# Patient Record
Sex: MALE | Race: WHITE | NOT HISPANIC OR LATINO | Employment: OTHER | URBAN - METROPOLITAN AREA
[De-identification: names, ages, dates, MRNs, and addresses within clinical notes are randomized per-mention and may not be internally consistent; named-entity substitution may affect disease eponyms.]

---

## 2017-01-17 ENCOUNTER — HISTORICAL (OUTPATIENT)
Dept: INFUSION THERAPY | Facility: HOSPITAL | Age: 37
End: 2017-01-17

## 2017-01-17 ENCOUNTER — HISTORICAL (OUTPATIENT)
Dept: ADMINISTRATIVE | Facility: HOSPITAL | Age: 37
End: 2017-01-17

## 2017-01-18 ENCOUNTER — HISTORICAL (OUTPATIENT)
Dept: CARDIOLOGY | Facility: HOSPITAL | Age: 37
End: 2017-01-18

## 2017-01-24 ENCOUNTER — HISTORICAL (OUTPATIENT)
Dept: INFUSION THERAPY | Facility: HOSPITAL | Age: 37
End: 2017-01-24

## 2017-01-24 ENCOUNTER — HISTORICAL (OUTPATIENT)
Dept: ADMINISTRATIVE | Facility: HOSPITAL | Age: 37
End: 2017-01-24

## 2017-02-08 ENCOUNTER — HISTORICAL (OUTPATIENT)
Dept: INFUSION THERAPY | Facility: HOSPITAL | Age: 37
End: 2017-02-08

## 2017-02-09 ENCOUNTER — HISTORICAL (OUTPATIENT)
Dept: ADMINISTRATIVE | Facility: HOSPITAL | Age: 37
End: 2017-02-09

## 2017-02-14 ENCOUNTER — HISTORICAL (OUTPATIENT)
Dept: INFUSION THERAPY | Facility: HOSPITAL | Age: 37
End: 2017-02-14

## 2017-02-21 ENCOUNTER — HISTORICAL (OUTPATIENT)
Dept: INFUSION THERAPY | Facility: HOSPITAL | Age: 37
End: 2017-02-21

## 2017-03-01 ENCOUNTER — HISTORICAL (OUTPATIENT)
Dept: INTERNAL MEDICINE | Facility: CLINIC | Age: 37
End: 2017-03-01

## 2017-03-02 ENCOUNTER — HISTORICAL (OUTPATIENT)
Dept: INFUSION THERAPY | Facility: HOSPITAL | Age: 37
End: 2017-03-02

## 2017-03-08 ENCOUNTER — HISTORICAL (OUTPATIENT)
Dept: INTERNAL MEDICINE | Facility: CLINIC | Age: 37
End: 2017-03-08

## 2017-03-09 ENCOUNTER — HISTORICAL (OUTPATIENT)
Dept: INFUSION THERAPY | Facility: HOSPITAL | Age: 37
End: 2017-03-09

## 2017-03-15 ENCOUNTER — HISTORICAL (OUTPATIENT)
Dept: LAB | Facility: HOSPITAL | Age: 37
End: 2017-03-15

## 2017-03-15 ENCOUNTER — HISTORICAL (OUTPATIENT)
Dept: INTERNAL MEDICINE | Facility: CLINIC | Age: 37
End: 2017-03-15

## 2017-03-22 ENCOUNTER — HISTORICAL (OUTPATIENT)
Dept: INTERNAL MEDICINE | Facility: CLINIC | Age: 37
End: 2017-03-22

## 2017-03-29 ENCOUNTER — HISTORICAL (OUTPATIENT)
Dept: INTERNAL MEDICINE | Facility: CLINIC | Age: 37
End: 2017-03-29

## 2017-04-12 ENCOUNTER — HISTORICAL (OUTPATIENT)
Dept: INTERNAL MEDICINE | Facility: CLINIC | Age: 37
End: 2017-04-12

## 2017-04-18 ENCOUNTER — HISTORICAL (OUTPATIENT)
Dept: INTERNAL MEDICINE | Facility: CLINIC | Age: 37
End: 2017-04-18

## 2017-04-25 ENCOUNTER — HISTORICAL (OUTPATIENT)
Dept: INTERNAL MEDICINE | Facility: CLINIC | Age: 37
End: 2017-04-25

## 2017-05-24 ENCOUNTER — HISTORICAL (OUTPATIENT)
Dept: INTERNAL MEDICINE | Facility: CLINIC | Age: 37
End: 2017-05-24

## 2017-05-24 LAB
ABS NEUT (OLG): 5.69 X10(3)/MCL (ref 2.1–9.2)
ALBUMIN SERPL-MCNC: 3.7 GM/DL (ref 3.4–5)
ALBUMIN/GLOB SERPL: 1 {RATIO}
ALP SERPL-CCNC: 160 UNIT/L (ref 20–120)
ALT SERPL-CCNC: 57 UNIT/L
APPEARANCE, UA: CLEAR
AST SERPL-CCNC: 31 UNIT/L
BACTERIA #/AREA URNS AUTO: ABNORMAL /[HPF]
BASOPHILS # BLD AUTO: 0.08 X10(3)/MCL
BASOPHILS NFR BLD AUTO: 1 % (ref 0–1)
BILIRUB SERPL-MCNC: 0.6 MG/DL
BILIRUB UR QL STRIP: NEGATIVE
BILIRUBIN DIRECT+TOT PNL SERPL-MCNC: <0.1 MG/DL
BILIRUBIN DIRECT+TOT PNL SERPL-MCNC: >0.5 MG/DL
BUN SERPL-MCNC: 13 MG/DL (ref 7–25)
CALCIUM SERPL-MCNC: 9 MG/DL (ref 8.4–10.3)
CHLORIDE SERPL-SCNC: 102 MMOL/L (ref 96–110)
CHOLEST SERPL-MCNC: 208 MG/DL
CHOLEST/HDLC SERPL: 10.4 {RATIO} (ref 0–5)
CO2 SERPL-SCNC: 23 MMOL/L (ref 24–32)
COLOR UR: ABNORMAL
CREAT SERPL-MCNC: 0.72 MG/DL (ref 0.7–1.4)
CREAT UR-MCNC: 117.3 MG/DL
DEPRECATED CALCIDIOL+CALCIFEROL SERPL-MC: 8.33 NG/ML (ref 30–80)
EOSINOPHIL # BLD AUTO: 0.43 10*3/UL
EOSINOPHIL NFR BLD AUTO: 5 % (ref 0–5)
ERYTHROCYTE [DISTWIDTH] IN BLOOD BY AUTOMATED COUNT: 14.4 % (ref 11.5–14.5)
EST. AVERAGE GLUCOSE BLD GHB EST-MCNC: 229 MG/DL
GLOBULIN SER-MCNC: 3.9 GM/ML (ref 2.3–3.5)
GLUCOSE (UA): 200 MG/DL
GLUCOSE SERPL-MCNC: 242 MG/DL (ref 65–99)
HBA1C MFR BLD: 9.6 % (ref 4.7–5.6)
HCT VFR BLD AUTO: 40.1 % (ref 40–51)
HDLC SERPL-MCNC: 20 MG/DL
HGB BLD-MCNC: 13.7 GM/DL (ref 13.5–17.5)
HGB UR QL STRIP: NEGATIVE
HIV 1+2 AB+HIV1 P24 AG SERPL QL IA: NONREACTIVE
HYALINE CASTS #/AREA URNS LPF: ABNORMAL /[LPF]
IMM GRANULOCYTES # BLD AUTO: 0.06 10*3/UL
IMM GRANULOCYTES NFR BLD AUTO: 1 %
KETONES UR QL STRIP: NEGATIVE
LDLC SERPL CALC-MCNC: ABNORMAL MG/DL (ref 0–130)
LEUKOCYTE ESTERASE UR QL STRIP: NEGATIVE
LYMPHOCYTES # BLD AUTO: 2.17 X10(3)/MCL
LYMPHOCYTES NFR BLD AUTO: 24 % (ref 15–40)
MCH RBC QN AUTO: 28 PG (ref 26–34)
MCHC RBC AUTO-ENTMCNC: 34.2 GM/DL (ref 31–37)
MCV RBC AUTO: 81.8 FL (ref 80–100)
MICROALBUMIN UR-MCNC: 708.4 MG/L (ref 0–19)
MICROALBUMIN/CREAT RATIO PNL UR: 603.9 MCG/MG CR (ref 0–29)
MONOCYTES # BLD AUTO: 0.64 X10(3)/MCL
MONOCYTES NFR BLD AUTO: 7 % (ref 4–12)
NEUTROPHILS # BLD AUTO: 5.69 X10(3)/MCL
NEUTROPHILS NFR BLD AUTO: 63 X10(3)/MCL
NITRITE UR QL STRIP: NEGATIVE
PH UR STRIP: 5.5 [PH] (ref 4.5–8)
PLATELET # BLD AUTO: 306 X10(3)/MCL (ref 130–400)
PMV BLD AUTO: 10.2 FL (ref 7.4–10.4)
POTASSIUM SERPL-SCNC: 3.9 MMOL/L (ref 3.6–5.2)
PROT SERPL-MCNC: 7.6 GM/DL (ref 6–8)
PROT UR QL STRIP: 100 MG/DL
RBC # BLD AUTO: 4.9 X10(6)/MCL (ref 4.5–5.9)
RBC #/AREA URNS AUTO: ABNORMAL /[HPF]
SODIUM SERPL-SCNC: 135 MMOL/L (ref 135–146)
SP GR UR STRIP: 1.01 (ref 1–1.03)
SQUAMOUS #/AREA URNS LPF: ABNORMAL /[LPF]
TRIGL SERPL-MCNC: 1223 MG/DL
TSH SERPL-ACNC: 1.69 MIU/L (ref 0.5–5)
UROBILINOGEN UR STRIP-ACNC: NORMAL
VLDLC SERPL CALC-MCNC: ABNORMAL MG/DL
WBC # SPEC AUTO: 9.1 X10(3)/MCL (ref 4.5–11)
WBC #/AREA URNS AUTO: ABNORMAL /HPF

## 2017-05-31 ENCOUNTER — HISTORICAL (OUTPATIENT)
Dept: INTERNAL MEDICINE | Facility: CLINIC | Age: 37
End: 2017-05-31

## 2017-05-31 LAB
CHOLEST SERPL-MCNC: 310 MG/DL
CHOLEST/HDLC SERPL: 14.8 {RATIO} (ref 0–5)
HDLC SERPL-MCNC: 21 MG/DL
LDLC SERPL CALC-MCNC: ABNORMAL MG/DL (ref 0–130)
TRIGL SERPL-MCNC: 3741 MG/DL
VLDLC SERPL CALC-MCNC: ABNORMAL MG/DL

## 2017-06-07 ENCOUNTER — HISTORICAL (OUTPATIENT)
Dept: INTERNAL MEDICINE | Facility: CLINIC | Age: 37
End: 2017-06-07

## 2017-06-07 LAB
CHOLEST SERPL-MCNC: 207 MG/DL
CHOLEST/HDLC SERPL: ABNORMAL {RATIO} (ref 0–5)
HDLC SERPL-MCNC: ABNORMAL MG/DL
LDLC SERPL CALC-MCNC: ABNORMAL MG/DL (ref 0–130)
TRIGL SERPL-MCNC: 1785 MG/DL
VLDLC SERPL CALC-MCNC: ABNORMAL MG/DL

## 2017-06-21 ENCOUNTER — HISTORICAL (OUTPATIENT)
Dept: INTERNAL MEDICINE | Facility: CLINIC | Age: 37
End: 2017-06-21

## 2017-06-21 LAB
CHOLEST SERPL-MCNC: 393 MG/DL
CHOLEST/HDLC SERPL: ABNORMAL {RATIO} (ref 0–5)
HDLC SERPL-MCNC: ABNORMAL MG/DL
LDLC SERPL CALC-MCNC: ABNORMAL MG/DL (ref 0–130)
TRIGL SERPL-MCNC: 4468 MG/DL
VLDLC SERPL CALC-MCNC: ABNORMAL MG/DL

## 2017-06-22 ENCOUNTER — HISTORICAL (OUTPATIENT)
Dept: LAB | Facility: HOSPITAL | Age: 37
End: 2017-06-22

## 2017-06-23 ENCOUNTER — HISTORICAL (OUTPATIENT)
Dept: ADMINISTRATIVE | Facility: HOSPITAL | Age: 37
End: 2017-06-23

## 2017-06-23 LAB
INR PPP: 0.9 (ref 0.9–1.2)
PROTHROMBIN TIME: 12 SECOND(S) (ref 11.9–14.4)
TRIGL SERPL-MCNC: 3487 MG/DL

## 2017-06-24 ENCOUNTER — HISTORICAL (OUTPATIENT)
Dept: ADMINISTRATIVE | Facility: HOSPITAL | Age: 37
End: 2017-06-24

## 2017-06-26 ENCOUNTER — HISTORICAL (OUTPATIENT)
Dept: ADMINISTRATIVE | Facility: HOSPITAL | Age: 37
End: 2017-06-26

## 2017-06-26 LAB — TRIGL SERPL-MCNC: 4583 MG/DL

## 2017-07-12 ENCOUNTER — HISTORICAL (OUTPATIENT)
Dept: ADMINISTRATIVE | Facility: HOSPITAL | Age: 37
End: 2017-07-12

## 2017-07-12 ENCOUNTER — HISTORICAL (OUTPATIENT)
Dept: INTERNAL MEDICINE | Facility: CLINIC | Age: 37
End: 2017-07-12

## 2017-07-12 LAB
CHOLEST SERPL-MCNC: 231 MG/DL
CHOLEST/HDLC SERPL: ABNORMAL {RATIO} (ref 0–5)
HDLC SERPL-MCNC: ABNORMAL MG/DL
LDLC SERPL CALC-MCNC: ABNORMAL MG/DL (ref 0–130)
TRIGL SERPL-MCNC: 1930 MG/DL
VLDLC SERPL CALC-MCNC: ABNORMAL MG/DL

## 2017-07-13 ENCOUNTER — HISTORICAL (OUTPATIENT)
Dept: LAB | Facility: HOSPITAL | Age: 37
End: 2017-07-13

## 2017-07-19 ENCOUNTER — HISTORICAL (OUTPATIENT)
Dept: INTERNAL MEDICINE | Facility: CLINIC | Age: 37
End: 2017-07-19

## 2017-07-19 LAB
CHOLEST SERPL-MCNC: 251 MG/DL
CHOLEST/HDLC SERPL: ABNORMAL {RATIO} (ref 0–5)
HDLC SERPL-MCNC: 17 MG/DL
LDLC SERPL CALC-MCNC: ABNORMAL MG/DL (ref 0–130)
TRIGL SERPL-MCNC: 4535 MG/DL
VLDLC SERPL CALC-MCNC: ABNORMAL MG/DL

## 2017-07-20 ENCOUNTER — HISTORICAL (OUTPATIENT)
Dept: ADMINISTRATIVE | Facility: HOSPITAL | Age: 37
End: 2017-07-20

## 2017-07-20 LAB — HBV SURFACE AG SERPL QL IA: NEGATIVE

## 2017-07-21 ENCOUNTER — HOSPITAL ENCOUNTER (OUTPATIENT)
Dept: ADMINISTRATIVE | Facility: HOSPITAL | Age: 37
End: 2017-07-22

## 2017-07-21 LAB
ABS NEUT (OLG): 7.15 X10(3)/MCL (ref 2.1–9.2)
ALBUMIN SERPL-MCNC: 4.2 GM/DL (ref 3.4–5)
ALBUMIN/GLOB SERPL: 1.4 {RATIO}
ALP SERPL-CCNC: 159 UNIT/L (ref 50–136)
ALT SERPL-CCNC: 95 UNIT/L (ref 12–78)
APTT PPP: 27.4 SECOND(S) (ref 20.6–36)
AST SERPL-CCNC: 86 UNIT/L (ref 15–37)
BASOPHILS # BLD AUTO: 0.1 X10(3)/MCL (ref 0–0.2)
BASOPHILS NFR BLD AUTO: 1 %
BILIRUB SERPL-MCNC: 0.5 MG/DL (ref 0.2–1)
BILIRUBIN DIRECT+TOT PNL SERPL-MCNC: 0 MG/DL (ref 0–0.5)
BILIRUBIN DIRECT+TOT PNL SERPL-MCNC: 0.5 MG/DL (ref 0–0.8)
BUN SERPL-MCNC: 14 MG/DL (ref 7–18)
CALCIUM SERPL-MCNC: 7.9 MG/DL (ref 8.5–10.1)
CHLORIDE SERPL-SCNC: 100 MMOL/L (ref 98–107)
CHOLEST SERPL-MCNC: 166 MG/DL (ref 0–200)
CHOLEST/HDLC SERPL: 23.7 {RATIO} (ref 0–5)
CO2 SERPL-SCNC: 23 MMOL/L (ref 21–32)
CREAT SERPL-MCNC: 0.9 MG/DL (ref 0.7–1.3)
EOSINOPHIL # BLD AUTO: 0.3 X10(3)/MCL (ref 0–0.9)
EOSINOPHIL NFR BLD AUTO: 2 %
ERYTHROCYTE [DISTWIDTH] IN BLOOD BY AUTOMATED COUNT: 13.2 % (ref 11.5–17)
ERYTHROCYTE [SEDIMENTATION RATE] IN BLOOD: 1 MM/HR (ref 0–15)
GLOBULIN SER-MCNC: 2.9 GM/DL (ref 2.4–3.5)
GLUCOSE SERPL-MCNC: 406 MG/DL (ref 74–106)
HCT VFR BLD AUTO: 38.9 % (ref 42–52)
HDLC SERPL-MCNC: 7 MG/DL (ref 35–60)
HGB BLD-MCNC: 14.1 GM/DL (ref 14–18)
INR PPP: 0.93 (ref 0–1.27)
LDLC SERPL CALC-MCNC: ABNORMAL MG/DL (ref 0–129)
LIPASE SERPL-CCNC: 403 UNIT/L (ref 73–393)
LIPASE SERPL-CCNC: 447 UNIT/L (ref 73–393)
LYMPHOCYTES # BLD AUTO: 2.9 X10(3)/MCL (ref 0.6–4.6)
LYMPHOCYTES NFR BLD AUTO: 25 %
MCH RBC QN AUTO: 29.2 PG (ref 27–31)
MCHC RBC AUTO-ENTMCNC: 35.2 GM/DL (ref 33–36)
MCV RBC AUTO: 82.8 FL (ref 80–94)
MONOCYTES # BLD AUTO: 1 X10(3)/MCL (ref 0.1–1.3)
MONOCYTES NFR BLD AUTO: 9 %
NEUTROPHILS # BLD AUTO: 7.15 X10(3)/MCL (ref 1.4–7.9)
NEUTROPHILS NFR BLD AUTO: 62 %
PLATELET # BLD AUTO: 268 X10(3)/MCL (ref 130–400)
PMV BLD AUTO: 10.8 FL (ref 9.4–12.4)
POTASSIUM SERPL-SCNC: 5.6 MMOL/L (ref 3.5–5.1)
PROT SERPL-MCNC: 7.1 GM/DL (ref 6.4–8.2)
PROTHROMBIN TIME: 12.3 SECOND(S) (ref 12.1–14.2)
RBC # BLD AUTO: 4.7 X10(6)/MCL (ref 4.7–6.1)
SODIUM SERPL-SCNC: 130 MMOL/L (ref 136–145)
TRIGL SERPL-MCNC: 1254 MG/DL (ref 30–150)
TROP %DIFF IP 3 (OHS): 0 % (ref 0–29)
TROP 3HR (OHS): <0.02 NG/ML (ref 0.02–0.5)
TROP 6HR (OHS): <0.02 NG/ML (ref 0.02–0.5)
TROP IP BASE (OHS): <0.02 NG/ML (ref 0.02–0.5)
TROP IP BASE (OHS): <0.02 NG/ML (ref 0.02–0.5)
TROPONIN I SERPL-MCNC: <0.02 NG/ML (ref 0.02–0.49)
TROPONIN I SERPL-MCNC: <0.02 NG/ML (ref 0.02–0.49)
VLDLC SERPL CALC-MCNC: 251 MG/DL
WBC # SPEC AUTO: 11.4 X10(3)/MCL (ref 4.5–11.5)

## 2017-07-22 LAB
ABS NEUT (OLG): 4.53 X10(3)/MCL (ref 2.1–9.2)
ALBUMIN SERPL-MCNC: 3.4 GM/DL (ref 3.4–5)
ALBUMIN/GLOB SERPL: 1.3 RATIO (ref 1.1–2)
ALP SERPL-CCNC: 135 UNIT/L (ref 50–136)
ALT SERPL-CCNC: 112 UNIT/L (ref 12–78)
APTT PPP: 28 SECOND(S) (ref 20.6–36)
AST SERPL-CCNC: 47 UNIT/L (ref 15–37)
BASOPHILS # BLD AUTO: 0 X10(3)/MCL (ref 0–0.2)
BASOPHILS NFR BLD AUTO: 0 %
BILIRUB SERPL-MCNC: 0.4 MG/DL (ref 0.2–1)
BILIRUBIN DIRECT+TOT PNL SERPL-MCNC: 0 MG/DL (ref 0–0.5)
BILIRUBIN DIRECT+TOT PNL SERPL-MCNC: 0.4 MG/DL (ref 0–0.8)
BUN SERPL-MCNC: 7 MG/DL (ref 7–18)
CALCIUM SERPL-MCNC: 7.9 MG/DL (ref 8.5–10.1)
CHLORIDE SERPL-SCNC: 106 MMOL/L (ref 98–107)
CHOLEST SERPL-MCNC: 182 MG/DL (ref 0–200)
CHOLEST/HDLC SERPL: 15.2 {RATIO} (ref 0–5)
CO2 SERPL-SCNC: 20 MMOL/L (ref 21–32)
CREAT SERPL-MCNC: 0.79 MG/DL (ref 0.7–1.3)
EOSINOPHIL # BLD AUTO: 0.2 X10(3)/MCL (ref 0–0.9)
EOSINOPHIL NFR BLD AUTO: 3 %
ERYTHROCYTE [DISTWIDTH] IN BLOOD BY AUTOMATED COUNT: 13.4 % (ref 11.5–17)
GLOBULIN SER-MCNC: 2.6 GM/DL (ref 2.4–3.5)
GLUCOSE SERPL-MCNC: 309 MG/DL (ref 74–106)
HCT VFR BLD AUTO: 35.9 % (ref 42–52)
HDLC SERPL-MCNC: 12 MG/DL (ref 35–60)
HGB BLD-MCNC: 13.1 GM/DL (ref 14–18)
INR PPP: 0.91 (ref 0–1.27)
LDLC SERPL CALC-MCNC: ABNORMAL MG/DL (ref 0–129)
LIPASE SERPL-CCNC: 352 UNIT/L (ref 73–393)
LYMPHOCYTES # BLD AUTO: 2.5 X10(3)/MCL (ref 0.6–4.6)
LYMPHOCYTES NFR BLD AUTO: 32 %
MCH RBC QN AUTO: 31 PG (ref 27–31)
MCHC RBC AUTO-ENTMCNC: 36.5 GM/DL (ref 33–36)
MCV RBC AUTO: 84.9 FL (ref 80–94)
MONOCYTES # BLD AUTO: 0.6 X10(3)/MCL (ref 0.1–1.3)
MONOCYTES NFR BLD AUTO: 8 %
NEUTROPHILS # BLD AUTO: 4.53 X10(3)/MCL (ref 2.1–9.2)
NEUTROPHILS NFR BLD AUTO: 56 %
PLATELET # BLD AUTO: 208 X10(3)/MCL (ref 130–400)
PMV BLD AUTO: 10.8 FL (ref 9.4–12.4)
POTASSIUM SERPL-SCNC: 4.3 MMOL/L (ref 3.5–5.1)
PROT SERPL-MCNC: 6 GM/DL (ref 6.4–8.2)
PROTHROMBIN TIME: 12.1 SECOND(S) (ref 12.1–14.2)
RBC # BLD AUTO: 4.23 X10(6)/MCL (ref 4.7–6.1)
SODIUM SERPL-SCNC: 138 MMOL/L (ref 136–145)
TRIGL SERPL-MCNC: 1407 MG/DL (ref 30–150)
VLDLC SERPL CALC-MCNC: 281 MG/DL
WBC # SPEC AUTO: 8 X10(3)/MCL (ref 4.5–11.5)

## 2017-07-26 ENCOUNTER — HISTORICAL (OUTPATIENT)
Dept: GASTROENTEROLOGY | Facility: CLINIC | Age: 37
End: 2017-07-26

## 2017-07-26 ENCOUNTER — HISTORICAL (OUTPATIENT)
Dept: ADMINISTRATIVE | Facility: HOSPITAL | Age: 37
End: 2017-07-26

## 2017-07-26 LAB
CHOLEST SERPL-MCNC: 258 MG/DL
CHOLEST/HDLC SERPL: ABNORMAL {RATIO} (ref 0–5)
HDLC SERPL-MCNC: ABNORMAL MG/DL
LDLC SERPL CALC-MCNC: ABNORMAL MG/DL (ref 0–130)
TRIGL SERPL-MCNC: 2875 MG/DL
VLDLC SERPL CALC-MCNC: 575 MG/DL

## 2017-08-02 ENCOUNTER — HISTORICAL (OUTPATIENT)
Dept: ADMINISTRATIVE | Facility: HOSPITAL | Age: 37
End: 2017-08-02

## 2017-08-09 ENCOUNTER — HISTORICAL (OUTPATIENT)
Dept: INTERNAL MEDICINE | Facility: CLINIC | Age: 37
End: 2017-08-09

## 2017-08-09 ENCOUNTER — HISTORICAL (OUTPATIENT)
Dept: ADMINISTRATIVE | Facility: HOSPITAL | Age: 37
End: 2017-08-09

## 2017-08-09 LAB
CHOLEST SERPL-MCNC: 369 MG/DL
CHOLEST/HDLC SERPL: ABNORMAL {RATIO} (ref 0–5)
HDLC SERPL-MCNC: ABNORMAL MG/DL
LDLC SERPL CALC-MCNC: ABNORMAL MG/DL (ref 0–130)
TRIGL SERPL-MCNC: >4000 MG/DL
VLDLC SERPL CALC-MCNC: ABNORMAL MG/DL

## 2017-08-11 ENCOUNTER — HISTORICAL (OUTPATIENT)
Dept: ADMINISTRATIVE | Facility: HOSPITAL | Age: 37
End: 2017-08-11

## 2017-08-11 LAB
AMYLASE SERPL-CCNC: 12 UNIT/L (ref 25–115)
CHOLEST SERPL-MCNC: 347 MG/DL
CHOLEST/HDLC SERPL: 13.3 {RATIO} (ref 0–5)
HDLC SERPL-MCNC: 26 MG/DL
LDLC SERPL CALC-MCNC: ABNORMAL MG/DL (ref 0–130)
LIPASE SERPL-CCNC: 321 UNIT/L (ref 73–393)
TRIGL SERPL-MCNC: >4000 MG/DL
VLDLC SERPL CALC-MCNC: ABNORMAL MG/DL

## 2017-08-15 ENCOUNTER — HISTORICAL (OUTPATIENT)
Dept: ADMINISTRATIVE | Facility: HOSPITAL | Age: 37
End: 2017-08-15

## 2017-08-15 LAB
APPEARANCE, UA: CLEAR
BACTERIA #/AREA URNS AUTO: ABNORMAL /[HPF]
BILIRUB UR QL STRIP: NEGATIVE
COLOR UR: YELLOW
CREAT UR-MCNC: 152 MG/DL
DEPRECATED CALCIDIOL+CALCIFEROL SERPL-MC: 4.21 NG/ML (ref 30–80)
GLUCOSE (UA): >1000 MG/DL
HGB UR QL STRIP: NEGATIVE
HYALINE CASTS #/AREA URNS LPF: ABNORMAL /[LPF]
KETONES UR QL STRIP: NEGATIVE
LEUKOCYTE ESTERASE UR QL STRIP: NEGATIVE
NITRITE UR QL STRIP: NEGATIVE
PH UR STRIP: 5.5 [PH] (ref 4.5–8)
PROT UR QL STRIP: 300 MG/DL
PROT UR STRIP-MCNC: 224.6 MG/DL
PROT/CREAT UR-RTO: 1477.6 MG/GM
PTH-INTACT SERPL-MCNC: 56 PG/ML (ref 13.8–85)
RBC #/AREA URNS AUTO: ABNORMAL /[HPF]
SP GR UR STRIP: 1.04 (ref 1–1.03)
SQUAMOUS #/AREA URNS LPF: ABNORMAL /[LPF]
UROBILINOGEN UR STRIP-ACNC: NORMAL
WBC #/AREA URNS AUTO: ABNORMAL /HPF

## 2017-08-16 LAB
ALBUMIN SERPL-MCNC: 4.1 GM/DL (ref 3.4–5)
ALBUMIN/GLOB SERPL: 1 RATIO (ref 1–2)
ALP SERPL-CCNC: 206 UNIT/L (ref 45–117)
ALT SERPL-CCNC: 92 UNIT/L (ref 12–78)
AST SERPL-CCNC: 59 UNIT/L (ref 15–37)
BILIRUB SERPL-MCNC: 0.3 MG/DL (ref 0.2–1)
BILIRUBIN DIRECT+TOT PNL SERPL-MCNC: <0.1 MG/DL
BILIRUBIN DIRECT+TOT PNL SERPL-MCNC: >0.2 MG/DL
BUN SERPL-MCNC: 9 MG/DL (ref 7–18)
CALCIUM SERPL-MCNC: 9.3 MG/DL (ref 8.5–10.1)
CHLORIDE SERPL-SCNC: 103 MMOL/L (ref 98–107)
CHOLEST SERPL-MCNC: 249 MG/DL
CHOLEST/HDLC SERPL: ABNORMAL {RATIO} (ref 0–5)
CO2 SERPL-SCNC: 23 MMOL/L (ref 21–32)
CREAT SERPL-MCNC: 0.9 MG/DL (ref 0.6–1.3)
FERRITIN SERPL-MCNC: 31.6 NG/ML (ref 26–388)
GLOBULIN SER-MCNC: 3.5 GM/ML (ref 2.3–3.5)
GLUCOSE SERPL-MCNC: 406 MG/DL (ref 74–106)
HDLC SERPL-MCNC: ABNORMAL MG/DL
IRON SATN MFR SERPL: 19.2 % (ref 15–50)
IRON SERPL-MCNC: 58 MCG/DL (ref 65–175)
LDLC SERPL CALC-MCNC: ABNORMAL MG/DL (ref 0–130)
MAGNESIUM SERPL-MCNC: 2 MG/DL (ref 1.8–2.4)
PHOSPHATE SERPL-MCNC: 3.7 MG/DL (ref 2.5–4.9)
POTASSIUM SERPL-SCNC: 4.6 MMOL/L (ref 3.5–5.1)
PROT SERPL-MCNC: 7.6 GM/DL (ref 6.4–8.2)
SODIUM SERPL-SCNC: 135 MMOL/L (ref 136–145)
TIBC SERPL-MCNC: 302 MCG/DL (ref 250–450)
TRANSFERRIN SERPL-MCNC: 240 MG/DL (ref 200–360)
TRIGL SERPL-MCNC: 2903 MG/DL
VLDLC SERPL CALC-MCNC: ABNORMAL MG/DL

## 2017-08-17 ENCOUNTER — HISTORICAL (OUTPATIENT)
Dept: ADMINISTRATIVE | Facility: HOSPITAL | Age: 37
End: 2017-08-17

## 2017-08-23 ENCOUNTER — HISTORICAL (OUTPATIENT)
Dept: LAB | Facility: HOSPITAL | Age: 37
End: 2017-08-23

## 2017-08-23 ENCOUNTER — HISTORICAL (OUTPATIENT)
Dept: INTERNAL MEDICINE | Facility: CLINIC | Age: 37
End: 2017-08-23

## 2017-08-23 LAB
CHOLEST SERPL-MCNC: 283 MG/DL
CHOLEST/HDLC SERPL: ABNORMAL {RATIO} (ref 0–5)
HBV SURFACE AG SERPL QL IA: NEGATIVE
HDLC SERPL-MCNC: ABNORMAL MG/DL
LDLC SERPL CALC-MCNC: ABNORMAL MG/DL (ref 0–130)
TRIGL SERPL-MCNC: 3403 MG/DL
VLDLC SERPL CALC-MCNC: ABNORMAL MG/DL

## 2017-08-31 ENCOUNTER — HISTORICAL (OUTPATIENT)
Dept: ADMINISTRATIVE | Facility: HOSPITAL | Age: 37
End: 2017-08-31

## 2017-09-12 ENCOUNTER — HISTORICAL (OUTPATIENT)
Dept: ADMINISTRATIVE | Facility: HOSPITAL | Age: 37
End: 2017-09-12

## 2017-09-13 ENCOUNTER — HISTORICAL (OUTPATIENT)
Dept: SURGERY | Facility: HOSPITAL | Age: 37
End: 2017-09-13

## 2017-09-13 LAB — POTASSIUM SERPL-SCNC: 3.6 MMOL/L (ref 3.5–5.1)

## 2017-09-20 ENCOUNTER — HISTORICAL (OUTPATIENT)
Dept: INTERNAL MEDICINE | Facility: CLINIC | Age: 37
End: 2017-09-20

## 2017-09-20 LAB
CHOLEST SERPL-MCNC: 327 MG/DL
CHOLEST/HDLC SERPL: ABNORMAL {RATIO} (ref 0–5)
HDLC SERPL-MCNC: ABNORMAL MG/DL
LDLC SERPL CALC-MCNC: ABNORMAL MG/DL (ref 0–130)
TRIGL SERPL-MCNC: 3543 MG/DL
VLDLC SERPL CALC-MCNC: ABNORMAL MG/DL

## 2017-09-21 ENCOUNTER — HISTORICAL (OUTPATIENT)
Dept: ADMINISTRATIVE | Facility: HOSPITAL | Age: 37
End: 2017-09-21

## 2017-09-22 ENCOUNTER — HISTORICAL (OUTPATIENT)
Dept: LAB | Facility: HOSPITAL | Age: 37
End: 2017-09-22

## 2017-09-22 ENCOUNTER — HISTORICAL (OUTPATIENT)
Dept: GASTROENTEROLOGY | Facility: CLINIC | Age: 37
End: 2017-09-22

## 2017-09-22 LAB — TRIGL SERPL-MCNC: 2526 MG/DL

## 2017-09-27 ENCOUNTER — HISTORICAL (OUTPATIENT)
Dept: GASTROENTEROLOGY | Facility: CLINIC | Age: 37
End: 2017-09-27

## 2017-09-27 LAB
CHOLEST SERPL-MCNC: 218 MG/DL
CHOLEST/HDLC SERPL: ABNORMAL {RATIO} (ref 0–5)
HBV SURFACE AB SER-ACNC: <3.1 MIU/ML
HBV SURFACE AB SERPL IA-ACNC: NONREACTIVE M[IU]/ML
HBV SURFACE AG SERPL QL IA: NEGATIVE
HDLC SERPL-MCNC: ABNORMAL MG/DL
LDLC SERPL CALC-MCNC: ABNORMAL MG/DL (ref 0–130)
TRIGL SERPL-MCNC: 1703 MG/DL
VLDLC SERPL CALC-MCNC: ABNORMAL MG/DL

## 2017-10-04 ENCOUNTER — HISTORICAL (OUTPATIENT)
Dept: INTERNAL MEDICINE | Facility: CLINIC | Age: 37
End: 2017-10-04

## 2017-10-04 LAB
CHOLEST SERPL-MCNC: 257 MG/DL
CHOLEST/HDLC SERPL: ABNORMAL {RATIO} (ref 0–5)
HDLC SERPL-MCNC: ABNORMAL MG/DL
LDLC SERPL CALC-MCNC: ABNORMAL MG/DL (ref 0–130)
TRIGL SERPL-MCNC: 2017 MG/DL
VLDLC SERPL CALC-MCNC: ABNORMAL MG/DL

## 2017-10-05 ENCOUNTER — HISTORICAL (OUTPATIENT)
Dept: LAB | Facility: HOSPITAL | Age: 37
End: 2017-10-05

## 2017-10-11 ENCOUNTER — HISTORICAL (OUTPATIENT)
Dept: INTERNAL MEDICINE | Facility: CLINIC | Age: 37
End: 2017-10-11

## 2017-10-11 LAB
CHOLEST SERPL-MCNC: 170 MG/DL
CHOLEST/HDLC SERPL: ABNORMAL {RATIO} (ref 0–5)
HDLC SERPL-MCNC: ABNORMAL MG/DL
LDLC SERPL CALC-MCNC: ABNORMAL MG/DL (ref 0–130)
TRIGL SERPL-MCNC: 2209 MG/DL
VLDLC SERPL CALC-MCNC: ABNORMAL MG/DL

## 2017-10-12 ENCOUNTER — HISTORICAL (OUTPATIENT)
Dept: ADMINISTRATIVE | Facility: HOSPITAL | Age: 37
End: 2017-10-12

## 2017-10-18 ENCOUNTER — HISTORICAL (OUTPATIENT)
Dept: INTERNAL MEDICINE | Facility: CLINIC | Age: 37
End: 2017-10-18

## 2017-10-18 LAB
APPEARANCE, UA: CLEAR
BACTERIA #/AREA URNS AUTO: ABNORMAL /[HPF]
BILIRUB UR QL STRIP: NEGATIVE
CHOLEST SERPL-MCNC: 184 MG/DL
CHOLEST/HDLC SERPL: 10.8 {RATIO} (ref 0–5)
COLOR UR: ABNORMAL
CREAT UR-MCNC: 99 MG/DL
EST. AVERAGE GLUCOSE BLD GHB EST-MCNC: 295 MG/DL
GLUCOSE (UA): >1000 MG/DL
HBA1C MFR BLD: 11.9 % (ref 4.2–6.3)
HDLC SERPL-MCNC: 17 MG/DL
HGB UR QL STRIP: NEGATIVE
HYALINE CASTS #/AREA URNS LPF: ABNORMAL /[LPF]
KETONES UR QL STRIP: NEGATIVE
LDLC SERPL CALC-MCNC: ABNORMAL MG/DL (ref 0–130)
LEUKOCYTE ESTERASE UR QL STRIP: NEGATIVE
MICROALBUMIN UR-MCNC: 993 MG/L (ref 0–19)
MICROALBUMIN/CREAT RATIO PNL UR: 1003 MCG/MG CR (ref 0–29)
NITRITE UR QL STRIP: NEGATIVE
PH UR STRIP: 5.5 [PH] (ref 4.5–8)
PROT UR QL STRIP: 200 MG/DL
RBC #/AREA URNS AUTO: ABNORMAL /[HPF]
SP GR UR STRIP: 1.03 (ref 1–1.03)
SQUAMOUS #/AREA URNS LPF: ABNORMAL /[LPF]
TRIGL SERPL-MCNC: 1105 MG/DL
TSH SERPL-ACNC: 1.3 MIU/L (ref 0.36–3.74)
UROBILINOGEN UR STRIP-ACNC: NORMAL
VLDLC SERPL CALC-MCNC: ABNORMAL MG/DL
WBC #/AREA URNS AUTO: ABNORMAL /HPF

## 2017-10-25 ENCOUNTER — HISTORICAL (OUTPATIENT)
Dept: LAB | Facility: HOSPITAL | Age: 37
End: 2017-10-25

## 2017-10-25 ENCOUNTER — HISTORICAL (OUTPATIENT)
Dept: INTERNAL MEDICINE | Facility: CLINIC | Age: 37
End: 2017-10-25

## 2017-10-25 LAB
CHOLEST SERPL-MCNC: 231 MG/DL
CHOLEST/HDLC SERPL: ABNORMAL {RATIO} (ref 0–5)
HDLC SERPL-MCNC: ABNORMAL MG/DL
LDLC SERPL CALC-MCNC: ABNORMAL MG/DL (ref 0–130)
TRIGL SERPL-MCNC: 2094 MG/DL
VLDLC SERPL CALC-MCNC: ABNORMAL MG/DL

## 2017-11-03 ENCOUNTER — HISTORICAL (OUTPATIENT)
Dept: INTERNAL MEDICINE | Facility: CLINIC | Age: 37
End: 2017-11-03

## 2017-11-03 LAB
CHOLEST SERPL-MCNC: 108 MG/DL
CHOLEST/HDLC SERPL: 5.1 {RATIO} (ref 0–5)
HDLC SERPL-MCNC: 21 MG/DL
LDLC SERPL CALC-MCNC: 34 MG/DL (ref 0–130)
TRIGL SERPL-MCNC: 267 MG/DL
VLDLC SERPL CALC-MCNC: 53 MG/DL

## 2017-11-08 ENCOUNTER — HISTORICAL (OUTPATIENT)
Dept: INTERNAL MEDICINE | Facility: CLINIC | Age: 37
End: 2017-11-08

## 2017-11-08 LAB
CHOLEST SERPL-MCNC: 142 MG/DL
CHOLEST/HDLC SERPL: 7.1 {RATIO} (ref 0–5)
HDLC SERPL-MCNC: 20 MG/DL
LDLC SERPL CALC-MCNC: ABNORMAL MG/DL (ref 0–130)
TRIGL SERPL-MCNC: 492 MG/DL
VLDLC SERPL CALC-MCNC: ABNORMAL MG/DL

## 2017-11-16 ENCOUNTER — HISTORICAL (OUTPATIENT)
Dept: INTERNAL MEDICINE | Facility: CLINIC | Age: 37
End: 2017-11-16

## 2017-11-16 LAB
CHOLEST SERPL-MCNC: 166 MG/DL
CHOLEST/HDLC SERPL: 7.5 {RATIO} (ref 0–5)
HDLC SERPL-MCNC: 22 MG/DL
LDLC SERPL CALC-MCNC: ABNORMAL MG/DL (ref 0–130)
TRIGL SERPL-MCNC: 803 MG/DL
VLDLC SERPL CALC-MCNC: ABNORMAL MG/DL

## 2017-11-29 ENCOUNTER — HISTORICAL (OUTPATIENT)
Dept: INTERNAL MEDICINE | Facility: CLINIC | Age: 37
End: 2017-11-29

## 2017-11-29 LAB
CHOLEST SERPL-MCNC: 165 MG/DL
CHOLEST/HDLC SERPL: 9.2 {RATIO} (ref 0–5)
HDLC SERPL-MCNC: 18 MG/DL
LDLC SERPL CALC-MCNC: ABNORMAL MG/DL (ref 0–130)
TRIGL SERPL-MCNC: 852 MG/DL
VLDLC SERPL CALC-MCNC: ABNORMAL MG/DL

## 2017-12-13 ENCOUNTER — HISTORICAL (OUTPATIENT)
Dept: NEPHROLOGY | Facility: CLINIC | Age: 37
End: 2017-12-13

## 2017-12-13 LAB
CHOLEST SERPL-MCNC: 255 MG/DL
CHOLEST/HDLC SERPL: ABNORMAL {RATIO} (ref 0–5)
HDLC SERPL-MCNC: ABNORMAL MG/DL
LDLC SERPL CALC-MCNC: ABNORMAL MG/DL (ref 0–130)
TRIGL SERPL-MCNC: 2231 MG/DL
VLDLC SERPL CALC-MCNC: ABNORMAL MG/DL

## 2017-12-14 ENCOUNTER — HISTORICAL (OUTPATIENT)
Dept: ADMINISTRATIVE | Facility: HOSPITAL | Age: 37
End: 2017-12-14

## 2017-12-14 LAB — HBV SURFACE AG SERPL QL IA: NEGATIVE

## 2017-12-18 ENCOUNTER — HISTORICAL (OUTPATIENT)
Dept: ADMINISTRATIVE | Facility: HOSPITAL | Age: 37
End: 2017-12-18

## 2017-12-18 LAB
ABS NEUT (OLG): 7.41 X10(3)/MCL (ref 2.1–9.2)
ALBUMIN SERPL-MCNC: 3.6 GM/DL (ref 3.4–5)
ALBUMIN/GLOB SERPL: 1 RATIO (ref 1–2)
ALP SERPL-CCNC: 237 UNIT/L (ref 45–117)
ALT SERPL-CCNC: 171 UNIT/L (ref 12–78)
AMYLASE SERPL-CCNC: 7 UNIT/L (ref 25–115)
AST SERPL-CCNC: 102 UNIT/L (ref 15–37)
BASOPHILS # BLD AUTO: 0.05 X10(3)/MCL
BASOPHILS NFR BLD AUTO: 0 % (ref 0–1)
BILIRUB SERPL-MCNC: 0.5 MG/DL (ref 0.2–1)
BILIRUBIN DIRECT+TOT PNL SERPL-MCNC: <0.1 MG/DL
BILIRUBIN DIRECT+TOT PNL SERPL-MCNC: ABNORMAL MG/DL
BUN SERPL-MCNC: 14 MG/DL (ref 7–18)
CALCIUM SERPL-MCNC: 8.8 MG/DL (ref 8.5–10.1)
CHLORIDE SERPL-SCNC: 95 MMOL/L (ref 98–107)
CHOLEST SERPL-MCNC: 234 MG/DL
CHOLEST/HDLC SERPL: ABNORMAL {RATIO} (ref 0–5)
CO2 SERPL-SCNC: 25 MMOL/L (ref 21–32)
CREAT SERPL-MCNC: 1.1 MG/DL (ref 0.6–1.3)
EOSINOPHIL # BLD AUTO: 0.19 10*3/UL
EOSINOPHIL NFR BLD AUTO: 2 % (ref 0–5)
ERYTHROCYTE [DISTWIDTH] IN BLOOD BY AUTOMATED COUNT: 12.8 % (ref 11.5–14.5)
GLOBULIN SER-MCNC: 4.3 GM/ML (ref 2.3–3.5)
GLUCOSE SERPL-MCNC: 487 MG/DL (ref 74–106)
HCT VFR BLD AUTO: 43 % (ref 40–51)
HDLC SERPL-MCNC: ABNORMAL MG/DL
HGB BLD-MCNC: 14.9 GM/DL (ref 13.5–17.5)
IMM GRANULOCYTES # BLD AUTO: 0.06 10*3/UL
IMM GRANULOCYTES NFR BLD AUTO: 1 %
LDLC SERPL CALC-MCNC: ABNORMAL MG/DL (ref 0–130)
LIPASE SERPL-CCNC: 138 UNIT/L (ref 73–393)
LYMPHOCYTES # BLD AUTO: 2.24 X10(3)/MCL
LYMPHOCYTES NFR BLD AUTO: 21 % (ref 15–40)
MAGNESIUM SERPL-MCNC: 2 MG/DL (ref 1.8–2.4)
MCH RBC QN AUTO: 29.1 PG (ref 26–34)
MCHC RBC AUTO-ENTMCNC: 35.4 GM/DL (ref 31–37)
MCV RBC AUTO: 82.2 FL (ref 80–100)
MONOCYTES # BLD AUTO: 0.66 X10(3)/MCL
MONOCYTES NFR BLD AUTO: 6 % (ref 4–12)
NEUTROPHILS # BLD AUTO: 7.41 X10(3)/MCL
NEUTROPHILS NFR BLD AUTO: 70 X10(3)/MCL
PHOSPHATE SERPL-MCNC: 3.1 MG/DL (ref 2.5–4.9)
PLATELET # BLD AUTO: 323 X10(3)/MCL (ref 130–400)
PMV BLD AUTO: 11.1 FL (ref 7.4–10.4)
POTASSIUM SERPL-SCNC: 4.3 MMOL/L (ref 3.5–5.1)
PROT SERPL-MCNC: 7.9 GM/DL (ref 6.4–8.2)
RBC # BLD AUTO: 5.23 X10(6)/MCL (ref 4.5–5.9)
SODIUM SERPL-SCNC: 130 MMOL/L (ref 136–145)
TRIGL SERPL-MCNC: 3292 MG/DL
VLDLC SERPL CALC-MCNC: ABNORMAL MG/DL
WBC # SPEC AUTO: 10.6 X10(3)/MCL (ref 4.5–11)

## 2017-12-20 ENCOUNTER — HISTORICAL (OUTPATIENT)
Dept: ADMINISTRATIVE | Facility: HOSPITAL | Age: 37
End: 2017-12-20

## 2017-12-27 ENCOUNTER — HISTORICAL (OUTPATIENT)
Dept: INTERNAL MEDICINE | Facility: CLINIC | Age: 37
End: 2017-12-27

## 2017-12-27 LAB
CHOLEST SERPL-MCNC: 245 MG/DL
CHOLEST/HDLC SERPL: ABNORMAL {RATIO} (ref 0–5)
HDLC SERPL-MCNC: ABNORMAL MG/DL
LDLC SERPL CALC-MCNC: ABNORMAL MG/DL (ref 0–130)
TRIGL SERPL-MCNC: 2772 MG/DL
VLDLC SERPL CALC-MCNC: ABNORMAL MG/DL

## 2017-12-28 ENCOUNTER — HISTORICAL (OUTPATIENT)
Dept: ADMINISTRATIVE | Facility: HOSPITAL | Age: 37
End: 2017-12-28

## 2018-01-10 ENCOUNTER — HISTORICAL (OUTPATIENT)
Dept: ADMINISTRATIVE | Facility: HOSPITAL | Age: 38
End: 2018-01-10

## 2018-01-10 LAB
CHOLEST SERPL-MCNC: 193 MG/DL
CHOLEST/HDLC SERPL: ABNORMAL {RATIO} (ref 0–5)
HDLC SERPL-MCNC: ABNORMAL MG/DL
LDLC SERPL CALC-MCNC: ABNORMAL MG/DL (ref 0–130)
TRIGL SERPL-MCNC: 2436 MG/DL
VLDLC SERPL CALC-MCNC: ABNORMAL MG/DL

## 2018-01-11 ENCOUNTER — HISTORICAL (OUTPATIENT)
Dept: ADMINISTRATIVE | Facility: HOSPITAL | Age: 38
End: 2018-01-11

## 2018-01-19 ENCOUNTER — HISTORICAL (OUTPATIENT)
Dept: INFUSION THERAPY | Facility: HOSPITAL | Age: 38
End: 2018-01-19

## 2018-01-19 ENCOUNTER — HISTORICAL (OUTPATIENT)
Dept: INTERNAL MEDICINE | Facility: CLINIC | Age: 38
End: 2018-01-19

## 2018-01-19 LAB
CHOLEST SERPL-MCNC: 337 MG/DL
CHOLEST/HDLC SERPL: ABNORMAL {RATIO} (ref 0–5)
HDLC SERPL-MCNC: ABNORMAL MG/DL
LDLC SERPL CALC-MCNC: ABNORMAL MG/DL (ref 0–130)
TRIGL SERPL-MCNC: >4000 MG/DL
VLDLC SERPL CALC-MCNC: ABNORMAL MG/DL

## 2018-01-24 ENCOUNTER — HISTORICAL (OUTPATIENT)
Dept: ADMINISTRATIVE | Facility: HOSPITAL | Age: 38
End: 2018-01-24

## 2018-01-24 ENCOUNTER — HISTORICAL (OUTPATIENT)
Dept: INFUSION THERAPY | Facility: HOSPITAL | Age: 38
End: 2018-01-24

## 2018-01-24 LAB
CHOLEST SERPL-MCNC: 333 MG/DL
CHOLEST/HDLC SERPL: ABNORMAL {RATIO} (ref 0–5)
CREAT UR-MCNC: 44 MG/DL
EST. AVERAGE GLUCOSE BLD GHB EST-MCNC: 289 MG/DL
HBA1C MFR BLD: 11.7 % (ref 4.2–6.3)
HDLC SERPL-MCNC: ABNORMAL MG/DL
LDLC SERPL CALC-MCNC: ABNORMAL MG/DL (ref 0–130)
MICROALBUMIN UR-MCNC: 310 MG/L (ref 0–19)
MICROALBUMIN/CREAT RATIO PNL UR: 704.5 MCG/MG CR (ref 0–29)
TRIGL SERPL-MCNC: 2715 MG/DL
VLDLC SERPL CALC-MCNC: ABNORMAL MG/DL

## 2018-02-07 ENCOUNTER — HISTORICAL (OUTPATIENT)
Dept: INFUSION THERAPY | Facility: HOSPITAL | Age: 38
End: 2018-02-07

## 2018-02-07 LAB
BUN SERPL-MCNC: 15 MG/DL (ref 7–18)
CHOLEST SERPL-MCNC: 166 MG/DL
CHOLEST/HDLC SERPL: ABNORMAL {RATIO} (ref 0–5)
CREAT SERPL-MCNC: 1.1 MG/DL (ref 0.6–1.3)
HDLC SERPL-MCNC: ABNORMAL MG/DL
LDLC SERPL CALC-MCNC: ABNORMAL MG/DL (ref 0–130)
TRIGL SERPL-MCNC: 2737 MG/DL
VLDLC SERPL CALC-MCNC: ABNORMAL MG/DL

## 2018-02-08 ENCOUNTER — HISTORICAL (OUTPATIENT)
Dept: ADMINISTRATIVE | Facility: HOSPITAL | Age: 38
End: 2018-02-08

## 2018-02-14 ENCOUNTER — HISTORICAL (OUTPATIENT)
Dept: INFUSION THERAPY | Facility: HOSPITAL | Age: 38
End: 2018-02-14

## 2018-02-14 ENCOUNTER — HISTORICAL (OUTPATIENT)
Dept: ADMINISTRATIVE | Facility: HOSPITAL | Age: 38
End: 2018-02-14

## 2018-02-14 LAB
CHOLEST SERPL-MCNC: 262 MG/DL
CHOLEST/HDLC SERPL: ABNORMAL {RATIO} (ref 0–5)
HDLC SERPL-MCNC: ABNORMAL MG/DL
LDLC SERPL CALC-MCNC: ABNORMAL MG/DL (ref 0–130)
TRIGL SERPL-MCNC: 2505 MG/DL
VLDLC SERPL CALC-MCNC: ABNORMAL MG/DL

## 2018-02-15 ENCOUNTER — HISTORICAL (OUTPATIENT)
Dept: ADMINISTRATIVE | Facility: HOSPITAL | Age: 38
End: 2018-02-15

## 2018-02-19 ENCOUNTER — HOSPITAL ENCOUNTER (OUTPATIENT)
Dept: NUTRITION | Facility: HOSPITAL | Age: 38
End: 2018-02-21
Attending: INTERNAL MEDICINE | Admitting: INTERNAL MEDICINE

## 2018-02-20 LAB
ABS NEUT (OLG): 5.12 X10(3)/MCL (ref 2.1–9.2)
ALBUMIN SERPL-MCNC: 3.3 GM/DL (ref 3.4–5)
ALBUMIN/GLOB SERPL: 0.8 {RATIO}
ALP SERPL-CCNC: 293 UNIT/L (ref 50–136)
ALT SERPL-CCNC: ABNORMAL UNIT/L (ref 12–78)
AMYLASE SERPL-CCNC: 10 UNIT/L (ref 25–115)
APPEARANCE, UA: CLEAR
AST SERPL-CCNC: 262 UNIT/L (ref 15–37)
BACTERIA #/AREA URNS AUTO: ABNORMAL /HPF
BASOPHILS # BLD AUTO: 0.1 X10(3)/MCL (ref 0–0.2)
BASOPHILS NFR BLD AUTO: 1 %
BILIRUB SERPL-MCNC: 0.5 MG/DL (ref 0.2–1)
BILIRUB UR QL STRIP: NEGATIVE
BILIRUBIN DIRECT+TOT PNL SERPL-MCNC: 0 MG/DL (ref 0–0.5)
BILIRUBIN DIRECT+TOT PNL SERPL-MCNC: 0.5 MG/DL (ref 0–0.8)
BUN SERPL-MCNC: 13 MG/DL (ref 7–18)
CALCIUM SERPL-MCNC: 8.5 MG/DL (ref 8.5–10.1)
CHLORIDE SERPL-SCNC: 98 MMOL/L (ref 98–107)
CO2 SERPL-SCNC: 19 MMOL/L (ref 21–32)
COLOR UR: YELLOW
CREAT SERPL-MCNC: 1 MG/DL (ref 0.7–1.3)
EOSINOPHIL # BLD AUTO: 0.2 X10(3)/MCL (ref 0–0.9)
EOSINOPHIL NFR BLD AUTO: 2 %
ERYTHROCYTE [DISTWIDTH] IN BLOOD BY AUTOMATED COUNT: 13.2 % (ref 11.5–17)
GLOBULIN SER-MCNC: 4.3 GM/DL (ref 2.4–3.5)
GLUCOSE (UA): ABNORMAL
GLUCOSE SERPL-MCNC: 415 MG/DL (ref 74–106)
HCT VFR BLD AUTO: 40 % (ref 42–52)
HGB BLD-MCNC: 14.8 GM/DL (ref 14–18)
HGB UR QL STRIP: NEGATIVE
KETONES UR QL STRIP: NEGATIVE
LDH SERPL-CCNC: 777 UNIT/L (ref 87–241)
LEUKOCYTE ESTERASE UR QL STRIP: NEGATIVE
LIPASE SERPL-CCNC: 88 UNIT/L (ref 73–393)
LYMPHOCYTES # BLD AUTO: 2.4 X10(3)/MCL (ref 0.6–4.6)
LYMPHOCYTES NFR BLD AUTO: 28 %
MAGNESIUM SERPL-MCNC: 2.2 MG/DL (ref 1.8–2.4)
MCH RBC QN AUTO: 33.3 PG (ref 27–31)
MCHC RBC AUTO-ENTMCNC: 37 GM/DL (ref 33–36)
MCV RBC AUTO: 89.9 FL (ref 80–94)
MONOCYTES # BLD AUTO: 0.6 X10(3)/MCL (ref 0.1–1.3)
MONOCYTES NFR BLD AUTO: 7 %
NEUTROPHILS # BLD AUTO: 5.12 X10(3)/MCL (ref 1.4–7.9)
NEUTROPHILS NFR BLD AUTO: 60 %
NITRITE UR QL STRIP.AUTO: NEGATIVE
PH UR STRIP: 5.5 [PH] (ref 5–9)
PLATELET # BLD AUTO: 293 X10(3)/MCL (ref 130–400)
PMV BLD AUTO: 10.1 FL (ref 9.4–12.4)
POTASSIUM SERPL-SCNC: 5.4 MMOL/L (ref 3.5–5.1)
PROT SERPL-MCNC: 7.6 GM/DL (ref 6.4–8.2)
PROT UR QL STRIP: ABNORMAL
RBC # BLD AUTO: 4.45 X10(6)/MCL (ref 4.7–6.1)
RBC #/AREA URNS HPF: ABNORMAL /[HPF]
SODIUM SERPL-SCNC: 129 MMOL/L (ref 136–145)
SP GR UR STRIP: 1.04 (ref 1–1.03)
SQUAMOUS EPITHELIAL, UA: ABNORMAL
TRIGL SERPL-MCNC: 1939 MG/DL (ref 30–150)
TROPONIN I SERPL-MCNC: 0.02 NG/ML (ref 0.02–0.49)
TROPONIN I SERPL-MCNC: 0.02 NG/ML (ref 0.02–0.49)
TROPONIN I SERPL-MCNC: <0.02 NG/ML (ref 0.02–0.49)
TROPONIN I SERPL-MCNC: <0.02 NG/ML (ref 0.02–0.49)
UROBILINOGEN UR STRIP-ACNC: 1
WBC # SPEC AUTO: 8.5 X10(3)/MCL (ref 4.5–11.5)
WBC #/AREA URNS AUTO: 5 /HPF (ref 0–3)

## 2018-02-22 ENCOUNTER — HISTORICAL (OUTPATIENT)
Dept: RADIOLOGY | Facility: HOSPITAL | Age: 38
End: 2018-02-22

## 2018-02-28 ENCOUNTER — HISTORICAL (OUTPATIENT)
Dept: INFUSION THERAPY | Facility: HOSPITAL | Age: 38
End: 2018-02-28

## 2018-02-28 LAB
CHOLEST SERPL-MCNC: 242 MG/DL
CHOLEST/HDLC SERPL: ABNORMAL {RATIO} (ref 0–5)
HDLC SERPL-MCNC: ABNORMAL MG/DL
LDLC SERPL CALC-MCNC: ABNORMAL MG/DL (ref 0–130)
TRIGL SERPL-MCNC: 2765 MG/DL
VLDLC SERPL CALC-MCNC: ABNORMAL MG/DL

## 2018-03-01 ENCOUNTER — HISTORICAL (OUTPATIENT)
Dept: ADMINISTRATIVE | Facility: HOSPITAL | Age: 38
End: 2018-03-01

## 2018-03-06 ENCOUNTER — HOSPITAL ENCOUNTER (OUTPATIENT)
Dept: INTENSIVE CARE | Facility: HOSPITAL | Age: 38
End: 2018-03-08
Attending: INTERNAL MEDICINE | Admitting: INTERNAL MEDICINE

## 2018-03-06 LAB
ABS NEUT (OLG): 7.1 X10(3)/MCL (ref 2.1–9.2)
ALBUMIN SERPL-MCNC: 3.6 GM/DL (ref 3.4–5)
ALBUMIN/GLOB SERPL: 1.09 RATIO (ref 1.1–2)
ALP SERPL-CCNC: 225 UNIT/L (ref 50–136)
ALT SERPL-CCNC: 77 UNIT/L (ref 12–78)
AMYLASE SERPL-CCNC: 10 UNIT/L (ref 25–115)
APPEARANCE, UA: CLEAR
APTT PPP: 28.8 SECOND(S) (ref 24.8–36.9)
AST SERPL-CCNC: 76 UNIT/L (ref 15–37)
BACTERIA SPEC CULT: ABNORMAL /HPF
BASOPHILS # BLD AUTO: 0.1 X10(3)/MCL (ref 0–0.2)
BASOPHILS NFR BLD AUTO: 0 %
BILIRUB SERPL-MCNC: 0.6 MG/DL (ref 0.2–1)
BILIRUB UR QL STRIP: NEGATIVE
BILIRUBIN DIRECT+TOT PNL SERPL-MCNC: 0 MG/DL (ref 0–0.5)
BILIRUBIN DIRECT+TOT PNL SERPL-MCNC: 0.6 MG/DL (ref 0–0.8)
BUN SERPL-MCNC: 13 MG/DL (ref 7–18)
CALCIUM SERPL-MCNC: 8 MG/DL (ref 8.5–10.1)
CHLORIDE SERPL-SCNC: 98 MMOL/L (ref 98–107)
CHOLEST SERPL-MCNC: 150 MG/DL (ref 0–200)
CHOLEST/HDLC SERPL: 12.5 {RATIO} (ref 0–5)
CK MB SERPL-MCNC: <0.5 NG/ML (ref 0.5–3.6)
CK SERPL-CCNC: 91 UNIT/L (ref 39–308)
CO2 SERPL-SCNC: 20 MMOL/L (ref 21–32)
COLOR UR: YELLOW
CREAT SERPL-MCNC: 1.02 MG/DL (ref 0.7–1.3)
EOSINOPHIL # BLD AUTO: 0.2 X10(3)/MCL (ref 0–0.9)
EOSINOPHIL NFR BLD AUTO: 2 %
ERYTHROCYTE [DISTWIDTH] IN BLOOD BY AUTOMATED COUNT: 12.7 % (ref 11.5–17)
GLOBULIN SER-MCNC: 3.3 GM/DL (ref 2.4–3.5)
GLUCOSE (UA): ABNORMAL
GLUCOSE SERPL-MCNC: 562 MG/DL (ref 74–106)
HCT VFR BLD AUTO: 41.2 % (ref 42–52)
HDLC SERPL-MCNC: 12 MG/DL (ref 35–60)
HGB BLD-MCNC: 14.4 GM/DL (ref 14–18)
HGB UR QL STRIP: NEGATIVE
HYALINE CASTS #/AREA URNS LPF: ABNORMAL /[LPF]
INR PPP: 0.82 (ref 0–1.27)
KETONES UR QL STRIP: NEGATIVE
LACTATE SERPL-SCNC: 1.3 MMOL/L (ref 0.4–2)
LDLC SERPL CALC-MCNC: ABNORMAL MG/DL (ref 0–129)
LEUKOCYTE ESTERASE UR QL STRIP: NEGATIVE
LIPASE SERPL-CCNC: 159 UNIT/L (ref 73–393)
LYMPHOCYTES # BLD AUTO: 2.9 X10(3)/MCL (ref 0.6–4.6)
LYMPHOCYTES NFR BLD AUTO: 26 %
MAGNESIUM SERPL-MCNC: 2 MG/DL (ref 1.8–2.4)
MCH RBC QN AUTO: 31.2 PG (ref 27–31)
MCHC RBC AUTO-ENTMCNC: 35 GM/DL (ref 33–36)
MCV RBC AUTO: 89.2 FL (ref 80–94)
MONOCYTES # BLD AUTO: 0.7 X10(3)/MCL (ref 0.1–1.3)
MONOCYTES NFR BLD AUTO: 6 %
NEUTROPHILS # BLD AUTO: 7.1 X10(3)/MCL (ref 1.4–7.9)
NEUTROPHILS NFR BLD AUTO: 64 %
NITRITE UR QL STRIP: NEGATIVE
PH UR STRIP: 5 [PH] (ref 5–9)
PLATELET # BLD AUTO: 303 X10(3)/MCL (ref 130–400)
PMV BLD AUTO: 10 FL (ref 9.4–12.4)
POTASSIUM SERPL-SCNC: 4.5 MMOL/L (ref 3.5–5.1)
PROT SERPL-MCNC: 6.9 G/DL
PROT UR QL STRIP: ABNORMAL
PROTHROMBIN TIME: 11.6 SECOND(S) (ref 12.2–14.7)
RBC # BLD AUTO: 4.62 X10(6)/MCL (ref 4.7–6.1)
RBC #/AREA URNS HPF: ABNORMAL /[HPF]
SODIUM SERPL-SCNC: 126 MMOL/L (ref 136–145)
SP GR UR STRIP: 1.04 (ref 1–1.03)
SQUAMOUS EPITHELIAL, UA: 2 /HPF (ref 0–4)
TRIGL SERPL-MCNC: 1000 MG/DL (ref 30–150)
TROPONIN I SERPL-MCNC: <0.02 NG/ML (ref 0.02–0.49)
TSH SERPL-ACNC: 2.49 MIU/ML (ref 0.36–3.74)
UA WBC MAN: ABNORMAL
UROBILINOGEN UR STRIP-ACNC: 0.2
VLDLC SERPL CALC-MCNC: 200 MG/DL
WBC # SPEC AUTO: 11.1 X10(3)/MCL (ref 4.5–11.5)

## 2018-03-07 LAB
BUN SERPL-MCNC: 10 MG/DL (ref 7–18)
CALCIUM SERPL-MCNC: 8 MG/DL (ref 8.5–10.1)
CHLORIDE SERPL-SCNC: 105 MMOL/L (ref 98–107)
CO2 SERPL-SCNC: 23 MMOL/L (ref 21–32)
CREAT SERPL-MCNC: 0.72 MG/DL (ref 0.7–1.3)
CREAT/UREA NIT SERPL: 14
GLUCOSE SERPL-MCNC: 238 MG/DL (ref 74–106)
POTASSIUM SERPL-SCNC: 4.7 MMOL/L (ref 3.5–5.1)
SODIUM SERPL-SCNC: 134 MMOL/L (ref 136–145)

## 2018-03-08 LAB
ABS NEUT (OLG): 5.65 X10(3)/MCL (ref 2.1–9.2)
ALBUMIN SERPL-MCNC: 3.2 GM/DL (ref 3.4–5)
ALBUMIN/GLOB SERPL: 0.9 RATIO (ref 1.1–2)
ALP SERPL-CCNC: 173 UNIT/L (ref 50–136)
ALT SERPL-CCNC: 69 UNIT/L (ref 12–78)
AST SERPL-CCNC: 53 UNIT/L (ref 15–37)
BASOPHILS # BLD AUTO: 0 X10(3)/MCL (ref 0–0.2)
BASOPHILS NFR BLD AUTO: 0 %
BILIRUB SERPL-MCNC: 0.3 MG/DL (ref 0.2–1)
BILIRUBIN DIRECT+TOT PNL SERPL-MCNC: 0.1
BILIRUBIN DIRECT+TOT PNL SERPL-MCNC: 0.2 MG/DL (ref 0–0.8)
BUN SERPL-MCNC: 8 MG/DL (ref 7–18)
CALCIUM SERPL-MCNC: 8.1 MG/DL (ref 8.5–10.1)
CHLORIDE SERPL-SCNC: 106 MMOL/L (ref 98–107)
CO2 SERPL-SCNC: 20 MMOL/L (ref 21–32)
CREAT SERPL-MCNC: 0.97 MG/DL (ref 0.7–1.3)
EOSINOPHIL # BLD AUTO: 0.2 X10(3)/MCL (ref 0–0.9)
EOSINOPHIL NFR BLD AUTO: 2 %
ERYTHROCYTE [DISTWIDTH] IN BLOOD BY AUTOMATED COUNT: 13 % (ref 11.5–17)
GLOBULIN SER-MCNC: 3.4 GM/DL (ref 2.4–3.5)
GLUCOSE SERPL-MCNC: 321 MG/DL (ref 74–106)
HCT VFR BLD AUTO: 37.7 % (ref 42–52)
HGB BLD-MCNC: 12.6 GM/DL (ref 14–18)
LYMPHOCYTES # BLD AUTO: 2.5 X10(3)/MCL (ref 0.6–4.6)
LYMPHOCYTES NFR BLD AUTO: 27 %
MAGNESIUM SERPL-MCNC: 1.9 MG/DL (ref 1.8–2.4)
MCH RBC QN AUTO: 30.1 PG (ref 27–31)
MCHC RBC AUTO-ENTMCNC: 33.4 GM/DL (ref 33–36)
MCV RBC AUTO: 90 FL (ref 80–94)
MONOCYTES # BLD AUTO: 0.7 X10(3)/MCL (ref 0.1–1.3)
MONOCYTES NFR BLD AUTO: 8 %
NEUTROPHILS # BLD AUTO: 5.65 X10(3)/MCL (ref 1.4–7.9)
NEUTROPHILS NFR BLD AUTO: 62 %
PLATELET # BLD AUTO: 212 X10(3)/MCL (ref 130–400)
PMV BLD AUTO: 9.7 FL (ref 9.4–12.4)
POTASSIUM SERPL-SCNC: 4.7 MMOL/L (ref 3.5–5.1)
PROT SERPL-MCNC: 6.6 GM/DL (ref 6.4–8.2)
RBC # BLD AUTO: 4.19 X10(6)/MCL (ref 4.7–6.1)
RESTICK: NORMAL
SODIUM SERPL-SCNC: 136 MMOL/L (ref 136–145)
TRIGL SERPL-MCNC: 1464 MG/DL (ref 30–150)
WBC # SPEC AUTO: 9.2 X10(3)/MCL (ref 4.5–11.5)

## 2018-03-15 ENCOUNTER — HISTORICAL (OUTPATIENT)
Dept: INFUSION THERAPY | Facility: HOSPITAL | Age: 38
End: 2018-03-15

## 2018-03-15 ENCOUNTER — HISTORICAL (OUTPATIENT)
Dept: INTERNAL MEDICINE | Facility: CLINIC | Age: 38
End: 2018-03-15

## 2018-03-15 LAB
CHOLEST SERPL-MCNC: 187 MG/DL
CHOLEST/HDLC SERPL: 9.8 {RATIO} (ref 0–5)
HDLC SERPL-MCNC: 19 MG/DL
LDLC SERPL CALC-MCNC: ABNORMAL MG/DL (ref 0–130)
TRIGL SERPL-MCNC: 1518 MG/DL
VLDLC SERPL CALC-MCNC: ABNORMAL MG/DL

## 2018-03-26 ENCOUNTER — HISTORICAL (OUTPATIENT)
Dept: ADMINISTRATIVE | Facility: HOSPITAL | Age: 38
End: 2018-03-26

## 2018-03-26 LAB
APPEARANCE, UA: CLEAR
BACTERIA #/AREA URNS AUTO: ABNORMAL /[HPF]
BILIRUB UR QL STRIP: NEGATIVE
BUN SERPL-MCNC: 13 MG/DL (ref 7–18)
CALCIUM SERPL-MCNC: 8.7 MG/DL (ref 8.5–10.1)
CHLORIDE SERPL-SCNC: 101 MMOL/L (ref 98–107)
CO2 SERPL-SCNC: 24 MMOL/L (ref 21–32)
COLOR UR: YELLOW
CREAT SERPL-MCNC: 1.4 MG/DL (ref 0.6–1.3)
CREAT UR-MCNC: 136 MG/DL
CREAT/UREA NIT SERPL: 9
EST. AVERAGE GLUCOSE BLD GHB EST-MCNC: 252 MG/DL
GLUCOSE (UA): >1000 MG/DL
GLUCOSE SERPL-MCNC: 401 MG/DL (ref 74–106)
HBA1C MFR BLD: 10.4 % (ref 4.2–6.3)
HGB UR QL STRIP: 0.03 MG/DL
HYALINE CASTS #/AREA URNS LPF: ABNORMAL /[LPF]
KETONES UR QL STRIP: NEGATIVE
LEUKOCYTE ESTERASE UR QL STRIP: 75 LEU/UL
MICROALBUMIN UR-MCNC: 801 MG/L (ref 0–19)
MICROALBUMIN/CREAT RATIO PNL UR: 589 MCG/MG CR (ref 0–29)
NITRITE UR QL STRIP: NEGATIVE
PH UR STRIP: 6 [PH] (ref 4.5–8)
POTASSIUM SERPL-SCNC: 4.1 MMOL/L (ref 3.5–5.1)
PROT UR QL STRIP: 100 MG/DL
RBC #/AREA URNS AUTO: ABNORMAL /[HPF]
SODIUM SERPL-SCNC: 136 MMOL/L (ref 136–145)
SP GR UR STRIP: 1.03 (ref 1–1.03)
SQUAMOUS #/AREA URNS LPF: ABNORMAL /[LPF]
UROBILINOGEN UR STRIP-ACNC: NORMAL
WBC #/AREA URNS AUTO: ABNORMAL /HPF

## 2018-03-27 ENCOUNTER — HISTORICAL (OUTPATIENT)
Dept: INFUSION THERAPY | Facility: HOSPITAL | Age: 38
End: 2018-03-27

## 2018-03-27 LAB
CHOLEST SERPL-MCNC: 269 MG/DL
CHOLEST/HDLC SERPL: ABNORMAL {RATIO} (ref 0–5)
HDLC SERPL-MCNC: ABNORMAL MG/DL
LDLC SERPL CALC-MCNC: ABNORMAL MG/DL (ref 0–130)
TRIGL SERPL-MCNC: 3500 MG/DL
VLDLC SERPL CALC-MCNC: ABNORMAL MG/DL

## 2018-04-03 ENCOUNTER — HISTORICAL (OUTPATIENT)
Dept: INFUSION THERAPY | Facility: HOSPITAL | Age: 38
End: 2018-04-03

## 2018-04-03 LAB
BUN SERPL-MCNC: 15 MG/DL (ref 7–18)
CALCIUM SERPL-MCNC: 8.8 MG/DL (ref 8.5–10.1)
CHLORIDE SERPL-SCNC: 102 MMOL/L (ref 98–107)
CHOLEST SERPL-MCNC: <200 MG/DL
CHOLEST/HDLC SERPL: ABNORMAL {RATIO} (ref 0–5)
CO2 SERPL-SCNC: 23 MMOL/L (ref 21–32)
CREAT SERPL-MCNC: 1 MG/DL (ref 0.6–1.3)
CREAT/UREA NIT SERPL: 15
GLUCOSE SERPL-MCNC: 475 MG/DL (ref 74–106)
HDLC SERPL-MCNC: ABNORMAL MG/DL
LDLC SERPL CALC-MCNC: ABNORMAL MG/DL (ref 0–130)
POTASSIUM SERPL-SCNC: 4.6 MMOL/L (ref 3.5–5.1)
SODIUM SERPL-SCNC: 132 MMOL/L (ref 136–145)
TRIGL SERPL-MCNC: 3196 MG/DL
VLDLC SERPL CALC-MCNC: ABNORMAL MG/DL

## 2018-04-04 ENCOUNTER — HISTORICAL (OUTPATIENT)
Dept: ADMINISTRATIVE | Facility: HOSPITAL | Age: 38
End: 2018-04-04

## 2018-04-09 ENCOUNTER — HISTORICAL (OUTPATIENT)
Dept: ADMINISTRATIVE | Facility: HOSPITAL | Age: 38
End: 2018-04-09

## 2018-04-11 ENCOUNTER — HISTORICAL (OUTPATIENT)
Dept: ADMINISTRATIVE | Facility: HOSPITAL | Age: 38
End: 2018-04-11

## 2018-04-11 LAB
BUN SERPL-MCNC: 11 MG/DL (ref 7–18)
CALCIUM SERPL-MCNC: 9.4 MG/DL (ref 8.5–10.1)
CHLORIDE SERPL-SCNC: 91 MMOL/L (ref 98–107)
CHOLEST SERPL-MCNC: 269 MG/DL (ref 0–200)
CHOLEST/HDLC SERPL: 20.7 {RATIO} (ref 0–5)
CO2 SERPL-SCNC: 24 MMOL/L (ref 21–32)
CREAT SERPL-MCNC: 1.02 MG/DL (ref 0.7–1.3)
CREAT/UREA NIT SERPL: 10.8
GLUCOSE SERPL-MCNC: 660 MG/DL (ref 74–106)
HDLC SERPL-MCNC: 13 MG/DL (ref 35–60)
LDLC SERPL CALC-MCNC: ABNORMAL MG/DL (ref 0–129)
POTASSIUM SERPL-SCNC: 4.1 MMOL/L (ref 3.5–5.1)
SODIUM SERPL-SCNC: 126 MMOL/L (ref 136–145)
TRIGL SERPL-MCNC: 2656 MG/DL (ref 30–150)
VLDLC SERPL CALC-MCNC: 531 MG/DL

## 2018-04-19 ENCOUNTER — HISTORICAL (OUTPATIENT)
Dept: INFUSION THERAPY | Facility: HOSPITAL | Age: 38
End: 2018-04-19

## 2018-04-19 ENCOUNTER — HISTORICAL (OUTPATIENT)
Dept: ADMINISTRATIVE | Facility: HOSPITAL | Age: 38
End: 2018-04-19

## 2018-04-19 LAB
ABS NEUT (OLG): 4.71 X10(3)/MCL (ref 2.1–9.2)
ALBUMIN SERPL-MCNC: 3.9 GM/DL (ref 3.4–5)
ALBUMIN/GLOB SERPL: 1 RATIO (ref 1–2)
ALP SERPL-CCNC: 196 UNIT/L (ref 45–117)
ALT SERPL-CCNC: 88 UNIT/L (ref 12–78)
APPEARANCE, UA: CLEAR
AST SERPL-CCNC: 71 UNIT/L (ref 15–37)
BACTERIA #/AREA URNS AUTO: ABNORMAL /[HPF]
BASOPHILS # BLD AUTO: 0.06 X10(3)/MCL
BASOPHILS NFR BLD AUTO: 1 %
BILIRUB SERPL-MCNC: 0.3 MG/DL (ref 0.2–1)
BILIRUB UR QL STRIP: NEGATIVE
BILIRUBIN DIRECT+TOT PNL SERPL-MCNC: <0.1 MG/DL
BILIRUBIN DIRECT+TOT PNL SERPL-MCNC: ABNORMAL MG/DL
BUN SERPL-MCNC: 15 MG/DL (ref 7–18)
CALCIUM SERPL-MCNC: 8.4 MG/DL (ref 8.5–10.1)
CHLORIDE SERPL-SCNC: 98 MMOL/L (ref 98–107)
CHOLEST SERPL-MCNC: 185 MG/DL
CHOLEST/HDLC SERPL: ABNORMAL {RATIO} (ref 0–5)
CO2 SERPL-SCNC: 21 MMOL/L (ref 21–32)
COLOR UR: ABNORMAL
CREAT SERPL-MCNC: 1.2 MG/DL (ref 0.6–1.3)
CREAT UR-MCNC: 23 MG/DL
CREAT UR-MCNC: 23 MG/DL
EOSINOPHIL # BLD AUTO: 0.25 X10(3)/MCL
EOSINOPHIL NFR BLD AUTO: 3 %
ERYTHROCYTE [DISTWIDTH] IN BLOOD BY AUTOMATED COUNT: 12.2 % (ref 11.5–14.5)
EST. AVERAGE GLUCOSE BLD GHB EST-MCNC: 269 MG/DL
GLOBULIN SER-MCNC: 3.7 GM/ML (ref 2.3–3.5)
GLUCOSE (UA): >1000 MG/DL
GLUCOSE SERPL-MCNC: 683 MG/DL (ref 74–106)
HBA1C MFR BLD: 11 % (ref 4.2–6.3)
HCT VFR BLD AUTO: 41.8 % (ref 40–51)
HDLC SERPL-MCNC: ABNORMAL MG/DL
HGB BLD-MCNC: 15.6 GM/DL (ref 13.5–17.5)
HGB UR QL STRIP: NEGATIVE
HYALINE CASTS #/AREA URNS LPF: ABNORMAL /[LPF]
IMM GRANULOCYTES # BLD AUTO: 0.06 10*3/UL
IMM GRANULOCYTES NFR BLD AUTO: 1 %
KETONES UR QL STRIP: NEGATIVE
LDLC SERPL CALC-MCNC: ABNORMAL MG/DL (ref 0–130)
LEUKOCYTE ESTERASE UR QL STRIP: 25 LEU/UL
LYMPHOCYTES # BLD AUTO: 2.92 X10(3)/MCL
LYMPHOCYTES NFR BLD AUTO: 34 % (ref 13–40)
MAGNESIUM SERPL-MCNC: 2.1 MG/DL (ref 1.8–2.4)
MCH RBC QN AUTO: 32.8 PG (ref 26–34)
MCHC RBC AUTO-ENTMCNC: 37.3 GM/DL (ref 31–37)
MCV RBC AUTO: 88 FL (ref 80–100)
MICROALBUMIN UR-MCNC: 226 MG/L (ref 0–19)
MICROALBUMIN/CREAT RATIO PNL UR: 982.6 MCG/MG CR (ref 0–29)
MONOCYTES # BLD AUTO: 0.68 X10(3)/MCL
MONOCYTES NFR BLD AUTO: 8 % (ref 4–12)
NEUTROPHILS # BLD AUTO: 4.71 X10(3)/MCL
NEUTROPHILS NFR BLD AUTO: 54 X10(3)/MCL
NITRITE UR QL STRIP: NEGATIVE
PH UR STRIP: 5 [PH] (ref 4.5–8)
PHOSPHATE SERPL-MCNC: 3.6 MG/DL (ref 2.5–4.9)
PLATELET # BLD AUTO: 325 X10(3)/MCL (ref 130–400)
PMV BLD AUTO: 10.7 FL (ref 7.4–10.4)
POTASSIUM SERPL-SCNC: 4.5 MMOL/L (ref 3.5–5.1)
PROT SERPL-MCNC: 7.6 GM/DL (ref 6.4–8.2)
PROT UR QL STRIP: 30 MG/DL
PROT UR STRIP-MCNC: 26.7 MG/DL
PROT/CREAT UR-RTO: 1160.9 MG/GM
RBC # BLD AUTO: 4.75 X10(6)/MCL (ref 4.5–5.9)
RBC #/AREA URNS AUTO: ABNORMAL /[HPF]
SODIUM SERPL-SCNC: 127 MMOL/L (ref 136–145)
SP GR UR STRIP: 1.03 (ref 1–1.03)
SQUAMOUS #/AREA URNS LPF: ABNORMAL /[LPF]
TRIGL SERPL-MCNC: 2959 MG/DL
UROBILINOGEN UR STRIP-ACNC: NORMAL
VLDLC SERPL CALC-MCNC: ABNORMAL MG/DL
WBC # SPEC AUTO: 8.7 X10(3)/MCL (ref 4.5–11)
WBC #/AREA URNS AUTO: ABNORMAL /HPF

## 2018-05-01 ENCOUNTER — HISTORICAL (OUTPATIENT)
Dept: INTERNAL MEDICINE | Facility: CLINIC | Age: 38
End: 2018-05-01

## 2018-05-01 ENCOUNTER — HISTORICAL (OUTPATIENT)
Dept: INFUSION THERAPY | Facility: HOSPITAL | Age: 38
End: 2018-05-01

## 2018-05-01 LAB
CHOLEST SERPL-MCNC: 193 MG/DL
CHOLEST/HDLC SERPL: 12.1 {RATIO} (ref 0–5)
HDLC SERPL-MCNC: 16 MG/DL
LDLC SERPL CALC-MCNC: ABNORMAL MG/DL (ref 0–130)
TRIGL SERPL-MCNC: 1417 MG/DL
VLDLC SERPL CALC-MCNC: ABNORMAL MG/DL

## 2018-05-07 ENCOUNTER — HISTORICAL (OUTPATIENT)
Dept: ADMINISTRATIVE | Facility: HOSPITAL | Age: 38
End: 2018-05-07

## 2018-05-07 LAB
ABS NEUT (OLG): 4.71 X10(3)/MCL (ref 2.1–9.2)
BASOPHILS # BLD AUTO: 0.1 X10(3)/MCL (ref 0–0.2)
BASOPHILS NFR BLD AUTO: 1 %
BUN SERPL-MCNC: 13 MG/DL (ref 7–18)
CALCIUM SERPL-MCNC: 7.7 MG/DL (ref 8.5–10.1)
CHLORIDE SERPL-SCNC: 101 MMOL/L (ref 98–107)
CO2 SERPL-SCNC: 24 MMOL/L (ref 21–32)
CREAT SERPL-MCNC: 0.77 MG/DL (ref 0.7–1.3)
CREAT/UREA NIT SERPL: 16.9
EOSINOPHIL # BLD AUTO: 0.2 X10(3)/MCL (ref 0–0.9)
EOSINOPHIL NFR BLD AUTO: 3 %
ERYTHROCYTE [DISTWIDTH] IN BLOOD BY AUTOMATED COUNT: 12.1 % (ref 11.5–17)
GLUCOSE SERPL-MCNC: 363 MG/DL (ref 74–106)
HCT VFR BLD AUTO: 37.4 % (ref 42–52)
HGB BLD-MCNC: 13.1 GM/DL (ref 14–18)
LYMPHOCYTES # BLD AUTO: 3 X10(3)/MCL (ref 0.6–4.6)
LYMPHOCYTES NFR BLD AUTO: 34 %
MCH RBC QN AUTO: 30.5 PG (ref 27–31)
MCHC RBC AUTO-ENTMCNC: 35 GM/DL (ref 33–36)
MCV RBC AUTO: 87 FL (ref 80–94)
MONOCYTES # BLD AUTO: 0.7 X10(3)/MCL (ref 0.1–1.3)
MONOCYTES NFR BLD AUTO: 8 %
NEUTROPHILS # BLD AUTO: 4.71 X10(3)/MCL (ref 2.1–9.2)
NEUTROPHILS NFR BLD AUTO: 54 %
PLATELET # BLD AUTO: 302 X10(3)/MCL (ref 130–400)
PMV BLD AUTO: 9.7 FL (ref 9.4–12.4)
POTASSIUM SERPL-SCNC: 4.1 MMOL/L (ref 3.5–5.1)
RBC # BLD AUTO: 4.3 X10(6)/MCL (ref 4.7–6.1)
SODIUM SERPL-SCNC: 133 MMOL/L (ref 136–145)
WBC # SPEC AUTO: 8.8 X10(3)/MCL (ref 4.5–11.5)

## 2018-05-09 ENCOUNTER — HISTORICAL (OUTPATIENT)
Dept: ADMINISTRATIVE | Facility: HOSPITAL | Age: 38
End: 2018-05-09

## 2018-05-09 LAB
ALBUMIN SERPL-MCNC: 3.4 GM/DL (ref 3.4–5)
ALBUMIN/GLOB SERPL: 1 RATIO (ref 1–2)
ALP SERPL-CCNC: 230 UNIT/L (ref 45–117)
ALT SERPL-CCNC: 87 UNIT/L (ref 12–78)
AMYLASE SERPL-CCNC: 17 UNIT/L (ref 25–115)
AST SERPL-CCNC: 63 UNIT/L (ref 15–37)
BILIRUB SERPL-MCNC: 0.3 MG/DL (ref 0.2–1)
BILIRUBIN DIRECT+TOT PNL SERPL-MCNC: <0.1 MG/DL
BILIRUBIN DIRECT+TOT PNL SERPL-MCNC: ABNORMAL MG/DL
BUN SERPL-MCNC: 11 MG/DL (ref 7–18)
CALCIUM SERPL-MCNC: 8.5 MG/DL (ref 8.5–10.1)
CHLORIDE SERPL-SCNC: 108 MMOL/L (ref 98–107)
CHOLEST SERPL-MCNC: 220 MG/DL
CHOLEST/HDLC SERPL: 16.9 {RATIO} (ref 0–5)
CO2 SERPL-SCNC: 21 MMOL/L (ref 21–32)
CREAT SERPL-MCNC: 1 MG/DL (ref 0.6–1.3)
ERYTHROCYTE [DISTWIDTH] IN BLOOD BY AUTOMATED COUNT: 12.3 % (ref 11.5–14.5)
ERYTHROCYTE [SEDIMENTATION RATE] IN BLOOD: 34 MM/HR (ref 0–15)
GLOBULIN SER-MCNC: 4.5 GM/ML (ref 2.3–3.5)
GLUCOSE SERPL-MCNC: 265 MG/DL (ref 74–106)
HCT VFR BLD AUTO: 40.9 % (ref 40–51)
HDLC SERPL-MCNC: 13 MG/DL
HGB BLD-MCNC: 14.3 GM/DL (ref 13.5–17.5)
LDLC SERPL CALC-MCNC: ABNORMAL MG/DL (ref 0–130)
LIPASE SERPL-CCNC: 90 UNIT/L (ref 73–393)
MCH RBC QN AUTO: 30.4 PG (ref 26–34)
MCHC RBC AUTO-ENTMCNC: 35 GM/DL (ref 31–37)
MCV RBC AUTO: 87 FL (ref 80–100)
PLATELET # BLD AUTO: 333 X10(3)/MCL (ref 130–400)
PMV BLD AUTO: 10.8 FL (ref 7.4–10.4)
POTASSIUM SERPL-SCNC: 4.1 MMOL/L (ref 3.5–5.1)
PROT SERPL-MCNC: 7.9 GM/DL (ref 6.4–8.2)
RBC # BLD AUTO: 4.7 X10(6)/MCL (ref 4.5–5.9)
SODIUM SERPL-SCNC: 136 MMOL/L (ref 136–145)
TRIGL SERPL-MCNC: 1672 MG/DL
VLDLC SERPL CALC-MCNC: ABNORMAL MG/DL
WBC # SPEC AUTO: 11.6 X10(3)/MCL (ref 4.5–11)

## 2018-05-11 ENCOUNTER — HISTORICAL (OUTPATIENT)
Dept: INFUSION THERAPY | Facility: HOSPITAL | Age: 38
End: 2018-05-11

## 2018-05-16 ENCOUNTER — HISTORICAL (OUTPATIENT)
Dept: GASTROENTEROLOGY | Facility: CLINIC | Age: 38
End: 2018-05-16

## 2018-05-16 LAB
CHOLEST SERPL-MCNC: 208 MG/DL
CHOLEST/HDLC SERPL: 11.6 {RATIO} (ref 0–5)
HDLC SERPL-MCNC: 18 MG/DL
LDLC SERPL CALC-MCNC: ABNORMAL MG/DL (ref 0–130)
TRIGL SERPL-MCNC: 1410 MG/DL
VLDLC SERPL CALC-MCNC: ABNORMAL MG/DL

## 2018-05-17 ENCOUNTER — HISTORICAL (OUTPATIENT)
Dept: GASTROENTEROLOGY | Facility: CLINIC | Age: 38
End: 2018-05-17

## 2018-05-30 ENCOUNTER — HISTORICAL (OUTPATIENT)
Dept: ADMINISTRATIVE | Facility: HOSPITAL | Age: 38
End: 2018-05-30

## 2018-06-04 ENCOUNTER — HISTORICAL (OUTPATIENT)
Dept: INFUSION THERAPY | Facility: HOSPITAL | Age: 38
End: 2018-06-04

## 2018-06-05 ENCOUNTER — HISTORICAL (OUTPATIENT)
Dept: INFUSION THERAPY | Facility: HOSPITAL | Age: 38
End: 2018-06-05

## 2018-06-07 ENCOUNTER — HISTORICAL (OUTPATIENT)
Dept: INTERNAL MEDICINE | Facility: CLINIC | Age: 38
End: 2018-06-07

## 2018-06-07 LAB
CHOLEST SERPL-MCNC: 173 MG/DL
CHOLEST/HDLC SERPL: 13.3 {RATIO} (ref 0–5)
HDLC SERPL-MCNC: 13 MG/DL
LDLC SERPL CALC-MCNC: ABNORMAL MG/DL (ref 0–130)
TRIGL SERPL-MCNC: 1316 MG/DL
VLDLC SERPL CALC-MCNC: ABNORMAL MG/DL

## 2018-06-12 ENCOUNTER — HISTORICAL (OUTPATIENT)
Dept: ADMINISTRATIVE | Facility: HOSPITAL | Age: 38
End: 2018-06-12

## 2018-06-12 LAB
CHOLEST SERPL-MCNC: 148 MG/DL
CHOLEST/HDLC SERPL: 11.4 {RATIO} (ref 0–5)
HDLC SERPL-MCNC: 13 MG/DL
LDLC SERPL CALC-MCNC: ABNORMAL MG/DL (ref 0–130)
TRIGL SERPL-MCNC: 1220 MG/DL
VLDLC SERPL CALC-MCNC: 244 MG/DL

## 2018-06-13 ENCOUNTER — HISTORICAL (OUTPATIENT)
Dept: ADMINISTRATIVE | Facility: HOSPITAL | Age: 38
End: 2018-06-13

## 2018-06-21 ENCOUNTER — HISTORICAL (OUTPATIENT)
Dept: ADMINISTRATIVE | Facility: HOSPITAL | Age: 38
End: 2018-06-21

## 2018-06-21 LAB
CHOLEST SERPL-MCNC: 193 MG/DL
CHOLEST/HDLC SERPL: 12.9 {RATIO} (ref 0–5)
HDLC SERPL-MCNC: 15 MG/DL
LDLC SERPL CALC-MCNC: ABNORMAL MG/DL (ref 0–130)
TRIGL SERPL-MCNC: 1366 MG/DL
VLDLC SERPL CALC-MCNC: ABNORMAL MG/DL

## 2018-06-27 ENCOUNTER — HISTORICAL (OUTPATIENT)
Dept: INTERNAL MEDICINE | Facility: CLINIC | Age: 38
End: 2018-06-27

## 2018-06-27 LAB
CHOLEST SERPL-MCNC: 205 MG/DL
CHOLEST/HDLC SERPL: ABNORMAL {RATIO} (ref 0–5)
HDLC SERPL-MCNC: ABNORMAL MG/DL
LDLC SERPL CALC-MCNC: ABNORMAL MG/DL (ref 0–130)
TRIGL SERPL-MCNC: 1917 MG/DL
VLDLC SERPL CALC-MCNC: ABNORMAL MG/DL

## 2018-06-28 ENCOUNTER — HISTORICAL (OUTPATIENT)
Dept: ADMINISTRATIVE | Facility: HOSPITAL | Age: 38
End: 2018-06-28

## 2018-07-11 ENCOUNTER — HISTORICAL (OUTPATIENT)
Dept: INTERNAL MEDICINE | Facility: CLINIC | Age: 38
End: 2018-07-11

## 2018-07-11 LAB
ALBUMIN SERPL-MCNC: 3.5 GM/DL (ref 3.4–5)
ALBUMIN/GLOB SERPL: 1 RATIO (ref 1–2)
ALP SERPL-CCNC: 217 UNIT/L (ref 45–117)
ALT SERPL-CCNC: 108 UNIT/L (ref 12–78)
AST SERPL-CCNC: 62 UNIT/L (ref 15–37)
BILIRUB SERPL-MCNC: 0.3 MG/DL (ref 0.2–1)
BILIRUBIN DIRECT+TOT PNL SERPL-MCNC: <0.1 MG/DL
BILIRUBIN DIRECT+TOT PNL SERPL-MCNC: ABNORMAL MG/DL
BUN SERPL-MCNC: 14 MG/DL (ref 7–18)
CALCIUM SERPL-MCNC: 8.6 MG/DL (ref 8.5–10.1)
CHLORIDE SERPL-SCNC: 94 MMOL/L (ref 98–107)
CHOLEST SERPL-MCNC: 293 MG/DL
CHOLEST/HDLC SERPL: ABNORMAL {RATIO} (ref 0–5)
CO2 SERPL-SCNC: 25 MMOL/L (ref 21–32)
CREAT SERPL-MCNC: 1 MG/DL (ref 0.6–1.3)
DEPRECATED CALCIDIOL+CALCIFEROL SERPL-MC: 4.63 NG/ML (ref 30–80)
EST. AVERAGE GLUCOSE BLD GHB EST-MCNC: 306 MG/DL
GLOBULIN SER-MCNC: 4.4 GM/ML (ref 2.3–3.5)
GLUCOSE SERPL-MCNC: 391 MG/DL (ref 74–106)
HBA1C MFR BLD: 12.3 % (ref 4.2–6.3)
HDLC SERPL-MCNC: ABNORMAL MG/DL
LDLC SERPL CALC-MCNC: ABNORMAL MG/DL (ref 0–130)
POTASSIUM SERPL-SCNC: 3.7 MMOL/L (ref 3.5–5.1)
PROT SERPL-MCNC: 7.9 GM/DL (ref 6.4–8.2)
SODIUM SERPL-SCNC: 130 MMOL/L (ref 136–145)
TRIGL SERPL-MCNC: 4838 MG/DL
VLDLC SERPL CALC-MCNC: ABNORMAL MG/DL

## 2018-07-12 ENCOUNTER — HISTORICAL (OUTPATIENT)
Dept: ADMINISTRATIVE | Facility: HOSPITAL | Age: 38
End: 2018-07-12

## 2018-08-08 ENCOUNTER — HISTORICAL (OUTPATIENT)
Dept: ADMINISTRATIVE | Facility: HOSPITAL | Age: 38
End: 2018-08-08

## 2018-08-08 LAB
CHOLEST SERPL-MCNC: 210 MG/DL
CHOLEST/HDLC SERPL: ABNORMAL {RATIO} (ref 0–5)
HDLC SERPL-MCNC: ABNORMAL MG/DL
LDLC SERPL CALC-MCNC: ABNORMAL MG/DL (ref 0–130)
TRIGL SERPL-MCNC: 1880 MG/DL
VLDLC SERPL CALC-MCNC: ABNORMAL MG/DL

## 2018-08-23 ENCOUNTER — HISTORICAL (OUTPATIENT)
Dept: RADIOLOGY | Facility: HOSPITAL | Age: 38
End: 2018-08-23

## 2018-08-23 LAB
CHOLEST SERPL-MCNC: 141 MG/DL
CHOLEST/HDLC SERPL: 7.8 {RATIO} (ref 0–5)
HDLC SERPL-MCNC: 18 MG/DL
LDLC SERPL CALC-MCNC: 53 MG/DL (ref 0–130)
TRIGL SERPL-MCNC: 348 MG/DL
VLDLC SERPL CALC-MCNC: 70 MG/DL

## 2018-10-11 ENCOUNTER — HISTORICAL (OUTPATIENT)
Dept: ADMINISTRATIVE | Facility: HOSPITAL | Age: 38
End: 2018-10-11

## 2018-10-11 LAB
ALBUMIN SERPL-MCNC: 3.2 GM/DL (ref 3.4–5)
ALBUMIN/GLOB SERPL: 1 RATIO (ref 1–2)
ALP SERPL-CCNC: 259 UNIT/L (ref 45–117)
ALT SERPL-CCNC: 72 UNIT/L (ref 12–78)
APPEARANCE, UA: CLEAR
AST SERPL-CCNC: 46 UNIT/L (ref 15–37)
BACTERIA #/AREA URNS AUTO: ABNORMAL /[HPF]
BILIRUB SERPL-MCNC: 0.3 MG/DL (ref 0.2–1)
BILIRUB UR QL STRIP: NEGATIVE
BILIRUBIN DIRECT+TOT PNL SERPL-MCNC: 0.1 MG/DL
BILIRUBIN DIRECT+TOT PNL SERPL-MCNC: 0.2 MG/DL
BUN SERPL-MCNC: 7 MG/DL (ref 7–18)
CALCIUM SERPL-MCNC: 8.7 MG/DL (ref 8.5–10.1)
CHLORIDE SERPL-SCNC: 103 MMOL/L (ref 98–107)
CHOLEST SERPL-MCNC: 149 MG/DL
CHOLEST/HDLC SERPL: 8.8 {RATIO} (ref 0–5)
CO2 SERPL-SCNC: 28 MMOL/L (ref 21–32)
COLOR UR: YELLOW
CREAT SERPL-MCNC: 0.9 MG/DL (ref 0.6–1.3)
CREAT UR-MCNC: 239 MG/DL
GLOBULIN SER-MCNC: 5.3 GM/ML (ref 2.3–3.5)
GLUCOSE (UA): 30 MG/DL
GLUCOSE SERPL-MCNC: 196 MG/DL (ref 74–106)
HDLC SERPL-MCNC: 17 MG/DL
HGB UR QL STRIP: NEGATIVE
HYALINE CASTS #/AREA URNS LPF: ABNORMAL /[LPF]
KETONES UR QL STRIP: NEGATIVE
LDLC SERPL CALC-MCNC: 55 MG/DL (ref 0–130)
LEUKOCYTE ESTERASE UR QL STRIP: NEGATIVE
NITRITE UR QL STRIP: NEGATIVE
PH UR STRIP: 5.5 [PH] (ref 4.5–8)
POTASSIUM SERPL-SCNC: 3.7 MMOL/L (ref 3.5–5.1)
PROT SERPL-MCNC: 8.5 GM/DL (ref 6.4–8.2)
PROT UR QL STRIP: 200 MG/DL
PROT UR STRIP-MCNC: 142.2 MG/DL
PROT/CREAT UR-RTO: 595 MG/GM
PSA SERPL-MCNC: 0.1 NG/ML
RBC #/AREA URNS AUTO: ABNORMAL /[HPF]
SODIUM SERPL-SCNC: 137 MMOL/L (ref 136–145)
SP GR UR STRIP: 1.02 (ref 1–1.03)
SQUAMOUS #/AREA URNS LPF: ABNORMAL /[LPF]
TRIGL SERPL-MCNC: 386 MG/DL
UROBILINOGEN UR STRIP-ACNC: 2 MG/DL
VLDLC SERPL CALC-MCNC: 77 MG/DL
WBC #/AREA URNS AUTO: ABNORMAL /HPF

## 2018-11-20 ENCOUNTER — HISTORICAL (OUTPATIENT)
Dept: ADMINISTRATIVE | Facility: HOSPITAL | Age: 38
End: 2018-11-20

## 2018-11-20 ENCOUNTER — HISTORICAL (OUTPATIENT)
Dept: INTERNAL MEDICINE | Facility: CLINIC | Age: 38
End: 2018-11-20

## 2018-11-20 LAB
ABS NEUT (OLG): 8.7 X10(3)/MCL (ref 2.1–9.2)
ALBUMIN SERPL-MCNC: 3.3 GM/DL (ref 3.4–5)
ALBUMIN/GLOB SERPL: 1 RATIO (ref 1–2)
ALP SERPL-CCNC: 233 UNIT/L (ref 45–117)
ALT SERPL-CCNC: 70 UNIT/L (ref 12–78)
AST SERPL-CCNC: 73 UNIT/L (ref 15–37)
BASOPHILS # BLD AUTO: 0.04 X10(3)/MCL
BASOPHILS NFR BLD AUTO: 0 %
BILIRUB SERPL-MCNC: 0.2 MG/DL (ref 0.2–1)
BILIRUBIN DIRECT+TOT PNL SERPL-MCNC: <0.1 MG/DL
BILIRUBIN DIRECT+TOT PNL SERPL-MCNC: ABNORMAL MG/DL
BUN SERPL-MCNC: 9 MG/DL (ref 7–18)
CALCIUM SERPL-MCNC: 8.9 MG/DL (ref 8.5–10.1)
CHLORIDE SERPL-SCNC: 106 MMOL/L (ref 98–107)
CHOLEST SERPL-MCNC: 161 MG/DL
CHOLEST/HDLC SERPL: 8 {RATIO} (ref 0–5)
CO2 SERPL-SCNC: 23 MMOL/L (ref 21–32)
CREAT SERPL-MCNC: 0.8 MG/DL (ref 0.6–1.3)
EOSINOPHIL # BLD AUTO: 0.71 X10(3)/MCL
EOSINOPHIL NFR BLD AUTO: 6 %
ERYTHROCYTE [DISTWIDTH] IN BLOOD BY AUTOMATED COUNT: 13.7 % (ref 11.5–14.5)
EST. AVERAGE GLUCOSE BLD GHB EST-MCNC: 169 MG/DL
GLOBULIN SER-MCNC: 4.9 GM/ML (ref 2.3–3.5)
GLUCOSE SERPL-MCNC: 183 MG/DL (ref 74–106)
HBA1C MFR BLD: 7.5 % (ref 4.2–6.3)
HCT VFR BLD AUTO: 44.2 % (ref 40–51)
HDLC SERPL-MCNC: 20 MG/DL
HGB BLD-MCNC: 15.2 GM/DL (ref 13.5–17.5)
HIV 1+2 AB+HIV1 P24 AG SERPL QL IA: NONREACTIVE
IMM GRANULOCYTES # BLD AUTO: 0.06 10*3/UL
IMM GRANULOCYTES NFR BLD AUTO: 0 %
LDLC SERPL CALC-MCNC: 62 MG/DL (ref 0–130)
LYMPHOCYTES # BLD AUTO: 2.41 X10(3)/MCL
LYMPHOCYTES NFR BLD AUTO: 19 % (ref 13–40)
MCH RBC QN AUTO: 27.4 PG (ref 26–34)
MCHC RBC AUTO-ENTMCNC: 34.4 GM/DL (ref 31–37)
MCV RBC AUTO: 79.6 FL (ref 80–100)
MONOCYTES # BLD AUTO: 0.91 X10(3)/MCL
MONOCYTES NFR BLD AUTO: 7 % (ref 4–12)
NEUTROPHILS # BLD AUTO: 8.7 X10(3)/MCL
NEUTROPHILS NFR BLD AUTO: 68 X10(3)/MCL
PLATELET # BLD AUTO: 392 X10(3)/MCL (ref 130–400)
PMV BLD AUTO: 9.6 FL (ref 7.4–10.4)
POTASSIUM SERPL-SCNC: 3.9 MMOL/L (ref 3.5–5.1)
PROT SERPL-MCNC: 8.2 GM/DL (ref 6.4–8.2)
RBC # BLD AUTO: 5.55 X10(6)/MCL (ref 4.5–5.9)
SODIUM SERPL-SCNC: 138 MMOL/L (ref 136–145)
TRIGL SERPL-MCNC: 395 MG/DL
TSH SERPL-ACNC: 2.26 MIU/L (ref 0.36–3.74)
VLDLC SERPL CALC-MCNC: 79 MG/DL
WBC # SPEC AUTO: 12.8 X10(3)/MCL (ref 4.5–11)

## 2018-11-30 ENCOUNTER — HISTORICAL (OUTPATIENT)
Dept: RESPIRATORY THERAPY | Facility: HOSPITAL | Age: 38
End: 2018-11-30

## 2019-01-22 ENCOUNTER — HISTORICAL (OUTPATIENT)
Dept: INTERNAL MEDICINE | Facility: CLINIC | Age: 39
End: 2019-01-22

## 2019-01-22 LAB
ABS NEUT (OLG): 7.58 X10(3)/MCL (ref 2.1–9.2)
ALBUMIN SERPL-MCNC: 3.6 GM/DL (ref 3.4–5)
ALBUMIN/GLOB SERPL: 0.71 RATIO (ref 1.1–2)
ALP SERPL-CCNC: 226 UNIT/L (ref 45–117)
ALT SERPL-CCNC: 58 UNIT/L (ref 12–78)
APPEARANCE, UA: CLEAR
AST SERPL-CCNC: 34 UNIT/L (ref 15–37)
BACTERIA #/AREA URNS AUTO: ABNORMAL /[HPF]
BASOPHILS # BLD AUTO: 0.07 X10(3)/MCL
BASOPHILS NFR BLD AUTO: 0 %
BILIRUB SERPL-MCNC: 0.3 MG/DL (ref 0.2–1)
BILIRUB UR QL STRIP: NEGATIVE
BILIRUBIN DIRECT+TOT PNL SERPL-MCNC: 0.1 MG/DL
BILIRUBIN DIRECT+TOT PNL SERPL-MCNC: 0.2 MG/DL
BUN SERPL-MCNC: 16 MG/DL (ref 7–18)
CALCIUM SERPL-MCNC: 8.9 MG/DL (ref 8.5–10.1)
CHLORIDE SERPL-SCNC: 105 MMOL/L (ref 98–107)
CHOLEST SERPL-MCNC: 152 MG/DL
CHOLEST/HDLC SERPL: 6.9 {RATIO} (ref 0–5)
CO2 SERPL-SCNC: 26 MMOL/L (ref 21–32)
COLOR UR: ABNORMAL
CREAT SERPL-MCNC: 1.1 MG/DL (ref 0.6–1.3)
EOSINOPHIL # BLD AUTO: 0.37 10*3/UL
EOSINOPHIL NFR BLD AUTO: 3 %
ERYTHROCYTE [DISTWIDTH] IN BLOOD BY AUTOMATED COUNT: 13.4 % (ref 11.5–14.5)
EST. AVERAGE GLUCOSE BLD GHB EST-MCNC: 169 MG/DL
GLOBULIN SER-MCNC: 5.1 GM/ML (ref 2.3–3.5)
GLUCOSE (UA): NORMAL
GLUCOSE SERPL-MCNC: 125 MG/DL (ref 74–106)
HBA1C MFR BLD: 7.5 % (ref 4.2–6.3)
HCT VFR BLD AUTO: 46.5 % (ref 40–51)
HDLC SERPL-MCNC: 22 MG/DL
HGB BLD-MCNC: 15.1 GM/DL (ref 13.5–17.5)
HGB UR QL STRIP: NEGATIVE
HYALINE CASTS #/AREA URNS LPF: ABNORMAL /[LPF]
IMM GRANULOCYTES # BLD AUTO: 0.06 10*3/UL
IMM GRANULOCYTES NFR BLD AUTO: 0 %
KETONES UR QL STRIP: NEGATIVE
LDLC SERPL CALC-MCNC: 76 MG/DL (ref 0–130)
LEUKOCYTE ESTERASE UR QL STRIP: NEGATIVE
LYMPHOCYTES # BLD AUTO: 3.8 X10(3)/MCL
LYMPHOCYTES NFR BLD AUTO: 29 % (ref 13–40)
MCH RBC QN AUTO: 26.8 PG (ref 26–34)
MCHC RBC AUTO-ENTMCNC: 32.5 GM/DL (ref 31–37)
MCV RBC AUTO: 82.6 FL (ref 80–100)
MONOCYTES # BLD AUTO: 1.13 X10(3)/MCL
MONOCYTES NFR BLD AUTO: 9 % (ref 4–12)
NEUTROPHILS # BLD AUTO: 7.58 X10(3)/MCL
NEUTROPHILS NFR BLD AUTO: 58 X10(3)/MCL
NITRITE UR QL STRIP: NEGATIVE
PH UR STRIP: 5 [PH] (ref 4.5–8)
PLATELET # BLD AUTO: 413 X10(3)/MCL (ref 130–400)
PMV BLD AUTO: 9.6 FL (ref 7.4–10.4)
POTASSIUM SERPL-SCNC: 4.1 MMOL/L (ref 3.5–5.1)
PROT SERPL-MCNC: 8.7 GM/DL (ref 6.4–8.2)
PROT UR QL STRIP: 30 MG/DL
RBC # BLD AUTO: 5.63 X10(6)/MCL (ref 4.5–5.9)
RBC #/AREA URNS AUTO: ABNORMAL /[HPF]
SODIUM SERPL-SCNC: 138 MMOL/L (ref 136–145)
SP GR UR STRIP: 1.01 (ref 1–1.03)
SQUAMOUS #/AREA URNS LPF: ABNORMAL /[LPF]
TRIGL SERPL-MCNC: 272 MG/DL
UROBILINOGEN UR STRIP-ACNC: NORMAL
VLDLC SERPL CALC-MCNC: 54 MG/DL
WBC # SPEC AUTO: 13 X10(3)/MCL (ref 4.5–11)
WBC #/AREA URNS AUTO: ABNORMAL /HPF

## 2019-02-18 ENCOUNTER — HISTORICAL (OUTPATIENT)
Dept: RADIOLOGY | Facility: HOSPITAL | Age: 39
End: 2019-02-18

## 2019-02-18 LAB
CHOLEST SERPL-MCNC: 137 MG/DL
CHOLEST/HDLC SERPL: 6 {RATIO} (ref 0–5)
EST. AVERAGE GLUCOSE BLD GHB EST-MCNC: 154 MG/DL
HBA1C MFR BLD: 7 % (ref 4.2–6.3)
HDLC SERPL-MCNC: 23 MG/DL
LDLC SERPL CALC-MCNC: 70 MG/DL (ref 0–130)
TRIGL SERPL-MCNC: 219 MG/DL
VLDLC SERPL CALC-MCNC: 44 MG/DL

## 2019-04-11 ENCOUNTER — HISTORICAL (OUTPATIENT)
Dept: ADMINISTRATIVE | Facility: HOSPITAL | Age: 39
End: 2019-04-11

## 2019-04-11 LAB
ABS NEUT (OLG): 7.86 X10(3)/MCL (ref 2.1–9.2)
ALBUMIN SERPL-MCNC: 3.5 GM/DL (ref 3.4–5)
ALBUMIN/GLOB SERPL: 0.7 RATIO (ref 1.1–2)
ALP SERPL-CCNC: 169 UNIT/L (ref 45–117)
ALT SERPL-CCNC: 47 UNIT/L (ref 12–78)
APPEARANCE, UA: CLEAR
AST SERPL-CCNC: 23 UNIT/L (ref 15–37)
BACTERIA #/AREA URNS AUTO: ABNORMAL /[HPF]
BASOPHILS # BLD AUTO: 0.04 X10(3)/MCL
BASOPHILS NFR BLD AUTO: 0 %
BILIRUB SERPL-MCNC: 0.2 MG/DL (ref 0.2–1)
BILIRUB UR QL STRIP: NEGATIVE
BILIRUBIN DIRECT+TOT PNL SERPL-MCNC: <0.1 MG/DL
BILIRUBIN DIRECT+TOT PNL SERPL-MCNC: ABNORMAL MG/DL
BUN SERPL-MCNC: 10 MG/DL (ref 7–18)
CALCIUM SERPL-MCNC: 8.7 MG/DL (ref 8.5–10.1)
CHLORIDE SERPL-SCNC: 104 MMOL/L (ref 98–107)
CHOLEST SERPL-MCNC: 172 MG/DL
CHOLEST/HDLC SERPL: 7.2 {RATIO} (ref 0–5)
CO2 SERPL-SCNC: 26 MMOL/L (ref 21–32)
COLOR UR: ABNORMAL
CREAT SERPL-MCNC: 0.8 MG/DL (ref 0.6–1.3)
CREAT UR-MCNC: 103 MG/DL
DEPRECATED CALCIDIOL+CALCIFEROL SERPL-MC: 20.9 NG/ML (ref 30–80)
EOSINOPHIL # BLD AUTO: 0.36 X10(3)/MCL
EOSINOPHIL NFR BLD AUTO: 3 %
ERYTHROCYTE [DISTWIDTH] IN BLOOD BY AUTOMATED COUNT: 13 % (ref 11.5–14.5)
GLOBULIN SER-MCNC: 5 GM/ML (ref 2.3–3.5)
GLUCOSE (UA): NORMAL
GLUCOSE SERPL-MCNC: 148 MG/DL (ref 74–106)
HCT VFR BLD AUTO: 46.8 % (ref 40–51)
HDLC SERPL-MCNC: 24 MG/DL
HGB BLD-MCNC: 16.1 GM/DL (ref 13.5–17.5)
HGB UR QL STRIP: NEGATIVE
HYALINE CASTS #/AREA URNS LPF: ABNORMAL /[LPF]
IMM GRANULOCYTES # BLD AUTO: 0.05 10*3/UL
IMM GRANULOCYTES NFR BLD AUTO: 0 %
KETONES UR QL STRIP: NEGATIVE
LDLC SERPL CALC-MCNC: 107 MG/DL (ref 0–130)
LEUKOCYTE ESTERASE UR QL STRIP: NEGATIVE
LYMPHOCYTES # BLD AUTO: 2.38 X10(3)/MCL
LYMPHOCYTES NFR BLD AUTO: 21 % (ref 13–40)
MAGNESIUM SERPL-MCNC: 2.2 MG/DL (ref 1.6–2.6)
MCH RBC QN AUTO: 28.5 PG (ref 26–34)
MCHC RBC AUTO-ENTMCNC: 34.4 GM/DL (ref 31–37)
MCV RBC AUTO: 82.8 FL (ref 80–100)
MONOCYTES # BLD AUTO: 0.86 X10(3)/MCL
MONOCYTES NFR BLD AUTO: 7 % (ref 4–12)
NEUTROPHILS # BLD AUTO: 7.86 X10(3)/MCL
NEUTROPHILS NFR BLD AUTO: 68 X10(3)/MCL
NITRITE UR QL STRIP: NEGATIVE
PH UR STRIP: 7.5 [PH] (ref 4.5–8)
PHOSPHATE SERPL-MCNC: 3.1 MG/DL (ref 2.5–4.9)
PLATELET # BLD AUTO: 319 X10(3)/MCL (ref 130–400)
PMV BLD AUTO: 9.5 FL (ref 7.4–10.4)
POTASSIUM SERPL-SCNC: 4 MMOL/L (ref 3.5–5.1)
PROT SERPL-MCNC: 8.5 GM/DL (ref 6.4–8.2)
PROT UR QL STRIP: 50 MG/DL
PROT UR STRIP-MCNC: 47.3 MG/DL
PROT/CREAT UR-RTO: 459.2 MG/GM
PTH-INTACT SERPL-MCNC: 83.7 PG/ML (ref 18.4–80.1)
RBC # BLD AUTO: 5.65 X10(6)/MCL (ref 4.5–5.9)
RBC #/AREA URNS AUTO: ABNORMAL /[HPF]
SODIUM SERPL-SCNC: 137 MMOL/L (ref 136–145)
SP GR UR STRIP: 1.02 (ref 1–1.03)
SQUAMOUS #/AREA URNS LPF: ABNORMAL /[LPF]
TRIGL SERPL-MCNC: 207 MG/DL
UROBILINOGEN UR STRIP-ACNC: NORMAL
VLDLC SERPL CALC-MCNC: 41 MG/DL
WBC # SPEC AUTO: 11.6 X10(3)/MCL (ref 4.5–11)
WBC #/AREA URNS AUTO: ABNORMAL /HPF

## 2019-04-23 ENCOUNTER — HISTORICAL (OUTPATIENT)
Dept: ADMINISTRATIVE | Facility: HOSPITAL | Age: 39
End: 2019-04-23

## 2019-05-22 ENCOUNTER — HISTORICAL (OUTPATIENT)
Dept: ADMINISTRATIVE | Facility: HOSPITAL | Age: 39
End: 2019-05-22

## 2019-06-05 ENCOUNTER — HISTORICAL (OUTPATIENT)
Dept: ADMINISTRATIVE | Facility: HOSPITAL | Age: 39
End: 2019-06-05

## 2019-07-19 ENCOUNTER — HISTORICAL (OUTPATIENT)
Dept: RADIOLOGY | Facility: HOSPITAL | Age: 39
End: 2019-07-19

## 2019-07-22 ENCOUNTER — HISTORICAL (OUTPATIENT)
Dept: RADIOLOGY | Facility: HOSPITAL | Age: 39
End: 2019-07-22

## 2019-09-10 ENCOUNTER — HISTORICAL (OUTPATIENT)
Dept: ADMINISTRATIVE | Facility: HOSPITAL | Age: 39
End: 2019-09-10

## 2019-09-10 LAB
ABS NEUT (OLG): 6.74 X10(3)/MCL (ref 2.1–9.2)
ALBUMIN SERPL-MCNC: 3.2 GM/DL (ref 3.4–5)
ALBUMIN/GLOB SERPL: 0.5 RATIO (ref 1.1–2)
ALP SERPL-CCNC: 204 UNIT/L (ref 45–117)
ALT SERPL-CCNC: 103 UNIT/L (ref 12–78)
AST SERPL-CCNC: 104 UNIT/L (ref 15–37)
BASOPHILS # BLD AUTO: 0.1 X10(3)/MCL (ref 0–0.2)
BASOPHILS NFR BLD AUTO: 1 %
BILIRUB SERPL-MCNC: 1 MG/DL (ref 0.2–1)
BILIRUBIN DIRECT+TOT PNL SERPL-MCNC: <0.1 MG/DL (ref 0–0.2)
BILIRUBIN DIRECT+TOT PNL SERPL-MCNC: ABNORMAL MG/DL
BUN SERPL-MCNC: 12 MG/DL (ref 7–18)
CALCIUM SERPL-MCNC: 9.4 MG/DL (ref 8.5–10.1)
CHLORIDE SERPL-SCNC: 85 MMOL/L (ref 98–107)
CO2 SERPL-SCNC: 31 MMOL/L (ref 21–32)
CREAT SERPL-MCNC: 1.3 MG/DL (ref 0.6–1.3)
CRP SERPL HS-MCNC: 43.3 MG/L (ref 0–3)
EOSINOPHIL # BLD AUTO: 0.3 X10(3)/MCL (ref 0–0.9)
EOSINOPHIL NFR BLD AUTO: 3 %
ERYTHROCYTE [DISTWIDTH] IN BLOOD BY AUTOMATED COUNT: 12.3 % (ref 11.5–14.5)
ERYTHROCYTE [SEDIMENTATION RATE] IN BLOOD: 2 MM/HR (ref 0–15)
EST. AVERAGE GLUCOSE BLD GHB EST-MCNC: 292 MG/DL
GLOBULIN SER-MCNC: 6 GM/ML (ref 2.3–3.5)
GLUCOSE SERPL-MCNC: 548 MG/DL (ref 74–106)
HBA1C MFR BLD: 11.8 % (ref 4.2–6.3)
HCT VFR BLD AUTO: 46 % (ref 40–51)
HGB BLD-MCNC: 16.8 GM/DL (ref 13.5–17.5)
IMM GRANULOCYTES # BLD AUTO: 0.06 10*3/UL
IMM GRANULOCYTES NFR BLD AUTO: 1 %
LYMPHOCYTES # BLD AUTO: 2.4 X10(3)/MCL (ref 0.6–4.6)
LYMPHOCYTES NFR BLD AUTO: 23 %
MCH RBC QN AUTO: 31.5 PG (ref 26–34)
MCHC RBC AUTO-ENTMCNC: 36.5 GM/DL (ref 31–37)
MCV RBC AUTO: 86.3 FL (ref 80–100)
MONOCYTES # BLD AUTO: 0.7 X10(3)/MCL (ref 0.1–1.3)
MONOCYTES NFR BLD AUTO: 7 %
NEUTROPHILS # BLD AUTO: 6.74 X10(3)/MCL (ref 2.1–9.2)
NEUTROPHILS NFR BLD AUTO: 66 %
PLATELET # BLD AUTO: 298 X10(3)/MCL (ref 130–400)
PMV BLD AUTO: 11 FL (ref 7.4–10.4)
POTASSIUM SERPL-SCNC: 4.3 MMOL/L (ref 3.5–5.1)
PROT SERPL-MCNC: 9.2 GM/DL (ref 6.4–8.2)
RBC # BLD AUTO: 5.33 X10(6)/MCL (ref 4.5–5.9)
RHEUMATOID FACT SERPL-ACNC: <10 IU/ML (ref 0–15)
SODIUM SERPL-SCNC: 126 MMOL/L (ref 136–145)
WBC # SPEC AUTO: 10.3 X10(3)/MCL (ref 4.5–11)

## 2019-09-20 ENCOUNTER — HISTORICAL (OUTPATIENT)
Dept: RADIOLOGY | Facility: HOSPITAL | Age: 39
End: 2019-09-20

## 2019-10-01 ENCOUNTER — HISTORICAL (OUTPATIENT)
Dept: ADMINISTRATIVE | Facility: HOSPITAL | Age: 39
End: 2019-10-01

## 2019-10-01 LAB
ABS NEUT (OLG): 5.21 X10(3)/MCL (ref 2.1–9.2)
BASOPHILS # BLD AUTO: 0.1 X10(3)/MCL (ref 0–0.2)
BASOPHILS NFR BLD AUTO: 1 %
BUN SERPL-MCNC: 12 MG/DL (ref 7–18)
CALCIUM SERPL-MCNC: 8.2 MG/DL (ref 8.5–10.1)
CHLORIDE SERPL-SCNC: 99 MMOL/L (ref 98–107)
CHOLEST SERPL-MCNC: 254 MG/DL
CHOLEST/HDLC SERPL: ABNORMAL {RATIO} (ref 0–5)
CO2 SERPL-SCNC: 26 MMOL/L (ref 21–32)
CREAT SERPL-MCNC: 1 MG/DL (ref 0.6–1.3)
CREAT/UREA NIT SERPL: 12
EOSINOPHIL # BLD AUTO: 0.3 X10(3)/MCL (ref 0–0.9)
EOSINOPHIL NFR BLD AUTO: 3 %
ERYTHROCYTE [DISTWIDTH] IN BLOOD BY AUTOMATED COUNT: 12.8 % (ref 11.5–14.5)
GLUCOSE SERPL-MCNC: 363 MG/DL (ref 74–106)
HCT VFR BLD AUTO: 41.5 % (ref 40–51)
HDLC SERPL-MCNC: ABNORMAL MG/DL (ref 40–59)
HGB BLD-MCNC: 14.9 GM/DL (ref 13.5–17.5)
IMM GRANULOCYTES # BLD AUTO: 0.1 10*3/UL
IMM GRANULOCYTES NFR BLD AUTO: 1 %
LDLC SERPL CALC-MCNC: ABNORMAL MG/DL
LYMPHOCYTES # BLD AUTO: 3 X10(3)/MCL (ref 0.6–4.6)
LYMPHOCYTES NFR BLD AUTO: 32 %
MCH RBC QN AUTO: 32.1 PG (ref 26–34)
MCHC RBC AUTO-ENTMCNC: 35.9 GM/DL (ref 31–37)
MCV RBC AUTO: 89.4 FL (ref 80–100)
MONOCYTES # BLD AUTO: 0.6 X10(3)/MCL (ref 0.1–1.3)
MONOCYTES NFR BLD AUTO: 7 %
NEUTROPHILS # BLD AUTO: 5.21 X10(3)/MCL (ref 2.1–9.2)
NEUTROPHILS NFR BLD AUTO: 56 %
PLATELET # BLD AUTO: 267 X10(3)/MCL (ref 130–400)
PMV BLD AUTO: 10.8 FL (ref 7.4–10.4)
POTASSIUM SERPL-SCNC: 3.8 MMOL/L (ref 3.5–5.1)
RBC # BLD AUTO: 4.64 X10(6)/MCL (ref 4.5–5.9)
SODIUM SERPL-SCNC: 131 MMOL/L (ref 136–145)
TRIGL SERPL-MCNC: 2098 MG/DL
VLDLC SERPL CALC-MCNC: ABNORMAL MG/DL
WBC # SPEC AUTO: 9.3 X10(3)/MCL (ref 4.5–11)

## 2019-10-25 ENCOUNTER — HISTORICAL (OUTPATIENT)
Dept: NEPHROLOGY | Facility: CLINIC | Age: 39
End: 2019-10-25

## 2019-10-25 LAB
ABS NEUT (OLG): 4.74 X10(3)/MCL (ref 2.1–9.2)
ALBUMIN SERPL-MCNC: 3.1 GM/DL (ref 3.4–5)
ALBUMIN/GLOB SERPL: 0.6 RATIO (ref 1.1–2)
ALP SERPL-CCNC: 176 UNIT/L (ref 45–117)
ALT SERPL-CCNC: 53 UNIT/L (ref 12–78)
APPEARANCE, UA: CLEAR
AST SERPL-CCNC: 46 UNIT/L (ref 15–37)
BACTERIA #/AREA URNS AUTO: ABNORMAL /HPF
BASOPHILS # BLD AUTO: 0.1 X10(3)/MCL (ref 0–0.2)
BASOPHILS NFR BLD AUTO: 1 %
BILIRUB SERPL-MCNC: 0.4 MG/DL (ref 0.2–1)
BILIRUB UR QL STRIP: NEGATIVE
BILIRUBIN DIRECT+TOT PNL SERPL-MCNC: 0.1 MG/DL (ref 0–0.2)
BILIRUBIN DIRECT+TOT PNL SERPL-MCNC: 0.3 MG/DL
BUN SERPL-MCNC: 12 MG/DL (ref 7–18)
CALCIUM SERPL-MCNC: 8.4 MG/DL (ref 8.5–10.1)
CHLORIDE SERPL-SCNC: 96 MMOL/L (ref 98–107)
CO2 SERPL-SCNC: 32 MMOL/L (ref 21–32)
COLOR UR: YELLOW
CREAT SERPL-MCNC: 1 MG/DL (ref 0.6–1.3)
CREAT UR-MCNC: 90 MG/DL
EOSINOPHIL # BLD AUTO: 0.4 X10(3)/MCL (ref 0–0.9)
EOSINOPHIL NFR BLD AUTO: 4 %
ERYTHROCYTE [DISTWIDTH] IN BLOOD BY AUTOMATED COUNT: 12.5 % (ref 11.5–14.5)
GLOBULIN SER-MCNC: 5.4 GM/ML (ref 2.3–3.5)
GLUCOSE (UA): >1000 MG/DL
GLUCOSE SERPL-MCNC: 268 MG/DL (ref 74–106)
HCT VFR BLD AUTO: 44.2 % (ref 40–51)
HGB BLD-MCNC: 15.6 GM/DL (ref 13.5–17.5)
HGB UR QL STRIP: NEGATIVE
HYALINE CASTS #/AREA URNS LPF: ABNORMAL /LPF
IMM GRANULOCYTES # BLD AUTO: 0.19 10*3/UL
IMM GRANULOCYTES NFR BLD AUTO: 2 %
KETONES UR QL STRIP: NEGATIVE
LEUKOCYTE ESTERASE UR QL STRIP: 250 LEU/UL
LYMPHOCYTES # BLD AUTO: 3.4 X10(3)/MCL (ref 0.6–4.6)
LYMPHOCYTES NFR BLD AUTO: 35 %
MCH RBC QN AUTO: 30.2 PG (ref 26–34)
MCHC RBC AUTO-ENTMCNC: 35.3 GM/DL (ref 31–37)
MCV RBC AUTO: 85.5 FL (ref 80–100)
MONOCYTES # BLD AUTO: 0.9 X10(3)/MCL (ref 0.1–1.3)
MONOCYTES NFR BLD AUTO: 10 %
MUCOUS THREADS URNS QL MICRO: SLIGHT
NEUTROPHILS # BLD AUTO: 4.74 X10(3)/MCL (ref 2.1–9.2)
NEUTROPHILS NFR BLD AUTO: 49 %
NITRITE UR QL STRIP: NEGATIVE
PH UR STRIP: 6 [PH] (ref 4.5–8)
PLATELET # BLD AUTO: 381 X10(3)/MCL (ref 130–400)
PMV BLD AUTO: 9.5 FL (ref 7.4–10.4)
POTASSIUM SERPL-SCNC: 3.1 MMOL/L (ref 3.5–5.1)
PROT SERPL-MCNC: 8.5 GM/DL (ref 6.4–8.2)
PROT UR QL STRIP: 20 MG/DL
PROT UR STRIP-MCNC: 26.2 MG/DL
PROT/CREAT UR-RTO: 291.1 MG/GM
RBC # BLD AUTO: 5.17 X10(6)/MCL (ref 4.5–5.9)
RBC #/AREA URNS AUTO: ABNORMAL /HPF
SODIUM SERPL-SCNC: 133 MMOL/L (ref 136–145)
SP GR UR STRIP: 1.03 (ref 1–1.03)
SQUAMOUS #/AREA URNS LPF: ABNORMAL /LPF
UROBILINOGEN UR STRIP-ACNC: NORMAL
WBC # SPEC AUTO: 9.7 X10(3)/MCL (ref 4.5–11)
WBC #/AREA URNS AUTO: ABNORMAL /HPF

## 2019-11-12 ENCOUNTER — HISTORICAL (OUTPATIENT)
Dept: ADMINISTRATIVE | Facility: HOSPITAL | Age: 39
End: 2019-11-12

## 2019-11-26 ENCOUNTER — HISTORICAL (OUTPATIENT)
Dept: ADMINISTRATIVE | Facility: HOSPITAL | Age: 39
End: 2019-11-26

## 2019-11-26 LAB
ABS NEUT (OLG): 13.73 X10(3)/MCL (ref 2.1–9.2)
ALBUMIN SERPL-MCNC: 3.7 GM/DL (ref 3.4–5)
ALBUMIN/GLOB SERPL: 0.6 RATIO (ref 1.1–2)
ALP SERPL-CCNC: 232 UNIT/L (ref 45–117)
ALT SERPL-CCNC: 126 UNIT/L (ref 12–78)
APPEARANCE, UA: CLEAR
AST SERPL-CCNC: 54 UNIT/L (ref 15–37)
BACTERIA #/AREA URNS AUTO: ABNORMAL /HPF
BASOPHILS # BLD AUTO: 0.1 X10(3)/MCL (ref 0–0.2)
BASOPHILS NFR BLD AUTO: 0 %
BILIRUB SERPL-MCNC: 0.5 MG/DL (ref 0.2–1)
BILIRUB UR QL STRIP: NEGATIVE
BILIRUBIN DIRECT+TOT PNL SERPL-MCNC: 0.2 MG/DL (ref 0–0.2)
BILIRUBIN DIRECT+TOT PNL SERPL-MCNC: 0.3 MG/DL
BUN SERPL-MCNC: 24 MG/DL (ref 7–18)
CALCIUM SERPL-MCNC: 9.5 MG/DL (ref 8.5–10.1)
CHLORIDE SERPL-SCNC: 84 MMOL/L (ref 98–107)
CHOLEST SERPL-MCNC: 216 MG/DL
CHOLEST/HDLC SERPL: 11.4 {RATIO} (ref 0–5)
CO2 SERPL-SCNC: 38 MMOL/L (ref 21–32)
COLOR UR: YELLOW
CREAT SERPL-MCNC: 1.4 MG/DL (ref 0.6–1.3)
CREAT UR-MCNC: 329 MG/DL
EOSINOPHIL # BLD AUTO: 0.3 X10(3)/MCL (ref 0–0.9)
EOSINOPHIL NFR BLD AUTO: 2 %
ERYTHROCYTE [DISTWIDTH] IN BLOOD BY AUTOMATED COUNT: 11.8 % (ref 11.5–14.5)
EST. AVERAGE GLUCOSE BLD GHB EST-MCNC: 226 MG/DL
GLOBULIN SER-MCNC: 5.8 GM/ML (ref 2.3–3.5)
GLUCOSE (UA): >1000 MG/DL
GLUCOSE SERPL-MCNC: 382 MG/DL (ref 74–106)
HBA1C MFR BLD: 9.5 % (ref 4.2–6.3)
HCT VFR BLD AUTO: 48.4 % (ref 40–51)
HDLC SERPL-MCNC: 19 MG/DL (ref 40–59)
HGB BLD-MCNC: 16.9 GM/DL (ref 13.5–17.5)
HGB UR QL STRIP: 0.5
HYALINE CASTS #/AREA URNS LPF: ABNORMAL /LPF
IMM GRANULOCYTES # BLD AUTO: 0.11 10*3/UL
IMM GRANULOCYTES NFR BLD AUTO: 1 %
KETONES UR QL STRIP: ABNORMAL
LDLC SERPL CALC-MCNC: ABNORMAL MG/DL
LEUKOCYTE ESTERASE UR QL STRIP: NEGATIVE
LYMPHOCYTES # BLD AUTO: 3.7 X10(3)/MCL (ref 0.6–4.6)
LYMPHOCYTES NFR BLD AUTO: 19 %
MCH RBC QN AUTO: 29.7 PG (ref 26–34)
MCHC RBC AUTO-ENTMCNC: 34.9 GM/DL (ref 31–37)
MCV RBC AUTO: 85.1 FL (ref 80–100)
MICROALBUMIN UR-MCNC: 904 MG/L (ref 0–19)
MICROALBUMIN/CREAT RATIO PNL UR: 274.8 MCG/MG CR (ref 0–29)
MONOCYTES # BLD AUTO: 1.2 X10(3)/MCL (ref 0.1–1.3)
MONOCYTES NFR BLD AUTO: 6 %
NEUTROPHILS # BLD AUTO: 13.73 X10(3)/MCL (ref 2.1–9.2)
NEUTROPHILS NFR BLD AUTO: 72 %
NITRITE UR QL STRIP: NEGATIVE
PH UR STRIP: 5.5 [PH] (ref 4.5–8)
PLATELET # BLD AUTO: 384 X10(3)/MCL (ref 130–400)
PMV BLD AUTO: 9.7 FL (ref 7.4–10.4)
POTASSIUM SERPL-SCNC: 2.6 MMOL/L (ref 3.5–5.1)
PROT SERPL-MCNC: 8.8 GM/DL
PROT SERPL-MCNC: 9.5 GM/DL (ref 6.4–8.2)
PROT UR QL STRIP: 100 MG/DL
RBC # BLD AUTO: 5.69 X10(6)/MCL (ref 4.5–5.9)
RBC #/AREA URNS AUTO: ABNORMAL /HPF
SODIUM SERPL-SCNC: 128 MMOL/L (ref 136–145)
SP GR UR STRIP: 1.03 (ref 1–1.03)
SQUAMOUS #/AREA URNS LPF: ABNORMAL /LPF
TRIGL SERPL-MCNC: 995 MG/DL
UROBILINOGEN UR STRIP-ACNC: 2 MG/DL
VLDLC SERPL CALC-MCNC: ABNORMAL MG/DL
WBC # SPEC AUTO: 19.1 X10(3)/MCL (ref 4.5–11)
WBC #/AREA URNS AUTO: ABNORMAL /HPF

## 2020-06-23 ENCOUNTER — HISTORICAL (OUTPATIENT)
Dept: ADMINISTRATIVE | Facility: HOSPITAL | Age: 40
End: 2020-06-23

## 2020-06-23 LAB
ABS NEUT (OLG): 8.87 X10(3)/MCL (ref 2.1–9.2)
ALBUMIN SERPL-MCNC: 3.4 GM/DL (ref 3.4–5)
ALBUMIN/GLOB SERPL: 0.7 RATIO (ref 1.1–2)
ALP SERPL-CCNC: 211 UNIT/L (ref 45–117)
ALT SERPL-CCNC: 72 UNIT/L (ref 12–78)
APPEARANCE, UA: CLEAR
AST SERPL-CCNC: 30 UNIT/L (ref 15–37)
BACTERIA #/AREA URNS AUTO: ABNORMAL /HPF
BASOPHILS # BLD AUTO: 0.1 X10(3)/MCL (ref 0–0.2)
BASOPHILS NFR BLD AUTO: 1 %
BILIRUB SERPL-MCNC: 0.4 MG/DL (ref 0.2–1)
BILIRUB UR QL STRIP: NEGATIVE
BILIRUBIN DIRECT+TOT PNL SERPL-MCNC: <0.1 MG/DL (ref 0–0.2)
BILIRUBIN DIRECT+TOT PNL SERPL-MCNC: ABNORMAL MG/DL
BUN SERPL-MCNC: 10 MG/DL (ref 7–18)
CALCIUM SERPL-MCNC: 8.6 MG/DL (ref 8.5–10.1)
CHLORIDE SERPL-SCNC: 102 MMOL/L (ref 98–107)
CHOLEST SERPL-MCNC: 224 MG/DL
CHOLEST/HDLC SERPL: 14 {RATIO} (ref 0–5)
CO2 SERPL-SCNC: 23 MMOL/L (ref 21–32)
COLOR UR: YELLOW
CREAT SERPL-MCNC: 0.9 MG/DL (ref 0.6–1.3)
CREAT UR-MCNC: 106 MG/DL
CRP SERPL HS-MCNC: 9.98 MG/DL
DEPRECATED CALCIDIOL+CALCIFEROL SERPL-MC: 12.5 NG/ML (ref 30–80)
EOSINOPHIL # BLD AUTO: 0.2 X10(3)/MCL (ref 0–0.9)
EOSINOPHIL NFR BLD AUTO: 1 %
ERYTHROCYTE [DISTWIDTH] IN BLOOD BY AUTOMATED COUNT: 12.3 % (ref 11.5–14.5)
ERYTHROCYTE [SEDIMENTATION RATE] IN BLOOD: 28 MM/HR (ref 0–15)
EST. AVERAGE GLUCOSE BLD GHB EST-MCNC: 283 MG/DL
GLOBULIN SER-MCNC: 5.1 GM/ML (ref 2.3–3.5)
GLUCOSE (UA): >1000 MG/DL
GLUCOSE SERPL-MCNC: 363 MG/DL (ref 74–106)
HBA1C MFR BLD: 11.5 % (ref 4.2–6.3)
HCT VFR BLD AUTO: 47.2 % (ref 40–51)
HDLC SERPL-MCNC: 16 MG/DL (ref 40–59)
HGB BLD-MCNC: 17 GM/DL (ref 13.5–17.5)
HGB UR QL STRIP: 0.03 MG/DL
HIV 1+2 AB+HIV1 P24 AG SERPL QL IA: NONREACTIVE
HYALINE CASTS #/AREA URNS LPF: ABNORMAL /LPF
IMM GRANULOCYTES # BLD AUTO: 0.1 10*3/UL
IMM GRANULOCYTES NFR BLD AUTO: 1 %
KETONES UR QL STRIP: NEGATIVE
LDLC SERPL CALC-MCNC: ABNORMAL MG/DL
LEUKOCYTE ESTERASE UR QL STRIP: 500 LEU/UL
LYMPHOCYTES # BLD AUTO: 3.7 X10(3)/MCL (ref 0.6–4.6)
LYMPHOCYTES NFR BLD AUTO: 27 %
MCH RBC QN AUTO: 31 PG (ref 26–34)
MCHC RBC AUTO-ENTMCNC: 36 GM/DL (ref 31–37)
MCV RBC AUTO: 86.1 FL (ref 80–100)
MICROALBUMIN UR-MCNC: 647 MG/L (ref 0–19)
MICROALBUMIN/CREAT RATIO PNL UR: 610.4 MCG/MG CR (ref 0–29)
MONOCYTES # BLD AUTO: 0.8 X10(3)/MCL (ref 0.1–1.3)
MONOCYTES NFR BLD AUTO: 6 %
NEUTROPHILS # BLD AUTO: 8.87 X10(3)/MCL (ref 2.1–9.2)
NEUTROPHILS NFR BLD AUTO: 65 %
NITRITE UR QL STRIP: NEGATIVE
PH UR STRIP: 5.5 [PH] (ref 4.5–8)
PLATELET # BLD AUTO: 363 X10(3)/MCL (ref 130–400)
PMV BLD AUTO: 10 FL (ref 7.4–10.4)
POTASSIUM SERPL-SCNC: 3.8 MMOL/L (ref 3.5–5.1)
PROT SERPL-MCNC: 8.5 GM/DL (ref 6.4–8.2)
PROT UR QL STRIP: 100 MG/DL
RBC # BLD AUTO: 5.48 X10(6)/MCL (ref 4.5–5.9)
RBC #/AREA URNS AUTO: ABNORMAL /HPF
SODIUM SERPL-SCNC: 133 MMOL/L (ref 136–145)
SP GR UR STRIP: 1.04 (ref 1–1.03)
SQUAMOUS #/AREA URNS LPF: ABNORMAL /LPF
T PALLIDUM AB SER QL: NONREACTIVE
TRIGL SERPL-MCNC: 1351 MG/DL
TSH SERPL-ACNC: 2.49 MIU/L (ref 0.36–3.74)
URATE SERPL-MCNC: 7 MG/DL (ref 3.5–7.2)
UROBILINOGEN UR STRIP-ACNC: NORMAL
VLDLC SERPL CALC-MCNC: ABNORMAL MG/DL
WBC # SPEC AUTO: 13.7 X10(3)/MCL (ref 4.5–11)
WBC #/AREA URNS AUTO: >=100 /HPF

## 2020-07-07 ENCOUNTER — HISTORICAL (OUTPATIENT)
Dept: ADMINISTRATIVE | Facility: HOSPITAL | Age: 40
End: 2020-07-07

## 2020-07-07 LAB
ABS NEUT (OLG): 7.29 X10(3)/MCL (ref 2.1–9.2)
ALBUMIN SERPL-MCNC: 3.5 GM/DL (ref 3.4–5)
ALBUMIN/GLOB SERPL: 0.7 RATIO (ref 1.1–2)
ALP SERPL-CCNC: 212 UNIT/L (ref 45–117)
ALT SERPL-CCNC: 99 UNIT/L (ref 12–78)
APPEARANCE, UA: CLEAR
AST SERPL-CCNC: 46 UNIT/L (ref 15–37)
BACTERIA #/AREA URNS AUTO: ABNORMAL /HPF
BASOPHILS # BLD AUTO: 0.1 X10(3)/MCL (ref 0–0.2)
BASOPHILS NFR BLD AUTO: 0 %
BILIRUB SERPL-MCNC: 0.6 MG/DL (ref 0.2–1)
BILIRUB UR QL STRIP: NEGATIVE
BILIRUBIN DIRECT+TOT PNL SERPL-MCNC: <0.1 MG/DL (ref 0–0.2)
BILIRUBIN DIRECT+TOT PNL SERPL-MCNC: ABNORMAL MG/DL
BUN SERPL-MCNC: 11 MG/DL (ref 7–18)
CALCIUM SERPL-MCNC: 8.5 MG/DL (ref 8.5–10.1)
CHLORIDE SERPL-SCNC: 100 MMOL/L (ref 98–107)
CHOLEST SERPL-MCNC: 241 MG/DL
CHOLEST/HDLC SERPL: 15.1 {RATIO} (ref 0–5)
CO2 SERPL-SCNC: 27 MMOL/L (ref 21–32)
COLOR UR: YELLOW
CREAT SERPL-MCNC: 0.9 MG/DL (ref 0.6–1.3)
CREAT UR-MCNC: 139 MG/DL
EOSINOPHIL # BLD AUTO: 0.2 X10(3)/MCL (ref 0–0.9)
EOSINOPHIL NFR BLD AUTO: 1 %
ERYTHROCYTE [DISTWIDTH] IN BLOOD BY AUTOMATED COUNT: 12.3 % (ref 11.5–14.5)
EST. AVERAGE GLUCOSE BLD GHB EST-MCNC: 266 MG/DL
GLOBULIN SER-MCNC: 5.2 GM/ML (ref 2.3–3.5)
GLUCOSE (UA): ABNORMAL MG/DL
GLUCOSE SERPL-MCNC: 366 MG/DL (ref 74–106)
HBA1C MFR BLD: 10.9 % (ref 4.2–6.3)
HCT VFR BLD AUTO: 46.2 % (ref 40–51)
HDLC SERPL-MCNC: 16 MG/DL (ref 40–59)
HGB BLD-MCNC: 16.6 GM/DL (ref 13.5–17.5)
HGB UR QL STRIP: 0.03 MG/DL
HYALINE CASTS #/AREA URNS LPF: ABNORMAL /LPF
IMM GRANULOCYTES # BLD AUTO: 0.08 10*3/UL
IMM GRANULOCYTES NFR BLD AUTO: 1 %
KETONES UR QL STRIP: NEGATIVE
LDLC SERPL CALC-MCNC: ABNORMAL MG/DL
LEUKOCYTE ESTERASE UR QL STRIP: 250 LEU/UL
LYMPHOCYTES # BLD AUTO: 3 X10(3)/MCL (ref 0.6–4.6)
LYMPHOCYTES NFR BLD AUTO: 26 %
MCH RBC QN AUTO: 31 PG (ref 26–34)
MCHC RBC AUTO-ENTMCNC: 35.9 GM/DL (ref 31–37)
MCV RBC AUTO: 86.4 FL (ref 80–100)
MICROALBUMIN UR-MCNC: 815 MG/L (ref 0–19)
MICROALBUMIN/CREAT RATIO PNL UR: 586.3 MCG/MG CR (ref 0–29)
MONOCYTES # BLD AUTO: 0.7 X10(3)/MCL (ref 0.1–1.3)
MONOCYTES NFR BLD AUTO: 6 %
NEUTROPHILS # BLD AUTO: 7.29 X10(3)/MCL (ref 2.1–9.2)
NEUTROPHILS NFR BLD AUTO: 65 %
NITRITE UR QL STRIP: NEGATIVE
PH UR STRIP: 5.5 [PH] (ref 4.5–8)
PLATELET # BLD AUTO: 321 X10(3)/MCL (ref 130–400)
PMV BLD AUTO: 10 FL (ref 7.4–10.4)
POTASSIUM SERPL-SCNC: 4 MMOL/L (ref 3.5–5.1)
PROT SERPL-MCNC: 8.7 GM/DL (ref 6.4–8.2)
PROT UR QL STRIP: 100 MG/DL
RBC # BLD AUTO: 5.35 X10(6)/MCL (ref 4.5–5.9)
RBC #/AREA URNS AUTO: ABNORMAL /HPF
SODIUM SERPL-SCNC: 133 MMOL/L (ref 136–145)
SP GR UR STRIP: 1.04 (ref 1–1.03)
SQUAMOUS #/AREA URNS LPF: ABNORMAL /LPF
TRIGL SERPL-MCNC: 2062 MG/DL
UROBILINOGEN UR STRIP-ACNC: NORMAL
VLDLC SERPL CALC-MCNC: 412 MG/DL
WBC # SPEC AUTO: 11.2 X10(3)/MCL (ref 4.5–11)
WBC #/AREA URNS AUTO: ABNORMAL /HPF

## 2020-07-17 ENCOUNTER — HISTORICAL (OUTPATIENT)
Dept: RADIOLOGY | Facility: HOSPITAL | Age: 40
End: 2020-07-17

## 2020-08-03 ENCOUNTER — HISTORICAL (OUTPATIENT)
Dept: ADMINISTRATIVE | Facility: HOSPITAL | Age: 40
End: 2020-08-03

## 2020-08-03 LAB
ALBUMIN SERPL-MCNC: 3.4 GM/DL (ref 3.4–5)
ALBUMIN/GLOB SERPL: 0.7 RATIO (ref 1.1–2)
ALP SERPL-CCNC: 194 UNIT/L (ref 45–117)
ALT SERPL-CCNC: 84 UNIT/L (ref 12–78)
APPEARANCE, UA: CLEAR
AST SERPL-CCNC: 59 UNIT/L (ref 15–37)
BACTERIA #/AREA URNS AUTO: ABNORMAL /HPF
BILIRUB SERPL-MCNC: 0.2 MG/DL (ref 0.2–1)
BILIRUB UR QL STRIP: NEGATIVE
BILIRUBIN DIRECT+TOT PNL SERPL-MCNC: <0.1 MG/DL (ref 0–0.2)
BILIRUBIN DIRECT+TOT PNL SERPL-MCNC: ABNORMAL MG/DL
BUN SERPL-MCNC: 17 MG/DL (ref 7–18)
CALCIUM SERPL-MCNC: 8.8 MG/DL (ref 8.5–10.1)
CHLORIDE SERPL-SCNC: 101 MMOL/L (ref 98–107)
CO2 SERPL-SCNC: 22 MMOL/L (ref 21–32)
COLOR UR: ABNORMAL
CREAT SERPL-MCNC: 1 MG/DL (ref 0.6–1.3)
CREAT UR-MCNC: 91 MG/DL
DEPRECATED CALCIDIOL+CALCIFEROL SERPL-MC: 10.7 NG/ML (ref 30–80)
GLOBULIN SER-MCNC: 4.8 GM/ML (ref 2.3–3.5)
GLUCOSE (UA): >1000 MG/DL
GLUCOSE SERPL-MCNC: 402 MG/DL (ref 74–106)
HGB UR QL STRIP: NEGATIVE
HYALINE CASTS #/AREA URNS LPF: ABNORMAL /LPF
KETONES UR QL STRIP: NEGATIVE
LEUKOCYTE ESTERASE UR QL STRIP: NEGATIVE
LIPASE SERPL-CCNC: 81 UNIT/L (ref 73–393)
NITRITE UR QL STRIP: NEGATIVE
PH UR STRIP: 5 [PH] (ref 4.5–8)
POTASSIUM SERPL-SCNC: 3.8 MMOL/L (ref 3.5–5.1)
PROT SERPL-MCNC: 8.2 GM/DL (ref 6.4–8.2)
PROT UR QL STRIP: 30 MG/DL
PROT UR STRIP-MCNC: 43.4 MG/DL
PROT/CREAT UR-RTO: 476.9 MG/GM
RBC #/AREA URNS AUTO: ABNORMAL /HPF
SODIUM SERPL-SCNC: 133 MMOL/L (ref 136–145)
SP GR UR STRIP: 1.03 (ref 1–1.03)
SQUAMOUS #/AREA URNS LPF: ABNORMAL /LPF
URATE SERPL-MCNC: 7.6 MG/DL (ref 3.5–7.2)
UROBILINOGEN UR STRIP-ACNC: NORMAL
WBC #/AREA URNS AUTO: ABNORMAL /HPF

## 2020-08-13 ENCOUNTER — HISTORICAL (OUTPATIENT)
Dept: ADMINISTRATIVE | Facility: HOSPITAL | Age: 40
End: 2020-08-13

## 2020-08-13 LAB
ALBUMIN SERPL-MCNC: 3.3 GM/DL (ref 3.4–5)
ALBUMIN/GLOB SERPL: 0.6 RATIO (ref 1.1–2)
ALP SERPL-CCNC: 262 UNIT/L (ref 45–117)
ALT SERPL-CCNC: 116 UNIT/L (ref 12–78)
AST SERPL-CCNC: 63 UNIT/L (ref 15–37)
BILIRUB SERPL-MCNC: 0.5 MG/DL (ref 0.2–1)
BILIRUBIN DIRECT+TOT PNL SERPL-MCNC: <0.1 MG/DL (ref 0–0.2)
BILIRUBIN DIRECT+TOT PNL SERPL-MCNC: ABNORMAL MG/DL
BUN SERPL-MCNC: 13 MG/DL (ref 7–18)
CALCIUM SERPL-MCNC: 8.2 MG/DL (ref 8.5–10.1)
CHLORIDE SERPL-SCNC: 98 MMOL/L (ref 98–107)
CHOLEST SERPL-MCNC: 230 MG/DL
CHOLEST/HDLC SERPL: ABNORMAL {RATIO} (ref 0–5)
CO2 SERPL-SCNC: 26 MMOL/L (ref 21–32)
CREAT SERPL-MCNC: 1 MG/DL (ref 0.6–1.3)
GLOBULIN SER-MCNC: 5.3 GM/ML (ref 2.3–3.5)
GLUCOSE SERPL-MCNC: 466 MG/DL (ref 74–106)
HAV IGM SERPL QL IA: NONREACTIVE
HBV CORE IGM SERPL QL IA: NONREACTIVE
HBV SURFACE AG SERPL QL IA: NONREACTIVE
HCV AB SERPL QL IA: NONREACTIVE
HDLC SERPL-MCNC: ABNORMAL MG/DL (ref 40–59)
LDLC SERPL CALC-MCNC: ABNORMAL MG/DL
POTASSIUM SERPL-SCNC: 3.5 MMOL/L (ref 3.5–5.1)
PROT SERPL-MCNC: 8.6 GM/DL (ref 6.4–8.2)
SERINE PROT 3-ARUP: 1 AU/ML
SODIUM SERPL-SCNC: 132 MMOL/L (ref 136–145)
TRIGL SERPL-MCNC: 2216 MG/DL
VIT B12 SERPL-MCNC: 773 PG/ML (ref 193–986)
VLDLC SERPL CALC-MCNC: ABNORMAL MG/DL

## 2020-08-18 ENCOUNTER — HISTORICAL (OUTPATIENT)
Dept: ADMINISTRATIVE | Facility: HOSPITAL | Age: 40
End: 2020-08-18

## 2020-08-18 LAB
ABS NEUT (OLG): 7.1 X10(3)/MCL (ref 2.1–9.2)
ANTINUCLEAR ANTIBODY SCREEN (OHS): NEGATIVE
BASOPHILS # BLD AUTO: 0 X10(3)/MCL (ref 0–0.2)
BASOPHILS NFR BLD AUTO: 0 %
DSDNA ANTIBODY (OHS): NEGATIVE
EOSINOPHIL # BLD AUTO: 0.2 X10(3)/MCL (ref 0–0.9)
EOSINOPHIL NFR BLD AUTO: 2 %
ERYTHROCYTE [DISTWIDTH] IN BLOOD BY AUTOMATED COUNT: 12.3 % (ref 11.5–14.5)
FERRITIN SERPL-MCNC: 190.9 NG/ML (ref 26–388)
HCT VFR BLD AUTO: 47.8 % (ref 40–51)
HGB BLD-MCNC: 16.6 GM/DL (ref 13.5–17.5)
IMM GRANULOCYTES # BLD AUTO: 0.06 10*3/UL
IMM GRANULOCYTES NFR BLD AUTO: 1 %
INR PPP: 0.88 (ref 0.9–1.2)
IRON SATN MFR SERPL: 17.6 % (ref 15–50)
IRON SERPL-MCNC: 52 MCG/DL (ref 65–175)
LIPASE SERPL-CCNC: 127 UNIT/L (ref 73–393)
LYMPHOCYTES # BLD AUTO: 2.5 X10(3)/MCL (ref 0.6–4.6)
LYMPHOCYTES NFR BLD AUTO: 23 %
MCH RBC QN AUTO: 30.1 PG (ref 26–34)
MCHC RBC AUTO-ENTMCNC: 34.7 GM/DL (ref 31–37)
MCV RBC AUTO: 86.8 FL (ref 80–100)
MONOCYTES # BLD AUTO: 0.7 X10(3)/MCL (ref 0.1–1.3)
MONOCYTES NFR BLD AUTO: 7 %
NEUTROPHILS # BLD AUTO: 7.1 X10(3)/MCL (ref 2.1–9.2)
NEUTROPHILS NFR BLD AUTO: 67 %
PLATELET # BLD AUTO: 335 X10(3)/MCL (ref 130–400)
PMV BLD AUTO: 10.9 FL (ref 7.4–10.4)
PROTHROMBIN TIME: 11.6 SECOND(S) (ref 11.9–14.4)
RBC # BLD AUTO: 5.51 X10(6)/MCL (ref 4.5–5.9)
TIBC SERPL-MCNC: 296 MCG/DL (ref 250–450)
TRANSFERRIN SERPL-MCNC: 232 MG/DL (ref 200–360)
WBC # SPEC AUTO: 10.6 X10(3)/MCL (ref 4.5–11)

## 2020-09-28 ENCOUNTER — HISTORICAL (OUTPATIENT)
Dept: LAB | Facility: HOSPITAL | Age: 40
End: 2020-09-28

## 2020-09-28 LAB
ABS NEUT (OLG): 5.61 X10(3)/MCL (ref 2.1–9.2)
ALBUMIN SERPL-MCNC: 3.2 GM/DL (ref 3.4–5)
ALBUMIN/GLOB SERPL: 0.7 RATIO (ref 1.1–2)
ALP SERPL-CCNC: 239 UNIT/L (ref 45–117)
ALT SERPL-CCNC: 114 UNIT/L (ref 12–78)
AST SERPL-CCNC: 53 UNIT/L (ref 15–37)
BASOPHILS # BLD AUTO: 0.1 X10(3)/MCL (ref 0–0.2)
BASOPHILS NFR BLD AUTO: 1 %
BILIRUB SERPL-MCNC: 0.5 MG/DL (ref 0.2–1)
BILIRUBIN DIRECT+TOT PNL SERPL-MCNC: <0.1 MG/DL (ref 0–0.2)
BILIRUBIN DIRECT+TOT PNL SERPL-MCNC: ABNORMAL MG/DL
BUN SERPL-MCNC: 13 MG/DL (ref 7–18)
CALCIUM SERPL-MCNC: 8 MG/DL (ref 8.5–10.1)
CHLORIDE SERPL-SCNC: 100 MMOL/L (ref 98–107)
CHOLEST SERPL-MCNC: 272 MG/DL
CHOLEST/HDLC SERPL: ABNORMAL {RATIO} (ref 0–5)
CO2 SERPL-SCNC: 26 MMOL/L (ref 21–32)
CREAT SERPL-MCNC: 0.9 MG/DL (ref 0.6–1.3)
EOSINOPHIL # BLD AUTO: 0.1 X10(3)/MCL (ref 0–0.9)
EOSINOPHIL NFR BLD AUTO: 1 %
ERYTHROCYTE [DISTWIDTH] IN BLOOD BY AUTOMATED COUNT: 12 % (ref 11.5–14.5)
EST. AVERAGE GLUCOSE BLD GHB EST-MCNC: 283 MG/DL
GLOBULIN SER-MCNC: 4.8 GM/ML (ref 2.3–3.5)
GLUCOSE SERPL-MCNC: 320 MG/DL (ref 74–106)
HBA1C MFR BLD: 11.5 % (ref 4.2–6.3)
HCT VFR BLD AUTO: 46.6 % (ref 40–51)
HDLC SERPL-MCNC: ABNORMAL MG/DL (ref 40–59)
HGB BLD-MCNC: 16.7 GM/DL (ref 13.5–17.5)
IMM GRANULOCYTES # BLD AUTO: 0.07 10*3/UL
IMM GRANULOCYTES NFR BLD AUTO: 1 %
LDLC SERPL CALC-MCNC: ABNORMAL MG/DL
LYMPHOCYTES # BLD AUTO: 3.2 X10(3)/MCL (ref 0.6–4.6)
LYMPHOCYTES NFR BLD AUTO: 32 %
MCH RBC QN AUTO: 31 PG (ref 26–34)
MCHC RBC AUTO-ENTMCNC: 35.8 GM/DL (ref 31–37)
MCV RBC AUTO: 86.6 FL (ref 80–100)
MONOCYTES # BLD AUTO: 0.8 X10(3)/MCL (ref 0.1–1.3)
MONOCYTES NFR BLD AUTO: 8 %
NEUTROPHILS # BLD AUTO: 5.61 X10(3)/MCL (ref 2.1–9.2)
NEUTROPHILS NFR BLD AUTO: 57 %
PLATELET # BLD AUTO: 315 X10(3)/MCL (ref 130–400)
PMV BLD AUTO: 10.1 FL (ref 7.4–10.4)
POTASSIUM SERPL-SCNC: 3.6 MMOL/L (ref 3.5–5.1)
PROT SERPL-MCNC: 8 GM/DL (ref 6.4–8.2)
RBC # BLD AUTO: 5.38 X10(6)/MCL (ref 4.5–5.9)
SODIUM SERPL-SCNC: 133 MMOL/L (ref 136–145)
TRIGL SERPL-MCNC: 2534 MG/DL
TSH SERPL-ACNC: 2.22 MIU/L (ref 0.36–3.74)
VLDLC SERPL CALC-MCNC: ABNORMAL MG/DL
WBC # SPEC AUTO: 9.9 X10(3)/MCL (ref 4.5–11)

## 2020-10-01 ENCOUNTER — HISTORICAL (OUTPATIENT)
Dept: RADIOLOGY | Facility: HOSPITAL | Age: 40
End: 2020-10-01

## 2020-10-01 LAB
ALBUMIN SERPL-MCNC: 3.3 GM/DL (ref 3.4–5)
ALBUMIN/GLOB SERPL: 0.7 RATIO (ref 1.1–2)
ALP SERPL-CCNC: 247 UNIT/L (ref 45–117)
ALT SERPL-CCNC: 118 UNIT/L (ref 12–78)
AST SERPL-CCNC: 52 UNIT/L (ref 15–37)
BILIRUB SERPL-MCNC: 0.5 MG/DL (ref 0.2–1)
BILIRUBIN DIRECT+TOT PNL SERPL-MCNC: <0.1 MG/DL (ref 0–0.2)
BILIRUBIN DIRECT+TOT PNL SERPL-MCNC: ABNORMAL MG/DL
BUN SERPL-MCNC: 12 MG/DL (ref 7–18)
CALCIUM SERPL-MCNC: 8.6 MG/DL (ref 8.5–10.1)
CHLORIDE SERPL-SCNC: 101 MMOL/L (ref 98–107)
CO2 SERPL-SCNC: 22 MMOL/L (ref 21–32)
CREAT SERPL-MCNC: 1.12 MG/DL (ref 0.6–1.3)
GLOBULIN SER-MCNC: 4.8 GM/ML (ref 2.3–3.5)
GLUCOSE SERPL-MCNC: 336 MG/DL (ref 74–106)
POTASSIUM SERPL-SCNC: 4.1 MMOL/L (ref 3.5–5.1)
PROT SERPL-MCNC: 8.1 GM/DL (ref 6.4–8.2)
SODIUM SERPL-SCNC: 132 MMOL/L (ref 136–145)

## 2020-11-09 ENCOUNTER — HISTORICAL (OUTPATIENT)
Dept: ADMINISTRATIVE | Facility: HOSPITAL | Age: 40
End: 2020-11-09

## 2020-12-02 ENCOUNTER — HISTORICAL (OUTPATIENT)
Dept: ADMINISTRATIVE | Facility: HOSPITAL | Age: 40
End: 2020-12-02

## 2020-12-08 ENCOUNTER — HISTORICAL (OUTPATIENT)
Dept: ADMINISTRATIVE | Facility: HOSPITAL | Age: 40
End: 2020-12-08

## 2020-12-08 LAB
ABS NEUT (OLG): 6.8 X10(3)/MCL (ref 2.1–9.2)
ALBUMIN SERPL-MCNC: 3.8 GM/DL (ref 3.5–5)
ALBUMIN/GLOB SERPL: 0.7 RATIO (ref 1.1–2)
ALP SERPL-CCNC: 210 UNIT/L (ref 40–150)
ALT SERPL-CCNC: 94 UNIT/L (ref 0–55)
APPEARANCE, UA: CLEAR
AST SERPL-CCNC: 48 UNIT/L (ref 5–34)
BACTERIA #/AREA URNS AUTO: ABNORMAL /HPF
BASOPHILS # BLD AUTO: 0 X10(3)/MCL (ref 0–0.2)
BASOPHILS NFR BLD AUTO: 0 %
BILIRUB SERPL-MCNC: 0.3 MG/DL
BILIRUB UR QL STRIP: NEGATIVE
BILIRUBIN DIRECT+TOT PNL SERPL-MCNC: <0.1 MG/DL (ref 0–0.5)
BILIRUBIN DIRECT+TOT PNL SERPL-MCNC: >0.2 MG/DL (ref 0–0.8)
BUN SERPL-MCNC: 11 MG/DL (ref 8.9–20.6)
CALCIUM SERPL-MCNC: 9 MG/DL (ref 8.4–10.2)
CHLORIDE SERPL-SCNC: 100 MMOL/L (ref 98–107)
CHOLEST SERPL-MCNC: 208 MG/DL
CHOLEST/HDLC SERPL: 10 {RATIO} (ref 0–5)
CO2 SERPL-SCNC: 16 MMOL/L (ref 22–29)
COLOR UR: ABNORMAL
CREAT SERPL-MCNC: 0.82 MG/DL (ref 0.73–1.18)
CREAT UR-MCNC: 86.5 MG/DL (ref 58–161)
DEPRECATED CALCIDIOL+CALCIFEROL SERPL-MC: 12.9 NG/ML (ref 30–80)
EOSINOPHIL # BLD AUTO: 0.1 X10(3)/MCL (ref 0–0.9)
EOSINOPHIL NFR BLD AUTO: 1 %
ERYTHROCYTE [DISTWIDTH] IN BLOOD BY AUTOMATED COUNT: 11.8 % (ref 11.5–14.5)
EST. AVERAGE GLUCOSE BLD GHB EST-MCNC: 237.4 MG/DL
GLOBULIN SER-MCNC: 5.3 GM/DL (ref 2.4–3.5)
GLUCOSE (UA): >1000 MG/DL
GLUCOSE SERPL-MCNC: 272 MG/DL (ref 74–100)
HBA1C MFR BLD: 9.9 %
HCT VFR BLD AUTO: 48.1 % (ref 40–51)
HDLC SERPL-MCNC: 20 MG/DL (ref 35–60)
HGB BLD-MCNC: 17.3 GM/DL (ref 13.5–17.5)
HGB UR QL STRIP: NEGATIVE
HYALINE CASTS #/AREA URNS LPF: ABNORMAL /LPF
IMM GRANULOCYTES # BLD AUTO: 0.05 10*3/UL
IMM GRANULOCYTES NFR BLD AUTO: 0 %
KETONES UR QL STRIP: NEGATIVE
LDLC SERPL CALC-MCNC: ABNORMAL MG/DL (ref 50–140)
LEUKOCYTE ESTERASE UR QL STRIP: 25 LEU/UL
LYMPHOCYTES # BLD AUTO: 2.9 X10(3)/MCL (ref 0.6–4.6)
LYMPHOCYTES NFR BLD AUTO: 27 %
MCH RBC QN AUTO: 31.1 PG (ref 26–34)
MCHC RBC AUTO-ENTMCNC: 36 GM/DL (ref 31–37)
MCV RBC AUTO: 86.4 FL (ref 80–100)
MONOCYTES # BLD AUTO: 0.6 X10(3)/MCL (ref 0.1–1.3)
MONOCYTES NFR BLD AUTO: 6 %
NEUTROPHILS # BLD AUTO: 6.8 X10(3)/MCL (ref 2.1–9.2)
NEUTROPHILS NFR BLD AUTO: 65 %
NITRITE UR QL STRIP: NEGATIVE
PH UR STRIP: 6 [PH] (ref 4.5–8)
PLATELET # BLD AUTO: 356 X10(3)/MCL (ref 130–400)
PMV BLD AUTO: 10.6 FL (ref 7.4–10.4)
POTASSIUM SERPL-SCNC: 3.7 MMOL/L (ref 3.5–5.1)
PROT SERPL-MCNC: 9.1 GM/DL (ref 6.4–8.3)
PROT UR QL STRIP: 70 MG/DL
PROT UR STRIP-MCNC: 64.9 MG/DL
PROT/CREAT UR-RTO: 750.3 MG/GM
RBC # BLD AUTO: 5.57 X10(6)/MCL (ref 4.5–5.9)
RBC #/AREA URNS AUTO: ABNORMAL /HPF
SODIUM SERPL-SCNC: 135 MMOL/L (ref 136–145)
SP GR UR STRIP: 1.02 (ref 1–1.03)
SQUAMOUS #/AREA URNS LPF: ABNORMAL /LPF
TRIGL SERPL-MCNC: >1420 MG/DL (ref 34–140)
TSH SERPL-ACNC: 1.31 UIU/ML (ref 0.35–4.94)
UROBILINOGEN UR STRIP-ACNC: NORMAL
VLDLC SERPL CALC-MCNC: ABNORMAL MG/DL
WBC # SPEC AUTO: 10.5 X10(3)/MCL (ref 4.5–11)
WBC #/AREA URNS AUTO: ABNORMAL /HPF

## 2021-01-19 ENCOUNTER — HISTORICAL (OUTPATIENT)
Dept: INTERNAL MEDICINE | Facility: CLINIC | Age: 41
End: 2021-01-19

## 2021-01-19 LAB
ABS NEUT (OLG): 5.92 X10(3)/MCL (ref 2.1–9.2)
ALBUMIN SERPL-MCNC: 3.4 GM/DL (ref 3.5–5)
ALBUMIN/GLOB SERPL: 0.7 RATIO (ref 1.1–2)
ALP SERPL-CCNC: 252 UNIT/L (ref 40–150)
ALT SERPL-CCNC: 137 UNIT/L (ref 0–55)
AST SERPL-CCNC: 85 UNIT/L (ref 5–34)
BASOPHILS # BLD AUTO: 0 X10(3)/MCL (ref 0–0.2)
BASOPHILS NFR BLD AUTO: 0 %
BILIRUB SERPL-MCNC: 0.2 MG/DL
BILIRUBIN DIRECT+TOT PNL SERPL-MCNC: <0.1 MG/DL (ref 0–0.5)
BILIRUBIN DIRECT+TOT PNL SERPL-MCNC: >0.1 MG/DL (ref 0–0.8)
BUN SERPL-MCNC: 16 MG/DL (ref 8.9–20.6)
CALCIUM SERPL-MCNC: 9 MG/DL (ref 8.4–10.2)
CHLORIDE SERPL-SCNC: 101 MMOL/L (ref 98–107)
CHOLEST SERPL-MCNC: 379 MG/DL
CHOLEST/HDLC SERPL: ABNORMAL {RATIO} (ref 0–5)
CO2 SERPL-SCNC: 16 MMOL/L (ref 22–29)
CREAT SERPL-MCNC: 0.86 MG/DL (ref 0.73–1.18)
DEPRECATED CALCIDIOL+CALCIFEROL SERPL-MC: 6.5 NG/ML (ref 30–80)
EOSINOPHIL # BLD AUTO: 0.2 X10(3)/MCL (ref 0–0.9)
EOSINOPHIL NFR BLD AUTO: 2 %
ERYTHROCYTE [DISTWIDTH] IN BLOOD BY AUTOMATED COUNT: 11.7 % (ref 11.5–14.5)
EST. AVERAGE GLUCOSE BLD GHB EST-MCNC: 260.4 MG/DL
GLOBULIN SER-MCNC: 4.8 GM/DL (ref 2.4–3.5)
GLUCOSE SERPL-MCNC: 260 MG/DL (ref 74–100)
HBA1C MFR BLD: 10.7 %
HCT VFR BLD AUTO: 46.7 % (ref 40–51)
HDLC SERPL-MCNC: ABNORMAL MG/DL (ref 35–60)
HGB BLD-MCNC: 16.8 GM/DL (ref 13.5–17.5)
IMM GRANULOCYTES # BLD AUTO: 0.09 10*3/UL
IMM GRANULOCYTES NFR BLD AUTO: 1 %
LDLC SERPL CALC-MCNC: ABNORMAL MG/DL (ref 50–140)
LYMPHOCYTES # BLD AUTO: 3.2 X10(3)/MCL (ref 0.6–4.6)
LYMPHOCYTES NFR BLD AUTO: 32 %
MCH RBC QN AUTO: 31.2 PG (ref 26–34)
MCHC RBC AUTO-ENTMCNC: 36 GM/DL (ref 31–37)
MCV RBC AUTO: 86.6 FL (ref 80–100)
MONOCYTES # BLD AUTO: 0.6 X10(3)/MCL (ref 0.1–1.3)
MONOCYTES NFR BLD AUTO: 6 %
NEUTROPHILS # BLD AUTO: 5.92 X10(3)/MCL (ref 2.1–9.2)
NEUTROPHILS NFR BLD AUTO: 59 %
PLATELET # BLD AUTO: 316 X10(3)/MCL (ref 130–400)
PMV BLD AUTO: 10.3 FL (ref 7.4–10.4)
POTASSIUM SERPL-SCNC: 3.3 MMOL/L (ref 3.5–5.1)
PROT SERPL-MCNC: 8.2 GM/DL (ref 6.4–8.3)
RBC # BLD AUTO: 5.39 X10(6)/MCL (ref 4.5–5.9)
SODIUM SERPL-SCNC: 137 MMOL/L (ref 136–145)
TRIGL SERPL-MCNC: 3170 MG/DL (ref 34–140)
VLDLC SERPL CALC-MCNC: ABNORMAL MG/DL
WBC # SPEC AUTO: 10 X10(3)/MCL (ref 4.5–11)

## 2021-03-17 ENCOUNTER — HISTORICAL (OUTPATIENT)
Dept: NEPHROLOGY | Facility: CLINIC | Age: 41
End: 2021-03-17

## 2021-03-17 LAB
ALBUMIN SERPL-MCNC: 3.7 GM/DL (ref 3.5–5)
ALBUMIN/GLOB SERPL: 0.6 RATIO (ref 1.1–2)
ALP SERPL-CCNC: 304 UNIT/L (ref 40–150)
ALT SERPL-CCNC: 147 UNIT/L (ref 0–55)
AST SERPL-CCNC: 62 UNIT/L (ref 5–34)
BILIRUB SERPL-MCNC: 0.3 MG/DL
BILIRUBIN DIRECT+TOT PNL SERPL-MCNC: <0.1 MG/DL (ref 0–0.5)
BILIRUBIN DIRECT+TOT PNL SERPL-MCNC: >0.2 MG/DL (ref 0–0.8)
BUN SERPL-MCNC: 14.9 MG/DL (ref 8.9–20.6)
CALCIUM SERPL-MCNC: 8.9 MG/DL (ref 8.4–10.2)
CHLORIDE SERPL-SCNC: 96 MMOL/L (ref 98–107)
CHOLEST SERPL-MCNC: 378 MG/DL
CHOLEST/HDLC SERPL: ABNORMAL {RATIO} (ref 0–5)
CO2 SERPL-SCNC: 11 MMOL/L (ref 22–29)
CREAT SERPL-MCNC: 1.02 MG/DL (ref 0.73–1.18)
GLOBULIN SER-MCNC: 5.8 GM/DL (ref 2.4–3.5)
GLUCOSE SERPL-MCNC: 388 MG/DL (ref 74–100)
HDLC SERPL-MCNC: ABNORMAL MG/DL (ref 35–60)
LDLC SERPL CALC-MCNC: ABNORMAL MG/DL (ref 50–140)
POTASSIUM SERPL-SCNC: 3.8 MMOL/L (ref 3.5–5.1)
PROT SERPL-MCNC: 9.5 GM/DL (ref 6.4–8.3)
SODIUM SERPL-SCNC: 132 MMOL/L (ref 136–145)
TRIGL SERPL-MCNC: 2757 MG/DL (ref 34–140)
VLDLC SERPL CALC-MCNC: ABNORMAL MG/DL

## 2021-06-01 LAB
LEFT EYE DM RETINOPATHY: NEGATIVE
RIGHT EYE DM RETINOPATHY: NEGATIVE

## 2021-06-03 ENCOUNTER — HISTORICAL (OUTPATIENT)
Dept: GASTROENTEROLOGY | Facility: CLINIC | Age: 41
End: 2021-06-03

## 2021-06-07 ENCOUNTER — HISTORICAL (OUTPATIENT)
Dept: ENDOSCOPY | Facility: HOSPITAL | Age: 41
End: 2021-06-07

## 2021-06-07 LAB — SARS-COV-2 AG RESP QL IA.RAPID: NEGATIVE

## 2021-06-16 ENCOUNTER — HISTORICAL (OUTPATIENT)
Dept: NEPHROLOGY | Facility: CLINIC | Age: 41
End: 2021-06-16

## 2021-06-16 LAB
ABS NEUT (OLG): 6.69 X10(3)/MCL (ref 2.1–9.2)
ALBUMIN SERPL-MCNC: 3.7 GM/DL (ref 3.5–5)
ALBUMIN/GLOB SERPL: 0.7 RATIO (ref 1.1–2)
ALP SERPL-CCNC: 220 UNIT/L (ref 40–150)
ALT SERPL-CCNC: 116 UNIT/L (ref 0–55)
APPEARANCE, UA: CLEAR
AST SERPL-CCNC: 90 UNIT/L (ref 5–34)
BACTERIA #/AREA URNS AUTO: ABNORMAL /HPF
BASOPHILS # BLD AUTO: 0.1 X10(3)/MCL (ref 0–0.2)
BASOPHILS NFR BLD AUTO: 1 %
BILIRUB SERPL-MCNC: 0.3 MG/DL
BILIRUB UR QL STRIP: NEGATIVE
BILIRUBIN DIRECT+TOT PNL SERPL-MCNC: <0.1 MG/DL (ref 0–0.5)
BILIRUBIN DIRECT+TOT PNL SERPL-MCNC: >0.2 MG/DL (ref 0–0.8)
BUN SERPL-MCNC: 12.9 MG/DL (ref 8.9–20.6)
CALCIUM SERPL-MCNC: 10.2 MG/DL (ref 8.4–10.2)
CHLORIDE SERPL-SCNC: 102 MMOL/L (ref 98–107)
CHOLEST SERPL-MCNC: 254 MG/DL
CHOLEST/HDLC SERPL: ABNORMAL {RATIO} (ref 0–5)
CO2 SERPL-SCNC: 18 MMOL/L (ref 22–29)
COLOR UR: YELLOW
CREAT SERPL-MCNC: 0.84 MG/DL (ref 0.73–1.18)
CREAT UR-MCNC: 118.5 MG/DL (ref 58–161)
CREAT UR-MCNC: 120.2 MG/DL (ref 58–161)
DEPRECATED CALCIDIOL+CALCIFEROL SERPL-MC: 28.1 NG/ML (ref 30–80)
EOSINOPHIL # BLD AUTO: 0.3 X10(3)/MCL (ref 0–0.9)
EOSINOPHIL NFR BLD AUTO: 2 %
ERYTHROCYTE [DISTWIDTH] IN BLOOD BY AUTOMATED COUNT: 11.7 % (ref 11.5–14.5)
EST. AVERAGE GLUCOSE BLD GHB EST-MCNC: 237.4 MG/DL
GLOBULIN SER-MCNC: 5 GM/DL (ref 2.4–3.5)
GLUCOSE (UA): >1000 MG/DL
GLUCOSE SERPL-MCNC: 106 MG/DL (ref 74–100)
HBA1C MFR BLD: 9.9 %
HCT VFR BLD AUTO: 45.6 % (ref 40–51)
HDLC SERPL-MCNC: ABNORMAL MG/DL (ref 35–60)
HGB BLD-MCNC: 16.4 GM/DL (ref 13.5–17.5)
HGB UR QL STRIP: 0.1
HYALINE CASTS #/AREA URNS LPF: ABNORMAL /LPF
IMM GRANULOCYTES # BLD AUTO: 0.06 10*3/UL
IMM GRANULOCYTES NFR BLD AUTO: 0 %
KETONES UR QL STRIP: NEGATIVE
LDLC SERPL CALC-MCNC: ABNORMAL MG/DL (ref 50–140)
LEUKOCYTE ESTERASE UR QL STRIP: 500 LEU/UL
LYMPHOCYTES # BLD AUTO: 3.8 X10(3)/MCL (ref 0.6–4.6)
LYMPHOCYTES NFR BLD AUTO: 32 %
MCH RBC QN AUTO: 31 PG (ref 26–34)
MCHC RBC AUTO-ENTMCNC: 36 GM/DL (ref 31–37)
MCV RBC AUTO: 86.2 FL (ref 80–100)
MICROALBUMIN UR-MCNC: 516.4 MG/L
MONOCYTES # BLD AUTO: 1.1 X10(3)/MCL (ref 0.1–1.3)
MONOCYTES NFR BLD AUTO: 9 %
NEUTROPHILS # BLD AUTO: 6.69 X10(3)/MCL (ref 2.1–9.2)
NEUTROPHILS NFR BLD AUTO: 56 %
NITRITE UR QL STRIP: NEGATIVE
NRBC BLD AUTO-RTO: 0 % (ref 0–0.2)
PH UR STRIP: 5.5 [PH] (ref 4.5–8)
PLATELET # BLD AUTO: 332 X10(3)/MCL (ref 130–400)
PMV BLD AUTO: 9.9 FL (ref 7.4–10.4)
POTASSIUM SERPL-SCNC: 3.6 MMOL/L (ref 3.5–5.1)
PROT SERPL-MCNC: 8.7 GM/DL (ref 6.4–8.3)
PROT UR QL STRIP: 70 MG/DL
PROT UR STRIP-MCNC: 79.2 MG/DL
PROT/CREAT UR-RTO: 668.4 MG/GM CR
RBC # BLD AUTO: 5.29 X10(6)/MCL (ref 4.5–5.9)
RBC #/AREA URNS AUTO: ABNORMAL /HPF
SODIUM SERPL-SCNC: 139 MMOL/L (ref 136–145)
SP GR UR STRIP: 1.03 (ref 1–1.03)
SQUAMOUS #/AREA URNS LPF: ABNORMAL /LPF
TRIGL SERPL-MCNC: 1922 MG/DL (ref 34–140)
UROBILINOGEN UR STRIP-ACNC: NORMAL
VLDLC SERPL CALC-MCNC: ABNORMAL MG/DL
WBC # SPEC AUTO: 11.9 X10(3)/MCL (ref 4.5–11)
WBC #/AREA URNS AUTO: ABNORMAL /HPF

## 2021-07-01 ENCOUNTER — HISTORICAL (OUTPATIENT)
Dept: ADMINISTRATIVE | Facility: HOSPITAL | Age: 41
End: 2021-07-01

## 2021-08-04 ENCOUNTER — HISTORICAL (OUTPATIENT)
Dept: RADIOLOGY | Facility: HOSPITAL | Age: 41
End: 2021-08-04

## 2021-10-04 ENCOUNTER — HISTORICAL (OUTPATIENT)
Dept: ADMINISTRATIVE | Facility: HOSPITAL | Age: 41
End: 2021-10-04

## 2021-10-04 ENCOUNTER — HISTORICAL (OUTPATIENT)
Dept: LAB | Facility: HOSPITAL | Age: 41
End: 2021-10-04

## 2021-10-04 LAB
ABS NEUT (OLG): 7.59 X10(3)/MCL (ref 2.1–9.2)
ALBUMIN SERPL-MCNC: 3.7 GM/DL (ref 3.5–5)
ALBUMIN/GLOB SERPL: 0.8 RATIO (ref 1.1–2)
ALP SERPL-CCNC: 225 UNIT/L (ref 40–150)
ALT SERPL-CCNC: 54 UNIT/L (ref 0–55)
AST SERPL-CCNC: 26 UNIT/L (ref 5–34)
BASOPHILS # BLD AUTO: 0.1 X10(3)/MCL (ref 0–0.2)
BASOPHILS NFR BLD AUTO: 0 %
BILIRUB SERPL-MCNC: 0.2 MG/DL
BILIRUBIN DIRECT+TOT PNL SERPL-MCNC: <0.1 MG/DL (ref 0–0.5)
BILIRUBIN DIRECT+TOT PNL SERPL-MCNC: >0.1 MG/DL (ref 0–0.8)
BUN SERPL-MCNC: 14.1 MG/DL (ref 8.9–20.6)
CALCIUM SERPL-MCNC: 9.7 MG/DL (ref 8.4–10.2)
CHLORIDE SERPL-SCNC: 103 MMOL/L (ref 98–107)
CO2 SERPL-SCNC: 23 MMOL/L (ref 22–29)
CREAT SERPL-MCNC: 0.84 MG/DL (ref 0.73–1.18)
EOSINOPHIL # BLD AUTO: 0.4 X10(3)/MCL (ref 0–0.9)
EOSINOPHIL NFR BLD AUTO: 3 %
ERYTHROCYTE [DISTWIDTH] IN BLOOD BY AUTOMATED COUNT: 11.8 % (ref 11.5–14.5)
GLOBULIN SER-MCNC: 4.8 GM/DL (ref 2.4–3.5)
GLUCOSE SERPL-MCNC: 143 MG/DL (ref 74–100)
HCT VFR BLD AUTO: 44.8 % (ref 40–51)
HGB BLD-MCNC: 16 GM/DL (ref 13.5–17.5)
IMM GRANULOCYTES # BLD AUTO: 0.06 10*3/UL
IMM GRANULOCYTES NFR BLD AUTO: 0 %
LYMPHOCYTES # BLD AUTO: 3.6 X10(3)/MCL (ref 0.6–4.6)
LYMPHOCYTES NFR BLD AUTO: 28 %
MCH RBC QN AUTO: 31.4 PG (ref 26–34)
MCHC RBC AUTO-ENTMCNC: 35.7 GM/DL (ref 31–37)
MCV RBC AUTO: 87.8 FL (ref 80–100)
MONOCYTES # BLD AUTO: 1.1 X10(3)/MCL (ref 0.1–1.3)
MONOCYTES NFR BLD AUTO: 8 %
NEUTROPHILS # BLD AUTO: 7.59 X10(3)/MCL (ref 2.1–9.2)
NEUTROPHILS NFR BLD AUTO: 59 %
NRBC BLD AUTO-RTO: 0 % (ref 0–0.2)
PLATELET # BLD AUTO: 324 X10(3)/MCL (ref 130–400)
PMV BLD AUTO: 10.1 FL (ref 7.4–10.4)
POTASSIUM SERPL-SCNC: 3.6 MMOL/L (ref 3.5–5.1)
PROT SERPL-MCNC: 8.5 GM/DL (ref 6.4–8.3)
RBC # BLD AUTO: 5.1 X10(6)/MCL (ref 4.5–5.9)
SODIUM SERPL-SCNC: 138 MMOL/L (ref 136–145)
WBC # SPEC AUTO: 12.8 X10(3)/MCL (ref 4.5–11)

## 2021-10-06 ENCOUNTER — HISTORICAL (OUTPATIENT)
Dept: GASTROENTEROLOGY | Facility: CLINIC | Age: 41
End: 2021-10-06

## 2021-10-06 LAB — SARS-COV-2 AG RESP QL IA.RAPID: NEGATIVE

## 2021-10-08 ENCOUNTER — HISTORICAL (OUTPATIENT)
Dept: ENDOSCOPY | Facility: HOSPITAL | Age: 41
End: 2021-10-08

## 2021-10-14 ENCOUNTER — HISTORICAL (OUTPATIENT)
Dept: ADMINISTRATIVE | Facility: HOSPITAL | Age: 41
End: 2021-10-14

## 2021-10-14 LAB
APPEARANCE, UA: CLEAR
BACTERIA #/AREA URNS AUTO: ABNORMAL /HPF
BILIRUB UR QL STRIP: NEGATIVE
COLOR UR: YELLOW
GLUCOSE (UA): >1000 MG/DL
HGB UR QL STRIP: NEGATIVE
HYALINE CASTS #/AREA URNS LPF: ABNORMAL /LPF
KETONES UR QL STRIP: NEGATIVE
LEUKOCYTE ESTERASE UR QL STRIP: NEGATIVE
NITRITE UR QL STRIP: NEGATIVE
PH UR STRIP: 6.5 [PH] (ref 4.5–8)
PROT UR QL STRIP: 200 MG/DL
RBC #/AREA URNS AUTO: ABNORMAL /HPF
SP GR UR STRIP: 1.02 (ref 1–1.03)
SQUAMOUS #/AREA URNS LPF: ABNORMAL /LPF
UROBILINOGEN UR STRIP-ACNC: NORMAL
WBC #/AREA URNS AUTO: ABNORMAL /HPF

## 2021-10-15 ENCOUNTER — HISTORICAL (OUTPATIENT)
Dept: RADIOLOGY | Facility: HOSPITAL | Age: 41
End: 2021-10-15

## 2021-10-25 ENCOUNTER — HISTORICAL (OUTPATIENT)
Dept: RADIOLOGY | Facility: HOSPITAL | Age: 41
End: 2021-10-25

## 2021-12-20 ENCOUNTER — HISTORICAL (OUTPATIENT)
Dept: NEPHROLOGY | Facility: CLINIC | Age: 41
End: 2021-12-20

## 2021-12-20 LAB
ABS NEUT (OLG): 8.78 X10(3)/MCL (ref 2.1–9.2)
ALBUMIN SERPL-MCNC: 3.8 GM/DL (ref 3.5–5)
ALBUMIN/GLOB SERPL: 0.8 RATIO (ref 1.1–2)
ALP SERPL-CCNC: 294 UNIT/L (ref 40–150)
ALT SERPL-CCNC: 97 UNIT/L (ref 0–55)
APPEARANCE, UA: CLEAR
AST SERPL-CCNC: 38 UNIT/L (ref 5–34)
BACTERIA SPEC CULT: ABNORMAL
BASOPHILS # BLD AUTO: 0.1 X10(3)/MCL (ref 0–0.2)
BASOPHILS NFR BLD AUTO: 0 %
BILIRUB SERPL-MCNC: 0.3 MG/DL
BILIRUB UR QL STRIP: NEGATIVE
BILIRUBIN DIRECT+TOT PNL SERPL-MCNC: 0.1 MG/DL (ref 0–0.5)
BILIRUBIN DIRECT+TOT PNL SERPL-MCNC: 0.2 MG/DL (ref 0–0.8)
BUN SERPL-MCNC: 8.8 MG/DL (ref 8.9–20.6)
CALCIUM SERPL-MCNC: 10.2 MG/DL (ref 8.7–10.5)
CHLORIDE SERPL-SCNC: 100 MMOL/L (ref 98–107)
CO2 SERPL-SCNC: 26 MMOL/L (ref 22–29)
COLOR UR: YELLOW
CREAT SERPL-MCNC: 0.74 MG/DL (ref 0.73–1.18)
CREAT UR-MCNC: 201.5 MG/DL (ref 58–161)
EOSINOPHIL # BLD AUTO: 0.3 X10(3)/MCL (ref 0–0.9)
EOSINOPHIL NFR BLD AUTO: 2 %
ERYTHROCYTE [DISTWIDTH] IN BLOOD BY AUTOMATED COUNT: 11.8 % (ref 11.5–14.5)
GLOBULIN SER-MCNC: 5 GM/DL (ref 2.4–3.5)
GLUCOSE (UA): ABNORMAL /UL
GLUCOSE SERPL-MCNC: 115 MG/DL (ref 74–100)
HCT VFR BLD AUTO: 47.6 % (ref 40–51)
HGB BLD-MCNC: 16.6 GM/DL (ref 13.5–17.5)
HGB UR QL STRIP: NEGATIVE /HPF
HYALINE CASTS #/AREA URNS LPF: ABNORMAL /LPF
IMM GRANULOCYTES # BLD AUTO: 0.11 10*3/UL
IMM GRANULOCYTES NFR BLD AUTO: 1 %
KETONES UR QL STRIP: NEGATIVE /UL
LEUKOCYTE ESTERASE UR QL STRIP: NEGATIVE
LYMPHOCYTES # BLD AUTO: 3.1 X10(3)/MCL (ref 0.6–4.6)
LYMPHOCYTES NFR BLD AUTO: 23 %
MCH RBC QN AUTO: 30.2 PG (ref 26–34)
MCHC RBC AUTO-ENTMCNC: 34.9 GM/DL (ref 31–37)
MCV RBC AUTO: 86.7 FL (ref 80–100)
MONOCYTES # BLD AUTO: 1.1 X10(3)/MCL (ref 0.1–1.3)
MONOCYTES NFR BLD AUTO: 8 %
MUCOUS THREADS URNS QL MICRO: ABNORMAL /LPF
NEUTROPHILS # BLD AUTO: 8.78 X10(3)/MCL (ref 2.1–9.2)
NEUTROPHILS NFR BLD AUTO: 65 %
NITRITE UR QL STRIP: NEGATIVE
NRBC BLD AUTO-RTO: 0 % (ref 0–0.2)
PH UR STRIP: 5.5 /UL (ref 4.5–8)
PLATELET # BLD AUTO: 381 X10(3)/MCL (ref 130–400)
PMV BLD AUTO: 10.3 FL (ref 7.4–10.4)
POTASSIUM SERPL-SCNC: 3.2 MMOL/L (ref 3.5–5.1)
PROT SERPL-MCNC: 8.8 GM/DL (ref 6.4–8.3)
PROT UR QL STRIP: ABNORMAL /UL
PROT UR STRIP-MCNC: 203.1 MG/DL
PROT/CREAT UR-RTO: 1007.9 MG/GM CR
RBC # BLD AUTO: 5.49 X10(6)/MCL (ref 4.5–5.9)
RBC #/AREA URNS HPF: ABNORMAL /HPF
SODIUM SERPL-SCNC: 137 MMOL/L (ref 136–145)
SP GR UR STRIP: 1.03 (ref 1–1.03)
SQUAMOUS EPITHELIAL, UA: ABNORMAL /LPF
UROBILINOGEN UR STRIP-ACNC: NORMAL /HPF
WBC # SPEC AUTO: 13.4 X10(3)/MCL (ref 4.5–11)
WBC #/AREA URNS HPF: ABNORMAL /HPF

## 2022-02-14 ENCOUNTER — HISTORICAL (OUTPATIENT)
Dept: ADMINISTRATIVE | Facility: HOSPITAL | Age: 42
End: 2022-02-14

## 2022-04-11 ENCOUNTER — HISTORICAL (OUTPATIENT)
Dept: ADMINISTRATIVE | Facility: HOSPITAL | Age: 42
End: 2022-04-11
Payer: MEDICARE

## 2022-04-26 DIAGNOSIS — K76.0 HEPATIC STEATOSIS: ICD-10-CM

## 2022-04-26 DIAGNOSIS — I10 HYPERTENSION, UNSPECIFIED TYPE: Primary | ICD-10-CM

## 2022-04-27 VITALS
BODY MASS INDEX: 31.77 KG/M2 | BODY MASS INDEX: 37.78 KG/M2 | DIASTOLIC BLOOD PRESSURE: 80 MMHG | OXYGEN SATURATION: 97 % | HEIGHT: 69 IN | SYSTOLIC BLOOD PRESSURE: 138 MMHG | OXYGEN SATURATION: 98 % | WEIGHT: 255.06 LBS | DIASTOLIC BLOOD PRESSURE: 85 MMHG | SYSTOLIC BLOOD PRESSURE: 130 MMHG | HEIGHT: 69 IN | WEIGHT: 214.5 LBS

## 2022-04-29 NOTE — DISCHARGE SUMMARY
Patient:   Bharath Caballero            MRN: [id number]            FIN: [id number]               Age:   38 years     Sex:  Male     :  1980   Associated Diagnoses:   None   Author:   Mike Cutler MD      Discharge Information      Discharge Summary Information   ADMIT/DISCHARGE DATE   Admit Date: 2018  Discharge Date: 2018     Physicians   Attending Physician - He DICKENS, Mike ROLLINS  Attending Physician - Héctor DICKENS, David RENO  Admitting Physician - Héctor DICKENS, David RENO  Primary Care Physician - Yeimi DICKENS, Jcarlos  Primary Care Physician - Mary Méndez     Discharge Diagnosis   Pure hyperglyceridemia (E78.1)   Other chronic pancreatitis (K86.1)   Morbid (severe) obesity due to excess calories (E66.01)   Obstructive sleep apnea (adult) (pediatric) (G47.33)      Procedures   No procedures recorded for this visit.   Immunizations   No immunizations recorded for this visit.     Discharge Medications   Continue  amLODIPine (amlodipine 10 mg oral tablet) 10 mg, Oral, Daily  atorvastatin (atorvastatin 80 mg oral tablet) 80 mg, Oral, Daily  doxepin (doxepin 75 mg oral capsule) 75 mg, Oral, Once a day (at bedtime)  fenofibrate (fenofibrate 160 mg oral tablet) 160 mg, Oral, Daily  ferrous sulfate (ferrous sulfate 325 mg (65 mg elemental iron) oral tablet) 325 mg, Oral, BID  gabapentin (gabapentin 300 mg oral capsule) 300 mg, Oral, TID  insulin glargine (insulin glargine 100 units/mL subcutaneous solution) See Instructions  insulin lispro (insulin lispro 100 units/mL subcutaneous solution) 30 units, Subcutaneous, BIDAC  metFORMIN (metFORMIN 1000 mg oral tablet) 1,000 mg, Oral, BID  metoprolol (Metoprolol Succinate  mg oral tablet extended release) 100 mg, Oral, Daily  niacin (niacin 500 mg oral capsule, extended release) 500 mg, Oral, Once a day (at bedtime)  pancrelipase (pancrelipase 3000 units-9500 units-15,000 units oral delayed release capsule) 1 cap(s), Oral,  QID  promethazine (Phenergan 25 mg Tab) 25 mg, Oral, q4hr, PRN for nausea/vomiting  Discontinue  clindamycin (clindamycin 150 mg oral capsule) 450 mg, Oral, TID        Education   Acute Pancreatitis, Easy-to-Read  Discharge - Home   Discharge Activity - Activity as Tolerated   Discharge Diet - Diabetic  Low Fat         Hospital Course   Hospital Course   Time Spent on Discharge: 45 minutes.     This is a 37-year-old  gentleman with a history that includes familial hypertriglyceridemia, recurrent pancreatitis secondary to it, multiple admissions here for the same, who presented with the similar abdominal pain no pain with nausea and vomiting. He was admitted essentially for plasmapheresis. On admission, his triglyceride levels were 1000.  Pt was outside smoking earlier.  He was treated supportively and is tolerating a diet and will be discharged home.      Physical Examination      Vital Signs (last 24 hrs)_____  Last Charted___________  Temp Oral     36.8 DegC  (MAR 08 11:53)  Heart Rate Peripheral   80 bpm  (MAR 08 11:53)  Resp Rate         18 br/min  (MAR 08 07:33)  SBP      137 mmHg  (MAR 08 11:53)  DBP      75 mmHg  (MAR 08 11:53)  SpO2      97 %  (MAR 08 11:53)     General:  Alert and oriented, No acute distress, obese.    Eye:  Pupils are equal, round and reactive to light, Extraocular movements are intact, Normal conjunctiva.    HENT:  Normocephalic, Normal hearing.    Neck:  Supple, Non-tender.    Respiratory:  Lungs are clear to auscultation, Respirations are non-labored, Breath sounds are equal, Symmetrical chest wall expansion.    Cardiovascular:  Normal rate, Regular rhythm.    Gastrointestinal:  Soft, Non-tender, Non-distended, Normal bowel sounds, No organomegaly.    Genitourinary:  No costovertebral angle tenderness.    Musculoskeletal:  Normal range of motion, Normal strength, No tenderness, No swelling, No deformity.    Integumentary:  Warm, Dry, Intact, No rash.    Neurologic:  Alert,  Oriented, No focal deficits, Cranial Nerves II-XII are grossly intact.    Psychiatric:  Cooperative.       Discharge Plan   Discharge Summary Plan   Prescriptions: continue same medications.     Course   Progressing as expected.

## 2022-04-29 NOTE — H&P
"   Patient:   Bharath Caballero            MRN: [id number]            FIN: [id number]               Age:   37 years     Sex:  Male     :  1980   Associated Diagnoses:   None   Author:   Anthony DICKENS, Dat ROBERTSON      Basic Information   Source of history:  Self, Medical record.    Referral source:  Emergency department.    History limitation:  None.    Advance directive:  Full code.    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  He completed his run.  Pain is dull, constant and non-radiating, with no relieving factors but exacerbated by lying flat.  He denies fevers, chills, dyspnea, abdominal pain, nausea, vomitting, diarrhea, constipation or dysuria.   EKG and troponin unremarkable.  Other lab work significant for lipase of 406 and potassium of 5.6           Review of Systems   Constitutional:  Negative.    Eye:  Negative.    Ear/Nose/Mouth/Throat:  Negative.    Respiratory:  Negative.    Cardiovascular:  Negative except as documented in history of present illness.    Gastrointestinal:  Negative except as documented in history of present illness.    Genitourinary:  Negative.    Hematology/Lymphatics:  Negative.    Endocrine:  Negative.    Immunologic:  Negative.    Musculoskeletal:  Negative.    Integumentary:  Negative.    Neurologic:  Negative.    Psychiatric:  Negative.       Health Status   Allergies:    Allergic Reactions (Selected)  Severity Not Documented  Morphine- Hives and c/o: a swelling.   Current medications:  (Selected)   Inpatient Medications  Ordered  Ancef: 1 gm, form: Infusion, IV Piggyback, Now, Infuse over: 30 minute(s), first dose 10/17/16 16:14:00 CDT, stop date 10/17/16 16:14:00 CDT, 1,023,835  albuterol 2.5 mg/3 mL (0.083%) " inhalation solution: 12.5 mg, form: Soln-Inh, NEB, Once, first dose 07/21/17 2:38:00 CDT, stop date 07/21/17 2:38:00 CDT, STAT, Continuous Inhalation Therapy, 24  heparin flush 100 units/mL (500 Unit  Flush): 500 units, form: Injection, IV Push, Once-chemo, first dose 03/15/16 14:32:00 CDT, stop date 03/15/16 14:32:00 CDT, Heparin Flush for Medi-port, Weeks 1, 970,215  insulin regular (for specific SQ/IV Push dose): 10 units, IV, Once, first dose 07/21/17 2:35:00 CDT, stop date 07/21/17 2:35:00 CDT, STAT, 24  insulin regular (for specific SQ/IV Push dose): 10 units, form: Injection, Subcutaneous, Once, first dose 07/21/17 2:35:00 CDT, stop date 07/21/17 2:35:00 CDT, STAT, 24  Future  Heparin 1000 units/ml 2 ml injectable: 3,800 units, form: Soln, IV, Once-Unscheduled, *Est. first dose 03/23/17 12:00:00 CDT, 1.9 ml / port, Future Order, 160,101  heparin 1000 units/mL - 10 mL inj. solution: 3.8 unit/mL, form: Injection, IV Push, Once-Unscheduled, *Est. first dose 03/16/17 14:50:00 CDT, Future Order, 1.9 ml per port  Outpatient Medications  Ordered  Albumin 5% IV Soln (by Volume): 4,000 mL, IV, Once, first dose 06/22/17 8:00:00 CDT, stop date 06/22/17 8:00:00 CDT  Albumin 5% IV Soln (by Volume): 4,000 mL, IV, Once, first dose 07/20/17 8:00:00 CDT, stop date 07/20/17 8:00:00 CDT, Administer with plasmapheresis Tx today  Prescriptions  Prescribed  Glucose test strips: Glucose test strips, See Instructions, glucose test strips, # 1 units, 11 Refill(s), [id number]  Halcion 0.25 mg oral tablet: 0.25 mg = 1 tab(s), Oral, Once a day (at bedtime), PRN PRN as needed for sleep, # 30 tab(s), 2 Refill(s), [facility] [id number]  Lancets: Lancets, See Instructions, Use twice daily for CBG checks, # 200 EA, 3 Refill(s), [facility] [id number]  Lipitor 80 mg oral tablet: 80 mg = 1 tab(s), Oral, Daily, # 90 tab(s), 3 Refill(s), [facility] [id number]  Norvasc 10 mg  oral tablet: 10 mg = 1 tab(s), Oral, Daily, # 90 tab(s), 3 Refill(s), [facility] [id number]  NovoLog 100 units/mL subcutaneous solution: See Instructions, 35 units with lunch and correction in am and evening with 1 U insulin for every 10 point higher than 150., # 30 mL, 0 Refill(s), [facility]  Pantoprazole 40 mg ORAL EC-Tablet: 40 mg = 1 tab(s), Oral, BID, # 60 tab(s), 0 Refill(s), [facility]  Phenergan 25 mg Tab: 25 mg = 1 tab(s), Oral, q6hr, PRN PRN as needed for nausea/vomiting, # 30 tab(s), 1 Refill(s), [facility]  Zenpep 5000 units-17,000 units-27,000 units oral delayed release capsule: 1 cap(s), Oral, TID, # 90 cap(s), 3 Refill(s), [facility]  baclofen 10 mg oral tablet: 10 mg = 1 tab(s), Oral, TID, # 60 tab(s), 1 Refill(s), [facility]  ergocalciferol 50,000 intUnit oral capsule: 50,000 IntUnit = 1 cap(s), Oral, qWeek, # 4 cap(s), 11 Refill(s), [facility]  fenofibrate 145 mg oral tablet: 145 mg = 1 tab(s), Oral, Daily, # 90 tab(s), 3 Refill(s), [facility] [id number]  ferrous sulfate 325 mg (65 mg elemental iron) oral delayed release tablet: 325 mcmol/L = 1 tab(s), Oral, BID, # 60 tab(s), 3 Refill(s), [facility]  gabapentin 300 mg oral capsule: 300 mg = 1 cap(s), Oral, TID, # 270 cap(s), 3 Refill(s), [facility]  lisinopril 40 mg oral tablet: 40 mg = 1 tab(s), Oral, Daily, # 90 tab(s), 3 Refill(s), Pharmacy: [facility] [id number]  metoprolol succinate 100 mg oral tablet, extended release: 100 mg = 1 tab(s), Oral, Daily, # 30 tab(s), 6 Refill(s), Pharmacy: [facility] [address]  Documented Medications  Documented  DOXEPIN 75MG CAPSULES: 75 mg = 1 cap(s), Oral, qPM  Lantus 100 units/mL subcutaneous inj.: 30 units = 0.3 mL, Subcutaneous, BID, 0 Refill(s)  METFORMIN  1000MG TABLETS: 1000 mg = 1 tab(s), Oral, BID  MUPIROCIN 2% CREAM 15GM: TID  NOVOLOG U-100 INSULIN VL10ML(ORANG): units units, TID  PRALUENT 150MG/M INJ:   SUCRALFATE 1GM TABLETS: 1 gm = 1 tab(s), Oral, QID  niacin 500 mg oral capsule, extended release: 500 mg, Oral, At Bedtime, 0 Refill(s)      Histories   PAST MEDICAL HISTORY  Recurrent pancreatitis, secondary to hypertriglyceridemia  Hypertriglyceridemia/hyperlipidemia  Hypertension  Fatty liver disease  Insulin-dependent diabetes mellitus type II  GERD  Peripheral neuropathy    PAST SURGICAL HISTORY  Permacath placements  AV fistula placement  Hernia repair  Appendectomy  Colonoscopy  Esophagogastroduodenoscopy    FAMILY HISTORY  Familial hyperlipidemia    SOCIAL HISTORY  Does not drink ethanol or use illicit drugs.  He smokes 1/2 pack of cigarettes per day.         Physical Examination   Vital Signs   7/21/2017 1:57 CDT       Respiratory Rate          16 br/min                             SpO2                      96 %    7/21/2017 1:47 CDT       Peripheral Pulse Rate     77 bpm                             Heart Rate Monitored      76 bpm                             Respiratory Rate          18 br/min                             SpO2                      95 %    7/21/2017 1:13 CDT       Peripheral Pulse Rate     83 bpm                             Heart Rate Monitored      84 bpm                             Respiratory Rate          17 br/min                             SpO2                      95 %     Measurements from flowsheet : Measurements   7/21/2017 0:32 CDT       Weight Estimated          109 kg                             Height/Length Estimated   175 cm                             Body Mass Index Estimated 35.59 kg/m2     General:  Alert and oriented, No acute distress.    Eye:  Pupils are equal, round and reactive to light, Extraocular movements are intact.    HENT:  Normal hearing.    Neck:  No carotid bruit, No jugular venous distention.     Respiratory:  Lungs are clear to auscultation, Respirations are non-labored, Breath sounds are equal.    Cardiovascular:  Normal rate, Regular rhythm, No murmur, No edema, L AV fistula with good thrill and bruit.    Gastrointestinal:  Soft, Non-tender, Non-distended, Normal bowel sounds.    Genitourinary:  No costovertebral angle tenderness.    Lymphatics:  No lymphadenopathy neck, axilla, groin.    Musculoskeletal:  Normal range of motion, Normal strength.    Integumentary:  Warm, Dry, Intact.    Neurologic:  Alert, Oriented, Normal sensory, Normal motor function, No focal deficits, Cranial Nerves II-XII are grossly intact.    Cognition and Speech:  Oriented, Speech clear and coherent.    Psychiatric:  Cooperative, Appropriate mood & affect.       Review / Management   Results review:  All Results   7/21/2017 1:03 CDT       WBC                       11.4 x10(3)/mcL                             Hgb                       14.1 gm/dL                             Hct                       38.9 %  LOW                             Platelet                  268 x10(3)/mcL                             MCV                       82.8 fL                             MCH                       29.2 pg                             MCHC                      35.2 gm/dL                             RDW                       13.2 %                             MPV                       10.8 fL                             Abs Neut                  7.15 x10(3)/mcL                             Neutro Auto               62 %  NA                             Lymph Auto                25 %  NA                             Mono Auto                 9 %  NA                             Eos Auto                  2 %  NA                             Abs Eos                   0.3 x10(3)/mcL                             Basophil Auto             1 %  NA                             Abs Neutro                7.15 x10(3)/mcL                             Abs  Lymph                 2.9 x10(3)/mcL                             Abs Mono                  1.0 x10(3)/mcL                             Abs Baso                  0.1 x10(3)/mcL                             PT                        12.3 second(s)                             INR                       0.93                             PTT                       27.4 second(s)                             Sodium Lvl                130 mmol/L  LOW                             Potassium Lvl             5.6 mmol/L  HI                             Chloride                  100 mmol/L                             CO2                       23.0 mmol/L                             Calcium Lvl               7.9 mg/dL  LOW                             Glucose Lvl               406 mg/dL  HI                             BUN                       14.0 mg/dL                             Creatinine                0.90 mg/dL                             eGFR-AA                   >60 mL/min/1.73 m2  NA                             eGFR-CHIN                  >60 mL/min/1.73 m2  NA                             Bili Total                0.5 mg/dL                             Bili Direct               0.00 mg/dL                             Bili Indirect             0.50 mg/dL                             AST                       86 unit/L  HI                             ALT                       95 unit/L  HI                             Alk Phos                  159 unit/L  HI                             Total Protein             7.1 gm/dL                             Albumin Lvl               4.20 gm/dL                             Globulin                  2.90 gm/dL                             A/G Ratio                 1.4  NA                             Lipase Lvl                403 unit/L  HI                             Troponin-I                <0.02 ng/mL  .    Course:  Progressing as expected.       Impression and Plan   36 yo WM with  1)chest pain  in adult   will trend troponins and monitor on tele.  toradol prn  2)elevated lipase   no abdominal pain.  will repeat lipase in AM  3)familial hypertriglyceridemia   check triglycerides in AM  4)hyperkalemia   no ekg changes.  will see if it normalizes with fluids and insulin.  got albuterol in ED  5)IDDM  resume home meds.  ISS2  6)hypertension   resume meds.  labetalol prn  7)tobacco abuse   does not want nicotine patch  8)prevention   scd's for dvt prophylaxis.  protonix for GI    Admission took 73 minutes

## 2022-04-29 NOTE — DISCHARGE SUMMARY
Patient:   Bharath Caballero            MRN: [id number]            FIN: [id number]               Age:   37 years     Sex:  Male     :  1980   Associated Diagnoses:   Malfunction of arteriovenous dialysis fistula   Author:   Evan Arenas MD      Discharge Information      Discharge Summary Information   Admitted  2017   Discharged  2017   Admitting physician     Sheridan Bass MD     Discharge diagnosis     Malfunction of arteriovenous dialysis fistula ([id number] T82.590A).        Physical Examination   Gen: NAD, comfortable  CV: RRR, palpable thrill over fistula, ulnar pulse in L arm 2+  Pulm: CTAB, good air movement bilaterally  Abd: soft, NT, ND, BS+  Ext: no edema, no gross deformity, no bleeding from access site, single suture in place  Neuro: alert       Hospital Course   38 y/o M presented to pre-op as instructed. Underwent successful fistulagram w/ baloon dilation of stenotic lesion. Only local anesthetic used. Pt recovered appropriately and was sent home w/ f/u in Surgery clinic in 2 weeks.       Discharge Plan   Discharge Summary Plan   Discharge Status: stable.     Discharge instructions given: to patient.     Discharge disposition: discharge to home self care.     Prescriptions: continue same medications.

## 2022-04-29 NOTE — ED PROVIDER NOTES
"   Patient:   Bharath Caballero            MRN: [id number]            FIN: [id number]               Age:   37 years     Sex:  Male     :  1980   Associated Diagnoses:   Hypertriglyceridemia; Right upper quadrant pain; Chronic abdominal pain; Atypical chest pain; Hyperglycemia; Hyperkalemia   Author:   Bailee Sharma MD      Basic Information   Time seen: Date & time 2018 00:12:00.   History source: Patient.   Arrival mode: Ambulance.   History limitation: None.   Additional information: Patient's physician(s): Wilian Shafer MD, Chief Complaint from Nursing Triage Note : Chief Complaint   2018 23:33 CST      Chief Complaint           pt c/o midline cp radiating to the back onset saturday. denies aleviating factors. pt states he has hx of pancreatic failure causing elev liver enzymes. pt has picc line for plasmaphoresis tx, last tx  on thursday. ems reports cbg > 500  .      History of Present Illness   The patient presents with 38 y/o CM with a hx of HTN, DM, hypertriglyceridemia, and pancreatitis presents to the ED via EMS c/o epigastric/RUQ abdominal pain radiating to his back with nausea and vomiting onset Saturday (). Pt's last plasmapheresis treatment was on Wednesday (). He has an MRI of his head scheduled for next week. Per EMS, pt's CBG was greater than 500..  The onset was 2018 .  The course/duration of symptoms is constant.  Location: epigastric. Radiating pain: middle of the back. The character of symptoms is "pain".  The degree at onset was moderate.  The degree at maximum was moderate.  The degree at present is moderate.  The exacerbating factor is none.  The relieving factor is none.  Risk factors consist of hypertension and diabetes mellitus.  Therapy today EMS.  Associated symptoms: nausea and vomiting.        Review of Systems   Constitutional symptoms:  Negative except as documented in HPI.   Skin symptoms:  Negative except as documented in HPI.   Eye symptoms:  " Negative except as documented in HPI.   ENMT symptoms:  Negative except as documented in HPI.   Respiratory symptoms:  Negative except as documented in HPI.   Cardiovascular symptoms:  Negative except as documented in HPI.   Gastrointestinal symptoms:  Abdominal pain, moderate, right upper quadrant, epigastric, pain, radiating around to back, nausea, vomiting.    Genitourinary symptoms:  Negative except as documented in HPI.   Musculoskeletal symptoms:  Negative except as documented in HPI.   Neurologic symptoms:  Negative except as documented in HPI.   Psychiatric symptoms:  Negative except as documented in HPI.   Endocrine symptoms:  Negative except as documented in HPI.   Hematologic/Lymphatic symptoms:  Negative except as documented in HPI.   Allergy/immunologic symptoms:  Negative except as documented in HPI.             Additional review of systems information: All other systems reviewed and otherwise negative.      Health Status   Allergies: No known allergies.   Medications:  (Selected)   Inpatient Medications  Ordered  Ancef: 1 gm, form: Infusion, IV Piggyback, Now, Infuse over: 30 minute(s), first dose 10/17/16 16:14:00 CDT, stop date 10/17/16 16:14:00 CDT, 1,023,835  heparin flush 100 units/mL (500 Unit  Flush): 500 units, form: Injection, IV Push, Once-chemo, first dose 03/15/16 14:32:00 CDT, stop date 03/15/16 14:32:00 CDT, Heparin Flush for Medi-port, Weeks 1, 970,215  Future  Heparin 1000 units/ml 2 ml injectable: 3,800 units, form: Soln, IV, Once-Unscheduled, *Est. first dose 03/23/17 12:00:00 CDT, 1.9 ml / port, Future Order, 160,101  heparin 1000 units/mL - 10 mL inj. solution: 3.8 unit/mL, form: Injection, IV Push, Once-Unscheduled, *Est. first dose 03/16/17 14:50:00 CDT, Future Order, 1.9 ml per port  Outpatient Medications  Ordered  Albumin 5% IV Soln (by Volume): 4,000 mL, IV, Once-NOW, first dose 02/15/18 7:37:00 CST, stop date 02/15/18 7:37:00 CST, preare 4 liter 5%albumin for tpe  please  calcium gluconate 10% inj.(Push/IVPB): 3 gm, IV, Once-NOW, first dose 02/15/18 8:13:00 CST, stop date 02/15/18 8:13:00 CST, 3 grams.in 250ml ns...infuse with tpe  Prescriptions  Prescribed  Metoprolol Succinate  mg oral tablet extended release: 100 mg = 1 tab(s), Oral, Daily, do not crush or chew, # 30 tab(s), 4 Refill(s), Pharmacy: [facility]  Phenergan 25 mg Tab: 25 mg = 1 tab(s), Oral, q4hr, PRN PRN for nausea/vomiting, # 15 tab(s), 0 Refill(s)  amlodipine 10 mg oral tablet: 10 mg = 1 tab(s), Oral, Daily, # 30 tab(s), 4 Refill(s), Pharmacy: [facility]  atorvastatin 80 mg oral tablet: 80 mg = 1 tab(s), Oral, Daily, # 30 tab(s), 0 Refill(s), Pharmacy: [facility]  doxepin 75 mg oral capsule: 75 mg = 1 cap(s), Oral, Once a day (at bedtime), # 30 cap(s), 0 Refill(s), Pharmacy: [facility]  fenofibrate 160 mg oral tablet: 160 mg = 1 tab(s), Oral, Daily, # 30 tab(s), 0 Refill(s), Pharmacy: [facility]  ferrous sulfate 325 mg (65 mg elemental iron) oral tablet: 325 mg = 1 tab(s), Oral, BID, # 60 tab(s), 4 Refill(s), Pharmacy: [facility]  gabapentin 300 mg oral capsule: 300 mg = 1 cap(s), Oral, TID, # 90 cap(s), 0 Refill(s), Pharmacy: [facility]  insulin glargine 100 units/mL subcutaneous solution: See Instructions, Inject 40 units in the AM and 50 in the PM, # 15 mL, 4 Refill(s), Pharmacy: [facility]  insulin lispro 100 units/mL subcutaneous solution: 30 units, Subcutaneous, BIDAC, # 15 mL, 4 Refill(s), Pharmacy: [facility]  lisinopril 40 mg oral tablet: 40 mg = 1 tab(s), Oral, Daily, # 30 tab(s), 4 Refill(s), Pharmacy: [facility]  metFORMIN 1000 mg oral tablet: 1,000 mg = 1 tab(s), Oral, BID, # 60 tab(s), 0 Refill(s), Pharmacy: [facility]  niacin 500 mg oral capsule, extended release: 500 mg = 1 cap(s), Oral, Once a day (at bedtime), # 30 cap(s),  0 Refill(s), [facility].      Past Medical/ Family/ Social History   Medical history:    Resolved  Hyperlipidemia ([id number]):  Resolved.  HTN (hypertension) ([id number]):  Resolved.  Pancreatitis ([id number]):  Resolved.  Diabetes ([id number]):  Resolved.  Neuropathy of lower limb                                                                                                                                                                                                                27-APR-2016 12:12:07<$> ([id number]):  Resolved.  Dialysis catheter ([id number]):  Resolved..   Surgical history:    Catheter Insertion Palindrome (.) on 12/31/2017 at 37 Years.  Comments:  12/31/2017 11:24 - Radames Mittal RN  auto-populated from documented surgical case  Biopsy Gastrointestinal on 6/30/2017 at 37 Years.  Comments:  6/30/2017 10:35 - Kierra Moody RN  auto-populated from documented surgical case  Esophagogastroduodenoscopy on 6/30/2017 at 37 Years.  Comments:  6/30/2017 10:35 - Kierra Moody RN  auto-populated from documented surgical case  Arteriovenous Fistula (Left) on 11/28/2016 at 36 Years.  Comments:  11/28/2016 15:03 - Lanie Flores RN  auto-populated from documented surgical case  Arteriovenous Fistula Declot (Left) on 10/21/2016 at 36 Years.  Comments:  10/21/2016 16:16 - Elyl Cantu RN  auto-populated from documented surgical case  Arteriovenous Fistula Revision (Left) on 10/17/2016 at 36 Years.  Comments:  10/17/2016 16:32 - Giovanna Mitchell RN  auto-populated from documented surgical case  Dressing Change (Right) on 10/17/2016 at 36 Years.  Comments:  10/17/2016 16:32 - Giovanna Mitchell RN  auto-populated from documented surgical case  Permacath Placement (None) on 9/2/2016 at 36 Years.  Comments:  9/2/2016 16:38 - Giovanna Mitchell RN  auto-populated from documented surgical case  Arteriovenous Fistula  (Left) on 2016 at 36 Years.  Comments:  2016 10:35 - Jim VIZCARRA, Michelle THOMAS  auto-populated from documented surgical case  Permacath Placement (None) on 2016 at 36 Years.  Comments:  2016 11:45 - Mark VIZCARRA , Lanie LAURA  auto-populated from documented surgical case  Permacath Placement (None) on 3/30/2016 at 36 Years.  Comments:  3/30/2016 08:34 - Svetlana VIZCARRA, Nicolle SANTANA  auto-populated from documented surgical case  Permacath Placement (Right) on 2016 at 35 Years.  Comments:  2016 10:19 - Elly Regalado  auto-populated from documented surgical case  Hernia Repair in  at 31 Years.  Appendectomy; (90952) in  at 17 Years.  Colonoscopy ([id number]).  Esophagogastroduodenoscopy ([id number]).  dialysis port..   Family history:      Mother (BARBY GONZALEZ, )  .   Social history: Alcohol use: Denies, Tobacco use: Regularly, Drug use: Denies.      Physical Examination               Vital Signs   Vital Signs   2018 23:33 CST      Temperature Oral          37.0 DegC                             Temperature Oral (calculated)             98.60 DegF                             Peripheral Pulse Rate     86 bpm                             Respiratory Rate          22 br/min                             SpO2                      99 %                             Oxygen Therapy            Room air                             Systolic Blood Pressure   163 mmHg  HI                             Diastolic Blood Pressure  105 mmHg  HI  .   Measurements   2018 23:33 CST      Weight Dosing             109 kg                             Weight Measured and Calculated in Lbs     240.30 lb                             Weight Estimated          109 kg                             Height/Length Dosing      172 cm                             Height/Length Estimated   172 cm                             Body Mass Index Estimated 36.84 kg/m2  .   Basic Oxygen Information   2018 23:33 CST  "     SpO2                      99 %                             Oxygen Therapy            Room air  .   General:  Alert, no acute distress.    Skin:  Warm, dry.    Head:  Normocephalic, atraumatic.    Neck:  Supple, trachea midline.    Eye:  Normal conjunctiva.   Ears, nose, mouth and throat:  Oral mucosa moist.   Cardiovascular:  Regular rate and rhythm, No murmur, Normal peripheral perfusion, No edema.    Respiratory:  Lungs are clear to auscultation, respirations are non-labored.    Chest wall:  Left chest wall dialysis catheter. No erythema, warmth, or discharge.   Musculoskeletal:  LUE AV fistula.   Gastrointestinal:  Soft, Non distended, Normal bowel sounds, Tenderness: Moderate, epigastric, right upper quadrant, Guarding: Moderate, voluntary.    Neurological:  Alert and oriented to person, place, time, and situation.   Psychiatric:  Cooperative.      Medical Decision Making   Documents reviewed:  Emergency department nurses' notes, emergency department records, prior records.    Orders  Launch Order Profile (Selected)   Inpatient Orders  Ordered  30 Day Readmission: 02/19/18 23:40:44 CST, Stop date 02/19/18 23:40:44 CST, "This patient has had an inpatient, observation, outpatient bedded or emergency visit within the last 30 days."  Dilaudid: 2 mg, form: Injection, IV Slow, Once, first dose 02/19/18 23:53:00 CST, stop date 02/19/18 23:53:00 CST, STAT, over at least 2 3 minutes  [id number]: 1,000 mL, 1,000 mL, IV, start date 02/19/18 23:54:00 CST, stop date 02/19/18 23:54:00 CST, STAT  Zofran 2 mg/mL injectable solution: 8 mg, form: Injection, IV Push, Once, first dose 02/19/18 23:54:00 CST, stop date 02/19/18 23:54:00 CST, STAT  Ordered (Dispatched)  Amylase Level: Stat collect, 02/19/18 23:53:00 CST, Blood, Once, Stop date 02/19/18 23:54:00 CST, Lab Collect, Print Label By Order Location, 02/19/18 23:53:00 CST  CBC w/ Auto Diff: Stat collect, 02/19/18 23:53:00 CST, Blood, Once, Stop date 02/19/18 23:54:00 CST, " Lab Collect, Print Label By Order Location, 02/19/18 23:53:00 CST  CMP: Stat collect, 02/19/18 23:53:00 CST, Blood, Once, Stop date 02/19/18 23:54:00 CST, Lab Collect, Print Label By Order Location, 02/19/18 23:53:00 CST  LDH: Stat collect, 02/19/18 23:53:00 CST, Blood, Stop date 02/19/18 23:54:00 CST, Lab Collect, Print Label By Order Location, 02/19/18 23:53:00 CST  Lipase Level: Stat collect, 02/19/18 23:53:00 CST, Blood, Once, Stop date 02/19/18 23:54:00 CST, Lab Collect, Print Label By Order Location, 02/19/18 23:53:00 CST  Magnesium Level: Stat collect, 02/19/18 23:53:00 CST, Blood, Once, Stop date 02/19/18 23:54:00 CST, Lab Collect, Print Label By Order Location, 02/19/18 23:53:00 CST  Triglycerides: Stat collect, 02/19/18 23:53:00 CST, Blood, Stop date 02/19/18 23:54:00 CST, Lab Collect, Print Label By Order Location, 02/19/18 23:53:00 CST  Troponin-I: Stat collect, 02/19/18 23:53:00 CST, Blood, Stop date 02/19/18 23:54:00 CST, Lab Collect, Print Label By Order Location, 02/19/18 23:53:00 CST  Urinalysis Complete no reflex: Stat collect, Urine, 02/19/18 23:54:00 CST, Stop date 02/19/18 23:54:00 CST, Nurse collect, Print Label By Order Location.   Electrocardiogram:  Time 2/19/2018 23:42:00, rate 86, normal sinus rhythm, No ST-T changes, no ectopy, normal AK & QRS intervals, EP Interp, Previous EKG available septal Q waves new from prior ECG 1/29/2018.    Results review:  Lab results : Lab View   2/20/2018 0:57 CST       UA Appear                 CLEAR                             UA Color                  YELLOW                             UA Spec Grav              1.036  HI                             UA Bili                   Negative                             UA pH                     5.5                             UA Urobilinogen           1.0                             UA Blood                  Negative                             UA Glucose                3+                             UA Ketones                 Negative                             UA Protein                1+                             UA Nitri                  Negative                             UA Leuk Est               Negative                             UA WBC cnt                5 /HPF  HI                             UA RBC                    NONE SEEN                             UA Bact                   NONE SEEN /HPF                             UA Squam Epithelial       NONE SEEN    2/20/2018 0:32 CST       Sodium Lvl                129 mmol/L  LOW                             Potassium Lvl             5.4 mmol/L  HI                             Chloride                  98 mmol/L                             CO2                       19.0 mmol/L  LOW                             Calcium Lvl               8.5 mg/dL                             Magnesium Lvl             2.2 mg/dL                             Glucose Lvl               415 mg/dL  HI                             BUN                       13.0 mg/dL                             Creatinine                1.00 mg/dL                             eGFR-AA                   >60 mL/min/1.73 m2  NA                             eGFR-CHIN                  >60 mL/min/1.73 m2  NA                             Amylase Lvl               10 unit/L  LOW                             Bili Total                0.5 mg/dL                             Bili Direct               0.00 mg/dL                             Bili Indirect             0.50 mg/dL                             AST                       262 unit/L  HI                             ALT                       N/A unit/L                             Alk Phos                  293 unit/L  HI                             Total Protein             7.6 gm/dL                             Albumin Lvl               3.30 gm/dL  LOW                             Globulin                  4.30 gm/dL  HI                             A/G Ratio                 0.8   NA                             LDH                       777 unit/L  HI                             Lipase Lvl                88 unit/L                             Trig                      1,939 mg/dL  HI                             Troponin-I                <0.02 ng/mL                             WBC                       8.5 x10(3)/mcL                             RBC                       4.45 x10(6)/mcL  LOW                             Hgb                       14.8 gm/dL                             Hct                       40.0 %  LOW                             Platelet                  293 x10(3)/mcL                             MCV                       89.9 fL                             MCH                       33.3 pg  HI                             MCHC                      37.0 gm/dL  HI                             RDW                       13.2 %                             MPV                       10.1 fL                             Abs Neut                  5.12 x10(3)/mcL                             Neutro Auto               60 %  NA                             Lymph Auto                28 %  NA                             Mono Auto                 7 %  NA                             Eos Auto                  2 %  NA                             Abs Eos                   0.2 x10(3)/mcL                             Basophil Auto             1 %  NA                             Abs Neutro                5.12 x10(3)/mcL                             Abs Lymph                 2.4 x10(3)/mcL                             Abs Mono                  0.6 x10(3)/mcL                             Abs Baso                  0.1 x10(3)/mcL  , Interpretation Abnormal results  Hyperglycemia without anion gap acidosis or ketonuria, elevated triglycerides, elevated LDH.       Reexamination/ Reevaluation   Vital signs   results included from flowsheet : Vital Signs   2/20/2018 2:00 CST       Peripheral Pulse Rate     74 bpm                              Heart Rate Monitored      75 bpm                             Respiratory Rate          20 br/min                             SpO2                      94 %                             Systolic Blood Pressure   140 mmHg                             Diastolic Blood Pressure  80 mmHg                             Mean Arterial Pressure, Cuff              100 mmHg    2/20/2018 1:17 CST       Respiratory Rate          15 br/min                             SpO2                      94 %    2/20/2018 0:59 CST       Peripheral Pulse Rate     77 bpm                             Heart Rate Monitored      78 bpm                             Respiratory Rate          15 br/min                             SpO2                      94 %                             Oxygen Therapy            Room air                             Systolic Blood Pressure   155 mmHg  HI                             Diastolic Blood Pressure  98 mmHg  HI                             Mean Arterial Pressure, Cuff              117 mmHg     Course: improving.   Pain status: decreased.   Notes: Patient with history of hypertriglyceridemia who undergoes frequent plasmapheresis.  Known chronic pancreatitis and chronic abdominal pain, multiple prior ED presentations with resultant inpatient admissions presented with similar symptoms.  Exam with right upper quadrant tenderness to palpation without peritoneal signs.  Laboratory studies notable for elevated triglycerides around 1900.  Patient states that anything over 1600 his nephrologist typically admits for plasmapheresis.  Alkaline phosphatase mildly elevated at this time, no elevation of bilirubin or AST/ALT.  No elevation of lipase.  Hyperglycemia without anion gap acidosis or ketonuria.  Patient to be admitted to the hospitalist service with consultation to nephrology for plasmapheresis in the morning. Findings and plan discussed with the patient, and he is agreeable to admission at this  time.   .      Impression and Plan   Diagnosis   Hypertriglyceridemia (JXN34-RF E78.1)   Hyperglycemia (ZQA31-XD R73.9)   Hyperkalemia (BPB60-IZ E87.5)   Right upper quadrant pain (ASW76-GA R10.11)   Chronic abdominal pain (YMJ73-TA R10.9)   Atypical chest pain (JKZ56-ZD R07.89)   Plan   Condition: Improved.    Disposition: Admit time  2/20/2018 02:53:00, Place in Observation Telemetry Unit, Héctor DICKENS, David RENO, case discussed with Giovanna Ford NP.    Counseled: Patient, Regarding diagnosis, Regarding diagnostic results, Regarding treatment plan, Patient indicated understanding of instructions.    Notes: IRodney', acted solely as a scribe for and in the presence of Dr. Sharma who performed the service., I, Bailee Sharma, have independently performed the history, physical, medical decision making and procedures as documented above and agree with the scribe's documentation. .

## 2022-04-29 NOTE — H&P
HISTORY OF PRESENT ILLNESS:  The patient is a 37-year-old  male with a history of severe hypertriglyceridemia and recurrent pancreatitis episodes related to that.  We have been performing outpatient laboratory on more or less a weekly or biweekly basis, and considering plasmapheresis, if his triglycerides levels were above 2000.  He did blood work yesterday at [facility] and [facility] and found to have a triglyceride of 4468.  He was referred to our services for plasmapheresis therapy today.  These orders have been written.  The patient was seen and examined.  Currently, he is awake, alert and in no distress.    PAST MEDICAL HISTORY:    1. Type 2 diabetes.  2. Reflux.  3. History of fatty liver disease.  4. Hypertension.  5. Hypertriglyceridemia.  6. Recurrent episodes of pancreatitis related to that.  7. Appendectomy in 1997.  8. EGD with colonoscopy done in the past.  9. Left forearm AV graft placed with subsequent occlusion and revascularization by Dr. Smith and hernia repair in 2011.    SOCIAL HISTORY:  He does smoke cigarettes.  No ethanol or illicit drug use.    ALLERGIES:  Morphine.    FAMILY HISTORY:  Positive for hypertension and diabetes.    REVIEW OF SYSTEMS:  Otherwise unremarkable.  He has been feeling well generally.  He has not had any episodes of recurrent pancreatitis in quite some time.    HOME MEDICATIONS:  Reviewed.    PHYSICAL EXAMINATION:  GENERAL:   Well-developed, well-nourished male, who is in no acute distress.  Awake, alert and oriented and appropriate.     VITAL SIGNS:  Stable with a blood pressure 148/72, afebrile.   HEENT:   No scleral icterus.  Extraocular movements are intact.  Mucous membranes are moist.     NECK:  Short and thick.   LUNGS:  Clear to auscultation.   ABDOMEN:  Soft, nontender.  I could not detect any organomegaly.   EXTREMITIES:  No pitting edema.  Left forearm graft is patent and function for plasmapheresis.     NEUROLOGIC:  No focal deficits  noted.    LABORATORY DATA:  Chemistry analysis available to me today is only the lipid panel, which reveals a triglyceride of 4468, cholesterol 393.  Previous urine protein to creatinine evaluations already exceeding 1.3 g per day in April of this year.    IMPRESSION:    1. History of poorly controlled diabetes with proteinuria.  2. Severe hypertriglyceridemia with recurrent episodes of pancreatitis.  3. Hypertension.    PLANS:  We will proceed with plasmapheresis today, reevaluate his lipids in the morning and if his triglycerides appear to be over 2000, we perform plasmapheresis on him again.  I have offered him the option of staying overnight, but he wishes to go home and check his blood work at [facility] tomorrow.        ______________________________  MD LIOT Schwab/TERI  DD:  06/22/2017  Time:  09:26AM  DT:  06/22/2017  Time:  09:59AM  Job #:  [id number]    The H&P was reviewed, the patient was examined, and the following changes to the patients condition are noted:  ______________________________________________________________________________  ______________________________________________________________________________  ______________________________________________________________________________  [  ] No changes to the patient's condition:    ______________________________                                             ___________________  PHYSICIAN SIGNATURE                                                             DATE/TIME

## 2022-04-29 NOTE — H&P
Final Report *  Document Contains Addenda  interval H&P     Patient:   Bharath Caballero            MRN: [id number]            FIN: [id number]               Age:   37 years     Sex:  Male     :  1980   Associated Diagnoses:   None   Author:   Evan Arenas MD      I have examined the patient at bedside and performed ROS. I have reviewed H&P charted on  and make the following updates/changes:    IRVIN Arenas  17     Addendum by Maye Tabor MD on 2017 13:44 CDT (Verified)  I agree with resident documentation. I was physically present, supervised resident,  and discussed plan of care.    Result type: Progress Note-Physician  Result date: 2017 8:42 CDT  Result status: Modified  Result title: interval H&P  Performed by: Evan Arenas MD on 2017 8:44 CDT  Verified by: Evan Arenas MD on 2017 8:44 CDT  Encounter info: [id number], [facility], Day Surgery, 2017 - 2017    Doc has been moved by HIM specialist to the correct doc type.

## 2022-04-29 NOTE — ED PROVIDER NOTES
Patient:   Bharath Caballero            MRN: [id number]            FIN: [id number]               Age:   37 years     Sex:  Male     :  1980   Associated Diagnoses:   Diabetes mellitus; Hypertriglyceridemia, familial; Pancreatitis, chronic; Atypical chest pain; Hyperglycemia without ketosis   Author:   Nando Rea MD      Basic Information   Time seen: Date & time 3/6/2018 20:30:00.   History source: Patient.   Arrival mode: Private vehicle.   History limitation: None.   Additional information: Patient's physician(s): Wilian Shafer MD, First appt with [facility] at [facility] in 2018., Chief Complaint from Nursing Triage Note : Chief Complaint   3/6/2018 20:20 CST       Chief Complaint           Presents with c/o chest pain and back pain. Onset yesterday. Denies SOB  .      History of Present Illness   The patient presents with chest pain and 38 y/o white male presents to the ED c/o mid chest pain that radiates to his right back that began yesterday morning 3/5/2018. Patient reports that this is the same pain that he has when his triglyceride levels are elevated. Patient denies a hx of cardiac problems. Patient reports that his pancreas is failing, that he had 8 surgeries to fix his dialysis access to his left arm, and that he has a port to his left upper chest. Patient receives plasmapheresis through the port to his left upper chest and through the vascular access of his left forearm. Patient smokes cigarettes daily. Patient has his first appt with the C at [facility] in 2018. Patient denies fever, peripheral edema, and fever. Patient reports that he has loose bowel movements but that this is his normal. Patient has a hx of HTN, DM, HLD, and pancreatitis. .  The onset was 3/5/2018 .  The course/duration of symptoms is constant.  Location: Generalized mid chest. Radiating pain: right back. The character of symptoms is pain.  The degree at onset was moderate.  The degree at maximum was moderate.  The degree  at present is moderate.  The exacerbating factor is none.  The relieving factor is none.  Risk factors consist of diabetes mellitus, smoking and  hx of HTN, DM, HLD, and pancreatitis. .  Prior episodes: none.  Therapy today None.  Associated symptoms: nausea.        Review of Systems   Constitutional symptoms:  No fever,    Skin symptoms:  Negative except as documented in HPI.   Eye symptoms:  Negative except as documented in HPI.   ENMT symptoms:  Negative except as documented in HPI.   Respiratory symptoms:  Negative except as documented in HPI.   Cardiovascular symptoms:  Chest pain, mid chest pain, No peripheral edema,    Gastrointestinal symptoms:  Negative except as documented in HPI.   Genitourinary symptoms:  Negative except as documented in HPI.   Musculoskeletal symptoms:  Back pain.   Neurologic symptoms:  Negative except as documented in HPI.   Psychiatric symptoms:  Negative except as documented in HPI.   Endocrine symptoms:  Negative except as documented in HPI.   Hematologic/Lymphatic symptoms:  Negative except as documented in HPI.   Allergy/immunologic symptoms:  Negative except as documented in HPI.             Additional review of systems information: All other systems reviewed and otherwise negative.      Health Status   Allergies:    Allergic Reactions (Selected)  No Known Allergies.   Medications:  (Selected)   Inpatient Medications  Ordered  Ancef: 1 gm, form: Infusion, IV Piggyback, Now, Infuse over: 30 minute(s), first dose 10/17/16 16:14:00 CDT, stop date 10/17/16 16:14:00 CDT, 1,023,835  heparin flush 100 units/mL (500 Unit  Flush): 500 units, form: Injection, IV Push, Once-chemo, first dose 03/15/16 14:32:00 CDT, stop date 03/15/16 14:32:00 CDT, Heparin Flush for Medi-port, Weeks 1, 970,215  Future  Heparin 1000 units/ml 2 ml injectable: 3,800 units, form: Soln, IV, Once-Unscheduled, *Est. first dose 03/23/17 12:00:00 CDT, 1.9 ml / port, Future Order, 160,101  heparin 1000 units/mL - 10  mL inj. solution: 3.8 unit/mL, form: Injection, IV Push, Once-Unscheduled, *Est. first dose 03/16/17 14:50:00 CDT, Future Order, 1.9 ml per port  Prescriptions  Prescribed  Metoprolol Succinate  mg oral tablet extended release: 100 mg = 1 tab(s), Oral, Daily, do not crush or chew, # 30 tab(s), 4 Refill(s), Pharmacy: [facility]  Phenergan 25 mg Tab: 25 mg = 1 tab(s), Oral, q4hr, PRN PRN for nausea/vomiting, # 15 tab(s), 0 Refill(s)  amlodipine 10 mg oral tablet: 10 mg = 1 tab(s), Oral, Daily, # 30 tab(s), 4 Refill(s), Pharmacy: [facility]  atorvastatin 80 mg oral tablet: 80 mg = 1 tab(s), Oral, Daily, # 30 tab(s), 0 Refill(s), Pharmacy: [facility]  clindamycin 150 mg oral capsule: 450 mg = 3 cap(s), Oral, TID, X 10 day(s), # 90 cap(s), 0 Refill(s), Pharmacy: [facility]  doxepin 75 mg oral capsule: 75 mg = 1 cap(s), Oral, Once a day (at bedtime), # 30 cap(s), 0 Refill(s), Pharmacy: [facility]  fenofibrate 160 mg oral tablet: 160 mg = 1 tab(s), Oral, Daily, # 30 tab(s), 0 Refill(s), Pharmacy: [facility]  ferrous sulfate 325 mg (65 mg elemental iron) oral tablet: 325 mg = 1 tab(s), Oral, BID, # 60 tab(s), 4 Refill(s), Pharmacy: [facility]  gabapentin 300 mg oral capsule: 300 mg = 1 cap(s), Oral, TID, # 90 cap(s), 0 Refill(s), Pharmacy: [facility]  ibuprofen 600 mg oral tablet: 600 mg = 1 tab(s), Oral, TID, X 5 day(s), # 15 tab(s), 0 Refill(s), Pharmacy: [facility]  insulin glargine 100 units/mL subcutaneous solution: See Instructions, Inject 40 units in the AM and 50 in the PM, # 15 mL, 4 Refill(s), Pharmacy: [facility]  insulin lispro 100 units/mL subcutaneous solution: 30 units, Subcutaneous, BIDAC, # 15 mL, 4 Refill(s), Pharmacy: [facility]  metFORMIN 1000 mg oral tablet: 1,000 mg = 1 tab(s), Oral, BID, # 60 tab(s), 0 Refill(s), Pharmacy: [facility]  niacin 500 mg oral capsule, extended release: 500 mg = 1 cap(s), Oral, Once a day (at bedtime), # 30 cap(s), 0 Refill(s), Pharmacy: [facility]  Documented Medications  Documented  pancrelipase 3000 units-9500 units-15,000 units oral delayed release capsule: 1 cap(s), Oral, QID, # 120 cap(s), 0 Refill(s).   Immunizations: Up to date, tetanus up to date, Flu UTD. PNA UTD. Tetanus UTD. .      Past Medical/ Family/ Social History   Medical history:    Resolved  Hyperlipidemia ([id number]):  Resolved.  HTN (hypertension) ([id number]):  Resolved.  Pancreatitis ([id number]):  Resolved.  Diabetes ([id number]):  Resolved.  Neuropathy of lower limb                                                                                                                                                                                                                27-APR-2016 12:12:07<$> ([id number]):  Resolved.  Dialysis catheter ([id number]):  Resolved., Patient reports a hx of DM, HTN, HLD, and pancreatitis..   Surgical history:    Catheter Insertion Palindrome (.) on 12/31/2017 at 37 Years.  Comments:  12/31/2017 11:24 - Kyrie VIZCARRA, Radames FIELDS.  auto-populated from documented surgical case  Biopsy Gastrointestinal on 6/30/2017 at 37 Years.  Comments:  6/30/2017 10:35 - Kierra Moody RN  auto-populated from documented surgical case  Esophagogastroduodenoscopy on 6/30/2017 at 37 Years.  Comments:  6/30/2017 10:35 - Kierra Moody RN  auto-populated from documented surgical case  Arteriovenous Fistula (Left) on 11/28/2016 at 36 Years.  Comments:  11/28/2016 15:03 - Lanie Flores RN  auto-populated from documented surgical case  Arteriovenous Fistula Declot (Left) on 10/21/2016 at 36 Years.  Comments:  10/21/2016 16:16 - Elly Cantu RN  auto-populated from documented surgical case  Arteriovenous Fistula Revision (Left) on 10/17/2016  at 36 Years.  Comments:  10/17/2016 16:32 - Giovanna Mitchlel RN  auto-populated from documented surgical case  Dressing Change (Right) on 10/17/2016 at 36 Years.  Comments:  10/17/2016 16:32 - Giovanna Mitchell RN  auto-populated from documented surgical case  Permacath Placement (None) on 2016 at 36 Years.  Comments:  2016 16:38 - Giovanna Mitchell RN  auto-populated from documented surgical case  Arteriovenous Fistula (Left) on 2016 at 36 Years.  Comments:  2016 10:35 - Jim VIZCARRA, Michelle THOMAS  auto-populated from documented surgical case  Permacath Placement (None) on 2016 at 36 Years.  Comments:  2016 11:45 - Lanie Flores RN  auto-populated from documented surgical case  Permacath Placement (None) on 3/30/2016 at 36 Years.  Comments:  3/30/2016 08:34 - Nicolle Mota RN.  auto-populated from documented surgical case  Permacath Placement (Right) on 2016 at 35 Years.  Comments:  2016 10:19 - Elly Regalado  auto-populated from documented surgical case  Hernia Repair in  at 31 Years.  Appendectomy; (13739) in  at 17 Years.  Colonoscopy ([id number]).  Esophagogastroduodenoscopy ([id number]).  dialysis port., Patient reports a hx of eight surgeries to his left arm for his vascular access and a hx of appendectomy..   Family history:      Mother (BARBY GONZALEZ, )  ,   Mother:  . No medical hx.  Father:  alive. Unknown medical hx; he lives in [place]..   Social history: Alcohol use: Denies, Tobacco use: Regularly, Drug use: Denies, Occupation: On disability, Family/social situation: Unmarried, lives alone.      Physical Examination               Vital Signs   Vital Signs   3/6/2018 20:20 CST       Temperature Oral          36.9 DegC                             Temperature Oral (calculated)             98.42 DegF                             Peripheral Pulse Rate     100 bpm                             Respiratory Rate          18 br/min                              SpO2                      100 %                             Systolic Blood Pressure   166 mmHg  HI                             Diastolic Blood Pressure  113 mmHg  HI  .   Measurements   3/6/2018 20:20 CST       Weight Dosing             112 kg                             Weight Measured and Calculated in Lbs     246.92 lb                             Weight Estimated          112 kg                             Height/Length Dosing      175 cm                             Height/Length Estimated   175 cm                             Body Mass Index Estimated 36.57 kg/m2  .   Basic Oxygen Information   3/6/2018 20:20 CST       SpO2                      100 %  .   General:  Alert, mild distress, Patient appears to be in mild discomfort at the most., not anxious, not ill-appearing.    Skin:  Warm, dry, no pallor, no rash, Very pale complected gentleman with numerous tattoos but none that appear to be fresh..    Head:  Normocephalic, atraumatic.    Neck:  Supple, trachea midline, no tenderness, no carotid bruit.    Eye:  Pupils are equal, round and reactive to light, extraocular movements are intact, normal conjunctiva.    Ears, nose, mouth and throat:  Tympanic membranes clear, oral mucosa moist, no pharyngeal erythema or exudate.    Cardiovascular:  Regular rate and rhythm, No murmur, Normal peripheral perfusion, No edema, Twin lumen subclavian dialysis catheter to left upper chest.   Vascular access to left forearm with thrill and pulse present..    Respiratory:  Respirations are non-labored, breath sounds are equal, Breath sounds: smoker's rhonchi bilaterally.    Chest wall:  No tenderness, No deformity.    Back:  Nontender, Normal range of motion, Normal alignment, no step-offs.    Musculoskeletal:  Normal ROM, normal strength, no tenderness, no swelling, no deformity.    Gastrointestinal:  Soft, Nontender, Non distended, Normal bowel sounds, No organomegaly, Good bowel sounds. Nontender..     Genitourinary:  no CVA tenderness.   Neurological:  Alert and oriented to person, place, time, and situation, No focal neurological deficit observed, CN II-XII intact, normal sensory observed, normal motor observed, normal speech observed, normal coordination observed, normal and symmetrical reflexes.    Lymphatics:  No lymphadenopathy.   Psychiatric:  Cooperative, appropriate mood & affect, normal judgment, non-suicidal.       Medical Decision Making   Differential Diagnosis:  Unstable angina, Pancreatitis.    Documents reviewed:  Emergency department nurses' notes, prior records.    Orders  Launch Orders   Laboratory:  Lipid Panel (Order): Stat collect, 3/6/2018 23:42 CST, Blood, Lab Collect, Print Label By Order Location, 3/6/2018 23:42 CST, Laboratory    Amylase Level, Nando Rea MD, 03/06/18, 20:54, Ordered    CBC w/ Auto Diff, Nando Rea MD, 03/06/18, 20:54, Ordered    CK, Nando Rea MD, 03/06/18, 20:54, Ordered    CK MB, Nando Rea MD, 03/06/18, 20:54, Ordered    CMP, Nando Rea MD, 03/06/18, 20:54, Ordered    Mg Level, Nando Rea MD, 03/06/18, 20:54, Ordered    Lipase Level, Nando Rea MD, 03/06/18, 20:54, Ordered    Lactic Acid, Nando Rea MD, 03/06/18, 20:54, Ordered    INR - Protime, Nando Rea MD, 03/06/18, 20:55, Ordered    PTT, Nando Rea MD, 03/06/18, 20:55, Ordered    TSH, Nando Rea MD, 03/06/18, 20:55, Ordered    Troponin-I, Nando Rea MD, 03/06/18, 20:55, Ordered    UA Total a reflex to culture, Nando Rea MD, 03/06/18, 20:55, Ordered  Xray    XR Chest 1 View, Nando Rea MD, 03/06/18, 20:55, Ordered.    Electrocardiogram:  Time 3/6/2018 20:19:00, rate 86, normal sinus rhythm, No ST-T changes, no ectopy, normal AZ & QRS intervals, EP Interp.    Results review:  Lab results : Lab View   3/6/2018 21:26 CST       UA Appear                 CLEAR                             UA Color                  YELLOW                              UA Spec Grav              1.039  HI                             UA Bili                   Negative                             UA pH                     5.0                             UA Urobilinogen           0.2                             UA Blood                  Negative                             UA Glucose                3+                             UA Ketones                Negative                             UA Protein                1+                             UA Nitrite                Negative                             UA Leuk Est               Negative                             UA WBC Man                None Seen                             UA RBC                    None Seen                             UA Bacteria               None Seen /HPF                             UA Squam Epithelial       2 /HPF                             UA Hyal Cast              Rare    3/6/2018 21:15 CST       Lactic Acid Lvl           1.3 mmol/L    3/6/2018 20:40 CST       Sodium Lvl                126 mmol/L  LOW                             Potassium Lvl             4.5 mmol/L                             Chloride                  98 mmol/L                             CO2                       20.0 mmol/L  LOW                             Calcium Lvl               8.0 mg/dL  LOW                             Magnesium Lvl             2.0 mg/dL                             Glucose Lvl               562 mg/dL  HI                             BUN                       13.0 mg/dL                             Creatinine                1.02 mg/dL                             eGFR-AA                   >60 mL/min/1.73 m2  NA                             eGFR-CHIN                  >60 mL/min/1.73 m2  NA                             Amylase Lvl               10 unit/L  LOW                             Bili Total                0.6 mg/dL                             Bili Direct               0.00 mg/dL                              Bili Indirect             0.60 mg/dL                             AST                       76 unit/L  HI                             ALT                       77 unit/L                             Alk Phos                  225 unit/L  HI                             Total Protein             6.9                             Albumin Lvl               3.60 gm/dL                             Globulin                  3.30 gm/dL                             A/G Ratio                 1.09 ratio                             Lipase Lvl                159 unit/L                             Total CK                  91 unit/L                             CK MB                     <0.5 ng/mL                             Troponin-I                <0.02 ng/mL                             TSH                       2.490 mIU/mL                             PT                        11.6 second(s)  LOW                             INR                       0.82                             PTT                       28.8 second(s)                             WBC                       11.1 x10(3)/mcL                             RBC                       4.62 x10(6)/mcL  LOW                             Hgb                       14.4 gm/dL                             Hct                       41.2 %  LOW                             Platelet                  303 x10(3)/mcL                             MCV                       89.2 fL                             MCH                       31.2 pg  HI                             MCHC                      35.0 gm/dL                             RDW                       12.7 %                             MPV                       10.0 fL                             Abs Neut                  7.10 x10(3)/mcL                             Neutro Auto               64 %  NA                             Lymph Auto                26 %  NA                             Mono Auto                 6 %  NA                              Eos Auto                  2 %  NA                             Abs Eos                   0.2 x10(3)/mcL                             Basophil Auto             0 %  NA                             Abs Neutro                7.10 x10(3)/mcL                             Abs Lymph                 2.9 x10(3)/mcL                             Abs Mono                  0.7 x10(3)/mcL                             Abs Baso                  0.1 x10(3)/mcL  .      Reexamination/ Reevaluation   Time: 3/6/2018 23:44:00 .   Vital signs   results included from flowsheet : Vital Signs   3/6/2018 23:30 CST       Peripheral Pulse Rate     72 bpm                             Heart Rate Monitored      72 bpm                             Respiratory Rate          14 br/min                             SpO2                      94 %                             Oxygen Therapy            Room air                             Systolic Blood Pressure   124 mmHg                             Diastolic Blood Pressure  87 mmHg                             Mean Arterial Pressure, Cuff              99 mmHg    3/6/2018 23:00 CST       Peripheral Pulse Rate     77 bpm                             Heart Rate Monitored      78 bpm                             Respiratory Rate          18 br/min                             SpO2                      94 %                             Oxygen Therapy            Room air                             Systolic Blood Pressure   144 mmHg  HI                             Diastolic Blood Pressure  97 mmHg  HI                             Mean Arterial Pressure, Cuff              113 mmHg     Assessment: Patient understands will be admitted his pain is been controlled 2 rounds of dilauded apparently he does not have elevation of his lipase when his pancreas flares up but he has these pains.  Glucose is noted to be elevated bicarb noted to be low as is calcium.      Impression and Plan   Diagnosis   Diabetes  mellitus (UYH53-VG E11.9)   Hypertriglyceridemia, familial (TUO79-ZG E78.1)   Pancreatitis, chronic (PHP94-BB K86.1)   Atypical chest pain (LVJ41-ZT R07.89)   Hyperglycemia without ketosis (QGY30-DL R73.9)      Calls-Consults   -  3/6/2018 23:45:00 , Héctor DICKENS, Giovanna Dumas.    Plan   Condition: Improved.    Disposition: Admit time  3/6/2018 23:45:00, Admit to Inpatient Unit.    Counseled: Patient, Regarding diagnosis, Regarding diagnostic results, Regarding treatment plan, Patient indicated understanding of instructions.    Notes: I, Deepti Martínez, acted solely as a scribe for and in the presence of Dr. Rea who performed the service., I, Nando Rea MD, a physician licensed to practice in this state, have  performed the physical evaluation,  history gathering,  and medical decision making that is reflected in this record..   MARIA ISABEL Rea MD.

## 2022-04-29 NOTE — H&P
"   Patient:   Bharath Caballero            MRN: [id number]            FIN: [id number]               Age:   37 years     Sex:  Male     :  1980   Associated Diagnoses:   None   Author:   David Anaya MD      Basic Information   Time Seen:  Date & Time 2018 04:56:00.    Source of history:  Self, Medical record.    History limitation:  None.    Advance directive:  Full code.    Provider information/ cc:  Primary care:  None, None.       Chief Complaint   Midline chest pain, epigastric pain, and bilateral upper quad pain       History of Present Illness   Mr. Caballero is a 37 year old male with a medical history of HTN, Monty Hypertriglyceridemia, Recurrent Pancreatitis s/t Hypertriglyceridemia, IDDM2, CICI, Fatty Liver Disease, and GERD.  He presented to [facility] ER with complaints of midline chest pain, epigastric pain, and bilateral upper quadrant pain; states it feels like the pain he gets when his triglycerides are high or when he has a pancreatitis "flare-up".  He reports that he normally gets plasmapheresis when his triglyceride levels are above 1600, tonight they are 1900.  He reports compliance with his oral medications.  Over the last several days he's had epigastric pain consistent with his prior episodes of acute pancreatitis on chronic  pancreatitis.  Initial ER VS: /105, HR 86, respirations 22, oral temp 37.0, SPO2 90% on RA.  , K5.4, CL 98, CO2 19, glucose 415, lipase 88, triglycerides 1939, troponin negative, and CBC unremarkable. UA 3+ glucose, 1+ protein, WBC 5, negative nitrites/leukocytes/bacteria. CXR unremarkable. He received Dilaudid 2mg, 7u reglaur insulin, Zofran 8mg IV, Dilaudid 1mg , Phenergan 12.5 IV, and IVF while in the ER. He is being admitted to the hospitalist services for further evaluation and management with a consult to renal for possible plasmaphoresis.       Review of Systems   Except as documented, all other systems reviewed and negative.     "   Health Status   Allergies:    Allergic Reactions (Selected)  No Known Allergies,    Allergies (1) Active Reaction  No Known Allergies None Documented     Current medications:  (Selected)   Inpatient Medications  Ordered  Ancef: 1 gm, form: Infusion, IV Piggyback, Now, Infuse over: 30 minute(s), first dose 10/17/16 16:14:00 CDT, stop date 10/17/16 16:14:00 CDT, 1,023,835  Dextrose 50% and Water  (50 mL vial/syringe): 25 mL, 12.5 gm =, form: Injection, IV Push, As Directed PRN for blood glucose, Infuse over: 5 minute(s), first dose 02/20/18 3:43:00 CST, Unconscious patient: Repeat as ordered per protocol.  Dextrose 50% and Water  (50 mL vial/syringe): 25 mL, 12.5 gm =, form: Injection, IV Push, Once PRN for blood glucose, Infuse over: 5 minute(s), first dose 02/20/18 3:43:00 CST, Unconscious patient: Look for other source of altered mental status.  Dextrose 50% and Water  (50 mL vial/syringe): 50 mL, 25 gm =, form: Injection, IV Push, As Directed PRN for blood glucose, Infuse over: 5 minute(s), first dose 02/20/18 3:43:00 CST, Unconscious patient: Repeat as ordered per protocol.  Dextrose 50% in Water: 25 mL, 12.5 gm =, form: Injection, IV Push, As Directed PRN for blood glucose, Infuse over: 5 minute(s), first dose 02/20/18 3:43:00 CST, Conscious patient.  Dilaudid: 1 mg, form: Injection, IV, q4hr PRN for pain, first dose 02/20/18 3:43:00 CST, severe ( > 7 on pain scale)  Phenergan: 12.5 mg, form: Injection, IV Push, q4hr PRN for nausea/vomiting, first dose 02/20/18 3:43:00 CST  Zofran: 4 mg, form: Injection, IV Push, q4hr PRN for nausea, first dose 02/20/18 3:43:00 CST  glucagon: 1 mg, form: Injection, IM, q10min PRN for blood glucose, first dose 02/20/18 3:43:00 CST, Conscious Patient with NO IV access available and BG < 45 mg/dl.  glucagon: 1 mg, form: Injection, IM, q10min PRN for blood glucose, first dose 02/20/18 3:43:00 CST, Unconscious patient: Patient with NO IV access available and BG < 70 mg/dl.  heparin  flush 100 units/mL (500 Unit  Flush): 500 units, form: Injection, IV Push, Once-chemo, first dose 03/15/16 14:32:00 CDT, stop date 03/15/16 14:32:00 CDT, Heparin Flush for Medi-port, Weeks 1, 970,215  insulin lispro: 2-14 units, form: Injection, Subcutaneous, As Directed PRN for blood glucose, first dose 02/20/18 3:43:00 CST  Future  Heparin 1000 units/ml 2 ml injectable: 3,800 units, form: Soln, IV, Once-Unscheduled, *Est. first dose 03/23/17 12:00:00 CDT, 1.9 ml / port, Future Order, 160,101  heparin 1000 units/mL - 10 mL inj. solution: 3.8 unit/mL, form: Injection, IV Push, Once-Unscheduled, *Est. first dose 03/16/17 14:50:00 CDT, Future Order, 1.9 ml per port  Outpatient Medications  Ordered  Albumin 5% IV Soln (by Volume): 4,000 mL, IV, Once-NOW, first dose 02/15/18 7:37:00 CST, stop date 02/15/18 7:37:00 CST, preare 4 liter 5%albumin for tpe please  calcium gluconate 10% inj.(Push/IVPB): 3 gm, IV, Once-NOW, first dose 02/15/18 8:13:00 CST, stop date 02/15/18 8:13:00 CST, 3 grams.in 250ml ns...infuse with tpe  Prescriptions  Prescribed  Metoprolol Succinate  mg oral tablet extended release: 100 mg = 1 tab(s), Oral, Daily, do not crush or chew, # 30 tab(s), 4 Refill(s), [facility]acy: [facility]  Phenergan 25 mg Tab: 25 mg = 1 tab(s), Oral, q4hr, PRN PRN for nausea/vomiting, # 15 tab(s), 0 Refill(s)  amlodipine 10 mg oral tablet: 10 mg = 1 tab(s), Oral, Daily, # 30 tab(s), 4 Refill(s), [facility]  atorvastatin 80 mg oral tablet: 80 mg = 1 tab(s), Oral, Daily, # 30 tab(s), 0 Refill(s), Pharmacy: [facility]  doxepin 75 mg oral capsule: 75 mg = 1 cap(s), Oral, Once a day (at bedtime), # 30 cap(s), 0 Refill(s), Pharmacy: [facility]  fenofibrate 160 mg oral tablet: 160 mg = 1 tab(s), Oral, Daily, # 30 tab(s), 0 Refill(s), Pharmacy: [facility]  ferrous sulfate 325 mg (65 mg elemental iron) oral tablet: 325 mg = 1 tab(s), Oral, BID, #  60 tab(s), 4 Refill(s), Pharmacy: [facility]  gabapentin 300 mg oral capsule: 300 mg = 1 cap(s), Oral, TID, # 90 cap(s), 0 Refill(s), Pharmacy: [facility]  insulin glargine 100 units/mL subcutaneous solution: See Instructions, Inject 40 units in the AM and 50 in the PM, # 15 mL, 4 Refill(s), Pharmacy: [facility]  insulin lispro 100 units/mL subcutaneous solution: 30 units, Subcutaneous, BIDAC, # 15 mL, 4 Refill(s), Pharmacy: [facility]  lisinopril 40 mg oral tablet: 40 mg = 1 tab(s), Oral, Daily, # 30 tab(s), 4 Refill(s), Pharmacy: [facility]  metFORMIN 1000 mg oral tablet: 1,000 mg = 1 tab(s), Oral, BID, # 60 tab(s), 0 Refill(s), Pharmacy: [facility]  niacin 500 mg oral capsule, extended release: 500 mg = 1 cap(s), Oral, Once a day (at bedtime), # 30 cap(s), 0 Refill(s), Pharmacy: [facility]  Documented Medications  Documented  pancrelipase 3000 units-9500 units-15,000 units oral delayed release capsule: 1 cap(s), Oral, QID, # 120 cap(s), 0 Refill(s),    Medications (10) Active  Scheduled: (0)  Continuous: (0)  PRN: (10)  dextrose 50% abboj  12.5 gm 25 mL, IV Push, As Directed  dextrose 50% abboj  12.5 gm 25 mL, IV Push, Once  dextrose 50% abboj  12.5 gm 25 mL, IV Push, As Directed  dextrose 50% abboj  25 gm 50 mL, IV Push, As Directed  glucagon recombinant 1 mg Inj  1 mg 1 EA, IM, q10min  glucagon recombinant 1 mg Inj  1 mg 1 EA, IM, q10min  hydromorphone 2 mg/ml Inj (per mL)  1 mg 0.5 mL, IV, q4hr  insulin (Humalog) lispro 100 u/ml Inj  2-14 units, Subcutaneous, As Directed  ondansetron 2 mg/mL inj - 2mL  4 mg 2 mL, IV Push, q4hr  promethazine 25 mg/mL Inj  12.5 mg 0.5 mL, IV Push, q4hr        Histories     Past Medical History: HTN, Monty Hypertriglyceridemia, Recurrent Pancreatitis s/t Hypertriglyceridemia, IDDM2, CICI, Fatty Liver Disease, GERD  Past Surgical History: Left arm AV fistula with revisions,  hernia repair, appendectomy, EGD/colonoscopy  Family History: Denies  Social History:  Former EtOH use; stopped in 2010.  Current tobacco use; 1 PPD ×20+ years.  No alcohol, tobacco or illicit drug use.        Physical Examination      Vital Signs (last 24 hrs)_____  Last Charted___________  Temp Oral     37.0 DegC  (FEB 19 23:33)  Heart Rate Peripheral   77 bpm  (FEB 20 03:00)  Resp Rate         L 11br/min  (FEB 20 03:00)  SBP      H 145mmHg  (FEB 20 03:00)  DBP      87 mmHg  (FEB 20 03:00)  SpO2      95 %  (FEB 20 03:00)     General:  Alert and oriented, No acute distress.    Cognition and Speech:  Oriented, Speech clear and coherent.    HENT:  Normocephalic, Normal hearing, Oral mucosa is moist.    Eye:  Pupils are equal, round and reactive to light, Normal conjunctiva.    Neck:  Supple, No carotid bruit, No jugular venous distention.    Respiratory:  Lungs are clear to auscultation, Respirations are non-labored, Breath sounds are equal.    Cardiovascular:  Normal rate, Regular rhythm, No murmur, No edema.    Gastrointestinal:  Soft, Non-distended, Normal bowel sounds, Mild epigastric tenderness to deep palpation.    Integumentary:  Warm, Dry, Intact.    Musculoskeletal:  Normal strength, No tenderness, No swelling.    Neurologic:  Alert, Oriented, Normal sensory, No focal deficits.    Psychiatric:  Cooperative, Appropriate mood & affect, Normal judgment.       Review / Management   Laboratory Results   Today's Lab Results : PowerNote Discrete Results   2/20/2018 0:57 CST       UA Appear                 CLEAR                             UA Color                  YELLOW                             UA Spec Grav              1.036  HI                             UA Bili                   Negative                             UA pH                     5.5                             UA Urobilinogen           1.0                             UA Blood                  Negative                             UA Glucose                 3+                             UA Ketones                Negative                             UA Protein                1+                             UA Nitri                  Negative                             UA Leuk Est               Negative                             UA WBC cnt                5 /HPF  HI                             UA RBC                    NONE SEEN                             UA Bact                   NONE SEEN /HPF                             UA Squam Epithelial       NONE SEEN    2/20/2018 0:32 CST       WBC                       8.5 x10(3)/mcL                             Hgb                       14.8 gm/dL                             Hct                       40.0 %  LOW                             Platelet                  293 x10(3)/mcL                             Sodium Lvl                129 mmol/L  LOW                             Potassium Lvl             5.4 mmol/L  HI                             Chloride                  98 mmol/L                             CO2                       19.0 mmol/L  LOW                             Calcium Lvl               8.5 mg/dL                             Magnesium Lvl             2.2 mg/dL                             Glucose Lvl               415 mg/dL  HI                             BUN                       13.0 mg/dL                             Creatinine                1.00 mg/dL                             eGFR-AA                   >60 mL/min/1.73 m2  NA                             eGFR-CHIN                  >60 mL/min/1.73 m2  NA                             Amylase Lvl               10 unit/L  LOW                             Bili Total                0.5 mg/dL                             Bili Direct               0.00 mg/dL                             Bili Indirect             0.50 mg/dL                             AST                       262 unit/L  HI                             ALT                       N/A unit/L                              Alk Phos                  293 unit/L  HI                             Total Protein             7.6 gm/dL                             Albumin Lvl               3.30 gm/dL  LOW                             Globulin                  4.30 gm/dL  HI                             A/G Ratio                 0.8  NA                             LDH                       777 unit/L  HI                             Lipase Lvl                88 unit/L                             Trig                      1,939 mg/dL  HI                             Troponin-I                <0.02 ng/mL           Impression and Plan   Abdominal pain with Hypertriglyceridemia - triglycerides 1939  - Continue lipid lowering medications   - PRN analgesics  - Consult Renal for ? Plasmaphoresis; patient reports he receives when triglycerides > 1600  ---- Last tx 2/14/18    Hyperglycemia, HX: IDDM2 - poor controlled  - Accuchecks AC & HS with SSI#2  - Cardiac diet   - HOLD home Metformin; resume upon discharge  - DECREASE home lispro to 15u BID AC   - DECREASE home Lantus 40am and 45pm to 25u BID     Hypertension, Essential - uncontrolled  - Resume home amlodipine, BB; HOLD ACE-I  - PRN antihypertensives  - VS monitoring q4hr & PRN     Depression   - Resume home medications     HX: GERD, Pancreatitis, Fatty Liver Disease   - Resume home medications     Code status: Full Code  DVT prophylaxis: Lovenox 40mg SC daily   Admission time 72 minutes.     I, ROCCO Kaur-C, reviewed and discussed the case with Dr. David Anaya.       Professional Services   I, David Anaya MD, assumed care of this patient at the time of this addendum and assisted with the composition of the above assesment and plan. For this patient encounter, I reviewed the NP or PA documentation, treatment plan, and medical decision making; and I had face-to-face time with this patient.  Labs and imaging were reviewed and I agree with history, physical and  medical decision making as detailed above.      37-year-old male well known to the hospitalist service for hypertriglyceridemia and associated chronic pancreatitis with acute flares and frequent admissions.  He was last plasmapheresis on the 14th.  His triglycerides are currently in the 1900s and he presents with epigastric abdominal pain.    Exam: Agree with above mild epigastric tenderness to palpation    Plan: Consult nephrology for plasmapheresis likely can be discharged thereafter

## 2022-04-29 NOTE — CONSULTS
DATE OF CONSULTATION:  04/20/2018    ATTENDING PHYSICIAN:  Jacques Rivera MD, MD  CONSULTING PHYSICIAN:  Norberto Coleman MD    This is a known, 38-year-old, white male with significant hypertriglyceridemia who was found to have elevated triglycerides and was told to come to the hospital for plasmapheresis, which he has undergone several times before.  He has a history of diabetes mellitus, sleep apnea and fatty liver disease.  We are called in consultation to provide plasmapheresis.    ALLERGIES:  The patient seems to be allergic to morphine and Zofran according to previous notes in the chart.    MEDICATIONS:  Medication list can be found in the medication section of the chart.    LABORATORY:  Yesterday, sodium was 124, potassium 6, chloride 95, C02 21, blood sugar 664, BUN 16, creatinine 1.25, alkaline phosphatase 206, globulin 4.7, triglycerides 3,915.  H&H yesterday were 13.9 and 40% with normal white blood cell count.  Platelet count was somewhat increased to 404,000.    PHYSICAL EXAMINATION:  GENERAL:  The patient is alert, oriented and in no acute distress.   VITAL SIGNS:  Blood pressure is 145/91 with heart rate of 72 and regular, respirations 18, temperature 36.6 degrees Celsius.   HEENT:  Atraumatic.  Pupils are equally reactive to light.  No scleral discoloration.  No discharge.  Ears and nose are free of lesions.   NECK:  Without jugular venous distention or masses.   CHEST:  Clear.   HEART:  Regular rhythm.   ABDOMEN:  Soft and nontender.  There is no edema.    ASSESSMENT:    1. Elevated triglycerides familial.   He will need plasmapheresis again.   2. Hyperlipidemia.   3. Type-2 diabetes mellitus.    4. Arterial hypertension.  5. Hyperkalemia.    PLAN:  We will do plasma volume exchange with plasmapheresis today.  We are going to give Kayexalate for potassium control and continue monitoring his chemistries tomorrow when he will likely need more plasmapheresis.       Thank you very  much for allowing us to participate in the care of this gentleman.        ______________________________  MD GERHARD Yepez/LINDSAY  DD:  04/20/2018  Time:  10:44AM  DT:  04/20/2018  Time:  11:01AM  Job #:  [id number]

## 2022-04-29 NOTE — DISCHARGE SUMMARY
Patient:   Bharath Caballero            MRN: [id number]            FIN: [id number]               Age:   37 years     Sex:  Male     :  1980   Associated Diagnoses:   Chronic abdominal pain; Right upper quadrant pain; Hypertriglyceridemia; Atypical chest pain; Hyperkalemia; Hyperglycemia   Author:   Stefanie DICKENS, Miriam Abad      Discharge Information      Discharge Summary Information   Admitted  2018   Discharged  2018   Consulting physician     Wilian Shafer MD.     Admitting diagnosis (Hypertriglyceridemia, chronic abdominal pain)   Discharge diagnosis     Chronic abdominal pain (MTG26-RE R10.9).     Right upper quadrant pain (WZT97-WL R10.11).     Hypertriglyceridemia (IVT05-DB E78.1).     Atypical chest pain (USV86-NC R07.89).     Hyperkalemia (IGY70-MB E87.5).     Hyperglycemia (PJT91-FE R73.9).        Physical Examination      Vital Signs (last 24 hrs)_____  Last Charted___________  Temp Oral     36.7 DegC  (:)  Heart Rate Peripheral   73 bpm  (:)  Resp Rate         20 br/min  (:)  SBP      132 mmHg  (:)  DBP      72 mmHg  (:)  SpO2      97 %  (:)     General:  Alert and oriented, No acute distress.    Cognition and Speech:  Oriented, Speech clear and coherent.    HENT:  Normocephalic, Normal hearing, Oral mucosa is moist.    Eye:  Pupils are equal, round and reactive to light, Normal conjunctiva.    Neck:  Supple, No carotid bruit, No jugular venous distention.    Respiratory:  Lungs are clear to auscultation, Respirations are non-labored, Breath sounds are equal.    Cardiovascular:  Normal rate, Regular rhythm, No murmur, No edema.    Gastrointestinal:  Soft, Non-distended, Normal bowel sounds, Mild epigastric tenderness to deep palpation.    Integumentary:  Warm, Dry, Intact.    Musculoskeletal:  Normal strength, No tenderness, No swelling.    Neurologic:  Alert, Oriented, Normal sensory, No focal deficits.   "  Psychiatric:  Cooperative, Appropriate mood & affect, Normal judgment.           Hospital Course   Mr. Caballero is a 37 year old male with a medical history of HTN, Monty Hypertriglyceridemia, Recurrent Pancreatitis s/t Hypertriglyceridemia, IDDM2, CICI, Fatty Liver Disease, and GERD.  He presented to [facility] ER with complaints of midline chest pain, epigastric pain, and bilateral upper quadrant pain; states it feels like the pain he gets when his triglycerides are high or when he has a pancreatitis "flare-up".  He reports that he normally gets plasmapheresis when his triglyceride levels are above 1600, tonight they are 1900.  He reports compliance with his oral medications.  Over the last several days he's had epigastric pain consistent with his prior episodes of acute pancreatitis on chronic  pancreatitis.  Initial ER VS: /105, HR 86, respirations 22, oral temp 37.0, SPO2 90% on RA.  , K5.4, CL 98, CO2 19, glucose 415, lipase 88, triglycerides 1939, troponin negative, and CBC unremarkable. UA 3+ glucose, 1+ protein, WBC 5, negative nitrites/leukocytes/bacteria. CXR unremarkable. He received Dilaudid 2mg, 7u reglaur insulin, Zofran 8mg IV, Dilaudid 1mg , Phenergan 12.5 IV, and IVF while in the ER. He is being admitted to the hospitalist services for further evaluation and management with a consult to renal for possible plasmapharesis.    Patient was seen by renal and as per their recommendations underwent plasmapheresis on 2/20/2018 and tolerated the run well without any issues.  His symptoms are significantly improved however he still complains of some right upper quadrant pain.  He states that the pain remains all the time.  Requesting something for pain and stated Percocet helped him better.  Did not want to leave the hospital until later this evening since he did not have a ride, however when he was told that his IV will be discontinued he found somebody to bring him home with an half hour.  Patient " has been noncompliant during his hospital stay and leaving the floor multiple times in spite of not having hospital privileges.  Overall his other medical comorbidities have remained stable and he will be discharged home in a medically stable condition with a prescription for Percocet.  Compliance with his medications is reinforced.  Time spent: 34 minutes         Discharge Plan   Discharge Summary Plan   Discharge Status: improved.     Discharge instructions given: to patient.     Discharge disposition: discharge to home (into the care of family member, self care).     Prescriptions: continue same medications, reviewed with patient, written and given to patient, per med rec sheet.     Orders     Orders   Admit/Transfer/Discharge:  Discharge (Order): Home, needs a ride, won't be able to leave till 3 pm otherwise.        Abdominal pain with Hypertriglyceridemia - triglycerides 1939  - Continue lipid lowering medications   - PRN analgesics  -Renal was consulted and patient status post plasmapheresis on 2/20/2018 with improvement in symptoms    Hyperglycemia, HX: IDDM2 - poor controlled  -Home medications lispro, Lantus and metformin will be resumed  Compliance with medications is reinforced-    Hypertension, Essential - uncontrolled  -Resume home medications    Depression   - Resume home medications     HX: GERD, Pancreatitis, Fatty Liver Disease   - Resume home medications       Education and Follow-up   Counseled: patient, regarding diagnosis, regarding treatment, regarding medications.

## 2022-04-29 NOTE — OP NOTE
Patient:   Bharath Caballero            MRN: [id number]            FIN: [id number]               Age:   38 years     Sex:  Male     :  1980   Associated Diagnoses:   None   Author:   Jacques Rivera MD      Operative Note   Operative Information   Date/ Time:  2018 15:29:00.     Procedures Performed: Left basilic vein transposition   .     Indications: Mr. Caballero is a 37 y/o man with hypertriglyceridemia who requires regular plasmapheresis for his condition.   He needs good long term access for his treatments.  He does not have good superficial veins.  He does have an appropriate basilic vein in the left arm..     Preoperative Diagnosis: hypertriglyceridemia.     Postoperative Diagnosis: hypertriglyceridemia.     Surgeon: Jacques Rivera MD.     Assistant: Deepti Phillips.     Speciman Removed: none   .     Esimated blood loss: loss less than  100  cc.     Description of Procedure/Findings/    Complications: The patient was draped with sterile prepping of the right upper extremity. The ultrasound was utilized to appropriately geronimo the course of the basilic vein .  An incision was made over this course and dissection was performed down to the level of the basilic vein at the antecubital fossa.  The basilic vein was then exposed proximally  to the upper arm.  The medial antecubital brachial nerve was identified and protected.  Branches were ligated with 4-0 silk and small clips.   The basilic vein was transected at a branch point at the antecubital fossa. Utiliizing a  and forceps a superficial tunnel was created along the superior portion of the upper arm.  Care was taken to avoid twisting of the vein.  The vein was appropriately size and spatulated at a branch point.  5000 units of heparin was administered.   A longitudinal arteriotomy was performed.   The vein was then anastomosed to the artery utilizing 6-0 prolene suture.  The anastomosis was backbled and foreward bled  prior to completion. Upon completion there was a thrill to the fistula.   Minimal amount of further soft tissue dissection was performed to ensure an appropriate lie to the basilic vein. Protamine was administered hemostasis was achieved. The antecubital brachial nerve was confirmed in its anatomic position. Irrigation was performed. Layered closure was performed with 3 separate running 3-0 Vicryl sutures. 4-0 Monocryl was utilized to reapproximate the skin.  Dermabond Prineo dressing was placed. This completed the procedure, the patient was then extubated and transferred to the recovery room.    .     Findings:    ,    .     Complications: None.

## 2022-04-29 NOTE — OP NOTE
DATE OF SURGERY:    09/13/2017    SURGEON:  Sheridan Bass MD    attending physician:  Sheridan Bass MD    PREOPERATIVE DIAGNOSIS:  Stenosis of arteriovenous graft.    POSTOPERATIVE DIAGNOSIS:  Stenosis of arteriovenous graft.    PROCEDURE:  Fistulogram of the left upper extremity with angioplasty of venous outflow, 8 x 40 mm.    ASSISTANT:  Bertram Lao M.D.    ANESTHESIA:  None.    COMPLICATIONS:  None.    HISTORY OF PRESENT ILLNESS:  Mr. Caballero is a 37-year-old male who has a left upper extremity fistula, which is in place for plasmapheresis.  He has had difficulties in the last several treatments and therefore was consented and scheduled for fistulogram.    PROCEDURE IN DETAIL:  In the angio suite, the patient was prepped and draped in a sterile fashion in supine position.  1% lidocaine was used to anesthetize the skin and subcutaneous tissue overlying the forearm loop graft.  This was then accessed using a micropuncture needle, followed by a wire and catheter, and a fistulogram was performed that showed a widely patent arterial anastomosis and inflow, as well as patency of the ulnar and interosseous arteries.  However, the radial artery appears chronically occluded.  The remaining graft is patent, although the venous outflow there was a high-grade stenosis with very poor flow contrast beyond this.  At this point in time, a wire was passed through the venous outflow and the catheter was exchanged for a 6-Setswana sheath after which a 8 x 20 mm balloon was used to perform angioplasty without difficulties.   Following this, there was significant improvement, although not complete resolution of the stenosis, and there was noted to be a very smooth thrill.  All wires and catheters were     removed and the access point was closed using a 4-0 Prolene.  The patient was then sent to Recovery and discharged home in stable condition.        ______________________________  Sheridan Bass MD    TSG/MODL  DD:  10/18/2017   Time:  09:05AM  DT:  10/18/2017  Time:  09:48AM  Job #:  [id number]

## 2022-04-29 NOTE — H&P
Patient:   Bharath Caballero            MRN: [id number]            FIN: [id number]               Age:   37 years     Sex:  Male     :  1980   Associated Diagnoses:   None   Author:   David Anaya MD      DATE OF ADMIT: 3/6/2018 23:43  REFERRAL SOURCE: LUCITA Rea MD   PCP: [facility]     CHIEF COMPLAINT: pain     HISTORY OF PRESENTING ILLNESS  Mr. Caballero is a 37 year old male with a medical history of HTN, Monty Hypertriglyceridemia, Recurrent Pancreatitis s/t Hypertriglyceridemia, IDDM2, CICI, Fatty Liver Disease, and GERD.  He presented to [facility] ER with complaints of midline chest pain, epigastric pain, and bilateral upper quadrant pain which is his usual complaint.  He has frequent plasmapheresis for hypertriglyceridemia, last plasmapheresis was 2018 when his triglycerides were in the 2000.  Usually if his triglycerides are above 1600 he is admitted for plasmapheresis.  Fasting lipid panel has not yet been checked, and is pending.  On arrival he was afebrile and hemodynamically stable, laboratory work overall wasn't significantly changed from prior with evidence of a mile acidosis and hyperglycemia.  He is being admitted for pain control and for possible need for plasmapheresis.      REVIEW OF SYSTEMS  Except as documented, all other systems reviewed and negative    PAST MEDICAL HISTORY  Hyperlipidemia  HTN (hypertension)  Pancreatitis, chronic  Diabetes  Neuropathy of lower limb         Dialysis catheter    PAST SURGICAL HISTORY  Hernia Repair:   Appendectomy;.:   Esophagogastroduodenoscopy  Colonoscopy  dialysis port    FAMILY HISTORY  Reviewed, noncontributory to current condition.    SOCIAL HISTORY  Smokes 1 pack per day, former alcohol abuse but no longer drinks, denies drug use    ALLERGIES  No Known Allergies    HOME MEDICATIONS  amlodipine 10 mg oral tablet 10 mg = 1 tab(s), Oral, Daily  atorvastatin 80 mg oral tablet 80 mg = 1 tab(s), Oral, Daily  clindamycin 150 mg oral  capsule 450 mg = 3 cap(s), Oral, TID  doxepin 75 mg oral capsule 75 mg = 1 cap(s), Oral, Once a day (at bedtime)  fenofibrate 160 mg oral tablet 160 mg = 1 tab(s), Oral, Daily  ferrous sulfate 325 mg (65 mg elemental iron) oral tablet 325 mg = 1 tab(s), Oral, BID  gabapentin 300 mg oral capsule 300 mg = 1 cap(s), Oral, TID  insulin glargine 100 units/mL subcutaneous solution See Instructions  insulin lispro 100 units/mL subcutaneous solution 30 units, Subcutaneous, BIDAC  metFORMIN 1000 mg oral tablet 1,000 mg = 1 tab(s), Oral, BID  Metoprolol Succinate  mg oral tablet extended release 100 mg = 1 tab(s), Oral, Daily  niacin 500 mg oral capsule, extended release 500 mg = 1 cap(s), Oral, Once a day (at bedtime)  pancrelipase 3000 units-9500 units-15,000 units oral delayed release capsule 1 cap(s), Oral, QID  Phenergan 25 mg Tab 25 mg = 1 tab(s), PRN, Oral, q4hr    PHYSICAL EXAM  Temp Oral     36.9 DegC  (MAR 06 20:20)  Heart Rate Peripheral   72 bpm  (MAR 06 23:30)  Resp Rate         14 br/min  (MAR 06 23:30)  SBP      124 mmHg  (MAR 06 23:30)  DBP      87 mmHg  (MAR 06 23:30)  SpO2   94 %  (MAR 06 23:30)  GENERAL: awake, alert, oriented and in no acute distress  HEENT: NC/AT, EOMI, Pupils equal, CN 2-12 intact   LUNGS: CTA B/L, no rales or rhonchi, moving air well with no  resp distress  CVS: Regular rate and rhythm, normal peripheral perfusion  ABD: Soft, epigastric tenderness, non-distended, bowel sounds present  EXTREMITIES: no clubbing or cyanosis  SKIN: Warm, dry. No rashes or lesions  NEURO: AAOx3, no focal neurological deficit  PSYCHIATRIC: Cooperative    LABS  Chemistry Hematology/Coagulation   Sodium Lvl: 126 mmol/L Low (03/06/18 21:41:00) PT: 11.6 second(s) Low (03/06/18 21:52:14)   Potassium Lvl: 4.5 mmol/L (03/06/18 21:41:00) INR: 0.82 (03/06/18 21:52:14)   Chloride: 98 mmol/L (03/06/18 21:41:00) PTT: 28.8 second(s) (03/06/18 21:52:15)   CO2: 20 mmol/L Low (03/06/18 21:41:00) WBC: 11.1 x10(3)/mcL  (03/06/18 21:12:30)   Calcium Lvl: 8 mg/dL Low (03/06/18 21:41:00) RBC: 4.62 x10(6)/mcL Low (03/06/18 21:12:30)   Magnesium Lvl: 2 mg/dL (03/06/18 21:41:03) Hgb: 14.4 gm/dL (03/06/18 21:12:30)   Glucose Lvl: 562 mg/dL High (03/06/18 21:41:00) Hct: 41.2 % Low (03/06/18 21:12:30)   BUN: 13 mg/dL (03/06/18 21:41:00) Platelet: 303 x10(3)/mcL (03/06/18 21:12:30)   Creatinine: 1.02 mg/dL (03/06/18 21:41:00) MCV: 89.2 fL (03/06/18 21:12:30)   eGFR-AA: >60 (03/06/18 21:41:02) MCH: 31.2 pg High (03/06/18 21:12:30)   eGFR-CHIN: >60 (03/06/18 21:41:04) MCHC: 35 gm/dL (03/06/18 21:12:30)   Amylase Lvl: 10 unit/L Low (03/06/18 21:40:59) RDW: 12.7 % (03/06/18 21:12:30)   Bili Total: 0.6 mg/dL (03/06/18 21:41:00) MPV: 10 fL (03/06/18 21:12:30)   Bili Direct: 0 mg/dL (03/06/18 21:41:00) Abs Neut: 7.1 x10(3)/mcL (03/06/18 21:12:30)   Bili Indirect: 0.6 mg/dL (03/06/18 21:41:00) Neutro Auto: 64 % (03/06/18 21:12:31)   AST: 76 unit/L High (03/06/18 23:20:17) Lymph Auto: 26 % (03/06/18 21:12:31)   ALT: 77 unit/L (03/06/18 23:20:17) Mono Auto: 6 % (03/06/18 21:12:31)   Alk Phos: 225 unit/L High (03/06/18 21:41:00) Eos Auto: 2 % (03/06/18 21:12:31)   Total Protein: 6.9 (03/06/18 21:41:00) Abs Eos: 0.2 x10(3)/mcL (03/06/18 21:12:31)   Albumin Lvl: 3.6 gm/dL (03/06/18 21:41:00) Basophil Auto: 0 % (03/06/18 21:12:31)   Globulin: 3.3 gm/dL (03/06/18 22:08:21) Abs Neutro: 7.1 x10(3)/mcL (03/06/18 21:12:31)   A/G Ratio: 1.09 (03/06/18 21:41:00) Abs Lymph: 2.9 x10(3)/mcL (03/06/18 21:12:31)   Lactic Acid Lvl: 1.3 mmol/L (03/06/18 22:09:47) Abs Mono: 0.7 x10(3)/mcL (03/06/18 21:12:31)   Lipase Lvl: 159 unit/L (03/06/18 21:28:10) Abs Baso: 0.1 x10(3)/mcL (03/06/18 21:12:31)   Total CK: 91 unit/L (03/06/18 21:41:01)    CK MB: <0.5 (03/06/18 21:34:35)    Troponin-I: <0.02 (03/06/18 21:34:34)    TSH: 2.49 mIU/mL (03/06/18 21:34:33)      RADIOLOGY  Chest x-ray reviewed in no obvious infiltrates or effusions, official read pending      ASSESSMENT AND  PLAN  Abdominal pain with Hypertriglyceridemia   - Continue lipid lowering medications   - PRN analgesics  - awaiting trig level if >1600 consult renal for plasmapharesis     Hyperglycemia, HX: IDDM2 - poor controlled  - Accuchandrews AC & HS with SSI#2  - Cardiac diet and diabetic diet  - continue home insulins at reduced doses     Hypertension, Essential - uncontrolled  - Resume home meds  - PRN antihypertensives  - VS monitoring q4hr & PRN     Depression   - Resume home medications     HX: GERD, Pancreatitis, Fatty Liver Disease   - Resume home medications     Code status: Full Code  DVT prophylaxis: Lovenox 40mg SC daily   Admission time 72 minutes.

## 2022-04-29 NOTE — ED PROVIDER NOTES
"   Patient:   Bharath Caballero            MRN: [id number]            FIN: [id number]               Age:   37 years     Sex:  Male     :  1980   Associated Diagnoses:   Pancreatitis; Chest pain; Hyperkalemia; Diabetes   Author:   Yonathan Valencia MD      Basic Information   Time seen: Date & time 2017 00:57:00.   History source: Patient.   Arrival mode: Private vehicle.   History limitation: None.   Additional information: Patient's physician(s): [facility], Chief Complaint from Nursing Triage Note : Chief Complaint   2017 0:32 CDT       Chief Complaint           chest pain after dialysis today. ran 4 complete cycle.nausea, denies sob  .      History of Present Illness   The patient presents with 38 y/o CM w/ HX of hypercholesteremia, HTN, and pancreatitis presents to ED c/o left side CP onset 12 hours ago. Patient states that he was half way through his plasma paresis when he felt "terrible" and had CP. .  The onset was 12  hours ago.  The course/duration of symptoms is constant.  Location: Left chest. Radiating pain: none. The character of symptoms is Pain.  The degree at onset was moderate.  The degree at maximum was moderate.  The degree at present is moderate.  The exacerbating factor is none.  The relieving factor is none.  Risk factors consist of hypertension.  Prior episodes: none.  Therapy today None.  Associated symptoms: none.        Review of Systems   Constitutional symptoms:  No fever, no chills.    Skin symptoms:  Negative except as documented in HPI.   Respiratory symptoms:  No shortness of breath, no cough, no sputum production.    Cardiovascular symptoms:  Chest pain, left chest, No palpitations,    Gastrointestinal symptoms:  No abdominal pain, no nausea, no vomiting.    Psychiatric symptoms:  Negative except as documented in HPI.             Additional review of systems information: All other systems reviewed and otherwise negative.      Health Status   Allergies:    Allergic " Reactions (All)  Severity Not Documented  Morphine- Hives and c/o: a swelling..   Medications:  (Selected)   Inpatient Medications  Ordered  Albumin 5% IV Soln (by Volume): 4,000 mL, 200 gm =, form: Soln, IV, Once, Infuse over: 24 hr, first dose 06/22/17 9:00:00 CDT, stop date 06/22/17 9:00:00 CDT, For plasmapheresis, call dialysis [phone] when ready  Albumin 5% IV Soln (by Volume): 4,000 mL, 200 gm =, form: Soln, IV, Once, Infuse over: 3 hr, first dose 10/03/16 8:00:00 CDT, stop date 10/03/16 8:00:00 CDT, for plasmapheresis  Ancef: 1 gm, form: Infusion, IV Piggyback, Now, Infuse over: 30 minute(s), first dose 10/17/16 16:14:00 CDT, stop date 10/17/16 16:14:00 CDT, [phone]  Discontinued  heparin flush 100 units/mL (500 Unit  Flush): 500 units, form: Injection, IV Push, Once-chemo, first dose 03/15/16 14:32:00 CDT, *Est. stop date 08/30/16 14:32:00 CDT, Heparin Flush for Medi-port, Weeks 1, 7, 13, 19, 25, [phone]  Outpatient Medications  Ordered  Albumin 5% IV Soln (by Volume): 4,000 mL, IV, Once, first dose 06/22/17 8:00:00 CDT, stop date 06/22/17 8:00:00 CDT  Albumin 5% IV Soln (by Volume): 4,000 mL, IV, Once, first dose 07/20/17 8:00:00 CDT, stop date 07/20/17 8:00:00 CDT, Administer with plasmapheresis Tx today  Prescriptions  Prescribed  Glucose test strips: Glucose test strips, See Instructions, glucose test strips, # 1 units, 11 Refill(s), Pharmacy: [facility] [address]  Halcion 0.25 mg oral tablet: 0.25 mg = 1 tab(s), Oral, Once a day (at bedtime), PRN PRN as needed for sleep, # 30 tab(s), 2 Refill(s), Pharmacy: [facility] [address]  Lancets: Lancets, See Instructions, Use twice daily for CBG checks, # 200 EA, 3 Refill(s), Pharmacy: [facility] [address]  Lipitor 80 mg oral tablet: 80 mg = 1 tab(s), Oral, Daily, # 90 tab(s), 3 Refill(s), Pharmacy: [facility] [address]  Norvasc 10 mg oral tablet: 10 mg = 1 tab(s), Oral, Daily, # 90 tab(s), 3 Refill(s), Pharmacy: [facility]  [address]  NovoLog 100 units/mL subcutaneous solution: See Instructions, 35 units with lunch and correction in am and evening with 1 U insulin for every 10 point higher than 150., # 30 mL, 0 Refill(s), [facility] [address]  Pantoprazole 40 mg ORAL EC-Tablet: 40 mg = 1 tab(s), Oral, BID, # 60 tab(s), 0 Refill(s), Pharmacy: [facility] [address]  Phenergan 25 mg Tab: 25 mg = 1 tab(s), Oral, q6hr, PRN PRN as needed for nausea/vomiting, # 30 tab(s), 1 Refill(s), [facility] [address]  Zenpep 5000 units-17,000 units-27,000 units oral delayed release capsule: 1 cap(s), Oral, TID, # 90 cap(s), 3 Refill(s), Pharmacy: [facility] [address]  baclofen 10 mg oral tablet: 10 mg = 1 tab(s), Oral, TID, # 60 tab(s), 1 Refill(s), [facility] [address]  ergocalciferol 50,000 intUnit oral capsule: 50,000 IntUnit = 1 cap(s), Oral, qWeek, # 4 cap(s), 11 Refill(s), Pharmacy: [facility] [address]  fenofibrate 145 mg oral tablet: 145 mg = 1 tab(s), Oral, Daily, # 90 tab(s), 3 Refill(s), Pharmacy: [facility] [address]  ferrous sulfate 325 mg (65 mg elemental iron) oral delayed release tablet: 325 mcmol/L = 1 tab(s), Oral, BID, # 60 tab(s), 3 Refill(s), Pharmacy: [facility] [address]  gabapentin 300 mg oral capsule: 300 mg = 1 cap(s), Oral, TID, # 270 cap(s), 3 Refill(s), Pharmacy: [facility] [address]  lisinopril 40 mg oral tablet: 40 mg = 1 tab(s), Oral, Daily, # 90 tab(s), 3 Refill(s), Pharmacy: [facility] [address]  metoprolol succinate 100 mg oral tablet, extended release: 100 mg = 1 tab(s), Oral, Daily, # 30 tab(s), 6 Refill(s), [facility] [address]  Documented Medications  Documented  DOXEPIN 75MG CAPSULES: 75 mg = 1 cap(s), Oral, qPM  Lantus 100 units/mL subcutaneous inj.: 30 units = 0.3 mL, Subcutaneous, BID, 0 Refill(s)  METFORMIN 1000MG TABLETS: 1000 mg = 1 tab(s), Oral, BID  MUPIROCIN 2% CREAM 15GM: TID  NOVOLOG U-100 INSULIN  VL10ML(ANITHA): units units, TID  PRALUENT 150MG/M INJ:   SUCRALFATE 1GM TABLETS: 1 gm = 1 tab(s), Oral, QID  niacin 500 mg oral capsule, extended release: 500 mg, Oral, At Bedtime, 0 Refill(s).      Past Medical/ Family/ Social History   Medical history:    Resolved  Hyperlipidemia ([id number]):  Resolved.  HTN (hypertension) ([id number]):  Resolved.  Pancreatitis ([id number]):  Resolved.  Diabetes ([id number]):  Resolved.  Neuropathy of lower limb                                                                                                                                                                                                                2016 12:12:07<$> ([id number]):  Resolved.  Dialysis catheter ([id number]):  Resolved., Reviewed as documented in chart.   Surgical history: Negative.   Family history:      Mother (BARBY GONZALEZ, )  , Reviewed as documented in chart.   Social history: Alcohol use: Occasionally, Tobacco use: Regularly, 20 cigarettes per day, Drug use: Denies, Occupation: Employed, Family/social situation: .      Physical Examination               Vital Signs   Vital Signs   2017 0:32 CDT       Temperature Oral          36.8 DegC                             Peripheral Pulse Rate     85 bpm                             Respiratory Rate          20 br/min                             SpO2                      97 %                             Systolic Blood Pressure   170 mmHg  HI                             Diastolic Blood Pressure  103 mmHg  HI  .   Measurements   2017 0:32 CDT       Weight Estimated          109 kg                             Height/Length Estimated   175 cm                             Body Mass Index Estimated 35.59 kg/m2  .   Basic Oxygen Information   2017 0:32 CDT       SpO2                      97 %  .   General:  Alert, mild distress.    Skin:  Warm, dry.    Head:   Normocephalic.   Neck:  Supple.   Eye:  Pupils are equal, round and reactive to light, extraocular movements are intact.    Cardiovascular:  Regular rate and rhythm, Normal peripheral perfusion.    Respiratory:  Lungs are clear to auscultation, respirations are non-labored, breath sounds are equal, Symmetrical chest wall expansion.    Chest wall:  Reproducible chest wall pain under the left breast.   Back:  Nontender, Normal range of motion.    Musculoskeletal:  Normal ROM.   Gastrointestinal:  Soft, Nontender.    Neurological:  Alert and oriented to person, place, time, and situation.   Psychiatric:  Cooperative, appropriate mood & affect.       Medical Decision Making   Differential Diagnosis:  Angina, atypical chest pain, gastroesophageal reflux disease, chest wall pain, Pancreatitis.    Documents reviewed:  Emergency department nurses' notes.   Orders  Launch Orders   Pharmacy:  insulin regular (for specific SQ/IV Push dose) (Order): 10 units, IV, Once, first dose 7/21/2017 2:35 CDT, stop date 7/21/2017 2:35 CDT, 24  insulin regular (for specific SQ/IV Push dose) (Order): 10 units, Subcutaneous, Once, first dose 7/21/2017 2:35 CDT, stop date 7/21/2017 2:35 CDT, 24  Admit/Transfer/Discharge:  Admit to Outpatient Observation (Order): 7/21/2017 2:35 CDT, Anthony DICKENS, Dat ROBERTSON Remote Telemetry, No, Launch Orders   Pharmacy:  albuterol 2.5 mg/3 mL (0.083%) inhalation solution (Order): 12.5 mg, form: Soln-Inh, NEB, Once, first dose 7/21/2017 2:38 CDT, stop date 7/21/2017 2:38 CDT, Continuous Inhalation Therapy, 24.    Results review:  Lab results : Lab View   7/21/2017 1:03 CDT       Sodium Lvl                130 mmol/L  LOW                             Potassium Lvl             5.6 mmol/L  HI                             Chloride                  100 mmol/L                             CO2                       23.0 mmol/L                             Calcium Lvl               7.9 mg/dL  LOW                              Glucose Lvl               406 mg/dL  HI                             BUN                       14.0 mg/dL                             Creatinine                0.90 mg/dL                             eGFR-AA                   >60 mL/min/1.73 m2  NA                             eGFR-CHIN                  >60 mL/min/1.73 m2  NA                             Bili Total                0.5 mg/dL                             Bili Direct               0.00 mg/dL                             Bili Indirect             0.50 mg/dL                             Alk Phos                  159 unit/L  HI                             Total Protein             7.1 gm/dL                             Albumin Lvl               4.20 gm/dL                             Globulin                  2.90 gm/dL                             A/G Ratio                 1.4  NA                             Lipase Lvl                403 unit/L  HI                             Troponin-I                <0.02 ng/mL                             PT                        12.3 second(s)                             INR                       0.93                             PTT                       27.4 second(s)                             WBC                       11.4 x10(3)/mcL                             RBC                       4.70 x10(6)/mcL                             Hgb                       14.1 gm/dL                             Hct                       38.9 %  LOW                             Platelet                  268 x10(3)/mcL                             MCV                       82.8 fL                             MCH                       29.2 pg                             MCHC                      35.2 gm/dL                             RDW                       13.2 %                             MPV                       10.8 fL                             Abs Neut                  7.15 x10(3)/mcL                             Neutro Auto                62 %  NA                             Lymph Auto                25 %  NA                             Mono Auto                 9 %  NA                             Eos Auto                  2 %  NA                             Abs Eos                   0.3 x10(3)/mcL                             Basophil Auto             1 %  NA                             Abs Neutro                7.15 x10(3)/mcL                             Abs Lymph                 2.9 x10(3)/mcL                             Abs Mono                  1.0 x10(3)/mcL                             Abs Baso                  0.1 x10(3)/mcL    7/20/2017 9:35 CDT       Hep Bs Ag                 Negative  .      Reexamination/ Reevaluation   Time: 7/21/2017 02:38:00 .   Vital signs   Basic Oxygen Information   7/21/2017 0:32 CDT       SpO2                      97 %     Course: progressing as expected.   Assessment: Patient states that he still feels mild chest pain in his left side..      Impression and Plan   Diagnosis   Pancreatitis (GGC91-GH K85.90)   Chest pain (BUW30-TV R07.9)   Hyperkalemia (HCH04-WY E87.5)   Diabetes (WMA42-WS E11.9)   Plan   Disposition: Admit time  7/21/2017 02:41:00, Admit to Inpatient Telemetry Unit, Anthony DICKENS, Dat SANTANA    Counseled: Patient, Regarding diagnosis, Regarding diagnostic results, Regarding treatment plan, Patient indicated understanding of instructions.    Orders: I have personally seen and examined the patient and agree with the scribes documentation..    Notes: IAlicia acted solely as a scribe for and in the presence of Dr. Valencia who performed the service..

## 2022-04-29 NOTE — DISCHARGE SUMMARY
Patient:   Bharath Caballero            MRN: [id number]            FIN: [id number]               Age:   37 years     Sex:  Male     :  1980   Associated Diagnoses:   Chest pain; Diabetes; Hyperkalemia; Pancreatitis   Author:   Héctor DICKENS, David RENO      Results Review   General results   Today's results   2017 5:12 CDT       WBC                       8.0 x10(3)/mcL                             RBC                       4.23 x10(6)/mcL  LOW                             Hgb                       13.1 gm/dL  LOW                             Hct                       35.9 %  LOW                             Platelet                  208 x10(3)/mcL                             MCV                       84.9 fL                             MCH                       31.0 pg                             MCHC                      36.5 gm/dL  HI                             RDW                       13.4 %                             MPV                       10.8 fL                             Abs Neut                  4.53 x10(3)/mcL                             Neutro Auto               56 %  NA                             Lymph Auto                32 %  NA                             Mono Auto                 8 %  NA                             Eos Auto                  3 %  NA                             Abs Eos                   0.2 x10(3)/mcL                             Basophil Auto             0 %  NA                             Abs Neutro                4.53 x10(3)/mcL                             Abs Lymph                 2.5 x10(3)/mcL                             Abs Mono                  0.6 x10(3)/mcL                             Abs Baso                  0.0 x10(3)/mcL                             PT                        12.1 second(s)                             INR                       0.91                             PTT                       28.0 second(s)                             Sodium  Lvl                138 mmol/L                             Potassium Lvl             4.3 mmol/L                             Chloride                  106 mmol/L                             CO2                       20.0 mmol/L  LOW                             Calcium Lvl               7.9 mg/dL  LOW                             Glucose Lvl               309 mg/dL  HI                             BUN                       7.0 mg/dL                             Creatinine                0.79 mg/dL                             eGFR-AA                   >60 mL/min/1.73 m2  NA                             eGFR-CHIN                  >60 mL/min/1.73 m2  NA                             Bili Total                0.4 mg/dL                             Bili Direct               0.00 mg/dL                             Bili Indirect             0.40 mg/dL                             AST                       47 unit/L  HI                             ALT                       112 unit/L  HI                             Alk Phos                  135 unit/L                             Total Protein             6.0 gm/dL  LOW                             Albumin Lvl               3.40 gm/dL                             Globulin                  2.60 gm/dL                             A/G Ratio                 1.3 ratio                             Lipase Lvl                352 unit/L                             Chol                      182 mg/dL                             HDL                       12 mg/dL  LOW                             Trig                      1,407 mg/dL  HI                             LDL                       See Comment mg/dL                             Chol/HDL                  15.2  HI                             VLDL                      281 mg/dL  NA     US liver: fatty liver w hepatomegaly       Discharge Information      Discharge Summary Information   Admitted  7/20/2017   Discharged  7/22/2017   Admitting  physician     Dat Cruz MD     Discharge diagnosis     Chest pain (ZQC09-NR R07.9).     Diabetes (YLW44-AK E11.9).     Hyperkalemia (FGV17-RI E87.5).     Pancreatitis (MMD80-PO K85.90).     Discharge medications     OTHER MEDICATIONS (Selected)   Prescriptions  Prescribed  Norco 7.5 mg-325 mg oral tablet: 1 tab(s), Oral, q4hr, PRN PRN for pain, X 5 day(s), # 24 tab(s), 0 Refill(s)  Documented Medications  Documented  DOXEPIN 75MG CAPSULES: 75 mg = 1 cap(s), Oral, qPM  Lantus 100 units/mL subcutaneous inj.: 30 units = 0.3 mL, Subcutaneous, BID, 0 Refill(s)  METFORMIN 1000MG TABLETS: 1000 mg = 1 tab(s), Oral, BID  Protonix 40 mg ORAL enteric coated tablet: 40 mg = 1 tab(s), Oral, BID, # 60 tab(s), 0 Refill(s)  SUCRALFATE 1GM TABLETS: 1 gm = 1 tab(s), Oral, QID  amlodipine 10 mg oral tablet: 10 mg = 1 tab(s), Oral, Daily, # 30 tab(s), 0 Refill(s)  atorvastatin 80 mg oral tablet: 80 mg = 1 tab(s), Oral, Daily, # 30 tab(s), 0 Refill(s)  baclofen 10 mg oral tablet: 10 mg = 1 tab(s), Oral, TID, # 90 tab(s), 0 Refill(s)  ergocalciferol 50,000 intl units (1.25 mg) oral capsule: 50,000 IntUnit = 1 cap(s), Oral, qWeek, # 30 cap(s), 0 Refill(s)  fenofibrate 145 mg oral tablet: 145 mg = 1 tab(s), Oral, Daily, # 90 tab(s), 0 Refill(s)  ferrous sulfate 325 mg (65 mg elemental iron) oral delayed release tablet: 1 tablet, Oral, BID, 0 Refill(s)  gabapentin 300 mg oral capsule: 1 capsule, Oral, TID, 0 Refill(s)  insulin aspart 100 U/mL (for sliding scale): As Directed, 35 units with lunch and correction in am and evening with 1 U insulin for every 10 point higher than 150., 0 Refill(s)  lisinopril 40 mg oral tablet: 40 mg = 1 tab(s), Oral, Daily, # 30 tab(s), 0 Refill(s)  metoprolol succinate 100 mg oral tablet extended release: 1 tablet, Oral, Daily, 0 Refill(s)  niacin 500 mg oral capsule, extended release: 500 mg, Oral, At Bedtime, 0 Refill(s)  pancrelipase 5000 units-17,000 units-27,000 units oral delayed release  capsule: 1 capsule, Oral, TID, 0 Refill(s)  triazolam 0.25 mg oral tablet: 0.25 mg = 1 tab(s), Oral, Once a day (at bedtime), PRN PRN for sleep, 0 Refill(s).        Physical Examination      Vital Signs (last 24 hrs)_____  Last Charted___________  Temp Oral     36.8 DegC  (JUL 22 07:07)  Heart Rate Peripheral   73 bpm  (JUL 22 07:07)  Resp Rate         18 br/min  (JUL 22 07:07)  SBP      H 150mmHg  (JUL 22 07:07)  DBP      H 100mmHg  (JUL 22 07:07)  SpO2      96 %  (JUL 22 07:07)     General:  Alert and oriented, No acute distress.    Eye:  Extraocular movements are intact, Normal conjunctiva.    HENT:  Normocephalic.    Neck:  Supple.    Respiratory:  Lungs are clear to auscultation, Respirations are non-labored, Symmetrical chest wall expansion.    Cardiovascular:  Normal rate, Regular rhythm.    Gastrointestinal:  Soft, Non-distended, Normal bowel sounds.    Musculoskeletal:  Normal strength, No swelling.    Integumentary:  Warm, Dry.    Neurologic:  Alert, Oriented, No focal deficits.    Psychiatric:  Cooperative, Appropriate mood & affect.       Hospital Course   Hospital Course   Admitted from: from emergency department.     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  He completed his run.  Pain is dull, constant and non-radiating, with no relieving factors but exacerbated by lying flat.  He denies fevers, chills, dyspnea, abdominal pain, nausea, vomitting, diarrhea, constipation or dysuria.   EKG and troponin unremarkable.  Other lab work significant for lipase of 406 and potassium of 5.6    Cardiac enzymes and EKG were normal.  Pain is chronic and present on every admission.  Received when necessary Tylenol and continuously throughout admission.  His pain remains the same but is noncardiac in nature and involves the epigastric, left sided  retrosternal region.  Constant.  Cardiac enzymes within normal ×3.  EKG with normal sinus rhythm.  He continues to have hypertriglyceridemia at 1400.  He will follow-up with his plasmapheresis center with repeat check of his triglycerides and plasmapheresis for greater than 1600.  Additionally he will follow-up with his primary care nurse practitioner on the 15th.  Discussed importance of utilizing outpatient medicine.    1)chest pain in adult   non-cardiac, tylenol prn   2)elevated lipase   no abdominal pain.    trended to nromal   3)familial hypertriglyceridemia   f/u plasmapharesis as scheduled and cont oral meds  4)hyperkalemia   resolved  5)IDDM  resume home meds  6)hypertension   resume meds.   7)tobacco abuse  8) Chronic pain   short rx for norco written   f/u pain managementy in [place]       Discharge Plan   Discharge Summary Plan   Discharge Status: improved.     Discharge instructions given: to patient.     Discharge disposition: discharge to home self care.     Prescriptions: continue same medications, written and given to patient.     Diagnosis     Chest pain (YWY42-GO R07.9).     Diabetes (HYY80-LN E11.9).     Hyperkalemia (RYT71-JO E87.5).     Pancreatitis (DWT54-TL K85.90).     Course   Improving.     Education and Follow-up   Counseled: patient.

## 2022-05-05 NOTE — HISTORICAL [facility]ER
This is a historical note converted from [place]. Formatting and pictures may have been removed.  Please reference Junior for original formatting and attached multimedia. Chief Complaint  colonoscopy  History of Present Illness  41-year-old  male with a history of?colon polyps, poorly controlled diabetes mellitus, CKD stage II,?morbid obesity, familial hypertriglyceridemia?with routine plasmapheresis in the past, hepatic steatosis,?hypertension,?CICI,?and tobacco abuse?presents for colonoscopy. Has had prior colonoscopy 5-6yrs ago where he reports multiple polyps were found. Having regular without melena, hematochezia, fecal urgency, fecal incontinence, or pain with defecation.?He denies a family history of IBD, colon polyps, or colon cancer.  ?   EGD with  June 7, 2021?with findings of LA grade?A reflux esophagitis?and otherwise unremarkable exam  Review of Systems  General: no acute distress, comfortable on exam  Neuro: no c/o HA, deficits in sensation or weakness  HEENT: no c/o HA, sinus drainage, dysphagia  Resp: no c/o cough, or phlegm production  CV: no c/o palpitations or CP  GI: no c/o abdominal pain, nausea or vomiting  : no co dysuria or increased urinary frequency  Heme: no c/o easy bruising/bleeding  Endo: no c/o heat/cold intolerance  MSK: no c/o weakness, paresthesias, pain  ?  Physical Exam  Vitals & Measurements  T:?37.1? ?C (Oral)? HR:?85(Monitored)? RR:?18? BP:?164/101? SpO2:?98%? WT:?97.2?kg? BMI:?31.74?  General:?well-developed well-nourished in no acute distress  Respiratory:?no respiratory distress at room air  Cardiovascular:?regular rate and rhythm  Gastrointestinal:?soft, non-tender, non-distended  Musculoskeletal:?full range of motion of all extremities  Neurologic: cranial nerves grossly?intact  ?  Assessment/Plan  Patient presenting for colonoscopy  Risks, benefits, alternatives discussed  Questions answered  Consents obtained  Proceed with  colonoscopy  ?  ?  ?  -------------------------------------------  Edward Cunningham MD  [employer]?General Surgery PGY-2  ?  ?   Agree w above. Personal h/o colon polyps. Surveillance colonoscopy.   Problem List/Past Medical History  Ongoing  Abdominal pain, acute, right upper quadrant  Abnormal weight loss  Bladder outlet obstruction  Candiduria  Chronic pain syndrome  Chronic pancreatitis  Familial hypercholesteremia  Hepatic failure  Hepatic steatosis  HTN (hypertension)  Hx of pancreatitis  Left flank pain  Mononeuritis multiplex  Morbid obesity due to excess calories  Numbness and tingling in left hand  CICI (obstructive sleep apnea)  Polyneuropathy in diabetes  Poorly controlled type 2 diabetes mellitus  Right leg weakness  Tobacco abuse  Wellness examination  Historical  Diabetes  Dialysis catheter  Hyperlipidemia  Neuropathy of lower limb  Pancreatitis  Procedure/Surgical History  Esophagogastroduodenoscopy (06/07/2021)  Esophagogastroduodenoscopy, flexible, transoral; diagnostic, including collection of specimen(s) by brushing or washing, when performed (separate procedure) (06/07/2021)  Inspection of Upper Intestinal Tract, Via Natural or Artificial Opening Endoscopic (06/07/2021)  upper gi (06/07/2021)  Pheresis of Plasma, Multiple (07/13/2018)  arteriovenous fistula (06/01/2018)  Pheresis of Plasma, Single (05/25/2018)  Arteriovenous Anastomosis, Open, Upper Arm Basilic Vein Transposition (Surg) (05/07/2018)  Arteriovenous anastomosis, open; by upper arm cephalic vein transposition (05/07/2018)  Basilic Transposition (Left) (05/07/2018)  Reposition Left Basilic Vein, Open Approach (05/07/2018)  Pheresis of Plasma, Multiple (04/19/2018)  Pheresis of Plasma, Multiple (04/11/2018)  Pheresis of Plasma, Single (04/04/2018)  Therapeutic apheresis; for plasma pheresis (04/04/2018)  Catheter Insertion Palindrome (Left) (03/28/2018)  Fluoroscopy of Superior Vena Cava using Low Osmolar Contrast, Guidance  (03/28/2018)  Insertion of Infusion Device into Superior Vena Cava, Percutaneous Approach (03/28/2018)  Removal of Infusion Device from Great Vessel, Percutaneous Approach (03/28/2018)  Replacement of Tunneled Centrally Inserted Central Venous Catheter, without Subcutaneous Port or Pump, through Same Venous Access (Surg) (03/28/2018)  Pheresis of Plasma, Single (03/27/2018)  Pheresis of Plasma, Single (03/20/2018)  Percutaneous Thrombectomy, Any Method, with Pta (Surg) (02/09/2018)  Pheresis of Plasma, Multiple (01/29/2018)  Catheter Insertion Palindrome (.) (12/31/2017)  Insertion of Tunneled Vascular Access Device into Chest Subcutaneous Tissue and Fascia, Percutaneous Approach (12/31/2017)  Other (12/31/2017)  Other (12/30/2017)  Pheresis of Plasma, Single (12/20/2017)  Therapeutic apheresis; for plasma pheresis (12/20/2017)  Pheresis of Plasma, Single (10/12/2017)  Therapeutic apheresis; for plasma pheresis (10/12/2017)  Pheresis of Plasma, Single (09/21/2017)  Therapeutic apheresis; for plasma pheresis (09/21/2017)  Dilation of Left Radial Artery, Percutaneous Approach (09/13/2017)  Vascular embolization or occlusion, inclusive of all radiological supervision and interpretation, intraprocedural roadmapping, and imaging guidance necessary to complete the intervention; arterial, other than hemorrhage or tumor (eg, congenital or acquire (09/13/2017)  Pheresis of Plasma, Multiple (08/27/2017)  Pheresis of Plasma, Single (08/17/2017)  Therapeutic apheresis; for plasma pheresis (08/17/2017)  Pheresis of Plasma, Single (08/12/2017)  Pheresis of Plasma, Single (07/13/2017)  Therapeutic apheresis; for plasma pheresis (07/13/2017)  Biopsy Gastrointestinal (06/30/2017)  Esophagogastroduodenoscopy (06/30/2017)  Excision of Stomach, Via Natural or Artificial Opening Endoscopic, Diagnostic (06/30/2017)  Pheresis of Plasma, Multiple (06/27/2017)  Pheresis of Plasma, Single (06/24/2017)  Therapeutic apheresis; for plasma  pheresis (06/24/2017)  Pheresis of Plasma, Single (06/02/2017)  Dilation of Left Axillary Artery, Percutaneous Approach (04/28/2017)  Dilation of Left Brachial Vein, Percutaneous Approach (04/28/2017)  Extirpation of Matter from Left Axillary Artery, Percutaneous Approach (04/28/2017)  Extirpation of Matter from Left Brachial Vein, Percutaneous Approach (04/28/2017)  Fluoroscopy of Dialysis Shunt/Fistula using Low Osmolar Contrast (04/28/2017)  Introduction of Other Thrombolytic into Peripheral Vein, Percutaneous Approach (04/28/2017)  Insertion of Infusion Device into Right Basilic Vein, Percutaneous Approach (04/27/2017)  Ultrasonography of Right Upper Extremity Veins, Guidance (04/27/2017)  Pheresis of Plasma, Single (04/26/2017)  Pheresis of Plasma, Multiple (03/31/2017)  Pheresis of Plasma, Single (03/15/2017)  Therapeutic apheresis; for plasma pheresis (03/15/2017)  Pheresis of Plasma, Single (02/09/2017)  Therapeutic apheresis; for plasma pheresis (02/09/2017)  Arteriovenous Fistula (Left) (11/28/2016)  Bypass Left Brachial Artery to Upper Arm Vein with Synthetic Substitute, Open Approach (11/28/2016)  Creation of arteriovenous fistula by other than direct arteriovenous anastomosis (separate procedure); autogenous graft (11/28/2016)  Pheresis of Plasma, Single (11/21/2016)  Pheresis of Plasma, Single (11/17/2016)  Therapeutic apheresis; for plasma pheresis (11/17/2016)  Performance of Urinary Filtration, Multiple (11/10/2016)  Pheresis of Plasma, Multiple (11/10/2016)  Pheresis of Plasma, Multiple (10/30/2016)  Arteriovenous Fistula Declot (Left) (10/21/2016)  Revision of Autologous Tissue Substitute in Upper Artery, Open Approach (10/21/2016)  Revision, open, arteriovenous fistula; without thrombectomy, autogenous or nonautogenous dialysis graft (separate procedure) (10/21/2016)  Arteriovenous Fistula Revision (Left) (10/17/2016)  Dressing Change (Right) (10/17/2016)  Restriction of Left Brachial Artery,  Open Approach (10/17/2016)  Revision, open, arteriovenous fistula; without thrombectomy, autogenous or nonautogenous dialysis graft (separate procedure) (10/17/2016)  Pheresis of Plasma, Multiple (10/12/2016)  Pheresis of Plasma, Multiple (09/16/2016)  Insertion of Infusion Device into Right Internal Jugular Vein, Percutaneous Approach (09/02/2016)  Insertion of tunneled centrally inserted central venous catheter, without subcutaneous port or pump; age 5 years or older (09/02/2016)  Permacath Placement (None) (09/02/2016)  Pheresis of Plasma, Multiple (08/03/2016)  Therapeutic apheresis; for plasma pheresis (08/03/2016)  Arteriovenous anastomosis, open; direct, any site (eg, Blanca type) (separate procedure) (07/13/2016)  Arteriovenous Fistula (Left) (07/13/2016)  Bypass Left Radial Artery to Lower Arm Vein, Open Approach (07/13/2016)  Insertion of non-tunneled centrally inserted central venous catheter; age 5 years or older (06/30/2016)  Permacath Placement (None) (06/30/2016)  Pheresis of Plasma, Multiple (06/11/2016)  Insertion of Infusion Device into Left Femoral Vein, Percutaneous Approach (06/10/2016)  Ultrasonography of Left Lower Extremity Veins, Guidance (06/10/2016)  Pheresis of Plasma, Multiple (05/20/2016)  Pheresis of Plasma, Single (05/03/2016)  CATHETER, INFUSION, INSERTED PERIPHERALLY, CENTRALLY OR MIDLINE (OTHER THAN HEMODIALYSIS) (03/30/2016)  Insertion of Infusion Device into Right Internal Jugular Vein, Percutaneous Approach (03/30/2016)  Insertion of tunneled centrally inserted central venous catheter, without subcutaneous port or pump; age 5 years or older (03/30/2016)  Permacath Placement (None) (03/30/2016)  Fluoroscopy of Superior Vena Cava using Low Osmolar Contrast, Guidance (02/24/2016)  Insertion of Infusion Device into Superior Vena Cava, Percutaneous Approach (02/24/2016)  Insertion of Vascular Access Device into Chest Subcutaneous Tissue and Fascia, Open Approach  (02/24/2016)  Permacath Placement (Right) (02/24/2016)  Pheresis of Plasma, Multiple (02/24/2016)  Colonoscopy, flexible; with removal of tumor(s), polyp(s), or other lesion(s) by hot biopsy forceps (08/31/2015)  Endoscopic polypectomy of large intestine (08/31/2015)  Esophagogastroduodenoscopy [EGD] with closed biopsy (08/31/2015)  Esophagogastroduodenoscopy, flexible, transoral; with biopsy, single or multiple (08/31/2015)  Hernia Repair (2011)  Appendectomy; (1997)  Appendectomy  Colonoscopy  dialysis port  Esophagogastroduodenoscopy  fistula  Hernia of abdominal cavity (disorder)  Hernia Repair  Other   Medications  Inpatient  buffered lidocaine 2% - 0.5 ml syringe, 10 mg= 0.5 mL, Subcutaneous, As Directed  IVF Lactated Ringers LR Infusion 1,000 mL, 1000 mL, IV  Lactated Ringers 1000ml 1,000 mL, 1000 mL, IV  Lactated Ringers 1000ml 1,000 mL, 1000 mL, IV  Home  aspirin 81 mg oral Delayed Release (EC) tablet, 81 mg= 1 tab(s), Oral, Daily, 1 refills  carBAMazepine 200 mg oral capsule, extended release, 600 mg= 3 cap(s), Oral, BID, 3 refills  cloniDINE 0.1 mg oral tablet, 0.1 mg= 1 tab(s), Oral, TID, 2 refills  Creon 36,000 units oral delayed release capsule, 1 cap(s), Oral, TID, 1 refills  Dilaudid 8 mg oral tablet, 8 mg= 1 tab(s), Oral, q6hr, PRN  escitalopram 20 mg oral tablet, 20 mg= 1 tab(s), Oral, Daily, 1 refills  fenofibrate 160 mg oral tablet, 160 mg= 1 tab(s), Oral, Daily, 1 refills  Flomax 0.4 mg oral capsule, 0.4 mg= 1 cap(s), Oral, Daily, 1 refills  gabapentin 400 mg oral capsule, 400 mg= 1 cap(s), Oral, TID, 6 refills  gabapentin 800 mg oral tablet, 800 mg= 1 tab(s), Oral, qPM, 1 refills  HumaLOG 100 units/mL injectable solution, See Instructions, 4 refills  hydrALAZINE 100 mg oral tablet, 100 mg= 1 tab(s), Oral, TID, 1 refills  Insulin syringes, See Instructions, 3 refills  irbesartan 300 mg oral tablet, 300 mg= 1 tab(s), Oral, Daily, 1 refills  KlonoPIN 1 mg oral tablet, 1 mg= 1 tab(s), Oral, BID, 5  refills  Lantus Solostar Pen 100 units/mL subcutaneous solution, See Instructions, 3 refills  Lantus Solostar Pen 100 units/mL subcutaneous solution, See Instructions  metoprolol succinate 100 mg oral tablet, extended release, 100 mg= 1 tab(s), Oral, BID, 1 refills  morphine 100 mg/8 to 12 hr oral tablet, extended release, 100 mg= 1 tab(s), Oral, q8hr  nitroglycerin 0.4 mg sublingual TAB, 0.4 mg= 1 tab(s), SL, q5min, PRN  Pantoprazole 40 mg ORAL EC-Tablet, 40 mg= 1 tab(s), Oral, Daily, 3 refills  potassium chloride 20 mEq oral TABLET extended release, 20 mEq= 1 tab(s), Oral, Daily, 1 refills  Praluent Pen 150 mg/mL subcutaneous solution, See Instructions  rosuvastatin 40 mg oral tablet, 40 mg= 1 tab(s), Oral, Daily, 1 refills  rosuvastatin 40 mg oral tablet, See Instructions  spironolactone 25 mg oral tablet, 25 mg= 1 tab(s), Oral, BID, 1 refills  torsemide 20 mg oral tablet, 20 mg= 1 tab(s), Oral, Daily, 1 refills  Vascepa 1 g oral capsule, 2 gm= 2 cap(s), Oral, BID  venlafaxine 37.5 mg oral tablet, 37.5 mg= 1 tab(s), Oral, BID, 1 refills  Vitamin D3 5000 intl units (125 mcg) oral capsule, 5000 IntUnit= 1 cap(s), Oral, Daily  Zyprexa 5 mg oral tablet, 5 mg= 1 tab(s), Oral, Daily, 1 refills  Allergies  No Known Allergies  Social History  Abuse/Neglect  No, 10/08/2021  Alcohol - Denies Alcohol Use, 11/23/2014  Past, 06/30/2021  Past, Alcohol use interferes with work or home: No. Others hurt by drinking: No. Household alcohol concerns: No., 06/17/2021  Employment/School  DISABILITY, 06/29/2018  Exercise  Exercise duration: 15. Exercise frequency: Daily. Exercise type: leg physical therapy., 12/09/2020  Financial/Legal Situation  Low income, Social Security Disability, 03/17/2021  Home/Environment  Lives with Alone. Living situation: Home/Independent. Home equipment: Glucose monitoring. Alcohol abuse in household: No. Substance abuse in household: No. Smoker in household: Yes. Feels unsafe at home: No. Safe place to  go: Yes. Family/Friends available for support: Yes. Financial concerns: Yes., 2018    Never in , 2021  Nutrition/Health  Diabetic, Poor, 2020  Other  Sexual  Spiritual/Cultural  None, 2020  Substance Use - Denies Substance Abuse, 2014  Never, 2016  Tobacco - High Risk, 2014  10 or more cigarettes (1/2 pack or more)/day in last 30 days, N/A, 10/08/2021  Family History  : Mother.  Father: History is unknown  Brother: History is unknown  Sister: History is unknown  Immunizations  Vaccine Date Status Comments   influenza virus vaccine, inactivated 2020 Given PT TOLERATED WELL   influenza virus vaccine, inactivated - Not Given Expectation Not Necessary  Duplicate order   influenza virus vaccine, inactivated 10/01/2019 Given    pneumococcal 23-polyvalent vaccine 2019 Given Pt tolerated well   influenza virus vaccine, inactivated 10/23/2018 Given    influenza virus vaccine, inactivated 2017 Given    tetanus/diphtheria/pertussis, acel(Tdap) 2016 Given    influenza virus vaccine, inactivated 2016 Given    influenza virus vaccine, inactivated - Not Given Parent Or Guardian Refuses  patient refused administration of flu vaccine, stated will take it another time.   influenza virus vaccine, inactivated 2015 Given tolerated well   influenza virus vaccine, inactivated 12/15/2014 Given Other : ?task resceduled   influenza virus vaccine, inactivated - Not Given Expectation Not Necessary

## 2022-05-05 NOTE — HISTORICAL [facility]ER
This is a historical note converted from Junior. Formatting and pictures may have been removed.  Please reference Junior for original formatting and attached multimedia. Chief Complaint  6 month follow up--1meal day dont eat because it makes abd hurt  History of Present Illness  37 yr old WM here for follow up appt.? PMH: DM, Familial hypercholesteremia, Hepatic steatosis, Transaminitis, Recurrent pancreatitis, obesity, HTN, sleep apnea.? PT follow up appt -emotional state?decreased,?? overall not felling good and states doesnt care anymore, denies any suicidal thoughts - (+) weight? gain 10lbbs in last few months - states following good diet but not matter what eats doesnt matter.?? (+) following [facility] for frequent plasmapheresis past 2 years increased every week and states sometimes?2-3 times week following - states feeling worse after treatment.???States (+) abd pain - constant -never goes away but intensity of the pain varies, never sleeps well at night, ?but gets worse as labs elevate.? bruit (+) Left upper extremity AV fistulogram. Still On Praulurent shots 1 shot every 2 weeks - at home - Good BM daily  Pt states does not care - not instrested in continuing plasmapheresis treatment -  Review of Systems  All systems NEG except for HPI  Physical Exam  Vitals & Measurements  T:?36.7? ?C ?(Oral)? HR:?94?(Peripheral)? BP:?138/85? SpO2:?97%?  HT:?175.26?cm? HT:?175.26?cm? WT:?115.7?kg? WT:?115.7?kg? BMI:?37.67?  Constitutional_ NO Distress  Eyes: round and equal, no jaundice  Neck: supple, thick  Respiratory_CTBA  Cardiovascular_S1S2 Reg  Gastrointestinal_soft, (+) tenderness through out, BS(+)  Ext: no edema  Neurologic_AAOx4  Assessment/Plan  1.?Familial hypercholesteremia  ?TG today 2,878 - pt states not interested in plasmapheresis - bruit (+) Left upper extremity AV fistulogram.  On Praulurent shots 1 shot every 2 weeks - at home -  Ordered:  CBC wo/Diff, Routine collect, 07/26/17 10:28:00 CDT,  Blood, Order for future visit, Stop date 07/26/17 10:28:00 CDT, Lab Collect, Familial hypercholesteremia  Hx of pancreatitis  HTN (hypertension)  Obesity  Tobacco user, 07/26/17 10:28:00 CDT  Comprehensive Metabolic Panel, Routine collect, 07/26/17 10:28:00 CDT, Blood, Order for future visit, Stop date 07/26/17 10:28:00 CDT, Lab Collect, Familial hypercholesteremia  Hx of pancreatitis  HTN (hypertension)  Obesity  Tobacco user, 07/26/17 10:28:00 CDT  Lipid Panel, Routine collect, 07/26/17 10:28:00 CDT, Blood, Order for future visit, Stop date 07/26/17 10:28:00 CDT, Lab Collect, Familial hypercholesteremia  Hx of pancreatitis  HTN (hypertension)  Obesity  Tobacco user, 07/26/17 10:28:00 CDT  Sedimentation Rate, Routine collect, 07/26/17 10:28:00 CDT, Blood, Order for future visit, Stop date 07/26/17 10:28:00 CDT, Lab Collect, Familial hypercholesteremia  Hx of pancreatitis  HTN (hypertension)  Obesity  Tobacco user, 07/26/17 10:28:00 CDT  ?  2.?Hx of pancreatitis  ?slight elevated lipase 4445 but overall all lipase WNL last 6 months  Ordered:  CBC wo/Diff, Routine collect, 07/26/17 10:28:00 CDT, Blood, Order for future visit, Stop date 07/26/17 10:28:00 CDT, Lab Collect, Familial hypercholesteremia  Hx of pancreatitis  HTN (hypertension)  Obesity  Tobacco user, 07/26/17 10:28:00 CDT  Comprehensive Metabolic Panel, Routine collect, 07/26/17 10:28:00 CDT, Blood, Order for future visit, Stop date 07/26/17 10:28:00 CDT, Lab Collect, Familial hypercholesteremia  Hx of pancreatitis  HTN (hypertension)  Obesity  Tobacco user, 07/26/17 10:28:00 CDT  Lipid Panel, Routine collect, 07/26/17 10:28:00 CDT, Blood, Order for future visit, Stop date 07/26/17 10:28:00 CDT, Lab Collect, Familial hypercholesteremia  Hx of pancreatitis  HTN (hypertension)  Obesity  Tobacco user, 07/26/17 10:28:00 CDT  Sedimentation Rate, Routine collect, 07/26/17 10:28:00 CDT, Blood, Order for future visit, Stop date 07/26/17  10:28:00 CDT, Lab Collect, Familial hypercholesteremia  Hx of pancreatitis  HTN (hypertension)  Obesity  Tobacco user, 07/26/17 10:28:00 CDT  ?  3.?HTN (hypertension)  ?BP:?138/85  controlled - cont following with PCP  Ordered:  CBC wo/Diff, Routine collect, 07/26/17 10:28:00 CDT, Blood, Order for future visit, Stop date 07/26/17 10:28:00 CDT, Lab Collect, Familial hypercholesteremia  Hx of pancreatitis  HTN (hypertension)  Obesity  Tobacco user, 07/26/17 10:28:00 CDT  Comprehensive Metabolic Panel, Routine collect, 07/26/17 10:28:00 CDT, Blood, Order for future visit, Stop date 07/26/17 10:28:00 CDT, Lab Collect, Familial hypercholesteremia  Hx of pancreatitis  HTN (hypertension)  Obesity  Tobacco user, 07/26/17 10:28:00 CDT  Lipid Panel, Routine collect, 07/26/17 10:28:00 CDT, Blood, Order for future visit, Stop date 07/26/17 10:28:00 CDT, Lab Collect, Familial hypercholesteremia  Hx of pancreatitis  HTN (hypertension)  Obesity  Tobacco user, 07/26/17 10:28:00 CDT  Sedimentation Rate, Routine collect, 07/26/17 10:28:00 CDT, Blood, Order for future visit, Stop date 07/26/17 10:28:00 CDT, Lab Collect, Familial hypercholesteremia  Hx of pancreatitis  HTN (hypertension)  Obesity  Tobacco user, 07/26/17 10:28:00 CDT  ?  4.?Obesity  ?weight stable - 10lb weight flucuation  encourage to lose weight and daily exercise  Ordered:  CBC wo/Diff, Routine collect, 07/26/17 10:28:00 CDT, Blood, Order for future visit, Stop date 07/26/17 10:28:00 CDT, Lab Collect, Familial hypercholesteremia  Hx of pancreatitis  HTN (hypertension)  Obesity  Tobacco user, 07/26/17 10:28:00 CDT  Comprehensive Metabolic Panel, Routine collect, 07/26/17 10:28:00 CDT, Blood, Order for future visit, Stop date 07/26/17 10:28:00 CDT, Lab Collect, Familial hypercholesteremia  Hx of pancreatitis  HTN (hypertension)  Obesity  Tobacco user, 07/26/17 10:28:00 CDT  Lipid Panel, Routine collect, 07/26/17 10:28:00 CDT, Blood, Order  for future visit, Stop date 07/26/17 10:28:00 CDT, Lab Collect, Familial hypercholesteremia  Hx of pancreatitis  HTN (hypertension)  Obesity  Tobacco user, 07/26/17 10:28:00 CDT  Sedimentation Rate, Routine collect, 07/26/17 10:28:00 CDT, Blood, Order for future visit, Stop date 07/26/17 10:28:00 CDT, Lab Collect, Familial hypercholesteremia  Hx of pancreatitis  HTN (hypertension)  Obesity  Tobacco user, 07/26/17 10:28:00 CDT  ?  5.?Tobacco user  ?encourage to stop smoking - pt not interested at this time  Ordered:  CBC wo/Diff, Routine collect, 07/26/17 10:28:00 CDT, Blood, Order for future visit, Stop date 07/26/17 10:28:00 CDT, Lab Collect, Familial hypercholesteremia  Hx of pancreatitis  HTN (hypertension)  Obesity  Tobacco user, 07/26/17 10:28:00 CDT  Comprehensive Metabolic Panel, Routine collect, 07/26/17 10:28:00 CDT, Blood, Order for future visit, Stop date 07/26/17 10:28:00 CDT, Lab Collect, Familial hypercholesteremia  Hx of pancreatitis  HTN (hypertension)  Obesity  Tobacco user, 07/26/17 10:28:00 CDT  Lipid Panel, Routine collect, 07/26/17 10:28:00 CDT, Blood, Order for future visit, Stop date 07/26/17 10:28:00 CDT, Lab Collect, Familial hypercholesteremia  Hx of pancreatitis  HTN (hypertension)  Obesity  Tobacco user, 07/26/17 10:28:00 CDT  Sedimentation Rate, Routine collect, 07/26/17 10:28:00 CDT, Blood, Order for future visit, Stop date 07/26/17 10:28:00 CDT, Lab Collect, Familial hypercholesteremia  Hx of pancreatitis  HTN (hypertension)  Obesity  Tobacco user, 07/26/17 10:28:00 CDT  ?  6. follow up 6 months with labs/US abd   Problem List/Past Medical History  AVF (arteriovenous fistula)  DIARRHEA  DM (diabetes mellitus)  Dressing change  Familial hypercholesteremia  Hernia  HTN - Hypertension  Hyperlipemia  Morbid obesity(  Probable Diagnosis  )  CICI (obstructive sleep apnea)  Pancreatitis, acute  Tobacco user  Tobacco user  Tobacco user  Tobacco  user  Historical  Diabetes  Dialysis catheter  HTN (hypertension)  Hyperlipidemia  Neuropathy of lower limb 27-APR-2016 12:12:07<$>  Pancreatitis  Procedure/Surgical History  Pheresis of Plasma, Single (07/13/2017), Therapeutic apheresis; for plasma pheresis (07/13/2017), Pheresis of Plasma, Single (06/02/2017), Dilation of Left Axillary Artery, Percutaneous Approach (04/28/2017), Dilation of Left Brachial Vein, Percutaneous Approach (04/28/2017), Extirpation of Matter from Left Axillary Artery, Percutaneous Approach (04/28/2017), Extirpation of Matter from Left Brachial Vein, Percutaneous Approach (04/28/2017), Fluoroscopy of Dialysis Shunt/Fistula using Low Osmolar Contrast (04/28/2017), Introduction of Other Thrombolytic into Peripheral Vein, Percutaneous Approach (04/28/2017), Insertion of Infusion Device into Right Basilic Vein, Percutaneous Approach (04/27/2017), Ultrasonography of Right Upper Extremity Veins, Guidance (04/27/2017), Pheresis of Plasma, Single (04/26/2017), Pheresis of Plasma, Multiple (03/31/2017), Pheresis of Plasma, Single (03/15/2017), Therapeutic apheresis; for plasma pheresis (03/15/2017), Pheresis of Plasma, Single (02/09/2017), Therapeutic apheresis; for plasma pheresis (02/09/2017), Arteriovenous Fistula (Left) (11/28/2016), Bypass Left Brachial Artery to Upper Arm Vein with Synthetic Substitute, Open Approach (11/28/2016), Creation of arteriovenous fistula by other than direct arteriovenous anastomosis (separate procedure); autogenous graft (11/28/2016), Pheresis of Plasma, Single (11/21/2016), Pheresis of Plasma, Single (11/17/2016), Therapeutic apheresis; for plasma pheresis (11/17/2016), Performance of Urinary Filtration, Multiple (11/10/2016), Pheresis of Plasma, Multiple (11/10/2016), Pheresis of Plasma, Multiple (10/30/2016), Arteriovenous Fistula Declot (Left) (10/21/2016), Revision of Autologous Tissue Substitute in Upper Artery, Open Approach (10/21/2016), Revision, open,  arteriovenous fistula; without thrombectomy, autogenous or nonautogenous dialysis graft (separate procedure) (10/21/2016), Arteriovenous Fistula Revision (Left) (10/17/2016), Dressing Change (Right) (10/17/2016), Restriction of Left Brachial Artery, Open Approach (10/17/2016), Revision, open, arteriovenous fistula; without thrombectomy, autogenous or nonautogenous dialysis graft (separate procedure) (10/17/2016), Pheresis of Plasma, Multiple (10/12/2016), Pheresis of Plasma, Multiple (09/16/2016), Insertion of Infusion Device into Right Internal Jugular Vein, Percutaneous Approach (09/02/2016), Insertion of tunneled centrally inserted central venous catheter, without subcutaneous port or pump; age 5 years or older (09/02/2016), Permacath Placement (None) (09/02/2016), Pheresis of Plasma, Multiple (08/03/2016), Therapeutic apheresis; for plasma pheresis (08/03/2016), Arteriovenous anastomosis, open; direct, any site (eg, Blanca type) (separate procedure) (07/13/2016), Arteriovenous Fistula (Left) (07/13/2016), Bypass Left Radial Artery to Lower Arm Vein, Open Approach (07/13/2016), Insertion of non-tunneled centrally inserted central venous catheter; age 5 years or older (06/30/2016), Permacath Placement (None) (06/30/2016), Pheresis of Plasma, Multiple (06/11/2016), Insertion of Infusion Device into Left Femoral Vein, Percutaneous Approach (06/10/2016), Ultrasonography of Left Lower Extremity Veins, Guidance (06/10/2016), Pheresis of Plasma, Multiple (05/20/2016), Pheresis of Plasma, Single (05/03/2016), CATHETER, INFUSION, INSERTED PERIPHERALLY, CENTRALLY OR MIDLINE (OTHER THAN HEMODIALYSIS) (03/30/2016), Insertion of Infusion Device into Right Internal Jugular Vein, Percutaneous Approach (03/30/2016), Insertion of tunneled centrally inserted central venous catheter, without subcutaneous port or pump; age 5 years or older (03/30/2016), Permacath Placement (None) (03/30/2016), Fluoroscopy of Superior Vena Cava using  Low Osmolar Contrast, Guidance (02/24/2016), Insertion of Infusion Device into Superior Vena Cava, Percutaneous Approach (02/24/2016), Insertion of Vascular Access Device into Chest Subcutaneous Tissue and Fascia, Open Approach (02/24/2016), Permacath Placement (Right) (02/24/2016), Pheresis of Plasma, Multiple (02/24/2016), Colonoscopy, flexible; with removal of tumor(s), polyp(s), or other lesion(s) by hot biopsy forceps (08/31/2015), Endoscopic polypectomy of large intestine (08/31/2015), Esophagogastroduodenoscopy [EGD] with closed biopsy (08/31/2015), Esophagogastroduodenoscopy, flexible, transoral; with biopsy, single or multiple (08/31/2015), Hernia Repair (2011), Appendectomy;. (1997), Colonoscopy, dialysis port, Esophagogastroduodenoscopy.  Medications  Albumin 5% IV Soln (by Volume), 200 gm, 4000 mL, IV, Once  amlodipine 10 mg oral tablet, 10 mg, 1 tab(s), Oral, Daily  Ancef, 1 gm, 50 mL, IV Piggyback, Now  atorvastatin 80 mg oral tablet, 80 mg, 1 tab(s), Oral, Daily  baclofen 10 mg oral tablet, 10 mg, 1 tab(s), Oral, TID  DOXEPIN 75MG CAPSULES, 75 mg, 1 cap(s), Oral, qPM  ergocalciferol 50,000 intl units (1.25 mg) oral capsule, 50053 IntUnit, 1 cap(s), Oral, qWeek  fenofibrate 145 mg oral tablet, 145 mg, 1 tab(s), Oral, Daily  fenofibrate 160 mg oral tablet, 160 mg, 1 tab(s), Oral, Daily, 11 refills  ferrous sulfate 325 mg (65 mg elemental iron) oral delayed release tablet, 1 tablet, Oral, BID  gabapentin 300 mg oral capsule, 1 capsule, Oral, TID  heparin 1000 units/mL - 10 mL inj. solution, 3.8 unit/mL, IV Push, Once-Unscheduled  Heparin 1000 units/ml 2 ml injectable, 3800 units, IV, Once-Unscheduled  heparin flush 100 units/mL (500 Unit Flush), 500 units, IV Push, Once-chemo  insulin aspart 100 U/mL (for sliding scale), As Directed  Lantus 100 units/mL subcutaneous inj., 30 units, 0.3 mL, Subcutaneous, BID  lisinopril 40 mg oral tablet, 40 mg, 1 tab(s), Oral, Daily  METFORMIN 1000MG TABLETS, 1000 mg, 1  tab(s), Oral, BID  metoprolol succinate 100 mg oral tablet extended release, 1 tablet, Oral, Daily  niacin 500 mg oral capsule, extended release, 500 mg, Oral, At Bedtime  Norco 7.5 mg-325 mg oral tablet, 1 tab(s), Oral, q4hr, PRN,? ?Not taking  pancrelipase 5000 units-17,000 units-27,000 units oral delayed release capsule, 1 capsule, Oral, TID  Protonix 40 mg ORAL enteric coated tablet, 40 mg, 1 tab(s), Oral, BID  SUCRALFATE 1GM TABLETS, 1 gm, 1 tab(s), Oral, QID,? ?Not Taking, Completed Rx  triazolam 0.25 mg oral tablet, 0.25 mg, 1 tab(s), Oral, Once a day (at bedtime), PRN  Allergies  morphine?(Hives, C/O: a swelling)  Social History  Alcohol - Denies Alcohol Use  Past, Beer, Started age 17 Years. Stopped age 29 Years.  Past  Employment/School  Unemployed  Unemployed, Activity level: Occasional physical work.  Employed, Work/School description: [employer].  Exercise  Exercise type: never.  Home/Environment  Lives with Alone. Living situation: Home/Independent.  Lives with Alone. Living situation: Home/Independent. Alcohol abuse in household: No. Substance abuse in household: No. Smoker in household: Yes. Feels unsafe at home: No. Safe place to go: Yes. Family/Friends available for support: Yes. Financial concerns: Yes.  Nutrition/Health  Diabetic, Caffeine intake amount: 2-3 sodas/day. Wants to lose weight: Yes. Sleeping concerns: Yes. Feels highly stressed: Yes.  Other  Sexual  Substance Abuse - Denies Substance Abuse  Never  Never  Tobacco - High Risk  Current every day smoker, 20 per day.  Family History  : Mother.

## 2022-05-05 NOTE — HISTORICAL [facility]ER
This is a historical note converted from [employer]. Formatting and pictures may have been removed.  Please reference [place] for original formatting and attached multimedia. Patient seen and examined, no changes made to prior H&P. Presents for EGD.  ?  ?Chief Complaint  4 months follow up visit.  History of Present Illness  This 40-year-old  male with a history of?colon polyps, poorly controlled diabetes mellitus, CKD stage II,?morbid obesity, familial hypertriglyceridemia?with routine plasmapheresis in the past, hepatic steatosis,?hypertension,?CICI,?and tobacco abuse?presents unaccompanied for a follow-up visit. ?He was initially seen August 18, 2020, referred for elevated LFTs at that time.? Per review of laboratory results,?he has had?persistent elevation of alkaline phosphatase and intermittent elevation of AST and ALT since January 2019.? Laboratory results August 13, 2020 revealed sodium 132, calcium 8.2, glucose 466, GFR 88, AST 63, , alkaline phosphatase 262, total protein 8.6, albumin 3.3, globulin 5.30,?and otherwise unremarkable CMP;?vitamin B12 773; cholesterol 230, triglycerides 2216, invalid HDL and LDL secondary to triglyceride level;?negative acute viral hepatitis panel.? Hemoglobin A1c was 10.9% July 7, 2020. ?Gastric?emptying study?was unremarkable July 17, 2020.? CT scan abdomen and pelvis with contrast June 1, 2020 revealed hepatomegaly with steatosis and otherwise unremarkable.  ?   He reported intermittent right upper quadrant abdominal pain?for the past several months described as sharp?and radiating around to his back at times.? The pain?was worsened?with meal intake?and he?was unable to identify any specific food triggers. ?He denied any relieving factors.? His appetite?was poor and his weight?was stable.? He had frequent symptoms of acid reflux and heartburn that?was controlled on pantoprazole 40 mg daily.? He had frequent intermittent nausea?with subsequent vomiting?almost  daily and with almost every meal?(nonbilious and nonbloody).? He?did not always?have relief of symptoms with vomiting.? He denied odynophagia, dysphagia,?or early satiety.? Bowel habits?were described as formed stools once daily?without melena, hematochezia, fecal urgency, fecal incontinence, or pain with defecation.? He denied icterus, jaundice, pruritus, confusion, headaches, vision changes, cough, shortness of breath, chest pain, abdominal/lower extremity swelling, dark-colored urine, or pale-colored stools.  ?   Laboratory results?August 18, 2020?revealed iron level 52?and otherwise unremarkable iron profile and ferritin,?ceruloplasmin, alpha 1 antitrypsin,?F-actin, LKM, mitochondrial Ab; PT 11.6, INR 0.88;?unremarkable CBC; negative SHAKIR and dsDNA; FibroSure testing revealed F1.??Abdominal ultrasound October 1, 2020 revealed enlarged liver with steatosis.  ?   Laboratory results December 8, 2020 revealed sodium 135, CO2 16, glucose 272, AST 48, ALT 94, alkaline phosphatase 210, total protein 9.1, globulin?5.3, and otherwise unremarkable CMP; hemoglobin A1c 9.9%; vitamin D 12.9;?cholesterol 208, HDL 20, triglycerides >1420;?TSH 1.3122; and unremarkable CBC.  ?   Today, he presents for a follow-up visit.? He reports persistent?intermittent right upper quadrant abdominal pain?for the past several months described as sharp?and radiating around to his back at times.? The pain?is worsened?with meal intake?and he?is unable to identify any specific food triggers. ?He denies any relieving factors.? His appetite?is fair and his weight?is stable.? He has frequent symptoms of acid reflux and heartburn that?is somewhat controlled on pantoprazole 40 mg daily.? He has frequent intermittent nausea?with subsequent vomiting?a few times per week (nonbilious and nonbloody).? He?does not always?have relief of symptoms with vomiting.? He denies odynophagia, dysphagia,?or early satiety.? Bowel habits?are described as formed stools  once daily?without melena, hematochezia, fecal urgency, fecal incontinence, or pain with defecation.? He denies icterus, jaundice, pruritus, confusion, headaches, vision changes, cough, shortness of breath, chest pain, abdominal/lower extremity swelling, dark-colored urine, or pale-colored stools.  ?  He reports having an EGD and colonoscopy?at [facility]?approximately 4 to 5 years ago.? He thinks that he had benign colon polyps at that time.? He is unsure of EGD results at that time.??He takes aspirin 81 mg daily.? He smokes 10 cigarettes daily?and denies alcohol use. ?He denies a family history of IBD, colon polyps, or colon cancer.  Review of Systems  Negative except as noted in the HPI.  Physical Exam  ???Vitals & Measurements  ??T:?36.7? ?C (Oral)? HR:?53(Peripheral)? RR:?20? BP:?160/101?  ??HT:?176.00?cm? WT:?104.000?kg? BMI:?33.57?  General:?well-developed, well-nourished, in no acute distress; ambulates with a cane  Eye: PERRLA, EOMI, clear conjunctiva, eyelids normal  HENT:?oropharynx without erythema/exudate, oropharynx and nasal mucosal surfaces moist  Neck: full range of motion, no lymphadenopathy  Respiratory:?clear to auscultation bilaterally  Cardiovascular:?regular rate and rhythm without murmurs, gallops or rubs  Gastrointestinal:?soft, increased adiposity, moderate tenderness noted to RUQ abdominal region with deep palpation, no rebound or guarding, non-distended with normal bowel sounds, without masses to palpation  Integumentary: no rashes or skin lesions present  Neurologic: motor/sensory function intact  Assessment/Plan  1.?Elevated LFTs?R79.89  ??History of poorly controlled diabetes mellitus, CKD stage II,?morbid obesity, familial hypertriglyceridemia?with routine plasmapheresis in the past, hepatic steatosis,?hypertension,?CICI,?and tobacco abuse. ?  Per review of laboratory results,?he has had?persistent elevation of alkaline phosphatase and intermittent elevation of AST and ALT since January  2019.?  Laboratory results August 13, 2020 revealed sodium 132, calcium 8.2, glucose 466, GFR 88, AST 63, , alkaline phosphatase 262, total protein 8.6, albumin 3.3, globulin 5.30,?and otherwise unremarkable CMP;?vitamin B12 773; cholesterol 230, triglycerides 2216, invalid HDL and LDL secondary to triglyceride level;?negative acute viral hepatitis panel.?  Hemoglobin A1c was 10.9% July 7, 2020. ?  Gastric?emptying study?was unremarkable July 17, 2020.?  CT scan abdomen and pelvis with contrast June 1, 2020 revealed hepatomegaly with steatosis and otherwise unremarkable.?  Laboratory results?August 18, 2020?revealed iron level 52?and otherwise unremarkable iron profile and ferritin,?ceruloplasmin, alpha 1 antitrypsin,?F-actin, LKM, mitochondrial Ab; PT 11.6, INR 0.88;?unremarkable CBC; negative SHAKIR and dsDNA; FibroSure testing revealed F1.??  Abdominal ultrasound October 1, 2020 revealed enlarged liver with steatosis.  Laboratory results December 8, 2020 revealed sodium 135, CO2 16, glucose 272, AST 48, ALT 94, alkaline phosphatase 210, total protein 9.1, globulin?5.3, and otherwise unremarkable CMP; hemoglobin A1c 9.9%; vitamin D 12.9;?cholesterol 208, HDL 20, triglycerides >1420;?TSH 1.3122; and unremarkable CBC.  Repeat CBC and CMP in 6 months  Repeat abdominal ultrasound in 4 months  ??Patient has significant risk factors for NAFLD  Lifestyle and dietary modifications provided  Recommend weight loss  Recommend good control of comorbidities  Call with updates  Follow-up?6 months clinic visit with NP  ??Ordered:  1160F- Medication reconciliation completed during visit, Elevated LFTs  Hepatic steatosis  Familial hypercholesteremia  Poorly controlled type 2 diabetes mellitus  Morbid obesity due to excess calories  Tobacco abuse  Abdominal pain, RUQ (right upper quadrant), [facility], 12/28/20 9:34:00 CST  CBC w/ Auto Diff, Routine collect, *Est. 05/28/21 3:00:00 CDT, Blood, Order for future visit,  *Est. Stop date 05/28/21 3:00:00 CDT, Lab Collect, Elevated LFTs  Hepatic steatosis  Abdominal pain, RUQ (right upper quadrant), Print Label By Order Location, 12/28/20 9:35...  Clinic Follow up, *Est. 06/28/21 3:00:00 CDT, Follow-up with ROCCO Darling in 6 months, Order for future visit, Elevated LFTs  Hepatic steatosis  Familial hypercholesteremia  Poorly controlled type 2 diabetes mellitus  Morbid obesity due to excess calories  Tob...  Comprehensive Metabolic Panel, Routine collect, *Est. 05/28/21 3:00:00 CDT, Blood, Order for future visit, *Est. Stop date 05/28/21 3:00:00 CDT, Lab Collect, Elevated LFTs  Hepatic steatosis  Abdominal pain, RUQ (right upper quadrant), Print Label By Order Location, 12/28/20 9:35...  Office/Outpatient Visit Level 4 Established [id number] PC, Elevated LFTs  Hepatic steatosis  Familial hypercholesteremia  Poorly controlled type 2 diabetes mellitus  Morbid obesity due to excess calories  Tobacco abuse  Abdominal pain, RUQ (right upper quadrant), [facility], 12/28/20 9:34:00 CST  US Abdomen Limited, Routine, *Est. 04/28/21 3:00:00 CDT, Other (please specify), None, Wheelchair, Patient Has IV?, Rad Type, Order for future visit, Elevated LFTs  Hepatic steatosis  Abdominal pain, RUQ (right upper quadrant), Schedule this test, [facility] a...  ?  2.?Hepatic steatosis?K76.0  ??See above  ??Ordered:  1160F- Medication reconciliation completed during visit, Elevated LFTs  Hepatic steatosis  Familial hypercholesteremia  Poorly controlled type 2 diabetes mellitus  Morbid obesity due to excess calories  Tobacco abuse  Abdominal pain, RUQ (right upper quadrant), [facility], 12/28/20 9:34:00 CST  CBC w/ Auto Diff, Routine collect, *Est. 05/28/21 3:00:00 CDT, Blood, Order for future visit, *Est. Stop date 05/28/21 3:00:00 CDT, Lab Collect, Elevated LFTs  Hepatic steatosis  Abdominal pain, RUQ (right upper quadrant), Print Label By Order Location, 12/28/20  9:35...  Clinic Follow up, *Est. 06/28/21 3:00:00 CDT, Follow-up with ROCCO Darling in 6 months, Order for future visit, Elevated LFTs  Hepatic steatosis  Familial hypercholesteremia  Poorly controlled type 2 diabetes mellitus  Morbid obesity due to excess calories  Tob...  Comprehensive Metabolic Panel, Routine collect, *Est. 05/28/21 3:00:00 CDT, Blood, Order for future visit, *Est. Stop date 05/28/21 3:00:00 CDT, Lab Collect, Elevated LFTs  Hepatic steatosis  Abdominal pain, RUQ (right upper quadrant), Print Label By Order Location, 12/28/20 9:35...  Office/Outpatient Visit Level 4 Established [id number] PC, Elevated LFTs  Hepatic steatosis  Familial hypercholesteremia  Poorly controlled type 2 diabetes mellitus  Morbid obesity due to excess calories  Tobacco abuse  Abdominal pain, RUQ (right upper quadrant), [facility], 12/28/20 9:34:00 CST  US Abdomen Limited, Routine, *Est. 04/28/21 3:00:00 CDT, Other (please specify), None, Wheelchair, Patient Has IV?, Rad Type, Order for future visit, Elevated LFTs  Hepatic steatosis  Abdominal pain, RUQ (right upper quadrant), Schedule this test, [facility] a...  ?  3.?Abdominal pain, RUQ (right upper quadrant)?R10.11  ??GERD lifestyle modifications  Reflux precautions  Limit?NSAID use  EGD  Continue pantoprazole 40 mg daily?  ??Call with updates  Follow-up?6 months clinic visit with NP  ??Ordered:  1160F- Medication reconciliation completed during visit, Elevated LFTs  Hepatic steatosis  Familial hypercholesteremia  Poorly controlled type 2 diabetes mellitus  Morbid obesity due to excess calories  Tobacco abuse  Abdominal pain, RUQ (right upper quadrant), [facility], 12/28/20 9:34:00 CST  CBC w/ Auto Diff, Routine collect, *Est. 05/28/21 3:00:00 CDT, Blood, Order for future visit, *Est. Stop date 05/28/21 3:00:00 CDT, Lab Collect, Elevated LFTs  Hepatic steatosis  Abdominal pain, RUQ (right upper quadrant), Print Label By Order  Location, 12/28/20 9:35...  Clinic Follow up, *Est. 06/28/21 3:00:00 CDT, Follow-up with ROCCO Darling in 6 months, Order for future visit, Elevated LFTs  Hepatic steatosis  Familial hypercholesteremia  Poorly controlled type 2 diabetes mellitus  Morbid obesity due to excess calories  Tob...  Comprehensive Metabolic Panel, Routine collect, *Est. 05/28/21 3:00:00 CDT, Blood, Order for future visit, *Est. Stop date 05/28/21 3:00:00 CDT, Lab Collect, Elevated LFTs  Hepatic steatosis  Abdominal pain, RUQ (right upper quadrant), Print Label By Order Location, 12/28/20 9:35...  Office/Outpatient Visit Level 4 Established [id number] PC, Elevated LFTs  Hepatic steatosis  Familial hypercholesteremia  Poorly controlled type 2 diabetes mellitus  Morbid obesity due to excess calories  Tobacco abuse  Abdominal pain, RUQ (right upper quadrant), [facility], 12/28/20 9:34:00 CST  US Abdomen Limited, Routine, *Est. 04/28/21 3:00:00 CDT, Other (please specify), None, Wheelchair, Patient Has IV?, Rad Type, Order for future visit, Elevated LFTs  Hepatic steatosis  Abdominal pain, RUQ (right upper quadrant), Schedule this test, [facility] a...  ?  4.?Familial hypercholesteremia?E78.01  ??See above  Followed by internal medicine and nephrology  ??Ordered:  1160F- Medication reconciliation completed during visit, Elevated LFTs  Hepatic steatosis  Familial hypercholesteremia  Poorly controlled type 2 diabetes mellitus  Morbid obesity due to excess calories  Tobacco abuse  Abdominal pain, RUQ (right upper quadrant), [facility], 12/28/20 9:34:00 CST  Clinic Follow up, *Est. 06/28/21 3:00:00 CDT, Follow-up with ROCCO Darling in 6 months, Order for future visit, Elevated LFTs  Hepatic steatosis  Familial hypercholesteremia  Poorly controlled type 2 diabetes mellitus  Morbid obesity due to excess calories  Tob...  Office/Outpatient Visit Level 4 Established [id number] PC, Elevated LFTs  Hepatic steatosis   Familial hypercholesteremia  Poorly controlled type 2 diabetes mellitus  Morbid obesity due to excess calories  Tobacco abuse  Abdominal pain, RUQ (right upper quadrant), [facility], 12/28/20 9:34:00 CST  ?  5.?Poorly controlled type 2 diabetes mellitus?E11.65  ??See above  Followed by internal medicine and endocrinology  ??Ordered:  1160F- Medication reconciliation completed during visit, Elevated LFTs  Hepatic steatosis  Familial hypercholesteremia  Poorly controlled type 2 diabetes mellitus  Morbid obesity due to excess calories  Tobacco abuse  Abdominal pain, RUQ (right upper quadrant), [facility], 12/28/20 9:34:00 CST  Clinic Follow up, *Est. 06/28/21 3:00:00 CDT, Follow-up with ROCCO Darling in 6 months, Order for future visit, Elevated LFTs  Hepatic steatosis  Familial hypercholesteremia  Poorly controlled type 2 diabetes mellitus  Morbid obesity due to excess calories  Tob...  Office/Outpatient Visit Level 4 Established [id number] PC, Elevated LFTs  Hepatic steatosis  Familial hypercholesteremia  Poorly controlled type 2 diabetes mellitus  Morbid obesity due to excess calories  Tobacco abuse  Abdominal pain, RUQ (right upper quadrant), [facility], 12/28/20 9:34:00 CST  ?  6.?Morbid obesity due to excess calories?E66.01  ??See above  ??Ordered:  1160F- Medication reconciliation completed during visit, Elevated LFTs  Hepatic steatosis  Familial hypercholesteremia  Poorly controlled type 2 diabetes mellitus  Morbid obesity due to excess calories  Tobacco abuse  Abdominal pain, RUQ (right upper quadrant), [facility], 12/28/20 9:34:00 CST  Clinic Follow up, *Est. 06/28/21 3:00:00 CDT, Follow-up with ROCCO Darling in 6 months, Order for future visit, Elevated LFTs  Hepatic steatosis  Familial hypercholesteremia  Poorly controlled type 2 diabetes mellitus  Morbid obesity due to excess calories  Tob...  Office/Outpatient Visit Level 4 Established [id number] PC, Elevated  LFTs  Hepatic steatosis  Familial hypercholesteremia  Poorly controlled type 2 diabetes mellitus  Morbid obesity due to excess calories  Tobacco abuse  Abdominal pain, RUQ (right upper quadrant), [facility], 12/28/20 9:34:00 CST  ?  7.?Tobacco abuse?Z72.0  ??Recommend tobacco cessation  ??Ordered:  1160F- Medication reconciliation completed during visit, Elevated LFTs  Hepatic steatosis  Familial hypercholesteremia  Poorly controlled type 2 diabetes mellitus  Morbid obesity due to excess calories  Tobacco abuse  Abdominal pain, RUQ (right upper quadrant), [facility], 12/28/20 9:34:00 CST  Clinic Follow up, *Est. 06/28/21 3:00:00 CDT, Follow-up with ROCCO Darling in 6 months, Order for future visit, Elevated LFTs  Hepatic steatosis  Familial hypercholesteremia  Poorly controlled type 2 diabetes mellitus  Morbid obesity due to excess calories  Tob...  Office/Outpatient Visit Level 4 Established [id number] PC, Elevated LFTs  Hepatic steatosis  Familial hypercholesteremia  Poorly controlled type 2 diabetes mellitus  Morbid obesity due to excess calories  Tobacco abuse  Abdominal pain, RUQ (right upper quadrant), [facility], 12/28/20 9:34:00 CST  ?  Referrals  ?Clinic Follow up, *Est. 06/28/21 3:00:00 CDT, Follow-up with ROCCO Darling in 6 months, Order for future visit, Elevated LFTs  Hepatic steatosis  Familial hypercholesteremia  Poorly controlled type 2 diabetes mellitus  Morbid obesity due to excess calories  Tob... Problem List/Past Medical History  Ongoing  ??Abdominal pain, acute, right upper quadrant  ?Abnormal weight loss  ?Bladder outlet obstruction  ?Candiduria  ?Chronic pancreatitis  ?Familial hypercholesteremia  ?Hepatic failure  ?Hepatic steatosis  ?HTN (hypertension)  ?Hx of pancreatitis  ?Mononeuritis multiplex  ?Morbid obesity due to excess calories  ?Numbness and tingling in left hand  ?CICI (obstructive sleep apnea)  ?Polyneuropathy in diabetes  ?Poorly  controlled type 2 diabetes mellitus  ?Right leg weakness  ?Tobacco abuse  ?Wellness examination  Historical  ??Diabetes  ??Dialysis catheter  ??Hyperlipidemia  ??Neuropathy of lower limb  ??Pancreatitis  Procedure/Surgical HistoryPheresis of Plasma, Multiple (07/13/2018)  arteriovenous fistula (06/01/2018)  Pheresis of Plasma, Single (05/25/2018)  Arteriovenous anastomosis, open; by upper arm cephalic vein transposition (05/07/2018)  Basilic Transposition (Left) (05/07/2018)  Reposition Left Basilic Vein, Open Approach (05/07/2018)  Pheresis of Plasma, Multiple (04/19/2018)  Pheresis of Plasma, Multiple (04/11/2018)  Pheresis of Plasma, Single (04/04/2018)  Therapeutic apheresis; for plasma pheresis (04/04/2018)  Catheter Insertion Palindrome (Left) (03/28/2018)  Fluoroscopy of Superior Vena Cava using Low Osmolar Contrast, Guidance (03/28/2018)  Insertion of Infusion Device into Superior Vena Cava, Percutaneous Approach (03/28/2018)  Removal of Infusion Device from Great Vessel, Percutaneous Approach (03/28/2018)  Pheresis of Plasma, Single (03/27/2018)  Pheresis of Plasma, Single (03/20/2018)  Pheresis of Plasma, Multiple (01/29/2018)  Catheter Insertion Palindrome (.) (12/31/2017)  Insertion of Tunneled Vascular Access Device into Chest Subcutaneous Tissue and Fascia, Percutaneous Approach (12/31/2017)  Pheresis of Plasma, Single (12/20/2017)  Therapeutic apheresis; for plasma pheresis (12/20/2017)  Pheresis of Plasma, Single (10/12/2017)  Therapeutic apheresis; for plasma pheresis (10/12/2017)  Pheresis of Plasma, Single (09/21/2017)  Therapeutic apheresis; for plasma pheresis (09/21/2017)  Dilation of Left Radial Artery, Percutaneous Approach (09/13/2017)  Vascular embolization or occlusion, inclusive of all radiological supervision and interpretation, intraprocedural roadmapping, and imaging guidance necessary to complete the intervention; arterial, other than hemorrhage or tumor (eg, congenital or acquire  (09/13/2017)  Pheresis of Plasma, Multiple (08/27/2017)  Pheresis of Plasma, Single (08/17/2017)  Therapeutic apheresis; for plasma pheresis (08/17/2017)  Pheresis of Plasma, Single (08/12/2017)  Pheresis of Plasma, Single (07/13/2017)  Therapeutic apheresis; for plasma pheresis (07/13/2017)  Biopsy Gastrointestinal (06/30/2017)  Esophagogastroduodenoscopy (06/30/2017)  Excision of Stomach, Via Natural or Artificial Opening Endoscopic, Diagnostic (06/30/2017)  Pheresis of Plasma, Multiple (06/27/2017)  Pheresis of Plasma, Single (06/24/2017)  Therapeutic apheresis; for plasma pheresis (06/24/2017)  Pheresis of Plasma, Single (06/02/2017)  Dilation of Left Axillary Artery, Percutaneous Approach (04/28/2017)  Dilation of Left Brachial Vein, Percutaneous Approach (04/28/2017)  Extirpation of Matter from Left Axillary Artery, Percutaneous Approach (04/28/2017)  Extirpation of Matter from Left Brachial Vein, Percutaneous Approach (04/28/2017)  Fluoroscopy of Dialysis Shunt/Fistula using Low Osmolar Contrast (04/28/2017)  Introduction of Other Thrombolytic into Peripheral Vein, Percutaneous Approach (04/28/2017)  Insertion of Infusion Device into Right Basilic Vein, Percutaneous Approach (04/27/2017)  Ultrasonography of Right Upper Extremity Veins, Guidance (04/27/2017)  Pheresis of Plasma, Single (04/26/2017)  Pheresis of Plasma, Multiple (03/31/2017)  Pheresis of Plasma, Single (03/15/2017)  Therapeutic apheresis; for plasma pheresis (03/15/2017)  Pheresis of Plasma, Single (02/09/2017)  Therapeutic apheresis; for plasma pheresis (02/09/2017)  Arteriovenous Fistula (Left) (11/28/2016)  Bypass Left Brachial Artery to Upper Arm Vein with Synthetic Substitute, Open Approach (11/28/2016)  Creation of arteriovenous fistula by other than direct arteriovenous anastomosis (separate procedure); autogenous graft (11/28/2016)  Pheresis of Plasma, Single (11/21/2016)  Pheresis of Plasma, Single (11/17/2016)  Therapeutic apheresis;  for plasma pheresis (11/17/2016)  Performance of Urinary Filtration, Multiple (11/10/2016)  Pheresis of Plasma, Multiple (11/10/2016)  Pheresis of Plasma, Multiple (10/30/2016)  Arteriovenous Fistula Declot (Left) (10/21/2016)  Revision of Autologous Tissue Substitute in Upper Artery, Open Approach (10/21/2016)  Revision, open, arteriovenous fistula; without thrombectomy, autogenous or nonautogenous dialysis graft (separate procedure) (10/21/2016)  Arteriovenous Fistula Revision (Left) (10/17/2016)  Dressing Change (Right) (10/17/2016)  Restriction of Left Brachial Artery, Open Approach (10/17/2016)  Revision, open, arteriovenous fistula; without thrombectomy, autogenous or nonautogenous dialysis graft (separate procedure) (10/17/2016)  Pheresis of Plasma, Multiple (10/12/2016)  Pheresis of Plasma, Multiple (09/16/2016)  Insertion of Infusion Device into Right Internal Jugular Vein, Percutaneous Approach (09/02/2016)  Insertion of tunneled centrally inserted central venous catheter, without subcutaneous port or pump; age 5 years or older (09/02/2016)  Permacath Placement (None) (09/02/2016)  Pheresis of Plasma, Multiple (08/03/2016)  Therapeutic apheresis; for plasma pheresis (08/03/2016)  Arteriovenous anastomosis, open; direct, any site (eg, Blanca type) (separate procedure) (07/13/2016)  Arteriovenous Fistula (Left) (07/13/2016)  Bypass Left Radial Artery to Lower Arm Vein, Open Approach (07/13/2016)  Insertion of non-tunneled centrally inserted central venous catheter; age 5 years or older (06/30/2016)  Permacath Placement (None) (06/30/2016)  Pheresis of Plasma, Multiple (06/11/2016)  Insertion of Infusion Device into Left Femoral Vein, Percutaneous Approach (06/10/2016)  Ultrasonography of Left Lower Extremity Veins, Guidance (06/10/2016)  Pheresis of Plasma, Multiple (05/20/2016)  Pheresis of Plasma, Single (05/03/2016)  CATHETER, INFUSION, INSERTED PERIPHERALLY, CENTRALLY OR MIDLINE (OTHER THAN HEMODIALYSIS)  (03/30/2016)  Insertion of Infusion Device into Right Internal Jugular Vein, Percutaneous Approach (03/30/2016)  Insertion of tunneled centrally inserted central venous catheter, without subcutaneous port or pump; age 5 years or older (03/30/2016)  Permacath Placement (None) (03/30/2016)  Fluoroscopy of Superior Vena Cava using Low Osmolar Contrast, Guidance (02/24/2016)  Insertion of Infusion Device into Superior Vena Cava, Percutaneous Approach (02/24/2016)  Insertion of Vascular Access Device into Chest Subcutaneous Tissue and Fascia, Open Approach (02/24/2016)  Permacath Placement (Right) (02/24/2016)  Pheresis of Plasma, Multiple (02/24/2016)  Colonoscopy, flexible; with removal of tumor(s), polyp(s), or other lesion(s) by hot biopsy forceps (08/31/2015)  Endoscopic polypectomy of large intestine (08/31/2015)  Esophagogastroduodenoscopy [EGD] with closed biopsy (08/31/2015)  Esophagogastroduodenoscopy, flexible, transoral; with biopsy, single or multiple (08/31/2015)  Hernia Repair (2011)  Appendectomy; (1997)  Colonoscopy  dialysis port  Esophagogastroduodenoscopy   ?  Medications  ?acetaminophen-codeine 300 mg-30 mg oral tablet., 1 tab(s), Oral, q4-6hr  ?Amoxil 500 mg Cap, 500 mg= 1 cap(s), Oral, TID,? ?Not Taking, Completed Rx  ?aspirin 81 mg oral Delayed Release (EC) tablet, 81 mg= 1 tab(s), Oral, Daily, 1 refills  ?capsaicin 0.075% topical cream, 1 maryam, TOP, BID, 4 refills  ?carBAMazepine 200 mg oral capsule, extended release, 200 mg= 1 cap(s), Oral, At Bedtime, 3 refills  ?cloniDINE 0.2 mg oral tablet, 0.2 mg= 1 tab(s), Oral, TID, 1 refills  ?Creon 36,000 units oral delayed release capsule, 1 cap(s), Oral, TID, 1 refills  ?Dilaudid 8 mg oral tablet, 8 mg= 1 tab(s), Oral, q6hr, PRN  ?escitalopram 20 mg oral tablet, 20 mg= 1 tab(s), Oral, Daily, 1 refills  ?fenofibrate 160 mg oral tablet, 160 mg= 1 tab(s), Oral, Daily, 1 refills  ?Flomax 0.4 mg oral capsule, 0.4 mg= 1 cap(s), Oral, Daily, 1  refills  ?gabapentin 400 mg oral capsule, 400 mg= 1 cap(s), Oral, TID, 6 refills  ?gabapentin 800 mg oral tablet, 800 mg= 1 tab(s), Oral, qPM, 1 refills  ?gramicidin/neomycin/polymyxin B 0.025 mg-1.75 mg-10,000 units/mL ophthalmic solution, 1 drop(s), OPTH, QID  ?hydrALAZINE 100 mg oral tablet, 100 mg= 1 tab(s), Oral, TID, 1 refills  ?insulin lispro 100 units/mL injectable solution, 25 units, Subcutaneous, TIDAC, 5 refills  ?Insulin syringes, See Instructions, 3 refills  ?KlonoPIN 1 mg oral tablet, 1 mg= 1 tab(s), Oral, BID, 5 refills  ?Lantus Solostar Pen 100 units/mL subcutaneous solution, See Instructions  ?metoprolol succinate 100 mg oral tablet, extended release, 100 mg= 1 tab(s), Oral, BID, 1 refills  ?morphine 100 mg/8 to 12 hr oral tablet, extended release, 100 mg= 1 tab(s), Oral, q8hr  ?nitroglycerin 0.4 mg sublingual TAB, 0.4 mg= 1 tab(s), SL, q5min, PRN  ?Pantoprazole 40 mg ORAL EC-Tablet, 40 mg= 1 tab(s), Oral, Daily, 1 refills  ?potassium chloride 20 mEq oral TABLET extended release, 20 mEq= 1 tab(s), Oral, Daily, 1 refills  ?Praluent Pen 150 mg/mL subcutaneous solution, 150 mg= 1 mL, Subcutaneous, q2wk, 5 refills,? ?Not taking  ?rosuvastatin 40 mg oral tablet, 40 mg= 1 tab(s), Oral, Daily, 1 refills  ?spironolactone 25 mg oral tablet, 25 mg= 1 tab(s), Oral, BID, 1 refills  ?sulfamethoxazole-trimethoprim 800 mg-160 mg oral tablet, 1 tab(s), Oral, BID  ?torsemide 20 mg oral tablet, 20 mg= 1 tab(s), Oral, Daily, 1 refills  ?Vascepa 1 g oral capsule, 2 gm= 2 cap(s), Oral, BID  ?venlafaxine 37.5 mg oral tablet, 37.5 mg= 1 tab(s), Oral, BID, 1 refills  ?Vitamin D3 5000 intl units (125 mcg) oral capsule, 5000 IntUnit= 1 cap(s), Oral, Daily  ?Zyprexa 5 mg oral tablet, 5 mg= 1 tab(s), Oral, Daily, 1 refills  Allergies  No Known Allergies  Social History  Abuse/Neglect  ?No, 12/15/2020  Alcohol - Denies Alcohol Use, 11/23/2014  ?Past, 12/09/2020  Employment/School  ?DISABILITY, 06/29/2018  Exercise  ?Exercise  duration: 15. Exercise frequency: Daily. Exercise type: leg physical therapy., 2020  Home/Environment  ?Lives with Alone. Living situation: Home/Independent. Home equipment: Glucose monitoring. Alcohol abuse in household: No. Substance abuse in household: No. Smoker in household: Yes. Feels unsafe at home: No. Safe place to go: Yes. Family/Friends available for support: Yes. Financial concerns: Yes., 2018  Nutrition/Health  ?Diabetic, Poor, 2020  Other  Sexual  Spiritual/Cultural  ?None, 2020  Substance Use - Denies Substance Abuse, 2014  ?Never, 2016  Tobacco - High Risk, 2014  ?10 or more cigarettes (1/2 pack or more)/day in last 30 days, No, 12/15/2020  Family History  ?: Mother.  ??Father: History is unknown  ??Brother: History is unknown  ??Sister: History is unknown  Immunizations  ?Vaccine ?Date ?Status ?Comments   ?influenza virus vaccine, inactivated 2020 Given PT TOLERATED WELL   ?influenza virus vaccine, inactivated - Not Given Expectation Not Necessary  ?Duplicate order   ?influenza virus vaccine, inactivated 10/01/2019 Given    ?pneumococcal 23-polyvalent vaccine 2019 Given Pt tolerated well   ?influenza virus vaccine, inactivated 10/23/2018 Given    ?influenza virus vaccine, inactivated 2017 Given    ?tetanus/diphtheria/pertussis, acel(Tdap) 2016 Given    ?influenza virus vaccine, inactivated 2016 Given    ?influenza virus vaccine, inactivated - Not Given Parent Or Guardian Refuses  ?patient refused administration of flu vaccine, stated will take it another time.   ?influenza virus vaccine, inactivated 2015 Given tolerated well   ?influenza virus vaccine, inactivated 12/15/2014 Given Other : ?task resceduled   ?influenza virus vaccine, inactivated - Not Given Expectation Not Necessary   ?  Health Maintenance  Health Maintenance  ???Pending?(in the next year)  ??? ??OverDue  ??? ? ? ?Influenza Vaccine  due??10/01/20??and every 1??day(s)  ??? ??Due?  ??? ? ? ?Medicare Annual Wellness Exam due??12/28/20??and every 1??year(s)  ??? ??Due In Future?  ??? ? ? ?Obesity Screening not due until??01/01/21??and every 1??year(s)  ??? ? ? ?Smoking Cessation not due until??01/01/21??and every 1??year(s)  ??? ? ? ?Alcohol Misuse Screening not due until??01/02/21??and every 1??year(s)  ??? ? ? ?Diabetes Maintenance-Foot Exam not due until??06/23/21??and every 1??year(s)  ??? ? ? ?Diabetes Maintenance-Eye Exam not due until??10/29/21??and every 2??year(s)  ??? ? ? ?Diabetes Maintenance-Medication Prescribed not due until??11/02/21??and every 1??year(s)  ??? ? ? ?Diabetes Maintenance-HgbA1c not due until??12/08/21??and every 1??year(s)  ??? ? ? ?Diabetes Maintenance-Fasting Lipid Profile not due until??12/08/21??and every 1??year(s)  ??? ? ? ?Diabetes Maintenance-Serum Creatinine not due until??12/08/21??and every 1??year(s)  ???Satisfied?(in the past 1 year)  ??? ??Satisfied?  ??? ? ? ?ADL Screening on??12/28/20.??Satisfied by Rosemary Marie  ??? ? ? ?Alcohol Misuse Screening on??12/08/20.??Satisfied by Dat Beasley MD  ??? ? ? ?Blood Pressure Screening on??12/28/20.??Satisfied by Rosemary Marie  ??? ? ? ?Body Mass Index Check on??12/28/20.??Satisfied by Rosemary Marie  ??? ? ? ?Coronary Artery Disease Maintenance-Lipid Lowering Therapy on??08/21/20.??Satisfied by Kendal Grande NP  ??? ? ? ?Coronary Artery Disease Maintenance-Antiplatelet Agent Prescribed on??05/20/20.??Satisfied by Dat Beasley MD  ??? ? ? ?Depression Screening on??12/28/20.??Satisfied by Rosemary Marie  ??? ? ? ?Diabetes Maintenance-Fasting Lipid Profile on??12/08/20.??Satisfied by Carroll Martins Jr.  ??? ? ? ?Diabetes Maintenance-HgbA1c on??12/08/20.??Satisfied by Paulo Crum  ??? ? ? ?Diabetes Maintenance-Serum Creatinine on??12/08/20.??Satisfied by Carroll Martins Jr.  ??? ? ? ?Diabetes Maintenance-Medication Prescribed  on??11/02/20.??Satisfied by Kendal Grande NP  ??? ? ? ?Diabetes Maintenance-Microalbumin on??07/07/20.??Satisfied by Shantal Bro  ??? ? ? ?Diabetes Maintenance-Foot Exam on??06/23/20.??Satisfied by Dat Beasley MD  ??? ? ? ?Diabetes Screening on??12/08/20.??Satisfied by Carroll Martins Jr.  ??? ? ? ?Hypertension Management-Blood Pressure on??12/28/20.??Satisfied by Rosemary Marie  ??? ? ? ?Hypertension Management-BMP on??12/08/20.??Satisfied by Carroll Martins Jr.  ??? ? ? ?Influenza Vaccine on??09/22/20.??Satisfied by Deborah Leblanc.  ??? ? ? ?Lipid Screening on??12/08/20.??Satisfied by Carroll Martins Jr.  ??? ? ? ?Obesity Screening on??12/28/20.??Satisfied by Rosemary Marie  ??? ? ? ?Smoking Cessation on??12/28/20.??Satisfied by Erin Schumacher  ??? ??Refused?  ??? ? ? ?Influenza Vaccine on??12/28/20.??Recorded by Erin Schumacher  ?  Lab Results  ?Test Name ?Test Result ?Date/Time ?Comments   ?Sodium Lvl 135 mmol/L (Low) 12/08/2020 12:15 CST    ?Sodium Lvl 133 mmol/L (Low) 09/28/2020 10:50 CDT    ?Potassium Lvl 3.7 mmol/L 12/08/2020 12:15 CST    ?Potassium Lvl 3.6 mmol/L 09/28/2020 10:50 CDT    ?Chloride 100 mmol/L 12/08/2020 12:15 CST    ?Chloride 100 mmol/L 09/28/2020 10:50 CDT    ?CO2 16 mmol/L (Low) 12/08/2020 12:15 CST    ?CO2 26 mmol/L 09/28/2020 10:50 CDT    ?Calcium Lvl 9.0 mg/dL 12/08/2020 12:15 CST    ?Calcium Lvl 8.0 mg/dL (Low) 09/28/2020 10:50 CDT    ?Glucose Lvl 272 mg/dL (High) 12/08/2020 12:15 CST    ?Glucose Lvl 320 mg/dL (High) 09/28/2020 10:50 CDT    ?.4 mg/dL 12/08/2020 12:15 CST    ? mg/dL (High) 09/28/2020 10:50 CDT    ?BUN 11.0 mg/dL 12/08/2020 12:15 CST    ?BUN 13 mg/dL 09/28/2020 10:50 CDT    ?Creatinine 0.82 mg/dL 12/08/2020 12:15 CST    ?Creatinine 0.90 mg/dL 09/28/2020 10:50 CDT    ?eGFR-AA >105 12/08/2020 12:15 CST    ?eGFR-AA >105 mL/min 09/28/2020 10:50 CDT    ?eGFR-CHIN >105 mL/min/1.73 m2 12/08/2020 12:15 CST     ?eGFR-CHIN 99 mL/min 09/28/2020 10:50 CDT    ?Bili Total 0.3 mg/dL 12/08/2020 12:15 CST    ?Bili Total 0.5 mg/dL 09/28/2020 10:50 CDT    ?Bili Direct <0.1 mg/dL 12/08/2020 12:15 CST    ?Bili Direct <0.1 mg/dL 09/28/2020 10:50 CDT    ?Bili Indirect >0.20 mg/dL 12/08/2020 12:15 CST    ?Bili Indirect Calc. invalid 09/28/2020 10:50 CDT    ?AST 48 unit/L (High) 12/08/2020 12:15 CST    ?AST 53 unit/L (High) 09/28/2020 10:50 CDT    ?ALT 94 unit/L (High) 12/08/2020 12:15 CST    ? unit/L (High) 09/28/2020 10:50 CDT    ?Alk Phos 210 unit/L (High) 12/08/2020 12:15 CST    ?Alk Phos 239 unit/L (High) 09/28/2020 10:50 CDT    ?Total Protein 9.1 gm/dL (High) 12/08/2020 12:15 CST    ?Total Protein 8.0 gm/dL 09/28/2020 10:50 CDT    ?Albumin Lvl 3.8 gm/dL 12/08/2020 12:15 CST    ?Albumin Lvl 3.2 gm/dL (Low) 09/28/2020 10:50 CDT    ?Globulin 5.3 gm/dL (High) 12/08/2020 12:15 CST    ?Globulin 4.80 gm/mL (High) 09/28/2020 10:50 CDT    ?A/G Ratio 0.7 ratio (Low) 12/08/2020 12:15 CST    ?A/G Ratio 0.7 ratio (Low) 09/28/2020 10:50 CDT    ?Iron Lvl 52 mcg/dL (Low) 08/18/2020 10:45 CDT    ?Transferrin 232.0 mg/dL 08/18/2020 10:45 CDT    ?TIBC 296 mcg/dL 08/18/2020 10:45 CDT    ?Iron Sat 17.6 % 08/18/2020 10:45 CDT    ?Ferritin Lvl 190.9 ng/mL 08/18/2020 10:45 CDT    ?Lipase Lvl 127 unit/L 08/18/2020 10:45 CDT    ?Hgb A1c 9.9 % (High) 12/08/2020 12:15 CST    ?Hgb A1c 11.5 % (High) 09/28/2020 10:50 CDT    ?Vit D 25 OH 12.9 ng/mL (Low) 12/08/2020 12:15 CST    ?Test Name IGG SUB 09/28/2020 10:50 CDT Review results in Power chart, Notes by date of service (fin#), Laboratory Docs, Outside lab.   ?Test Result see comment 09/28/2020 10:50 CDT    ?Ceruloplasmin-LC 28.1 mg/dL 08/18/2020 10:45 CDT Performed At: [facility]  ?[address] [place], [place] [id number]  Homero REYNAGA MD Ph:[phone]   ?Alpha-1-Antitrypsin- mg/dL 08/18/2020 10:45 CDT Performed At: [facility]  ?[address] [place], [place]  [id number]  Homero REYNAGA MD Ph:[phone]   ?Chol 208 mg/dL (High) 12/08/2020 12:15 CST    ?Chol 272 mg/dL (High) 09/28/2020 10:50 CDT    ?HDL 20 mg/dL (Low) 12/08/2020 12:15 CST    ?HDL See Comment. 09/28/2020 10:50 CDT HDL not valid with Triglyceride > 1700.   ?Trig >1420 mg/dL (High) 12/08/2020 12:15 CST    ?Trig 2534 mg/dL (High) 09/28/2020 10:50 CDT    ?LDL See Comment. 12/08/2020 12:15 CST Calculated LDL not valid with Triglyceride > 400.   ?LDL See Comment. 09/28/2020 10:50 CDT Calculated LDL not valid with Triglyceride > 400.   ?Chol/HDL 10 (High) 12/08/2020 12:15 CST    ?Chol/HDL Not Applicable. 09/28/2020 10:50 CDT    ?VLDL See Comment. 12/08/2020 12:15 CST Calculated VLDL not valid with Triglyceride > 400.   ?VLDL See Comment. 09/28/2020 10:50 CDT Calculated VLDL not valid with Triglyceride > 400.   ?TSH 1.3122 uIU/mL 12/08/2020 12:15 CST    ?TSH 2.217 mIU/L 09/28/2020 10:50 CDT    ?PT 11.6 second(s) (Low) 08/18/2020 10:45 CDT    ?INR 0.88 (Low) 08/18/2020 10:45 CDT    ?WBC 10.5 x10(3)/mcL 12/08/2020 12:15 CST    ?WBC 9.9 x10(3)/mcL 09/28/2020 10:50 CDT    ?WBC 10.6 x10(3)/mcL 08/18/2020 10:45 CDT    ?RBC 5.57 x10(6)/mcL 12/08/2020 12:15 CST    ?RBC 5.38 x10(6)/mcL 09/28/2020 10:50 CDT    ?RBC 5.51 x10(6)/mcL 08/18/2020 10:45 CDT    ?Hgb 17.3 gm/dL 12/08/2020 12:15 CST    ?Hgb 16.7 gm/dL 09/28/2020 10:50 CDT    ?Hgb 16.6 gm/dL 08/18/2020 10:45 CDT    ?Hct 48.1 % 12/08/2020 12:15 CST    ?Hct 46.6 % 09/28/2020 10:50 CDT    ?Hct 47.8 % 08/18/2020 10:45 CDT    ?Platelet 356 x10(3)/mcL 12/08/2020 12:15 CST    ?Platelet 315 x10(3)/mcL 09/28/2020 10:50 CDT    ?Platelet 335 x10(3)/mcL 08/18/2020 10:45 CDT    ?MCV 86.4 fL 12/08/2020 12:15 CST    ?MCV 86.6 fL 09/28/2020 10:50 CDT    ?MCV 86.8 fL 08/18/2020 10:45 CDT    ?MCH 31.1 pg 12/08/2020 12:15 CST    ?MCH 31.0 pg 09/28/2020 10:50 CDT    ?MCH 30.1 pg 08/18/2020 10:45 CDT    ?MCHC 36.0 gm/dL 12/08/2020 12:15 CST    ?MCHC 35.8 gm/dL 09/28/2020 10:50 CDT    ?MCHC  34.7 gm/dL 08/18/2020 10:45 CDT    ?RDW 11.8 % 12/08/2020 12:15 CST    ?RDW 12.0 % 09/28/2020 10:50 CDT    ?RDW 12.3 % 08/18/2020 10:45 CDT    ?MPV 10.6 fL (High) 12/08/2020 12:15 CST    ?MPV 10.1 fL 09/28/2020 10:50 CDT    ?MPV 10.9 fL (High) 08/18/2020 10:45 CDT    ?Abs Neut 6.80 x10(3)/mcL 12/08/2020 12:15 CST    ?Abs Neut 5.61 x10(3)/mcL 09/28/2020 10:50 CDT    ?Abs Neut 7.10 x10(3)/mcL 08/18/2020 10:45 CDT    ?Neutro Auto 65 % 12/08/2020 12:15 CST    ?Neutro Auto 57 % 09/28/2020 10:50 CDT    ?Neutro Auto 67 % 08/18/2020 10:45 CDT    ?Lymph Auto 27 % 12/08/2020 12:15 CST    ?Lymph Auto 32 % 09/28/2020 10:50 CDT    ?Lymph Auto 23 % 08/18/2020 10:45 CDT    ?Mono Auto 6 % 12/08/2020 12:15 CST    ?Mono Auto 8 % 09/28/2020 10:50 CDT    ?Mono Auto 7 % 08/18/2020 10:45 CDT    ?Eos Auto 1 % 12/08/2020 12:15 CST    ?Eos Auto 1 % 09/28/2020 10:50 CDT    ?Eos Auto 2 % 08/18/2020 10:45 CDT    ?Abs Eos 0.1 x10(3)/mcL 12/08/2020 12:15 CST    ?Abs Eos 0.1 x10(3)/mcL 09/28/2020 10:50 CDT    ?Abs Eos 0.2 x10(3)/mcL 08/18/2020 10:45 CDT    ?Basophil Auto 0 % 12/08/2020 12:15 CST    ?Basophil Auto 1 % 09/28/2020 10:50 CDT    ?Basophil Auto 0 % 08/18/2020 10:45 CDT    ?Abs Neutro 6.80 x10(3)/mcL 12/08/2020 12:15 CST    ?Abs Neutro 5.61 x10(3)/mcL 09/28/2020 10:50 CDT    ?Abs Neutro 7.10 x10(3)/mcL 08/18/2020 10:45 CDT    ?Abs Lymph 2.9 x10(3)/mcL 12/08/2020 12:15 CST    ?Abs Lymph 3.2 x10(3)/mcL 09/28/2020 10:50 CDT    ?Abs Lymph 2.5 x10(3)/mcL 08/18/2020 10:45 CDT    ?Abs Mono 0.6 x10(3)/mcL 12/08/2020 12:15 CST    ?Abs Mono 0.8 x10(3)/mcL 09/28/2020 10:50 CDT    ?Abs Mono 0.7 x10(3)/mcL 08/18/2020 10:45 CDT    ?Abs Baso 0.0 x10(3)/mcL 12/08/2020 12:15 CST    ?Abs Baso 0.1 x10(3)/mcL 09/28/2020 10:50 CDT    ?Abs Baso 0.0 x10(3)/mcL 08/18/2020 10:45 CDT    ?IG% 0 % 12/08/2020 12:15 CST    ?IG% 1 % 09/28/2020 10:50 CDT    ?IG% 1 % 08/18/2020 10:45 CDT    ?IG# 0.050 12/08/2020 12:15 CST    ?IG# 0.0700 09/28/2020 10:50 CDT    ?IG#  0.0600 08/18/2020 10:45 CDT    ?SHAKIR Negative 08/18/2020 10:45 CDT    ?dsDNA Ab Negative 08/18/2020 10:45 CDT    ?Mitochon Ab IgG-RUST 7.3 Units 09/28/2020 10:50 CDT REFERENCE INTERVAL: Mitochondrial (M2) Antibody, IgG  ?  ??20.0 Units or less ......... Negative  ?20.1 - 24.9 Units........... Equivocal  ?25.0 Units or greater....... Positive  ?   Anti-mitochondrial antibodies (AMA) are thought to be  present in 90-95% of patients with primary biliary  cholangitis (PBC). However, the frequency of detected  antibodies may be cohort or assay dependent, as lower  sensitivities have been reported. Not all PBC patients are  positive for AMA; some patients may be positive for   and/or  antibodies. A negative result does not rule  out PBC.  Performed By: [facility]  [address] [place] [id number]  : Kerry Waite MD   ?F Actin IgG-RUST 14 Units 08/18/2020 10:45 CDT ?  ?If F-Actin (Smooth Muscle) Antibody, IgG is negative, the  Smooth Muscle Antibody titer by IFA is not performed.  Specimen is lipemic. Results may be adversely affected.  REFERENCE INTERVAL: F-Actin (Smooth Muscle) Antibody, IgG  by  ? ? ? ? ? ? ? ? ? ?DHARMESH  ?19 Units or less ....... Negative  ?20 - 30 Units .......... Weak Positive-Suggest repeat  ? ? ? ? ? ? ? ? ? ? ? ? ? testing in two to three weeks  ? ? ? ? ? ? ? ? ? ? ? ? ? with fresh specimen.  ?31 Units or greater..... Positive-Suggestive of  ? ? ? ? ? ? ? ? ? ? ? ? ? autoimmune hepatitis type 1  ? ? ? ? ? ? ? ? ? ? ? ? ? or chronic active hepatitis.  F-actin IgG antibodies have been shown to have increased  sensitivity for autoimmune hepatitis (AIH) but lower  specificity than smooth muscle antibodies (SMA). F-actin  IgG antibodies can also be seen in SMA-negative disease  controls (non-AIH), especially in patients with primary  biliary cirrhosis and chronic hepatitis C infections. Some  patients with AIH may be SMA-positive but negative for  F-actin  IgG. Consider testing for SMA by IFA if suspicion  for AIH is strong.  Performed By: [facility]  [address] [place] [address]  : Milton Yang MD, MS   ?LKM IgG-ARUP <1:20 2020 10:50 CDT ?  ?Specimen is lipemic. Results may be adversely affected.  INTERPRETIVE INFORMATION: ?Liver-Kidney-Microsome Abs, IgG  Liver-Kidney Microsome IgG antibody (anti-LKM), as detected  by indirect immunofluorescent antibody (IFA) techniques,  may be observed in patients with autoimmune hepatitis type  2 (AIH-2), AIH-2 associated with autoimmune  yzuyduyhftsvkmmvrm-dqjxpfdwccp-xlqyyvofdv dystrophy  (APECED), viral hepatitis C or D, and some forms of  drug-induced hepatitis. This IFA does not differentiate  among the four types of LKM antibodies (LKM-1, LKM-2,  LKM-3, and a fourth type that recognizes CY and CY  antigens). Of these, anti-LKM-1 (cytochrome [id number]) IgG  antibodies are considered specific for AIH-2.  Test developed and characteristics determined by [facility]. See Compliance Statement D: [employer]  Performed By: [facility]  [address] [place] [address]  : Kerry Waite MD   ?  Diagnostic Results  (10/01/2020 08:33 CDT US Abdomen Limited)  Reason For Exam  R10.11;Abdominal Pain  ?  Radiology Report  TECHNIQUE: Gray-scale and color Doppler images of the abdomen.  ?  HISTORY: Abdominal Pain  ?  COMPARISON: CT abdomen 2020, abdominal ultrasound 2018?  ?  FINDINGS: The liver measures 20.1 cm and is diffusely hyperechoic  suggesting steatosis. No focal hepatic abnormalities. The portal vein  demonstrates hepatopedal flow. There is no intra or extrahepatic bile  duct dilatation. The common bile duct measures 5 mm.  ?  The gallbladder is unremarkable. There is no sonographic Benson  sign.?  ?  The visualized portions of the pancreas and IVC are unremarkable.?  ?  The right kidney measures 11.4 cm and is  unremarkable.  ?  There is no free fluid in the visualized abdomen.  ?  ?  IMPRESSION:  ?  1. Enlarged liver with steatosis. [1]   ?  ?   Sent for EGD for abdominal pain, intermittent nausea and vomiting.? Patient with chronic pain syndrome taking morphine 100mg three times a day and dilaudid prn daily as well.? He reports a generalized abdominal pain, worse after eating.? Unable to provide any triggers.? Takes pain meds for pain when it occurs.? Also reports intermittent nausea/vomiting and heartburn despite pantoprazole 40mg daily.? He has a BM every other day without constipation or bleeding.

## 2022-05-09 ENCOUNTER — HOSPITAL ENCOUNTER (OUTPATIENT)
Dept: RADIOLOGY | Facility: HOSPITAL | Age: 42
Discharge: HOME | End: 2022-05-09
Attending: PSYCHIATRY & NEUROLOGY
Payer: MEDICARE

## 2022-05-09 ENCOUNTER — HOSPITAL ENCOUNTER (OUTPATIENT)
Dept: RADIOLOGY | Facility: HOSPITAL | Age: 42
Discharge: HOME | End: 2022-05-09
Payer: MEDICARE

## 2022-05-09 DIAGNOSIS — Z01.818 OTHER SPECIFIED PRE-OPERATIVE EXAMINATION: ICD-10-CM

## 2022-05-09 DIAGNOSIS — Z01.818 OTHER SPECIFIED PRE-OPERATIVE EXAMINATION: Primary | ICD-10-CM

## 2022-05-09 DIAGNOSIS — G89.4 CHRONIC PAIN ASSOCIATED WITH SIGNIFICANT PSYCHOSOCIAL DYSFUNCTION: ICD-10-CM

## 2022-05-09 PROCEDURE — 71046 X-RAY EXAM CHEST 2 VIEWS: CPT | Mod: TC

## 2022-05-09 RX ORDER — ESCITALOPRAM OXALATE 20 MG/1
20 TABLET ORAL DAILY
COMMUNITY

## 2022-05-09 RX ORDER — OLANZAPINE 5 MG/1
5 TABLET ORAL NIGHTLY
COMMUNITY
End: 2022-06-09

## 2022-05-09 RX ORDER — SPIRONOLACTONE 25 MG/1
25 TABLET ORAL DAILY
COMMUNITY
End: 2022-06-09

## 2022-05-09 RX ORDER — HYDRALAZINE HYDROCHLORIDE 100 MG/1
100 TABLET, FILM COATED ORAL EVERY 8 HOURS
COMMUNITY
End: 2022-06-09

## 2022-05-09 RX ORDER — ASPIRIN 81 MG/1
81 TABLET ORAL DAILY
COMMUNITY

## 2022-05-10 ENCOUNTER — ANESTHESIA (OUTPATIENT)
Dept: SURGERY | Facility: HOSPITAL | Age: 42
End: 2022-05-10
Payer: MEDICARE

## 2022-05-10 ENCOUNTER — ANESTHESIA EVENT (OUTPATIENT)
Dept: SURGERY | Facility: HOSPITAL | Age: 42
End: 2022-05-10
Payer: MEDICARE

## 2022-05-10 RX ORDER — ACETAMINOPHEN 325 MG/1
650 TABLET ORAL EVERY 4 HOURS PRN
Status: CANCELLED | OUTPATIENT
Start: 2022-05-10

## 2022-05-10 RX ORDER — SODIUM CHLORIDE 0.9 % (FLUSH) 0.9 %
3 SYRINGE (ML) INJECTION
Status: CANCELLED | OUTPATIENT
Start: 2022-05-10

## 2022-05-10 RX ORDER — LIDOCAINE HYDROCHLORIDE 10 MG/ML
1 INJECTION, SOLUTION EPIDURAL; INFILTRATION; INTRACAUDAL; PERINEURAL ONCE
Status: CANCELLED | OUTPATIENT
Start: 2022-05-10 | End: 2022-05-10

## 2022-05-10 RX ORDER — IPRATROPIUM BROMIDE AND ALBUTEROL SULFATE 2.5; .5 MG/3ML; MG/3ML
3 SOLUTION RESPIRATORY (INHALATION)
Status: CANCELLED | OUTPATIENT
Start: 2022-05-10

## 2022-05-10 RX ORDER — KETOROLAC TROMETHAMINE 30 MG/ML
15 INJECTION, SOLUTION INTRAMUSCULAR; INTRAVENOUS EVERY 8 HOURS PRN
Status: CANCELLED | OUTPATIENT
Start: 2022-05-10 | End: 2022-05-12

## 2022-05-10 RX ORDER — SODIUM CHLORIDE, SODIUM LACTATE, POTASSIUM CHLORIDE, CALCIUM CHLORIDE 600; 310; 30; 20 MG/100ML; MG/100ML; MG/100ML; MG/100ML
INJECTION, SOLUTION INTRAVENOUS CONTINUOUS
Status: CANCELLED | OUTPATIENT
Start: 2022-05-10

## 2022-05-10 RX ORDER — SODIUM CHLORIDE, SODIUM GLUCONATE, SODIUM ACETATE, POTASSIUM CHLORIDE AND MAGNESIUM CHLORIDE 30; 37; 368; 526; 502 MG/100ML; MG/100ML; MG/100ML; MG/100ML; MG/100ML
1000 INJECTION, SOLUTION INTRAVENOUS CONTINUOUS
Status: CANCELLED | OUTPATIENT
Start: 2022-05-10 | End: 2022-06-09

## 2022-05-10 NOTE — ANESTHESIA PREPROCEDURE EVALUATION
05/10/2022  Bharath Caballero is a 42 y.o., male who presents with Chronic back pain and   Chronic pain syndrome.  Dx:  Chronic pain syndrome.      Other diabetic neurological complication associated with other specified diabetes mellitus       (Chronic pain syndrome [G89.4])    He comes to [facility] for the above procedure under GETA.        Pre-op Assessment    I have reviewed the Patient Summary Reports.     I have reviewed the Nursing Notes. I have reviewed the NPO Status.   I have reviewed the Medications.     Review of Systems  Anesthesia Hx:  No problems with previous Anesthesia    Hematology/Oncology:  Hematology Normal   Oncology Normal     EENT/Dental:EENT/Dental Normal   Cardiovascular:   Exercise tolerance: good Hypertension  Functional Capacity good / => 4 METS    Pulmonary:   Sleep Apnea    Renal/:   Chronic Renal Disease    Hepatic/GI:   GERD    Musculoskeletal:   Arthritis     Neurological:  Neurology Normal    Endocrine:   Diabetes, poorly controlled, type 2, using insulin His CBG is rarely below 200  Morbid Obesity / BMI > 40  Dermatological:  Skin Normal    Psych:  Psychiatric Normal           Physical Exam  General: Alert, Oriented, Well nourished and Cooperative  obese  Airway:  Mallampati: III   Mouth Opening: Normal  TM Distance: Normal  Tongue: Large  Neck ROM: Normal ROM  Neck: Girth Increased  Full beard  Dental:  Intact    Chest/Lungs:  Clear to auscultation, Normal Respiratory Rate    Heart:  Rate: Normal  Rhythm: Regular Rhythm        Anesthesia Plan  Type of Anesthesia, risks & benefits discussed:    Anesthesia Type: Gen ETT  Intra-op Monitoring Plan: Standard ASA Monitors  Post Op Pain Control Plan: IV/PO Opioids PRN  Induction:  IV and Inhalation  Airway Plan: Direct, Post-Induction  Informed Consent: Informed consent signed with the Patient and all parties understand the risks and  [facility]  Critical Care Progress Note    Patient: Soledad Walton Date of Service: 2/1/2022   MRN: [id number] Time: 11:01 AM    YOB: 1952  Length of Stay: 2 days       Subjective: No events overnight. More awake this morning.     Objective:    Intake/Output:    Intake/Output Summary (Last 24 hours) at 2/1/2022 1101  Last data filed at 2/1/2022 1000  Gross per 24 hour   Intake 3526.41 ml   Output 1110 ml   Net 2416.41 ml       Vitals:  Visit Vitals  /67   Pulse (!) 101   Temp 97 °F (36.1 °C) (Temporal)   Resp (!) 23   Ht 5' 7\" (1.702 m)   Wt 105.1 kg (231 lb 11.3 oz)   SpO2 96%   BMI 36.29 kg/m²       Physical Exam:  General: Drowsy   Lungs: Diminished   Heart: Sinus rhythm, regular rate  Abdomen: Soft, nontender, nondistended, obese   Genitourinary: khan with yellow urine   Extremities: Warm and well perfused, mild generalized edema   Neurologic: A&Ox0, weakly moves all extremities x4    Labs  No results displayed because visit has over 200 results.          Imaging:  XR CHEST PA OR AP 1 VIEW   Final Result      1. Enteric tube extends over the stomach.   2. Prominent heart size with relatively unchanged interstitial edema.   3. Relatively unchanged small left pleural effusion with atelectasis/lung   consolidation. Possible trace right pleural effusion.   4. No definite new focal airspace consolidation.   5. Additional findings as described above.      Electronically Signed by: IVORY FRANCOIS M.D.    Signed on: 2/1/2022 9:06 AM          MRI CERVICAL SPINE W WO CONTRAST   Final Result      1. Multilevel chronic degenerative changes of the cervical spine   contributing to varying degrees of neural foraminal and spinal canal   stenosis as described in detail in the body of the report. Multilevel   neural foraminal stenosis most pronounced on the left at C3-C4 where there   is at least moderate stenosis. Multilevel no worse than mild spinal canal   stenosis in the cervical  agree with anesthesia plan.  All questions answered. Patient consented to blood products? Yes  ASA Score: 3  Day of Surgery Review of History & Physical: H&P Update referred to the surgeon/provider.  Anesthesia Plan Notes: Premedication: Midazolam  Special Technique: Preemptive analgesia with neurontin, zofran, acetaminophen and tramadol  PONV prophylaxis    Pt has uncontrolled DM2 on insulin - CBG over 300 this am despite his usual AM dose of insulin - will give 10U regular IV now in holding    Ready For Surgery From Anesthesia Perspective.     .       spine.   2. No cervical cord signal abnormality. No evidence for infection in the   cervical spine.   3. Additional findings as described above.      Electronically Signed by: IVORY FRANCOIS M.D.    Signed on: 1/31/2022 3:58 PM          MRI BRAIN W WO CONTRAST   Final Result      1. No acute intracranial abnormality.   2. Additional findings as described above.      Electronically Signed by: IVORY FRANCOIS M.D.    Signed on: 1/31/2022 3:48 PM          XR CHEST PA OR AP 1 VIEW   Final Result   1. Enteric tube projects at the stomach.   2. Small left pleural effusion with adjacent atelectasis or consolidation   is unchanged.   3. Additional probable edema and atelectatic changes are very similar   compared to the prior exam.      Electronically Signed by: AUREA TELLEZ M.D.    Signed on: 1/31/2022 10:07 AM          CTA HEAD W WO CONTRAST   Final Result   1. CT of the head demonstrates no acute intracranial hemorrhage, mass   effect, or hydrocephalus.   2. Mild age-appropriate involutional change.   3. Small amount of white matter hypodensity for which main differential   consideration is chronic small vessel ischemia given the presence of   intracranial atherosclerosis.   4. CTA of the head demonstrates small left cavernous ICA aneurysm.   5. No intracranial flow limiting stenosis.   6. CTA of the neck demonstrates no aneurysm, dissection, or flow limiting   stenosis.   7. Retropharyngeal course of the common and internal carotid arteries.   8. Please see above for additional observations.      Dr. Low was notified of the findings over secure perfect serve message   from Dr. Tellez on 1/31/2022 at 08:01.      Electronically Signed by: AUREA TELLEZ M.D.    Signed on: 1/31/2022 8:01 AM          CTA NECK W CONTRAST   Final Result   1. CT of the head demonstrates no acute intracranial hemorrhage, mass   effect, or hydrocephalus.   2. Mild age-appropriate involutional change.   3. Small amount of white matter  hypodensity for which main differential   consideration is chronic small vessel ischemia given the presence of   intracranial atherosclerosis.   4. CTA of the head demonstrates small left cavernous ICA aneurysm.   5. No intracranial flow limiting stenosis.   6. CTA of the neck demonstrates no aneurysm, dissection, or flow limiting   stenosis.   7. Retropharyngeal course of the common and internal carotid arteries.   8. Please see above for additional observations.      Dr. Low was notified of the findings over secure perfect serve message   from Dr. Marte on 1/31/2022 at 08:01.      Electronically Signed by: AUREA MARTE M.D.    Signed on: 1/31/2022 8:01 AM          CTA CHEST PULMONARY EMBOLISM W CONTRAST   Final Result      1.  Motion degraded examination.        2.  No evidence of pulmonary embolism to the segmental pulmonary arterial   level.  Segmental branches not well evaluated secondary to respiratory   motion artifact.      3.  Findings suggest pulmonary hypertension.      4.  Dependent opacities in the lower lungs likely represent atelectasis.    No conclusive infiltrate is identified.      5.  No conclusive acute abnormality identified within the abdomen/pelvis,   within limitations of motion artifact.  No free fluid, free air, or bowel   obstruction.        6.  Colonic diverticulosis without evidence of acute diverticulitis.      7.  Small lesions lower pole left kidney acute represent   hemorrhagic/proteinaceous cysts or small solid lesions.  Routine follow-up   with MRI suggested for better evaluation.      8.  Other findings as above.      Electronically Signed by: RAFAEL LUIS M.D.    Signed on: 1/30/2022 8:17 PM          CT ABDOMEN PELVIS W CONTRAST - IV contrast only   Final Result      1.  Motion degraded examination.        2.  No evidence of pulmonary embolism to the segmental pulmonary arterial   level.  Segmental branches not well evaluated secondary to respiratory   motion  artifact.      3.  Findings suggest pulmonary hypertension.      4.  Dependent opacities in the lower lungs likely represent atelectasis.    No conclusive infiltrate is identified.      5.  No conclusive acute abnormality identified within the abdomen/pelvis,   within limitations of motion artifact.  No free fluid, free air, or bowel   obstruction.        6.  Colonic diverticulosis without evidence of acute diverticulitis.      7.  Small lesions lower pole left kidney acute represent   hemorrhagic/proteinaceous cysts or small solid lesions.  Routine follow-up   with MRI suggested for better evaluation.      8.  Other findings as above.      Electronically Signed by: RAFAEL LUIS M.D.    Signed on: 1/30/2022 8:17 PM          XR CHEST PA OR AP 1 VIEW   Final Result      1.  Low lung volumes.      2.  Left lower lung obscured by enlargement of the cardiac silhouette and   low lung volumes.      3.  No conclusive infiltrate is identified.         Electronically Signed by: RAFAEL LUIS M.D.    Signed on: 1/30/2022 6:15 PM             Assessment/Plan:  1. UTI/sepsis  2. Hypotension--resolved  3. Afib with RVR-- resolved   4. Lactic acidosis--resolved   5. Toxic metabolic encephalopathy   6. Leukocytosis  7. Hypokalemia  8. Hyperglycemia   9. Hx of HTN  10. Hx of HLD  11. Hx of DM2  12. Hx of obesity (bedbound/wheelchair bound at baseline per daughter)    Neuro: Encephalopathy improving. Neurology following, MRI brain and c-spine yesterday with nothing acute to explain presentation. Clinically she is improving and neuro does not feel she needs LP at this time. Ammonia normal. Continue HD thiamine. Home duloxetine resumed.     CV: Hemodynamically stable. Back in NSR. Will stop amiodarone. Resume home cardiac meds- ASA, statin, low dose metoprolol. Hold ARB for now, if BP remains stable on BB can consider resuming tomorrow. Echo reviewed.     Pulm: On room air this morning with SpO2 91 and tachypneic. Place do 2L O2. CXR  obtained with mild edema. Will given lasix 20mg x1 now and monitor response.     GI: Enteral feeds. Home PPI resumed. Bowel regime. SLP consultation.     Renal/: BUN/Cr stable. Stop IVF. Remove khan.     ID: UA+ , urine and blood cultures pending. Will follow. Continue Zosyn day 3.     Heme: No acute issues. Hgb and plt stable.     Endo: Hyperglycemic likely due to initiation of enteral feeds. Will adjust insulin and monitor.     Lines/drains/tubes: PIV  Activity: As tolerated. PT/OT  Prophylaxis: SCDs, PPI, LMWH  Disposition: Ok to transfer to floor from critical care team perspective. D/w Dr. Lambert  Code Status: Full code    My personal management time is 25 minutes. This time reported does not include separately billed procedures. Collaborating physician Dr. Jason Ewing.     DOS 2/1/22    JOCELYN Elizondo-BC  Critical Care Nurse Practitioner

## 2022-05-12 ENCOUNTER — HOSPITAL ENCOUNTER (OUTPATIENT)
Facility: HOSPITAL | Age: 42
Discharge: HOME | End: 2022-05-12
Attending: PSYCHIATRY & NEUROLOGY | Admitting: PSYCHIATRY & NEUROLOGY
Payer: MEDICARE

## 2022-05-12 VITALS
TEMPERATURE: 97 F | OXYGEN SATURATION: 95 % | HEART RATE: 80 BPM | WEIGHT: 227.31 LBS | SYSTOLIC BLOOD PRESSURE: 136 MMHG | DIASTOLIC BLOOD PRESSURE: 75 MMHG | BODY MASS INDEX: 33.67 KG/M2 | HEIGHT: 69 IN | RESPIRATION RATE: 18 BRPM

## 2022-05-12 PROBLEM — G89.4 CHRONIC PAIN SYNDROME: Chronic | Status: ACTIVE | Noted: 2022-05-12

## 2022-05-12 PROBLEM — E11.40 DIABETIC NEUROPATHY: Status: ACTIVE | Noted: 2022-05-12

## 2022-05-12 PROBLEM — G89.4 CHRONIC PAIN SYNDROME: Status: ACTIVE | Noted: 2022-05-12

## 2022-05-12 LAB
POCT GLUCOSE: 206 MG/DL (ref 70–110)
POCT GLUCOSE: 250 MG/DL (ref 70–110)
POCT GLUCOSE: 258 MG/DL (ref 70–110)
POCT GLUCOSE: 339 MG/DL (ref 70–110)

## 2022-05-12 PROCEDURE — 63650 IMPLANT NEUROELECTRODES: CPT | Mod: 59,,, | Performed by: PSYCHIATRY & NEUROLOGY

## 2022-05-12 PROCEDURE — 25000003 PHARM REV CODE 250: Performed by: ANESTHESIOLOGY

## 2022-05-12 PROCEDURE — C1897 LEAD, NEUROSTIM TEST KIT: HCPCS | Performed by: PSYCHIATRY & NEUROLOGY

## 2022-05-12 PROCEDURE — 63650 IMPLANT NEUROELECTRODES: CPT

## 2022-05-12 PROCEDURE — 63600175 PHARM REV CODE 636 W HCPCS: Performed by: PSYCHIATRY & NEUROLOGY

## 2022-05-12 PROCEDURE — 25000003 PHARM REV CODE 250

## 2022-05-12 PROCEDURE — 63600175 PHARM REV CODE 636 W HCPCS: Performed by: ANESTHESIOLOGY

## 2022-05-12 PROCEDURE — 63650 PR PERCUT IMPLNT NEUROELECT,EPIDURAL: ICD-10-PCS | Mod: 59,,, | Performed by: PSYCHIATRY & NEUROLOGY

## 2022-05-12 PROCEDURE — 63600175 PHARM REV CODE 636 W HCPCS

## 2022-05-12 PROCEDURE — 82962 GLUCOSE BLOOD TEST: CPT | Performed by: PSYCHIATRY & NEUROLOGY

## 2022-05-12 PROCEDURE — 27201423 OPTIME MED/SURG SUP & DEVICES STERILE SUPPLY: Performed by: PSYCHIATRY & NEUROLOGY

## 2022-05-12 PROCEDURE — 37000009 HC ANESTHESIA EA ADD 15 MINS: Performed by: PSYCHIATRY & NEUROLOGY

## 2022-05-12 PROCEDURE — 37000008 HC ANESTHESIA 1ST 15 MINUTES: Performed by: PSYCHIATRY & NEUROLOGY

## 2022-05-12 DEVICE — IMPLANTABLE DEVICE: Type: IMPLANTABLE DEVICE | Site: BACK | Status: FUNCTIONAL

## 2022-05-12 RX ORDER — CEFAZOLIN SODIUM 1 G/3ML
1 INJECTION, POWDER, FOR SOLUTION INTRAMUSCULAR; INTRAVENOUS
Status: COMPLETED | OUTPATIENT
Start: 2022-05-12 | End: 2022-05-12

## 2022-05-12 RX ORDER — SUCCINYLCHOLINE CHLORIDE 20 MG/ML
INJECTION INTRAMUSCULAR; INTRAVENOUS
Status: DISCONTINUED | OUTPATIENT
Start: 2022-05-12 | End: 2022-05-12

## 2022-05-12 RX ORDER — MIDAZOLAM HYDROCHLORIDE 1 MG/ML
2 INJECTION INTRAMUSCULAR; INTRAVENOUS ONCE AS NEEDED
Status: COMPLETED | OUTPATIENT
Start: 2022-05-12 | End: 2022-05-12

## 2022-05-12 RX ORDER — HALOPERIDOL 5 MG/ML
0.5 INJECTION INTRAMUSCULAR EVERY 10 MIN PRN
Status: DISCONTINUED | OUTPATIENT
Start: 2022-05-12 | End: 2022-05-12 | Stop reason: HOSPADM

## 2022-05-12 RX ORDER — HYDROMORPHONE HYDROCHLORIDE 2 MG/ML
0.2 INJECTION, SOLUTION INTRAMUSCULAR; INTRAVENOUS; SUBCUTANEOUS EVERY 5 MIN PRN
Status: DISCONTINUED | OUTPATIENT
Start: 2022-05-12 | End: 2022-10-19

## 2022-05-12 RX ORDER — LIDOCAINE HYDROCHLORIDE 20 MG/ML
INJECTION, SOLUTION EPIDURAL; INFILTRATION; INTRACAUDAL; PERINEURAL
Status: DISCONTINUED
Start: 2022-05-12 | End: 2022-05-12 | Stop reason: HOSPADM

## 2022-05-12 RX ORDER — TRAMADOL HYDROCHLORIDE 50 MG/1
TABLET ORAL
Status: DISCONTINUED
Start: 2022-05-12 | End: 2022-05-12 | Stop reason: HOSPADM

## 2022-05-12 RX ORDER — ONDANSETRON 4 MG/1
4 TABLET, ORALLY DISINTEGRATING ORAL ONCE
Status: COMPLETED | OUTPATIENT
Start: 2022-05-12 | End: 2022-05-12

## 2022-05-12 RX ORDER — LIDOCAINE HYDROCHLORIDE 20 MG/ML
INJECTION, SOLUTION EPIDURAL; INFILTRATION; INTRACAUDAL; PERINEURAL
Status: DISCONTINUED
Start: 2022-05-12 | End: 2022-05-12 | Stop reason: WASHOUT

## 2022-05-12 RX ORDER — TRAMADOL HYDROCHLORIDE 50 MG/1
50 TABLET ORAL
Status: COMPLETED | OUTPATIENT
Start: 2022-05-12 | End: 2022-05-12

## 2022-05-12 RX ORDER — ONDANSETRON 2 MG/ML
4 INJECTION INTRAMUSCULAR; INTRAVENOUS DAILY PRN
Status: DISCONTINUED | OUTPATIENT
Start: 2022-05-12 | End: 2022-10-19

## 2022-05-12 RX ORDER — METOCLOPRAMIDE HYDROCHLORIDE 5 MG/ML
10 INJECTION INTRAMUSCULAR; INTRAVENOUS EVERY 10 MIN PRN
Status: DISCONTINUED | OUTPATIENT
Start: 2022-05-12 | End: 2022-10-19

## 2022-05-12 RX ORDER — SODIUM CHLORIDE, SODIUM GLUCONATE, SODIUM ACETATE, POTASSIUM CHLORIDE AND MAGNESIUM CHLORIDE 30; 37; 368; 526; 502 MG/100ML; MG/100ML; MG/100ML; MG/100ML; MG/100ML
1000 INJECTION, SOLUTION INTRAVENOUS CONTINUOUS
Status: DISCONTINUED | OUTPATIENT
Start: 2022-05-12 | End: 2022-05-12 | Stop reason: HOSPADM

## 2022-05-12 RX ORDER — LIDOCAINE HYDROCHLORIDE 10 MG/ML
INJECTION, SOLUTION EPIDURAL; INFILTRATION; INTRACAUDAL; PERINEURAL
Status: DISCONTINUED | OUTPATIENT
Start: 2022-05-12 | End: 2022-05-12

## 2022-05-12 RX ORDER — DEXMEDETOMIDINE HYDROCHLORIDE 100 UG/ML
INJECTION, SOLUTION INTRAVENOUS
Status: DISCONTINUED | OUTPATIENT
Start: 2022-05-12 | End: 2022-05-12

## 2022-05-12 RX ORDER — CEFAZOLIN SODIUM 1 G/3ML
INJECTION, POWDER, FOR SOLUTION INTRAMUSCULAR; INTRAVENOUS
Status: COMPLETED
Start: 2022-05-12 | End: 2022-05-12

## 2022-05-12 RX ORDER — DIPHENHYDRAMINE HYDROCHLORIDE 50 MG/ML
25 INJECTION INTRAMUSCULAR; INTRAVENOUS EVERY 6 HOURS PRN
Status: DISCONTINUED | OUTPATIENT
Start: 2022-05-12 | End: 2022-10-19

## 2022-05-12 RX ORDER — SODIUM CHLORIDE, SODIUM GLUCONATE, SODIUM ACETATE, POTASSIUM CHLORIDE AND MAGNESIUM CHLORIDE 30; 37; 368; 526; 502 MG/100ML; MG/100ML; MG/100ML; MG/100ML; MG/100ML
1000 INJECTION, SOLUTION INTRAVENOUS CONTINUOUS
Status: CANCELLED | OUTPATIENT
Start: 2022-05-12 | End: 2022-06-11

## 2022-05-12 RX ORDER — GABAPENTIN 300 MG/1
CAPSULE ORAL
Status: DISCONTINUED
Start: 2022-05-12 | End: 2022-05-12 | Stop reason: HOSPADM

## 2022-05-12 RX ORDER — ONDANSETRON 2 MG/ML
4 INJECTION INTRAMUSCULAR; INTRAVENOUS DAILY PRN
Status: DISCONTINUED | OUTPATIENT
Start: 2022-05-12 | End: 2022-05-12 | Stop reason: HOSPADM

## 2022-05-12 RX ORDER — ROCURONIUM BROMIDE 10 MG/ML
INJECTION, SOLUTION INTRAVENOUS
Status: DISCONTINUED | OUTPATIENT
Start: 2022-05-12 | End: 2022-05-12

## 2022-05-12 RX ORDER — HYDROMORPHONE HYDROCHLORIDE 2 MG/ML
0.4 INJECTION, SOLUTION INTRAMUSCULAR; INTRAVENOUS; SUBCUTANEOUS EVERY 5 MIN PRN
Status: DISCONTINUED | OUTPATIENT
Start: 2022-05-12 | End: 2022-05-12 | Stop reason: HOSPADM

## 2022-05-12 RX ORDER — ACETAMINOPHEN 500 MG
1000 TABLET ORAL
Status: COMPLETED | OUTPATIENT
Start: 2022-05-12 | End: 2022-05-12

## 2022-05-12 RX ORDER — PROPOFOL 10 MG/ML
VIAL (ML) INTRAVENOUS
Status: DISCONTINUED | OUTPATIENT
Start: 2022-05-12 | End: 2022-05-12

## 2022-05-12 RX ORDER — GABAPENTIN 300 MG/1
300 CAPSULE ORAL
Status: COMPLETED | OUTPATIENT
Start: 2022-05-12 | End: 2022-05-12

## 2022-05-12 RX ORDER — LIDOCAINE HYDROCHLORIDE 20 MG/ML
INJECTION, SOLUTION EPIDURAL; INFILTRATION; INTRACAUDAL; PERINEURAL
Status: DISCONTINUED | OUTPATIENT
Start: 2022-05-12 | End: 2022-05-12 | Stop reason: HOSPADM

## 2022-05-12 RX ORDER — ACETAMINOPHEN 500 MG
TABLET ORAL
Status: DISCONTINUED
Start: 2022-05-12 | End: 2022-05-12 | Stop reason: HOSPADM

## 2022-05-12 RX ORDER — FENTANYL CITRATE 50 UG/ML
INJECTION, SOLUTION INTRAMUSCULAR; INTRAVENOUS
Status: DISCONTINUED | OUTPATIENT
Start: 2022-05-12 | End: 2022-05-12

## 2022-05-12 RX ORDER — SODIUM CHLORIDE, SODIUM LACTATE, POTASSIUM CHLORIDE, CALCIUM CHLORIDE 600; 310; 30; 20 MG/100ML; MG/100ML; MG/100ML; MG/100ML
INJECTION, SOLUTION INTRAVENOUS CONTINUOUS
Status: DISCONTINUED | OUTPATIENT
Start: 2022-05-12 | End: 2022-05-12 | Stop reason: HOSPADM

## 2022-05-12 RX ORDER — SODIUM CHLORIDE 0.9 % (FLUSH) 0.9 %
10 SYRINGE (ML) INJECTION
Status: CANCELLED | OUTPATIENT
Start: 2022-05-12

## 2022-05-12 RX ORDER — LIDOCAINE HYDROCHLORIDE 10 MG/ML
1 INJECTION, SOLUTION EPIDURAL; INFILTRATION; INTRACAUDAL; PERINEURAL ONCE
Status: CANCELLED | OUTPATIENT
Start: 2022-05-12 | End: 2022-05-12

## 2022-05-12 RX ORDER — ONDANSETRON 4 MG/1
4 TABLET, ORALLY DISINTEGRATING ORAL
Status: CANCELLED | OUTPATIENT
Start: 2022-05-12 | End: 2022-05-12

## 2022-05-12 RX ADMIN — FENTANYL CITRATE 50 MCG: 50 INJECTION, SOLUTION INTRAMUSCULAR; INTRAVENOUS at 09:05

## 2022-05-12 RX ADMIN — ONDANSETRON 4 MG: 4 TABLET, ORALLY DISINTEGRATING ORAL at 08:05

## 2022-05-12 RX ADMIN — FENTANYL CITRATE 50 MCG: 50 INJECTION, SOLUTION INTRAMUSCULAR; INTRAVENOUS at 10:05

## 2022-05-12 RX ADMIN — GABAPENTIN 300 MG: 300 CAPSULE ORAL at 08:05

## 2022-05-12 RX ADMIN — TRAMADOL HYDROCHLORIDE 50 MG: 50 TABLET, FILM COATED ORAL at 08:05

## 2022-05-12 RX ADMIN — DEXMEDETOMIDINE HYDROCHLORIDE 6 MCG: 100 INJECTION, SOLUTION INTRAVENOUS at 09:05

## 2022-05-12 RX ADMIN — ACETAMINOPHEN 1000 MG: 500 TABLET ORAL at 08:05

## 2022-05-12 RX ADMIN — INSULIN HUMAN 10 UNITS: 100 INJECTION, SOLUTION PARENTERAL at 07:05

## 2022-05-12 RX ADMIN — DEXMEDETOMIDINE HYDROCHLORIDE 2 MCG: 100 INJECTION, SOLUTION INTRAVENOUS at 10:05

## 2022-05-12 RX ADMIN — MIDAZOLAM HYDROCHLORIDE 2 MG: 1 INJECTION, SOLUTION INTRAMUSCULAR; INTRAVENOUS at 09:05

## 2022-05-12 RX ADMIN — INSULIN HUMAN 10 UNITS: 100 INJECTION, SOLUTION PARENTERAL at 11:05

## 2022-05-12 RX ADMIN — CEFAZOLIN 1 G: 330 INJECTION, POWDER, FOR SOLUTION INTRAMUSCULAR; INTRAVENOUS at 09:05

## 2022-05-12 RX ADMIN — SODIUM CHLORIDE, SODIUM LACTATE, POTASSIUM CHLORIDE, AND CALCIUM CHLORIDE: .6; .31; .03; .02 INJECTION, SOLUTION INTRAVENOUS at 07:05

## 2022-05-12 RX ADMIN — ROCURONIUM BROMIDE 5 MG: 10 SOLUTION INTRAVENOUS at 09:05

## 2022-05-12 RX ADMIN — SUCCINYLCHOLINE CHLORIDE 120 MG: 20 INJECTION, SOLUTION INTRAMUSCULAR; INTRAVENOUS at 09:05

## 2022-05-12 RX ADMIN — LIDOCAINE HYDROCHLORIDE 40 MG: 10 INJECTION, SOLUTION EPIDURAL; INFILTRATION; INTRACAUDAL; PERINEURAL at 09:05

## 2022-05-12 RX ADMIN — PROPOFOL 200 MG: 10 INJECTION, EMULSION INTRAVENOUS at 09:05

## 2022-05-12 NOTE — OP NOTE
DATE OF SURGERY:    05/12/2022     SURGEON:  Jay Pozo MD    PREOPERATIVE DIAGNOSIS:  1.  Chronic pain syndrome.    2.  Painful Diabetic Neuropathy    POSTOPERATIVE DIAGNOSE:  1.  Chronic pain syndrome.    2.  Painful Diabetic Neuropathy    PROCEDURE PERFORMED:  Fluoroscopically-guided placement of 2 spinal cord stimulator leads under anesthesia for programming and trial.    EQUIPMENT USED:  Proper Cloth stimulator kit.    DETAILED DESCRIPTION:  Following informed consent, and with the patient under general endotracheal anesthesia, he was prepped and draped in the usual sterile fashion.  I infiltrated the tissue overlying L2-3 with local anesthetic.  Under fluoroscopic guidance, I entered the epidural space at L1-2 using two 14-gauge Tomade.comy curved-tip spinal needles.  I introduced stimulator lead and advanced it to the top of T8 and then a 2nd stimulator lead to the middle of T9.  I carefully removed the stylets and needles while keeping the lead in place.  The leads were then connected to the generator for programming.  Everything was secured with sterile dressing.  The patient tolerated the procedure well without apparent complication.      ______________________________  Jay Pozo MD

## 2022-05-12 NOTE — TRANSFER OF CARE
"Anesthesia Transfer of Care Note    Patient: Bharath Caballero    Procedure(s) Performed: Procedure(s) (LRB):  TRIAL, NEUROSTIMULATOR, SPINAL CORD (N/A)    Patient location: PACU    Anesthesia Type: general    Transport from OR: Transported from OR on room air with adequate spontaneous ventilation    Post pain: adequate analgesia    Post assessment: no apparent anesthetic complications and tolerated procedure well    Post vital signs: stable    Level of consciousness: responds to stimulation    Nausea/Vomiting: no nausea/vomiting    Complications: none    Transfer of care protocol was followed      Last vitals:   Visit Vitals  /78 (BP Location: Right arm)   Pulse 90   Temp 36.6 °C (97.9 °F) (Tympanic)   Resp 16   Ht 5' 9" (1.753 m)   Wt 103.1 kg (227 lb 4.7 oz)   SpO2 95%   BMI 33.57 kg/m²     "

## 2022-05-12 NOTE — PLAN OF CARE
Problem: Pain Acute  Goal: Acceptable Pain Control and Functional Ability  Outcome: Met     Problem: Fall Injury Risk  Goal: Absence of Fall and Fall-Related Injury  Outcome: Met     Problem: Infection  Goal: Absence of Infection Signs and Symptoms  Outcome: Met     Problem: Hypertension Acute  Goal: Blood Pressure Within Desired Range  Outcome: Met     Problem: Diabetes Comorbidity  Goal: Blood Glucose Level Within Targeted Range  Outcome: Met

## 2022-05-12 NOTE — ANESTHESIA PROCEDURE NOTES
Intubation    Date/Time: 5/12/2022 9:47 AM  Performed by: Mihaela Diaz CRNA  Authorized by: Malina Herrera MD     Intubation:     Induction:  Intravenous    Intubated:  Postinduction    Mask Ventilation:  Easy with oral airway    Attempts:  1    Attempted By:  CRNA    Method of Intubation:  Direct    Blade:  Jean 2    Laryngeal View Grade: Grade I - full view of cords      Difficult Airway Encountered?: No      Complications:  None    Airway Device:  Oral endotracheal tube    Airway Device Size:  7.5    Style/Cuff Inflation:  Cuffed (inflated to minimal occlusive pressure)    Tube secured:  21    Secured at:  The lips    Placement Verified By:  Capnometry    Complicating Factors:  None    Findings Post-Intubation:  BS equal bilateral

## 2022-05-12 NOTE — DISCHARGE SUMMARY
[facility] - Periop Services  Discharge Note  Short Stay    Procedure(s) (LRB):  TRIAL, NEUROSTIMULATOR, SPINAL CORD (N/A)    OUTCOME: Patient tolerated treatment/procedure well without complication and is now ready for discharge.    DISPOSITION: Home or Self Care    FINAL DIAGNOSIS:  Chronic pain syndrome    FOLLOWUP: In clinic    DISCHARGE INSTRUCTIONS:  No discharge procedures on file.     TIME SPENT ON DISCHARGE: 20 minutes

## 2022-05-12 NOTE — H&P
Pre-Operative History and Physical   Interventional Neurology    Bharath Caballero is a 42 y.o. male here for SCS trial    ROS:  Review of Systems   All other systems reviewed and are negative.       Past Medical History:   Diagnosis Date    BPH (benign prostatic hyperplasia)     Diabetes     GERD (gastroesophageal reflux disease)     History of continuous positive airway pressure (CPAP) therapy at home     Hypertension     Kidney disorder     Leg pain     Neuropathy     OA (osteoarthritis)     CICI (obstructive sleep apnea)     Renal insufficiency      Past Surgical History:   Procedure Laterality Date    APPENDECTOMY      ARTERIOVENOUS ANASTOMOSIS, OPEN, UPPER ARM BASILLIC VEIN TRANSPOSITION N/A 05/07/2018    ESOPHAGOGASTRODUODENOSCOPY N/A 06/07/2021    EXCISION OF ARTERIOVENOUS FISTULA N/A 06/01/2018    HERNIA REPAIR      INSPECTION OF UPPER INTESTINAL TRACT N/A 06/07/2021    PHERESIS OF PLASMA N/A 07/13/2018    PHERESIS OF PLASMA N/A 05/25/2018    UPPER GI N/A 06/07/2021     History reviewed. No pertinent family history.  Review of patient's allergies indicates:  No Known Allergies    Current Facility-Administered Medications:     acetaminophen (TYLENOL) 500 MG tablet, , , ,     ceFAZolin (ANCEF) 1 gram injection, , , ,     ceFAZolin injection 1 g, 1 g, Intravenous, Once Pre-Op, Jay Pozo MD    gabapentin (NEURONTIN) 300 MG capsule, , , ,     lactated ringers infusion, , Intravenous, Continuous, Malina Herrera MD, Last Rate: 100 mL/hr at 05/12/22 0745, New Bag at 05/12/22 0745    traMADoL (ULTRAM) 50 mg tablet, , , ,   Outpatient Medications Marked as Taking for the 5/12/22 encounter (Hospital Encounter)   Medication Sig Dispense Refill    alirocumab (PRALUENT PEN) 150 mg/mL PnIj Praluent Pen 150 mg/mL subcutaneous pen injector      amLODIPine (NORVASC) 10 MG tablet amlodipine 10 mg tablet      aspirin (ECOTRIN) 81 MG EC tablet Take 81 mg by mouth once daily.       atorvastatin (LIPITOR) 10 MG tablet Take 80 mg by mouth once daily.      atorvastatin (LIPITOR) 80 MG tablet atorvastatin 80 mg tablet      carBAMazepine (CARBATROL) 200 MG CM12 Take 600 mg by mouth 2 (two) times a day.      clonazePAM (KLONOPIN) 1 MG tablet Take 1 mg by mouth 2 (two) times a day.      cloNIDine (CATAPRES) 0.1 MG tablet Take 0.1 mg by mouth 3 (three) times daily.      doxepin (SINEQUAN) 75 MG capsule doxepin 75 mg capsule      ergocalciferol (ERGOCALCIFEROL) 50,000 unit Cap Vitamin D2 1,250 mcg (50,000 unit) capsule      EScitalopram oxalate (LEXAPRO) 20 MG tablet Take 20 mg by mouth once daily.      famotidine (PEPCID) 20 MG tablet Take 20 mg by mouth every evening.      fenofibrate 160 MG Tab fenofibrate 160 mg tablet      ferrous sulfate (FEOSOL) 325 mg (65 mg iron) Tab tablet Patrice-Time 325 mg (65 mg iron) tablet      gabapentin (NEURONTIN) 400 MG capsule Take 200 mg by mouth 2 (two) times daily.      gabapentin (NEURONTIN) 800 MG tablet every evening.      hydrALAZINE (APRESOLINE) 100 MG tablet Take 100 mg by mouth every 8 (eight) hours.      HYDROmorphone (DILAUDID) 2 MG tablet Take 2 mg by mouth every 4 (four) hours as needed.      insulin aspart U-100 (NOVOLOG) 100 unit/mL injection Novolog U-100 Insulin aspart 100 unit/mL subcutaneous solution      insulin glargine (LANTUS U-100 INSULIN) 100 unit/mL injection Lantus U-100 Insulin 100 unit/mL subcutaneous solution      irbesartan (AVAPRO) 300 MG tablet once daily.      LANTUS SOLOSTAR U-100 INSULIN glargine 100 units/mL (3mL) SubQ pen Inject 60 Units into the skin 2 (two) times a day.      lipase-protease-amylase (CREON) 3,000-9,500- 15,000 unit CpDR Creon 3,000 unit-9,500 unit-15,000 unit capsule,delayed release      LIPASE-PROTEASE-AMYLASE 5,000-17,000 -27,000 UNITS (PANLIPASE) 5,000-17,000 -27,000 unit CpDR Zenpep 5,000 unit-17,000 unit-27,000 unit capsule,delayed release      metFORMIN (GLUCOPHAGE) 1000 MG tablet  metformin 1,000 mg tablet      metoprolol succinate (TOPROL-XL) 100 MG 24 hr tablet once daily.      morphine (MS CONTIN) 100 MG 12 hr tablet Take 100 mg by mouth every 8 (eight) hours.      OLANZapine (ZYPREXA) 5 MG tablet Take 5 mg by mouth every evening.      pantoprazole (PROTONIX) 40 MG tablet pantoprazole 40 mg tablet,delayed release      pioglitazone (ACTOS) 15 MG tablet pioglitazone 15 mg tablet      potassium chloride SA (K-DUR,KLOR-CON) 20 MEQ tablet Take 20 mEq by mouth once daily.      QUEtiapine (SEROQUEL) 100 MG Tab quetiapine 100 mg tablet      rosuvastatin (CRESTOR) 40 MG Tab once daily.      spironolactone (ALDACTONE) 25 MG tablet Take 25 mg by mouth once daily.      tamsulosin (FLOMAX) 0.4 mg Cap Take 0.4 mg by mouth once daily.       Social History     Tobacco Use    Smoking status: Current Every Day Smoker     Packs/day: 1.00     Years: 25.00     Pack years: 25.00     Types: Cigarettes    Smokeless tobacco: Never Used   Substance Use Topics    Alcohol use: Not Currently    Drug use: Never         Vitals:    05/12/22 0803   BP:    Pulse:    Resp: 20   Temp:      Gen NAD  HEENT NC/AT  CV RRR, no carotid bruits  Resp clear  GI soft  Ext no C/C/E  Neuro  AAOx4  Speech fluent, appropriate  EOMI, PERRLA, VFF  Normal facial strength, sensation  Tongue and palate midline  Motor 5/5  Sensation intact  DTRs symmetric  Coord intact  Gait Normal        Assessment: CPS    Plan: SCS trial    I have discussed the risks, benefits, indications, and alternatives of the procedure in detail.  The patient verbalizes her understanding.  All questions answered.  Consents signed.  The patient agrees to proceed to proceed as planned.

## 2022-05-12 NOTE — ANESTHESIA POSTPROCEDURE EVALUATION
Anesthesia Post Evaluation    Patient: Bharath Caballero    Procedure(s) Performed: Procedure(s) (LRB):  TRIAL, NEUROSTIMULATOR, SPINAL CORD (N/A)    Final Anesthesia Type: general      Patient location during evaluation: PACU  Patient participation: Yes- Able to Participate  Level of consciousness: awake and alert  Post-procedure vital signs: reviewed and stable  Pain management: adequate    PONV status at discharge: No PONV  Anesthetic complications: no      Cardiovascular status: hemodynamically stable  Respiratory status: unassisted and room air  Hydration status: euvolemic  Follow-up not needed.          Vitals Value Taken Time   /75 05/12/22 1204   Temp 36.2 °C (97.2 °F) 05/12/22 1125   Pulse 80 05/12/22 1204   Resp 18 05/12/22 1125   SpO2 95 % 05/12/22 1204         Event Time   Out of Recovery 11:13:00         Pain/Merlyn Score: Pain Rating Prior to Med Admin: 8 (5/12/2022  8:03 AM)  Merlyn Score: 10 (5/12/2022 11:20 AM)  Modified Merlyn Score: 19 (5/12/2022 12:12 PM)

## 2022-05-17 ENCOUNTER — OFFICE VISIT (OUTPATIENT)
Dept: NEUROLOGY | Facility: CLINIC | Age: 42
End: 2022-05-17
Payer: MEDICARE

## 2022-05-17 ENCOUNTER — TELEPHONE (OUTPATIENT)
Dept: NEUROLOGY | Facility: CLINIC | Age: 42
End: 2022-05-17

## 2022-05-17 VITALS
RESPIRATION RATE: 18 BRPM | BODY MASS INDEX: 33.47 KG/M2 | WEIGHT: 226 LBS | DIASTOLIC BLOOD PRESSURE: 90 MMHG | HEIGHT: 69 IN | SYSTOLIC BLOOD PRESSURE: 136 MMHG | TEMPERATURE: 98 F

## 2022-05-17 DIAGNOSIS — E11.40 PAINFUL DIABETIC NEUROPATHY: Primary | ICD-10-CM

## 2022-05-17 DIAGNOSIS — G89.4 CHRONIC PAIN SYNDROME: Primary | ICD-10-CM

## 2022-05-17 DIAGNOSIS — E11.40 PAINFUL DIABETIC NEUROPATHY: ICD-10-CM

## 2022-05-17 DIAGNOSIS — G89.4 CHRONIC PAIN DISORDER: ICD-10-CM

## 2022-05-17 PROCEDURE — 99999 PR PBB SHADOW E&M-EST. PATIENT-LVL V: ICD-10-PCS | Mod: PBBFAC,,, | Performed by: NURSE PRACTITIONER

## 2022-05-17 PROCEDURE — 99211 OFF/OP EST MAY X REQ PHY/QHP: CPT | Mod: S$PBB,,, | Performed by: NURSE PRACTITIONER

## 2022-05-17 PROCEDURE — 99215 OFFICE O/P EST HI 40 MIN: CPT | Mod: PBBFAC | Performed by: NURSE PRACTITIONER

## 2022-05-17 PROCEDURE — 99999 PR PBB SHADOW E&M-EST. PATIENT-LVL V: CPT | Mod: PBBFAC,,, | Performed by: NURSE PRACTITIONER

## 2022-05-17 PROCEDURE — 99211 PR OFFICE/OUTPT VISIT, EST, LEVL I: ICD-10-PCS | Mod: S$PBB,,, | Performed by: NURSE PRACTITIONER

## 2022-05-17 RX ORDER — PEN NEEDLE, DIABETIC 30 GX3/16"
NEEDLE, DISPOSABLE MISCELLANEOUS
COMMUNITY
End: 2022-12-20 | Stop reason: SDUPTHER

## 2022-05-17 NOTE — PROGRESS NOTES
Subjective:      Patient ID: Bharath Caballero is a 42 y.o. male.    Chief Complaint: f/u SCS trial leas removal (Pt states that pain in legs has been minimal with stimulator but incision has been leaking and back pain is at a level 7. Pain is described as achy.)    Referred by: No ref. provider found     HPI    Interventional Pain History    Patient is new to me.  He has pertinent past medical history of painful diabetic neuropathy, chronic pain syndrome, pancreatitis, hyperlipidemia, liver failure, hypertension, morbid obesity and sleep apnea along with uncontrolled diabetes mellitus and tobacco abuse.  Review of labs shows AST elevated 38, ALT elevated 97 creatinine and GFR are both normal, last urine drug screen 09/11/2021 positive for benzodiazepines and opioids.  He was last evaluated by Dr. Pozo January 2022 with recommendation to place spinal cord stimulator for painful diabetic neuropathy.  He is here today for spinal cord stimulator trial lead removal.     He had notable pain relief during the SCS trial. Denied taking a tub bath, sponge baths only. He would like to proceed with a SCS implant as he had notable relief of pain, especially the cold feeling he had in legs and feet prior to the trial has resolved. He also reported feeling return of sensations to right and left foot and left leg and a notable improvement by being able to  walk a little more. Since his SCS trial, he reports pain woke him up once in night w/stabbing pain to his feet and legs. This is compared to his baseline neuropathy to legs and feet waking up 6 or more times at night. He teared up during visit as this was the first time in years he had some relief. Denies fever at home, no heavy lifting, twisting or bending.     Current pain medication:  Dilaudid, gabapentin and all other scheduled Pain drugs prescribed by an outside physician    ROS  As noted in HPI      Objective:          Physical Exam   General: unkempt appearance smelled  strong of tobacco. Hearing deficit. Pleasant  A&O x 3; No anxiety/depression; NAD  Mental Status: Oriented to person, palce and time. Displays appropriate mood & affect.  Head: Norm cephalic and atraumatic  Eyes: normal conjunctiva, normal lids, normal pupils  Respiratory: Symmetrical, Unlabored    Extremities:  Gen: No cyanosis or tenderness to palpation bilateral LE  Skin:Clem red appearance to skin. Rough dermis.   SCS Lead Incision: slight red w/small amount of clear/pale yellow drainage to right lead. Afebrile. Slight tenderness to site.   Strength: 4/5 motor strength bilateral UE/LE  ROM: Full ROM in bilateral knees and ankles without pain or instability.    Neuro:  Gait: Notable altalgic lean with single prong cane.   DTR's: abent in bilateral patellar, and ankle  Sensory:notable deficit to bilateral soles of feet, ankles and legs. Proprioception deficits to both feet.     Spine: mild tenderness to palpation over bilateral cervical and lumbar spine.     L-spine ROM: limited  ROM to flexion, extension, bilateral rotation, and bilateral lateral flexion     Straight Leg Raise: Equivocal on left sitting to 60 degrees.        Assessment:       Encounter Diagnoses   Name Primary?    Painful diabetic neuropathy Yes    Chronic pain disorder          Plan:       Bharath was seen today for f/u scs trial leas removal.    Diagnoses and all orders for this visit:    Painful diabetic neuropathy    Chronic pain disorder       Instructed patient to keep a Band-Aid on site for next two weeks No tub baths showers only with Band-Aid covering incision.  Recommended smoking cessation and better control of DM. Requested patient call back if he develops a fever , drainage, tenderness to SCS lead site. If after hours, patient instructed to go to ER for evaluation. Patient would like to proceed with SCS Implant. Communicated this to Lizzie Campbell who is scheduling procedure with patient.       Dr Pozo viewed patients spinal  cord stimulator lead incision and relayed he agreed w/plan of care and to notify patient to call us if fever, drainage or tenderness occurs to site. I confirmed this was relayed and patient confirmed he would notify us.

## 2022-05-19 RX ORDER — CLONIDINE HYDROCHLORIDE 0.1 MG/1
0.1 TABLET ORAL 3 TIMES DAILY
Qty: 270 TABLET | Refills: 3 | Status: SHIPPED | OUTPATIENT
Start: 2022-05-19 | End: 2022-06-20

## 2022-06-09 ENCOUNTER — OFFICE VISIT (OUTPATIENT)
Dept: NEUROLOGY | Facility: CLINIC | Age: 42
End: 2022-06-09
Payer: MEDICARE

## 2022-06-09 VITALS
WEIGHT: 227.75 LBS | SYSTOLIC BLOOD PRESSURE: 184 MMHG | HEIGHT: 69 IN | TEMPERATURE: 98 F | HEART RATE: 92 BPM | OXYGEN SATURATION: 97 % | RESPIRATION RATE: 20 BRPM | DIASTOLIC BLOOD PRESSURE: 96 MMHG | BODY MASS INDEX: 33.73 KG/M2

## 2022-06-09 DIAGNOSIS — E11.40 PAINFUL DIABETIC NEUROPATHY: Primary | ICD-10-CM

## 2022-06-09 PROCEDURE — 99215 OFFICE O/P EST HI 40 MIN: CPT | Mod: PBBFAC | Performed by: NURSE PRACTITIONER

## 2022-06-09 PROCEDURE — 99214 PR OFFICE/OUTPT VISIT, EST, LEVL IV, 30-39 MIN: ICD-10-PCS | Mod: S$PBB,,, | Performed by: NURSE PRACTITIONER

## 2022-06-09 PROCEDURE — 99214 OFFICE O/P EST MOD 30 MIN: CPT | Mod: S$PBB,,, | Performed by: NURSE PRACTITIONER

## 2022-06-09 RX ORDER — GABAPENTIN 400 MG/1
400 CAPSULE ORAL 2 TIMES DAILY
Qty: 60 CAPSULE | Refills: 7 | Status: SHIPPED | OUTPATIENT
Start: 2022-06-09 | End: 2022-07-09

## 2022-06-09 RX ORDER — CARBAMAZEPINE 200 MG/1
600 CAPSULE, EXTENDED RELEASE ORAL 2 TIMES DAILY
Qty: 180 CAPSULE | Refills: 11 | Status: SHIPPED | OUTPATIENT
Start: 2022-06-09 | End: 2022-10-19 | Stop reason: SDUPTHER

## 2022-06-09 RX ORDER — GABAPENTIN 800 MG/1
800 TABLET ORAL NIGHTLY
Qty: 30 TABLET | Refills: 7 | Status: SHIPPED | OUTPATIENT
Start: 2022-06-09 | End: 2022-07-09

## 2022-06-09 NOTE — PROGRESS NOTES
Subjective:       Patient ID: Bharath Caballero is a 42 y.o. male.    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    Interim History  He was last seen on 1/6/2022. During that visit, patient was awaiting appt w/Dr. Pozo for SCS trial, CBZ ER 600mg BID was continued. Pt underwent NEVRO SCS trial on 5/12/2022. Scheduled for permanent placement 6/23/2022. States trial was successful.    Pain to lower ext is constant and has worsened since last visit, R lower ext > L lower ext, described as burning and numbness. Fell yesterday. No longer participating in PT. Pain scale 7/10. Pain exacerbated by walking, laying. Improved with hot water soaks and Capsaicin. Currently sees Dr. Beasley for pain management.    Presenting History  He noted that he initially presented with right leg stabbing pain and paresthesia radiating up his leg for 2 years followed by paresthesia and allodynia in left foot for 1 year, worse with standing on his feet. He also noted that his hands and joints would hurt. He had quit drinking for 8 years. Still smokes. -280 most of the time for now, but it was in the 500s for the past 4 year.    Current Medications -  Neurontin 400mg - 400mg - 800mg  Effexor XR 37.5mg daily  CBZ ER 200mg-400mg BID (12/30/2020 - present)    Imaging  MRI Lumbar w/o Bryce 9/20/2019 - I have reviewed the study independently and with the patient. 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    MRI C spine w/o Bryce 9/20/2019 - I have reviewed the study independently and with the patient. 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 Multilevel spondylosis noted.    Review of  Systems    Constitutional: no fever, fatigue, weakness  Eye: no vision loss, eye redness, drainage, or pain  ENMT: no sore throat, ear pain, sinus pain/congestion, nasal congestion/drainage  Respiratory: no cough, no wheezing, no shortness of breath  Cardiovascular: no chest pain, no palpitations, no edema  Gastrointestinal: no nausea, vomiting, or diarrhea. No abdominal pain  Genitourinary: no dysuria, no urinary frequency or urgency, no hematuria  Hema/Lymph: no abnormal bruising or bleeding  Endocrine: no heat or cold intolerance, no excessive thirst or excessive urination  Musculoskeletal: + muscle or joint pain, no joint swelling  Integumentary: no skin rash or abnormal lesion  Psychiatric: no depressed mood, + anxiety, no visual/auditory hallucinations, no delusions, no suicidal or homicidal ideation  Neurologic: antalgic     Objective:      Physical Exam    General: well-developed well-nourished in no acute distress  Eye: clear conjunctiva, eyelids normal  HENT: TMs/ear canals clear, oropharynx without erythema/exudate, oropharynx and nasal mucosal surfaces moist, no maxillary/frontal sinus tenderness to palpation  Neck: full range of motion, no thyromegaly or lymphadenopathy  Respiratory: clear to auscultation bilaterally  Cardiovascular: regular rate and rhythm without murmurs, gallops or rubs  Gastrointestinal: soft, non-tender, non-distended with normal bowel sounds, without masses to palpation  Genitourinary: no CVA tenderness to palpation  Musculoskeletal: full range of motion of all extremities/spine without limitation or discomfort  Integumentary: no rashes or skin lesions present  Neurologic:  Mental Status- Alert and Oriented to time, self, place, Speech is fluent, with intact reading and comprehension, long term memory intact, No Dysarthria.  CN II-XII - CN I Deferred, STACI, no RAPD, VA grossly normal to finger counting at 6ft, No ptosis b/l, EOMI w/o nystagmus, LT/PP symmetric, intact in CN V1-V3  distribution b/l, masseter symmetric b/l, T/U midline, Shoulder shrug symmetric b/l, Right SNHL, VFFC, Face Symmetric.  Motor - Tone and Bulk nml throughout, No involuntary movements, 5/5 to confrontation throughout except for 4/5 in RLE limited by pain, FFM nml b/l, No pronator drift.  Sensory - LT/PP/Temp diminished in RLE up to knee. Vibration absent in bilat Big Toes. Hyperesthesia in b/l LE, R > L, decreased sensation to bilat finger tips  Reflexes - 2+ Throughout except for absent AJ b/l  Cerebellar Exam - FNF wnl b/l no intention tremor.  Gait - Antalgic gait, leaning mostly on his heels, utilizing cane    Assessment:       1. 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 Notes improvement with increased CBZ. Flare with weather. Scheduled for SCS placement in June. Discussed weaning of medication after placement and has better control of pain.    [] c/w Neurontin 400mg - 400mg - 800mg  [] c/w Effexor XR 37.5mg daily  [] c/w CBZ ER to 600mg BID   [] Rx Capsaicin Topical Cream .075% QID for neuropathic pain  [] advised on better glycemic control. Goal HgA1c < 7.0%    rtc 4 months    I have explained the treatment plan, diagnosis, and prognosis to the patient, caretaker, family. All questions are answered to the best of my knowledge.    Face to Face Time 30 minute, including, counseling, education, review of test results, relevant medical records, and coordination of care.

## 2022-06-20 ENCOUNTER — OFFICE VISIT (OUTPATIENT)
Dept: NEPHROLOGY | Facility: CLINIC | Age: 42
End: 2022-06-20
Payer: MEDICARE

## 2022-06-20 ENCOUNTER — LAB VISIT (OUTPATIENT)
Dept: LAB | Facility: HOSPITAL | Age: 42
End: 2022-06-20
Attending: NURSE PRACTITIONER
Payer: MEDICARE

## 2022-06-20 ENCOUNTER — LAB VISIT (OUTPATIENT)
Dept: LAB | Facility: HOSPITAL | Age: 42
End: 2022-06-20
Attending: PSYCHIATRY & NEUROLOGY
Payer: MEDICARE

## 2022-06-20 VITALS
OXYGEN SATURATION: 96 % | DIASTOLIC BLOOD PRESSURE: 86 MMHG | WEIGHT: 229.25 LBS | BODY MASS INDEX: 33.96 KG/M2 | SYSTOLIC BLOOD PRESSURE: 160 MMHG | RESPIRATION RATE: 20 BRPM | HEART RATE: 95 BPM | HEIGHT: 69 IN | TEMPERATURE: 99 F

## 2022-06-20 DIAGNOSIS — N18.9 CHRONIC KIDNEY DISEASE, UNSPECIFIED CKD STAGE: ICD-10-CM

## 2022-06-20 DIAGNOSIS — I10 HYPERTENSION, UNSPECIFIED TYPE: ICD-10-CM

## 2022-06-20 DIAGNOSIS — N18.2 CKD STAGE G2/A3, GFR 60-89 AND ALBUMIN CREATININE RATIO >300 MG/G: Primary | ICD-10-CM

## 2022-06-20 DIAGNOSIS — Z79.4 TYPE 2 DIABETES MELLITUS WITH DIABETIC NEUROPATHY, WITH LONG-TERM CURRENT USE OF INSULIN: ICD-10-CM

## 2022-06-20 DIAGNOSIS — Z01.818 OTHER SPECIFIED PRE-OPERATIVE EXAMINATION: Primary | ICD-10-CM

## 2022-06-20 DIAGNOSIS — Z03.818 ENCNTR FOR OBS FOR SUSP EXPSR TO OTH BIOLG AGENTS RULED OUT: ICD-10-CM

## 2022-06-20 DIAGNOSIS — Z01.818 OTHER SPECIFIED PRE-OPERATIVE EXAMINATION: ICD-10-CM

## 2022-06-20 DIAGNOSIS — E11.40 TYPE 2 DIABETES MELLITUS WITH DIABETIC NEUROPATHY, WITH LONG-TERM CURRENT USE OF INSULIN: ICD-10-CM

## 2022-06-20 PROBLEM — E66.9 DIABETES MELLITUS TYPE 2 IN OBESE: Status: ACTIVE | Noted: 2017-10-28

## 2022-06-20 PROBLEM — N32.0 BLADDER OUTFLOW OBSTRUCTION: Status: ACTIVE | Noted: 2022-06-20

## 2022-06-20 PROBLEM — E11.42 DIABETIC POLYNEUROPATHY: Status: ACTIVE | Noted: 2022-06-20

## 2022-06-20 PROBLEM — E78.01 FAMILIAL HYPERCHOLESTEROLEMIA: Status: ACTIVE | Noted: 2022-06-20

## 2022-06-20 PROBLEM — E66.01 MORBID OBESITY: Status: ACTIVE | Noted: 2022-06-20

## 2022-06-20 PROBLEM — Z87.19 HISTORY OF PANCREATITIS: Status: ACTIVE | Noted: 2022-06-20

## 2022-06-20 PROBLEM — G83.10 PARESIS OF SINGLE LOWER EXTREMITY: Status: ACTIVE | Noted: 2022-06-20

## 2022-06-20 PROBLEM — E11.69 DIABETES MELLITUS TYPE 2 IN OBESE: Status: ACTIVE | Noted: 2017-10-28

## 2022-06-20 PROBLEM — R35.1 NOCTURIA: Status: ACTIVE | Noted: 2022-06-20

## 2022-06-20 PROBLEM — E78.2 MIXED HYPERLIPIDEMIA: Status: ACTIVE | Noted: 2017-10-28

## 2022-06-20 PROBLEM — G58.7 MONONEURITIS MULTIPLEX: Status: ACTIVE | Noted: 2022-06-20

## 2022-06-20 PROBLEM — R63.4 ABNORMAL WEIGHT LOSS: Status: ACTIVE | Noted: 2022-06-20

## 2022-06-20 PROBLEM — R20.2 HAND PARESTHESIA: Status: ACTIVE | Noted: 2022-06-20

## 2022-06-20 PROBLEM — G47.33 OBSTRUCTIVE SLEEP APNEA SYNDROME: Status: ACTIVE | Noted: 2022-06-20

## 2022-06-20 PROBLEM — Z72.0 TOBACCO USER: Status: ACTIVE | Noted: 2022-06-20

## 2022-06-20 PROBLEM — B37.49 CANDIDURIA: Status: ACTIVE | Noted: 2022-06-20

## 2022-06-20 PROBLEM — K86.1 CHRONIC PANCREATITIS: Status: ACTIVE | Noted: 2017-10-28

## 2022-06-20 PROBLEM — R10.9 LEFT FLANK PAIN: Status: ACTIVE | Noted: 2022-06-20

## 2022-06-20 PROBLEM — R10.9 ACUTE ABDOMINAL PAIN: Status: ACTIVE | Noted: 2022-06-20

## 2022-06-20 PROBLEM — K76.0 STEATOSIS OF LIVER: Status: ACTIVE | Noted: 2022-06-20

## 2022-06-20 LAB
ALBUMIN SERPL-MCNC: 3.6 GM/DL (ref 3.5–5)
ALBUMIN/GLOB SERPL: 0.9 RATIO (ref 1.1–2)
ALP SERPL-CCNC: 249 UNIT/L (ref 40–150)
ALT SERPL-CCNC: 67 UNIT/L (ref 0–55)
ANION GAP SERPL CALC-SCNC: 10 MEQ/L
APPEARANCE UR: CLEAR
AST SERPL-CCNC: 31 UNIT/L (ref 5–34)
BACTERIA #/AREA URNS AUTO: ABNORMAL /HPF
BASOPHILS # BLD AUTO: 0.08 X10(3)/MCL (ref 0–0.2)
BASOPHILS NFR BLD AUTO: 0.6 %
BILIRUB UR QL STRIP.AUTO: NEGATIVE MG/DL
BILIRUBIN DIRECT+TOT PNL SERPL-MCNC: 0.5 MG/DL
BUN SERPL-MCNC: 10.8 MG/DL (ref 8.9–20.6)
BUN SERPL-MCNC: 11.7 MG/DL (ref 8.9–20.6)
CALCIUM SERPL-MCNC: 9.1 MG/DL (ref 8.4–10.2)
CALCIUM SERPL-MCNC: 9.1 MG/DL (ref 8.4–10.2)
CHLORIDE SERPL-SCNC: 96 MMOL/L (ref 98–107)
CHLORIDE SERPL-SCNC: 98 MMOL/L (ref 98–107)
CO2 SERPL-SCNC: 25 MMOL/L (ref 22–29)
CO2 SERPL-SCNC: 29 MMOL/L (ref 22–29)
COLOR UR AUTO: YELLOW
CREAT SERPL-MCNC: 0.9 MG/DL (ref 0.73–1.18)
CREAT SERPL-MCNC: 1.19 MG/DL (ref 0.73–1.18)
CREAT UR-MCNC: 246.8 MG/DL (ref 63–166)
CREAT/UREA NIT SERPL: 10
EOSINOPHIL # BLD AUTO: 0.3 X10(3)/MCL (ref 0–0.9)
EOSINOPHIL NFR BLD AUTO: 2.3 %
ERYTHROCYTE [DISTWIDTH] IN BLOOD BY AUTOMATED COUNT: 11.7 % (ref 11.5–17)
GLOBULIN SER-MCNC: 4 GM/DL (ref 2.4–3.5)
GLUCOSE SERPL-MCNC: 261 MG/DL (ref 74–100)
GLUCOSE SERPL-MCNC: 444 MG/DL (ref 74–100)
GLUCOSE UR QL STRIP.AUTO: ABNORMAL MG/DL
HCT VFR BLD AUTO: 45.6 % (ref 42–52)
HGB BLD-MCNC: 16 GM/DL (ref 14–18)
HYALINE CASTS #/AREA URNS LPF: ABNORMAL /LPF
IMM GRANULOCYTES # BLD AUTO: 0.07 X10(3)/MCL (ref 0–0.02)
IMM GRANULOCYTES NFR BLD AUTO: 0.5 % (ref 0–0.43)
KETONES UR QL STRIP.AUTO: NEGATIVE MG/DL
LEUKOCYTE ESTERASE UR QL STRIP.AUTO: NEGATIVE UNIT/L
LYMPHOCYTES # BLD AUTO: 2.89 X10(3)/MCL (ref 0.6–4.6)
LYMPHOCYTES NFR BLD AUTO: 22 %
MCH RBC QN AUTO: 30.7 PG (ref 27–31)
MCHC RBC AUTO-ENTMCNC: 35.1 MG/DL (ref 33–36)
MCV RBC AUTO: 87.4 FL (ref 80–94)
MONOCYTES # BLD AUTO: 1.08 X10(3)/MCL (ref 0.1–1.3)
MONOCYTES NFR BLD AUTO: 8.2 %
MUCOUS THREADS URNS QL MICRO: ABNORMAL /LPF
NEUTROPHILS # BLD AUTO: 8.7 X10(3)/MCL (ref 2.1–9.2)
NEUTROPHILS NFR BLD AUTO: 66.4 %
NITRITE UR QL STRIP.AUTO: NEGATIVE
NRBC BLD AUTO-RTO: 0 %
PH UR STRIP.AUTO: 5.5 [PH]
PLATELET # BLD AUTO: 300 X10(3)/MCL (ref 130–400)
PMV BLD AUTO: 10.6 FL (ref 9.4–12.4)
POTASSIUM SERPL-SCNC: 3.4 MMOL/L (ref 3.5–5.1)
POTASSIUM SERPL-SCNC: 3.7 MMOL/L (ref 3.5–5.1)
PROT SERPL-MCNC: 7.6 GM/DL (ref 6.4–8.3)
PROT UR QL STRIP.AUTO: ABNORMAL MG/DL
PROT UR STRIP-MCNC: 300.1 MG/DL
PROT/CREAT UR-RTO: 1216 MG/GM CR
RBC # BLD AUTO: 5.22 X10(6)/MCL (ref 4.7–6.1)
RBC #/AREA URNS AUTO: ABNORMAL /HPF
RBC UR QL AUTO: NEGATIVE UNIT/L
SARS-COV-2 RNA RESP QL NAA+PROBE: NOT DETECTED
SODIUM SERPL-SCNC: 131 MMOL/L (ref 136–145)
SODIUM SERPL-SCNC: 138 MMOL/L (ref 136–145)
SP GR UR STRIP.AUTO: 1.03
SQUAMOUS #/AREA URNS LPF: ABNORMAL /HPF
UROBILINOGEN UR STRIP-ACNC: NORMAL MG/DL
WBC # SPEC AUTO: 13.1 X10(3)/MCL (ref 4.5–11.5)
WBC #/AREA URNS AUTO: ABNORMAL /HPF

## 2022-06-20 PROCEDURE — 99214 PR OFFICE/OUTPT VISIT, EST, LEVL IV, 30-39 MIN: ICD-10-PCS | Mod: S$PBB,,, | Performed by: NURSE PRACTITIONER

## 2022-06-20 PROCEDURE — 36415 COLL VENOUS BLD VENIPUNCTURE: CPT

## 2022-06-20 PROCEDURE — 85025 COMPLETE CBC W/AUTO DIFF WBC: CPT

## 2022-06-20 PROCEDURE — 87635 SARS-COV-2 COVID-19 AMP PRB: CPT

## 2022-06-20 PROCEDURE — 81001 URINALYSIS AUTO W/SCOPE: CPT

## 2022-06-20 PROCEDURE — 80053 COMPREHEN METABOLIC PANEL: CPT

## 2022-06-20 PROCEDURE — 99215 OFFICE O/P EST HI 40 MIN: CPT | Mod: PBBFAC | Performed by: NURSE PRACTITIONER

## 2022-06-20 PROCEDURE — 82570 ASSAY OF URINE CREATININE: CPT

## 2022-06-20 PROCEDURE — 99214 OFFICE O/P EST MOD 30 MIN: CPT | Mod: S$PBB,,, | Performed by: NURSE PRACTITIONER

## 2022-06-20 RX ORDER — CLONIDINE HYDROCHLORIDE 0.2 MG/1
0.2 TABLET ORAL 3 TIMES DAILY
Qty: 270 TABLET | Refills: 3 | Status: SHIPPED | OUTPATIENT
Start: 2022-06-20 | End: 2022-07-20 | Stop reason: SDUPTHER

## 2022-06-20 RX ORDER — DAPAGLIFLOZIN 5 MG/1
5 TABLET, FILM COATED ORAL DAILY
Qty: 90 TABLET | Refills: 3 | Status: SHIPPED | OUTPATIENT
Start: 2022-06-20 | End: 2023-07-11

## 2022-06-20 NOTE — PROGRESS NOTES
[facility] Note    Chief Complaint   Patient presents with    Chronic Kidney Disease     Follow up, right sided abdominal and side pain        History of Present Illness  42-year-old  male with past medical history of poorly controlled diabetes mellitus, morbid obesity, familial hypertriglyceridemia, recurrent pancreatitis, hepatic steatosis, hypertension, CICI, hearing loss, recurrent candiduria, polyneuropathy secondary to diabetes, chronic pain, and tobacco abuse. Hypertriglyceridemia was previously treated with routine plasmapheresis, this was discontinued in August 2018 per patient's request. Patient has undergone multiple AV access creation procedures, including tunneled catheter insertion and removal as well as AV graft creation. Both of his AV grafts are now thrombosed. Patient was managed by hospice in the past, however, was discharged from hospice care due to lack of terminal diagnosis. Presents today for follow-up.   Complains of right upper quadrant and left upper quadrant pain, which is chronic.  Denies nausea or vomiting.  Tells me that he has been scheduled for nerve stimulator placement with Dr. Pozo in the next couple of weeks for pain control.    Review of Systems  Twelve point review of systems conducted, negative except as stated in history of present illness.    Review of patient's allergies indicates:  No Known Allergies    Past Medical History:   Past Medical History:   Diagnosis Date    BPH (benign prostatic hyperplasia)     Diabetes     GERD (gastroesophageal reflux disease)     Hepatic steatosis     History of continuous positive airway pressure (CPAP) therapy at home     Hypertension     Hypertriglyceridemia     Kidney disorder     Leg pain     Morbid obesity     Neuropathy     OA (osteoarthritis)     CICI (obstructive sleep apnea)     Polyneuropathy     Recurrent pancreatitis     Renal insufficiency     Tobacco abuse        Procedure History:  "  Past Surgical History:   Procedure Laterality Date    APPENDECTOMY      ARTERIOVENOUS ANASTOMOSIS, OPEN, UPPER ARM BASILLIC VEIN TRANSPOSITION N/A 05/07/2018    ESOPHAGOGASTRODUODENOSCOPY N/A 06/07/2021    EXCISION OF ARTERIOVENOUS FISTULA N/A 06/01/2018    HERNIA REPAIR      INSPECTION OF UPPER INTESTINAL TRACT N/A 06/07/2021    PHERESIS OF PLASMA N/A 07/13/2018    PHERESIS OF PLASMA N/A 05/25/2018    SPINAL CORD STIMULATOR REMOVAL      TRIAL OF SPINAL CORD NERVE STIMULATOR N/A 5/12/2022    Procedure: TRIAL, NEUROSTIMULATOR, SPINAL CORD;  Surgeon: Jay Pozo MD;  Location: [facility];  Service: Neurosurgery;  Laterality: N/A;    UPPER GI N/A 06/07/2021       Family History: family history includes Obesity in his father.    Social History:  reports that he has been smoking cigarettes. He has a 25.00 pack-year smoking history. He has never used smokeless tobacco. He reports that he does not drink alcohol and does not use drugs.    Physical Exam:   BP (!) 160/86 (BP Location: Right arm, Patient Position: Sitting, BP Method: Large (Manual))   Pulse 95   Temp 98.5 °F (36.9 °C) (Oral)   Resp 20   Ht 5' 9" (1.753 m)   Wt 104 kg (229 lb 4.5 oz)   SpO2 96%   BMI 33.86 kg/m²  Body mass index is 33.86 kg/m².  General appearance: Patient is in no acute distress.  Skin: No rashes or wounds.  HEENT: PERRLA, EOMI, no scleral icterus, no JVD. Neck is supple.  Chest: Respirations are unlabored. Lungs sounds are clear.   Heart: S1, S2.   Abdomen: Benign.  : Deferred.  Extremities: No edema, peripheral pulses are palpable.   Neuro: No focal deficits.     Home Medications:  Current Outpatient Medications   Medication Sig    alirocumab (PRALUENT PEN) 150 mg/mL PnIj Praluent Pen 150 mg/mL subcutaneous pen injector    amLODIPine (NORVASC) 10 MG tablet 10 mg once daily.    atorvastatin (LIPITOR) 80 MG tablet Take 80 mg by mouth once daily.    carBAMazepine (CARBATROL) 200 MG CM12 Take 3 capsules (600 mg " "total) by mouth 2 (two) times a day.    clonazePAM (KLONOPIN) 1 MG tablet Take 1 mg by mouth 2 (two) times a day.    cloNIDine (CATAPRES) 0.1 MG tablet Take 1 tablet (0.1 mg total) by mouth 3 (three) times daily.    EScitalopram oxalate (LEXAPRO) 20 MG tablet Take 20 mg by mouth once daily.    famotidine (PEPCID) 20 MG tablet Take 20 mg by mouth every evening.    ferrous sulfate (FEOSOL) 325 mg (65 mg iron) Tab tablet Patrice-Time 325 mg (65 mg iron) tablet    gabapentin (NEURONTIN) 400 MG capsule Take 1 capsule (400 mg total) by mouth 2 (two) times daily.    gabapentin (NEURONTIN) 800 MG tablet Take 1 tablet (800 mg total) by mouth every evening.    HYDROmorphone (DILAUDID) 2 MG tablet Take 8 mg by mouth every 4 (four) hours as needed.    insulin glargine (LANTUS U-100 INSULIN) 100 unit/mL injection Inject 100 Units into the skin 2 (two) times a day.    insulin lispro 100 unit/mL injection   See Instructions, INJECT 25 UNITS SUBCUTANEOUSLY BEFORE MEALS THREE TIMES DAILY, # 10 mL, 4 Refill(s), Pharmacy: [facility]  [facility], 175.3, cm, Height/Length Dosing, 07/20/21 13:41:00 CDT, 84.6, kg, Weight Dosing, 07/20/21 13:41:00 CDT    lipase-protease-amylase (CREON) 3,000-9,500- 15,000 unit CpDR 2 (two) times a day.    lisinopriL (PRINIVIL,ZESTRIL) 40 MG tablet Take 40 mg by mouth once daily.    morphine (MS CONTIN) 100 MG 12 hr tablet TAKE ONE TABLET BY MOUTH EVERY 8 HOURS (Patient taking differently: Take 100 mg by mouth 3 (three) times daily.)    pen needle, diabetic 31 gauge x 5/16" Ndle Sure Comfort Pen Needle 31 gauge x 5/16"    potassium chloride SA (K-DUR,KLOR-CON) 20 MEQ tablet TAKE ONE TABLET BY MOUTH ONCE DAILY    SURE COMFORT INSULIN SYRINGE 0.5 mL 31 gauge x 5/16" Syrg 3 (three) times daily.    tamsulosin (FLOMAX) 0.4 mg Cap Take 0.4 mg by mouth once daily.    aspirin (ECOTRIN) 81 MG EC tablet Take 81 mg by mouth once daily.    mupirocin calcium 2% (BACTROBAN) 2 % cream mupirocin " calcium 2 % topical cream    torsemide (DEMADEX) 20 MG Tab Take 20 mg by mouth once daily.     No current facility-administered medications for this visit.     Facility-Administered Medications Ordered in Other Visits   Medication Frequency    diphenhydrAMINE injection 25 mg Q6H PRN    HYDROmorphone (PF) injection 0.2 mg Q5 Min PRN    metoclopramide HCl injection 10 mg Q10 Min PRN    ondansetron injection 4 mg Daily PRN        Laboratory Data:   Recent Results (from the past 336 hour(s))   Comprehensive Metabolic Panel    Collection Time: 06/20/22  8:17 AM   Result Value Ref Range    Sodium Level 138 136 - 145 mmol/L    Potassium Level 3.4 (L) 3.5 - 5.1 mmol/L    Chloride 98 98 - 107 mmol/L    Carbon Dioxide 29 22 - 29 mmol/L    Glucose Level 261 (H) 74 - 100 mg/dL    Blood Urea Nitrogen 10.8 8.9 - 20.6 mg/dL    Creatinine 0.90 0.73 - 1.18 mg/dL    Calcium Level Total 9.1 8.4 - 10.2 mg/dL    Protein Total 7.6 6.4 - 8.3 gm/dL    Albumin Level 3.6 3.5 - 5.0 gm/dL    Globulin 4.0 (H) 2.4 - 3.5 gm/dL    Albumin/Globulin Ratio 0.9 (L) 1.1 - 2.0 ratio    Bilirubin Total 0.5 <=1.5 mg/dL    Alkaline Phosphatase 249 (H) 40 - 150 unit/L    Alanine Aminotransferase 67 (H) 0 - 55 unit/L    Aspartate Aminotransferase 31 5 - 34 unit/L    Estimated GFR-Non  >60 mls/min/1.73/m2   Protein/Creatinine Ratio, Urine    Collection Time: 06/20/22  8:17 AM   Result Value Ref Range    Urine Protein Level 300.1 mg/dL    Urine Creatinine 246.8 (H) 63.0 - 166.0 mg/dL    Urine Protein/Creatinine Ratio 1,216.0 (H) <=200.0 mg/gm Cr   Urinalysis    Collection Time: 06/20/22  8:17 AM   Result Value Ref Range    Color, UA Yellow Yellow, Colorless, Other, Clear    Appearance, UA Clear Clear    Specific Gravity, UA 1.032     pH, UA 5.5 5.0, 5.5, 6.0, 6.5, 7.0, 7.5, 8.0, 8.5    Protein, UA 3+ (A) Negative, 300  mg/dL    Glucose, UA 3+ (A) Negative, Normal mg/dL    Ketones, UA Negative Negative, +1, +2, +3, +4, +5, >=160, >=80 mg/dL     Blood, UA Negative Negative unit/L    Bilirubin, UA Negative Negative mg/dL    Urobilinogen, UA Normal 0.2, 1.0, Normal mg/dL    Nitrites, UA Negative Negative    Leukocyte Esterase, UA Negative Negative, 75  unit/L    WBC, UA 0-5 None Seen, 0-2, 3-5, 0-5 /HPF    Bacteria, UA None Seen None Seen /HPF    Squamous Epithelial Cells, UA None Seen None Seen /HPF    Mucous, UA Trace (A) None Seen /LPF    Hyaline Casts, UA None Seen None Seen /lpf    RBC, UA 0-5 None Seen, 0-2, 3-5, 0-5 /HPF       Imaging:  10/25/21 CT of abdomen and pelvis without contrast     FINDINGS: Assessment of the visceral organs and vasculature is limited  by the lack of IV contrast.     Liver/biliary: Hepatomegaly measures 23 cm. No radiodense gallstone or  biliary dilatation appreciated.      Pancreas: Normal.     Spleen: Stable 24 mm hypodense area inferiorly.     Adrenals: Normal.     Genitourinary: No defined renal or ureteral stone identified. No  hydronephrosis. The bladder is within normal limits. Normal prostate  size.     Stomach/bowel: Normal caliber small bowel and colon. Appendix not seen  with suspected prior appendectomy. No discernible bowel inflammation.      Lymph nodes: No pathologically enlarged lymph node identified with  noncontrast technique.     Peritoneum: No ascites or free air.      Vasculature: Mild aortic and iliac artery calcification. No abdominal  aortic aneurysm.      Abdominal wall: Prior ventral hernia repair.     Lung bases: No consolidation or pleural effusion.     Bones: No acute osseous findings.     IMPRESSION:   1. No acute process identified.  2. Hepatomegaly.    Impression and Plan     CKD stage G2/A3, GFR 60-89 and albumin creatinine ratio >300 mg/g  -     Comprehensive Metabolic Panel; Future; Expected date: 06/13/2022  -     Protein/Creatinine Ratio, Urine; Future; Expected date: 06/13/2022  -     Urinalysis; Future; Expected date: 06/13/2022  -     Comprehensive Metabolic Panel; Future; Expected  date: 12/20/2022  -     Protein/Creatinine Ratio, Urine; Future; Expected date: 12/20/2022  -     Urinalysis; Future; Expected date: 12/20/2022  Diabetic kidney disease.  Serum creatinine is stable.  Proteinuria is significant, glycemic control is not at goal despite large dose of basal insulin.  Patient would benefit from SGLT2 inhibitor therapy for both better glycemic control and renal protective benefit.  Will start Farxiga 5 mg by mouth daily.  Continue ACE-inhibitor and risk factor management.  Return to clinic with routine labs in 6 months.    Hypertension, unspecified type  -     cloNIDine (CATAPRES) 0.2 MG tablet; Take 1 tablet (0.2 mg total) by mouth 3 (three) times daily.  Dispense: 270 tablet; Refill: 3  Blood pressure is above goal.  Will increase clonidine to 0.2 mg 3 times a day.    Type 2 diabetes mellitus with diabetic neuropathy, with long-term current use of insulin  -     dapagliflozin (FARXIGA) 5 mg Tab tablet; Take 1 tablet (5 mg total) by mouth once daily.  Dispense: 90 tablet; Refill: 3  Diabetic kidney disease.  Serum creatinine is stable.  Proteinuria is significant, glycemic control is not at goal despite large dose of basal insulin.  Patient would benefit from SGLT2 inhibitor therapy for both better glycemic control and renal protective benefit.  Will start Farxiga 5 mg by mouth daily.    BMI 33.0-33.9,adult  Lifestyle and dietary interventions discussed, patient counseled on weight loss using portion control, non sedentary lifestyle, low-carbohydrate/low fat diet.      Addendum:  There are no specific precautions from Nephrology perspective the need to be taken prior to nerve stimulator placement.

## 2022-06-21 ENCOUNTER — TELEPHONE (OUTPATIENT)
Dept: INTERNAL MEDICINE | Facility: CLINIC | Age: 42
End: 2022-06-21

## 2022-06-21 DIAGNOSIS — E13.9 DIABETES 1.5, MANAGED AS TYPE 1: Primary | ICD-10-CM

## 2022-06-21 NOTE — TELEPHONE ENCOUNTER
Pt has been having issues with his blood sugar. Pt states that on 6/17 his blood glucose level was 200 in the AM and was 210 In the PM. On the 6/18 it was 237 in the AM and 205 In the PM. 6/19 was 287 in AM and 305 In the PM. And on 6/20 it was 468 in AM and was 399 In PM. Also this AM it was 402. Pt states that he hasn't changed anything with meds or diet. Pt looking for guidance with this issue. Please advise. Thanks!

## 2022-06-22 ENCOUNTER — CLINICAL SUPPORT (OUTPATIENT)
Dept: AUDIOLOGY | Facility: HOSPITAL | Age: 42
End: 2022-06-22
Payer: MEDICARE

## 2022-06-22 ENCOUNTER — LAB VISIT (OUTPATIENT)
Dept: LAB | Facility: HOSPITAL | Age: 42
End: 2022-06-22
Attending: INTERNAL MEDICINE
Payer: MEDICARE

## 2022-06-22 DIAGNOSIS — E13.9 DIABETES 1.5, MANAGED AS TYPE 1: ICD-10-CM

## 2022-06-22 DIAGNOSIS — I42.9 CARDIOMYOPATHY, UNSPECIFIED TYPE: Primary | ICD-10-CM

## 2022-06-22 DIAGNOSIS — N18.2 CKD STAGE G2/A3, GFR 60-89 AND ALBUMIN CREATININE RATIO >300 MG/G: ICD-10-CM

## 2022-06-22 DIAGNOSIS — H90.3 SENSORINEURAL HEARING LOSS, BILATERAL: Primary | ICD-10-CM

## 2022-06-22 DIAGNOSIS — Z79.4 TYPE 2 DIABETES MELLITUS WITH HYPERGLYCEMIA, WITH LONG-TERM CURRENT USE OF INSULIN: Primary | ICD-10-CM

## 2022-06-22 DIAGNOSIS — E11.65 TYPE 2 DIABETES MELLITUS WITH HYPERGLYCEMIA, WITH LONG-TERM CURRENT USE OF INSULIN: Primary | ICD-10-CM

## 2022-06-22 LAB
ALBUMIN SERPL-MCNC: 3.5 GM/DL (ref 3.5–5)
ALBUMIN/GLOB SERPL: 0.7 RATIO (ref 1.1–2)
ALP SERPL-CCNC: 251 UNIT/L (ref 40–150)
ALT SERPL-CCNC: 78 UNIT/L (ref 0–55)
APPEARANCE UR: CLEAR
AST SERPL-CCNC: 55 UNIT/L (ref 5–34)
BACTERIA #/AREA URNS AUTO: ABNORMAL /HPF
BASOPHILS # BLD AUTO: 0.08 X10(3)/MCL (ref 0–0.2)
BASOPHILS NFR BLD AUTO: 0.6 %
BILIRUB UR QL STRIP.AUTO: NEGATIVE MG/DL
BILIRUBIN DIRECT+TOT PNL SERPL-MCNC: 0.2 MG/DL
BUN SERPL-MCNC: 15.7 MG/DL (ref 8.9–20.6)
CALCIUM SERPL-MCNC: 9.8 MG/DL (ref 8.4–10.2)
CAOX CRY URNS QL MICRO: ABNORMAL /HPF
CASTS URNS MICRO: ABNORMAL /LPF
CHLORIDE SERPL-SCNC: 103 MMOL/L (ref 98–107)
CHOLEST SERPL-MCNC: 259 MG/DL
CHOLEST/HDLC SERPL: 12 {RATIO} (ref 0–5)
CO2 SERPL-SCNC: 20 MMOL/L (ref 22–29)
COLOR UR AUTO: YELLOW
CREAT SERPL-MCNC: 0.82 MG/DL (ref 0.73–1.18)
CREAT UR-MCNC: 211.7 MG/DL (ref 63–166)
CRP SERPL-MCNC: 11.2 MG/L
EOSINOPHIL # BLD AUTO: 0.45 X10(3)/MCL (ref 0–0.9)
EOSINOPHIL NFR BLD AUTO: 3.4 %
ERYTHROCYTE [DISTWIDTH] IN BLOOD BY AUTOMATED COUNT: 11.4 % (ref 11.5–17)
ERYTHROCYTE [SEDIMENTATION RATE] IN BLOOD: 26 MM/HR (ref 0–15)
EST. AVERAGE GLUCOSE BLD GHB EST-MCNC: 266.1 MG/DL
GLOBULIN SER-MCNC: 5.1 GM/DL (ref 2.4–3.5)
GLUCOSE SERPL-MCNC: 118 MG/DL (ref 74–100)
GLUCOSE UR QL STRIP.AUTO: ABNORMAL MG/DL
HBA1C MFR BLD: 10.9 %
HCT VFR BLD AUTO: 49.4 % (ref 42–52)
HDLC SERPL-MCNC: 21 MG/DL (ref 35–60)
HGB BLD-MCNC: 17.1 GM/DL (ref 14–18)
HYALINE CASTS #/AREA URNS LPF: ABNORMAL /LPF
IMM GRANULOCYTES # BLD AUTO: 0.08 X10(3)/MCL (ref 0–0.02)
IMM GRANULOCYTES NFR BLD AUTO: 0.6 % (ref 0–0.43)
KETONES UR QL STRIP.AUTO: NEGATIVE MG/DL
LDLC SERPL CALC-MCNC: -89 MG/DL (ref 50–140)
LEUKOCYTE ESTERASE UR QL STRIP.AUTO: NEGATIVE UNIT/L
LYMPHOCYTES # BLD AUTO: 4.16 X10(3)/MCL (ref 0.6–4.6)
LYMPHOCYTES NFR BLD AUTO: 31.6 %
MCH RBC QN AUTO: 29.9 PG (ref 27–31)
MCHC RBC AUTO-ENTMCNC: 34.6 MG/DL (ref 33–36)
MCV RBC AUTO: 86.4 FL (ref 80–94)
MONOCYTES # BLD AUTO: 1.02 X10(3)/MCL (ref 0.1–1.3)
MONOCYTES NFR BLD AUTO: 7.7 %
MUCOUS THREADS URNS QL MICRO: ABNORMAL /LPF
NEUTROPHILS # BLD AUTO: 7.4 X10(3)/MCL (ref 2.1–9.2)
NEUTROPHILS NFR BLD AUTO: 56.1 %
NITRITE UR QL STRIP.AUTO: NEGATIVE
NRBC BLD AUTO-RTO: 0 %
PH UR STRIP.AUTO: 5.5 [PH]
PLATELET # BLD AUTO: 340 X10(3)/MCL (ref 130–400)
PMV BLD AUTO: 10 FL (ref 9.4–12.4)
POTASSIUM SERPL-SCNC: 3.2 MMOL/L (ref 3.5–5.1)
PROT SERPL-MCNC: 8.6 GM/DL (ref 6.4–8.3)
PROT UR QL STRIP.AUTO: ABNORMAL MG/DL
PROT UR STRIP-MCNC: 217.8 MG/DL
PROT/CREAT UR-RTO: 1028.8 MG/GM CR
RBC # BLD AUTO: 5.72 X10(6)/MCL (ref 4.7–6.1)
RBC #/AREA URNS AUTO: ABNORMAL /HPF
RBC UR QL AUTO: NEGATIVE UNIT/L
SODIUM SERPL-SCNC: 140 MMOL/L (ref 136–145)
SP GR UR STRIP.AUTO: 1.03
SQUAMOUS #/AREA URNS LPF: ABNORMAL /HPF
TRIGL SERPL-MCNC: 1637 MG/DL (ref 34–140)
UNIDENT CRYS #/AREA URNS HPF: ABNORMAL /HPF
UROBILINOGEN UR STRIP-ACNC: NORMAL MG/DL
VLDLC SERPL CALC-MCNC: 327 MG/DL
WBC # SPEC AUTO: 13.2 X10(3)/MCL (ref 4.5–11.5)
WBC #/AREA URNS AUTO: ABNORMAL /HPF

## 2022-06-22 PROCEDURE — 86140 C-REACTIVE PROTEIN: CPT

## 2022-06-22 PROCEDURE — 92592 HC HEARING AID CHECK, ONE EAR: CPT | Performed by: AUDIOLOGIST

## 2022-06-22 PROCEDURE — 80053 COMPREHEN METABOLIC PANEL: CPT

## 2022-06-22 PROCEDURE — 83036 HEMOGLOBIN GLYCOSYLATED A1C: CPT

## 2022-06-22 PROCEDURE — 81001 URINALYSIS AUTO W/SCOPE: CPT

## 2022-06-22 PROCEDURE — 80061 LIPID PANEL: CPT

## 2022-06-22 PROCEDURE — 85651 RBC SED RATE NONAUTOMATED: CPT

## 2022-06-22 PROCEDURE — 36415 COLL VENOUS BLD VENIPUNCTURE: CPT

## 2022-06-22 PROCEDURE — 85025 COMPLETE CBC W/AUTO DIFF WBC: CPT

## 2022-06-22 PROCEDURE — 82570 ASSAY OF URINE CREATININE: CPT

## 2022-06-22 RX ORDER — INSULIN GLARGINE 100 [IU]/ML
100 INJECTION, SOLUTION SUBCUTANEOUS 2 TIMES DAILY
Qty: 30 ML | Refills: 6 | Status: SHIPPED | OUTPATIENT
Start: 2022-06-22 | End: 2023-03-14

## 2022-06-22 NOTE — PROGRESS NOTES
Mr. Caballero is in for hearing aid check and impression of right ear. He currently utilizes monaural amplification (left ear) and is in need of new earmold for CI evaluation.  Impression taken for skeleton, lucite earmold with short canal length and pressure vent. Clear was color chosen.  Left earmold cleaned and re-tubed. Listening check indicates hearing aid is in good working condition. No adjustments needed.

## 2022-06-22 NOTE — TELEPHONE ENCOUNTER
Spoke with patient currently out of his Lantus. Stated he completed lab work this morning and would like to know the results. Thank you.

## 2022-07-11 ENCOUNTER — HOSPITAL ENCOUNTER (EMERGENCY)
Facility: HOSPITAL | Age: 42
Discharge: HOME | End: 2022-07-11
Attending: INTERNAL MEDICINE
Payer: MEDICARE

## 2022-07-11 VITALS
WEIGHT: 222.69 LBS | BODY MASS INDEX: 32.98 KG/M2 | HEIGHT: 69 IN | DIASTOLIC BLOOD PRESSURE: 93 MMHG | RESPIRATION RATE: 20 BRPM | HEART RATE: 84 BPM | OXYGEN SATURATION: 97 % | TEMPERATURE: 98 F | SYSTOLIC BLOOD PRESSURE: 187 MMHG

## 2022-07-11 DIAGNOSIS — E78.01 FAMILIAL HYPERCHOLESTEROLEMIA: ICD-10-CM

## 2022-07-11 DIAGNOSIS — R07.9 CHEST PAIN: ICD-10-CM

## 2022-07-11 DIAGNOSIS — Z87.19 HISTORY OF CHRONIC PANCREATITIS: ICD-10-CM

## 2022-07-11 DIAGNOSIS — I10 UNCONTROLLED HYPERTENSION: Primary | ICD-10-CM

## 2022-07-11 LAB
ALBUMIN SERPL-MCNC: 3.6 GM/DL (ref 3.5–5)
ALBUMIN/GLOB SERPL: 0.8 RATIO (ref 1.1–2)
ALP SERPL-CCNC: 218 UNIT/L (ref 40–150)
ALT SERPL-CCNC: 51 UNIT/L (ref 0–55)
AST SERPL-CCNC: 24 UNIT/L (ref 5–34)
B-OH-BUTYR SERPL-MCNC: 0.07 MMOL/L
BASOPHILS # BLD AUTO: 0.08 X10(3)/MCL (ref 0–0.2)
BASOPHILS NFR BLD AUTO: 0.5 %
BILIRUBIN DIRECT+TOT PNL SERPL-MCNC: 0.2 MG/DL
BUN SERPL-MCNC: 9.6 MG/DL (ref 8.9–20.6)
CALCIUM SERPL-MCNC: 9.4 MG/DL (ref 8.4–10.2)
CHLORIDE SERPL-SCNC: 103 MMOL/L (ref 98–107)
CHOLEST SERPL-MCNC: 181 MG/DL
CHOLEST/HDLC SERPL: 9 {RATIO} (ref 0–5)
CK MB SERPL-MCNC: 1.3 NG/ML
CK SERPL-CCNC: 89 U/L (ref 30–200)
CO2 SERPL-SCNC: 23 MMOL/L (ref 22–29)
CORRECTED TEMPERATURE (PCO2): 51 MMHG
CORRECTED TEMPERATURE (PH): 7.36
CORRECTED TEMPERATURE (PO2): 58 MMHG
CREAT SERPL-MCNC: 0.85 MG/DL (ref 0.73–1.18)
EOSINOPHIL # BLD AUTO: 0.15 X10(3)/MCL (ref 0–0.9)
EOSINOPHIL NFR BLD AUTO: 1 %
ERYTHROCYTE [DISTWIDTH] IN BLOOD BY AUTOMATED COUNT: 11.6 % (ref 11.5–17)
GLOBULIN SER-MCNC: 4.4 GM/DL (ref 2.4–3.5)
GLUCOSE SERPL-MCNC: 263 MG/DL (ref 74–100)
HCO3 UR-SCNC: 28.8 MMOL/L
HCT VFR BLD AUTO: 46.1 % (ref 42–52)
HDLC SERPL-MCNC: 20 MG/DL (ref 35–60)
HGB BLD-MCNC: 16.1 GM/DL (ref 14–18)
HGB BLD-MCNC: 16.5 G/DL
IMM GRANULOCYTES # BLD AUTO: 0.07 X10(3)/MCL (ref 0–0.04)
IMM GRANULOCYTES NFR BLD AUTO: 0.5 %
LDLC SERPL CALC-MCNC: -2 MG/DL (ref 50–140)
LIPASE SERPL-CCNC: 40 U/L
LYMPHOCYTES # BLD AUTO: 2.32 X10(3)/MCL (ref 0.6–4.6)
LYMPHOCYTES NFR BLD AUTO: 15.8 %
MCH RBC QN AUTO: 29.7 PG (ref 27–31)
MCHC RBC AUTO-ENTMCNC: 34.9 MG/DL (ref 33–36)
MCV RBC AUTO: 84.9 FL (ref 80–94)
MONOCYTES # BLD AUTO: 1.08 X10(3)/MCL (ref 0.1–1.3)
MONOCYTES NFR BLD AUTO: 7.4 %
NEUTROPHILS # BLD AUTO: 11 X10(3)/MCL (ref 2.1–9.2)
NEUTROPHILS NFR BLD AUTO: 74.8 %
NRBC BLD AUTO-RTO: 0 %
PCO2 BLDA: 51 MMHG
PH SMN: 7.36 [PH]
PLATELET # BLD AUTO: 318 X10(3)/MCL (ref 130–400)
PMV BLD AUTO: 10.1 FL (ref 7.4–10.4)
PO2 BLDA: 58 MMHG
POC BASE DEFICIT: 2.1 MMOL/L
POC COHB: 8.7 %
POC METHB: 1.3 %
POC O2HB: 84.2 %
POC PERFORMED BY: NORMAL
POC SATURATED O2: 88.5 %
POC TEMPERATURE: 37 °C
POTASSIUM SERPL-SCNC: 3.2 MMOL/L (ref 3.5–5.1)
PROT SERPL-MCNC: 8 GM/DL (ref 6.4–8.3)
RBC # BLD AUTO: 5.43 X10(6)/MCL (ref 4.7–6.1)
SODIUM SERPL-SCNC: 139 MMOL/L (ref 136–145)
SPECIMEN SOURCE: NORMAL
TRIGL SERPL-MCNC: 817 MG/DL (ref 34–140)
TROPONIN I SERPL-MCNC: <0.01 NG/ML (ref 0–0.04)
VLDLC SERPL CALC-MCNC: 163 MG/DL
WBC # SPEC AUTO: 14.7 X10(3)/MCL (ref 4.5–11.5)

## 2022-07-11 PROCEDURE — 83690 ASSAY OF LIPASE: CPT | Performed by: INTERNAL MEDICINE

## 2022-07-11 PROCEDURE — 36415 COLL VENOUS BLD VENIPUNCTURE: CPT | Performed by: INTERNAL MEDICINE

## 2022-07-11 PROCEDURE — 82803 BLOOD GASES ANY COMBINATION: CPT

## 2022-07-11 PROCEDURE — 80053 COMPREHEN METABOLIC PANEL: CPT | Performed by: INTERNAL MEDICINE

## 2022-07-11 PROCEDURE — 82553 CREATINE MB FRACTION: CPT | Performed by: INTERNAL MEDICINE

## 2022-07-11 PROCEDURE — 93005 ELECTROCARDIOGRAM TRACING: CPT

## 2022-07-11 PROCEDURE — 99284 EMERGENCY DEPT VISIT MOD MDM: CPT | Mod: 25

## 2022-07-11 PROCEDURE — 82010 KETONE BODYS QUAN: CPT | Performed by: INTERNAL MEDICINE

## 2022-07-11 PROCEDURE — 84484 ASSAY OF TROPONIN QUANT: CPT | Performed by: INTERNAL MEDICINE

## 2022-07-11 PROCEDURE — 85025 COMPLETE CBC W/AUTO DIFF WBC: CPT | Performed by: INTERNAL MEDICINE

## 2022-07-11 PROCEDURE — 80061 LIPID PANEL: CPT | Performed by: INTERNAL MEDICINE

## 2022-07-11 PROCEDURE — 82550 ASSAY OF CK (CPK): CPT | Performed by: INTERNAL MEDICINE

## 2022-07-11 RX ORDER — HYDROMORPHONE HYDROCHLORIDE 8 MG/1
TABLET ORAL
Qty: 120 TABLET | Refills: 0 | Status: SHIPPED | OUTPATIENT
Start: 2022-07-11 | End: 2022-07-20 | Stop reason: SDUPTHER

## 2022-07-11 RX ORDER — MORPHINE SULFATE 100 MG/1
TABLET, FILM COATED, EXTENDED RELEASE ORAL
Qty: 90 TABLET | Refills: 0 | Status: SHIPPED | OUTPATIENT
Start: 2022-07-11 | End: 2022-07-20 | Stop reason: SDUPTHER

## 2022-07-11 NOTE — ED PROVIDER NOTES
Encounter Date: 7/11/2022       History     Chief Complaint   Patient presents with    Chest Pain    Abdominal Pain    Weakness     C/o     Hypertension     C/o not feeling well for few days. States having problems with nerves in legs also. Chest pain radiating to left arm at intervals. Bp 194/110. Also having  abdominal pain radiating from one side to the other.      Bharath Caballero is a 41 yo male who presents to the ED due to chest pain that has worsened over the past 2 hours. Patient reports that his chest pain has been going on for 2 days but has acutely worsened. The pain now radiates to his left arm and he describes it as a sharp, stabbing type pain. He has not tried taking anything for the pain and says that he has run out of nitro. He says he has chest pain at baseline and that nitro usually helps his pain. In addition to his chest pain, he also complains of abdominal pain that has started the same time as his chest pain. He says the abdominal pain is similar in quality to his chest pain and radiates diffusely across his abdomen. He denies having any difficulty eating meals. He denies any fever, chills, diarrhea, or constipation.    The history is provided by the patient. No  was used.   Chest Pain  The current episode started 2 to 3 hours ago. Chest pain occurs intermittently. The chest pain is worsening. At its most intense, the chest pain is at 10/10. The chest pain is currently at 8/10. The quality of the pain is described as sharp and stabbing. The pain radiates to the left arm. Primary symptoms include shortness of breath and abdominal pain. Pertinent negatives for primary symptoms include no fever, no cough, no palpitations, no nausea, no vomiting and no dizziness.   The shortness of breath began today. The shortness of breath developed gradually.   Pertinent negatives for associated symptoms include no claudication, no lower extremity edema and no numbness.     Review of  patient's allergies indicates:  No Known Allergies  Past Medical History:   Diagnosis Date    BPH (benign prostatic hyperplasia)     Diabetes     GERD (gastroesophageal reflux disease)     Hepatic steatosis     History of continuous positive airway pressure (CPAP) therapy at home     Hypertension     Hypertriglyceridemia     Kidney disorder     Leg pain     Morbid obesity     Neuropathy     OA (osteoarthritis)     CICI (obstructive sleep apnea)     Polyneuropathy     Recurrent pancreatitis     Renal insufficiency     Tobacco abuse      Past Surgical History:   Procedure Laterality Date    APPENDECTOMY      ARTERIOVENOUS ANASTOMOSIS, OPEN, UPPER ARM BASILLIC VEIN TRANSPOSITION N/A 05/07/2018    ESOPHAGOGASTRODUODENOSCOPY N/A 06/07/2021    EXCISION OF ARTERIOVENOUS FISTULA N/A 06/01/2018    HERNIA REPAIR      INSPECTION OF UPPER INTESTINAL TRACT N/A 06/07/2021    PHERESIS OF PLASMA N/A 07/13/2018    PHERESIS OF PLASMA N/A 05/25/2018    SPINAL CORD STIMULATOR REMOVAL      TRIAL OF SPINAL CORD NERVE STIMULATOR N/A 5/12/2022    Procedure: TRIAL, NEUROSTIMULATOR, SPINAL CORD;  Surgeon: Jay Pozo MD;  Location: [facility];  Service: Neurosurgery;  Laterality: N/A;    UPPER GI N/A 06/07/2021     Family History   Problem Relation Age of Onset    Obesity Father      Social History     Tobacco Use    Smoking status: Current Every Day Smoker     Packs/day: 1.00     Years: 25.00     Pack years: 25.00     Types: Cigarettes    Smokeless tobacco: Never Used   Substance Use Topics    Alcohol use: Never    Drug use: Never     Review of Systems   Constitutional: Negative for appetite change, chills and fever.   HENT: Negative for rhinorrhea, sore throat and trouble swallowing.    Respiratory: Positive for shortness of breath. Negative for cough.    Cardiovascular: Positive for chest pain. Negative for palpitations and claudication.        Left sided chest pain that radiates to left arm    Gastrointestinal: Positive for abdominal pain. Negative for abdominal distention, constipation, diarrhea, nausea and vomiting.   Genitourinary: Negative for dysuria and hematuria.   Musculoskeletal: Negative for myalgias.   Neurological: Negative for dizziness, light-headedness, numbness and headaches.       Physical Exam     Initial Vitals [07/11/22 1435]   BP Pulse Resp Temp SpO2   (!) 194/110 (!) 111 20 98.1 °F (36.7 °C) 98 %      MAP       --         Physical Exam    Nursing note and vitals reviewed.  Constitutional: He appears well-developed and well-nourished.   HENT:   Head: Normocephalic and atraumatic.   Eyes: Pupils are equal, round, and reactive to light.   Neck: Neck supple.   Cardiovascular: Regular rhythm and normal heart sounds.   No murmur heard.  Pulmonary/Chest: Breath sounds normal.   Abdominal: Abdomen is soft. Bowel sounds are normal. There is abdominal tenderness.   Musculoskeletal:         General: Tenderness present. Normal range of motion.      Cervical back: Neck supple.      Comments: Left sided chest wall tenderness to palpation     Neurological: He is alert and oriented to person, place, and time.   Skin: Skin is warm and dry.         ED Course   Procedures  Labs Reviewed   COMPREHENSIVE METABOLIC PANEL - Abnormal; Notable for the following components:       Result Value    Potassium Level 3.2 (*)     Glucose Level 263 (*)     Globulin 4.4 (*)     Albumin/Globulin Ratio 0.8 (*)     Alkaline Phosphatase 218 (*)     All other components within normal limits   CBC WITH DIFFERENTIAL - Abnormal; Notable for the following components:    WBC 14.7 (*)     Neut # 11.0 (*)     IG# 0.07 (*)     All other components within normal limits   LIPID PANEL - Abnormal; Notable for the following components:    HDL Cholesterol 20 (*)     Triglyceride 817 (*)     Cholesterol/HDL Ratio 9 (*)     LDL Cholesterol -2.00 (*)     All other components within normal limits   LIPASE - Normal   TROPONIN I - Normal    CK - Normal   CK-MB - Normal   BETA - HYDROXYBUTYRATE, SERUM - Normal   CBC W/ AUTO DIFFERENTIAL    Narrative:     The following orders were created for panel order CBC auto differential.  Procedure                               Abnormality         Status                     ---------                               -----------         ------                     CBC with Differential[[id number]]        Abnormal            Final result                 Please view results for these tests on the individual orders.   EXTRA TUBES    Narrative:     The following orders were created for panel order EXTRA TUBES.  Procedure                               Abnormality         Status                     ---------                               -----------         ------                     Light Blue Top Hold[[id number]]                              In process                 Gold Top Hold[[id number]]                                    In process                   Please view results for these tests on the individual orders.   LIGHT BLUE TOP HOLD   GOLD TOP HOLD     EKG Readings: (Independently Interpreted)   Initial Reading: No STEMI. Rhythm: Sinus Tachycardia. Heart Rate: 106. Ectopy: No Ectopy. Conduction: Normal. Axis: Left Axis Deviation. Q Waves: III and V1. Other Findings: Prolonged QT Interval. Other Impression: IRBBB, GTc 0.48     ECG Results          EKG 12-lead (Chest Pain) Age >30 (In process)  Result time 07/11/22 15:20:15    In process by Interface, Lab In [facility] (07/11/22 15:20:15)                 Narrative:    Test Reason : R07.9,    Vent. Rate : 106 BPM     Atrial Rate : 106 BPM     P-R Int : 152 ms          QRS Dur : 082 ms      QT Int : 364 ms       P-R-T Axes : 059 -53 020 degrees     QTc Int : 483 ms    Sinus tachycardia  Left axis deviation  Inferior infarct ,age undetermined  Cannot rule out Anterior infarct ,age undetermined  Abnormal ECG  When compared with ECG of 09-MAY-2022 13:22,  Minimal criteria for  Anterior infarct are now Present  Inferior infarct is now Present    Referred By: [email or web address]   SELF           Confirmed By:                             Imaging Results    None          Medications - No data to display  Medical Decision Making:   Initial Assessment:   41 yo male presents to the ED due to 2 hour worsening of left sided chest pain that radiates to his left arm. He describes the pain as a sharp, stabbing type pain. He also complains of associated shortness of breath. Patient has not tried anything for his pain as he has run out of nitro at home. He also complains of associated abdominal pain that started around the same as his chest pain. He denies any fevers, chills, diarrhea, or constipation.  Differential Diagnosis:   Differential diagnosis considered include MI, unstable angina, myocarditis, pneumothorax, and pulmonary embolism.  ED Management:  While in the ED, patient had labs drawn. CK, CK-MB, lipase, and troponin levels were all within normal limits. Patient had an elevated white count along with elevated triglycerides.            Attending Attestation:   Physician Attestation Statement for Resident:  As the supervising MD   Physician Attestation Statement: I have personally seen and examined this patient.   I agree with the above history. -:   As the supervising MD I agree with the above PE.    As the supervising MD I agree with the above treatment, course, plan, and disposition.  I have reviewed and agree with the residents interpretation of the following: lab data, x-rays and EKG.  I have reviewed the following: old records at this facility.            Attending ED Notes:   I performed a face to face evaluation of the patient Bharath Caballero and my history reveal history of DM, HTN, familial hyperlipidemia and chronic pancreatitis presents with abdominal pain the physical exam was unremarkable.  I reviewed the history, physical exam and diagnostic studies performed by the resident Dr. EL Ruiz  and I concur with the diagnosis and assessment. I performed a face to face with this patient. This case was initially evaluated by Dr. Ruiz  under my supervision and I agree with the documentation and plan.    Total teaching time: 12 min                 Clinical Impression:   Final diagnoses:  [R07.9] Chest pain  [I10] Uncontrolled hypertension (Primary)  [Z87.19] History of chronic pancreatitis  [E78.01] Familial hypercholesterolemia          ED Disposition Condition    Discharge Stable        ED Prescriptions     None        Follow-up Information     Follow up With Specialties Details Why Contact Info    Dat Beasley MD Pulmonary Disease In 1 week  [address] [facility] [address]  [phone]      [facility] Emergency Medicine  If symptoms worsen [address] [facility] [id number] [phone]           Milton Kitchen MD  07/12/22 0003       Milton Kitchen MD  07/12/22 0004

## 2022-07-11 NOTE — TELEPHONE ENCOUNTER
Pt is requesting to schedule an appt for surgery clearance. Pt is scheduled for 7/21/22 and they will cancel him for a 2nd time if he does not have a visit with the provider before then.

## 2022-07-20 ENCOUNTER — OFFICE VISIT (OUTPATIENT)
Dept: INTERNAL MEDICINE | Facility: CLINIC | Age: 42
End: 2022-07-20
Payer: MEDICARE

## 2022-07-20 VITALS
SYSTOLIC BLOOD PRESSURE: 158 MMHG | BODY MASS INDEX: 30.17 KG/M2 | DIASTOLIC BLOOD PRESSURE: 92 MMHG | WEIGHT: 203.69 LBS | TEMPERATURE: 99 F | HEART RATE: 84 BPM | HEIGHT: 69 IN | RESPIRATION RATE: 20 BRPM

## 2022-07-20 DIAGNOSIS — I10 HYPERTENSION, UNSPECIFIED TYPE: ICD-10-CM

## 2022-07-20 DIAGNOSIS — E66.9 DIABETES MELLITUS TYPE 2 IN OBESE: ICD-10-CM

## 2022-07-20 DIAGNOSIS — E11.69 DIABETES MELLITUS TYPE 2 IN OBESE: ICD-10-CM

## 2022-07-20 DIAGNOSIS — E13.42 DIABETIC POLYNEUROPATHY ASSOCIATED WITH OTHER SPECIFIED DIABETES MELLITUS: ICD-10-CM

## 2022-07-20 DIAGNOSIS — G83.10 PARESIS OF SINGLE LOWER EXTREMITY: Primary | ICD-10-CM

## 2022-07-20 DIAGNOSIS — E78.01 FAMILIAL HYPERCHOLESTEROLEMIA: ICD-10-CM

## 2022-07-20 DIAGNOSIS — G47.33 OBSTRUCTIVE SLEEP APNEA SYNDROME: ICD-10-CM

## 2022-07-20 DIAGNOSIS — Z72.0 TOBACCO USER: ICD-10-CM

## 2022-07-20 DIAGNOSIS — E11.40 PAINFUL DIABETIC NEUROPATHY: Chronic | ICD-10-CM

## 2022-07-20 DIAGNOSIS — G89.4 CHRONIC PAIN SYNDROME: Chronic | ICD-10-CM

## 2022-07-20 DIAGNOSIS — R10.9 LEFT FLANK PAIN: ICD-10-CM

## 2022-07-20 PROCEDURE — 99215 OFFICE O/P EST HI 40 MIN: CPT | Mod: PBBFAC | Performed by: INTERNAL MEDICINE

## 2022-07-20 RX ORDER — NITROGLYCERIN 0.3 MG/1
0.3 TABLET SUBLINGUAL EVERY 5 MIN PRN
COMMUNITY
End: 2022-07-20 | Stop reason: SDUPTHER

## 2022-07-20 RX ORDER — CLONIDINE HYDROCHLORIDE 0.2 MG/1
0.2 TABLET ORAL 3 TIMES DAILY
Qty: 270 TABLET | Refills: 3 | Status: SHIPPED | OUTPATIENT
Start: 2022-07-20 | End: 2023-08-14

## 2022-07-20 RX ORDER — NITROGLYCERIN 0.3 MG/1
0.3 TABLET SUBLINGUAL EVERY 5 MIN PRN
Qty: 30 TABLET | Refills: 6 | Status: SHIPPED | OUTPATIENT
Start: 2022-07-20 | End: 2022-12-13 | Stop reason: SDUPTHER

## 2022-07-20 RX ORDER — GABAPENTIN 400 MG/1
400 CAPSULE ORAL 2 TIMES DAILY
COMMUNITY
End: 2022-10-19 | Stop reason: SDUPTHER

## 2022-07-20 RX ORDER — HYDROMORPHONE HYDROCHLORIDE 8 MG/1
8 TABLET ORAL EVERY 6 HOURS PRN
Qty: 120 TABLET | Refills: 0 | Status: SHIPPED | OUTPATIENT
Start: 2022-07-20 | End: 2022-08-18 | Stop reason: SDUPTHER

## 2022-07-20 RX ORDER — MORPHINE SULFATE 100 MG/1
100 TABLET, FILM COATED, EXTENDED RELEASE ORAL EVERY 8 HOURS
Qty: 90 TABLET | Refills: 0 | Status: SHIPPED | OUTPATIENT
Start: 2022-07-20 | End: 2022-08-18 | Stop reason: SDUPTHER

## 2022-07-20 RX ORDER — HYDROMORPHONE HYDROCHLORIDE 8 MG/1
8 TABLET ORAL EVERY 6 HOURS PRN
Qty: 120 TABLET | Refills: 0 | Status: SHIPPED | OUTPATIENT
Start: 2022-07-20 | End: 2022-09-23

## 2022-07-20 RX ORDER — MORPHINE SULFATE 100 MG/1
100 TABLET, FILM COATED, EXTENDED RELEASE ORAL EVERY 8 HOURS
Qty: 90 TABLET | Refills: 0 | Status: SHIPPED | OUTPATIENT
Start: 2022-07-20 | End: 2022-09-23

## 2022-07-20 NOTE — PROGRESS NOTES
Subjective:       Patient ID: Bharath Caballero is a 42 y.o. male.    Chief Complaint: Follow-up (Surgery Clearance)    HPI     Aggregate:  0 minutes after I was asked to see this patient for Dr. Pozo for surgical clearance to put a spinal stimulator for chronic pain.  To making him for this epic system is is taking the overnight hours to see this patient and still having trouble extracting appropriate data.  This is despite the help of more than 1 nurse.  It appears that he has had lived very labile hypertension is on high chronic pain medicine use for years labile diabetic multiple other problems as listed in the chart.  He was seen in the workup with a negative benign workup.  This was for chest pain abdominal pain and labile hypertension.  Initially his blood pressure here was 185/96 but on repeat testing it was 158/82 after he relaxed.  His nephrologist recently increased his clonidine to 0.2 mg t.i.d..  He said he was still taking 0.12 we will repeat placing it to 0.2 t.i.d. as was prescribed.  He needed refills on his narcotic medications which we have accomplished to include morphine Dilaudid and also his sublingual nitroglycerin for p.r.n. use under structures to him what to hold his last pro insulin in the morning take his Lantus full dose hold his aspirin which he has been doing now for almost a week.  Normally we would have him hold his ACE inhibitor which is lisinopril but because of his severe labile hypertension we think is this area that he continue it.  All other medicines will be taken with a sip of water.  All this has reviewed instructed hopefully by Anesthesia in preparation for his surgery tomorrow we will get a BMP to check potassium today he is supposed to have labs in the morning before his surgery as well.  Today the patient says he has no pain shortness of breath fever chills nausea vomiting diarrhea and feels well.  He has had no palpitations syncope weakness his exam is unremarkable.   He is cleared for surgery he does understand that he is a moderate risk candidate and accepted the risk benefit for this procedure  Review of Systems   All other systems reviewed and are negative.        Objective:      Physical Exam  Vitals reviewed.   Constitutional:       Appearance: Normal appearance.      Comments: Using a cane for ambulation   HENT:      Head: Normocephalic and atraumatic.      Right Ear: Tympanic membrane, ear canal and external ear normal.      Left Ear: Tympanic membrane, ear canal and external ear normal.      Nose: Nose normal.      Mouth/Throat:      Mouth: Mucous membranes are moist.      Pharynx: Oropharynx is clear.   Eyes:      Extraocular Movements: Extraocular movements intact.      Conjunctiva/sclera: Conjunctivae normal.      Pupils: Pupils are equal, round, and reactive to light.   Cardiovascular:      Rate and Rhythm: Normal rate.      Pulses: Normal pulses.      Heart sounds: Normal heart sounds.   Pulmonary:      Effort: Pulmonary effort is normal.      Breath sounds: Normal breath sounds.   Abdominal:      General: Bowel sounds are normal.      Palpations: Abdomen is soft.   Musculoskeletal:      Cervical back: Normal range of motion and neck supple.   Skin:     General: Skin is warm and dry.   Neurological:      General: No focal deficit present.      Mental Status: He is alert and oriented to person, place, and time. Mental status is at baseline.   Psychiatric:         Mood and Affect: Mood normal.         Behavior: Behavior normal.         Thought Content: Thought content normal.         Judgment: Judgment normal.         Assessment:       Problem List Items Addressed This Visit        Neuro    Chronic pain syndrome (Chronic)    Painful diabetic neuropathy (Chronic)    Diabetic polyneuropathy    Paresis of single lower extremity - Primary       Cardiac/Vascular    Hypertension    Relevant Medications    cloNIDine (CATAPRES) 0.2 MG tablet    Other Relevant Orders    Basic  Metabolic Panel    Familial hypercholesterolemia       Endocrine    Diabetes mellitus type 2 in obese       GI    Left flank pain       Other    Obstructive sleep apnea syndrome    Tobacco user          Plan:           Assessment plan problems as above.  Medical problems appear to be stable and he is cleared for surgery.  He accepts the risk benefit of this surgery

## 2022-07-20 NOTE — TELEPHONE ENCOUNTER
Medication was missing the medically necessary I have proposed this again and  added this to the Rx.

## 2022-07-21 ENCOUNTER — ANESTHESIA (OUTPATIENT)
Dept: SURGERY | Facility: HOSPITAL | Age: 42
End: 2022-07-21
Payer: MEDICARE

## 2022-07-21 ENCOUNTER — ANESTHESIA EVENT (OUTPATIENT)
Dept: SURGERY | Facility: HOSPITAL | Age: 42
End: 2022-07-21
Payer: MEDICARE

## 2022-07-21 ENCOUNTER — HOSPITAL ENCOUNTER (OUTPATIENT)
Facility: HOSPITAL | Age: 42
Discharge: HOME | End: 2022-07-21
Attending: PSYCHIATRY & NEUROLOGY | Admitting: PSYCHIATRY & NEUROLOGY
Payer: MEDICARE

## 2022-07-21 LAB
CTP QC/QA: YES
POCT GLUCOSE: 122 MG/DL (ref 70–110)
POCT GLUCOSE: 67 MG/DL (ref 70–110)
POCT GLUCOSE: 90 MG/DL (ref 70–110)
SARS-COV-2 AG RESP QL IA.RAPID: NEGATIVE

## 2022-07-21 PROCEDURE — 71000015 HC POSTOP RECOV 1ST HR: Performed by: PSYCHIATRY & NEUROLOGY

## 2022-07-21 PROCEDURE — 27201423 OPTIME MED/SURG SUP & DEVICES STERILE SUPPLY: Performed by: PSYCHIATRY & NEUROLOGY

## 2022-07-21 PROCEDURE — 63650 PR PERCUT IMPLNT NEUROELECT,EPIDURAL: ICD-10-PCS | Mod: ,,, | Performed by: PSYCHIATRY & NEUROLOGY

## 2022-07-21 PROCEDURE — 82962 GLUCOSE BLOOD TEST: CPT | Performed by: PSYCHIATRY & NEUROLOGY

## 2022-07-21 PROCEDURE — 63650 IMPLANT NEUROELECTRODES: CPT | Mod: ,,, | Performed by: PSYCHIATRY & NEUROLOGY

## 2022-07-21 PROCEDURE — 37000008 HC ANESTHESIA 1ST 15 MINUTES: Performed by: PSYCHIATRY & NEUROLOGY

## 2022-07-21 PROCEDURE — C1778 LEAD, NEUROSTIMULATOR: HCPCS | Performed by: PSYCHIATRY & NEUROLOGY

## 2022-07-21 PROCEDURE — 27800903 OPTIME MED/SURG SUP & DEVICES OTHER IMPLANTS: Performed by: PSYCHIATRY & NEUROLOGY

## 2022-07-21 PROCEDURE — 25000003 PHARM REV CODE 250: Performed by: PSYCHIATRY & NEUROLOGY

## 2022-07-21 PROCEDURE — 63600175 PHARM REV CODE 636 W HCPCS

## 2022-07-21 PROCEDURE — 25000003 PHARM REV CODE 250

## 2022-07-21 PROCEDURE — 63685 INS/RPLC SPI NPG/RCVR POCKET: CPT | Mod: 51,,, | Performed by: PSYCHIATRY & NEUROLOGY

## 2022-07-21 PROCEDURE — 37000009 HC ANESTHESIA EA ADD 15 MINS: Performed by: PSYCHIATRY & NEUROLOGY

## 2022-07-21 PROCEDURE — 63600175 PHARM REV CODE 636 W HCPCS: Performed by: PSYCHIATRY & NEUROLOGY

## 2022-07-21 PROCEDURE — 25000003 PHARM REV CODE 250: Performed by: ANESTHESIOLOGY

## 2022-07-21 PROCEDURE — 71000033 HC RECOVERY, INTIAL HOUR: Performed by: PSYCHIATRY & NEUROLOGY

## 2022-07-21 PROCEDURE — 63600175 PHARM REV CODE 636 W HCPCS: Performed by: ANESTHESIOLOGY

## 2022-07-21 PROCEDURE — C1822 GEN, NEURO, HF, RECHG BAT: HCPCS | Performed by: PSYCHIATRY & NEUROLOGY

## 2022-07-21 PROCEDURE — L8699 PROSTHETIC IMPLANT NOS: HCPCS | Performed by: PSYCHIATRY & NEUROLOGY

## 2022-07-21 PROCEDURE — A4216 STERILE WATER/SALINE, 10 ML: HCPCS | Performed by: PSYCHIATRY & NEUROLOGY

## 2022-07-21 PROCEDURE — 36000706: Performed by: PSYCHIATRY & NEUROLOGY

## 2022-07-21 PROCEDURE — 36000707: Performed by: PSYCHIATRY & NEUROLOGY

## 2022-07-21 PROCEDURE — 87811 SARS-COV-2 COVID19 W/OPTIC: CPT | Performed by: PSYCHIATRY & NEUROLOGY

## 2022-07-21 PROCEDURE — 71000039 HC RECOVERY, EACH ADD'L HOUR: Performed by: PSYCHIATRY & NEUROLOGY

## 2022-07-21 PROCEDURE — A6011 COLLAGEN GEL/PASTE WOUND FIL: HCPCS | Performed by: PSYCHIATRY & NEUROLOGY

## 2022-07-21 PROCEDURE — 71000016 HC POSTOP RECOV ADDL HR: Performed by: PSYCHIATRY & NEUROLOGY

## 2022-07-21 PROCEDURE — 63685 PR IMPLANT SPINAL NEUROSTIM/RECEIVER: ICD-10-PCS | Mod: 51,,, | Performed by: PSYCHIATRY & NEUROLOGY

## 2022-07-21 DEVICE — KIT LEAD 70CM: Type: IMPLANTABLE DEVICE | Site: BACK | Status: FUNCTIONAL

## 2022-07-21 DEVICE — KIT SENZA II OMNIA IPG 2500: Type: IMPLANTABLE DEVICE | Site: BACK | Status: FUNCTIONAL

## 2022-07-21 DEVICE — KIT LEAD ANCHOR N3000: Type: IMPLANTABLE DEVICE | Site: BACK | Status: FUNCTIONAL

## 2022-07-21 RX ORDER — VANCOMYCIN HYDROCHLORIDE 500 MG/10ML
INJECTION, POWDER, LYOPHILIZED, FOR SOLUTION INTRAVENOUS
Status: DISCONTINUED | OUTPATIENT
Start: 2022-07-21 | End: 2022-07-21 | Stop reason: HOSPADM

## 2022-07-21 RX ORDER — SODIUM CHLORIDE 9 MG/ML
INJECTION, SOLUTION INTRAMUSCULAR; INTRAVENOUS; SUBCUTANEOUS
Status: DISCONTINUED
Start: 2022-07-21 | End: 2022-07-21 | Stop reason: HOSPADM

## 2022-07-21 RX ORDER — FENTANYL CITRATE 50 UG/ML
INJECTION, SOLUTION INTRAMUSCULAR; INTRAVENOUS
Status: DISCONTINUED | OUTPATIENT
Start: 2022-07-21 | End: 2022-07-21

## 2022-07-21 RX ORDER — SUCCINYLCHOLINE CHLORIDE 20 MG/ML
INJECTION INTRAMUSCULAR; INTRAVENOUS
Status: DISCONTINUED | OUTPATIENT
Start: 2022-07-21 | End: 2022-07-21

## 2022-07-21 RX ORDER — SODIUM CHLORIDE 9 MG/ML
INJECTION, SOLUTION INTRAMUSCULAR; INTRAVENOUS; SUBCUTANEOUS
Status: DISCONTINUED | OUTPATIENT
Start: 2022-07-21 | End: 2022-07-21 | Stop reason: HOSPADM

## 2022-07-21 RX ORDER — GLYCOPYRROLATE 0.2 MG/ML
INJECTION INTRAMUSCULAR; INTRAVENOUS
Status: DISCONTINUED | OUTPATIENT
Start: 2022-07-21 | End: 2022-07-21

## 2022-07-21 RX ORDER — SODIUM CHLORIDE 0.9 % (FLUSH) 0.9 %
10 SYRINGE (ML) INJECTION
Status: DISCONTINUED | OUTPATIENT
Start: 2022-07-21 | End: 2022-07-21 | Stop reason: HOSPADM

## 2022-07-21 RX ORDER — MIDAZOLAM HYDROCHLORIDE 1 MG/ML
1 INJECTION INTRAMUSCULAR; INTRAVENOUS EVERY 5 MIN PRN
Status: DISCONTINUED | OUTPATIENT
Start: 2022-07-21 | End: 2022-07-21 | Stop reason: HOSPADM

## 2022-07-21 RX ORDER — VANCOMYCIN HYDROCHLORIDE 500 MG/10ML
INJECTION, POWDER, LYOPHILIZED, FOR SOLUTION INTRAVENOUS
Status: DISCONTINUED
Start: 2022-07-21 | End: 2022-07-21 | Stop reason: HOSPADM

## 2022-07-21 RX ORDER — HYDROCODONE BITARTRATE AND ACETAMINOPHEN 5; 325 MG/1; MG/1
1 TABLET ORAL
Status: DISCONTINUED | OUTPATIENT
Start: 2022-07-21 | End: 2022-07-21 | Stop reason: HOSPADM

## 2022-07-21 RX ORDER — FAMOTIDINE 10 MG/ML
20 INJECTION INTRAVENOUS ONCE
Status: COMPLETED | OUTPATIENT
Start: 2022-07-21 | End: 2022-07-21

## 2022-07-21 RX ORDER — EPHEDRINE SULFATE 50 MG/ML
INJECTION, SOLUTION INTRAVENOUS
Status: DISCONTINUED | OUTPATIENT
Start: 2022-07-21 | End: 2022-07-21

## 2022-07-21 RX ORDER — LIDOCAINE HYDROCHLORIDE 20 MG/ML
INJECTION, SOLUTION EPIDURAL; INFILTRATION; INTRACAUDAL; PERINEURAL
Status: DISCONTINUED
Start: 2022-07-21 | End: 2022-07-21 | Stop reason: WASHOUT

## 2022-07-21 RX ORDER — CEFAZOLIN SODIUM 1 G/3ML
1 INJECTION, POWDER, FOR SOLUTION INTRAMUSCULAR; INTRAVENOUS
Status: COMPLETED | OUTPATIENT
Start: 2022-07-21 | End: 2022-07-21

## 2022-07-21 RX ORDER — LIDOCAINE HYDROCHLORIDE 10 MG/ML
INJECTION, SOLUTION EPIDURAL; INFILTRATION; INTRACAUDAL; PERINEURAL
Status: DISCONTINUED | OUTPATIENT
Start: 2022-07-21 | End: 2022-07-21

## 2022-07-21 RX ORDER — LIDOCAINE HYDROCHLORIDE 10 MG/ML
1 INJECTION, SOLUTION EPIDURAL; INFILTRATION; INTRACAUDAL; PERINEURAL ONCE
Status: DISCONTINUED | OUTPATIENT
Start: 2022-07-21 | End: 2022-07-21 | Stop reason: HOSPADM

## 2022-07-21 RX ORDER — ONDANSETRON HYDROCHLORIDE 2 MG/ML
INJECTION, SOLUTION INTRAMUSCULAR; INTRAVENOUS
Status: DISCONTINUED | OUTPATIENT
Start: 2022-07-21 | End: 2022-07-21

## 2022-07-21 RX ORDER — ROCURONIUM BROMIDE 10 MG/ML
INJECTION, SOLUTION INTRAVENOUS
Status: DISCONTINUED | OUTPATIENT
Start: 2022-07-21 | End: 2022-07-21

## 2022-07-21 RX ORDER — GABAPENTIN 300 MG/1
300 CAPSULE ORAL 3 TIMES DAILY
Status: DISCONTINUED | OUTPATIENT
Start: 2022-07-21 | End: 2022-07-21 | Stop reason: HOSPADM

## 2022-07-21 RX ORDER — CEFAZOLIN SODIUM 1 G/3ML
INJECTION, POWDER, FOR SOLUTION INTRAMUSCULAR; INTRAVENOUS
Status: DISCONTINUED | OUTPATIENT
Start: 2022-07-21 | End: 2022-07-21 | Stop reason: HOSPADM

## 2022-07-21 RX ORDER — CEFAZOLIN SODIUM 1 G/3ML
INJECTION, POWDER, FOR SOLUTION INTRAMUSCULAR; INTRAVENOUS
Status: COMPLETED
Start: 2022-07-21 | End: 2022-07-21

## 2022-07-21 RX ORDER — PHENYLEPHRINE HYDROCHLORIDE 10 MG/ML
INJECTION INTRAVENOUS
Status: DISCONTINUED | OUTPATIENT
Start: 2022-07-21 | End: 2022-07-21

## 2022-07-21 RX ORDER — HYDROMORPHONE HYDROCHLORIDE 2 MG/ML
0.2 INJECTION, SOLUTION INTRAMUSCULAR; INTRAVENOUS; SUBCUTANEOUS EVERY 5 MIN PRN
Status: DISCONTINUED | OUTPATIENT
Start: 2022-07-21 | End: 2022-07-21 | Stop reason: HOSPADM

## 2022-07-21 RX ORDER — MIDAZOLAM HYDROCHLORIDE 1 MG/ML
INJECTION INTRAMUSCULAR; INTRAVENOUS
Status: COMPLETED
Start: 2022-07-21 | End: 2022-07-21

## 2022-07-21 RX ORDER — FENTANYL CITRATE 50 UG/ML
25 INJECTION, SOLUTION INTRAMUSCULAR; INTRAVENOUS EVERY 5 MIN PRN
Status: DISCONTINUED | OUTPATIENT
Start: 2022-07-21 | End: 2022-07-21 | Stop reason: HOSPADM

## 2022-07-21 RX ORDER — ACETAMINOPHEN 10 MG/ML
INJECTION, SOLUTION INTRAVENOUS
Status: DISCONTINUED | OUTPATIENT
Start: 2022-07-21 | End: 2022-07-21

## 2022-07-21 RX ORDER — KETAMINE HYDROCHLORIDE 100 MG/ML
INJECTION, SOLUTION INTRAMUSCULAR; INTRAVENOUS
Status: DISCONTINUED | OUTPATIENT
Start: 2022-07-21 | End: 2022-07-21

## 2022-07-21 RX ORDER — PROPOFOL 10 MG/ML
VIAL (ML) INTRAVENOUS
Status: DISCONTINUED | OUTPATIENT
Start: 2022-07-21 | End: 2022-07-21

## 2022-07-21 RX ADMIN — DEXTROSE MONOHYDRATE 6.5 G: 25 INJECTION, SOLUTION INTRAVENOUS at 07:07

## 2022-07-21 RX ADMIN — EPHEDRINE SULFATE 5 MG: 50 INJECTION INTRAVENOUS at 10:07

## 2022-07-21 RX ADMIN — EPHEDRINE SULFATE 10 MG: 50 INJECTION INTRAVENOUS at 10:07

## 2022-07-21 RX ADMIN — GLYCOPYRROLATE 0.2 MG: 0.2 INJECTION INTRAMUSCULAR; INTRAVENOUS at 10:07

## 2022-07-21 RX ADMIN — PHENYLEPHRINE HYDROCHLORIDE 100 MCG: 10 INJECTION INTRAVENOUS at 11:07

## 2022-07-21 RX ADMIN — LIDOCAINE HYDROCHLORIDE 50 MG: 10 INJECTION, SOLUTION EPIDURAL; INFILTRATION; INTRACAUDAL; PERINEURAL at 10:07

## 2022-07-21 RX ADMIN — PROPOFOL 200 MG: 10 INJECTION, EMULSION INTRAVENOUS at 10:07

## 2022-07-21 RX ADMIN — PHENYLEPHRINE HYDROCHLORIDE 50 MCG: 10 INJECTION INTRAVENOUS at 10:07

## 2022-07-21 RX ADMIN — EPHEDRINE SULFATE 10 MG: 50 INJECTION INTRAVENOUS at 11:07

## 2022-07-21 RX ADMIN — KETAMINE HYDROCHLORIDE 25 MG: 100 INJECTION, SOLUTION, CONCENTRATE INTRAMUSCULAR; INTRAVENOUS at 10:07

## 2022-07-21 RX ADMIN — FENTANYL CITRATE 100 MCG: 50 INJECTION, SOLUTION INTRAMUSCULAR; INTRAVENOUS at 10:07

## 2022-07-21 RX ADMIN — PHENYLEPHRINE HYDROCHLORIDE 100 MCG: 10 INJECTION INTRAVENOUS at 10:07

## 2022-07-21 RX ADMIN — ROCURONIUM BROMIDE 5 MG: 10 SOLUTION INTRAVENOUS at 10:07

## 2022-07-21 RX ADMIN — CEFAZOLIN 1 G: 330 INJECTION, POWDER, FOR SOLUTION INTRAMUSCULAR; INTRAVENOUS at 10:07

## 2022-07-21 RX ADMIN — SODIUM CHLORIDE, POTASSIUM CHLORIDE, SODIUM LACTATE AND CALCIUM CHLORIDE: 600; 310; 30; 20 INJECTION, SOLUTION INTRAVENOUS at 11:07

## 2022-07-21 RX ADMIN — SODIUM CHLORIDE, POTASSIUM CHLORIDE, SODIUM LACTATE AND CALCIUM CHLORIDE: 600; 310; 30; 20 INJECTION, SOLUTION INTRAVENOUS at 10:07

## 2022-07-21 RX ADMIN — MIDAZOLAM HYDROCHLORIDE 1 MG: 1 INJECTION, SOLUTION INTRAMUSCULAR; INTRAVENOUS at 09:07

## 2022-07-21 RX ADMIN — SUCCINYLCHOLINE CHLORIDE 120 MG: 20 INJECTION, SOLUTION INTRAMUSCULAR; INTRAVENOUS at 10:07

## 2022-07-21 RX ADMIN — ONDANSETRON 4 MG: 2 INJECTION INTRAMUSCULAR; INTRAVENOUS at 10:07

## 2022-07-21 RX ADMIN — KETAMINE HYDROCHLORIDE 25 MG: 100 INJECTION, SOLUTION, CONCENTRATE INTRAMUSCULAR; INTRAVENOUS at 11:07

## 2022-07-21 RX ADMIN — FAMOTIDINE 20 MG: 10 INJECTION, SOLUTION INTRAVENOUS at 08:07

## 2022-07-21 RX ADMIN — ACETAMINOPHEN 1000 MG: 10 INJECTION, SOLUTION INTRAVENOUS at 10:07

## 2022-07-21 NOTE — PLAN OF CARE
Pt desated to 86% c intercostal retraction and abdominus rectus accessory muscle use, kim crna present at  Bedside 90mm sommers removed present sat 96% on 100% face tent

## 2022-07-21 NOTE — PLAN OF CARE
Hourly rounds performed assessment unchanged  pacu discharge criteria met Pt stable for transfer to phase II

## 2022-07-21 NOTE — ANESTHESIA POSTPROCEDURE EVALUATION
Anesthesia Post Evaluation    Patient: Bharath Caballero    Procedure(s) Performed: Procedure(s) (LRB):  INSERTION, NEUROSTIMULATOR, SPINAL CORD (N/A)    Final Anesthesia Type: general      Patient location during evaluation: PACU  Patient participation: Yes- Able to Participate  Level of consciousness: awake and alert  Post-procedure vital signs: reviewed and stable  Pain management: adequate  Airway patency: patent    PONV status at discharge: No PONV  Anesthetic complications: no      Cardiovascular status: blood pressure returned to baseline  Respiratory status: spontaneous ventilation and unassisted  Hydration status: euvolemic  Follow-up needed           Vitals Value Taken Time   /75 07/21/22 1340   Temp 36.5 °C (97.7 °F) 07/21/22 1200   Pulse 98 07/21/22 1340   Resp 16 07/21/22 1340   SpO2 95 % 07/21/22 1340         Event Time   Out of Recovery 13:20:00         Pain/Merlyn Score: Merlyn Score: 8 (7/21/2022  1:25 PM)

## 2022-07-21 NOTE — H&P
Pre-Operative History and Physical   Interventional Neurology    Bharath Caballero is a 42 y.o. male here for SCS implant    ROS:  Review of Systems   All other systems reviewed and are negative.       Past Medical History:   Diagnosis Date    BPH (benign prostatic hyperplasia)     Diabetes     GERD (gastroesophageal reflux disease)     Hepatic steatosis     History of continuous positive airway pressure (CPAP) therapy at home     Hypertension     Hypertriglyceridemia     Kidney disorder     Leg pain     Morbid obesity     Neuropathy     OA (osteoarthritis)     CICI (obstructive sleep apnea)     Polyneuropathy     Recurrent pancreatitis     Renal insufficiency     Tobacco abuse      Past Surgical History:   Procedure Laterality Date    APPENDECTOMY      ARTERIOVENOUS ANASTOMOSIS, OPEN, UPPER ARM BASILLIC VEIN TRANSPOSITION N/A 05/07/2018    ESOPHAGOGASTRODUODENOSCOPY N/A 06/07/2021    EXCISION OF ARTERIOVENOUS FISTULA N/A 06/01/2018    HERNIA REPAIR      INSPECTION OF UPPER INTESTINAL TRACT N/A 06/07/2021    PHERESIS OF PLASMA N/A 07/13/2018    PHERESIS OF PLASMA N/A 05/25/2018    SPINAL CORD STIMULATOR REMOVAL      TRIAL OF SPINAL CORD NERVE STIMULATOR N/A 5/12/2022    Procedure: TRIAL, NEUROSTIMULATOR, SPINAL CORD;  Surgeon: Jay Pozo MD;  Location: [facility];  Service: Neurosurgery;  Laterality: N/A;    UPPER GI N/A 06/07/2021     Family History   Problem Relation Age of Onset    Obesity Father      Review of patient's allergies indicates:  No Known Allergies    Current Facility-Administered Medications:     ceFAZolin (ANCEF) 1 gram injection, , , ,     ceFAZolin injection 1 g, 1 g, Intravenous, Once Pre-Op, Jay Pozo MD    gabapentin capsule 300 mg, 300 mg, Oral, TID, Marlo Neil DO    LIDOcaine (PF) 10 mg/ml (1%) injection 10 mg, 1 mL, Intradermal, Once, Marlo Neil DO    midazolam (VERSED) 1 mg/mL injection 1 mg, 1 mg, Intravenous, Q5 Min PRN, Marlo  Jolynn, , 1 mg at 07/21/22 0941    sodium chloride 0.9% flush 10 mL, 10 mL, Intravenous, PRN, Marlo Neil DO    sodium chloride 0.9% flush 10 mL, 10 mL, Intravenous, PRN, Marlo Neil, DO    sodium chloride 0.9% injection, , , ,     vancomycin (VANCOCIN) 500 mg injection, , , ,     Facility-Administered Medications Ordered in Other Encounters:     diphenhydrAMINE injection 25 mg, 25 mg, Intravenous, Q6H PRN, Hero Carty MD    HYDROmorphone (PF) injection 0.2 mg, 0.2 mg, Intravenous, Q5 Min PRN, Hero Carty MD    metoclopramide HCl injection 10 mg, 10 mg, Intravenous, Q10 Min PRN, Hero Carty MD    ondansetron injection 4 mg, 4 mg, Intravenous, Daily PRN, Hero Carty MD  Outpatient Medications Marked as Taking for the 7/21/22 encounter (Hospital Encounter)   Medication Sig Dispense Refill    amLODIPine (NORVASC) 10 MG tablet 10 mg once daily.      atorvastatin (LIPITOR) 80 MG tablet Take 80 mg by mouth once daily.      carBAMazepine (CARBATROL) 200 MG CM12 Take 3 capsules (600 mg total) by mouth 2 (two) times a day. 180 capsule 11    clonazePAM (KLONOPIN) 1 MG tablet TAKE ONE TABLET BY MOUTH TWICE DAILY 60 tablet 5    cloNIDine (CATAPRES) 0.2 MG tablet Take 1 tablet (0.2 mg total) by mouth 3 (three) times daily. 270 tablet 3    EScitalopram oxalate (LEXAPRO) 20 MG tablet Take 20 mg by mouth once daily.      ferrous sulfate (FEOSOL) 325 mg (65 mg iron) Tab tablet Patrice-Time 325 mg (65 mg iron) tablet      gabapentin (NEURONTIN) 400 MG capsule Take 400 mg by mouth 2 (two) times daily.      HYDROmorphone (DILAUDID) 8 MG tablet Take 1 tablet (8 mg total) by mouth every 6 (six) hours as needed (PRN for pain). 120 tablet 0    insulin glargine (LANTUS U-100 INSULIN) 100 unit/mL injection Inject 100 Units into the skin 2 (two) times a day. (Patient taking differently: Inject 65 Units into the skin 2 (two) times a day.) 30 mL 6    insulin lispro 100 unit/mL injection   See  Instructions, INJECT 25 UNITS SUBCUTANEOUSLY BEFORE MEALS THREE TIMES DAILY, # 10 mL, 4 Refill(s), Pharmacy: [facility]  [employer] [facility], 175.3, cm, Height/Length Dosing, 07/20/21 13:41:00 CDT, 84.6, kg, Weight Dosing, 07/20/21 13:41:00 CDT      lipase-protease-amylase (CREON) 3,000-9,500- 15,000 unit CpDR 2 (two) times a day.      lisinopriL (PRINIVIL,ZESTRIL) 40 MG tablet Take 40 mg by mouth once daily.      morphine (MS CONTIN) 100 MG 12 hr tablet Take 1 tablet (100 mg total) by mouth every 8 (eight) hours. 90 tablet 0    potassium chloride SA (K-DUR,KLOR-CON) 20 MEQ tablet TAKE ONE TABLET BY MOUTH ONCE DAILY 90 tablet 1    rosuvastatin (CRESTOR) 40 MG Tab Take 40 mg by mouth once daily.      tamsulosin (FLOMAX) 0.4 mg Cap Take 0.4 mg by mouth once daily.      torsemide (DEMADEX) 20 MG Tab Take 20 mg by mouth once daily.       Social History     Tobacco Use    Smoking status: Current Every Day Smoker     Packs/day: 0.50     Years: 25.00     Pack years: 12.50     Types: Cigarettes     Start date: 7/20/1996    Smokeless tobacco: Never Used   Substance Use Topics    Alcohol use: Never    Drug use: Never         Vitals:    07/21/22 0806   BP:    Pulse:    Resp: 20   Temp:      Gen NAD  HEENT NC/AT  CV RRR, no carotid bruits  Resp clear  GI soft  Ext no C/C/E  Neuro  AAOx4  Speech fluent, appropriate  EOMI, PERRLA, VFF  Normal facial strength, sensation  Tongue and palate midline  Motor 5/5  Sensation intact  DTRs symmetric  Coord intact          Assessment: CPS/Painful Diabetic Neuropathy    Plan: SCS implant    I have discussed the risks, benefits, indications, and alternatives of the procedure in detail.  The patient verbalizes her understanding.  All questions answered.  Consents signed.  The patient agrees to proceed to proceed as planned.

## 2022-07-21 NOTE — TRANSFER OF CARE
"Anesthesia Transfer of Care Note    Patient: Bharath Caballero    Procedure(s) Performed: Procedure(s) (LRB):  INSERTION, NEUROSTIMULATOR, SPINAL CORD (N/A)    Patient location: PACU    Anesthesia Type: general    Transport from OR: Transported from OR on room air with adequate spontaneous ventilation    Post pain: adequate analgesia    Post assessment: no apparent anesthetic complications and tolerated procedure well    Post vital signs: stable    Level of consciousness: responds to stimulation    Nausea/Vomiting: no nausea/vomiting    Complications: none    Transfer of care protocol was followed      Last vitals:   Visit Vitals  /62 (BP Location: Right arm, Patient Position: Lying)   Pulse 85   Temp 36.5 °C (97.7 °F) (Tympanic)   Resp 14   Ht 5' 9" (1.753 m)   Wt 104 kg (229 lb 4.5 oz)   SpO2 96%   BMI 33.96 kg/m²     "

## 2022-07-21 NOTE — PROGRESS NOTES
Pts ride home arrived to  pt. Pt awoke when friend entered room. Able to maintain 02 sat at 95-96% on RA while awake. Pt reports feeling stiff. Offered pain med but pt denied med and stated he is ready to go home. Tolerated fluid well.

## 2022-07-21 NOTE — DISCHARGE SUMMARY
[facility] Surgical - Periop Services  Discharge Note  Short Stay    Procedure(s) (LRB):  INSERTION, NEUROSTIMULATOR, SPINAL CORD (N/A)    OUTCOME: Patient tolerated treatment/procedure well without complication and is now ready for discharge.    DISPOSITION: Home or Self Care    FINAL DIAGNOSIS:  Chronic pain syndrome    FOLLOWUP: In clinic    DISCHARGE INSTRUCTIONS:  No discharge procedures on file.     TIME SPENT ON DISCHARGE: 25 minutes

## 2022-07-21 NOTE — ANESTHESIA POSTPROCEDURE EVALUATION
Anesthesia Post Evaluation    Patient: Bharath Caballero    Procedure(s) Performed: Procedure(s) (LRB):  INSERTION, NEUROSTIMULATOR, SPINAL CORD (N/A)    [facility] Anesthesia Post Op Evaluation      Vitals Value Taken Time   /75 07/21/22 1340   Temp 36.5 °C (97.7 °F) 07/21/22 1200   Pulse 98 07/21/22 1340   Resp 16 07/21/22 1340   SpO2 95 % 07/21/22 1340         Event Time   Out of Recovery 13:20:00         Pain/Merlyn Score: Merlyn Score: 8 (7/21/2022  1:25 PM)

## 2022-07-21 NOTE — PROGRESS NOTES
"Pt arrived from pacu via bed to rm 17. No visitors. Pt drowsey and falls asleep easily while talking. Ice chips at side. Staff called pts contact "federico" and left message on machine.  "

## 2022-07-21 NOTE — OP NOTE
DATE OF SURGERY:   07/21/22    SURGEON:  Jay Pozo MD    PREOPERATIVE DIAGNOSIS:  Chronic pain syndrome/Other Specified Mononeuropathies of Bilateral Lower Limbs    POSTOPERATIVE DIAGNOSIS:  Chronic pain syndrome/Other Specified Mononeuropathies of Bilateral Lower Limbs    PROCEDURES PERFORMED:    Percutaneous implantation of neurostimulator electrode arrays.  Insertion of spinal neurostimulator pulse generator.  Electronic analysis of implanted neurostimulator pulse generator system with intraoperative and subsequent programming.    COMPLICATIONS:  None.    INDICATIONS FOR PROCEDURE:  This is a 42 y.o. male with a history of chronic pain syndrome, who successfully underwent spinal cord stimulator trial and elected to proceed with implantation.    EQUIPMENT USED:  [employer] stimulator kit.    DETAILED DESCRIPTION:  Following informed consent and with the patient under general endotracheal anesthesia, he was prepped and draped in the usual sterile fashion.  The tissue overlying L2-3 was infiltrated with local anesthetic.  Under fluoroscopic guidance, I advanced a 14-gauge Tuohy curved-tip spinal needle entering the epidural space at T12-L1.  I introduced the stimulator lead and advanced it to the top of C2.  An incision was made over the needle.  Hemostasis was achieved.  The stylet and needle were carefully removed while keeping the lead in place.  An anchoring attachment was placed over the lead, and this was secured with 0 silk suture.  A second 14-gauge Touhy curved-tip spinal needle was then introduced and used to enter into the epidural space at T12-L1.  A second stimulator lead was introduced and advanced to the top of T8.  The stylet and needle were carefully removed while keeping the lead in place.  An anchoring attachment was placed over this lead, and this was secured with 0 silk suture.  Final fluoroscopic AP and lateral views were obtained of the leads to confirm placement.  The anchoring attachments  were then tightened with the supplied screwdriver.  A right paramedian incision was then made and dissection was carried down approximately 1 cm deep, using a finger sweep technique to create a pocket.  The neurostimulator pulse generator template was temporarily placed in the pocket to confirm positioning.  This was removed.  The leads were tunneled subcutaneously to the pocket.  These were connected to the neurostimulator pulse generator, and analysis of the generator confirmed proper functioning.  The leads connected to the generator were tightened using the supplied screwdriver.  The neurostimulator pulse generator was placed in the pocket.  Both wounds were copiously irrigated with Bacitracin, and then vancomycin powder was added to both wounds.  Standard layer closure was performed at both sites with one layer of 0 Vicryl followed by one layer of Stratafix. Exofin was applied to the wounds.  The patient tolerated the procedure well.  There were no apparent complications.      ______________________________  Jay Pozo MD

## 2022-07-21 NOTE — ANESTHESIA PREPROCEDURE EVALUATION
07/21/2022  Bharath Caballero is a 42 y.o., male.      Pre-op Assessment    I have reviewed the Patient Summary Reports.     I have reviewed the Nursing Notes. I have reviewed the NPO Status.   I have reviewed the Medications.     Review of Systems  Anesthesia Hx:  No problems with previous Anesthesia    Social:  Smoker    Hematology/Oncology:        Hematology Comments: Hx high triglycerides  Has left arm fistula for triglyceride treatments   Cardiovascular:   Hypertension Denies MI.    Denies Angina.  Denies CHF. hyperlipidemia    Pulmonary:   Denies COPD.  Denies Asthma. Sleep Apnea, CPAP    Renal/:   Chronic Renal Disease, CRI no renal calculi    Hepatic/GI:   GERD, well controlled Liver Disease, (steatosis)    Neurological:   Denies CVA. Denies Seizures.   Chronic Pain Syndrome  Peripheral Neuropathy (diabetic)    Endocrine:   Diabetes, poorly controlled, type 2, using insulin Denies Hypothyroidism. Denies Hyperthyroidism. Hx pancreatitis Denies Obesity / BMI > 30      Physical Exam  General: Well nourished, Cooperative, Alert and Oriented    Airway:  Mallampati: I   Mouth Opening: Normal  Tongue: Normal  Neck ROM: Normal ROM    Dental:  Intact, Periodontal disease        Anesthesia Plan  Type of Anesthesia, risks & benefits discussed:    Anesthesia Type: Gen ETT  Intra-op Monitoring Plan: Standard ASA Monitors  Airway Plan: Video  Informed Consent: Informed consent signed with the Patient and all parties understand the risks and agree with anesthesia plan.  All questions answered.   ASA Score: 4  Day of Surgery Review of History & Physical: H&P Update referred to the surgeon/provider.  Anesthesia Plan Notes: Consider ketamine 50mg IV    Ready For Surgery From Anesthesia Perspective.     .

## 2022-07-21 NOTE — ANESTHESIA PROCEDURE NOTES
Intubation    Date/Time: 7/21/2022 10:07 AM  Performed by: Mihaela Diaz CRNA  Authorized by: Marlo Neil DO     Intubation:     Induction:  Intravenous    Intubated:  Postinduction    Mask Ventilation:  Easy with oral airway    Attempts:  1    Attempted By:  CRNA    Method of Intubation:  Direct and video laryngoscopy    Blade:  Ana 4    Laryngeal View Grade: Grade I - full view of cords      Difficult Airway Encountered?: No      Complications:  None    Airway Device:  Oral endotracheal tube    Airway Device Size:  7.5    Style/Cuff Inflation:  Cuffed (inflated to minimal occlusive pressure)    Tube secured:  23    Secured at:  The lips    Placement Verified By:  Capnometry    Complicating Factors:  None    Findings Post-Intubation:  BS equal bilateral

## 2022-07-22 VITALS
SYSTOLIC BLOOD PRESSURE: 151 MMHG | RESPIRATION RATE: 18 BRPM | DIASTOLIC BLOOD PRESSURE: 88 MMHG | TEMPERATURE: 98 F | BODY MASS INDEX: 33.96 KG/M2 | OXYGEN SATURATION: 95 % | HEIGHT: 69 IN | HEART RATE: 87 BPM | WEIGHT: 229.25 LBS

## 2022-07-25 ENCOUNTER — OFFICE VISIT (OUTPATIENT)
Dept: NEUROLOGY | Facility: CLINIC | Age: 42
End: 2022-07-25
Payer: MEDICARE

## 2022-07-25 VITALS
BODY MASS INDEX: 34.71 KG/M2 | WEIGHT: 229 LBS | SYSTOLIC BLOOD PRESSURE: 137 MMHG | HEIGHT: 68 IN | TEMPERATURE: 98 F | DIASTOLIC BLOOD PRESSURE: 86 MMHG

## 2022-07-25 DIAGNOSIS — E11.40 CHRONIC PAINFUL DIABETIC NEUROPATHY: ICD-10-CM

## 2022-07-25 DIAGNOSIS — G89.4 CHRONIC PAIN SYNDROME: ICD-10-CM

## 2022-07-25 DIAGNOSIS — Z48.811 AFTERCARE FOLLOWING SURGERY OF THE NERVOUS SYSTEM, NEC: Primary | ICD-10-CM

## 2022-07-25 PROCEDURE — 99024 POSTOP FOLLOW-UP VISIT: CPT | Mod: POP,,, | Performed by: NURSE PRACTITIONER

## 2022-07-25 PROCEDURE — 99999 PR PBB SHADOW E&M-EST. PATIENT-LVL IV: ICD-10-PCS | Mod: PBBFAC,,, | Performed by: NURSE PRACTITIONER

## 2022-07-25 PROCEDURE — 99214 OFFICE O/P EST MOD 30 MIN: CPT | Mod: PBBFAC | Performed by: NURSE PRACTITIONER

## 2022-07-25 PROCEDURE — 99999 PR PBB SHADOW E&M-EST. PATIENT-LVL IV: CPT | Mod: PBBFAC,,, | Performed by: NURSE PRACTITIONER

## 2022-07-25 PROCEDURE — 99024 PR POST-OP FOLLOW-UP VISIT: ICD-10-PCS | Mod: POP,,, | Performed by: NURSE PRACTITIONER

## 2022-07-25 NOTE — PROGRESS NOTES
Subjective:       Patient ID: Bharath Caballero is a 42 y.o. male.    Chief Complaint:  Wound Check (4 day incision check. Pt denies swelling, drainage or fevers. Pt states mild pain at incision site. )      History of Present Illness  Patient presents for incision check following SCS placement on 7/21/22. Patient denies any redness, swelling or drainage from incision site. Denies any fever. Reports some incisional pain.        Review of Systems  Review of Systems   Constitutional: Negative for fever.   Musculoskeletal: Positive for back pain.   All other systems reviewed and are negative.      Objective:      Neurologic Exam     Mental Status   Oriented to person, place, and time.   Attention: normal. Concentration: normal.   Speech: speech is normal   Level of consciousness: alert  Knowledge: good.     Cranial Nerves     CN II   Visual fields full to confrontation.     CN III, IV, VI   Pupils are equal, round, and reactive to light.    Motor Exam   Muscle bulk: normal      Physical Exam  Vitals and nursing note reviewed.   Eyes:      Pupils: Pupils are equal, round, and reactive to light.   Skin:         Neurological:      Mental Status: He is oriented to person, place, and time.   Psychiatric:         Speech: Speech normal.           Assessment:        1. Aftercare following surgery of the nervous system, NEC    2. Chronic pain syndrome    3. Chronic painful diabetic neuropathy        Plan:   Advised to call with any fever greater than 100.4 or drainage from incision site  Advised no heavy lifting, bending, twisting x 8 weeks post procedure  May shower  Advised to call Mallory to initiate programming, incision check scheduled for 1 week.

## 2022-08-01 ENCOUNTER — OFFICE VISIT (OUTPATIENT)
Dept: NEUROLOGY | Facility: CLINIC | Age: 42
End: 2022-08-01
Payer: MEDICARE

## 2022-08-01 VITALS
WEIGHT: 225 LBS | BODY MASS INDEX: 34.1 KG/M2 | TEMPERATURE: 98 F | HEIGHT: 68 IN | DIASTOLIC BLOOD PRESSURE: 83 MMHG | SYSTOLIC BLOOD PRESSURE: 135 MMHG

## 2022-08-01 DIAGNOSIS — Z48.811 AFTERCARE FOLLOWING SURGERY OF THE NERVOUS SYSTEM, NEC: Primary | ICD-10-CM

## 2022-08-01 DIAGNOSIS — Z96.89 S/P INSERTION OF SPINAL CORD STIMULATOR: ICD-10-CM

## 2022-08-01 PROCEDURE — 99999 PR PBB SHADOW E&M-EST. PATIENT-LVL V: ICD-10-PCS | Mod: PBBFAC,,, | Performed by: NURSE PRACTITIONER

## 2022-08-01 PROCEDURE — 99024 POSTOP FOLLOW-UP VISIT: CPT | Mod: POP,,, | Performed by: NURSE PRACTITIONER

## 2022-08-01 PROCEDURE — 99215 OFFICE O/P EST HI 40 MIN: CPT | Mod: PBBFAC | Performed by: NURSE PRACTITIONER

## 2022-08-01 PROCEDURE — 99024 PR POST-OP FOLLOW-UP VISIT: ICD-10-PCS | Mod: POP,,, | Performed by: NURSE PRACTITIONER

## 2022-08-01 PROCEDURE — 99999 PR PBB SHADOW E&M-EST. PATIENT-LVL V: CPT | Mod: PBBFAC,,, | Performed by: NURSE PRACTITIONER

## 2022-08-01 NOTE — PROGRESS NOTES
Subjective:       Patient ID: Bharath Caballero is a 42 y.o. male.    Chief Complaint:  Wound Check (Pt denies fever,drainage, swelling or pain at incision site. States pain R leg pain. Describes as stabbing . Pain level 7/10)      History of Present Illness  Patient presents for incision check following SCS placement. Patient denies any fever or drainage. Reports feels swollen to battery site incision. States about 3-4 days ago after programming started having right leg pain that starts at his lumbar spine and radiates upward. Rates this pain a 7/10. States he should be having programming today.        Review of Systems  Review of Systems   Musculoskeletal: Positive for back pain and gait problem.       Objective:      Neurologic Exam    Physical Exam  Vitals and nursing note reviewed.   Constitutional:       Appearance: Normal appearance.   Skin:         Neurological:      Mental Status: He is alert.           Assessment:        1. Aftercare following surgery of the nervous system, NEC    2. S/P insertion of spinal cord stimulator      Plan:   Advised to follow up with Mallory payan for programming  Will follow up in 1 week for incision check and to determine if xray should be performed

## 2022-08-08 RX ORDER — PANCRELIPASE 36000; 180000; 114000 [USP'U]/1; [USP'U]/1; [USP'U]/1
1 CAPSULE, DELAYED RELEASE PELLETS ORAL 3 TIMES DAILY
Qty: 270 CAPSULE | Refills: 1 | Status: SHIPPED | OUTPATIENT
Start: 2022-08-08 | End: 2022-12-20 | Stop reason: SDUPTHER

## 2022-08-08 NOTE — TELEPHONE ENCOUNTER
Pharmacy requesting Creon [id number], 114,000units, [id number] units. Please verify dosage. Thank you.

## 2022-08-10 ENCOUNTER — OFFICE VISIT (OUTPATIENT)
Dept: NEUROLOGY | Facility: CLINIC | Age: 42
End: 2022-08-10
Payer: MEDICARE

## 2022-08-10 VITALS
WEIGHT: 225 LBS | TEMPERATURE: 98 F | DIASTOLIC BLOOD PRESSURE: 80 MMHG | BODY MASS INDEX: 34.1 KG/M2 | SYSTOLIC BLOOD PRESSURE: 135 MMHG | HEIGHT: 68 IN

## 2022-08-10 DIAGNOSIS — Z48.811 AFTERCARE FOLLOWING SURGERY OF THE NERVOUS SYSTEM, NEC: ICD-10-CM

## 2022-08-10 DIAGNOSIS — E11.40 CHRONIC PAINFUL DIABETIC NEUROPATHY: Primary | ICD-10-CM

## 2022-08-10 PROCEDURE — 99213 OFFICE O/P EST LOW 20 MIN: CPT | Mod: S$PBB,,, | Performed by: NURSE PRACTITIONER

## 2022-08-10 PROCEDURE — 99999 PR PBB SHADOW E&M-EST. PATIENT-LVL V: CPT | Mod: PBBFAC,,, | Performed by: NURSE PRACTITIONER

## 2022-08-10 PROCEDURE — 99999 PR PBB SHADOW E&M-EST. PATIENT-LVL V: ICD-10-PCS | Mod: PBBFAC,,, | Performed by: NURSE PRACTITIONER

## 2022-08-10 PROCEDURE — 99213 PR OFFICE/OUTPT VISIT, EST, LEVL III, 20-29 MIN: ICD-10-PCS | Mod: S$PBB,,, | Performed by: NURSE PRACTITIONER

## 2022-08-10 PROCEDURE — 99215 OFFICE O/P EST HI 40 MIN: CPT | Mod: PBBFAC | Performed by: NURSE PRACTITIONER

## 2022-08-10 NOTE — PROGRESS NOTES
Subjective:       Patient ID: Bharath Caballero is a 42 y.o. male.    Chief Complaint:  Wound Check (1 week incision check.. Pt denies pain,drainage, swelling or fever. )      History of Present Illness  Patient presents for incision re-check. LOV incision was reddened and swollen. With patient's history of diabetes and patient living alone, re-check scheduled to ensure appropriate wound healing. Today patient denies any fever. Denies any swelling or drainage to incision site.        Review of Systems  Review of Systems   Musculoskeletal: Positive for gait problem.   All other systems reviewed and are negative.      Objective:      Neurologic Exam    Physical Exam  Vitals and nursing note reviewed.   Constitutional:       Appearance: Normal appearance.   HENT:      Head: Normocephalic and atraumatic.   Pulmonary:      Effort: Pulmonary effort is normal.   Skin:         Neurological:      General: No focal deficit present.      Mental Status: He is alert. Mental status is at baseline.           Assessment:        1. Chronic painful diabetic neuropathy    2. Aftercare following surgery of the nervous system, NEC        Plan:     Follow up in 1 month for incision check  Advised no heavy lifting, bending or twisting x 6 weeks following surgery  Advised no tub baths or swimming x 6 weeks  Continue programming with Mallory hunter

## 2022-08-18 ENCOUNTER — OFFICE VISIT (OUTPATIENT)
Dept: GASTROENTEROLOGY | Facility: CLINIC | Age: 42
End: 2022-08-18
Payer: MEDICARE

## 2022-08-18 VITALS
BODY MASS INDEX: 32.71 KG/M2 | HEART RATE: 97 BPM | WEIGHT: 220.81 LBS | RESPIRATION RATE: 18 BRPM | HEIGHT: 69 IN | TEMPERATURE: 98 F | DIASTOLIC BLOOD PRESSURE: 92 MMHG | OXYGEN SATURATION: 97 % | SYSTOLIC BLOOD PRESSURE: 154 MMHG

## 2022-08-18 DIAGNOSIS — K76.0 HEPATIC STEATOSIS: ICD-10-CM

## 2022-08-18 DIAGNOSIS — K21.00 GASTROESOPHAGEAL REFLUX DISEASE WITH ESOPHAGITIS, UNSPECIFIED WHETHER HEMORRHAGE: ICD-10-CM

## 2022-08-18 DIAGNOSIS — R79.89 ELEVATED LFTS: Primary | ICD-10-CM

## 2022-08-18 DIAGNOSIS — Z72.0 TOBACCO USER: ICD-10-CM

## 2022-08-18 DIAGNOSIS — Z86.010 PERSONAL HISTORY OF COLONIC POLYPS: ICD-10-CM

## 2022-08-18 DIAGNOSIS — K76.0 HEPATIC STEATOSIS: Primary | ICD-10-CM

## 2022-08-18 PROBLEM — Z86.0100 PERSONAL HISTORY OF COLONIC POLYPS: Status: ACTIVE | Noted: 2022-08-18

## 2022-08-18 PROCEDURE — 99214 OFFICE O/P EST MOD 30 MIN: CPT | Mod: S$PBB,,, | Performed by: NURSE PRACTITIONER

## 2022-08-18 PROCEDURE — 99215 OFFICE O/P EST HI 40 MIN: CPT | Mod: PBBFAC | Performed by: NURSE PRACTITIONER

## 2022-08-18 PROCEDURE — 99214 PR OFFICE/OUTPT VISIT, EST, LEVL IV, 30-39 MIN: ICD-10-PCS | Mod: S$PBB,,, | Performed by: NURSE PRACTITIONER

## 2022-08-18 RX ORDER — IRBESARTAN 300 MG/1
300 TABLET ORAL DAILY
COMMUNITY
Start: 2022-08-08 | End: 2023-10-05 | Stop reason: SDUPTHER

## 2022-08-18 RX ORDER — GABAPENTIN 800 MG/1
800 TABLET ORAL NIGHTLY
COMMUNITY
Start: 2022-08-08 | End: 2022-10-19 | Stop reason: SDUPTHER

## 2022-08-18 RX ORDER — SUCRALFATE 1 G/10ML
1 SUSPENSION ORAL 4 TIMES DAILY
Qty: 560 ML | Refills: 1 | Status: SHIPPED | OUTPATIENT
Start: 2022-08-18 | End: 2022-09-01

## 2022-08-18 RX ORDER — ESOMEPRAZOLE MAGNESIUM 40 MG/1
40 CAPSULE, DELAYED RELEASE ORAL
Qty: 30 CAPSULE | Refills: 11 | Status: SHIPPED | OUTPATIENT
Start: 2022-08-18 | End: 2024-04-01

## 2022-08-18 NOTE — ASSESSMENT & PLAN NOTE
History of poorly controlled diabetes mellitus, CKD stage II, morbid obesity, familial hypertriglyceridemia with routine plasmapheresis in the past, hepatic steatosis, hypertension, CICI, and tobacco abuse.   Per review of laboratory results, he has had persistent elevation of alkaline phosphatase and intermittent elevation of AST and ALT since January 2019.   Laboratory results August 13, 2020 revealed sodium 132, calcium 8.2, glucose 466, GFR 88, AST 63, , alkaline phosphatase 262, total protein 8.6, albumin 3.3, globulin 5.30, and otherwise unremarkable CMP; vitamin B12 773; cholesterol 230, triglycerides 2216, invalid HDL and LDL secondary to triglyceride level; negative acute viral hepatitis panel.   Hemoglobin A1c was 10.9% July 7, 2020.   Gastric emptying study was unremarkable July 17, 2020.   CT scan abdomen and pelvis with contrast June 1, 2020 revealed hepatomegaly with steatosis and otherwise unremarkable.   Laboratory results August 18, 2020 revealed iron level 52 and otherwise unremarkable iron profile and ferritin, ceruloplasmin, alpha 1 antitrypsin, F-actin, LKM, mitochondrial Ab; PT 11.6, INR 0.88; unremarkable CBC; negative SHAKIR and dsDNA; FibroSure testing revealed F1.   Abdominal ultrasound October 1, 2020 revealed enlarged liver with steatosis.  Laboratory results December 8, 2020 revealed sodium 135, CO2 16, glucose 272, AST 48, ALT 94, alkaline phosphatase 210, total protein 9.1, globulin 5.3, and otherwise unremarkable CMP; hemoglobin A1c 9.9%; vitamin D 12.9; cholesterol 208, HDL 20, triglycerides >1420; TSH 1.3122; and unremarkable CBC.  Laboratory results June 16, 2021 revealed CO2 18, glucose 106, AST 90, , alkaline phosphatase 220, total protein 8.7, globulin 5.0, and otherwise unremarkable CMP; hemoglobin A1c 9.9%; vitamin D 28.1; cholesterol 254, triglycerides 1922; WBC 11.9 and otherwise unremarkable CBC.  FibroScan July 26, 2021 revealed S3 F0/F1.   Abdominal ultrasound  limited August 4, 2021 revealed hepatomegaly with fatty infiltration of the liver and limited study.   CT scan abdomen and pelvis without contrast October 25, 2021 revealed hepatomegaly and otherwise unremarkable.   Laboratory results December 20, 2021 revealed potassium 3.2, glucose 115, AST 38, ALT 97, alkaline phosphatase 294, total protein 8.8, and otherwise unremarkable CMP; WBC 13.4 and otherwise unremarkable CBC.  Abdominal ultrasound February 14, 2022 revealed hepatomegaly and mild to moderate diffuse hepatic steatosis.  No significant change identified compared to the limited abdominal ultrasound 8/4/2021.  CBC, CMP, PT/INR in 3-4 months  Abdominal ultrasound  FibroScan  Patient has significant risk factors for NAFLD  Lifestyle and dietary modifications provided  Recommend weight loss  Recommend good control of comorbidities  Recommend tobacco cessation  Call with updates  Follow-up 6 months clinic visit with NP on a Tuesday when Dr. James is here

## 2022-08-18 NOTE — ASSESSMENT & PLAN NOTE
He underwent colonoscopy October 8, 2021 with findings of adenomatous polyp in the transverse colon and rectum with a recommended recall of 5 years.

## 2022-08-18 NOTE — PROGRESS NOTES
Subjective:       Patient ID: Bharath Caballero is a 42 y.o. male.    Chief Complaint: Hepatic steatosis (Follow up) and Abdominal Pain (After eating)    This 42-year-old  male with a history of colon polyps, poorly controlled diabetes mellitus, CKD stage II, morbid obesity, familial hypertriglyceridemia with routine plasmapheresis in the past, hepatic steatosis, hypertension, CICI, and tobacco abuse presents unaccompanied for a follow-up visit.  He was initially seen August 18, 2020, referred for elevated LFTs at that time.  Per review of laboratory results, he has had persistent elevation of alkaline phosphatase and intermittent elevation of AST and ALT since January 2019.  Laboratory results August 13, 2020 revealed sodium 132, calcium 8.2, glucose 466, GFR 88, AST 63, , alkaline phosphatase 262, total protein 8.6, albumin 3.3, globulin 5.30, and otherwise unremarkable CMP; vitamin B12 773; cholesterol 230, triglycerides 2216, invalid HDL and LDL secondary to triglyceride level; negative acute viral hepatitis panel.  Hemoglobin A1c was 10.9% July 7, 2020.  Gastric emptying study was unremarkable July 17, 2020.  CT scan abdomen and pelvis with contrast June 1, 2020 revealed hepatomegaly with steatosis and otherwise unremarkable.    He reported intermittent right upper quadrant abdominal pain for the past several months described as sharp and radiating around to his back at times.  The pain was worsened with meal intake and he was unable to identify any specific food triggers.  He denied any relieving factors.  His appetite was poor and his weight was stable.  He had frequent symptoms of acid reflux and heartburn that was controlled on pantoprazole 40 mg daily.  He had frequent intermittent nausea with subsequent vomiting almost daily and with almost every meal (nonbilious and nonbloody).  He did not always have relief of symptoms with vomiting.  He denied odynophagia, dysphagia, or early satiety.  Bowel  habits were described as formed stools once daily without melena, hematochezia, fecal urgency, fecal incontinence, or pain with defecation.  He denied icterus, jaundice, pruritus, confusion, headaches, vision changes, cough, shortness of breath, chest pain, abdominal/lower extremity swelling, dark-colored urine, or pale-colored stools.    Laboratory results August 18, 2020 revealed iron level 52 and otherwise unremarkable iron profile and ferritin, ceruloplasmin, alpha 1 antitrypsin, F-actin, LKM, mitochondrial Ab; PT 11.6, INR 0.88; unremarkable CBC; negative SHAKIR and dsDNA; FibroSure testing revealed F1.  Abdominal ultrasound October 1, 2020 revealed enlarged liver with steatosis.    Laboratory results December 8, 2020 revealed sodium 135, CO2 16, glucose 272, AST 48, ALT 94, alkaline phosphatase 210, total protein 9.1, globulin 5.3, and otherwise unremarkable CMP; hemoglobin A1c 9.9%; vitamin D 12.9; cholesterol 208, HDL 20, triglycerides >1420; TSH 1.3122; and unremarkable CBC.    He was seen for a follow-up visit December 28, 2020.  He reported persistent intermittent right upper quadrant abdominal pain for the past several months described as sharp and radiating around to his back at times.  The pain was worsened with meal intake and he was unable to identify any specific food triggers.  He denied any relieving factors.  His appetite was fair and his weight was stable.  He had frequent symptoms of acid reflux and heartburn that was somewhat controlled on pantoprazole 40 mg daily.  He had frequent intermittent nausea with subsequent vomiting a few times per week (nonbilious and nonbloody).  He did not always have relief of symptoms with vomiting.  He denied odynophagia, dysphagia, or early satiety.  Bowel habits were described as formed stools once daily without melena, hematochezia, fecal urgency, fecal incontinence, or pain with defecation.  He denied icterus, jaundice, pruritus, confusion, headaches, vision  changes, cough, shortness of breath, chest pain, abdominal/lower extremity swelling, dark-colored urine, or pale-colored stools.    He underwent EGD with Dr. James June 7, 2021 with findings of LA grade A reflux esophagitis and otherwise unremarkable exam.  Laboratory results June 16, 2021 revealed CO2 18, glucose 106, AST 90, , alkaline phosphatase 220, total protein 8.7, globulin 5.0, and otherwise unremarkable CMP; hemoglobin A1c 9.9%; vitamin D 28.1; cholesterol 254, triglycerides 1922; WBC 11.9 and otherwise unremarkable CBC.    He was seen for follow-up visit June 28, 2021.  He reported persistent intermittent RUQ abdominal burning and pain with eating.  He denied radiation on pain.  He had occasional nausea without vomiting.  He had frequent acid reflux and pyrosis.  His appetite was good and his weight was stable.  He denied nocturnal symptoms, fever, chills, odynophagia, dysphagia, belching, bloating, or early satiety.  Bowel habits were described as formed stools once daily without melena, hematochezia, fecal urgency, fecal incontinence, or pain with defecation.    FibroScan July 26, 2021 revealed S3 F0/F1.  Abdominal ultrasound limited August 4, 2021 revealed hepatomegaly with fatty infiltration of the liver and limited study.  CT scan abdomen and pelvis without contrast October 25, 2021 revealed hepatomegaly and otherwise unremarkable.  Laboratory results December 20, 2021 revealed potassium 3.2, glucose 115, AST 38, ALT 97, alkaline phosphatase 294, total protein 8.8, and otherwise unremarkable CMP; WBC 13.4 and otherwise unremarkable CBC.    He underwent colonoscopy October 8, 2021 with findings of adenomatous polyp in the transverse colon and rectum with a recommended recall of 5 years.    He was last seen for a follow-up visit February 7, 2022.  He reported persistent intermittent right upper quadrant abdominal pain described as sharp and radiating around to the right side of his back.  The  pain was triggered with eating and drinking and he was unable to identify specific food triggers.  He continued to take pantoprazole 40 mg daily and had to occasionally take OTC Tums as well.  He had frequent acid reflux and regurgitation of acid.  He reported occasional vomiting secondary to intensity of abdominal pain.  His appetite was good and his weight was stable.  He denied fever, chills, odynophagia, dysphagia, or early satiety.  Bowel habits remained unchanged and was described as formed stools once daily without melena, hematochezia, fecal urgency, fecal incontinence, or pain with defecation.    Abdominal ultrasound February 14, 2022 revealed hepatomegaly and mild to moderate diffuse hepatic steatosis.  No significant change identified compared to the limited abdominal ultrasound 8/4/2021.    Today, he presents for a follow-up visit.  He reports minimal change in symptoms from previous office visit.  He is no longer taking pantoprazole or famotidine secondary to minimal relief of symptoms.    He reports having an EGD and colonoscopy at [facility] approximately 5-6 years ago.  He thinks that he had benign colon polyps at that time.  He is unsure of EGD results at that time.  He takes aspirin 81 mg daily.  He smokes 10 cigarettes daily and denies alcohol use.  He denies a family history of IBD, colon polyps, or colon cancer.    Review of patient's allergies indicates:  No Known Allergies    Past Medical History:   Diagnosis Date    BPH (benign prostatic hyperplasia)     Diabetes     GERD (gastroesophageal reflux disease)     Hepatic steatosis     History of continuous positive airway pressure (CPAP) therapy at home     Hypertension     Hypertriglyceridemia     Kidney disorder     Leg pain     Morbid obesity     Neuropathy     OA (osteoarthritis)     CICI (obstructive sleep apnea)     Polyneuropathy     Recurrent pancreatitis     Renal insufficiency     Tobacco abuse      Past Surgical History:    Procedure Laterality Date    APPENDECTOMY      ARTERIOVENOUS ANASTOMOSIS, OPEN, UPPER ARM BASILLIC VEIN TRANSPOSITION N/A 05/07/2018    ESOPHAGOGASTRODUODENOSCOPY N/A 06/07/2021    EXCISION OF ARTERIOVENOUS FISTULA N/A 06/01/2018    HERNIA REPAIR      INSPECTION OF UPPER INTESTINAL TRACT N/A 06/07/2021    PHERESIS OF PLASMA N/A 07/13/2018    PHERESIS OF PLASMA N/A 05/25/2018    SPINAL CORD STIMULATOR REMOVAL      TRIAL OF SPINAL CORD NERVE STIMULATOR N/A 5/12/2022    Procedure: TRIAL, NEUROSTIMULATOR, SPINAL CORD;  Surgeon: Jay Pozo MD;  Location[facility];  Service: Neurosurgery;  Laterality: N/A;    UPPER GI N/A 06/07/2021     Family History:   family history includes Obesity in his father.    Social History:    reports that he has been smoking cigarettes. He started smoking about 26 years ago. He has a 25.00 pack-year smoking history. He has never used smokeless tobacco. He reports that he does not drink alcohol and does not use drugs.    Review of Systems  Negative except as noted in the HPI.      Objective:      Physical Exam  Constitutional:       Appearance: Normal appearance.      Comments: Ambulates with cane   HENT:      Head: Normocephalic.      Mouth/Throat:      Mouth: Mucous membranes are moist.   Eyes:      Extraocular Movements: Extraocular movements intact.      Conjunctiva/sclera: Conjunctivae normal.      Pupils: Pupils are equal, round, and reactive to light.   Cardiovascular:      Rate and Rhythm: Normal rate and regular rhythm.      Pulses: Normal pulses.      Heart sounds: Normal heart sounds.   Pulmonary:      Effort: Pulmonary effort is normal.      Breath sounds: Normal breath sounds.   Abdominal:      General: Bowel sounds are normal.      Palpations: Abdomen is soft.   Musculoskeletal:         General: Normal range of motion.      Cervical back: Normal range of motion and neck supple.   Skin:     General: Skin is warm and dry.   Neurological:      General: No focal  deficit present.      Mental Status: He is alert and oriented to person, place, and time.   Psychiatric:         Mood and Affect: Mood normal.         Behavior: Behavior normal.         Thought Content: Thought content normal.         Judgment: Judgment normal.         Home Medications:     Current Outpatient Medications   Medication Sig    alirocumab (PRALUENT PEN) 150 mg/mL PnIj Praluent Pen 150 mg/mL subcutaneous pen injector    amLODIPine (NORVASC) 10 MG tablet 10 mg once daily.    aspirin (ECOTRIN) 81 MG EC tablet Take 81 mg by mouth once daily.    atorvastatin (LIPITOR) 80 MG tablet Take 80 mg by mouth once daily.    carBAMazepine (CARBATROL) 200 MG CM12 Take 3 capsules (600 mg total) by mouth 2 (two) times a day.    clonazePAM (KLONOPIN) 1 MG tablet TAKE ONE TABLET BY MOUTH TWICE DAILY    cloNIDine (CATAPRES) 0.2 MG tablet Take 1 tablet (0.2 mg total) by mouth 3 (three) times daily.    dapagliflozin (FARXIGA) 5 mg Tab tablet Take 1 tablet (5 mg total) by mouth once daily.    EScitalopram oxalate (LEXAPRO) 20 MG tablet Take 20 mg by mouth once daily.    ferrous sulfate (FEOSOL) 325 mg (65 mg iron) Tab tablet Patirce-Time 325 mg (65 mg iron) tablet    gabapentin (NEURONTIN) 400 MG capsule Take 400 mg by mouth 2 (two) times daily.    gabapentin (NEURONTIN) 800 MG tablet Take 800 mg by mouth every evening.    HUMALOG U-100 INSULIN 100 unit/mL injection INJECT 25 UNITS SUBCUTANEOUSLY BEFORE MEALS THREE TIMES DAILY    HYDROmorphone (DILAUDID) 8 MG tablet Take 1 tablet (8 mg total) by mouth every 6 (six) hours as needed (PRN for pain).    insulin glargine (LANTUS U-100 INSULIN) 100 unit/mL injection Inject 100 Units into the skin 2 (two) times a day. (Patient taking differently: Inject 65 Units into the skin 2 (two) times a day.)    irbesartan (AVAPRO) 300 MG tablet Take 300 mg by mouth once daily.    lipase-protease-amylase (CREON) 36,000-114,000- 180,000 unit CpDR Take 1 capsule by mouth 3 (three)  "times daily.    lisinopriL (PRINIVIL,ZESTRIL) 40 MG tablet Take 40 mg by mouth once daily.    morphine (MS CONTIN) 100 MG 12 hr tablet Take 1 tablet (100 mg total) by mouth every 8 (eight) hours.    nitroGLYCERIN (NITROSTAT) 0.3 MG SL tablet Place 1 tablet (0.3 mg total) under the tongue every 5 (five) minutes as needed for Chest pain (go to er after 3 doses).    pen needle, diabetic 31 gauge x 5/16" Ndle Sure Comfort Pen Needle 31 gauge x 5/16"    potassium chloride SA (K-DUR,KLOR-CON) 20 MEQ tablet TAKE ONE TABLET BY MOUTH ONCE DAILY    rosuvastatin (CRESTOR) 40 MG Tab Take 40 mg by mouth once daily.    SURE COMFORT INSULIN SYRINGE 0.5 mL 31 gauge x 5/16" Syrg 3 (three) times daily.    tamsulosin (FLOMAX) 0.4 mg Cap Take 0.4 mg by mouth once daily.    torsemide (DEMADEX) 20 MG Tab Take 20 mg by mouth once daily.     Facility-Administered Medications Ordered in Other Visits   Medication Frequency    diphenhydrAMINE injection 25 mg Q6H PRN    HYDROmorphone (PF) injection 0.2 mg Q5 Min PRN    metoclopramide HCl injection 10 mg Q10 Min PRN    ondansetron injection 4 mg Daily PRN       Laboratory Results:     Recent Results (from the past 2016 hour(s))   Comprehensive Metabolic Panel    Collection Time: 06/20/22  8:17 AM   Result Value Ref Range    Sodium Level 138 136 - 145 mmol/L    Potassium Level 3.4 (L) 3.5 - 5.1 mmol/L    Chloride 98 98 - 107 mmol/L    Carbon Dioxide 29 22 - 29 mmol/L    Glucose Level 261 (H) 74 - 100 mg/dL    Blood Urea Nitrogen 10.8 8.9 - 20.6 mg/dL    Creatinine 0.90 0.73 - 1.18 mg/dL    Calcium Level Total 9.1 8.4 - 10.2 mg/dL    Protein Total 7.6 6.4 - 8.3 gm/dL    Albumin Level 3.6 3.5 - 5.0 gm/dL    Globulin 4.0 (H) 2.4 - 3.5 gm/dL    Albumin/Globulin Ratio 0.9 (L) 1.1 - 2.0 ratio    Bilirubin Total 0.5 <=1.5 mg/dL    Alkaline Phosphatase 249 (H) 40 - 150 unit/L    Alanine Aminotransferase 67 (H) 0 - 55 unit/L    Aspartate Aminotransferase 31 5 - 34 unit/L    Estimated GFR-Non "  >60 mls/min/1.73/m2   Protein/Creatinine Ratio, Urine    Collection Time: 06/20/22  8:17 AM   Result Value Ref Range    Urine Protein Level 300.1 mg/dL    Urine Creatinine 246.8 (H) 63.0 - 166.0 mg/dL    Urine Protein/Creatinine Ratio 1,216.0 (H) <=200.0 mg/gm Cr   Urinalysis    Collection Time: 06/20/22  8:17 AM   Result Value Ref Range    Color, UA Yellow Yellow, Colorless, Other, Clear    Appearance, UA Clear Clear    Specific Gravity, UA 1.032     pH, UA 5.5 5.0, 5.5, 6.0, 6.5, 7.0, 7.5, 8.0, 8.5    Protein, UA 3+ (A) Negative, 300  mg/dL    Glucose, UA 3+ (A) Negative, Normal mg/dL    Ketones, UA Negative Negative, +1, +2, +3, +4, +5, >=160, >=80 mg/dL    Blood, UA Negative Negative unit/L    Bilirubin, UA Negative Negative mg/dL    Urobilinogen, UA Normal 0.2, 1.0, Normal mg/dL    Nitrites, UA Negative Negative    Leukocyte Esterase, UA Negative Negative, 75  unit/L    WBC, UA 0-5 None Seen, 0-2, 3-5, 0-5 /HPF    Bacteria, UA None Seen None Seen /HPF    Squamous Epithelial Cells, UA None Seen None Seen /HPF    Mucous, UA Trace (A) None Seen /LPF    Hyaline Casts, UA None Seen None Seen /lpf    RBC, UA 0-5 None Seen, 0-2, 3-5, 0-5 /HPF   Basic Metabolic Panel    Collection Time: 06/20/22 11:12 AM   Result Value Ref Range    Sodium Level 131 (L) 136 - 145 mmol/L    Potassium Level 3.7 3.5 - 5.1 mmol/L    Chloride 96 (L) 98 - 107 mmol/L    Carbon Dioxide 25 22 - 29 mmol/L    Glucose Level 444 (H) 74 - 100 mg/dL    Blood Urea Nitrogen 11.7 8.9 - 20.6 mg/dL    Creatinine 1.19 (H) 0.73 - 1.18 mg/dL    BUN/Creatinine Ratio 10     Calcium Level Total 9.1 8.4 - 10.2 mg/dL    Estimated GFR-Non  >60 mls/min/1.73/m2    Anion Gap 10.0 mEq/L   CBC with Differential    Collection Time: 06/20/22 11:12 AM   Result Value Ref Range    WBC 13.1 (H) 4.5 - 11.5 x10(3)/mcL    RBC 5.22 4.70 - 6.10 x10(6)/mcL    Hgb 16.0 14.0 - 18.0 gm/dL    Hct 45.6 42.0 - 52.0 %    MCV 87.4 80.0 - 94.0 fL    MCH 30.7  27.0 - 31.0 pg    MCHC 35.1 33.0 - 36.0 mg/dL    RDW 11.7 11.5 - 17.0 %    Platelet 300 130 - 400 x10(3)/mcL    MPV 10.6 9.4 - 12.4 fL    Neut % 66.4 %    Lymph % 22.0 %    Mono % 8.2 %    Eos % 2.3 %    Basophil % 0.6 %    Lymph # 2.89 0.6 - 4.6 x10(3)/mcL    Neut # 8.7 2.1 - 9.2 x10(3)/mcL    Mono # 1.08 0.1 - 1.3 x10(3)/mcL    Eos # 0.30 0 - 0.9 x10(3)/mcL    Baso # 0.08 0 - 0.2 x10(3)/mcL    IG# 0.07 (H) 0 - 0.0155 x10(3)/mcL    IG% 0.5 (H) 0 - 0.43 %    NRBC% 0.0 %   COVID-19 Routine Screening    Collection Time: 06/20/22 11:15 AM   Result Value Ref Range    SARS-CoV-2 PCR Not Detected Not Detected   Comprehensive Metabolic Panel    Collection Time: 06/22/22  8:28 AM   Result Value Ref Range    Sodium Level 140 136 - 145 mmol/L    Potassium Level 3.2 (L) 3.5 - 5.1 mmol/L    Chloride 103 98 - 107 mmol/L    Carbon Dioxide 20 (L) 22 - 29 mmol/L    Glucose Level 118 (H) 74 - 100 mg/dL    Blood Urea Nitrogen 15.7 8.9 - 20.6 mg/dL    Creatinine 0.82 0.73 - 1.18 mg/dL    Calcium Level Total 9.8 8.4 - 10.2 mg/dL    Protein Total 8.6 (H) 6.4 - 8.3 gm/dL    Albumin Level 3.5 3.5 - 5.0 gm/dL    Globulin 5.1 (H) 2.4 - 3.5 gm/dL    Albumin/Globulin Ratio 0.7 (L) 1.1 - 2.0 ratio    Bilirubin Total 0.2 <=1.5 mg/dL    Alkaline Phosphatase 251 (H) 40 - 150 unit/L    Alanine Aminotransferase 78 (H) 0 - 55 unit/L    Aspartate Aminotransferase 55 (H) 5 - 34 unit/L    Estimated GFR-Non  >60 mls/min/1.73/m2   Protein/Creatinine Ratio, Urine    Collection Time: 06/22/22  8:28 AM   Result Value Ref Range    Urine Protein Level 217.8 mg/dL    Urine Creatinine 211.7 (H) 63.0 - 166.0 mg/dL    Urine Protein/Creatinine Ratio 1,028.8 (H) <=200.0 mg/gm Cr   Urinalysis    Collection Time: 06/22/22  8:28 AM   Result Value Ref Range    Color, UA Yellow Yellow, Colorless, Other, Clear    Appearance, UA Clear Clear    Specific Gravity, UA 1.029     pH, UA 5.5 5.0, 5.5, 6.0, 6.5, 7.0, 7.5, 8.0, 8.5    Protein, UA 3+ (A) Negative,  300  mg/dL    Glucose, UA 2+ (A) Negative, Normal mg/dL    Ketones, UA Negative Negative, +1, +2, +3, +4, +5, >=160, >=80 mg/dL    Blood, UA Negative Negative unit/L    Bilirubin, UA Negative Negative mg/dL    Urobilinogen, UA Normal 0.2, 1.0, Normal mg/dL    Nitrites, UA Negative Negative    Leukocyte Esterase, UA Negative Negative, 75  unit/L    WBC, UA 0-5 None Seen, 0-2, 3-5, 0-5 /HPF    Bacteria, UA None Seen None Seen /HPF    Squamous Epithelial Cells, UA None Seen None Seen /HPF    Mucous, UA Trace (A) None Seen /LPF    Unclassified Cast, UA 3-5 (A) None Seen /LPF    Hyaline Casts, UA None Seen None Seen /lpf    Unclassified Crystal, UA Trace (A) None Seen /HPF    Calcium Oxalate Crystals, UA Few (A) None Seen /HPF    RBC, UA 0-5 None Seen, 0-2, 3-5, 0-5 /HPF   Hemoglobin A1C    Collection Time: 06/22/22  8:28 AM   Result Value Ref Range    Hemoglobin A1c 10.9 (H) <=7.0 %    Estimated Average Glucose 266.1 mg/dL   Lipid Panel    Collection Time: 06/22/22  8:28 AM   Result Value Ref Range    Cholesterol Total 259 (H) <=200 mg/dL    HDL Cholesterol 21 (L) 35 - 60 mg/dL    Triglyceride 1,637 (H) 34 - 140 mg/dL    Cholesterol/HDL Ratio 12 (H) 0 - 5    Very Low Density Lipoprotein 327     LDL Cholesterol -89.00 (L) 50.00 - 140.00 mg/dL   Sedimentation rate, automated    Collection Time: 06/22/22  8:28 AM   Result Value Ref Range    Sed Rate 26 (H) 0 - 15 mm/hr   C-reactive protein    Collection Time: 06/22/22  8:28 AM   Result Value Ref Range    C-Reactive Protein 11.20 (H) <5.00 mg/L   CBC with Differential    Collection Time: 06/22/22  8:28 AM   Result Value Ref Range    WBC 13.2 (H) 4.5 - 11.5 x10(3)/mcL    RBC 5.72 4.70 - 6.10 x10(6)/mcL    Hgb 17.1 14.0 - 18.0 gm/dL    Hct 49.4 42.0 - 52.0 %    MCV 86.4 80.0 - 94.0 fL    MCH 29.9 27.0 - 31.0 pg    MCHC 34.6 33.0 - 36.0 mg/dL    RDW 11.4 (L) 11.5 - 17.0 %    Platelet 340 130 - 400 x10(3)/mcL    MPV 10.0 9.4 - 12.4 fL    Neut % 56.1 %    Lymph % 31.6 %    Mono  % 7.7 %    Eos % 3.4 %    Basophil % 0.6 %    Lymph # 4.16 0.6 - 4.6 x10(3)/mcL    Neut # 7.4 2.1 - 9.2 x10(3)/mcL    Mono # 1.02 0.1 - 1.3 x10(3)/mcL    Eos # 0.45 0 - 0.9 x10(3)/mcL    Baso # 0.08 0 - 0.2 x10(3)/mcL    IG# 0.08 (H) 0 - 0.0155 x10(3)/mcL    IG% 0.6 (H) 0 - 0.43 %    NRBC% 0.0 %   Comprehensive metabolic panel    Collection Time: 07/11/22  3:06 PM   Result Value Ref Range    Sodium Level 139 136 - 145 mmol/L    Potassium Level 3.2 (L) 3.5 - 5.1 mmol/L    Chloride 103 98 - 107 mmol/L    Carbon Dioxide 23 22 - 29 mmol/L    Glucose Level 263 (H) 74 - 100 mg/dL    Blood Urea Nitrogen 9.6 8.9 - 20.6 mg/dL    Creatinine 0.85 0.73 - 1.18 mg/dL    Calcium Level Total 9.4 8.4 - 10.2 mg/dL    Protein Total 8.0 6.4 - 8.3 gm/dL    Albumin Level 3.6 3.5 - 5.0 gm/dL    Globulin 4.4 (H) 2.4 - 3.5 gm/dL    Albumin/Globulin Ratio 0.8 (L) 1.1 - 2.0 ratio    Bilirubin Total 0.2 <=1.5 mg/dL    Alkaline Phosphatase 218 (H) 40 - 150 unit/L    Alanine Aminotransferase 51 0 - 55 unit/L    Aspartate Aminotransferase 24 5 - 34 unit/L    Estimated GFR-Non  >60 mls/min/1.73/m2   Lipase    Collection Time: 07/11/22  3:06 PM   Result Value Ref Range    Lipase Level 40 <=60 U/L   Troponin I    Collection Time: 07/11/22  3:06 PM   Result Value Ref Range    Troponin-I <0.010 0.000 - 0.045 ng/mL   CK    Collection Time: 07/11/22  3:06 PM   Result Value Ref Range    Creatine Kinase 89 30 - 200 U/L   CK-MB    Collection Time: 07/11/22  3:06 PM   Result Value Ref Range    Creatine Kinase MB 1.3 <=7.2 ng/mL   CBC with Differential    Collection Time: 07/11/22  3:06 PM   Result Value Ref Range    WBC 14.7 (H) 4.5 - 11.5 x10(3)/mcL    RBC 5.43 4.70 - 6.10 x10(6)/mcL    Hgb 16.1 14.0 - 18.0 gm/dL    Hct 46.1 42.0 - 52.0 %    MCV 84.9 80.0 - 94.0 fL    MCH 29.7 27.0 - 31.0 pg    MCHC 34.9 33.0 - 36.0 mg/dL    RDW 11.6 11.5 - 17.0 %    Platelet 318 130 - 400 x10(3)/mcL    MPV 10.1 7.4 - 10.4 fL    Neut % 74.8 %    Lymph %  15.8 %    Mono % 7.4 %    Eos % 1.0 %    Basophil % 0.5 %    Lymph # 2.32 0.6 - 4.6 x10(3)/mcL    Neut # 11.0 (H) 2.1 - 9.2 x10(3)/mcL    Mono # 1.08 0.1 - 1.3 x10(3)/mcL    Eos # 0.15 0 - 0.9 x10(3)/mcL    Baso # 0.08 0 - 0.2 x10(3)/mcL    IG# 0.07 (H) 0 - 0.04 x10(3)/mcL    IG% 0.5 %    NRBC% 0.0 %   Beta-Hydroxybutyrate, Serum    Collection Time: 07/11/22  3:06 PM   Result Value Ref Range    Beta Hydroxybutyrate 0.07 <=0.30 mmol/L   Lipid Panel    Collection Time: 07/11/22  3:06 PM   Result Value Ref Range    Cholesterol Total 181 <=200 mg/dL    HDL Cholesterol 20 (L) 35 - 60 mg/dL    Triglyceride 817 (H) 34 - 140 mg/dL    Cholesterol/HDL Ratio 9 (H) 0 - 5    Very Low Density Lipoprotein 163     LDL Cholesterol -2.00 (L) 50.00 - 140.00 mg/dL   POCT Capillary Blood Gas-Resp    Collection Time: 07/11/22  7:17 PM   Result Value Ref Range    POC PH 7.36     POC PCO2 51 mmHg    POC PO2 58 mmHg    POC HEMOGLOBIN 16.5 g/dL    POC SATURATED O2 88.5 %    POC O2Hb 84.2 %    POC COHb 8.7 %    POC MetHb 1.3 %    Correct Temperature (PH) 7.36     Corrected Temperature (pCO2) 51 mmHg    Corrected Temperature (pO2) 58 mmHg    POC HCO3 28.8 mmol/l    Base Deficit 2.1 mmol/l    POC Temp 37.0 °C    Specimen source Venous sample     Performed By: FRANCHESKA    Basic Metabolic Panel    Collection Time: 07/20/22 12:32 PM   Result Value Ref Range    Sodium Level 139 136 - 145 mmol/L    Potassium Level 3.1 (L) 3.5 - 5.1 mmol/L    Chloride 102 98 - 107 mmol/L    Carbon Dioxide 27 22 - 29 mmol/L    Glucose Level 98 74 - 100 mg/dL    Blood Urea Nitrogen 11.1 8.9 - 20.6 mg/dL    Creatinine 0.83 0.73 - 1.18 mg/dL    BUN/Creatinine Ratio 13     Calcium Level Total 9.6 8.4 - 10.2 mg/dL    Estimated GFR-Non  >60 mls/min/1.73/m2    Anion Gap 10.0 mEq/L   POCT glucose    Collection Time: 07/21/22  7:34 AM   Result Value Ref Range    POCT Glucose 67 (L) 70 - 110 mg/dL   SARS Coronavirus 2 Antigen, POCT Manual Read - Use for Outpatients     Collection Time: 07/21/22  8:00 AM   Result Value Ref Range    SARS Coronavirus 2 Antigen Negative Negative     Acceptable Yes    POCT glucose    Collection Time: 07/21/22  8:26 AM   Result Value Ref Range    POCT Glucose 90 70 - 110 mg/dL   POCT glucose    Collection Time: 07/21/22 11:59 AM   Result Value Ref Range    POCT Glucose 122 (H) 70 - 110 mg/dL     Imaging Results:     Narrative & Impression  Limited abdominal ultrasound     INDICATION: 41-year-old man with pancreatic disease and elevated liver  function tests.     TECHNIQUE: Real-time grayscale and limited color and spectral Doppler  sonographic evaluation of the right upper abdominal quadrant was  performed per routine protocol.     COMPARISON: Limited abdominal ultrasound 8/4/2021.     FINDINGS:     The liver is enlarged with the right hepatic lobe measuring 20 cm  craniocaudally. There is abnormal mild to moderate diffuse increase in  hepatic parenchymal echogenicity consistent with diffuse hepatic  steatosis. No hepatic lesion is identified and the hepatic surface  contour is smooth. The main portal vein is widely patent with normal  antegrade flow. The gallbladder is normal and there is no intrahepatic  or extrahepatic biliary duct dilation. The proximal common bile duct  measures just over 4 mm in diameter. The visualized pancreas is normal  with small portions of the head and tail partially obscured by bowel  gas. The right kidney is normal in size and appearance, measuring 12.4  cm in length. There is no right hydronephrosis. The proximal IVC is  widely patent and normal, measuring 2.4 cm in diameter. The abdominal  aorta is normal in caliber with mild diffuse atherosclerotic disease.  There is no abdominal free fluid or mass.     IMPRESSION:     Hepatomegaly and mild to moderate diffuse hepatic steatosis.  No significant change identified compared to the limited abdominal  ultrasound 8/4/2021.  Electronically Signed By: Carlos DICKENS,  Mike CEDEÑO  Date/Time Signed: 02/14/2022 09:11    Assessment/Plan:     Problem List Items Addressed This Visit        GI    Elevated LFTs - Primary     History of poorly controlled diabetes mellitus, CKD stage II, morbid obesity, familial hypertriglyceridemia with routine plasmapheresis in the past, hepatic steatosis, hypertension, CICI, and tobacco abuse.   Per review of laboratory results, he has had persistent elevation of alkaline phosphatase and intermittent elevation of AST and ALT since January 2019.   Laboratory results August 13, 2020 revealed sodium 132, calcium 8.2, glucose 466, GFR 88, AST 63, , alkaline phosphatase 262, total protein 8.6, albumin 3.3, globulin 5.30, and otherwise unremarkable CMP; vitamin B12 773; cholesterol 230, triglycerides 2216, invalid HDL and LDL secondary to triglyceride level; negative acute viral hepatitis panel.   Hemoglobin A1c was 10.9% July 7, 2020.   Gastric emptying study was unremarkable July 17, 2020.   CT scan abdomen and pelvis with contrast June 1, 2020 revealed hepatomegaly with steatosis and otherwise unremarkable.   Laboratory results August 18, 2020 revealed iron level 52 and otherwise unremarkable iron profile and ferritin, ceruloplasmin, alpha 1 antitrypsin, F-actin, LKM, mitochondrial Ab; PT 11.6, INR 0.88; unremarkable CBC; negative SHAKIR and dsDNA; FibroSure testing revealed F1.   Abdominal ultrasound October 1, 2020 revealed enlarged liver with steatosis.  Laboratory results December 8, 2020 revealed sodium 135, CO2 16, glucose 272, AST 48, ALT 94, alkaline phosphatase 210, total protein 9.1, globulin 5.3, and otherwise unremarkable CMP; hemoglobin A1c 9.9%; vitamin D 12.9; cholesterol 208, HDL 20, triglycerides >1420; TSH 1.3122; and unremarkable CBC.  Laboratory results June 16, 2021 revealed CO2 18, glucose 106, AST 90, , alkaline phosphatase 220, total protein 8.7, globulin 5.0, and otherwise unremarkable CMP; hemoglobin A1c 9.9%; vitamin D  28.1; cholesterol 254, triglycerides 1922; WBC 11.9 and otherwise unremarkable CBC.  FibroScan July 26, 2021 revealed S3 F0/F1.   Abdominal ultrasound limited August 4, 2021 revealed hepatomegaly with fatty infiltration of the liver and limited study.   CT scan abdomen and pelvis without contrast October 25, 2021 revealed hepatomegaly and otherwise unremarkable.   Laboratory results December 20, 2021 revealed potassium 3.2, glucose 115, AST 38, ALT 97, alkaline phosphatase 294, total protein 8.8, and otherwise unremarkable CMP; WBC 13.4 and otherwise unremarkable CBC.  Abdominal ultrasound February 14, 2022 revealed hepatomegaly and mild to moderate diffuse hepatic steatosis.  No significant change identified compared to the limited abdominal ultrasound 8/4/2021.  CBC, CMP, PT/INR in 3-4 months  Abdominal ultrasound  FibroScan  Patient has significant risk factors for NAFLD  Lifestyle and dietary modifications provided  Recommend weight loss  Recommend good control of comorbidities  Recommend tobacco cessation  Call with updates  Follow-up 6 months clinic visit with NP on a Tuesday when Dr. James is here           Relevant Orders    US Abdomen Limited    CBC Auto Differential    Comprehensive Metabolic Panel    Protime-INR    Hepatic steatosis     See above           Relevant Orders    US Abdomen Limited    CBC Auto Differential    Comprehensive Metabolic Panel    Protime-INR    Gastroesophageal reflux disease with esophagitis     See above  GERD lifestyle modifications  Reflux precautions  Limit NSAID use  Recommend tobacco cessation  Nexium 40 mg daily and sucralfate 1 g QID x 2 weeks  He underwent EGD with Dr. James June 7, 2021 with findings of LA grade A reflux esophagitis and otherwise unremarkable exam.   Call with updates  Follow-up 6 months clinic visit with NP on a Tuesday when Dr. James is here           Relevant Medications    sucralfate (CARAFATE) 100 mg/mL suspension    esomeprazole (NEXIUM)  40 MG capsule    Personal history of colonic polyps     He underwent colonoscopy October 8, 2021 with findings of adenomatous polyp in the transverse colon and rectum with a recommended recall of 5 years.              Other    Tobacco user     Recommend tobacco cessation           Relevant Orders    Ambulatory referral/consult to Smoking Cessation Program

## 2022-08-29 ENCOUNTER — PROCEDURE VISIT (OUTPATIENT)
Dept: INFECTIOUS DISEASES | Facility: CLINIC | Age: 42
End: 2022-08-29
Payer: MEDICARE

## 2022-08-29 DIAGNOSIS — K76.0 HEPATIC STEATOSIS: ICD-10-CM

## 2022-08-29 PROCEDURE — 91200 LIVER ELASTOGRAPHY: CPT | Mod: PBBFAC | Performed by: INTERNAL MEDICINE

## 2022-08-29 NOTE — PROGRESS NOTES
Patient here for FibroScan visit. Reports NPO X3 hours and denies any implanted electronic devices. Assisted pt on exam table due to weakness in his legs. Position patient for the procedure to expose the right rib cage. Explained procedure to the patient. FibroScan exam complete and was tolerated, he did report having more pain to right side of abdomen. Assisted pt off exam table.

## 2022-09-09 ENCOUNTER — TELEPHONE (OUTPATIENT)
Dept: NEUROLOGY | Facility: CLINIC | Age: 42
End: 2022-09-09
Payer: MEDICARE

## 2022-09-09 NOTE — TELEPHONE ENCOUNTER
Pt called to cancel appt today 9/9/22 @9:30am incision check. Pt rescheduled 10/05/22 @ 3pm. Pt denies any drainage, swelling, pain, or fevers

## 2022-09-14 ENCOUNTER — HOSPITAL ENCOUNTER (OUTPATIENT)
Dept: RADIOLOGY | Facility: HOSPITAL | Age: 42
Discharge: HOME | End: 2022-09-14
Attending: NURSE PRACTITIONER
Payer: MEDICARE

## 2022-09-14 DIAGNOSIS — R79.89 ELEVATED LFTS: ICD-10-CM

## 2022-09-14 DIAGNOSIS — K76.0 HEPATIC STEATOSIS: ICD-10-CM

## 2022-09-14 PROCEDURE — 76705 ECHO EXAM OF ABDOMEN: CPT | Mod: TC

## 2022-09-14 NOTE — PROGRESS NOTES
Please notify findings of hepatomegaly and hepatic steatosis with no other abnormalities noted.  Thanks

## 2022-09-15 ENCOUNTER — TELEPHONE (OUTPATIENT)
Dept: GASTROENTEROLOGY | Facility: CLINIC | Age: 42
End: 2022-09-15
Payer: MEDICARE

## 2022-09-26 RX ORDER — ROSUVASTATIN CALCIUM 40 MG/1
40 TABLET, COATED ORAL DAILY
Qty: 90 TABLET | Refills: 1 | OUTPATIENT
Start: 2022-09-26

## 2022-10-05 ENCOUNTER — HOSPITAL ENCOUNTER (OUTPATIENT)
Dept: RADIOLOGY | Facility: HOSPITAL | Age: 42
Discharge: HOME | End: 2022-10-05
Attending: NURSE PRACTITIONER
Payer: MEDICARE

## 2022-10-05 ENCOUNTER — OFFICE VISIT (OUTPATIENT)
Dept: NEUROLOGY | Facility: CLINIC | Age: 42
End: 2022-10-05
Payer: MEDICARE

## 2022-10-05 VITALS
DIASTOLIC BLOOD PRESSURE: 102 MMHG | SYSTOLIC BLOOD PRESSURE: 164 MMHG | WEIGHT: 236 LBS | BODY MASS INDEX: 34.96 KG/M2 | HEIGHT: 69 IN

## 2022-10-05 DIAGNOSIS — M54.9 CHRONIC BACK PAIN, UNSPECIFIED BACK LOCATION, UNSPECIFIED BACK PAIN LATERALITY: ICD-10-CM

## 2022-10-05 DIAGNOSIS — Z96.89 S/P INSERTION OF SPINAL CORD STIMULATOR: Primary | ICD-10-CM

## 2022-10-05 DIAGNOSIS — G89.29 CHRONIC BACK PAIN, UNSPECIFIED BACK LOCATION, UNSPECIFIED BACK PAIN LATERALITY: ICD-10-CM

## 2022-10-05 DIAGNOSIS — Z96.89 S/P INSERTION OF SPINAL CORD STIMULATOR: ICD-10-CM

## 2022-10-05 PROCEDURE — 99999 PR PBB SHADOW E&M-EST. PATIENT-LVL V: ICD-10-PCS | Mod: PBBFAC,,, | Performed by: NURSE PRACTITIONER

## 2022-10-05 PROCEDURE — 99214 OFFICE O/P EST MOD 30 MIN: CPT | Mod: S$PBB,,, | Performed by: NURSE PRACTITIONER

## 2022-10-05 PROCEDURE — 72100 X-RAY EXAM L-S SPINE 2/3 VWS: CPT | Mod: TC

## 2022-10-05 PROCEDURE — 72070 X-RAY EXAM THORAC SPINE 2VWS: CPT | Mod: TC

## 2022-10-05 PROCEDURE — 99999 PR PBB SHADOW E&M-EST. PATIENT-LVL V: CPT | Mod: PBBFAC,,, | Performed by: NURSE PRACTITIONER

## 2022-10-05 PROCEDURE — 99214 PR OFFICE/OUTPT VISIT, EST, LEVL IV, 30-39 MIN: ICD-10-PCS | Mod: S$PBB,,, | Performed by: NURSE PRACTITIONER

## 2022-10-05 PROCEDURE — 99215 OFFICE O/P EST HI 40 MIN: CPT | Mod: PBBFAC | Performed by: NURSE PRACTITIONER

## 2022-10-05 NOTE — PROGRESS NOTES
Subjective:       Patient ID: Bharath Caballero is a 42 y.o. male.    Chief Complaint:  Wound Check (Incision check. Patient reports sharp pains at incision site)      History of Present Illness  Patient presents for 1 month follow up after SCS placement. Patient reports a sharp pain near incision site. Patient also feels like he is receiving inadequate pain relief from SCS programming. States the cold sensation to his legs has improved. Reports that he feels like he has had about 10 percent relief in pain.         Review of Systems  Review of Systems   Musculoskeletal:  Positive for back pain.   All other systems reviewed and are negative.    Objective:      Neurologic Exam     Mental Status   Oriented to person, place, and time.   Level of consciousness: alert    Cranial Nerves   Cranial nerves II through XII intact.     Motor Exam   Muscle bulk: normal  Overall muscle tone: normal  Right arm tone: normal  Left arm tone: normal  Right leg tone: normal  Left leg tone: normal    Sensory Exam   Light touch normal.     Gait, Coordination, and Reflexes Antalgic       Physical Exam  Vitals and nursing note reviewed.   Skin:     Comments: 2 incision sites healed and well approximated, no drainage, no redness or swelling.   Neurological:      Mental Status: He is oriented to person, place, and time.      Cranial Nerves: Cranial nerves 2-12 are intact.         Assessment:        1. S/P insertion of spinal cord stimulator    2. Chronic back pain, unspecified back location, unspecified back pain laterality        Plan:     Will contact [facility] for in office programming  Advised patient to continue programming with kylah  Will have 3 month follow up  Advised to follow up with PCP regarding elevated BP  Will order Xrays for lead placement    MDM moderate

## 2022-10-06 ENCOUNTER — TELEPHONE (OUTPATIENT)
Dept: NEUROLOGY | Facility: CLINIC | Age: 42
End: 2022-10-06
Payer: MEDICARE

## 2022-10-06 NOTE — TELEPHONE ENCOUNTER
----- Message from ROCCO Marr sent at 10/6/2022 11:13 AM CDT -----  Please call patient with results of xray. Patient's leads are still in the appropriate place. Mallory will call to discuss re-programming. No new orders. Keep scheduled follow up appointment.

## 2022-10-19 ENCOUNTER — OFFICE VISIT (OUTPATIENT)
Dept: NEUROLOGY | Facility: CLINIC | Age: 42
End: 2022-10-19
Payer: MEDICARE

## 2022-10-19 VITALS
DIASTOLIC BLOOD PRESSURE: 91 MMHG | HEIGHT: 69 IN | RESPIRATION RATE: 20 BRPM | HEART RATE: 84 BPM | SYSTOLIC BLOOD PRESSURE: 174 MMHG | TEMPERATURE: 99 F | BODY MASS INDEX: 33.93 KG/M2 | OXYGEN SATURATION: 97 % | WEIGHT: 229.06 LBS

## 2022-10-19 DIAGNOSIS — G89.4 CHRONIC PAIN SYNDROME: Primary | Chronic | ICD-10-CM
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
## 2022-10-19 DIAGNOSIS — E11.69 DIABETES MELLITUS TYPE 2 IN OBESE: ICD-10-CM

## 2022-10-19 DIAGNOSIS — E11.40 PAINFUL DIABETIC NEUROPATHY: Chronic | ICD-10-CM

## 2022-10-19 DIAGNOSIS — Z74.09 IMPAIRED FUNCTIONAL MOBILITY, BALANCE, GAIT, AND ENDURANCE: ICD-10-CM

## 2022-10-19 DIAGNOSIS — H53.8 BLURRED VISION, RIGHT EYE: ICD-10-CM

## 2022-10-19 DIAGNOSIS — E66.9 DIABETES MELLITUS TYPE 2 IN OBESE: ICD-10-CM

## 2022-10-19 PROCEDURE — 99215 OFFICE O/P EST HI 40 MIN: CPT | Mod: PBBFAC | Performed by: NURSE PRACTITIONER

## 2022-10-19 PROCEDURE — 99215 OFFICE O/P EST HI 40 MIN: CPT | Mod: S$PBB,,, | Performed by: NURSE PRACTITIONER

## 2022-10-19 PROCEDURE — 99215 PR OFFICE/OUTPT VISIT, EST, LEVL V, 40-54 MIN: ICD-10-PCS | Mod: S$PBB,,, | Performed by: NURSE PRACTITIONER

## 2022-10-19 RX ORDER — GABAPENTIN 800 MG/1
800 TABLET ORAL NIGHTLY
Qty: 90 TABLET | Refills: 3 | Status: SHIPPED | OUTPATIENT
Start: 2022-10-19 | End: 2023-02-17

## 2022-10-19 RX ORDER — GABAPENTIN 400 MG/1
400 CAPSULE ORAL 2 TIMES DAILY
Qty: 180 CAPSULE | Refills: 3 | Status: SHIPPED | OUTPATIENT
Start: 2022-10-19 | End: 2023-01-17

## 2022-10-19 RX ORDER — CARBAMAZEPINE 200 MG/1
600 CAPSULE, EXTENDED RELEASE ORAL 2 TIMES DAILY
Qty: 540 CAPSULE | Refills: 3 | Status: ON HOLD | OUTPATIENT
Start: 2022-10-19 | End: 2022-12-01 | Stop reason: HOSPADM

## 2022-10-19 NOTE — PROGRESS NOTES
Subjective:       Patient ID: Bharath Caballero is a 42 y.o. male.    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  He was last seen on 1/6/2022. During that visit, medications nancy and CMZ were continued.     Interim History  Pt had NEVRO SCS placement for painful neuropathy on 7/21/2022 by Dr. Jay Pozo. Pt notes some improvement in pain to lower ext, but needs further adjustments to stimulator as he states he continues w/numbness/tingling to lower ext. Utilizes Capsaicin cream BID. No longer on Effexor. Now on Lexapro    Pain is constant, worse at night, R lower ext > L lower ext, but L lower ext has worsened somewhat. Described as burning and numbness. Fell twice yesterday, lost his balance, hit his head last week when he fell. Pain scale 7/10. Pain exacerbated by walking, laying. Improved with hot water soaks and Capsaicin. Currently sees Dr. Beasley for pain management.    Presenting History  He noted that he initially presented with right leg stabbing pain and paresthesia radiating up his leg for 2 years followed by paresthesia and allodynia in left foot for 1 year, worse with standing on his feet. He also noted that his hands and joints would hurt. He had quit drinking for 8 years. Still smokes. -280 most of the time for now, but it was in the 500s for the past 4 year.    Current Medications -  Neurontin 400mg - 400mg - 800mg  Effexor XR 37.5mg daily  CBZ ER 200mg-400mg BID (12/30/2020 - present)    Imaging  MRI Lumbar w/o Bryce 9/20/2019 - I have reviewed the study independently and with the patient. 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    MRI C spine w/o Bryce 9/20/2019 - I  have reviewed the study independently and with the patient. 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 Multilevel spondylosis noted.    Review of Systems   Constitutional: no fever, fatigue, weakness  Eye: no vision loss, eye redness, drainage, or pain  ENMT: no sore throat, ear pain, sinus pain/congestion, nasal congestion/drainage  Respiratory: no cough, no wheezing, no shortness of breath  Cardiovascular: no chest pain, no palpitations, no edema  Gastrointestinal: no nausea, vomiting, or diarrhea. No abdominal pain  Genitourinary: no dysuria, no urinary frequency or urgency, no hematuria  Hema/Lymph: no abnormal bruising or bleeding  Endocrine: no heat or cold intolerance, no excessive thirst or excessive urination  Musculoskeletal: + muscle or joint pain, no joint swelling  Integumentary: no skin rash or abnormal lesion  Psychiatric: no depressed mood, + anxiety, no visual/auditory hallucinations, no delusions, no suicidal or homicidal ideation  Neurologic: antalgic     Objective:      Physical Exam    General: well-developed well-nourished in no acute distress  Eye: clear conjunctiva, eyelids normal  HENT: TMs/ear canals clear, oropharynx without erythema/exudate, oropharynx and nasal mucosal surfaces moist, no maxillary/frontal sinus tenderness to palpation  Neck: full range of motion, no thyromegaly or lymphadenopathy  Respiratory: clear to auscultation bilaterally  Cardiovascular: regular rate and rhythm without murmurs, gallops or rubs  Gastrointestinal: soft, non-tender, non-distended with normal bowel sounds, without masses to palpation  Genitourinary: no CVA tenderness to palpation  Musculoskeletal: full range of motion of all extremities/spine without limitation or discomfort  Integumentary: no rashes or skin lesions present  Neurologic:  Mental Status- Alert and Oriented to time, self, place, Speech is fluent, with intact reading and comprehension, long term memory intact,  No Dysarthria.  CN II-XII - CN I Deferred, STACI, no RAPD, OD blurred vision, VA grossly normal to finger counting at 6ft, No ptosis b/l, EOMI w/o nystagmus, LT/PP symmetric, intact in CN V1-V3 distribution b/l, masseter symmetric b/l, T/U midline, Shoulder shrug symmetric b/l, Right SNHL, VFFC, Face Symmetric, Mentasta bilaterally  Motor - Tone and Bulk nml throughout, No involuntary movements, 5/5 to confrontation throughout except for 4/5 in RLE limited by pain, FFM nml b/l, No pronator drift.  Sensory - LT/PP/Temp diminished in RLE up to knee, diminished to L LLE up to mid-shin, Vibration absent in bilat Big Toes. Hyperesthesia absent  Reflexes - 2+ Throughout except for absent AJ b/l  Cerebellar Exam - FNF wnl b/l no intention tremor.  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 Notes improvement with increased CBZ and SCS but needs more adjustments. Flare with weather.     1. Chronic pain syndrome    2. Painful diabetic neuropathy    3. Fall, initial encounter    4. Impaired functional mobility, balance, gait, and endurance    5. Diabetes mellitus type 2 in obese    6. Blurred vision, right eye      Plan:       [] c/w Neurontin 400mg - 400mg - 800mg  [] c/w CBZ ER to 600mg BID   [] Rx Capsaicin Topical Cream .075% QID for neuropathic pain  [] advised on better glycemic control. Goal HgA1c < 7.0%  [] referral to [facility] for PT/diabetic management/vision program    rtc 4 months    I have explained the treatment plan, diagnosis, and prognosis to the patient, caretaker, family. All questions are answered to the best of my knowledge.    Face to Face Time 60 minute,  including, counseling, education, review of test results, relevant medical records, contacting [facility] for patient follow, referral to home health for PT and coordination of care.

## 2022-10-24 RX ORDER — ALIROCUMAB 150 MG/ML
150 INJECTION, SOLUTION SUBCUTANEOUS
Qty: 6 ML | Refills: 3 | Status: SHIPPED | OUTPATIENT
Start: 2022-10-24 | End: 2022-12-20 | Stop reason: SDUPTHER

## 2022-10-25 ENCOUNTER — OFFICE VISIT (OUTPATIENT)
Dept: INTERNAL MEDICINE | Facility: CLINIC | Age: 42
End: 2022-10-25
Payer: MEDICARE

## 2022-10-25 VITALS — DIASTOLIC BLOOD PRESSURE: 94 MMHG | SYSTOLIC BLOOD PRESSURE: 154 MMHG

## 2022-10-25 DIAGNOSIS — E08.42 DIABETIC POLYNEUROPATHY ASSOCIATED WITH DIABETES MELLITUS DUE TO UNDERLYING CONDITION: Primary | ICD-10-CM

## 2022-10-25 DIAGNOSIS — E66.9 DIABETES MELLITUS TYPE 2 IN OBESE: ICD-10-CM

## 2022-10-25 DIAGNOSIS — E78.01 FAMILIAL HYPERCHOLESTEROLEMIA: ICD-10-CM

## 2022-10-25 DIAGNOSIS — R07.9 CHEST PAIN, UNSPECIFIED TYPE: ICD-10-CM

## 2022-10-25 DIAGNOSIS — Z87.19 HISTORY OF PANCREATITIS: ICD-10-CM

## 2022-10-25 DIAGNOSIS — E66.01 MORBID OBESITY: ICD-10-CM

## 2022-10-25 DIAGNOSIS — I10 PRIMARY HYPERTENSION: ICD-10-CM

## 2022-10-25 DIAGNOSIS — G89.4 CHRONIC PAIN SYNDROME: ICD-10-CM

## 2022-10-25 DIAGNOSIS — Z86.010 PERSONAL HISTORY OF COLONIC POLYPS: ICD-10-CM

## 2022-10-25 DIAGNOSIS — R29.6 RECURRENT FALLS: ICD-10-CM

## 2022-10-25 DIAGNOSIS — E11.69 DIABETES MELLITUS TYPE 2 IN OBESE: ICD-10-CM

## 2022-10-25 DIAGNOSIS — Z00.00 ROUTINE CHECK-UP: ICD-10-CM

## 2022-10-25 DIAGNOSIS — R26.9 GAIT ABNORMALITY: ICD-10-CM

## 2022-10-25 PROBLEM — G89.29 CHRONIC PAIN: Status: ACTIVE | Noted: 2022-10-25

## 2022-10-25 PROCEDURE — G0008 ADMIN INFLUENZA VIRUS VAC: HCPCS | Mod: PBBFAC

## 2022-10-25 PROCEDURE — 99215 OFFICE O/P EST HI 40 MIN: CPT | Mod: PBBFAC,25 | Performed by: INTERNAL MEDICINE

## 2022-10-25 RX ORDER — SUCRALFATE 1 G/10ML
100 SUSPENSION ORAL DAILY
COMMUNITY
Start: 2022-09-23 | End: 2023-02-28 | Stop reason: SDUPTHER

## 2022-10-25 NOTE — PROGRESS NOTES
Subjective:       Patient ID: Bharath Caballero is a 42 y.o. male.    Chief Complaint: Follow-up  Test 123 patient doing okay.  Has fallen several times in having trouble with his gait.  Pain stimulator in his back is only helping about 20% as well as the temporary test device had helped.  Diabetic foot exam performed today in he has no feelings in the bottoms of his feet and only slight in the tops of his feet pulses are good no ulcerations or skin breakdown he is seeing a neurologist Dr. Modi right leg pain slowly worsening they are discussing this he does have chest pain and wants to have a nuclear medicine resting stress test and echo which I think is appropriate this chest pain is fairly stable but I think it is appropriate with his longstanding diabetes he is requesting a Rollator walker and I think this is appropriate to prevent falls and stabilize his gait he will get a vaccine today will check lab work to include A1c urine microalbumin etc. he signed his pain contract will schedule an eye exam he will check his glucoses every morning and bring them every 2 weeks follow-up in 4 months  Follow-up    Review of Systems   All other systems reviewed and are negative.      Objective:      Physical Exam  Vitals and nursing note reviewed.   Constitutional:       Appearance: Normal appearance.   HENT:      Head: Normocephalic and atraumatic.      Right Ear: Tympanic membrane, ear canal and external ear normal.      Left Ear: Tympanic membrane, ear canal and external ear normal.      Nose: Nose normal.      Mouth/Throat:      Mouth: Mucous membranes are moist.      Pharynx: Oropharynx is clear.   Eyes:      Extraocular Movements: Extraocular movements intact.      Conjunctiva/sclera: Conjunctivae normal.      Pupils: Pupils are equal, round, and reactive to light.   Cardiovascular:      Rate and Rhythm: Normal rate.      Pulses: Normal pulses.      Heart sounds: Normal heart sounds.   Pulmonary:      Effort: Pulmonary  effort is normal.      Breath sounds: Normal breath sounds.   Abdominal:      General: Bowel sounds are normal.      Palpations: Abdomen is soft.   Musculoskeletal:         General: Normal range of motion.      Cervical back: Normal range of motion and neck supple.      Comments: Foot exam with no sensation to light touch in his feet except mildly the dorsum good pulses   Skin:     General: Skin is warm and dry.   Neurological:      General: No focal deficit present.      Mental Status: He is alert and oriented to person, place, and time. Mental status is at baseline.   Psychiatric:         Mood and Affect: Mood normal.         Behavior: Behavior normal.         Thought Content: Thought content normal.         Judgment: Judgment normal.       Assessment:       Problem List Items Addressed This Visit          Neuro    Diabetic polyneuropathy - Primary    Chronic pain       Cardiac/Vascular    Hypertension    Familial hypercholesterolemia       Endocrine    Diabetes mellitus type 2 in obese    Relevant Orders    Diabetic Eye Screening Photo    Microalbumin/Creatinine Ratio, Urine    TSH    Hemoglobin A1C    Morbid obesity       GI    History of pancreatitis    Personal history of colonic polyps     Other Visit Diagnoses       Routine check-up        Relevant Orders    Influenza - Quadrivalent *Preferred* (6 months+) (PF) (Completed)    Gait abnormality        Relevant Orders    WALKER FOR HOME USE    Chest pain, unspecified type        Relevant Orders    Echo    Nuclear Stress - Cardiology Interpreted    Recurrent falls        Relevant Orders    WALKER FOR HOME USE              Plan:         See above

## 2022-11-04 ENCOUNTER — TELEPHONE (OUTPATIENT)
Dept: NEUROLOGY | Facility: CLINIC | Age: 42
End: 2022-11-04
Payer: MEDICARE

## 2022-11-04 NOTE — TELEPHONE ENCOUNTER
Called [facility] spoke to Vivian regarding vital sign alert report with elevated b/p per Judith Jean NP informed vivian that the high should be reported pt's PCP

## 2022-11-10 ENCOUNTER — TELEPHONE (OUTPATIENT)
Dept: NEUROLOGY | Facility: CLINIC | Age: 42
End: 2022-11-10
Payer: MEDICARE

## 2022-11-10 NOTE — TELEPHONE ENCOUNTER
Called [facility] in [place] spoke to Ana that client coordination note report regarding elevate high pressure be sent to pt's PCP

## 2022-11-18 DIAGNOSIS — N18.9 CHRONIC KIDNEY DISEASE, UNSPECIFIED CKD STAGE: Primary | ICD-10-CM

## 2022-11-23 ENCOUNTER — TELEPHONE (OUTPATIENT)
Dept: NEUROLOGY | Facility: CLINIC | Age: 42
End: 2022-11-23
Payer: MEDICARE

## 2022-11-23 ENCOUNTER — TELEPHONE (OUTPATIENT)
Dept: INTERNAL MEDICINE | Facility: CLINIC | Age: 42
End: 2022-11-23

## 2022-11-23 NOTE — TELEPHONE ENCOUNTER
Pt and pharmacy called stating  the Rx for pt Dilaudid and Morphine has to medically necessary for more than 7 days. Pharmacy asking for a new Rx and pt is at pharmcy wanting stating it's the holiday and he need his Rx. Thanks.

## 2022-11-23 NOTE — TELEPHONE ENCOUNTER
Spoke with Pt regarding home health notes. States he could not get a ride to ED. Denies chest pain at this time, BP has been elevated, but better now. Does not have portable BP machine at home at this time. Home health goes out once a week and PT goes out twice weekly. BP taken each time. Pt instructed to call 911 if chest pain return

## 2022-11-23 NOTE — TELEPHONE ENCOUNTER
----- Message from Mónica Aguirre RN sent at 11/23/2022  7:44 AM CST -----  These may be duplicates; also [facility] has been trying to contact Dr Beasley with no return response. Will send message via Epic message system

## 2022-11-28 ENCOUNTER — HOSPITAL ENCOUNTER (INPATIENT)
Facility: HOSPITAL | Age: 42
LOS: 1 days | Discharge: HOME HEALTH | DRG: 305 | End: 2022-12-01
Attending: INTERNAL MEDICINE | Admitting: INTERNAL MEDICINE
Payer: MEDICARE

## 2022-11-28 DIAGNOSIS — G47.33 OBSTRUCTIVE SLEEP APNEA SYNDROME: ICD-10-CM

## 2022-11-28 DIAGNOSIS — R07.9 CHEST PAIN: ICD-10-CM

## 2022-11-28 DIAGNOSIS — I16.1 HYPERTENSIVE EMERGENCY: Primary | ICD-10-CM

## 2022-11-28 DIAGNOSIS — E66.01 MORBID OBESITY: ICD-10-CM

## 2022-11-28 DIAGNOSIS — E08.42 DIABETIC POLYNEUROPATHY ASSOCIATED WITH DIABETES MELLITUS DUE TO UNDERLYING CONDITION: Primary | ICD-10-CM

## 2022-11-28 DIAGNOSIS — I16.0 HYPERTENSIVE URGENCY: ICD-10-CM

## 2022-11-28 DIAGNOSIS — O10.913 CHRONIC HYPERTENSION IN OBSTETRIC CONTEXT IN THIRD TRIMESTER: ICD-10-CM

## 2022-11-28 LAB
ALBUMIN SERPL-MCNC: 3.8 GM/DL (ref 3.5–5)
ALBUMIN/GLOB SERPL: 0.8 RATIO (ref 1.1–2)
ALP SERPL-CCNC: 209 UNIT/L (ref 40–150)
ALT SERPL-CCNC: 53 UNIT/L (ref 0–55)
AST SERPL-CCNC: 29 UNIT/L (ref 5–34)
BASOPHILS # BLD AUTO: 0.07 X10(3)/MCL (ref 0–0.2)
BASOPHILS NFR BLD AUTO: 0.5 %
BILIRUBIN DIRECT+TOT PNL SERPL-MCNC: 0.3 MG/DL
BNP BLD-MCNC: <10 PG/ML
BUN SERPL-MCNC: 14.4 MG/DL (ref 8.9–20.6)
CALCIUM SERPL-MCNC: 10.3 MG/DL (ref 8.4–10.2)
CHLORIDE SERPL-SCNC: 101 MMOL/L (ref 98–107)
CK MB SERPL-MCNC: 1.8 NG/ML
CK SERPL-CCNC: 73 U/L (ref 30–200)
CO2 SERPL-SCNC: 21 MMOL/L (ref 22–29)
CREAT SERPL-MCNC: 1.15 MG/DL (ref 0.73–1.18)
EOSINOPHIL # BLD AUTO: 0.22 X10(3)/MCL (ref 0–0.9)
EOSINOPHIL NFR BLD AUTO: 1.4 %
ERYTHROCYTE [DISTWIDTH] IN BLOOD BY AUTOMATED COUNT: 11.8 % (ref 11.5–17)
GFR SERPLBLD CREATININE-BSD FMLA CKD-EPI: >60 MLS/MIN/1.73/M2
GLOBULIN SER-MCNC: 4.7 GM/DL (ref 2.4–3.5)
GLUCOSE SERPL-MCNC: 130 MG/DL (ref 74–100)
HCT VFR BLD AUTO: 46.3 % (ref 42–52)
HGB BLD-MCNC: 16.1 GM/DL (ref 14–18)
IMM GRANULOCYTES # BLD AUTO: 0.09 X10(3)/MCL (ref 0–0.04)
IMM GRANULOCYTES NFR BLD AUTO: 0.6 %
LYMPHOCYTES # BLD AUTO: 3.68 X10(3)/MCL (ref 0.6–4.6)
LYMPHOCYTES NFR BLD AUTO: 23.7 %
MAGNESIUM SERPL-MCNC: 1.8 MG/DL (ref 1.6–2.6)
MCH RBC QN AUTO: 30.1 PG (ref 27–31)
MCHC RBC AUTO-ENTMCNC: 34.8 MG/DL (ref 33–36)
MCV RBC AUTO: 86.7 FL (ref 80–94)
MONOCYTES # BLD AUTO: 1.07 X10(3)/MCL (ref 0.1–1.3)
MONOCYTES NFR BLD AUTO: 6.9 %
NEUTROPHILS # BLD AUTO: 10.4 X10(3)/MCL (ref 2.1–9.2)
NEUTROPHILS NFR BLD AUTO: 66.9 %
NRBC BLD AUTO-RTO: 0 %
PHOSPHATE SERPL-MCNC: 4.5 MG/DL (ref 2.3–4.7)
PLATELET # BLD AUTO: 346 X10(3)/MCL (ref 130–400)
PMV BLD AUTO: 9.4 FL (ref 7.4–10.4)
POCT GLUCOSE: 115 MG/DL (ref 70–110)
POTASSIUM SERPL-SCNC: 3.3 MMOL/L (ref 3.5–5.1)
PROT SERPL-MCNC: 8.5 GM/DL (ref 6.4–8.3)
RBC # BLD AUTO: 5.34 X10(6)/MCL (ref 4.7–6.1)
SODIUM SERPL-SCNC: 138 MMOL/L (ref 136–145)
TROPONIN I SERPL-MCNC: <0.01 NG/ML (ref 0–0.04)
WBC # SPEC AUTO: 15.5 X10(3)/MCL (ref 4.5–11.5)

## 2022-11-28 PROCEDURE — 82553 CREATINE MB FRACTION: CPT | Performed by: STUDENT IN AN ORGANIZED HEALTH CARE EDUCATION/TRAINING PROGRAM

## 2022-11-28 PROCEDURE — 93005 ELECTROCARDIOGRAM TRACING: CPT

## 2022-11-28 PROCEDURE — 36415 COLL VENOUS BLD VENIPUNCTURE: CPT | Performed by: STUDENT IN AN ORGANIZED HEALTH CARE EDUCATION/TRAINING PROGRAM

## 2022-11-28 PROCEDURE — 82550 ASSAY OF CK (CPK): CPT | Performed by: STUDENT IN AN ORGANIZED HEALTH CARE EDUCATION/TRAINING PROGRAM

## 2022-11-28 PROCEDURE — 83880 ASSAY OF NATRIURETIC PEPTIDE: CPT | Performed by: STUDENT IN AN ORGANIZED HEALTH CARE EDUCATION/TRAINING PROGRAM

## 2022-11-28 PROCEDURE — 83735 ASSAY OF MAGNESIUM: CPT | Performed by: STUDENT IN AN ORGANIZED HEALTH CARE EDUCATION/TRAINING PROGRAM

## 2022-11-28 PROCEDURE — G0379 DIRECT REFER HOSPITAL OBSERV: HCPCS

## 2022-11-28 PROCEDURE — 63600175 PHARM REV CODE 636 W HCPCS: Performed by: STUDENT IN AN ORGANIZED HEALTH CARE EDUCATION/TRAINING PROGRAM

## 2022-11-28 PROCEDURE — G0378 HOSPITAL OBSERVATION PER HR: HCPCS

## 2022-11-28 PROCEDURE — 80053 COMPREHEN METABOLIC PANEL: CPT | Performed by: STUDENT IN AN ORGANIZED HEALTH CARE EDUCATION/TRAINING PROGRAM

## 2022-11-28 PROCEDURE — 84484 ASSAY OF TROPONIN QUANT: CPT | Performed by: STUDENT IN AN ORGANIZED HEALTH CARE EDUCATION/TRAINING PROGRAM

## 2022-11-28 PROCEDURE — 96372 THER/PROPH/DIAG INJ SC/IM: CPT | Performed by: STUDENT IN AN ORGANIZED HEALTH CARE EDUCATION/TRAINING PROGRAM

## 2022-11-28 PROCEDURE — 83605 ASSAY OF LACTIC ACID: CPT | Performed by: STUDENT IN AN ORGANIZED HEALTH CARE EDUCATION/TRAINING PROGRAM

## 2022-11-28 PROCEDURE — 85025 COMPLETE CBC W/AUTO DIFF WBC: CPT | Performed by: STUDENT IN AN ORGANIZED HEALTH CARE EDUCATION/TRAINING PROGRAM

## 2022-11-28 PROCEDURE — 81001 URINALYSIS AUTO W/SCOPE: CPT | Performed by: STUDENT IN AN ORGANIZED HEALTH CARE EDUCATION/TRAINING PROGRAM

## 2022-11-28 PROCEDURE — 84100 ASSAY OF PHOSPHORUS: CPT | Performed by: STUDENT IN AN ORGANIZED HEALTH CARE EDUCATION/TRAINING PROGRAM

## 2022-11-28 RX ORDER — IBUPROFEN 200 MG
16 TABLET ORAL
Status: DISCONTINUED | OUTPATIENT
Start: 2022-11-28 | End: 2022-12-01 | Stop reason: HOSPADM

## 2022-11-28 RX ORDER — ENOXAPARIN SODIUM 100 MG/ML
40 INJECTION SUBCUTANEOUS EVERY 24 HOURS
Status: DISCONTINUED | OUTPATIENT
Start: 2022-11-28 | End: 2022-12-01 | Stop reason: HOSPADM

## 2022-11-28 RX ORDER — NALOXONE HCL 0.4 MG/ML
0.02 VIAL (ML) INJECTION
Status: DISCONTINUED | OUTPATIENT
Start: 2022-11-28 | End: 2022-12-01 | Stop reason: HOSPADM

## 2022-11-28 RX ORDER — GLUCAGON 1 MG
1 KIT INJECTION
Status: DISCONTINUED | OUTPATIENT
Start: 2022-11-28 | End: 2022-12-01 | Stop reason: HOSPADM

## 2022-11-28 RX ORDER — LABETALOL HCL 20 MG/4 ML
10 SYRINGE (ML) INTRAVENOUS EVERY 4 HOURS PRN
Status: DISCONTINUED | OUTPATIENT
Start: 2022-11-28 | End: 2022-11-28

## 2022-11-28 RX ORDER — HYDRALAZINE HYDROCHLORIDE 20 MG/ML
10 INJECTION INTRAMUSCULAR; INTRAVENOUS EVERY 6 HOURS PRN
Status: DISCONTINUED | OUTPATIENT
Start: 2022-11-28 | End: 2022-12-01 | Stop reason: HOSPADM

## 2022-11-28 RX ORDER — TALC
6 POWDER (GRAM) TOPICAL NIGHTLY PRN
Status: DISCONTINUED | OUTPATIENT
Start: 2022-11-28 | End: 2022-12-01 | Stop reason: HOSPADM

## 2022-11-28 RX ORDER — LABETALOL HCL 20 MG/4 ML
10 SYRINGE (ML) INTRAVENOUS EVERY 4 HOURS PRN
Status: DISCONTINUED | OUTPATIENT
Start: 2022-11-28 | End: 2022-12-01 | Stop reason: HOSPADM

## 2022-11-28 RX ORDER — IBUPROFEN 200 MG
24 TABLET ORAL
Status: DISCONTINUED | OUTPATIENT
Start: 2022-11-28 | End: 2022-12-01 | Stop reason: HOSPADM

## 2022-11-28 RX ORDER — SODIUM CHLORIDE 0.9 % (FLUSH) 0.9 %
10 SYRINGE (ML) INJECTION EVERY 12 HOURS PRN
Status: DISCONTINUED | OUTPATIENT
Start: 2022-11-28 | End: 2022-12-01 | Stop reason: HOSPADM

## 2022-11-28 RX ORDER — INSULIN ASPART 100 [IU]/ML
0-5 INJECTION, SOLUTION INTRAVENOUS; SUBCUTANEOUS
Status: DISCONTINUED | OUTPATIENT
Start: 2022-11-28 | End: 2022-12-01 | Stop reason: HOSPADM

## 2022-11-28 RX ADMIN — HYDRALAZINE HYDROCHLORIDE 10 MG: 20 INJECTION INTRAMUSCULAR; INTRAVENOUS at 10:11

## 2022-11-28 RX ADMIN — ENOXAPARIN SODIUM 40 MG: 40 INJECTION SUBCUTANEOUS at 09:11

## 2022-11-28 NOTE — TELEPHONE ENCOUNTER
Pt states that he had ED Visit on yesterday due to Chest Pain.     Pt states that his BP was 206/112 on check in. Pt states that he was given IV Fluids for elevated BP.     Pt also states that his BP was 174/104 on discharge and he was not given any prescriptions for elevated BP and he was informed to contact PCP for follow up.    Pt is asking to be contacted by PCP or Nurse @ [phone].

## 2022-11-29 LAB
ALBUMIN SERPL-MCNC: 3.5 GM/DL (ref 3.5–5)
ALBUMIN/GLOB SERPL: 0.8 RATIO (ref 1.1–2)
ALP SERPL-CCNC: 196 UNIT/L (ref 40–150)
ALT SERPL-CCNC: 49 UNIT/L (ref 0–55)
AORTIC ROOT ANNULUS: 3.5 CM
APPEARANCE UR: CLEAR
AST SERPL-CCNC: 27 UNIT/L (ref 5–34)
AV INDEX (PROSTH): 0.93
AV MEAN GRADIENT: 4 MMHG
AV PEAK GRADIENT: 5 MMHG
AV VALVE AREA: 3.19 CM2
AV VELOCITY RATIO: 0.9
BACTERIA #/AREA URNS AUTO: ABNORMAL /HPF
BASOPHILS # BLD AUTO: 0.09 X10(3)/MCL (ref 0–0.2)
BASOPHILS NFR BLD AUTO: 0.6 %
BILIRUB UR QL STRIP.AUTO: NEGATIVE MG/DL
BILIRUBIN DIRECT+TOT PNL SERPL-MCNC: 0.3 MG/DL
BSA FOR ECHO PROCEDURE: 2.22 M2
BUN SERPL-MCNC: 13.9 MG/DL (ref 8.9–20.6)
CALCIUM SERPL-MCNC: 9.5 MG/DL (ref 8.4–10.2)
CASTS URNS MICRO: ABNORMAL /LPF
CHLORIDE SERPL-SCNC: 104 MMOL/L (ref 98–107)
CK MB SERPL-MCNC: 1.4 NG/ML
CO2 SERPL-SCNC: 21 MMOL/L (ref 22–29)
COLOR UR AUTO: YELLOW
CREAT SERPL-MCNC: 0.86 MG/DL (ref 0.73–1.18)
CREAT UR-MCNC: 171.8 MG/DL (ref 63–166)
CV ECHO LV RWT: 0.71 CM
CV STRESS BASE HR: 92 BPM
DIASTOLIC BLOOD PRESSURE: 91 MMHG
DOP CALC AO PEAK VEL: 1.17 M/S
DOP CALC AO VTI: 25.9 CM
DOP CALC LVOT AREA: 3.4 CM2
DOP CALC LVOT DIAMETER: 2.09 CM
DOP CALC LVOT PEAK VEL: 1.05 M/S
DOP CALC LVOT STROKE VOLUME: 82.64 CM3
DOP CALC MV VTI: 21.4 CM
DOP CALCLVOT PEAK VEL VTI: 24.1 CM
E WAVE DECELERATION TIME: 235.11 MSEC
E/A RATIO: 1.21
E/E' RATIO: 10.46 M/S
ECHO LV POSTERIOR WALL: 1.27 CM (ref 0.6–1.1)
EJECTION FRACTION: 60 %
EOSINOPHIL # BLD AUTO: 0.24 X10(3)/MCL (ref 0–0.9)
EOSINOPHIL NFR BLD AUTO: 1.5 %
ERYTHROCYTE [DISTWIDTH] IN BLOOD BY AUTOMATED COUNT: 11.6 % (ref 11.5–17)
FRACTIONAL SHORTENING: 37 % (ref 28–44)
GFR SERPLBLD CREATININE-BSD FMLA CKD-EPI: >60 MLS/MIN/1.73/M2
GLOBULIN SER-MCNC: 4.6 GM/DL (ref 2.4–3.5)
GLUCOSE SERPL-MCNC: 106 MG/DL (ref 74–100)
GLUCOSE UR QL STRIP.AUTO: ABNORMAL MG/DL
HCT VFR BLD AUTO: 43.9 % (ref 42–52)
HGB BLD-MCNC: 15.7 GM/DL (ref 14–18)
HR MV ECHO: 85 BPM
HYALINE CASTS #/AREA URNS LPF: ABNORMAL /LPF
IMM GRANULOCYTES # BLD AUTO: 0.07 X10(3)/MCL (ref 0–0.04)
IMM GRANULOCYTES NFR BLD AUTO: 0.4 %
INTERVENTRICULAR SEPTUM: 1.2 CM (ref 0.6–1.1)
KETONES UR QL STRIP.AUTO: NEGATIVE MG/DL
LACTATE SERPL-SCNC: 1.5 MMOL/L (ref 0.5–2.2)
LEFT ATRIUM SIZE: 4.29 CM
LEFT ATRIUM VOLUME INDEX MOD: 23.6 ML/M2
LEFT ATRIUM VOLUME MOD: 51 CM3
LEFT INTERNAL DIMENSION IN SYSTOLE: 2.24 CM (ref 2.1–4)
LEFT VENTRICLE DIASTOLIC VOLUME INDEX: 50 ML/M2
LEFT VENTRICLE DIASTOLIC VOLUME: 108 ML
LEFT VENTRICLE MASS INDEX: 68 G/M2
LEFT VENTRICLE SYSTOLIC VOLUME INDEX: 20.4 ML/M2
LEFT VENTRICLE SYSTOLIC VOLUME: 44 ML
LEFT VENTRICULAR INTERNAL DIMENSION IN DIASTOLE: 3.57 CM (ref 3.5–6)
LEFT VENTRICULAR MASS: 146.07 G
LEUKOCYTE ESTERASE UR QL STRIP.AUTO: NEGATIVE UNIT/L
LV LATERAL E/E' RATIO: 11.33 M/S
LV SEPTAL E/E' RATIO: 9.71 M/S
LVOT MG: 2.49 MMHG
LVOT MV: 0.74 CM/S
LYMPHOCYTES # BLD AUTO: 4.02 X10(3)/MCL (ref 0.6–4.6)
LYMPHOCYTES NFR BLD AUTO: 25.4 %
MAGNESIUM SERPL-MCNC: 1.8 MG/DL (ref 1.6–2.6)
MCH RBC QN AUTO: 30.7 PG (ref 27–31)
MCHC RBC AUTO-ENTMCNC: 35.8 MG/DL (ref 33–36)
MCV RBC AUTO: 85.9 FL (ref 80–94)
MONOCYTES # BLD AUTO: 1.24 X10(3)/MCL (ref 0.1–1.3)
MONOCYTES NFR BLD AUTO: 7.8 %
MUCOUS THREADS URNS QL MICRO: ABNORMAL /LPF
MV MEAN GRADIENT: 1 MMHG
MV PEAK A VEL: 0.56 M/S
MV PEAK E VEL: 0.68 M/S
MV PEAK GRADIENT: 3 MMHG
MV VALVE AREA BY CONTINUITY EQUATION: 3.86 CM2
NEUTROPHILS # BLD AUTO: 10.2 X10(3)/MCL (ref 2.1–9.2)
NEUTROPHILS NFR BLD AUTO: 64.3 %
NITRITE UR QL STRIP.AUTO: NEGATIVE
NRBC BLD AUTO-RTO: 0 %
NUC REST EJECTION FRACTION: 64
NUC STRESS EJECTION FRACTION: 69 %
OHS CV CPX 85 PERCENT MAX PREDICTED HEART RATE MALE: 151
OHS CV CPX ESTIMATED METS: 1
OHS CV CPX MAX PREDICTED HEART RATE: 178
OHS CV CPX PATIENT IS FEMALE: 0
OHS CV CPX PATIENT IS MALE: 1
OHS CV CPX PEAK DIASTOLIC BLOOD PRESSURE: 91 MMHG
OHS CV CPX PEAK HEAR RATE: 109 BPM
OHS CV CPX PEAK RATE PRESSURE PRODUCT: NORMAL
OHS CV CPX PEAK SYSTOLIC BLOOD PRESSURE: 135 MMHG
OHS CV CPX PERCENT MAX PREDICTED HEART RATE ACHIEVED: 61
OHS CV CPX RATE PRESSURE PRODUCT PRESENTING: NORMAL
PH UR STRIP.AUTO: 5.5 [PH]
PHOSPHATE SERPL-MCNC: 4.6 MG/DL (ref 2.3–4.7)
PLATELET # BLD AUTO: 340 X10(3)/MCL (ref 130–400)
PMV BLD AUTO: 9.5 FL (ref 7.4–10.4)
POCT GLUCOSE: 127 MG/DL (ref 70–110)
POCT GLUCOSE: 89 MG/DL (ref 70–110)
POCT GLUCOSE: 93 MG/DL (ref 70–110)
POTASSIUM SERPL-SCNC: 3 MMOL/L (ref 3.5–5.1)
PROT SERPL-MCNC: 8.1 GM/DL (ref 6.4–8.3)
PROT UR QL STRIP.AUTO: ABNORMAL MG/DL
PROT UR STRIP-MCNC: 190 MG/DL
RA PRESSURE: 3 MMHG
RA WIDTH: 4.4 CM
RBC # BLD AUTO: 5.11 X10(6)/MCL (ref 4.7–6.1)
RBC #/AREA URNS AUTO: ABNORMAL /HPF
RBC UR QL AUTO: NEGATIVE UNIT/L
SODIUM SERPL-SCNC: 137 MMOL/L (ref 136–145)
SP GR UR STRIP.AUTO: 1.02
SQUAMOUS #/AREA URNS LPF: ABNORMAL /HPF
STRESS ECHO POST EXERCISE DUR MIN: 3 MINUTES
STRESS ECHO POST EXERCISE DUR SEC: 11 SECONDS
SYSTOLIC BLOOD PRESSURE: 135 MMHG
TDI LATERAL: 0.06 M/S
TDI SEPTAL: 0.07 M/S
TDI: 0.07 M/S
TRICUSPID ANNULAR PLANE SYSTOLIC EXCURSION: 2.23 CM
TROPONIN I SERPL-MCNC: <0.01 NG/ML (ref 0–0.04)
URINE PROTEIN/CREATININE RATIO (OHS): 1.1
UROBILINOGEN UR STRIP-ACNC: NORMAL MG/DL
WBC # SPEC AUTO: 15.8 X10(3)/MCL (ref 4.5–11.5)
WBC #/AREA URNS AUTO: ABNORMAL /HPF

## 2022-11-29 PROCEDURE — 25000242 PHARM REV CODE 250 ALT 637 W/ HCPCS: Performed by: STUDENT IN AN ORGANIZED HEALTH CARE EDUCATION/TRAINING PROGRAM

## 2022-11-29 PROCEDURE — S4991 NICOTINE PATCH NONLEGEND: HCPCS | Performed by: STUDENT IN AN ORGANIZED HEALTH CARE EDUCATION/TRAINING PROGRAM

## 2022-11-29 PROCEDURE — 63600175 PHARM REV CODE 636 W HCPCS: Performed by: STUDENT IN AN ORGANIZED HEALTH CARE EDUCATION/TRAINING PROGRAM

## 2022-11-29 PROCEDURE — 25000003 PHARM REV CODE 250: Performed by: INTERNAL MEDICINE

## 2022-11-29 PROCEDURE — 84484 ASSAY OF TROPONIN QUANT: CPT | Performed by: STUDENT IN AN ORGANIZED HEALTH CARE EDUCATION/TRAINING PROGRAM

## 2022-11-29 PROCEDURE — 63600175 PHARM REV CODE 636 W HCPCS: Performed by: INTERNAL MEDICINE

## 2022-11-29 PROCEDURE — 36415 COLL VENOUS BLD VENIPUNCTURE: CPT | Performed by: STUDENT IN AN ORGANIZED HEALTH CARE EDUCATION/TRAINING PROGRAM

## 2022-11-29 PROCEDURE — 80053 COMPREHEN METABOLIC PANEL: CPT | Performed by: STUDENT IN AN ORGANIZED HEALTH CARE EDUCATION/TRAINING PROGRAM

## 2022-11-29 PROCEDURE — 36415 COLL VENOUS BLD VENIPUNCTURE: CPT | Performed by: INTERNAL MEDICINE

## 2022-11-29 PROCEDURE — 25000003 PHARM REV CODE 250: Performed by: STUDENT IN AN ORGANIZED HEALTH CARE EDUCATION/TRAINING PROGRAM

## 2022-11-29 PROCEDURE — 85025 COMPLETE CBC W/AUTO DIFF WBC: CPT | Performed by: STUDENT IN AN ORGANIZED HEALTH CARE EDUCATION/TRAINING PROGRAM

## 2022-11-29 PROCEDURE — 99900035 HC TECH TIME PER 15 MIN (STAT)

## 2022-11-29 PROCEDURE — 82570 ASSAY OF URINE CREATININE: CPT | Performed by: STUDENT IN AN ORGANIZED HEALTH CARE EDUCATION/TRAINING PROGRAM

## 2022-11-29 PROCEDURE — G0378 HOSPITAL OBSERVATION PER HR: HCPCS

## 2022-11-29 PROCEDURE — 84100 ASSAY OF PHOSPHORUS: CPT | Performed by: STUDENT IN AN ORGANIZED HEALTH CARE EDUCATION/TRAINING PROGRAM

## 2022-11-29 PROCEDURE — 96372 THER/PROPH/DIAG INJ SC/IM: CPT | Performed by: STUDENT IN AN ORGANIZED HEALTH CARE EDUCATION/TRAINING PROGRAM

## 2022-11-29 PROCEDURE — 83735 ASSAY OF MAGNESIUM: CPT | Performed by: STUDENT IN AN ORGANIZED HEALTH CARE EDUCATION/TRAINING PROGRAM

## 2022-11-29 RX ORDER — REGADENOSON 0.08 MG/ML
0.4 INJECTION, SOLUTION INTRAVENOUS ONCE
Status: COMPLETED | OUTPATIENT
Start: 2022-11-29 | End: 2022-11-29

## 2022-11-29 RX ORDER — NITROGLYCERIN 0.4 MG/1
0.4 TABLET SUBLINGUAL EVERY 5 MIN PRN
Status: DISCONTINUED | OUTPATIENT
Start: 2022-11-29 | End: 2022-12-01 | Stop reason: HOSPADM

## 2022-11-29 RX ORDER — ASPIRIN 81 MG/1
81 TABLET ORAL DAILY
Status: DISCONTINUED | OUTPATIENT
Start: 2022-11-29 | End: 2022-12-01 | Stop reason: HOSPADM

## 2022-11-29 RX ORDER — SUCRALFATE 1 G/10ML
1 SUSPENSION ORAL
Status: DISCONTINUED | OUTPATIENT
Start: 2022-11-29 | End: 2022-12-01 | Stop reason: HOSPADM

## 2022-11-29 RX ORDER — GABAPENTIN 400 MG/1
400 CAPSULE ORAL 2 TIMES DAILY
Status: DISCONTINUED | OUTPATIENT
Start: 2022-11-29 | End: 2022-12-01 | Stop reason: HOSPADM

## 2022-11-29 RX ORDER — CARBAMAZEPINE 200 MG/1
600 TABLET ORAL 2 TIMES DAILY
Status: DISCONTINUED | OUTPATIENT
Start: 2022-11-29 | End: 2022-12-01 | Stop reason: HOSPADM

## 2022-11-29 RX ORDER — CARBAMAZEPINE 200 MG/1
600 CAPSULE, EXTENDED RELEASE ORAL 2 TIMES DAILY
Status: DISCONTINUED | OUTPATIENT
Start: 2022-11-29 | End: 2022-11-29

## 2022-11-29 RX ORDER — CLONIDINE HYDROCHLORIDE 0.1 MG/1
0.2 TABLET ORAL 3 TIMES DAILY
Status: DISCONTINUED | OUTPATIENT
Start: 2022-11-29 | End: 2022-12-01 | Stop reason: HOSPADM

## 2022-11-29 RX ORDER — HYDROMORPHONE HYDROCHLORIDE 2 MG/1
8 TABLET ORAL EVERY 6 HOURS PRN
Status: DISCONTINUED | OUTPATIENT
Start: 2022-11-29 | End: 2022-12-01 | Stop reason: HOSPADM

## 2022-11-29 RX ORDER — POTASSIUM CHLORIDE 20 MEQ/1
40 TABLET, EXTENDED RELEASE ORAL ONCE
Status: COMPLETED | OUTPATIENT
Start: 2022-11-29 | End: 2022-11-29

## 2022-11-29 RX ORDER — TAMSULOSIN HYDROCHLORIDE 0.4 MG/1
0.4 CAPSULE ORAL DAILY
Status: DISCONTINUED | OUTPATIENT
Start: 2022-11-29 | End: 2022-12-01 | Stop reason: HOSPADM

## 2022-11-29 RX ORDER — HYDROMORPHONE HYDROCHLORIDE 8 MG/1
8 TABLET ORAL EVERY 6 HOURS PRN
Qty: 120 TABLET | Refills: 0 | Status: SHIPPED | OUTPATIENT
Start: 2022-11-29 | End: 2023-01-03

## 2022-11-29 RX ORDER — ESCITALOPRAM OXALATE 10 MG/1
20 TABLET ORAL DAILY
Status: DISCONTINUED | OUTPATIENT
Start: 2022-11-29 | End: 2022-12-01 | Stop reason: HOSPADM

## 2022-11-29 RX ORDER — MORPHINE SULFATE 30 MG/1
90 TABLET, FILM COATED, EXTENDED RELEASE ORAL EVERY 8 HOURS PRN
Status: DISCONTINUED | OUTPATIENT
Start: 2022-11-29 | End: 2022-12-01 | Stop reason: HOSPADM

## 2022-11-29 RX ORDER — IBUPROFEN 200 MG
1 TABLET ORAL DAILY
Status: DISCONTINUED | OUTPATIENT
Start: 2022-11-29 | End: 2022-12-01 | Stop reason: HOSPADM

## 2022-11-29 RX ORDER — PANTOPRAZOLE SODIUM 40 MG/1
40 TABLET, DELAYED RELEASE ORAL DAILY
Status: DISCONTINUED | OUTPATIENT
Start: 2022-11-29 | End: 2022-12-01 | Stop reason: HOSPADM

## 2022-11-29 RX ORDER — ATORVASTATIN CALCIUM 40 MG/1
80 TABLET, FILM COATED ORAL DAILY
Status: DISCONTINUED | OUTPATIENT
Start: 2022-11-29 | End: 2022-11-30

## 2022-11-29 RX ORDER — MAGNESIUM SULFATE HEPTAHYDRATE 40 MG/ML
2 INJECTION, SOLUTION INTRAVENOUS ONCE
Status: COMPLETED | OUTPATIENT
Start: 2022-11-29 | End: 2022-11-29

## 2022-11-29 RX ORDER — GABAPENTIN 800 MG/1
800 TABLET ORAL NIGHTLY
Status: DISCONTINUED | OUTPATIENT
Start: 2022-11-29 | End: 2022-11-29

## 2022-11-29 RX ORDER — MORPHINE SULFATE 100 MG/1
100 TABLET, FILM COATED, EXTENDED RELEASE ORAL EVERY 8 HOURS
Qty: 90 TABLET | Refills: 0 | Status: SHIPPED | OUTPATIENT
Start: 2022-11-29 | End: 2023-01-03

## 2022-11-29 RX ORDER — GABAPENTIN 400 MG/1
800 CAPSULE ORAL NIGHTLY
Status: DISCONTINUED | OUTPATIENT
Start: 2022-11-29 | End: 2022-12-01 | Stop reason: HOSPADM

## 2022-11-29 RX ORDER — AMLODIPINE BESYLATE 10 MG/1
10 TABLET ORAL DAILY
Status: DISCONTINUED | OUTPATIENT
Start: 2022-11-29 | End: 2022-12-01 | Stop reason: HOSPADM

## 2022-11-29 RX ORDER — CLONAZEPAM 1 MG/1
1 TABLET ORAL 2 TIMES DAILY
Status: DISCONTINUED | OUTPATIENT
Start: 2022-11-29 | End: 2022-12-01 | Stop reason: HOSPADM

## 2022-11-29 RX ORDER — POTASSIUM CHLORIDE 7.45 MG/ML
10 INJECTION INTRAVENOUS
Status: COMPLETED | OUTPATIENT
Start: 2022-11-29 | End: 2022-11-29

## 2022-11-29 RX ORDER — VALSARTAN 80 MG/1
160 TABLET ORAL DAILY
Status: DISCONTINUED | OUTPATIENT
Start: 2022-11-29 | End: 2022-11-30

## 2022-11-29 RX ADMIN — HYDROMORPHONE HYDROCHLORIDE 8 MG: 2 TABLET ORAL at 07:11

## 2022-11-29 RX ADMIN — POTASSIUM CHLORIDE 10 MEQ: 7.46 INJECTION, SOLUTION INTRAVENOUS at 05:11

## 2022-11-29 RX ADMIN — CLONAZEPAM 1 MG: 1 TABLET ORAL at 10:11

## 2022-11-29 RX ADMIN — AMLODIPINE BESYLATE 10 MG: 10 TABLET ORAL at 08:11

## 2022-11-29 RX ADMIN — SUCRALFATE 1 G: 1 SUSPENSION ORAL at 10:11

## 2022-11-29 RX ADMIN — MORPHINE SULFATE 90 MG: 30 TABLET, FILM COATED, EXTENDED RELEASE ORAL at 01:11

## 2022-11-29 RX ADMIN — CLONIDINE HYDROCHLORIDE 0.2 MG: 0.1 TABLET ORAL at 08:11

## 2022-11-29 RX ADMIN — VALSARTAN 160 MG: 80 TABLET, FILM COATED ORAL at 08:11

## 2022-11-29 RX ADMIN — GABAPENTIN 800 MG: 400 CAPSULE ORAL at 10:11

## 2022-11-29 RX ADMIN — GABAPENTIN 800 MG: 400 CAPSULE ORAL at 01:11

## 2022-11-29 RX ADMIN — CARBAMAZEPINE 600 MG: 200 TABLET ORAL at 10:11

## 2022-11-29 RX ADMIN — ENOXAPARIN SODIUM 40 MG: 40 INJECTION SUBCUTANEOUS at 04:11

## 2022-11-29 RX ADMIN — CLONAZEPAM 1 MG: 1 TABLET ORAL at 08:11

## 2022-11-29 RX ADMIN — SUCRALFATE 1 G: 1 SUSPENSION ORAL at 12:11

## 2022-11-29 RX ADMIN — CARBAMAZEPINE 600 MG: 200 TABLET ORAL at 08:11

## 2022-11-29 RX ADMIN — POTASSIUM CHLORIDE 10 MEQ: 7.46 INJECTION, SOLUTION INTRAVENOUS at 01:11

## 2022-11-29 RX ADMIN — MAGNESIUM SULFATE 2 G: 2 INJECTION INTRAVENOUS at 12:11

## 2022-11-29 RX ADMIN — ASPIRIN 81 MG: 81 TABLET, COATED ORAL at 08:11

## 2022-11-29 RX ADMIN — PANTOPRAZOLE SODIUM 40 MG: 40 TABLET, DELAYED RELEASE ORAL at 08:11

## 2022-11-29 RX ADMIN — GABAPENTIN 400 MG: 400 CAPSULE ORAL at 08:11

## 2022-11-29 RX ADMIN — MORPHINE SULFATE 90 MG: 30 TABLET, FILM COATED, EXTENDED RELEASE ORAL at 11:11

## 2022-11-29 RX ADMIN — SUCRALFATE 1 G: 1 SUSPENSION ORAL at 06:11

## 2022-11-29 RX ADMIN — REGADENOSON 0.4 MG: 0.08 INJECTION, SOLUTION INTRAVENOUS at 11:11

## 2022-11-29 RX ADMIN — ESCITALOPRAM OXALATE 20 MG: 10 TABLET, FILM COATED ORAL at 08:11

## 2022-11-29 RX ADMIN — NICOTINE 1 PATCH: 21 PATCH, EXTENDED RELEASE TRANSDERMAL at 08:11

## 2022-11-29 RX ADMIN — SUCRALFATE 1 G: 1 SUSPENSION ORAL at 05:11

## 2022-11-29 RX ADMIN — NICOTINE 1 PATCH: 21 PATCH, EXTENDED RELEASE TRANSDERMAL at 01:11

## 2022-11-29 RX ADMIN — TAMSULOSIN HYDROCHLORIDE 0.4 MG: 0.4 CAPSULE ORAL at 08:11

## 2022-11-29 RX ADMIN — POTASSIUM CHLORIDE 10 MEQ: 7.46 INJECTION, SOLUTION INTRAVENOUS at 03:11

## 2022-11-29 RX ADMIN — POTASSIUM CHLORIDE 40 MEQ: 1500 TABLET, EXTENDED RELEASE ORAL at 02:11

## 2022-11-29 RX ADMIN — POTASSIUM CHLORIDE 10 MEQ: 7.46 INJECTION, SOLUTION INTRAVENOUS at 04:11

## 2022-11-29 RX ADMIN — GABAPENTIN 400 MG: 400 CAPSULE ORAL at 10:11

## 2022-11-29 RX ADMIN — PANCRELIPASE 1 CAPSULE: 30000; 6000; 19000 CAPSULE, DELAYED RELEASE PELLETS ORAL at 10:11

## 2022-11-29 RX ADMIN — ATORVASTATIN CALCIUM 80 MG: 40 TABLET, FILM COATED ORAL at 08:11

## 2022-11-29 RX ADMIN — HYDROMORPHONE HYDROCHLORIDE 8 MG: 2 TABLET ORAL at 05:11

## 2022-11-29 RX ADMIN — LABETALOL HYDROCHLORIDE 10 MG: 5 INJECTION, SOLUTION INTRAVENOUS at 12:11

## 2022-11-29 RX ADMIN — PANCRELIPASE 1 CAPSULE: 30000; 6000; 19000 CAPSULE, DELAYED RELEASE PELLETS ORAL at 08:11

## 2022-11-29 RX ADMIN — CARBAMAZEPINE 600 MG: 200 TABLET ORAL at 01:11

## 2022-11-29 NOTE — PROGRESS NOTES
Pt reported he lives alone and receives  services through [facility] ([phone]). Additional contact: Frieda JON ([phone]).

## 2022-11-29 NOTE — NURSING
Patient was found in room lethargic  and unable to be awakened. Patient had to be sternal rubbed to be wakened up and was still lethargic and unaware of situation. Patient was transported in wheelchair off unit for a nuclear stress test. I searched in the patients pant pocket and found 2 pill bottles, dilaudid 8mg po and morphine 100 mg po. I suspect patient took his own pain medication. Dr. Arroyo was notified regarding patients status and pain pills that was found. The medication was sent to the pharmacy to be locked up until discharge.

## 2022-11-29 NOTE — H&P
[facility] Medicine Wards History & Physical Note     Resident Team: [facility] Medicine List 1  Attending Physician: Edwin Mehta MD  Resident: Sidra Allen MD    Date of Admit: 11/28/22    Chief Complaint     Elevated BP reading     Subjective:      History of Present Illness:  Bharath Caballero is a 42 y.o.  male who with a history of HTN, DM II, hypertriglyceridemia, hepatosteatosis, GERD, recurrent pancreatitis, chronic pain syndrome s/p spinal cord stimulator and other medical history listed below who presented to [facility] per advise of PCP on 11/28/22 for BP check. Patient reports BP has been elevated with SBP in 200s over last week or 2 despite medication compliance. Patient seen in ED 11/27/22 due to complaint of chest pain x2-3 days. Found to have elevated blood pressure readings up to 206/112, which improved with 20 mg labetalol IV. Work up at that time with negative troponin, BNP, lipase, CMP, CBC and EKG. Hypertriglyceridemia stable. Patient discharged home with recommendation for follow up with PCP. Patient notified PCP office regarding elevated BP and was advised to present to hospital for observation in setting of significantly uncontrolled HTN. Today, patient reports that he has had 2 episodes of substernal chest pain since discharge from ED that occurred at rest, lasted a few seconds to minutes, resolved on its own without relief from nitro. Reports nonpainful blurry vision over last 1-2 weeks and decreased oral intake when his blood pressures have been uncontrolled. Patient with chronic right flank and abdominal pain, lower extremity neuropathy and weakness in right lower extremity unchanged from baseline. Has home health that comes 3 times a week. Denies fevers, chills, nausea, vomiting, headaches, SOB, constipation, diarrhea, dysuria or hematuria. Smoke 1 pack of cigarettes/day.        Past Medical History:  Past Medical History:   Diagnosis Date    BPH (benign prostatic hyperplasia)     Diabetes     GERD  (gastroesophageal reflux disease)     Hepatic steatosis     History of continuous positive airway pressure (CPAP) therapy at home     Hypertension     Hypertriglyceridemia     Kidney disorder     Leg pain     Morbid obesity     Neuropathy     OA (osteoarthritis)     CICI (obstructive sleep apnea)     Polyneuropathy     Recurrent pancreatitis     Renal insufficiency     Tobacco abuse        Past Surgical History:  Past Surgical History:   Procedure Laterality Date    APPENDECTOMY      ARTERIOVENOUS ANASTOMOSIS, OPEN, UPPER ARM BASILLIC VEIN TRANSPOSITION N/A 05/07/2018    ESOPHAGOGASTRODUODENOSCOPY N/A 06/07/2021    EXCISION OF ARTERIOVENOUS FISTULA N/A 06/01/2018    HERNIA REPAIR      INSPECTION OF UPPER INTESTINAL TRACT N/A 06/07/2021    PHERESIS OF PLASMA N/A 07/13/2018    PHERESIS OF PLASMA N/A 05/25/2018    SPINAL CORD STIMULATOR REMOVAL      TRIAL OF SPINAL CORD NERVE STIMULATOR N/A 5/12/2022    Procedure: TRIAL, NEUROSTIMULATOR, SPINAL CORD;  Surgeon: Jay Pozo MD;  Location: [facility];  Service: Neurosurgery;  Laterality: N/A;    UPPER GI N/A 06/07/2021       Family History:  Family History   Problem Relation Age of Onset    Obesity Father        Social History:  Social History     Tobacco Use    Smoking status: Every Day     Packs/day: 1.00     Years: 25.00     Pack years: 25.00     Types: Cigarettes     Start date: 7/20/1996    Smokeless tobacco: Never   Substance Use Topics    Alcohol use: Never    Drug use: Not Currently       Allergies:  Review of patient's allergies indicates:  No Known Allergies    Home Medications:  Prior to Admission medications    Medication Sig Start Date End Date Taking? Authorizing Provider   alirocumab (PRALUENT PEN) 150 mg/mL PnIj Inject 1 mL (150 mg total) into the skin every 14 (fourteen) days. 10/24/22 10/24/23  ROCCO Colmenares   amLODIPine (NORVASC) 10 MG tablet 10 mg once daily.    Historical Provider   aspirin (ECOTRIN) 81 MG EC tablet Take 81 mg by mouth once  daily.    Historical Provider   atorvastatin (LIPITOR) 80 MG tablet Take 80 mg by mouth once daily.    Historical Provider   carBAMazepine (CARBATROL) 200 MG CM12 Take 3 capsules (600 mg total) by mouth 2 (two) times a day. 10/19/22 1/17/23  ELVIS rTaore   clonazePAM (KLONOPIN) 1 MG tablet TAKE ONE TABLET BY MOUTH TWICE DAILY 7/6/22   Dat Beasley MD   cloNIDine (CATAPRES) 0.2 MG tablet Take 1 tablet (0.2 mg total) by mouth 3 (three) times daily. 7/20/22 7/20/23  Dat Beasley MD   dapagliflozin (FARXIGA) 5 mg Tab tablet Take 1 tablet (5 mg total) by mouth once daily. 6/20/22 6/20/23  ROCCO Colmenares   EScitalopram oxalate (LEXAPRO) 20 MG tablet Take 20 mg by mouth once daily.    Historical Provider   esomeprazole (NEXIUM) 40 MG capsule Take 1 capsule (40 mg total) by mouth before breakfast. 8/18/22 8/18/23  ROCCO Gamez   gabapentin (NEURONTIN) 400 MG capsule Take 1 capsule (400 mg total) by mouth 2 (two) times daily. 10/19/22 1/17/23  ELVIS Traore   gabapentin (NEURONTIN) 800 MG tablet Take 1 tablet (800 mg total) by mouth every evening. 10/19/22 1/17/23  ELVIS Traore   HUMALOG U-100 INSULIN 100 unit/mL injection INJECT 25 UNITS SUBCUTANEOUSLY BEFORE MEALS THREE TIMES DAILY 8/4/22   Dat Beasley MD   HYDROmorphone (DILAUDID) 8 MG tablet TAKE ONE TABLET BY MOUTH EVERY 6 HOURS AS NEEDED FOR PAIN 11/21/22   Dat Beasley MD   insulin glargine (LANTUS U-100 INSULIN) 100 unit/mL injection Inject 100 Units into the skin 2 (two) times a day.  Patient taking differently: Inject 65 Units into the skin 2 (two) times a day. 6/22/22   Dat Beasley MD   irbesartan (AVAPRO) 300 MG tablet Take 300 mg by mouth once daily. 8/8/22   Historical Provider   lipase-protease-amylase (CREON) 36,000-114,000- 180,000 unit CpDR Take 1 capsule by mouth 3 (three) times daily. 8/8/22   ROCCO Colmenares   lisinopriL (PRINIVIL,ZESTRIL) 40 MG tablet Take 40 mg by mouth once daily.     "Historical Provider   morphine (MS CONTIN) 100 MG 12 hr tablet TAKE ONE TABLET BY MOUTH EVERY 8 HOURS 11/21/22   Dat Beasley MD   nitroGLYCERIN (NITROSTAT) 0.3 MG SL tablet Place 1 tablet (0.3 mg total) under the tongue every 5 (five) minutes as needed for Chest pain (go to er after 3 doses). 7/20/22 10/19/22  Dat Beasley MD   pen needle, diabetic 31 gauge x 5/16" Ndle Sure Comfort Pen Needle 31 gauge x 5/16"    Historical Provider   potassium chloride SA (K-DUR,KLOR-CON) 20 MEQ tablet TAKE ONE TABLET BY MOUTH ONCE DAILY 11/21/22   Dat Beasley MD   sucralfate (CARAFATE) 100 mg/mL suspension Take by mouth. 9/23/22   Historical Provider   SURE COMFORT INSULIN SYRINGE 0.5 mL 31 gauge x 5/16" Syrg 3 (three) times daily. 4/27/22   Historical Provider   tamsulosin (FLOMAX) 0.4 mg Cap Take 0.4 mg by mouth once daily. 12/14/21 12/9/22  Historical Provider     Review of Systems   Constitutional:  Negative for chills, diaphoresis, fever and malaise/fatigue.   HENT:  Negative for congestion and sore throat.    Eyes:  Positive for blurred vision. Negative for double vision, photophobia and pain.   Respiratory:  Negative for cough and shortness of breath.    Cardiovascular:  Positive for chest pain. Negative for palpitations, orthopnea, claudication, leg swelling and PND.   Gastrointestinal:  Positive for abdominal pain (chronic). Negative for blood in stool, constipation, diarrhea, nausea and vomiting.   Genitourinary:  Negative for dysuria, hematuria and urgency.   Musculoskeletal:  Positive for back pain (chronic). Negative for myalgias.   Skin:  Negative for rash.   Neurological:  Positive for tingling (BLE, chronic). Negative for dizziness and headaches.           Objective:     Vitals:    11/28/22 2025 11/28/22 2116 11/28/22 2225 11/28/22 2300   BP: (!) 218/119 (!) 213/124 (!) 179/119    BP Location: Right arm      Pulse: 106      Resp: 20      Temp: 98.6 °F (37 °C)   98.4 °F (36.9 °C)   TempSrc: Oral    "   Weight:    101.6 kg (223 lb 15.8 oz)      Physical Examination:  GEN: alert, appears anxious, in no acute distress   HEENT: Normocephalic, atraumatic, EOMI, PERRLA, patient able to read words on white board approximately 10 feet away, but reports words are more blurry than baseline, ophthalmoscope not available to perform fundus exam, pink and moist mucous membranes, decreased hearing (baseline)  Neck: no JVD, no lymphadenopathy   CARDIO: tachycardic in low 100s, regular rhythm, normal S1, S2, no murmurs, rubs, clicks or gallops   PULM/CHEST: non labored breathing, lungs clear to auscultation bilaterally, no wheezes, rales or rhonchi, symmetric air entry, no accessory muscle use or distress  ABDOMEN: protuberant, + BS, soft, generalized pain to abdomen re-directable, non tender, non-distended, no guarding and no rebound   EXT: 2+ radial and dorsal pedal pulses, decreased sensation to bilateral lower extremities (stable), no clubbing or cyanosis, no BLE edema, non functioning left forearm venous access sites   NEURO: AAOx3, moves all extremities, strength slightly decreased in right lower extremity (previously documented)  PSYCH: Normal speech, mentation, and affect    Laboratory:       Lab 11/27/22 2238 11/28/22 2056   WBC 12.6* 15.5*   HGB 17.1 16.1   HCT 48.9 46.3    346   MCV 85.6 86.7   RDW 11.5 11.8    138   K 3.9 3.3*   CO2 25 21*   BUN 12.4 14.4   CREATININE 0.92 1.15   ALBUMIN 4.1 3.8   BILITOT 0.3 0.3   AST 41* 29   ALKPHOS 247* 209*   ALT 73* 53        Lab 11/27/22 2238 11/28/22 2056 11/28/22 2210   TROPONINI <0.010  --  <0.010   BNP <10.0 <10.0  --      FLP:   Lab Results   Component Value Date    CHOL 190 11/27/2022    HDL 24 (L) 11/27/2022    TRIG 526 (H) 11/27/2022     DM:   Lab Results   Component Value Date    HGBA1C 10.4 (H) 11/27/2022    HGBA1C 10.9 (H) 06/22/2022    HGBA1C 9.9 (H) 06/16/2021    CREATININE 1.15 11/28/2022     Thyroid:   Lab Results   Component Value Date    TSH  1.3122 12/08/2020      Urinalysis:    Color, UA Yellow Nitrites, UA Negative   Appearance, UA Clear Leukocyte Esterase, UA Negative unit/L   Specific Tow, UA 1.025 WBC, UA 0-5 /HPF   pH, UA 5.5 Bacteria, UA Trace Abnormal  /HPF   Protein, UA 2+ Abnormal  mg/dL Squamous Epithelial Cells, UA Trace Abnormal  /HPF   Glucose, UA 4+ Abnormal  mg/dL Mucous, UA Trace Abnormal  /LPF   Ketones, UA Negative mg/dL Unclassified Cast, UA 0-2 Abnormal  /LPF   Blood, UA Negative unit/L Hyaline Casts, UA None Seen /lpf   Bilirubin, UA Negative mg/dL RBC, UA 0-5 /HPF   Urobilinogen, UA Normal mg/dL          Other Results:  EKG (my interpretation): appears normal, NSR, unchanged from previous tracings    Radiology:  X-Ray 11/27/22: increased interstitial markings      Assessment & Plan:     Hypertensive Emergency  -BP on admission: (!) 218/119  - concern that decreased visual acuity over last weeks is sign of end organ damage, unable to perform fundus exam, no acute painful vision loss, consider Ophthalmology consult if persists    - non specific chest pain, JAVY=2; troponin negative with unchanged EKG, trend troponin and EKGs, continue to monitor for ACS, prn nitro    - Patient scheduled for outpatient Lexiscan and ECHO tomorrow, will order to be obtained while in hospital   - No lab abnormalities 11/27/22 to indicate end organ damage, repeat labs pending    -Cardiac monitor, strict Is & Os  -Will trial prn IV hydralazine and labetalol since it appears to have improved BP previously with goal of 25% decrease in blood pressure within the 1st hour and a goal of < 160/100 within the next 6 hours, if difficult to control consider nicardipine drip  - Restart patient's home medications in am: amlodipine 10mg, clonidine 0.2 mg TID, irbesartan 300 mg daily     Hypokalemia  - replete electrolytes prn     Leukocytosis  - WBC 15.5, continue to trend  - no source of infection suspected at this time, afebrile, continue to monitor     DM II,  uncontrolled  Neuropathy  -A1c= 10.4 (11/27/22)  -Reports using 65 units Lantus BID and 25 units Humalog TID before meals, took evening Lantus dose prior to presentation   - will hold scheduled insulin as patient's CBGs < 180s and will be NPO after midnight  -Low sliding scale insulin   - continue home gabpentin     HLD  -continue home atorvastatin     Anxiety/depression  Chronic Pain Syndrome  - continue home medications    Tobacco Use  - nicotine patch     Pancreatic insufficiency  Chronic abdominal pain  - continue home medications     BPH  - continue home Flomax    CICI  - CPAP at night       CODE STATUS: FULL  Access: PIV  Antibiotics: none  Diet: heart healthy, NPO after midnight  DVT Prophylaxis: Lovenox 40 mg daily   GI Prophylaxis:   Fluids: none     Disposition: Admit for observation in setting of hypertensive emergency. Adjust outpatient regimen as necessary. Patient scheduled for outpatient Lexiscan tomorrow. Will obtain while in hospital. NPO after midnight.    Sidra Allen MD  [facility] HO-II

## 2022-11-30 LAB
ALBUMIN SERPL-MCNC: 3 GM/DL (ref 3.5–5)
ALBUMIN/GLOB SERPL: 0.8 RATIO (ref 1.1–2)
ALP SERPL-CCNC: 196 UNIT/L (ref 40–150)
ALT SERPL-CCNC: 59 UNIT/L (ref 0–55)
AST SERPL-CCNC: 76 UNIT/L (ref 5–34)
BASOPHILS # BLD AUTO: 0.05 X10(3)/MCL (ref 0–0.2)
BASOPHILS NFR BLD AUTO: 0.4 %
BILIRUBIN DIRECT+TOT PNL SERPL-MCNC: 0.4 MG/DL
BUN SERPL-MCNC: 17.8 MG/DL (ref 8.9–20.6)
CALCIUM SERPL-MCNC: 8.5 MG/DL (ref 8.4–10.2)
CHLORIDE SERPL-SCNC: 106 MMOL/L (ref 98–107)
CO2 SERPL-SCNC: 23 MMOL/L (ref 22–29)
CREAT SERPL-MCNC: 1.3 MG/DL (ref 0.73–1.18)
EOSINOPHIL # BLD AUTO: 0.18 X10(3)/MCL (ref 0–0.9)
EOSINOPHIL NFR BLD AUTO: 1.4 %
ERYTHROCYTE [DISTWIDTH] IN BLOOD BY AUTOMATED COUNT: 12 % (ref 11.5–17)
GFR SERPLBLD CREATININE-BSD FMLA CKD-EPI: >60 MLS/MIN/1.73/M2
GLOBULIN SER-MCNC: 3.7 GM/DL (ref 2.4–3.5)
GLUCOSE SERPL-MCNC: 86 MG/DL (ref 74–100)
HCT VFR BLD AUTO: 40 % (ref 42–52)
HGB BLD-MCNC: 13.7 GM/DL (ref 14–18)
IMM GRANULOCYTES # BLD AUTO: 0.06 X10(3)/MCL (ref 0–0.04)
IMM GRANULOCYTES NFR BLD AUTO: 0.5 %
LYMPHOCYTES # BLD AUTO: 2.95 X10(3)/MCL (ref 0.6–4.6)
LYMPHOCYTES NFR BLD AUTO: 23.7 %
MAGNESIUM SERPL-MCNC: 2.2 MG/DL (ref 1.6–2.6)
MCH RBC QN AUTO: 30 PG (ref 27–31)
MCHC RBC AUTO-ENTMCNC: 34.3 MG/DL (ref 33–36)
MCV RBC AUTO: 87.7 FL (ref 80–94)
MONOCYTES # BLD AUTO: 1.04 X10(3)/MCL (ref 0.1–1.3)
MONOCYTES NFR BLD AUTO: 8.3 %
NEUTROPHILS # BLD AUTO: 8.2 X10(3)/MCL (ref 2.1–9.2)
NEUTROPHILS NFR BLD AUTO: 65.7 %
NRBC BLD AUTO-RTO: 0 %
PHOSPHATE SERPL-MCNC: 4.2 MG/DL (ref 2.3–4.7)
PLATELET # BLD AUTO: 293 X10(3)/MCL (ref 130–400)
PMV BLD AUTO: 9.9 FL (ref 7.4–10.4)
POCT GLUCOSE: 165 MG/DL (ref 70–110)
POCT GLUCOSE: 97 MG/DL (ref 70–110)
POTASSIUM SERPL-SCNC: 4 MMOL/L (ref 3.5–5.1)
PROT SERPL-MCNC: 6.7 GM/DL (ref 6.4–8.3)
RBC # BLD AUTO: 4.56 X10(6)/MCL (ref 4.7–6.1)
SODIUM SERPL-SCNC: 137 MMOL/L (ref 136–145)
T4 FREE SERPL-MCNC: 0.95 NG/DL (ref 0.7–1.48)
TSH SERPL-ACNC: 0.93 UIU/ML (ref 0.35–4.94)
WBC # SPEC AUTO: 12.5 X10(3)/MCL (ref 4.5–11.5)

## 2022-11-30 PROCEDURE — 36415 COLL VENOUS BLD VENIPUNCTURE: CPT

## 2022-11-30 PROCEDURE — 21400001 HC TELEMETRY ROOM

## 2022-11-30 PROCEDURE — 94761 N-INVAS EAR/PLS OXIMETRY MLT: CPT

## 2022-11-30 PROCEDURE — 85025 COMPLETE CBC W/AUTO DIFF WBC: CPT | Performed by: STUDENT IN AN ORGANIZED HEALTH CARE EDUCATION/TRAINING PROGRAM

## 2022-11-30 PROCEDURE — 84100 ASSAY OF PHOSPHORUS: CPT | Performed by: STUDENT IN AN ORGANIZED HEALTH CARE EDUCATION/TRAINING PROGRAM

## 2022-11-30 PROCEDURE — 84439 ASSAY OF FREE THYROXINE: CPT

## 2022-11-30 PROCEDURE — 63600175 PHARM REV CODE 636 W HCPCS: Performed by: STUDENT IN AN ORGANIZED HEALTH CARE EDUCATION/TRAINING PROGRAM

## 2022-11-30 PROCEDURE — 25000003 PHARM REV CODE 250

## 2022-11-30 PROCEDURE — 84443 ASSAY THYROID STIM HORMONE: CPT

## 2022-11-30 PROCEDURE — 80053 COMPREHEN METABOLIC PANEL: CPT | Performed by: STUDENT IN AN ORGANIZED HEALTH CARE EDUCATION/TRAINING PROGRAM

## 2022-11-30 PROCEDURE — 25000003 PHARM REV CODE 250: Performed by: STUDENT IN AN ORGANIZED HEALTH CARE EDUCATION/TRAINING PROGRAM

## 2022-11-30 PROCEDURE — 83735 ASSAY OF MAGNESIUM: CPT | Performed by: STUDENT IN AN ORGANIZED HEALTH CARE EDUCATION/TRAINING PROGRAM

## 2022-11-30 PROCEDURE — 36415 COLL VENOUS BLD VENIPUNCTURE: CPT | Performed by: STUDENT IN AN ORGANIZED HEALTH CARE EDUCATION/TRAINING PROGRAM

## 2022-11-30 PROCEDURE — 27000221 HC OXYGEN, UP TO 24 HOURS

## 2022-11-30 PROCEDURE — 25000003 PHARM REV CODE 250: Performed by: INTERNAL MEDICINE

## 2022-11-30 PROCEDURE — S4991 NICOTINE PATCH NONLEGEND: HCPCS | Performed by: STUDENT IN AN ORGANIZED HEALTH CARE EDUCATION/TRAINING PROGRAM

## 2022-11-30 RX ORDER — MUPIROCIN 20 MG/G
OINTMENT TOPICAL 2 TIMES DAILY
Status: CANCELLED | OUTPATIENT
Start: 2022-11-30 | End: 2022-12-05

## 2022-11-30 RX ORDER — SODIUM CHLORIDE 9 MG/ML
INJECTION, SOLUTION INTRAVENOUS CONTINUOUS
Status: DISCONTINUED | OUTPATIENT
Start: 2022-11-30 | End: 2022-12-01 | Stop reason: HOSPADM

## 2022-11-30 RX ORDER — ATORVASTATIN CALCIUM 40 MG/1
40 TABLET, FILM COATED ORAL DAILY
Status: DISCONTINUED | OUTPATIENT
Start: 2022-12-01 | End: 2022-12-01 | Stop reason: HOSPADM

## 2022-11-30 RX ADMIN — ENOXAPARIN SODIUM 40 MG: 40 INJECTION SUBCUTANEOUS at 06:11

## 2022-11-30 RX ADMIN — AMLODIPINE BESYLATE 10 MG: 10 TABLET ORAL at 10:11

## 2022-11-30 RX ADMIN — GABAPENTIN 800 MG: 400 CAPSULE ORAL at 10:11

## 2022-11-30 RX ADMIN — CLONAZEPAM 1 MG: 1 TABLET ORAL at 10:11

## 2022-11-30 RX ADMIN — PANTOPRAZOLE SODIUM 40 MG: 40 TABLET, DELAYED RELEASE ORAL at 10:11

## 2022-11-30 RX ADMIN — CLONIDINE HYDROCHLORIDE 0.2 MG: 0.1 TABLET ORAL at 10:11

## 2022-11-30 RX ADMIN — CARBAMAZEPINE 600 MG: 200 TABLET ORAL at 10:11

## 2022-11-30 RX ADMIN — ASPIRIN 81 MG: 81 TABLET, COATED ORAL at 10:11

## 2022-11-30 RX ADMIN — NICOTINE 1 PATCH: 21 PATCH, EXTENDED RELEASE TRANSDERMAL at 02:11

## 2022-11-30 RX ADMIN — PANCRELIPASE 1 CAPSULE: 30000; 6000; 19000 CAPSULE, DELAYED RELEASE PELLETS ORAL at 10:11

## 2022-11-30 RX ADMIN — ATORVASTATIN CALCIUM 80 MG: 40 TABLET, FILM COATED ORAL at 10:11

## 2022-11-30 RX ADMIN — SUCRALFATE 1 G: 1 SUSPENSION ORAL at 06:11

## 2022-11-30 RX ADMIN — VALSARTAN 160 MG: 80 TABLET, FILM COATED ORAL at 10:11

## 2022-11-30 RX ADMIN — CLONIDINE HYDROCHLORIDE 0.2 MG: 0.1 TABLET ORAL at 02:11

## 2022-11-30 RX ADMIN — MORPHINE SULFATE 90 MG: 30 TABLET, FILM COATED, EXTENDED RELEASE ORAL at 01:11

## 2022-11-30 RX ADMIN — SUCRALFATE 1 G: 1 SUSPENSION ORAL at 10:11

## 2022-11-30 RX ADMIN — PANCRELIPASE 1 CAPSULE: 30000; 6000; 19000 CAPSULE, DELAYED RELEASE PELLETS ORAL at 02:11

## 2022-11-30 RX ADMIN — TAMSULOSIN HYDROCHLORIDE 0.4 MG: 0.4 CAPSULE ORAL at 10:11

## 2022-11-30 RX ADMIN — ESCITALOPRAM OXALATE 20 MG: 10 TABLET, FILM COATED ORAL at 10:11

## 2022-11-30 RX ADMIN — SODIUM CHLORIDE: 9 INJECTION, SOLUTION INTRAVENOUS at 12:11

## 2022-11-30 NOTE — CONSULTS
Cardiology Consultation    Date of Consultation: 11/30/22    Consultation Requested By: Internal Medicine    Reason for Consultation: Abnormal Stress Test    History of Present Illness:  Mr. Caballero is a 42 y.o. male with PMH of HTN, DM, Hypertriglyceridemia, Hepatic Steatosis, recurrent pancreatitis, CICI who was admitted to [facility] on 11/28 with HTN emergency with blurry vision and chest pain concerning for signs of end organ damage. He went to the ED initially and was sent home after his BP improved with IV medication. His PCP sent him back to the ED. He tells me that for the past month or so he has had intermittent chest pain, sharp, radiating to back. He had chest pain last night that woke him up, and this morning he had CP 4 times, lasting up to 20 minutes. He describes it as sharp and radiating to his back. Not related to exertion. Yesterday, he did take too much of his home narcotics/benzos and was somnolent prior to his stress test. Takes prescription narcotics and benzos at home.    Smokes 1 ppd  No illicit drug use    Review of patient's allergies indicates:  No Known Allergies    Review of systems: 12 point review of systems conducted, negative except as stated in the HPI.     Past Medical History:   Past Medical History:   Diagnosis Date    BPH (benign prostatic hyperplasia)     Diabetes     GERD (gastroesophageal reflux disease)     Hepatic steatosis     History of continuous positive airway pressure (CPAP) therapy at home     Hypertension     Hypertriglyceridemia     Kidney disorder     Leg pain     Morbid obesity     Neuropathy     OA (osteoarthritis)     CICI (obstructive sleep apnea)     Polyneuropathy     Recurrent pancreatitis     Renal insufficiency     Tobacco abuse        Procedure History:   Past Surgical History:   Procedure Laterality Date    APPENDECTOMY      ARTERIOVENOUS ANASTOMOSIS, OPEN, UPPER ARM BASILLIC VEIN TRANSPOSITION N/A 05/07/2018    ESOPHAGOGASTRODUODENOSCOPY N/A 06/07/2021     EXCISION OF ARTERIOVENOUS FISTULA N/A 06/01/2018    HERNIA REPAIR      INSPECTION OF UPPER INTESTINAL TRACT N/A 06/07/2021    PHERESIS OF PLASMA N/A 07/13/2018    PHERESIS OF PLASMA N/A 05/25/2018    SPINAL CORD STIMULATOR REMOVAL      TRIAL OF SPINAL CORD NERVE STIMULATOR N/A 5/12/2022    Procedure: TRIAL, NEUROSTIMULATOR, SPINAL CORD;  Surgeon: Jay Pozo MD;  Location: [facility];  Service: Neurosurgery;  Laterality: N/A;    UPPER GI N/A 06/07/2021       Family History: family history includes Obesity in his father.    Social History:  reports that he has been smoking cigarettes. He started smoking about 26 years ago. He has a 25.00 pack-year smoking history. He has never used smokeless tobacco. He reports that he does not currently use drugs. He reports that he does not drink alcohol.    Home Medications:   Medications Prior to Admission   Medication Sig Dispense Refill Last Dose    alirocumab (PRALUENT PEN) 150 mg/mL PnIj Inject 1 mL (150 mg total) into the skin every 14 (fourteen) days. 6 mL 3     amLODIPine (NORVASC) 10 MG tablet 10 mg once daily.       aspirin (ECOTRIN) 81 MG EC tablet Take 81 mg by mouth once daily.       atorvastatin (LIPITOR) 80 MG tablet Take 80 mg by mouth once daily.       carBAMazepine (CARBATROL) 200 MG CM12 Take 3 capsules (600 mg total) by mouth 2 (two) times a day. 540 capsule 3     clonazePAM (KLONOPIN) 1 MG tablet TAKE ONE TABLET BY MOUTH TWICE DAILY 60 tablet 5     cloNIDine (CATAPRES) 0.2 MG tablet Take 1 tablet (0.2 mg total) by mouth 3 (three) times daily. 270 tablet 3     dapagliflozin (FARXIGA) 5 mg Tab tablet Take 1 tablet (5 mg total) by mouth once daily. 90 tablet 3     EScitalopram oxalate (LEXAPRO) 20 MG tablet Take 20 mg by mouth once daily.       esomeprazole (NEXIUM) 40 MG capsule Take 1 capsule (40 mg total) by mouth before breakfast. 30 capsule 11     gabapentin (NEURONTIN) 400 MG capsule Take 1 capsule (400 mg total) by mouth 2 (two) times daily. 180  "capsule 3     gabapentin (NEURONTIN) 800 MG tablet Take 1 tablet (800 mg total) by mouth every evening. 90 tablet 3     HUMALOG U-100 INSULIN 100 unit/mL injection INJECT 25 UNITS SUBCUTANEOUSLY BEFORE MEALS THREE TIMES DAILY 10 mL 4     insulin glargine (LANTUS U-100 INSULIN) 100 unit/mL injection Inject 100 Units into the skin 2 (two) times a day. (Patient taking differently: Inject 65 Units into the skin 2 (two) times a day.) 30 mL 6     irbesartan (AVAPRO) 300 MG tablet Take 300 mg by mouth once daily.       lipase-protease-amylase (CREON) 36,000-114,000- 180,000 unit CpDR Take 1 capsule by mouth 3 (three) times daily. 270 capsule 1     lisinopriL (PRINIVIL,ZESTRIL) 40 MG tablet Take 40 mg by mouth once daily.       nitroGLYCERIN (NITROSTAT) 0.3 MG SL tablet Place 1 tablet (0.3 mg total) under the tongue every 5 (five) minutes as needed for Chest pain (go to er after 3 doses). 30 tablet 6     pen needle, diabetic 31 gauge x 5/16" Ndle Sure Comfort Pen Needle 31 gauge x 5/16"       potassium chloride SA (K-DUR,KLOR-CON) 20 MEQ tablet TAKE ONE TABLET BY MOUTH ONCE DAILY 90 tablet 1     sucralfate (CARAFATE) 100 mg/mL suspension Take by mouth.       SURE COMFORT INSULIN SYRINGE 0.5 mL 31 gauge x 5/16" Syrg 3 (three) times daily.       tamsulosin (FLOMAX) 0.4 mg Cap Take 0.4 mg by mouth once daily.       torsemide (DEMADEX) 20 MG Tab Take 20 mg by mouth once daily.       [DISCONTINUED] rosuvastatin (CRESTOR) 40 MG Tab Take 40 mg by mouth once daily.          Inpatient Medications:     Current Facility-Administered Medications:     0.9%  NaCl infusion, , Intravenous, Continuous, Ash Nava MD    amLODIPine tablet 10 mg, 10 mg, Oral, Daily, Sidra Allen MD, 10 mg at 11/30/22 1002    aspirin EC tablet 81 mg, 81 mg, Oral, Daily, Sidra Allen MD, 81 mg at 11/30/22 1002    [START ON 12/1/2022] atorvastatin tablet 40 mg, 40 mg, Oral, Daily, Eliezer Akbar MD    carBAMazepine tablet 600 mg, 600 mg, Oral, BID, " Edwin Mehta MD, 600 mg at 11/30/22 1002    clonazePAM tablet 1 mg, 1 mg, Oral, BID, Sidra Allen MD, 1 mg at 11/30/22 1002    cloNIDine tablet 0.2 mg, 0.2 mg, Oral, TID, Sidra Allen MD, 0.2 mg at 11/30/22 1003    dextrose 10% bolus 125 mL, 12.5 g, Intravenous, PRN, Edwin Mehta MD    dextrose 10% bolus 250 mL, 25 g, Intravenous, PRN, dEwin Mehta MD    dextrose 50% injection 25 g, 25 g, Intravenous, PRN, Sidra Allen MD    enoxaparin injection 40 mg, 40 mg, Subcutaneous, Daily, Sidra Allen MD, 40 mg at 11/29/22 1615    EScitalopram oxalate tablet 20 mg, 20 mg, Oral, Daily, Sidra Allen MD, 20 mg at 11/30/22 1003    gabapentin capsule 400 mg, 400 mg, Oral, BID, Sidra Allen MD, 400 mg at 11/30/22 1002    gabapentin capsule 800 mg, 800 mg, Oral, QHS, Edwin Mehta MD, 800 mg at 11/29/22 2219    glucagon (human recombinant) injection 1 mg, 1 mg, Intramuscular, PRN, Sidra Allen MD    glucose chewable tablet 16 g, 16 g, Oral, PRN, Sidra Allen MD    glucose chewable tablet 24 g, 24 g, Oral, PRN, Sidra Allen MD    hydrALAZINE injection 10 mg, 10 mg, Intravenous, Q6H PRN, Sidra Allen MD, 10 mg at 11/28/22 2225    HYDROmorphone tablet 8 mg, 8 mg, Oral, Q6H PRN, Edwin Mehta MD, 8 mg at 11/29/22 1758    insulin aspart U-100 injection 0-5 Units, 0-5 Units, Subcutaneous, QID (AC + HS) PRN, Sidra Allen MD    labetalol 20 mg/4 mL (5 mg/mL) IV syring, 10 mg, Intravenous, Q4H PRN, Sidra Allen MD, 10 mg at 11/29/22 0036    lipase-protease-amylase 6,000-19,000-30,000 units per capsule 1 capsule, 1 capsule, Oral, TID, Sidra Allen MD, 1 capsule at 11/30/22 1002    melatonin tablet 6 mg, 6 mg, Oral, Nightly PRN, Sidra Allen MD    morphine 12 hr tablet 90 mg, 90 mg, Oral, Q8H PRN, Sidra Allen MD, 90 mg at 11/29/22 2320    naloxone 0.4 mg/mL injection 0.02 mg, 0.02 mg, Intravenous, PRN, Sidra Allen MD    nicotine 21 mg/24 hr 1 patch, 1 patch, Transdermal, Daily,  "Sidra Allen MD, 1 patch at 11/29/22 0818    nitroGLYCERIN SL tablet 0.4 mg, 0.4 mg, Sublingual, Q5 Min PRN, Sidra Allen MD    pantoprazole EC tablet 40 mg, 40 mg, Oral, Daily, Sidra Allen MD, 40 mg at 11/30/22 1002    sodium chloride 0.9% flush 10 mL, 10 mL, Intravenous, Q12H PRN, Sidra Allen MD    sucralfate 100 mg/mL suspension 1 g, 1 g, Oral, QID (AC & HS), Sidra Allen MD, 1 g at 11/30/22 1001    tamsulosin 24 hr capsule 0.4 mg, 0.4 mg, Oral, Daily, Sidra Allen MD, 0.4 mg at 11/30/22 1002    valsartan tablet 160 mg, 160 mg, Oral, Daily, Sidra Allen MD, 160 mg at 11/30/22 1002     Laboratory Data:   Reviewed      Imaging:  No orders to display       Physical Exam:   BP 98/60   Pulse 95   Temp 99.1 °F (37.3 °C) (Oral)   Resp 15   Ht 5' 9" (1.753 m)   Wt 101 kg (222 lb 10.6 oz)   SpO2 (!) 92%   BMI 32.88 kg/m²  Body mass index is 32.88 kg/m².  General - Appears comfortable, appropriatley conversive   Mental Status - alert and oriented x 3, speaking in logical, relevant sentences   HEENT - no rhinorrhea   Cardiac - RRR, no murmurs, rubs, or gallops; no edema in LE   Respiratory - breathing comfortably; clear to ascultation bilaterally   Abdominal - nondistended, soft, nontender to palpation   Extremities - LE, UE, and joints are nonerythematous and nonswollen   Skin - no rashes or bruises seen on skin      Impression:     Atypical chest pain  -Consider CTA Aorta to rule out dissection, although he says he's been having pain for the past month, so dissection is less likely.  -see below for positive stress test    Positive Stress Test  -Will pursue medical management. Recommend beta-blocker. Start Toprol 25mg daily. Titrate up as tolerated. Can decrease dose of clonidine if needed for up-titration of anti-anginals. If needed after increasing BB dose, can add Imdur, then Ranexa for further antianginal management.  -Troponins and EKG negative for ischemia  -continue ASA. Consider increasing " atorvastatin back to home 80mg dose if AST/ALT downtrend.  -LDL well-controlled  -Will need better triglyceride and DM control  -He will need to follow up in Cardiology clinic outpatient. If shows good follow up, then may consider outpatient angiogram.    HTN Emergency  -continue Norvasc 10mg daily, Valsartan 160mg daily, Clonidine 0.2mg BID  -Recommend starting Toprol 25mg daily    Hypertriglyceridemia  -continue atorvastatin 40mg daily  -recommend adding Vascepa 2g BID  -additional DM control will help lower triglycerides    Thank you very much for your consultation    David Figueredo PGY 3

## 2022-11-30 NOTE — PROGRESS NOTES
[facility] Medicine Wards Progress Note     Resident Team: [facility] Medicine List 2  Attending Physician: Edwin Mehta MD    Subjective:      Brief HPI:  Bharath Caballero is a 42 y.o.  male who with a history of HTN, DM II, hypertriglyceridemia, hepatosteatosis, GERD, recurrent pancreatitis, chronic pain syndrome s/p spinal cord stimulator and other medical history listed below who presented to [facility] per advise of PCP on 11/28/22 for BP check. Patient reports BP has been elevated with SBP in 200s over last week or 2 despite medication compliance. Patient seen in ED 11/27/22 due to complaint of chest pain x2-3 days. Found to have elevated blood pressure readings up to 206/112, which improved with 20 mg labetalol IV. Work up at that time with negative troponin, BNP, lipase, CMP, CBC and EKG. Hypertriglyceridemia stable. Patient discharged home with recommendation for follow up with PCP. Patient notified PCP office regarding elevated BP and was advised to present to hospital for observation in setting of significantly uncontrolled HTN. Today, patient reports that he has had 2 episodes of substernal chest pain since discharge from ED that occurred at rest, lasted a few seconds to minutes, resolved on its own without relief from nitro. Reports nonpainful blurry vision over last 1-2 weeks and decreased oral intake when his blood pressures have been uncontrolled. Patient with chronic right flank and abdominal pain, lower extremity neuropathy and weakness in right lower extremity unchanged from baseline. Has home health that comes 3 times a week. Denies fevers, chills, nausea, vomiting, headaches, SOB, constipation, diarrhea, dysuria or hematuria. Smoke 1 pack of cigarettes/day.      Interval History: Patient is doing well today. He has no complaints and denies any CP or SOB. He remains afebrile and hemodynamically stable. DOMINGA revealed a mod-severe intensity, mod-large sized, reversible perfusion defect in the LCX territory. Will  consult Cardiology today.      Review of Systems:  14 point ROS completed and negative except as indicated above.     Objective:     Vital Signs:  Vital Signs (Most Recent):  Temp: 99.7 °F (37.6 °C) (11/30/22 1130)  Pulse: (!) 111 (11/30/22 1049)  Resp: 16 (11/30/22 1340)  BP: (!) 136/92 (11/30/22 1002)  SpO2: (!) 94 % (11/30/22 1049)   Vital Signs (24h Range):  Temp:  [98.4 °F (36.9 °C)-99.7 °F (37.6 °C)] 99.7 °F (37.6 °C)  Pulse:  [] 111  Resp:  [9-20] 16  SpO2:  [85 %-98 %] 94 %  BP: ()/(60-92) 136/92   Body mass index is 32.88 kg/m².     Intake/Output Summary (Last 24 hours) at 11/30/2022 1549  Last data filed at 11/29/2022 2000  Gross per 24 hour   Intake 240 ml   Output 700 ml   Net -460 ml       Physical Examination:  General:  Well developed, well nourished, no acute respiratory distress  Head: Normocephalic, atraumatic  Eyes: PERRL, anicteric sclera  Throat: No posterior pharyngeal erythema or exudate, no tonsillar exudate  Neck: supple, normal ROM, no JVD  CVS:  RRR, S1 and S2 normal, no murmurs, no added heart sounds, rubs, gallops, regular peripheral pulses, and no peripheral edema  Resp:  Lungs clear to auscultation bilaterally, no wheezes, rales, or rhonci  GI:  Abdomen soft, non-tender, non-distended, normoactive bowel sounds  MSK:  Full range of motion, no obvious deformities   Skin:  No rashes, ulcers, erythema  Neuro:  Alert and oriented x3, No focal neuro deficits, CNII-XII grossly intact  Psych:  Appropriate mood and affect     Laboratory:    Recent Labs   Lab 11/30/22  0341   WBC 12.5*   HGB 13.7*   HCT 40.0*      MCV 87.7   RDW 12.0     Recent Labs   Lab 11/28/22 2056 11/28/22  2210 11/29/22  0236   TROPONINI  --    < > <0.010   BNP <10.0  --   --     < > = values in this interval not displayed.      Recent Labs   Lab 11/30/22 0341      K 4.0   CHLORIDE 106   CO2 23   BUN 17.8   CREATININE 1.30*   CALCIUM 8.5   MG 2.20   PHOS 4.2     Recent Labs   Lab 11/30/22  0013    ALBUMIN 3.0*   BILITOT 0.4   AST 76*   ALKPHOS 196*   ALT 59*            Microbiology Data:  Microbiology Results (last 7 days)       ** No results found for the last 168 hours. **               Radiology:  X-Ray Chest AP Portable 11/27/2022    Narrative  EXAMINATION:  XR CHEST AP PORTABLE    CLINICAL HISTORY:  Chest Pain;    COMPARISON:  9 May 2022    FINDINGS:  Portable frontal view of the chest was obtained. Heart is not enlarged.  Increased interstitial prominence compared to the prior film.  No dense consolidation or pneumothorax.  There are dorsal column stimulator leads.    Impression  Increased interstitial prominence.  Question some pulmonary vascular congestion or infection.      Electronically signed by: Paulo Biggs  Date:    11/28/2022  Time:    06:18       Current Medications:     Infusions:   sodium chloride 0.9% 125 mL/hr at 11/30/22 1246         Scheduled:   amLODIPine  10 mg Oral Daily    aspirin  81 mg Oral Daily    [START ON 12/1/2022] atorvastatin  40 mg Oral Daily    carBAMazepine  600 mg Oral BID    clonazePAM  1 mg Oral BID    cloNIDine  0.2 mg Oral TID    enoxaparin  40 mg Subcutaneous Daily    EScitalopram oxalate  20 mg Oral Daily    gabapentin  400 mg Oral BID    gabapentin  800 mg Oral QHS    lipase-protease-amylase 6,000-19,000-30,000 units  1 capsule Oral TID    nicotine  1 patch Transdermal Daily    pantoprazole  40 mg Oral Daily    sucralfate  1 g Oral QID (AC & HS)    tamsulosin  0.4 mg Oral Daily         PRN:   dextrose 10%    dextrose 10%    dextrose 50%    glucagon (human recombinant)    glucose    glucose    hydrALAZINE    HYDROmorphone    insulin aspart U-100    labetalol    melatonin    morphine    naloxone    nitroGLYCERIN    sodium chloride 0.9%        Antibiotics and Day Number of Therapy:  Antibiotics (From admission, onward)      None                 Assessment & Plan:     Hypertensive Emergency (Resolved)  -BP on admission: (!) 218/119  - BP today 136/92,    -  concern that decreased visual acuity over last weeks is sign of end organ damage, unable to perform fundus exam, no acute painful vision loss, consider Ophthalmology consult if persists    - non specific chest pain, JAVY=2; troponin negative x3  - TTE unremarkable, EF 60%  - DOMINGA revealed a mod-severe intensity, mod-large sized, reversible perfusion defect in the LCX territory  - Consulted Cardiology, will appreciate recs  - DANNY (Cr 0.86-->1.3) and Mildly elevated Transaminases  -Cardiac monitor, strict Is & Os  - Continue home meds: amlodipine 10mg, clonidine 0.2 mg TID  - Stopped irbesartan 300 mg daily due to DANNY  - TSH and Free T4 wnl     DANNY  - BUN/Cr 13.0/0.86 --> 17.8/1.30  - Started  mL/hr  - US Peritoneum unremarkable  - Ordered UA w/ reflex culture    Mildly Elevated Transaminases  - AST/ALT 76/59 this morning  - Continue to monitor    Hypokalemia  - Replete electrolytes prn      Leukocytosis  - WBC downtrending, currently 12.5 from 15.8 yesterday  - no source of infection suspected at this time, afebrile, continue to monitor      DM II, uncontrolled  Neuropathy  -A1c= 10.4 (11/27/22)  -Reports using 65 units Lantus BID and 25 units Humalog TID before meals, took evening Lantus dose prior to presentation   - Continue holding scheduled insulin as patient's CBGs < 180s and will be NPO after midnight  -Low sliding scale insulin   - continue home gabpentin      HLD  -continue home atorvastatin      Anxiety/depression  Chronic Pain Syndrome  - continue home medications     Tobacco Use  - nicotine patch      Pancreatic insufficiency  Chronic abdominal pain  - continue home medications      BPH  - continue home Flomax     CICI  - CPAP at night         CODE STATUS: FULL  Access: PIV  Antibiotics: none  Diet: heart healthy, NPO after midnight  DVT Prophylaxis: Lovenox 40 mg daily   GI Prophylaxis: None  Fluids:  mL/hr    Disposition: Admitted for observation in setting of hypertensive emergency, with  complaint of 2 episodes of CP prior to admission. Neg Trop x3. Abnormal Kristy, Cardiology Consulted. No signs of end organ damage on admission, but today DANNY and mildly elevated transaminases. Started on  mL/hr. Further dispo planning pending Cardiology recs.       Ash Nava MD  [facility], -

## 2022-11-30 NOTE — PROGRESS NOTES
"Inpatient Nutrition Evaluation    Admit Date: 2022   Total duration of encounter: 2 days    Nutrition Recommendation/Prescription     Will change diet to diabetic/ heart healthy diet  Pt would like boost glucose control supplement--chocolate/strawberry--bid  Pt education on diet/complete  MVI/fe  Biweekly wt  Will monitor nutrition status       Nutrition Assessment     Chart Review    Reason Seen: continuous nutrition monitoring    Diagnosis:  HTN, Hypokalemia, leukocytosis, DM, neuropathy, HLD, anxiety/depression, pain, pancreatic insufficiency , BPH, abd pain     Relevant Medical History: HTN, DM, elevated TG, hepatosteatosis, GERD, pancreatitis, pain     Nutrition-Related Medications: IVF 0.9% NaCl @ 125ml/ch; aspirin, atorvastatin, clonidine, pantoprazole   Nutrition-Related Labs:() H/H 13.7/40(L) Gluc 86 Bun 17.8 Cr 1.3 GFR > 60 Alk Phos 196(H) AST 76(H) ALT 59(H)       Diet Order: Diet heart healthy  Oral Supplement Order: none  Appetite/Oral Intake: good/% of meals  Factors Affecting Nutritional Intake: none identified  Food/Episcopal/Cultural Preferences:  would like try boost   Food Allergies: none reported       Wound(s):   none reported     Comments    () Pt slow to answer questions/very tired ; per EMR --pt had taken home pain meds yesterday; ? Residual effects. Pt reported good appetite; wt --fluctuates--with fluid. Pt was on regular diet PTA; hx DM --will change diet to diabetic /cardiac. Brief education provided on diet tx. Noted elevated LFTs. Pt on pancreatic enzymes 2 pancreatic insufficiency.     Anthropometrics    Height: 5' 9" (175.3 cm)    Last Weight: 101 kg (222 lb 10.6 oz) (22 0600) Weight Method: Bed Scale  BMI (Calculated): 32.9  BMI Classification: obese grade I (BMI 30-34.9)        Ideal Body Weight (IBW), Male: 160 lb     % Ideal Body Weight, Male (lb): 139.44 %                 Usual Body Weight (UBW), k kg (pt reported wt --up/down --fluid)  % Usual " Body Weight: 100.21     Usual Weight Provided By: patient and EMR weight history    Wt Readings from Last 5 Encounters:   11/30/22 101 kg (222 lb 10.6 oz)   11/27/22 107 kg (236 lb)   10/25/22 (P) 105.2 kg (232 lb)   10/19/22 103.9 kg (229 lb 0.9 oz)   10/05/22 107 kg (236 lb)     Weight Change(s) Since Admission:  Admit Weight: 101.6 kg (223 lb 15.8 oz) (11/28/22 2300)  Wt --up/down with fluid     Patient Education    Education Provided: diabetic diet and heart healthy diet  Teaching Method: explanation and printed materials  Comprehension: verbalizes understanding  Barriers to Learning: difficulty concentrating  Expected Compliance: fair  Comments: All questions were answered and dietitian's contact information was provided.     Monitoring & Evaluation     Dietitian will monitor food and beverage intake and weight.  Nutrition Risk/Follow-Up: low (follow-up in 5-7 days)  Patients assigned 'low nutrition risk' status do not qualify for a full nutritional assessment but will be monitored and re-evaluated in a 5-7 day time period. Please consult if re-evaluation needed sooner.

## 2022-12-01 VITALS
RESPIRATION RATE: 13 BRPM | WEIGHT: 218.06 LBS | DIASTOLIC BLOOD PRESSURE: 88 MMHG | OXYGEN SATURATION: 98 % | HEIGHT: 69 IN | HEART RATE: 67 BPM | TEMPERATURE: 98 F | SYSTOLIC BLOOD PRESSURE: 154 MMHG | BODY MASS INDEX: 32.3 KG/M2

## 2022-12-01 LAB
ALBUMIN SERPL-MCNC: 3 GM/DL (ref 3.5–5)
ALBUMIN/GLOB SERPL: 0.8 RATIO (ref 1.1–2)
ALP SERPL-CCNC: 231 UNIT/L (ref 40–150)
ALT SERPL-CCNC: 66 UNIT/L (ref 0–55)
AST SERPL-CCNC: 63 UNIT/L (ref 5–34)
BASOPHILS # BLD AUTO: 0.04 X10(3)/MCL (ref 0–0.2)
BASOPHILS NFR BLD AUTO: 0.3 %
BILIRUBIN DIRECT+TOT PNL SERPL-MCNC: 0.7 MG/DL
BUN SERPL-MCNC: 17.1 MG/DL (ref 8.9–20.6)
CALCIUM SERPL-MCNC: 8.7 MG/DL (ref 8.4–10.2)
CHLORIDE SERPL-SCNC: 105 MMOL/L (ref 98–107)
CO2 SERPL-SCNC: 23 MMOL/L (ref 22–29)
CREAT SERPL-MCNC: 0.98 MG/DL (ref 0.73–1.18)
EOSINOPHIL # BLD AUTO: 0.1 X10(3)/MCL (ref 0–0.9)
EOSINOPHIL NFR BLD AUTO: 0.7 %
ERYTHROCYTE [DISTWIDTH] IN BLOOD BY AUTOMATED COUNT: 11.8 % (ref 11.5–17)
GFR SERPLBLD CREATININE-BSD FMLA CKD-EPI: >60 MLS/MIN/1.73/M2
GLOBULIN SER-MCNC: 3.8 GM/DL (ref 2.4–3.5)
GLUCOSE SERPL-MCNC: 167 MG/DL (ref 74–100)
HCT VFR BLD AUTO: 40.2 % (ref 42–52)
HGB BLD-MCNC: 13.7 GM/DL (ref 14–18)
IMM GRANULOCYTES # BLD AUTO: 0.07 X10(3)/MCL (ref 0–0.04)
IMM GRANULOCYTES NFR BLD AUTO: 0.5 %
LIPASE SERPL-CCNC: 5 U/L
LYMPHOCYTES # BLD AUTO: 2.27 X10(3)/MCL (ref 0.6–4.6)
LYMPHOCYTES NFR BLD AUTO: 16.1 %
MAGNESIUM SERPL-MCNC: 2.2 MG/DL (ref 1.6–2.6)
MCH RBC QN AUTO: 30 PG (ref 27–31)
MCHC RBC AUTO-ENTMCNC: 34.1 MG/DL (ref 33–36)
MCV RBC AUTO: 88.2 FL (ref 80–94)
MONOCYTES # BLD AUTO: 1.09 X10(3)/MCL (ref 0.1–1.3)
MONOCYTES NFR BLD AUTO: 7.7 %
NEUTROPHILS # BLD AUTO: 10.5 X10(3)/MCL (ref 2.1–9.2)
NEUTROPHILS NFR BLD AUTO: 74.7 %
NRBC BLD AUTO-RTO: 0 %
PHOSPHATE SERPL-MCNC: 2.9 MG/DL (ref 2.3–4.7)
PLATELET # BLD AUTO: 271 X10(3)/MCL (ref 130–400)
PMV BLD AUTO: 9.6 FL (ref 7.4–10.4)
POCT GLUCOSE: 102 MG/DL (ref 70–110)
POCT GLUCOSE: 163 MG/DL (ref 70–110)
POCT GLUCOSE: 280 MG/DL (ref 70–110)
POTASSIUM SERPL-SCNC: 4.3 MMOL/L (ref 3.5–5.1)
PROT SERPL-MCNC: 6.8 GM/DL (ref 6.4–8.3)
RBC # BLD AUTO: 4.56 X10(6)/MCL (ref 4.7–6.1)
SODIUM SERPL-SCNC: 137 MMOL/L (ref 136–145)
WBC # SPEC AUTO: 14.1 X10(3)/MCL (ref 4.5–11.5)

## 2022-12-01 PROCEDURE — 25000003 PHARM REV CODE 250: Performed by: INTERNAL MEDICINE

## 2022-12-01 PROCEDURE — 25000003 PHARM REV CODE 250: Performed by: STUDENT IN AN ORGANIZED HEALTH CARE EDUCATION/TRAINING PROGRAM

## 2022-12-01 PROCEDURE — 80053 COMPREHEN METABOLIC PANEL: CPT | Performed by: STUDENT IN AN ORGANIZED HEALTH CARE EDUCATION/TRAINING PROGRAM

## 2022-12-01 PROCEDURE — S4991 NICOTINE PATCH NONLEGEND: HCPCS | Performed by: STUDENT IN AN ORGANIZED HEALTH CARE EDUCATION/TRAINING PROGRAM

## 2022-12-01 PROCEDURE — 83690 ASSAY OF LIPASE: CPT

## 2022-12-01 PROCEDURE — 84100 ASSAY OF PHOSPHORUS: CPT | Performed by: STUDENT IN AN ORGANIZED HEALTH CARE EDUCATION/TRAINING PROGRAM

## 2022-12-01 PROCEDURE — 83735 ASSAY OF MAGNESIUM: CPT | Performed by: STUDENT IN AN ORGANIZED HEALTH CARE EDUCATION/TRAINING PROGRAM

## 2022-12-01 PROCEDURE — 94761 N-INVAS EAR/PLS OXIMETRY MLT: CPT

## 2022-12-01 PROCEDURE — 85025 COMPLETE CBC W/AUTO DIFF WBC: CPT | Performed by: STUDENT IN AN ORGANIZED HEALTH CARE EDUCATION/TRAINING PROGRAM

## 2022-12-01 PROCEDURE — 36415 COLL VENOUS BLD VENIPUNCTURE: CPT | Performed by: STUDENT IN AN ORGANIZED HEALTH CARE EDUCATION/TRAINING PROGRAM

## 2022-12-01 PROCEDURE — 25000003 PHARM REV CODE 250

## 2022-12-01 RX ORDER — METOPROLOL SUCCINATE 25 MG/1
25 TABLET, EXTENDED RELEASE ORAL DAILY
Qty: 30 TABLET | Refills: 11 | Status: SHIPPED | OUTPATIENT
Start: 2022-12-02 | End: 2023-06-09

## 2022-12-01 RX ORDER — AMLODIPINE BESYLATE 10 MG/1
10 TABLET ORAL DAILY
Qty: 30 TABLET | Refills: 11 | Status: SHIPPED | OUTPATIENT
Start: 2022-12-02 | End: 2022-12-13

## 2022-12-01 RX ORDER — ATORVASTATIN CALCIUM 40 MG/1
40 TABLET, FILM COATED ORAL DAILY
Qty: 90 TABLET | Refills: 3 | Status: SHIPPED | OUTPATIENT
Start: 2022-12-02 | End: 2023-12-14 | Stop reason: SDUPTHER

## 2022-12-01 RX ORDER — ICOSAPENT ETHYL 1000 MG/1
2 CAPSULE ORAL 2 TIMES DAILY
Qty: 60 CAPSULE | Refills: 11 | Status: SHIPPED | OUTPATIENT
Start: 2022-12-01 | End: 2023-12-13 | Stop reason: SDUPTHER

## 2022-12-01 RX ORDER — VALSARTAN 80 MG/1
160 TABLET ORAL DAILY
Status: DISCONTINUED | OUTPATIENT
Start: 2022-12-01 | End: 2022-12-01 | Stop reason: HOSPADM

## 2022-12-01 RX ORDER — CARBAMAZEPINE 200 MG/1
600 TABLET ORAL 2 TIMES DAILY
Qty: 180 TABLET | Refills: 11 | Status: SHIPPED | OUTPATIENT
Start: 2022-12-01 | End: 2023-09-25

## 2022-12-01 RX ORDER — MUPIROCIN 20 MG/G
OINTMENT TOPICAL 2 TIMES DAILY
Status: DISCONTINUED | OUTPATIENT
Start: 2022-12-01 | End: 2022-12-01 | Stop reason: HOSPADM

## 2022-12-01 RX ORDER — METOPROLOL SUCCINATE 25 MG/1
25 TABLET, EXTENDED RELEASE ORAL DAILY
Status: DISCONTINUED | OUTPATIENT
Start: 2022-12-01 | End: 2022-12-01 | Stop reason: HOSPADM

## 2022-12-01 RX ADMIN — MORPHINE SULFATE 90 MG: 30 TABLET, FILM COATED, EXTENDED RELEASE ORAL at 10:12

## 2022-12-01 RX ADMIN — AMLODIPINE BESYLATE 10 MG: 10 TABLET ORAL at 09:12

## 2022-12-01 RX ADMIN — METOPROLOL SUCCINATE 25 MG: 25 TABLET, EXTENDED RELEASE ORAL at 09:12

## 2022-12-01 RX ADMIN — NICOTINE 1 PATCH: 21 PATCH, EXTENDED RELEASE TRANSDERMAL at 09:12

## 2022-12-01 RX ADMIN — ESCITALOPRAM OXALATE 20 MG: 10 TABLET, FILM COATED ORAL at 09:12

## 2022-12-01 RX ADMIN — CARBAMAZEPINE 600 MG: 200 TABLET ORAL at 09:12

## 2022-12-01 RX ADMIN — ASPIRIN 81 MG: 81 TABLET, COATED ORAL at 09:12

## 2022-12-01 RX ADMIN — GABAPENTIN 400 MG: 400 CAPSULE ORAL at 09:12

## 2022-12-01 RX ADMIN — SUCRALFATE 1 G: 1 SUSPENSION ORAL at 05:12

## 2022-12-01 RX ADMIN — PANCRELIPASE 1 CAPSULE: 30000; 6000; 19000 CAPSULE, DELAYED RELEASE PELLETS ORAL at 09:12

## 2022-12-01 RX ADMIN — MUPIROCIN: 20 OINTMENT TOPICAL at 09:12

## 2022-12-01 RX ADMIN — TAMSULOSIN HYDROCHLORIDE 0.4 MG: 0.4 CAPSULE ORAL at 09:12

## 2022-12-01 RX ADMIN — VALSARTAN 160 MG: 80 TABLET, FILM COATED ORAL at 09:12

## 2022-12-01 RX ADMIN — PANTOPRAZOLE SODIUM 40 MG: 40 TABLET, DELAYED RELEASE ORAL at 09:12

## 2022-12-01 RX ADMIN — ATORVASTATIN CALCIUM 40 MG: 40 TABLET, FILM COATED ORAL at 09:12

## 2022-12-01 RX ADMIN — CLONIDINE HYDROCHLORIDE 0.2 MG: 0.1 TABLET ORAL at 09:12

## 2022-12-01 RX ADMIN — SUCRALFATE 1 G: 1 SUSPENSION ORAL at 12:12

## 2022-12-01 RX ADMIN — CLONAZEPAM 1 MG: 1 TABLET ORAL at 09:12

## 2022-12-01 NOTE — DISCHARGE SUMMARY
[facility] Discharge Summary    Admitting Physician: Edwin Mehta MD  Attending Physician: No att. providers found  Date of Admit: 11/28/2022  Date of Discharge: 12/1/2022    Condition: Good  Outcome: Patient tolerated treatment/procedure well without complication and is now ready for discharge.  DISPOSITION: Home or Self Care          Discharge Diagnoses     Patient Active Problem List   Diagnosis    Chronic pain syndrome    Painful diabetic neuropathy    History of pancreatitis    Abnormal weight loss    Acute abdominal pain    Bladder outflow obstruction    Candiduria    Chronic pancreatitis    Diabetes mellitus type 2 in obese    Diabetic polyneuropathy    Hypertension    Familial hypercholesterolemia    Hand paresthesia    Mixed hyperlipidemia    Left flank pain    Mononeuritis multiplex    Morbid obesity    Nocturia    Obstructive sleep apnea syndrome    Paresis of single lower extremity    Hepatic steatosis    Tobacco user    Elevated LFTs    Gastroesophageal reflux disease with esophagitis    Personal history of colonic polyps    Fall    Impaired functional mobility, balance, gait, and endurance    Blurred vision, right eye    Chronic pain    Hypertensive emergency       Principal Problem:  Hypertensive emergency    Consultants and Procedures     Consultants:  IP CONSULT TO CARDIOLOGY  IP CONSULT TO SOCIAL WORK/CASE MANAGEMENT  IP CONSULT TO SOCIAL WORK/CASE MANAGEMENT    Procedures:   * No surgery found *     Brief Admission History      Bharath Caballero is a 42 y.o.  male who with a history of HTN, DM II, hypertriglyceridemia, hepatosteatosis, GERD, recurrent pancreatitis, chronic pain syndrome s/p spinal cord stimulator and other medical history listed below who presented to [facility] per advise of PCP on 11/28/22 for BP check. Patient reports BP has been elevated with SBP in 200s over last week or 2 despite medication compliance. Patient seen in ED 11/27/22 due to complaint of chest pain x2-3  days. Found to have elevated blood pressure readings up to 206/112, which improved with 20 mg labetalol IV. Work up at that time with negative troponin, BNP, lipase, CMP, CBC and EKG. Hypertriglyceridemia stable. Patient discharged home with recommendation for follow up with PCP. Patient notified PCP office regarding elevated BP and was advised to present to hospital for observation in setting of significantly uncontrolled HTN. Today, patient reports that he has had 2 episodes of substernal chest pain since discharge from ED that occurred at rest, lasted a few seconds to minutes, resolved on its own without relief from nitro. Reports nonpainful blurry vision over last 1-2 weeks and decreased oral intake when his blood pressures have been uncontrolled. Patient with chronic right flank and abdominal pain, lower extremity neuropathy and weakness in right lower extremity unchanged from baseline. Has home health that comes 3 times a week. Denies fevers, chills, nausea, vomiting, headaches, SOB, constipation, diarrhea, dysuria or hematuria. Smoke 1 pack of cigarettes/day.    Hospital Course with Pertinent Findings     Patietn admitted on 11/28/22 for hypertensive urgency and 2 episodes of atypical chest pain. Cardiac enzymes wnl. TTE revealed 60% EF, otherwise unremarkable.  Nuclear Stress test revealed mod-severe intensity, mod-large sized, reversible perfusion defect in the LCX territory. Cardiology consulted with recommendations to continue Norvasc 10 mg once daily, Irbesartan 300 mg once daily, Lipitor 80 mg once daily and Clonidine 0.2 mg BID (all home meds) and to start Toprol 25 mg once daily and Vascepa 2 g BID. Referral placed for Cardiology f/u and internal medicine post [facility] on 12/12/22. PCP f/u with Dr. Beasley scheduled on 1/24/22. On 12/1/22, patient remains hemodynamically stable and afebrile, with no CP since admission. Patient discharge on 12/1/22.    Discharge physical exam:  Vitals  BP: (!)  "154/88  Temp: 97.9 °F (36.6 °C)  Temp src: Axillary  Pulse: 67  Resp: 13  SpO2: 98 %  Height: 5' 9" (175.3 cm)  Weight: 98.9 kg (218 lb 0.6 oz)    General:  Well developed, well nourished, no acute respiratory distress  Head: Normocephalic, atraumatic  Eyes: PERRL, EOMI, anicteric sclera  Throat: No posterior pharyngeal erythema or exudate, no tonsillar exudate  Neck: supple, normal ROM, no JVD  CVS:  RRR, S1 and S2 normal, no murmurs, no added heart sounds, rubs, gallops, regular peripheral pulses, and no peripheral edema  Resp:  Lungs clear to auscultation bilaterally, no wheezes, rales, or rhonci  GI:  Abdomen soft, non-tender, non-distended, normoactive bowel sounds  MSK:  Full range of motion, no obvious deformities   Skin:  No rashes, ulcers, erythema  Neuro:  Alert and oriented x3, No focal neuro deficits, CNII-XII grossly intact  Psych:  Appropriate mood and affect     TIME SPENT ON DISCHARGE: 60 minutes    Discharge Medications        Medication List        START taking these medications      carBAMazepine 200 mg tablet  Commonly known as: TEGRETOL  Take 3 tablets (600 mg total) by mouth 2 (two) times a day.  Replaces: carBAMazepine 200 MG Cm12     icosapent ethyL 1 gram Cap  Commonly known as: VASCEPA  Take 2 capsules (2 g total) by mouth 2 (two) times daily.     metoprolol succinate 25 MG 24 hr tablet  Commonly known as: TOPROL-XL  Take 1 tablet (25 mg total) by mouth once daily.  Start taking on: December 2, 2022            CHANGE how you take these medications      amLODIPine 10 MG tablet  Commonly known as: NORVASC  Take 1 tablet (10 mg total) by mouth once daily.  Start taking on: December 2, 2022  What changed: how to take this     atorvastatin 40 MG tablet  Commonly known as: LIPITOR  Take 1 tablet (40 mg total) by mouth once daily.  Start taking on: December 2, 2022  What changed:   medication strength  how much to take     LANTUS U-100 INSULIN 100 unit/mL injection  Generic drug: insulin " "glargine  Inject 100 Units into the skin 2 (two) times a day.  What changed: how much to take            CONTINUE taking these medications      aspirin 81 MG EC tablet  Commonly known as: ECOTRIN     clonazePAM 1 MG tablet  Commonly known as: KlonoPIN  TAKE ONE TABLET BY MOUTH TWICE DAILY     cloNIDine 0.2 MG tablet  Commonly known as: CATAPRES  Take 1 tablet (0.2 mg total) by mouth 3 (three) times daily.     CREON 36,000-114,000- 180,000 unit Cpdr  Generic drug: lipase-protease-amylase  Take 1 capsule by mouth 3 (three) times daily.     dapagliflozin 5 mg Tab tablet  Commonly known as: FARXIGA  Take 1 tablet (5 mg total) by mouth once daily.     EScitalopram oxalate 20 MG tablet  Commonly known as: LEXAPRO     esomeprazole 40 MG capsule  Commonly known as: NEXIUM  Take 1 capsule (40 mg total) by mouth before breakfast.     * gabapentin 400 MG capsule  Commonly known as: NEURONTIN  Take 1 capsule (400 mg total) by mouth 2 (two) times daily.     * gabapentin 800 MG tablet  Commonly known as: NEURONTIN  Take 1 tablet (800 mg total) by mouth every evening.     HumaLOG U-100 Insulin 100 unit/mL injection  Generic drug: insulin lispro  INJECT 25 UNITS SUBCUTANEOUSLY BEFORE MEALS THREE TIMES DAILY     HYDROmorphone 8 MG tablet  Commonly known as: DILAUDID  Take 1 tablet (8 mg total) by mouth every 6 (six) hours as needed.     irbesartan 300 MG tablet  Commonly known as: AVAPRO     morphine 100 MG 12 hr tablet  Commonly known as: MS CONTIN  Take 1 tablet (100 mg total) by mouth every 8 (eight) hours.     nitroGLYCERIN 0.3 MG SL tablet  Commonly known as: NITROSTAT  Place 1 tablet (0.3 mg total) under the tongue every 5 (five) minutes as needed for Chest pain (go to er after 3 doses).     pen needle, diabetic 31 gauge x 5/16" Ndle     potassium chloride SA 20 MEQ tablet  Commonly known as: K-DUR,KLOR-CON  TAKE ONE TABLET BY MOUTH ONCE DAILY     PRALUENT  mg/mL Pnij  Generic drug: alirocumab  Inject 1 mL (150 mg " "total) into the skin every 14 (fourteen) days.     sucralfate 100 mg/mL suspension  Commonly known as: CARAFATE     SURE COMFORT INSULIN SYRINGE 0.5 mL 31 gauge x 5/16" Syrg  Generic drug: insulin syringe-needle U-100     tamsulosin 0.4 mg Cap  Commonly known as: FLOMAX     torsemide 20 MG Tab  Commonly known as: DEMADEX           * This list has 2 medication(s) that are the same as other medications prescribed for you. Read the directions carefully, and ask your doctor or other care provider to review them with you.                STOP taking these medications      carBAMazepine 200 MG Cm12  Commonly known as: CARBATROL  Replaced by: carBAMazepine 200 mg tablet     lisinopriL 40 MG tablet  Commonly known as: PRINIVIL,ZESTRIL               Where to Get Your Medications        These medications were sent to [facility] of [address] [address]      Phone: [phone]   amLODIPine 10 MG tablet  atorvastatin 40 MG tablet  carBAMazepine 200 mg tablet  HYDROmorphone 8 MG tablet  icosapent ethyL 1 gram Cap  metoprolol succinate 25 MG 24 hr tablet  morphine 100 MG 12 hr tablet         Discharge Information:   Bharath Caballero is being discharged Home-Health Care [facility].    Discharge Procedure Orders   Ambulatory referral/consult to Internal Medicine   Standing Status: Future   Referral Priority: Routine Referral Type: Consultation   Referral Reason: Specialty Services Required   Requested Specialty: Internal Medicine   Number of Visits Requested: 1     Ambulatory referral/consult to Cardiology   Standing Status: Future   Referral Priority: Routine Referral Type: Consultation   Referral Reason: Specialty Services Required   Requested Specialty: Cardiology   Number of Visits Requested: 1        Follow-Up Appointments:      To address at follow-up:  - Continue Norvasc 10 mg once daily, Irbesartan 300 mg once daily, Lipitor 80 mg once daily and Clonidine 0.2 mg BID " (all home meds)  - Started Toprol 25 mg once daily and Vascepa 2 g BID  - Referral placed for Cardiology f/u; Patient should receive call shortly for apt scheduling  - Internal medicine post [facility] on 12/12/22  - Follow up with Dr. Beasley (PCP) on 1/24/22      Ash Nava MD  [facility] Internal Medicine, -

## 2022-12-01 NOTE — PLAN OF CARE
12/01/22 1024   Discharge Reassessment   Assessment Type Discharge Planning Assessment   Did the patient's condition or plan change since previous assessment? Yes   Discharge Plan discussed with: Patient   Discharge Plan A Home Health;Home  (Concepts of Care)   DME Needed Upon Discharge  none   Discharge Barriers Identified None   Post-Acute Status   Post-Acute Authorization Home Health   HH order requested to resume services upon discharge. Pt reported home CPAP needs repairs. Contacted Lillian with [facility] who reported pt was issued CPAP in 2018 for 3 months; however, never turned it in. Faxed clinicals to [facility] for f/u.

## 2022-12-01 NOTE — PLAN OF CARE
12/01/22 1157   Medicare Message   Important Message from Medicare regarding Discharge Appeal Rights Given to patient/caregiver;Explained to patient/caregiver;Signed/date by patient/caregiver   Date IMM was signed 12/01/22   Time IMM was signed 1739

## 2022-12-02 ENCOUNTER — PATIENT MESSAGE (OUTPATIENT)
Dept: ADMINISTRATIVE | Facility: CLINIC | Age: 42
End: 2022-12-02
Payer: MEDICARE

## 2022-12-02 ENCOUNTER — PATIENT OUTREACH (OUTPATIENT)
Dept: ADMINISTRATIVE | Facility: CLINIC | Age: 42
End: 2022-12-02
Payer: MEDICARE

## 2022-12-02 NOTE — PROGRESS NOTES
C3 nurse attempted to contact Bharath Caballero for a TCC post hospital discharge follow up call. No answer, left VM, CB# provided.  The patient has a scheduled HOSFU appointment with [facility] on 12/12/22 @ 1 pm, routed msg requesting HOSFU type appt 5-7 days post discharge.

## 2022-12-05 NOTE — PROGRESS NOTES
C3 nurse attempted to contact Bharath Caballero for a TCC post hospital discharge follow up call. Spoke to Beata  The patient has a scheduled HOSFU appointment with [facility] on 12/12/22 @ 1 pm, routed msg requesting HOSFU type appt 5-7 days post discharge

## 2022-12-08 NOTE — PHYSICIAN QUERY
PT Name: Bharath Caballero  MR #: [id number]     DOCUMENTATION CLARIFICATION     CDS/: MARIETTA Davis, RN, CCDS               Contact information: [email or web address]  This form is a permanent document in the medical record.     Query Date: December 8, 2022    By submitting this query, we are merely seeking further clarification of documentation to reflect the severity of illness of your patient. Please utilize your independent clinical judgment when addressing the question(s) below.    The Medical Record contains the following:   Indicators   Supporting Clinical Findings Location in Medical Record   X Hypertension or hypertensive documented reports BP has been elevated with SBP in 200s over last week or 2 despite medication compliance    significantly uncontrolled HTN    Hypertensive Emergency  concern that decreased visual acuity over last weeks is sign of end organ damage, unable to perform fundus exam, no acute painful vision loss, consider Ophthalmology consult if persists      HTN Emergency    admitted on 11/28/22 for hypertensive urgency and 2 episodes of atypical chest pain.  Principal Problem:  Hypertensive emergency   H & P: Dr. Mehta 11/29                    Cards consult: Dr. Pugh 11/30    DCS: Dr. Mehta 12/1   X Acute/Chronic condition(s) seen in ED 11/27/22 due to complaint of chest pain x2-3 days. Found to have elevated blood pressure readings up to 206/112, which improved with 20 mg labetalol IV    2 episodes of substernal chest pain since discharge from ED that occurred at rest, lasted a few seconds to minutes, resolved on its own without relief from nitro. Reports nonpainful blurry vision over last 1-2 weeks and decreased oral intake when his blood pressures have been uncontrolled       H & P: Dr. Mehta 11/29   X Vital signs BP on admission: (!) 218/119    BP: 136/92   H & P: Dr. Mehta 11/29    IM PN: Dr. Mehta 11/30    Lab findings      Radiology findings     X  Treatment/Medication continue Norvasc 10mg daily, Valsartan 160mg daily, Clonidine 0.2mg BID  -Recommend starting Toprol 25mg daily Cards consult: Dr. Pugh 11/30    Other       The clinical guidelines noted are only a system guideline. It does not replace the provider's clinical judgment.  Provider, please clarify conflicting HTN diagnoses that correspond(s) to the above indicators:    [   ] Hypertensive emergency - Severe hypertension (SBP>180 and/or DBP>120mmHg) with signs or symptoms of new or worsening end-organ damage (i.e. hypertensive encephalopathy, retinal hemorrhage, papilledema, acute kidney injury, stroke, heart attack, heart failure, kidney failure)   [   ] Hypertensive urgency - Severe asymptomatic hypertension (SBP>180 and/or DBP>120mmHg) without signs or symptoms of acute end-organ damage. Can have a mild headache.   [   ] Other cardiovascular condition (please specify): __________   [ x  ]  Clinically Undetermined         Please document in your progress notes daily for the duration of treatment until resolved, and include in your discharge summary.    References:    ANISH Griffith MD, PhD, & TRISTAN Latham MD, FACP, FCCP, FCCM, FRSM. (2020, June 25). Evaluation and treatment of hypertensive emergencies in adults (KEYA Rey MD, ANISH Blackwell MD, & TRISTAN Velasquez MD, MSc, Eds.). Retrieved October 22, 2020, from https://[email or web address]/contents/evaluation-and-treatment-mg-xitfjrlikgxp-tgvayqudken-in-adults?search=hypertensive%20emergency&source=search_result&selectedTitle=1~150&usage_type=default&display_rank=1    TRISTAN Latham MD, FACP, FCCP, FCCM, FRSM, & ANISH Griffith MD, PhD. (2020, October 14). Management of severe asymptomatic hypertension (hypertensive urgencies) in adults (KEYA Rey MD, ANISH Blackwell MD, & TRISTAN Velasquez MD, MSc, Eds.). Retrieved October 22, 2020, from  https://www.[employer]/contents/management-of-severe-asymptomatic-hypertension-hypertensive-urgencies-in-adults?search=hypertensive%20urgency&source=search_result&selectedTitle=1~41&usage_type=default&display_rank=1    Form No. 90358

## 2022-12-08 NOTE — PHYSICIAN QUERY
PT Name: Bharath Caballero  MR #: [id number]     DOCUMENTATION CLARIFICATION     CDS/: MARIETTA Davis, RN, CCDS              Contact information: [email or web address]  This form is a permanent document in the medical record.     Query Date: December 8, 2022    By submitting this query, we are merely seeking further clarification of documentation to reflect the severity of illness of your patient. Please utilize your independent clinical judgment when addressing the question(s) below.    The medical record reflects the following:     Indicators   Supporting Clinical Findings Location in Medical Record   X Diabetes uncontrolled documented PMH: DMII    DM II, uncontrolled  Neuropathy   H & P: Dr. Mehta 11/28   X Lab Value(s), POCT glucose value(s) HbgA1C: 10.4    POCT glucose: 115, 127, 165, 280 Lab: 11/27    Lab: 11/28, 11/29, 11/30, 12/1    Beta-OHxy Butyric Acid levels      Serum Osmolarity      pH      Anion gap/ Bicarb levels      Treatment/Medication      Other       Provider, please specify the meaning of the term uncontrolled:    [   ] Diabetes mellitus Type 2 with hyperglycemia   [   ] Other diabetes complication (please specify): ____________   [ x  ]  Clinically Undetermined       Please document in your progress notes daily for the duration of treatment until resolved, and include in your discharge summary.

## 2022-12-12 ENCOUNTER — OFFICE VISIT (OUTPATIENT)
Dept: INTERNAL MEDICINE | Facility: CLINIC | Age: 42
End: 2022-12-12
Payer: MEDICARE

## 2022-12-12 VITALS
WEIGHT: 236.19 LBS | HEIGHT: 69 IN | SYSTOLIC BLOOD PRESSURE: 154 MMHG | HEART RATE: 82 BPM | DIASTOLIC BLOOD PRESSURE: 86 MMHG | TEMPERATURE: 98 F | BODY MASS INDEX: 34.98 KG/M2 | RESPIRATION RATE: 18 BRPM

## 2022-12-12 DIAGNOSIS — I16.1 HYPERTENSIVE EMERGENCY: ICD-10-CM

## 2022-12-12 PROCEDURE — 99215 OFFICE O/P EST HI 40 MIN: CPT | Mod: PBBFAC

## 2022-12-12 NOTE — PROGRESS NOTES
[facility] INTERNAL MEDICINE  OUTPATIENT OFFICE VISIT NOTE    SUBJECTIVE:      HPI: Bharath Caballero is a 42 y.o. male w/ PMH of     ROS:  (+)  (-) Chest pain, palpitations, SOB, dizziness, syncope, edema, PND, orthopnea, claudication, cough, fever, chills, abdominal pain, vomiting, diarrhea, dysuria, bleeding    OBJECTIVE:     Vital signs:   There were no vitals taken for this visit.     Physical Examination:  General:  Well-nourished, well-developed, no acute distress  HEENT: Normocephalic, atraumatic. EOMI.  MMM  Neck: Supple. No obvious JVD.  Normal range of motion.  Respiratory: CTAB.  No obvious crackles wheeze or rhonchi.  Cardiovascular:  RRR.  No obvious M/G/R. Warm extremities.  Peripheral pulses intact.  No edema.  Gastrointestinal:  Soft, nontender, nondistended.  Normal bowel sounds.  Neurologic: ANOx3.  Answering questions/following commands appropriately.  No FND      Current Outpatient Medications:     alirocumab (PRALUENT PEN) 150 mg/mL PnIj, Inject 1 mL (150 mg total) into the skin every 14 (fourteen) days., Disp: 6 mL, Rfl: 3    amLODIPine (NORVASC) 10 MG tablet, Take 1 tablet (10 mg total) by mouth once daily., Disp: 30 tablet, Rfl: 11    aspirin (ECOTRIN) 81 MG EC tablet, Take 81 mg by mouth once daily., Disp: , Rfl:     atorvastatin (LIPITOR) 40 MG tablet, Take 1 tablet (40 mg total) by mouth once daily., Disp: 90 tablet, Rfl: 3    carBAMazepine (TEGRETOL) 200 mg tablet, Take 3 tablets (600 mg total) by mouth 2 (two) times a day., Disp: 180 tablet, Rfl: 11    clonazePAM (KLONOPIN) 1 MG tablet, TAKE ONE TABLET BY MOUTH TWICE DAILY, Disp: 60 tablet, Rfl: 5    cloNIDine (CATAPRES) 0.2 MG tablet, Take 1 tablet (0.2 mg total) by mouth 3 (three) times daily., Disp: 270 tablet, Rfl: 3    dapagliflozin (FARXIGA) 5 mg Tab tablet, Take 1 tablet (5 mg total) by mouth once daily., Disp: 90 tablet, Rfl: 3    EScitalopram oxalate (LEXAPRO) 20 MG tablet, Take 20 mg by mouth once daily., Disp: , Rfl:     esomeprazole  "(NEXIUM) 40 MG capsule, Take 1 capsule (40 mg total) by mouth before breakfast., Disp: 30 capsule, Rfl: 11    gabapentin (NEURONTIN) 400 MG capsule, Take 1 capsule (400 mg total) by mouth 2 (two) times daily., Disp: 180 capsule, Rfl: 3    gabapentin (NEURONTIN) 800 MG tablet, Take 1 tablet (800 mg total) by mouth every evening., Disp: 90 tablet, Rfl: 3    HUMALOG U-100 INSULIN 100 unit/mL injection, INJECT 25 UNITS SUBCUTANEOUSLY BEFORE MEALS THREE TIMES DAILY, Disp: 10 mL, Rfl: 4    HYDROmorphone (DILAUDID) 8 MG tablet, Take 1 tablet (8 mg total) by mouth every 6 (six) hours as needed., Disp: 120 tablet, Rfl: 0    icosapent ethyL (VASCEPA) 1 gram Cap, Take 2 capsules (2 g total) by mouth 2 (two) times daily., Disp: 60 capsule, Rfl: 11    insulin glargine (LANTUS U-100 INSULIN) 100 unit/mL injection, Inject 100 Units into the skin 2 (two) times a day., Disp: 30 mL, Rfl: 6    irbesartan (AVAPRO) 300 MG tablet, Take 300 mg by mouth once daily., Disp: , Rfl:     lipase-protease-amylase (CREON) 36,000-114,000- 180,000 unit CpDR, Take 1 capsule by mouth 3 (three) times daily., Disp: 270 capsule, Rfl: 1    metoprolol succinate (TOPROL-XL) 25 MG 24 hr tablet, Take 1 tablet (25 mg total) by mouth once daily., Disp: 30 tablet, Rfl: 11    morphine (MS CONTIN) 100 MG 12 hr tablet, Take 1 tablet (100 mg total) by mouth every 8 (eight) hours., Disp: 90 tablet, Rfl: 0    nitroGLYCERIN (NITROSTAT) 0.3 MG SL tablet, Place 1 tablet (0.3 mg total) under the tongue every 5 (five) minutes as needed for Chest pain (go to er after 3 doses)., Disp: 30 tablet, Rfl: 6    pen needle, diabetic 31 gauge x 5/16" Ndle, Sure Comfort Pen Needle 31 gauge x 5/16", Disp: , Rfl:     potassium chloride SA (K-DUR,KLOR-CON) 20 MEQ tablet, TAKE ONE TABLET BY MOUTH ONCE DAILY, Disp: 90 tablet, Rfl: 1    sucralfate (CARAFATE) 100 mg/mL suspension, Take by mouth., Disp: , Rfl:     SURE COMFORT INSULIN SYRINGE 0.5 mL 31 gauge x 5/16" Syrg, 3 (three) times " daily., Disp: , Rfl:     tamsulosin (FLOMAX) 0.4 mg Cap, Take 0.4 mg by mouth once daily., Disp: , Rfl:     torsemide (DEMADEX) 20 MG Tab, Take 20 mg by mouth once daily., Disp: , Rfl:      ASSESSMENT & PLAN:         Toan Bob MD

## 2022-12-12 NOTE — PROGRESS NOTES
"[facility] Internal Post Wards Visit    Chief Complaint:      Post wards follow-up    Subjective:     HPI:  Bharath Caballero is a 42 y.o. male recently admitted to Inpatient Medicine on 11/28/22 for hypertensive urgency. He was discharged in [facility] on 12/1/22.  He presents to clinic today for post wards follow-up.  Today patient has no complaints continues to see home health at home, had 1 episode of chest pain very mild in nature since discharge follows up with cardiology tomorrow.  Blood pressure slightly elevated today at 154/86 initially was going to make medication changes however cardiology likely to initiate new medications so do not want to make too many changes at once.      Review of Systems  A comprehensive 12 point review of systems was completed.  Please see above for pertinent positives and negatives.     Objective:   Last 24 Hour Vital Signs:  Vitals  BP: (!) 154/86  Temp: 97.9 °F (36.6 °C)  Temp src: Oral  Pulse: 82  Resp: 18  Height: 5' 9" (175.3 cm)  Weight: 107.1 kg (236 lb 3.2 oz)    Physical Examination:  General: Awake, alert, & oriented to person, place & time. No acute distress  Psychiatric: Mood and affect normal  HEENT: Normocephalic, atraumatic. Face symmetric.  Cardiovascular: Regular rate & rhythm.   Pulmonary: Bilateral symmetric chest rise. Non-labored  Abdominal:  Soft, nondistended.  Extremities: No clubbing, cyanosis or edema.  Skin:  Exposed skin is warm & dry.  Neuro:   Patient moves all extremities equally. Sensation intact bilateraly.    Assessment & Plan:     Hypertensive urgency  -continue Norvasc 10 mg, irbesartan 300 mg, clonidine 0.2 b.i.d., Toprol 25   -keep tomorrow appointment with Cardiology, will likely have medications adjusted at that time, if not PCP potentially can change Norvasc 10 mg to Procardia 60 mg for better BP control  -follow-up with Dr. Beasley on 01/24/2022      Follow-ups  -Follow-up with PCP in 1 month      Eliezer Akbar MD  Internal Medicine - " PGY-2

## 2022-12-13 ENCOUNTER — OFFICE VISIT (OUTPATIENT)
Dept: CARDIOLOGY | Facility: CLINIC | Age: 42
End: 2022-12-13
Payer: MEDICARE

## 2022-12-13 VITALS
DIASTOLIC BLOOD PRESSURE: 88 MMHG | HEIGHT: 69 IN | SYSTOLIC BLOOD PRESSURE: 164 MMHG | TEMPERATURE: 98 F | HEART RATE: 77 BPM | OXYGEN SATURATION: 99 % | RESPIRATION RATE: 20 BRPM | BODY MASS INDEX: 34.65 KG/M2 | WEIGHT: 233.94 LBS

## 2022-12-13 DIAGNOSIS — E78.01 FAMILIAL HYPERCHOLESTEROLEMIA: ICD-10-CM

## 2022-12-13 DIAGNOSIS — E78.2 MIXED HYPERLIPIDEMIA: Primary | ICD-10-CM

## 2022-12-13 PROCEDURE — 99215 OFFICE O/P EST HI 40 MIN: CPT | Mod: PBBFAC | Performed by: INTERNAL MEDICINE

## 2022-12-13 RX ORDER — ISOSORBIDE MONONITRATE 30 MG/1
30 TABLET, EXTENDED RELEASE ORAL DAILY
Qty: 90 TABLET | Refills: 3 | Status: SHIPPED | OUTPATIENT
Start: 2022-12-13 | End: 2023-06-09

## 2022-12-13 RX ORDER — NITROGLYCERIN 0.3 MG/1
0.3 TABLET SUBLINGUAL EVERY 5 MIN PRN
Qty: 30 TABLET | Refills: 6 | Status: SHIPPED | OUTPATIENT
Start: 2022-12-13 | End: 2024-04-01

## 2022-12-13 RX ORDER — NIFEDIPINE 90 MG/1
90 TABLET, EXTENDED RELEASE ORAL DAILY
Qty: 90 TABLET | Refills: 3 | Status: SHIPPED | OUTPATIENT
Start: 2022-12-13 | End: 2023-06-09

## 2022-12-13 NOTE — PROGRESS NOTES
Cardiology Attending    I evaluated Mr. Bharath Caballero and discussed the patient's symptoms, findings, and management plan with the resident.  Please see the Cardiology note for details.

## 2022-12-13 NOTE — PROGRESS NOTES
"[facility] Cardiology Clinic Visit    Chief Complaint:      initial visit sts has chest pain often has PENA     Subjective:     HPI:  Bharath Caballero is a 42 y.o. male who presents to cardiology clinic today for initial visit sts has chest pain often has PENA.  Has PMH of HTN, DM, Hypertriglyceridemia, Hepatic Steatosis, recurrent pancreatitis, CICI.  Patient recently discharge from the hospital where he presented with chest pain echo at that time showed an EF of 60% with normal systolic function.  Lexiscan performed showed moderate-to-severe intensity moderate to large reversible reperfusion consistent with ischemia of the left circumflex.  Since discharge patient has had 1 episode of chest pain last night that was associated with SOB however denies nausea, vomiting and diaphoresis.  At this time patient is not a candidate left heart catheterization we will await for better pain control and will medically manage at this time.      Review of Systems  A comprehensive 12 point review of systems was completed.  Please see above for pertinent positives and negatives.     Objective:   Last 24 Hour Vital Signs:  Vitals  BP: (!) 164/88 (Manual)  Temp: 98.1 °F (36.7 °C)  Temp src: Oral  Pulse: 77  Resp: 20  SpO2: 99 %  Height: 5' 8.98" (175.2 cm)  Weight: 106.1 kg (233 lb 14.5 oz)    Physical Examination:  General: Awake, alert, & oriented to person, place & time. No acute distress  Psychiatric: Mood and affect normal  HEENT: Normocephalic, atraumatic. Face symmetric.  Patient hard of hearing  Cardiovascular: Regular rate & rhythm. Normal S1 & S2 w/out murmurs, rubs or gallops.    Pulmonary: Bilateral symmetric chest rise. Non-labored, no wheezes, rhonchi or crackles are appreciated.  Abdominal:  nondistended  Extremities: No clubbing or edema.  Skin:  Exposed skin is warm & dry.  Neuro:   Patient moves all extremities equally. Sensation intact bilateraly.    Assessment & Plan:       Atypical chest pain   Positive stress test "   Hypertension  -not candidate for left heart catheterization at this time will medically manage as per Lexiscan results above.  Once pain better controlled we will consider left heart catheterization  -discontinue amlodipine at this time will start nifedipine 90 mg daily  -initiate Imdur 30 mg daily  -continue clonidine 0.2, aspirin 81, dapagliflozin, irbesartan, torsemide and nitro as needed  -appropriate ED precautions given patient    Hypertriglyceridemia  -continue atorvastatin 40mg daily  -continue Vascepa 2g BID    To be addressed at next follow-up  -outpatient is doing with the addition Imdur as well as change of amlodipine to nifedipine      Follow-ups  -Follow-up medicine clinic in 3 months      Eliezer Akbar MD  Internal Medicine - PGY-2

## 2022-12-20 ENCOUNTER — OFFICE VISIT (OUTPATIENT)
Dept: NEPHROLOGY | Facility: CLINIC | Age: 42
End: 2022-12-20
Payer: MEDICARE

## 2022-12-20 ENCOUNTER — TELEPHONE (OUTPATIENT)
Dept: GASTROENTEROLOGY | Facility: CLINIC | Age: 42
End: 2022-12-20
Payer: MEDICARE

## 2022-12-20 VITALS
BODY MASS INDEX: 34.18 KG/M2 | OXYGEN SATURATION: 99 % | RESPIRATION RATE: 18 BRPM | HEIGHT: 69 IN | DIASTOLIC BLOOD PRESSURE: 85 MMHG | HEART RATE: 80 BPM | SYSTOLIC BLOOD PRESSURE: 160 MMHG | TEMPERATURE: 98 F | WEIGHT: 230.81 LBS

## 2022-12-20 DIAGNOSIS — I10 PRIMARY HYPERTENSION: ICD-10-CM

## 2022-12-20 DIAGNOSIS — Z72.0 TOBACCO USER: ICD-10-CM

## 2022-12-20 DIAGNOSIS — D63.8 ANEMIA OF CHRONIC DISEASE: ICD-10-CM

## 2022-12-20 DIAGNOSIS — N18.2 CKD STAGE G2/A3, GFR 60-89 AND ALBUMIN CREATININE RATIO >300 MG/G: Primary | ICD-10-CM

## 2022-12-20 DIAGNOSIS — E78.01 FAMILIAL HYPERCHOLESTEROLEMIA: ICD-10-CM

## 2022-12-20 PROCEDURE — 99215 OFFICE O/P EST HI 40 MIN: CPT | Mod: PBBFAC | Performed by: NURSE PRACTITIONER

## 2022-12-20 PROCEDURE — 99214 PR OFFICE/OUTPT VISIT, EST, LEVL IV, 30-39 MIN: ICD-10-PCS | Mod: S$PBB,,, | Performed by: NURSE PRACTITIONER

## 2022-12-20 PROCEDURE — 99214 OFFICE O/P EST MOD 30 MIN: CPT | Mod: S$PBB,,, | Performed by: NURSE PRACTITIONER

## 2022-12-20 RX ORDER — POTASSIUM CHLORIDE 20 MEQ/1
20 TABLET, EXTENDED RELEASE ORAL DAILY
Qty: 90 TABLET | Refills: 3 | Status: SHIPPED | OUTPATIENT
Start: 2022-12-20 | End: 2023-06-21 | Stop reason: SDUPTHER

## 2022-12-20 RX ORDER — PANCRELIPASE 36000; 180000; 114000 [USP'U]/1; [USP'U]/1; [USP'U]/1
1 CAPSULE, DELAYED RELEASE PELLETS ORAL 3 TIMES DAILY
Qty: 270 CAPSULE | Refills: 1 | Status: SHIPPED | OUTPATIENT
Start: 2022-12-20 | End: 2024-01-15 | Stop reason: SDUPTHER

## 2022-12-20 RX ORDER — ALIROCUMAB 150 MG/ML
150 INJECTION, SOLUTION SUBCUTANEOUS
Qty: 6 ML | Refills: 3 | Status: SHIPPED | OUTPATIENT
Start: 2022-12-20 | End: 2023-02-06

## 2022-12-20 RX ORDER — PEN NEEDLE, DIABETIC 30 GX3/16"
1 NEEDLE, DISPOSABLE MISCELLANEOUS 4 TIMES DAILY
Qty: 100 EACH | Refills: 1 | Status: SHIPPED | OUTPATIENT
Start: 2022-12-20 | End: 2023-06-18

## 2022-12-20 NOTE — PROGRESS NOTES
[facility]  Nephrology Clinic Note    Chief complaint: Chronic Kidney Disease (RTC, took meds, edema lle)    History of present illness:   Bharath Caballero is a 42 y.o. White male with past medical history of poorly controlled diabetes mellitus, morbid obesity, familial hypertriglyceridemia, recurrent pancreatitis, hepatic steatosis, hypertension, CICI, hearing loss, recurrent candiduria, polyneuropathy secondary to diabetes, chronic pain (s/p neuro stimulator insertion in July 2022), and tobacco abuse. Hypertriglyceridemia was previously treated with routine plasmapheresis, this was discontinued in August 2018 per patient's request. Patient has undergone multiple AV access creation procedures, including tunneled catheter insertion and removal as well as AV graft creation. Both of his AV grafts are now thrombosed. Patient was managed by hospice in the past, however, was discharged from hospice care due to lack of terminal diagnosis. Presents today for follow-up. Accompanied by sister in law.     Review of Systems  12 point review of systems conducted, negative except as stated in the history of present illness.    Allergies: Patient has No Known Allergies.     Past Medical History:  has a past medical history of BPH (benign prostatic hyperplasia), Diabetes, GERD (gastroesophageal reflux disease), Hepatic steatosis, History of continuous positive airway pressure (CPAP) therapy at home, Hypertension, Hypertriglyceridemia, Kidney disorder, Leg pain, Morbid obesity, Neuropathy, OA (osteoarthritis), CICI (obstructive sleep apnea), Polyneuropathy, Recurrent pancreatitis, Renal insufficiency, and Tobacco abuse.    Procedure History:  has a past surgical history that includes Esophagogastroduodenoscopy (N/A, 06/07/2021); INSPECTION OF UPPER INTESTINAL TRACT (N/A, 06/07/2021); UPPER GI (N/A, 06/07/2021); PHERESIS OF PLASMA (N/A, 07/13/2018); Excision of arteriovenous fistula (N/A, 06/01/2018);  "PHERESIS OF PLASMA (N/A, 05/25/2018); ARTERIOVENOUS ANASTOMOSIS, OPEN, UPPER ARM BASILLIC VEIN TRANSPOSITION (N/A, 05/07/2018); Appendectomy; Hernia repair; Trial of spinal cord nerve stimulator (N/A, 5/12/2022); and Spinal cord stimulator removal.    Family History: family history includes Obesity in his father.    Social History:  reports that he has been smoking cigarettes. He started smoking about 26 years ago. He has a 25.00 pack-year smoking history. He has never used smokeless tobacco. He reports that he does not currently use drugs. He reports that he does not drink alcohol.    Physical exam  BP (!) 160/85 (BP Location: Right arm, Patient Position: Sitting, BP Method: Large (Manual))   Pulse 80   Temp 98.2 °F (36.8 °C) (Oral)   Resp 18   Ht 5' 8.9" (1.75 m)   Wt 104.7 kg (230 lb 12.8 oz)   SpO2 99%   BMI 34.18 kg/m²   General appearance: Patient is in no acute distress.  Skin: No rashes or wounds.  HEENT: PERRLA, EOMI, no scleral icterus, no JVD. Neck is supple.  Chest: Respirations are unlabored. Lungs sounds are clear.   Heart: S1, S2.   Abdomen: Benign.  : Deferred.  Extremities: No edema, peripheral pulses are palpable.   Neuro: No focal deficits.     Home Medications:    Current Outpatient Medications:     aspirin (ECOTRIN) 81 MG EC tablet, Take 81 mg by mouth once daily., Disp: , Rfl:     atorvastatin (LIPITOR) 40 MG tablet, Take 1 tablet (40 mg total) by mouth once daily., Disp: 90 tablet, Rfl: 3    carBAMazepine (TEGRETOL) 200 mg tablet, Take 3 tablets (600 mg total) by mouth 2 (two) times a day., Disp: 180 tablet, Rfl: 11    clonazePAM (KLONOPIN) 1 MG tablet, TAKE ONE TABLET BY MOUTH TWICE DAILY, Disp: 60 tablet, Rfl: 5    cloNIDine (CATAPRES) 0.2 MG tablet, Take 1 tablet (0.2 mg total) by mouth 3 (three) times daily., Disp: 270 tablet, Rfl: 3    dapagliflozin (FARXIGA) 5 mg Tab tablet, Take 1 tablet (5 mg total) by mouth once daily., Disp: 90 tablet, Rfl: 3    EScitalopram oxalate " "(LEXAPRO) 20 MG tablet, Take 20 mg by mouth once daily., Disp: , Rfl:     esomeprazole (NEXIUM) 40 MG capsule, Take 1 capsule (40 mg total) by mouth before breakfast., Disp: 30 capsule, Rfl: 11    gabapentin (NEURONTIN) 400 MG capsule, Take 1 capsule (400 mg total) by mouth 2 (two) times daily., Disp: 180 capsule, Rfl: 3    gabapentin (NEURONTIN) 800 MG tablet, Take 1 tablet (800 mg total) by mouth every evening., Disp: 90 tablet, Rfl: 3    HUMALOG U-100 INSULIN 100 unit/mL injection, INJECT 25 UNITS SUBCUTANEOUSLY BEFORE MEALS THREE TIMES DAILY, Disp: 10 mL, Rfl: 4    HYDROmorphone (DILAUDID) 8 MG tablet, Take 1 tablet (8 mg total) by mouth every 6 (six) hours as needed., Disp: 120 tablet, Rfl: 0    icosapent ethyL (VASCEPA) 1 gram Cap, Take 2 capsules (2 g total) by mouth 2 (two) times daily., Disp: 60 capsule, Rfl: 11    insulin glargine (LANTUS U-100 INSULIN) 100 unit/mL injection, Inject 100 Units into the skin 2 (two) times a day., Disp: 30 mL, Rfl: 6    irbesartan (AVAPRO) 300 MG tablet, Take 300 mg by mouth once daily., Disp: , Rfl:     isosorbide mononitrate (IMDUR) 30 MG 24 hr tablet, Take 1 tablet (30 mg total) by mouth once daily., Disp: 90 tablet, Rfl: 3    metoprolol succinate (TOPROL-XL) 25 MG 24 hr tablet, Take 1 tablet (25 mg total) by mouth once daily., Disp: 30 tablet, Rfl: 11    morphine (MS CONTIN) 100 MG 12 hr tablet, Take 1 tablet (100 mg total) by mouth every 8 (eight) hours., Disp: 90 tablet, Rfl: 0    NIFEdipine (PROCARDIA-XL) 90 MG (OSM) 24 hr tablet, Take 1 tablet (90 mg total) by mouth once daily., Disp: 90 tablet, Rfl: 3    nitroGLYCERIN (NITROSTAT) 0.3 MG SL tablet, Place 1 tablet (0.3 mg total) under the tongue every 5 (five) minutes as needed for Chest pain (go to er after 3 doses)., Disp: 30 tablet, Rfl: 6    sucralfate (CARAFATE) 100 mg/mL suspension, Take by mouth., Disp: , Rfl:     SURE COMFORT INSULIN SYRINGE 0.5 mL 31 gauge x 5/16" Syrg, 3 (three) times daily., Disp: , Rfl:     " "alirocumab (PRALUENT PEN) 150 mg/mL PnIj, Inject 1 mL (150 mg total) into the skin every 14 (fourteen) days., Disp: 6 mL, Rfl: 3    lipase-protease-amylase (CREON) 36,000-114,000- 180,000 unit CpDR, Take 1 capsule by mouth 3 (three) times daily., Disp: 270 capsule, Rfl: 1    pen needle, diabetic 31 gauge x 5/16" Ndle, Inject 1 each into the skin 4 (four) times daily., Disp: 100 each, Rfl: 1    potassium chloride SA (K-DUR,KLOR-CON) 20 MEQ tablet, Take 1 tablet (20 mEq total) by mouth once daily., Disp: 90 tablet, Rfl: 3    tamsulosin (FLOMAX) 0.4 mg Cap, Take 0.4 mg by mouth once daily., Disp: , Rfl:     torsemide (DEMADEX) 20 MG Tab, Take 20 mg by mouth once daily., Disp: , Rfl:     Laboratory data    Hematology  Lab Results   Component Value Date    WBC 14.1 (H) 12/01/2022    HGB 13.7 (L) 12/01/2022    HCT 40.2 (L) 12/01/2022     12/01/2022     Chemistry  Lab Results   Component Value Date     12/01/2022    K 4.3 12/01/2022    CHLORIDE 105 12/01/2022    BUN 17.1 12/01/2022    CREATININE 0.98 12/01/2022    EGFRNORACEVR >60 12/01/2022    GLUCOSE 167 (H) 12/01/2022    CALCIUM 8.7 12/01/2022    ALKPHOS 231 (H) 12/01/2022    LABPROT 6.8 12/01/2022    ALBUMIN 3.0 (L) 12/01/2022    BILIDIR 0.1 12/20/2021    IBILI 0.20 12/20/2021    AST 63 (H) 12/01/2022    ALT 66 (H) 12/01/2022    MG 2.20 12/01/2022    PHOS 2.9 12/01/2022      Urine:  Lab Results   Component Value Date    COLORUA Yellow 11/28/2022    APPEARANCEUA Clear 11/28/2022    SGUA 1.025 11/28/2022    PHUA 5.5 11/28/2022    PROTEINUA 2+ (A) 11/28/2022    GLUCOSEUA 4+ (A) 11/28/2022    KETONESUA Negative 11/28/2022    BLOODUA Negative 11/28/2022    NITRITESUA Negative 11/28/2022    LEUKOCYTESUR Negative 11/28/2022    RBCUA 0-5 11/28/2022    WBCUA 0-5 11/28/2022    BACTERIA Trace (A) 11/28/2022    SQEPUA Trace (A) 11/28/2022    HYALINECASTS None Seen 11/28/2022    CREATRANDUR 171.8 (H) 11/29/2022    PROTEINURINE 190.0 11/29/2022    UPROTCREA 1.1 " 11/29/2022         Lab Results   Component Value Date    HGBA1C 10.4 (H) 11/27/2022    GLUCOSE 167 (H) 12/01/2022      Lab Results   Component Value Date    HIV Nonreactive 06/23/2020    HEPCAB Nonreactive 08/13/2020    HEPBSURFAG Nonreactive 08/13/2020    HEPBSAB Nonreactive 09/27/2017         Imaging  US Retroperitoneal Complete 11/30/2022  Grayscale and color Doppler sonographic evaluation of the kidneys and urinary bladder.  The right kidney measures 10 cm. The left kidney measures 11 cm.   No hydronephrosis.  Normal renal parenchymal echogenicity.  No significant abnormality of the urinary bladder.    Impression  No significant sonographic abnormality of the kidneys.    Impression and plan    CKD stage G2/A3, GFR 60-89 and albumin creatinine ratio >300 mg/g  -     Comprehensive Metabolic Panel; Future; Expected date: 05/20/2023  -     Protein/Creatinine Ratio, Urine; Future; Expected date: 05/20/2023  -     Urinalysis, Reflex to Urine Culture Urine, Clean Catch; Future; Expected date: 05/20/2023  Diabetic kidney disease.  Serum creatinine is stable.  Continue ACE-inhibitor, SGLT2i and risk factor management.  Continue:  -follow 2 g a day dietary sodium restriction  -control diabetes (goal A1c less than 7%)  -control high blood pressure (goal blood pressure is less than 130/80, please check blood pressure twice a week and bring blood pressure logs to office visit)  -exercise at least 30 minutes a day, 5 days a week  -maintain healthy weight  -decrease or stop alcohol use  -do not smoke  -stay well hydrated (drink water only, avoid juices, sweet tea, and sodas)  -ask about staying up-to-date on vaccinations (flu vaccine, pneumonia vaccine, hepatitis B vaccine)  -avoid excessive use of NSAIDs (ibuprofen, naproxen, Aleve, Advil, Toradol, Mobic), take Tylenol as needed for headache or mild pain  -take cholesterol lowering medications if prescribed (LDL goal less than 100)    Follow-up with the primary care provider  as scheduled.  Return to subspecialty nephrology (kidney) clinic with routine labs in 6 months     Primary hypertension  Continue current antihypertensive regimen and 2 g a day dietary sodium restriction.      Anemia of chronic disease  No indications for LAQUITA.     Tobacco user  Patient was counseled on importance of tobacco use cessation.  Strongly encouraged to quit, offered a referral to a smoking cessation program.    BMI 34.0-34.9,adult  Lifestyle and dietary interventions discussed, patient counseled on weight loss using portion control, non- sedentary lifestyle, low-carbohydrate/low fat diet.     Familial hypercholesteremia E78.01   Considering multiple potential complications from new AV access creation as well as plasmapheresis treatments, would not recommend routine plasma exchange at this time, but rather a more aggressive glycemic control and optimization of lipid-lowering therapies. Patient was treated with maximum dose of rosuvastatin and fenofibrate, but total cholesterol and triglyceride levels remained above goal.  Patient is at very high risk for development of cardiovascular events (MI, stroke, unstable angina). Unfortunately, dietary interventions, high-dose statin in combination with fibrate were ineffective to meet cholesterol treatment goals. Patient would benefit from continuation of therapy with PCSK9 Inhibitor.   Continue evolocumab (REPATHA) 140 mg subcutaneously every 14 (fourteen) days.     12/20/2022  Kendal Grande NP  [facility]

## 2022-12-20 NOTE — TELEPHONE ENCOUNTER
Pt notified of outstanding lab orders due for Erin Hernandez NP. Pt notified that labs need to be completed prior to next office visit on 2/28/23, verbalized understanding.

## 2023-01-03 ENCOUNTER — DOCUMENTATION ONLY (OUTPATIENT)
Dept: INTERNAL MEDICINE | Facility: CLINIC | Age: 43
End: 2023-01-03

## 2023-01-05 ENCOUNTER — OFFICE VISIT (OUTPATIENT)
Dept: NEUROLOGY | Facility: CLINIC | Age: 43
End: 2023-01-05
Payer: MEDICARE

## 2023-01-05 VITALS
DIASTOLIC BLOOD PRESSURE: 99 MMHG | HEIGHT: 68 IN | WEIGHT: 230 LBS | SYSTOLIC BLOOD PRESSURE: 167 MMHG | HEART RATE: 91 BPM | BODY MASS INDEX: 34.86 KG/M2

## 2023-01-05 DIAGNOSIS — Z96.89 SPINAL CORD STIMULATOR STATUS: Primary | ICD-10-CM

## 2023-01-05 DIAGNOSIS — E13.42 DIABETIC POLYNEUROPATHY ASSOCIATED WITH OTHER SPECIFIED DIABETES MELLITUS: ICD-10-CM

## 2023-01-05 PROCEDURE — 99213 OFFICE O/P EST LOW 20 MIN: CPT | Mod: PBBFAC | Performed by: NURSE PRACTITIONER

## 2023-01-05 PROCEDURE — 99999 PR PBB SHADOW E&M-EST. PATIENT-LVL III: ICD-10-PCS | Mod: PBBFAC,,, | Performed by: NURSE PRACTITIONER

## 2023-01-05 PROCEDURE — 99213 PR OFFICE/OUTPT VISIT, EST, LEVL III, 20-29 MIN: ICD-10-PCS | Mod: S$PBB,,, | Performed by: NURSE PRACTITIONER

## 2023-01-05 PROCEDURE — 99999 PR PBB SHADOW E&M-EST. PATIENT-LVL III: CPT | Mod: PBBFAC,,, | Performed by: NURSE PRACTITIONER

## 2023-01-05 PROCEDURE — 99213 OFFICE O/P EST LOW 20 MIN: CPT | Mod: S$PBB,,, | Performed by: NURSE PRACTITIONER

## 2023-01-05 RX ORDER — AMLODIPINE BESYLATE 10 MG/1
10 TABLET ORAL
COMMUNITY
Start: 2023-01-02 | End: 2023-02-02 | Stop reason: SDUPTHER

## 2023-01-05 NOTE — PROGRESS NOTES
Subjective:       Patient ID: Bharath Caballero is a 42 y.o. male.    Chief Complaint:  Pain (3 month f/u for chronic pain syndrome. Primary pain located in legs/feet pain level today is a 9 and describes pain as stabbing and throbbing. Patient does not have list of current medications with him today.)      History of Present Illness  Patient presents for follow up of spinal cord stimulator. Sees [facility] for medication management of diabetic neuropathy. Has Nevro SCS. Reports he does not know the last time that SCS has been reprogrammed. Reports pain is a 9/10. Has numbing pain to bilateral lower extremities and his hand. Xrays ordered after LOV showed correct placement of leads with no migration.        Past Medical History:   Diagnosis Date    BPH (benign prostatic hyperplasia)     Diabetes     GERD (gastroesophageal reflux disease)     Hepatic steatosis     History of continuous positive airway pressure (CPAP) therapy at home     Hypertension     Hypertriglyceridemia     Kidney disorder     Leg pain     Morbid obesity     Neuropathy     OA (osteoarthritis)     CICI (obstructive sleep apnea)     Polyneuropathy     Recurrent pancreatitis     Renal insufficiency     Tobacco abuse        Past Surgical History:   Procedure Laterality Date    APPENDECTOMY      ARTERIOVENOUS ANASTOMOSIS, OPEN, UPPER ARM BASILLIC VEIN TRANSPOSITION N/A 05/07/2018    ESOPHAGOGASTRODUODENOSCOPY N/A 06/07/2021    EXCISION OF ARTERIOVENOUS FISTULA N/A 06/01/2018    HERNIA REPAIR      INSPECTION OF UPPER INTESTINAL TRACT N/A 06/07/2021    PHERESIS OF PLASMA N/A 07/13/2018    PHERESIS OF PLASMA N/A 05/25/2018    SPINAL CORD STIMULATOR REMOVAL      TRIAL OF SPINAL CORD NERVE STIMULATOR N/A 5/12/2022    Procedure: TRIAL, NEUROSTIMULATOR, SPINAL CORD;  Surgeon: Jay Pozo MD;  Location[facility];  Service: Neurosurgery;  Laterality: N/A;    UPPER GI N/A 06/07/2021       Family History   Problem Relation Age of Onset    Obesity Father         Social History     Socioeconomic History    Marital status: Single   Tobacco Use    Smoking status: Every Day     Packs/day: 1.00     Years: 25.00     Pack years: 25.00     Types: Cigarettes     Start date: 7/20/1996    Smokeless tobacco: Never   Substance and Sexual Activity    Alcohol use: Never    Drug use: Not Currently    Sexual activity: Yes     Partners: Female       Current Outpatient Medications   Medication Sig Dispense Refill    alirocumab (PRALUENT PEN) 150 mg/mL PnIj Inject 1 mL (150 mg total) into the skin every 14 (fourteen) days. 6 mL 3    amLODIPine (NORVASC) 10 MG tablet Take 10 mg by mouth.      aspirin (ECOTRIN) 81 MG EC tablet Take 81 mg by mouth once daily.      atorvastatin (LIPITOR) 40 MG tablet Take 1 tablet (40 mg total) by mouth once daily. 90 tablet 3    carBAMazepine (TEGRETOL) 200 mg tablet Take 3 tablets (600 mg total) by mouth 2 (two) times a day. 180 tablet 11    clonazePAM (KLONOPIN) 1 MG tablet TAKE ONE TABLET BY MOUTH TWICE DAILY 60 tablet 5    cloNIDine (CATAPRES) 0.2 MG tablet Take 1 tablet (0.2 mg total) by mouth 3 (three) times daily. 270 tablet 3    dapagliflozin (FARXIGA) 5 mg Tab tablet Take 1 tablet (5 mg total) by mouth once daily. 90 tablet 3    EScitalopram oxalate (LEXAPRO) 20 MG tablet Take 20 mg by mouth once daily.      esomeprazole (NEXIUM) 40 MG capsule Take 1 capsule (40 mg total) by mouth before breakfast. 30 capsule 11    gabapentin (NEURONTIN) 400 MG capsule Take 1 capsule (400 mg total) by mouth 2 (two) times daily. 180 capsule 3    gabapentin (NEURONTIN) 800 MG tablet Take 1 tablet (800 mg total) by mouth every evening. 90 tablet 3    HUMALOG U-100 INSULIN 100 unit/mL injection INJECT 25 UNITS SUBCUTANEOUSLY BEFORE MEALS THREE TIMES DAILY 10 mL 4    HYDROmorphone (DILAUDID) 8 MG tablet TAKE ONE TABLET BY MOUTH EVERY 6 HOURS AS NEEDED FOR PAIN 120 tablet 0    icosapent ethyL (VASCEPA) 1 gram Cap Take 2 capsules (2 g total) by mouth 2 (two) times daily.  "60 capsule 11    insulin glargine (LANTUS U-100 INSULIN) 100 unit/mL injection Inject 100 Units into the skin 2 (two) times a day. 30 mL 6    irbesartan (AVAPRO) 300 MG tablet Take 300 mg by mouth once daily.      isosorbide mononitrate (IMDUR) 30 MG 24 hr tablet Take 1 tablet (30 mg total) by mouth once daily. 90 tablet 3    lipase-protease-amylase (CREON) 36,000-114,000- 180,000 unit CpDR Take 1 capsule by mouth 3 (three) times daily. 270 capsule 1    metoprolol succinate (TOPROL-XL) 25 MG 24 hr tablet Take 1 tablet (25 mg total) by mouth once daily. 30 tablet 11    morphine (MS CONTIN) 100 MG 12 hr tablet TAKE ONE TABLET BY MOUTH EVERY 8 HOURS 90 tablet 0    NIFEdipine (PROCARDIA-XL) 90 MG (OSM) 24 hr tablet Take 1 tablet (90 mg total) by mouth once daily. 90 tablet 3    nitroGLYCERIN (NITROSTAT) 0.3 MG SL tablet Place 1 tablet (0.3 mg total) under the tongue every 5 (five) minutes as needed for Chest pain (go to er after 3 doses). 30 tablet 6    pen needle, diabetic 31 gauge x 5/16" Ndle Inject 1 each into the skin 4 (four) times daily. 100 each 1    potassium chloride SA (K-DUR,KLOR-CON) 20 MEQ tablet Take 1 tablet (20 mEq total) by mouth once daily. 90 tablet 3    sucralfate (CARAFATE) 100 mg/mL suspension Take by mouth.      SURE COMFORT INSULIN SYRINGE 0.5 mL 31 gauge x 5/16" Syrg 3 (three) times daily.      tamsulosin (FLOMAX) 0.4 mg Cap TAKE ONE CAPSULE BY MOUTH EVERY DAY 90 capsule 3    torsemide (DEMADEX) 20 MG Tab Take 20 mg by mouth once daily.       No current facility-administered medications for this visit.       Review of patient's allergies indicates:  No Known Allergies     Vitals:    01/05/23 1439   BP: (!) 167/99   Pulse: 91         Review of Systems  Review of Systems   HENT:  Positive for hearing loss.    Musculoskeletal:  Positive for gait problem.   Neurological:  Positive for numbness.   All other systems reviewed and are negative.    Objective:      Neurologic Exam     Mental Status "   Oriented to person, place, and time.   Speech: speech is normal   Level of consciousness: alert    Cranial Nerves   Cranial nerves II through XII intact.     Motor Exam   Muscle bulk: normal    Physical Exam  Neurological:      Mental Status: He is oriented to person, place, and time.      Cranial Nerves: Cranial nerves 2-12 are intact.   Psychiatric:         Speech: Speech normal.         Assessment:        1. Spinal cord stimulator status    2. Diabetic polyneuropathy associated with other specified diabetes mellitus        Plan:     Advised patient to continue follow up with [facility] neurology for management of diabetic neuropathy  Advised patient to follow up with [facility] for programming; reached out to local [facility] rep to meet up with patient for programming due to patient being hard of hearing

## 2023-01-17 ENCOUNTER — LAB VISIT (OUTPATIENT)
Dept: LAB | Facility: HOSPITAL | Age: 43
End: 2023-01-17
Attending: INTERNAL MEDICINE
Payer: MEDICARE

## 2023-01-17 DIAGNOSIS — R79.89 ELEVATED LFTS: ICD-10-CM

## 2023-01-17 DIAGNOSIS — K76.0 HEPATIC STEATOSIS: ICD-10-CM

## 2023-01-17 DIAGNOSIS — E66.9 DIABETES MELLITUS TYPE 2 IN OBESE: ICD-10-CM

## 2023-01-17 DIAGNOSIS — E11.69 DIABETES MELLITUS TYPE 2 IN OBESE: ICD-10-CM

## 2023-01-17 LAB
ALBUMIN SERPL-MCNC: 3.7 G/DL (ref 3.5–5)
ALBUMIN/GLOB SERPL: 0.8 RATIO (ref 1.1–2)
ALP SERPL-CCNC: 249 UNIT/L (ref 40–150)
ALT SERPL-CCNC: 94 UNIT/L (ref 0–55)
AST SERPL-CCNC: 28 UNIT/L (ref 5–34)
BASOPHILS # BLD AUTO: 0.05 X10(3)/MCL (ref 0–0.2)
BASOPHILS NFR BLD AUTO: 0.4 %
BILIRUBIN DIRECT+TOT PNL SERPL-MCNC: 0.2 MG/DL
BUN SERPL-MCNC: 10.6 MG/DL (ref 8.9–20.6)
CALCIUM SERPL-MCNC: 9.6 MG/DL (ref 8.4–10.2)
CHLORIDE SERPL-SCNC: 99 MMOL/L (ref 98–107)
CO2 SERPL-SCNC: 23 MMOL/L (ref 22–29)
CREAT SERPL-MCNC: 1.02 MG/DL (ref 0.73–1.18)
CREAT UR-MCNC: 97.8 MG/DL (ref 63–166)
EOSINOPHIL # BLD AUTO: 0.22 X10(3)/MCL (ref 0–0.9)
EOSINOPHIL NFR BLD AUTO: 1.8 %
ERYTHROCYTE [DISTWIDTH] IN BLOOD BY AUTOMATED COUNT: 11.5 % (ref 11.5–17)
EST. AVERAGE GLUCOSE BLD GHB EST-MCNC: 246 MG/DL
GFR SERPLBLD CREATININE-BSD FMLA CKD-EPI: >60 MLS/MIN/1.73/M2
GLOBULIN SER-MCNC: 4.5 GM/DL (ref 2.4–3.5)
GLUCOSE SERPL-MCNC: 335 MG/DL (ref 74–100)
HBA1C MFR BLD: 10.2 %
HCT VFR BLD AUTO: 45.9 % (ref 42–52)
HGB BLD-MCNC: 16.3 GM/DL (ref 14–18)
IMM GRANULOCYTES # BLD AUTO: 0.05 X10(3)/MCL (ref 0–0.04)
IMM GRANULOCYTES NFR BLD AUTO: 0.4 %
LYMPHOCYTES # BLD AUTO: 3.16 X10(3)/MCL (ref 0.6–4.6)
LYMPHOCYTES NFR BLD AUTO: 25.4 %
MCH RBC QN AUTO: 30.5 PG
MCHC RBC AUTO-ENTMCNC: 35.5 MG/DL (ref 33–36)
MCV RBC AUTO: 85.8 FL (ref 80–94)
MICROALBUMIN UR-MCNC: >500 UG/ML
MICROALBUMIN/CREAT RATIO PNL UR: >511.2 MG/GM CR (ref 0–30)
MONOCYTES # BLD AUTO: 0.81 X10(3)/MCL (ref 0.1–1.3)
MONOCYTES NFR BLD AUTO: 6.5 %
NEUTROPHILS # BLD AUTO: 8.13 X10(3)/MCL (ref 2.1–9.2)
NEUTROPHILS NFR BLD AUTO: 65.5 %
NRBC BLD AUTO-RTO: 0 %
PLATELET # BLD AUTO: 323 X10(3)/MCL (ref 130–400)
PMV BLD AUTO: 9.8 FL (ref 7.4–10.4)
POTASSIUM SERPL-SCNC: 3.6 MMOL/L (ref 3.5–5.1)
PROT SERPL-MCNC: 8.2 GM/DL (ref 6.4–8.3)
RBC # BLD AUTO: 5.35 X10(6)/MCL (ref 4.7–6.1)
SODIUM SERPL-SCNC: 138 MMOL/L (ref 136–145)
TSH SERPL-ACNC: 1.92 UIU/ML (ref 0.35–4.94)
WBC # SPEC AUTO: 12.4 X10(3)/MCL (ref 4.5–11.5)

## 2023-01-17 PROCEDURE — 85610 PROTHROMBIN TIME: CPT

## 2023-01-17 PROCEDURE — 80053 COMPREHEN METABOLIC PANEL: CPT

## 2023-01-17 PROCEDURE — 85025 COMPLETE CBC W/AUTO DIFF WBC: CPT

## 2023-01-17 PROCEDURE — 83036 HEMOGLOBIN GLYCOSYLATED A1C: CPT

## 2023-01-17 PROCEDURE — 84443 ASSAY THYROID STIM HORMONE: CPT

## 2023-01-17 PROCEDURE — 36415 COLL VENOUS BLD VENIPUNCTURE: CPT

## 2023-01-18 ENCOUNTER — TELEPHONE (OUTPATIENT)
Dept: GASTROENTEROLOGY | Facility: CLINIC | Age: 43
End: 2023-01-18
Payer: MEDICARE

## 2023-01-18 NOTE — TELEPHONE ENCOUNTER
Pt notified, verbalized understanding. Has appt next week with PCP and will discuss glucose at that time.    Call to pt, no answer. Left message for return call.      ----- Message from ROCCO Gamez sent at 1/18/2023 11:49 AM CST -----  Please notify glucose elevated at 335 and would defer to PCP.  Alkaline phosphatase and ALT remain elevated.  CBC is stable from previous and WBC count improved from previous.  PT/INR within normal limits.  I would recommend good control of comorbidities and lifestyle and dietary modifications as previously directed.  Thanks

## 2023-01-18 NOTE — PROGRESS NOTES
Please notify glucose elevated at 335 and would defer to PCP.  Alkaline phosphatase and ALT remain elevated.  CBC is stable from previous and WBC count improved from previous.  PT/INR within normal limits.  I would recommend good control of comorbidities and lifestyle and dietary modifications as previously directed.  Thanks

## 2023-01-24 ENCOUNTER — OFFICE VISIT (OUTPATIENT)
Dept: INTERNAL MEDICINE | Facility: CLINIC | Age: 43
End: 2023-01-24
Payer: MEDICARE

## 2023-01-24 VITALS
RESPIRATION RATE: 18 BRPM | WEIGHT: 222 LBS | DIASTOLIC BLOOD PRESSURE: 79 MMHG | OXYGEN SATURATION: 99 % | HEART RATE: 60 BPM | HEIGHT: 68 IN | TEMPERATURE: 99 F | BODY MASS INDEX: 33.65 KG/M2 | SYSTOLIC BLOOD PRESSURE: 131 MMHG

## 2023-01-24 DIAGNOSIS — L97.501 ULCER OF FOOT, LIMITED TO BREAKDOWN OF SKIN, UNSPECIFIED LATERALITY: ICD-10-CM

## 2023-01-24 DIAGNOSIS — N32.0 BLADDER OUTFLOW OBSTRUCTION: ICD-10-CM

## 2023-01-24 DIAGNOSIS — Z74.09 IMPAIRED FUNCTIONAL MOBILITY, BALANCE, GAIT, AND ENDURANCE: ICD-10-CM

## 2023-01-24 DIAGNOSIS — H53.8 BLURRED VISION, RIGHT EYE: ICD-10-CM

## 2023-01-24 DIAGNOSIS — E78.2 MIXED HYPERLIPIDEMIA: ICD-10-CM

## 2023-01-24 DIAGNOSIS — E08.42 DIABETIC POLYNEUROPATHY ASSOCIATED WITH DIABETES MELLITUS DUE TO UNDERLYING CONDITION: ICD-10-CM

## 2023-01-24 DIAGNOSIS — F11.90 CHRONIC NARCOTIC USE: ICD-10-CM

## 2023-01-24 DIAGNOSIS — R79.89 ELEVATED LFTS: ICD-10-CM

## 2023-01-24 DIAGNOSIS — Z72.0 TOBACCO USER: ICD-10-CM

## 2023-01-24 DIAGNOSIS — K21.00 GASTROESOPHAGEAL REFLUX DISEASE WITH ESOPHAGITIS WITHOUT HEMORRHAGE: ICD-10-CM

## 2023-01-24 DIAGNOSIS — K86.1 OTHER CHRONIC PANCREATITIS: ICD-10-CM

## 2023-01-24 DIAGNOSIS — E66.9 DIABETES MELLITUS TYPE 2 IN OBESE: ICD-10-CM

## 2023-01-24 DIAGNOSIS — G83.10 PARESIS OF SINGLE LOWER EXTREMITY: ICD-10-CM

## 2023-01-24 DIAGNOSIS — I70.0 AORTIC ATHEROSCLEROSIS: Primary | ICD-10-CM

## 2023-01-24 DIAGNOSIS — E78.01 FAMILIAL HYPERCHOLESTEROLEMIA: ICD-10-CM

## 2023-01-24 DIAGNOSIS — R20.2 PARESTHESIA OF BOTH HANDS: ICD-10-CM

## 2023-01-24 DIAGNOSIS — I10 PRIMARY HYPERTENSION: ICD-10-CM

## 2023-01-24 DIAGNOSIS — E66.01 MORBID OBESITY: ICD-10-CM

## 2023-01-24 DIAGNOSIS — E11.69 DIABETES MELLITUS TYPE 2 IN OBESE: ICD-10-CM

## 2023-01-24 DIAGNOSIS — Z87.19 HISTORY OF PANCREATITIS: ICD-10-CM

## 2023-01-24 DIAGNOSIS — G89.4 CHRONIC PAIN SYNDROME: ICD-10-CM

## 2023-01-24 DIAGNOSIS — E11.40 PAINFUL DIABETIC NEUROPATHY: Chronic | ICD-10-CM

## 2023-01-24 DIAGNOSIS — G47.33 OBSTRUCTIVE SLEEP APNEA SYNDROME: ICD-10-CM

## 2023-01-24 PROBLEM — L97.509 FOOT ULCER: Status: ACTIVE | Noted: 2023-01-24

## 2023-01-24 PROCEDURE — 99214 OFFICE O/P EST MOD 30 MIN: CPT | Mod: PBBFAC | Performed by: INTERNAL MEDICINE

## 2023-01-24 RX ORDER — NALOXONE HYDROCHLORIDE 4 MG/.1ML
1 SPRAY NASAL ONCE
Qty: 1 EACH | Refills: 0 | Status: SHIPPED | OUTPATIENT
Start: 2023-01-24 | End: 2023-01-24

## 2023-01-24 NOTE — PROGRESS NOTES
Subjective:       Patient ID: Bharath Caballero is a 42 y.o. male.    Chief Complaint:  Follow-up visit    HPI  Patient here for follow-up he has what appears to be pressure ulcers that are healing wound distal left 4th visit digit and 1 on the distal 2nd digit it looks like he had what he calls a blood blister that dry is not red tender inflamed or fungi foot looks like there is just a bled as your we will send him to wound care clinic to watch this he is making this visit with his sister-in-law he is followed couple of times it will go to a regular walker he says it is hard for him to use a Rollator because he can not control the brakes very easily when nurse recommended that he uses and alert MD. is pinned it in [place] call for help he does live alone over his brother and sister-in-law 5 minutes away he did have a colonoscopy 10/08/2021 and will need a follow-up in 5 years last hemoglobin A1c was 10.2 he was admitted to the hospital a little over a month ago where he had good control with his medical regimen will therefore refer him to Endocrine Clinic to help work with his medical compliance and diabetic control he is going to sign a pain contract be prescribed naloxone for p.r.n. use the computer that we need to order a drug screen since he is all these narcotics chronically for chronic pain if it would not be ordered when I tried to order go figure.  His blood pressure is markedly improved since adding Imdur to his antihypertensive regimen we are going to order ultrasound of his lower extremities because I can not find good pulses in his lower extremities although his good quality is good last labs as review or fairly good  Review of Systems   All other systems reviewed and are negative.      Objective:      Physical Exam  Vitals and nursing note reviewed.   Constitutional:       Comments: Uses a walker   Skin:     Comments: See above for dried clean healing blood blister on his feet as above   Neurological:       Mental Status: He is alert.       Assessment:       Problem List Items Addressed This Visit          Neuro    Chronic pain syndrome (Chronic)    Relevant Medications    naloxone (NARCAN) 4 mg/actuation Spry    Painful diabetic neuropathy (Chronic)    Diabetic polyneuropathy    Paresis of single lower extremity       Ophtho    Blurred vision, right eye       Cardiac/Vascular    Hypertension    Familial hypercholesterolemia    Mixed hyperlipidemia    Aortic atherosclerosis - Primary       Renal/    Bladder outflow obstruction       Endocrine    Diabetes mellitus type 2 in obese    Relevant Orders    Ambulatory referral/consult to Endocrinology    Morbid obesity       GI    History of pancreatitis    Chronic pancreatitis    Elevated LFTs    Gastroesophageal reflux disease with esophagitis       Orthopedic    Foot ulcer    Relevant Orders    CV Ultrasound doppler arterial legs bilat    Ambulatory referral/consult to Wound Clinic       Other    Hand paresthesia    Obstructive sleep apnea syndrome    Tobacco user    Impaired functional mobility, balance, gait, and endurance     Other Visit Diagnoses       Chronic narcotic use                  Plan:       See above thank you

## 2023-02-06 ENCOUNTER — HOSPITAL ENCOUNTER (OUTPATIENT)
Dept: WOUND CARE | Facility: HOSPITAL | Age: 43
Discharge: HOME | End: 2023-02-06
Attending: INTERNAL MEDICINE
Payer: MEDICARE

## 2023-02-06 DIAGNOSIS — E78.01 FAMILIAL HYPERCHOLESTEROLEMIA: Primary | ICD-10-CM

## 2023-02-06 DIAGNOSIS — Z72.0 TOBACCO USER: ICD-10-CM

## 2023-02-06 DIAGNOSIS — L97.501 ULCER OF FOOT, LIMITED TO BREAKDOWN OF SKIN, UNSPECIFIED LATERALITY: ICD-10-CM

## 2023-02-06 DIAGNOSIS — E11.69 ONYCHOMYCOSIS OF MULTIPLE TOENAILS WITH TYPE 2 DIABETES MELLITUS: ICD-10-CM

## 2023-02-06 DIAGNOSIS — E13.42 DIABETIC POLYNEUROPATHY ASSOCIATED WITH OTHER SPECIFIED DIABETES MELLITUS: ICD-10-CM

## 2023-02-06 DIAGNOSIS — R53.1 WEAKNESS: ICD-10-CM

## 2023-02-06 DIAGNOSIS — Z79.4 TYPE 2 DIABETES MELLITUS WITH OTHER SKIN ULCER, WITH LONG-TERM CURRENT USE OF INSULIN: Primary | ICD-10-CM
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
## 2023-02-06 DIAGNOSIS — R60.0 BILATERAL EDEMA OF LOWER EXTREMITY: ICD-10-CM

## 2023-02-06 DIAGNOSIS — E11.622 TYPE 2 DIABETES MELLITUS WITH OTHER SKIN ULCER, WITH LONG-TERM CURRENT USE OF INSULIN: Primary | ICD-10-CM

## 2023-02-06 DIAGNOSIS — E13.42 DIABETIC POLYNEUROPATHY ASSOCIATED WITH OTHER SPECIFIED DIABETES MELLITUS: Primary | ICD-10-CM

## 2023-02-06 DIAGNOSIS — E66.9 CLASS 1 OBESITY WITH BODY MASS INDEX (BMI) OF 33.0 TO 33.9 IN ADULT, UNSPECIFIED OBESITY TYPE, UNSPECIFIED WHETHER SERIOUS COMORBIDITY PRESENT: ICD-10-CM

## 2023-02-06 DIAGNOSIS — B35.1 ONYCHOMYCOSIS OF MULTIPLE TOENAILS WITH TYPE 2 DIABETES MELLITUS: ICD-10-CM

## 2023-02-06 DIAGNOSIS — L60.2 OVERGROWN TOENAILS: ICD-10-CM

## 2023-02-06 PROBLEM — E66.811 CLASS 1 OBESITY WITH BODY MASS INDEX (BMI) OF 33.0 TO 33.9 IN ADULT: Status: ACTIVE | Noted: 2022-06-20

## 2023-02-06 PROBLEM — E11.628 TYPE 2 DIABETES MELLITUS WITH SKIN COMPLICATION, WITH LONG-TERM CURRENT USE OF INSULIN: Status: ACTIVE | Noted: 2023-02-06

## 2023-02-06 PROCEDURE — 99211 OFF/OP EST MAY X REQ PHY/QHP: CPT | Mod: 25

## 2023-02-06 PROCEDURE — 11719 TRIM NAIL(S) ANY NUMBER: CPT

## 2023-02-06 PROCEDURE — 11719 PR TRIM NAIL(S): ICD-10-PCS | Mod: Q9,,, | Performed by: NURSE PRACTITIONER

## 2023-02-06 PROCEDURE — 99203 PR OFFICE/OUTPT VISIT, NEW, LEVL III, 30-44 MIN: ICD-10-PCS | Mod: 25,,, | Performed by: NURSE PRACTITIONER

## 2023-02-06 PROCEDURE — 11719 TRIM NAIL(S) ANY NUMBER: CPT | Mod: Q9,,, | Performed by: NURSE PRACTITIONER

## 2023-02-06 PROCEDURE — 99203 OFFICE O/P NEW LOW 30 MIN: CPT | Mod: 25,,, | Performed by: NURSE PRACTITIONER

## 2023-02-06 NOTE — PROGRESS NOTES
Subjective:       Patient ID: Bharath Caballero is a 42 y.o. male.    Chief Complaint: No chief complaint on file.    42-year-old male presents to wound care clinic today for ulcer to foot and diabetic foot care.  Patient was a referral from Dr. Beasley 01/24/2023.  Patient presents to wound care clinic with sister marshal.  Patient states he is no longer with hospice has home health come once a week.  Medical history chronic pain, neuropathy, chronic palliative, hypertension, diabetes, mixed hyperlipidemia, mononeuritis, GERD, falls, sleep apnea, hard of hearing, and nonpitting bilateral lower extremity edema.    Today's visit 02/06/2023:  No ulcers noted to bilateral toes and feet.  Fungal toenails noted bilateral great toes.  Trimmed all 10 toenails using podiatry clippers, and filed with [facility] board.  Monofilament test done unable to feel sensation in all areas.  Lateral nonpitting edema will applied Tubigrip size F.  Informed will send script to [facility] for nail polish compound Voricon 2.67%/Vanc 6.67% for great toe nail fungus. Instructed on diabetic foot care, proper footwear, and checking feet daily.  Will have him return in 3 months for diabetic foot care.  Instructed to call the office with any questions, concerns, or new skin issues.  Verbalized understanding of all instructions.    Lab Results   Component Value Date/Time    WBC 12.4 (H) 01/17/2023 12:36 PM    RBC 5.35 01/17/2023 12:36 PM    HGB 16.3 01/17/2023 12:36 PM    HCT 45.9 01/17/2023 12:36 PM    MCV 85.8 01/17/2023 12:36 PM    MCH 30.5 01/17/2023 12:36 PM    CREATININE 1.02 01/17/2023 12:36 PM    HGBA1C 10.2 (H) 01/17/2023 12:36 PM    CRP 11.20 (H) 06/22/2022 08:28 AM     Review of Systems   All other systems reviewed and are negative.        Objective:        Physical Exam  Vitals reviewed.   Cardiovascular:      Pulses:           Dorsalis pedis pulses are 2+ on the right side and 2+ on the left side.        Posterior tibial pulses  are 2+ on the right side and 2+ on the left side.   Musculoskeletal:      Right lower leg: Edema present.      Left lower leg: Edema present.        Feet:    Feet:      Right foot:      Skin integrity: Skin integrity normal.      Toenail Condition: Right toenails are abnormally thick and long. Fungal disease present.     Left foot:      Skin integrity: Skin integrity normal.      Toenail Condition: Left toenails are abnormally thick and long. Fungal disease present.     Comments: Monofilament test done bilateral lower extremities no sensation noted in all areas.  Skin:     General: Skin is warm and dry.      Capillary Refill: Capillary refill takes less than 2 seconds.   Neurological:      Mental Status: He is alert.   Psychiatric:         Behavior: Behavior is cooperative.                  Assessment:         ICD-10-CM ICD-9-CM   1. Type 2 diabetes mellitus with other skin ulcer, with long-term current use of insulin  E11.622 250.80    Z79.4 V58.67   2. Ulcer of foot, limited to breakdown of skin, unspecified laterality  L97.501 707.15   3. Onychomycosis of multiple toenails with type 2 diabetes mellitus  E11.69 250.80    B35.1 110.1   4. Overgrown toenails  L60.2 703.8   5. Diabetic polyneuropathy associated with other specified diabetes mellitus  E13.42 250.60     357.2   6. Bilateral edema of lower extremity  R60.0 782.3   7. Class 1 obesity with body mass index (BMI) of 33.0 to 33.9 in adult, unspecified obesity type, unspecified whether serious comorbidity present  E66.9 278.00    Z68.33 V85.33   8. Tobacco user  Z72.0 305.1         Plan:   Tissue pathology and/or culture taken:  [] Yes [x] No   Sharp debridement performed:   [] Yes [x] No   Labs ordered this visit:   [] Yes [x] No   Imaging ordered this visit:   [] Yes [x] No             1. Type 2 diabetes mellitus with other skin ulcer, with long-term current use of insulin     Co-factor in development in ulcers and delayed wound healing. Instructed on diet  and medication compliance. Last A1C 10.2. Under the care of Dr. Beasley.       2. Ulcer of foot, limited to breakdown of skin, unspecified laterality     Resolved.    3. Onychomycosis of multiple toenails with type 2 diabetes mellitus     Trimmed and filed. Sent script to [facility] for CMPD Voricon 2.67%, Vanc 6.67% nail suspension.       4. Overgrown toenails     Trimmed tolerated well. Instructed on nail care.    5. Diabetic polyneuropathy associated with other specified diabetes mellitus     Co-factor in development of ulcers due to decrease in sensation. Instructed to check feet daily and wear shoes while out of bed.       6. Bilateral edema of lower extremity     Applied Tubigrip size F.    7. Class 1 obesity with body mass index (BMI) of 33.0 to 33.9 in adult, unspecified obesity type, unspecified whether serious comorbidity present     Co-factor in delayed wound healing. Under the care of Dr. Beasley.       8. Tobacco user     Smoking cessation. Pt is not ready to quit at this time.           Follow up in about 3 months (around 5/6/2023).

## 2023-02-16 ENCOUNTER — TELEPHONE (OUTPATIENT)
Dept: NEUROLOGY | Facility: CLINIC | Age: 43
End: 2023-02-16
Payer: MEDICARE

## 2023-02-16 NOTE — TELEPHONE ENCOUNTER
Called  and spoke with Meek at [facility] informed that all high blood pressures issues should be sent to pt's PCP

## 2023-02-16 NOTE — TELEPHONE ENCOUNTER
----- Message from ELVIS Traore sent at 2/15/2023  2:38 PM CST -----  Home Health needs to send information to PCP  ----- Message -----  From: Maryann Miller LPN  Sent: 2/15/2023   9:39 AM CST  To: Judith Jean ANP

## 2023-02-18 NOTE — PROCEDURES
Fibroscan Procedure     Name: Bharath Caballero  Date of Procedure : 2022  Interpreting Physician: Christina James MD, MPH  Diagnosis: NAFLD    Probe: XL    Fibroscan readin.7 kPa    Fibrosis: F 0-1     CAP readin dB/m    Steatosis: S1      Miscellaneous:   2021: Fibroscan: 7.1 kPa, S3, F 0-1

## 2023-02-20 ENCOUNTER — PATIENT MESSAGE (OUTPATIENT)
Dept: GASTROENTEROLOGY | Facility: CLINIC | Age: 43
End: 2023-02-20
Payer: MEDICARE

## 2023-02-20 ENCOUNTER — OFFICE VISIT (OUTPATIENT)
Dept: NEUROLOGY | Facility: CLINIC | Age: 43
End: 2023-02-20
Payer: MEDICARE

## 2023-02-20 VITALS
RESPIRATION RATE: 20 BRPM | SYSTOLIC BLOOD PRESSURE: 134 MMHG | WEIGHT: 230 LBS | DIASTOLIC BLOOD PRESSURE: 81 MMHG | BODY MASS INDEX: 34.07 KG/M2 | OXYGEN SATURATION: 97 % | HEIGHT: 69 IN | HEART RATE: 77 BPM | TEMPERATURE: 99 F

## 2023-02-20 DIAGNOSIS — E08.42 DIABETIC POLYNEUROPATHY ASSOCIATED WITH DIABETES MELLITUS DUE TO UNDERLYING CONDITION: ICD-10-CM

## 2023-02-20 DIAGNOSIS — E11.40 PAINFUL DIABETIC NEUROPATHY: Primary | Chronic | ICD-10-CM

## 2023-02-20 DIAGNOSIS — G89.4 CHRONIC PAIN SYNDROME: Chronic | ICD-10-CM

## 2023-02-20 PROCEDURE — 99215 OFFICE O/P EST HI 40 MIN: CPT | Mod: S$PBB,,, | Performed by: NURSE PRACTITIONER

## 2023-02-20 PROCEDURE — 99215 PR OFFICE/OUTPT VISIT, EST, LEVL V, 40-54 MIN: ICD-10-PCS | Mod: S$PBB,,, | Performed by: NURSE PRACTITIONER

## 2023-02-20 PROCEDURE — 99215 OFFICE O/P EST HI 40 MIN: CPT | Mod: PBBFAC | Performed by: NURSE PRACTITIONER

## 2023-02-20 RX ORDER — GABAPENTIN 400 MG/1
CAPSULE ORAL
Qty: 90 CAPSULE | Refills: 3 | Status: SHIPPED | OUTPATIENT
Start: 2023-02-20

## 2023-02-20 NOTE — PROGRESS NOTES
[facility] Neurology Follow Up Visit Note    Subjective:       Patient ID: Bharath Caballero is a 42 y.o. male.    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  He was last seen on 10/19/2022. During that visit, medications nancy and CMZ were continued. Pt was also referred to home health.    Interim History  Today, Pt is accompanied by sister in law. Continues w/c/o burning/numbness and tingling to lower ext, now worsening to hands. Admits to a fall since last visit, is restarting physical therapy via home health. PCP has adjusted BP medication and is now under better control. Uses Aspercreme and Capsaicin to feet with some improvement. Severe hearing loss, pt states due to nerve damage, cannot afford cochlear implant.    Pt had NEVRO SCS placement for painful neuropathy on 7/21/2022 by Dr. Jay Pozo.     Currently sees Dr. Beasley for pain management.    Presenting History  He noted that he initially presented with right leg stabbing pain and paresthesia radiating up his leg for 2 years followed by paresthesia and allodynia in left foot for 1 year, worse with standing on his feet. He also noted that his hands and joints would hurt. He had quit drinking for 8 years. Still smokes. -280 most of the time for now, but it was in the 500s for the past 4 year.    Current Medications -  Neurontin 400mg - 400mg - 800mg  CBZ ER 200mg-400mg BID (12/30/2020 - present)    Prior Medications -  Effexor XR 37.5mg daily    Imaging  MRI Lumbar w/o Bryce 9/20/2019 - I have reviewed the study independently and with the patient. Multi-level DJD with mild to moderate  canal and neuroforaminal stenosis.    MRI C spine w/o Bryce 9/20/2019 - I have reviewed the study independently and with the patient. 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 Multilevel spondylosis noted.    Results for orders placed or performed in visit on 01/24/23   Protein/Creatinine Ratio, Urine   Result Value Ref Range    Urine Protein Level 106.4 mg/dL    Urine Creatinine 90.6 63.0 - 166.0 mg/dL    Urine Protein/Creatinine Ratio 1.2    Urinalysis, Reflex to Urine Culture Urine, Clean Catch    Specimen: Urine   Result Value Ref Range    Color, UA Light-Yellow Yellow, Light-Yellow, Dark Yellow, Celsa, Straw    Appearance, UA Clear Clear    Specific Gravity, UA 1.035     pH, UA 5.5 5.0 - 8.5    Protein, UA 2+ (A) Negative mg/dL    Glucose, UA 4+ (A) Negative, Normal mg/dL    Ketones, UA Negative Negative mg/dL    Blood, UA Negative Negative unit/L    Bilirubin, UA Negative Negative mg/dL    Urobilinogen, UA Normal 0.2, 1.0, Normal mg/dL    Nitrites, UA Negative Negative    Leukocyte Esterase, UA Negative Negative unit/L    WBC, UA 0-5 None Seen, 0-2, 3-5, 0-5 /HPF    Bacteria, UA None Seen None Seen /HPF    Squamous Epithelial Cells, UA Trace (A) None Seen /HPF    Hyaline Casts, UA None Seen None Seen /lpf    RBC, UA 0-5 None Seen, 0-2, 3-5, 0-5 /HPF       Review of Systems   Constitutional: no fever, fatigue, weakness  Eye: no vision loss, eye redness, drainage, or pain  ENMT: no sore throat, ear pain, sinus pain/congestion, nasal congestion/drainage  Respiratory: no cough, no wheezing, no shortness of breath  Cardiovascular: no chest pain, no palpitations, no edema  Gastrointestinal: no nausea, vomiting, or diarrhea. No abdominal pain  Genitourinary: no dysuria, no urinary frequency or urgency, no hematuria  Hema/Lymph: no abnormal bruising or bleeding  Endocrine: no heat or cold intolerance, no excessive thirst or excessive urination  Musculoskeletal: + muscle or joint pain, no  joint swelling  Integumentary: no skin rash or abnormal lesion  Psychiatric: no depressed mood, + anxiety, no visual/auditory hallucinations, no delusions, no suicidal or homicidal ideation  Neurologic: antalgic     Objective:      Physical Exam    General: well-developed well-nourished in no acute distress  Eye: clear conjunctiva, eyelids normal  HENT: TMs/ear canals clear, oropharynx without erythema/exudate, oropharynx and nasal mucosal surfaces moist, no maxillary/frontal sinus tenderness to palpation  Neck: full range of motion, no thyromegaly or lymphadenopathy  Respiratory: clear to auscultation bilaterally  Cardiovascular: regular rate and rhythm without murmurs, gallops or rubs  Gastrointestinal: soft, non-tender, non-distended with normal bowel sounds, without masses to palpation  Genitourinary: no CVA tenderness to palpation  Musculoskeletal: full range of motion of all extremities/spine without limitation or discomfort  Integumentary: no rashes or skin lesions present  Neurologic:  Mental Status- Alert and Oriented to time, self, place, Speech is fluent, with intact reading and comprehension, long term memory intact, No Dysarthria.  CN II-XII - CN I Deferred, STACI, no RAPD, OD blurred vision, VA grossly normal to finger counting at 6ft, No ptosis b/l, EOMI w/o nystagmus, LT/PP symmetric, intact in CN V1-V3 distribution b/l, masseter symmetric b/l, T/U midline, Shoulder shrug symmetric b/l, Right SNHL, VFFC, Face Symmetric, [place] bilaterally  Motor - Tone and Bulk nml throughout, No involuntary movements, 5/5 to confrontation throughout except for 4/5 in RLE limited by pain, FFM nml b/l, No pronator drift.  Sensory - LT/PP/Temp diminished in RLE up to knee, diminished to L LLE up to mid-shin, Vibration absent in bilat Big Toes. Hyperesthesia absent  Reflexes - 2+ Throughout except for absent AJ b/l  Cerebellar Exam - FNF wnl b/l no intention tremor.  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 Notes improvement with increased CBZ and SCS but needs more adjustments. Flare with weather.     1. Painful diabetic neuropathy    2. Chronic pain syndrome    3. Diabetic polyneuropathy associated with diabetes mellitus due to underlying condition        Plan:       [] increase Neurontin to 800mg - 400mg - 800mg  [] c/w CBZ ER to 600mg BID   [] Rx Capsaicin Topical Cream .075% QID for neuropathic pain  [] advised on better glycemic control. Goal HgA1c < 7.0%  [] referral to [facility] for PT/diabetic management/vision program  [] discussed for potential dizziness and/or edema to lower ext w/gabapentin    RTC 3 months    I have explained the treatment plan, diagnosis, and prognosis to the patient, caretaker, family. All questions are answered to the best of my knowledge.    Face to Face Time 40 minute, including, counseling, education, review of test results, relevant medical records, contacting [facility] Rep for patient follow, and coordination of care.

## 2023-02-22 ENCOUNTER — DOCUMENTATION ONLY (OUTPATIENT)
Dept: NEPHROLOGY | Facility: CLINIC | Age: 43
End: 2023-02-22
Payer: MEDICARE

## 2023-02-22 ENCOUNTER — TELEPHONE (OUTPATIENT)
Dept: NEPHROLOGY | Facility: CLINIC | Age: 43
End: 2023-02-22
Payer: MEDICARE

## 2023-02-22 NOTE — PROGRESS NOTES
Spoke with [facility] representative  Ref # [id number]; phone [phone]  Submitted appeal for [facility]

## 2023-02-22 NOTE — TELEPHONE ENCOUNTER
Spoke with [facility] representative  Ref # [id number]; phone [phone]  Submitted appeal for [facility]

## 2023-02-28 ENCOUNTER — OFFICE VISIT (OUTPATIENT)
Dept: GASTROENTEROLOGY | Facility: CLINIC | Age: 43
End: 2023-02-28
Payer: MEDICARE

## 2023-02-28 ENCOUNTER — TELEPHONE (OUTPATIENT)
Dept: NEPHROLOGY | Facility: CLINIC | Age: 43
End: 2023-02-28
Payer: MEDICARE

## 2023-02-28 ENCOUNTER — TELEPHONE (OUTPATIENT)
Dept: GASTROENTEROLOGY | Facility: CLINIC | Age: 43
End: 2023-02-28
Payer: MEDICARE

## 2023-02-28 VITALS
DIASTOLIC BLOOD PRESSURE: 78 MMHG | SYSTOLIC BLOOD PRESSURE: 128 MMHG | RESPIRATION RATE: 16 BRPM | TEMPERATURE: 98 F | HEART RATE: 78 BPM | BODY MASS INDEX: 33.59 KG/M2 | OXYGEN SATURATION: 97 % | HEIGHT: 69 IN | WEIGHT: 226.81 LBS

## 2023-02-28 DIAGNOSIS — Z72.0 TOBACCO USER: ICD-10-CM

## 2023-02-28 DIAGNOSIS — K76.0 HEPATIC STEATOSIS: ICD-10-CM

## 2023-02-28 DIAGNOSIS — R79.89 ELEVATED LFTS: Primary | ICD-10-CM

## 2023-02-28 DIAGNOSIS — K21.00 GASTROESOPHAGEAL REFLUX DISEASE WITH ESOPHAGITIS WITHOUT HEMORRHAGE: ICD-10-CM

## 2023-02-28 DIAGNOSIS — Z86.010 PERSONAL HISTORY OF COLONIC POLYPS: ICD-10-CM

## 2023-02-28 PROCEDURE — 99215 OFFICE O/P EST HI 40 MIN: CPT | Mod: PBBFAC | Performed by: NURSE PRACTITIONER

## 2023-02-28 PROCEDURE — 99214 PR OFFICE/OUTPT VISIT, EST, LEVL IV, 30-39 MIN: ICD-10-PCS | Mod: S$PBB,,, | Performed by: NURSE PRACTITIONER

## 2023-02-28 PROCEDURE — 99214 OFFICE O/P EST MOD 30 MIN: CPT | Mod: S$PBB,,, | Performed by: NURSE PRACTITIONER

## 2023-02-28 RX ORDER — SUCRALFATE 1 G/10ML
1 SUSPENSION ORAL
Qty: 1200 ML | Refills: 3 | Status: SHIPPED | OUTPATIENT
Start: 2023-02-28 | End: 2023-06-21 | Stop reason: SDUPTHER

## 2023-02-28 NOTE — ASSESSMENT & PLAN NOTE
See above  GERD lifestyle modifications  Reflux precautions  Limit NSAID use  Recommend tobacco cessation  Continue Nexium and sucralfate as directed  He underwent EGD with Dr. James June 7, 2021 with findings of LA grade A reflux esophagitis and otherwise unremarkable exam.   Call with updates  Follow-up 6 months clinic visit with NP on a Tuesday when Dr. James is here

## 2023-02-28 NOTE — PROGRESS NOTES
Subjective:       Patient ID: Bharath Caballero is a 42 y.o. male.    Chief Complaint: Elevated Hepatic Enzymes, Gastroesophageal Reflux, and Fatty Liver (Pain across upper abdomen worsening)    This 42-year-old  male with a history of colon polyps, poorly controlled diabetes mellitus, CKD stage II, morbid obesity, familial hypertriglyceridemia with routine plasmapheresis in the past, hepatic steatosis, hypertension, CICI, and tobacco abuse presents unaccompanied for a follow-up visit.  He was initially seen August 18, 2020, referred for elevated LFTs at that time.  Per review of laboratory results, he has had persistent elevation of alkaline phosphatase and intermittent elevation of AST and ALT since January 2019.  Laboratory results August 13, 2020 revealed sodium 132, calcium 8.2, glucose 466, GFR 88, AST 63, , alkaline phosphatase 262, total protein 8.6, albumin 3.3, globulin 5.30, and otherwise unremarkable CMP; vitamin B12 773; cholesterol 230, triglycerides 2216, invalid HDL and LDL secondary to triglyceride level; negative acute viral hepatitis panel.  Hemoglobin A1c was 10.9% July 7, 2020.  Gastric emptying study was unremarkable July 17, 2020.  CT scan abdomen and pelvis with contrast June 1, 2020 revealed hepatomegaly with steatosis and otherwise unremarkable.     He reported intermittent right upper quadrant abdominal pain for the past several months described as sharp and radiating around to his back at times.  The pain was worsened with meal intake and he was unable to identify any specific food triggers.  He denied any relieving factors.  His appetite was poor and his weight was stable.  He had frequent symptoms of acid reflux and heartburn that was controlled on pantoprazole 40 mg daily.  He had frequent intermittent nausea with subsequent vomiting almost daily and with almost every meal (nonbilious and nonbloody).  He did not always have relief of symptoms with vomiting.  He denied  odynophagia, dysphagia, or early satiety.  Bowel habits were described as formed stools once daily without melena, hematochezia, fecal urgency, fecal incontinence, or pain with defecation.  He denied icterus, jaundice, pruritus, confusion, headaches, vision changes, cough, shortness of breath, chest pain, abdominal/lower extremity swelling, dark-colored urine, or pale-colored stools.     Laboratory results August 18, 2020 revealed iron level 52 and otherwise unremarkable iron profile and ferritin, ceruloplasmin, alpha 1 antitrypsin, F-actin, LKM, mitochondrial Ab; PT 11.6, INR 0.88; unremarkable CBC; negative SHAKIR and dsDNA; FibroSure testing revealed F1.  Abdominal ultrasound October 1, 2020 revealed enlarged liver with steatosis.     Laboratory results December 8, 2020 revealed sodium 135, CO2 16, glucose 272, AST 48, ALT 94, alkaline phosphatase 210, total protein 9.1, globulin 5.3, and otherwise unremarkable CMP; hemoglobin A1c 9.9%; vitamin D 12.9; cholesterol 208, HDL 20, triglycerides >1420; TSH 1.3122; and unremarkable CBC.     He was seen for a follow-up visit December 28, 2020.  He reported persistent intermittent right upper quadrant abdominal pain for the past several months described as sharp and radiating around to his back at times.  The pain was worsened with meal intake and he was unable to identify any specific food triggers.  He denied any relieving factors.  His appetite was fair and his weight was stable.  He had frequent symptoms of acid reflux and heartburn that was somewhat controlled on pantoprazole 40 mg daily.  He had frequent intermittent nausea with subsequent vomiting a few times per week (nonbilious and nonbloody).  He did not always have relief of symptoms with vomiting.  He denied odynophagia, dysphagia, or early satiety.  Bowel habits were described as formed stools once daily without melena, hematochezia, fecal urgency, fecal incontinence, or pain with defecation.  He denied  icterus, jaundice, pruritus, confusion, headaches, vision changes, cough, shortness of breath, chest pain, abdominal/lower extremity swelling, dark-colored urine, or pale-colored stools.     He underwent EGD with Dr. James June 7, 2021 with findings of LA grade A reflux esophagitis and otherwise unremarkable exam.  Laboratory results June 16, 2021 revealed CO2 18, glucose 106, AST 90, , alkaline phosphatase 220, total protein 8.7, globulin 5.0, and otherwise unremarkable CMP; hemoglobin A1c 9.9%; vitamin D 28.1; cholesterol 254, triglycerides 1922; WBC 11.9 and otherwise unremarkable CBC.     He was seen for follow-up visit June 28, 2021.  He reported persistent intermittent RUQ abdominal burning and pain with eating.  He denied radiation on pain.  He had occasional nausea without vomiting.  He had frequent acid reflux and pyrosis.  His appetite was good and his weight was stable.  He denied nocturnal symptoms, fever, chills, odynophagia, dysphagia, belching, bloating, or early satiety.  Bowel habits were described as formed stools once daily without melena, hematochezia, fecal urgency, fecal incontinence, or pain with defecation.     FibroScan July 26, 2021 revealed S3 F0/F1.  Abdominal ultrasound limited August 4, 2021 revealed hepatomegaly with fatty infiltration of the liver and limited study.  CT scan abdomen and pelvis without contrast October 25, 2021 revealed hepatomegaly and otherwise unremarkable.  Laboratory results December 20, 2021 revealed potassium 3.2, glucose 115, AST 38, ALT 97, alkaline phosphatase 294, total protein 8.8, and otherwise unremarkable CMP; WBC 13.4 and otherwise unremarkable CBC.     He underwent colonoscopy October 8, 2021 with findings of adenomatous polyp in the transverse colon and rectum with a recommended recall of 5 years.     He was seen for a follow-up visit February 7, 2022.  He reported persistent intermittent right upper quadrant abdominal pain described as  sharp and radiating around to the right side of his back.  The pain was triggered with eating and drinking and he was unable to identify specific food triggers.  He continued to take pantoprazole 40 mg daily and had to occasionally take OTC Tums as well.  He had frequent acid reflux and regurgitation of acid.  He reported occasional vomiting secondary to intensity of abdominal pain.  His appetite was good and his weight was stable.  He denied fever, chills, odynophagia, dysphagia, or early satiety.  Bowel habits remained unchanged and was described as formed stools once daily without melena, hematochezia, fecal urgency, fecal incontinence, or pain with defecation.     Abdominal ultrasound February 14, 2022 revealed hepatomegaly and mild to moderate diffuse hepatic steatosis.  No significant change identified compared to the limited abdominal ultrasound 8/4/2021.     He was last seen for a follow-up visit August 18, 2022.  He reported minimal change in symptoms from previous office visit.  He was no longer taking pantoprazole or famotidine secondary to minimal relief of symptoms.    FibroScan August 29, 2022 revealed F0-1, S1.    Abdominal ultrasound December 1, 2022 revealed hepatomegaly, hepatic steatosis, no other significant abnormalities identified, and no significant change.      Today, he presents for a follow-up visit and reports minimal change in symptoms from previous office visit.  He reports some relief of abdominal pain with sucralfate 1 g before meals and at bedtime.  He is requesting a refill.  He reports that his Repatha was stopped secondary to insurance coverage and he is currently awaiting PA approval.     He takes aspirin 81 mg daily.  He smokes 10 cigarettes daily and denies alcohol use.  He denies a family history of IBD, colon polyps, or colon cancer.    Review of patient's allergies indicates:  No Known Allergies    Past Medical History:   Diagnosis Date    BPH (benign prostatic hyperplasia)      Diabetes     GERD (gastroesophageal reflux disease)     Hepatic steatosis     History of continuous positive airway pressure (CPAP) therapy at home     Hypertension     Hypertriglyceridemia     Kidney disorder     Leg pain     Morbid obesity     Neuropathy     OA (osteoarthritis)     CICI (obstructive sleep apnea)     Polyneuropathy     Recurrent pancreatitis     Renal insufficiency     Tobacco abuse      Past Surgical History:   Procedure Laterality Date    APPENDECTOMY      ARTERIOVENOUS ANASTOMOSIS, OPEN, UPPER ARM BASILLIC VEIN TRANSPOSITION N/A 05/07/2018    ESOPHAGOGASTRODUODENOSCOPY N/A 06/07/2021    EXCISION OF ARTERIOVENOUS FISTULA N/A 06/01/2018    HERNIA REPAIR      INSPECTION OF UPPER INTESTINAL TRACT N/A 06/07/2021    PHERESIS OF PLASMA N/A 07/13/2018    PHERESIS OF PLASMA N/A 05/25/2018    SPINAL CORD STIMULATOR REMOVAL      TRIAL OF SPINAL CORD NERVE STIMULATOR N/A 5/12/2022    Procedure: TRIAL, NEUROSTIMULATOR, SPINAL CORD;  Surgeon: Jay Pozo MD;  Location: [facility];  Service: Neurosurgery;  Laterality: N/A;    UPPER GI N/A 06/07/2021     Family History:   family history includes Obesity in his father.    Social History:    reports that he has been smoking cigarettes. He started smoking about 26 years ago. He has a 12.50 pack-year smoking history. He has never used smokeless tobacco. He reports that he does not currently use alcohol. He reports that he does not currently use drugs.    Review of Systems  Negative except as noted in the HPI.      Objective:      Physical Exam  Constitutional:       Appearance: Normal appearance.      Comments: Ambulates with cane   HENT:      Head: Normocephalic.      Mouth/Throat:      Mouth: Mucous membranes are moist.   Eyes:      Extraocular Movements: Extraocular movements intact.      Conjunctiva/sclera: Conjunctivae normal.      Pupils: Pupils are equal, round, and reactive to light.   Cardiovascular:      Rate and Rhythm: Normal rate and regular  rhythm.      Pulses: Normal pulses.      Heart sounds: Normal heart sounds.   Pulmonary:      Effort: Pulmonary effort is normal.      Breath sounds: Normal breath sounds.   Abdominal:      General: Bowel sounds are normal.      Palpations: Abdomen is soft.   Musculoskeletal:         General: Normal range of motion.      Cervical back: Normal range of motion and neck supple.   Skin:     General: Skin is warm and dry.   Neurological:      General: No focal deficit present.      Mental Status: He is alert and oriented to person, place, and time.   Psychiatric:         Mood and Affect: Mood normal.         Behavior: Behavior normal.         Thought Content: Thought content normal.         Judgment: Judgment normal.       Home Medications:     Current Outpatient Medications   Medication Sig    aspirin (ECOTRIN) 81 MG EC tablet Take 81 mg by mouth once daily.    atorvastatin (LIPITOR) 40 MG tablet Take 1 tablet (40 mg total) by mouth once daily.    carBAMazepine (TEGRETOL) 200 mg tablet Take 3 tablets (600 mg total) by mouth 2 (two) times a day.    clonazePAM (KLONOPIN) 1 MG tablet TAKE ONE TABLET BY MOUTH TWICE DAILY    cloNIDine (CATAPRES) 0.2 MG tablet Take 1 tablet (0.2 mg total) by mouth 3 (three) times daily.    dapagliflozin (FARXIGA) 5 mg Tab tablet Take 1 tablet (5 mg total) by mouth once daily.    EScitalopram oxalate (LEXAPRO) 20 MG tablet Take 20 mg by mouth once daily.    esomeprazole (NEXIUM) 40 MG capsule Take 1 capsule (40 mg total) by mouth before breakfast.    gabapentin (NEURONTIN) 400 MG capsule Two tabs in AM, one in afternoon    gabapentin (NEURONTIN) 800 MG tablet TAKE ONE TABLET BY MOUTH IN THE EVENING    HUMALOG U-100 INSULIN 100 unit/mL injection INJECT 25 UNITS SUBCUTANEOUSLY BEFORE MEALS THREE TIMES DAILY    HYDROmorphone (DILAUDID) 8 MG tablet TAKE ONE TABLET BY MOUTH EVERY 6 HOURS AS NEEDED FOR PAIN    icosapent ethyL (VASCEPA) 1 gram Cap Take 2 capsules (2 g total) by mouth 2 (two) times  "daily.    insulin glargine (LANTUS U-100 INSULIN) 100 unit/mL injection Inject 100 Units into the skin 2 (two) times a day.    irbesartan (AVAPRO) 300 MG tablet Take 300 mg by mouth once daily.    isosorbide mononitrate (IMDUR) 30 MG 24 hr tablet Take 1 tablet (30 mg total) by mouth once daily.    lipase-protease-amylase (CREON) 36,000-114,000- 180,000 unit CpDR Take 1 capsule by mouth 3 (three) times daily.    metoprolol succinate (TOPROL-XL) 25 MG 24 hr tablet Take 1 tablet (25 mg total) by mouth once daily.    morphine (MS CONTIN) 100 MG 12 hr tablet TAKE ONE TABLET BY MOUTH EVERY 8 HOURS    NIFEdipine (PROCARDIA-XL) 90 MG (OSM) 24 hr tablet Take 1 tablet (90 mg total) by mouth once daily.    nitroGLYCERIN (NITROSTAT) 0.3 MG SL tablet Place 1 tablet (0.3 mg total) under the tongue every 5 (five) minutes as needed for Chest pain (go to er after 3 doses).    pen needle, diabetic 31 gauge x 5/16" Ndle Inject 1 each into the skin 4 (four) times daily.    potassium chloride SA (K-DUR,KLOR-CON) 20 MEQ tablet Take 1 tablet (20 mEq total) by mouth once daily.    SURE COMFORT INSULIN SYRINGE 0.5 mL 31 gauge x 5/16" Syrg USE ONE SYRINGE THREE TIMES DAILY    tamsulosin (FLOMAX) 0.4 mg Cap TAKE ONE CAPSULE BY MOUTH EVERY DAY    torsemide (DEMADEX) 20 MG Tab Take 20 mg by mouth once daily.    evolocumab (REPATHA SURECLICK) 140 mg/mL PnIj Inject 1 mL (140 mg total) into the skin every 14 (fourteen) days. (Patient not taking: Reported on 2/28/2023)    sucralfate (CARAFATE) 100 mg/mL suspension Take 100 mg by mouth once daily.     Laboratory Results:     Recent Results (from the past 2016 hour(s))   Microalbumin/Creatinine Ratio, Urine    Collection Time: 01/17/23 12:36 PM   Result Value Ref Range    Urine Microalbumin >500.0 (H) <=30.0 ug/ml    Urine Creatinine 97.8 63.0 - 166.0 mg/dL    Microalbumin Creatinine Ratio >511.2 (H) 0.0 - 30.0 mg/gm Cr   Comprehensive Metabolic Panel    Collection Time: 01/17/23 12:36 PM   Result " Value Ref Range    Sodium Level 138 136 - 145 mmol/L    Potassium Level 3.6 3.5 - 5.1 mmol/L    Chloride 99 98 - 107 mmol/L    Carbon Dioxide 23 22 - 29 mmol/L    Glucose Level 335 (H) 74 - 100 mg/dL    Blood Urea Nitrogen 10.6 8.9 - 20.6 mg/dL    Creatinine 1.02 0.73 - 1.18 mg/dL    Calcium Level Total 9.6 8.4 - 10.2 mg/dL    Protein Total 8.2 6.4 - 8.3 gm/dL    Albumin Level 3.7 3.5 - 5.0 g/dL    Globulin 4.5 (H) 2.4 - 3.5 gm/dL    Albumin/Globulin Ratio 0.8 (L) 1.1 - 2.0 ratio    Bilirubin Total 0.2 <=1.5 mg/dL    Alkaline Phosphatase 249 (H) 40 - 150 unit/L    Alanine Aminotransferase 94 (H) 0 - 55 unit/L    Aspartate Aminotransferase 28 5 - 34 unit/L    eGFR >60 mls/min/1.73/m2   Protime-INR    Collection Time: 01/17/23 12:36 PM   Result Value Ref Range    PT 12.1 seconds    INR 0.91 0.00 - 1.30   TSH    Collection Time: 01/17/23 12:36 PM   Result Value Ref Range    Thyroid Stimulating Hormone 1.917 0.350 - 4.940 uIU/mL   Hemoglobin A1C    Collection Time: 01/17/23 12:36 PM   Result Value Ref Range    Hemoglobin A1c 10.2 (H) <=7.0 %    Estimated Average Glucose 246.0 mg/dL   CBC with Differential    Collection Time: 01/17/23 12:36 PM   Result Value Ref Range    WBC 12.4 (H) 4.5 - 11.5 x10(3)/mcL    RBC 5.35 4.70 - 6.10 x10(6)/mcL    Hgb 16.3 14.0 - 18.0 gm/dL    Hct 45.9 42.0 - 52.0 %    MCV 85.8 80.0 - 94.0 fL    MCH 30.5 pg    MCHC 35.5 33.0 - 36.0 mg/dL    RDW 11.5 11.5 - 17.0 %    Platelet 323 130 - 400 x10(3)/mcL    MPV 9.8 7.4 - 10.4 fL    Neut % 65.5 %    Lymph % 25.4 %    Mono % 6.5 %    Eos % 1.8 %    Basophil % 0.4 %    Lymph # 3.16 0.6 - 4.6 x10(3)/mcL    Neut # 8.13 2.1 - 9.2 x10(3)/mcL    Mono # 0.81 0.1 - 1.3 x10(3)/mcL    Eos # 0.22 0 - 0.9 x10(3)/mcL    Baso # 0.05 0 - 0.2 x10(3)/mcL    IG# 0.05 (H) 0 - 0.04 x10(3)/mcL    IG% 0.4 %    NRBC% 0.0 %   Protein/Creatinine Ratio, Urine    Collection Time: 01/24/23  3:08 PM   Result Value Ref Range    Urine Protein Level 106.4 mg/dL    Urine Creatinine  90.6 63.0 - 166.0 mg/dL    Urine Protein/Creatinine Ratio 1.2    Urinalysis, Reflex to Urine Culture Urine, Clean Catch    Collection Time: 01/24/23  3:08 PM    Specimen: Urine   Result Value Ref Range    Color, UA Light-Yellow Yellow, Light-Yellow, Dark Yellow, Celsa, Straw    Appearance, UA Clear Clear    Specific Gravity, UA 1.035     pH, UA 5.5 5.0 - 8.5    Protein, UA 2+ (A) Negative mg/dL    Glucose, UA 4+ (A) Negative, Normal mg/dL    Ketones, UA Negative Negative mg/dL    Blood, UA Negative Negative unit/L    Bilirubin, UA Negative Negative mg/dL    Urobilinogen, UA Normal 0.2, 1.0, Normal mg/dL    Nitrites, UA Negative Negative    Leukocyte Esterase, UA Negative Negative unit/L    WBC, UA 0-5 None Seen, 0-2, 3-5, 0-5 /HPF    Bacteria, UA None Seen None Seen /HPF    Squamous Epithelial Cells, UA Trace (A) None Seen /HPF    Hyaline Casts, UA None Seen None Seen /lpf    RBC, UA 0-5 None Seen, 0-2, 3-5, 0-5 /HPF     Imaging Results:     Narrative & Impression  EXAMINATION:  US ABDOMEN COMPLETE     CLINICAL HISTORY:  Abd pain with elevated alk phos and transaminitis;, .     TECHNIQUE:  Transverse and longitudinal images of the right upper abdomen were obtained.     COMPARISON:  September 14, 2022     FINDINGS:  Liver:     Size: 21 cm in the right midclavicular line, normal     Appearance: There is increased echogenicity of the liver parenchyma increased echogenicity decreases sensitivity to detect focal lesions     Mass: No focal masses     Gallbladder:     Stones/Sludge: None     Appearance: No wall thickening, pericholecystic fluid or hydrops     Sonographic Gaytan's Sign: Negative     Bile Ducts:     Intrahepatic Ducts: No dilatation     Extrahepatic Ducts: Common bile duct measures 0.49 cm, no dilatation     Pancreas:     Visualized portions of the pancreas are normal.     Spleen appears to be unremarkable although images are limited due to patient's body habitus and gas     Both kidneys are of normal size  shape and contour with no abnormal masses or hydronephrosis     Vessels:     Inferior Vena Cava: Visualized portions are normal.     Main Portal Vein: Patent with hepatopedal  flow.     Free Fluid:     No ascites or pleural effusions     Impression:     Hepatomegaly.     Hepatic steatosis.     No other significant abnormalities identified     No significant change      Electronically signed by: Keith Black  Date:                                            12/01/2022  Time:                                           10:06    Assessment/Plan:     Problem List Items Addressed This Visit          GI    Elevated LFTs - Primary     History of poorly controlled diabetes mellitus, CKD stage II, morbid obesity, familial hypertriglyceridemia with routine plasmapheresis in the past, hepatic steatosis, hypertension, CICI, and tobacco abuse.   Per review of laboratory results, he has had persistent elevation of alkaline phosphatase and intermittent elevation of AST and ALT since January 2019.   Laboratory results August 13, 2020 revealed sodium 132, calcium 8.2, glucose 466, GFR 88, AST 63, , alkaline phosphatase 262, total protein 8.6, albumin 3.3, globulin 5.30, and otherwise unremarkable CMP; vitamin B12 773; cholesterol 230, triglycerides 2216, invalid HDL and LDL secondary to triglyceride level; negative acute viral hepatitis panel.   Hemoglobin A1c was 10.9% July 7, 2020.   Gastric emptying study was unremarkable July 17, 2020.   CT scan abdomen and pelvis with contrast June 1, 2020 revealed hepatomegaly with steatosis and otherwise unremarkable.   Laboratory results August 18, 2020 revealed iron level 52 and otherwise unremarkable iron profile and ferritin, ceruloplasmin, alpha 1 antitrypsin, F-actin, LKM, mitochondrial Ab; PT 11.6, INR 0.88; unremarkable CBC; negative SHAKIR and dsDNA; FibroSure testing revealed F1.   Abdominal ultrasound October 1, 2020 revealed enlarged liver with steatosis.  Laboratory results  December 8, 2020 revealed sodium 135, CO2 16, glucose 272, AST 48, ALT 94, alkaline phosphatase 210, total protein 9.1, globulin 5.3, and otherwise unremarkable CMP; hemoglobin A1c 9.9%; vitamin D 12.9; cholesterol 208, HDL 20, triglycerides >1420; TSH 1.3122; and unremarkable CBC.  Laboratory results June 16, 2021 revealed CO2 18, glucose 106, AST 90, , alkaline phosphatase 220, total protein 8.7, globulin 5.0, and otherwise unremarkable CMP; hemoglobin A1c 9.9%; vitamin D 28.1; cholesterol 254, triglycerides 1922; WBC 11.9 and otherwise unremarkable CBC.  FibroScan July 26, 2021 revealed S3 F0/F1.   Abdominal ultrasound limited August 4, 2021 revealed hepatomegaly with fatty infiltration of the liver and limited study.   CT scan abdomen and pelvis without contrast October 25, 2021 revealed hepatomegaly and otherwise unremarkable.   Laboratory results December 20, 2021 revealed potassium 3.2, glucose 115, AST 38, ALT 97, alkaline phosphatase 294, total protein 8.8, and otherwise unremarkable CMP; WBC 13.4 and otherwise unremarkable CBC.  Abdominal ultrasound February 14, 2022 revealed hepatomegaly and mild to moderate diffuse hepatic steatosis.  No significant change identified compared to the limited abdominal ultrasound 8/4/2021.  FibroScan August 29, 2022 revealed F0-1, S1.  Abdominal ultrasound December 1, 2022 revealed hepatomegaly, hepatic steatosis, no other significant abnormalities identified, and no significant change.   CBC, CMP, PT/INR in 4 months  Abdominal ultrasound in 3 months  FibroScan  Patient has significant risk factors for NAFLD  Lifestyle and dietary modifications provided  Recommend weight loss  Recommend good control of comorbidities  Recommend tobacco cessation  Call with updates  Follow-up 6 months clinic visit with NP on a Tuesday when Dr. James is here         Relevant Orders    CBC Auto Differential    Comprehensive Metabolic Panel    Protime-INR    US Abdomen Limited     Hepatic steatosis     See above         Relevant Orders    CBC Auto Differential    Comprehensive Metabolic Panel    Protime-INR    US Abdomen Limited    Gastroesophageal reflux disease with esophagitis without hemorrhage     See above  GERD lifestyle modifications  Reflux precautions  Limit NSAID use  Recommend tobacco cessation  Continue Nexium and sucralfate as directed  He underwent EGD with Dr. James June 7, 2021 with findings of LA grade A reflux esophagitis and otherwise unremarkable exam.   Call with updates  Follow-up 6 months clinic visit with NP on a Tuesday when Dr. James is here         Relevant Medications    sucralfate (CARAFATE) 100 mg/mL suspension    Personal history of colonic polyps     He underwent colonoscopy October 8, 2021 with findings of adenomatous polyp in the transverse colon and rectum with a recommended recall of 5 years.            Other    Tobacco user     Recommend tobacco cessation.         Relevant Orders    Ambulatory referral/consult to Smoking Cessation Program

## 2023-02-28 NOTE — TELEPHONE ENCOUNTER
Called to check status of Repatha appeal  [facility]   Ref # [id number]; phone [phone]  Submitted appeal for Repatha-appeal was not submitted  Spoke with Cheyanne-another appeal submitted:   alirocumab/Praluent was the medication prescribed previously   Pt has been out of medication for 1 month and is in severe pain    Case # [id number]  [employer] Appeal

## 2023-02-28 NOTE — ASSESSMENT & PLAN NOTE
History of poorly controlled diabetes mellitus, CKD stage II, morbid obesity, familial hypertriglyceridemia with routine plasmapheresis in the past, hepatic steatosis, hypertension, CICI, and tobacco abuse.   Per review of laboratory results, he has had persistent elevation of alkaline phosphatase and intermittent elevation of AST and ALT since January 2019.   Laboratory results August 13, 2020 revealed sodium 132, calcium 8.2, glucose 466, GFR 88, AST 63, , alkaline phosphatase 262, total protein 8.6, albumin 3.3, globulin 5.30, and otherwise unremarkable CMP; vitamin B12 773; cholesterol 230, triglycerides 2216, invalid HDL and LDL secondary to triglyceride level; negative acute viral hepatitis panel.   Hemoglobin A1c was 10.9% July 7, 2020.   Gastric emptying study was unremarkable July 17, 2020.   CT scan abdomen and pelvis with contrast June 1, 2020 revealed hepatomegaly with steatosis and otherwise unremarkable.   Laboratory results August 18, 2020 revealed iron level 52 and otherwise unremarkable iron profile and ferritin, ceruloplasmin, alpha 1 antitrypsin, F-actin, LKM, mitochondrial Ab; PT 11.6, INR 0.88; unremarkable CBC; negative SHAKIR and dsDNA; FibroSure testing revealed F1.   Abdominal ultrasound October 1, 2020 revealed enlarged liver with steatosis.  Laboratory results December 8, 2020 revealed sodium 135, CO2 16, glucose 272, AST 48, ALT 94, alkaline phosphatase 210, total protein 9.1, globulin 5.3, and otherwise unremarkable CMP; hemoglobin A1c 9.9%; vitamin D 12.9; cholesterol 208, HDL 20, triglycerides >1420; TSH 1.3122; and unremarkable CBC.  Laboratory results June 16, 2021 revealed CO2 18, glucose 106, AST 90, , alkaline phosphatase 220, total protein 8.7, globulin 5.0, and otherwise unremarkable CMP; hemoglobin A1c 9.9%; vitamin D 28.1; cholesterol 254, triglycerides 1922; WBC 11.9 and otherwise unremarkable CBC.  FibroScan July 26, 2021 revealed S3 F0/F1.   Abdominal ultrasound  limited August 4, 2021 revealed hepatomegaly with fatty infiltration of the liver and limited study.   CT scan abdomen and pelvis without contrast October 25, 2021 revealed hepatomegaly and otherwise unremarkable.   Laboratory results December 20, 2021 revealed potassium 3.2, glucose 115, AST 38, ALT 97, alkaline phosphatase 294, total protein 8.8, and otherwise unremarkable CMP; WBC 13.4 and otherwise unremarkable CBC.  Abdominal ultrasound February 14, 2022 revealed hepatomegaly and mild to moderate diffuse hepatic steatosis.  No significant change identified compared to the limited abdominal ultrasound 8/4/2021.  FibroScan August 29, 2022 revealed F0-1, S1.  Abdominal ultrasound December 1, 2022 revealed hepatomegaly, hepatic steatosis, no other significant abnormalities identified, and no significant change.   CBC, CMP, PT/INR in 4 months  Abdominal ultrasound in 3 months  FibroScan  Patient has significant risk factors for NAFLD  Lifestyle and dietary modifications provided  Recommend weight loss  Recommend good control of comorbidities  Recommend tobacco cessation  Call with updates  Follow-up 6 months clinic visit with NP on a Tuesday when Dr. James is here

## 2023-03-02 ENCOUNTER — TELEPHONE (OUTPATIENT)
Dept: NEPHROLOGY | Facility: CLINIC | Age: 43
End: 2023-03-02
Payer: MEDICARE

## 2023-03-02 NOTE — TELEPHONE ENCOUNTER
Spoke with Buffy GARRISON From [facility] for questions for Repatha appeal  Repatha was prescribed as the preferred medication for Familial hypercholesteremia E78.01.  Patients's LDL 60 which is lower than the required 70 or greater because he has been using Praluent.  The medication has been very effective, but is not the perferred  Medication of his insurance coverage.  Buffy PELAYO stated follow up.  Harjinder VALIENTE informed of the delays.

## 2023-03-07 ENCOUNTER — TELEPHONE (OUTPATIENT)
Dept: NEPHROLOGY | Facility: CLINIC | Age: 43
End: 2023-03-07
Payer: MEDICARE

## 2023-03-07 NOTE — TELEPHONE ENCOUNTER
Spoke with Cortes, simin CUMMINGS  PDP representative [phone]  For Repatha appeal update.  Approved from 2/16-9/2/2023  [id number]

## 2023-03-08 ENCOUNTER — TELEPHONE (OUTPATIENT)
Dept: NEPHROLOGY | Facility: CLINIC | Age: 43
End: 2023-03-08
Payer: MEDICARE

## 2023-03-09 ENCOUNTER — HOSPITAL ENCOUNTER (OUTPATIENT)
Dept: RADIOLOGY | Facility: HOSPITAL | Age: 43
Discharge: HOME | End: 2023-03-09
Attending: INTERNAL MEDICINE
Payer: MEDICARE

## 2023-03-09 DIAGNOSIS — L97.501 ULCER OF FOOT, LIMITED TO BREAKDOWN OF SKIN, UNSPECIFIED LATERALITY: ICD-10-CM

## 2023-03-09 PROCEDURE — 93922 UPR/L XTREMITY ART 2 LEVELS: CPT

## 2023-03-10 LAB
LEFT ABI: 1.17
LEFT DORSALIS PEDIS: 178 MMHG
LEFT POSTERIOR TIBIAL: 182 MMHG
LEFT TBI: 1.05
LEFT TOE PRESSURE: 163 MMHG
RIGHT ABI: 1.1
RIGHT ARM BP: 155 MMHG
RIGHT DORSALIS PEDIS: 171 MMHG
RIGHT POSTERIOR TIBIAL: 167 MMHG
RIGHT TBI: 1.26
RIGHT TOE PRESSURE: 195 MMHG

## 2023-03-14 ENCOUNTER — OFFICE VISIT (OUTPATIENT)
Dept: CARDIOLOGY | Facility: CLINIC | Age: 43
End: 2023-03-14
Payer: MEDICARE

## 2023-03-14 ENCOUNTER — PROCEDURE VISIT (OUTPATIENT)
Dept: INFECTIOUS DISEASES | Facility: CLINIC | Age: 43
End: 2023-03-14
Payer: MEDICARE

## 2023-03-14 VITALS
TEMPERATURE: 99 F | HEIGHT: 69 IN | HEART RATE: 75 BPM | SYSTOLIC BLOOD PRESSURE: 114 MMHG | WEIGHT: 227.94 LBS | BODY MASS INDEX: 33.76 KG/M2 | DIASTOLIC BLOOD PRESSURE: 71 MMHG | RESPIRATION RATE: 20 BRPM | OXYGEN SATURATION: 100 %

## 2023-03-14 DIAGNOSIS — R94.39 ABNORMAL STRESS TEST: Primary | ICD-10-CM

## 2023-03-14 DIAGNOSIS — R07.89 ATYPICAL CHEST PAIN: ICD-10-CM

## 2023-03-14 DIAGNOSIS — K76.0 HEPATIC STEATOSIS: Primary | ICD-10-CM

## 2023-03-14 PROCEDURE — 99215 OFFICE O/P EST HI 40 MIN: CPT | Mod: PBBFAC | Performed by: INTERNAL MEDICINE

## 2023-03-14 PROCEDURE — 91200 LIVER ELASTOGRAPHY: CPT | Mod: PBBFAC | Performed by: INTERNAL MEDICINE

## 2023-03-14 NOTE — H&P (VIEW-ONLY)
[facility] Cardiology Clinic Note     Date of Visit: 3/14/2023  Reason for Visit/Chief Complaint:   Chief Complaint    f/u visit, FOUZIA results, c/o frequent chest pains with sob; Dizziness          The patient was discussed with Dr. Pugh who agrees with the assessment and plan.    History of Present Illness:      Bharath Caballero is a 43 y.o. male with a PMH significant for HTN, DM, Hypertriglyceridemia, Hepatic Steatosis, recurrent pancreatitis, CICI who presents to cardiology clinic for follow up. He was Admitted from 11/29/22-12/1/22 for hypertensive urgency (218/119 on admission) with concern for end organ damage (blurry vision) atypical chest pain, cardiac enzymes at the time were negative and EKG not concerning for acute ischemia. At that time echo was performed which revealed EF 60% and normal systolic function. Pt had nuclear stress test which showed severe intensity, moderate-large size reversible perfusion defect in LCX. Coronary angiogram recommended however it was not performed while inpatient. During the last clinic visit in 12/2022, it was determined that pt was not a candidate for LHC due to poorly controlled BP at that time. He reports compliance with nifedipine 90 daily, imdur 30 mg daily, clonidine 0.2, aspirin 81, dapagliflozin, irbesartan, torsemide and nitro. He also is taking Vascepa and atorvastatin for HLD. BP in office today at goal. He states he checks regularly at home and it has been very well controlled since initiating nifedipine and imdur. He is still having chest pain, sometimes at rest and sometimes with exertion. Occasionally it wakes him up at night. Mild dyspnea on exertion as well. He seems anxious to get LHC soon.    Past Medical History:     Past Medical History:   Diagnosis Date    BPH (benign prostatic hyperplasia)     Diabetes     GERD (gastroesophageal reflux disease)     Hepatic steatosis     History of continuous positive airway pressure (CPAP) therapy at home     Hypertension      Hypertriglyceridemia     Kidney disorder     Leg pain     Morbid obesity     Neuropathy     OA (osteoarthritis)     CICI (obstructive sleep apnea)     Polyneuropathy     Recurrent pancreatitis     Renal insufficiency     Tobacco abuse        Surgical History:     Past Surgical History:   Procedure Laterality Date    APPENDECTOMY      ARTERIOVENOUS ANASTOMOSIS, OPEN, UPPER ARM BASILLIC VEIN TRANSPOSITION N/A 05/07/2018    ESOPHAGOGASTRODUODENOSCOPY N/A 06/07/2021    EXCISION OF ARTERIOVENOUS FISTULA N/A 06/01/2018    HERNIA REPAIR      INSPECTION OF UPPER INTESTINAL TRACT N/A 06/07/2021    PHERESIS OF PLASMA N/A 07/13/2018    PHERESIS OF PLASMA N/A 05/25/2018    SPINAL CORD STIMULATOR REMOVAL      TRIAL OF SPINAL CORD NERVE STIMULATOR N/A 5/12/2022    Procedure: TRIAL, NEUROSTIMULATOR, SPINAL CORD;  Surgeon: Jay Pozo MD;  Location: [facility];  Service: Neurosurgery;  Laterality: N/A;    UPPER GI N/A 06/07/2021       Family History:     Family History   Problem Relation Age of Onset    Obesity Father        Social History:     Social History     Tobacco Use    Smoking status: Every Day     Packs/day: 0.25     Years: 25.00     Pack years: 6.25     Types: Cigarettes     Start date: 7/20/1996    Smokeless tobacco: Never   Substance Use Topics    Alcohol use: Not Currently    Drug use: Not Currently       Allergies:   Review of patient's allergies indicates:  No Known Allergies      Review of Systems:   Review of Systems   Constitutional:  Negative for chills and fever.   HENT:  Negative for congestion and sinus pain.    Respiratory:  Positive for shortness of breath. Negative for cough and wheezing.    Cardiovascular:  Positive for chest pain. Negative for palpitations, orthopnea and leg swelling.   Gastrointestinal:  Negative for abdominal pain, constipation, diarrhea, nausea and vomiting.   Genitourinary:  Negative for dysuria and hematuria.   Musculoskeletal:  Negative for falls, joint pain and myalgias.  "  Skin:  Negative for rash.   Neurological:  Negative for dizziness and weakness.   Psychiatric/Behavioral:  The patient is not nervous/anxious.       Objective:     Wt Readings from Last 3 Encounters:   03/14/23 103.4 kg (227 lb 15.3 oz)   02/28/23 102.9 kg (226 lb 12.8 oz)   02/20/23 104.3 kg (230 lb)     Temp Readings from Last 3 Encounters:   03/14/23 99 °F (37.2 °C) (Oral)   02/28/23 98.4 °F (36.9 °C) (Oral)   02/20/23 98.8 °F (37.1 °C) (Oral)     BP Readings from Last 3 Encounters:   03/14/23 114/71   02/28/23 128/78   02/20/23 134/81     Pulse Readings from Last 3 Encounters:   03/14/23 75   02/28/23 78   02/20/23 77       Vitals:    03/14/23 1500   BP: 114/71   BP Location: Left arm   Patient Position: Sitting   BP Method: X-Large (Automatic)   Pulse: 75   Resp: 20   Temp: 99 °F (37.2 °C)   TempSrc: Oral   SpO2: 100%   Weight: 103.4 kg (227 lb 15.3 oz)   Height: 5' 9" (1.753 m)     Body mass index is 33.66 kg/m².    General: Patient resting comfortably, in no acute distress   HENT: Head-normocephalic and atraumatic  Neck: no JVD, trachea midline  Respiratory: clear to auscultation bilaterally  Cardiovascular: regular rate and rhythm without murmurs  Gastrointestinal: soft, non-tender, non-distended   Musculoskeletal: full range of motion without limitation or discomfort  Integumentary: no rashes or skin lesions present  Neurologic: no signs of peripheral neurological deficit  Psychiatric:  alert and oriented, cognitive function intact, cooperative with exam      Cardiac Studies/Imaging:   I have reviewed the following studies below:      EKG (11/29/22):  NSR    Echo (11/29/22):  The left ventricle is normal in size with concentric remodeling and normal systolic function.  The estimated ejection fraction is 60%.  Normal left ventricular diastolic function.  Normal right ventricular size with normal right ventricular systolic function.  Normal central venous pressure (3 mmHg).    Nuclear stress (Regadenoson): " 11/29/22    Abnormal myocardial perfusion scan.    There is a moderate to severe intensity, moderate to large sized, reversible perfusion abnormality that is consistent with ischemia in the basal to apical lateral apical wall(s) in the typical distribution of the LCX territory.    There are no other significant perfusion abnormalities.    The gated perfusion images showed an ejection fraction of 64% at rest. The gated perfusion images showed an ejection fraction of 69% post stress.    The EKG portion of this study is negative for ischemia.    The patient reported no chest pain during the stress test.    There were no arrhythmias during stress.     On the nuclear stress test the EF is normal.  If the patient has an echo, the EF on the echo is considered more accurate.      The patient has a moderate to high risk nuclear stress test.      If clinically indicated, consider further evaluation with coronary angiogram.    Images:  Ankle Brachial Indices (FOUZIA)    Result Date: 3/10/2023  There were normal triphasic Doppler waveforms at the right ankle. The FOUZIA on the right was normal. The TBI on the right was normal The were normal triphasic Doppler waveforms at the left ankle. The FOUZIA on the left was normal. The TBI on the left was normal. There was no evidence of significant arterial insufficiency identified on this study. The post exercise FOUZIA study was omitted due to an unstable gait and lethargy. The left arm brachial systolic pressure was omitted due to an AV fistula.        Assessment/Plan:   Atypical chest pain   Positive stress test   Hypertension  -BP well controlled, will plan for outpatient [facility], scheduled and consented today  -continue nifedipine 90 mg daily, imdur 30 mg daily, clonidine 0.2, aspirin 81, metoprolol, irbesartan, torsemide, Farxiga, and nitro as needed  -appropriate ED precautions given patient     Hypertriglyceridemia, familial hypercholesterolemia   DM2  -continue atorvastatin 40mg  daily  -continue Vascepa 2g BID  - diabetes not well controlled, FLP and A1c above goal, defer to PCP    Angiogram scheduled today.       Future Appointments   Date Time Provider Department Center   4/25/2023  1:30 PM Dat Beasley MD [facility] IM RES [place]   5/8/2023  1:00 PM ROCCO Sequeira [facility] OPWND [place]   5/23/2023  1:00 PM ELVIS Traore [facility] NEURO [place]   6/21/2023  1:30 PM ROCCO Colmenares [facility] NEPHR [place]   9/19/2023  8:45 AM ROCCO Gamez [facility] GASTRO [place]   4/1/2024  9:30 AM Gilda Erwin NP [facility] ENDOCR [place]         Sheridan Cartwright DO  [facility] Internal Medicine  HO-1

## 2023-03-14 NOTE — PROGRESS NOTES
[facility] Cardiology Clinic Note     Date of Visit: 3/14/2023  Reason for Visit/Chief Complaint:   Chief Complaint    f/u visit, FOUZIA results, c/o frequent chest pains with sob; Dizziness          The patient was discussed with Dr. Pugh who agrees with the assessment and plan.    History of Present Illness:      Bharath Caballero is a 43 y.o. male with a PMH significant for HTN, DM, Hypertriglyceridemia, Hepatic Steatosis, recurrent pancreatitis, CICI who presents to cardiology clinic for follow up. He was Admitted from 11/29/22-12/1/22 for hypertensive urgency (218/119 on admission) with concern for end organ damage (blurry vision) atypical chest pain, cardiac enzymes at the time were negative and EKG not concerning for acute ischemia. At that time echo was performed which revealed EF 60% and normal systolic function. Pt had nuclear stress test which showed severe intensity, moderate-large size reversible perfusion defect in LCX. Coronary angiogram recommended however it was not performed while inpatient. During the last clinic visit in 12/2022, it was determined that pt was not a candidate for LHC due to poorly controlled BP at that time. He reports compliance with nifedipine 90 daily, imdur 30 mg daily, clonidine 0.2, aspirin 81, dapagliflozin, irbesartan, torsemide and nitro. He also is taking Vascepa and atorvastatin for HLD. BP in office today at goal. He states he checks regularly at home and it has been very well controlled since initiating nifedipine and imdur. He is still having chest pain, sometimes at rest and sometimes with exertion. Occasionally it wakes him up at night. Mild dyspnea on exertion as well. He seems anxious to get LHC soon.    Past Medical History:     Past Medical History:   Diagnosis Date    BPH (benign prostatic hyperplasia)     Diabetes     GERD (gastroesophageal reflux disease)     Hepatic steatosis     History of continuous positive airway pressure (CPAP) therapy at home     Hypertension      Hypertriglyceridemia     Kidney disorder     Leg pain     Morbid obesity     Neuropathy     OA (osteoarthritis)     CICI (obstructive sleep apnea)     Polyneuropathy     Recurrent pancreatitis     Renal insufficiency     Tobacco abuse        Surgical History:     Past Surgical History:   Procedure Laterality Date    APPENDECTOMY      ARTERIOVENOUS ANASTOMOSIS, OPEN, UPPER ARM BASILLIC VEIN TRANSPOSITION N/A 05/07/2018    ESOPHAGOGASTRODUODENOSCOPY N/A 06/07/2021    EXCISION OF ARTERIOVENOUS FISTULA N/A 06/01/2018    HERNIA REPAIR      INSPECTION OF UPPER INTESTINAL TRACT N/A 06/07/2021    PHERESIS OF PLASMA N/A 07/13/2018    PHERESIS OF PLASMA N/A 05/25/2018    SPINAL CORD STIMULATOR REMOVAL      TRIAL OF SPINAL CORD NERVE STIMULATOR N/A 5/12/2022    Procedure: TRIAL, NEUROSTIMULATOR, SPINAL CORD;  Surgeon: Jay Pozo MD;  Location: [facility];  Service: Neurosurgery;  Laterality: N/A;    UPPER GI N/A 06/07/2021       Family History:     Family History   Problem Relation Age of Onset    Obesity Father        Social History:     Social History     Tobacco Use    Smoking status: Every Day     Packs/day: 0.25     Years: 25.00     Pack years: 6.25     Types: Cigarettes     Start date: 7/20/1996    Smokeless tobacco: Never   Substance Use Topics    Alcohol use: Not Currently    Drug use: Not Currently       Allergies:   Review of patient's allergies indicates:  No Known Allergies      Review of Systems:   Review of Systems   Constitutional:  Negative for chills and fever.   HENT:  Negative for congestion and sinus pain.    Respiratory:  Positive for shortness of breath. Negative for cough and wheezing.    Cardiovascular:  Positive for chest pain. Negative for palpitations, orthopnea and leg swelling.   Gastrointestinal:  Negative for abdominal pain, constipation, diarrhea, nausea and vomiting.   Genitourinary:  Negative for dysuria and hematuria.   Musculoskeletal:  Negative for falls, joint pain and myalgias.  "  Skin:  Negative for rash.   Neurological:  Negative for dizziness and weakness.   Psychiatric/Behavioral:  The patient is not nervous/anxious.       Objective:     Wt Readings from Last 3 Encounters:   03/14/23 103.4 kg (227 lb 15.3 oz)   02/28/23 102.9 kg (226 lb 12.8 oz)   02/20/23 104.3 kg (230 lb)     Temp Readings from Last 3 Encounters:   03/14/23 99 °F (37.2 °C) (Oral)   02/28/23 98.4 °F (36.9 °C) (Oral)   02/20/23 98.8 °F (37.1 °C) (Oral)     BP Readings from Last 3 Encounters:   03/14/23 114/71   02/28/23 128/78   02/20/23 134/81     Pulse Readings from Last 3 Encounters:   03/14/23 75   02/28/23 78   02/20/23 77       Vitals:    03/14/23 1500   BP: 114/71   BP Location: Left arm   Patient Position: Sitting   BP Method: X-Large (Automatic)   Pulse: 75   Resp: 20   Temp: 99 °F (37.2 °C)   TempSrc: Oral   SpO2: 100%   Weight: 103.4 kg (227 lb 15.3 oz)   Height: 5' 9" (1.753 m)     Body mass index is 33.66 kg/m².    General: Patient resting comfortably, in no acute distress   HENT: Head-normocephalic and atraumatic  Neck: no JVD, trachea midline  Respiratory: clear to auscultation bilaterally  Cardiovascular: regular rate and rhythm without murmurs  Gastrointestinal: soft, non-tender, non-distended   Musculoskeletal: full range of motion without limitation or discomfort  Integumentary: no rashes or skin lesions present  Neurologic: no signs of peripheral neurological deficit  Psychiatric:  alert and oriented, cognitive function intact, cooperative with exam      Cardiac Studies/Imaging:   I have reviewed the following studies below:      EKG (11/29/22):  NSR    Echo (11/29/22):  The left ventricle is normal in size with concentric remodeling and normal systolic function.  The estimated ejection fraction is 60%.  Normal left ventricular diastolic function.  Normal right ventricular size with normal right ventricular systolic function.  Normal central venous pressure (3 mmHg).    Nuclear stress (Regadenoson): " 11/29/22    Abnormal myocardial perfusion scan.    There is a moderate to severe intensity, moderate to large sized, reversible perfusion abnormality that is consistent with ischemia in the basal to apical lateral apical wall(s) in the typical distribution of the LCX territory.    There are no other significant perfusion abnormalities.    The gated perfusion images showed an ejection fraction of 64% at rest. The gated perfusion images showed an ejection fraction of 69% post stress.    The EKG portion of this study is negative for ischemia.    The patient reported no chest pain during the stress test.    There were no arrhythmias during stress.     On the nuclear stress test the EF is normal.  If the patient has an echo, the EF on the echo is considered more accurate.      The patient has a moderate to high risk nuclear stress test.      If clinically indicated, consider further evaluation with coronary angiogram.    Images:  Ankle Brachial Indices (FOUZIA)    Result Date: 3/10/2023  There were normal triphasic Doppler waveforms at the right ankle. The FOUZIA on the right was normal. The TBI on the right was normal The were normal triphasic Doppler waveforms at the left ankle. The FOUZIA on the left was normal. The TBI on the left was normal. There was no evidence of significant arterial insufficiency identified on this study. The post exercise FOUZIA study was omitted due to an unstable gait and lethargy. The left arm brachial systolic pressure was omitted due to an AV fistula.        Assessment/Plan:   Atypical chest pain   Positive stress test   Hypertension  -BP well controlled, will plan for outpatient [facility], scheduled and consented today  -continue nifedipine 90 mg daily, imdur 30 mg daily, clonidine 0.2, aspirin 81, metoprolol, irbesartan, torsemide, Farxiga, and nitro as needed  -appropriate ED precautions given patient     Hypertriglyceridemia, familial hypercholesterolemia   DM2  -continue atorvastatin 40mg  daily  -continue Vascepa 2g BID  - diabetes not well controlled, FLP and A1c above goal, defer to PCP    Angiogram scheduled today.       Future Appointments   Date Time Provider Department Center   4/25/2023  1:30 PM Dat Beasley MD [facility] IM RES [place]   5/8/2023  1:00 PM ROCCO Sequeira [facility] OPWND [place]   5/23/2023  1:00 PM ELVIS Traore [facility] NEURO [place]   6/21/2023  1:30 PM ROCCO Colmenares [facility] NEPHR [place]   9/19/2023  8:45 AM ROCCO Gamez [facility] GASTRO [place]   4/1/2024  9:30 AM Gilda Erwin NP [facility] ENDOCR [place]         Sheridan Cartwright DO  [facility] Internal Medicine  HO-1

## 2023-03-15 NOTE — PROGRESS NOTES
Cardiology Attending    I evaluated Bharath Caballero and discussed the patient's symptoms, findings, and management plan with the resident.  Please see the Cardiology note for details.

## 2023-03-16 DIAGNOSIS — R94.39 ABNORMAL CARDIOVASCULAR STRESS TEST: Primary | ICD-10-CM

## 2023-03-16 RX ORDER — SODIUM CHLORIDE 9 MG/ML
INJECTION, SOLUTION INTRAVENOUS ONCE
Status: CANCELLED | OUTPATIENT
Start: 2023-03-16 | End: 2023-03-16

## 2023-03-16 RX ORDER — SODIUM CHLORIDE 0.9 % (FLUSH) 0.9 %
10 SYRINGE (ML) INJECTION
Status: DISCONTINUED | OUTPATIENT
Start: 2023-03-16 | End: 2023-03-16 | Stop reason: HOSPADM

## 2023-03-27 NOTE — PROCEDURES
Fibroscan Procedure     Name: Bharath Caballero  Date of Procedure : 2023  Interpreting Physician: Christina James MD, MPH  Diagnosis: NAFLD    Probe: XL    Fibroscan readin.6 kPa    Fibrosis: F2     CAP readin dB/m    Steatosis: S0      Miscellaneous:   2021: Fibroscan: 7.1 kPa, S3, F 0-1  2022: Fibroscan: 6.7 kPa. S1, F0-1

## 2023-04-04 ENCOUNTER — TELEPHONE (OUTPATIENT)
Dept: GASTROENTEROLOGY | Facility: CLINIC | Age: 43
End: 2023-04-04
Payer: MEDICARE

## 2023-04-04 NOTE — TELEPHONE ENCOUNTER
Results to pt, verbalized understanding.    Call to pt, no answer. Left message for return call.    FibroScan results  Received: 1 week ago  ROCCO Gamez  P [facility] Gastroenterology Clinical Support Staff  Please notify findings of fibrosis (F2) without cirrhosis or steatosis. Thanks

## 2023-04-06 ENCOUNTER — CLINICAL SUPPORT (OUTPATIENT)
Dept: CARDIOLOGY | Facility: CLINIC | Age: 43
End: 2023-04-06
Payer: MEDICARE

## 2023-04-06 VITALS — RESPIRATION RATE: 17 BRPM | HEIGHT: 69 IN | WEIGHT: 224 LBS | BODY MASS INDEX: 33.18 KG/M2

## 2023-04-06 DIAGNOSIS — R07.89 ATYPICAL CHEST PAIN: ICD-10-CM

## 2023-04-06 DIAGNOSIS — R94.39 ABNORMAL CARDIOVASCULAR STRESS TEST: ICD-10-CM

## 2023-04-06 DIAGNOSIS — R94.39 ABNORMAL STRESS TEST: Primary | ICD-10-CM

## 2023-04-06 DIAGNOSIS — G47.33 OBSTRUCTIVE SLEEP APNEA SYNDROME: ICD-10-CM

## 2023-04-06 DIAGNOSIS — R79.89 ABNORMAL CBC: ICD-10-CM

## 2023-04-06 LAB
ANION GAP SERPL CALC-SCNC: 9 MEQ/L
APTT PPP: 31.9 SECONDS
BASOPHILS # BLD AUTO: 0.03 X10(3)/MCL (ref 0–0.2)
BASOPHILS NFR BLD AUTO: 0.2 %
BUN SERPL-MCNC: 13 MG/DL (ref 8.9–20.6)
CALCIUM SERPL-MCNC: 9.7 MG/DL (ref 8.4–10.2)
CHLORIDE SERPL-SCNC: 101 MMOL/L (ref 98–107)
CO2 SERPL-SCNC: 28 MMOL/L (ref 22–29)
CREAT SERPL-MCNC: 1.15 MG/DL (ref 0.73–1.18)
CREAT/UREA NIT SERPL: 11
EOSINOPHIL # BLD AUTO: 0.13 X10(3)/MCL (ref 0–0.9)
EOSINOPHIL NFR BLD AUTO: 1.1 %
ERYTHROCYTE [DISTWIDTH] IN BLOOD BY AUTOMATED COUNT: 11.7 % (ref 11.5–17)
GFR SERPLBLD CREATININE-BSD FMLA CKD-EPI: >60 MLS/MIN/1.73/M2
GLUCOSE SERPL-MCNC: 263 MG/DL (ref 74–100)
HCT VFR BLD AUTO: 45.5 % (ref 42–52)
HGB BLD-MCNC: 16 G/DL (ref 14–18)
IMM GRANULOCYTES # BLD AUTO: 0.06 X10(3)/MCL (ref 0–0.04)
IMM GRANULOCYTES NFR BLD AUTO: 0.5 %
LYMPHOCYTES # BLD AUTO: 2.25 X10(3)/MCL (ref 0.6–4.6)
LYMPHOCYTES NFR BLD AUTO: 18.3 %
MCH RBC QN AUTO: 30.8 PG (ref 27–31)
MCHC RBC AUTO-ENTMCNC: 35.2 G/DL (ref 33–36)
MCV RBC AUTO: 87.7 FL (ref 80–94)
MONOCYTES # BLD AUTO: 0.93 X10(3)/MCL (ref 0.1–1.3)
MONOCYTES NFR BLD AUTO: 7.6 %
NEUTROPHILS # BLD AUTO: 8.91 X10(3)/MCL (ref 2.1–9.2)
NEUTROPHILS NFR BLD AUTO: 72.3 %
NRBC BLD AUTO-RTO: 0 %
PLATELET # BLD AUTO: 324 X10(3)/MCL (ref 130–400)
PMV BLD AUTO: 9.8 FL (ref 7.4–10.4)
POTASSIUM SERPL-SCNC: 3.8 MMOL/L (ref 3.5–5.1)
RBC # BLD AUTO: 5.19 X10(6)/MCL (ref 4.7–6.1)
SODIUM SERPL-SCNC: 138 MMOL/L (ref 136–145)
WBC # SPEC AUTO: 12.3 X10(3)/MCL (ref 4.5–11.5)

## 2023-04-06 PROCEDURE — 85730 THROMBOPLASTIN TIME PARTIAL: CPT

## 2023-04-06 PROCEDURE — 85025 COMPLETE CBC W/AUTO DIFF WBC: CPT

## 2023-04-06 PROCEDURE — 36415 COLL VENOUS BLD VENIPUNCTURE: CPT

## 2023-04-06 PROCEDURE — 85610 PROTHROMBIN TIME: CPT

## 2023-04-06 PROCEDURE — 93005 ELECTROCARDIOGRAM TRACING: CPT

## 2023-04-06 PROCEDURE — 99212 OFFICE O/P EST SF 10 MIN: CPT | Mod: PBBFAC

## 2023-04-06 PROCEDURE — 80048 BASIC METABOLIC PNL TOTAL CA: CPT

## 2023-04-10 ENCOUNTER — HOSPITAL ENCOUNTER (OUTPATIENT)
Facility: HOSPITAL | Age: 43
Discharge: HOME | End: 2023-04-10
Attending: INTERNAL MEDICINE | Admitting: INTERNAL MEDICINE
Payer: MEDICARE

## 2023-04-10 VITALS
DIASTOLIC BLOOD PRESSURE: 62 MMHG | OXYGEN SATURATION: 97 % | HEIGHT: 69 IN | SYSTOLIC BLOOD PRESSURE: 119 MMHG | WEIGHT: 218.25 LBS | BODY MASS INDEX: 32.33 KG/M2 | RESPIRATION RATE: 16 BRPM | HEART RATE: 67 BPM

## 2023-04-10 DIAGNOSIS — R94.39 ABNORMAL CARDIOVASCULAR STRESS TEST: ICD-10-CM

## 2023-04-10 DIAGNOSIS — R94.39 ABNORMAL NUCLEAR STRESS TEST: ICD-10-CM

## 2023-04-10 LAB — POCT GLUCOSE: 143 MG/DL (ref 70–110)

## 2023-04-10 PROCEDURE — 63600175 PHARM REV CODE 636 W HCPCS: Performed by: INTERNAL MEDICINE

## 2023-04-10 PROCEDURE — C1894 INTRO/SHEATH, NON-LASER: HCPCS | Performed by: INTERNAL MEDICINE

## 2023-04-10 PROCEDURE — 25500020 PHARM REV CODE 255: Performed by: INTERNAL MEDICINE

## 2023-04-10 PROCEDURE — 99152 MOD SED SAME PHYS/QHP 5/>YRS: CPT | Performed by: INTERNAL MEDICINE

## 2023-04-10 PROCEDURE — C1887 CATHETER, GUIDING: HCPCS | Performed by: INTERNAL MEDICINE

## 2023-04-10 PROCEDURE — C1769 GUIDE WIRE: HCPCS | Performed by: INTERNAL MEDICINE

## 2023-04-10 PROCEDURE — 99153 MOD SED SAME PHYS/QHP EA: CPT | Performed by: INTERNAL MEDICINE

## 2023-04-10 PROCEDURE — 25000003 PHARM REV CODE 250: Performed by: INTERNAL MEDICINE

## 2023-04-10 PROCEDURE — 93459 L HRT ART/GRFT ANGIO: CPT | Performed by: INTERNAL MEDICINE

## 2023-04-10 PROCEDURE — 25000003 PHARM REV CODE 250

## 2023-04-10 PROCEDURE — 27201423 OPTIME MED/SURG SUP & DEVICES STERILE SUPPLY: Performed by: INTERNAL MEDICINE

## 2023-04-10 PROCEDURE — 93799 UNLISTED CV SVC/PROCEDURE: CPT | Performed by: INTERNAL MEDICINE

## 2023-04-10 RX ORDER — FENTANYL CITRATE 50 UG/ML
INJECTION, SOLUTION INTRAMUSCULAR; INTRAVENOUS
Status: DISCONTINUED | OUTPATIENT
Start: 2023-04-10 | End: 2023-04-10 | Stop reason: HOSPADM

## 2023-04-10 RX ORDER — MIDAZOLAM HYDROCHLORIDE 1 MG/ML
INJECTION INTRAMUSCULAR; INTRAVENOUS
Status: DISCONTINUED | OUTPATIENT
Start: 2023-04-10 | End: 2023-04-10 | Stop reason: HOSPADM

## 2023-04-10 RX ORDER — DIPHENHYDRAMINE HCL 25 MG
25 CAPSULE ORAL
Status: DISCONTINUED | OUTPATIENT
Start: 2023-04-10 | End: 2023-04-10 | Stop reason: HOSPADM

## 2023-04-10 RX ORDER — BACITRACIN ZINC 500 [USP'U]/G
OINTMENT TOPICAL 2 TIMES DAILY
Status: DISCONTINUED | OUTPATIENT
Start: 2023-04-10 | End: 2023-04-10

## 2023-04-10 RX ORDER — BACITRACIN ZINC 500 [USP'U]/G
OINTMENT TOPICAL ONCE
Status: DISCONTINUED | OUTPATIENT
Start: 2023-04-10 | End: 2023-04-10

## 2023-04-10 RX ORDER — LIDOCAINE HYDROCHLORIDE 10 MG/ML
INJECTION INFILTRATION; PERINEURAL
Status: DISCONTINUED | OUTPATIENT
Start: 2023-04-10 | End: 2023-04-10 | Stop reason: HOSPADM

## 2023-04-10 RX ORDER — SODIUM CHLORIDE 9 MG/ML
INJECTION, SOLUTION INTRAVENOUS ONCE
Status: COMPLETED | OUTPATIENT
Start: 2023-04-10 | End: 2023-04-10

## 2023-04-10 RX ORDER — HEPARIN SODIUM 5000 [USP'U]/ML
INJECTION, SOLUTION INTRAVENOUS; SUBCUTANEOUS
Status: DISCONTINUED | OUTPATIENT
Start: 2023-04-10 | End: 2023-04-10 | Stop reason: HOSPADM

## 2023-04-10 RX ORDER — NITROGLYCERIN 5 MG/ML
INJECTION, SOLUTION INTRAVENOUS
Status: DISCONTINUED | OUTPATIENT
Start: 2023-04-10 | End: 2023-04-10 | Stop reason: HOSPADM

## 2023-04-10 RX ADMIN — BACITRACIN ZINC, NEOMYCIN, POLYMYXIN B SULFAT 1 EACH: 5000; 3.5; 4 OINTMENT TOPICAL at 01:04

## 2023-04-10 RX ADMIN — DIPHENHYDRAMINE HYDROCHLORIDE 25 MG: 25 CAPSULE ORAL at 09:04

## 2023-04-10 RX ADMIN — SODIUM CHLORIDE: 9 INJECTION, SOLUTION INTRAVENOUS at 06:04

## 2023-04-10 NOTE — INTERVAL H&P NOTE
The patient has been examined and the H&P has been reviewed:    I concur with the findings and no changes have occurred since H&P was written.    Procedure risks, benefits and alternative options discussed and understood by patient/family.    There are no hospital problems to display for this patient.    Patient presents for outpatient LHC/cors after having positive NM stress test with reversible defect in the Lcx territory. Currently chest pain free. NPO since last PM. On ASA, no OAC. Previously had AV fistula in LUE. No issues laying supine. No abnormal bleeding. Labs from 4/6 reviewed and significant for Cr 1.15, Hgb 16, Plt 324, INR 0.9    Plan to proceed with LHC/Cors    Jakob Santoro MD  [employer] Cardiology Fellow PGY-4

## 2023-04-10 NOTE — DISCHARGE SUMMARY
[facility] - Cath Lab  Discharge Note  Short Stay    Procedure(s) (LRB):  Angiogram, Coronary, with Left Heart Cath (N/A)  Fractional Flow [place] (FFR), Coronary      OUTCOME: Patient tolerated treatment/procedure well without complication and is now ready for discharge.    DISPOSITION: Home or Self Care    FINAL DIAGNOSIS:  Non-obstructive CAD    FOLLOWUP: In clinic    DISCHARGE INSTRUCTIONS:  No discharge procedures on file.      Clinical Reference Documents Added to Patient Instructions         Document    CARDIAC CATHETERIZATION DISCHARGE INSTRUCTIONS (ENGLISH)    WOUND CARE FOR ARTERIAL PUNCTURE DISCHARGE INSTRUCTIONS (ENGLISH)            TIME SPENT ON DISCHARGE: 15 minutes

## 2023-04-10 NOTE — Clinical Note
The catheter was repositioned into the left subclavian artery. An angiography was performed of the LIMA. Multiple views were taken. The angiography was performed via power injection.

## 2023-04-10 NOTE — Clinical Note
The radial band was applied to the right radial artery. 9 cc's of air were inserted into the closure device. Able to move right fingers without difficulty; fingers warm to touch; cap refill less than 3 seconds

## 2023-04-10 NOTE — Clinical Note
209 ml of contrast were injected throughout the case. 41 mL of contrast was the total wasted during the case. 250 mL was the total amount used during the case.

## 2023-04-10 NOTE — BRIEF OP NOTE
[facility]  Brief Operative Note  Cardiology    SUMMARY     Surgery Date: 4/10/2023     Surgeon(s) and Role:     * Jaswant Pugh MD - Primary    Assisting Surgeon: None    Pre-op Diagnosis:  Abnormal cardiovascular stress test [R94.39]    Post-op Diagnosis: Post-Op Diagnosis Codes:     * Abnormal cardiovascular stress test [R94.39]    Procedure Performed: LEFT HEART CATH    Procedure(s) (LRB):  Angiogram, Coronary, with Left Heart Cath (N/A)  Fractional Flow [place] (FFR), Coronary    Technical Procedures Used: right radial access. iFR     Operative Findings: 70% stenosis of the ostial LAD without significant flow obstruction, iFR >0.9    Estimated Blood Loss: * No values recorded between 4/10/2023 10:14 AM and 4/10/2023 11:30 AM *         Specimens:   Specimen (24h ago, onward)      None

## 2023-04-10 NOTE — BRIEF OP NOTE
[facility]  Brief Operative Note  Cardiology    SUMMARY     Surgery Date: 4/10/2023     Surgeon(s) and Role:     * Jaswant Pugh MD - Primary    Assisting Surgeon: None    Pre-op Diagnosis:  Abnormal cardiovascular stress test [R94.39]    Post-op Diagnosis: Post-Op Diagnosis Codes:     * Abnormal cardiovascular stress test [R94.39]    Procedure Performed: LEFT HEART CATH and iFR    Procedure(s) (LRB):  Angiogram, Coronary, with Left Heart Cath (N/A)  Fractional Flow [place] (FFR), Coronary    Technical Procedures Used: iFR    Operative Findings:   70% stenosis of LAD with iFR >0.90. Small RCA with diffuse disease and  of mid vessel.     Estimated Blood Loss: * No values recorded between 4/10/2023 10:14 AM and 4/10/2023 11:30 AM *         Specimens:   Specimen (24h ago, onward)      None

## 2023-04-10 NOTE — DISCHARGE INSTRUCTIONS
Do not lift/push/pull anything over 5lbs for 5 days    Do not submerge Right arm in any water for 5 days - Showers only    Drink plenty of water over the next week - follow up with PCP/cardiology    Remove blue arm board on Tuesday afternoon    Remove gauze dressing on Wednesday afternoon    If bleeding occurs, hold pressure on site, lay flat, and call 911!    No driving for 24 hours    Do not bend wrist for 24 hours

## 2023-04-25 ENCOUNTER — OFFICE VISIT (OUTPATIENT)
Dept: INTERNAL MEDICINE | Facility: CLINIC | Age: 43
End: 2023-04-25
Payer: MEDICARE

## 2023-04-25 ENCOUNTER — CLINICAL SUPPORT (OUTPATIENT)
Dept: INTERNAL MEDICINE | Facility: CLINIC | Age: 43
End: 2023-04-25
Attending: INTERNAL MEDICINE
Payer: MEDICARE

## 2023-04-25 VITALS
HEIGHT: 69 IN | DIASTOLIC BLOOD PRESSURE: 80 MMHG | BODY MASS INDEX: 33.47 KG/M2 | SYSTOLIC BLOOD PRESSURE: 144 MMHG | RESPIRATION RATE: 18 BRPM | TEMPERATURE: 99 F | HEART RATE: 73 BPM | WEIGHT: 226 LBS | OXYGEN SATURATION: 98 %

## 2023-04-25 DIAGNOSIS — B35.1 ONYCHOMYCOSIS OF MULTIPLE TOENAILS WITH TYPE 2 DIABETES MELLITUS: ICD-10-CM

## 2023-04-25 DIAGNOSIS — N32.0 BLADDER OUTFLOW OBSTRUCTION: ICD-10-CM

## 2023-04-25 DIAGNOSIS — H53.8 BLURRED VISION, RIGHT EYE: ICD-10-CM

## 2023-04-25 DIAGNOSIS — E78.2 MIXED HYPERLIPIDEMIA: ICD-10-CM

## 2023-04-25 DIAGNOSIS — Z87.19 HISTORY OF PANCREATITIS: ICD-10-CM

## 2023-04-25 DIAGNOSIS — B37.49 CANDIDURIA: ICD-10-CM

## 2023-04-25 DIAGNOSIS — G89.4 CHRONIC PAIN SYNDROME: Primary | Chronic | ICD-10-CM

## 2023-04-25 DIAGNOSIS — K72.10 CHRONIC LIVER FAILURE WITHOUT HEPATIC COMA: ICD-10-CM

## 2023-04-25 DIAGNOSIS — K86.1 CHRONIC PANCREATITIS, UNSPECIFIED PANCREATITIS TYPE: ICD-10-CM

## 2023-04-25 DIAGNOSIS — E11.69 DIABETES MELLITUS TYPE 2 IN OBESE: ICD-10-CM

## 2023-04-25 DIAGNOSIS — G47.33 OBSTRUCTIVE SLEEP APNEA SYNDROME: ICD-10-CM

## 2023-04-25 DIAGNOSIS — E66.9 DIABETES MELLITUS TYPE 2 IN OBESE: ICD-10-CM

## 2023-04-25 DIAGNOSIS — E78.01 FAMILIAL HYPERCHOLESTEROLEMIA: ICD-10-CM

## 2023-04-25 DIAGNOSIS — I25.10 CORONARY ARTERY DISEASE, UNSPECIFIED VESSEL OR LESION TYPE, UNSPECIFIED WHETHER ANGINA PRESENT, UNSPECIFIED WHETHER NATIVE OR TRANSPLANTED HEART: ICD-10-CM

## 2023-04-25 DIAGNOSIS — Z79.891 OPIOID USE AGREEMENT EXISTS: ICD-10-CM

## 2023-04-25 DIAGNOSIS — F33.2 MAJOR DEPRESSIVE DISORDER, RECURRENT SEVERE WITHOUT PSYCHOTIC FEATURES: ICD-10-CM

## 2023-04-25 DIAGNOSIS — Z79.4 TYPE 2 DIABETES MELLITUS WITH DIABETIC DERMATITIS, WITH LONG-TERM CURRENT USE OF INSULIN: ICD-10-CM

## 2023-04-25 DIAGNOSIS — Z74.09 IMPAIRED FUNCTIONAL MOBILITY, BALANCE, GAIT, AND ENDURANCE: ICD-10-CM

## 2023-04-25 DIAGNOSIS — F11.90 CHRONIC NARCOTIC USE: ICD-10-CM

## 2023-04-25 DIAGNOSIS — E11.40 PAINFUL DIABETIC NEUROPATHY: Chronic | ICD-10-CM

## 2023-04-25 DIAGNOSIS — K21.00 GASTROESOPHAGEAL REFLUX DISEASE WITH ESOPHAGITIS WITHOUT HEMORRHAGE: ICD-10-CM

## 2023-04-25 DIAGNOSIS — E11.69 ONYCHOMYCOSIS OF MULTIPLE TOENAILS WITH TYPE 2 DIABETES MELLITUS: ICD-10-CM

## 2023-04-25 DIAGNOSIS — I16.1 HYPERTENSIVE EMERGENCY: ICD-10-CM

## 2023-04-25 DIAGNOSIS — I70.0 AORTIC ATHEROSCLEROSIS: ICD-10-CM

## 2023-04-25 DIAGNOSIS — I10 PRIMARY HYPERTENSION: ICD-10-CM

## 2023-04-25 DIAGNOSIS — E11.620 TYPE 2 DIABETES MELLITUS WITH DIABETIC DERMATITIS, WITH LONG-TERM CURRENT USE OF INSULIN: ICD-10-CM

## 2023-04-25 DIAGNOSIS — Z72.0 TOBACCO USER: ICD-10-CM

## 2023-04-25 DIAGNOSIS — R60.0 BILATERAL EDEMA OF LOWER EXTREMITY: ICD-10-CM

## 2023-04-25 PROCEDURE — 99214 OFFICE O/P EST MOD 30 MIN: CPT | Mod: PBBFAC | Performed by: INTERNAL MEDICINE

## 2023-04-25 PROCEDURE — 92228 IMG RTA DETC/MNTR DS PHY/QHP: CPT | Mod: PBBFAC

## 2023-04-25 NOTE — PROGRESS NOTES
Subjective     Patient ID: Bharath Caballero is a 43 y.o. male.    Chief Complaint: Follow-up    Follow-up    Patient had coronary angiogram a couple of weeks ago per Cardiology severe two-vessel disease 70% mid LAD 50% mid LAD and 70% distal LAD but the flow was supposedly good 90% proximal % mid RCA with chronic thrombotic occlusion and 30% 1st OM at the left circumflex.  He would like a 2nd opinion we will refer him to Dr. Dallas who I have spoken to today who will see him in a couple of weeks for 2nd opinion as to whether a CABG is appropriate or not.  He has 2 small adenomatous polyps when a transverse colon and 1 in the rectum is recommended to have a repeat colonoscopy October of 2026 diabetes is stable blood pressure initially was quite high said it does have when he has to strain and walk long distances it eventually came down to 144/80 with observation chronic pain diabetes pancreatitis hypercholesterolemia back pain seemed to be stable he said he quit smoking cigarettes 2 weeks ago.  Today we will get blood work scheduled for photograph of his eye refer him to Dr. Dallas see him back in 4 months get a drug screen because of his chronic opioid pain program he is up-to-date on his vaccinations and had a diabetic foot exam in October this past year  Review of Systems   All other systems reviewed and are negative.       Objective     Physical Exam  Vitals and nursing note reviewed.   Constitutional:       Appearance: Normal appearance.      Comments: Uses a walker for ambulation   HENT:      Head: Normocephalic and atraumatic.      Right Ear: Tympanic membrane, ear canal and external ear normal.      Left Ear: Tympanic membrane, ear canal and external ear normal.      Nose: Nose normal.      Mouth/Throat:      Mouth: Mucous membranes are moist.      Pharynx: Oropharynx is clear.   Eyes:      Extraocular Movements: Extraocular movements intact.      Conjunctiva/sclera: Conjunctivae normal.      Pupils:  Pupils are equal, round, and reactive to light.   Cardiovascular:      Rate and Rhythm: Normal rate.      Pulses: Normal pulses.      Heart sounds: Normal heart sounds.   Pulmonary:      Effort: Pulmonary effort is normal.      Breath sounds: Normal breath sounds.   Abdominal:      General: Bowel sounds are normal.      Palpations: Abdomen is soft.   Musculoskeletal:      Cervical back: Normal range of motion and neck supple.   Skin:     General: Skin is warm and dry.   Neurological:      General: No focal deficit present.      Mental Status: He is alert and oriented to person, place, and time. Mental status is at baseline.   Psychiatric:         Mood and Affect: Mood normal.         Behavior: Behavior normal.         Thought Content: Thought content normal.         Judgment: Judgment normal.          Assessment and Plan     Problem List Items Addressed This Visit          Neuro    Chronic pain syndrome - Primary (Chronic)    Relevant Orders    Drug Screen, Urine    Painful diabetic neuropathy (Chronic)       Psychiatric    Major depressive disorder, recurrent severe without psychotic features       Ophtho    Blurred vision, right eye       Derm    Onychomycosis of multiple toenails with type 2 diabetes mellitus       Cardiac/Vascular    Hypertension    Familial hypercholesterolemia    Mixed hyperlipidemia    Relevant Orders    Lipid Panel    Hypertensive emergency    Aortic atherosclerosis       Renal/    Bladder outflow obstruction       ID    Candiduria       Endocrine    Type 2 diabetes mellitus with skin complication, with long-term current use of insulin    Relevant Orders    Comprehensive Metabolic Panel    Hemoglobin A1C    Diabetic Eye Screening Photo       GI    History of pancreatitis    Chronic pancreatitis    Gastroesophageal reflux disease with esophagitis without hemorrhage    Chronic liver failure without hepatic coma       Other    Obstructive sleep apnea syndrome    Tobacco user    Impaired  functional mobility, balance, gait, and endurance    Bilateral edema of lower extremity     Other Visit Diagnoses       Chronic narcotic use        Relevant Orders    Drug Screen, Urine    Opioid use agreement exists        Relevant Orders    Drug Screen, Urine    Coronary artery disease, unspecified vessel or lesion type, unspecified whether angina present, unspecified whether native or transplanted heart        Relevant Orders    Ambulatory referral/consult to Cardiothoracic Surgery              See above thank you

## 2023-04-26 NOTE — PROGRESS NOTES
Bharath Caballero is a 43 y.o. male here for a diabetic eye screening with non-dilated fundus photos per Dat Beasley MD.    Patient cooperative?: Yes  Small pupils?: No  Last eye exam: unknown    For exam results, see Encounter Report.

## 2023-05-05 DIAGNOSIS — E08.42 DIABETIC POLYNEUROPATHY ASSOCIATED WITH DIABETES MELLITUS DUE TO UNDERLYING CONDITION: ICD-10-CM

## 2023-05-08 ENCOUNTER — HOSPITAL ENCOUNTER (OUTPATIENT)
Dept: WOUND CARE | Facility: HOSPITAL | Age: 43
Discharge: HOME | End: 2023-05-08
Attending: NURSE PRACTITIONER
Payer: MEDICARE

## 2023-05-08 VITALS
OXYGEN SATURATION: 99 % | SYSTOLIC BLOOD PRESSURE: 143 MMHG | HEART RATE: 66 BPM | RESPIRATION RATE: 18 BRPM | DIASTOLIC BLOOD PRESSURE: 81 MMHG

## 2023-05-08 DIAGNOSIS — E11.69 ONYCHOMYCOSIS OF MULTIPLE TOENAILS WITH TYPE 2 DIABETES MELLITUS: ICD-10-CM

## 2023-05-08 DIAGNOSIS — Z72.0 TOBACCO USER: ICD-10-CM

## 2023-05-08 DIAGNOSIS — B35.1 ONYCHOMYCOSIS OF MULTIPLE TOENAILS WITH TYPE 2 DIABETES MELLITUS: ICD-10-CM

## 2023-05-08 DIAGNOSIS — L60.2 OVERGROWN TOENAILS: ICD-10-CM

## 2023-05-08 DIAGNOSIS — I10 PRIMARY HYPERTENSION: ICD-10-CM

## 2023-05-08 DIAGNOSIS — E13.42 DIABETIC POLYNEUROPATHY ASSOCIATED WITH OTHER SPECIFIED DIABETES MELLITUS: ICD-10-CM

## 2023-05-08 DIAGNOSIS — E66.9 CLASS 1 OBESITY WITH BODY MASS INDEX (BMI) OF 33.0 TO 33.9 IN ADULT, UNSPECIFIED OBESITY TYPE, UNSPECIFIED WHETHER SERIOUS COMORBIDITY PRESENT: ICD-10-CM

## 2023-05-08 DIAGNOSIS — E11.9 ENCOUNTER FOR DIABETIC FOOT EXAM: Primary | ICD-10-CM

## 2023-05-08 PROCEDURE — 99212 PR OFFICE/OUTPT VISIT, EST, LEVL II, 10-19 MIN: ICD-10-PCS | Mod: 25,,, | Performed by: NURSE PRACTITIONER

## 2023-05-08 PROCEDURE — 11719 PR TRIM NAIL(S): ICD-10-PCS | Mod: Q7,,, | Performed by: NURSE PRACTITIONER

## 2023-05-08 PROCEDURE — 99212 OFFICE O/P EST SF 10 MIN: CPT | Mod: 25,,, | Performed by: NURSE PRACTITIONER

## 2023-05-08 PROCEDURE — 11719 TRIM NAIL(S) ANY NUMBER: CPT | Mod: Q7,,, | Performed by: NURSE PRACTITIONER

## 2023-05-08 PROCEDURE — 11719 TRIM NAIL(S) ANY NUMBER: CPT

## 2023-05-09 ENCOUNTER — OFFICE VISIT (OUTPATIENT)
Dept: CARDIAC SURGERY | Facility: CLINIC | Age: 43
End: 2023-05-09
Payer: MEDICARE

## 2023-05-09 VITALS
HEIGHT: 69 IN | OXYGEN SATURATION: 98 % | RESPIRATION RATE: 20 BRPM | DIASTOLIC BLOOD PRESSURE: 112 MMHG | HEART RATE: 98 BPM | WEIGHT: 225.19 LBS | BODY MASS INDEX: 33.35 KG/M2 | SYSTOLIC BLOOD PRESSURE: 182 MMHG

## 2023-05-09 DIAGNOSIS — I25.10 CORONARY ARTERY DISEASE, UNSPECIFIED VESSEL OR LESION TYPE, UNSPECIFIED WHETHER ANGINA PRESENT, UNSPECIFIED WHETHER NATIVE OR TRANSPLANTED HEART: ICD-10-CM

## 2023-05-09 DIAGNOSIS — E08.42 DIABETIC POLYNEUROPATHY ASSOCIATED WITH DIABETES MELLITUS DUE TO UNDERLYING CONDITION: ICD-10-CM

## 2023-05-09 PROBLEM — E11.9 ENCOUNTER FOR DIABETIC FOOT EXAM: Status: ACTIVE | Noted: 2023-05-09

## 2023-05-09 PROCEDURE — 99214 PR OFFICE/OUTPT VISIT, EST, LEVL IV, 30-39 MIN: ICD-10-PCS | Mod: ,,, | Performed by: SPECIALIST

## 2023-05-09 PROCEDURE — 99214 OFFICE O/P EST MOD 30 MIN: CPT | Mod: ,,, | Performed by: SPECIALIST

## 2023-05-09 RX ORDER — INSULIN LISPRO 100 [IU]/ML
INJECTION, SOLUTION INTRAVENOUS; SUBCUTANEOUS
Qty: 10 ML | Refills: 4 | Status: SHIPPED | OUTPATIENT
Start: 2023-05-09 | End: 2023-10-12

## 2023-05-09 RX ORDER — MORPHINE SULFATE 100 MG/1
TABLET, FILM COATED, EXTENDED RELEASE ORAL
Qty: 90 TABLET | Refills: 0 | Status: SHIPPED | OUTPATIENT
Start: 2023-05-09 | End: 2023-05-16

## 2023-05-09 RX ORDER — HYDROMORPHONE HYDROCHLORIDE 8 MG/1
8 TABLET ORAL EVERY 6 HOURS PRN
Qty: 120 TABLET | Refills: 0 | Status: SHIPPED | OUTPATIENT
Start: 2023-05-09 | End: 2023-07-13 | Stop reason: SDUPTHER

## 2023-05-09 RX ORDER — HYDROMORPHONE HYDROCHLORIDE 8 MG/1
TABLET ORAL
Qty: 120 TABLET | Refills: 0 | Status: SHIPPED | OUTPATIENT
Start: 2023-05-09 | End: 2023-05-09 | Stop reason: SDUPTHER

## 2023-05-09 RX ORDER — MORPHINE SULFATE 100 MG/1
100 TABLET, FILM COATED, EXTENDED RELEASE ORAL 2 TIMES DAILY
Qty: 90 TABLET | Refills: 0 | Status: SHIPPED | OUTPATIENT
Start: 2023-05-09 | End: 2023-05-16

## 2023-05-09 NOTE — PROGRESS NOTES
[facility]  History & Physical  Cardiothoracic Surgery    SUBJECTIVE:     Chief Complaint/Reason for Visit:   Chief Complaint   Patient presents with    Coronary Artery Disease     Referral Dr. Beasley-CAD, 2nd opinion        History of Present Illness:  Patient is a 43 y.o. male presents referred by Dr. Beasley for a 2nd opinion on possible CABG.  The patient apparently had been having some chest pain and shortness of breath.  He ultimately underwent a left heart catheterization that was read as 70% lad but a normal IFR.  He also had 100% occluded right coronary artery.  Cardiology recommended medical therapy.  On my review of the angiogram, I do not really appreciate a 70% lesion in the LAD.  It was perhaps closer to 50%.  As such I am not surprised the IFR was normal.  In addition the right coronary is totally occluded and it does fill via collaterals however this appears to be a very small PDA.  Bottom line is I do not think the patient needs CABG at this time.  I would agree with medical therapy.  I have discussed this in detail with the patient and his wife.    Review of patient's allergies indicates:  No Known Allergies    Past Medical History:   Diagnosis Date    BPH (benign prostatic hyperplasia)     Diabetes     GERD (gastroesophageal reflux disease)     Hepatic steatosis     History of continuous positive airway pressure (CPAP) therapy at home     Hypertension     Hypertriglyceridemia     Kidney disorder     Leg pain     Morbid obesity     Neuropathy     OA (osteoarthritis)     CICI (obstructive sleep apnea)     Polyneuropathy     Recurrent pancreatitis     Renal insufficiency     Tobacco abuse      Past Surgical History:   Procedure Laterality Date    ANGIOGRAM, CORONARY, WITH LEFT HEART CATHETERIZATION N/A 4/10/2023    Procedure: Angiogram, Coronary, with Left Heart Cath;  Surgeon: Jaswant Pugh MD;  Location: [facility] CATH LAB;  Service: Cardiology;  Laterality: N/A;     APPENDECTOMY      ARTERIOVENOUS ANASTOMOSIS, OPEN, UPPER ARM BASILLIC VEIN TRANSPOSITION N/A 2018    ESOPHAGOGASTRODUODENOSCOPY N/A 2021    EXCISION OF ARTERIOVENOUS FISTULA N/A 2018    FRACTIONAL FLOW [place] (FFR), CORONARY  4/10/2023    Procedure: Fractional Flow [place] (FFR), Coronary;  Surgeon: Jaswant Pugh MD;  Location: [facility] CATH LAB;  Service: Cardiology;;    HERNIA REPAIR      INSPECTION OF UPPER INTESTINAL TRACT N/A 2021    PHERESIS OF PLASMA N/A 2018    PHERESIS OF PLASMA N/A 2018    SPINAL CORD STIMULATOR REMOVAL      TRIAL OF SPINAL CORD NERVE STIMULATOR N/A 2022    Procedure: TRIAL, NEUROSTIMULATOR, SPINAL CORD;  Surgeon: Jay Pozo MD;  Location: [facility] OR;  Service: Neurosurgery;  Laterality: N/A;    UPPER GI N/A 2021     Family History   Problem Relation Age of Onset    Obesity Father      Social History     Tobacco Use    Smoking status: Former     Packs/day: 0.25     Years: 25.00     Pack years: 6.25     Types: Cigarettes     Start date: 1996     Quit date: 2023     Years since quittin.0    Smokeless tobacco: Never    Tobacco comments:     States quit 2.5 weeks ago   Substance Use Topics    Alcohol use: Not Currently    Drug use: Not Currently        Review of Systems:  Review of Systems   Constitutional: Negative.   HENT: Negative.     Eyes: Negative.    Cardiovascular:  Positive for chest pain.   Respiratory: Negative.     Endocrine: Negative.    Hematologic/Lymphatic: Negative.    Skin: Negative.    Musculoskeletal: Negative.    Gastrointestinal: Negative.    Genitourinary: Negative.    Neurological: Negative.    Psychiatric/Behavioral: Negative.     Allergic/Immunologic: Negative.      OBJECTIVE:     Vital Signs (Most Recent):  Pulse: 98 (23 1345)  Resp: 20 (23 1345)  BP: (!) 182/112 (23 1345)  SpO2: 98 % (23 134)    Admission: Weight: 102.2 kg (225 lb 3.2 oz) (23 1345)   Most Recent:  Weight: 102.2 kg (225 lb 3.2 oz) (05/09/23 1345)    Physical Exam:  Physical Exam  Vitals reviewed.   Constitutional:       Appearance: Normal appearance.   HENT:      Head: Normocephalic and atraumatic.   Eyes:      Pupils: Pupils are equal, round, and reactive to light.   Cardiovascular:      Rate and Rhythm: Normal rate and regular rhythm.      Pulses: Normal pulses.      Heart sounds: Normal heart sounds.   Pulmonary:      Effort: Pulmonary effort is normal.      Breath sounds: Normal breath sounds.   Abdominal:      Palpations: Abdomen is soft.   Musculoskeletal:         General: Normal range of motion.      Cervical back: Normal range of motion.   Skin:     General: Skin is warm.   Neurological:      General: No focal deficit present.      Mental Status: He is alert and oriented to person, place, and time.   Psychiatric:         Mood and Affect: Mood normal.         Behavior: Behavior normal.       Diagnostic Results:  Cardiac Cath: Reviewed      ASSESSMENT/PLAN:     1. Coronary artery disease, unspecified vessel or lesion type, unspecified whether angina present, unspecified whether native or transplanted heart    Diabetes mellitus  Hypertension   Severe hypertriglyceridemia, requiring ultrafiltration  Renal insufficiency   Chronic pancreatitis   Chronic pain  Chronic liver failure  Agree with medical management of coronary disease

## 2023-05-09 NOTE — PROGRESS NOTES
Subjective:       Patient ID: Bharath Caballero is a 43 y.o. male.    Chief Complaint: Diabetic foot care    43-year-old male presents to wound care clinic today for diabetic foot care.  PCP Dr. Beasley last appt 4/23/2023.  Patient presents to wound care clinic with sister marshal.  Patient states he is about to have open heart surgery.  Reviewed previous medical history.  Medical history chronic pain, neuropathy, chronic palliative, hypertension, diabetes, mixed hyperlipidemia, mononeuritis, GERD, falls, sleep apnea, hard of hearing, and nonpitting bilateral lower extremity edema.      Today's visit 05/08/2023:  Trimmed all 10 toenails using podiatry clippers, and filed using [employer].  Tolerated well.  Discussed with the patient on smoking sensation due to long history of uncontrolled HTN.  Patient states took blood pressure medicine this a.m. and blood pressure always fluctuates.  Voices awaiting open heart surgery will follow up with Cardiology this week.  Monofilament test done decreased sensation noted in all areas.  Patient complained of having chronic pain to left arm, Md aware patient had a increase Neurontin.  Instructed on diabetic foot care, checking feet daily, proper footwear.  Will have the patient return in 3 months for diabetic foot care.  Instructed to call the office with any questions, concerns, or new skin issues.  Patient and sister in all verbalized understanding of all instructions.      Lab Results   Component Value Date/Time    WBC 12.3 (H) 04/06/2023 01:47 PM    RBC 5.19 04/06/2023 01:47 PM    HGB 16.0 04/06/2023 01:47 PM    HCT 45.5 04/06/2023 01:47 PM    MCV 87.7 04/06/2023 01:47 PM    MCH 30.8 04/06/2023 01:47 PM    CREATININE 1.15 04/06/2023 01:47 PM    HGBA1C 10.2 (H) 01/17/2023 12:36 PM    CRP 11.20 (H) 06/22/2022 08:28 AM     Review of Systems   All other systems reviewed and are negative.        Objective:        Physical Exam  Vitals reviewed.   Cardiovascular:      Pulses:            Dorsalis pedis pulses are detected w/ Doppler on the right side and detected w/ Doppler on the left side.        Posterior tibial pulses are detected w/ Doppler on the right side and detected w/ Doppler on the left side.   Feet:      Right foot:      Skin integrity: Skin integrity normal.      Toenail Condition: Right toenails are long.      Left foot:      Skin integrity: Skin integrity normal.      Toenail Condition: Left toenails are long.      Comments: Monofilament test done decreased sensation in all areas.  Skin:     General: Skin is dry.      Capillary Refill: Capillary refill takes less than 2 seconds.   Neurological:      Mental Status: He is alert.                  Assessment:         ICD-10-CM ICD-9-CM   1. Encounter for diabetic foot exam  E11.9 250.00   2. Overgrown toenails  L60.2 703.8   3. Onychomycosis of multiple toenails with type 2 diabetes mellitus  E11.69 250.80    B35.1 110.1   4. Diabetic polyneuropathy associated with other specified diabetes mellitus  E13.42 250.60     357.2   5. Class 1 obesity with body mass index (BMI) of 33.0 to 33.9 in adult, unspecified obesity type, unspecified whether serious comorbidity present  E66.9 278.00    Z68.33 V85.33   6. Tobacco user  Z72.0 305.1         Plan:   Tissue pathology and/or culture taken:  [] Yes [x] No   Sharp debridement performed:   [] Yes [x] No   Labs ordered this visit:   [] Yes [x] No   Imaging ordered this visit:   [] Yes [x] No         1. Encounter for diabetic foot exam     DFC. Instructed on foot care.    2. Overgrown toenails    Instructed proper nail care.     3. Onychomycosis of multiple toenails with type 2 diabetes mellitus     Debulked with [place].       4. Diabetic polyneuropathy associated with other specified diabetes mellitus     Instructed to check feet daily due to decrease sensation.       5. Class 1 obesity with body mass index (BMI) of 33.0 to 33.9 in adult, unspecified obesity type, unspecified whether serious  comorbidity present     Co-factor in delayed wound development. Instructed on daily walking as tolerated.       6. Tobacco user     Instructed on dangers of smoking and smoking cessation.              Follow up in about 3 months (around 8/8/2023).

## 2023-05-10 ENCOUNTER — TELEPHONE (OUTPATIENT)
Dept: INTERNAL MEDICINE | Facility: CLINIC | Age: 43
End: 2023-05-10
Payer: MEDICARE

## 2023-05-10 NOTE — TELEPHONE ENCOUNTER
Attempt to contact pt to regarding Rx sent to clinic by M.D. Unable to contact left msg to contact clinic . Art/Lpn

## 2023-05-12 ENCOUNTER — TELEPHONE (OUTPATIENT)
Dept: NEUROLOGY | Facility: CLINIC | Age: 43
End: 2023-05-12
Payer: MEDICARE

## 2023-05-12 NOTE — TELEPHONE ENCOUNTER
----- Message from Bailee Garcia sent at 5/11/2023 12:30 PM CDT -----  Regarding: Please Advise  Patient called and stated he needs a paper filled out for the DMV stating his spinal cord simulator does not effect with driving. Patient stated he is trying to renew driving license.  Patient has an upcoming appointment in clinic on 5/23/2023    Patient can be reached at [phone]    Please Advise     Thank You

## 2023-05-16 ENCOUNTER — TELEPHONE (OUTPATIENT)
Dept: INTERNAL MEDICINE | Facility: CLINIC | Age: 43
End: 2023-05-16
Payer: MEDICARE

## 2023-05-16 DIAGNOSIS — G89.4 CHRONIC PAIN SYNDROME: Primary | ICD-10-CM

## 2023-05-16 RX ORDER — MORPHINE SULFATE 100 MG/1
100 TABLET, FILM COATED, EXTENDED RELEASE ORAL 3 TIMES DAILY
Qty: 90 TABLET | Refills: 0 | Status: SHIPPED | OUTPATIENT
Start: 2023-05-22 | End: 2023-06-20

## 2023-05-16 RX ORDER — MORPHINE SULFATE 100 MG/1
100 TABLET, FILM COATED, EXTENDED RELEASE ORAL 2 TIMES DAILY
Qty: 90 TABLET | Refills: 0 | Status: SHIPPED | OUTPATIENT
Start: 2023-05-22 | End: 2023-05-23

## 2023-05-16 NOTE — TELEPHONE ENCOUNTER
Pt is asking for a call back to discuss the issue with pain medication.    Pt can be reached @ [phone]

## 2023-05-16 NOTE — TELEPHONE ENCOUNTER
Contacted the patient and verbalized understanding. Patient agreed to bring paperwork to next office visit

## 2023-05-23 ENCOUNTER — HOSPITAL ENCOUNTER (OUTPATIENT)
Facility: HOSPITAL | Age: 43
Discharge: LEFT BEFORE DISCHARGE | End: 2023-05-24
Attending: EMERGENCY MEDICINE | Admitting: INTERNAL MEDICINE
Payer: MEDICARE

## 2023-05-23 ENCOUNTER — OFFICE VISIT (OUTPATIENT)
Dept: NEUROLOGY | Facility: CLINIC | Age: 43
End: 2023-05-23
Payer: MEDICARE

## 2023-05-23 VITALS
HEART RATE: 85 BPM | RESPIRATION RATE: 18 BRPM | DIASTOLIC BLOOD PRESSURE: 92 MMHG | TEMPERATURE: 99 F | HEIGHT: 69 IN | BODY MASS INDEX: 32.91 KG/M2 | WEIGHT: 222.19 LBS | SYSTOLIC BLOOD PRESSURE: 160 MMHG

## 2023-05-23 DIAGNOSIS — G81.91 ACUTE RIGHT HEMIPARESIS: Primary | ICD-10-CM

## 2023-05-23 DIAGNOSIS — R29.818 OTHER SYMPTOMS AND SIGNS INVOLVING THE NERVOUS SYSTEM: ICD-10-CM

## 2023-05-23 DIAGNOSIS — I63.9 CVA (CEREBRAL VASCULAR ACCIDENT): ICD-10-CM

## 2023-05-23 DIAGNOSIS — E11.40 PAINFUL DIABETIC NEUROPATHY: Primary | Chronic | ICD-10-CM

## 2023-05-23 DIAGNOSIS — I63.9 STROKE: ICD-10-CM

## 2023-05-23 DIAGNOSIS — Z72.0 TOBACCO USER: ICD-10-CM

## 2023-05-23 DIAGNOSIS — G47.33 OBSTRUCTIVE SLEEP APNEA SYNDROME: ICD-10-CM

## 2023-05-23 LAB
ALBUMIN SERPL-MCNC: 3.5 G/DL (ref 3.5–5)
ALBUMIN/GLOB SERPL: 0.8 RATIO (ref 1.1–2)
ALP SERPL-CCNC: 224 UNIT/L (ref 40–150)
ALT SERPL-CCNC: 50 UNIT/L (ref 0–55)
AST SERPL-CCNC: 28 UNIT/L (ref 5–34)
BASOPHILS # BLD AUTO: 0.06 X10(3)/MCL
BASOPHILS NFR BLD AUTO: 0.4 %
BILIRUBIN DIRECT+TOT PNL SERPL-MCNC: 0.2 MG/DL
BUN SERPL-MCNC: 10.5 MG/DL (ref 8.9–20.6)
CALCIUM SERPL-MCNC: 10 MG/DL (ref 8.4–10.2)
CHLORIDE SERPL-SCNC: 102 MMOL/L (ref 98–107)
CHOLEST SERPL-MCNC: 192 MG/DL
CHOLEST/HDLC SERPL: 8 {RATIO} (ref 0–5)
CO2 SERPL-SCNC: 27 MMOL/L (ref 22–29)
CREAT SERPL-MCNC: 0.88 MG/DL (ref 0.73–1.18)
EOSINOPHIL # BLD AUTO: 0.2 X10(3)/MCL (ref 0–0.9)
EOSINOPHIL NFR BLD AUTO: 1.3 %
ERYTHROCYTE [DISTWIDTH] IN BLOOD BY AUTOMATED COUNT: 11.4 % (ref 11.5–17)
GFR SERPLBLD CREATININE-BSD FMLA CKD-EPI: >60 MLS/MIN/1.73/M2
GLOBULIN SER-MCNC: 4.5 GM/DL (ref 2.4–3.5)
GLUCOSE SERPL-MCNC: 104 MG/DL (ref 74–100)
HCT VFR BLD AUTO: 43.6 % (ref 42–52)
HDLC SERPL-MCNC: 24 MG/DL (ref 35–60)
HGB BLD-MCNC: 15.6 G/DL (ref 14–18)
IMM GRANULOCYTES # BLD AUTO: 0.09 X10(3)/MCL (ref 0–0.04)
IMM GRANULOCYTES NFR BLD AUTO: 0.6 %
LDLC SERPL CALC-MCNC: 56 MG/DL (ref 50–140)
LYMPHOCYTES # BLD AUTO: 4.68 X10(3)/MCL (ref 0.6–4.6)
LYMPHOCYTES NFR BLD AUTO: 29.5 %
MCH RBC QN AUTO: 30.6 PG (ref 27–31)
MCHC RBC AUTO-ENTMCNC: 35.8 G/DL (ref 33–36)
MCV RBC AUTO: 85.5 FL (ref 80–94)
MONOCYTES # BLD AUTO: 1.39 X10(3)/MCL (ref 0.1–1.3)
MONOCYTES NFR BLD AUTO: 8.8 %
NEUTROPHILS # BLD AUTO: 9.44 X10(3)/MCL (ref 2.1–9.2)
NEUTROPHILS NFR BLD AUTO: 59.4 %
NRBC BLD AUTO-RTO: 0 %
PLATELET # BLD AUTO: 337 X10(3)/MCL (ref 130–400)
PMV BLD AUTO: 9.5 FL (ref 7.4–10.4)
POTASSIUM SERPL-SCNC: 3 MMOL/L (ref 3.5–5.1)
PROT SERPL-MCNC: 8 GM/DL (ref 6.4–8.3)
RBC # BLD AUTO: 5.1 X10(6)/MCL (ref 4.7–6.1)
SODIUM SERPL-SCNC: 141 MMOL/L (ref 136–145)
TRIGL SERPL-MCNC: 560 MG/DL (ref 34–140)
TSH SERPL-ACNC: 3.5 UIU/ML (ref 0.35–4.94)
VLDLC SERPL CALC-MCNC: 112 MG/DL
WBC # SPEC AUTO: 15.86 X10(3)/MCL (ref 4.5–11.5)

## 2023-05-23 PROCEDURE — 84443 ASSAY THYROID STIM HORMONE: CPT | Performed by: EMERGENCY MEDICINE

## 2023-05-23 PROCEDURE — 85610 PROTHROMBIN TIME: CPT | Performed by: EMERGENCY MEDICINE

## 2023-05-23 PROCEDURE — 99215 OFFICE O/P EST HI 40 MIN: CPT | Mod: S$PBB,,, | Performed by: NURSE PRACTITIONER

## 2023-05-23 PROCEDURE — 99285 EMERGENCY DEPT VISIT HI MDM: CPT | Mod: 25,27

## 2023-05-23 PROCEDURE — 80053 COMPREHEN METABOLIC PANEL: CPT | Performed by: EMERGENCY MEDICINE

## 2023-05-23 PROCEDURE — 82962 GLUCOSE BLOOD TEST: CPT

## 2023-05-23 PROCEDURE — 99214 OFFICE O/P EST MOD 30 MIN: CPT | Mod: PBBFAC,25 | Performed by: NURSE PRACTITIONER

## 2023-05-23 PROCEDURE — 85025 COMPLETE CBC W/AUTO DIFF WBC: CPT | Performed by: EMERGENCY MEDICINE

## 2023-05-23 PROCEDURE — 80061 LIPID PANEL: CPT | Performed by: EMERGENCY MEDICINE

## 2023-05-23 PROCEDURE — 99215 PR OFFICE/OUTPT VISIT, EST, LEVL V, 40-54 MIN: ICD-10-PCS | Mod: S$PBB,,, | Performed by: NURSE PRACTITIONER

## 2023-05-23 NOTE — PROGRESS NOTES
"[facility] Neurology Follow Up Visit Note    Subjective:       Patient ID: Bharath Caballero is a 43 y.o. male.    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  He was last seen on 10/19/2022. During that visit, medications nancy and CMZ were continued. Pt was also referred to home health.    Interim History  Today, Pt presents alone. States he is followed by [facility] every two weeks for BP checks. Continues w/worsening burning/numbness and tingling to lower ext, now worsening to hands. States he lost feeling is his R arm this morning that has not returned. Also c/o weakness to R hand and upper ext that is new. Fell this AM getting out of shower (hx of falls). CPAP machine broken so not utilizing. Also states he had chest pain last night, took nitro, did not call EMS. Finally resolved. Notes that there was something "different" about his R leg weakness yesterday that was not "normal". C/O edema to feet and had to cut his shoes off.    Completed physical therapy. HTN today. Severe hearing loss, pt states due to nerve damage, cannot afford cochlear implant.    Pt had NEVRO SCS placement for painful neuropathy on 7/21/2022 by Dr. Jay Pozo.     Currently sees Dr. Beasley for pain management. Discuss changing gabapentin to Lyrica for edema to lower ext.    Presenting History  He noted that he initially presented with right leg stabbing pain and paresthesia radiating up his leg for 2 years followed by paresthesia and allodynia in left foot for 1 year, worse with standing on his feet. He also noted that his hands and joints would hurt. He had quit drinking for 8 years. Still smokes. FS " 200-280 most of the time for now, but it was in the 500s for the past 4 year.    Current Medications -  Neurontin 400mg - 400mg - 800mg  CBZ ER 200mg-400mg BID (12/30/2020 - present)    Prior Medications -  Effexor XR 37.5mg daily    Imaging  MRI Lumbar w/o Bryce 9/20/2019 - I have reviewed the study independently and with the patient. 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    MRI C spine w/o Bryce 9/20/2019 - I have reviewed the study independently and with the patient. 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 Multilevel spondylosis noted.    Results for orders placed or performed during the hospital encounter of 04/10/23   POCT glucose   Result Value Ref Range    POCT Glucose 143 (H) 70 - 110 mg/dL       Review of Systems   Constitutional: no fever, fatigue, +weakness  Eye: no vision loss, eye redness, drainage, or pain  ENMT: no sore throat, ear pain, sinus pain/congestion, nasal congestion/drainage  Respiratory: no cough, no wheezing, no shortness of breath  Cardiovascular: no chest pain, no palpitations, no edema at this time  Gastrointestinal: no nausea, vomiting, or diarrhea. No abdominal pain  Genitourinary: no dysuria, no urinary frequency or urgency, no hematuria  Hema/Lymph: no abnormal bruising or bleeding  Endocrine: no heat or cold intolerance, no excessive thirst or excessive urination  Musculoskeletal: + muscle or joint pain, no joint swelling  Integumentary: no skin rash or abnormal lesion  Psychiatric: no depressed mood, + anxiety, no visual/auditory hallucinations, no delusions, no suicidal or homicidal ideation  Neurologic: antalgic     Objective:      Physical Exam    General: well-developed well-nourished in no acute distress  Eye: clear conjunctiva, eyelids normal  HENT: TMs/ear canals clear, oropharynx without erythema/exudate, oropharynx and nasal mucosal surfaces moist, no maxillary/frontal sinus tenderness to palpation  Neck:  full range of motion, no thyromegaly or lymphadenopathy  Respiratory: clear to auscultation bilaterally  Cardiovascular: regular rate and rhythm without murmurs, gallops or rubs  Gastrointestinal: soft, non-tender, non-distended with normal bowel sounds, without masses to palpation  Musculoskeletal: full range of motion of all extremities/spine without limitation or discomfort  Integumentary: no rashes or skin lesions present    Neurologic:  Mental Status- Alert and Oriented to time, self, place, Speech is fluent, with intact reading and comprehension, long term memory intact, No Dysarthria.  CN II-XII - CN I Deferred, STACI, no RAPD, OD blurred vision, VA grossly normal to finger counting at 6ft, No ptosis b/l, EOMI w/o nystagmus, LT/PP symmetric, intact in CN V1-V3 distribution b/l, masseter symmetric b/l, T/U midline, Shoulder shrug symmetric b/l, Right SNHL, VFFC, + R facial droop, smile, [place] bilaterally  Motor - Tone and Bulk nml throughout, No involuntary movements, 5/5 to L upper/lower ext to confrontation throughout except for 3/5 in RLE limited by pain, FFM nml b/l, No pronator drift. 2/5  (new)  Sensory - LT/PP/Temp diminished in RLE up to knee, diminished to L LLE up to mid-shin, Vibration absent in bilat Big Toes. Numbness to R hand radiating proximally up to bicep. Decreased sensation from bicep up to shoulder.   Reflexes - 2+ Throughout except for absent AJ b/l  Cerebellar Exam - FNF wnl b/l no intention tremor.  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 New c/o R upper ext numbness upon awakening this AM that has not resolved. R facial droop that is new, HTN  "today. Discussed going to ED for possible CVA or cervical issues. Pt refuses and states he will go when his sister in law gets into town. Chest pain last night as well as "different" type of weakness/heaviness to R lower ext. Fell this AM getting out of shower. Has decreased cigarette intake to 10 cigarettes daily. Not utilizing CPAP as she states it is broken.    1. Painful diabetic neuropathy    2. Obstructive sleep apnea syndrome    3. Tobacco user      Plan:       [] d/c mid day dose of Neurontin; c/w 800mg BID  [] c/w CBZ ER to 600mg BID   [] Rx Capsaicin Topical Cream .075% QID for neuropathic pain  [] advised on better glycemic control. Goal HgA1c < 7.0%  [] discussed for potential dizziness and/or edema to lower ext w/gabapentin  [] instructed Pt to go to ED for new onset of numbness and weakness to R upper ext and HTN, pt refuses at this time. States he will go when his family comes back into town later today or tomorrow.  [] contact Dr. Beasley about possibly changing nancy to Lyrica as his feet are swelling  [] c/w decreased smoking    RTC 4 months    I have explained the treatment plan, diagnosis, and prognosis to the patient, caretaker, family. All questions are answered to the best of my knowledge.    Face to Face Time 40 minute, including, counseling, education, review of test results, relevant medical records, and coordination of care.               "

## 2023-05-24 VITALS
DIASTOLIC BLOOD PRESSURE: 101 MMHG | HEART RATE: 64 BPM | WEIGHT: 222 LBS | TEMPERATURE: 99 F | RESPIRATION RATE: 16 BRPM | OXYGEN SATURATION: 97 % | SYSTOLIC BLOOD PRESSURE: 192 MMHG | BODY MASS INDEX: 32.88 KG/M2 | HEIGHT: 69 IN

## 2023-05-24 LAB
ANION GAP SERPL CALC-SCNC: 11 MEQ/L
AV INDEX (PROSTH): 0.66
AV MEAN GRADIENT: 2 MMHG
AV PEAK GRADIENT: 3 MMHG
AV VALVE AREA: 3.04 CM2
AV VELOCITY RATIO: 0.67
BSA FOR ECHO PROCEDURE: 2.21 M2
BUN SERPL-MCNC: 10.1 MG/DL (ref 8.9–20.6)
CALCIUM SERPL-MCNC: 8.8 MG/DL (ref 8.4–10.2)
CHLORIDE SERPL-SCNC: 103 MMOL/L (ref 98–107)
CO2 SERPL-SCNC: 26 MMOL/L (ref 22–29)
CREAT SERPL-MCNC: 0.77 MG/DL (ref 0.73–1.18)
CREAT/UREA NIT SERPL: 13
CV ECHO LV RWT: 0.68 CM
DOP CALC AO PEAK VEL: 0.91 M/S
DOP CALC AO VTI: 22.2 CM
DOP CALC LVOT AREA: 4.6 CM2
DOP CALC LVOT DIAMETER: 2.42 CM
DOP CALC LVOT PEAK VEL: 0.61 M/S
DOP CALC LVOT STROKE VOLUME: 67.58 CM3
DOP CALC MV VTI: 25.8 CM
DOP CALCLVOT PEAK VEL VTI: 14.7 CM
E WAVE DECELERATION TIME: 309.16 MSEC
E/A RATIO: 0.99
E/E' RATIO: 8.25 M/S
ECHO LV POSTERIOR WALL: 1.41 CM (ref 0.6–1.1)
EJECTION FRACTION: 65 %
EST. AVERAGE GLUCOSE BLD GHB EST-MCNC: 223.1 MG/DL
FRACTIONAL SHORTENING: 34 % (ref 28–44)
GFR SERPLBLD CREATININE-BSD FMLA CKD-EPI: >60 MLS/MIN/1.73/M2
GLUCOSE SERPL-MCNC: 97 MG/DL (ref 74–100)
HBA1C MFR BLD: 9.4 %
HIV 1+2 AB+HIV1 P24 AG SERPL QL IA: NONREACTIVE
INTERVENTRICULAR SEPTUM: 1.89 CM (ref 0.6–1.1)
LEFT ATRIUM SIZE: 3.55 CM
LEFT INTERNAL DIMENSION IN SYSTOLE: 2.72 CM (ref 2.1–4)
LEFT VENTRICLE DIASTOLIC VOLUME INDEX: 35.43 ML/M2
LEFT VENTRICLE DIASTOLIC VOLUME: 76.52 ML
LEFT VENTRICLE MASS INDEX: 132 G/M2
LEFT VENTRICLE SYSTOLIC VOLUME INDEX: 12.7 ML/M2
LEFT VENTRICLE SYSTOLIC VOLUME: 27.44 ML
LEFT VENTRICULAR INTERNAL DIMENSION IN DIASTOLE: 4.15 CM (ref 3.5–6)
LEFT VENTRICULAR MASS: 285.16 G
LV LATERAL E/E' RATIO: 7.33 M/S
LV SEPTAL E/E' RATIO: 9.43 M/S
LVOT MG: 0.85 MMHG
LVOT MV: 0.43 CM/S
MAGNESIUM SERPL-MCNC: 1.8 MG/DL (ref 1.6–2.6)
MV MEAN GRADIENT: 1 MMHG
MV PEAK A VEL: 0.67 M/S
MV PEAK E VEL: 0.66 M/S
MV PEAK GRADIENT: 2 MMHG
MV STENOSIS PRESSURE HALF TIME: 89.66 MS
MV VALVE AREA BY CONTINUITY EQUATION: 2.62 CM2
MV VALVE AREA P 1/2 METHOD: 2.45 CM2
PISA TR MAX VEL: 1.54 M/S
POCT GLUCOSE: 101 MG/DL (ref 70–110)
POCT GLUCOSE: 75 MG/DL (ref 70–110)
POTASSIUM SERPL-SCNC: 3.3 MMOL/L (ref 3.5–5.1)
RA PRESSURE: 15 MMHG
SARS-COV-2 RDRP RESP QL NAA+PROBE: NEGATIVE
SODIUM SERPL-SCNC: 140 MMOL/L (ref 136–145)
T PALLIDUM AB SER QL: NONREACTIVE
TDI LATERAL: 0.09 M/S
TDI SEPTAL: 0.07 M/S
TDI: 0.08 M/S
TR MAX PG: 9 MMHG
TV REST PULMONARY ARTERY PRESSURE: 24 MMHG

## 2023-05-24 PROCEDURE — 25000003 PHARM REV CODE 250: Performed by: STUDENT IN AN ORGANIZED HEALTH CARE EDUCATION/TRAINING PROGRAM

## 2023-05-24 PROCEDURE — G0378 HOSPITAL OBSERVATION PER HR: HCPCS

## 2023-05-24 PROCEDURE — 96374 THER/PROPH/DIAG INJ IV PUSH: CPT | Mod: 59

## 2023-05-24 PROCEDURE — 83036 HEMOGLOBIN GLYCOSYLATED A1C: CPT | Performed by: STUDENT IN AN ORGANIZED HEALTH CARE EDUCATION/TRAINING PROGRAM

## 2023-05-24 PROCEDURE — 96376 TX/PRO/DX INJ SAME DRUG ADON: CPT

## 2023-05-24 PROCEDURE — 92610 EVALUATE SWALLOWING FUNCTION: CPT

## 2023-05-24 PROCEDURE — 63600175 PHARM REV CODE 636 W HCPCS: Performed by: STUDENT IN AN ORGANIZED HEALTH CARE EDUCATION/TRAINING PROGRAM

## 2023-05-24 PROCEDURE — 86780 TREPONEMA PALLIDUM: CPT | Performed by: STUDENT IN AN ORGANIZED HEALTH CARE EDUCATION/TRAINING PROGRAM

## 2023-05-24 PROCEDURE — 83735 ASSAY OF MAGNESIUM: CPT | Performed by: INTERNAL MEDICINE

## 2023-05-24 PROCEDURE — 87389 HIV-1 AG W/HIV-1&-2 AB AG IA: CPT | Performed by: STUDENT IN AN ORGANIZED HEALTH CARE EDUCATION/TRAINING PROGRAM

## 2023-05-24 PROCEDURE — 63600175 PHARM REV CODE 636 W HCPCS

## 2023-05-24 PROCEDURE — 25000003 PHARM REV CODE 250

## 2023-05-24 PROCEDURE — 87635 SARS-COV-2 COVID-19 AMP PRB: CPT | Performed by: STUDENT IN AN ORGANIZED HEALTH CARE EDUCATION/TRAINING PROGRAM

## 2023-05-24 PROCEDURE — 94761 N-INVAS EAR/PLS OXIMETRY MLT: CPT

## 2023-05-24 PROCEDURE — 80048 BASIC METABOLIC PNL TOTAL CA: CPT | Performed by: INTERNAL MEDICINE

## 2023-05-24 PROCEDURE — 96375 TX/PRO/DX INJ NEW DRUG ADDON: CPT | Mod: 59

## 2023-05-24 PROCEDURE — 93005 ELECTROCARDIOGRAM TRACING: CPT

## 2023-05-24 RX ORDER — ATORVASTATIN CALCIUM 40 MG/1
80 TABLET, FILM COATED ORAL NIGHTLY
Status: DISCONTINUED | OUTPATIENT
Start: 2023-05-24 | End: 2023-05-24 | Stop reason: HOSPADM

## 2023-05-24 RX ORDER — POTASSIUM CHLORIDE 7.45 MG/ML
10 INJECTION INTRAVENOUS
Status: COMPLETED | OUTPATIENT
Start: 2023-05-24 | End: 2023-05-24

## 2023-05-24 RX ORDER — ENOXAPARIN SODIUM 100 MG/ML
40 INJECTION SUBCUTANEOUS EVERY 24 HOURS
Status: DISCONTINUED | OUTPATIENT
Start: 2023-05-24 | End: 2023-05-24 | Stop reason: HOSPADM

## 2023-05-24 RX ORDER — PANTOPRAZOLE SODIUM 40 MG/1
40 TABLET, DELAYED RELEASE ORAL DAILY
Status: DISCONTINUED | OUTPATIENT
Start: 2023-05-24 | End: 2023-05-24 | Stop reason: HOSPADM

## 2023-05-24 RX ORDER — TALC
6 POWDER (GRAM) TOPICAL NIGHTLY PRN
Status: DISCONTINUED | OUTPATIENT
Start: 2023-05-24 | End: 2023-05-24 | Stop reason: HOSPADM

## 2023-05-24 RX ORDER — HYDRALAZINE HYDROCHLORIDE 20 MG/ML
10 INJECTION INTRAMUSCULAR; INTRAVENOUS EVERY 6 HOURS PRN
Status: DISCONTINUED | OUTPATIENT
Start: 2023-05-24 | End: 2023-05-24 | Stop reason: HOSPADM

## 2023-05-24 RX ORDER — NAPROXEN SODIUM 220 MG/1
81 TABLET, FILM COATED ORAL DAILY
Status: DISCONTINUED | OUTPATIENT
Start: 2023-05-24 | End: 2023-05-24 | Stop reason: HOSPADM

## 2023-05-24 RX ORDER — SODIUM CHLORIDE 9 MG/ML
INJECTION, SOLUTION INTRAVENOUS CONTINUOUS
Status: DISCONTINUED | OUTPATIENT
Start: 2023-05-24 | End: 2023-05-24 | Stop reason: HOSPADM

## 2023-05-24 RX ORDER — ESCITALOPRAM OXALATE 10 MG/1
20 TABLET ORAL DAILY
Status: DISCONTINUED | OUTPATIENT
Start: 2023-05-24 | End: 2023-05-24 | Stop reason: HOSPADM

## 2023-05-24 RX ORDER — GABAPENTIN 300 MG/1
300 CAPSULE ORAL 3 TIMES DAILY
Status: DISCONTINUED | OUTPATIENT
Start: 2023-05-24 | End: 2023-05-24 | Stop reason: HOSPADM

## 2023-05-24 RX ORDER — SODIUM CHLORIDE 0.9 % (FLUSH) 0.9 %
10 SYRINGE (ML) INJECTION
Status: DISCONTINUED | OUTPATIENT
Start: 2023-05-24 | End: 2023-05-24 | Stop reason: HOSPADM

## 2023-05-24 RX ORDER — CARBAMAZEPINE 200 MG/1
600 TABLET ORAL 2 TIMES DAILY
Status: DISCONTINUED | OUTPATIENT
Start: 2023-05-24 | End: 2023-05-24 | Stop reason: HOSPADM

## 2023-05-24 RX ORDER — HYDROMORPHONE HYDROCHLORIDE 2 MG/1
2 TABLET ORAL EVERY 6 HOURS PRN
Status: DISCONTINUED | OUTPATIENT
Start: 2023-05-24 | End: 2023-05-24 | Stop reason: HOSPADM

## 2023-05-24 RX ORDER — INSULIN ASPART 100 [IU]/ML
12 INJECTION, SOLUTION INTRAVENOUS; SUBCUTANEOUS
Status: DISCONTINUED | OUTPATIENT
Start: 2023-05-24 | End: 2023-05-24 | Stop reason: HOSPADM

## 2023-05-24 RX ORDER — POTASSIUM CHLORIDE 20 MEQ/1
40 TABLET, EXTENDED RELEASE ORAL ONCE
Status: COMPLETED | OUTPATIENT
Start: 2023-05-24 | End: 2023-05-24

## 2023-05-24 RX ORDER — LABETALOL HCL 20 MG/4 ML
20 SYRINGE (ML) INTRAVENOUS EVERY 6 HOURS PRN
Status: DISCONTINUED | OUTPATIENT
Start: 2023-05-24 | End: 2023-05-24 | Stop reason: HOSPADM

## 2023-05-24 RX ADMIN — ESCITALOPRAM OXALATE 20 MG: 10 TABLET ORAL at 11:05

## 2023-05-24 RX ADMIN — CARBAMAZEPINE 600 MG: 200 TABLET ORAL at 11:05

## 2023-05-24 RX ADMIN — HYDRALAZINE HYDROCHLORIDE 10 MG: 20 INJECTION INTRAMUSCULAR; INTRAVENOUS at 04:05

## 2023-05-24 RX ADMIN — POTASSIUM CHLORIDE 40 MEQ: 1500 TABLET, EXTENDED RELEASE ORAL at 02:05

## 2023-05-24 RX ADMIN — POTASSIUM CHLORIDE 10 MEQ: 7.46 INJECTION, SOLUTION INTRAVENOUS at 04:05

## 2023-05-24 RX ADMIN — PANTOPRAZOLE SODIUM 40 MG: 40 TABLET, DELAYED RELEASE ORAL at 11:05

## 2023-05-24 RX ADMIN — POTASSIUM CHLORIDE 10 MEQ: 7.46 INJECTION, SOLUTION INTRAVENOUS at 02:05

## 2023-05-24 RX ADMIN — SODIUM CHLORIDE: 9 INJECTION, SOLUTION INTRAVENOUS at 04:05

## 2023-05-24 RX ADMIN — ASPIRIN 81 MG CHEWABLE TABLET 81 MG: 81 TABLET CHEWABLE at 11:05

## 2023-05-24 RX ADMIN — ATORVASTATIN CALCIUM 80 MG: 40 TABLET, FILM COATED ORAL at 01:05

## 2023-05-24 NOTE — NURSING
Patient went down for Ct scan and brought back up due to IV infiltration. I went with all IV supplies to restart and patient refused stating he will no longer stay here due to him not being able to get all his regular home medications and being off of his home regimen. I called Long call MD and notified of patient refusal to stay in hospital any longer. Per MD states we can not keep patient here if he does not want to stay but they were trying to get all testing done that was needed. I notified patient and he still insisted on leaving. I went to give patient AMA papers and he refused to sign them and states he will be leaving and not signing any papers. I then notified house supervisor and states to fill out form with patient refusal to sign. I also called MD again and made aware of patient still insisting on leaving. States he will be up to floor to sign required form.

## 2023-05-24 NOTE — PROGRESS NOTES
STAT request for copy of Spinal Cord Stimulator card faxed to office of Dr. Jay Pozo's Med Records at [phone].

## 2023-05-24 NOTE — PROGRESS NOTES
05/24/23 0806   Missed Time Reason   OT Attempted Eval Time 0806   Missed Treatment Reason Bedrest;Other (Comment)  (OTeval cancelled due to  pt on bedrest until 10:30 pm 5/25/23 and or pending results of  MRI. Will complete OT eval as appropriate after MRI results received and/or after 24 hour bedrest.)

## 2023-05-24 NOTE — PLAN OF CARE
05/24/23 1053   Discharge Assessment   Assessment Type Discharge Planning Assessment   Confirmed/corrected address, phone number and insurance Yes   Confirmed Demographics Correct on Facesheet   Source of Information patient   When was your last doctors appointment?   ([facility] [place])   Reason For Admission CVA, Acute right hemiparesis   People in Home alone   Facility Arrived From: Home   Do you expect to return to your current living situation? Yes   Do you have help at home or someone to help you manage your care at home? Yes   Who are your caregiver(s) and their phone number(s)? Yogesh Wesley (Friend)   [phone]; Beata JON ([phone])   Prior to hospitilization cognitive status: Alert/Oriented   Current cognitive status: Alert/Oriented   Walking or Climbing Stairs ambulation difficulty, requires equipment   Mobility Management Cane, RW   Equipment Currently Used at Home cane, straight;walker, rolling   Readmission within 30 days? No   Patient currently being followed by outpatient case management? No   Do you currently have service(s) that help you manage your care at home? Yes   Name and Contact number of agency HH - Unable to provide agency name (Left v/m for nurseBeata at [phone])   Is the pt/caregiver preference to resume services with current agency Yes   Do you take prescription medications? Yes  ([facility])   Do you have prescription coverage? No   Do you have any problems affording any of your prescribed medications? TBD   Who is going to help you get home at discharge? Friend   How do you get to doctors appointments? car, drives self   Are you on dialysis? No   Discharge Plan A Home   DME Needed Upon Discharge  none   Discharge Plan discussed with: Patient   Transition of Care Barriers None   Physical Activity   On average, how many days per week do you engage in moderate to strenuous exercise (like a brisk walk)? 0 days   On average, how many minutes do you engage in  exercise at this level? 0 min   Financial Resource Strain   How hard is it for you to pay for the very basics like food, housing, medical care, and heating? Not very   Housing Stability   In the last 12 months, was there a time when you were not able to pay the mortgage or rent on time? N   In the last 12 months, how many places have you lived? 1   In the last 12 months, was there a time when you did not have a steady place to sleep or slept in a shelter (including now)? N   Transportation Needs   In the past 12 months, has lack of transportation kept you from medical appointments or from getting medications? no   In the past 12 months, has lack of transportation kept you from meetings, work, or from getting things needed for daily living? No   Food Insecurity   Within the past 12 months, you worried that your food would run out before you got the money to buy more. Never true   Within the past 12 months, the food you bought just didn't last and you didn't have money to get more. Never true   Stress   Do you feel stress - tense, restless, nervous, or anxious, or unable to sleep at night because your mind is troubled all the time - these days? Not at all   Social Connections   How often do you attend Protestant or Congregational services? Never   Do you belong to any clubs or organizations such as Protestant groups, unions, fraternal or athletic groups, or school groups? No   How often do you attend meetings of the clubs or organizations you belong to? Never   Are you , , , , never , or living with a partner? Never marrie   Alcohol Use   Q1: How often do you have a drink containing alcohol? Never   Q2: How many drinks containing alcohol do you have on a typical day when you are drinking? None   Q3: How often do you have six or more drinks on one occasion? Never     Pt single; 1 adult child; Resides alone; Receives Disability & SNAP benefits; Pt stated he receives  services, but does not  know name of provider - Left v/m for nurse, Beata, requesting return call; CM to follow for d/c planning needs.

## 2023-05-24 NOTE — PT/OT/SLP EVAL
[facility]  Cranial Nerve Exam and Clinical Swallow Exam  Initial      Name: Bharath Caballero   MRN: [id number]    Therapy Diagnosis: WFL oropharyngeal swallow    Physician: Lm Doherty DO    Date of Evaluation:  05/24/2023    Speech Start Time:  0845  Speech Stop Time:  0900     Speech Total Time (min):  15 min    Billable Minutes: Eval Swallow and Oral Function 15      Procedure Min.   Swallow and Oral Function Evaluation   15     Chief Complaint: CVA    Active Ambulatory Problems     Diagnosis Date Noted    Chronic pain syndrome 05/12/2022    Painful diabetic neuropathy 05/12/2022    History of pancreatitis 06/20/2022    Abnormal weight loss 06/20/2022    Acute abdominal pain 06/20/2022    Bladder outflow obstruction 06/20/2022    Candiduria 06/20/2022    Chronic pancreatitis 10/28/2017    Onychomycosis of multiple toenails with type 2 diabetes mellitus 10/28/2017    Diabetic polyneuropathy 06/20/2022    Hypertension 10/28/2017    Familial hypercholesterolemia 06/20/2022    Hand paresthesia 06/20/2022    Mixed hyperlipidemia 10/28/2017    Left flank pain 06/20/2022    Mononeuritis multiplex 06/20/2022    Class 1 obesity with body mass index (BMI) of 33.0 to 33.9 in adult 06/20/2022    Nocturia 06/20/2022    Obstructive sleep apnea syndrome 06/20/2022    Paresis of single lower extremity 06/20/2022    Hepatic steatosis 06/20/2022    Tobacco user 06/20/2022    Elevated LFTs 08/18/2022    Gastroesophageal reflux disease with esophagitis without hemorrhage 08/18/2022    Personal history of colonic polyps 08/18/2022    Fall 10/19/2022    Impaired functional mobility, balance, gait, and endurance 10/19/2022    Blurred vision, right eye 10/19/2022    Chronic pain 10/25/2022    Hypertensive emergency 11/28/2022    Aortic atherosclerosis 01/24/2023    Foot ulcer 01/24/2023    Overgrown toenails 02/06/2023    Bilateral edema of lower extremity 02/06/2023    Type 2 diabetes mellitus with skin  "complication, with long-term current use of insulin 02/06/2023    Major depressive disorder, recurrent severe without psychotic features 04/25/2023    Chronic liver failure without hepatic coma 04/25/2023    Encounter for diabetic foot exam 05/09/2023     Resolved Ambulatory Problems     Diagnosis Date Noted    No Resolved Ambulatory Problems     Past Medical History:   Diagnosis Date    BPH (benign prostatic hyperplasia)     Diabetes     GERD (gastroesophageal reflux disease)     History of continuous positive airway pressure (CPAP) therapy at home     Hypertriglyceridemia     Kidney disorder     Leg pain     Morbid obesity     Neuropathy     OA (osteoarthritis)     CICI (obstructive sleep apnea)     Polyneuropathy     Recurrent pancreatitis     Renal insufficiency     Tobacco abuse       Per medical record: "Bharath Caballero is a 43 y.o. male with a PMH of poorly controlled diabetes mellitus, morbid obesity, familial hypertriglyceridemia, recurrent pancreatitis, hepatic steatosis, hypertension, CICI not on CPAP, tobacco abuse (15 pack-year hx), and CKD II who presented to [facility] ED on 5/23/2023  with a primary complaint of right UE, right LE, and right facial droop. Patient states he woke up this morning with right sided weakness. His known normal was the evening of 5/22/23. Patient states he saw his PCP who recommended evaluation in the ED." Speech pathology consulted to assess swallow via clinical swallow evaluation.      Imaging:  CXR: 5/24/23  Impression:     No acute findings.    CT Head: 5/24/23  Impression:     No acute intracranial findings.     No significant discrepancy with the preliminary report.      General Information:  Affect: Alert  Cooperative  Oxygen:  room air  Hearing: Hard of hearing  Orientation:Ox4    Objective:       Cranial Nerve 5: Trigeminal Nerve  Motor Jaw Posture at rest: Closed  Mandible Elevation/Depression: WFL  Mandible lateralization: Unable to lateralize right  Abnormal movement: " absent Interpretation:   intact   Sensory Forehead: WFL  Cheek: WFL  Jaw: WFL  Facial Pain: None noted Interpretation:   intact     Cranial Nerve 7: Facial Nerve  Motor Facial Symmetry: WNL  Wrinkle Forehead: WFL  Close eyes tightly: WFL  Labial Protrusion: WFL  Labial Retraction: WFL  Buccal Strength with Labial Seal: WFL  Abnormal movement: absent Interpretation:   intact   Sensory Formal testing not completed. Patient denied any changes in taste      Cranial Nerves IX and X: Glossopharyngeal and Vagus Nerves  Motor Palatal Symmetry (Rest): WNL  Palatal Symmetry (Movement): WNL  Cough: Perceptually strong  Voice Prior to PO intake: Clear  Resonance: Normal  Abnormal movement: absent Interpretation:   intact     Cranial Nerve XII: Hypoglossal Nerve  Motor Tongue at rest: WNL  Lingual Protrusion: WNL  Lingual Protrusion against Resistance: WNL  Lingual Lateralization: WNL  Abnormal movement: absent Interpretation:   intact     Other information:  Volitional Swallow: Able to palpate laryngeal rise  Mucosal Quality: No abnormal findings  Secretion Management: Secretion Mgmt: adequate  Dentition: Good condition for speech and mastication     Predisposing dysphagia risk factors: CVA  Clinical signs of possible chronic dysphagia: no overt s/sx  Precipitating dysphagia risk factors: none    Pain:   0/10  Pain Location / Description: no pain reported    Assessment :     PO Trials:   Patient presented with:   ICE CHIPS: DNT  THIN:-self regulated thin liquid via straw  PUREE:   MINCED AND MOIST:   SOFT AND BITE SIZED: self administered  SOLID: self administered      Limitations: n/a    IMPRESSION:   Patient's oropharyngeal swallow appear within functional limits for the  consistencies assessed. No clinical s/sx of airway invasion appreciated during this assessment. Based on this assessment, patient appears safe for initiation of a PO diet with general swallowing precautions. SLP to follow up as indicated.        Goals:      LONG TERM GOAL    1.Bharath Caballero will tolerate regular consistency diet with thin liquids without overt s/sx of aspiration to maintain appropriate nutrition and hydration.  Initial       Recommendations:     Consistency Recommendations: InitiateRegular (IDDSI level 7) consistency diet with Thin (IDDSI Level 0) liquids  Precautions:sit as upright as possible and frequent oral care  Risk Management: use good oral hygiene , sit upright for all PO intake, and increase physical mobility as tolerated  Specialist Referrals:   Ancillary Tests:   Therapy: Dysphagia therapy is not recommended at this time.  Frequency:   Follow-up exam: Follow up swallow study is not indicated at this time.  Discharge disposition: Home        The Functional Oral Intake Scale (FOIS) is an ordinal scale that is used to assess the current status and meaningful change in the oral intake. FOIS levels include:        TUBE DEPENDENT   (Levels 1-3) 1. No oral intake    2. Tube dependent with minimal/inconsistent oral intake    3. Tube supplements with consistent oral intake          TOTAL ORAL INTAKE (Levels 4-7) 4. Total oral intake of a single consistency    5. Total oral intake of multiple consistencies requiring special preparation    6. Total oral intake with no special preparation, but must avoid specific foods or liquid items    7. Total oral intake with no restrictions   Patient is currently judged to be at FOIS Level 7    *If this is the last documented treatment note, then it will signify discharge from acute care prior to discharge from speech services and will serve as the discharge summary.*        Education:  Aspiration precautions and Recommendations     Learners: Patient, RN (Raven), were educated on the results and recommendations of this evaluation. All expressed understanding.     Barriers to Learning: n/a    Teaching Method: Verbal        Reference:   American Speech-Language-Hearing Association. (n.d.b). Adult dysphagia.  (Practice Portal). https://www.albert.org/practice-portal/clinical-topics/adult-dysphagia/  MAINOR Pitt T. (2018). Use of modified diets to prevent aspiration in oropharyngeal dysphagia: Is current practice justified?. [employer] Geriatrics, 18(1), 1-10.  MAINOR Yo., PHOEBE Machuca, GLORIA Kaplan, & HAILY Wilder (2002). Predictors of aspiration pneumonia in nursing home residents.  Dysphagia, 17(4), 298-307.  PHOEBE Wooten (2016). Best practices for dehydration prevention. Perspectives of the [facility] Special Interest Groups - SIG 13, 1(2), 72- 80.          Maciel Gaytan M.S., CCC-SLP  [facility]

## 2023-05-24 NOTE — PROGRESS NOTES
[facility] Progress Note    Patient Name: Bharath Caballero  MRN: [id number]  Admission Date: 5/23/2023  Hospital Length of Stay: 0 days  Code Status: Full Code  Attending Provider: Trinity Vidal MD  Primary Care Provider: Dat Beasley MD     Subjective:      Brief HPI:  Bharath Caballero is a 43 y.o. male with a PMH of poorly controlled diabetes mellitus, morbid obesity, familial hypertriglyceridemia, recurrent pancreatitis, hepatic steatosis, hypertension, CICI not on CPAP, tobacco abuse (15 pack-year hx), and CKD II who presented to [facility] ED on 5/23/2023  with a primary complaint of right UE, right LE, and right facial droop. Patient states he woke up this morning with right sided weakness. His known normal was the evening of 5/22/23. Patient states he saw his PCP who recommended evaluation in the ED.      In the ED, CT head WO contrast showed no bleed, labs showed a leukocytosis of 15.86, hypokalemia of 3.0, and Alk Phos of 224. Patient was admitted for further work up of CVA and neurological monitoring.      Interval History: Patient doing better this AM, although his RT sided upper and lower extremities remain noticeably weak. His dysarthria and facial droop seemingly have self resolved. SLP following, diet in place. Patient agitated this morning for being able to resume his home meds 2/2 NPO status. Explained situation to patient, he seems amenable to staying for further workup now that his home meds and diet have been resumed. CM working on achieving MRI Brain w/ w/o contrast at a facility that offers settings that permit MRI with his spinal cord stimulator. Spinal Cord stimulator was placed by Dr. Pozo, Interventional Neurology at [facility].       Review of Systems:  The remainder of the 14 system ROS is noncontributory or negative unless mentioned/reviewed above.     Objective:     Vital Signs:  Vital Signs (Most Recent):  Temp: 98 °F (36.7 °C) (05/24/23 0436)  Pulse: 69 (05/24/23 1148)  Resp: 16  (05/24/23 0130)  BP: (!) 175/95 (05/24/23 1148)  SpO2: 98 % (05/24/23 1148)  Body mass index is 32.78 kg/m².  Weight: 100.7 kg (222 lb) Vital Signs (24h Range):  Temp:  [98 °F (36.7 °C)-98.8 °F (37.1 °C)] 98 °F (36.7 °C)  Pulse:  [] 69  Resp:  [14-20] 16  SpO2:  [93 %-99 %] 98 %  BP: (121-212)/() 175/95       Input/output:   No intake or output data in the 24 hours ending 05/24/23 1232    Physical Examination:  Physical Exam  Vitals and nursing note reviewed.   Constitutional:       Appearance: Normal appearance.   HENT:      Head: Normocephalic.   Eyes:      General: No scleral icterus.     Extraocular Movements: Extraocular movements intact.      Pupils: Pupils are equal, round, and reactive to light.   Cardiovascular:      Rate and Rhythm: Normal rate and regular rhythm.      Pulses: Normal pulses.      Heart sounds: Normal heart sounds. No murmur heard.  Pulmonary:      Effort: Pulmonary effort is normal. No respiratory distress.      Breath sounds: Normal breath sounds. No wheezing or rales.   Abdominal:      General: Abdomen is flat.      Palpations: Abdomen is soft.   Musculoskeletal:         General: No swelling or tenderness. Normal range of motion.      Cervical back: Normal range of motion.      Right lower leg: No edema.      Left lower leg: No edema.   Skin:     General: Skin is warm.      Capillary Refill: Capillary refill takes less than 2 seconds.      Coloration: Skin is not jaundiced or pale.      Findings: No lesion or rash.   Neurological:      Mental Status: He is alert and oriented to person, place, and time.      Cranial Nerves: No cranial nerve deficit.      Motor: Weakness (4/5 strength in RUE and RLE. No facial weakness noted.) present.           Lines/Drains/Airways       None                    Laboratory:    Recent Labs   Lab 05/23/23  2251   WBC 15.86*   RBC 5.10   HGB 15.6   HCT 43.6   MCV 85.5   MCH 30.6   MCHC 35.8   RDW 11.4*      MPV 9.5      Recent Labs   Lab  05/23/23  2251 05/24/23  0913    140   K 3.0* 3.3*   CHLORIDE 102 103   CO2 27 26   BUN 10.5 10.1   CREATININE 0.88 0.77   CALCIUM 10.0 8.8   MG  --  1.80   ALBUMIN 3.5  --    ALKPHOS 224*  --    ALT 50  --    AST 28  --    BILITOT 0.2  --         Other Results:    Current Medications:     Infusions:   sodium chloride 0.9% 100 mL/hr at 05/24/23 0403         Scheduled:   aspirin  81 mg Oral Daily    atorvastatin  80 mg Oral QHS    carBAMazepine  600 mg Oral BID    enoxparin  40 mg Subcutaneous Daily    EScitalopram oxalate  20 mg Oral Daily    gabapentin  300 mg Oral TID    insulin aspart U-100  12 Units Subcutaneous TIDWM    insulin detemir U-100  50 Units Subcutaneous QHS    pantoprazole  40 mg Oral Daily         PRN:   HYDROmorphone    melatonin    sodium chloride 0.9%        Microbiology Data:  Microbiology Results (last 7 days)       ** No results found for the last 168 hours. **             Antibiotics and Day Number of Therapy:  Antibiotics (From admission, onward)      None             Imaging:  CT Head Without Contrast  Narrative: EXAMINATION:  CT HEAD WITHOUT CONTRAST    CLINICAL HISTORY:  Neuro deficit, acute, stroke suspected;    TECHNIQUE:  CT imaging of the head performed from the skull base to the vertex without intravenous contrast.  mGycm. Automatic exposure control, adjustment of mA/kV or iterative reconstruction technique was used to reduce radiation.    COMPARISON:  12 September 2019    FINDINGS:  There is no acute cortical infarct, hemorrhage or mass lesion.  The ventricles are normal in size.    Visualized paranasal sinuses and mastoid air cells are clear.  Impression: No acute intracranial findings.    No significant discrepancy with the preliminary report.    Electronically signed by: Paulo Biggs  Date:    05/24/2023  Time:    06:24  X-Ray Chest AP Portable  Narrative: EXAMINATION:  XR CHEST AP PORTABLE    CLINICAL HISTORY:  Cerebral infarction, unspecified    COMPARISON:  27  November 2022    FINDINGS:  Portable frontal view of the chest was obtained. The heart is not enlarged.  Stable interstitial prominence.  No dense consolidation or pneumothorax.  Impression: No acute findings.    Electronically signed by: Paulo Biggs  Date:    05/24/2023  Time:    06:17        Assessment & Plan:     1) CVA       - NIHSS Stroke score of 6; started ASA and not DAPT       - continue 81mg daily and atorvastatin 80mg daily        - continue to monitor neuro checks q4h       - last known normal 9 PM 5/22/23, not a candidate for TPA       - CTA head and neck pending       - Ordered MRI brain W/WO contrast; CM working on achieving MRI compatible with spinal cord stimulator implant       - TTE with bubble study 5/24/23 revealed EF 65%, no intracardiac shunt       - Allow for 24 hours of permissive HTN, but keep BP < 220/120       - Continue working with PT/OT/SLP; continued on diet and PO home meds     2) DMII       - home insulin of 25 units TIDWM and 100 units basal BID       - A1c 9.4 (5/24/23)       - TSH 3.495       - started on 1/2 of home insulin dose while NPO tonight, started moderate SSI       - continue to titrate based on CBGs     3) HLD       - started on atorvastatin 80 mg daily       - Lipid panel Chol 192, HDL 24, LDL 56, Trig 560     4) HTN       - holding home HTN medications       - allow of 24 hours permissive HTN       - keep BP < 220/120 if needed       - continue to monitor     5) Tobacco Use       - monitor for withdrawals, will be nicotine patch if needed     6) CICI       - CPAP at night while sleeping       - educated on compliance        - will need to work with case management prior to discharge to obtain CPAP        CODE STATUS: Full Code  Access: PIV  Antibiotics: None  Diet: NPO until morning  DVT Prophylaxis: Lovenox 40mg daily  GI Prophylaxis: Protonix 40mg daily  Fluids: None      Disposition: Admit for further CVA workup and close monitoring of neurological status. Patient  has been convinced to stay but may leave AMA. CTA pending. CM working on achieving MRI brain at outside facility given spinal cord stimulator. Further dispo planning pending.     Ash Nava MD  [facility] Internal Medicine, [facility]

## 2023-05-24 NOTE — PT/OT/SLP PROGRESS
Physical Therapy    Missed Treatment Session    Patient Name:  Bharath Caballero   MRN:  [id number]      Patient not seen at this time secondary to Testing/imaging (xray/CT/MRI).    Will follow-up as patient is available to participate and as therapists' schedule allows.

## 2023-05-24 NOTE — PROGRESS NOTES
Contacted by pt's HH nurse who confirmed pt active with [facility]. Clinicals submitted via [employer].

## 2023-05-24 NOTE — H&P
[facility] Wards History & Physical Note     Resident Team: [facility] Medicine List 3  Attending Physician: Trinity Vidal MD  Resident: Chas  Intern: None     Date of Admit: 5/23/2023    Chief Complaint     Extremity Weakness (Pt arrives with c/o right sided arm/leg weakness that started last night around 9pm; Dr. Herrera at bedside and CODE FAST called)       Subjective:      History of Present Illness:  Bharath Caballero is a 43 y.o. male with a PMH of poorly controlled diabetes mellitus, morbid obesity, familial hypertriglyceridemia, recurrent pancreatitis, hepatic steatosis, hypertension, CICI not on CPAP, tobacco abuse (15 pack-year hx), and CKD II who presented to [facility] ED on 5/23/2023  with a primary complaint of right UE, right LE, and right facial droop. Patient states he woke up this morning with right sided weakness. His known normal was the evening of 5/22/23. Patient states he saw his PCP who recommended evaluation in the ED.     In the ED, CT head WO contrast showed no bleed, labs showed a leukocytosis of 15.86, hypokalemia of 3.0, and Alk Phos of 224. Patient was admitted for further work up of CVA and neurological monitoring.      Past Medical History:  Past Medical History:   Diagnosis Date    BPH (benign prostatic hyperplasia)     Diabetes     GERD (gastroesophageal reflux disease)     Hepatic steatosis     History of continuous positive airway pressure (CPAP) therapy at home     Hypertension     Hypertriglyceridemia     Kidney disorder     Leg pain     Morbid obesity     Neuropathy     OA (osteoarthritis)     CICI (obstructive sleep apnea)     Polyneuropathy     Recurrent pancreatitis     Renal insufficiency     Tobacco abuse        Past Surgical History:  Past Surgical History:   Procedure Laterality Date    ANGIOGRAM, CORONARY, WITH LEFT HEART CATHETERIZATION N/A 4/10/2023    Procedure: Angiogram, Coronary, with Left Heart Cath;  Surgeon: Jaswant Pugh MD;  Location: [facility] CATH LAB;  Service:  Cardiology;  Laterality: N/A;    APPENDECTOMY      ARTERIOVENOUS ANASTOMOSIS, OPEN, UPPER ARM BASILLIC VEIN TRANSPOSITION N/A 2018    ESOPHAGOGASTRODUODENOSCOPY N/A 2021    EXCISION OF ARTERIOVENOUS FISTULA N/A 2018    FRACTIONAL FLOW RESERVE (FFR), CORONARY  4/10/2023    Procedure: Fractional Flow [place] (FFR), Coronary;  Surgeon: Jaswant Pugh MD;  Location: [facility] CATH LAB;  Service: Cardiology;;    HERNIA REPAIR      INSPECTION OF UPPER INTESTINAL TRACT N/A 2021    PHERESIS OF PLASMA N/A 2018    PHERESIS OF PLASMA N/A 2018    SPINAL CORD STIMULATOR REMOVAL      TRIAL OF SPINAL CORD NERVE STIMULATOR N/A 2022    Procedure: TRIAL, NEUROSTIMULATOR, SPINAL CORD;  Surgeon: Jay Pozo MD;  Location: [facility] OR;  Service: Neurosurgery;  Laterality: N/A;    UPPER GI N/A 2021       Family History:  Family History   Problem Relation Age of Onset    Obesity Father        Social History:  Social History     Tobacco Use    Smoking status: Former     Packs/day: 0.50     Years: 25.00     Pack years: 12.50     Types: Cigarettes     Start date: 1996     Quit date: 2023     Years since quittin.0    Smokeless tobacco: Never    Tobacco comments:     States quit 2.5 weeks ago   Substance Use Topics    Alcohol use: Not Currently    Drug use: Not Currently       Allergies:  Review of patient's allergies indicates:  No Known Allergies    Home Medications:  Prior to Admission medications    Medication Sig Start Date End Date Taking? Authorizing Provider   aspirin (ECOTRIN) 81 MG EC tablet Take 81 mg by mouth once daily.    Historical Provider   atorvastatin (LIPITOR) 40 MG tablet Take 1 tablet (40 mg total) by mouth once daily. 22  Ash Nava MD   carBAMazepine (TEGRETOL) 200 mg tablet Take 3 tablets (600 mg total) by mouth 2 (two) times a day. 22  Ash Nava MD   clonazePAM (KLONOPIN) 1 MG tablet TAKE ONE TABLET BY MOUTH TWICE  DAILY 2/2/23   Dat Beasley MD   cloNIDine (CATAPRES) 0.2 MG tablet Take 1 tablet (0.2 mg total) by mouth 3 (three) times daily. 7/20/22 7/20/23  Dat Beasley MD   dapagliflozin (FARXIGA) 5 mg Tab tablet Take 1 tablet (5 mg total) by mouth once daily. 6/20/22 6/20/23  ROCCO Colmenares   EScitalopram oxalate (LEXAPRO) 20 MG tablet Take 20 mg by mouth once daily.    Historical Provider   esomeprazole (NEXIUM) 40 MG capsule Take 1 capsule (40 mg total) by mouth before breakfast. 8/18/22 8/18/23  ROCCO Gamez   evolocumab (REPATHA SURECLICK) 140 mg/mL PnIj Inject 1 mL (140 mg total) into the skin every 14 (fourteen) days. 2/6/23 2/6/24  ROCCO Colmenares   gabapentin (NEURONTIN) 400 MG capsule Two tabs in AM, one in afternoon 2/20/23   ELVIS Traore   gabapentin (NEURONTIN) 800 MG tablet TAKE ONE TABLET BY MOUTH IN THE EVENING 2/17/23   Dat Beasley MD   HUMALOG U-100 INSULIN 100 unit/mL injection INJECT 25 UNITS SUBCUTANEOUSLY BEFORE MEALS THREE TIMES DAILY 5/9/23   Dat Beasley MD   HYDROmorphone (DILAUDID) 8 MG tablet Take 1 tablet (8 mg total) by mouth every 6 (six) hours as needed. 5/9/23   Dat Beasley MD   icosapent ethyL (VASCEPA) 1 gram Cap Take 2 capsules (2 g total) by mouth 2 (two) times daily. 12/1/22   Ash Nava MD   irbesartan (AVAPRO) 300 MG tablet Take 300 mg by mouth once daily. 8/8/22   Historical Provider   isosorbide mononitrate (IMDUR) 30 MG 24 hr tablet Take 1 tablet (30 mg total) by mouth once daily. 12/13/22 12/13/23  Eliezer Akbar MD   LANTUS U-100 INSULIN 100 unit/mL injection INJECT 100 UNITS SUBCUTANEOUSLY TWICE DAILY  Patient taking differently: Inject 100 Units into the skin every evening. And in AM 3/14/23   Dat Beasley MD   lipase-protease-amylase (CREON) 36,000-114,000- 180,000 unit CpDR Take 1 capsule by mouth 3 (three) times daily. 12/20/22   ROCCO Colmenares   metoprolol succinate (TOPROL-XL) 25 MG 24 hr tablet Take 1 tablet  "(25 mg total) by mouth once daily. 12/2/22 12/2/23  Ash Nava MD   morphine (MS CONTIN) 100 MG 12 hr tablet Take 1 tablet (100 mg total) by mouth 3 (three) times daily. 5/22/23   Dat Beasley MD   NIFEdipine (PROCARDIA-XL) 90 MG (OSM) 24 hr tablet Take 1 tablet (90 mg total) by mouth once daily. 12/13/22 12/13/23  Eliezer Akbar MD   nitroGLYCERIN (NITROSTAT) 0.3 MG SL tablet Place 1 tablet (0.3 mg total) under the tongue every 5 (five) minutes as needed for Chest pain (go to er after 3 doses). 12/13/22 2/28/23  Eliezer Akbar MD   pen needle, diabetic 31 gauge x 5/16" Ndle Inject 1 each into the skin 4 (four) times daily. 12/20/22 6/18/23  ROCCO Colmenares   potassium chloride SA (K-DUR,KLOR-CON) 20 MEQ tablet Take 1 tablet (20 mEq total) by mouth once daily. 12/20/22 12/20/23  ROCCO Colmenares   sucralfate (CARAFATE) 100 mg/mL suspension Take 10 mLs (1 g total) by mouth 4 (four) times daily before meals and nightly. 2/28/23   ROCCO Gamez   SURE COMFORT INSULIN SYRINGE 0.5 mL 31 gauge x 5/16" Syrg USE ONE SYRINGE THREE TIMES DAILY 1/8/23   ROCCO Colmenares   tamsulosin (FLOMAX) 0.4 mg Cap TAKE ONE CAPSULE BY MOUTH EVERY DAY 1/3/23   Dat Beasley MD   torsemide (DEMADEX) 20 MG Tab Take 20 mg by mouth once daily. 11/10/21 2/28/23  Historical Provider         Review of Systems:  CONSTITUTIONAL: No weight loss, subjective fever, chills, weakness or fatigue.  HEENT: Eyes: No visual loss, blurred vision, double vision or yellow sclerae. Ears, Nose, Throat: No hearing loss, sneezing, congestion, runny nose or sore throat.  SKIN: No rash or itching.  CARDIOVASCULAR: No chest pain, chest pressure or chest discomfort. No palpitations or edema.  RESPIRATORY: No shortness of breath, cough or sputum.  GASTROINTESTINAL: No anorexia, nausea, vomiting or diarrhea. No abdominal pain or blood.  GENITOURINARY: No dysuria, hematuria, or incontinence  NEUROLOGICAL: No headache, + dizziness, syncope, " "paralysis, + ataxia, + numbness or tingling in the extremities. + right sided weakness  MUSCULOSKELETAL: No muscle, back pain, joint pain or stiffness.  HEMATOLOGIC: No anemia, bleeding or bruising.  LYMPHATICS: No enlarged nodes.  PSYCHIATRIC: No history of depression or anxiety.  ENDOCRINOLOGIC: No reports of sweating, cold or heat intolerance. No polyuria or polydipsia.  ALLERGIES: No history of asthma, hives, eczema or rhinitis.         Objective:   Last 24 Hour Vital Signs:  BP  Min: 160/92  Max: 212/103  Temp  Av.8 °F (37.1 °C)  Min: 98.8 °F (37.1 °C)  Max: 98.8 °F (37.1 °C)  Pulse  Av.5  Min: 85  Max: 104  Resp  Av  Min: 18  Max: 20  SpO2  Av %  Min: 98 %  Max: 98 %  Height  Av' 9" (175.3 cm)  Min: 5' 9" (175.3 cm)  Max: 5' 9" (175.3 cm)  Weight  Av.7 kg (222 lb 1.6 oz)  Min: 100.7 kg (222 lb)  Max: 100.8 kg (222 lb 3.2 oz)  Body mass index is 32.78 kg/m².  No intake/output data recorded.    Physical Examination:  Gen: He is alert and oriented x3. Well developed, obese, NAD.  Head: Normocephalic  Eyes: PERRL, EOMI, no conjunctival injection or pallor  Nose: No nasal discharge  Throat: No pharyngeal inflammation, erythema, discharge, mucous membranes moist  Neck: Supple. No carotid bruits. No lymphadenopathy or thyromegaly.  Lungs: Clear to auscultation bilaterally  CV: Normal S1 & S2, RRR, no murmurs appreciated  Abdomen: Soft, NT/ND, bowel sounds present. No hepatosplenomegaly  Ext: No cyanosis/clubbing/edema, pulses 2+  Neuro: CN VII abnormal left facial droop, dysarthria noted, strength 5/5 left UE and LE, 3/5 strength right UE and LE, decreased sensation right UE and LE and left face, mild ataxia, normal muscle tone and bulk, romberg not able to be completed  Psych: Flat affect  Skin: No rashes, lesions, ulceration or induration    Laboratory:  Most Recent Data:  CBC:   Lab Results   Component Value Date    WBC 15.86 (H) 2023    HGB 15.6 2023    HCT 43.6 " 05/23/2023     05/23/2023    MCV 85.5 05/23/2023    RDW 11.4 (L) 05/23/2023     WBC Differential:   Recent Labs   Lab 05/23/23 2251   WBC 15.86*   HGB 15.6   HCT 43.6      MCV 85.5     BMP:   Lab Results   Component Value Date     05/23/2023    K 3.0 (L) 05/23/2023    CO2 27 05/23/2023    BUN 10.5 05/23/2023    CREATININE 0.88 05/23/2023    CALCIUM 10.0 05/23/2023    MG 2.20 12/01/2022    PHOS 2.9 12/01/2022     LFTs:   Lab Results   Component Value Date    ALBUMIN 3.5 05/23/2023    BILITOT 0.2 05/23/2023    AST 28 05/23/2023    ALKPHOS 224 (H) 05/23/2023    ALT 50 05/23/2023     Coags:   Lab Results   Component Value Date    INR 0.97 05/23/2023    PROTIME 12.7 05/23/2023    PTT 31.9 04/06/2023     FLP:   Lab Results   Component Value Date    CHOL 192 05/23/2023    HDL 24 (L) 05/23/2023    TRIG 560 (H) 05/23/2023     DM:   Lab Results   Component Value Date    HGBA1C 10.2 (H) 01/17/2023    HGBA1C 10.4 (H) 11/27/2022    HGBA1C 10.9 (H) 06/22/2022    CREATININE 0.88 05/23/2023     Thyroid:   Lab Results   Component Value Date    TSH 3.495 05/23/2023      Anemia:   Lab Results   Component Value Date    IRON 52 (L) 08/18/2020    TIBC 296 08/18/2020    FERRITIN 190.9 08/18/2020       Lab Results   Component Value Date    RMWSTELL41 773 08/13/2020     No results found for: FOLATE     Cardiac:   Lab Results   Component Value Date    TROPONINI <0.010 11/29/2022    BNP <10.0 11/28/2022     Urinalysis:   Lab Results   Component Value Date    COLORU Yellow 12/20/2021    PHUA 5.5 01/24/2023    NITRITE Negative 12/20/2021    KETONESU Negative 12/20/2021    UROBILINOGEN Normal 01/24/2023    WBCUA 0-5 01/24/2023       Trended Lab Data:  Recent Labs   Lab 05/23/23  2251   WBC 15.86*   HGB 15.6   HCT 43.6      MCV 85.5   RDW 11.4*      K 3.0*   CO2 27   BUN 10.5   CREATININE 0.88   ALBUMIN 3.5   BILITOT 0.2   AST 28   ALKPHOS 224*   ALT 50       Trended Cardiac Data:  No results for input(s):  TROPONINI, CKTOTAL, CKMB, BNP in the last 168 hours.    Microbiology Data:  Microbiology Results (last 7 days)       ** No results found for the last 168 hours. **               Radiology:  Imaging Results              X-Ray Chest AP Portable (Preliminary result)  Result time 05/23/23 23:52:52      Wet Read by Feliz Herrera DO (05/23/23 23:52:52, [facility], Emergency Medicine)    No dense lobar consolidation or pneumothorax.                                     CT Head Without Contrast (Preliminary result)  Result time 05/23/23 22:49:34      Preliminary result by Earl Barragan Jr., MD (05/23/23 22:49:34)                   Narrative:    START OF REPORT:  TECHNIQUE: CT OF THE HEAD WAS PERFORMED WITHOUT INTRAVENOUS CONTRAST WITH AXIAL AS WELL AS CORONAL AND SAGITTAL IMAGES.    COMPARISON: NONE.    DOSAGE INFORMATION: AUTOMATED EXPOSURE CONTROL WAS UTILIZED.    CLINICAL HISTORY: EVAL FOR STROKE.    Findings:  Hemorrhage: No acute intracranial hemorrhage is seen.  CSF spaces: The ventricles sulci and basal cisterns are within normal limits.  Brain parenchyma: Unremarkable with preservation of the grey white junction throughout.  Cerebellum: Unremarkable.  Sella and skull base: The sella appears to be within normal limits for age.  Herniation: None.  Intracranial calcifications: Incidental note is made of bilateral choroid plexus calcification. Incidental note is made of some pineal region calcification. Incidental note is made of some calcification of the falx.  Calvarium: No acute linear or depressed skull fracture is seen.    Maxillofacial Structures:  Paranasal sinuses: A small retention cyst or polyp is seen in the right maxillary sinus.  Orbits: The orbits appear unremarkable.  Zygomatic arches: The zygomatic arches are intact and unremarkable.  Temporal bones and mastoids: The temporal bones and mastoids appear unremarkable.  TMJ: The mandibular condyles appear normally placed with  respect to the mandibular fossa.    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    Notifications: This is a stroke protocol report and the results were discussed with the emergency room physician Dr. Herrera prior to dictation at 2023-05-23 22:58:29 CDT.      Impression:  1. No acute intracranial process identified. Details and findings as noted above.                          Wet Read by Feliz Herrera DO (05/23/23 22:48:46, [facility] Dept, Emergency Medicine)    No ICH or extra-axial hemorrhage.                                       Assessment & Plan:     1) CVA       - NIHSS Stroke score of 6,will start ASA and not DAPT       - continue 81mg daily and atorvastatin 80mg daily        - continue to monitor neuro checks q4h       - last known normal 9am this morning, not a candidate for TPA       - Ordered CTA head and neck       - Ordered MRI brain W/WO contrast       - Ordered TTE with bubble study        - Allow for 24 hours of permissive HTN, but keep BP < 220/120       - Ordered PT/OT/SLP evaluation, keep NPO until bedside swallow is complete    2) DMII       - home insulin of 25 units TIDWM and 100 units basal BID       - ordered A1c and TSH       - started on 1/2 of home insulin dose while NPO tonight, started moderate SSI       - continue to titrate based on CBGs    3) HLD       - started on atorvastatin 80mg daily       - ordered lipid panel     4) HTN       - holding home HTN medications       - allow of 24 hours permissive HTN       - keep BP < 220/120 if needed       - continue to monitor    5) Tobacco Use       - monitor for withdrawals, will be nicotine patch if needed    6) CICI       - CPAP at night while sleeping       - educated on compliance        - will need to work with case management prior to discharge to obtain CPAP      CODE STATUS: Full Code  Access: PIV  Antibiotics: None  Diet: NPO until  morning  DVT Prophylaxis: Lovenox 40mg daily  GI Prophylaxis: Protonix 40mg daily  Fluids: None      Disposition: Admit for further CVA workup and close monitoring of neurological status. Patient has been convinced to stay but may leave AMA.           Lm Doherty, DO  [facility] PGY-3

## 2023-05-24 NOTE — PROGRESS NOTES
Received call from Dr. Jay Pozo's nurse, Sheridan, who stated they do not have a record of pt's Spinal Cord Stimulator card and advised I contact [facility] ([phone]) to obtain.    Card obtained from [facility] and forwarded to [facility] Scheduling Dept  ([phone]) for review.

## 2023-05-24 NOTE — ED PROVIDER NOTES
ED PROVIDER NOTE  5/23/2023    CHIEF COMPLAINT:   Chief Complaint   Patient presents with    Extremity Weakness     Pt arrives with c/o right sided arm/leg weakness that started last night around 9pm; Dr. Herrera at bedside and [facility] called       HISTORY OF PRESENT ILLNESS:   Bharath Caballero is a 43 y.o. male who presents with chief complaint Stroke-like symptoms.  Patient reports he went to bed around 2100 last night when he woke up in the middle of the night he noticed some numbness in his right arm and leg and states that he also felt dizzy.  He states he went back to bed and when he woke up this morning the dizziness was gone but he still had the numbness in his right arm and leg.  States that he had seen his PCP today who instructed him to come to the ED due to concern for stroke.    The history is provided by the patient.       REVIEW OF SYSTEMS: as noted in the HPI.  NURSING NOTES REVIEWED      PAST MEDICAL/SURGICAL HISTORY:   Past Medical History:   Diagnosis Date    BPH (benign prostatic hyperplasia)     Diabetes     GERD (gastroesophageal reflux disease)     Hepatic steatosis     History of continuous positive airway pressure (CPAP) therapy at home     Hypertension     Hypertriglyceridemia     Kidney disorder     Leg pain     Morbid obesity     Neuropathy     OA (osteoarthritis)     CICI (obstructive sleep apnea)     Polyneuropathy     Recurrent pancreatitis     Renal insufficiency     Tobacco abuse       Past Surgical History:   Procedure Laterality Date    ANGIOGRAM, CORONARY, WITH LEFT HEART CATHETERIZATION N/A 4/10/2023    Procedure: Angiogram, Coronary, with Left Heart Cath;  Surgeon: Jaswant Pugh MD;  Location: [facility] CATH LAB;  Service: Cardiology;  Laterality: N/A;    APPENDECTOMY      ARTERIOVENOUS ANASTOMOSIS, OPEN, UPPER ARM BASILLIC VEIN TRANSPOSITION N/A 05/07/2018    ESOPHAGOGASTRODUODENOSCOPY N/A 06/07/2021    EXCISION OF ARTERIOVENOUS FISTULA N/A 06/01/2018    FRACTIONAL FLOW RESERVE  (FFR), CORONARY  4/10/2023    Procedure: Fractional Flow Tay[place] (FFR), Coronary;  Surgeon: Jaswant Pugh MD;  Location: [facility] CATH LAB;  Service: Cardiology;;    HERNIA REPAIR      INSPECTION OF UPPER INTESTINAL TRACT N/A 2021    PHERESIS OF PLASMA N/A 2018    PHERESIS OF PLASMA N/A 2018    SPINAL CORD STIMULATOR REMOVAL      TRIAL OF SPINAL CORD NERVE STIMULATOR N/A 2022    Procedure: TRIAL, NEUROSTIMULATOR, SPINAL CORD;  Surgeon: Jay Pozo MD;  Location: [facility] OR;  Service: Neurosurgery;  Laterality: N/A;    UPPER GI N/A 2021       FAMILY HISTORY:   Family History   Problem Relation Age of Onset    Obesity Father        SOCIAL HISTORY:   Social History     Tobacco Use    Smoking status: Former     Packs/day: 0.50     Years: 25.00     Pack years: 12.50     Types: Cigarettes     Start date: 1996     Quit date: 2023     Years since quittin.0    Smokeless tobacco: Never    Tobacco comments:     States quit 2.5 weeks ago   Substance Use Topics    Alcohol use: Not Currently    Drug use: Not Currently       ALLERGIES: Review of patient's allergies indicates:  No Known Allergies    PHYSICAL EXAM:  Initial Vitals [23 2230]   BP Pulse Resp Temp SpO2   (!) 212/103 104 20 -- 98 %      MAP       --         Physical Exam    Nursing note and vitals reviewed.  Constitutional: He appears well-developed and well-nourished. No distress.   HENT:   Head: Normocephalic and atraumatic.   Right Ear: Decreased hearing is noted.   Nose: Nose normal.   Mouth/Throat: Oropharynx is clear and moist and mucous membranes are normal.   Eyes: Conjunctivae and EOM are normal. Pupils are equal, round, and reactive to light.   Neck: Neck supple. No tracheal deviation present.   Cardiovascular:  Normal rate, regular rhythm, normal heart sounds, intact distal pulses and normal pulses.           Pulmonary/Chest: Effort normal and breath sounds normal. No respiratory distress.   Abdominal:  Abdomen is soft. There is no abdominal tenderness. There is no rebound and no guarding.   Musculoskeletal:         General: Normal range of motion.      Cervical back: Neck supple.     Neurological: He is alert and oriented to person, place, and time. He displays tremor. A sensory deficit is present. He exhibits abnormal muscle tone. GCS score is 15. GCS eye subscore is 4. GCS verbal subscore is 5. GCS motor subscore is 6.   Skin: Skin is warm, dry and intact.   Psychiatric: He has a normal mood and affect. His speech is normal and behavior is normal. Judgment and thought content normal. Cognition and memory are normal.       RESULTS:  Labs Reviewed   COMPREHENSIVE METABOLIC PANEL - Abnormal; Notable for the following components:       Result Value    Potassium Level 3.0 (*)     Glucose Level 104 (*)     Globulin 4.5 (*)     Albumin/Globulin Ratio 0.8 (*)     Alkaline Phosphatase 224 (*)     All other components within normal limits   LIPID PANEL - Abnormal; Notable for the following components:    HDL Cholesterol 24 (*)     Triglyceride 560 (*)     Cholesterol/HDL Ratio 8 (*)     All other components within normal limits   CBC WITH DIFFERENTIAL - Abnormal; Notable for the following components:    WBC 15.86 (*)     RDW 11.4 (*)     Lymph # 4.68 (*)     Neut # 9.44 (*)     Mono # 1.39 (*)     IG# 0.09 (*)     All other components within normal limits   TSH - Normal   CBC W/ AUTO DIFFERENTIAL    Narrative:     The following orders were created for panel order CBC W/ AUTO DIFFERENTIAL.  Procedure                               Abnormality         Status                     ---------                               -----------         ------                     CBC with Differential[[id number]]        Abnormal            Final result                 Please view results for these tests on the individual orders.   PROTIME-INR   EXTRA TUBES    Narrative:     The following orders were created for panel order EXTRA  TUBES.  Procedure                               Abnormality         Status                     ---------                               -----------         ------                     Red Top Hold[[id number]]                                                                Lavender Top Hold[[id number]]                                                             Please view results for these tests on the individual orders.   RED TOP HOLD   LAVENDER TOP HOLD   SARS-COV-2 RNA AMPLIFICATION, QUAL     Imaging Results              X-Ray Chest AP Portable (Preliminary result)  Result time 05/23/23 23:52:52      Wet Read by Feliz Herrera DO (05/23/23 23:52:52, [facility], Emergency Medicine)    No dense lobar consolidation or pneumothorax.                                     CT Head Without Contrast (Preliminary result)  Result time 05/23/23 22:49:34      Preliminary result by Earl Barragan Jr., MD (05/23/23 22:49:34)                   Narrative:    START OF REPORT:  TECHNIQUE: CT OF THE HEAD WAS PERFORMED WITHOUT INTRAVENOUS CONTRAST WITH AXIAL AS WELL AS CORONAL AND SAGITTAL IMAGES.    COMPARISON: NONE.    DOSAGE INFORMATION: AUTOMATED EXPOSURE CONTROL WAS UTILIZED.    CLINICAL HISTORY: EVAL FOR STROKE.    Findings:  Hemorrhage: No acute intracranial hemorrhage is seen.  CSF spaces: The ventricles sulci and basal cisterns are within normal limits.  Brain parenchyma: Unremarkable with preservation of the grey white junction throughout.  Cerebellum: Unremarkable.  Sella and skull base: The sella appears to be within normal limits for age.  Herniation: None.  Intracranial calcifications: Incidental note is made of bilateral choroid plexus calcification. Incidental note is made of some pineal region calcification. Incidental note is made of some calcification of the falx.  Calvarium: No acute linear or depressed skull fracture is seen.    Maxillofacial Structures:  Paranasal sinuses: A small  retention cyst or polyp is seen in the right maxillary sinus.  Orbits: The orbits appear unremarkable.  Zygomatic arches: The zygomatic arches are intact and unremarkable.  Temporal bones and mastoids: The temporal bones and mastoids appear unremarkable.  TMJ: The mandibular condyles appear normally placed with respect to the mandibular fossa.    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    Notifications: This is a stroke protocol report and the results were discussed with the emergency room physician Dr. Herrera prior to dictation at 2023-05-23 22:58:29 CDT.      Impression:  1. No acute intracranial process identified. Details and findings as noted above.                          Wet Read by Feliz Herrera DO (05/23/23 22:48:46, [facility], Emergency Medicine)    No ICH or extra-axial hemorrhage.                                     PROCEDURES:  Procedures    ECG:  EKG Readings: (Independently Interpreted)   Initial Reading: No STEMI. Rhythm: Normal Sinus Rhythm. Heart Rate: 85. Axis: Normal.     ED COURSE AND MEDICAL DECISION MAKING:  Medications - No data to display  ED Course as of 05/23/23 2358   Tue May 23, 2023   2248 CT head shows no intracerebral or extra-axial hemorrhage. [IB]      ED Course User Index  [IB] Feliz Herrera DO     Additional MDM:     NIH Stroke Scale:   Interval = initial 15 minute assessment from arrival  Level of consciousness = 0 - alert  LOC questions = 0 - answers both correctly  LOC commands = 0 - performs both correctly  Best gaze = 0 - normal  Visual = 0 - no visual loss  Facial palsy = 1 - minor  Motor left arm =  0 - no drift  Motor right arm =  1 - drift  Motor left leg = 0 - no drift  Motor right leg =  1 - drift  Limb ataxia = 1 - present in one limb  Sensory = 1 - mild to moderate loss  Best language = 0 - no aphasia  Dysarthria = 0 - normal articulation  Extinction and  inattention = 0 - no neglect  NIH Stroke Scale Total = 5  Medical Decision Making  43-year-old male who presents with right-sided hemiparesis with last known well 2100 yesterday.  He was seen and evaluated immediately upon arrival to the ED with initial NIHSS score 5.  Stroke alert activated.  He was outside the window for thrombolytics and mechanical thrombectomy.  CTA shows no acute intracranial process.  CBC shows leukocytosis of 15.8, this appears chronic unknown etiology.  CMP shows glucose 104, mild hypokalemia at 3.0 was normal BUN and creatinine.  Chest x-ray shows no acute abnormalities.  BP upon arrival 212/103.  We will allow permissive hypertension.  I have spoken with the patient and/or caregivers. I have explained the patient's condition, diagnoses and treatment plan based on the information available to me at this time. I have answered the patient's and/or caregiver's questions and addressed any concerns. The patient and/or caregivers have as good an understanding of the patient's diagnosis, condition and treatment plan as can be expected at this point. The patient has been stabilized within the capability of the emergency department. The patient will be transported for further care and management or will be moved to an observation or inpatient service. I have communicated with the staff or medical practitioner taking over this patient's care.    I have personally provided 31 minutes of critical care time exclusive of time spent on separately billable procedures. Time includes review of laboratory data, radiology results, discussion with consultants, and monitoring for potential decompensation. Interventions were performed as documented above.     Problems Addressed:  Stroke: complicated acute illness or injury with systemic symptoms     Details: Differential diagnoses include hemorrhagic stroke, brain tumor, ischemic stroke, subarachnoide hemorrhage.    Amount and/or Complexity of Data  Reviewed  External Data Reviewed: labs.  Labs: ordered. Decision-making details documented in ED Course.  Radiology: ordered and independent interpretation performed. Decision-making details documented in ED Course.  ECG/medicine tests: ordered and independent interpretation performed. Decision-making details documented in ED Course.    Risk  Decision regarding hospitalization.    Critical Care  Total time providing critical care: 31 minutes      CLINICAL IMPRESSION:  1. Acute right hemiparesis    2. Stroke        DISPOSITION:   ED Disposition Condition    Observation Stable                    Feliz Herrera,   05/23/23 [id number]       Feliz Herrera,   05/24/23 0123

## 2023-05-24 NOTE — PROGRESS NOTES
"Inpatient Nutrition Evaluation    Admit Date: 2023   Total duration of encounter: 1 day    Nutrition Recommendation/Prescription     Will change diet to cardiac/diabetic   Pt education on diet/complete  MVI/fe  Biweekly wt  Will monitor nutrition status     Nutrition Assessment     Chart Review    Reason Seen: continuous nutrition monitoring    Malnutrition Screening Tool Results   Have you recently lost weight without trying?: Yes: 2-13 lbs  Have you been eating poorly because of a decreased appetite?: No   MST Score: 1     Diagnosis:  CVA, DM, HLD< HTN, tobacco use, CICI     Relevant Medical History: poorly controlled DM, obesity , elevated TG, pancreatitis, hepatic steatosis, HTN, CICI< CKD     Nutrition-Related Medications: IVF 0.9 NaCl @ 100ml/hr, aspirin, atorvastatin, insulin, pantoprazole    Nutrition-Related Labs:  () H/H 15.6/43.6 Gluc 104 Bun 10.5 Cr 0.8 K 3.0(L) Alk Phos 224(H) Alb 3.5 HgbA1c 9.4(H) (H)     Diet Order: Diet Adult Regular Cardiac  Oral Supplement Order: none  Appetite/Oral Intake: fair/50-75% of meals  Factors Affecting Nutritional Intake:  stomach issues   Food/Cheondoism/Cultural Preferences: none reported  Food Allergies: none reported       Wound(s):   none reported     Comments    () Pt reported appetite --up/down; sometimes has stomach issues; UBW between 220-230#; current wt within UBW range. Pt stated not on strict diet; discussed importance of following diabetic diet. Pt refused oral supplement.     Anthropometrics    Height: 5' 9" (175.3 cm)    Last Weight: 100.7 kg (222 lb) (23 0809) Weight Method: Standard Scale  BMI (Calculated): 32.8  BMI Classification: obese grade I (BMI 30-34.9)        Ideal Body Weight (IBW), Male: 160 lb     % Ideal Body Weight, Male (lb): 138.75 %                 Usual Body Weight (UBW), k kg (pt stated UBW between 220-230#)  % Usual Body Weight: 100.91     Usual Weight Provided By: patient and EMR weight history    Wt " Readings from Last 5 Encounters:   05/24/23 100.7 kg (222 lb)   05/23/23 100.8 kg (222 lb 3.2 oz)   05/09/23 102.2 kg (225 lb 3.2 oz)   04/25/23 102.5 kg (226 lb)   04/10/23 99 kg (218 lb 4.1 oz)     Weight Change(s) Since Admission:  Admit Weight: 100.7 kg (222 lb) (05/23/23 2230)  No wt change     Patient Education    Education Provided: diabetic diet and heart healthy diet  Teaching Method: explanation and printed materials  Comprehension: verbalizes understanding  Barriers to Learning: desire/motivation  Expected Compliance: fair  Comments: All questions were answered and dietitian's contact information was provided.     Monitoring & Evaluation     Dietitian will monitor food and beverage intake, weight, and food/nutrition knowledge skill.  Nutrition Risk/Follow-Up: low (follow-up in 5-7 days)  Patients assigned 'low nutrition risk' status do not qualify for a full nutritional assessment but will be monitored and re-evaluated in a 5-7 day time period. Please consult if re-evaluation needed sooner.

## 2023-05-24 NOTE — PROGRESS NOTES
"Consult received for MRI with Spinal Cord Stimulator. OU, OLC & OSM unable to accomodate-machines "incompatible". Contacted [facility] who agreed to review for eligibility once Stimulator Card information received. Called Dr. Jay Pozo's office ([phone]) for card information; however, staff out for lunch until 1:00 pm.   "

## 2023-05-24 NOTE — DISCHARGE SUMMARY
[facility] INTERNAL MEDICINE  LEAVING AGAINST MEDICAL ADVICE    Admitting Physician: Trinity Vidal MD  Attending Physician: Trinity Vdial MD  Date of Admit: 5/23/2023  Date of AMA: 5/24/2023    Brief History of Present Illness      Bharath Caballero is a 43 y.o. male with a PMH of poorly controlled diabetes mellitus, morbid obesity, familial hypertriglyceridemia, recurrent pancreatitis, hepatic steatosis, hypertension, CICI not on CPAP, tobacco abuse (15 pack-year hx), and CKD II who presented to [facility] ED on 5/23/2023  with a primary complaint of right UE, right LE, and right facial droop. Patient states he woke up this morning with right sided weakness. His known normal was the evening of 5/22/23. Patient states he saw his PCP who recommended evaluation in the ED.      In the ED, CT head WO contrast showed no bleed, labs showed a leukocytosis of 15.86, hypokalemia of 3.0, and Alk Phos of 224. Patient was admitted for further work up of CVA and neurological monitoring.     Decision was made per primary care team in the AM after his hospital admission to pursue CVA workup, consisting of MRI brain w/wo contrast. During morning rounds, patient was displeased/agitated for being unable to resume his home meds 2/2 NPO status. After explaining the situation and why he was admitted to the hospital, he seems amenable to staying for further workup now that his home meds and diet have been resumed. However, upon entering the MRI room, the patient's IV was infiltrated during contrast administration and patient had to be returned to the floor for new IV placement. Once back on the floors, patient stated he no longer wanted to receive another IV placement for contrast infusion and refused imaging. Patient threatened to leave AMA without signing as well mainly due to the fact that his BP has not been resumed. Nurse contacted physician on call and informed patient wanting to leave AMA. Physician was on the way to meet with patient on the  floor to convince the patient to stay for continued CVA workup. However, the patient was not present in the room and nurse reports patient left the hospital.     Discharge Information:     This patient left against medical advice. I have contacted the patient via phone afterwards and personally explained to patient the risks and that choosing to do so may result in permanent bodily harm or even death. Discussed at great length that without further evaluation and monitoring there may have unforeseen circumstances and/or deterioration causing permanent bodily harm or death as a result of leaving against medical advice. Patient verbalizes these risks back to me in layman terms. Patient still desires to leave against medical advice. Patient is alert, oriented, and shows the mental capacity to make clear decisions regarding his/her health care at this time. In light of patients decision to leave against medical advice, follow up will be arranged and patient is aware of the importance of following up as instructed. Advise patient to return to ED at any time immediately if he/ changes mind at any time or if condition begins to worsen or change in anyway.     Han Vallejo MD   [facility] Family Medicine Resident HO-1  05/24/2023

## 2023-05-25 NOTE — PROGRESS NOTES
05/25/23 0740   Missed Time Reason   Missed Treatment Reason Patient discharged prior to initiation of evaluation

## 2023-05-29 ENCOUNTER — HOSPITAL ENCOUNTER (OUTPATIENT)
Dept: RADIOLOGY | Facility: HOSPITAL | Age: 43
Discharge: HOME | End: 2023-05-29
Attending: NURSE PRACTITIONER
Payer: MEDICARE

## 2023-05-29 DIAGNOSIS — R79.89 ELEVATED LFTS: ICD-10-CM

## 2023-05-29 DIAGNOSIS — K76.0 HEPATIC STEATOSIS: ICD-10-CM

## 2023-05-29 PROCEDURE — 76705 ECHO EXAM OF ABDOMEN: CPT | Mod: TC

## 2023-05-29 NOTE — PROGRESS NOTES
Please notify findings of hepatomegaly, hepatic steatosis, and sludge in the gallbladder with no other significant change.  Thanks

## 2023-06-09 ENCOUNTER — OFFICE VISIT (OUTPATIENT)
Dept: CARDIOLOGY | Facility: CLINIC | Age: 43
End: 2023-06-09
Payer: MEDICARE

## 2023-06-09 VITALS
HEART RATE: 92 BPM | OXYGEN SATURATION: 99 % | DIASTOLIC BLOOD PRESSURE: 90 MMHG | TEMPERATURE: 99 F | HEIGHT: 69 IN | BODY MASS INDEX: 33.38 KG/M2 | WEIGHT: 225.38 LBS | RESPIRATION RATE: 20 BRPM | SYSTOLIC BLOOD PRESSURE: 178 MMHG

## 2023-06-09 DIAGNOSIS — G47.33 OBSTRUCTIVE SLEEP APNEA SYNDROME: ICD-10-CM

## 2023-06-09 DIAGNOSIS — I10 PRIMARY HYPERTENSION: ICD-10-CM

## 2023-06-09 DIAGNOSIS — R94.39 ABNORMAL STRESS TEST: ICD-10-CM

## 2023-06-09 DIAGNOSIS — I25.118 CORONARY ARTERY DISEASE OF NATIVE ARTERY OF NATIVE HEART WITH STABLE ANGINA PECTORIS: Primary | ICD-10-CM

## 2023-06-09 PROCEDURE — 99215 OFFICE O/P EST HI 40 MIN: CPT | Mod: PBBFAC | Performed by: INTERNAL MEDICINE

## 2023-06-09 RX ORDER — CARVEDILOL 12.5 MG/1
12.5 TABLET ORAL 2 TIMES DAILY WITH MEALS
Qty: 60 TABLET | Refills: 11 | Status: SHIPPED | OUTPATIENT
Start: 2023-06-09 | End: 2023-06-09

## 2023-06-09 RX ORDER — ISOSORBIDE MONONITRATE 120 MG/1
120 TABLET, EXTENDED RELEASE ORAL DAILY
Qty: 90 TABLET | Refills: 3 | Status: SHIPPED | OUTPATIENT
Start: 2023-06-09 | End: 2024-06-08

## 2023-06-09 RX ORDER — NIFEDIPINE 60 MG/1
60 TABLET, EXTENDED RELEASE ORAL 2 TIMES DAILY
Qty: 180 TABLET | Refills: 3 | Status: SHIPPED | OUTPATIENT
Start: 2023-06-09 | End: 2024-06-08

## 2023-06-09 RX ORDER — ISOSORBIDE MONONITRATE 60 MG/1
60 TABLET, EXTENDED RELEASE ORAL DAILY
Qty: 90 TABLET | Refills: 3 | Status: SHIPPED | OUTPATIENT
Start: 2023-06-09 | End: 2023-06-09

## 2023-06-09 RX ORDER — CARVEDILOL 25 MG/1
25 TABLET ORAL 2 TIMES DAILY WITH MEALS
Qty: 60 TABLET | Refills: 11 | Status: SHIPPED | OUTPATIENT
Start: 2023-06-09 | End: 2024-06-08

## 2023-06-09 NOTE — PROGRESS NOTES
Cardiology Attending    I have discussed Bharath Caballero including the patient's symptoms, findings, and management plan with the cardiology fellow.  Please see the Cardiology Note for details.

## 2023-06-09 NOTE — PROGRESS NOTES
[facility] Cardiology Clinic Note     Date of Visit: 6/9/2023  Reason for Visit/Chief Complaint:   Chief Complaint    Follow-up        History of Present Illness:      Bharath Caballero is a 43 y.o. male with a PMH significant for HTN, DM, Hypertriglyceridemia, Hepatic Steatosis, recurrent pancreatitis, CICI who presents to cardiology clinic for follow up.     Pt has had ongoing chest pain on and off even at rest, and SOB. He had a coronary angiogram in Apr 2023 which showed some proximal LAD disease, however iFR was 0.94 which was considered non-significant, hence not revascularized. Pt also has mid-RCA . Pt also met with CT surgery Dr. Dallas at [facility] who did not think that the pt warranted a CABG. The pt has uncontrolled diabetes (A1c>10) and uncontrolled HTN. He continues to complain of occasional angina at rest which is non-limiting.     Pt is hard of hearing. Uses a cane to walk. Continues to smoke a 1/2 pack over 2 days.     Coronary angiogram 4/2023:   FINDINGS  LVEDP:  18 mmHg  Left Main:  No stenosis    LAD:   Diffusely diseased.  70% ostial-prox lesion, 70% mid-distal lesion and 50% lesion at apex  Circumflex:   Mild diffuse disease   30%  lesion at the ostium of the first OM  RCA:   Severe, diffuse disease. Small vessel   90% prox-mid lesion   mid vessel  The patient has Significant two vessel disease    Blood loss:  less than 10 cc.  LIMA:  Medium size vessel.     iFR = 0.94 in proximal and mid LAD.  iFR > 0.89 is not significant      RECOMMENDATIONS  Medical management  Risk factor modifications -- start statin, blood pressure control      ECHO 5/24/23   The left ventricle is normal in size with mild concentric hypertrophy and normal systolic function.  The estimated ejection fraction is 65%.  Normal left ventricular diastolic function.  There is no evidence of intracardiac shunting.  Elevated central venous pressure (15 mmHg).  The estimated PA systolic pressure is 24 mmHg.  Normal right ventricular size  with normal right ventricular systolic function.  Mild left atrial enlargement.    Past Medical History:     Past Medical History:   Diagnosis Date    BPH (benign prostatic hyperplasia)     Diabetes     GERD (gastroesophageal reflux disease)     Hepatic steatosis     History of continuous positive airway pressure (CPAP) therapy at home     Hypertension     Hypertriglyceridemia     Kidney disorder     Leg pain     Morbid obesity     Neuropathy     OA (osteoarthritis)     CICI (obstructive sleep apnea)     Polyneuropathy     Recurrent pancreatitis     Renal insufficiency     Tobacco abuse        Surgical History:     Past Surgical History:   Procedure Laterality Date    ANGIOGRAM, CORONARY, WITH LEFT HEART CATHETERIZATION N/A 4/10/2023    Procedure: Angiogram, Coronary, with Left Heart Cath;  Surgeon: Jaswant Pugh MD;  Location: [facility] CATH LAB;  Service: Cardiology;  Laterality: N/A;    APPENDECTOMY      ARTERIOVENOUS ANASTOMOSIS, OPEN, UPPER ARM BASILLIC VEIN TRANSPOSITION N/A 05/07/2018    ESOPHAGOGASTRODUODENOSCOPY N/A 06/07/2021    EXCISION OF ARTERIOVENOUS FISTULA N/A 06/01/2018    FRACTIONAL FLOW RESERVE (FFR), CORONARY  4/10/2023    Procedure: Fractional Flow [place] (FFR), Coronary;  Surgeon: Jaswant Pugh MD;  Location: [facility] CATH LAB;  Service: Cardiology;;    HERNIA REPAIR      INSPECTION OF UPPER INTESTINAL TRACT N/A 06/07/2021    PHERESIS OF PLASMA N/A 07/13/2018    PHERESIS OF PLASMA N/A 05/25/2018    SPINAL CORD STIMULATOR REMOVAL      TRIAL OF SPINAL CORD NERVE STIMULATOR N/A 5/12/2022    Procedure: TRIAL, NEUROSTIMULATOR, SPINAL CORD;  Surgeon: Jay Pozo MD;  Location: [facility];  Service: Neurosurgery;  Laterality: N/A;    UPPER GI N/A 06/07/2021       Family History:     Family History   Problem Relation Age of Onset    Obesity Father        Social History:     Social History     Tobacco Use    Smoking status: Former     Packs/day: 0.50     Years: 25.00     Pack years: 12.50     Types:  "Cigarettes     Start date: 1996     Quit date: 2023     Years since quittin.1    Smokeless tobacco: Never    Tobacco comments:     States quit 2.5 weeks ago   Substance Use Topics    Alcohol use: Not Currently    Drug use: Not Currently       Allergies:   Review of patient's allergies indicates:  No Known Allergies      Review of Systems:   Review of Systems   Constitutional:  Negative for chills and fever.   HENT:  Negative for congestion and sinus pain.    Respiratory:  Positive for shortness of breath. Negative for cough and wheezing.    Cardiovascular:  Positive for chest pain. Negative for palpitations, orthopnea and leg swelling.   Gastrointestinal:  Negative for abdominal pain, constipation, diarrhea, nausea and vomiting.   Genitourinary:  Negative for dysuria and hematuria.   Musculoskeletal:  Negative for falls, joint pain and myalgias.   Skin:  Negative for rash.   Neurological:  Negative for dizziness and weakness.   Psychiatric/Behavioral:  The patient is not nervous/anxious.       Objective:     Wt Readings from Last 3 Encounters:   23 102.2 kg (225 lb 6.4 oz)   23 100.7 kg (222 lb)   23 100.8 kg (222 lb 3.2 oz)     Temp Readings from Last 3 Encounters:   23 98.6 °F (37 °C)   23 98.5 °F (36.9 °C) (Oral)   23 98.8 °F (37.1 °C) (Oral)     BP Readings from Last 3 Encounters:   23 (!) 196/105   23 (!) 192/101   23 (!) 160/92     Pulse Readings from Last 3 Encounters:   23 92   23 64   23 85       Vitals:    23 1045   BP: (!) 196/105   BP Location: Right arm   Patient Position: Sitting   BP Method: Medium (Automatic)   Pulse: 92   Resp: 20   Temp: 98.6 °F (37 °C)   SpO2: 99%   Weight: 102.2 kg (225 lb 6.4 oz)   Height: 5' 9" (1.753 m)     Body mass index is 33.29 kg/m².    General: Patient resting comfortably, in no acute distress. Hard of hearing.   HENT: Head-normocephalic and atraumatic  Neck: no JVD, trachea " midline  Respiratory: clear to auscultation bilaterally  Cardiovascular: regular rate and rhythm without murmurs  Gastrointestinal: soft, non-tender, non-distended   Musculoskeletal: full range of motion without limitation or discomfort  Integumentary: no rashes or skin lesions present  Neurologic: no signs of peripheral neurological deficit  Psychiatric:  alert and oriented, cognitive function intact, cooperative with exam      Cardiac Studies/Imaging:   I have reviewed the following studies below:      EKG (11/29/22):  NSR    Echo (11/29/22):  The left ventricle is normal in size with concentric remodeling and normal systolic function.  The estimated ejection fraction is 60%.  Normal left ventricular diastolic function.  Normal right ventricular size with normal right ventricular systolic function.  Normal central venous pressure (3 mmHg).    Nuclear stress (Regadenoson): 11/29/22    Abnormal myocardial perfusion scan.    There is a moderate to severe intensity, moderate to large sized, reversible perfusion abnormality that is consistent with ischemia in the basal to apical lateral apical wall(s) in the typical distribution of the LCX territory.    There are no other significant perfusion abnormalities.    The gated perfusion images showed an ejection fraction of 64% at rest. The gated perfusion images showed an ejection fraction of 69% post stress.    The EKG portion of this study is negative for ischemia.    The patient reported no chest pain during the stress test.    There were no arrhythmias during stress.     On the nuclear stress test the EF is normal.  If the patient has an echo, the EF on the echo is considered more accurate.      The patient has a moderate to high risk nuclear stress test.      If clinically indicated, consider further evaluation with coronary angiogram.    Images:  Ankle Brachial Indices (FOUZIA)    Result Date: 3/10/2023  There were normal triphasic Doppler waveforms at the right ankle.  The FOUZIA on the right was normal. The TBI on the right was normal The were normal triphasic Doppler waveforms at the left ankle. The FOUZIA on the left was normal. The TBI on the left was normal. There was no evidence of significant arterial insufficiency identified on this study. The post exercise FOUZIA study was omitted due to an unstable gait and lethargy. The left arm brachial systolic pressure was omitted due to an AV fistula.        Assessment/Plan:     Angina  CAD with moderate obstructive CAD  Uncontrolled Hypertension  Pt has proximal LAD disease based on cath in 4/2023, however iFR was 0.94 which was considered non-significant, hence not revascularized. Pt also has mid-RCA . Not felt to be a CABG candidate by CT surgery.   - Will maximize anti-anginals   - Switch metoprolol to Coreg 25 mg BID   - Increase Imdur to 120 mg daily    - Increase nifedipine to 60 mg BID  - Continue other anti-hypertensives: clonidine, ibesartan, torsemide  - Continue ASA, statin  - If pt continues to have angina despite maximal medical therapy, could consider PCI, however given his uncontrolled diabetes, LIMA-LAD may be a better option. In any case, will follow up with him after maximizing anti-anginals.      Hypertriglyceridemia, familial hypercholesterolemia   DM2- uncontrolled  -continue atorvastatin 40mg daily  -continue Vascepa 2g BID  - diabetes not well controlled, FLP and A1c above goal, defer to PCP      Future Appointments   Date Time Provider Department Center   6/21/2023  1:30 PM Kendal Grande, BEATRIZ [facility] NEPHR [place]   8/8/2023  1:30 PM Malina Brito [facility] OPWND [place]    8/29/2023  1:30 PM Dat Beasley MD [facility] IM RES [place]   9/19/2023  8:45 AM ROCCO Gamez [facility] GASTRO [place]   9/25/2023  1:30 PM Judith Jean, ELVIS [facility] NEURO [place]   4/1/2024  9:30 AM Gilda Erwin, VIOLA [facility] ENDOCR [place]       Rajan Baltazar MD, MSCI  [employer] Cardiology Fellow  06/09/2023    10:55 AM  [facility]

## 2023-06-19 DIAGNOSIS — G89.4 CHRONIC PAIN SYNDROME: ICD-10-CM

## 2023-06-20 RX ORDER — MORPHINE SULFATE 100 MG/1
TABLET, FILM COATED, EXTENDED RELEASE ORAL
Qty: 90 TABLET | Refills: 0 | Status: SHIPPED | OUTPATIENT
Start: 2023-06-20 | End: 2023-07-13 | Stop reason: SDUPTHER

## 2023-06-21 ENCOUNTER — OFFICE VISIT (OUTPATIENT)
Dept: NEPHROLOGY | Facility: CLINIC | Age: 43
End: 2023-06-21
Payer: MEDICARE

## 2023-06-21 VITALS
RESPIRATION RATE: 18 BRPM | TEMPERATURE: 98 F | HEART RATE: 68 BPM | OXYGEN SATURATION: 99 % | DIASTOLIC BLOOD PRESSURE: 88 MMHG | BODY MASS INDEX: 33.38 KG/M2 | WEIGHT: 225.38 LBS | HEIGHT: 69 IN | SYSTOLIC BLOOD PRESSURE: 152 MMHG

## 2023-06-21 DIAGNOSIS — I10 PRIMARY HYPERTENSION: ICD-10-CM

## 2023-06-21 DIAGNOSIS — N18.1 CKD STAGE G1/A3, GFR > 90 AND ALBUMIN CREATININE RATIO >300 MG/G: Primary | ICD-10-CM

## 2023-06-21 DIAGNOSIS — K21.00 GASTROESOPHAGEAL REFLUX DISEASE WITH ESOPHAGITIS WITHOUT HEMORRHAGE: ICD-10-CM

## 2023-06-21 DIAGNOSIS — E78.01 FAMILIAL HYPERCHOLESTEREMIA: ICD-10-CM

## 2023-06-21 DIAGNOSIS — E87.6 HYPOKALEMIA: ICD-10-CM

## 2023-06-21 DIAGNOSIS — Z72.0 TOBACCO ABUSE: ICD-10-CM

## 2023-06-21 PROCEDURE — 99215 OFFICE O/P EST HI 40 MIN: CPT | Mod: PBBFAC | Performed by: NURSE PRACTITIONER

## 2023-06-21 PROCEDURE — 99214 OFFICE O/P EST MOD 30 MIN: CPT | Mod: S$PBB,,, | Performed by: NURSE PRACTITIONER

## 2023-06-21 PROCEDURE — 99214 PR OFFICE/OUTPT VISIT, EST, LEVL IV, 30-39 MIN: ICD-10-PCS | Mod: S$PBB,,, | Performed by: NURSE PRACTITIONER

## 2023-06-21 RX ORDER — POTASSIUM CHLORIDE 20 MEQ/1
20 TABLET, EXTENDED RELEASE ORAL 2 TIMES DAILY
Qty: 180 TABLET | Refills: 3 | Status: SHIPPED | OUTPATIENT
Start: 2023-06-21 | End: 2024-06-20

## 2023-06-21 RX ORDER — SUCRALFATE 1 G/10ML
1 SUSPENSION ORAL
Qty: 1200 ML | Refills: 3 | Status: SHIPPED | OUTPATIENT
Start: 2023-06-21 | End: 2024-03-07 | Stop reason: SDUPTHER

## 2023-06-21 NOTE — PROGRESS NOTES
[facility]  Nephrology Clinic Note    Chief complaint: Chronic Kidney Disease (RTC, took meds, dyspnea upon ambulation)    History of present illness:   Bharath Caballero is a 43 y.o. White male with past medical history of poorly controlled diabetes mellitus, morbid obesity, familial hypertriglyceridemia, recurrent pancreatitis, hepatic steatosis, hypertension, CICI, hearing loss, recurrent candiduria, polyneuropathy secondary to diabetes, chronic pain (s/p neuro stimulator insertion in July 2022), and tobacco abuse. Hypertriglyceridemia was previously treated with routine plasmapheresis, this was discontinued in August 2018 per patient's request. Patient has undergone multiple AV access creation procedures, including tunneled catheter insertion and removal as well as AV graft creation. Both of his AV grafts are now thrombosed. Patient was managed by hospice in the past, however, was discharged from hospice care due to lack of terminal diagnosis. Presents today for follow-up.    Review of Systems  12 point review of systems conducted, negative except as stated in the history of present illness.    Allergies: Patient has No Known Allergies.     Past Medical History:  has a past medical history of BPH (benign prostatic hyperplasia), Diabetes, GERD (gastroesophageal reflux disease), Hepatic steatosis, History of continuous positive airway pressure (CPAP) therapy at home, Hypertension, Hypertriglyceridemia, Kidney disorder, Leg pain, Morbid obesity, Neuropathy, OA (osteoarthritis), CICI (obstructive sleep apnea), Polyneuropathy, Recurrent pancreatitis, Renal insufficiency, and Tobacco abuse.    Procedure History:  has a past surgical history that includes Esophagogastroduodenoscopy (N/A, 06/07/2021); INSPECTION OF UPPER INTESTINAL TRACT (N/A, 06/07/2021); UPPER GI (N/A, 06/07/2021); PHERESIS OF PLASMA (N/A, 07/13/2018); Excision of arteriovenous fistula (N/A, 06/01/2018); PHERESIS OF PLASMA  "(N/A, 05/25/2018); ARTERIOVENOUS ANASTOMOSIS, OPEN, UPPER ARM BASILLIC VEIN TRANSPOSITION (N/A, 05/07/2018); Appendectomy; Hernia repair; Trial of spinal cord nerve stimulator (N/A, 5/12/2022); Spinal cord stimulator removal; ANGIOGRAM, CORONARY, WITH LEFT HEART CATHETERIZATION (N/A, 4/10/2023); and fractional flow reserve (ffr), coronary (4/10/2023).    Family History: family history includes Obesity in his father.    Social History:  reports that he has been smoking cigarettes. He started smoking about 26 years ago. He has a 12.50 pack-year smoking history. He has never used smokeless tobacco. He reports that he does not currently use alcohol. He reports that he does not currently use drugs.    Physical exam  BP (!) 152/88 (BP Location: Right arm, Patient Position: Sitting, BP Method: Medium (Manual))   Pulse 68   Temp 98.4 °F (36.9 °C) (Oral)   Resp 18   Ht 5' 8.9" (1.75 m)   Wt 102.2 kg (225 lb 6.4 oz)   SpO2 99%   BMI 33.38 kg/m²   General appearance: Patient is in no acute distress.  Skin: No rashes or wounds.  HEENT: PERRLA, EOMI, no scleral icterus, no JVD. Neck is supple.  Chest: Respirations are unlabored. Lungs sounds are clear.   Heart: S1, S2.   Abdomen: Benign.  : Deferred.  Extremities: No edema, peripheral pulses are palpable. Thrombosed accesses to LUE noted  Neuro: No focal deficits.     Home Medications:    Current Outpatient Medications:     aspirin (ECOTRIN) 81 MG EC tablet, Take 81 mg by mouth once daily., Disp: , Rfl:     atorvastatin (LIPITOR) 40 MG tablet, Take 1 tablet (40 mg total) by mouth once daily., Disp: 90 tablet, Rfl: 3    carBAMazepine (TEGRETOL) 200 mg tablet, Take 3 tablets (600 mg total) by mouth 2 (two) times a day., Disp: 180 tablet, Rfl: 11    carvediloL (COREG) 25 MG tablet, Take 1 tablet (25 mg total) by mouth 2 (two) times daily with meals., Disp: 60 tablet, Rfl: 11    clonazePAM (KLONOPIN) 1 MG tablet, TAKE ONE TABLET BY MOUTH TWICE DAILY, Disp: 60 tablet, Rfl: " 5    cloNIDine (CATAPRES) 0.2 MG tablet, Take 1 tablet (0.2 mg total) by mouth 3 (three) times daily., Disp: 270 tablet, Rfl: 3    EScitalopram oxalate (LEXAPRO) 20 MG tablet, Take 20 mg by mouth once daily., Disp: , Rfl:     esomeprazole (NEXIUM) 40 MG capsule, Take 1 capsule (40 mg total) by mouth before breakfast., Disp: 30 capsule, Rfl: 11    evolocumab (REPATHA SURECLICK) 140 mg/mL PnIj, Inject 1 mL (140 mg total) into the skin every 14 (fourteen) days., Disp: 2 mL, Rfl: 11    gabapentin (NEURONTIN) 400 MG capsule, Two tabs in AM, one in afternoon, Disp: 90 capsule, Rfl: 3    gabapentin (NEURONTIN) 800 MG tablet, TAKE ONE TABLET BY MOUTH IN THE EVENING, Disp: 90 tablet, Rfl: 3    HUMALOG U-100 INSULIN 100 unit/mL injection, INJECT 25 UNITS SUBCUTANEOUSLY BEFORE MEALS THREE TIMES DAILY, Disp: 10 mL, Rfl: 4    HYDROmorphone (DILAUDID) 8 MG tablet, Take 1 tablet (8 mg total) by mouth every 6 (six) hours as needed., Disp: 120 tablet, Rfl: 0    icosapent ethyL (VASCEPA) 1 gram Cap, Take 2 capsules (2 g total) by mouth 2 (two) times daily., Disp: 60 capsule, Rfl: 11    irbesartan (AVAPRO) 300 MG tablet, Take 300 mg by mouth once daily., Disp: , Rfl:     isosorbide mononitrate (IMDUR) 120 MG 24 hr tablet, Take 1 tablet (120 mg total) by mouth once daily., Disp: 90 tablet, Rfl: 3    LANTUS U-100 INSULIN 100 unit/mL injection, INJECT 100 UNITS SUBCUTANEOUSLY TWICE DAILY (Patient taking differently: Inject 100 Units into the skin every evening. And in AM), Disp: 30 mL, Rfl: 6    lipase-protease-amylase (CREON) 36,000-114,000- 180,000 unit CpDR, Take 1 capsule by mouth 3 (three) times daily., Disp: 270 capsule, Rfl: 1    morphine (MS CONTIN) 100 MG 12 hr tablet, TAKE ONE TABLET BY MOUTH THREE TIMES DAILY, Disp: 90 tablet, Rfl: 0    NIFEdipine (PROCARDIA-XL) 60 MG (OSM) 24 hr tablet, Take 1 tablet (60 mg total) by mouth 2 (two) times a day., Disp: 180 tablet, Rfl: 3    potassium chloride SA (K-DUR,KLOR-CON) 20 MEQ tablet,  "Take 1 tablet (20 mEq total) by mouth once daily., Disp: 90 tablet, Rfl: 3    sucralfate (CARAFATE) 100 mg/mL suspension, Take 10 mLs (1 g total) by mouth 4 (four) times daily before meals and nightly., Disp: 1200 mL, Rfl: 3    SURE COMFORT INSULIN SYRINGE 0.5 mL 31 gauge x 5/16" Syrg, USE ONE SYRINGE THREE TIMES DAILY, Disp: 100 each, Rfl: 3    tamsulosin (FLOMAX) 0.4 mg Cap, TAKE ONE CAPSULE BY MOUTH EVERY DAY, Disp: 90 capsule, Rfl: 3    nitroGLYCERIN (NITROSTAT) 0.3 MG SL tablet, Place 1 tablet (0.3 mg total) under the tongue every 5 (five) minutes as needed for Chest pain (go to er after 3 doses)., Disp: 30 tablet, Rfl: 6    torsemide (DEMADEX) 20 MG Tab, Take 20 mg by mouth once daily., Disp: , Rfl:     Laboratory data    Serum  Lab Results   Component Value Date    WBC 15.86 (H) 05/23/2023    HGB 15.6 05/23/2023    HCT 43.6 05/23/2023     05/23/2023    IRON 52 (L) 08/18/2020    TIBC 296 08/18/2020    TRANS 232.0 08/18/2020    LABIRON 17.6 08/18/2020    FERRITIN 190.9 08/18/2020    UVOIITUS57 773 08/13/2020     (H) 02/20/2018     05/24/2023    K 3.3 (L) 05/24/2023    CHLORIDE 103 05/24/2023    CO2 26 05/24/2023    BUN 10.1 05/24/2023    CREATININE 0.77 05/24/2023    EGFRNORACEVR >60 05/24/2023    GLUCOSE 97 05/24/2023    CALCIUM 8.8 05/24/2023    ALKPHOS 224 (H) 05/23/2023    LABPROT 8.0 05/23/2023    ALBUMIN 3.5 05/23/2023    BILIDIR 0.1 12/20/2021    IBILI 0.20 12/20/2021    AST 28 05/23/2023    ALT 50 05/23/2023    MG 1.80 05/24/2023    PHOS 2.9 12/01/2022      Lab Results   Component Value Date    HGBA1C 9.4 (H) 05/24/2023    PTH 83.7 (H) 04/11/2019    JRODVOGT86NJ 28.1 (L) 06/16/2021    HIV Nonreactive 05/24/2023    HEPCAB Nonreactive 08/13/2020    HEPBSURFAG Nonreactive 08/13/2020    HEPBSAB Nonreactive 09/27/2017     Urine:  Lab Results   Component Value Date    COLORUA Light-Yellow 01/24/2023    APPEARANCEUA Clear 01/24/2023    SGUA 1.035 01/24/2023    PHUA 5.5 01/24/2023    " PROTEINUA 2+ (A) 01/24/2023    GLUCOSEUA 4+ (A) 01/24/2023    KETONESUA Negative 01/24/2023    BLOODUA Negative 01/24/2023    NITRITESUA Negative 01/24/2023    LEUKOCYTESUR Negative 01/24/2023    RBCUA 0-5 01/24/2023    WBCUA 0-5 01/24/2023    BACTERIA None Seen 01/24/2023    SQEPUA Trace (A) 01/24/2023    HYALINECASTS None Seen 01/24/2023    CREATRANDUR 90.6 01/24/2023    PROTEINURINE 106.4 01/24/2023    UPROTCREA 1.2 01/24/2023         Imaging  US Retroperitoneal Complete 11/30/2022    Narrative  EXAMINATION:  US RETROPERITONEAL COMPLETE    CLINICAL HISTORY:  DANNY; Hypertensive emergency    COMPARISON:  14 September 2022    FINDINGS:  Grayscale and color Doppler sonographic evaluation of the kidneys and urinary bladder.    The right kidney measures 10 cm. The left kidney measures 11 cm.   No hydronephrosis.  Normal renal parenchymal echogenicity.    No significant abnormality of the urinary bladder.    Impression  No significant sonographic abnormality of the kidneys.      Electronically signed by: Paulo Biggs  Date:    11/30/2022  Time:    14:12    Impression and plan     CKD stage G1/A3, GFR > 90 and albumin creatinine ratio >300 mg/g  -     Comprehensive Metabolic Panel; Future; Expected date: 06/14/2023  -     Protein/Creatinine Ratio, Urine; Future; Expected date: 06/14/2023  -     Urinalysis, Reflex to Urine Culture; Future; Expected date: 06/14/2023  -     Comprehensive Metabolic Panel; Future; Expected date: 12/07/2023  -     Protein/Creatinine Ratio, Urine; Future; Expected date: 12/07/2023  -     Urinalysis, Reflex to Urine Culture; Future; Expected date: 12/07/2023  Diabetic kidney disease.  Serum creatinine is stable, proteinuria is in the nonnephrotic range.  Continue RAASi and risk factor management.  Continue:  -follow 2 g a day dietary sodium restriction  -control diabetes (goal A1c less than 7%)  -control high blood pressure (goal blood pressure is less than 130/80, please check blood pressure  twice a week and bring blood pressure logs to office visit)  -exercise at least 30 minutes a day, 5 days a week  -maintain healthy weight  -decrease or stop alcohol use  -do not smoke  -stay well hydrated (drink water only, avoid juices, sweet tea, and sodas)  -ask about staying up-to-date on vaccinations (flu vaccine, pneumonia vaccine, hepatitis B vaccine)  -avoid excessive use of NSAIDs (ibuprofen, naproxen, Aleve, Advil, Toradol, Mobic), take Tylenol as needed for headache or mild pain  -take cholesterol lowering medications if prescribed (LDL goal less than 100)  Follow up in about 6 months (around 12/21/2023).        Hypokalemia  -     potassium chloride SA (K-DUR,KLOR-CON) 20 MEQ tablet; Take 1 tablet (20 mEq total) by mouth 2 (two) times daily.  Dispense: 180 tablet; Refill: 3  Will increase dose of potassium chloride to 20 mEq twice a day.      Gastroesophageal reflux disease with esophagitis without hemorrhage  -     sucralfate (CARAFATE) 100 mg/mL suspension; Take 10 mLs (1 g total) by mouth 4 (four) times daily before meals and nightly.  Dispense: 1200 mL; Refill: 3  Continue Carafate as prescribed.      Primary hypertension  Continue current antihypertensive regimen and 2 g a day dietary sodium restriction.      Familial hypercholesteremia  Considering multiple potential complications from new AV access creation as well as plasmapheresis treatments, would not recommend routine plasma exchange at this time, but rather a more aggressive glycemic control and optimization of lipid-lowering therapies. Patient was treated with maximum dose of rosuvastatin and fenofibrate, but total cholesterol and triglyceride levels remained above goal.  Patient is at very high risk for development of cardiovascular events (MI, stroke, unstable angina). Unfortunately, dietary interventions, high-dose statin in combination with fibrate were ineffective to meet cholesterol treatment goals.  Continue PCSK9 Inhibitor.     BMI  33.0-33.9,adult  Lifestyle and dietary interventions discussed, patient counseled on weight loss using portion control, non- sedentary lifestyle, low-carbohydrate/low fat diet.    Tobacco abuse  Patient was counseled on importance of tobacco use cessation.  Strongly encouraged to quit, offered a referral to a smoking cessation program.             6/21/2023  Kendal Grande NP  [facility] Nephrology

## 2023-06-23 DIAGNOSIS — G89.4 CHRONIC PAIN SYNDROME: ICD-10-CM

## 2023-06-23 DIAGNOSIS — E08.42 DIABETIC POLYNEUROPATHY ASSOCIATED WITH DIABETES MELLITUS DUE TO UNDERLYING CONDITION: ICD-10-CM

## 2023-06-23 NOTE — TELEPHONE ENCOUNTER
Pharmacist  at [facility] called stating Rx for Morphine has to state quantity greater than 7 days medically necessary in order for the insurance to cover it. Also pt need a refill on his Dilaudid stating quantity medical necessary, greater than 7 days

## 2023-07-03 NOTE — TELEPHONE ENCOUNTER
The pt has been calling for over a wk inquiring about his Rx. Pharmacy need Rx to states quantity greater than 7 days and medically necessary. Pt requesting a call from Dr or nurse. Someone please respond to pharmacy and pt ASAP!

## 2023-07-06 ENCOUNTER — TELEPHONE (OUTPATIENT)
Dept: INTERNAL MEDICINE | Facility: CLINIC | Age: 43
End: 2023-07-06

## 2023-07-06 RX ORDER — MORPHINE SULFATE 100 MG/1
100 TABLET, FILM COATED, EXTENDED RELEASE ORAL 3 TIMES DAILY
Qty: 90 TABLET | Refills: 0 | OUTPATIENT
Start: 2023-07-06

## 2023-07-06 RX ORDER — HYDROMORPHONE HYDROCHLORIDE 8 MG/1
8 TABLET ORAL EVERY 6 HOURS PRN
Qty: 120 TABLET | Refills: 0 | OUTPATIENT
Start: 2023-07-06

## 2023-07-06 NOTE — TELEPHONE ENCOUNTER
Pt calling inquiring about forms for DMV also medication that was supposed to be sent in for at his last office visit. Pt requesting a call back from Nurse

## 2023-07-11 DIAGNOSIS — E11.40 TYPE 2 DIABETES MELLITUS WITH DIABETIC NEUROPATHY, WITH LONG-TERM CURRENT USE OF INSULIN: ICD-10-CM

## 2023-07-11 DIAGNOSIS — Z79.4 TYPE 2 DIABETES MELLITUS WITH DIABETIC NEUROPATHY, WITH LONG-TERM CURRENT USE OF INSULIN: ICD-10-CM

## 2023-07-11 DIAGNOSIS — E08.42 DIABETIC POLYNEUROPATHY ASSOCIATED WITH DIABETES MELLITUS DUE TO UNDERLYING CONDITION: ICD-10-CM

## 2023-07-11 DIAGNOSIS — G89.4 CHRONIC PAIN SYNDROME: ICD-10-CM

## 2023-07-11 RX ORDER — DAPAGLIFLOZIN 5 MG/1
TABLET, FILM COATED ORAL
Qty: 90 TABLET | Refills: 3 | Status: SHIPPED | OUTPATIENT
Start: 2023-07-11

## 2023-07-13 RX ORDER — MORPHINE SULFATE 100 MG/1
100 TABLET, FILM COATED, EXTENDED RELEASE ORAL 3 TIMES DAILY
Qty: 90 TABLET | Refills: 0 | Status: SHIPPED | OUTPATIENT
Start: 2023-07-13 | End: 2023-08-10 | Stop reason: SDUPTHER

## 2023-07-13 RX ORDER — HYDROMORPHONE HYDROCHLORIDE 8 MG/1
8 TABLET ORAL EVERY 6 HOURS PRN
Qty: 120 TABLET | Refills: 0 | Status: SHIPPED | OUTPATIENT
Start: 2023-07-13 | End: 2023-08-10 | Stop reason: SDUPTHER

## 2023-08-02 ENCOUNTER — TELEPHONE (OUTPATIENT)
Dept: GASTROENTEROLOGY | Facility: CLINIC | Age: 43
End: 2023-08-02
Payer: MEDICARE

## 2023-08-04 DIAGNOSIS — I10 HYPERTENSION, UNSPECIFIED TYPE: ICD-10-CM

## 2023-08-04 RX ORDER — CLONAZEPAM 1 MG/1
1 TABLET ORAL 2 TIMES DAILY
Qty: 60 TABLET | Refills: 5 | Status: CANCELLED | OUTPATIENT
Start: 2023-08-04

## 2023-08-04 NOTE — TELEPHONE ENCOUNTER
Pt called clinic, concerned about his doctor being out on medical leave and being able to get an appointment for his medications. Reassured him that staff would make sure his medications were filled and he got an appointment with doctor's covering for Dr. Beasley. Pt repeated back understanding, and stated he was relieved.

## 2023-08-08 ENCOUNTER — HOSPITAL ENCOUNTER (OUTPATIENT)
Dept: WOUND CARE | Facility: HOSPITAL | Age: 43
Discharge: HOME | End: 2023-08-08
Attending: NURSE PRACTITIONER
Payer: MEDICARE

## 2023-08-08 VITALS
OXYGEN SATURATION: 97 % | HEART RATE: 82 BPM | RESPIRATION RATE: 18 BRPM | SYSTOLIC BLOOD PRESSURE: 171 MMHG | DIASTOLIC BLOOD PRESSURE: 81 MMHG

## 2023-08-08 DIAGNOSIS — Z72.0 TOBACCO USER: ICD-10-CM

## 2023-08-08 DIAGNOSIS — E13.42 DIABETIC POLYNEUROPATHY ASSOCIATED WITH OTHER SPECIFIED DIABETES MELLITUS: ICD-10-CM

## 2023-08-08 DIAGNOSIS — B35.1 ONYCHOMYCOSIS OF MULTIPLE TOENAILS WITH TYPE 2 DIABETES MELLITUS: ICD-10-CM

## 2023-08-08 DIAGNOSIS — E11.69 ONYCHOMYCOSIS OF MULTIPLE TOENAILS WITH TYPE 2 DIABETES MELLITUS: ICD-10-CM

## 2023-08-08 DIAGNOSIS — E11.9 ENCOUNTER FOR DIABETIC FOOT EXAM: Primary | ICD-10-CM

## 2023-08-08 DIAGNOSIS — I10 PRIMARY HYPERTENSION: ICD-10-CM

## 2023-08-08 DIAGNOSIS — L60.2 OVERGROWN TOENAILS: ICD-10-CM
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
## 2023-08-08 DIAGNOSIS — E66.9 CLASS 1 OBESITY WITH BODY MASS INDEX (BMI) OF 33.0 TO 33.9 IN ADULT, UNSPECIFIED OBESITY TYPE, UNSPECIFIED WHETHER SERIOUS COMORBIDITY PRESENT: ICD-10-CM

## 2023-08-08 PROCEDURE — 11719 TRIM NAIL(S) ANY NUMBER: CPT | Mod: ,,, | Performed by: NURSE PRACTITIONER

## 2023-08-08 PROCEDURE — 11719 TRIM NAIL(S) ANY NUMBER: CPT

## 2023-08-08 PROCEDURE — 27000999 HC MEDICAL RECORD PHOTO DOCUMENTATION

## 2023-08-08 PROCEDURE — 25000003 PHARM REV CODE 250

## 2023-08-08 PROCEDURE — 11719 PR TRIM NAIL(S): ICD-10-PCS | Mod: ,,, | Performed by: NURSE PRACTITIONER

## 2023-08-08 PROCEDURE — 99213 PR OFFICE/OUTPT VISIT, EST, LEVL III, 20-29 MIN: ICD-10-PCS | Mod: 25,,, | Performed by: NURSE PRACTITIONER

## 2023-08-08 PROCEDURE — 99213 OFFICE O/P EST LOW 20 MIN: CPT | Mod: 25,,, | Performed by: NURSE PRACTITIONER

## 2023-08-08 RX ORDER — TRIAMCINOLONE ACETONIDE 1 MG/G
OINTMENT TOPICAL 2 TIMES DAILY
Status: ACTIVE | OUTPATIENT
Start: 2023-08-08

## 2023-08-09 ENCOUNTER — TELEPHONE (OUTPATIENT)
Dept: GASTROENTEROLOGY | Facility: CLINIC | Age: 43
End: 2023-08-09
Payer: MEDICARE

## 2023-08-09 NOTE — TELEPHONE ENCOUNTER
----- Message from Brittney Martins sent at 8/8/2023  1:52 PM CDT -----  Regarding: Pt having issues  Heidi w/Wound Care called and stated the pt had an appt with them today and mentioned he is having nausea and vomiting with trouble eating.  GI LOV 2/28  Nov 9/19. Please call pt @ [phone]            Thank You  Shannen PELAYO

## 2023-08-09 NOTE — PROGRESS NOTES
Subjective:       Patient ID: Bharath Caballero is a 43 y.o. male.    Chief Complaint: diabetic foot care    43-year-old male presents to wound care clinic today for diabetic foot care, multiple ant bites to right ankle and foot.  PCP Dr. Beasley last appt 5/9/2023.  Patient presents to wound care clinic alone. Patient states he needs a triple bypass but is refusing at this time.  Reviewed previous medical history.  Medical history chronic pain, neuropathy, chronic palliative, hypertension, diabetes, mixed hyperlipidemia, mononeuritis, GERD, falls, sleep apnea, hard of hearing, and nonpitting bilateral lower extremity edema.    Today's visit 8/8/23:  Reviewed previous progress note.  Trimmed all 10 toenails using podiatry clippers, and filed using [place] board.  Multiple scabs noted to right ankle patient states does not know for sure if it is ant bites.  Instructed just to clean daily with soap and water apply triamcinolone cream in clinic and instructed perform daily. .  Instructed the patient to check daily he is high risk for diabetic foot ulcer due to decrease sensation from neuropathy.  Instructed on proper foot wear, and nail care.  Bilateral lower extremities nonpitting edema, cool to touch, and absent hair growth.  Apply bilateral Tubigrip size F.  Will have him return to the clinic in 1 week to re-evaluate insect bite ankle. Instructed  to call the office with any questions, concerns, or new skin issues.  Verbalized understanding of instructions.    05/08/2023:  Trimmed all 10 toenails using podiatry clippers, and filed using [place] board.  Tolerated well.  Discussed with the patient on smoking sensation due to long history of uncontrolled HTN.  Patient states took blood pressure medicine this a.m. and blood pressure always fluctuates.  Voices awaiting open heart surgery will follow up with Cardiology this week.  Monofilament test done decreased sensation noted in all areas.  Patient complained of having  chronic pain to left arm, Md aware patient had a increase Neurontin.  Instructed on diabetic foot care, checking feet daily, proper footwear.  Will have the patient return in 3 months for diabetic foot care.  Instructed to call the office with any questions, concerns, or new skin issues.  Patient and sister in all verbalized understanding of all instructions.        Lab Results   Component Value Date/Time    WBC 15.86 (H) 05/23/2023 10:51 PM    RBC 5.10 05/23/2023 10:51 PM    HGB 15.6 05/23/2023 10:51 PM    HCT 43.6 05/23/2023 10:51 PM    MCV 85.5 05/23/2023 10:51 PM    MCH 30.6 05/23/2023 10:51 PM    CREATININE 0.77 05/24/2023 09:13 AM    HGBA1C 9.4 (H) 05/24/2023 02:24 AM    CRP 11.20 (H) 06/22/2022 08:28 AM     Review of Systems   Skin:  Positive for wound.   All other systems reviewed and are negative.          Objective:        Physical Exam  Vitals reviewed.   Cardiovascular:      Pulses:           Dorsalis pedis pulses are detected w/ Doppler on the right side and detected w/ Doppler on the left side.        Posterior tibial pulses are detected w/ Doppler on the right side and detected w/ Doppler on the left side.   Musculoskeletal:      Right lower leg: Edema present.      Left lower leg: Edema present.   Feet:      Right foot:      Skin integrity: Skin integrity normal.      Toenail Condition: Right toenails are long.      Left foot:      Skin integrity: Skin integrity normal.      Toenail Condition: Left toenails are long.      Comments: Monofilament test done decreased sensation in all areas.  Skin:     General: Skin is cool and dry.      Capillary Refill: Capillary refill takes less than 2 seconds.             Comments: Insect bites to right ankle and foot no drainage noted.   Neurological:      Mental Status: He is alert.                        Assessment:         ICD-10-CM ICD-9-CM   1. Encounter for diabetic foot exam  E11.9 250.00   2. Overgrown toenails  L60.2 703.8   3. Multiple insect bites   W57.XXXA 919.4     E906.4   4. Onychomycosis of multiple toenails with type 2 diabetes mellitus  E11.69 250.80    B35.1 110.1   5. Diabetic polyneuropathy associated with other specified diabetes mellitus  E13.42 250.60     357.2   6. Primary hypertension  I10 401.9   7. Class 1 obesity with body mass index (BMI) of 33.0 to 33.9 in adult, unspecified obesity type, unspecified whether serious comorbidity present  E66.9 278.00    Z68.33 V85.33   8. Tobacco user  Z72.0 305.1         Plan:   Tissue pathology and/or culture taken:  [] Yes [x] No   Sharp debridement performed:   [] Yes [x] No   Labs ordered this visit:   [] Yes [x] No   Imaging ordered this visit:   [] Yes [x] No         1. Encounter for diabetic foot exam     DFC done.    2. Overgrown toenails     Trimmed.  Instructed on proper nail care.   3. Multiple insect bites     Cleansed with Vashe in clinic and apply triamcinolone cream daily.      4. Onychomycosis of multiple toenails with type 2 diabetes mellitus     Trimmed in clinic.       5. Diabetic polyneuropathy associated with other specified diabetes mellitus     Will check feet daily due to high risk for diabetic foot ulcers.      6. Primary hypertension     Instructed on the importance of taking medications and dangers of hypertension.   7. Class 1 obesity with body mass index (BMI) of 33.0 to 33.9 in adult, unspecified obesity type, unspecified whether serious comorbidity present     Co-factor in delayed wound development. Instructed on daily walking as tolerated.       8. Tobacco user     Instructed on dangers of smoking and smoking cessation.            Follow up in about 1 week (around 8/15/2023).

## 2023-08-10 ENCOUNTER — TELEPHONE (OUTPATIENT)
Dept: INTERNAL MEDICINE | Facility: CLINIC | Age: 43
End: 2023-08-10
Payer: MEDICARE

## 2023-08-10 DIAGNOSIS — E08.42 DIABETIC POLYNEUROPATHY ASSOCIATED WITH DIABETES MELLITUS DUE TO UNDERLYING CONDITION: ICD-10-CM

## 2023-08-10 DIAGNOSIS — G89.4 CHRONIC PAIN SYNDROME: ICD-10-CM

## 2023-08-10 RX ORDER — CLONAZEPAM 1 MG/1
1 TABLET ORAL 2 TIMES DAILY
Qty: 60 TABLET | Refills: 5 | Status: SHIPPED | OUTPATIENT
Start: 2023-08-10 | End: 2024-02-15

## 2023-08-10 RX ORDER — MORPHINE SULFATE 100 MG/1
100 TABLET, FILM COATED, EXTENDED RELEASE ORAL 3 TIMES DAILY
Qty: 90 TABLET | Refills: 0 | Status: SHIPPED | OUTPATIENT
Start: 2023-08-10 | End: 2023-09-13

## 2023-08-10 RX ORDER — HYDROMORPHONE HYDROCHLORIDE 8 MG/1
8 TABLET ORAL EVERY 6 HOURS PRN
Qty: 120 TABLET | Refills: 0 | Status: SHIPPED | OUTPATIENT
Start: 2023-08-10 | End: 2023-09-13

## 2023-08-11 DIAGNOSIS — I10 HYPERTENSION, UNSPECIFIED TYPE: ICD-10-CM

## 2023-08-14 RX ORDER — CLONIDINE HYDROCHLORIDE 0.2 MG/1
0.2 TABLET ORAL 3 TIMES DAILY
Qty: 270 TABLET | Refills: 3 | Status: SHIPPED | OUTPATIENT
Start: 2023-08-14 | End: 2024-03-26 | Stop reason: SDUPTHER

## 2023-08-15 ENCOUNTER — HOSPITAL ENCOUNTER (OUTPATIENT)
Dept: WOUND CARE | Facility: HOSPITAL | Age: 43
Discharge: HOME | End: 2023-08-15
Attending: NURSE PRACTITIONER
Payer: MEDICARE

## 2023-08-15 VITALS
OXYGEN SATURATION: 99 % | DIASTOLIC BLOOD PRESSURE: 90 MMHG | HEART RATE: 81 BPM | RESPIRATION RATE: 18 BRPM | SYSTOLIC BLOOD PRESSURE: 174 MMHG

## 2023-08-15 DIAGNOSIS — E11.622 TYPE 2 DIABETES MELLITUS WITH OTHER SKIN ULCER, WITH LONG-TERM CURRENT USE OF INSULIN: ICD-10-CM

## 2023-08-15 DIAGNOSIS — E13.42 DIABETIC POLYNEUROPATHY ASSOCIATED WITH OTHER SPECIFIED DIABETES MELLITUS: ICD-10-CM

## 2023-08-15 DIAGNOSIS — E66.9 CLASS 1 OBESITY WITH BODY MASS INDEX (BMI) OF 33.0 TO 33.9 IN ADULT, UNSPECIFIED OBESITY TYPE, UNSPECIFIED WHETHER SERIOUS COMORBIDITY PRESENT: ICD-10-CM

## 2023-08-15 DIAGNOSIS — Z79.4 TYPE 2 DIABETES MELLITUS WITH OTHER SKIN ULCER, WITH LONG-TERM CURRENT USE OF INSULIN: ICD-10-CM

## 2023-08-15 DIAGNOSIS — Z72.0 TOBACCO USER: ICD-10-CM

## 2023-08-15 DIAGNOSIS — I10 PRIMARY HYPERTENSION: ICD-10-CM
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
## 2023-08-15 PROCEDURE — 99211 OFF/OP EST MAY X REQ PHY/QHP: CPT

## 2023-08-15 PROCEDURE — 99213 PR OFFICE/OUTPT VISIT, EST, LEVL III, 20-29 MIN: ICD-10-PCS | Mod: ,,, | Performed by: NURSE PRACTITIONER

## 2023-08-15 PROCEDURE — 99213 OFFICE O/P EST LOW 20 MIN: CPT | Mod: ,,, | Performed by: NURSE PRACTITIONER

## 2023-08-15 PROCEDURE — 27000999 HC MEDICAL RECORD PHOTO DOCUMENTATION
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
## 2023-08-16 LAB
INR PPP: 0.9
INR PPP: 1
INR PPP: 1
PROTHROMBIN TIME: 12.1 SECONDS (ref 11.4–14)
PROTHROMBIN TIME: 12.6 SECONDS (ref 11.4–14)
PROTHROMBIN TIME: 12.7 SECONDS (ref 11.4–14)

## 2023-08-16 NOTE — PROGRESS NOTES
Subjective:       Patient ID: Bharath Caballero is a 43 y.o. male.    Chief Complaint: Wound Consult    43-year-old male presents to wound care clinic today for follow up regarding multiple ant bites to right ankle and foot.  PCP Dr. Beasley last appt 5/9/2023.  Patient presents to wound care clinic alone. Patient states he needs a triple bypass but is refusing at this time.  Reviewed previous medical history.  Medical history chronic pain, neuropathy, chronic palliative, hypertension, diabetes, mixed hyperlipidemia, mononeuritis, GERD, falls, sleep apnea, hard of hearing, and nonpitting bilateral lower extremity edema.    Today's visit 8/15/23:  Reviewed previous progress notes.  Right  ankle multiple scabs and areas of redness from bite has improved since last visit.  Instructed to stop the triamcinolone cream inserts washing with soap and water daily and apply Betadine and leave open air.  Instructed may cover foot and ankle with dry gauze and Kerlix.  Tubigrips F bilaterally for edema.  Supplies given.  Increase in blood pressure today patient states a just took blood pressure medication had earlier appointment.  Instructed the importance of monitoring blood pressure and tape medication as directed.  Will have him return in 2 weeks.  Instructed to call the office with any questions, concerns, or new skin issues. Verbalized understanding of instructions.       8/8/23:  Reviewed previous progress note.  Trimmed all 10 toenails using podiatry clippers, and filed using [place] board.  Multiple scabs noted to right ankle patient states does not know for sure if it is ant bites.  Instructed just to clean daily with soap and water apply triamcinolone cream in clinic and instructed perform daily. .  Instructed the patient to check daily he is high risk for diabetic foot ulcer due to decrease sensation from neuropathy.  Instructed on proper foot wear, and nail care.  Bilateral lower extremities nonpitting edema, cool to  touch, and absent hair growth.  Apply bilateral Tubigrip size F.  Will have him return to the clinic in 1 week to re-evaluate insect bite ankle. Instructed  to call the office with any questions, concerns, or new skin issues.  Verbalized understanding of instructions.    05/08/2023:  Trimmed all 10 toenails using podiatry clippers, and filed using [employer] board.  Tolerated well.  Discussed with the patient on smoking sensation due to long history of uncontrolled HTN.  Patient states took blood pressure medicine this a.m. and blood pressure always fluctuates.  Voices awaiting open heart surgery will follow up with Cardiology this week.  Monofilament test done decreased sensation noted in all areas.  Patient complained of having chronic pain to left arm, Md aware patient had a increase Neurontin.  Instructed on diabetic foot care, checking feet daily, proper footwear.  Will have the patient return in 3 months for diabetic foot care.  Instructed to call the office with any questions, concerns, or new skin issues.  Patient and sister in all verbalized understanding of all instructions.        Lab Results   Component Value Date/Time    WBC 15.86 (H) 05/23/2023 10:51 PM    RBC 5.10 05/23/2023 10:51 PM    HGB 15.6 05/23/2023 10:51 PM    HCT 43.6 05/23/2023 10:51 PM    MCV 85.5 05/23/2023 10:51 PM    MCH 30.6 05/23/2023 10:51 PM    CREATININE 0.77 05/24/2023 09:13 AM    HGBA1C 9.4 (H) 05/24/2023 02:24 AM    CRP 11.20 (H) 06/22/2022 08:28 AM     Review of Systems   Skin:  Positive for wound.   All other systems reviewed and are negative.          Objective:        Physical Exam  Vitals reviewed.   Cardiovascular:      Pulses:           Dorsalis pedis pulses are detected w/ Doppler on the right side and detected w/ Doppler on the left side.        Posterior tibial pulses are detected w/ Doppler on the right side and detected w/ Doppler on the left side.   Musculoskeletal:      Right lower leg: Edema present.      Left lower  leg: Edema present.   Feet:      Right foot:      Skin integrity: Skin integrity normal.      Toenail Condition: Right toenails are normal.      Left foot:      Skin integrity: Skin integrity normal.      Toenail Condition: Left toenails are normal.      Comments: Monofilament test done decreased sensation in all area on 8/8/23.  Skin:     General: Skin is cool and dry.      Capillary Refill: Capillary refill takes less than 2 seconds.             Comments: Insect bites to right ankle and foot no drainage noted.   Neurological:      Mental Status: He is alert.   Psychiatric:         Behavior: Behavior is cooperative.                      Assessment:         ICD-10-CM ICD-9-CM   1. Multiple insect bites  W57.XXXA 919.4     E906.4   2. Type 2 diabetes mellitus with other skin ulcer, with long-term current use of insulin  E11.622 250.80    Z79.4 V58.67   3. Diabetic polyneuropathy associated with other specified diabetes mellitus  E13.42 250.60     357.2   4. Primary hypertension  I10 401.9   5. Class 1 obesity with body mass index (BMI) of 33.0 to 33.9 in adult, unspecified obesity type, unspecified whether serious comorbidity present  E66.9 278.00    Z68.33 V85.33   6. Tobacco user  Z72.0 305.1         Plan:   Tissue pathology and/or culture taken:  [] Yes [x] No   Sharp debridement performed:   [] Yes [x] No   Labs ordered this visit:   [] Yes [x] No   Imaging ordered this visit:   [] Yes [x] No         1. Multiple insect bites     Instructed to wash with soap and water daily.  Apply Betadine to areas.  Leave open air.  May cover with Kerlix.  Perform daily.      2. Type 2 diabetes mellitus with other skin ulcer, with long-term current use of insulin     Co-factor in delayed wound healing.       3. Diabetic polyneuropathy associated with other specified diabetes mellitus     Co-factor in wound development.  Will check feet daily.      4. Primary hypertension     Taking blood pressure medications as directed.   5.  Class 1 obesity with body mass index (BMI) of 33.0 to 33.9 in adult, unspecified obesity type, unspecified whether serious comorbidity present     Co-factor in delayed wound healing. Instructed on daily walking as tolerated.       6. Tobacco user     Instructed on dangers of smoking and smoking cessation.            Follow up in about 2 weeks (around 8/29/2023).

## 2023-08-26 NOTE — TELEPHONE ENCOUNTER
Call to pt, no answer. Left message for return call.   
Call to pt, no answer. Left message for return call.        ----- Message from ROCCO Gamez sent at 9/14/2022 11:27 AM CDT -----  Please notify findings of hepatomegaly and hepatic steatosis with no other abnormalities noted.  Thanks  
Results to pt, verbalized understanding.    
normal...

## 2023-08-29 ENCOUNTER — HOSPITAL ENCOUNTER (OUTPATIENT)
Dept: WOUND CARE | Facility: HOSPITAL | Age: 43
Discharge: HOME | End: 2023-08-29
Attending: NURSE PRACTITIONER
Payer: MEDICARE

## 2023-08-29 ENCOUNTER — TELEPHONE (OUTPATIENT)
Dept: NEPHROLOGY | Facility: CLINIC | Age: 43
End: 2023-08-29
Payer: MEDICARE

## 2023-08-29 DIAGNOSIS — Z72.0 TOBACCO USER: ICD-10-CM

## 2023-08-29 DIAGNOSIS — E66.9 CLASS 1 OBESITY WITH BODY MASS INDEX (BMI) OF 33.0 TO 33.9 IN ADULT, UNSPECIFIED OBESITY TYPE, UNSPECIFIED WHETHER SERIOUS COMORBIDITY PRESENT: ICD-10-CM

## 2023-08-29 DIAGNOSIS — L98.8 ACQUIRED PERFORATING DERMATOSIS: Primary | ICD-10-CM

## 2023-08-29 DIAGNOSIS — I10 PRIMARY HYPERTENSION: ICD-10-CM

## 2023-08-29 DIAGNOSIS — Z79.4 TYPE 2 DIABETES MELLITUS WITH OTHER SKIN ULCER, WITH LONG-TERM CURRENT USE OF INSULIN: ICD-10-CM

## 2023-08-29 DIAGNOSIS — E11.622 TYPE 2 DIABETES MELLITUS WITH OTHER SKIN ULCER, WITH LONG-TERM CURRENT USE OF INSULIN: ICD-10-CM

## 2023-08-29 DIAGNOSIS — E13.42 DIABETIC POLYNEUROPATHY ASSOCIATED WITH OTHER SPECIFIED DIABETES MELLITUS: ICD-10-CM

## 2023-08-29 PROCEDURE — 27000999 HC MEDICAL RECORD PHOTO DOCUMENTATION

## 2023-08-29 PROCEDURE — 99213 PR OFFICE/OUTPT VISIT, EST, LEVL III, 20-29 MIN: ICD-10-PCS | Mod: ,,, | Performed by: NURSE PRACTITIONER

## 2023-08-29 PROCEDURE — 99211 OFF/OP EST MAY X REQ PHY/QHP: CPT

## 2023-08-29 PROCEDURE — 99213 OFFICE O/P EST LOW 20 MIN: CPT | Mod: ,,, | Performed by: NURSE PRACTITIONER

## 2023-08-29 NOTE — TELEPHONE ENCOUNTER
Pt offered to go to ED, Urgent Care, or have an earlier clinic appointment-9/13@1:45.  Pt chose to come to clinic on 9/13.  Pt advised to seek ED or Urgent Care if symptoms worsen:  states understanding.

## 2023-08-29 NOTE — TELEPHONE ENCOUNTER
----- Message from Brittney Martins sent at 8/29/2023  8:59 AM CDT -----  Regarding: pt having issues  Pt presented @ window and stated his legs and feet are swelling and he has sores on his feet. Pt is also seeing wound care for the sores. Pt can be reached @ [phone]          Thank You  Shannen PELAYO

## 2023-08-30 PROBLEM — L98.8: Status: ACTIVE | Noted: 2023-08-30

## 2023-08-30 NOTE — PROGRESS NOTES
Subjective:       Patient ID: Bharath Caballero is a 43 y.o. male.    Chief Complaint: Wound Consult    43-year-old male presents to wound care clinic today for follow up regarding multiple nonhealing papules to lower legs in ankles.  Patient voices increased swelling decreased urination try to get in to see renal NP today.  Patient voices they have cancelled his GI appointment today also.  PCP Dr. Beasley last appt 5/9/2023.  Patient presents to wound care clinic alone. Patient states he needs a triple bypass but is refusing at this time.  Reviewed previous medical history.  Medical history chronic pain, neuropathy, chronic palliative, hypertension, diabetes, mixed hyperlipidemia, mononeuritis, GERD, falls, sleep apnea, hard of hearing, and nonpitting bilateral lower extremity edema.    Today's visit 8/29/23:  Reviewed previous progress notes.  Bilateral left and right ankles and upper legs papules with a central hyperkeratotic crust noted.  Due to patient is having increase in swelling and decrease in urination informed the patient these areas possibility result from diabetes and renal insufficiency.  Instructed the patient just to continue using Betadine and leaving open air.  Instructed to wear bilateral Tubigrip size F for compression.  Patient will follow up with Deepa NP for renal issues.  Will send referral for Dermatology.  Discussion with the patient due to increased blood pressure during visit.  Patient said did take his medications prior to her appointment.  Instructed to start continue to monitor and keep b/p log for MD.  Will have him return to the clinic in 2 weeks instructed to call the office with any questions, concerns, or new skin issues.  Patient verbalized understanding of all instructions.    8/15/23:  Reviewed previous progress notes.  Right  ankle multiple scabs and areas of redness from bite has improved since last visit.  Instructed to stop the triamcinolone cream inserts washing with soap  and water daily and apply Betadine and leave open air.  Instructed may cover foot and ankle with dry gauze and Kerlix.  Tubigrips F bilaterally for edema.  Supplies given.  Increase in blood pressure today patient states a just took blood pressure medication had earlier appointment.  Instructed the importance of monitoring blood pressure and tape medication as directed.  Will have him return in 2 weeks.  Instructed to call the office with any questions, concerns, or new skin issues. Verbalized understanding of instructions.     8/8/23:  Reviewed previous progress note.  Trimmed all 10 toenails using podiatry clippers, and filed using [place] board.  Multiple scabs noted to right ankle patient states does not know for sure if it is ant bites.  Instructed just to clean daily with soap and water apply triamcinolone cream in clinic and instructed perform daily. .  Instructed the patient to check daily he is high risk for diabetic foot ulcer due to decrease sensation from neuropathy.  Instructed on proper foot wear, and nail care.  Bilateral lower extremities nonpitting edema, cool to touch, and absent hair growth.  Apply bilateral Tubigrip size F.  Will have him return to the clinic in 1 week to re-evaluate insect bite ankle. Instructed  to call the office with any questions, concerns, or new skin issues.  Verbalized understanding of instructions.    05/08/2023:  Trimmed all 10 toenails using podiatry clippers, and filed using [place] board.  Tolerated well.  Discussed with the patient on smoking sensation due to long history of uncontrolled HTN.  Patient states took blood pressure medicine this a.m. and blood pressure always fluctuates.  Voices awaiting open heart surgery will follow up with Cardiology this week.  Monofilament test done decreased sensation noted in all areas.  Patient complained of having chronic pain to left arm, Md aware patient had a increase Neurontin.  Instructed on diabetic foot care, checking feet  daily, proper footwear.  Will have the patient return in 3 months for diabetic foot care.  Instructed to call the office with any questions, concerns, or new skin issues.  Patient and sister in all verbalized understanding of all instructions.        Lab Results   Component Value Date/Time    WBC 15.86 (H) 05/23/2023 10:51 PM    RBC 5.10 05/23/2023 10:51 PM    HGB 15.6 05/23/2023 10:51 PM    HCT 43.6 05/23/2023 10:51 PM    MCV 85.5 05/23/2023 10:51 PM    MCH 30.6 05/23/2023 10:51 PM    CREATININE 0.77 05/24/2023 09:13 AM    HGBA1C 9.4 (H) 05/24/2023 02:24 AM    CRP 11.20 (H) 06/22/2022 08:28 AM     Review of Systems   Skin:  Positive for wound.   All other systems reviewed and are negative.          Objective:        Physical Exam  Vitals reviewed.   Cardiovascular:      Pulses:           Dorsalis pedis pulses are detected w/ Doppler on the right side and detected w/ Doppler on the left side.        Posterior tibial pulses are detected w/ Doppler on the right side and detected w/ Doppler on the left side.   Musculoskeletal:      Right lower leg: Edema present.      Left lower leg: Edema present.   Feet:      Right foot:      Skin integrity: Skin integrity normal.      Toenail Condition: Right toenails are normal.      Left foot:      Skin integrity: Skin integrity normal.      Toenail Condition: Left toenails are normal.      Comments: Monofilament test done decreased sensation in all area on 8/8/23.  Skin:     General: Skin is cool and dry.      Capillary Refill: Capillary refill takes less than 2 seconds.             Comments: Insect bites to right ankle and foot no drainage noted.   Neurological:      Mental Status: He is alert.   Psychiatric:         Behavior: Behavior is cooperative.                        Assessment:         ICD-10-CM ICD-9-CM   1. Acquired perforating dermatosis  L98.8 709.9   2. Type 2 diabetes mellitus with other skin ulcer, with long-term current use of insulin  E11.622 250.80    Z79.4  V58.67   3. Diabetic polyneuropathy associated with other specified diabetes mellitus  E13.42 250.60     357.2   4. Primary hypertension  I10 401.9   5. Class 1 obesity with body mass index (BMI) of 33.0 to 33.9 in adult, unspecified obesity type, unspecified whether serious comorbidity present  E66.9 278.00    Z68.33 V85.33   6. Tobacco user  Z72.0 305.1         Plan:   Tissue pathology and/or culture taken:  [] Yes [x] No   Sharp debridement performed:   [] Yes [x] No   Labs ordered this visit:   [] Yes [x] No   Imaging ordered this visit:   [] Yes [x] No           1. Acquired perforating dermatosis     Paint with Betadine daily leave areas open air.   2. Type 2 diabetes mellitus with other skin ulcer, with long-term current use of insulin     Co-factor in development of ulcers in delayed wound healing.      3. Diabetic polyneuropathy associated with other specified diabetes mellitus     Instructed to check feet daily due to decrease sensation high risk for foot ulcers.      4. Primary hypertension     Instructed the danger of hypertension.  Should take all medications as directed.   5. Class 1 obesity with body mass index (BMI) of 33.0 to 33.9 in adult, unspecified obesity type, unspecified whether serious comorbidity present     Co-factor in delayed wound healing.      6. Tobacco user     Instructed on smoking cessation.           Follow up in about 2 weeks (around 9/12/2023).

## 2023-09-05 NOTE — TELEPHONE ENCOUNTER
Pt was scheduled with Erin today.  I called on Friday to reschedule due to provider coming in late.  He did not answer.  Left a detailed message on cell giving all important information.     His issues called about on this message isnt something new for him.  Erin advised that she will address with him if he shows for his apt.

## 2023-09-07 ENCOUNTER — OFFICE VISIT (OUTPATIENT)
Dept: GASTROENTEROLOGY | Facility: CLINIC | Age: 43
End: 2023-09-07
Payer: MEDICARE

## 2023-09-07 VITALS
RESPIRATION RATE: 16 BRPM | TEMPERATURE: 98 F | HEART RATE: 65 BPM | HEIGHT: 68 IN | OXYGEN SATURATION: 98 % | DIASTOLIC BLOOD PRESSURE: 70 MMHG | SYSTOLIC BLOOD PRESSURE: 116 MMHG | WEIGHT: 220 LBS | BODY MASS INDEX: 33.34 KG/M2

## 2023-09-07 DIAGNOSIS — K76.0 HEPATIC STEATOSIS: ICD-10-CM

## 2023-09-07 DIAGNOSIS — R79.89 ELEVATED LFTS: Primary | ICD-10-CM

## 2023-09-07 DIAGNOSIS — Z72.0 TOBACCO USER: ICD-10-CM

## 2023-09-07 DIAGNOSIS — K21.00 GASTROESOPHAGEAL REFLUX DISEASE WITH ESOPHAGITIS WITHOUT HEMORRHAGE: ICD-10-CM

## 2023-09-07 DIAGNOSIS — Z86.010 PERSONAL HISTORY OF COLONIC POLYPS: ICD-10-CM

## 2023-09-07 PROCEDURE — 99214 OFFICE O/P EST MOD 30 MIN: CPT | Mod: PBBFAC | Performed by: NURSE PRACTITIONER

## 2023-09-07 PROCEDURE — 99214 PR OFFICE/OUTPT VISIT, EST, LEVL IV, 30-39 MIN: ICD-10-PCS | Mod: S$PBB,,, | Performed by: NURSE PRACTITIONER

## 2023-09-07 PROCEDURE — 99214 OFFICE O/P EST MOD 30 MIN: CPT | Mod: S$PBB,,, | Performed by: NURSE PRACTITIONER

## 2023-09-07 NOTE — ASSESSMENT & PLAN NOTE
History of poorly controlled diabetes mellitus, CKD stage II, morbid obesity, familial hypertriglyceridemia with routine plasmapheresis in the past, hepatic steatosis, hypertension, CICI, and tobacco abuse.   Per review of laboratory results, he has had persistent elevation of alkaline phosphatase and intermittent elevation of AST and ALT since January 2019.   Laboratory results August 13, 2020 revealed sodium 132, calcium 8.2, glucose 466, GFR 88, AST 63, , alkaline phosphatase 262, total protein 8.6, albumin 3.3, globulin 5.30, and otherwise unremarkable CMP; vitamin B12 773; cholesterol 230, triglycerides 2216, invalid HDL and LDL secondary to triglyceride level; negative acute viral hepatitis panel.   Hemoglobin A1c was 10.9% July 7, 2020.   Gastric emptying study was unremarkable July 17, 2020.   CT scan abdomen and pelvis with contrast June 1, 2020 revealed hepatomegaly with steatosis and otherwise unremarkable.   Laboratory results August 18, 2020 revealed iron level 52 and otherwise unremarkable iron profile and ferritin, ceruloplasmin, alpha 1 antitrypsin, F-actin, LKM, mitochondrial Ab; PT 11.6, INR 0.88; unremarkable CBC; negative SHAKIR and dsDNA; FibroSure testing revealed F1.   Abdominal ultrasound October 1, 2020 revealed enlarged liver with steatosis.  Laboratory results December 8, 2020 revealed sodium 135, CO2 16, glucose 272, AST 48, ALT 94, alkaline phosphatase 210, total protein 9.1, globulin 5.3, and otherwise unremarkable CMP; hemoglobin A1c 9.9%; vitamin D 12.9; cholesterol 208, HDL 20, triglycerides >1420; TSH 1.3122; and unremarkable CBC.  Laboratory results June 16, 2021 revealed CO2 18, glucose 106, AST 90, , alkaline phosphatase 220, total protein 8.7, globulin 5.0, and otherwise unremarkable CMP; hemoglobin A1c 9.9%; vitamin D 28.1; cholesterol 254, triglycerides 1922; WBC 11.9 and otherwise unremarkable CBC.  FibroScan July 26, 2021 revealed S3 F0/F1.   Abdominal ultrasound  limited August 4, 2021 revealed hepatomegaly with fatty infiltration of the liver and limited study.   CT scan abdomen and pelvis without contrast October 25, 2021 revealed hepatomegaly and otherwise unremarkable.   Laboratory results December 20, 2021 revealed potassium 3.2, glucose 115, AST 38, ALT 97, alkaline phosphatase 294, total protein 8.8, and otherwise unremarkable CMP; WBC 13.4 and otherwise unremarkable CBC.  Abdominal ultrasound February 14, 2022 revealed hepatomegaly and mild to moderate diffuse hepatic steatosis.  No significant change identified compared to the limited abdominal ultrasound 8/4/2021.  FibroScan August 29, 2022 revealed F0-1, S1.  Abdominal ultrasound December 1, 2022 revealed hepatomegaly, hepatic steatosis, no other significant abnormalities identified, and no significant change.   FibroScan March 14, 2023 revealed F2, S0.  Abdominal ultrasound May 29, 2023 revealed hepatomegaly, coarse echotexture of the liver parenchyma with changes of hepatic steatosis, sludge in the gallbladder, and no other significant change.   CBC, CMP, PT/INR  Abdominal ultrasound in 2 months  FibroScan  Patient has significant risk factors for NAFLD  Lifestyle and dietary modifications provided  Recommend weight loss  Recommend good control of comorbidities  Recommend tobacco cessation  Call with updates  Follow-up 6 months clinic visit with NP

## 2023-09-07 NOTE — PROGRESS NOTES
Subjective:       Patient ID: Bharath Caballero is a 43 y.o. male.    Chief Complaint: Hematemesis (Vomiting blood x 2 weeks) and Abdominal Pain (RUQ to LUQ after meals)    This 43-year-old  male with a history of colon polyps, poorly controlled diabetes mellitus, CKD stage II, morbid obesity, familial hypertriglyceridemia with routine plasmapheresis in the past, hepatic steatosis, hypertension, CICI, and tobacco abuse presents unaccompanied for a follow-up visit.  He was initially seen August 18, 2020, referred for elevated LFTs at that time.  Per review of laboratory results, he has had persistent elevation of alkaline phosphatase and intermittent elevation of AST and ALT since January 2019.  Laboratory results August 13, 2020 revealed sodium 132, calcium 8.2, glucose 466, GFR 88, AST 63, , alkaline phosphatase 262, total protein 8.6, albumin 3.3, globulin 5.30, and otherwise unremarkable CMP; vitamin B12 773; cholesterol 230, triglycerides 2216, invalid HDL and LDL secondary to triglyceride level; negative acute viral hepatitis panel.  Hemoglobin A1c was 10.9% July 7, 2020.  Gastric emptying study was unremarkable July 17, 2020.  CT scan abdomen and pelvis with contrast June 1, 2020 revealed hepatomegaly with steatosis and otherwise unremarkable.     He reported intermittent right upper quadrant abdominal pain for the past several months described as sharp and radiating around to his back at times.  The pain was worsened with meal intake and he was unable to identify any specific food triggers.  He denied any relieving factors.  His appetite was poor and his weight was stable.  He had frequent symptoms of acid reflux and heartburn that was controlled on pantoprazole 40 mg daily.  He had frequent intermittent nausea with subsequent vomiting almost daily and with almost every meal (nonbilious and nonbloody).  He did not always have relief of symptoms with vomiting.  He denied odynophagia, dysphagia, or  early satiety.  Bowel habits were described as formed stools once daily without melena, hematochezia, fecal urgency, fecal incontinence, or pain with defecation.  He denied icterus, jaundice, pruritus, confusion, headaches, vision changes, cough, shortness of breath, chest pain, abdominal/lower extremity swelling, dark-colored urine, or pale-colored stools.     Laboratory results August 18, 2020 revealed iron level 52 and otherwise unremarkable iron profile and ferritin, ceruloplasmin, alpha 1 antitrypsin, F-actin, LKM, mitochondrial Ab; PT 11.6, INR 0.88; unremarkable CBC; negative SHAKIR and dsDNA; FibroSure testing revealed F1.  Abdominal ultrasound October 1, 2020 revealed enlarged liver with steatosis.     Laboratory results December 8, 2020 revealed sodium 135, CO2 16, glucose 272, AST 48, ALT 94, alkaline phosphatase 210, total protein 9.1, globulin 5.3, and otherwise unremarkable CMP; hemoglobin A1c 9.9%; vitamin D 12.9; cholesterol 208, HDL 20, triglycerides >1420; TSH 1.3122; and unremarkable CBC.     He was seen for a follow-up visit December 28, 2020.  He reported persistent intermittent right upper quadrant abdominal pain for the past several months described as sharp and radiating around to his back at times.  The pain was worsened with meal intake and he was unable to identify any specific food triggers.  He denied any relieving factors.  His appetite was fair and his weight was stable.  He had frequent symptoms of acid reflux and heartburn that was somewhat controlled on pantoprazole 40 mg daily.  He had frequent intermittent nausea with subsequent vomiting a few times per week (nonbilious and nonbloody).  He did not always have relief of symptoms with vomiting.  He denied odynophagia, dysphagia, or early satiety.  Bowel habits were described as formed stools once daily without melena, hematochezia, fecal urgency, fecal incontinence, or pain with defecation.  He denied icterus, jaundice, pruritus,  confusion, headaches, vision changes, cough, shortness of breath, chest pain, abdominal/lower extremity swelling, dark-colored urine, or pale-colored stools.     He underwent EGD with Dr. James June 7, 2021 with findings of LA grade A reflux esophagitis and otherwise unremarkable exam.  Laboratory results June 16, 2021 revealed CO2 18, glucose 106, AST 90, , alkaline phosphatase 220, total protein 8.7, globulin 5.0, and otherwise unremarkable CMP; hemoglobin A1c 9.9%; vitamin D 28.1; cholesterol 254, triglycerides 1922; WBC 11.9 and otherwise unremarkable CBC.     He was seen for follow-up visit June 28, 2021.  He reported persistent intermittent RUQ abdominal burning and pain with eating.  He denied radiation on pain.  He had occasional nausea without vomiting.  He had frequent acid reflux and pyrosis.  His appetite was good and his weight was stable.  He denied nocturnal symptoms, fever, chills, odynophagia, dysphagia, belching, bloating, or early satiety.  Bowel habits were described as formed stools once daily without melena, hematochezia, fecal urgency, fecal incontinence, or pain with defecation.     FibroScan July 26, 2021 revealed S3 F0/F1.  Abdominal ultrasound limited August 4, 2021 revealed hepatomegaly with fatty infiltration of the liver and limited study.  CT scan abdomen and pelvis without contrast October 25, 2021 revealed hepatomegaly and otherwise unremarkable.  Laboratory results December 20, 2021 revealed potassium 3.2, glucose 115, AST 38, ALT 97, alkaline phosphatase 294, total protein 8.8, and otherwise unremarkable CMP; WBC 13.4 and otherwise unremarkable CBC.     He underwent colonoscopy October 8, 2021 with findings of adenomatous polyp in the transverse colon and rectum with a recommended recall of 5 years.     He was seen for a follow-up visit February 7, 2022.  He reported persistent intermittent right upper quadrant abdominal pain described as sharp and radiating around to the  right side of his back.  The pain was triggered with eating and drinking and he was unable to identify specific food triggers.  He continued to take pantoprazole 40 mg daily and had to occasionally take OTC Tums as well.  He had frequent acid reflux and regurgitation of acid.  He reported occasional vomiting secondary to intensity of abdominal pain.  His appetite was good and his weight was stable.  He denied fever, chills, odynophagia, dysphagia, or early satiety.  Bowel habits remained unchanged and was described as formed stools once daily without melena, hematochezia, fecal urgency, fecal incontinence, or pain with defecation.     Abdominal ultrasound February 14, 2022 revealed hepatomegaly and mild to moderate diffuse hepatic steatosis.  No significant change identified compared to the limited abdominal ultrasound 8/4/2021.     He was seen for a follow-up visit August 18, 2022.  He reported minimal change in symptoms from previous office visit.  He was no longer taking pantoprazole or famotidine secondary to minimal relief of symptoms.     FibroScan August 29, 2022 revealed F0-1, S1.     Abdominal ultrasound December 1, 2022 revealed hepatomegaly, hepatic steatosis, no other significant abnormalities identified, and no significant change.      He was seen for a follow-up visit February 28, 2023 and reported minimal change in symptoms from previous office visit.  He reported some relief of abdominal pain with sucralfate 1 g before meals and at bedtime.  He reported that his Repatha was stopped secondary to insurance coverage and he was awaiting PA approval.    FibroScan March 14, 2023 revealed F2, S0.    Abdominal ultrasound May 29, 2023 revealed hepatomegaly, coarse echotexture of the liver parenchyma with changes of hepatic steatosis, sludge in the gallbladder, and no other significant change.      Today, he presents for a follow-up visit.  He reports hematemesis with almost every meal over the past 2 weeks.   He is taking Nexium 40 mg daily and sucralfate 1 g 4 times daily.  His appetite is good but he is afraid to eat due to vomiting and abdominal pain.  He has nausea that precedes vomiting.  He is unable to identify specific food triggers or relieving factors.  He has a documented weight loss of 6 lb since his last office visit in GI clinic February 28, 2023.  He reports that his weight fluctuates over time.  He denies nocturnal symptoms, fever, chills, odynophagia, dysphagia, acid reflux, pyrosis, or early satiety.  He reports minimal change in regards to symptoms of right upper quadrant and left upper quadrant abdominal pain.  Bowel habits remain unchanged and described as formed stools 1-2 times daily without melena, hematochezia, fecal urgency, fecal incontinence, or pain with defecation.     He takes aspirin 81 mg daily.  He smokes 4 cigarettes daily and denies alcohol use.  He denies a family history of IBD, colon polyps, or colon cancer.    Review of patient's allergies indicates:  No Known Allergies    Past Medical History:   Diagnosis Date    BPH (benign prostatic hyperplasia)     Diabetes     GERD (gastroesophageal reflux disease)     Hepatic steatosis     History of continuous positive airway pressure (CPAP) therapy at home     Hypertension     Hypertriglyceridemia     Kidney disorder     Leg pain     Morbid obesity     Neuropathy     OA (osteoarthritis)     CICI (obstructive sleep apnea)     Polyneuropathy     Recurrent pancreatitis     Renal insufficiency     Tobacco abuse      Past Surgical History:   Procedure Laterality Date    ANGIOGRAM, CORONARY, WITH LEFT HEART CATHETERIZATION N/A 4/10/2023    Procedure: Angiogram, Coronary, with Left Heart Cath;  Surgeon: Jaswant Pugh MD;  Location: [facility] CATH LAB;  Service: Cardiology;  Laterality: N/A;    APPENDECTOMY      ARTERIOVENOUS ANASTOMOSIS, OPEN, UPPER ARM BASILLIC VEIN TRANSPOSITION N/A 05/07/2018    ESOPHAGOGASTRODUODENOSCOPY N/A 06/07/2021     EXCISION OF ARTERIOVENOUS FISTULA N/A 06/01/2018    FRACTIONAL FLOW RESERVE (FFR), CORONARY  4/10/2023    Procedure: Fractional Flow [place] (FFR), Coronary;  Surgeon: Jaswant Pugh MD;  Location: [facility] CATH LAB;  Service: Cardiology;;    HERNIA REPAIR      INSPECTION OF UPPER INTESTINAL TRACT N/A 06/07/2021    PHERESIS OF PLASMA N/A 07/13/2018    PHERESIS OF PLASMA N/A 05/25/2018    SPINAL CORD STIMULATOR REMOVAL      TRIAL OF SPINAL CORD NERVE STIMULATOR N/A 5/12/2022    Procedure: TRIAL, NEUROSTIMULATOR, SPINAL CORD;  Surgeon: Jay Pozo MD;  Location: [facility] OR;  Service: Neurosurgery;  Laterality: N/A;    UPPER GI N/A 06/07/2021     Family History:   family history includes Obesity in his father.    Social History:    reports that he has been smoking cigarettes. He started smoking about 27 years ago. He has a 13.4 pack-year smoking history. He has never used smokeless tobacco. He reports that he does not currently use alcohol. He reports that he does not currently use drugs.    Review of Systems  Negative except as noted in the HPI.      Objective:      Physical Exam  Constitutional:       Appearance: Normal appearance.      Comments: Ambulates with cane   HENT:      Head: Normocephalic.      Mouth/Throat:      Mouth: Mucous membranes are moist.   Eyes:      Extraocular Movements: Extraocular movements intact.      Conjunctiva/sclera: Conjunctivae normal.      Pupils: Pupils are equal, round, and reactive to light.   Cardiovascular:      Rate and Rhythm: Normal rate and regular rhythm.      Pulses: Normal pulses.      Heart sounds: Normal heart sounds.   Pulmonary:      Effort: Pulmonary effort is normal.      Breath sounds: Normal breath sounds.   Abdominal:      General: Bowel sounds are normal.      Palpations: Abdomen is soft.   Musculoskeletal:         General: Normal range of motion.      Cervical back: Normal range of motion and neck supple.   Skin:     General: Skin is warm and dry.    Neurological:      General: No focal deficit present.      Mental Status: He is alert and oriented to person, place, and time.   Psychiatric:         Mood and Affect: Mood normal.         Behavior: Behavior normal.         Thought Content: Thought content normal.         Judgment: Judgment normal.         Home Medications:     Current Outpatient Medications   Medication Sig    aspirin (ECOTRIN) 81 MG EC tablet Take 81 mg by mouth once daily.    atorvastatin (LIPITOR) 40 MG tablet Take 1 tablet (40 mg total) by mouth once daily.    carBAMazepine (TEGRETOL) 200 mg tablet Take 3 tablets (600 mg total) by mouth 2 (two) times a day.    carvediloL (COREG) 25 MG tablet Take 1 tablet (25 mg total) by mouth 2 (two) times daily with meals.    clonazePAM (KLONOPIN) 1 MG tablet Take 1 tablet (1 mg total) by mouth 2 (two) times daily.    cloNIDine (CATAPRES) 0.2 MG tablet TAKE ONE TABLET BY MOUTH THREE TIMES DAILY    EScitalopram oxalate (LEXAPRO) 20 MG tablet Take 20 mg by mouth once daily.    esomeprazole (NEXIUM) 40 MG capsule Take 1 capsule (40 mg total) by mouth before breakfast.    evolocumab (REPATHA SURECLICK) 140 mg/mL PnIj Inject 1 mL (140 mg total) into the skin every 14 (fourteen) days.    FARXIGA 5 mg Tab tablet TAKE ONE TABLET BY MOUTH EVERY DAY    gabapentin (NEURONTIN) 400 MG capsule Two tabs in AM, one in afternoon    gabapentin (NEURONTIN) 800 MG tablet TAKE ONE TABLET BY MOUTH IN THE EVENING    HUMALOG U-100 INSULIN 100 unit/mL injection INJECT 25 UNITS SUBCUTANEOUSLY BEFORE MEALS THREE TIMES DAILY    HYDROmorphone (DILAUDID) 8 MG tablet Take 1 tablet (8 mg total) by mouth every 6 (six) hours as needed.    icosapent ethyL (VASCEPA) 1 gram Cap Take 2 capsules (2 g total) by mouth 2 (two) times daily.    irbesartan (AVAPRO) 300 MG tablet Take 300 mg by mouth once daily.    isosorbide mononitrate (IMDUR) 120 MG 24 hr tablet Take 1 tablet (120 mg total) by mouth once daily.    LANTUS U-100 INSULIN 100 unit/mL  "injection INJECT 100 UNITS SUBCUTANEOUSLY TWICE DAILY (Patient taking differently: Inject 100 Units into the skin every evening. And in AM)    lipase-protease-amylase (CREON) 36,000-114,000- 180,000 unit CpDR Take 1 capsule by mouth 3 (three) times daily.    morphine (MS CONTIN) 100 MG 12 hr tablet Take 1 tablet (100 mg total) by mouth 3 (three) times daily.    NIFEdipine (PROCARDIA-XL) 60 MG (OSM) 24 hr tablet Take 1 tablet (60 mg total) by mouth 2 (two) times a day.    nitroGLYCERIN (NITROSTAT) 0.3 MG SL tablet Place 1 tablet (0.3 mg total) under the tongue every 5 (five) minutes as needed for Chest pain (go to er after 3 doses).    potassium chloride SA (K-DUR,KLOR-CON) 20 MEQ tablet Take 1 tablet (20 mEq total) by mouth 2 (two) times daily.    sucralfate (CARAFATE) 100 mg/mL suspension Take 10 mLs (1 g total) by mouth 4 (four) times daily before meals and nightly.    SURE COMFORT INSULIN SYRINGE 0.5 mL 31 gauge x 5/16" Syrg USE ONE SYRINGE THREE TIMES DAILY    tamsulosin (FLOMAX) 0.4 mg Cap TAKE ONE CAPSULE BY MOUTH EVERY DAY    torsemide (DEMADEX) 20 MG Tab Take 20 mg by mouth once daily.     Current Facility-Administered Medications   Medication Frequency    triamcinolone acetonide 0.1% ointment BID     Laboratory Results:     Recent Results (from the past 4032 hour(s))   Basic Metabolic Panel    Collection Time: 04/06/23  1:47 PM   Result Value Ref Range    Sodium Level 138 136 - 145 mmol/L    Potassium Level 3.8 3.5 - 5.1 mmol/L    Chloride 101 98 - 107 mmol/L    Carbon Dioxide 28 22 - 29 mmol/L    Glucose Level 263 (H) 74 - 100 mg/dL    Blood Urea Nitrogen 13.0 8.9 - 20.6 mg/dL    Creatinine 1.15 0.73 - 1.18 mg/dL    BUN/Creatinine Ratio 11     Calcium Level Total 9.7 8.4 - 10.2 mg/dL    Anion Gap 9.0 mEq/L    eGFR >60 mls/min/1.73/m2   Protime-INR    Collection Time: 04/06/23  1:47 PM   Result Value Ref Range    PT 12.6 11.4 - 14.0 seconds    INR 1.0 <=1.3   APTT    Collection Time: 04/06/23  1:47 PM "   Result Value Ref Range    PTT 31.9 <150.0 seconds   CBC with Differential    Collection Time: 04/06/23  1:47 PM   Result Value Ref Range    WBC 12.3 (H) 4.5 - 11.5 x10(3)/mcL    RBC 5.19 4.70 - 6.10 x10(6)/mcL    Hgb 16.0 14.0 - 18.0 g/dL    Hct 45.5 42.0 - 52.0 %    MCV 87.7 80.0 - 94.0 fL    MCH 30.8 27.0 - 31.0 pg    MCHC 35.2 33.0 - 36.0 g/dL    RDW 11.7 11.5 - 17.0 %    Platelet 324 130 - 400 x10(3)/mcL    MPV 9.8 7.4 - 10.4 fL    Neut % 72.3 %    Lymph % 18.3 %    Mono % 7.6 %    Eos % 1.1 %    Basophil % 0.2 %    Lymph # 2.25 0.6 - 4.6 x10(3)/mcL    Neut # 8.91 2.1 - 9.2 x10(3)/mcL    Mono # 0.93 0.1 - 1.3 x10(3)/mcL    Eos # 0.13 0 - 0.9 x10(3)/mcL    Baso # 0.03 0 - 0.2 x10(3)/mcL    IG# 0.06 (H) 0 - 0.04 x10(3)/mcL    IG% 0.5 %    NRBC% 0.0 %   POCT glucose    Collection Time: 04/10/23  6:55 AM   Result Value Ref Range    POCT Glucose 143 (H) 70 - 110 mg/dL   POCT glucose    Collection Time: 05/23/23 10:37 PM   Result Value Ref Range    POCT Glucose 101 70 - 110 mg/dL   Comprehensive metabolic panel    Collection Time: 05/23/23 10:51 PM   Result Value Ref Range    Sodium Level 141 136 - 145 mmol/L    Potassium Level 3.0 (L) 3.5 - 5.1 mmol/L    Chloride 102 98 - 107 mmol/L    Carbon Dioxide 27 22 - 29 mmol/L    Glucose Level 104 (H) 74 - 100 mg/dL    Blood Urea Nitrogen 10.5 8.9 - 20.6 mg/dL    Creatinine 0.88 0.73 - 1.18 mg/dL    Calcium Level Total 10.0 8.4 - 10.2 mg/dL    Protein Total 8.0 6.4 - 8.3 gm/dL    Albumin Level 3.5 3.5 - 5.0 g/dL    Globulin 4.5 (H) 2.4 - 3.5 gm/dL    Albumin/Globulin Ratio 0.8 (L) 1.1 - 2.0 ratio    Bilirubin Total 0.2 <=1.5 mg/dL    Alkaline Phosphatase 224 (H) 40 - 150 unit/L    Alanine Aminotransferase 50 0 - 55 unit/L    Aspartate Aminotransferase 28 5 - 34 unit/L    eGFR >60 mls/min/1.73/m2   Protime-INR    Collection Time: 05/23/23 10:51 PM   Result Value Ref Range    PT 12.7 11.4 - 14.0 seconds    INR 1.0 <=1.3   TSH    Collection Time: 05/23/23 10:51 PM   Result  Value Ref Range    Thyroid Stimulating Hormone 3.495 0.350 - 4.940 uIU/mL   LDL - Lipid Panel    Collection Time: 05/23/23 10:51 PM   Result Value Ref Range    Cholesterol Total 192 <=200 mg/dL    HDL Cholesterol 24 (L) 35 - 60 mg/dL    Triglyceride 560 (H) 34 - 140 mg/dL    Cholesterol/HDL Ratio 8 (H) 0 - 5    Very Low Density Lipoprotein 112     LDL Cholesterol 56.00 50.00 - 140.00 mg/dL   CBC with Differential    Collection Time: 05/23/23 10:51 PM   Result Value Ref Range    WBC 15.86 (H) 4.50 - 11.50 x10(3)/mcL    RBC 5.10 4.70 - 6.10 x10(6)/mcL    Hgb 15.6 14.0 - 18.0 g/dL    Hct 43.6 42.0 - 52.0 %    MCV 85.5 80.0 - 94.0 fL    MCH 30.6 27.0 - 31.0 pg    MCHC 35.8 33.0 - 36.0 g/dL    RDW 11.4 (L) 11.5 - 17.0 %    Platelet 337 130 - 400 x10(3)/mcL    MPV 9.5 7.4 - 10.4 fL    Neut % 59.4 %    Lymph % 29.5 %    Mono % 8.8 %    Eos % 1.3 %    Basophil % 0.4 %    Lymph # 4.68 (H) 0.6 - 4.6 x10(3)/mcL    Neut # 9.44 (H) 2.1 - 9.2 x10(3)/mcL    Mono # 1.39 (H) 0.1 - 1.3 x10(3)/mcL    Eos # 0.20 0 - 0.9 x10(3)/mcL    Baso # 0.06 <=0.2 x10(3)/mcL    IG# 0.09 (H) 0 - 0.04 x10(3)/mcL    IG% 0.6 %    NRBC% 0.0 %   COVID-19 Rapid Screening    Collection Time: 05/24/23  1:14 AM   Result Value Ref Range    SARS COV-2 MOLECULAR Negative Negative   Hemoglobin A1C    Collection Time: 05/24/23  2:24 AM   Result Value Ref Range    Hemoglobin A1c 9.4 (H) <=7.0 %    Estimated Average Glucose 223.1 mg/dL   POCT glucose    Collection Time: 05/24/23  6:33 AM   Result Value Ref Range    POCT Glucose 75 70 - 110 mg/dL   SYPHILIS ANTIBODY (WITH REFLEX RPR)    Collection Time: 05/24/23  7:39 AM   Result Value Ref Range    Syphilis Antibody Nonreactive Nonreactive, Equivocal   HIV 1/2 Ag/Ab (4th Gen)    Collection Time: 05/24/23  7:39 AM   Result Value Ref Range    HIV Nonreactive Nonreactive   Basic Metabolic Panel    Collection Time: 05/24/23  9:13 AM   Result Value Ref Range    Sodium Level 140 136 - 145 mmol/L    Potassium Level 3.3 (L)  3.5 - 5.1 mmol/L    Chloride 103 98 - 107 mmol/L    Carbon Dioxide 26 22 - 29 mmol/L    Glucose Level 97 74 - 100 mg/dL    Blood Urea Nitrogen 10.1 8.9 - 20.6 mg/dL    Creatinine 0.77 0.73 - 1.18 mg/dL    BUN/Creatinine Ratio 13     Calcium Level Total 8.8 8.4 - 10.2 mg/dL    Anion Gap 11.0 mEq/L    eGFR >60 mls/min/1.73/m2   Magnesium    Collection Time: 05/24/23  9:13 AM   Result Value Ref Range    Magnesium Level 1.80 1.60 - 2.60 mg/dL   Echo Saline Bubble? Yes    Collection Time: 05/24/23 11:25 AM   Result Value Ref Range    BSA 2.21 m2    TDI SEPTAL 0.07 m/s    LV LATERAL E/E' RATIO 7.33 m/s    LV SEPTAL E/E' RATIO 9.43 m/s    Right Atrial Pressure (from IVC) 15 mmHg    EF 65 %    Left Ventricular Outflow Tract Mean Velocity 0.43 cm/s    Left Ventricular Outflow Tract Mean Gradient 0.85 mmHg    TDI LATERAL 0.09 m/s    LVIDd 4.15 3.5 - 6.0 cm    IVS 1.89 (A) 0.6 - 1.1 cm    Posterior Wall 1.41 (A) 0.6 - 1.1 cm    LVIDs 2.72 2.1 - 4.0 cm    FS 34 28 - 44 %    LV mass 285.16 g    LA size 3.55 cm    Left Ventricle Relative Wall Thickness 0.68 cm    AV mean gradient 2 mmHg    AV valve area 3.04 cm2    AV Velocity Ratio 0.67     AV index (prosthetic) 0.66     MV mean gradient 1 mmHg    MV valve area p 1/2 method 2.45 cm2    MV valve area by continuity eq 2.62 cm2    E/A ratio 0.99     Mean e' 0.08 m/s    E wave deceleration time 309.16 msec    LVOT diameter 2.42 cm    LVOT area 4.6 cm2    LVOT peak oleksandr 0.61 m/s    LVOT peak VTI 14.70 cm    Ao peak oleksandr 0.91 m/s    Ao VTI 22.2 cm    LVOT stroke volume 67.58 cm3    AV peak gradient 3 mmHg    MV peak gradient 2 mmHg    TV resting pulmonary artery pressure 24 mmHg    E/E' ratio 8.25 m/s    MV Peak E Oleksandr 0.66 m/s    TR Max Oleksandr 1.54 m/s    MV VTI 25.8 cm    MV stenosis pressure 1/2 time 89.66 ms    MV Peak A Oleksandr 0.67 m/s    LV Systolic Volume 27.44 mL    LV Systolic Volume Index 12.7 mL/m2    LV Diastolic Volume 76.52 mL    LV Diastolic Volume Index 35.43 mL/m2    LV Mass  Index 132 g/m2    Triscuspid Valve Regurgitation Peak Gradient 9 mmHg     Imaging Results:     Narrative & Impression  EXAMINATION:  US ABDOMEN LIMITED     CLINICAL HISTORY:  Other specified abnormal findings of blood chemistry.     TECHNIQUE:  Transverse and longitudinal images of the right upper abdomen were obtained.     COMPARISON:  None     FINDINGS:  Liver:     Size: 19.6 cm in the right midclavicular line, liver is enlarged     Appearance: There is coarse echotexture to the liver parenchyma with increased echogenicity increased echogenicity decreases sensitivity to detect focal lesions     Mass: No focal masses     Gallbladder:     Stones/Sludge: Echogenic material is identified in the gallbladder representing sludge     Appearance: No wall thickening, pericholecystic fluid or hydrops     Sonographic Gaytan's Sign: Negative     Bile Ducts:     Intrahepatic Ducts: No dilatation     Extrahepatic Ducts: Common bile duct measures 0.30 cm, no dilatation     Pancreas:     Visualized portions of the pancreas are normal.     Right Kidney:     Size: 12.4 cm in length     Echogenicity: Normal     Collecting System: No hydronephrosis     Stone: None     Cyst/Mass: None     Vessels:     Inferior Vena Cava: Visualized portions are normal.     Main Portal Vein: Patent with hepatopedal  flow.     Free Fluid:     No ascites or pleural effusions     Impression:     Hepatomegaly     Coarse echotexture of the liver parenchyma with changes of hepatic steatosis     Sludge in the gallbladder.     No other significant change        Electronically signed by: Keith Black  Date:                                            05/29/2023  Time:                                           09:46    Assessment/Plan:     Problem List Items Addressed This Visit          GI    Elevated LFTs - Primary     History of poorly controlled diabetes mellitus, CKD stage II, morbid obesity, familial hypertriglyceridemia with routine plasmapheresis in  the past, hepatic steatosis, hypertension, CICI, and tobacco abuse.   Per review of laboratory results, he has had persistent elevation of alkaline phosphatase and intermittent elevation of AST and ALT since January 2019.   Laboratory results August 13, 2020 revealed sodium 132, calcium 8.2, glucose 466, GFR 88, AST 63, , alkaline phosphatase 262, total protein 8.6, albumin 3.3, globulin 5.30, and otherwise unremarkable CMP; vitamin B12 773; cholesterol 230, triglycerides 2216, invalid HDL and LDL secondary to triglyceride level; negative acute viral hepatitis panel.   Hemoglobin A1c was 10.9% July 7, 2020.   Gastric emptying study was unremarkable July 17, 2020.   CT scan abdomen and pelvis with contrast June 1, 2020 revealed hepatomegaly with steatosis and otherwise unremarkable.   Laboratory results August 18, 2020 revealed iron level 52 and otherwise unremarkable iron profile and ferritin, ceruloplasmin, alpha 1 antitrypsin, F-actin, LKM, mitochondrial Ab; PT 11.6, INR 0.88; unremarkable CBC; negative SHAKIR and dsDNA; FibroSure testing revealed F1.   Abdominal ultrasound October 1, 2020 revealed enlarged liver with steatosis.  Laboratory results December 8, 2020 revealed sodium 135, CO2 16, glucose 272, AST 48, ALT 94, alkaline phosphatase 210, total protein 9.1, globulin 5.3, and otherwise unremarkable CMP; hemoglobin A1c 9.9%; vitamin D 12.9; cholesterol 208, HDL 20, triglycerides >1420; TSH 1.3122; and unremarkable CBC.  Laboratory results June 16, 2021 revealed CO2 18, glucose 106, AST 90, , alkaline phosphatase 220, total protein 8.7, globulin 5.0, and otherwise unremarkable CMP; hemoglobin A1c 9.9%; vitamin D 28.1; cholesterol 254, triglycerides 1922; WBC 11.9 and otherwise unremarkable CBC.  FibroScan July 26, 2021 revealed S3 F0/F1.   Abdominal ultrasound limited August 4, 2021 revealed hepatomegaly with fatty infiltration of the liver and limited study.   CT scan abdomen and pelvis without  contrast October 25, 2021 revealed hepatomegaly and otherwise unremarkable.   Laboratory results December 20, 2021 revealed potassium 3.2, glucose 115, AST 38, ALT 97, alkaline phosphatase 294, total protein 8.8, and otherwise unremarkable CMP; WBC 13.4 and otherwise unremarkable CBC.  Abdominal ultrasound February 14, 2022 revealed hepatomegaly and mild to moderate diffuse hepatic steatosis.  No significant change identified compared to the limited abdominal ultrasound 8/4/2021.  FibroScan August 29, 2022 revealed F0-1, S1.  Abdominal ultrasound December 1, 2022 revealed hepatomegaly, hepatic steatosis, no other significant abnormalities identified, and no significant change.   FibroScan March 14, 2023 revealed F2, S0.  Abdominal ultrasound May 29, 2023 revealed hepatomegaly, coarse echotexture of the liver parenchyma with changes of hepatic steatosis, sludge in the gallbladder, and no other significant change.   CBC, CMP, PT/INR  Abdominal ultrasound in 2 months  FibroScan  Patient has significant risk factors for NAFLD  Lifestyle and dietary modifications provided  Recommend weight loss  Recommend good control of comorbidities  Recommend tobacco cessation  Call with updates  Follow-up 6 months clinic visit with NP         Relevant Orders    CBC Auto Differential    Comprehensive Metabolic Panel    Protime-INR    US Abdomen Limited    US Elastography Liver w/imaging    Hepatic steatosis     See above         Relevant Orders    CBC Auto Differential    Comprehensive Metabolic Panel    Protime-INR    US Abdomen Limited    US Elastography Liver w/imaging    Gastroesophageal reflux disease with esophagitis without hemorrhage     See above  GERD lifestyle modifications  Reflux precautions  Limit NSAID use  EGD due to reported hematemesis for the past 2 weeks  Recommend tobacco cessation  Continue Nexium and sucralfate as directed  He underwent EGD with Dr. James June 7, 2021 with findings of LA grade A reflux  esophagitis and otherwise unremarkable exam.   Call with updates  Follow-up 6 months clinic visit with NP          Relevant Orders    CBC Auto Differential    Comprehensive Metabolic Panel    Case Request Endoscopy: EGD (ESOPHAGOGASTRODUODENOSCOPY) (Completed)    Personal history of colonic polyps     He underwent colonoscopy October 8, 2021 with findings of adenomatous polyp in the transverse colon and rectum with a recommended recall of 5 years.            Other    Tobacco user     Recommend tobacco cessation.         Relevant Orders    Ambulatory referral/consult to Smoking Cessation Program

## 2023-09-07 NOTE — ASSESSMENT & PLAN NOTE
See above  GERD lifestyle modifications  Reflux precautions  Limit NSAID use  EGD due to reported hematemesis for the past 2 weeks  Recommend tobacco cessation  Continue Nexium and sucralfate as directed  He underwent EGD with Dr. James June 7, 2021 with findings of LA grade A reflux esophagitis and otherwise unremarkable exam.   Call with updates  Follow-up 6 months clinic visit with NP

## 2023-09-12 ENCOUNTER — HOSPITAL ENCOUNTER (OUTPATIENT)
Dept: WOUND CARE | Facility: HOSPITAL | Age: 43
Discharge: HOME | End: 2023-09-12
Attending: NURSE PRACTITIONER
Payer: MEDICARE

## 2023-09-12 VITALS
TEMPERATURE: 98 F | RESPIRATION RATE: 18 BRPM | HEART RATE: 67 BPM | DIASTOLIC BLOOD PRESSURE: 83 MMHG | OXYGEN SATURATION: 98 % | SYSTOLIC BLOOD PRESSURE: 164 MMHG

## 2023-09-12 DIAGNOSIS — E13.42 DIABETIC POLYNEUROPATHY ASSOCIATED WITH OTHER SPECIFIED DIABETES MELLITUS: ICD-10-CM

## 2023-09-12 DIAGNOSIS — E11.622 TYPE 2 DIABETES MELLITUS WITH OTHER SKIN ULCER, WITH LONG-TERM CURRENT USE OF INSULIN: ICD-10-CM

## 2023-09-12 DIAGNOSIS — E66.9 CLASS 1 OBESITY WITH BODY MASS INDEX (BMI) OF 33.0 TO 33.9 IN ADULT, UNSPECIFIED OBESITY TYPE, UNSPECIFIED WHETHER SERIOUS COMORBIDITY PRESENT: ICD-10-CM

## 2023-09-12 DIAGNOSIS — Z72.0 TOBACCO USER: ICD-10-CM

## 2023-09-12 DIAGNOSIS — I10 PRIMARY HYPERTENSION: ICD-10-CM

## 2023-09-12 DIAGNOSIS — L98.8 ACQUIRED PERFORATING DERMATOSIS: Primary | ICD-10-CM

## 2023-09-12 DIAGNOSIS — Z79.4 TYPE 2 DIABETES MELLITUS WITH OTHER SKIN ULCER, WITH LONG-TERM CURRENT USE OF INSULIN: ICD-10-CM

## 2023-09-12 PROCEDURE — 99212 OFFICE O/P EST SF 10 MIN: CPT | Mod: ,,, | Performed by: NURSE PRACTITIONER

## 2023-09-12 PROCEDURE — 99212 PR OFFICE/OUTPT VISIT, EST, LEVL II, 10-19 MIN: ICD-10-PCS | Mod: ,,, | Performed by: NURSE PRACTITIONER

## 2023-09-12 PROCEDURE — 99211 OFF/OP EST MAY X REQ PHY/QHP: CPT

## 2023-09-12 PROCEDURE — 27000999 HC MEDICAL RECORD PHOTO DOCUMENTATION

## 2023-09-12 NOTE — PROGRESS NOTES
Subjective:       Patient ID: Bharath Caballero is a 43 y.o. male.    Chief Complaint: Wound Consult    43-year-old male presents to wound care clinic today for 2 week follow up regarding multiple nonhealing papules to lower legs in ankles. Scheduled for blood work and will see Renal clinic tomorrow.  Patient voices GI MD schedule him an upper GI but did not do anything for his bleeding.  PCP Dr. Beasley last appt 5/9/2023.  Patient presents to wound care clinic alone. Patient states he needs a triple bypass but is refusing at this time.  Reviewed previous medical history.  Medical history chronic pain, neuropathy, chronic palliative, hypertension, diabetes, mixed hyperlipidemia, mononeuritis, GERD, falls, sleep apnea, hard of hearing, and nonpitting bilateral lower extremity edema.    Today's visit 9/12/23:  Reviewed previous progress notes.  Bilateral left and right ankle and upper leg papules resolving.  Swelling to bilateral lower extremities has resolved.  Patient is scheduled to see renal clinic tomorrow.  Instructed to continue shower daily and apply Betadine to papules and leave open air.  Blood pressure medications to visit.  Due to increased blood pressure today instructed on dangers of hypertension.  Will have him return to the clinic in 3 months for diabetic foot care.  Instructed to call the office with any question or new skin issues.  Verbalized understanding of all instructions.      8/29/23:  Reviewed previous progress notes.  Bilateral left and right ankles and upper legs papules with a central hyperkeratotic crust noted.  Due to patient is having increase in swelling and decrease in urination informed the patient these areas possibility result from diabetes and renal insufficiency.  Instructed the patient just to continue using Betadine and leaving open air.  Instructed to wear bilateral Tubigrip size F for compression.  Patient will follow up with [facility] for renal issues.  Will send referral for  Dermatology.  Discussion with the patient due to increased blood pressure during visit.  Patient said did take his medications prior to her appointment.  Instructed to start continue to monitor and keep b/p log for MD.  Will have him return to the clinic in 2 weeks instructed to call the office with any questions, concerns, or new skin issues.  Patient verbalized understanding of all instructions.    8/15/23:  Reviewed previous progress notes.  Right  ankle multiple scabs and areas of redness from bite has improved since last visit.  Instructed to stop the triamcinolone cream inserts washing with soap and water daily and apply Betadine and leave open air.  Instructed may cover foot and ankle with dry gauze and Kerlix.  Tubigrips F bilaterally for edema.  Supplies given.  Increase in blood pressure today patient states a just took blood pressure medication had earlier appointment.  Instructed the importance of monitoring blood pressure and tape medication as directed.  Will have him return in 2 weeks.  Instructed to call the office with any questions, concerns, or new skin issues. Verbalized understanding of instructions.     8/8/23:  Reviewed previous progress note.  Trimmed all 10 toenails using podiatry clippers, and filed using marietta board.  Multiple scabs noted to right ankle patient states does not know for sure if it is ant bites.  Instructed just to clean daily with soap and water apply triamcinolone cream in clinic and instructed perform daily. .  Instructed the patient to check daily he is high risk for diabetic foot ulcer due to decrease sensation from neuropathy.  Instructed on proper foot wear, and nail care.  Bilateral lower extremities nonpitting edema, cool to touch, and absent hair growth.  Apply bilateral Tubigrip size F.  Will have him return to the clinic in 1 week to re-evaluate insect bite ankle. Instructed  to call the office with any questions, concerns, or new skin issues.  Verbalized  understanding of instructions.    05/08/2023:  Trimmed all 10 toenails using podiatry clippers, and filed using Sina board.  Tolerated well.  Discussed with the patient on smoking sensation due to long history of uncontrolled HTN.  Patient states took blood pressure medicine this a.m. and blood pressure always fluctuates.  Voices awaiting open heart surgery will follow up with Cardiology this week.  Monofilament test done decreased sensation noted in all areas.  Patient complained of having chronic pain to left arm, Md aware patient had a increase Neurontin.  Instructed on diabetic foot care, checking feet daily, proper footwear.  Will have the patient return in 3 months for diabetic foot care.  Instructed to call the office with any questions, concerns, or new skin issues.  Patient and sister in all verbalized understanding of all instructions.        Lab Results   Component Value Date/Time    WBC 15.86 (H) 05/23/2023 10:51 PM    RBC 5.10 05/23/2023 10:51 PM    HGB 15.6 05/23/2023 10:51 PM    HCT 43.6 05/23/2023 10:51 PM    MCV 85.5 05/23/2023 10:51 PM    MCH 30.6 05/23/2023 10:51 PM    CREATININE 0.77 05/24/2023 09:13 AM    HGBA1C 9.4 (H) 05/24/2023 02:24 AM    CRP 11.20 (H) 06/22/2022 08:28 AM     Review of Systems   Skin:  Positive for wound.   All other systems reviewed and are negative.          Objective:        Physical Exam  Vitals reviewed.   Cardiovascular:      Pulses:           Dorsalis pedis pulses are detected w/ Doppler on the right side and detected w/ Doppler on the left side.        Posterior tibial pulses are detected w/ Doppler on the right side and detected w/ Doppler on the left side.   Feet:      Right foot:      Skin integrity: Skin integrity normal.      Toenail Condition: Right toenails are normal.      Left foot:      Skin integrity: Skin integrity normal.      Toenail Condition: Left toenails are normal.      Comments: Monofilament test done decreased sensation in all area on  8/8/23.  Skin:     General: Skin is cool and dry.      Capillary Refill: Capillary refill takes less than 2 seconds.             Comments: Papules bilateral ankles resolving.   Neurological:      Mental Status: He is alert.   Psychiatric:         Behavior: Behavior is cooperative.                      Assessment:         ICD-10-CM ICD-9-CM   1. Acquired perforating dermatosis  L98.8 709.9   2. Type 2 diabetes mellitus with other skin ulcer, with long-term current use of insulin  E11.622 250.80    Z79.4 V58.67   3. Diabetic polyneuropathy associated with other specified diabetes mellitus  E13.42 250.60     357.2   4. Primary hypertension  I10 401.9   5. Class 1 obesity with body mass index (BMI) of 33.0 to 33.9 in adult, unspecified obesity type, unspecified whether serious comorbidity present  E66.9 278.00    Z68.33 V85.33   6. Tobacco user  Z72.0 305.1         Plan:   Tissue pathology and/or culture taken:  [] Yes [x] No   Sharp debridement performed:   [] Yes [x] No   Labs ordered this visit:   [] Yes [x] No   Imaging ordered this visit:   [] Yes [x] No           1. Acquired perforating dermatosis     Paint with Betadine daily leave areas open air.   2. Type 2 diabetes mellitus with other skin ulcer, with long-term current use of insulin     Co-factor in development of ulcers in delayed wound healing.      3. Diabetic polyneuropathy associated with other specified diabetes mellitus     Instructed to check feet daily due to decrease sensation high risk for foot ulcers.      4. Primary hypertension     Instructed the danger of hypertension.  Should take all medications as directed.   5. Class 1 obesity with body mass index (BMI) of 33.0 to 33.9 in adult, unspecified obesity type, unspecified whether serious comorbidity present     Co-factor in delayed wound healing.      6. Tobacco user     Instructed on smoking cessation.           Follow up in about 3 months (around 12/12/2023).

## 2023-09-13 ENCOUNTER — OFFICE VISIT (OUTPATIENT)
Dept: NEPHROLOGY | Facility: CLINIC | Age: 43
End: 2023-09-13
Payer: MEDICARE

## 2023-09-13 ENCOUNTER — LAB VISIT (OUTPATIENT)
Dept: LAB | Facility: HOSPITAL | Age: 43
End: 2023-09-13
Attending: NURSE PRACTITIONER
Payer: MEDICARE

## 2023-09-13 VITALS
SYSTOLIC BLOOD PRESSURE: 144 MMHG | HEIGHT: 68 IN | DIASTOLIC BLOOD PRESSURE: 88 MMHG | HEART RATE: 89 BPM | WEIGHT: 224.19 LBS | RESPIRATION RATE: 19 BRPM | OXYGEN SATURATION: 99 % | BODY MASS INDEX: 33.98 KG/M2

## 2023-09-13 DIAGNOSIS — N18.1 CKD STAGE G1/A3, GFR > 90 AND ALBUMIN CREATININE RATIO >300 MG/G: ICD-10-CM

## 2023-09-13 DIAGNOSIS — N18.1 CKD STAGE G1/A3, GFR > 90 AND ALBUMIN CREATININE RATIO >300 MG/G: Primary | ICD-10-CM

## 2023-09-13 DIAGNOSIS — G89.4 CHRONIC PAIN SYNDROME: ICD-10-CM

## 2023-09-13 DIAGNOSIS — K21.00 GASTROESOPHAGEAL REFLUX DISEASE WITH ESOPHAGITIS WITHOUT HEMORRHAGE: ICD-10-CM

## 2023-09-13 DIAGNOSIS — Z72.0 TOBACCO USER: ICD-10-CM

## 2023-09-13 DIAGNOSIS — R79.89 ELEVATED LFTS: ICD-10-CM

## 2023-09-13 DIAGNOSIS — K76.0 HEPATIC STEATOSIS: ICD-10-CM

## 2023-09-13 DIAGNOSIS — I10 PRIMARY HYPERTENSION: ICD-10-CM

## 2023-09-13 DIAGNOSIS — E78.01 FAMILIAL HYPERCHOLESTEREMIA: ICD-10-CM

## 2023-09-13 DIAGNOSIS — E87.6 HYPOKALEMIA: ICD-10-CM

## 2023-09-13 LAB
ALBUMIN SERPL-MCNC: 3.4 G/DL (ref 3.5–5)
ALBUMIN/GLOB SERPL: 0.8 RATIO (ref 1.1–2)
ALP SERPL-CCNC: 195 UNIT/L (ref 40–150)
ALT SERPL-CCNC: 28 UNIT/L (ref 0–55)
APPEARANCE UR: CLEAR
AST SERPL-CCNC: 21 UNIT/L (ref 5–34)
BACTERIA #/AREA URNS AUTO: ABNORMAL /HPF
BASOPHILS # BLD AUTO: 0.06 X10(3)/MCL
BASOPHILS NFR BLD AUTO: 0.4 %
BILIRUB SERPL-MCNC: 0.2 MG/DL
BILIRUB UR QL STRIP.AUTO: NEGATIVE
BUN SERPL-MCNC: 9.8 MG/DL (ref 8.9–20.6)
CALCIUM SERPL-MCNC: 9.3 MG/DL (ref 8.4–10.2)
CHLORIDE SERPL-SCNC: 98 MMOL/L (ref 98–107)
CO2 SERPL-SCNC: 30 MMOL/L (ref 22–29)
COLOR UR: YELLOW
CREAT SERPL-MCNC: 0.94 MG/DL (ref 0.73–1.18)
CREAT UR-MCNC: 108.8 MG/DL (ref 63–166)
EOSINOPHIL # BLD AUTO: 0.2 X10(3)/MCL (ref 0–0.9)
EOSINOPHIL NFR BLD AUTO: 1.5 %
ERYTHROCYTE [DISTWIDTH] IN BLOOD BY AUTOMATED COUNT: 11.5 % (ref 11.5–17)
GFR SERPLBLD CREATININE-BSD FMLA CKD-EPI: >60 MLS/MIN/1.73/M2
GLOBULIN SER-MCNC: 4.2 GM/DL (ref 2.4–3.5)
GLUCOSE SERPL-MCNC: 332 MG/DL (ref 74–100)
GLUCOSE UR QL STRIP.AUTO: ABNORMAL
HCT VFR BLD AUTO: 43.4 % (ref 42–52)
HGB BLD-MCNC: 15.3 G/DL (ref 14–18)
HYALINE CASTS #/AREA URNS LPF: ABNORMAL /LPF
IMM GRANULOCYTES # BLD AUTO: 0.08 X10(3)/MCL (ref 0–0.04)
IMM GRANULOCYTES NFR BLD AUTO: 0.6 %
INR PPP: 1
KETONES UR QL STRIP.AUTO: NEGATIVE
LEUKOCYTE ESTERASE UR QL STRIP.AUTO: NEGATIVE
LYMPHOCYTES # BLD AUTO: 3.34 X10(3)/MCL (ref 0.6–4.6)
LYMPHOCYTES NFR BLD AUTO: 24.9 %
MCH RBC QN AUTO: 30.1 PG (ref 27–31)
MCHC RBC AUTO-ENTMCNC: 35.3 G/DL (ref 33–36)
MCV RBC AUTO: 85.4 FL (ref 80–94)
MONOCYTES # BLD AUTO: 0.99 X10(3)/MCL (ref 0.1–1.3)
MONOCYTES NFR BLD AUTO: 7.4 %
MUCOUS THREADS URNS QL MICRO: ABNORMAL /LPF
NEUTROPHILS # BLD AUTO: 8.77 X10(3)/MCL (ref 2.1–9.2)
NEUTROPHILS NFR BLD AUTO: 65.2 %
NITRITE UR QL STRIP.AUTO: NEGATIVE
NRBC BLD AUTO-RTO: 0 %
PH UR STRIP.AUTO: 6 [PH]
PLATELET # BLD AUTO: 325 X10(3)/MCL (ref 130–400)
PMV BLD AUTO: 9.6 FL (ref 7.4–10.4)
POTASSIUM SERPL-SCNC: 3.3 MMOL/L (ref 3.5–5.1)
PROT SERPL-MCNC: 7.6 GM/DL (ref 6.4–8.3)
PROT UR QL STRIP.AUTO: ABNORMAL
PROT UR STRIP-MCNC: 84.2 MG/DL
PROTHROMBIN TIME: 12.5 SECONDS (ref 11.4–14)
RBC # BLD AUTO: 5.08 X10(6)/MCL (ref 4.7–6.1)
RBC #/AREA URNS AUTO: ABNORMAL /HPF
RBC UR QL AUTO: NEGATIVE
SODIUM SERPL-SCNC: 137 MMOL/L (ref 136–145)
SP GR UR STRIP.AUTO: 1.02 (ref 1–1.03)
SQUAMOUS #/AREA URNS LPF: ABNORMAL /HPF
URINE PROTEIN/CREATININE RATIO (OHS): 0.8
UROBILINOGEN UR STRIP-ACNC: NORMAL
WBC # SPEC AUTO: 13.44 X10(3)/MCL (ref 4.5–11.5)
WBC #/AREA URNS AUTO: ABNORMAL /HPF

## 2023-09-13 PROCEDURE — 99214 OFFICE O/P EST MOD 30 MIN: CPT | Mod: S$PBB,,, | Performed by: NURSE PRACTITIONER

## 2023-09-13 PROCEDURE — 85025 COMPLETE CBC W/AUTO DIFF WBC: CPT

## 2023-09-13 PROCEDURE — 80053 COMPREHEN METABOLIC PANEL: CPT

## 2023-09-13 PROCEDURE — 36415 COLL VENOUS BLD VENIPUNCTURE: CPT

## 2023-09-13 PROCEDURE — 81001 URINALYSIS AUTO W/SCOPE: CPT

## 2023-09-13 PROCEDURE — 85610 PROTHROMBIN TIME: CPT

## 2023-09-13 PROCEDURE — 99215 OFFICE O/P EST HI 40 MIN: CPT | Mod: PBBFAC | Performed by: NURSE PRACTITIONER

## 2023-09-13 PROCEDURE — 99214 PR OFFICE/OUTPT VISIT, EST, LEVL IV, 30-39 MIN: ICD-10-PCS | Mod: S$PBB,,, | Performed by: NURSE PRACTITIONER

## 2023-09-13 PROCEDURE — 82570 ASSAY OF URINE CREATININE: CPT

## 2023-09-13 RX ORDER — SYRING-NEEDL,DISP,INSUL,0.3 ML 31 GX5/16"
SYRINGE, EMPTY DISPOSABLE MISCELLANEOUS
Qty: 100 EACH | Refills: 3 | Status: SHIPPED | OUTPATIENT
Start: 2023-09-13

## 2023-09-13 RX ORDER — HYDROMORPHONE HYDROCHLORIDE 8 MG/1
8 TABLET ORAL EVERY 6 HOURS PRN
Qty: 120 TABLET | Refills: 0 | Status: SHIPPED | OUTPATIENT
Start: 2023-09-13 | End: 2023-10-12

## 2023-09-13 RX ORDER — MORPHINE SULFATE 100 MG/1
100 TABLET, FILM COATED, EXTENDED RELEASE ORAL 3 TIMES DAILY
Qty: 90 TABLET | Refills: 0 | Status: SHIPPED | OUTPATIENT
Start: 2023-09-13 | End: 2023-10-12

## 2023-09-13 NOTE — PROGRESS NOTES
[facility]  Nephrology Clinic Note    Chief complaint: follow-up: chronic kidney disease, hypertriglyceridemia    History of present illness:   Bharath Caballero is a 43 y.o. White male with past medical history of diabetes mellitus, morbid obesity, familial hypertriglyceridemia, recurrent pancreatitis, hepatic steatosis, hypertension, CICI, hearing loss, recurrent candiduria, polyneuropathy secondary to diabetes, chronic pain (s/p neuro stimulator insertion in July 2022), and tobacco abuse. Hypertriglyceridemia was previously treated with routine plasmapheresis, this was discontinued in August 2018 per patient's request. Patient has undergone multiple AV access creation procedures, including tunneled catheter insertion and removal as well as AV graft creation. Both of his AV grafts are now thrombosed. Patient was managed by hospice in the past, however, was discharged from hospice care due to lack of terminal diagnosis. Presents today for follow-up.  Complains of all over body pain and abdominal pain to left upper quadrant after eating (chronic).    Review of Systems  12 point review of systems conducted, negative except as stated in the history of present illness.    Allergies: Patient has No Known Allergies.     Past Medical History:  has a past medical history of BPH (benign prostatic hyperplasia), Diabetes, GERD (gastroesophageal reflux disease), Hepatic steatosis, History of continuous positive airway pressure (CPAP) therapy at home, Hypertension, Hypertriglyceridemia, Kidney disorder, Leg pain, Morbid obesity, Neuropathy, OA (osteoarthritis), CICI (obstructive sleep apnea), Polyneuropathy, Recurrent pancreatitis, Renal insufficiency, and Tobacco abuse.    Procedure History:  has a past surgical history that includes Esophagogastroduodenoscopy (N/A, 06/07/2021); INSPECTION OF UPPER INTESTINAL TRACT (N/A, 06/07/2021); UPPER GI (N/A, 06/07/2021); PHERESIS OF PLASMA (N/A, 07/13/2018);  "Excision of arteriovenous fistula (N/A, 06/01/2018); PHERESIS OF PLASMA (N/A, 05/25/2018); ARTERIOVENOUS ANASTOMOSIS, OPEN, UPPER ARM BASILLIC VEIN TRANSPOSITION (N/A, 05/07/2018); Appendectomy; Hernia repair; Trial of spinal cord nerve stimulator (N/A, 5/12/2022); Spinal cord stimulator removal; ANGIOGRAM, CORONARY, WITH LEFT HEART CATHETERIZATION (N/A, 4/10/2023); and fractional flow reserve (ffr), coronary (4/10/2023).    Family History: family history includes Obesity in his father.    Social History:  reports that he has been smoking cigarettes. He started smoking about 27 years ago. He has a 13.4 pack-year smoking history. He has never used smokeless tobacco. He reports that he does not currently use alcohol. He reports that he does not currently use drugs.    Physical exam  BP (!) 144/88 (BP Location: Right arm, Patient Position: Sitting, BP Method: Medium (Manual))   Pulse 89   Resp 19   Ht 5' 8" (1.727 m)   Wt 101.7 kg (224 lb 3.2 oz)   SpO2 99%   BMI 34.09 kg/m²   General appearance: Patient is in no acute distress.  Skin: No rashes or wounds.  HEENT: PERRLA, EOMI, no scleral icterus, no JVD. Neck is supple.  Chest: Respirations are unlabored. Lungs sounds are clear.   Heart: S1, S2.   Abdomen: Benign.  : Deferred.  Extremities: No edema, peripheral pulses are palpable.  Thrombosed AV grafts to left upper extremity  Neuro: No focal deficits.     Home Medications:    Current Outpatient Medications:     aspirin (ECOTRIN) 81 MG EC tablet, Take 81 mg by mouth once daily., Disp: , Rfl:     atorvastatin (LIPITOR) 40 MG tablet, Take 1 tablet (40 mg total) by mouth once daily., Disp: 90 tablet, Rfl: 3    carBAMazepine (TEGRETOL) 200 mg tablet, Take 3 tablets (600 mg total) by mouth 2 (two) times a day., Disp: 180 tablet, Rfl: 11    carvediloL (COREG) 25 MG tablet, Take 1 tablet (25 mg total) by mouth 2 (two) times daily with meals., Disp: 60 tablet, Rfl: 11    clonazePAM (KLONOPIN) 1 MG tablet, Take 1 " tablet (1 mg total) by mouth 2 (two) times daily., Disp: 60 tablet, Rfl: 5    cloNIDine (CATAPRES) 0.2 MG tablet, TAKE ONE TABLET BY MOUTH THREE TIMES DAILY, Disp: 270 tablet, Rfl: 3    EScitalopram oxalate (LEXAPRO) 20 MG tablet, Take 20 mg by mouth once daily., Disp: , Rfl:     evolocumab (REPATHA SURECLICK) 140 mg/mL PnIj, Inject 1 mL (140 mg total) into the skin every 14 (fourteen) days., Disp: 2 mL, Rfl: 11    FARXIGA 5 mg Tab tablet, TAKE ONE TABLET BY MOUTH EVERY DAY, Disp: 90 tablet, Rfl: 3    gabapentin (NEURONTIN) 400 MG capsule, Two tabs in AM, one in afternoon, Disp: 90 capsule, Rfl: 3    gabapentin (NEURONTIN) 800 MG tablet, TAKE ONE TABLET BY MOUTH IN THE EVENING, Disp: 90 tablet, Rfl: 3    HUMALOG U-100 INSULIN 100 unit/mL injection, INJECT 25 UNITS SUBCUTANEOUSLY BEFORE MEALS THREE TIMES DAILY, Disp: 10 mL, Rfl: 4    HYDROmorphone (DILAUDID) 8 MG tablet, TAKE ONE TABLET BY MOUTH EVERY 6 HOURS AS NEEDED FOR PAIN, Disp: 120 tablet, Rfl: 0    icosapent ethyL (VASCEPA) 1 gram Cap, Take 2 capsules (2 g total) by mouth 2 (two) times daily., Disp: 60 capsule, Rfl: 11    irbesartan (AVAPRO) 300 MG tablet, Take 300 mg by mouth once daily., Disp: , Rfl:     isosorbide mononitrate (IMDUR) 120 MG 24 hr tablet, Take 1 tablet (120 mg total) by mouth once daily., Disp: 90 tablet, Rfl: 3    LANTUS U-100 INSULIN 100 unit/mL injection, INJECT 100 UNITS SUBCUTANEOUSLY TWICE DAILY (Patient taking differently: Inject 100 Units into the skin every evening. And in AM), Disp: 30 mL, Rfl: 6    lipase-protease-amylase (CREON) 36,000-114,000- 180,000 unit CpDR, Take 1 capsule by mouth 3 (three) times daily., Disp: 270 capsule, Rfl: 1    morphine (MS CONTIN) 100 MG 12 hr tablet, TAKE ONE TABLET BY MOUTH THREE TIMES DAILY, Disp: 90 tablet, Rfl: 0    NIFEdipine (PROCARDIA-XL) 60 MG (OSM) 24 hr tablet, Take 1 tablet (60 mg total) by mouth 2 (two) times a day., Disp: 180 tablet, Rfl: 3    potassium chloride SA (K-DUR,KLOR-CON) 20  "MEQ tablet, Take 1 tablet (20 mEq total) by mouth 2 (two) times daily., Disp: 180 tablet, Rfl: 3    sucralfate (CARAFATE) 100 mg/mL suspension, Take 10 mLs (1 g total) by mouth 4 (four) times daily before meals and nightly., Disp: 1200 mL, Rfl: 3    SURE COMFORT INSULIN SYRINGE 0.5 mL 31 gauge x 5/16" Syrg, USE ONE SYRINGE THREE TIMES DAILY, Disp: 100 each, Rfl: 3    tamsulosin (FLOMAX) 0.4 mg Cap, TAKE ONE CAPSULE BY MOUTH EVERY DAY, Disp: 90 capsule, Rfl: 3    esomeprazole (NEXIUM) 40 MG capsule, Take 1 capsule (40 mg total) by mouth before breakfast., Disp: 30 capsule, Rfl: 11    nitroGLYCERIN (NITROSTAT) 0.3 MG SL tablet, Place 1 tablet (0.3 mg total) under the tongue every 5 (five) minutes as needed for Chest pain (go to er after 3 doses)., Disp: 30 tablet, Rfl: 6    torsemide (DEMADEX) 20 MG Tab, Take 20 mg by mouth once daily., Disp: , Rfl:     Current Facility-Administered Medications:     triamcinolone acetonide 0.1% ointment, , Topical (Top), BID, Malina Brito, ROCCO    Laboratory data    Serum  Lab Results   Component Value Date    WBC 13.44 (H) 09/13/2023    HGB 15.3 09/13/2023    HCT 43.4 09/13/2023     09/13/2023    IRON 52 (L) 08/18/2020    TIBC 296 08/18/2020    TRANS 232.0 08/18/2020    LABIRON 17.6 08/18/2020    FERRITIN 190.9 08/18/2020    YSVQNZVM22 773 08/13/2020     (H) 02/20/2018     09/13/2023    K 3.3 (L) 09/13/2023    CHLORIDE 98 09/13/2023    CO2 30 (H) 09/13/2023    BUN 9.8 09/13/2023    CREATININE 0.94 09/13/2023    EGFRNORACEVR >60 09/13/2023    GLUCOSE 332 (H) 09/13/2023    CALCIUM 9.3 09/13/2023    ALKPHOS 195 (H) 09/13/2023    LABPROT 7.6 09/13/2023    ALBUMIN 3.4 (L) 09/13/2023    BILIDIR 0.1 12/20/2021    IBILI 0.20 12/20/2021    AST 21 09/13/2023    ALT 28 09/13/2023    MG 1.80 05/24/2023    PHOS 2.9 12/01/2022      Lab Results   Component Value Date    HGBA1C 9.4 (H) 05/24/2023    PTH 83.7 (H) 04/11/2019    MUPMDALB11HG 28.1 (L) 06/16/2021    HIV " Nonreactive 05/24/2023    HEPCAB Nonreactive 08/13/2020    HEPBSURFAG Nonreactive 08/13/2020    HEPBSAB Nonreactive 09/27/2017     Urine:  Lab Results   Component Value Date    COLORUA Yellow 09/13/2023    APPEARANCEUA Clear 09/13/2023    SGUA 1.024 09/13/2023    PHUA 6.0 09/13/2023    PROTEINUA 2+ (A) 09/13/2023    GLUCOSEUA 4+ (A) 09/13/2023    KETONESUA Negative 09/13/2023    BLOODUA Negative 09/13/2023    NITRITESUA Negative 09/13/2023    LEUKOCYTESUR Negative 09/13/2023    RBCUA 0-5 09/13/2023    WBCUA 0-5 09/13/2023    BACTERIA None Seen 09/13/2023    SQEPUA Trace (A) 09/13/2023    HYALINECASTS None Seen 09/13/2023    CREATRANDUR 108.8 09/13/2023    PROTEINURINE 84.2 09/13/2023    UPROTCREA 0.8 09/13/2023         Imaging  US Retroperitoneal Complete 11/30/2022   The right kidney measures 10 cm. The left kidney measures 11 cm.   No hydronephrosis.  Normal renal parenchymal echogenicity.  No significant abnormality of the urinary bladder.  Impression  No significant sonographic abnormality of the kidneys.  Electronically signed by: Paulo iBggs  Date:    11/30/2022  Time:    14:12       Impression and plan     CKD stage G1/A3, GFR > 90 and albumin creatinine ratio >300 mg/g  Diabetic kidney disease.  Serum creatinine is stable.  Continue ACE-inhibitor, SGLT2i and risk factor management.  Continue:  -follow 2 g a day dietary sodium restriction  -control diabetes (goal A1c less than 7%)  -control high blood pressure (goal blood pressure is less than 130/80, please check blood pressure twice a week and bring blood pressure logs to office visit)  -exercise at least 30 minutes a day, 5 days a week  -maintain healthy weight  -decrease or stop alcohol use  -do not smoke  -stay well hydrated (drink water only, avoid juices, sweet tea, and sodas)  -ask about staying up-to-date on vaccinations (flu vaccine, pneumonia vaccine, hepatitis B vaccine)  -avoid excessive use of NSAIDs (ibuprofen, naproxen, Aleve, Advil, Toradol,  Mobic), take Tylenol as needed for headache or mild pain  -take cholesterol lowering medications if prescribed (LDL goal less than 100)  Follow up in about 4 months (around 1/13/2024).    Hypokalemia  Will increase K-dur dose to 60 mEq a day.     Primary hypertension  Blood pressure reading is at goal, continue current antihypertensive regimen and 2 g a day dietary sodium restriction.      Familial hypercholesteremia  Considering multiple potential complications from new AV access creation as well as plasmapheresis treatments, would not recommend routine plasma exchange at this time, but rather a more aggressive glycemic control and optimization of lipid-lowering therapies. Patient was treated with maximum dose of rosuvastatin and fenofibrate, but total cholesterol and triglyceride levels remained above goal.  Patient is at very high risk for development of cardiovascular events (MI, stroke, unstable angina). Unfortunately, dietary interventions, high-dose statin in combination with fibrate were ineffective to meet cholesterol treatment goals. Patient would benefit from continuation of therapy with PCSK9 Inhibitor.   Continue evolocumab (REPATHA) 140 mg subcutaneously every 14 (fourteen) days.    Tobacco user  Patient was counseled on importance of tobacco use cessation.  Strongly encouraged to quit, offered a referral to a smoking cessation program.    BMI 34.0-34.9,adult  Lifestyle and dietary interventions discussed, patient encouraged to maintain non-sedentary lifestyle and well-balanced diet.        Orders Placed This Encounter   Procedures    Comprehensive Metabolic Panel     Standing Status:   Future     Standing Expiration Date:   1/30/2024    Protein/Creatinine Ratio, Urine     Standing Status:   Future     Standing Expiration Date:   1/30/2024     Order Specific Question:   Specimen Source     Answer:   Urine    Urinalysis, Reflex to Urine Culture     Standing Status:   Future     Standing Expiration Date:    1/30/2024     Order Specific Question:   Specimen Source     Answer:   Urine        9/13/2023  Kendal Grande NP  [facility]

## 2023-09-13 NOTE — PROGRESS NOTES
Please notify findings of elevated WBC somewhat improved from 3 months ago. Please defer to PCP for continued monitoring. PT/INR okay. Potassium mildly low and on potassium supplement per nephrology. Glucose elevated and would defer to PCP/endocrinology. Alk phos improved some from 3 months ago. Thanks

## 2023-09-14 ENCOUNTER — DOCUMENTATION ONLY (OUTPATIENT)
Dept: NEPHROLOGY | Facility: CLINIC | Age: 43
End: 2023-09-14
Payer: MEDICARE

## 2023-09-14 ENCOUNTER — TELEPHONE (OUTPATIENT)
Dept: GASTROENTEROLOGY | Facility: CLINIC | Age: 43
End: 2023-09-14
Payer: MEDICARE

## 2023-09-14 NOTE — TELEPHONE ENCOUNTER
Call to pt, no answer. Left message for return call.    ----- Message from ROCCO Gamez sent at 9/13/2023  3:09 PM CDT -----  Please notify findings of elevated WBC somewhat improved from 3 months ago. Please defer to PCP for continued monitoring. PT/INR okay. Potassium mildly low and on potassium supplement per nephrology. Glucose elevated and would defer to PCP/endocrinology. Alk phos improved some from 3 months ago. Thanks

## 2023-09-14 NOTE — PROGRESS NOTES
09-Jan-2022 21:46 Repatha PA approved  PA-B [id number]  Spoke with [facility] pharmacy:  paid

## 2023-09-25 ENCOUNTER — TELEPHONE (OUTPATIENT)
Dept: INTERNAL MEDICINE | Facility: CLINIC | Age: 43
End: 2023-09-25
Payer: MEDICARE

## 2023-09-25 ENCOUNTER — OFFICE VISIT (OUTPATIENT)
Dept: NEUROLOGY | Facility: CLINIC | Age: 43
End: 2023-09-25
Payer: MEDICARE

## 2023-09-25 VITALS
OXYGEN SATURATION: 99 % | HEIGHT: 68 IN | WEIGHT: 221 LBS | BODY MASS INDEX: 33.49 KG/M2 | DIASTOLIC BLOOD PRESSURE: 82 MMHG | HEART RATE: 80 BPM | SYSTOLIC BLOOD PRESSURE: 159 MMHG

## 2023-09-25 DIAGNOSIS — Z72.0 TOBACCO USER: ICD-10-CM

## 2023-09-25 DIAGNOSIS — Z74.09 IMPAIRED FUNCTIONAL MOBILITY, BALANCE, GAIT, AND ENDURANCE: ICD-10-CM

## 2023-09-25 DIAGNOSIS — R53.1 WEAKNESS: ICD-10-CM

## 2023-09-25 DIAGNOSIS — G62.9 NEUROPATHY: ICD-10-CM

## 2023-09-25 DIAGNOSIS — E11.40 PAINFUL DIABETIC NEUROPATHY: Primary | Chronic | ICD-10-CM

## 2023-09-25 DIAGNOSIS — E08.42 DIABETIC POLYNEUROPATHY ASSOCIATED WITH DIABETES MELLITUS DUE TO UNDERLYING CONDITION: ICD-10-CM

## 2023-09-25 DIAGNOSIS — G47.33 OBSTRUCTIVE SLEEP APNEA SYNDROME: ICD-10-CM

## 2023-09-25 PROCEDURE — 99215 OFFICE O/P EST HI 40 MIN: CPT | Mod: S$PBB,,, | Performed by: NURSE PRACTITIONER

## 2023-09-25 PROCEDURE — 99215 PR OFFICE/OUTPT VISIT, EST, LEVL V, 40-54 MIN: ICD-10-PCS | Mod: S$PBB,,, | Performed by: NURSE PRACTITIONER

## 2023-09-25 PROCEDURE — 99215 OFFICE O/P EST HI 40 MIN: CPT | Mod: PBBFAC | Performed by: NURSE PRACTITIONER

## 2023-09-25 RX ORDER — OXCARBAZEPINE 600 MG/1
600 TABLET, FILM COATED ORAL 2 TIMES DAILY
Qty: 60 TABLET | Refills: 11 | Status: SHIPPED | OUTPATIENT
Start: 2023-09-25 | End: 2024-09-24

## 2023-09-25 NOTE — TELEPHONE ENCOUNTER
Tried calling patient via number listed in chart, no answer. Left a vm at this time. Reminded patient about upcoming appointment and to take b/p meds 30 min to 1 hour before appointment.

## 2023-09-25 NOTE — PROGRESS NOTES
"[facility] Neurology Follow Up Office Visit Note    Subjective:       Patient ID: Bharath Caballero is a 43 y.o. male.    Chief Complaint: Painful diabetic neuropathy (Patient reports worsening, especially in legs. He also c/o "weird" feeling in his left arm and hand. 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 He was last seen on 5/23/2023. During that visit, medications nancy was decreased back to 800mg BIDand CMZ were continued. Pt was also referred to home health. Was instructed to go to ED for stroke like symptoms.    Interim History  Today, Pt presents alone. Pt has new c/o "weird" feeling to L upper ext from shoulder to hand. Accompanied by L hand weakness, started 1.5 weeks ago. Had participated in PT via Home Health, but completed. States he is followed by [facility] every two weeks for BP checks.     Continues w/worsening burning/numbness and tingling to lower ext, now worsening to hands. States he lost feeling is his R arm this morning that has not returned. Also c/o weakness to R hand and upper ext that is new. Fell this AM getting out of shower (hx of falls). CPAP machine broken so not utilizing. Also states he had chest pain last night, took nitro, did not call EMS. Finally resolved. Notes that there was something "different" about his R leg weakness yesterday that was not "normal". C/O edema to feet and had to cut his shoes off.    Severe hearing loss, pt states due to nerve damage, cannot afford cochlear implant.    Pt had NEVRO SCS placement for painful neuropathy on 7/21/2022 by Dr. Jay Pozo.     Currently sees Dr. Beasley for pain management. Discuss changing gabapentin to Lyrica for edema to " lower ext.    Presenting History  He noted that he initially presented with right leg stabbing pain and paresthesia radiating up his leg for 2 years followed by paresthesia and allodynia in left foot for 1 year, worse with standing on his feet. He also noted that his hands and joints would hurt. He had quit drinking for 8 years. Still smokes. -280 most of the time for now, but it was in the 500s for the past 4 year.    Current Medications -  Neurontin 400mg - 400mg - 800mg  CBZ ER 200mg-400mg BID (12/30/2020 - present)    Prior Medications -  Effexor XR 37.5mg daily    Imaging  - MRI Brain w/w/out 5/29/2023 - small area of chronic gliosis to L temporal lobe, no acute intracranial findings    - MRI Lumbar w/o Bryce 9/20/2019 - I have reviewed the study independently and with the patient. 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    - MRI C spine w/o Bryce 9/20/2019 - I have reviewed the study independently and with the patient. 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 Multilevel spondylosis noted.    Results for orders placed or performed in visit on 09/13/23   Protime-INR   Result Value Ref Range    PT 12.5 11.4 - 14.0 seconds    INR 1.0 <=1.3   Comprehensive Metabolic Panel   Result Value Ref Range    Sodium Level 137 136 - 145 mmol/L    Potassium Level 3.3 (L) 3.5 - 5.1 mmol/L    Chloride 98 98 - 107 mmol/L    Carbon Dioxide 30 (H) 22 - 29 mmol/L    Glucose Level 332 (H) 74 - 100 mg/dL    Blood Urea Nitrogen 9.8 8.9 - 20.6 mg/dL    Creatinine 0.94 0.73 - 1.18 mg/dL    Calcium Level Total 9.3 8.4 - 10.2 mg/dL    Protein Total 7.6 6.4 - 8.3 gm/dL    Albumin Level 3.4 (L) 3.5 - 5.0 g/dL    Globulin 4.2 (H) 2.4 - 3.5 gm/dL    Albumin/Globulin Ratio 0.8 (L) 1.1 - 2.0 ratio    Bilirubin Total 0.2 <=1.5 mg/dL    Alkaline Phosphatase 195 (H) 40 - 150 unit/L    Alanine Aminotransferase 28 0 - 55 unit/L    Aspartate Aminotransferase 21 5 - 34 unit/L    eGFR  >60 mls/min/1.73/m2   CBC with Differential   Result Value Ref Range    WBC 13.44 (H) 4.50 - 11.50 x10(3)/mcL    RBC 5.08 4.70 - 6.10 x10(6)/mcL    Hgb 15.3 14.0 - 18.0 g/dL    Hct 43.4 42.0 - 52.0 %    MCV 85.4 80.0 - 94.0 fL    MCH 30.1 27.0 - 31.0 pg    MCHC 35.3 33.0 - 36.0 g/dL    RDW 11.5 11.5 - 17.0 %    Platelet 325 130 - 400 x10(3)/mcL    MPV 9.6 7.4 - 10.4 fL    Neut % 65.2 %    Lymph % 24.9 %    Mono % 7.4 %    Eos % 1.5 %    Basophil % 0.4 %    Lymph # 3.34 0.6 - 4.6 x10(3)/mcL    Neut # 8.77 2.1 - 9.2 x10(3)/mcL    Mono # 0.99 0.1 - 1.3 x10(3)/mcL    Eos # 0.20 0 - 0.9 x10(3)/mcL    Baso # 0.06 <=0.2 x10(3)/mcL    IG# 0.08 (H) 0 - 0.04 x10(3)/mcL    IG% 0.6 %    NRBC% 0.0 %   Protein/Creatinine Ratio, Urine   Result Value Ref Range    Urine Protein Level 84.2 mg/dL    Urine Creatinine 108.8 63.0 - 166.0 mg/dL    Urine Protein/Creatinine Ratio 0.8    Urinalysis, Reflex to Urine Culture    Specimen: Urine   Result Value Ref Range    Color, UA Yellow Yellow, Light-Yellow, Dark Yellow, Celsa, Straw    Appearance, UA Clear Clear    Specific Gravity, UA 1.024 1.005 - 1.030    pH, UA 6.0 5.0 - 8.5    Protein, UA 2+ (A) Negative    Glucose, UA 4+ (A) Negative, Normal    Ketones, UA Negative Negative    Blood, UA Negative Negative    Bilirubin, UA Negative Negative    Urobilinogen, UA Normal 0.2, 1.0, Normal    Nitrites, UA Negative Negative    Leukocyte Esterase, UA Negative Negative    WBC, UA 0-5 None Seen, 0-2, 3-5, 0-5 /HPF    Bacteria, UA None Seen None Seen /HPF    Squamous Epithelial Cells, UA Trace (A) None Seen /HPF    Mucous, UA Trace (A) None Seen /LPF    Hyaline Casts, UA None Seen None Seen /lpf    RBC, UA 0-5 None Seen, 0-2, 3-5, 0-5 /HPF       Review of Systems   Constitutional: no fever, fatigue, +weakness  Eye: no vision loss, eye redness, drainage, or pain  ENMT: no sore throat, ear pain, sinus pain/congestion, nasal congestion/drainage  Respiratory: no cough, no wheezing, no shortness of  breath  Cardiovascular: no chest pain, no palpitations, no edema at this time  Gastrointestinal: no nausea, vomiting, or diarrhea. No abdominal pain  Genitourinary: no dysuria, no urinary frequency or urgency, no hematuria  Hema/Lymph: no abnormal bruising or bleeding  Endocrine: no heat or cold intolerance, no excessive thirst or excessive urination  Musculoskeletal: + muscle or joint pain, no joint swelling  Integumentary: no skin rash or abnormal lesion  Psychiatric: no depressed mood, + anxiety, no visual/auditory hallucinations, no delusions, no suicidal or homicidal ideation  Neurologic: antalgic     Objective:      Physical Exam    General: well-developed well-nourished in no acute distress  Eye: clear conjunctiva, eyelids normal  HENT: oropharynx without erythema/exudate, oropharynx and nasal mucosal surfaces moist  Neck: full range of motion  Respiratory: no distress on RA  Cardiovascular: regular rate and rhythm   Gastrointestinal: round, no guarding, non-distended  Musculoskeletal: full range of motion of all extremities/spine without limitation or discomfort  Integumentary: no rashes or skin lesions present    Neurologic:  Mental Status- Alert and Oriented to time, self, place, Speech is fluent, with intact reading and comprehension, long term memory intact, No Dysarthria.  CN II-XII - CN I Deferred, STACI, no RAPD, OD blurred vision, VA grossly normal to finger counting at 6ft, No ptosis b/l, EOMI w/o nystagmus, LT/PP symmetric, intact in CN V1-V3 distribution b/l, masseter symmetric b/l, T/U midline, Shoulder shrug symmetric b/l, Right SNHL, VFFC, + R facial droop, smile, Chinik bilaterally  Motor - Tone and Bulk nml throughout, No involuntary movements, 5/5 to confrontation all ext.  FFM nml b/l, No pronator drift.   Sensory - LT/PP/Temp diminished in RLE up to knee, diminished to LUE from hand to shoulder & LLE up to mid-shin, Vibration absent in bilat Big Toes.   Reflexes - 2+ Throughout except for  absent AJ b/l  Cerebellar Exam - FNF wnl b/l no intention tremor.  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 New c/o L upper ext numbness/tingling. Continues to cigarette intake to 10 cigarettes daily. Not utilizing CPAP as she states it is broken.    1. Painful diabetic neuropathy    2. Diabetic polyneuropathy associated with diabetes mellitus due to underlying condition    3. Obstructive sleep apnea syndrome    4. Tobacco user    5. Impaired functional mobility, balance, gait, and endurance      Plan:       [] c/w Neurontin; c/w 400mg in AM and afternoon and 800mg nightly; consider changing to Lyrica at next visit  [] d/c CBZ ER to 600mg BID   [] start OXC 600mg BID  [] Rx Capsaicin Topical Cream .075% QID for neuropathic pain  [] advised on better glycemic control. Goal HgA1c < 7.  [] c/w decreased smoking  [] MRI C-spine to L upper ext neuropathy and tingling    RTC 3 months    I have explained the treatment plan, diagnosis, and prognosis to the patient, caretaker, family. All questions are answered to the best of my knowledge.    Face to Face Time 40 minute, including, counseling, education, review of test results, relevant medical records, and coordination of care.

## 2023-09-29 ENCOUNTER — PROCEDURE VISIT (OUTPATIENT)
Dept: INFECTIOUS DISEASES | Facility: CLINIC | Age: 43
End: 2023-09-29
Payer: MEDICARE

## 2023-09-29 DIAGNOSIS — R79.89 ELEVATED LFTS: ICD-10-CM

## 2023-09-29 DIAGNOSIS — K76.0 HEPATIC STEATOSIS: ICD-10-CM

## 2023-09-29 PROCEDURE — 91200 LIVER ELASTOGRAPHY: CPT | Mod: PBBFAC | Performed by: INTERNAL MEDICINE

## 2023-10-03 ENCOUNTER — OFFICE VISIT (OUTPATIENT)
Dept: INTERNAL MEDICINE | Facility: CLINIC | Age: 43
End: 2023-10-03
Payer: MEDICARE

## 2023-10-03 VITALS
HEIGHT: 68 IN | HEART RATE: 68 BPM | RESPIRATION RATE: 18 BRPM | TEMPERATURE: 98 F | DIASTOLIC BLOOD PRESSURE: 78 MMHG | SYSTOLIC BLOOD PRESSURE: 152 MMHG | OXYGEN SATURATION: 98 % | WEIGHT: 225.63 LBS | BODY MASS INDEX: 34.19 KG/M2

## 2023-10-03 DIAGNOSIS — I70.0 AORTIC ATHEROSCLEROSIS: ICD-10-CM

## 2023-10-03 DIAGNOSIS — B35.1 ONYCHOMYCOSIS OF MULTIPLE TOENAILS WITH TYPE 2 DIABETES MELLITUS: ICD-10-CM

## 2023-10-03 DIAGNOSIS — H53.8 BLURRED VISION, RIGHT EYE: ICD-10-CM

## 2023-10-03 DIAGNOSIS — H91.90 DEAFNESS, UNSPECIFIED LATERALITY: ICD-10-CM

## 2023-10-03 DIAGNOSIS — K92.0 GASTROINTESTINAL HEMORRHAGE WITH HEMATEMESIS: ICD-10-CM

## 2023-10-03 DIAGNOSIS — Z87.19 HISTORY OF PANCREATITIS: ICD-10-CM

## 2023-10-03 DIAGNOSIS — I16.1 HYPERTENSIVE EMERGENCY: ICD-10-CM

## 2023-10-03 DIAGNOSIS — R10.9 ABDOMINAL PAIN, UNSPECIFIED ABDOMINAL LOCATION: ICD-10-CM

## 2023-10-03 DIAGNOSIS — B37.49 CANDIDURIA: ICD-10-CM

## 2023-10-03 DIAGNOSIS — R10.31 RIGHT LOWER QUADRANT PAIN: ICD-10-CM

## 2023-10-03 DIAGNOSIS — E11.69 ONYCHOMYCOSIS OF MULTIPLE TOENAILS WITH TYPE 2 DIABETES MELLITUS: ICD-10-CM

## 2023-10-03 DIAGNOSIS — F33.2 MAJOR DEPRESSIVE DISORDER, RECURRENT SEVERE WITHOUT PSYCHOTIC FEATURES: ICD-10-CM

## 2023-10-03 DIAGNOSIS — G89.4 CHRONIC PAIN SYNDROME: Chronic | ICD-10-CM

## 2023-10-03 DIAGNOSIS — E08.42 DIABETIC POLYNEUROPATHY ASSOCIATED WITH DIABETES MELLITUS DUE TO UNDERLYING CONDITION: Primary | ICD-10-CM

## 2023-10-03 DIAGNOSIS — Z74.09 IMPAIRED FUNCTIONAL MOBILITY, BALANCE, GAIT, AND ENDURANCE: ICD-10-CM

## 2023-10-03 DIAGNOSIS — L60.2 OVERGROWN TOENAILS: ICD-10-CM

## 2023-10-03 DIAGNOSIS — I10 PRIMARY HYPERTENSION: ICD-10-CM

## 2023-10-03 DIAGNOSIS — E11.42 TYPE 2 DIABETES MELLITUS WITH DIABETIC POLYNEUROPATHY, WITHOUT LONG-TERM CURRENT USE OF INSULIN: ICD-10-CM

## 2023-10-03 DIAGNOSIS — G89.29 OTHER CHRONIC PAIN: ICD-10-CM

## 2023-10-03 DIAGNOSIS — E78.01 FAMILIAL HYPERCHOLESTEROLEMIA: ICD-10-CM

## 2023-10-03 DIAGNOSIS — E11.40 PAINFUL DIABETIC NEUROPATHY: Chronic | ICD-10-CM

## 2023-10-03 DIAGNOSIS — Z72.0 TOBACCO USER: ICD-10-CM

## 2023-10-03 DIAGNOSIS — R63.4 ABNORMAL WEIGHT LOSS: ICD-10-CM

## 2023-10-03 PROCEDURE — 99215 OFFICE O/P EST HI 40 MIN: CPT | Mod: PBBFAC | Performed by: INTERNAL MEDICINE

## 2023-10-03 NOTE — PROGRESS NOTES
Subjective     Patient ID: Bharath Caballero is a 43 y.o. male.    Chief Complaint: Follow-up (Arms feeling worse, stomach feeling worse)    Follow-up      Patient says that he is being evaluated by Dr. Jean neurologist she is ordered an MRI of his neck both arms hurt there is no weakness but she is order this MRI and is following up with this complaint he had an MRI in 2019 and has not had follow-up since he complains of chronic right abdominal pain sodium in the flank area he has been in the emergency room many times over the last few years he said he actually coughed up actually threw up some blood GI has seen him and plan to do endoscopy blood pressure is 160/82 which is fairly stable for him glucoses have been fairly stable occasionally go down to 40 he is going to monitor his glucoses in the me know what they are knows the goal is to be between 80 and 200 his hearing is getting worse so we are sending him to audiology which checking labs today we are going to get a CT of his abdomen and pelvis because of this chronic abdominal pain he is not had 1 in 2 years he is a chronic leukocytosis so we are going to check a peripheral blood smear he saw renal recently which is satisfied with his condition triglycerides are stable overall he is done exceedingly well computer where he was before diabetes blood pressure depression uropathy were stable I did check his feet he has very little fine touch in his feet little laterally over his little toes and lateral foot but mostly stocking-glove distal from his just above his ankles pulses are good there is no ulcers  Review of Systems   All other systems reviewed and are negative.         Objective     Physical Exam  Vitals and nursing note reviewed.   Constitutional:       Appearance: Normal appearance.   HENT:      Head: Normocephalic and atraumatic.      Right Ear: Tympanic membrane, ear canal and external ear normal.      Left Ear: Tympanic membrane, ear canal and external  ear normal.      Nose: Nose normal.      Mouth/Throat:      Mouth: Mucous membranes are moist.      Pharynx: Oropharynx is clear.   Eyes:      Extraocular Movements: Extraocular movements intact.      Conjunctiva/sclera: Conjunctivae normal.      Pupils: Pupils are equal, round, and reactive to light.   Cardiovascular:      Rate and Rhythm: Normal rate.      Pulses: Normal pulses.      Heart sounds: Normal heart sounds.   Pulmonary:      Effort: Pulmonary effort is normal.      Breath sounds: Normal breath sounds.   Abdominal:      General: Bowel sounds are normal.      Palpations: Abdomen is soft.   Musculoskeletal:      Cervical back: Normal range of motion and neck supple.      Comments: Significant loss of pinprick to bilateral feet and fine touch with only some preservation laterally and over the little toe pretty much stocking-glove from just above the ankles distally pulses are good no ulcerations   Skin:     General: Skin is warm and dry.   Neurological:      General: No focal deficit present.      Mental Status: He is alert and oriented to person, place, and time. Mental status is at baseline.   Psychiatric:         Mood and Affect: Mood normal.         Behavior: Behavior normal.         Thought Content: Thought content normal.         Judgment: Judgment normal.            Assessment and Plan     1. Diabetic polyneuropathy associated with diabetes mellitus due to underlying condition    2. Other chronic pain    3. Chronic pain syndrome    4. Painful diabetic neuropathy    5. Major depressive disorder, recurrent severe without psychotic features    6. Blurred vision, right eye    7. Onychomycosis of multiple toenails with type 2 diabetes mellitus    8. Overgrown toenails    9. Primary hypertension    10. Familial hypercholesterolemia    11. Hypertensive emergency    12. Aortic atherosclerosis    13. Candiduria    14. Abnormal weight loss    15. History of pancreatitis    16. Tobacco user    17. Impaired  functional mobility, balance, gait, and endurance    18. Gastrointestinal hemorrhage with hematemesis  -     CT Abdomen Pelvis W Wo Contrast; Future; Expected date: 10/10/2023    19. Right lower quadrant pain  -     CT Abdomen Pelvis W Wo Contrast; Future; Expected date: 10/10/2023    20. Abdominal pain, unspecified abdominal location  -     CT Abdomen Pelvis W Wo Contrast; Future; Expected date: 10/10/2023    21. Type 2 diabetes mellitus with diabetic polyneuropathy, without long-term current use of insulin  -     Ambulatory referral/consult to Outpatient Case Management  -     CBC Auto Differential; Future; Expected date: 10/03/2023  -     Comprehensive Metabolic Panel; Future; Expected date: 10/03/2023    22. Deafness, unspecified laterality  -     Ambulatory referral/consult to Audiology; Future; Expected date: 10/17/2023    As above thank you             Follow up in about 4 months (around 2/3/2024).

## 2023-10-05 ENCOUNTER — OFFICE VISIT (OUTPATIENT)
Dept: CARDIOLOGY | Facility: CLINIC | Age: 43
End: 2023-10-05
Payer: MEDICARE

## 2023-10-05 VITALS
DIASTOLIC BLOOD PRESSURE: 82 MMHG | RESPIRATION RATE: 20 BRPM | HEIGHT: 68 IN | HEART RATE: 66 BPM | OXYGEN SATURATION: 99 % | TEMPERATURE: 100 F | BODY MASS INDEX: 34.32 KG/M2 | WEIGHT: 226.44 LBS | SYSTOLIC BLOOD PRESSURE: 136 MMHG

## 2023-10-05 DIAGNOSIS — I10 PRIMARY HYPERTENSION: Primary | ICD-10-CM

## 2023-10-05 DIAGNOSIS — E78.2 MIXED HYPERLIPIDEMIA: ICD-10-CM

## 2023-10-05 DIAGNOSIS — I25.10 CORONARY ARTERY DISEASE, UNSPECIFIED VESSEL OR LESION TYPE, UNSPECIFIED WHETHER ANGINA PRESENT, UNSPECIFIED WHETHER NATIVE OR TRANSPLANTED HEART: ICD-10-CM

## 2023-10-05 PROCEDURE — 99215 OFFICE O/P EST HI 40 MIN: CPT | Mod: PBBFAC | Performed by: INTERNAL MEDICINE

## 2023-10-05 RX ORDER — TORSEMIDE 20 MG/1
20 TABLET ORAL DAILY
Qty: 90 TABLET | Refills: 3 | Status: SHIPPED | OUTPATIENT
Start: 2023-10-05 | End: 2024-10-04

## 2023-10-05 RX ORDER — IRBESARTAN 300 MG/1
300 TABLET ORAL DAILY
Qty: 90 TABLET | Refills: 3 | Status: SHIPPED | OUTPATIENT
Start: 2023-10-05 | End: 2023-10-05

## 2023-10-05 RX ORDER — IRBESARTAN 150 MG/1
150 TABLET ORAL NIGHTLY
Qty: 90 TABLET | Refills: 3 | Status: SHIPPED | OUTPATIENT
Start: 2023-10-05 | End: 2023-10-05

## 2023-10-05 NOTE — PROGRESS NOTES
[facility] Cardiology Clinic Note     Date of Visit: 10/5/2023  Reason for Visit/Chief Complaint:   Chief Complaint    f/u sts has chest pain daily no sob since LV no questions           History of Present Illness:      Bharath Caballero is a 43 y.o. male with a PMH significant for HTN, DM, Hypertriglyceridemia, Hepatic Steatosis, recurrent pancreatitis, CICI who presents to cardiology clinic for follow up.     Patient continues to report chest pain described as substernal nonradiating stabbing pain which occurs at rest almost every day, relieved by sublingual nitro but generally resolved before he needs nitro. Such short episodes of chest pain likely non-cardiac. States chest pain is less frequent than it was 6 months ago but more intense. Denies SOB at rest but reports SOB with long walks over 1 block.     Pt is hard of hearing. Uses a cane to walk. Continues to smoke a 1/2 pack over 2 days.     Coronary angiogram 4/2023:   FINDINGS  LVEDP:  18 mmHg  Left Main:  No stenosis    LAD:   Diffusely diseased.  70% ostial-prox lesion, 70% mid-distal lesion and 50% lesion at apex  Circumflex:   Mild diffuse disease   30%  lesion at the ostium of the first OM  RCA:   Severe, diffuse disease. Small vessel   90% prox-mid lesion   mid vessel  The patient has Significant two vessel disease    Blood loss:  less than 10 cc.  LIMA:  Medium size vessel.     iFR = 0.94 in proximal and mid LAD.  iFR > 0.89 is not significant      RECOMMENDATIONS  Medical management  Risk factor modifications -- start statin, blood pressure control      ECHO 5/24/23   The left ventricle is normal in size with mild concentric hypertrophy and normal systolic function.  The estimated ejection fraction is 65%.  Normal left ventricular diastolic function.  There is no evidence of intracardiac shunting.  Elevated central venous pressure (15 mmHg).  The estimated PA systolic pressure is 24 mmHg.  Normal right ventricular size with normal right ventricular systolic  function.  Mild left atrial enlargement.    Past Medical History:     Past Medical History:   Diagnosis Date    BPH (benign prostatic hyperplasia)     Diabetes     GERD (gastroesophageal reflux disease)     Hepatic steatosis     History of continuous positive airway pressure (CPAP) therapy at home     Hypertension     Hypertriglyceridemia     Kidney disorder     Leg pain     Morbid obesity     Neuropathy     OA (osteoarthritis)     CICI (obstructive sleep apnea)     Polyneuropathy     Recurrent pancreatitis     Renal insufficiency     Tobacco abuse        Surgical History:     Past Surgical History:   Procedure Laterality Date    ANGIOGRAM, CORONARY, WITH LEFT HEART CATHETERIZATION N/A 4/10/2023    Procedure: Angiogram, Coronary, with Left Heart Cath;  Surgeon: Jaswant Pugh MD;  Location: [facility] CATH LAB;  Service: Cardiology;  Laterality: N/A;    APPENDECTOMY      ARTERIOVENOUS ANASTOMOSIS, OPEN, UPPER ARM BASILLIC VEIN TRANSPOSITION N/A 05/07/2018    ESOPHAGOGASTRODUODENOSCOPY N/A 06/07/2021    EXCISION OF ARTERIOVENOUS FISTULA N/A 06/01/2018    FRACTIONAL FLOW RESERVE (FFR), CORONARY  4/10/2023    Procedure: Fractional Flow [place] (FFR), Coronary;  Surgeon: Jaswant Pugh MD;  Location: [facility] CATH LAB;  Service: Cardiology;;    HERNIA REPAIR      INSPECTION OF UPPER INTESTINAL TRACT N/A 06/07/2021    PHERESIS OF PLASMA N/A 07/13/2018    PHERESIS OF PLASMA N/A 05/25/2018    SPINAL CORD STIMULATOR REMOVAL      TRIAL OF SPINAL CORD NERVE STIMULATOR N/A 5/12/2022    Procedure: TRIAL, NEUROSTIMULATOR, SPINAL CORD;  Surgeon: Jay Pozo MD;  Location: [facility];  Service: Neurosurgery;  Laterality: N/A;    UPPER GI N/A 06/07/2021       Family History:     Family History   Problem Relation Age of Onset    Obesity Father        Social History:     Social History     Tobacco Use    Smoking status: Some Days     Current packs/day: 0.00     Average packs/day: 0.5 packs/day for 26.7 years (13.4 ttl pk-yrs)      "Types: Cigarettes     Start date: 1996     Last attempt to quit: 2023     Years since quittin.4    Smokeless tobacco: Never    Tobacco comments:     4 cigarettes per day   Substance Use Topics    Alcohol use: Not Currently    Drug use: Not Currently       Allergies:   Review of patient's allergies indicates:  No Known Allergies      Review of Systems:   Review of Systems   Constitutional:  Negative for chills and fever.   HENT:  Negative for congestion and sinus pain.    Respiratory:  Negative for cough, shortness of breath and wheezing.    Cardiovascular:  Positive for chest pain. Negative for palpitations, orthopnea and leg swelling.   Gastrointestinal:  Negative for abdominal pain, constipation, diarrhea, nausea and vomiting.   Genitourinary:  Negative for dysuria and hematuria.   Musculoskeletal:  Negative for falls, joint pain and myalgias.   Skin:  Negative for rash.   Neurological:  Negative for dizziness and weakness.   Psychiatric/Behavioral:  The patient is not nervous/anxious.         Objective:     Wt Readings from Last 3 Encounters:   10/05/23 102.7 kg (226 lb 6.6 oz)   10/03/23 102.3 kg (225 lb 9.6 oz)   23 100.2 kg (221 lb)     Temp Readings from Last 3 Encounters:   10/05/23 99.9 °F (37.7 °C) (Oral)   10/03/23 98.1 °F (36.7 °C) (Oral)   23 98.3 °F (36.8 °C)     BP Readings from Last 3 Encounters:   10/05/23 136/82   10/03/23 (!) 152/78   23 (!) 159/82     Pulse Readings from Last 3 Encounters:   10/05/23 66   10/03/23 68   23 80       Vitals:    10/05/23 1446 10/05/23 1517   BP: (!) 156/89 136/82   BP Location: Right arm Right arm   Patient Position: Sitting    BP Method: Large (Automatic) Large (Manual)   Pulse: 66    Resp: 20    Temp: 99.9 °F (37.7 °C)    TempSrc: Oral    SpO2: 99%    Weight: 102.7 kg (226 lb 6.6 oz)    Height: 5' 8" (1.727 m)        Body mass index is 34.43 kg/m².    General: Patient resting comfortably, in no acute distress. Hard of hearing. "   HENT: Head-normocephalic and atraumatic  Neck: no JVD, trachea midline  Respiratory: clear to auscultation bilaterally  Cardiovascular: regular rate and rhythm without murmurs  Gastrointestinal: soft, non-tender, non-distended   Musculoskeletal: full range of motion without limitation or discomfort  Integumentary: no rashes or skin lesions present  Neurologic: no signs of peripheral neurological deficit  Psychiatric:  alert and oriented, cognitive function intact, cooperative with exam      Cardiac Studies/Imaging:   I have reviewed the following studies below:      EKG (11/29/22):  NSR    Echo (11/29/22):  The left ventricle is normal in size with concentric remodeling and normal systolic function.  The estimated ejection fraction is 60%.  Normal left ventricular diastolic function.  Normal right ventricular size with normal right ventricular systolic function.  Normal central venous pressure (3 mmHg).    Nuclear stress (Regadenoson): 11/29/22    Abnormal myocardial perfusion scan.    There is a moderate to severe intensity, moderate to large sized, reversible perfusion abnormality that is consistent with ischemia in the basal to apical lateral apical wall(s) in the typical distribution of the LCX territory.    There are no other significant perfusion abnormalities.    The gated perfusion images showed an ejection fraction of 64% at rest. The gated perfusion images showed an ejection fraction of 69% post stress.    The EKG portion of this study is negative for ischemia.    The patient reported no chest pain during the stress test.    There were no arrhythmias during stress.     On the nuclear stress test the EF is normal.  If the patient has an echo, the EF on the echo is considered more accurate.      The patient has a moderate to high risk nuclear stress test.      If clinically indicated, consider further evaluation with coronary angiogram.    Coronary angiogram 4/2023:   FINDINGS  LVEDP:  18 mmHg  Left Main:   No stenosis    LAD:   Diffusely diseased.  70% ostial-prox lesion, 70% mid-distal lesion and 50% lesion at apex  Circumflex:   Mild diffuse disease   30%  lesion at the ostium of the first OM  RCA:   Severe, diffuse disease. Small vessel   90% prox-mid lesion   mid vessel  The patient has Significant two vessel disease    Blood loss:  less than 10 cc.  LIMA:  Medium size vessel.     iFR = 0.94 in proximal and mid LAD.  iFR > 0.89 is not significant      RECOMMENDATIONS  Medical management  Risk factor modifications -- start statin, blood pressure control      ECHO 5/24/23   The left ventricle is normal in size with mild concentric hypertrophy and normal systolic function.  The estimated ejection fraction is 65%.  Normal left ventricular diastolic function.  There is no evidence of intracardiac shunting.  Elevated central venous pressure (15 mmHg).  The estimated PA systolic pressure is 24 mmHg.  Normal right ventricular size with normal right ventricular systolic function.  Mild left atrial enlargement.    Images:  Ankle Brachial Indices (FOUZIA)    Result Date: 3/10/2023  There were normal triphasic Doppler waveforms at the right ankle. The FOUZIA on the right was normal. The TBI on the right was normal The were normal triphasic Doppler waveforms at the left ankle. The FOUZIA on the left was normal. The TBI on the left was normal. There was no evidence of significant arterial insufficiency identified on this study. The post exercise FOUZIA study was omitted due to an unstable gait and lethargy. The left arm brachial systolic pressure was omitted due to an AV fistula.        Assessment/Plan:     Angina - improved  CAD with moderate obstructive CAD  Uncontrolled Hypertension  Pt has proximal LAD disease based on cath in 4/2023, however iFR was 0.94 which was considered non-significant, hence not revascularized. Pt also has mid-RCA . Not felt to be a CABG candidate by CT surgery.   - Previously maximized anti-anginals in  6/2023 with less frequent angina   - Continue Coreg 25 mg BID   - Continue Imdur to 120 mg daily    - Continue nifedipine to 60 mg BID  - /82 today  - Continue other anti-hypertensives: clonidine, torsemide  - Previous prescription for irbesartan, not filled, consider adding on if BP remain elevated  - Continue ASA, statin  - If pt continues to have angina despite maximal medical therapy, could consider PCI, however given his uncontrolled diabetes, LIMA-LAD may be a better option. In any case, will follow up with him after maximizing anti-anginals.      Hypertriglyceridemia, familial hypercholesterolemia   DM2- uncontrolled  - , LDL 44, , HDL 24 in 10/2023  - continue atorvastatin 40mg daily  - continue Vascepa 2g BID  - diabetes not well controlled, FLP and A1c above goal, defer to PCP      Future Appointments   Date Time Provider Department Center   10/19/2023  8:30 AM [facility] US1 [facility] US [place] [facility]   10/19/2023  9:00 AM [facility] CT1 450 LB LIMIT [facility] CTSCN [place] [facility]   10/19/2023  1:00 PM AUDIOLOGIST 2, [facility] AUDIOLOGY [facility] AUDIO [place]   10/20/2023  3:00 PM [facility] MRI1 420 LB LIMIT [facility]B [facility]   12/19/2023  1:30 PM Malina Brito, P [facility] OPWND [place] [facility]   12/27/2023  2:00 PM Judith Jean, ELVIS [facility] NEURO [place]   1/16/2024 11:15 AM Kendal Grande, FNP [facility] NEPHR [place]   2/6/2024  2:00 PM Jaswant Pugh MD [facility] CARD [place]   2/27/2024  1:30 PM Dat Beasley MD [facility] IM RES [place]   3/7/2024  2:00 PM Erin Hernandez FNP [facility] GASTRO [place]   4/1/2024  9:30 AM Gilda Erwin, NP [facility] ENDOCR [place] [facility]       Ilda Sarmiento MD  [facility] Internal Medicine, HO-2  10/5/2023

## 2023-10-06 ENCOUNTER — TELEPHONE (OUTPATIENT)
Dept: ADMINISTRATIVE | Facility: HOSPITAL | Age: 43
End: 2023-10-06

## 2023-10-11 DIAGNOSIS — E11.65 TYPE 2 DIABETES MELLITUS WITH HYPERGLYCEMIA, WITH LONG-TERM CURRENT USE OF INSULIN: ICD-10-CM

## 2023-10-11 DIAGNOSIS — Z79.4 TYPE 2 DIABETES MELLITUS WITH HYPERGLYCEMIA, WITH LONG-TERM CURRENT USE OF INSULIN: ICD-10-CM

## 2023-10-11 DIAGNOSIS — G89.4 CHRONIC PAIN SYNDROME: ICD-10-CM

## 2023-10-12 RX ORDER — INSULIN GLARGINE 100 [IU]/ML
INJECTION, SOLUTION SUBCUTANEOUS
Qty: 30 ML | Refills: 4 | Status: SHIPPED | OUTPATIENT
Start: 2023-10-12 | End: 2024-02-27

## 2023-10-12 RX ORDER — MORPHINE SULFATE 100 MG/1
100 TABLET, FILM COATED, EXTENDED RELEASE ORAL 3 TIMES DAILY
Qty: 90 TABLET | Refills: 0 | Status: SHIPPED | OUTPATIENT
Start: 2023-10-12 | End: 2023-11-16

## 2023-10-12 RX ORDER — INSULIN LISPRO 100 [IU]/ML
INJECTION, SOLUTION INTRAVENOUS; SUBCUTANEOUS
Qty: 10 ML | Refills: 4 | Status: SHIPPED | OUTPATIENT
Start: 2023-10-12 | End: 2024-03-14

## 2023-10-12 RX ORDER — HYDROMORPHONE HYDROCHLORIDE 8 MG/1
8 TABLET ORAL EVERY 6 HOURS PRN
Qty: 120 TABLET | Refills: 0 | Status: SHIPPED | OUTPATIENT
Start: 2023-10-12 | End: 2023-11-16

## 2023-10-19 ENCOUNTER — HOSPITAL ENCOUNTER (OUTPATIENT)
Dept: WOUND CARE | Facility: HOSPITAL | Age: 43
Discharge: HOME | End: 2023-10-19
Attending: NURSE PRACTITIONER
Payer: MEDICARE

## 2023-10-19 ENCOUNTER — CLINICAL SUPPORT (OUTPATIENT)
Dept: AUDIOLOGY | Facility: HOSPITAL | Age: 43
End: 2023-10-19
Attending: INTERNAL MEDICINE
Payer: MEDICARE

## 2023-10-19 ENCOUNTER — HOSPITAL ENCOUNTER (OUTPATIENT)
Dept: RADIOLOGY | Facility: HOSPITAL | Age: 43
Discharge: HOME | End: 2023-10-19
Attending: INTERNAL MEDICINE
Payer: MEDICARE

## 2023-10-19 ENCOUNTER — HOSPITAL ENCOUNTER (OUTPATIENT)
Dept: RADIOLOGY | Facility: HOSPITAL | Age: 43
Discharge: HOME | End: 2023-10-19
Attending: NURSE PRACTITIONER
Payer: MEDICARE

## 2023-10-19 VITALS
DIASTOLIC BLOOD PRESSURE: 92 MMHG | SYSTOLIC BLOOD PRESSURE: 174 MMHG | TEMPERATURE: 98 F | RESPIRATION RATE: 20 BRPM | HEART RATE: 90 BPM

## 2023-10-19 DIAGNOSIS — H91.90 DEAFNESS, UNSPECIFIED LATERALITY: ICD-10-CM

## 2023-10-19 DIAGNOSIS — R79.89 ELEVATED LFTS: ICD-10-CM

## 2023-10-19 DIAGNOSIS — S61.209A OPEN WOUND OF FINGER OF RIGHT HAND, INITIAL ENCOUNTER: Primary | ICD-10-CM

## 2023-10-19 DIAGNOSIS — H90.3 SENSORINEURAL HEARING LOSS, BILATERAL: Primary | ICD-10-CM

## 2023-10-19 DIAGNOSIS — R10.31 RIGHT LOWER QUADRANT PAIN: ICD-10-CM

## 2023-10-19 DIAGNOSIS — Z72.0 TOBACCO USER: ICD-10-CM

## 2023-10-19 DIAGNOSIS — K76.0 HEPATIC STEATOSIS: ICD-10-CM

## 2023-10-19 DIAGNOSIS — T23.029A BURN INJURY OF SKIN OF FINGER: ICD-10-CM

## 2023-10-19 DIAGNOSIS — Z79.4 TYPE 2 DIABETES MELLITUS WITH OTHER SKIN ULCER, WITH LONG-TERM CURRENT USE OF INSULIN: ICD-10-CM

## 2023-10-19 DIAGNOSIS — K92.0 GASTROINTESTINAL HEMORRHAGE WITH HEMATEMESIS: ICD-10-CM

## 2023-10-19 DIAGNOSIS — R10.9 ABDOMINAL PAIN, UNSPECIFIED ABDOMINAL LOCATION: ICD-10-CM

## 2023-10-19 DIAGNOSIS — E11.622 TYPE 2 DIABETES MELLITUS WITH OTHER SKIN ULCER, WITH LONG-TERM CURRENT USE OF INSULIN: ICD-10-CM

## 2023-10-19 PROCEDURE — 76705 ECHO EXAM OF ABDOMEN: CPT | Mod: TC

## 2023-10-19 PROCEDURE — 27000999 HC MEDICAL RECORD PHOTO DOCUMENTATION

## 2023-10-19 PROCEDURE — 99499 UNLISTED E&M SERVICE: CPT | Mod: ,,, | Performed by: NURSE PRACTITIONER

## 2023-10-19 PROCEDURE — 74178 CT ABD&PLV WO CNTR FLWD CNTR: CPT | Mod: TC

## 2023-10-19 PROCEDURE — 25500020 PHARM REV CODE 255: Performed by: INTERNAL MEDICINE

## 2023-10-19 PROCEDURE — 92567 TYMPANOMETRY: CPT | Performed by: AUDIOLOGIST

## 2023-10-19 PROCEDURE — 97597 DBRDMT OPN WND 1ST 20 CM/<: CPT

## 2023-10-19 PROCEDURE — 99211 OFF/OP EST MAY X REQ PHY/QHP: CPT | Mod: 25

## 2023-10-19 PROCEDURE — 92592 HC HEARING AID CHECK, ONE EAR: CPT | Mod: GY | Performed by: AUDIOLOGIST

## 2023-10-19 PROCEDURE — 97597 DBRDMT OPN WND 1ST 20 CM/<: CPT | Mod: ,,, | Performed by: NURSE PRACTITIONER

## 2023-10-19 PROCEDURE — 97597 PR DEBRIDEMENT OPEN WOUND 20 SQ CM<: ICD-10-PCS | Mod: ,,, | Performed by: NURSE PRACTITIONER

## 2023-10-19 PROCEDURE — 99499 NO LOS: ICD-10-PCS | Mod: ,,, | Performed by: NURSE PRACTITIONER

## 2023-10-19 PROCEDURE — 92557 COMPREHENSIVE HEARING TEST: CPT | Performed by: AUDIOLOGIST

## 2023-10-19 RX ADMIN — IOHEXOL 100 ML: 350 INJECTION, SOLUTION INTRAVENOUS at 10:10

## 2023-10-19 NOTE — PROGRESS NOTES
CHIEF COMPLAINT:    Burn wounds to 2 right fingers      HISTORY OF  PRESENT ILLNESS:  43 y.o. White male being seen today for evaluation and management of burn wounds to right index and right ring finger.  Burnt fingers a couple of weeks ago taking chicken out of the oven.  Wounds have not healed; they both have scabs over wounds.  Cleaning wounds with soap and water, followed by bandaids.   Followed by Dat Beasley MD for PCP.    History includes:        Past Medical History:   Diagnosis Date    Acquired perforating dermatosis 8/30/2023    Aortic atherosclerosis 1/24/2023    Bilateral edema of lower extremity 2/6/2023    Bladder outflow obstruction 6/20/2022    Blurred vision, right eye 10/19/2022    BMI 34.0-34.9,adult 6/20/2022    BPH (benign prostatic hyperplasia)     Chronic liver failure without hepatic coma 4/25/2023    Chronic pain 10/25/2022    Chronic pain syndrome 5/12/2022    Chronic pancreatitis 10/28/2017    Deafness 10/3/2023    Diabetes     Diabetic polyneuropathy 6/20/2022    Elevated LFTs 8/18/2022    GERD (gastroesophageal reflux disease)     Hand paresthesia 6/20/2022    Hepatic steatosis     History of continuous positive airway pressure (CPAP) therapy at home     History of pancreatitis 6/20/2022    Hypertension     Hypertriglyceridemia     Kidney disorder     Leg pain     Mixed hyperlipidemia 10/28/2017    Mononeuritis multiplex 6/20/2022    Morbid obesity     Neuropathy     Nocturia 6/20/2022    OA (osteoarthritis)     Obstructive sleep apnea syndrome 6/20/2022    Onychomycosis of multiple toenails with type 2 diabetes mellitus 10/28/2017    Open wound of finger of right hand 10/20/2023    CICI (obstructive sleep apnea)     Painful diabetic neuropathy 5/12/2022    Personal history of colonic polyps 8/18/2022    Polyneuropathy     Primary hypertension 10/28/2017    Recurrent pancreatitis     Renal insufficiency     Tobacco abuse     Tobacco user 6/20/2022    Type 2 diabetes mellitus with  skin complication, with long-term current use of insulin 2023      Past Surgical History:   Procedure Laterality Date    ANGIOGRAM, CORONARY, WITH LEFT HEART CATHETERIZATION N/A 4/10/2023    Procedure: Angiogram, Coronary, with Left Heart Cath;  Surgeon: Jaswant Pugh MD;  Location: [facility] CATH LAB;  Service: Cardiology;  Laterality: N/A;    APPENDECTOMY      ARTERIOVENOUS ANASTOMOSIS, OPEN, UPPER ARM BASILLIC VEIN TRANSPOSITION N/A 2018    ESOPHAGOGASTRODUODENOSCOPY N/A 2021    EXCISION OF ARTERIOVENOUS FISTULA N/A 2018    FRACTIONAL FLOW RESERVE (FFR), CORONARY  4/10/2023    Procedure: Fractional Flow [place] (FFR), Coronary;  Surgeon: Jaswant Pugh MD;  Location: [facility] CATH LAB;  Service: Cardiology;;    HERNIA REPAIR      INSPECTION OF UPPER INTESTINAL TRACT N/A 2021    PHERESIS OF PLASMA N/A 2018    PHERESIS OF PLASMA N/A 2018    SPINAL CORD STIMULATOR REMOVAL      TRIAL OF SPINAL CORD NERVE STIMULATOR N/A 2022    Procedure: TRIAL, NEUROSTIMULATOR, SPINAL CORD;  Surgeon: Jay Pozo MD;  Location: [facility];  Service: Neurosurgery;  Laterality: N/A;    UPPER GI N/A 2021      Social History     Socioeconomic History    Marital status: Single   Tobacco Use    Smoking status: Some Days     Current packs/day: 0.00     Average packs/day: 0.5 packs/day for 26.7 years (13.4 ttl pk-yrs)     Types: Cigarettes     Start date: 1996     Last attempt to quit: 2023     Years since quittin.5    Smokeless tobacco: Never    Tobacco comments:     4 cigarettes per day   Substance and Sexual Activity    Alcohol use: Not Currently    Drug use: Not Currently    Sexual activity: Yes     Partners: Female     Social Determinants of Health     Financial Resource Strain: Low Risk  (2023)    Overall Financial Resource Strain (CARDIA)     Difficulty of Paying Living Expenses: Not very hard   Food Insecurity: No Food Insecurity (2023)    Hunger Vital Sign      Worried About Running Out of Food in the Last Year: Never true     Ran Out of Food in the Last Year: Never true   Transportation Needs: No Transportation Needs (5/24/2023)    PRAPARE - Transportation     Lack of Transportation (Medical): No     Lack of Transportation (Non-Medical): No   Physical Activity: Inactive (5/24/2023)    Exercise Vital Sign     Days of Exercise per Week: 0 days     Minutes of Exercise per Session: 0 min   Stress: No Stress Concern Present (5/24/2023)    [place] [employer] - Occupational Stress Questionnaire     Feeling of Stress : Not at all   Social Connections: Unknown (5/24/2023)    Social Connection and Isolation Panel [NHANES]     Attends [employer] Services: Never     Active Member of Clubs or Organizations: No     Attends Club or Organization Meetings: Never     Marital Status: Never    Housing Stability: Low Risk  (5/24/2023)    Housing Stability Vital Sign     Unable to Pay for Housing in the Last Year: No     Number of Places Lived in the Last Year: 1     Unstable Housing in the Last Year: No       Review of Most Recent Wound Care-Related Labs:  Lab Results   Component Value Date/Time    WBC 14.77 (H) 10/03/2023 03:28 PM    RBC 4.83 10/03/2023 03:28 PM    HGB 14.9 10/03/2023 03:28 PM    HCT 42.1 10/03/2023 03:28 PM    IRON 52 (L) 08/18/2020 10:45 AM    FERRITIN 190.9 08/18/2020 10:45 AM    MCV 87.2 10/03/2023 03:28 PM    MCH 30.8 10/03/2023 03:28 PM    CREATININE 0.86 10/03/2023 03:28 PM    HGBA1C 9.4 (H) 05/24/2023 02:24 AM    CRP 11.20 (H) 06/22/2022 08:28 AM        Today 10/19/23:  Does not want to be seen by me again.  States I hurt his wounds with today's sharps debridement.  Wants to be seen by NATHANAEL Reeves, for all future visits.  Denies fevers, chills, wound redness, wound odors, or yellow/green drainage.       REVIEW OF SYSTEMS:  Except as stated in HPI, all other 10 body systems normal.       Current Outpatient Medications    Medication Sig Dispense Refill    aspirin (ECOTRIN) 81 MG EC tablet Take 81 mg by mouth once daily.      atorvastatin (LIPITOR) 40 MG tablet Take 1 tablet (40 mg total) by mouth once daily. 90 tablet 3    carvediloL (COREG) 25 MG tablet Take 1 tablet (25 mg total) by mouth 2 (two) times daily with meals. 60 tablet 11    clonazePAM (KLONOPIN) 1 MG tablet Take 1 tablet (1 mg total) by mouth 2 (two) times daily. 60 tablet 5    cloNIDine (CATAPRES) 0.2 MG tablet TAKE ONE TABLET BY MOUTH THREE TIMES DAILY 270 tablet 3    EScitalopram oxalate (LEXAPRO) 20 MG tablet Take 20 mg by mouth once daily.      esomeprazole (NEXIUM) 40 MG capsule Take 1 capsule (40 mg total) by mouth before breakfast. 30 capsule 11    evolocumab (REPATHA SURECLICK) 140 mg/mL PnIj Inject 1 mL (140 mg total) into the skin every 14 (fourteen) days. 2 mL 11    FARXIGA 5 mg Tab tablet TAKE ONE TABLET BY MOUTH EVERY DAY 90 tablet 3    gabapentin (NEURONTIN) 400 MG capsule Two tabs in AM, one in afternoon 90 capsule 3    gabapentin (NEURONTIN) 800 MG tablet TAKE ONE TABLET BY MOUTH IN THE EVENING 90 tablet 3    HUMALOG U-100 INSULIN 100 unit/mL injection INJECT 25 UNITS SUBCUTANEOUSLY BEFORE MEALS THREE TIMES DAILY 10 mL 4    HYDROmorphone (DILAUDID) 8 MG tablet TAKE ONE TABLET BY MOUTH EVERY 6 HOURS AS NEEDED FOR PAIN 120 tablet 0    icosapent ethyL (VASCEPA) 1 gram Cap Take 2 capsules (2 g total) by mouth 2 (two) times daily. 60 capsule 11    insulin glargine (LANTUS U-100 INSULIN) 100 unit/mL injection INJECT 100 UNITS SUBCUTANEOUSLY TWICE DAILY 30 mL 4    isosorbide mononitrate (IMDUR) 120 MG 24 hr tablet Take 1 tablet (120 mg total) by mouth once daily. 90 tablet 3    lipase-protease-amylase (CREON) 36,000-114,000- 180,000 unit CpDR Take 1 capsule by mouth 3 (three) times daily. 270 capsule 1    morphine (MS CONTIN) 100 MG 12 hr tablet TAKE ONE TABLET BY MOUTH THREE TIMES DAILY 90 tablet 0    NIFEdipine (PROCARDIA-XL) 60 MG (OSM) 24 hr tablet Take  "1 tablet (60 mg total) by mouth 2 (two) times a day. 180 tablet 3    nitroGLYCERIN (NITROSTAT) 0.3 MG SL tablet Place 1 tablet (0.3 mg total) under the tongue every 5 (five) minutes as needed for Chest pain (go to er after 3 doses). 30 tablet 6    OXcarbazepine (TRILEPTAL) 600 MG Tab Take 1 tablet (600 mg total) by mouth 2 (two) times daily. 60 tablet 11    potassium chloride SA (K-DUR,KLOR-CON) 20 MEQ tablet Take 1 tablet (20 mEq total) by mouth 2 (two) times daily. 180 tablet 3    sucralfate (CARAFATE) 100 mg/mL suspension Take 10 mLs (1 g total) by mouth 4 (four) times daily before meals and nightly. 1200 mL 3    SURE COMFORT INSULIN SYRINGE 0.5 mL 31 gauge x 5/16" Syrg USE ONE SYRINGE THREE TIMES DAILY 100 each 3    tamsulosin (FLOMAX) 0.4 mg Cap TAKE ONE CAPSULE BY MOUTH EVERY DAY 90 capsule 3    torsemide (DEMADEX) 20 MG Tab Take 1 tablet (20 mg total) by mouth once daily. 90 tablet 3     Current Facility-Administered Medications   Medication Dose Route Frequency Provider Last Rate Last Admin    triamcinolone acetonide 0.1% ointment   Topical (Top) BID Malina Brito FNP             PHYSICAL EXAMINATION AND VITAL SIGNS:    Vitals:    10/19/23 1127   BP: (!) 174/92   Pulse: 90   Resp: 20   Temp: 98 °F (36.7 °C)     There is no height or weight on file to calculate BMI.    General:  Hypertensive, asymptomatic with, afebrile.  Obese, in no acute distress.   Respiratory:   Breathing even, quiet, and unlabored at rest.  No coughing.  Musculoskeletal:  Full range of motion of all extremities.  Full ROM and sensation in right index and right ring finger, before and after today's sharps debridement.     Neurologic:  A&O X 3.  Cranial nerves grossly intact.   Integumentary:      Burn wound to right lateral index finger:  Full-thickness ulcer.    100% slough-covered upon presentation.  No bone visible or palpable before, and after, today's sharps debridement.  Mild periwound erythema.   No purulent drainage, " periwound edema, odors, induration, bogginess, or fluctuance.     Burn wound to right dorsal ring finger:  100% slough-covered upon presentation.  No bone visible or palpable before, and after, today's sharps debridement.  Mild periwound erythema.   No purulent drainage, periwound edema, odors, induration, bogginess, or fluctuance.              Incision/Site 10/19/23 1129 Right Finger, third (Active)   10/19/23 1129   Present Prior to Hospital Arrival?: Yes   Side: Right   Location: Finger, third   Orientation:    Incision Type:    Closure Method:    Additional Comments:    Removal Indication and Assessment:    Wound Outcome:    Removal Indications:    Wound Image   10/19/23 1128   Dressing Appearance Moist drainage 10/19/23 1128   Drainage Amount Small 10/19/23 1128   Drainage Characteristics/Odor Serosanguineous 10/19/23 1128   Appearance Slough 10/19/23 1128   Wound Length (cm) 0.8 cm 10/19/23 1128   Wound Width (cm) 0.7 cm 10/19/23 1128   Wound Depth (cm) 0.3 cm 10/19/23 1128   Wound Volume (cm^3) 0.168 cm^3 10/19/23 1128   Wound Surface Area (cm^2) 0.56 cm^2 10/19/23 1128            Incision/Site 10/19/23 1131 Right Finger, second (Active)   10/19/23 1131   Present Prior to Hospital Arrival?:    Side: Right   Location: Finger, second   Orientation:    Incision Type:    Closure Method:    Additional Comments:    Removal Indication and Assessment:    Wound Outcome:    Removal Indications:    Wound Image   10/19/23 1128   Dressing Appearance Dried drainage 10/19/23 1128   Drainage Amount None 10/19/23 1128   Appearance Dry 10/19/23 1128   Wound Length (cm) 0.4 cm 10/19/23 1128   Wound Width (cm) 0.5 cm 10/19/23 1128   Wound Depth (cm) 0.1 cm 10/19/23 1128   Wound Volume (cm^3) 0.02 cm^3 10/19/23 1128   Wound Surface Area (cm^2) 0.2 cm^2 10/19/23 1128                             ASSESSMENT AND PLAN:    Type 2 diabetes, insulin-dependent, with skin complication:  Diabetes being managed by PCP.    Diabetes  poorly-controlled by last HgbA1C on 5/24/23:  9.4%.      Burn wounds on right index and ring finger:  Injuries happened a couple of weeks ago taking chicken out of the oven.  Wounds have not healed, with overlying moist eschar/slough.    Wound care today.  New wound care tx plan:  Clean wounds with Vashe or Dakins 0.25% and pat dry, followed by Iodiflex putty, followed by secondary dressing.  Wound care to be done QOD.                                              PROCEDURE NOTE    Procedure Today:  Selective, non-excisional, non-surgical bedside debridement (wound management 82844):       Rationale for debridement:  Non-viable slough and eschar can delay wound healing and promote infection.        Expected outcome with sharps debridement:  Faster wound healing, decreased risk of infection, and response to topical antibacterials wound products.       Informed written consent obtained.     5% topical Lidocaine gel applied to wounds and allowed to soak for approximately 10 minutes.      Using  #4 dermal curettes, I gently scraped and lifted away non-viable slough and eschar.  Minimal bleeding, which was controlled with manual pressure.      Patient reported significant discomfort during procedure and stated he never wanted to be seen by me again, because I hurt him.     Wound measurements following non-excisional debridement:           Left index finger:  .4 cm x 0.5 cm x 0.3 cm           Left ring finger:     .9 cm x 0.8 cm x 0.5 cm            Return to clinic in one week, when he will be seen by NATHANAEL Reeves.  In accordance with patient's statement today, if Ms. Brito is not in office, patient can be rescheduled the following week; or, he can be seen in ER for finger wounds.

## 2023-10-19 NOTE — PROGRESS NOTES
Hearing Evaluation        Patient History: Mr. Caballero has a long-standing hearing loss reporting no changes in hearing since previous evaluation. Tinnitus, vertigo, and middle ear issues are denied. All additional history is unremarkable.        Test Results:                    Pure Tone Testing:      Right ear:       Profound sensorineural hearing loss from 250-8kHz. No responses obtained for speech reception threshold at equipment limitations.        Left ear:          Profound sensorineural hearing loss from 250-8kHz. Speech reception threshold is in agreement with puretone findings.  Discrimination score of 0% is considered poor.                                                                            Tympanometry:                                           Right ear:       Type 'A' tymp, normal middle ear pressure/function     Left ear:          Type 'A' tymp, normal middle ear pressure/function      Interpretations:      Behavioral test findings indicate no significant changes in hearing since previous hearing evaluation. Speech reception thresholds obtained at 100dB, AS, and are in agreement with puretone findings. Speech discrimination score of 0%, AS, is considered poor. Could not measure SRT for right ear at equipment limitations. Immittance measures indicate normal middle ear pressure/function, bilaterally.        Hearing aid check:     Mr. Caballero complains of 'static' interference with hearing aid in his ear.  I tried explaining that it was most likely his hearing when being amplified due to the significant amount of hearing loss that he has. He still insisted I turn it down which was done significantly to the point were I explained he most likely wasn't getting any use from the hearing aid.  He did not want any more increase. Counseled on the need of a cochlear implant as our last option for his type of hearing loss as he is in the most powerful hearing aid and has little no no residual hearing.  He  verbalized agreement and states that he will try again.  He gave me his sister-in-laws number for contact purposes if we need to discuss CI's further.     Recommendations:     Patient is in need of a CI evaluation due to profound hearing loss and little to no benefit from amplification.

## 2023-10-19 NOTE — PROGRESS NOTES
Please notify findings of hepatomegaly with nonspecific slightly heterogeneous liver echogenicity, stable from previous.  Small area of gallbladder sludge with no gallbladder wall thickening or ascites.  Thanks

## 2023-10-20 PROBLEM — S61.209A OPEN WOUND OF FINGER OF RIGHT HAND: Status: ACTIVE | Noted: 2023-10-20

## 2023-10-20 PROBLEM — I16.1 HYPERTENSIVE EMERGENCY: Status: RESOLVED | Noted: 2022-11-28 | Resolved: 2023-10-20

## 2023-10-20 PROBLEM — L97.509 FOOT ULCER: Status: RESOLVED | Noted: 2023-01-24 | Resolved: 2023-10-20

## 2023-10-20 PROBLEM — R10.9 ACUTE ABDOMINAL PAIN: Status: RESOLVED | Noted: 2022-06-20 | Resolved: 2023-10-20
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
## 2023-10-20 PROBLEM — B37.49 CANDIDURIA: Status: RESOLVED | Noted: 2022-06-20 | Resolved: 2023-10-20

## 2023-10-20 NOTE — TELEPHONE ENCOUNTER
----- Message from Brittney Martins sent at 6/22/2022 11:59 AM CDT -----  Regarding: med refill  Pt called and stated that the pharmacy sent in a refill request for INSULIN since last month and nothing was sent back. The pharmacy is [facility] in [place]. Pt can be reached @ [phone]          Thank You  Shannen GARRISON      LVM asking pt if he was able to  his insulin.   Advised him to call back

## 2023-10-23 ENCOUNTER — TELEPHONE (OUTPATIENT)
Dept: NEUROLOGY | Facility: CLINIC | Age: 43
End: 2023-10-23
Payer: MEDICARE

## 2023-10-23 NOTE — TELEPHONE ENCOUNTER
----- Message from Marifer Lovett sent at 10/23/2023  1:59 PM CDT -----  Pt is in need of a evon  [phone]    Thank You

## 2023-10-24 NOTE — TELEPHONE ENCOUNTER
Patient reports he was going to do his MRI on Friday 10/20/23. However, whenever they were going to shut off his spinal cord stimulator they found that one of the wires have malfunctioned therefore they were unable to do it. He states he was told to follow up with Dr. Pozo, since he was the one who placed the device. Patient states he scheduled with Dr. Pozo, but they are unable to see him until 11/20/23.     Mr. Sinha says that since the attempt to turn off stimulator for the MRI he has been in severe pain in his legs. He says it is possible they may have messed up some settings.    Patient is asking if it is possible that you send recommendation to Dr. Pozo to be seen sooner or otherwise please advise on what to do.

## 2023-10-24 NOTE — TELEPHONE ENCOUNTER
Spoke with patient, he requested I relay the message to his brother due to him being hard of hearing. Informed brother to call Mallory, gave him a support line number. Also informed him to call Dr. Pozo's office and request to be place on cancellation list. He verbalized understanding.

## 2023-10-26 ENCOUNTER — HOSPITAL ENCOUNTER (OUTPATIENT)
Dept: WOUND CARE | Facility: HOSPITAL | Age: 43
Discharge: HOME | End: 2023-10-26
Attending: NURSE PRACTITIONER
Payer: MEDICARE

## 2023-10-26 VITALS
OXYGEN SATURATION: 99 % | DIASTOLIC BLOOD PRESSURE: 97 MMHG | RESPIRATION RATE: 18 BRPM | SYSTOLIC BLOOD PRESSURE: 168 MMHG | TEMPERATURE: 98 F | HEART RATE: 79 BPM

## 2023-10-26 DIAGNOSIS — S61.209D OPEN WOUND OF FINGER OF RIGHT HAND, SUBSEQUENT ENCOUNTER: ICD-10-CM

## 2023-10-26 DIAGNOSIS — Z72.0 TOBACCO USER: ICD-10-CM

## 2023-10-26 DIAGNOSIS — E11.622 TYPE 2 DIABETES MELLITUS WITH OTHER SKIN ULCER, WITH LONG-TERM CURRENT USE OF INSULIN: ICD-10-CM

## 2023-10-26 DIAGNOSIS — E66.9 CLASS 1 OBESITY WITH BODY MASS INDEX (BMI) OF 33.0 TO 33.9 IN ADULT, UNSPECIFIED OBESITY TYPE, UNSPECIFIED WHETHER SERIOUS COMORBIDITY PRESENT: ICD-10-CM

## 2023-10-26 DIAGNOSIS — T23.029A BURN INJURY OF SKIN OF FINGER: Primary | ICD-10-CM

## 2023-10-26 DIAGNOSIS — Z79.4 TYPE 2 DIABETES MELLITUS WITH OTHER SKIN ULCER, WITH LONG-TERM CURRENT USE OF INSULIN: ICD-10-CM

## 2023-10-26 DIAGNOSIS — E13.42 DIABETIC POLYNEUROPATHY ASSOCIATED WITH OTHER SPECIFIED DIABETES MELLITUS: ICD-10-CM

## 2023-10-26 DIAGNOSIS — I10 PRIMARY HYPERTENSION: ICD-10-CM

## 2023-10-26 PROCEDURE — 99213 OFFICE O/P EST LOW 20 MIN: CPT | Mod: ,,, | Performed by: NURSE PRACTITIONER

## 2023-10-26 PROCEDURE — 27000999 HC MEDICAL RECORD PHOTO DOCUMENTATION

## 2023-10-26 PROCEDURE — 99213 PR OFFICE/OUTPT VISIT, EST, LEVL III, 20-29 MIN: ICD-10-PCS | Mod: ,,, | Performed by: NURSE PRACTITIONER

## 2023-10-26 PROCEDURE — 99211 OFF/OP EST MAY X REQ PHY/QHP: CPT

## 2023-10-26 RX ORDER — DOXYCYCLINE 100 MG/1
100 CAPSULE ORAL 2 TIMES DAILY
Qty: 20 CAPSULE | Refills: 0 | Status: SHIPPED | OUTPATIENT
Start: 2023-10-26 | End: 2023-11-05

## 2023-10-26 RX ORDER — MENTHOL 40 MG/ML
1 GEL TOPICAL 2 TIMES DAILY PRN
Qty: 118 ML | Refills: 2 | Status: SHIPPED | OUTPATIENT
Start: 2023-10-26 | End: 2023-11-02

## 2023-10-27 PROBLEM — E66.9 CLASS 1 OBESITY WITH BODY MASS INDEX (BMI) OF 33.0 TO 33.9 IN ADULT: Status: ACTIVE | Noted: 2023-10-27

## 2023-10-27 PROBLEM — T23.029A: Status: ACTIVE | Noted: 2023-10-27

## 2023-10-27 PROBLEM — E66.811 CLASS 1 OBESITY WITH BODY MASS INDEX (BMI) OF 33.0 TO 33.9 IN ADULT: Status: ACTIVE | Noted: 2023-10-27
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
## 2023-11-02 ENCOUNTER — HOSPITAL ENCOUNTER (OUTPATIENT)
Dept: WOUND CARE | Facility: HOSPITAL | Age: 43
Discharge: HOME | End: 2023-11-02
Attending: NURSE PRACTITIONER
Payer: MEDICARE

## 2023-11-02 VITALS
TEMPERATURE: 98 F | HEART RATE: 88 BPM | DIASTOLIC BLOOD PRESSURE: 84 MMHG | SYSTOLIC BLOOD PRESSURE: 140 MMHG | RESPIRATION RATE: 20 BRPM

## 2023-11-02 DIAGNOSIS — S61.209D OPEN WOUND OF FINGER OF RIGHT HAND, SUBSEQUENT ENCOUNTER: ICD-10-CM

## 2023-11-02 DIAGNOSIS — Z72.0 TOBACCO USER: ICD-10-CM

## 2023-11-02 DIAGNOSIS — E66.9 CLASS 1 OBESITY WITH BODY MASS INDEX (BMI) OF 33.0 TO 33.9 IN ADULT, UNSPECIFIED OBESITY TYPE, UNSPECIFIED WHETHER SERIOUS COMORBIDITY PRESENT: ICD-10-CM

## 2023-11-02 DIAGNOSIS — T23.029A BURN INJURY OF SKIN OF FINGER: Primary | ICD-10-CM

## 2023-11-02 DIAGNOSIS — I10 PRIMARY HYPERTENSION: ICD-10-CM

## 2023-11-02 DIAGNOSIS — E13.42 DIABETIC POLYNEUROPATHY ASSOCIATED WITH OTHER SPECIFIED DIABETES MELLITUS: ICD-10-CM

## 2023-11-02 DIAGNOSIS — Z79.4 TYPE 2 DIABETES MELLITUS WITH OTHER SKIN ULCER, WITH LONG-TERM CURRENT USE OF INSULIN: ICD-10-CM

## 2023-11-02 DIAGNOSIS — E11.622 TYPE 2 DIABETES MELLITUS WITH OTHER SKIN ULCER, WITH LONG-TERM CURRENT USE OF INSULIN: ICD-10-CM

## 2023-11-02 PROCEDURE — 99211 OFF/OP EST MAY X REQ PHY/QHP: CPT

## 2023-11-02 PROCEDURE — 27000999 HC MEDICAL RECORD PHOTO DOCUMENTATION

## 2023-11-02 PROCEDURE — 99213 PR OFFICE/OUTPT VISIT, EST, LEVL III, 20-29 MIN: ICD-10-PCS | Mod: ,,, | Performed by: NURSE PRACTITIONER

## 2023-11-02 PROCEDURE — 99213 OFFICE O/P EST LOW 20 MIN: CPT | Mod: ,,, | Performed by: NURSE PRACTITIONER

## 2023-11-02 NOTE — PATIENT INSTRUCTIONS
Shower daily wash fingers with soap and water apply Triad to fingers and cover with finger gauze. May leave open to air while at home.
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
## 2023-11-07 ENCOUNTER — CLINICAL SUPPORT (OUTPATIENT)
Dept: CARDIOLOGY | Facility: CLINIC | Age: 43
End: 2023-11-07
Payer: MEDICARE

## 2023-11-07 VITALS
DIASTOLIC BLOOD PRESSURE: 84 MMHG | BODY MASS INDEX: 32.65 KG/M2 | HEART RATE: 68 BPM | WEIGHT: 220.44 LBS | HEIGHT: 69 IN | OXYGEN SATURATION: 98 % | RESPIRATION RATE: 20 BRPM | SYSTOLIC BLOOD PRESSURE: 144 MMHG

## 2023-11-07 DIAGNOSIS — I10 PRIMARY HYPERTENSION: Primary | ICD-10-CM

## 2023-11-07 PROCEDURE — 99215 OFFICE O/P EST HI 40 MIN: CPT | Mod: PBBFAC

## 2023-11-07 NOTE — PROGRESS NOTES
"Mr. Caballero attends blood pressure check. He did not bring his medications, he did take AM nifedipine and AM clonidine.   He states that he takes coreg in the afternoon and evening, and torsemide in the afternoon.   It is unclear wether he took the isosorbide 120 mg as all he would say is, "oh..I did not know that was for my heart or blood pressure."  He then asks if he will see the doctor today; I answered that today is a nurse visit and I will present the information to his doctor.   As I am on the phone with his pharmacist to clarify his medications, since he did not bring them, he continues to loudly complain  that he wants to see the doctor today, he leaves the room stating that he does not want to stay, slamming the door as he leaves the  room.  He returns about 3 minutes later wanting to continue the visit. I agreed only if he will stop raising his voice and Slamming   Things, he had also slammed his phone on the table a couple of times as he was unhappy that I was on the phone with his pharmacist!!  Today's B/P is 154/91, HR 68, Manual B/P is 144/84.   I did call his home health company to get recent B/P's and ask whether they see that he is compliant. His home health B/P's  with them run 160's/70-80's. They state that he seems to not be compliant as he knows when they are going to visit but routinely   has not taken his medications when they arrive to check his B/P's . Presented to Dr. Pugh, instructions are to stay on current  Medications and keep all future appointments. Mr. Caballero verbalizes understanding.     "

## 2023-11-08 ENCOUNTER — TELEPHONE (OUTPATIENT)
Dept: NEUROLOGY | Facility: CLINIC | Age: 43
End: 2023-11-08

## 2023-11-08 ENCOUNTER — HOSPITAL ENCOUNTER (OUTPATIENT)
Dept: RADIOLOGY | Facility: HOSPITAL | Age: 43
Discharge: HOME | End: 2023-11-08
Attending: PSYCHIATRY & NEUROLOGY
Payer: MEDICARE

## 2023-11-08 ENCOUNTER — OFFICE VISIT (OUTPATIENT)
Dept: NEUROLOGY | Facility: CLINIC | Age: 43
End: 2023-11-08
Payer: MEDICARE

## 2023-11-08 VITALS
DIASTOLIC BLOOD PRESSURE: 88 MMHG | SYSTOLIC BLOOD PRESSURE: 148 MMHG | BODY MASS INDEX: 33.34 KG/M2 | WEIGHT: 220 LBS | HEIGHT: 68 IN

## 2023-11-08 DIAGNOSIS — G89.4 CHRONIC PAIN SYNDROME: ICD-10-CM

## 2023-11-08 DIAGNOSIS — G89.4 CHRONIC PAIN SYNDROME: Primary | ICD-10-CM

## 2023-11-08 DIAGNOSIS — G89.4 CHRONIC PAIN SYNDROME: Primary | Chronic | ICD-10-CM

## 2023-11-08 PROCEDURE — 99214 PR OFFICE/OUTPT VISIT, EST, LEVL IV, 30-39 MIN: ICD-10-PCS | Mod: S$PBB,,, | Performed by: PSYCHIATRY & NEUROLOGY

## 2023-11-08 PROCEDURE — 72040 X-RAY EXAM NECK SPINE 2-3 VW: CPT | Mod: TC

## 2023-11-08 PROCEDURE — 99214 OFFICE O/P EST MOD 30 MIN: CPT | Mod: PBBFAC | Performed by: PSYCHIATRY & NEUROLOGY

## 2023-11-08 PROCEDURE — 72070 X-RAY EXAM THORAC SPINE 2VWS: CPT | Mod: TC

## 2023-11-08 PROCEDURE — 99999 PR PBB SHADOW E&M-EST. PATIENT-LVL IV: CPT | Mod: PBBFAC,,, | Performed by: PSYCHIATRY & NEUROLOGY

## 2023-11-08 PROCEDURE — 99999 PR PBB SHADOW E&M-EST. PATIENT-LVL IV: ICD-10-PCS | Mod: PBBFAC,,, | Performed by: PSYCHIATRY & NEUROLOGY

## 2023-11-08 PROCEDURE — 72100 X-RAY EXAM L-S SPINE 2/3 VWS: CPT | Mod: TC

## 2023-11-08 PROCEDURE — 99214 OFFICE O/P EST MOD 30 MIN: CPT | Mod: S$PBB,,, | Performed by: PSYCHIATRY & NEUROLOGY

## 2023-11-08 NOTE — PROGRESS NOTES
Neurology Office Visit  Neurology    Bharath Caballero is a 43 y.o. male for f/u.  Reports SCS dysfunction.  Leads reportedly have moved.      ROS:  ROS     Past Medical History:   Diagnosis Date    Acquired perforating dermatosis 8/30/2023    Aortic atherosclerosis 1/24/2023    Bilateral edema of lower extremity 2/6/2023    Bladder outflow obstruction 6/20/2022    Blurred vision, right eye 10/19/2022    BMI 34.0-34.9,adult 6/20/2022    BPH (benign prostatic hyperplasia)     Chronic liver failure without hepatic coma 4/25/2023    Chronic pain 10/25/2022    Chronic pain syndrome 5/12/2022    Chronic pancreatitis 10/28/2017    Deafness 10/3/2023    Diabetes     Diabetic polyneuropathy 6/20/2022    Elevated LFTs 8/18/2022    GERD (gastroesophageal reflux disease)     Hand paresthesia 6/20/2022    Hepatic steatosis     History of continuous positive airway pressure (CPAP) therapy at home     History of pancreatitis 6/20/2022    Hypertension     Hypertriglyceridemia     Kidney disorder     Leg pain     Mixed hyperlipidemia 10/28/2017    Mononeuritis multiplex 6/20/2022    Morbid obesity     Neuropathy     Nocturia 6/20/2022    OA (osteoarthritis)     Obstructive sleep apnea syndrome 6/20/2022    Onychomycosis of multiple toenails with type 2 diabetes mellitus 10/28/2017    Open wound of finger of right hand 10/20/2023    CICI (obstructive sleep apnea)     Painful diabetic neuropathy 5/12/2022    Personal history of colonic polyps 8/18/2022    Polyneuropathy     Primary hypertension 10/28/2017    Recurrent pancreatitis     Renal insufficiency     Tobacco abuse     Tobacco user 6/20/2022    Type 2 diabetes mellitus with skin complication, with long-term current use of insulin 2/6/2023     Past Surgical History:   Procedure Laterality Date    ANGIOGRAM, CORONARY, WITH LEFT HEART CATHETERIZATION N/A 4/10/2023    Procedure: Angiogram, Coronary, with Left Heart Cath;  Surgeon: Jaswant Pugh MD;  Location: [facility] CATH LAB;   Service: Cardiology;  Laterality: N/A;    APPENDECTOMY      ARTERIOVENOUS ANASTOMOSIS, OPEN, UPPER ARM BASILLIC VEIN TRANSPOSITION N/A 05/07/2018    ESOPHAGOGASTRODUODENOSCOPY N/A 06/07/2021    EXCISION OF ARTERIOVENOUS FISTULA N/A 06/01/2018    FRACTIONAL FLOW RESERVE (FFR), CORONARY  4/10/2023    Procedure: Fractional Flow [place] (FFR), Coronary;  Surgeon: Jaswant Pugh MD;  Location: [facility] CATH LAB;  Service: Cardiology;;    HERNIA REPAIR      INSPECTION OF UPPER INTESTINAL TRACT N/A 06/07/2021    PHERESIS OF PLASMA N/A 07/13/2018    PHERESIS OF PLASMA N/A 05/25/2018    SPINAL CORD STIMULATOR REMOVAL      TRIAL OF SPINAL CORD NERVE STIMULATOR N/A 5/12/2022    Procedure: TRIAL, NEUROSTIMULATOR, SPINAL CORD;  Surgeon: Jay Pozo MD;  Location: [facility] OR;  Service: Neurosurgery;  Laterality: N/A;    UPPER GI N/A 06/07/2021     Family History   Problem Relation Age of Onset    Obesity Father      Review of patient's allergies indicates:  No Known Allergies    Current Outpatient Medications:     aspirin (ECOTRIN) 81 MG EC tablet, Take 81 mg by mouth once daily., Disp: , Rfl:     atorvastatin (LIPITOR) 40 MG tablet, Take 1 tablet (40 mg total) by mouth once daily., Disp: 90 tablet, Rfl: 3    carvediloL (COREG) 25 MG tablet, Take 1 tablet (25 mg total) by mouth 2 (two) times daily with meals., Disp: 60 tablet, Rfl: 11    clonazePAM (KLONOPIN) 1 MG tablet, Take 1 tablet (1 mg total) by mouth 2 (two) times daily., Disp: 60 tablet, Rfl: 5    cloNIDine (CATAPRES) 0.2 MG tablet, TAKE ONE TABLET BY MOUTH THREE TIMES DAILY, Disp: 270 tablet, Rfl: 3    EScitalopram oxalate (LEXAPRO) 20 MG tablet, Take 20 mg by mouth once daily., Disp: , Rfl:     evolocumab (REPATHA SURECLICK) 140 mg/mL PnIj, Inject 1 mL (140 mg total) into the skin every 14 (fourteen) days., Disp: 2 mL, Rfl: 11    FARXIGA 5 mg Tab tablet, TAKE ONE TABLET BY MOUTH EVERY DAY, Disp: 90 tablet, Rfl: 3    gabapentin (NEURONTIN) 400 MG capsule, Two tabs in  "AM, one in afternoon, Disp: 90 capsule, Rfl: 3    gabapentin (NEURONTIN) 800 MG tablet, TAKE ONE TABLET BY MOUTH IN THE EVENING, Disp: 90 tablet, Rfl: 3    HUMALOG U-100 INSULIN 100 unit/mL injection, INJECT 25 UNITS SUBCUTANEOUSLY BEFORE MEALS THREE TIMES DAILY, Disp: 10 mL, Rfl: 4    HYDROmorphone (DILAUDID) 8 MG tablet, TAKE ONE TABLET BY MOUTH EVERY 6 HOURS AS NEEDED FOR PAIN, Disp: 120 tablet, Rfl: 0    icosapent ethyL (VASCEPA) 1 gram Cap, Take 2 capsules (2 g total) by mouth 2 (two) times daily., Disp: 60 capsule, Rfl: 11    insulin glargine (LANTUS U-100 INSULIN) 100 unit/mL injection, INJECT 100 UNITS SUBCUTANEOUSLY TWICE DAILY, Disp: 30 mL, Rfl: 4    isosorbide mononitrate (IMDUR) 120 MG 24 hr tablet, Take 1 tablet (120 mg total) by mouth once daily., Disp: 90 tablet, Rfl: 3    lipase-protease-amylase (CREON) 36,000-114,000- 180,000 unit CpDR, Take 1 capsule by mouth 3 (three) times daily., Disp: 270 capsule, Rfl: 1    morphine (MS CONTIN) 100 MG 12 hr tablet, TAKE ONE TABLET BY MOUTH THREE TIMES DAILY, Disp: 90 tablet, Rfl: 0    NIFEdipine (PROCARDIA-XL) 60 MG (OSM) 24 hr tablet, Take 1 tablet (60 mg total) by mouth 2 (two) times a day., Disp: 180 tablet, Rfl: 3    OXcarbazepine (TRILEPTAL) 600 MG Tab, Take 1 tablet (600 mg total) by mouth 2 (two) times daily., Disp: 60 tablet, Rfl: 11    potassium chloride SA (K-DUR,KLOR-CON) 20 MEQ tablet, Take 1 tablet (20 mEq total) by mouth 2 (two) times daily., Disp: 180 tablet, Rfl: 3    sucralfate (CARAFATE) 100 mg/mL suspension, Take 10 mLs (1 g total) by mouth 4 (four) times daily before meals and nightly., Disp: 1200 mL, Rfl: 3    SURE COMFORT INSULIN SYRINGE 0.5 mL 31 gauge x 5/16" Syrg, USE ONE SYRINGE THREE TIMES DAILY, Disp: 100 each, Rfl: 3    tamsulosin (FLOMAX) 0.4 mg Cap, TAKE ONE CAPSULE BY MOUTH EVERY DAY, Disp: 90 capsule, Rfl: 3    torsemide (DEMADEX) 20 MG Tab, Take 1 tablet (20 mg total) by mouth once daily., Disp: 90 tablet, Rfl: 3    " esomeprazole (NEXIUM) 40 MG capsule, Take 1 capsule (40 mg total) by mouth before breakfast., Disp: 30 capsule, Rfl: 11    nitroGLYCERIN (NITROSTAT) 0.3 MG SL tablet, Place 1 tablet (0.3 mg total) under the tongue every 5 (five) minutes as needed for Chest pain (go to er after 3 doses)., Disp: 30 tablet, Rfl: 6    Current Facility-Administered Medications:     triamcinolone acetonide 0.1% ointment, , Topical (Top), BID, Malina Brito, ROCCO  Outpatient Medications Marked as Taking for the 11/8/23 encounter (Office Visit) with Jay Pozo MD   Medication Sig Dispense Refill    aspirin (ECOTRIN) 81 MG EC tablet Take 81 mg by mouth once daily.      atorvastatin (LIPITOR) 40 MG tablet Take 1 tablet (40 mg total) by mouth once daily. 90 tablet 3    carvediloL (COREG) 25 MG tablet Take 1 tablet (25 mg total) by mouth 2 (two) times daily with meals. 60 tablet 11    clonazePAM (KLONOPIN) 1 MG tablet Take 1 tablet (1 mg total) by mouth 2 (two) times daily. 60 tablet 5    cloNIDine (CATAPRES) 0.2 MG tablet TAKE ONE TABLET BY MOUTH THREE TIMES DAILY 270 tablet 3    EScitalopram oxalate (LEXAPRO) 20 MG tablet Take 20 mg by mouth once daily.      evolocumab (REPATHA SURECLICK) 140 mg/mL PnIj Inject 1 mL (140 mg total) into the skin every 14 (fourteen) days. 2 mL 11    FARXIGA 5 mg Tab tablet TAKE ONE TABLET BY MOUTH EVERY DAY 90 tablet 3    gabapentin (NEURONTIN) 400 MG capsule Two tabs in AM, one in afternoon 90 capsule 3    gabapentin (NEURONTIN) 800 MG tablet TAKE ONE TABLET BY MOUTH IN THE EVENING 90 tablet 3    HUMALOG U-100 INSULIN 100 unit/mL injection INJECT 25 UNITS SUBCUTANEOUSLY BEFORE MEALS THREE TIMES DAILY 10 mL 4    HYDROmorphone (DILAUDID) 8 MG tablet TAKE ONE TABLET BY MOUTH EVERY 6 HOURS AS NEEDED FOR PAIN 120 tablet 0    icosapent ethyL (VASCEPA) 1 gram Cap Take 2 capsules (2 g total) by mouth 2 (two) times daily. 60 capsule 11    insulin glargine (LANTUS U-100 INSULIN) 100 unit/mL injection INJECT  "100 UNITS SUBCUTANEOUSLY TWICE DAILY 30 mL 4    isosorbide mononitrate (IMDUR) 120 MG 24 hr tablet Take 1 tablet (120 mg total) by mouth once daily. 90 tablet 3    lipase-protease-amylase (CREON) 36,000-114,000- 180,000 unit CpDR Take 1 capsule by mouth 3 (three) times daily. 270 capsule 1    morphine (MS CONTIN) 100 MG 12 hr tablet TAKE ONE TABLET BY MOUTH THREE TIMES DAILY 90 tablet 0    NIFEdipine (PROCARDIA-XL) 60 MG (OSM) 24 hr tablet Take 1 tablet (60 mg total) by mouth 2 (two) times a day. 180 tablet 3    OXcarbazepine (TRILEPTAL) 600 MG Tab Take 1 tablet (600 mg total) by mouth 2 (two) times daily. 60 tablet 11    potassium chloride SA (K-DUR,KLOR-CON) 20 MEQ tablet Take 1 tablet (20 mEq total) by mouth 2 (two) times daily. 180 tablet 3    sucralfate (CARAFATE) 100 mg/mL suspension Take 10 mLs (1 g total) by mouth 4 (four) times daily before meals and nightly. 1200 mL 3    SURE COMFORT INSULIN SYRINGE 0.5 mL 31 gauge x 5/16" Syrg USE ONE SYRINGE THREE TIMES DAILY 100 each 3    tamsulosin (FLOMAX) 0.4 mg Cap TAKE ONE CAPSULE BY MOUTH EVERY DAY 90 capsule 3    torsemide (DEMADEX) 20 MG Tab Take 1 tablet (20 mg total) by mouth once daily. 90 tablet 3     Current Facility-Administered Medications for the 23 encounter (Office Visit) with Jay Pozo MD   Medication Dose Route Frequency Provider Last Rate Last Admin    triamcinolone acetonide 0.1% ointment   Topical (Top) BID Malina Brito FNP         Social History     Tobacco Use    Smoking status: Some Days     Current packs/day: 0.00     Average packs/day: 0.5 packs/day for 26.7 years (13.4 ttl pk-yrs)     Types: Cigarettes     Start date: 1996     Last attempt to quit: 2023     Years since quittin.5    Smokeless tobacco: Never    Tobacco comments:     4 cigarettes per day   Substance Use Topics    Alcohol use: Not Currently    Drug use: Not Currently         Vitals:    23 0953   BP: (!) 148/88     Gen NAD  HEENT NC/AT  CV " RRR, no carotid bruits  Resp clear  GI soft  Ext no C/C/E  Neuro  AAOx4  Speech fluent, appropriate  EOMI, PERRLA, VFF  Normal facial strength, sensation  Tongue and palate midline  Motor 5/5  Sensation TTP C and T-spine  DTRs symmetric  Coord intact  Gait cane    Assessment: CPS  SCS Dysfunction    Plan: Order C/T/L-spine X-ray

## 2023-11-15 DIAGNOSIS — G89.4 CHRONIC PAIN SYNDROME: ICD-10-CM

## 2023-11-16 RX ORDER — HYDROMORPHONE HYDROCHLORIDE 8 MG/1
8 TABLET ORAL EVERY 6 HOURS PRN
Qty: 120 TABLET | Refills: 0 | Status: SHIPPED | OUTPATIENT
Start: 2023-11-16 | End: 2023-12-15

## 2023-11-16 RX ORDER — MORPHINE SULFATE 100 MG/1
100 TABLET, FILM COATED, EXTENDED RELEASE ORAL 3 TIMES DAILY
Qty: 90 TABLET | Refills: 0 | Status: SHIPPED | OUTPATIENT
Start: 2023-11-16 | End: 2023-12-15

## 2023-11-19 ENCOUNTER — ANESTHESIA EVENT (OUTPATIENT)
Dept: ENDOSCOPY | Facility: HOSPITAL | Age: 43
End: 2023-11-19
Payer: MEDICARE

## 2023-11-19 RX ORDER — LIDOCAINE HYDROCHLORIDE 10 MG/ML
1 INJECTION, SOLUTION EPIDURAL; INFILTRATION; INTRACAUDAL; PERINEURAL ONCE
Status: CANCELLED | OUTPATIENT
Start: 2023-11-19 | End: 2023-11-19

## 2023-11-19 NOTE — ANESTHESIA PREPROCEDURE EVALUATION
"                                                                                                             11/19/2023  Bharath Caballero is a 43 y.o., male with PMHx of obesity, HTN, HLD, smoking, CICI, DM,  hearing loss, cane usage, chronic pain syndrome presents for EGD secondary to GERD & epigastric chest pain     Carvedilol--last dose  @ 0845          Vitals:    11/20/23 0830 11/20/23 0845 11/20/23 0853   BP: (!) 179/119 (!) 171/107 (!) 151/88   BP Location:   Right arm   Patient Position:   Lying   Pulse: 77 71 68   Resp: 20  18   Temp:   36.9 °C (98.4 °F)   TempSrc:   Oral   SpO2: 98% 98% 97%   Weight:   99.8 kg (220 lb)   Height:   5' 9" (1.753 m)        Coronary angiogram 4/2023:   FINDINGS  LVEDP:  18 mmHg  Left Main:  No stenosis    LAD:   Diffusely diseased.  70% ostial-prox lesion, 70% mid-distal lesion and 50% lesion at apex  Circumflex:   Mild diffuse disease   30%  lesion at the ostium of the first OM  RCA:   Severe, diffuse disease. Small vessel   90% prox-mid lesion   mid vessel  The patient has Significant two vessel disease    Blood loss:  less than 10 cc.  LIMA:  Medium size vessel.     iFR = 0.94 in proximal and mid LAD.  iFR > 0.89 is not significant      RECOMMENDATIONS  Medical management  Risk factor modifications -- start statin, blood pressure control             ECHO 5/24/23       The left ventricle is normal in size with mild concentric hypertrophy and normal systolic function.  The estimated ejection fraction is 65%.  Normal left ventricular diastolic function.  There is no evidence of intracardiac shunting.  Elevated central venous pressure (15 mmHg).  The estimated PA systolic pressure is 24 mmHg.  Normal right ventricular size with normal right ventricular systolic function.  Mild left atrial enlargement.          Active Ambulatory Problems     Diagnosis Date Noted    Chronic pain syndrome 05/12/2022    Painful diabetic neuropathy 05/12/2022    History of pancreatitis 06/20/2022    " Abnormal weight loss 06/20/2022    Bladder outflow obstruction 06/20/2022    Chronic pancreatitis 10/28/2017    Onychomycosis of multiple toenails with type 2 diabetes mellitus 10/28/2017    Diabetic polyneuropathy 06/20/2022    Primary hypertension 10/28/2017    Familial hypercholesterolemia 06/20/2022    Hand paresthesia 06/20/2022    Mixed hyperlipidemia 10/28/2017    Left flank pain 06/20/2022    Mononeuritis multiplex 06/20/2022    BMI 34.0-34.9,adult 06/20/2022    Nocturia 06/20/2022    Obstructive sleep apnea syndrome 06/20/2022    Paresis of single lower extremity 06/20/2022    Hepatic steatosis 06/20/2022    Tobacco user 06/20/2022    Elevated LFTs 08/18/2022    Gastroesophageal reflux disease with esophagitis without hemorrhage 08/18/2022    Personal history of colonic polyps 08/18/2022    Fall 10/19/2022    Impaired functional mobility, balance, gait, and endurance 10/19/2022    Blurred vision, right eye 10/19/2022    Chronic pain 10/25/2022    Aortic atherosclerosis 01/24/2023    Overgrown toenails 02/06/2023    Bilateral edema of lower extremity 02/06/2023    Type 2 diabetes mellitus with skin complication, with long-term current use of insulin 02/06/2023    Major depressive disorder, recurrent severe without psychotic features 04/25/2023    Chronic liver failure without hepatic coma 04/25/2023    Encounter for diabetic foot exam 05/09/2023    Acquired perforating dermatosis 08/30/2023    Deafness 10/03/2023    Open wound of finger of right hand 10/20/2023    Burn injury of skin of finger 10/27/2023    Class 1 obesity with body mass index (BMI) of 33.0 to 33.9 in adult 10/27/2023     Resolved Ambulatory Problems     Diagnosis Date Noted    Acute abdominal pain 06/20/2022    Candiduria 06/20/2022    Hypertensive emergency 11/28/2022    Foot ulcer 01/24/2023    Multiple insect bites 08/16/2023     Past Medical History:   Diagnosis Date    BPH (benign prostatic hyperplasia)     Diabetes     GERD  (gastroesophageal reflux disease)     History of continuous positive airway pressure (CPAP) therapy at home     Hypertension     Hypertriglyceridemia     Kidney disorder     Leg pain     Morbid obesity     Neuropathy     OA (osteoarthritis)     CICI (obstructive sleep apnea)     Polyneuropathy     Recurrent pancreatitis     Renal insufficiency     Tobacco abuse        Pre-op Assessment    I have reviewed the Patient Summary Reports.     I have reviewed the Nursing Notes. I have reviewed the NPO Status.   I have reviewed the Medications.     Review of Systems  Anesthesia Hx:  No problems with previous Anesthesia   History of prior surgery of interest to airway management or planning:          Denies Family Hx of Anesthesia complications.    Denies Personal Hx of Anesthesia complications.                    Hematology/Oncology:  Hematology Normal   Oncology Normal                                   EENT/Dental:  EENT/Dental Normal           Cardiovascular:  Cardiovascular Normal                                            Pulmonary:  Pulmonary Normal                       Renal/:  Renal/ Normal                 Hepatic/GI:  Hepatic/GI Normal                 Musculoskeletal:  Musculoskeletal Normal                Neurological:  Neurology Normal                                      Endocrine:  Endocrine Normal            Dermatological:  Skin Normal    Psych:  Psychiatric Normal                    Physical Exam  General: Alert    Airway:  Mallampati: I / I  Mouth Opening: Normal  TM Distance: Normal  Tongue: Normal  Neck ROM: Normal ROM    Dental:  Intact  Decaying dentition noted at multiple sites -- patient is aware of risk of damage and dislodgment          Lab Results   Component Value Date    WBC 14.77 (H) 10/03/2023    HGB 14.9 10/03/2023    HCT 42.1 10/03/2023    MCV 87.2 10/03/2023     10/03/2023       CMP  Sodium Level   Date Value Ref Range Status   10/03/2023 135 (L) 136 - 145 mmol/L Final      Potassium Level   Date Value Ref Range Status   10/03/2023 3.3 (L) 3.5 - 5.1 mmol/L Final     Carbon Dioxide   Date Value Ref Range Status   10/03/2023 26 22 - 29 mmol/L Final     Blood Urea Nitrogen   Date Value Ref Range Status   10/03/2023 9.2 8.9 - 20.6 mg/dL Final     Creatinine   Date Value Ref Range Status   10/03/2023 0.86 0.73 - 1.18 mg/dL Final     Calcium Level Total   Date Value Ref Range Status   10/03/2023 9.1 8.4 - 10.2 mg/dL Final     Albumin Level   Date Value Ref Range Status   10/03/2023 3.5 3.5 - 5.0 g/dL Final     Bilirubin Total   Date Value Ref Range Status   10/03/2023 0.2 <=1.5 mg/dL Final     Alkaline Phosphatase   Date Value Ref Range Status   10/03/2023 201 (H) 40 - 150 unit/L Final     Aspartate Aminotransferase   Date Value Ref Range Status   10/03/2023 34 5 - 34 unit/L Final     Alanine Aminotransferase   Date Value Ref Range Status   10/03/2023 38 0 - 55 unit/L Final     eGFR   Date Value Ref Range Status   10/03/2023 >60 mls/min/1.73/m2 Final     Lab Results   Component Value Date    HGBA1C 9.4 (H) 05/24/2023           CARDS OV 10/5/23              Anesthesia Plan  Type of Anesthesia, risks & benefits discussed:    Anesthesia Type: Gen Natural Airway  Intra-op Monitoring Plan: Standard ASA Monitors  Post Op Pain Control Plan: IV/PO Opioids PRN  (medical reason for not using multimodal pain management)  Induction:  IV  Informed Consent: Informed consent signed with the Patient and all parties understand the risks and agree with anesthesia plan.  All questions answered. Patient consented to blood products? No  ASA Score: 4  Day of Surgery Review of History & Physical: H&P Update referred to the surgeon/provider.    Ready For Surgery From Anesthesia Perspective.     .

## 2023-11-20 ENCOUNTER — HOSPITAL ENCOUNTER (OUTPATIENT)
Facility: HOSPITAL | Age: 43
Discharge: HOME | End: 2023-11-20
Attending: INTERNAL MEDICINE | Admitting: INTERNAL MEDICINE
Payer: MEDICARE

## 2023-11-20 ENCOUNTER — ANESTHESIA (OUTPATIENT)
Dept: ENDOSCOPY | Facility: HOSPITAL | Age: 43
End: 2023-11-20
Payer: MEDICARE

## 2023-11-20 DIAGNOSIS — K21.00 GASTROESOPHAGEAL REFLUX DISEASE WITH ESOPHAGITIS WITHOUT HEMORRHAGE: ICD-10-CM

## 2023-11-20 DIAGNOSIS — Z98.890 HISTORY OF ESOPHAGOGASTRODUODENOSCOPY (EGD): Primary | ICD-10-CM

## 2023-11-20 LAB
POCT GLUCOSE: 60 MG/DL (ref 70–110)
POCT GLUCOSE: 64 MG/DL (ref 70–110)

## 2023-11-20 PROCEDURE — 63600175 PHARM REV CODE 636 W HCPCS: Performed by: SPECIALIST

## 2023-11-20 PROCEDURE — 25000003 PHARM REV CODE 250: Performed by: SPECIALIST

## 2023-11-20 PROCEDURE — 37000009 HC ANESTHESIA EA ADD 15 MINS: Performed by: INTERNAL MEDICINE

## 2023-11-20 PROCEDURE — 63600175 PHARM REV CODE 636 W HCPCS: Performed by: NURSE ANESTHETIST, CERTIFIED REGISTERED

## 2023-11-20 PROCEDURE — 27201423 OPTIME MED/SURG SUP & DEVICES STERILE SUPPLY: Performed by: INTERNAL MEDICINE

## 2023-11-20 PROCEDURE — D9220A PRA ANESTHESIA: ICD-10-PCS | Mod: ,,, | Performed by: NURSE ANESTHETIST, CERTIFIED REGISTERED

## 2023-11-20 PROCEDURE — 43239 EGD BIOPSY SINGLE/MULTIPLE: CPT | Performed by: INTERNAL MEDICINE

## 2023-11-20 PROCEDURE — D9220A PRA ANESTHESIA: Mod: ,,, | Performed by: NURSE ANESTHETIST, CERTIFIED REGISTERED

## 2023-11-20 PROCEDURE — 88312 SPECIAL STAINS GROUP 1: CPT | Mod: TC | Performed by: INTERNAL MEDICINE

## 2023-11-20 PROCEDURE — 25000003 PHARM REV CODE 250: Performed by: NURSE ANESTHETIST, CERTIFIED REGISTERED

## 2023-11-20 PROCEDURE — 82962 GLUCOSE BLOOD TEST: CPT | Performed by: INTERNAL MEDICINE

## 2023-11-20 PROCEDURE — 88305 TISSUE EXAM BY PATHOLOGIST: CPT | Mod: TC | Performed by: INTERNAL MEDICINE

## 2023-11-20 PROCEDURE — 37000008 HC ANESTHESIA 1ST 15 MINUTES: Performed by: INTERNAL MEDICINE

## 2023-11-20 RX ORDER — SODIUM CHLORIDE, SODIUM LACTATE, POTASSIUM CHLORIDE, CALCIUM CHLORIDE 600; 310; 30; 20 MG/100ML; MG/100ML; MG/100ML; MG/100ML
INJECTION, SOLUTION INTRAVENOUS CONTINUOUS
Status: DISCONTINUED | OUTPATIENT
Start: 2023-11-20 | End: 2023-11-20 | Stop reason: HOSPADM

## 2023-11-20 RX ORDER — DEXTROSE, SODIUM CHLORIDE, SODIUM LACTATE, POTASSIUM CHLORIDE, AND CALCIUM CHLORIDE 5; .6; .31; .03; .02 G/100ML; G/100ML; G/100ML; G/100ML; G/100ML
INJECTION, SOLUTION INTRAVENOUS CONTINUOUS
Status: DISCONTINUED | OUTPATIENT
Start: 2023-11-20 | End: 2023-11-20 | Stop reason: HOSPADM

## 2023-11-20 RX ORDER — PROPOFOL 10 MG/ML
INJECTION, EMULSION INTRAVENOUS
Status: DISCONTINUED | OUTPATIENT
Start: 2023-11-20 | End: 2023-11-20

## 2023-11-20 RX ORDER — LIDOCAINE HYDROCHLORIDE 20 MG/ML
INJECTION INTRAVENOUS
Status: DISCONTINUED | OUTPATIENT
Start: 2023-11-20 | End: 2023-11-20

## 2023-11-20 RX ORDER — CARVEDILOL 12.5 MG/1
25 TABLET ORAL ONCE
Status: COMPLETED | OUTPATIENT
Start: 2023-11-20 | End: 2023-11-20

## 2023-11-20 RX ADMIN — SODIUM CHLORIDE, SODIUM LACTATE, POTASSIUM CHLORIDE, CALCIUM CHLORIDE AND DEXTROSE MONOHYDRATE: 5; 600; 310; 30; 20 INJECTION, SOLUTION INTRAVENOUS at 09:11

## 2023-11-20 RX ADMIN — PROPOFOL 300 MG: 10 INJECTION, EMULSION INTRAVENOUS at 10:11

## 2023-11-20 RX ADMIN — LIDOCAINE HYDROCHLORIDE 50 MG: 20 INJECTION INTRAVENOUS at 10:11

## 2023-11-20 RX ADMIN — CARVEDILOL 25 MG: 12.5 TABLET, FILM COATED ORAL at 08:11

## 2023-11-20 NOTE — H&P
[facility]   H&P Note  GI    Patient Name: Bharath Caballero  YOB: 1980  Date of Admission: 11/20/2023  Date: 11/20/2023 8:15 AM    HD#0  POD#* No surgery date entered *    PRESENTING HISTORY       History of Present Illness:  44 y/o M with PMHx of HTN, HLD, CICI, chronic pain syndrome, GERD that presents for EGD.   Patient with past episodes of bloody emesis. Has had EGD in the past, esophagitis was noted. Patient also with symptoms of reflux and heart burn.     Review of Systems:  12 point ROS negative except as stated in HPI    PAST HISTORY:   Past medical history:  Past Medical History:   Diagnosis Date    Acquired perforating dermatosis 8/30/2023    Aortic atherosclerosis 1/24/2023    Bilateral edema of lower extremity 2/6/2023    Bladder outflow obstruction 6/20/2022    Blurred vision, right eye 10/19/2022    BMI 34.0-34.9,adult 6/20/2022    BPH (benign prostatic hyperplasia)     Chronic liver failure without hepatic coma 4/25/2023    Chronic pain 10/25/2022    Chronic pain syndrome 5/12/2022    Chronic pancreatitis 10/28/2017    Deafness 10/3/2023    Diabetes     Diabetic polyneuropathy 6/20/2022    Elevated LFTs 8/18/2022    GERD (gastroesophageal reflux disease)     Hand paresthesia 6/20/2022    Hepatic steatosis     History of continuous positive airway pressure (CPAP) therapy at home     History of pancreatitis 6/20/2022    Hypertension     Hypertriglyceridemia     Kidney disorder     Leg pain     Mixed hyperlipidemia 10/28/2017    Mononeuritis multiplex 6/20/2022    Morbid obesity     Neuropathy     Nocturia 6/20/2022    OA (osteoarthritis)     Obstructive sleep apnea syndrome 6/20/2022    Onychomycosis of multiple toenails with type 2 diabetes mellitus 10/28/2017    Open wound of finger of right hand 10/20/2023    CICI (obstructive sleep apnea)     Painful diabetic neuropathy 5/12/2022    Personal history of colonic polyps 8/18/2022    Polyneuropathy     Primary hypertension  10/28/2017    Recurrent pancreatitis     Renal insufficiency     Tobacco abuse     Tobacco user 2022    Type 2 diabetes mellitus with skin complication, with long-term current use of insulin 2023       Past surgical history:  Past Surgical History:   Procedure Laterality Date    ANGIOGRAM, CORONARY, WITH LEFT HEART CATHETERIZATION N/A 4/10/2023    Procedure: Angiogram, Coronary, with Left Heart Cath;  Surgeon: Jaswant Pugh MD;  Location: [facility] CATH LAB;  Service: Cardiology;  Laterality: N/A;    APPENDECTOMY      ARTERIOVENOUS ANASTOMOSIS, OPEN, UPPER ARM BASILLIC VEIN TRANSPOSITION N/A 2018    ESOPHAGOGASTRODUODENOSCOPY N/A 2021    EXCISION OF ARTERIOVENOUS FISTULA N/A 2018    FRACTIONAL FLOW RESERVE (FFR), CORONARY  4/10/2023    Procedure: Fractional Flow [place] (FFR), Coronary;  Surgeon: Jaswant Pugh MD;  Location: [facility] CATH LAB;  Service: Cardiology;;    HERNIA REPAIR      INSPECTION OF UPPER INTESTINAL TRACT N/A 2021    PHERESIS OF PLASMA N/A 2018    PHERESIS OF PLASMA N/A 2018    SPINAL CORD STIMULATOR REMOVAL      TRIAL OF SPINAL CORD NERVE STIMULATOR N/A 2022    Procedure: TRIAL, NEUROSTIMULATOR, SPINAL CORD;  Surgeon: Jay Pozo MD;  Location: [facility];  Service: Neurosurgery;  Laterality: N/A;    UPPER GI N/A 2021       Family history:  Family History   Problem Relation Age of Onset    Obesity Father        Social history:  Social History     Socioeconomic History    Marital status: Single   Tobacco Use    Smoking status: Some Days     Current packs/day: 0.00     Average packs/day: 0.5 packs/day for 26.7 years (13.4 ttl pk-yrs)     Types: Cigarettes     Start date: 1996     Last attempt to quit: 2023     Years since quittin.5    Smokeless tobacco: Never    Tobacco comments:     4 cigarettes per day   Substance and Sexual Activity    Alcohol use: Not Currently    Drug use: Not Currently    Sexual activity: Yes      Partners: Female     Social Determinants of Health     Financial Resource Strain: Low Risk  (2023)    Overall Financial Resource Strain (CARDIA)     Difficulty of Paying Living Expenses: Not very hard   Food Insecurity: No Food Insecurity (2023)    Hunger Vital Sign     Worried About Running Out of Food in the Last Year: Never true     Ran Out of Food in the Last Year: Never true   Transportation Needs: No Transportation Needs (2023)    PRAPARE - Transportation     Lack of Transportation (Medical): No     Lack of Transportation (Non-Medical): No   Physical Activity: Inactive (2023)    Exercise Vital Sign     Days of Exercise per Week: 0 days     Minutes of Exercise per Session: 0 min   Stress: No Stress Concern Present (2023)    [employer] [place] - Occupational Stress Questionnaire     Feeling of Stress : Not at all   Social Connections: Unknown (2023)    Social Connection and Isolation Panel [NHANES]     Attends Confucianism Services: Never     Active Member of Clubs or Organizations: No     Attends Club or Organization Meetings: Never     Marital Status: Never    Housing Stability: Low Risk  (2023)    Housing Stability Vital Sign     Unable to Pay for Housing in the Last Year: No     Number of Places Lived in the Last Year: 1     Unstable Housing in the Last Year: No     Social History     Tobacco Use   Smoking Status Some Days    Current packs/day: 0.00    Average packs/day: 0.5 packs/day for 26.7 years (13.4 ttl pk-yrs)    Types: Cigarettes    Start date: 1996    Last attempt to quit: 2023    Years since quittin.5   Smokeless Tobacco Never   Tobacco Comments    4 cigarettes per day         MEDICATIONS & ALLERGIES:   Allergies: Review of patient's allergies indicates:  No Known Allergies    No current facility-administered medications on file prior to encounter.     Current Outpatient Medications on File Prior to Encounter   Medication  Sig    aspirin (ECOTRIN) 81 MG EC tablet Take 81 mg by mouth once daily.    atorvastatin (LIPITOR) 40 MG tablet Take 1 tablet (40 mg total) by mouth once daily.    carvediloL (COREG) 25 MG tablet Take 1 tablet (25 mg total) by mouth 2 (two) times daily with meals.    clonazePAM (KLONOPIN) 1 MG tablet Take 1 tablet (1 mg total) by mouth 2 (two) times daily.    cloNIDine (CATAPRES) 0.2 MG tablet TAKE ONE TABLET BY MOUTH THREE TIMES DAILY    EScitalopram oxalate (LEXAPRO) 20 MG tablet Take 20 mg by mouth once daily.    esomeprazole (NEXIUM) 40 MG capsule Take 1 capsule (40 mg total) by mouth before breakfast.    evolocumab (REPATHA SURECLICK) 140 mg/mL PnIj Inject 1 mL (140 mg total) into the skin every 14 (fourteen) days.    FARXIGA 5 mg Tab tablet TAKE ONE TABLET BY MOUTH EVERY DAY    gabapentin (NEURONTIN) 400 MG capsule Two tabs in AM, one in afternoon    gabapentin (NEURONTIN) 800 MG tablet TAKE ONE TABLET BY MOUTH IN THE EVENING    icosapent ethyL (VASCEPA) 1 gram Cap Take 2 capsules (2 g total) by mouth 2 (two) times daily.    isosorbide mononitrate (IMDUR) 120 MG 24 hr tablet Take 1 tablet (120 mg total) by mouth once daily.    lipase-protease-amylase (CREON) 36,000-114,000- 180,000 unit CpDR Take 1 capsule by mouth 3 (three) times daily.    NIFEdipine (PROCARDIA-XL) 60 MG (OSM) 24 hr tablet Take 1 tablet (60 mg total) by mouth 2 (two) times a day.    potassium chloride SA (K-DUR,KLOR-CON) 20 MEQ tablet Take 1 tablet (20 mEq total) by mouth 2 (two) times daily.    sucralfate (CARAFATE) 100 mg/mL suspension Take 10 mLs (1 g total) by mouth 4 (four) times daily before meals and nightly.    tamsulosin (FLOMAX) 0.4 mg Cap TAKE ONE CAPSULE BY MOUTH EVERY DAY    nitroGLYCERIN (NITROSTAT) 0.3 MG SL tablet Place 1 tablet (0.3 mg total) under the tongue every 5 (five) minutes as needed for Chest pain (go to er after 3 doses).       Scheduled Meds:    Continuous Infusions:    PRN Meds:    OBJECTIVE:     Vital Signs:    "  There is no height or weight on file to calculate BMI.     No intake/output data recorded.  No intake/output data recorded.    Physical Exam:  General:  Well developed, well nourished, no acute distress  HEENT:  Normocephalic, atraumatic, PERRL, EOMI, clear sclera, ears normal, neck supple, throat clear without erythema or exudates  CVS:  RRR  Resp:  Normal work of breathing on RA, equal rise and fall of the chest  GI: Abdomen soft, non-tender, non-distended, no masses, no guarding, no rebound  Skin:  No rashes, ulcers, erythema  Neuro:  CNII-XII grossly intact, alert and oriented to person, place, and time    Laboratory:  No results for input(s): "WBC", "HGB", "HCT", "PLT", "PTT", "INR" in the last 72 hours.  No results for input(s): "NA", "K", "CL", "CO2", "BUN", "CREATININE", "GLU", "CALCIUM", "MG", "PHOS", "PROT", "ALBUMIN", "BILITOT", "AST", "ALKPHOS", "ALT" in the last 72 hours.  Troponin:  No results for input(s): "TROPONINI" in the last 72 hours.  CBC:  No results for input(s): "WBC", "RBC", "HGB", "HCT", "PLT", "MCV", "MCH", "MCHC" in the last 72 hours.  CMP:  No results for input(s): "GLU", "CALCIUM", "ALBUMIN", "PROT", "NA", "K", "CO2", "CL", "BUN", "CREATININE", "ALKPHOS", "ALT", "AST", "BILITOT" in the last 72 hours.  Lactic Acid:  No results for input(s): "LACTATE" in the last 72 hours.  Etoh:  No results for input(s): "ALCOHOLMEDIC" in the last 72 hours.  Drug Screen:  No results for input(s): "PCDSCOMETHA", "COCAINEMETAB", "OPIATESCREEN", "BARBITURATES", "AMPHETAMINES", "MARIJUANATHC", "PCDSOPHENCYN", "CREATRANDUR", "TOXINFO" in the last 72 hours.    ABG:  No results for input(s): "PH", "PCO2", "PO2", "HCO3", "BE", "POCSATURATED" in the last 72 hours.    Diagnostic Results:  No results found in the last 24 hours.  No orders to display       Microbiology:  Microbiology Results (last 7 days)       ** No results found for the last 168 hours. **             ASSESSMENT & PLAN:   43 year old male that " presents for EGD today  Plan:  - EGD   - Consented  - Discharge home after procedure if no complications  Abhjieet Kennedy MD  [employer] General Surgery PGY2  11/20/2023  8:15 AM

## 2023-11-20 NOTE — ANESTHESIA POSTPROCEDURE EVALUATION
Anesthesia Post Evaluation    Patient: Bharath Caballero    Procedure(s) Performed: Procedure(s) (LRB):  EGD, WITH CLOSED BIOPSY (N/A)    Final Anesthesia Type: general      Patient location during evaluation: GI PACU  Patient participation: Yes- Able to Participate  Level of consciousness: awake and alert  Post-procedure vital signs: reviewed and stable  Pain management: adequate  Airway patency: patent    PONV status at discharge: No PONV  Anesthetic complications: no      Cardiovascular status: blood pressure returned to baseline  Respiratory status: unassisted  Hydration status: euvolemic  Follow-up not needed.          Vitals Value Taken Time   /88 11/20/23 0853   Temp 36.9 °C (98.4 °F) 11/20/23 0853   Pulse 68 11/20/23 0853   Resp 18 11/20/23 0853   SpO2 99 % 11/20/23 0927         No case tracking events are documented in the log.      Pain/Merlyn Score: No data recorded

## 2023-11-20 NOTE — TRANSFER OF CARE
"Anesthesia Transfer of Care Note    Patient: Bharath Caballero    Procedure(s) Performed: Procedure(s) (LRB):  EGD, WITH CLOSED BIOPSY (N/A)    Patient location: GI    Anesthesia Type: general    Transport from OR: Transported from OR on room air with adequate spontaneous ventilation    Post pain: adequate analgesia    Post assessment: no apparent anesthetic complications    Post vital signs: stable    Level of consciousness: awake    Nausea/Vomiting: no nausea/vomiting    Complications: none    Transfer of care protocol was followed      Last vitals: Visit Vitals  BP (!) 151/88 (BP Location: Right arm, Patient Position: Lying)   Pulse 68   Temp 36.9 °C (98.4 °F) (Oral)   Resp 18   Ht 5' 9" (1.753 m)   Wt 99.8 kg (220 lb)   SpO2 99%   BMI 32.49 kg/m²     "

## 2023-11-20 NOTE — PROVATION PATIENT INSTRUCTIONS
Discharge Summary/Instructions after an Endoscopic Procedure  Patient Name: Bharath Caballero  Patient MRN: [id number]  Patient YOB: 1980  Monday, November 20, 2023  Christina James MD  Dear patient,  As a result of recent federal legislation (The Federal Cures Act), you may   receive lab or pathology results from your procedure in your [facility]   account before your physician is able to contact you. Your physician or   their representative will relay the results to you with their   recommendations at their soonest availability.  Thank you,  RESTRICTIONS:  During your procedure today, you received medications for sedation.  These   medications may affect your judgment, balance and coordination.  Therefore,   for 24 hours, you have the following restrictions:   - DO NOT drive a car, operate machinery, make legal/financial decisions,   sign important papers or drink alcohol.    ACTIVITY:  Today: no heavy lifting, straining or running due to procedural   sedation/anesthesia.  The following day: return to full activity including work.  DIET:  Eat and drink normally unless instructed otherwise.     TREATMENT FOR COMMON SIDE EFFECTS:  - Mild abdominal pain, nausea, belching, bloating or excessive gas:  rest,   eat lightly and use a heating pad.  - Sore Throat: treat with throat lozenges and/or gargle with warm salt   water.  - Because air was used during the procedure, expelling large amounts of air   from your rectum or belching is normal.  - If a bowel prep was taken, you may not have a bowel movement for 1-3 days.    This is normal.  SYMPTOMS TO WATCH FOR AND REPORT TO YOUR PHYSICIAN:  1. Abdominal pain or bloating, other than gas cramps.  2. Chest pain.  3. Back pain.  4. Signs of infection such as: chills or fever occurring within 24 hours   after the procedure.  5. Rectal bleeding, which would show as bright red, maroon, or black stools.   (A tablespoon of blood from the rectum is not serious, especially  if   hemorrhoids are present.)  6. Vomiting.  7. Weakness or dizziness.  GO DIRECTLY TO THE NEAREST EMERGENCY ROOM IF YOU HAVE ANY OF THE FOLLOWING:      Difficulty breathing              Chills and/or fever over 101 F   Persistent vomiting and/or vomiting blood   Severe abdominal pain   Severe chest pain   Black, tarry stools   Bleeding- more than one tablespoon   Any other symptom or condition that you feel may need urgent attention  Your doctor recommends these additional instructions:  If any biopsies were taken, your doctors clinic will contact you in 1 to 2   weeks with any results.  - Patient has a contact number available for emergencies.  The signs and   symptoms of potential delayed complications were discussed with the   patient.  Return to normal activities tomorrow.  Written discharge   instructions were provided to the patient.   - Discharge patient to home.   - Continue present medications.   - small frequent meals, low fat, low fiber, chew thoroughly, drink liquids   during meals  - Await pathology results.   - Recommend diabetes optimization  For questions, problems or results please call your physician - Christina James MD at Work:  [phone], Work:  [phone].  [facility] , EMERGENCY ROOM PHONE NUMBER: [phone]  IF A COMPLICATION OR EMERGENCY SITUATION ARISES AND YOU ARE UNABLE TO REACH   YOUR PHYSICIAN - GO DIRECTLY TO THE EMERGENCY ROOM.  Christina James MD  11/20/2023 11:04:55 AM  This report has been verified and signed electronically.  Dear patient,  As a result of recent federal legislation (The Federal Cures Act), you may   receive lab or pathology results from your procedure in your [facility]   account before your physician is able to contact you. Your physician or   their representative will relay the results to you with their   recommendations at their soonest availability.  Thank you,  [employer]ATION

## 2023-11-21 ENCOUNTER — HOSPITAL ENCOUNTER (OUTPATIENT)
Dept: WOUND CARE | Facility: HOSPITAL | Age: 43
Discharge: HOME | End: 2023-11-21
Attending: NURSE PRACTITIONER
Payer: MEDICARE

## 2023-11-21 VITALS
OXYGEN SATURATION: 99 % | RESPIRATION RATE: 18 BRPM | DIASTOLIC BLOOD PRESSURE: 90 MMHG | HEART RATE: 83 BPM | TEMPERATURE: 98 F | SYSTOLIC BLOOD PRESSURE: 167 MMHG

## 2023-11-21 DIAGNOSIS — E11.622 TYPE 2 DIABETES MELLITUS WITH OTHER SKIN ULCER, WITH LONG-TERM CURRENT USE OF INSULIN: ICD-10-CM

## 2023-11-21 DIAGNOSIS — E13.42 DIABETIC POLYNEUROPATHY ASSOCIATED WITH OTHER SPECIFIED DIABETES MELLITUS: ICD-10-CM

## 2023-11-21 DIAGNOSIS — E11.9 ENCOUNTER FOR DIABETIC FOOT EXAM: ICD-10-CM

## 2023-11-21 DIAGNOSIS — E66.9 CLASS 1 OBESITY WITH BODY MASS INDEX (BMI) OF 33.0 TO 33.9 IN ADULT, UNSPECIFIED OBESITY TYPE, UNSPECIFIED WHETHER SERIOUS COMORBIDITY PRESENT: ICD-10-CM

## 2023-11-21 DIAGNOSIS — T23.029A BURN INJURY OF SKIN OF FINGER: Primary | ICD-10-CM

## 2023-11-21 DIAGNOSIS — Z79.4 TYPE 2 DIABETES MELLITUS WITH OTHER SKIN ULCER, WITH LONG-TERM CURRENT USE OF INSULIN: ICD-10-CM

## 2023-11-21 DIAGNOSIS — E11.69 ONYCHOMYCOSIS OF MULTIPLE TOENAILS WITH TYPE 2 DIABETES MELLITUS: ICD-10-CM

## 2023-11-21 DIAGNOSIS — B35.1 ONYCHOMYCOSIS OF MULTIPLE TOENAILS WITH TYPE 2 DIABETES MELLITUS: ICD-10-CM

## 2023-11-21 DIAGNOSIS — Z72.0 TOBACCO USER: ICD-10-CM

## 2023-11-21 DIAGNOSIS — I10 PRIMARY HYPERTENSION: ICD-10-CM

## 2023-11-21 DIAGNOSIS — L60.2 OVERGROWN TOENAILS: ICD-10-CM

## 2023-11-21 DIAGNOSIS — S61.209D OPEN WOUND OF FINGER OF RIGHT HAND, SUBSEQUENT ENCOUNTER: ICD-10-CM

## 2023-11-21 PROCEDURE — 99211 OFF/OP EST MAY X REQ PHY/QHP: CPT | Mod: 25

## 2023-11-21 PROCEDURE — 11719 PR TRIM NAIL(S): ICD-10-PCS | Mod: Q9,,, | Performed by: NURSE PRACTITIONER

## 2023-11-21 PROCEDURE — 11719 TRIM NAIL(S) ANY NUMBER: CPT | Mod: Q9,,, | Performed by: NURSE PRACTITIONER

## 2023-11-21 PROCEDURE — 99213 OFFICE O/P EST LOW 20 MIN: CPT | Mod: 25,,, | Performed by: NURSE PRACTITIONER

## 2023-11-21 PROCEDURE — 11719 TRIM NAIL(S) ANY NUMBER: CPT

## 2023-11-21 PROCEDURE — 27000999 HC MEDICAL RECORD PHOTO DOCUMENTATION

## 2023-11-21 PROCEDURE — 99213 PR OFFICE/OUTPT VISIT, EST, LEVL III, 20-29 MIN: ICD-10-PCS | Mod: 25,,, | Performed by: NURSE PRACTITIONER
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
## 2023-11-22 VITALS
TEMPERATURE: 98 F | HEART RATE: 66 BPM | BODY MASS INDEX: 32.58 KG/M2 | DIASTOLIC BLOOD PRESSURE: 77 MMHG | WEIGHT: 220 LBS | SYSTOLIC BLOOD PRESSURE: 117 MMHG | RESPIRATION RATE: 18 BRPM | HEIGHT: 69 IN | OXYGEN SATURATION: 97 %

## 2023-11-22 LAB
DHEA SERPL-MCNC: NORMAL
ESTROGEN SERPL-MCNC: NORMAL PG/ML
INSULIN SERPL-ACNC: NORMAL U[IU]/ML
LAB AP CLINICAL INFORMATION: NORMAL
LAB AP GROSS DESCRIPTION: NORMAL
LAB AP REPORT FOOTNOTES: NORMAL
T3RU NFR SERPL: NORMAL %

## 2023-12-05 ENCOUNTER — TELEPHONE (OUTPATIENT)
Dept: NEUROLOGY | Facility: CLINIC | Age: 43
End: 2023-12-05
Payer: MEDICARE

## 2023-12-12 ENCOUNTER — OFFICE VISIT (OUTPATIENT)
Dept: CARDIOLOGY | Facility: CLINIC | Age: 43
End: 2023-12-12
Payer: MEDICARE

## 2023-12-12 VITALS
HEART RATE: 61 BPM | DIASTOLIC BLOOD PRESSURE: 80 MMHG | TEMPERATURE: 99 F | HEIGHT: 69 IN | BODY MASS INDEX: 32.88 KG/M2 | RESPIRATION RATE: 20 BRPM | SYSTOLIC BLOOD PRESSURE: 150 MMHG | WEIGHT: 222 LBS | OXYGEN SATURATION: 100 %

## 2023-12-12 DIAGNOSIS — I10 HYPERTENSION, UNSPECIFIED TYPE: ICD-10-CM

## 2023-12-12 DIAGNOSIS — I25.110 CORONARY ARTERY DISEASE INVOLVING NATIVE CORONARY ARTERY OF NATIVE HEART WITH UNSTABLE ANGINA PECTORIS: Primary | ICD-10-CM

## 2023-12-12 DIAGNOSIS — E78.5 HYPERLIPIDEMIA, UNSPECIFIED HYPERLIPIDEMIA TYPE: ICD-10-CM

## 2023-12-12 PROCEDURE — 99215 OFFICE O/P EST HI 40 MIN: CPT | Mod: PBBFAC | Performed by: INTERNAL MEDICINE

## 2023-12-12 RX ORDER — LOSARTAN POTASSIUM 50 MG/1
50 TABLET ORAL DAILY
Qty: 90 TABLET | Refills: 3 | Status: SHIPPED | OUTPATIENT
Start: 2023-12-12 | End: 2024-12-11

## 2023-12-12 NOTE — PROGRESS NOTES
[facility]  Cariology Clinic - Follow Up     Cardiology Attending: Dr. Jaswant Pugh MD  Date of Visit: 12/12/2023  Reason for Visit/Chief Complaint:   Chief Complaint    f/u sts has chest pain daily has [facility] no questions           Subjective:      Bharath Caballero is a 43 y.o. male with a PMH significant for obstructive CAD on medical management, not a candidate for CABG per CTS,  HTN, DM, Hypertriglyceridemia, Hepatic Steatosis, recurrent pancreatitis, CICI who presents to cardiology clinic for follow up.  He states this chest pain occurs twice a day, substernal, lasts longer now, around 10-30 seconds.  It is worse at night.  Takes nitroglycerin for the pain occasionally.  Does seem to have unstable angina.  Describes it as a stabbing pain, 6/10 in intensity.  He also has shortness of breath on walking minimal distances edema.  Denies dizziness syncope.    He had a coronary angiogram in Apr 2023 which showed some proximal LAD disease, however iFR was 0.94 which was considered non-significant, hence not revascularized. Pt also has mid-RCA . Pt also met with CT surgery Dr. Dallas at [facility] who did not think that the pt warranted a CABG. The pt has uncontrolled diabetes, A1c has improved and uncontrolled HTN. Pt is hard of hearing. Uses a cane to walk. Continues to smoke     Medications:     Current Outpatient Medications   Medication Sig Dispense Refill    aspirin (ECOTRIN) 81 MG EC tablet Take 81 mg by mouth once daily.      atorvastatin (LIPITOR) 40 MG tablet Take 1 tablet (40 mg total) by mouth once daily. 90 tablet 3    carvediloL (COREG) 25 MG tablet Take 1 tablet (25 mg total) by mouth 2 (two) times daily with meals. 60 tablet 11    clonazePAM (KLONOPIN) 1 MG tablet Take 1 tablet (1 mg total) by mouth 2 (two) times daily. 60 tablet 5    cloNIDine (CATAPRES) 0.2 MG tablet TAKE ONE TABLET BY MOUTH THREE TIMES DAILY 270 tablet 3    EScitalopram  oxalate (LEXAPRO) 20 MG tablet Take 20 mg by mouth once daily.      esomeprazole (NEXIUM) 40 MG capsule Take 1 capsule (40 mg total) by mouth before breakfast. 30 capsule 11    evolocumab (REPATHA SURECLICK) 140 mg/mL PnIj Inject 1 mL (140 mg total) into the skin every 14 (fourteen) days. 2 mL 11    FARXIGA 5 mg Tab tablet TAKE ONE TABLET BY MOUTH EVERY DAY 90 tablet 3    gabapentin (NEURONTIN) 400 MG capsule Two tabs in AM, one in afternoon 90 capsule 3    gabapentin (NEURONTIN) 800 MG tablet TAKE ONE TABLET BY MOUTH IN THE EVENING 90 tablet 3    HUMALOG U-100 INSULIN 100 unit/mL injection INJECT 25 UNITS SUBCUTANEOUSLY BEFORE MEALS THREE TIMES DAILY 10 mL 4    HYDROmorphone (DILAUDID) 8 MG tablet TAKE ONE TABLET BY MOUTH EVERY 6 HOURS AS NEEDED FOR PAIN 120 tablet 0    icosapent ethyL (VASCEPA) 1 gram Cap Take 2 capsules (2 g total) by mouth 2 (two) times daily. 60 capsule 11    insulin glargine (LANTUS U-100 INSULIN) 100 unit/mL injection INJECT 100 UNITS SUBCUTANEOUSLY TWICE DAILY 30 mL 4    isosorbide mononitrate (IMDUR) 120 MG 24 hr tablet Take 1 tablet (120 mg total) by mouth once daily. 90 tablet 3    lipase-protease-amylase (CREON) 36,000-114,000- 180,000 unit CpDR Take 1 capsule by mouth 3 (three) times daily. 270 capsule 1    morphine (MS CONTIN) 100 MG 12 hr tablet TAKE ONE TABLET BY MOUTH THREE TIMES DAILY 90 tablet 0    NIFEdipine (PROCARDIA-XL) 60 MG (OSM) 24 hr tablet Take 1 tablet (60 mg total) by mouth 2 (two) times a day. 180 tablet 3    nitroGLYCERIN (NITROSTAT) 0.3 MG SL tablet Place 1 tablet (0.3 mg total) under the tongue every 5 (five) minutes as needed for Chest pain (go to er after 3 doses). 30 tablet 6    OXcarbazepine (TRILEPTAL) 600 MG Tab Take 1 tablet (600 mg total) by mouth 2 (two) times daily. 60 tablet 11    potassium chloride SA (K-DUR,KLOR-CON) 20 MEQ tablet Take 1 tablet (20 mEq total) by mouth 2 (two) times daily. 180 tablet 3    sucralfate (CARAFATE) 100 mg/mL suspension Take  "10 mLs (1 g total) by mouth 4 (four) times daily before meals and nightly. 1200 mL 3    SURE COMFORT INSULIN SYRINGE 0.5 mL 31 gauge x 5/16" Syrg USE ONE SYRINGE THREE TIMES DAILY 100 each 3    tamsulosin (FLOMAX) 0.4 mg Cap TAKE ONE CAPSULE BY MOUTH EVERY DAY 90 capsule 3    torsemide (DEMADEX) 20 MG Tab Take 1 tablet (20 mg total) by mouth once daily. 90 tablet 3     Current Facility-Administered Medications   Medication Dose Route Frequency Provider Last Rate Last Admin    triamcinolone acetonide 0.1% ointment   Topical (Top) BID Malina Brito FNP           I have reviewed and updated the patient's medications, allergies, past medical history, surgical history, social history and family history as needed.    Review of Systems:     ROS  Objective:     Wt Readings from Last 3 Encounters:   12/12/23 100.7 kg (222 lb 0.1 oz)   11/20/23 99.8 kg (220 lb)   11/08/23 99.8 kg (220 lb)     Temp Readings from Last 3 Encounters:   12/12/23 98.6 °F (37 °C) (Oral)   11/21/23 98.4 °F (36.9 °C)   11/20/23 98.4 °F (36.9 °C) (Oral)     BP Readings from Last 3 Encounters:   12/12/23 (!) 150/80   11/21/23 (!) 167/90   11/20/23 117/77     Pulse Readings from Last 3 Encounters:   12/12/23 61   11/21/23 83   11/20/23 66       Vitals:    12/12/23 1115 12/12/23 1136   BP: (!) 177/96 (!) 150/80   BP Location: Right arm Right arm   Patient Position: Sitting Sitting   BP Method: Medium (Automatic) Large (Manual)   Pulse: 61    Resp: 20    Temp: 98.6 °F (37 °C)    TempSrc: Oral    SpO2: 100%    Weight: 100.7 kg (222 lb 0.1 oz)    Height: 5' 9" (1.753 m)      Body mass index is 32.78 kg/m².    Physical Exam   Labs:   I have reviewed the following labs below:      Lab Results   Component Value Date    WBC 14.77 (H) 10/03/2023    HGB 14.9 10/03/2023    HCT 42.1 10/03/2023     10/03/2023    MCV 87.2 10/03/2023    RDW 11.8 10/03/2023     Lab Results   Component Value Date     (L) 10/03/2023    K 3.3 (L) 10/03/2023    CO2 26 " 10/03/2023    BUN 9.2 10/03/2023    CALCIUM 9.1 10/03/2023    MG 1.80 05/24/2023    PHOS 2.9 12/01/2022     Lab Results   Component Value Date    ALBUMIN 3.5 10/03/2023    BILITOT 0.2 10/03/2023    AST 34 10/03/2023    ALKPHOS 201 (H) 10/03/2023    ALT 38 10/03/2023     Cholesterol Total   Date Value Ref Range Status   10/03/2023 198 <=200 mg/dL Final     HDL Cholesterol   Date Value Ref Range Status   10/03/2023 24 (L) 35 - 60 mg/dL Final     LDL Cholesterol   Date Value Ref Range Status   10/03/2023 44.00 (L) 50.00 - 140.00 mg/dL Final     Comment:     Calculated LDL not valid with Triglyceride > 400.     Triglyceride   Date Value Ref Range Status   10/03/2023 649 (H) 34 - 140 mg/dL Final     Lab Results   Component Value Date    HGBA1C 9.4 (H) 05/24/2023    HGBA1C 10.2 (H) 01/17/2023    HGBA1C 10.4 (H) 11/27/2022    CREATININE 0.86 10/03/2023     Lab Results   Component Value Date    TSH 3.495 05/23/2023     Lab Results   Component Value Date    IRON 52 (L) 08/18/2020    TIBC 296 08/18/2020    FERRITIN 190.9 08/18/2020    [id number] 773 08/13/2020     Lab Results   Component Value Date    INR 1.0 09/13/2023    PROTIME 12.5 09/13/2023      Lab Results   Component Value Date    TROPONINI <0.010 11/29/2022    TROPONINI <0.010 11/28/2022    TROPONINI <0.010 11/27/2022    TROPONINI <0.010 07/11/2022    TROPONINI <0.010 09/11/2021    BNP <10.0 11/28/2022    BNP <10.0 11/27/2022    BNP <10.0 09/11/2021     Cardiovascular Studies:   I have reviewed the following studies below:      Coronary angiogram 4/2023:   FINDINGS  LVEDP:  18 mmHg  Left Main:  No stenosis    LAD:   Diffusely diseased.  70% ostial-prox lesion, 70% mid-distal lesion and 50% lesion at apex  Circumflex:   Mild diffuse disease   30%  lesion at the ostium of the first OM  RCA:   Severe, diffuse disease. Small vessel   90% prox-mid lesion   mid vessel  The patient has Significant two vessel disease    Blood loss:  less than 10 cc.  LIMA:  Medium size  vessel.     iFR = 0.94 in proximal and mid LAD.  iFR > 0.89 is not significant      RECOMMENDATIONS  Medical management  Risk factor modifications -- start statin, blood pressure control        ECHO 5/24/23   The left ventricle is normal in size with mild concentric hypertrophy and normal systolic function.  The estimated ejection fraction is 65%.  Normal left ventricular diastolic function.  There is no evidence of intracardiac shunting.  Elevated central venous pressure (15 mmHg).  The estimated PA systolic pressure is 24 mmHg.  Normal right ventricular size with normal right ventricular systolic function.  Mild left atrial enlargement.    Assessment/Plan:   43 y.o. male with the following medical problems:    Angina  CAD with moderate obstructive CAD  Uncontrolled Hypertension  -Pt has proximal LAD disease based on cath in 4/2023, however iFR was 0.94 which was considered non-significant, hence not revascularized. Pt also has mid-RCA . Not felt to be a CABG candidate by CT surgery.   -continue Coreg 25 b.i.d., Imdur 120, nifedipine 60 b.i.d.   -Added Losartan 50mg qd, he was on irbesartan but does not seem to be on ARB per med rec   - Continue ASA, statin  -we will review angiographic images with attending and we will reconsider PCI however given uncontrolled diabetes CABG would be a better option and will consider referral to [place] for CABG if indicated     Hypertriglyceridemia, familial hypercholesterolemia   DM2- uncontrolled  -continue atorvastatin 40mg daily, Repatha, Vascepa 2g BID  -A1c has improved, 9.4 - 6 months back, down from 10.2 , continue management per PCP    Return to clinic in 2 months.  We will return in 3 weeks for nurse visit blood pressure check    John Mckinnon MD  [facility] Cardiology Fellow, PGY-4  [facility] access hospital

## 2023-12-12 NOTE — PATIENT INSTRUCTIONS
Check blood pressures 1-2 hours after medication and record them on the BP log sheet for 2 weeks.  Call us around Dec 28th at [phone] with readings.

## 2023-12-13 RX ORDER — ICOSAPENT ETHYL 1000 MG/1
2 CAPSULE ORAL 2 TIMES DAILY
Qty: 60 CAPSULE | Refills: 11 | Status: SHIPPED | OUTPATIENT
Start: 2023-12-13

## 2023-12-14 DIAGNOSIS — G89.4 CHRONIC PAIN SYNDROME: ICD-10-CM

## 2023-12-15 RX ORDER — MORPHINE SULFATE 100 MG/1
100 TABLET, FILM COATED, EXTENDED RELEASE ORAL 3 TIMES DAILY
Qty: 90 TABLET | Refills: 0 | Status: SHIPPED | OUTPATIENT
Start: 2023-12-15 | End: 2024-01-18

## 2023-12-15 RX ORDER — HYDROMORPHONE HYDROCHLORIDE 8 MG/1
8 TABLET ORAL EVERY 6 HOURS PRN
Qty: 120 TABLET | Refills: 0 | Status: SHIPPED | OUTPATIENT
Start: 2023-12-15 | End: 2024-01-18

## 2023-12-15 RX ORDER — ATORVASTATIN CALCIUM 40 MG/1
40 TABLET, FILM COATED ORAL DAILY
Qty: 90 TABLET | Refills: 3 | Status: SHIPPED | OUTPATIENT
Start: 2023-12-15 | End: 2024-12-14

## 2023-12-27 ENCOUNTER — OFFICE VISIT (OUTPATIENT)
Dept: NEUROLOGY | Facility: CLINIC | Age: 43
End: 2023-12-27
Payer: MEDICARE

## 2023-12-27 VITALS
HEART RATE: 65 BPM | DIASTOLIC BLOOD PRESSURE: 75 MMHG | BODY MASS INDEX: 33.47 KG/M2 | WEIGHT: 226 LBS | SYSTOLIC BLOOD PRESSURE: 142 MMHG | HEIGHT: 69 IN | OXYGEN SATURATION: 98 %

## 2023-12-27 DIAGNOSIS — E11.40 PAINFUL DIABETIC NEUROPATHY: Primary | Chronic | ICD-10-CM

## 2023-12-27 DIAGNOSIS — G47.33 OBSTRUCTIVE SLEEP APNEA SYNDROME: ICD-10-CM

## 2023-12-27 DIAGNOSIS — Z72.0 TOBACCO USER: ICD-10-CM

## 2023-12-27 DIAGNOSIS — E66.9 CLASS 1 OBESITY WITH BODY MASS INDEX (BMI) OF 33.0 TO 33.9 IN ADULT, UNSPECIFIED OBESITY TYPE, UNSPECIFIED WHETHER SERIOUS COMORBIDITY PRESENT: ICD-10-CM

## 2023-12-27 PROCEDURE — 99214 OFFICE O/P EST MOD 30 MIN: CPT | Mod: S$PBB,,, | Performed by: NURSE PRACTITIONER

## 2023-12-27 PROCEDURE — 99214 OFFICE O/P EST MOD 30 MIN: CPT | Mod: PBBFAC | Performed by: NURSE PRACTITIONER

## 2023-12-27 PROCEDURE — 99214 PR OFFICE/OUTPT VISIT, EST, LEVL IV, 30-39 MIN: ICD-10-PCS | Mod: S$PBB,,, | Performed by: NURSE PRACTITIONER

## 2023-12-27 RX ORDER — AMITRIPTYLINE HYDROCHLORIDE 25 MG/1
25 TABLET, FILM COATED ORAL NIGHTLY PRN
Qty: 30 TABLET | Refills: 4 | Status: SHIPPED | OUTPATIENT
Start: 2023-12-27 | End: 2024-02-27 | Stop reason: SDUPTHER

## 2023-12-27 NOTE — PROGRESS NOTES
[facility] Follow Up Office Visit Note    Subjective:      Patient ID: Bharath Caballero is a 43 y.o. male.    Chief Complaint: Painful diabetic neuropathy (Patient reports pain continues to get worse in his legs. 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 He was last seen on 9/25/2023. During that visit, CBZ 600mg BID was Dc'd, OXC 600mg BID was initiated. MRI C-spine was ordered. Unable to perform as was not a ble to turn off SCS.     Interim History  Today, Pt presents alone. Was seen by Dr. Jay Pozo on 11/8/2023 and imaging was ordered of the SCS leads. Has appt with him next month. Pt states OXC 600mg BID ineffective for neuropathy. Seems pain has worsened after changing setting of SCS. States he will contact [facility] to adjust setting again.     Continues w/numbness to hands. Accompanied by R hand weakness, started 1.5 weeks ago. Unable to have MRI C-spine. Followed by home health. Continues w/worsening burning/numbness and tingling to lower ext, now worsening to hands. CPAP machine broken so not utilizing. Admits to multiple falls due to tingling/burning/numbness to feet.    Severe hearing loss, pt states due to nerve damage, cannot afford cochlear implant.    Pt had NEVRO SCS placement for painful neuropathy on 7/21/2022 by Dr. Jay Pozo.     Currently sees Dr. Beasley for pain management. Discuss changing gabapentin to Lyrica for edema to lower ext.    Presenting History  He noted that he initially presented with right leg stabbing pain and paresthesia radiating up his leg for 2 years followed by paresthesia and allodynia in left foot for 1 year, worse with standing on his feet. He also noted that his hands and  joints would hurt. He had quit drinking for 8 years. Still smokes. -280 most of the time for now, but it was in the 500s for the past 4 year.    Current Medications -  Neurontin 400mg - 400mg - 800mg  OXC 600mg BID (9/25/2023 - present)    Prior Medications -  Effexor XR 37.5mg daily  CBZ ER 200mg-400mg BID (12/30/2020 - 9/25/2023) ineffective    Imaging  - MRI Brain w/w/out 5/29/2023 - small area of chronic gliosis to L temporal lobe, no acute intracranial findings    - MRI Lumbar w/o Bryce 9/20/2019 - I have reviewed the study independently and with the patient. 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    - MRI C spine w/o Bryce 9/20/2019 - I have reviewed the study independently and with the patient. 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 Multilevel spondylosis noted.    Results for orders placed or performed during the hospital encounter of 11/20/23   Specimen to Pathology   Result Value Ref Range    Case Report       [facility]                            Case: [id number]                                 Authorizing Provider:  Christina James MD   Collected:           11/20/2023 10:42 AM          Ordering Location:     [facility] -       Received:            11/21/2023 07:23 AM                                 Endoscopy                                                                    Pathologist:           Deborah Erazo MD                                                        Specimen:    Stomach, Stomach Biopsy- Rule out H. Pylori                                                Final Diagnosis         Gastric biopsy rule out H.pylori:  - Mild chronic gastritis with lamina propria fibrosis.  - Diff Quik stain is negative for H.pylori organisms.  - Negative for dysplasia.          Comments       Appropriately stained controls reviewed.        Clinical Information       Procedure:  EGD, WITH CLOSED BIOPSY  Pre-op  "Diagnosis:  K21.00 - Gastroesophageal reflux disease with esophagitis without hemorrhage [ICD-10-CM]  Post-op Diagnosis:  K21.00 - Gastroesophageal reflux disease with esophagitis without hemorrhage [ICD-10-CM]      Microscopic Description       A microscopic examination was performed and the diagnosis reflects the findings.          Gross Description       1. Stomach, Stomach Biopsy- Rule out H. Pylori:   Received in formalin are multiple fragments of tan soft tissue ranging from 1 to 2 mm. Entirely submitted in a single cassette.      Report Footnotes       Unless the case is a "gross only" or additional testing only, the final diagnosis for each specimen is based on a microscopic examination of appropriate tissue sections.     POCT glucose   Result Value Ref Range    POCT Glucose 60 (L) 70 - 110 mg/dL   POCT glucose   Result Value Ref Range    POCT Glucose 64 (L) 70 - 110 mg/dL       Review of Systems   Constitutional: no fever, fatigue, +weakness  Eye: no vision loss, eye redness, drainage, or pain  ENMT: no sore throat, ear pain, sinus pain/congestion, nasal congestion/drainage  Respiratory: no cough, no wheezing, no shortness of breath  Cardiovascular: no chest pain, no palpitations, no edema at this time  Gastrointestinal: no nausea, vomiting, or diarrhea. No abdominal pain  Genitourinary: no dysuria, no urinary frequency or urgency, no hematuria  Hema/Lymph: no abnormal bruising or bleeding  Endocrine: no heat or cold intolerance, no excessive thirst or excessive urination  Musculoskeletal: + muscle or joint pain, no joint swelling  Integumentary: no skin rash or abnormal lesion  Psychiatric: no depressed mood, + anxiety, no visual/auditory hallucinations, no delusions, no suicidal or homicidal ideation  Neurologic: antalgic     Objective:      Physical Exam    General: well-developed well-nourished in no acute distress  Eye: clear conjunctiva, eyelids normal  HENT: oropharynx without erythema/exudate, " oropharynx and nasal mucosal surfaces moist  Neck: full range of motion  Respiratory: no distress on RA  Cardiovascular: regular rate and rhythm   Gastrointestinal: round, no guarding, non-distended  Musculoskeletal: full range of motion of all extremities/spine without limitation or discomfort  Integumentary: no rashes or skin lesions present    Neurologic:  Mental Status- Alert and Oriented to time, self, place, Speech is fluent, with intact reading and comprehension, long term memory intact, No Dysarthria.  CN II-XII - CN I Deferred, STACI, no RAPD, OD blurred vision, VA grossly normal to finger counting at 6ft, No ptosis b/l, EOMI w/o nystagmus, LT/PP symmetric, intact in CN V1-V3 distribution b/l, masseter symmetric b/l, T/U midline, Shoulder shrug symmetric b/l, Right SNHL, VFFC, no facial droop, [place] bilaterally  Motor - Tone and Bulk nml throughout, No involuntary movements, 5/5 to confrontation all ext.  FFM nml b/l, No pronator drift.   Sensory - LT/PP/Temp diminished in RLE up to knee, diminished to RUE from hand to shoulder & LLE up to mid-shin, Vibration absent in bilat Big Toes.   Reflexes - 2+ Throughout except for absent AJ b/l  Cerebellar Exam - FNF wnl b/l no intention tremor.  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 New c/o L upper ext numbness/tingling. Continues to cigarette intake to 10 cigarettes daily. Not utilizing CPAP as she states it is broken. Requesting new PSG study. Has fallen a few times since last visit. Followed by home health.    1. Painful diabetic neuropathy  - amitriptyline (ELAVIL) 25 MG tablet; Take 1 tablet (25 mg total) by mouth nightly  as needed for Insomnia.  Dispense: 30 tablet; Refill: 4    2. Class 1 obesity with body mass index (BMI) of 33.0 to 33.9 in adult, unspecified obesity type, unspecified whether serious comorbidity present    3. Obstructive sleep apnea syndrome    4. Tobacco user      Plan:       [] c/w Neurontin; c/w 400mg in AM and afternoon and 800mg nightly; consider changing to Lyrica at next visit  [] c/w OXC 600mg BID  [] start amitriptyline 25mg nightly for sleep and neuropathy  [] Rx Capsaicin Topical Cream .075% QID for neuropathic pain  [] advised on better glycemic control. Goal HgA1c < 7.  [] c/w decreased smoking  [] order PSG    RTC 4 months    I have explained the treatment plan, diagnosis, and prognosis to the patient, caretaker, family. All questions are answered to the best of my knowledge.    Face to Face Time 30 minute, including, counseling, education, review of test results, relevant medical records, and coordination of care.

## 2024-01-15 DIAGNOSIS — G89.4 CHRONIC PAIN SYNDROME: ICD-10-CM

## 2024-01-15 RX ORDER — PANCRELIPASE 36000; 180000; 114000 [USP'U]/1; [USP'U]/1; [USP'U]/1
CAPSULE, DELAYED RELEASE PELLETS ORAL
Qty: 270 CAPSULE | Refills: 1 | Status: SHIPPED | OUTPATIENT
Start: 2024-01-15

## 2024-01-16 ENCOUNTER — OFFICE VISIT (OUTPATIENT)
Dept: NEPHROLOGY | Facility: CLINIC | Age: 44
End: 2024-01-16
Payer: MEDICARE

## 2024-01-16 VITALS
SYSTOLIC BLOOD PRESSURE: 170 MMHG | TEMPERATURE: 98 F | RESPIRATION RATE: 18 BRPM | HEART RATE: 80 BPM | DIASTOLIC BLOOD PRESSURE: 98 MMHG | OXYGEN SATURATION: 100 % | WEIGHT: 220.38 LBS | HEIGHT: 69 IN | BODY MASS INDEX: 32.64 KG/M2

## 2024-01-16 DIAGNOSIS — E66.01 MORBID OBESITY: ICD-10-CM

## 2024-01-16 DIAGNOSIS — E11.22 TYPE 2 DIABETES MELLITUS WITH STAGE 1 CHRONIC KIDNEY DISEASE, WITH LONG-TERM CURRENT USE OF INSULIN: Primary | ICD-10-CM

## 2024-01-16 DIAGNOSIS — E78.01 FAMILIAL HYPERCHOLESTEREMIA: ICD-10-CM

## 2024-01-16 DIAGNOSIS — E87.6 HYPOKALEMIA: ICD-10-CM

## 2024-01-16 DIAGNOSIS — I10 PRIMARY HYPERTENSION: ICD-10-CM

## 2024-01-16 DIAGNOSIS — E11.40 PAINFUL DIABETIC NEUROPATHY: ICD-10-CM

## 2024-01-16 DIAGNOSIS — Z79.4 TYPE 2 DIABETES MELLITUS WITH STAGE 1 CHRONIC KIDNEY DISEASE, WITH LONG-TERM CURRENT USE OF INSULIN: Primary | ICD-10-CM

## 2024-01-16 DIAGNOSIS — Z72.0 TOBACCO USER: ICD-10-CM

## 2024-01-16 DIAGNOSIS — K72.10 CHRONIC LIVER FAILURE WITHOUT HEPATIC COMA: ICD-10-CM

## 2024-01-16 DIAGNOSIS — N18.1 TYPE 2 DIABETES MELLITUS WITH STAGE 1 CHRONIC KIDNEY DISEASE, WITH LONG-TERM CURRENT USE OF INSULIN: Primary | ICD-10-CM

## 2024-01-16 PROCEDURE — 99215 OFFICE O/P EST HI 40 MIN: CPT | Mod: PBBFAC | Performed by: NURSE PRACTITIONER

## 2024-01-16 PROCEDURE — 99214 OFFICE O/P EST MOD 30 MIN: CPT | Mod: S$PBB,,, | Performed by: NURSE PRACTITIONER

## 2024-01-16 RX ORDER — SPIRONOLACTONE 25 MG/1
25 TABLET ORAL DAILY
Qty: 90 TABLET | Refills: 3 | Status: SHIPPED | OUTPATIENT
Start: 2024-01-16 | End: 2024-02-15 | Stop reason: SDUPTHER

## 2024-01-16 NOTE — PROGRESS NOTES
[facility]  Nephrology Clinic Note    Chief complaint: Follow-up (RTC, took meds, chest, abdonminal, and leg pain)    History of present illness:   Bharath Caballero is a 43 y.o. White male with past medical history of diabetes mellitus, morbid obesity, familial hypertriglyceridemia, recurrent pancreatitis, hepatic steatosis, hypertension, CICI, hearing loss, recurrent candiduria, polyneuropathy secondary to diabetes, chronic pain (s/p neuro stimulator insertion in July 2022), and tobacco abuse. Hypertriglyceridemia was previously treated with routine plasmapheresis, this was discontinued in August 2018 per patient's request. Patient has undergone multiple AV access creation procedures, including tunneled catheter insertion and removal as well as AV graft creation. Both of his AV grafts are now thrombosed. Patient was managed by hospice in the past, however, was discharged from hospice care due to lack of terminal diagnosis. Presents today for follow-up.      Review of Systems  12 point review of systems conducted, negative except as stated in the history of present illness.    Allergies: Patient has No Known Allergies.     Past Medical History:  has a past medical history of Acquired perforating dermatosis, Aortic atherosclerosis, Bilateral edema of lower extremity, Bladder outflow obstruction, Blurred vision, right eye, BMI 34.0-34.9,adult, BPH (benign prostatic hyperplasia), Chronic liver failure without hepatic coma, Chronic pain, Chronic pain syndrome, Chronic pancreatitis, Deafness, Diabetes, Diabetic polyneuropathy, Elevated LFTs, GERD (gastroesophageal reflux disease), Hand paresthesia, Hepatic steatosis, History of continuous positive airway pressure (CPAP) therapy at home, History of pancreatitis, Hypertension, Hypertriglyceridemia, Kidney disorder, Leg pain, Mixed hyperlipidemia, Mononeuritis multiplex, Morbid obesity, Neuropathy, Nocturia, OA (osteoarthritis), Obstructive sleep  "apnea syndrome, Onychomycosis of multiple toenails with type 2 diabetes mellitus, Open wound of finger of right hand, CICI (obstructive sleep apnea), Painful diabetic neuropathy, Personal history of colonic polyps, Polyneuropathy, Primary hypertension, Recurrent pancreatitis, Renal insufficiency, Tobacco abuse, Tobacco user, and Type 2 diabetes mellitus with skin complication, with long-term current use of insulin.    Procedure History:  has a past surgical history that includes Esophagogastroduodenoscopy (N/A, 06/07/2021); INSPECTION OF UPPER INTESTINAL TRACT (N/A, 06/07/2021); UPPER GI (N/A, 06/07/2021); PHERESIS OF PLASMA (N/A, 07/13/2018); Excision of arteriovenous fistula (N/A, 06/01/2018); PHERESIS OF PLASMA (N/A, 05/25/2018); ARTERIOVENOUS ANASTOMOSIS, OPEN, UPPER ARM BASILLIC VEIN TRANSPOSITION (N/A, 05/07/2018); Appendectomy; Hernia repair; Trial of spinal cord nerve stimulator (N/A, 5/12/2022); Spinal cord stimulator removal; ANGIOGRAM, CORONARY, WITH LEFT HEART CATHETERIZATION (N/A, 4/10/2023); fractional flow reserve (ffr), coronary (4/10/2023); and egd, with closed biopsy (N/A, 11/20/2023).    Family History: family history includes Obesity in his father.    Social History:  reports that he has been smoking cigarettes. He started smoking about 27 years ago. He has a 13.4 pack-year smoking history. He has never used smokeless tobacco. He reports that he does not currently use alcohol. He reports that he does not currently use drugs.    Physical exam  BP (!) 170/98 (BP Location: Right arm, Patient Position: Sitting, BP Method: Large (Manual))   Pulse 80   Temp 98.3 °F (36.8 °C) (Oral)   Resp 18   Ht 5' 8.9" (1.75 m)   Wt 100 kg (220 lb 6.4 oz)   SpO2 100%   BMI 32.64 kg/m²   General appearance: Patient is in no acute distress.  Skin: No rashes or wounds.  HEENT: PERRLA, EOMI, no scleral icterus, no JVD. Neck is supple.  Chest: Respirations are unlabored. Lungs sounds are clear.   Heart: S1, S2. "   Abdomen: Benign.  : Deferred.  Extremities: No edema, peripheral pulses are palpable.   Neuro: No focal deficits.     Home Medications:    Current Outpatient Medications:     amitriptyline (ELAVIL) 25 MG tablet, Take 1 tablet (25 mg total) by mouth nightly as needed for Insomnia., Disp: 30 tablet, Rfl: 4    aspirin (ECOTRIN) 81 MG EC tablet, Take 81 mg by mouth once daily., Disp: , Rfl:     atorvastatin (LIPITOR) 40 MG tablet, Take 1 tablet (40 mg total) by mouth once daily., Disp: 90 tablet, Rfl: 3    carvediloL (COREG) 25 MG tablet, Take 1 tablet (25 mg total) by mouth 2 (two) times daily with meals., Disp: 60 tablet, Rfl: 11    clonazePAM (KLONOPIN) 1 MG tablet, Take 1 tablet (1 mg total) by mouth 2 (two) times daily., Disp: 60 tablet, Rfl: 5    cloNIDine (CATAPRES) 0.2 MG tablet, TAKE ONE TABLET BY MOUTH THREE TIMES DAILY, Disp: 270 tablet, Rfl: 3    CREON 36,000-114,000- 180,000 unit CpDR, TAKE ONE CAPSULE THREE TIMES DAILY, Disp: 270 capsule, Rfl: 1    EScitalopram oxalate (LEXAPRO) 20 MG tablet, Take 20 mg by mouth once daily., Disp: , Rfl:     esomeprazole (NEXIUM) 40 MG capsule, Take 1 capsule (40 mg total) by mouth before breakfast., Disp: 30 capsule, Rfl: 11    FARXIGA 5 mg Tab tablet, TAKE ONE TABLET BY MOUTH EVERY DAY, Disp: 90 tablet, Rfl: 3    gabapentin (NEURONTIN) 400 MG capsule, Two tabs in AM, one in afternoon, Disp: 90 capsule, Rfl: 3    gabapentin (NEURONTIN) 800 MG tablet, TAKE ONE TABLET BY MOUTH IN THE EVENING, Disp: 90 tablet, Rfl: 3    HUMALOG U-100 INSULIN 100 unit/mL injection, INJECT 25 UNITS SUBCUTANEOUSLY BEFORE MEALS THREE TIMES DAILY, Disp: 10 mL, Rfl: 4    HYDROmorphone (DILAUDID) 8 MG tablet, TAKE ONE TABLET BY MOUTH EVERY 6 HOURS AS NEEDED FOR PAIN, Disp: 120 tablet, Rfl: 0    icosapent ethyL (VASCEPA) 1 gram Cap, Take 2 capsules (2 g total) by mouth 2 (two) times daily., Disp: 60 capsule, Rfl: 11    insulin glargine (LANTUS U-100 INSULIN) 100 unit/mL injection, INJECT 100  "UNITS SUBCUTANEOUSLY TWICE DAILY, Disp: 30 mL, Rfl: 4    isosorbide mononitrate (IMDUR) 120 MG 24 hr tablet, Take 1 tablet (120 mg total) by mouth once daily., Disp: 90 tablet, Rfl: 3    losartan (COZAAR) 50 MG tablet, Take 1 tablet (50 mg total) by mouth once daily., Disp: 90 tablet, Rfl: 3    morphine (MS CONTIN) 100 MG 12 hr tablet, TAKE ONE TABLET BY MOUTH THREE TIMES DAILY, Disp: 90 tablet, Rfl: 0    NIFEdipine (PROCARDIA-XL) 60 MG (OSM) 24 hr tablet, Take 1 tablet (60 mg total) by mouth 2 (two) times a day., Disp: 180 tablet, Rfl: 3    nitroGLYCERIN (NITROSTAT) 0.3 MG SL tablet, Place 1 tablet (0.3 mg total) under the tongue every 5 (five) minutes as needed for Chest pain (go to er after 3 doses)., Disp: 30 tablet, Rfl: 6    OXcarbazepine (TRILEPTAL) 600 MG Tab, Take 1 tablet (600 mg total) by mouth 2 (two) times daily., Disp: 60 tablet, Rfl: 11    potassium chloride SA (K-DUR,KLOR-CON) 20 MEQ tablet, Take 1 tablet (20 mEq total) by mouth 2 (two) times daily., Disp: 180 tablet, Rfl: 3    sucralfate (CARAFATE) 100 mg/mL suspension, Take 10 mLs (1 g total) by mouth 4 (four) times daily before meals and nightly., Disp: 1200 mL, Rfl: 3    SURE COMFORT INSULIN SYRINGE 0.5 mL 31 gauge x 5/16" Syrg, USE ONE SYRINGE THREE TIMES DAILY, Disp: 100 each, Rfl: 3    tamsulosin (FLOMAX) 0.4 mg Cap, TAKE ONE CAPSULE BY MOUTH EVERY DAY, Disp: 90 capsule, Rfl: 3    torsemide (DEMADEX) 20 MG Tab, Take 1 tablet (20 mg total) by mouth once daily., Disp: 90 tablet, Rfl: 3    evolocumab (REPATHA SURECLICK) 140 mg/mL PnIj, Inject 1 mL (140 mg total) into the skin every 14 (fourteen) days., Disp: 2 mL, Rfl: 11    spironolactone (ALDACTONE) 25 MG tablet, Take 1 tablet (25 mg total) by mouth once daily., Disp: 90 tablet, Rfl: 3    Current Facility-Administered Medications:     triamcinolone acetonide 0.1% ointment, , Topical (Top), BID, Malina Brito FNP    Laboratory data    Lab Results   Component Value Date    WBC 14.77 (H) " 10/03/2023    HGB 14.9 10/03/2023    HCT 42.1 10/03/2023     10/03/2023    IRON 52 (L) 08/18/2020    TIBC 296 08/18/2020    TRANS 232.0 08/18/2020    LABIRON 17.6 08/18/2020    FERRITIN 190.9 08/18/2020    [id number] 773 08/13/2020     (H) 02/20/2018     01/16/2024    K 3.3 (L) 01/16/2024    CHLORIDE 99 01/16/2024    CO2 26 01/16/2024    BUN 8.2 (L) 01/16/2024    CREATININE 0.82 01/16/2024    EGFRNORACEVR >60 01/16/2024    GLUCOSE 327 (H) 01/16/2024    CALCIUM 9.1 01/16/2024    ALKPHOS 230 (H) 01/16/2024    LABPROT 8.1 01/16/2024    ALBUMIN 3.6 01/16/2024    BILIDIR 0.1 12/20/2021    IBILI 0.20 12/20/2021    AST 24 01/16/2024    ALT 44 01/16/2024    MG 1.80 05/24/2023    PHOS 2.9 12/01/2022      Lab Results   Component Value Date    HGBA1C 9.5 (H) 01/16/2024    PTH 83.7 (H) 04/11/2019    [id number] 28.1 (L) 06/16/2021    HIV Nonreactive 05/24/2023    HEPCAB Nonreactive 08/13/2020    HEPBSURFAG Nonreactive 08/13/2020    HEPBSAB Nonreactive 09/27/2017     Urine:  Lab Results   Component Value Date    COLORUA Light-Yellow 01/16/2024    APPEARANCEUA Clear 01/16/2024    SGUA 1.021 01/16/2024    PHUA 6.0 01/16/2024    PROTEINUA 2+ (A) 01/16/2024    GLUCOSEUA 4+ (A) 01/16/2024    KETONESUA Negative 01/16/2024    BLOODUA Trace (A) 01/16/2024    NITRITESUA Negative 01/16/2024    LEUKOCYTESUR Negative 01/16/2024    RBCUA 0-5 01/16/2024    WBCUA 0-5 01/16/2024    BACTERIA Trace (A) 01/16/2024    SQEPUA None Seen 01/16/2024    HYALINECASTS None Seen 01/16/2024    CREATRANDUR 68.6 01/16/2024    PROTEINURINE 197.9 01/16/2024    UPROTCREA 2.9 01/16/2024         Imaging  CT Abdomen Pelvis W Wo Contrast 10/19/2023  The lung bases are clear.  The liver appears normal.  No liver mass or lesion is seen.  Portal and hepatic veins appear normal.  The gallbladder appears grossly unremarkable.  The pancreas appears normal.  No pancreatic mass or lesion is seen.  There is a cyst in the lower pole of the spleen.  No  other splenic lesion is seen.  Spleen is normal in size.  The adrenal glands appear normal.  No adrenal nodule is seen.  The kidneys are normal in size.  No hydronephrosis is seen.  No hydroureter is seen.  No nephrolithiasis or ureteral stone is seen.  No cortical renal abnormality seen. Urinary bladder appears normal.  No colitis is seen.  No diverticulitis is seen.  No colonic mass or lesion or inflammatory process is seen.  There is no pooling of contrast seen within the colon to suggest active GI hemorrhage.  No free air is seen.  No free fluid is seen.  The bones appear grossly unremarkable.  Impression  No evidence of active GI hemorrhage seen  Cystic lesion in the spleen appears benign  Electronically signed by: Teresa Ramirez  Date:    10/19/2023  Time:    11:14    Impression    ICD-10-CM ICD-9-CM   1. Type 2 diabetes mellitus with stage 1 chronic kidney disease, with long-term current use of insulin  E11.22 250.40    N18.1 585.1    Z79.4 V58.67   2. Hypokalemia  E87.6 276.8   3. Primary hypertension  I10 401.9   4. Chronic liver failure without hepatic coma  K72.10 572.8   5. Painful diabetic neuropathy  E11.40 250.60     357.2   6. Familial hypercholesteremia  E78.01 272.0   7. Morbid obesity  E66.01 278.01   8. Tobacco user  Z72.0 305.1        Plan     Type 2 diabetes mellitus with stage 1 chronic kidney disease, with long-term current use of insulin  -     Comprehensive Metabolic Panel; Future; Expected date: 04/10/2024  -     Urinalysis, Reflex to Urine Culture; Future; Expected date: 04/10/2024  -     Protein/Creatinine Ratio, Urine; Future; Expected date: 04/10/2024  Diabetic kidney disease.  Serum creatinine is stable.  Continue ACE-inhibitor, SGLT2i and risk factor management.  Continue:  -follow 2 g a day dietary sodium restriction  -control diabetes (goal A1c less than 7%)  -control high blood pressure (goal blood pressure is less than 130/80, please check blood pressure twice a week and  bring blood pressure logs to office visit)  -exercise at least 30 minutes a day, 5 days a week  -maintain healthy weight  -decrease or stop alcohol use  -do not smoke  -stay well hydrated (drink water only, avoid juices, sweet tea, and sodas)  -ask about staying up-to-date on vaccinations (flu vaccine, pneumonia vaccine, hepatitis B vaccine)  -avoid excessive use of NSAIDs (ibuprofen, naproxen, Aleve, Advil, Toradol, Mobic), take Tylenol as needed for headache or mild pain  -take cholesterol lowering medications if prescribed (LDL goal less than 100)  Follow up in about 3 months (around 4/16/2024).    Hypokalemia  -     spironolactone (ALDACTONE) 25 MG tablet; Take 1 tablet (25 mg total) by mouth once daily.  Dispense: 90 tablet; Refill: 3  Will start spironolactone, continue potassium chloride supplementation as prescribed.    Primary hypertension  -     spironolactone (ALDACTONE) 25 MG tablet; Take 1 tablet (25 mg total) by mouth once daily.  Dispense: 90 tablet; Refill: 3  Blood pressure reading is above goal.  Will add spironolactone to medication regimen.      Chronic liver failure without hepatic coma  Continue to follow up with cirrhosis clinic as scheduled.      Painful diabetic neuropathy  Continue to follow up with Dr. Pozo as scheduled.      Familial hypercholesteremia  Considering multiple potential complications from new AV access creation as well as plasmapheresis treatments, would not recommend routine plasma exchange at this time, but rather a more aggressive glycemic control and optimization of lipid-lowering therapies. Patient was treated with maximum dose of rosuvastatin and fenofibrate, but total cholesterol and triglyceride levels remained above goal.  Patient is at very high risk for development of cardiovascular events (MI, stroke, unstable angina). Unfortunately, dietary interventions, high-dose statin in combination with fibrate were ineffective to meet cholesterol treatment goals.  Patient would benefit from continuation of therapy with PCSK9 Inhibitor.   Continue evolocumab (REPATHA) 140 mg subcutaneously every 14 (fourteen) days.    Morbid obesity  Lifestyle and dietary interventions discussed, patient encouraged to maintain non-sedentary lifestyle and well-balanced diet.     Tobacco user  Patient was counseled on importance of tobacco use cessation.  Strongly encouraged to quit, offered a referral to a smoking cessation program.        1/16/2024  Kendal Grande NP  [facility]

## 2024-01-17 ENCOUNTER — OFFICE VISIT (OUTPATIENT)
Dept: NEUROLOGY | Facility: CLINIC | Age: 44
End: 2024-01-17
Payer: MEDICARE

## 2024-01-17 VITALS
DIASTOLIC BLOOD PRESSURE: 88 MMHG | HEIGHT: 68 IN | BODY MASS INDEX: 33.34 KG/M2 | WEIGHT: 220 LBS | SYSTOLIC BLOOD PRESSURE: 138 MMHG

## 2024-01-17 DIAGNOSIS — G89.4 CHRONIC PAIN SYNDROME: Primary | Chronic | ICD-10-CM

## 2024-01-17 DIAGNOSIS — E78.01 FAMILIAL HYPERCHOLESTEROLEMIA: ICD-10-CM

## 2024-01-17 PROCEDURE — 99213 OFFICE O/P EST LOW 20 MIN: CPT | Mod: S$PBB,,, | Performed by: PSYCHIATRY & NEUROLOGY

## 2024-01-17 PROCEDURE — 99999 PR PBB SHADOW E&M-EST. PATIENT-LVL IV: CPT | Mod: PBBFAC,,, | Performed by: PSYCHIATRY & NEUROLOGY

## 2024-01-17 PROCEDURE — 99214 OFFICE O/P EST MOD 30 MIN: CPT | Mod: PBBFAC | Performed by: PSYCHIATRY & NEUROLOGY

## 2024-01-17 RX ORDER — ALIROCUMAB 150 MG/ML
INJECTION, SOLUTION SUBCUTANEOUS
Qty: 6 ML | Refills: 3 | OUTPATIENT
Start: 2024-01-17

## 2024-01-17 NOTE — PROGRESS NOTES
"Neurology Office Visit  Neurology    Bharath BEATRIZ Caballero is a 43 y.o. male for f/u.  Reports he is interested in having SCS revision due to "broken lead".    ROS:  Review of Systems   All other systems reviewed and are negative.     Past Medical History:   Diagnosis Date    Acquired perforating dermatosis 8/30/2023    Aortic atherosclerosis 1/24/2023    Bilateral edema of lower extremity 2/6/2023    Bladder outflow obstruction 6/20/2022    Blurred vision, right eye 10/19/2022    BMI 34.0-34.9,adult 6/20/2022    BPH (benign prostatic hyperplasia)     Chronic liver failure without hepatic coma 4/25/2023    Chronic pain 10/25/2022    Chronic pain syndrome 5/12/2022    Chronic pancreatitis 10/28/2017    Deafness 10/3/2023    Diabetes     Diabetic polyneuropathy 6/20/2022    Elevated LFTs 8/18/2022    GERD (gastroesophageal reflux disease)     Hand paresthesia 6/20/2022    Hepatic steatosis     History of continuous positive airway pressure (CPAP) therapy at home     History of pancreatitis 6/20/2022    Hypertension     Hypertriglyceridemia     Kidney disorder     Leg pain     Mixed hyperlipidemia 10/28/2017    Mononeuritis multiplex 6/20/2022    Morbid obesity     Neuropathy     Nocturia 6/20/2022    OA (osteoarthritis)     Obstructive sleep apnea syndrome 6/20/2022    Onychomycosis of multiple toenails with type 2 diabetes mellitus 10/28/2017    Open wound of finger of right hand 10/20/2023    CICI (obstructive sleep apnea)     Painful diabetic neuropathy 5/12/2022    Personal history of colonic polyps 8/18/2022    Polyneuropathy     Primary hypertension 10/28/2017    Recurrent pancreatitis     Renal insufficiency     Tobacco abuse     Tobacco user 6/20/2022    Type 2 diabetes mellitus with skin complication, with long-term current use of insulin 2/6/2023     Past Surgical History:   Procedure Laterality Date    ANGIOGRAM, CORONARY, WITH LEFT HEART CATHETERIZATION N/A 4/10/2023    Procedure: Angiogram, Coronary, with Left " Heart Cath;  Surgeon: Jaswant Pugh MD;  Location: [facility] CATH LAB;  Service: Cardiology;  Laterality: N/A;    APPENDECTOMY      ARTERIOVENOUS ANASTOMOSIS, OPEN, UPPER ARM BASILLIC VEIN TRANSPOSITION N/A 05/07/2018    EGD, WITH CLOSED BIOPSY N/A 11/20/2023    Procedure: EGD, WITH CLOSED BIOPSY;  Surgeon: Christina James MD;  Location: [facility] ENDOSCOPY;  Service: Gastroenterology;  Laterality: N/A;    ESOPHAGOGASTRODUODENOSCOPY N/A 06/07/2021    EXCISION OF ARTERIOVENOUS FISTULA N/A 06/01/2018    FRACTIONAL FLOW RESERVE (FFR), CORONARY  4/10/2023    Procedure: Fractional Flow Yolyn (FFR), Coronary;  Surgeon: Jaswant Pugh MD;  Location: [facility] CATH LAB;  Service: Cardiology;;    HERNIA REPAIR      INSPECTION OF UPPER INTESTINAL TRACT N/A 06/07/2021    PHERESIS OF PLASMA N/A 07/13/2018    PHERESIS OF PLASMA N/A 05/25/2018    SPINAL CORD STIMULATOR REMOVAL      TRIAL OF SPINAL CORD NERVE STIMULATOR N/A 5/12/2022    Procedure: TRIAL, NEUROSTIMULATOR, SPINAL CORD;  Surgeon: Jay Pozo MD;  Location: [facility] OR;  Service: Neurosurgery;  Laterality: N/A;    UPPER GI N/A 06/07/2021     Family History   Problem Relation Age of Onset    Obesity Father      Review of patient's allergies indicates:  No Known Allergies    Current Outpatient Medications:     amitriptyline (ELAVIL) 25 MG tablet, Take 1 tablet (25 mg total) by mouth nightly as needed for Insomnia., Disp: 30 tablet, Rfl: 4    aspirin (ECOTRIN) 81 MG EC tablet, Take 81 mg by mouth once daily., Disp: , Rfl:     atorvastatin (LIPITOR) 40 MG tablet, Take 1 tablet (40 mg total) by mouth once daily., Disp: 90 tablet, Rfl: 3    carvediloL (COREG) 25 MG tablet, Take 1 tablet (25 mg total) by mouth 2 (two) times daily with meals., Disp: 60 tablet, Rfl: 11    clonazePAM (KLONOPIN) 1 MG tablet, Take 1 tablet (1 mg total) by mouth 2 (two) times daily., Disp: 60 tablet, Rfl: 5    cloNIDine (CATAPRES) 0.2 MG tablet, TAKE ONE TABLET BY MOUTH THREE TIMES DAILY, Disp:  270 tablet, Rfl: 3    CREON 36,000-114,000- 180,000 unit CpDR, TAKE ONE CAPSULE THREE TIMES DAILY, Disp: 270 capsule, Rfl: 1    EScitalopram oxalate (LEXAPRO) 20 MG tablet, Take 20 mg by mouth once daily., Disp: , Rfl:     esomeprazole (NEXIUM) 40 MG capsule, Take 1 capsule (40 mg total) by mouth before breakfast., Disp: 30 capsule, Rfl: 11    evolocumab (REPATHA SURECLICK) 140 mg/mL PnIj, Inject 1 mL (140 mg total) into the skin every 14 (fourteen) days., Disp: 2 mL, Rfl: 11    FARXIGA 5 mg Tab tablet, TAKE ONE TABLET BY MOUTH EVERY DAY, Disp: 90 tablet, Rfl: 3    gabapentin (NEURONTIN) 400 MG capsule, Two tabs in AM, one in afternoon, Disp: 90 capsule, Rfl: 3    gabapentin (NEURONTIN) 800 MG tablet, TAKE ONE TABLET BY MOUTH IN THE EVENING, Disp: 90 tablet, Rfl: 3    HUMALOG U-100 INSULIN 100 unit/mL injection, INJECT 25 UNITS SUBCUTANEOUSLY BEFORE MEALS THREE TIMES DAILY, Disp: 10 mL, Rfl: 4    HYDROmorphone (DILAUDID) 8 MG tablet, TAKE ONE TABLET BY MOUTH EVERY 6 HOURS AS NEEDED FOR PAIN, Disp: 120 tablet, Rfl: 0    icosapent ethyL (VASCEPA) 1 gram Cap, Take 2 capsules (2 g total) by mouth 2 (two) times daily., Disp: 60 capsule, Rfl: 11    insulin glargine (LANTUS U-100 INSULIN) 100 unit/mL injection, INJECT 100 UNITS SUBCUTANEOUSLY TWICE DAILY, Disp: 30 mL, Rfl: 4    isosorbide mononitrate (IMDUR) 120 MG 24 hr tablet, Take 1 tablet (120 mg total) by mouth once daily., Disp: 90 tablet, Rfl: 3    losartan (COZAAR) 50 MG tablet, Take 1 tablet (50 mg total) by mouth once daily., Disp: 90 tablet, Rfl: 3    morphine (MS CONTIN) 100 MG 12 hr tablet, TAKE ONE TABLET BY MOUTH THREE TIMES DAILY, Disp: 90 tablet, Rfl: 0    NIFEdipine (PROCARDIA-XL) 60 MG (OSM) 24 hr tablet, Take 1 tablet (60 mg total) by mouth 2 (two) times a day., Disp: 180 tablet, Rfl: 3    nitroGLYCERIN (NITROSTAT) 0.3 MG SL tablet, Place 1 tablet (0.3 mg total) under the tongue every 5 (five) minutes as needed for Chest pain (go to er after 3 doses).,  "Disp: 30 tablet, Rfl: 6    OXcarbazepine (TRILEPTAL) 600 MG Tab, Take 1 tablet (600 mg total) by mouth 2 (two) times daily., Disp: 60 tablet, Rfl: 11    potassium chloride SA (K-DUR,KLOR-CON) 20 MEQ tablet, Take 1 tablet (20 mEq total) by mouth 2 (two) times daily., Disp: 180 tablet, Rfl: 3    spironolactone (ALDACTONE) 25 MG tablet, Take 1 tablet (25 mg total) by mouth once daily., Disp: 90 tablet, Rfl: 3    sucralfate (CARAFATE) 100 mg/mL suspension, Take 10 mLs (1 g total) by mouth 4 (four) times daily before meals and nightly., Disp: 1200 mL, Rfl: 3    SURE COMFORT INSULIN SYRINGE 0.5 mL 31 gauge x 5/16" Syrg, USE ONE SYRINGE THREE TIMES DAILY, Disp: 100 each, Rfl: 3    tamsulosin (FLOMAX) 0.4 mg Cap, TAKE ONE CAPSULE BY MOUTH EVERY DAY, Disp: 90 capsule, Rfl: 3    torsemide (DEMADEX) 20 MG Tab, Take 1 tablet (20 mg total) by mouth once daily., Disp: 90 tablet, Rfl: 3    Current Facility-Administered Medications:     triamcinolone acetonide 0.1% ointment, , Topical (Top), BID, Malina Brito, FNP  Outpatient Medications Marked as Taking for the 1/17/24 encounter (Office Visit) with Jay Pozo MD   Medication Sig Dispense Refill    amitriptyline (ELAVIL) 25 MG tablet Take 1 tablet (25 mg total) by mouth nightly as needed for Insomnia. 30 tablet 4    aspirin (ECOTRIN) 81 MG EC tablet Take 81 mg by mouth once daily.      atorvastatin (LIPITOR) 40 MG tablet Take 1 tablet (40 mg total) by mouth once daily. 90 tablet 3    carvediloL (COREG) 25 MG tablet Take 1 tablet (25 mg total) by mouth 2 (two) times daily with meals. 60 tablet 11    clonazePAM (KLONOPIN) 1 MG tablet Take 1 tablet (1 mg total) by mouth 2 (two) times daily. 60 tablet 5    cloNIDine (CATAPRES) 0.2 MG tablet TAKE ONE TABLET BY MOUTH THREE TIMES DAILY 270 tablet 3    CREON 36,000-114,000- 180,000 unit CpDR TAKE ONE CAPSULE THREE TIMES DAILY 270 capsule 1    EScitalopram oxalate (LEXAPRO) 20 MG tablet Take 20 mg by mouth once daily.      " esomeprazole (NEXIUM) 40 MG capsule Take 1 capsule (40 mg total) by mouth before breakfast. 30 capsule 11    evolocumab (REPATHA SURECLICK) 140 mg/mL PnIj Inject 1 mL (140 mg total) into the skin every 14 (fourteen) days. 2 mL 11    FARXIGA 5 mg Tab tablet TAKE ONE TABLET BY MOUTH EVERY DAY 90 tablet 3    gabapentin (NEURONTIN) 400 MG capsule Two tabs in AM, one in afternoon 90 capsule 3    gabapentin (NEURONTIN) 800 MG tablet TAKE ONE TABLET BY MOUTH IN THE EVENING 90 tablet 3    HUMALOG U-100 INSULIN 100 unit/mL injection INJECT 25 UNITS SUBCUTANEOUSLY BEFORE MEALS THREE TIMES DAILY 10 mL 4    HYDROmorphone (DILAUDID) 8 MG tablet TAKE ONE TABLET BY MOUTH EVERY 6 HOURS AS NEEDED FOR PAIN 120 tablet 0    icosapent ethyL (VASCEPA) 1 gram Cap Take 2 capsules (2 g total) by mouth 2 (two) times daily. 60 capsule 11    insulin glargine (LANTUS U-100 INSULIN) 100 unit/mL injection INJECT 100 UNITS SUBCUTANEOUSLY TWICE DAILY 30 mL 4    isosorbide mononitrate (IMDUR) 120 MG 24 hr tablet Take 1 tablet (120 mg total) by mouth once daily. 90 tablet 3    losartan (COZAAR) 50 MG tablet Take 1 tablet (50 mg total) by mouth once daily. 90 tablet 3    morphine (MS CONTIN) 100 MG 12 hr tablet TAKE ONE TABLET BY MOUTH THREE TIMES DAILY 90 tablet 0    NIFEdipine (PROCARDIA-XL) 60 MG (OSM) 24 hr tablet Take 1 tablet (60 mg total) by mouth 2 (two) times a day. 180 tablet 3    nitroGLYCERIN (NITROSTAT) 0.3 MG SL tablet Place 1 tablet (0.3 mg total) under the tongue every 5 (five) minutes as needed for Chest pain (go to er after 3 doses). 30 tablet 6    OXcarbazepine (TRILEPTAL) 600 MG Tab Take 1 tablet (600 mg total) by mouth 2 (two) times daily. 60 tablet 11    potassium chloride SA (K-DUR,KLOR-CON) 20 MEQ tablet Take 1 tablet (20 mEq total) by mouth 2 (two) times daily. 180 tablet 3    spironolactone (ALDACTONE) 25 MG tablet Take 1 tablet (25 mg total) by mouth once daily. 90 tablet 3    sucralfate (CARAFATE) 100 mg/mL suspension Take  "10 mLs (1 g total) by mouth 4 (four) times daily before meals and nightly. 1200 mL 3    SURE COMFORT INSULIN SYRINGE 0.5 mL 31 gauge x 5/16" Syrg USE ONE SYRINGE THREE TIMES DAILY 100 each 3    tamsulosin (FLOMAX) 0.4 mg Cap TAKE ONE CAPSULE BY MOUTH EVERY DAY 90 capsule 3    torsemide (DEMADEX) 20 MG Tab Take 1 tablet (20 mg total) by mouth once daily. 90 tablet 3     Current Facility-Administered Medications for the 24 encounter (Office Visit) with Jay Pozo MD   Medication Dose Route Frequency Provider Last Rate Last Admin    triamcinolone acetonide 0.1% ointment   Topical (Top) BID Malina Brito FNP         Social History     Tobacco Use    Smoking status: Some Days     Current packs/day: 0.00     Average packs/day: 0.5 packs/day for 26.7 years (13.4 ttl pk-yrs)     Types: Cigarettes     Start date: 1996     Last attempt to quit: 2023     Years since quittin.7    Smokeless tobacco: Never    Tobacco comments:     Half pack per day   Substance Use Topics    Alcohol use: Not Currently    Drug use: Not Currently         Vitals:    24 0914   BP: 138/88     Gen NAD  HEENT NC/AT  CV RRR, no carotid bruits  Resp clear  GI soft  Ext no C/C/E  Neuro  AAOx4  Speech fluent, appropriate  EOMI, PERRLA, VFF  Normal facial strength, sensation  Tongue and palate midline  Motor 5/5  Sensation TTP C and L-spine  DTRs symmetric  Coord intact  Gait Normal    Assessment: CPS  SCS Dysfunction    Plan: SCS Revision     "

## 2024-01-18 ENCOUNTER — TELEPHONE (OUTPATIENT)
Dept: INTERNAL MEDICINE | Facility: CLINIC | Age: 44
End: 2024-01-18
Payer: MEDICARE

## 2024-01-18 RX ORDER — MORPHINE SULFATE 100 MG/1
100 TABLET, FILM COATED, EXTENDED RELEASE ORAL 3 TIMES DAILY
Qty: 90 TABLET | Refills: 0 | Status: SHIPPED | OUTPATIENT
Start: 2024-01-18 | End: 2024-02-15

## 2024-01-18 RX ORDER — TAMSULOSIN HYDROCHLORIDE 0.4 MG/1
CAPSULE ORAL
Qty: 90 CAPSULE | Refills: 3 | Status: SHIPPED | OUTPATIENT
Start: 2024-01-18

## 2024-01-18 RX ORDER — HYDROMORPHONE HYDROCHLORIDE 8 MG/1
8 TABLET ORAL EVERY 6 HOURS PRN
Qty: 120 TABLET | Refills: 0 | Status: SHIPPED | OUTPATIENT
Start: 2024-01-18 | End: 2024-02-15

## 2024-01-22 ENCOUNTER — TELEPHONE (OUTPATIENT)
Dept: NEUROLOGY | Facility: CLINIC | Age: 44
End: 2024-01-22
Payer: MEDICARE

## 2024-01-22 NOTE — TELEPHONE ENCOUNTER
----- Message from Effie Margarita sent at 1/22/2024 10:36 AM CST -----  Regarding: SCS revision  Pt called about his procedure for  revising the SCS. Pt states that he was told Dr Pozo is no longer with [facility] and he would need to find another doctor for procedure. Pt states that Judith ordered for him to have it done so he is needing her to find someone who can do procedure that accepts his insurance. Please advise  Pt # [phone]

## 2024-01-22 NOTE — TELEPHONE ENCOUNTER
Spoke with patient. Advised patient that  is no longer seeing patients at [facility]. Advised patient to contact PCP regarding plan of care for SCS. Patient states not established with PCP. Advised patient to contact [place] to see what physicians are within the region to assist with revising SCS. Patient verbalized understanding

## 2024-01-23 NOTE — TELEPHONE ENCOUNTER
Spoke with patient, informed him to contact [facility] and find a provider that can revise his SCS then notify us so we can send referral. He verbalized understanding.

## 2024-01-24 ENCOUNTER — OUTPATIENT CASE MANAGEMENT (OUTPATIENT)
Dept: ADMINISTRATIVE | Facility: OTHER | Age: 44
End: 2024-01-24
Payer: MEDICARE

## 2024-02-02 ENCOUNTER — TELEPHONE (OUTPATIENT)
Dept: NEPHROLOGY | Facility: CLINIC | Age: 44
End: 2024-02-02
Payer: MEDICARE

## 2024-02-02 NOTE — TELEPHONE ENCOUNTER
Russel, [employer] representative stated PA is not needed at prescribed dose  RT#  [id number]  Spoke with Jewell-generic was filled

## 2024-02-06 ENCOUNTER — TELEPHONE (OUTPATIENT)
Dept: NEUROLOGY | Facility: CLINIC | Age: 44
End: 2024-02-06
Payer: MEDICARE

## 2024-02-06 ENCOUNTER — OFFICE VISIT (OUTPATIENT)
Dept: NEUROLOGY | Facility: CLINIC | Age: 44
End: 2024-02-06
Payer: MEDICARE

## 2024-02-06 VITALS — HEIGHT: 68 IN | BODY MASS INDEX: 33.34 KG/M2 | WEIGHT: 220 LBS

## 2024-02-06 DIAGNOSIS — G81.91 ACUTE RIGHT HEMIPARESIS: Primary | ICD-10-CM

## 2024-02-06 DIAGNOSIS — G47.33 OBSTRUCTIVE SLEEP APNEA SYNDROME: ICD-10-CM

## 2024-02-06 DIAGNOSIS — G89.4 CHRONIC PAIN SYNDROME: Primary | ICD-10-CM

## 2024-02-06 PROCEDURE — 99214 OFFICE O/P EST MOD 30 MIN: CPT | Mod: PBBFAC | Performed by: PSYCHIATRY & NEUROLOGY

## 2024-02-06 PROCEDURE — 99214 OFFICE O/P EST MOD 30 MIN: CPT | Mod: S$PBB,,, | Performed by: PSYCHIATRY & NEUROLOGY

## 2024-02-06 PROCEDURE — 99999 PR PBB SHADOW E&M-EST. PATIENT-LVL IV: CPT | Mod: PBBFAC,,, | Performed by: PSYCHIATRY & NEUROLOGY

## 2024-02-06 NOTE — PROGRESS NOTES
NEUROLOGY  OUTPATIENT OFFICE VISIT NOTE    SUBJECTIVE:      Chief Complaint: Sleep Apnea       HPI: Bharath Caballero is a 43 y.o. yo male who presents for  f/u for sleep study.     Reprots CPAP machine was broken. Had sleep study at [facility] previously and had good improvement with CICI when he had his machine.  Now with apneic episodes. Day time somnolence.     Patient with some hearing loss, awaiting cochlear implant evaluations  Awaiting to establish with interventional neurology for SCS revision      Past Medical History:   has a past medical history of Acquired perforating dermatosis (8/30/2023), Aortic atherosclerosis (1/24/2023), Bilateral edema of lower extremity (2/6/2023), Bladder outflow obstruction (6/20/2022), Blurred vision, right eye (10/19/2022), BMI 34.0-34.9,adult (6/20/2022), BPH (benign prostatic hyperplasia), Chronic liver failure without hepatic coma (4/25/2023), Chronic pain (10/25/2022), Chronic pain syndrome (5/12/2022), Chronic pancreatitis (10/28/2017), Deafness (10/3/2023), Diabetes, Diabetic polyneuropathy (6/20/2022), Elevated LFTs (8/18/2022), GERD (gastroesophageal reflux disease), Hand paresthesia (6/20/2022), Hepatic steatosis, History of continuous positive airway pressure (CPAP) therapy at home, History of pancreatitis (6/20/2022), Hypertension, Hypertriglyceridemia, Kidney disorder, Leg pain, Mixed hyperlipidemia (10/28/2017), Mononeuritis multiplex (6/20/2022), Morbid obesity, Neuropathy, Nocturia (6/20/2022), OA (osteoarthritis), Obstructive sleep apnea syndrome (6/20/2022), Onychomycosis of multiple toenails with type 2 diabetes mellitus (10/28/2017), Open wound of finger of right hand (10/20/2023), CICI (obstructive sleep apnea), Painful diabetic neuropathy (5/12/2022), Personal history of colonic polyps (8/18/2022), Polyneuropathy, Primary hypertension (10/28/2017), Recurrent pancreatitis, Renal insufficiency, Tobacco abuse, Tobacco user (6/20/2022), and Type 2 diabetes mellitus with  "skin complication, with long-term current use of insulin (2/6/2023).     Past Surgical History:   has a past surgical history that includes Esophagogastroduodenoscopy (N/A, 06/07/2021); INSPECTION OF UPPER INTESTINAL TRACT (N/A, 06/07/2021); UPPER GI (N/A, 06/07/2021); PHERESIS OF PLASMA (N/A, 07/13/2018); Excision of arteriovenous fistula (N/A, 06/01/2018); PHERESIS OF PLASMA (N/A, 05/25/2018); ARTERIOVENOUS ANASTOMOSIS, OPEN, UPPER ARM BASILLIC VEIN TRANSPOSITION (N/A, 05/07/2018); Appendectomy; Hernia repair; Trial of spinal cord nerve stimulator (N/A, 5/12/2022); Spinal cord stimulator removal; ANGIOGRAM, CORONARY, WITH LEFT HEART CATHETERIZATION (N/A, 4/10/2023); fractional flow reserve (ffr), coronary (4/10/2023); and egd, with closed biopsy (N/A, 11/20/2023).     Family History:  family history includes Obesity in his father.     Social History:   reports that he has been smoking cigarettes. He started smoking about 27 years ago. He has a 13.4 pack-year smoking history. He has never used smokeless tobacco. He reports that he does not currently use alcohol. He reports that he does not currently use drugs.     Allergies:  has No Known Allergies.          OBJECTIVE:     Vital signs:   Ht 5' 8" (1.727 m)   Wt 99.8 kg (220 lb)   BMI 33.45 kg/m²      Physical Examination:  General appearance: alert, appears stated age, cooperative and no distress  Head: Normocephalic, without obvious abnormality, atraumatic  Neck: no adenopathy, no carotid bruit, no JVD and supple, symmetrical, trachea midline  Lungs: clear to auscultation bilaterally  Heart: regular rate and rhythm, S1, S2 normal, no murmur, click, rub or gallop  Abdomen: soft, non-tender; bowel sounds normal; no masses,  no organomegaly  Extremities: extremities normal, atraumatic, no cyanosis or edema  Pulses: 2+ and symmetric  Skin: Skin color, texture, turgor normal. No rashes or lesions  Neurologic: Grossly hard of hearing, no focal deficits, ambulates with " cane    Labs:  BMP:   Lab Results   Component Value Date    CHLORIDE 99 01/16/2024    CO2 26 01/16/2024    BUN 8.2 (L) 01/16/2024    CREATININE 0.82 01/16/2024    GLUCOSE 327 (H) 01/16/2024    CALCIUM 9.1 01/16/2024     CBC:   Lab Results   Component Value Date    WBC 14.77 (H) 10/03/2023    HGB 14.9 10/03/2023    HCT 42.1 10/03/2023    MCV 87.2 10/03/2023    RDW 11.8 10/03/2023     LFTs:   Lab Results   Component Value Date    LABPROT 8.1 01/16/2024    ALBUMIN 3.6 01/16/2024    AST 24 01/16/2024    ALT 44 01/16/2024    ALKPHOS 230 (H) 01/16/2024     FLP:   Lab Results   Component Value Date    CHOL 198 10/03/2023    HDL 24 (L) 10/03/2023    LDL 44.00 (L) 10/03/2023    TRIG 649 (H) 10/03/2023    TOTALCHOLEST 8 (H) 10/03/2023     DM:   Lab Results   Component Value Date    HGBA1C 9.5 (H) 01/16/2024    .0 01/16/2024    CREATININE 0.82 01/16/2024    CREATRANDUR 68.6 01/16/2024    PROTEINURINE 197.9 01/16/2024     Thyroid:   Lab Results   Component Value Date    TSH 3.495 05/23/2023    TVDMCB2CHUR 0.95 11/30/2022     Anemia:   Lab Results   Component Value Date    IRON 52 (L) 08/18/2020    TIBC 296 08/18/2020    FERRITIN 190.9 08/18/2020    [id number] 08/13/2020         ASSESSMENT & PLAN:        CICI  - will order CPAP for home use  - patient with good improvement     2.   Polyneuropathy   - associated with DM   - attempting to establish with interventionalist for further management          Return to clinic in 2 month(s).    Naomy Stover DO  [facility], [facility]  02/06/2024    Orders Placed This Encounter    CPAP FOR HOME USE

## 2024-02-06 NOTE — TELEPHONE ENCOUNTER
----- Message from Tameka Atwood sent at 2/6/2024 10:58 AM CST -----  Pt called asking for a referral to be sent to:   Dr. Paulo Jmiénez   [facility] [address]  Ph # [phone]      Pt stated Dr. Pozo is no longer practicing and needs this sent to someone else in order to have spine surgery.     Pt can be reached @ [phone]

## 2024-02-14 DIAGNOSIS — G89.4 CHRONIC PAIN SYNDROME: ICD-10-CM

## 2024-02-14 DIAGNOSIS — E08.42 DIABETIC POLYNEUROPATHY ASSOCIATED WITH DIABETES MELLITUS DUE TO UNDERLYING CONDITION: ICD-10-CM

## 2024-02-15 ENCOUNTER — TELEPHONE (OUTPATIENT)
Dept: NEUROLOGY | Facility: CLINIC | Age: 44
End: 2024-02-15

## 2024-02-15 ENCOUNTER — OFFICE VISIT (OUTPATIENT)
Dept: CARDIOLOGY | Facility: CLINIC | Age: 44
End: 2024-02-15
Payer: MEDICARE

## 2024-02-15 VITALS
WEIGHT: 216.69 LBS | DIASTOLIC BLOOD PRESSURE: 78 MMHG | HEIGHT: 68 IN | OXYGEN SATURATION: 99 % | TEMPERATURE: 99 F | SYSTOLIC BLOOD PRESSURE: 142 MMHG | RESPIRATION RATE: 20 BRPM | BODY MASS INDEX: 32.84 KG/M2 | HEART RATE: 82 BPM

## 2024-02-15 DIAGNOSIS — I10 PRIMARY HYPERTENSION: ICD-10-CM

## 2024-02-15 DIAGNOSIS — Z74.09 IMPAIRED FUNCTIONAL MOBILITY, BALANCE, GAIT, AND ENDURANCE: ICD-10-CM

## 2024-02-15 DIAGNOSIS — I25.10 CORONARY ARTERY DISEASE, UNSPECIFIED VESSEL OR LESION TYPE, UNSPECIFIED WHETHER ANGINA PRESENT, UNSPECIFIED WHETHER NATIVE OR TRANSPLANTED HEART: ICD-10-CM

## 2024-02-15 DIAGNOSIS — K86.1 CHRONIC PANCREATITIS, UNSPECIFIED PANCREATITIS TYPE: ICD-10-CM

## 2024-02-15 DIAGNOSIS — E78.1 FAMILIAL HYPERTRIGLYCERIDEMIA: ICD-10-CM

## 2024-02-15 DIAGNOSIS — Z79.4 TYPE 2 DIABETES MELLITUS WITH OTHER SKIN COMPLICATION, WITH LONG-TERM CURRENT USE OF INSULIN: ICD-10-CM

## 2024-02-15 DIAGNOSIS — E87.6 HYPOKALEMIA: ICD-10-CM

## 2024-02-15 DIAGNOSIS — E78.2 MIXED HYPERLIPIDEMIA: ICD-10-CM

## 2024-02-15 DIAGNOSIS — G47.33 OBSTRUCTIVE SLEEP APNEA SYNDROME: ICD-10-CM

## 2024-02-15 DIAGNOSIS — H91.93 BILATERAL HEARING LOSS, UNSPECIFIED HEARING LOSS TYPE: ICD-10-CM

## 2024-02-15 DIAGNOSIS — K76.0 HEPATIC STEATOSIS: ICD-10-CM

## 2024-02-15 DIAGNOSIS — E11.628 TYPE 2 DIABETES MELLITUS WITH OTHER SKIN COMPLICATION, WITH LONG-TERM CURRENT USE OF INSULIN: ICD-10-CM

## 2024-02-15 DIAGNOSIS — Z87.19 HISTORY OF PANCREATITIS: ICD-10-CM

## 2024-02-15 DIAGNOSIS — Z72.0 TOBACCO USER: Primary | ICD-10-CM

## 2024-02-15 PROBLEM — E66.01 MORBID OBESITY: Status: RESOLVED | Noted: 2023-10-27 | Resolved: 2024-02-15

## 2024-02-15 PROCEDURE — 99213 OFFICE O/P EST LOW 20 MIN: CPT | Mod: PBBFAC | Performed by: INTERNAL MEDICINE

## 2024-02-15 RX ORDER — SPIRONOLACTONE 50 MG/1
50 TABLET, FILM COATED ORAL DAILY
Qty: 90 TABLET | Refills: 3 | Status: SHIPPED | OUTPATIENT
Start: 2024-02-15 | End: 2024-06-17 | Stop reason: SDUPTHER

## 2024-02-15 RX ORDER — CLONAZEPAM 1 MG/1
1 TABLET ORAL 2 TIMES DAILY
Qty: 60 TABLET | Refills: 0 | Status: SHIPPED | OUTPATIENT
Start: 2024-02-15 | End: 2024-03-14

## 2024-02-15 RX ORDER — MORPHINE SULFATE 100 MG/1
100 TABLET, FILM COATED, EXTENDED RELEASE ORAL 3 TIMES DAILY
Qty: 90 TABLET | Refills: 0 | Status: SHIPPED | OUTPATIENT
Start: 2024-02-15 | End: 2024-03-18

## 2024-02-15 RX ORDER — HYDROMORPHONE HYDROCHLORIDE 8 MG/1
8 TABLET ORAL EVERY 6 HOURS PRN
Qty: 120 TABLET | Refills: 0 | Status: SHIPPED | OUTPATIENT
Start: 2024-02-15 | End: 2024-03-18

## 2024-02-15 NOTE — TELEPHONE ENCOUNTER
Marylu Lehman    Relaying info that they are unable to service the patient d/t non-compliance; Also, he had a machine from 2018 that he was supposed to have turned in, but did not

## 2024-02-15 NOTE — PROGRESS NOTES
[facility] [place] [facility]  Cariology Clinic - Follow Up     Cardiology Attending: Dr. Jaswant Pugh MD  Date of Visit: 2/15/2024  Reason for Visit/Chief Complaint:   Chief Complaint    f/u sts has been having chest pain no sob no questions           Subjective:      Bharath Caballero is a 43 y.o. male with familial hypertriglyceridemia with recurrent pancreatitis, CAD on medical management, HTN, DM, diabetic neuropathy, Hepatic Steatosis, CICI, smoking who presents to cardiology clinic for follow up.      In the past patient was on plasmapheresis for hypertriglyceridemia.  At some point he was also on hospice but was taken off later due to absence of terminal disease.  In Apr 2023, pt underwent a coronary angiogram that revealed some proximal LAD disease, however iFR was 0.94 hence not revascularized. Pt also has mid-RCA  with collaterals but a small PDA. Pt also met with CT surgery Dr. Dallas at [facility] who did not think that the lesions warranted a CABG.     Today patient reports feeling well.  Since last visit he thinks his chest pain has been less frequent and less intense.  He reports chest pain that lasts for few minutes that occurs mostly at night when lying down.  It occurs a few times a week and he has recently not had to take nitroglycerin.  He reports SBP at home is between 140 and 190.  He is cutting down on cigarettes and now smokes 4 cigarettes per day. Used to smoke 1.5 packs per day.  Pt is hard of hearing and is being worked up for a cochlear implant. Uses a cane to walk.     Medications:     Current Outpatient Medications   Medication Sig Dispense Refill    amitriptyline (ELAVIL) 25 MG tablet Take 1 tablet (25 mg total) by mouth nightly as needed for Insomnia. 30 tablet 4    aspirin (ECOTRIN) 81 MG EC tablet Take 81 mg by mouth once daily.      atorvastatin (LIPITOR) 40 MG tablet Take 1 tablet (40 mg total) by mouth once daily. 90 tablet 3     carvediloL (COREG) 25 MG tablet Take 1 tablet (25 mg total) by mouth 2 (two) times daily with meals. 60 tablet 11    clonazePAM (KLONOPIN) 1 MG tablet Take 1 tablet (1 mg total) by mouth 2 (two) times daily. 60 tablet 5    cloNIDine (CATAPRES) 0.2 MG tablet TAKE ONE TABLET BY MOUTH THREE TIMES DAILY 270 tablet 3    CREON 36,000-114,000- 180,000 unit CpDR TAKE ONE CAPSULE THREE TIMES DAILY 270 capsule 1    EScitalopram oxalate (LEXAPRO) 20 MG tablet Take 20 mg by mouth once daily.      esomeprazole (NEXIUM) 40 MG capsule Take 1 capsule (40 mg total) by mouth before breakfast. 30 capsule 11    evolocumab (REPATHA SURECLICK) 140 mg/mL PnIj Inject 1 mL (140 mg total) into the skin every 14 (fourteen) days. 2 mL 11    FARXIGA 5 mg Tab tablet TAKE ONE TABLET BY MOUTH EVERY DAY 90 tablet 3    gabapentin (NEURONTIN) 400 MG capsule Two tabs in AM, one in afternoon 90 capsule 3    gabapentin (NEURONTIN) 800 MG tablet TAKE ONE TABLET BY MOUTH IN THE EVENING 90 tablet 3    HUMALOG U-100 INSULIN 100 unit/mL injection INJECT 25 UNITS SUBCUTANEOUSLY BEFORE MEALS THREE TIMES DAILY 10 mL 4    HYDROmorphone (DILAUDID) 8 MG tablet TAKE ONE TABLET BY MOUTH EVERY 6 HOURS AS NEEDED FOR PAIN 120 tablet 0    icosapent ethyL (VASCEPA) 1 gram Cap Take 2 capsules (2 g total) by mouth 2 (two) times daily. 60 capsule 11    isosorbide mononitrate (IMDUR) 120 MG 24 hr tablet Take 1 tablet (120 mg total) by mouth once daily. 90 tablet 3    losartan (COZAAR) 50 MG tablet Take 1 tablet (50 mg total) by mouth once daily. 90 tablet 3    morphine (MS CONTIN) 100 MG 12 hr tablet TAKE ONE TABLET BY MOUTH THREE TIMES DAILY 90 tablet 0    NIFEdipine (PROCARDIA-XL) 60 MG (OSM) 24 hr tablet Take 1 tablet (60 mg total) by mouth 2 (two) times a day. 180 tablet 3    nitroGLYCERIN (NITROSTAT) 0.3 MG SL tablet Place 1 tablet (0.3 mg total) under the tongue every 5 (five) minutes as needed for Chest pain (go to er after 3 doses). 30 tablet 6    OXcarbazepine  "(TRILEPTAL) 600 MG Tab Take 1 tablet (600 mg total) by mouth 2 (two) times daily. 60 tablet 11    potassium chloride SA (K-DUR,KLOR-CON) 20 MEQ tablet Take 1 tablet (20 mEq total) by mouth 2 (two) times daily. 180 tablet 3    spironolactone (ALDACTONE) 25 MG tablet Take 1 tablet (25 mg total) by mouth once daily. 90 tablet 3    sucralfate (CARAFATE) 100 mg/mL suspension Take 10 mLs (1 g total) by mouth 4 (four) times daily before meals and nightly. 1200 mL 3    tamsulosin (FLOMAX) 0.4 mg Cap TAKE ONE CAPSULE BY MOUTH EVERY DAY 90 capsule 3    torsemide (DEMADEX) 20 MG Tab Take 1 tablet (20 mg total) by mouth once daily. 90 tablet 3    insulin glargine (LANTUS U-100 INSULIN) 100 unit/mL injection INJECT 100 UNITS SUBCUTANEOUSLY TWICE DAILY (Patient not taking: Reported on 2/15/2024) 30 mL 4    SURE COMFORT INSULIN SYRINGE 0.5 mL 31 gauge x 5/16" Syrg USE ONE SYRINGE THREE TIMES DAILY 100 each 3     Current Facility-Administered Medications   Medication Dose Route Frequency Provider Last Rate Last Admin    triamcinolone acetonide 0.1% ointment   Topical (Top) BID Malina Brito, FNP           I have reviewed and updated the patient's medications, allergies, past medical history, surgical history, social history and family history as needed.    Review of Systems:     ROS  Objective:     Wt Readings from Last 3 Encounters:   02/15/24 98.3 kg (216 lb 11.2 oz)   02/06/24 99.8 kg (220 lb)   01/17/24 99.8 kg (220 lb)     Temp Readings from Last 3 Encounters:   02/15/24 98.6 °F (37 °C) (Oral)   01/16/24 98.3 °F (36.8 °C) (Oral)   12/12/23 98.6 °F (37 °C) (Oral)     BP Readings from Last 3 Encounters:   02/15/24 (!) 169/98   01/17/24 138/88   01/16/24 (!) 170/98     Pulse Readings from Last 3 Encounters:   02/15/24 82   01/16/24 80   12/27/23 65       Vitals:    02/15/24 1001   BP: (!) 169/98   BP Location: Right arm   Patient Position: Sitting   BP Method: Large (Automatic)   Pulse: 82   Resp: 20   Temp: 98.6 °F (37 °C) " "  TempSrc: Oral   SpO2: 99%   Weight: 98.3 kg (216 lb 11.2 oz)   Height: 5' 8" (1.727 m)     Body mass index is 32.95 kg/m².    Physical Exam   Labs:   I have reviewed the following labs below:      Lab Results   Component Value Date    WBC 14.77 (H) 10/03/2023    HGB 14.9 10/03/2023    HCT 42.1 10/03/2023     10/03/2023    MCV 87.2 10/03/2023    RDW 11.8 10/03/2023     Lab Results   Component Value Date     01/16/2024    K 3.3 (L) 01/16/2024    CO2 26 01/16/2024    BUN 8.2 (L) 01/16/2024    CALCIUM 9.1 01/16/2024    MG 1.80 05/24/2023    PHOS 2.9 12/01/2022     Lab Results   Component Value Date    ALBUMIN 3.6 01/16/2024    BILITOT 0.5 01/16/2024    AST 24 01/16/2024    ALKPHOS 230 (H) 01/16/2024    ALT 44 01/16/2024     Cholesterol Total   Date Value Ref Range Status   10/03/2023 198 <=200 mg/dL Final     HDL Cholesterol   Date Value Ref Range Status   10/03/2023 24 (L) 35 - 60 mg/dL Final     LDL Cholesterol   Date Value Ref Range Status   10/03/2023 44.00 (L) 50.00 - 140.00 mg/dL Final     Comment:     Calculated LDL not valid with Triglyceride > 400.     Triglyceride   Date Value Ref Range Status   10/03/2023 649 (H) 34 - 140 mg/dL Final     Lab Results   Component Value Date    HGBA1C 9.5 (H) 01/16/2024    HGBA1C 9.4 (H) 05/24/2023    HGBA1C 10.2 (H) 01/17/2023    CREATININE 0.82 01/16/2024     Lab Results   Component Value Date    TSH 3.495 05/23/2023     Lab Results   Component Value Date    IRON 52 (L) 08/18/2020    TIBC 296 08/18/2020    FERRITIN 190.9 08/18/2020    [id number] 773 08/13/2020     Lab Results   Component Value Date    INR 1.0 09/13/2023    PROTIME 12.5 09/13/2023      Lab Results   Component Value Date    TROPONINI <0.010 11/29/2022    TROPONINI <0.010 11/28/2022    TROPONINI <0.010 11/27/2022    TROPONINI <0.010 07/11/2022    TROPONINI <0.010 09/11/2021    BNP <10.0 11/28/2022    BNP <10.0 11/27/2022    BNP <10.0 09/11/2021     Cardiovascular Studies:   I have reviewed the " following studies below:      Coronary angiogram 4/2023:   FINDINGS  LVEDP:  18 mmHg  Left Main:  No stenosis    LAD:   Diffusely diseased.  70% ostial-prox lesion, 70% mid-distal lesion and 50% lesion at apex  Circumflex:   Mild diffuse disease   30%  lesion at the ostium of the first OM  RCA:   Severe, diffuse disease. Small vessel   90% prox-mid lesion   mid vessel  The patient has Significant two vessel disease    Blood loss:  less than 10 cc.  LIMA:  Medium size vessel.     iFR = 0.94 in proximal and mid LAD.  iFR > 0.89 is not significant      RECOMMENDATIONS  Medical management  Risk factor modifications -- start statin, blood pressure control        ECHO 5/24/23   The left ventricle is normal in size with mild concentric hypertrophy and normal systolic function.  The estimated ejection fraction is 65%.  Normal left ventricular diastolic function.  There is no evidence of intracardiac shunting.  Elevated central venous pressure (15 mmHg).  The estimated PA systolic pressure is 24 mmHg.  Normal right ventricular size with normal right ventricular systolic function.  Mild left atrial enlargement.    Assessment/Plan:   43 y.o. male with the following medical problems:    Stable Angina  CAD with moderate obstructive CAD  -Pt has proximal LAD disease based on cath in 4/2023, however iFR was 0.94 which was considered non-significant, hence not revascularized. Pt also has mid-RCA  with collaterals but a small PDA. Was seen by Dr. Dallas who did not think CABG is indicated.   Since last visit, chest pain has been less frequent and less intense.  -continue ASA, statin, PCSK9I, Coreg 25 b.i.d., Imdur 120, nifedipine 60 b.i.d.     HTN, resistant and uncontrolled  Initial /98 with repeat 142/78  BP at home runs between 140-190  Unclear if severe TG can be contributing to resistant HTN  No renal duplex US done  Pt with hx of hypokalemia and could have underlying hyperaldosteronism.  Renin and aldosterone were  never checked however as patient is already on Aldactone and RAAS inhibitors testing will be unreliable  Will increase aldactone to 50mg  Continue nifedipine 60 bid, losartan 50mg, coreg 25mg bid, clonidine 0.2mg tid, tordemide 20  Recommend he keeps a log and calls us with readings      Familial Hypertriglyceridemia, hypercholesterolemia   Patient reports triglycerides in the past were 22,000 (no records of that)  Suspect most of his metabolic disorders as well as CAD related to hypertriglyceridemia.  Patient also with recurrent pancreatitis (no recent episodes thankfully)   Patient used to be on plasmapheresis in the past but per his request has stopped doing so.  Most recent triglycerides 649  -continue atorvastatin 40mg daily, Repatha, Vascepa 2g BID  Discussed importance of decreasing any fat intake    Uncontrolled diabetes  Diabetic neuropathy  A1c 9.5 in January 2024   Continue management per PCP    Smoking  Patient has significant cut down smoking and is currently smoking 4 cigarettes a day with plan to quit on his own.    Recurrent pancreatitis  Liver steatosis  Pt followed by GI

## 2024-02-26 NOTE — PROCEDURES
Fibroscan Procedure     Name: Bharath Caballero  Date of Procedure : 2023  Interpreting Physician: Christina James MD, MPH  Diagnosis: MASLD (NAFLD)    Probe: XL    Fibroscan readin.1 kPa    Fibrosis: F 0-1     CAP readin dB/m    Steatosis: S0      Miscellaneous:   2021: Fibroscan: 7.1 kPa, S3, F 0-1  2022: Fibroscan: 6.7 kPa. S1, F0-1  2023: Fibroscan: 8.6 kPa, S0, F2

## 2024-02-27 ENCOUNTER — OFFICE VISIT (OUTPATIENT)
Dept: INTERNAL MEDICINE | Facility: CLINIC | Age: 44
End: 2024-02-27
Payer: MEDICARE

## 2024-02-27 VITALS
HEIGHT: 68 IN | WEIGHT: 223.38 LBS | BODY MASS INDEX: 33.85 KG/M2 | RESPIRATION RATE: 18 BRPM | HEART RATE: 84 BPM | OXYGEN SATURATION: 97 % | TEMPERATURE: 99 F | SYSTOLIC BLOOD PRESSURE: 136 MMHG | DIASTOLIC BLOOD PRESSURE: 70 MMHG

## 2024-02-27 DIAGNOSIS — Z72.0 TOBACCO USER: ICD-10-CM

## 2024-02-27 DIAGNOSIS — I25.118 CORONARY ARTERY DISEASE OF NATIVE ARTERY OF NATIVE HEART WITH STABLE ANGINA PECTORIS: ICD-10-CM

## 2024-02-27 DIAGNOSIS — E78.01 FAMILIAL HYPERCHOLESTEROLEMIA: ICD-10-CM

## 2024-02-27 DIAGNOSIS — E11.40 PAINFUL DIABETIC NEUROPATHY: Chronic | ICD-10-CM

## 2024-02-27 DIAGNOSIS — I70.0 AORTIC ATHEROSCLEROSIS: ICD-10-CM

## 2024-02-27 DIAGNOSIS — Z87.19 HISTORY OF PANCREATITIS: ICD-10-CM

## 2024-02-27 DIAGNOSIS — F33.2 MAJOR DEPRESSIVE DISORDER, RECURRENT SEVERE WITHOUT PSYCHOTIC FEATURES: ICD-10-CM

## 2024-02-27 DIAGNOSIS — G47.33 OBSTRUCTIVE SLEEP APNEA SYNDROME: ICD-10-CM

## 2024-02-27 DIAGNOSIS — Z86.010 PERSONAL HISTORY OF COLONIC POLYPS: ICD-10-CM

## 2024-02-27 DIAGNOSIS — I10 PRIMARY HYPERTENSION: ICD-10-CM

## 2024-02-27 DIAGNOSIS — E78.2 MIXED HYPERLIPIDEMIA: ICD-10-CM

## 2024-02-27 DIAGNOSIS — G89.29 OTHER CHRONIC PAIN: ICD-10-CM

## 2024-02-27 DIAGNOSIS — E11.40 PAINFUL DIABETIC NEUROPATHY: Primary | ICD-10-CM

## 2024-02-27 DIAGNOSIS — E08.42 DIABETIC POLYNEUROPATHY ASSOCIATED WITH DIABETES MELLITUS DUE TO UNDERLYING CONDITION: Primary | ICD-10-CM

## 2024-02-27 DIAGNOSIS — E11.622 TYPE 2 DIABETES MELLITUS WITH OTHER SKIN ULCER, WITH LONG-TERM CURRENT USE OF INSULIN: ICD-10-CM

## 2024-02-27 DIAGNOSIS — Z79.4 TYPE 2 DIABETES MELLITUS WITH OTHER SKIN ULCER, WITH LONG-TERM CURRENT USE OF INSULIN: ICD-10-CM

## 2024-02-27 DIAGNOSIS — R60.0 BILATERAL EDEMA OF LOWER EXTREMITY: ICD-10-CM

## 2024-02-27 DIAGNOSIS — K86.1 IDIOPATHIC CHRONIC PANCREATITIS: ICD-10-CM

## 2024-02-27 LAB
AMPHET UR QL SCN: NEGATIVE
BARBITURATE SCN PRESENT UR: NEGATIVE
BENZODIAZ UR QL SCN: NEGATIVE
CANNABINOIDS UR QL SCN: NEGATIVE
COCAINE UR QL SCN: NEGATIVE
CREAT UR-MCNC: 69.1 MG/DL (ref 63–166)
FENTANYL UR QL SCN: NEGATIVE
MDMA UR QL SCN: NEGATIVE
MICROALBUMIN UR-MCNC: 849.1 UG/ML
MICROALBUMIN/CREAT RATIO PNL UR: 1228.8 MG/GM CR (ref 0–30)
OPIATES UR QL SCN: POSITIVE
PCP UR QL: NEGATIVE
PH UR: 6 [PH] (ref 3–11)
SPECIFIC GRAVITY, URINE AUTO (.000) (OHS): 1.02 (ref 1–1.03)

## 2024-02-27 PROCEDURE — 82043 UR ALBUMIN QUANTITATIVE: CPT | Performed by: INTERNAL MEDICINE

## 2024-02-27 PROCEDURE — 99215 OFFICE O/P EST HI 40 MIN: CPT | Mod: PBBFAC | Performed by: INTERNAL MEDICINE

## 2024-02-27 RX ORDER — INSULIN GLARGINE 300 U/ML
35 INJECTION, SOLUTION SUBCUTANEOUS 2 TIMES DAILY
COMMUNITY
Start: 2024-02-14 | End: 2024-06-18

## 2024-02-27 RX ORDER — AMITRIPTYLINE HYDROCHLORIDE 25 MG/1
25 TABLET, FILM COATED ORAL NIGHTLY PRN
Qty: 30 TABLET | Refills: 4 | Status: SHIPPED | OUTPATIENT
Start: 2024-02-27 | End: 2025-02-26

## 2024-02-27 NOTE — PROGRESS NOTES
Subjective     Patient ID: Bharath Caballero is a 43 y.o. male.    Chief Complaint: Follow-up    Follow-up      Patient says he is having some trouble keeping food down he gets nauseated with the smell as some foods this is a chronic problem it maybe slowly getting worse blood pressure glucose control chronic headaches are stable abdominal pain is practically without change he may have a new pain his right abdomen which he can not really well described back pain is significant his stimulator has a broken wire Dr. 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 Modi the neurologist who recommended it be placed is working on trying to find some body to work on this and they are having trouble he needs a cochlear ear implant ENT and audiology are working on trying to get this done but they need a MRI of his brain in ear area with they can not get it done because of the broken back stimulator he said not to worry these are being worked on at the present time I reviewed the cardiology notes and despite his 2 to three-vessel disease they feel like bypass is not appropriate at this time he does not complain of significant angina  Review of Systems   All other systems reviewed and are negative.         Objective     Physical Exam  Vitals and nursing note reviewed.   Constitutional:       Appearance: Normal appearance.      Comments: Overall appearance appears stable   HENT:      Head: Normocephalic and atraumatic.      Right Ear: Tympanic membrane, ear canal and external ear normal.      Left Ear: Tympanic membrane, ear canal and external ear normal.      Nose: Nose normal.      Mouth/Throat:      Mouth: Mucous membranes are moist.      Pharynx: Oropharynx is clear.   Eyes:      Extraocular Movements: Extraocular movements intact.      Conjunctiva/sclera: Conjunctivae normal.      Pupils: Pupils are equal, round, and reactive to light.   Cardiovascular:      Rate and Rhythm: Normal rate.      Pulses: Normal pulses.       Heart sounds: Normal heart sounds.   Pulmonary:      Effort: Pulmonary effort is normal.      Breath sounds: Normal breath sounds.   Abdominal:      General: Bowel sounds are normal.      Palpations: Abdomen is soft.   Musculoskeletal:      Cervical back: Normal range of motion and neck supple.   Skin:     General: Skin is warm and dry.   Neurological:      General: No focal deficit present.      Mental Status: He is alert and oriented to person, place, and time. Mental status is at baseline.   Psychiatric:         Mood and Affect: Mood normal.         Behavior: Behavior normal.         Thought Content: Thought content normal.         Judgment: Judgment normal.            Assessment and Plan     1. Diabetic polyneuropathy associated with diabetes mellitus due to underlying condition    2. Other chronic pain    3. Major depressive disorder, recurrent severe without psychotic features    4. Type 2 diabetes mellitus with other skin ulcer, with long-term current use of insulin  -     Diabetic Eye Screening Photo; Future; Expected date: 02/27/2024  -     Microalbumin/Creatinine Ratio, Urine    5. Coronary artery disease of native artery of native heart with stable angina pectoris    6. Primary hypertension    7. Familial hypercholesterolemia    8. Mixed hyperlipidemia    9. Aortic atherosclerosis    10. History of pancreatitis    11. Idiopathic chronic pancreatitis    12. Personal history of colonic polyps    13. Obstructive sleep apnea syndrome    14. Tobacco user    15. Bilateral edema of lower extremity        Stable follow up in 4 months I examined urine test today         Follow up in about 4 months (around 6/27/2024).

## 2024-02-28 ENCOUNTER — HOSPITAL ENCOUNTER (OUTPATIENT)
Dept: WOUND CARE | Facility: HOSPITAL | Age: 44
Discharge: HOME | End: 2024-02-28
Attending: NURSE PRACTITIONER
Payer: MEDICARE

## 2024-02-28 ENCOUNTER — PATIENT MESSAGE (OUTPATIENT)
Dept: GASTROENTEROLOGY | Facility: CLINIC | Age: 44
End: 2024-02-28
Payer: MEDICARE

## 2024-02-28 VITALS
DIASTOLIC BLOOD PRESSURE: 92 MMHG | TEMPERATURE: 99 F | SYSTOLIC BLOOD PRESSURE: 169 MMHG | OXYGEN SATURATION: 98 % | HEART RATE: 81 BPM

## 2024-02-28 DIAGNOSIS — E11.9 ENCOUNTER FOR DIABETIC FOOT EXAM: ICD-10-CM

## 2024-02-28 DIAGNOSIS — Z79.4 TYPE 2 DIABETES MELLITUS WITH OTHER SKIN ULCER, WITH LONG-TERM CURRENT USE OF INSULIN: ICD-10-CM

## 2024-02-28 DIAGNOSIS — B35.1 ONYCHOMYCOSIS OF MULTIPLE TOENAILS WITH TYPE 2 DIABETES MELLITUS: ICD-10-CM

## 2024-02-28 DIAGNOSIS — E11.622 TYPE 2 DIABETES MELLITUS WITH OTHER SKIN ULCER, WITH LONG-TERM CURRENT USE OF INSULIN: ICD-10-CM

## 2024-02-28 DIAGNOSIS — L60.2 OVERGROWN TOENAILS: ICD-10-CM

## 2024-02-28 DIAGNOSIS — Z72.0 TOBACCO USER: ICD-10-CM

## 2024-02-28 DIAGNOSIS — T23.322A: Primary | ICD-10-CM

## 2024-02-28 DIAGNOSIS — E66.9 CLASS 1 OBESITY WITH BODY MASS INDEX (BMI) OF 33.0 TO 33.9 IN ADULT, UNSPECIFIED OBESITY TYPE, UNSPECIFIED WHETHER SERIOUS COMORBIDITY PRESENT: ICD-10-CM

## 2024-02-28 DIAGNOSIS — E11.69 ONYCHOMYCOSIS OF MULTIPLE TOENAILS WITH TYPE 2 DIABETES MELLITUS: ICD-10-CM

## 2024-02-28 DIAGNOSIS — E13.42 DIABETIC POLYNEUROPATHY ASSOCIATED WITH OTHER SPECIFIED DIABETES MELLITUS: ICD-10-CM

## 2024-02-28 DIAGNOSIS — I10 PRIMARY HYPERTENSION: ICD-10-CM

## 2024-02-28 PROCEDURE — 11720 DEBRIDE NAIL 1-5: CPT | Mod: 59,,, | Performed by: NURSE PRACTITIONER

## 2024-02-28 PROCEDURE — 99211 OFF/OP EST MAY X REQ PHY/QHP: CPT

## 2024-02-28 PROCEDURE — 11719 TRIM NAIL(S) ANY NUMBER: CPT | Mod: ,,, | Performed by: NURSE PRACTITIONER

## 2024-02-28 PROCEDURE — 11719 TRIM NAIL(S) ANY NUMBER: CPT

## 2024-02-28 PROCEDURE — 27000999 HC MEDICAL RECORD PHOTO DOCUMENTATION

## 2024-02-28 PROCEDURE — 99214 OFFICE O/P EST MOD 30 MIN: CPT | Mod: 25,,, | Performed by: NURSE PRACTITIONER

## 2024-02-28 PROCEDURE — 11720 DEBRIDE NAIL 1-5: CPT
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
## 2024-03-06 ENCOUNTER — HOSPITAL ENCOUNTER (OUTPATIENT)
Dept: WOUND CARE | Facility: HOSPITAL | Age: 44
Discharge: HOME | End: 2024-03-06
Attending: NURSE PRACTITIONER
Payer: MEDICARE

## 2024-03-06 VITALS
RESPIRATION RATE: 18 BRPM | OXYGEN SATURATION: 98 % | TEMPERATURE: 98 F | HEART RATE: 74 BPM | DIASTOLIC BLOOD PRESSURE: 83 MMHG | SYSTOLIC BLOOD PRESSURE: 163 MMHG

## 2024-03-06 DIAGNOSIS — I10 PRIMARY HYPERTENSION: ICD-10-CM

## 2024-03-06 DIAGNOSIS — T23.322A: Primary | ICD-10-CM

## 2024-03-06 DIAGNOSIS — S81.801A WOUND OF RIGHT LEG, INITIAL ENCOUNTER: ICD-10-CM

## 2024-03-06 DIAGNOSIS — Z79.4 TYPE 2 DIABETES MELLITUS WITH OTHER SKIN ULCER, WITH LONG-TERM CURRENT USE OF INSULIN: ICD-10-CM

## 2024-03-06 DIAGNOSIS — E11.622 TYPE 2 DIABETES MELLITUS WITH OTHER SKIN ULCER, WITH LONG-TERM CURRENT USE OF INSULIN: ICD-10-CM

## 2024-03-06 DIAGNOSIS — Z72.0 TOBACCO USER: ICD-10-CM

## 2024-03-06 DIAGNOSIS — E13.42 DIABETIC POLYNEUROPATHY ASSOCIATED WITH OTHER SPECIFIED DIABETES MELLITUS: ICD-10-CM

## 2024-03-06 DIAGNOSIS — E66.9 CLASS 1 OBESITY WITH BODY MASS INDEX (BMI) OF 33.0 TO 33.9 IN ADULT, UNSPECIFIED OBESITY TYPE, UNSPECIFIED WHETHER SERIOUS COMORBIDITY PRESENT: ICD-10-CM

## 2024-03-06 PROCEDURE — 99211 OFF/OP EST MAY X REQ PHY/QHP: CPT

## 2024-03-06 PROCEDURE — 99213 OFFICE O/P EST LOW 20 MIN: CPT | Mod: ,,, | Performed by: NURSE PRACTITIONER

## 2024-03-06 PROCEDURE — 27000999 HC MEDICAL RECORD PHOTO DOCUMENTATION
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
## 2024-03-07 ENCOUNTER — OFFICE VISIT (OUTPATIENT)
Dept: GASTROENTEROLOGY | Facility: CLINIC | Age: 44
End: 2024-03-07
Payer: MEDICARE

## 2024-03-07 VITALS
WEIGHT: 217.38 LBS | TEMPERATURE: 98 F | HEART RATE: 70 BPM | OXYGEN SATURATION: 98 % | BODY MASS INDEX: 32.94 KG/M2 | RESPIRATION RATE: 16 BRPM | HEIGHT: 68 IN | SYSTOLIC BLOOD PRESSURE: 198 MMHG | DIASTOLIC BLOOD PRESSURE: 92 MMHG

## 2024-03-07 DIAGNOSIS — Z72.0 TOBACCO USER: ICD-10-CM

## 2024-03-07 DIAGNOSIS — K76.0 HEPATIC STEATOSIS: ICD-10-CM

## 2024-03-07 DIAGNOSIS — R79.89 ELEVATED LFTS: Primary | ICD-10-CM

## 2024-03-07 DIAGNOSIS — Z86.010 PERSONAL HISTORY OF COLONIC POLYPS: ICD-10-CM

## 2024-03-07 DIAGNOSIS — K21.00 GASTROESOPHAGEAL REFLUX DISEASE WITH ESOPHAGITIS WITHOUT HEMORRHAGE: ICD-10-CM

## 2024-03-07 PROCEDURE — 99215 OFFICE O/P EST HI 40 MIN: CPT | Mod: PBBFAC | Performed by: NURSE PRACTITIONER

## 2024-03-07 PROCEDURE — 99214 OFFICE O/P EST MOD 30 MIN: CPT | Mod: S$PBB,,, | Performed by: NURSE PRACTITIONER

## 2024-03-07 RX ORDER — SUCRALFATE 1 G/10ML
1 SUSPENSION ORAL
Qty: 1200 ML | Refills: 3 | Status: SHIPPED | OUTPATIENT
Start: 2024-03-07

## 2024-03-07 NOTE — ASSESSMENT & PLAN NOTE
See above  GERD lifestyle modifications  Reflux precautions  Limit NSAID use  Recommend tobacco cessation  Continue Nexium and sucralfate as directed  He underwent EGD with Dr. James June 7, 2021 with findings of LA grade A reflux esophagitis and otherwise unremarkable exam.   He underwent EGD November 20, 2023 with findings of normal esophagus, esophagogastric landmarks identified, erosive gastropathy with no bleeding or stigmata of recent bleeding, normal examined duodenum.  Pathology revealed mild chronic gastritis with lamina propria fibrosis, negative H pylori and negative dysplasia.  GES ordered

## 2024-03-07 NOTE — PROGRESS NOTES
Subjective:       Patient ID: Bharath Caballero is a 44 y.o. male.    Chief Complaint: Elevated Hepatic Enzymes    This 44-year-old  male with colon polyps, poorly controlled diabetes mellitus, CKD stage II, morbid obesity, familial hypertriglyceridemia with routine plasmapheresis in the past, hepatic steatosis, hypertension, CICI, and tobacco abuse presents unaccompanied for a follow-up visit.  He was initially seen August 18, 2020, referred for elevated LFTs at that time.  Per review of laboratory results, he has had persistent elevation of alkaline phosphatase and intermittent elevation of AST and ALT since January 2019.  Laboratory results August 13, 2020 revealed sodium 132, calcium 8.2, glucose 466, GFR 88, AST 63, , alkaline phosphatase 262, total protein 8.6, albumin 3.3, globulin 5.30, and otherwise unremarkable CMP; vitamin B12 773; cholesterol 230, triglycerides 2216, invalid HDL and LDL secondary to triglyceride level; negative acute viral hepatitis panel.  Hemoglobin A1c was 10.9% July 7, 2020.  Gastric emptying study was unremarkable July 17, 2020.  CT scan abdomen and pelvis with contrast June 1, 2020 revealed hepatomegaly with steatosis and otherwise unremarkable.     He reported intermittent right upper quadrant abdominal pain for the past several months described as sharp and radiating around to his back at times.  The pain was worsened with meal intake and he was unable to identify any specific food triggers.  He denied any relieving factors.  His appetite was poor and his weight was stable.  He had frequent symptoms of acid reflux and heartburn that was controlled on pantoprazole 40 mg daily.  He had frequent intermittent nausea with subsequent vomiting almost daily and with almost every meal (nonbilious and nonbloody).  He did not always have relief of symptoms with vomiting.  He denied odynophagia, dysphagia, or early satiety.  Bowel habits were described as formed stools once daily  without melena, hematochezia, fecal urgency, fecal incontinence, or pain with defecation.  He denied icterus, jaundice, pruritus, confusion, headaches, vision changes, cough, shortness of breath, chest pain, abdominal/lower extremity swelling, dark-colored urine, or pale-colored stools.     Laboratory results August 18, 2020 revealed iron level 52 and otherwise unremarkable iron profile and ferritin, ceruloplasmin, alpha 1 antitrypsin, F-actin, LKM, mitochondrial Ab; PT 11.6, INR 0.88; unremarkable CBC; negative SHAKIR and dsDNA; FibroSure testing revealed F1.  Abdominal ultrasound October 1, 2020 revealed enlarged liver with steatosis.     Laboratory results December 8, 2020 revealed sodium 135, CO2 16, glucose 272, AST 48, ALT 94, alkaline phosphatase 210, total protein 9.1, globulin 5.3, and otherwise unremarkable CMP; hemoglobin A1c 9.9%; vitamin D 12.9; cholesterol 208, HDL 20, triglycerides >1420; TSH 1.3122; and unremarkable CBC.     He was seen for a follow-up visit December 28, 2020.  He reported persistent intermittent right upper quadrant abdominal pain for the past several months described as sharp and radiating around to his back at times.  The pain was worsened with meal intake and he was unable to identify any specific food triggers.  He denied any relieving factors.  His appetite was fair and his weight was stable.  He had frequent symptoms of acid reflux and heartburn that was somewhat controlled on pantoprazole 40 mg daily.  He had frequent intermittent nausea with subsequent vomiting a few times per week (nonbilious and nonbloody).  He did not always have relief of symptoms with vomiting.  He denied odynophagia, dysphagia, or early satiety.  Bowel habits were described as formed stools once daily without melena, hematochezia, fecal urgency, fecal incontinence, or pain with defecation.  He denied icterus, jaundice, pruritus, confusion, headaches, vision changes, cough, shortness of breath, chest pain,  abdominal/lower extremity swelling, dark-colored urine, or pale-colored stools.     He underwent EGD with Dr. James June 7, 2021 with findings of LA grade A reflux esophagitis and otherwise unremarkable exam.  Laboratory results June 16, 2021 revealed CO2 18, glucose 106, AST 90, , alkaline phosphatase 220, total protein 8.7, globulin 5.0, and otherwise unremarkable CMP; hemoglobin A1c 9.9%; vitamin D 28.1; cholesterol 254, triglycerides 1922; WBC 11.9 and otherwise unremarkable CBC.     He was seen for follow-up visit June 28, 2021.  He reported persistent intermittent RUQ abdominal burning and pain with eating.  He denied radiation on pain.  He had occasional nausea without vomiting.  He had frequent acid reflux and pyrosis.  His appetite was good and his weight was stable.  He denied nocturnal symptoms, fever, chills, odynophagia, dysphagia, belching, bloating, or early satiety.  Bowel habits were described as formed stools once daily without melena, hematochezia, fecal urgency, fecal incontinence, or pain with defecation.     FibroScan July 26, 2021 revealed S3 F0/F1.  Abdominal ultrasound limited August 4, 2021 revealed hepatomegaly with fatty infiltration of the liver and limited study.  CT scan abdomen and pelvis without contrast October 25, 2021 revealed hepatomegaly and otherwise unremarkable.  Laboratory results December 20, 2021 revealed potassium 3.2, glucose 115, AST 38, ALT 97, alkaline phosphatase 294, total protein 8.8, and otherwise unremarkable CMP; WBC 13.4 and otherwise unremarkable CBC.     He underwent colonoscopy October 8, 2021 with findings of adenomatous polyp in the transverse colon and rectum with a recommended recall of 5 years.     He was seen for a follow-up visit February 7, 2022.  He reported persistent intermittent right upper quadrant abdominal pain described as sharp and radiating around to the right side of his back.  The pain was triggered with eating and drinking and  he was unable to identify specific food triggers.  He continued to take pantoprazole 40 mg daily and had to occasionally take OTC Tums as well.  He had frequent acid reflux and regurgitation of acid.  He reported occasional vomiting secondary to intensity of abdominal pain.  His appetite was good and his weight was stable.  He denied fever, chills, odynophagia, dysphagia, or early satiety.  Bowel habits remained unchanged and was described as formed stools once daily without melena, hematochezia, fecal urgency, fecal incontinence, or pain with defecation.     Abdominal ultrasound February 14, 2022 revealed hepatomegaly and mild to moderate diffuse hepatic steatosis.  No significant change identified compared to the limited abdominal ultrasound 8/4/2021.     He was seen for a follow-up visit August 18, 2022.  He reported minimal change in symptoms from previous office visit.  He was no longer taking pantoprazole or famotidine secondary to minimal relief of symptoms.     FibroScan August 29, 2022 revealed F0-1, S1.     Abdominal ultrasound December 1, 2022 revealed hepatomegaly, hepatic steatosis, no other significant abnormalities identified, and no significant change.      He was seen for a follow-up visit February 28, 2023 and reported minimal change in symptoms from previous office visit.  He reported some relief of abdominal pain with sucralfate 1 g before meals and at bedtime.  He reported that his Repatha was stopped secondary to insurance coverage and he was awaiting PA approval.     FibroScan March 14, 2023 revealed F2, S0.     Abdominal ultrasound May 29, 2023 revealed hepatomegaly, coarse echotexture of the liver parenchyma with changes of hepatic steatosis, sludge in the gallbladder, and no other significant change.       He was seen for a follow-up visit September 7, 2023.  He reported hematemesis with almost every meal over the past 2 weeks.  He was taking Nexium 40 mg daily and sucralfate 1 g 4 times  daily.  His appetite was good but he was afraid to eat due to vomiting and abdominal pain.  He had nausea that preceded vomiting.  He was unable to identify specific food triggers or relieving factors.  He had a documented weight loss of 6 lb since his last office visit in GI clinic February 28, 2023.  He reported that his weight fluctuated over time.  He denied nocturnal symptoms, fever, chills, odynophagia, dysphagia, acid reflux, pyrosis, or early satiety.  He reported minimal change in regards to symptoms of right upper quadrant and left upper quadrant abdominal pain.  Bowel habits remained unchanged and described as formed stools 1-2 times daily without melena, hematochezia, fecal urgency, fecal incontinence, or pain with defecation.    FibroScan September 29, 2023 revealed S0, F0-1.    Abdominal ultrasound October 19, 2023 revealed hepatomegaly with nonspecific slightly heterogeneous liver echogenicity.    He underwent EGD November 20, 2023 with findings of normal esophagus, esophagogastric landmarks identified, erosive gastropathy with no bleeding or stigmata of recent bleeding, normal examined duodenum.  Pathology revealed mild chronic gastritis with lamina propria fibrosis, negative H pylori and negative dysplasia.    Today, he presents for a follow-up visit.  He reports resolution of hematemesis since his last office visit with me.  He continues to take Nexium 40 mg daily and sucralfate 1 g 4 times daily.  His appetite is fair and he is afraid to eat due to abdominal pain and vomiting.  This has remained unchanged from previous office visit.  He is unable to identify specific food triggers or relieving factors and reports not eating every day.  His diabetes is not currently controlled.  His weight continues to fluctuate over time.  He denies fever, chills, odynophagia, dysphagia, acid reflux, pyrosis, or early satiety.  Bowel habits remain unchanged and described as formed stools 1-2 times daily without  melena, hematochezia, fecal urgency, fecal incontinence, or pain with defecation.      He takes aspirin 81 mg daily.  He smokes 4 cigarettes daily and denies alcohol use.  He denies a family history of IBD, colon polyps, or colon cancer.    Review of patient's allergies indicates:  No Known Allergies    Past Medical History:   Diagnosis Date    Acquired perforating dermatosis 8/30/2023    Aortic atherosclerosis 1/24/2023    Bilateral edema of lower extremity 2/6/2023    Bladder outflow obstruction 6/20/2022    Blurred vision, right eye 10/19/2022    BMI 34.0-34.9,adult 6/20/2022    BPH (benign prostatic hyperplasia)     Chronic liver failure without hepatic coma 4/25/2023    Chronic pain 10/25/2022    Chronic pain syndrome 5/12/2022    Chronic pancreatitis 10/28/2017    Deafness 10/3/2023    Diabetes     Diabetic polyneuropathy 6/20/2022    Elevated LFTs 8/18/2022    GERD (gastroesophageal reflux disease)     Hand paresthesia 6/20/2022    Hepatic steatosis     History of continuous positive airway pressure (CPAP) therapy at home     History of pancreatitis 6/20/2022    Hypertension     Hypertriglyceridemia     Kidney disorder     Leg pain     Mixed hyperlipidemia 10/28/2017    Mononeuritis multiplex 6/20/2022    Morbid obesity     Neuropathy     Nocturia 6/20/2022    OA (osteoarthritis)     Obstructive sleep apnea syndrome 6/20/2022    Onychomycosis of multiple toenails with type 2 diabetes mellitus 10/28/2017    Open wound of finger of right hand 10/20/2023    CICI (obstructive sleep apnea)     Painful diabetic neuropathy 5/12/2022    Personal history of colonic polyps 8/18/2022    Polyneuropathy     Primary hypertension 10/28/2017    Recurrent pancreatitis     Renal insufficiency     Tobacco abuse     Tobacco user 6/20/2022    Type 2 diabetes mellitus with skin complication, with long-term current use of insulin 2/6/2023     Past Surgical History:   Procedure Laterality Date    ANGIOGRAM, CORONARY, WITH LEFT HEART  CATHETERIZATION N/A 4/10/2023    Procedure: Angiogram, Coronary, with Left Heart Cath;  Surgeon: Jaswant Pugh MD;  Location: [facility] CATH LAB;  Service: Cardiology;  Laterality: N/A;    APPENDECTOMY      ARTERIOVENOUS ANASTOMOSIS, OPEN, UPPER ARM BASILLIC VEIN TRANSPOSITION N/A 05/07/2018    EGD, WITH CLOSED BIOPSY N/A 11/20/2023    Procedure: EGD, WITH CLOSED BIOPSY;  Surgeon: Christina James MD;  Location: [facility] ENDOSCOPY;  Service: Gastroenterology;  Laterality: N/A;    ESOPHAGOGASTRODUODENOSCOPY N/A 06/07/2021    EXCISION OF ARTERIOVENOUS FISTULA N/A 06/01/2018    FRACTIONAL FLOW RESERVE (FFR), CORONARY  4/10/2023    Procedure: Fractional Flow [place] (FFR), Coronary;  Surgeon: Jaswant Pugh MD;  Location: [facility] CATH LAB;  Service: Cardiology;;    HERNIA REPAIR      INSPECTION OF UPPER INTESTINAL TRACT N/A 06/07/2021    PHERESIS OF PLASMA N/A 07/13/2018    PHERESIS OF PLASMA N/A 05/25/2018    SPINAL CORD STIMULATOR REMOVAL      TRIAL OF SPINAL CORD NERVE STIMULATOR N/A 5/12/2022    Procedure: TRIAL, NEUROSTIMULATOR, SPINAL CORD;  Surgeon: Jay Pozo MD;  Location: [facility] OR;  Service: Neurosurgery;  Laterality: N/A;    UPPER GI N/A 06/07/2021     Family History:   family history includes Obesity in his father.    Social History:    reports that he has been smoking cigarettes. He started smoking about 27 years ago. He has a 13.4 pack-year smoking history. He has never used smokeless tobacco. He reports that he does not currently use alcohol. He reports that he does not currently use drugs.    Review of Systems  Negative except as noted in the HPI.      Objective:      Physical Exam  Constitutional:       Appearance: Normal appearance.      Comments: Ambulates with cane   HENT:      Head: Normocephalic.      Mouth/Throat:      Mouth: Mucous membranes are moist.   Eyes:      Extraocular Movements: Extraocular movements intact.      Conjunctiva/sclera: Conjunctivae normal.      Pupils: Pupils are  equal, round, and reactive to light.   Cardiovascular:      Rate and Rhythm: Normal rate and regular rhythm.      Pulses: Normal pulses.      Heart sounds: Normal heart sounds.   Pulmonary:      Effort: Pulmonary effort is normal.      Breath sounds: Normal breath sounds.   Abdominal:      General: Bowel sounds are normal.      Palpations: Abdomen is soft.      Comments: Tenderness noted to epigastric and RUQ abdominal region with deep palpation, no rebound or guarding   Musculoskeletal:         General: Normal range of motion.      Cervical back: Normal range of motion and neck supple.   Skin:     General: Skin is warm and dry.   Neurological:      General: No focal deficit present.      Mental Status: He is alert and oriented to person, place, and time.   Psychiatric:         Mood and Affect: Mood normal.         Behavior: Behavior normal.         Thought Content: Thought content normal.         Judgment: Judgment normal.         Home Medications:     Current Outpatient Medications   Medication Sig    amitriptyline (ELAVIL) 25 MG tablet Take 1 tablet (25 mg total) by mouth nightly as needed for Insomnia.    aspirin (ECOTRIN) 81 MG EC tablet Take 81 mg by mouth once daily.    atorvastatin (LIPITOR) 40 MG tablet Take 1 tablet (40 mg total) by mouth once daily.    carvediloL (COREG) 25 MG tablet Take 1 tablet (25 mg total) by mouth 2 (two) times daily with meals.    clonazePAM (KLONOPIN) 1 MG tablet TAKE ONE TABLET BY MOUTH TWICE DAILY    cloNIDine (CATAPRES) 0.2 MG tablet TAKE ONE TABLET BY MOUTH THREE TIMES DAILY    CREON 36,000-114,000- 180,000 unit CpDR TAKE ONE CAPSULE THREE TIMES DAILY    EScitalopram oxalate (LEXAPRO) 20 MG tablet Take 20 mg by mouth once daily.    esomeprazole (NEXIUM) 40 MG capsule Take 1 capsule (40 mg total) by mouth before breakfast.    evolocumab (REPATHA SURECLICK) 140 mg/mL PnIj Inject 1 mL (140 mg total) into the skin every 14 (fourteen) days.    FARXIGA 5 mg Tab tablet TAKE ONE  "TABLET BY MOUTH EVERY DAY    gabapentin (NEURONTIN) 400 MG capsule Two tabs in AM, one in afternoon    gabapentin (NEURONTIN) 800 MG tablet TAKE ONE TABLET BY MOUTH IN THE EVENING    HUMALOG U-100 INSULIN 100 unit/mL injection INJECT 25 UNITS SUBCUTANEOUSLY BEFORE MEALS THREE TIMES DAILY    HYDROmorphone (DILAUDID) 8 MG tablet TAKE ONE TABLET BY MOUTH EVERY 6 HOURS AS NEEDED FOR PAIN    icosapent ethyL (VASCEPA) 1 gram Cap Take 2 capsules (2 g total) by mouth 2 (two) times daily.    isosorbide mononitrate (IMDUR) 120 MG 24 hr tablet Take 1 tablet (120 mg total) by mouth once daily.    losartan (COZAAR) 50 MG tablet Take 1 tablet (50 mg total) by mouth once daily.    morphine (MS CONTIN) 100 MG 12 hr tablet TAKE ONE TABLET BY MOUTH THREE TIMES DAILY    NIFEdipine (PROCARDIA-XL) 60 MG (OSM) 24 hr tablet Take 1 tablet (60 mg total) by mouth 2 (two) times a day.    nitroGLYCERIN (NITROSTAT) 0.3 MG SL tablet Place 1 tablet (0.3 mg total) under the tongue every 5 (five) minutes as needed for Chest pain (go to er after 3 doses).    OXcarbazepine (TRILEPTAL) 600 MG Tab Take 1 tablet (600 mg total) by mouth 2 (two) times daily.    potassium chloride SA (K-DUR,KLOR-CON) 20 MEQ tablet Take 1 tablet (20 mEq total) by mouth 2 (two) times daily.    spironolactone (ALDACTONE) 50 MG tablet Take 1 tablet (50 mg total) by mouth once daily.    sucralfate (CARAFATE) 100 mg/mL suspension Take 10 mLs (1 g total) by mouth 4 (four) times daily before meals and nightly.    SURE COMFORT INSULIN SYRINGE 0.5 mL 31 gauge x 5/16" Syrg USE ONE SYRINGE THREE TIMES DAILY    tamsulosin (FLOMAX) 0.4 mg Cap TAKE ONE CAPSULE BY MOUTH EVERY DAY    torsemide (DEMADEX) 20 MG Tab Take 1 tablet (20 mg total) by mouth once daily.    TOUJEO SOLOSTAR U-300 INSULIN 300 unit/mL (1.5 mL) InPn pen Inject 35 Units into the skin 2 (two) times a day.     Current Facility-Administered Medications   Medication Frequency    triamcinolone acetonide 0.1% ointment BID "     Laboratory Results:     Recent Results (from the past 4032 hour(s))   Comprehensive Metabolic Panel    Collection Time: 10/03/23  3:28 PM   Result Value Ref Range    Sodium Level 135 (L) 136 - 145 mmol/L    Potassium Level 3.3 (L) 3.5 - 5.1 mmol/L    Chloride 98 98 - 107 mmol/L    Carbon Dioxide 26 22 - 29 mmol/L    Glucose Level 232 (H) 74 - 100 mg/dL    Blood Urea Nitrogen 9.2 8.9 - 20.6 mg/dL    Creatinine 0.86 0.73 - 1.18 mg/dL    Calcium Level Total 9.1 8.4 - 10.2 mg/dL    Protein Total 7.9 6.4 - 8.3 gm/dL    Albumin Level 3.5 3.5 - 5.0 g/dL    Globulin 4.4 (H) 2.4 - 3.5 gm/dL    Albumin/Globulin Ratio 0.8 (L) 1.1 - 2.0 ratio    Bilirubin Total 0.2 <=1.5 mg/dL    Alkaline Phosphatase 201 (H) 40 - 150 unit/L    Alanine Aminotransferase 38 0 - 55 unit/L    Aspartate Aminotransferase 34 5 - 34 unit/L    eGFR >60 mls/min/1.73/m2   Lipid Panel    Collection Time: 10/03/23  3:28 PM   Result Value Ref Range    Cholesterol Total 198 <=200 mg/dL    HDL Cholesterol 24 (L) 35 - 60 mg/dL    Triglyceride 649 (H) 34 - 140 mg/dL    Cholesterol/HDL Ratio 8 (H) 0 - 5    Very Low Density Lipoprotein 130     LDL Cholesterol 44.00 (L) 50.00 - 140.00 mg/dL   CBC with Differential    Collection Time: 10/03/23  3:28 PM   Result Value Ref Range    WBC 14.77 (H) 4.50 - 11.50 x10(3)/mcL    RBC 4.83 4.70 - 6.10 x10(6)/mcL    Hgb 14.9 14.0 - 18.0 g/dL    Hct 42.1 42.0 - 52.0 %    MCV 87.2 80.0 - 94.0 fL    MCH 30.8 27.0 - 31.0 pg    MCHC 35.4 33.0 - 36.0 g/dL    RDW 11.8 11.5 - 17.0 %    Platelet 311 130 - 400 x10(3)/mcL    MPV 9.3 7.4 - 10.4 fL    Neut % 66.4 %    Lymph % 24.8 %    Mono % 6.6 %    Eos % 1.6 %    Basophil % 0.3 %    Lymph # 3.66 0.6 - 4.6 x10(3)/mcL    Neut # 9.79 (H) 2.1 - 9.2 x10(3)/mcL    Mono # 0.98 0.1 - 1.3 x10(3)/mcL    Eos # 0.24 0 - 0.9 x10(3)/mcL    Baso # 0.05 <=0.2 x10(3)/mcL    IG# 0.05 (H) 0 - 0.04 x10(3)/mcL    IG% 0.3 %    NRBC% 0.0 %   POCT glucose    Collection Time: 11/20/23  9:28 AM   Result Value  "Ref Range    POCT Glucose 60 (L) 70 - 110 mg/dL   POCT glucose    Collection Time: 11/20/23  9:54 AM   Result Value Ref Range    POCT Glucose 64 (L) 70 - 110 mg/dL   Specimen to Pathology    Collection Time: 11/20/23 10:42 AM   Result Value Ref Range    Case Report       [facility] Surgical Pathology                            Case: [id number]                                 Authorizing Provider:  Christina James MD   Collected:           11/20/2023 10:42 AM          Ordering Location:     [facility] -       Received:            11/21/2023 07:23 AM                                 Endoscopy                                                                    Pathologist:           Deborah Erazo MD                                                        Specimen:    Stomach, Stomach Biopsy- Rule out H. Pylori                                                Final Diagnosis         Gastric biopsy rule out H.pylori:  - Mild chronic gastritis with lamina propria fibrosis.  - Diff Quik stain is negative for H.pylori organisms.  - Negative for dysplasia.          Comments       Appropriately stained controls reviewed.        Clinical Information       Procedure:  EGD, WITH CLOSED BIOPSY  Pre-op Diagnosis:  K21.00 - Gastroesophageal reflux disease with esophagitis without hemorrhage [ICD-10-CM]  Post-op Diagnosis:  K21.00 - Gastroesophageal reflux disease with esophagitis without hemorrhage [ICD-10-CM]      Microscopic Description       A microscopic examination was performed and the diagnosis reflects the findings.          Gross Description       1. Stomach, Stomach Biopsy- Rule out H. Pylori:   Received in formalin are multiple fragments of tan soft tissue ranging from 1 to 2 mm. Entirely submitted in a single cassette.      Report Footnotes       Unless the case is a "gross only" or additional testing only, the final diagnosis for each specimen is based on a microscopic examination of appropriate tissue " sections.     Protein/Creatinine Ratio, Urine    Collection Time: 01/16/24 10:21 AM   Result Value Ref Range    Urine Protein Level 197.9 mg/dL    Urine Creatinine 68.6 63.0 - 166.0 mg/dL    Urine Protein/Creatinine Ratio 2.9    Urinalysis, Reflex to Urine Culture    Collection Time: 01/16/24 10:21 AM    Specimen: Urine   Result Value Ref Range    Color, UA Light-Yellow Yellow, Light-Yellow, Dark Yellow, Celsa, Straw    Appearance, UA Clear Clear    Specific Gravity, UA 1.021 1.005 - 1.030    pH, UA 6.0 5.0 - 8.5    Protein, UA 2+ (A) Negative    Glucose, UA 4+ (A) Negative, Normal    Ketones, UA Negative Negative    Blood, UA Trace (A) Negative    Bilirubin, UA Negative Negative    Urobilinogen, UA Normal 0.2, 1.0, Normal    Nitrites, UA Negative Negative    Leukocyte Esterase, UA Negative Negative    WBC, UA 0-5 None Seen, 0-2, 3-5, 0-5 /HPF    Bacteria, UA Trace (A) None Seen /HPF    Squamous Epithelial Cells, UA None Seen None Seen /HPF    Hyaline Casts, UA None Seen None Seen /lpf    RBC, UA 0-5 None Seen, 0-2, 3-5, 0-5 /HPF   Comprehensive Metabolic Panel    Collection Time: 01/16/24 10:27 AM   Result Value Ref Range    Sodium Level 137 136 - 145 mmol/L    Potassium Level 3.3 (L) 3.5 - 5.1 mmol/L    Chloride 99 98 - 107 mmol/L    Carbon Dioxide 26 22 - 29 mmol/L    Glucose Level 327 (H) 74 - 100 mg/dL    Blood Urea Nitrogen 8.2 (L) 8.9 - 20.6 mg/dL    Creatinine 0.82 0.73 - 1.18 mg/dL    Calcium Level Total 9.1 8.4 - 10.2 mg/dL    Protein Total 8.1 6.4 - 8.3 gm/dL    Albumin Level 3.6 3.5 - 5.0 g/dL    Globulin 4.5 (H) 2.4 - 3.5 gm/dL    Albumin/Globulin Ratio 0.8 (L) 1.1 - 2.0 ratio    Bilirubin Total 0.5 <=1.5 mg/dL    Alkaline Phosphatase 230 (H) 40 - 150 unit/L    Alanine Aminotransferase 44 0 - 55 unit/L    Aspartate Aminotransferase 24 5 - 34 unit/L    eGFR >60 mls/min/1.73/m2   Hemoglobin A1C    Collection Time: 01/16/24 10:27 AM   Result Value Ref Range    Hemoglobin A1c 9.5 (H) <=7.0 %    Estimated  Average Glucose 226.0 mg/dL   Drug Screen, Urine    Collection Time: 02/27/24  3:47 PM   Result Value Ref Range    Amphetamines, Urine Negative Negative    Barbituates, Urine Negative Negative    Benzodiazepine, Urine Negative Negative    Cannabinoids, Urine Negative Negative    Cocaine, Urine Negative Negative    Fentanyl, Urine Negative Negative    MDMA, Urine Negative Negative    Opiates, Urine Positive (A) Negative    Phencyclidine, Urine Negative Negative    pH, Urine 6.0 3.0 - 11.0    Specific Gravity, Urine Auto 1.021 1.001 - 1.035   Microalbumin/Creatinine Ratio, Urine    Collection Time: 02/27/24  3:47 PM   Result Value Ref Range    Urine Microalbumin 849.1 (H) <=30.0 ug/ml    Urine Creatinine 69.1 63.0 - 166.0 mg/dL    Microalbumin Creatinine Ratio 1,228.8 (H) 0.0 - 30.0 mg/gm Cr     Imaging Results:     Narrative & Impression  EXAMINATION:  US ABDOMEN LIMITED     CLINICAL HISTORY:  Other specified abnormal findings of blood chemistry     TECHNIQUE:  Gray-scale and color Doppler ultrasound images of the abdomen.     COMPARISON:  Ultrasound 05/29/2023     CT abdomen/pelvis 10/19/2023     FINDINGS:  Enlarged liver with slightly heterogeneous overall echogenicity.  Main portal vein patent with normal flow direction.     Small area of gallbladder sludge.  No gallbladder wall thickening or ascites.  Normal CBD caliber.     Pancreas largely obscured.  Normal caliber of the superior IVC.  No hydronephrosis or defined calcification in the right kidney.     Measurements:     - Liver: 20.6 cm     - CBD diameter: 4 mm     - Right kidney: 10.4 cm in length     Impression:     Hepatomegaly with nonspecific slightly heterogeneous liver echogenicity.      Electronically signed by: Jose Alejandro Ibarra  Date:                                            10/19/2023  Time:                                           14:00    Assessment/Plan:     Problem List Items Addressed This Visit          GI    Elevated LFTs - Primary     History  of poorly controlled diabetes mellitus, CKD stage II, morbid obesity, familial hypertriglyceridemia with routine plasmapheresis in the past, hepatic steatosis, hypertension, CICI, and tobacco abuse.   Per review of laboratory results, he has had persistent elevation of alkaline phosphatase and intermittent elevation of AST and ALT since January 2019.   Laboratory results August 13, 2020 revealed sodium 132, calcium 8.2, glucose 466, GFR 88, AST 63, , alkaline phosphatase 262, total protein 8.6, albumin 3.3, globulin 5.30, and otherwise unremarkable CMP; vitamin B12 773; cholesterol 230, triglycerides 2216, invalid HDL and LDL secondary to triglyceride level; negative acute viral hepatitis panel.   Hemoglobin A1c was 10.9% July 7, 2020.   Gastric emptying study was unremarkable July 17, 2020.   CT scan abdomen and pelvis with contrast June 1, 2020 revealed hepatomegaly with steatosis and otherwise unremarkable.   Laboratory results August 18, 2020 revealed iron level 52 and otherwise unremarkable iron profile and ferritin, ceruloplasmin, alpha 1 antitrypsin, F-actin, LKM, mitochondrial Ab; PT 11.6, INR 0.88; unremarkable CBC; negative SHAKIR and dsDNA; FibroSure testing revealed F1.   Abdominal ultrasound October 1, 2020 revealed enlarged liver with steatosis.  Laboratory results December 8, 2020 revealed sodium 135, CO2 16, glucose 272, AST 48, ALT 94, alkaline phosphatase 210, total protein 9.1, globulin 5.3, and otherwise unremarkable CMP; hemoglobin A1c 9.9%; vitamin D 12.9; cholesterol 208, HDL 20, triglycerides >1420; TSH 1.3122; and unremarkable CBC.  Laboratory results June 16, 2021 revealed CO2 18, glucose 106, AST 90, , alkaline phosphatase 220, total protein 8.7, globulin 5.0, and otherwise unremarkable CMP; hemoglobin A1c 9.9%; vitamin D 28.1; cholesterol 254, triglycerides 1922; WBC 11.9 and otherwise unremarkable CBC.  FibroScan July 26, 2021 revealed S3 F0/F1.   Abdominal ultrasound limited  August 4, 2021 revealed hepatomegaly with fatty infiltration of the liver and limited study.   CT scan abdomen and pelvis without contrast October 25, 2021 revealed hepatomegaly and otherwise unremarkable.   Laboratory results December 20, 2021 revealed potassium 3.2, glucose 115, AST 38, ALT 97, alkaline phosphatase 294, total protein 8.8, and otherwise unremarkable CMP; WBC 13.4 and otherwise unremarkable CBC.  Abdominal ultrasound February 14, 2022 revealed hepatomegaly and mild to moderate diffuse hepatic steatosis.  No significant change identified compared to the limited abdominal ultrasound 8/4/2021.  FibroScan August 29, 2022 revealed F0-1, S1.  Abdominal ultrasound December 1, 2022 revealed hepatomegaly, hepatic steatosis, no other significant abnormalities identified, and no significant change.   FibroScan March 14, 2023 revealed F2, S0.  Abdominal ultrasound May 29, 2023 revealed hepatomegaly, coarse echotexture of the liver parenchyma with changes of hepatic steatosis, sludge in the gallbladder, and no other significant change.   FibroScan September 29, 2023 revealed S0, F0-1.  Abdominal ultrasound October 19, 2023 revealed hepatomegaly with nonspecific slightly heterogeneous liver echogenicity.  He underwent EGD November 20, 2023 with findings of normal esophagus, esophagogastric landmarks identified, erosive gastropathy with no bleeding or stigmata of recent bleeding, normal examined duodenum.  Pathology revealed mild chronic gastritis with lamina propria fibrosis, negative H pylori and negative dysplasia.  CBC, CMP, PT/INR in 1 month  Abdominal ultrasound in 1 month  Patient has significant risk factors for MASLD  Lifestyle and dietary modifications provided  Recommend weight loss  Recommend good control of comorbidities  Recommend tobacco cessation  Recommend optimization of diabetes mellitus  Call with updates  Follow-up 6 months clinic visit with NP in a 45 minute slot         Relevant Orders     CBC Auto Differential    Comprehensive Metabolic Panel    Protime-INR    US Abdomen Limited    Hepatic steatosis     See above         Relevant Orders    CBC Auto Differential    Comprehensive Metabolic Panel    Protime-INR    US Abdomen Limited    Gastroesophageal reflux disease with esophagitis without hemorrhage     See above  GERD lifestyle modifications  Reflux precautions  Limit NSAID use  Recommend tobacco cessation  Continue Nexium and sucralfate as directed  He underwent EGD with Dr. James June 7, 2021 with findings of LA grade A reflux esophagitis and otherwise unremarkable exam.   He underwent EGD November 20, 2023 with findings of normal esophagus, esophagogastric landmarks identified, erosive gastropathy with no bleeding or stigmata of recent bleeding, normal examined duodenum.  Pathology revealed mild chronic gastritis with lamina propria fibrosis, negative H pylori and negative dysplasia.  GES ordered         Relevant Medications    sucralfate (CARAFATE) 100 mg/mL suspension    Other Relevant Orders    CBC Auto Differential    Comprehensive Metabolic Panel    Protime-INR    US Abdomen Limited    NM Gastric Emptying    Personal history of colonic polyps     He underwent colonoscopy October 8, 2021 with findings of adenomatous polyp in the transverse colon and rectum with a recommended recall of 5 years.             Other    Tobacco user     Recommend tobacco cessation.         Relevant Orders    Ambulatory referral/consult to Smoking Cessation Program

## 2024-03-07 NOTE — ASSESSMENT & PLAN NOTE
History of poorly controlled diabetes mellitus, CKD stage II, morbid obesity, familial hypertriglyceridemia with routine plasmapheresis in the past, hepatic steatosis, hypertension, CICI, and tobacco abuse.   Per review of laboratory results, he has had persistent elevation of alkaline phosphatase and intermittent elevation of AST and ALT since January 2019.   Laboratory results August 13, 2020 revealed sodium 132, calcium 8.2, glucose 466, GFR 88, AST 63, , alkaline phosphatase 262, total protein 8.6, albumin 3.3, globulin 5.30, and otherwise unremarkable CMP; vitamin B12 773; cholesterol 230, triglycerides 2216, invalid HDL and LDL secondary to triglyceride level; negative acute viral hepatitis panel.   Hemoglobin A1c was 10.9% July 7, 2020.   Gastric emptying study was unremarkable July 17, 2020.   CT scan abdomen and pelvis with contrast June 1, 2020 revealed hepatomegaly with steatosis and otherwise unremarkable.   Laboratory results August 18, 2020 revealed iron level 52 and otherwise unremarkable iron profile and ferritin, ceruloplasmin, alpha 1 antitrypsin, F-actin, LKM, mitochondrial Ab; PT 11.6, INR 0.88; unremarkable CBC; negative SHAKIR and dsDNA; FibroSure testing revealed F1.   Abdominal ultrasound October 1, 2020 revealed enlarged liver with steatosis.  Laboratory results December 8, 2020 revealed sodium 135, CO2 16, glucose 272, AST 48, ALT 94, alkaline phosphatase 210, total protein 9.1, globulin 5.3, and otherwise unremarkable CMP; hemoglobin A1c 9.9%; vitamin D 12.9; cholesterol 208, HDL 20, triglycerides >1420; TSH 1.3122; and unremarkable CBC.  Laboratory results June 16, 2021 revealed CO2 18, glucose 106, AST 90, , alkaline phosphatase 220, total protein 8.7, globulin 5.0, and otherwise unremarkable CMP; hemoglobin A1c 9.9%; vitamin D 28.1; cholesterol 254, triglycerides 1922; WBC 11.9 and otherwise unremarkable CBC.  FibroScan July 26, 2021 revealed S3 F0/F1.   Abdominal ultrasound  limited August 4, 2021 revealed hepatomegaly with fatty infiltration of the liver and limited study.   CT scan abdomen and pelvis without contrast October 25, 2021 revealed hepatomegaly and otherwise unremarkable.   Laboratory results December 20, 2021 revealed potassium 3.2, glucose 115, AST 38, ALT 97, alkaline phosphatase 294, total protein 8.8, and otherwise unremarkable CMP; WBC 13.4 and otherwise unremarkable CBC.  Abdominal ultrasound February 14, 2022 revealed hepatomegaly and mild to moderate diffuse hepatic steatosis.  No significant change identified compared to the limited abdominal ultrasound 8/4/2021.  FibroScan August 29, 2022 revealed F0-1, S1.  Abdominal ultrasound December 1, 2022 revealed hepatomegaly, hepatic steatosis, no other significant abnormalities identified, and no significant change.   FibroScan March 14, 2023 revealed F2, S0.  Abdominal ultrasound May 29, 2023 revealed hepatomegaly, coarse echotexture of the liver parenchyma with changes of hepatic steatosis, sludge in the gallbladder, and no other significant change.   FibroScan September 29, 2023 revealed S0, F0-1.  Abdominal ultrasound October 19, 2023 revealed hepatomegaly with nonspecific slightly heterogeneous liver echogenicity.  He underwent EGD November 20, 2023 with findings of normal esophagus, esophagogastric landmarks identified, erosive gastropathy with no bleeding or stigmata of recent bleeding, normal examined duodenum.  Pathology revealed mild chronic gastritis with lamina propria fibrosis, negative H pylori and negative dysplasia.  CBC, CMP, PT/INR in 1 month  Abdominal ultrasound in 1 month  Patient has significant risk factors for MASLD  Lifestyle and dietary modifications provided  Recommend weight loss  Recommend good control of comorbidities  Recommend tobacco cessation  Recommend optimization of diabetes mellitus  Call with updates  Follow-up 6 months clinic visit with NP in a 45 minute slot

## 2024-03-13 ENCOUNTER — HOSPITAL ENCOUNTER (OUTPATIENT)
Dept: WOUND CARE | Facility: HOSPITAL | Age: 44
Discharge: HOME | End: 2024-03-13
Attending: NURSE PRACTITIONER
Payer: MEDICARE

## 2024-03-13 VITALS
SYSTOLIC BLOOD PRESSURE: 159 MMHG | TEMPERATURE: 99 F | DIASTOLIC BLOOD PRESSURE: 79 MMHG | OXYGEN SATURATION: 97 % | HEART RATE: 77 BPM

## 2024-03-13 DIAGNOSIS — S81.801D WOUND OF RIGHT LEG, SUBSEQUENT ENCOUNTER: ICD-10-CM

## 2024-03-13 DIAGNOSIS — I10 PRIMARY HYPERTENSION: ICD-10-CM

## 2024-03-13 DIAGNOSIS — E11.622 TYPE 2 DIABETES MELLITUS WITH OTHER SKIN ULCER, WITH LONG-TERM CURRENT USE OF INSULIN: ICD-10-CM

## 2024-03-13 DIAGNOSIS — Z72.0 TOBACCO USER: ICD-10-CM

## 2024-03-13 DIAGNOSIS — T23.322A: Primary | ICD-10-CM

## 2024-03-13 DIAGNOSIS — E13.42 DIABETIC POLYNEUROPATHY ASSOCIATED WITH OTHER SPECIFIED DIABETES MELLITUS: ICD-10-CM

## 2024-03-13 DIAGNOSIS — E66.9 CLASS 1 OBESITY WITH BODY MASS INDEX (BMI) OF 33.0 TO 33.9 IN ADULT, UNSPECIFIED OBESITY TYPE, UNSPECIFIED WHETHER SERIOUS COMORBIDITY PRESENT: ICD-10-CM

## 2024-03-13 DIAGNOSIS — Z79.4 TYPE 2 DIABETES MELLITUS WITH OTHER SKIN ULCER, WITH LONG-TERM CURRENT USE OF INSULIN: ICD-10-CM

## 2024-03-13 PROCEDURE — 99211 OFF/OP EST MAY X REQ PHY/QHP: CPT

## 2024-03-13 PROCEDURE — 99213 OFFICE O/P EST LOW 20 MIN: CPT | Mod: ,,, | Performed by: NURSE PRACTITIONER

## 2024-03-13 PROCEDURE — 27000999 HC MEDICAL RECORD PHOTO DOCUMENTATION
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
## 2024-03-14 DIAGNOSIS — G89.4 CHRONIC PAIN SYNDROME: ICD-10-CM

## 2024-03-14 DIAGNOSIS — E08.42 DIABETIC POLYNEUROPATHY ASSOCIATED WITH DIABETES MELLITUS DUE TO UNDERLYING CONDITION: ICD-10-CM

## 2024-03-14 DIAGNOSIS — Z79.4 TYPE 2 DIABETES MELLITUS WITH HYPERGLYCEMIA, WITH LONG-TERM CURRENT USE OF INSULIN: ICD-10-CM

## 2024-03-14 DIAGNOSIS — E11.65 TYPE 2 DIABETES MELLITUS WITH HYPERGLYCEMIA, WITH LONG-TERM CURRENT USE OF INSULIN: ICD-10-CM

## 2024-03-14 PROCEDURE — G0180 MD CERTIFICATION HHA PATIENT: HCPCS | Mod: ,,, | Performed by: NURSE PRACTITIONER

## 2024-03-14 RX ORDER — CLONAZEPAM 1 MG/1
1 TABLET ORAL 2 TIMES DAILY
Qty: 60 TABLET | Refills: 0 | Status: SHIPPED | OUTPATIENT
Start: 2024-03-14 | End: 2024-04-15

## 2024-03-14 RX ORDER — INSULIN LISPRO 100 [IU]/ML
INJECTION, SOLUTION INTRAVENOUS; SUBCUTANEOUS
Qty: 10 ML | Refills: 4 | Status: SHIPPED | OUTPATIENT
Start: 2024-03-14

## 2024-03-14 RX ORDER — INSULIN GLARGINE 100 [IU]/ML
INJECTION, SOLUTION SUBCUTANEOUS
Qty: 30 ML | Refills: 4 | Status: SHIPPED | OUTPATIENT
Start: 2024-03-14 | End: 2024-06-04 | Stop reason: ALTCHOICE

## 2024-03-16 DIAGNOSIS — G89.4 CHRONIC PAIN SYNDROME: ICD-10-CM

## 2024-03-18 ENCOUNTER — HOSPITAL ENCOUNTER (OUTPATIENT)
Dept: RADIOLOGY | Facility: HOSPITAL | Age: 44
Discharge: HOME | End: 2024-03-18
Attending: NURSE PRACTITIONER
Payer: MEDICARE

## 2024-03-18 DIAGNOSIS — K21.00 GASTROESOPHAGEAL REFLUX DISEASE WITH ESOPHAGITIS WITHOUT HEMORRHAGE: ICD-10-CM

## 2024-03-18 PROCEDURE — A9541 TC99M SULFUR COLLOID: HCPCS | Performed by: NURSE PRACTITIONER

## 2024-03-18 PROCEDURE — 78264 GASTRIC EMPTYING IMG STUDY: CPT | Mod: TC

## 2024-03-18 RX ORDER — MORPHINE SULFATE 100 MG/1
100 TABLET, FILM COATED, EXTENDED RELEASE ORAL 3 TIMES DAILY
Qty: 90 TABLET | Refills: 0 | Status: SHIPPED | OUTPATIENT
Start: 2024-03-18 | End: 2024-04-17

## 2024-03-18 RX ORDER — HYDROMORPHONE HYDROCHLORIDE 8 MG/1
8 TABLET ORAL EVERY 6 HOURS PRN
Qty: 120 TABLET | Refills: 0 | Status: SHIPPED | OUTPATIENT
Start: 2024-03-18 | End: 2024-04-17

## 2024-03-18 RX ORDER — TECHNETIUM TC 99M SULFUR COLLOID 2 MG
2 KIT MISCELLANEOUS
Status: COMPLETED | OUTPATIENT
Start: 2024-03-18 | End: 2024-03-18

## 2024-03-18 RX ADMIN — TECHNETIUM TC 99M SULFUR COLLOID 1.7 MILLICURIE: KIT at 08:03

## 2024-03-20 ENCOUNTER — TELEPHONE (OUTPATIENT)
Dept: GASTROENTEROLOGY | Facility: CLINIC | Age: 44
End: 2024-03-20
Payer: MEDICARE

## 2024-03-20 ENCOUNTER — HOSPITAL ENCOUNTER (OUTPATIENT)
Dept: WOUND CARE | Facility: HOSPITAL | Age: 44
Discharge: HOME | End: 2024-03-20
Attending: NURSE PRACTITIONER
Payer: MEDICARE

## 2024-03-20 VITALS
SYSTOLIC BLOOD PRESSURE: 157 MMHG | TEMPERATURE: 98 F | HEART RATE: 82 BPM | RESPIRATION RATE: 20 BRPM | OXYGEN SATURATION: 99 % | DIASTOLIC BLOOD PRESSURE: 79 MMHG

## 2024-03-20 DIAGNOSIS — Z79.4 TYPE 2 DIABETES MELLITUS WITH OTHER SKIN ULCER, WITH LONG-TERM CURRENT USE OF INSULIN: ICD-10-CM

## 2024-03-20 DIAGNOSIS — E13.42 DIABETIC POLYNEUROPATHY ASSOCIATED WITH OTHER SPECIFIED DIABETES MELLITUS: ICD-10-CM

## 2024-03-20 DIAGNOSIS — S81.801D WOUND OF RIGHT LEG, SUBSEQUENT ENCOUNTER: Primary | ICD-10-CM

## 2024-03-20 DIAGNOSIS — I10 PRIMARY HYPERTENSION: ICD-10-CM

## 2024-03-20 DIAGNOSIS — E11.622 TYPE 2 DIABETES MELLITUS WITH OTHER SKIN ULCER, WITH LONG-TERM CURRENT USE OF INSULIN: ICD-10-CM

## 2024-03-20 DIAGNOSIS — E66.9 CLASS 1 OBESITY WITH BODY MASS INDEX (BMI) OF 33.0 TO 33.9 IN ADULT, UNSPECIFIED OBESITY TYPE, UNSPECIFIED WHETHER SERIOUS COMORBIDITY PRESENT: ICD-10-CM

## 2024-03-20 DIAGNOSIS — Z72.0 TOBACCO USER: ICD-10-CM

## 2024-03-20 PROCEDURE — 99211 OFF/OP EST MAY X REQ PHY/QHP: CPT

## 2024-03-20 PROCEDURE — 99213 OFFICE O/P EST LOW 20 MIN: CPT | Mod: ,,, | Performed by: NURSE PRACTITIONER

## 2024-03-20 PROCEDURE — 27000999 HC MEDICAL RECORD PHOTO DOCUMENTATION

## 2024-03-20 NOTE — TELEPHONE ENCOUNTER
----- Message from ROCCO Gamez sent at 3/19/2024  1:42 PM CDT -----  Please notify no evidence of delayed gastric emptying noted. Thanks

## 2024-03-21 ENCOUNTER — TELEPHONE (OUTPATIENT)
Dept: SMOKING CESSATION | Facility: CLINIC | Age: 44
End: 2024-03-21
Payer: MEDICARE

## 2024-03-21 PROBLEM — S81.801D WOUND OF RIGHT LEG, SUBSEQUENT ENCOUNTER: Status: ACTIVE | Noted: 2024-03-06
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
## 2024-03-21 NOTE — TELEPHONE ENCOUNTER
Pt had not shown up for his [facility] appointment.  Called pt.  No answer.  Left voice message with contact information.

## 2024-03-26 DIAGNOSIS — I10 HYPERTENSION, UNSPECIFIED TYPE: ICD-10-CM

## 2024-03-26 RX ORDER — CLONIDINE HYDROCHLORIDE 0.3 MG/1
0.3 TABLET ORAL 3 TIMES DAILY
Qty: 270 TABLET | Refills: 1 | Status: SHIPPED | OUTPATIENT
Start: 2024-03-26 | End: 2024-09-22

## 2024-04-01 ENCOUNTER — OFFICE VISIT (OUTPATIENT)
Dept: ENDOCRINOLOGY | Facility: CLINIC | Age: 44
End: 2024-04-01
Payer: MEDICARE

## 2024-04-01 DIAGNOSIS — Z91.199 NONCOMPLIANCE WITH DIABETES TREATMENT: Primary | ICD-10-CM

## 2024-04-01 DIAGNOSIS — E11.65 UNCONTROLLED TYPE 2 DIABETES MELLITUS WITH HYPERGLYCEMIA: ICD-10-CM

## 2024-04-01 LAB
ANION GAP SERPL CALC-SCNC: 9 MEQ/L
BUN SERPL-MCNC: 10.2 MG/DL (ref 8.9–20.6)
CALCIUM SERPL-MCNC: 9.3 MG/DL (ref 8.4–10.2)
CHLORIDE SERPL-SCNC: 98 MMOL/L (ref 98–107)
CO2 SERPL-SCNC: 28 MMOL/L (ref 22–29)
CREAT SERPL-MCNC: 1.04 MG/DL (ref 0.73–1.18)
CREAT/UREA NIT SERPL: 10
GFR SERPLBLD CREATININE-BSD FMLA CKD-EPI: >60 MLS/MIN/1.73/M2
GLUCOSE SERPL-MCNC: 352 MG/DL (ref 74–100)
HBA1C MFR BLD: 12.1 %
POTASSIUM SERPL-SCNC: 3.6 MMOL/L (ref 3.5–5.1)
SODIUM SERPL-SCNC: 135 MMOL/L (ref 136–145)

## 2024-04-01 PROCEDURE — 99215 OFFICE O/P EST HI 40 MIN: CPT | Mod: PBBFAC | Performed by: NURSE PRACTITIONER

## 2024-04-01 PROCEDURE — 36415 COLL VENOUS BLD VENIPUNCTURE: CPT | Performed by: NURSE PRACTITIONER

## 2024-04-01 PROCEDURE — 83036 HEMOGLOBIN GLYCOSYLATED A1C: CPT | Mod: PBBFAC | Performed by: NURSE PRACTITIONER

## 2024-04-01 PROCEDURE — 86341 ISLET CELL ANTIBODY: CPT | Performed by: NURSE PRACTITIONER

## 2024-04-01 PROCEDURE — 99214 OFFICE O/P EST MOD 30 MIN: CPT | Mod: S$PBB,,, | Performed by: NURSE PRACTITIONER

## 2024-04-01 PROCEDURE — 84681 ASSAY OF C-PEPTIDE: CPT | Performed by: NURSE PRACTITIONER

## 2024-04-01 PROCEDURE — 80048 BASIC METABOLIC PNL TOTAL CA: CPT | Performed by: NURSE PRACTITIONER

## 2024-04-01 NOTE — PROGRESS NOTES
Subjective     Patient ID: Bharath Caballero is a 44 y.o. male.    Chief Complaint: Diabetes    Endocrine clinic note 04/01/2024:  44-year-old male scheduled today as new patient referral to endocrine clinic for history of uncontrolled type 2 diabetes.  Uncontrolled type 2 diabetes current A1c increased to 12.1 from previous 9.5.  Patient was changed Lantus 100 units b.i.d. to Toujeo 35 units b.i.d. with an increase in A1c.  Patient was checking his CBGS due to pain in his fingers due to nerve pain.  Also patient has an irregular refill pattern on his medications indicating he has not taking medications regularly.  Patient states he does take Humalog frequently at times he does not eat.  Discussed patient today we will start Dexcom G7 will order and have him referral to Diabetes Education started continue his meter he is to change his Toujeo to once a day and will take NovoLog with meals once on a continuous CGM we will titrate patient's insulin.       Review of Systems   Constitutional:  Positive for activity change and fatigue. Negative for appetite change and chills.   HENT:  Positive for dental problem and hearing loss. Negative for rhinorrhea, sneezing and goiter.    Eyes: Negative.  Negative for photophobia and visual disturbance.   Respiratory: Negative.  Negative for cough, chest tightness and shortness of breath.    Cardiovascular:  Negative for chest pain, palpitations and leg swelling.   Gastrointestinal:  Positive for abdominal pain and nausea. Negative for abdominal distention, change in bowel habit, constipation, diarrhea and vomiting.   Endocrine: Negative.  Negative for cold intolerance, heat intolerance, polydipsia, polyphagia and polyuria.   Genitourinary: Negative.  Negative for difficulty urinating and erectile dysfunction.   Musculoskeletal:  Positive for back pain, gait problem, leg pain and myalgias. Negative for joint swelling and joint deformity.   Integumentary:  Negative for color change,  pallor and rash. Negative.   Allergic/Immunologic: Negative.  Negative for environmental allergies, food allergies and frequent infections.   Neurological:  Positive for weakness. Negative for tremors, syncope, headaches and coordination difficulties.   Hematological: Negative.  Does not bruise/bleed easily.   Psychiatric/Behavioral:  Positive for decreased concentration. Negative for agitation and behavioral problems. The patient is not nervous/anxious and is not hyperactive.           Objective     Physical Exam  Constitutional:       General: He is not in acute distress.     Appearance: He is normal weight. He is ill-appearing.   HENT:      Head: Normocephalic.      Right Ear: External ear normal. Decreased hearing noted.      Left Ear: External ear normal. Decreased hearing noted.      Nose: No congestion or rhinorrhea.      Mouth/Throat:      Mouth: Mucous membranes are moist.      Pharynx: Oropharynx is clear.   Eyes:      Conjunctiva/sclera: Conjunctivae normal.      Pupils: Pupils are equal, round, and reactive to light.   Neck:      Thyroid: No thyroid mass, thyromegaly or thyroid tenderness.   Cardiovascular:      Rate and Rhythm: Normal rate and regular rhythm.      Pulses: Normal pulses.      Heart sounds: Normal heart sounds. No murmur heard.  Pulmonary:      Effort: Pulmonary effort is normal. No respiratory distress.      Breath sounds: Normal breath sounds.   Abdominal:      General: Abdomen is flat. Bowel sounds are normal. There is no distension.      Palpations: Abdomen is soft.      Tenderness: There is no abdominal tenderness.   Genitourinary:     Testes: Normal.   Musculoskeletal:         General: Normal range of motion.      Cervical back: Normal range of motion and neck supple.      Right lower leg: No edema.      Left lower leg: No edema.   Feet:      Right foot:      Skin integrity: Skin integrity normal.      Left foot:      Skin integrity: Skin integrity normal.   Lymphadenopathy:       Cervical: No cervical adenopathy.   Skin:     General: Skin is warm and dry.      Coloration: Skin is not jaundiced or pale.   Neurological:      General: No focal deficit present.      Mental Status: He is alert and oriented to person, place, and time.      Motor: Weakness present.      Coordination: Coordination normal.      Gait: Gait abnormal.   Psychiatric:         Mood and Affect: Mood normal.         Behavior: Behavior normal.         Thought Content: Thought content normal.         Judgment: Judgment normal.            Assessment and Plan     1. Uncontrolled type 2 diabetes mellitus with hyperglycemia  Current A1C 12.1 increased from 9.5   A1C goal <7.0   Medications: Toujeo 35 units BID , Humalog 25 units TID with meals, Farxiga 5 mg  Changes:  Change Toujeo to 70 units once in the a.m., Dexcom G7 ordered, referral to Diabetes Education start Dexcom G7 titrate insulin  Follow ADA diet, avoid soda, simple sweets, and limit rice, breads and starches  Yearly Diabetic eye exam: 04/25/2023   Yearly Foot Exam: 10/03/2024  Urine Micro: 02/27/2024   -     Ambulatory referral/consult to Endocrinology  -     Hemoglobin A1C, POCT  -     C-Peptide; Future  -     Basic Metabolic Panel; Future  -     GAD65 Ab, Serum; Future             Component Ref Range & Units 2 mo ago  (1/16/24) 10 mo ago  (5/24/23) 1 yr ago  (1/17/23) 1 yr ago  (11/27/22) 1 yr ago  (6/22/22) 2 yr ago  (6/16/21) 3 yr ago  (1/19/21)   Hemoglobin A1c <=7.0 % 9.5 High  9.4 High  10.2 High  10.4 High  10.9 High  9.9 High  R 10.7 High  R         2. Noncompliance with diabetes treatment  Irregular insulin refills    Dispensed Days Supply Quantity Provider Pharmacy   Toujeo SoloStar U-300 Insulin 300 unit/mL (1.5 mL) subcutaneous pen 03/14/2024 19 4.5 mL Dat Beasley MD [facility]   Toujeo SoloStar U-300 Insulin 300 unit/mL (1.5 mL) subcutaneous pen 02/14/2024 19 4.5 mL Dat Beasley MD [facility]   Lantus U-100  Insulin 100 unit/mL subcutaneous solution 01/15/2024 15 30 mL Dat Beasley MD [facility]   Lantus U-100 Insulin 100 unit/mL subcutaneous solution 12/28/2023 15 30 mL Dat Beasley MD [facility]      Dispensed Days Supply Quantity Provider Pharmacy   Humalog U-100 Insulin 100 unit/mL subcutaneous solution 03/14/2024 13 10 mL Dat Beasley MD [facility]   Humalog U-100 Insulin 100 unit/mL subcutaneous solution 01/15/2024 13 10 mL Dat Beasley MD [facility]   Humalog U-100 Insulin 100 unit/mL subcutaneous solution 12/28/2023 13 10 mL Dat Beasley MD [facility]   Humalog U-100 Insulin 100 unit/mL subcutaneous solution 12/11/2023 13 10 mL Dat Beasley MD [facility]       Time taken patient today with written instructions due to being hard of hearing    Follow up in about 3 months (around 7/1/2024) for Type 2 diabetes.

## 2024-04-02 ENCOUNTER — TELEPHONE (OUTPATIENT)
Dept: DIABETES | Facility: CLINIC | Age: 44
End: 2024-04-02

## 2024-04-02 VITALS
WEIGHT: 214.31 LBS | HEIGHT: 69 IN | HEART RATE: 81 BPM | RESPIRATION RATE: 16 BRPM | BODY MASS INDEX: 31.74 KG/M2 | SYSTOLIC BLOOD PRESSURE: 150 MMHG | DIASTOLIC BLOOD PRESSURE: 69 MMHG | TEMPERATURE: 98 F

## 2024-04-02 LAB — C PEPTIDE P FAST SERPL-MCNC: 4.1 NG/ML (ref 1.1–4.4)

## 2024-04-03 LAB — GAD65 AB SER-SCNC: 0 NMOL/L

## 2024-04-04 ENCOUNTER — HOSPITAL ENCOUNTER (OUTPATIENT)
Dept: WOUND CARE | Facility: HOSPITAL | Age: 44
Discharge: HOME | End: 2024-04-04
Attending: NURSE PRACTITIONER
Payer: MEDICARE

## 2024-04-04 VITALS
TEMPERATURE: 98 F | OXYGEN SATURATION: 97 % | SYSTOLIC BLOOD PRESSURE: 153 MMHG | DIASTOLIC BLOOD PRESSURE: 79 MMHG | HEART RATE: 79 BPM

## 2024-04-04 DIAGNOSIS — S81.801D WOUND OF RIGHT LEG, SUBSEQUENT ENCOUNTER: Primary | ICD-10-CM

## 2024-04-04 DIAGNOSIS — Z79.4 TYPE 2 DIABETES MELLITUS WITH OTHER SKIN ULCER, WITH LONG-TERM CURRENT USE OF INSULIN: ICD-10-CM

## 2024-04-04 DIAGNOSIS — I10 PRIMARY HYPERTENSION: ICD-10-CM

## 2024-04-04 DIAGNOSIS — Z72.0 TOBACCO USER: ICD-10-CM

## 2024-04-04 DIAGNOSIS — E13.42 DIABETIC POLYNEUROPATHY ASSOCIATED WITH OTHER SPECIFIED DIABETES MELLITUS: ICD-10-CM

## 2024-04-04 DIAGNOSIS — E66.9 CLASS 1 OBESITY WITH BODY MASS INDEX (BMI) OF 33.0 TO 33.9 IN ADULT, UNSPECIFIED OBESITY TYPE, UNSPECIFIED WHETHER SERIOUS COMORBIDITY PRESENT: ICD-10-CM

## 2024-04-04 DIAGNOSIS — E11.622 TYPE 2 DIABETES MELLITUS WITH OTHER SKIN ULCER, WITH LONG-TERM CURRENT USE OF INSULIN: ICD-10-CM

## 2024-04-04 PROCEDURE — 99214 OFFICE O/P EST MOD 30 MIN: CPT | Mod: ,,, | Performed by: NURSE PRACTITIONER

## 2024-04-04 PROCEDURE — 27000999 HC MEDICAL RECORD PHOTO DOCUMENTATION

## 2024-04-04 PROCEDURE — 99211 OFF/OP EST MAY X REQ PHY/QHP: CPT
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
## 2024-04-08 ENCOUNTER — HOSPITAL ENCOUNTER (OUTPATIENT)
Dept: RADIOLOGY | Facility: HOSPITAL | Age: 44
Discharge: HOME | End: 2024-04-08
Attending: NURSE PRACTITIONER
Payer: MEDICARE

## 2024-04-08 DIAGNOSIS — K76.0 HEPATIC STEATOSIS: ICD-10-CM

## 2024-04-08 DIAGNOSIS — E11.65 UNCONTROLLED TYPE 2 DIABETES MELLITUS WITH HYPERGLYCEMIA: Primary | ICD-10-CM

## 2024-04-08 DIAGNOSIS — K21.00 GASTROESOPHAGEAL REFLUX DISEASE WITH ESOPHAGITIS WITHOUT HEMORRHAGE: ICD-10-CM

## 2024-04-08 DIAGNOSIS — R79.89 ELEVATED LFTS: ICD-10-CM

## 2024-04-08 PROCEDURE — 76705 ECHO EXAM OF ABDOMEN: CPT | Mod: TC

## 2024-04-08 RX ORDER — LINAGLIPTIN 5 MG/1
5 TABLET, FILM COATED ORAL DAILY
Qty: 90 TABLET | Refills: 3 | Status: SHIPPED | OUTPATIENT
Start: 2024-04-08 | End: 2025-04-08

## 2024-04-08 NOTE — PROGRESS NOTES
Subjective     Patient ID: Bharath Caballero is a 44 y.o. male.    Chief Complaint: Sleep Apnea    HPI  Seen by Kramer on 2/6/24  Cpap was reported broken at that time  New machine ordered    Received call from [facility]'s stating that they are unable to service pt 2.2 non-compliance & he also had a machine from 2018 that he was supposed to have turned in, but never did    [facility] ended up setting him up on 2/20  He feels like he is having air hunger  Wants his low-end pressure increased    When he is able to wear it all night, he does feel better the following day.  Has not been compliant 2.2 air hunger    Review of Systems  A 14pt ROS was reviewed & is negative unless otherwise documented in the HPI       Objective     Physical Exam  GENERAL: NAD, calm, cooperative, appropriate  Awake/alert  Well groomed  RESP: CTAB  HEART: RRR  No LE edema  MENTAL STATUS: oriented, follow commands reliably  SPEECH/LANGUAGE: clear, fluent  CN:  Perrla, eomi, vff, gaze conjugate  No tactile or motor facial asymmetry  Tongue protrudes midline  Motor: no focal weakness  Cerebellar: no tremor or dysmetria  Sensory: normal to tactile stim/vibration  DTRs: normal +2, symmetric  Gait: steady w/cane       Assessment and Plan     1. LASHAY on CPAP  -     CPAP; Future; Expected date: 04/09/2024        Increase apap to 10-20  Ramp time was already off  Will see if these settings will help to improve his compliance.  F/u 2mo    Annia Snider, [facility]-BC           Follow up in about 2 months (around 6/9/2024) for lashay on cpap.

## 2024-04-09 ENCOUNTER — TELEPHONE (OUTPATIENT)
Dept: GASTROENTEROLOGY | Facility: CLINIC | Age: 44
End: 2024-04-09
Payer: MEDICARE

## 2024-04-09 ENCOUNTER — OFFICE VISIT (OUTPATIENT)
Dept: NEUROLOGY | Facility: CLINIC | Age: 44
End: 2024-04-09
Payer: MEDICARE

## 2024-04-09 ENCOUNTER — TELEPHONE (OUTPATIENT)
Dept: ENDOCRINOLOGY | Facility: CLINIC | Age: 44
End: 2024-04-09
Payer: MEDICARE

## 2024-04-09 VITALS
WEIGHT: 214 LBS | DIASTOLIC BLOOD PRESSURE: 80 MMHG | HEIGHT: 69 IN | BODY MASS INDEX: 31.7 KG/M2 | SYSTOLIC BLOOD PRESSURE: 130 MMHG

## 2024-04-09 DIAGNOSIS — G47.33 OSA ON CPAP: Primary | ICD-10-CM

## 2024-04-09 PROCEDURE — 99213 OFFICE O/P EST LOW 20 MIN: CPT | Mod: S$PBB,,, | Performed by: NURSE PRACTITIONER

## 2024-04-09 PROCEDURE — 99999 PR PBB SHADOW E&M-EST. PATIENT-LVL V: CPT | Mod: PBBFAC,,, | Performed by: NURSE PRACTITIONER

## 2024-04-09 PROCEDURE — 99215 OFFICE O/P EST HI 40 MIN: CPT | Mod: PBBFAC | Performed by: NURSE PRACTITIONER

## 2024-04-09 NOTE — TELEPHONE ENCOUNTER
----- Message from Gilda Erwin NP sent at 4/8/2024 12:28 PM CDT -----  Please instruct the patient the labs came back that he is a type 2 diabetic and making plenty of insulin.  Due to his medical history and history of pancreatitis he is limited on medications so I am starting him on Tragenta 5mg .  He is to continue his current regimen with his insulin and Farxiga.

## 2024-04-09 NOTE — PROGRESS NOTES
Please notify findings of hepatomegaly and nonspecific slight coarsened echotexture with increased echogenicity with could be related to underlying hepatocellular disease and/or fatty infiltration.  Thanks

## 2024-04-09 NOTE — TELEPHONE ENCOUNTER
I called pt and discussed the following results and recommendations  Please instruct the patient the labs came back that he is a type 2 diabetic and making plenty of insulin.  Due to his medical history and history of pancreatitis he is limited on medications so I am starting him on Tragenta 5mg .  He is to continue his current regimen with his insulin and Farxiga.    Pt verbalizes understanding

## 2024-04-09 NOTE — PATIENT INSTRUCTIONS
"   Sleep Apnea in Adults   The Basics   Written by the doctors and editors at [facility]   What is sleep apnea? -- Sleep apnea is a condition that makes you stop breathing for short periods while you are asleep. There are 2 types of sleep apnea. One is called "obstructive sleep apnea," and the other is called "central sleep apnea."  In obstructive sleep apnea, you stop breathing because your throat narrows or closes (figure 1). In central sleep apnea, you stop breathing because your brain does not send the right signals to your muscles to make you breathe. When people talk about sleep apnea, they are usually referring to obstructive sleep apnea, which is what this article is about.  People with sleep apnea do not know that they stop breathing when they are asleep. But they do sometimes wake up startled or gasping for breath. They also often hear from loved ones that they snore.  What are the symptoms of sleep apnea? -- The main symptoms of sleep apnea are loud snoring, tiredness, and daytime sleepiness. Other symptoms can include:  Restless sleep  Waking up choking or gasping  Morning headaches, dry mouth, or sore throat  Waking up often to urinate  Waking up feeling unrested or groggy  Trouble thinking clearly or remembering things  Some people with sleep apnea don't have symptoms, or they don't know they have them. They might figure that it's normal to be tired or to snore a lot.  Should I see a doctor or nurse? -- Yes. If you think you might have sleep apnea, see your doctor.  Is there a test for sleep apnea? -- Yes. If your doctor or nurse suspects you have sleep apnea, they might send you for a "sleep study." Sleep studies can sometimes be done at home, but they are usually done in a sleep lab. For the study, you spend the night in the lab, and you are hooked up to different machines that monitor your heart rate, breathing, and other body functions. The results of the test will tell your doctor or nurse if you " "have the disorder.  Is there anything I can do on my own to help my sleep apnea? -- Yes. Here are some things that might help:  Stay off your back when sleeping. (This is not always practical, because people cannot control their position while asleep. Plus, it only helps some people.)  Lose weight, if you are overweight  Avoid alcohol, because it can make sleep apnea worse  How is sleep apnea treated? -- As mentioned above, weight loss can help if you are overweight or obese. But losing weight can be challenging, and it takes time to lose enough weight to help with your sleep apnea. Most people need other treatment while they work on losing weight.  The most effective treatment for sleep apnea is a device that keeps your airway open while you sleep. Treatment with this device is called "continuous positive airway pressure," or CPAP. People getting CPAP wear a face mask at night that keeps them breathing (figure 2).  If your doctor or nurse recommends a CPAP machine, try to be patient about using it. The mask might seem uncomfortable to wear at first, and the machine might seem noisy, but using the machine can really pay off. People with sleep apnea who use a CPAP machine feel more rested and generally feel better.  There is also another device that you wear in your mouth called an "oral appliance" or "mandibular advancement device." It also helps keep your airway open while you sleep. But devices do not work as well as CPAP for treating sleep apnea.  In rare cases, when nothing else helps, doctors recommend surgery to keep the airway open. Surgery to do this is not always effective, and even when it is, the problem can come back.  Is sleep apnea dangerous? -- It can be. People with sleep apnea do not get good-quality sleep, so they are often tired and not alert. This puts them at risk for car accidents and other types of accidents. Plus, studies show that people with sleep apnea are more likely than others to have " high blood pressure, heart attacks, and other serious heart problems. In people with severe sleep apnea, getting treated (for example, with a CPAP machine) can help prevent some of these problems.  All topics are updated as new evidence becomes available and our peer review process is complete.  This topic retrieved from [employer] on: Sep 21, 2021.  Topic 04447 Version 12.0  Release: 29.4.2 - C29.263  © 2021 UpToDate, Inc. and/or its affiliates. All rights reserved.  figure 1: Airway in a person with sleep apnea     Normally when a person sleeps, the airway remains open, and air can pass from the nose and mouth to the lungs. In a person with sleep apnea, parts of the throat and mouth drop into the airway and block off the flow of air. This can cause loud snoring and interrupt breathing for short periods.  Graphic 38308 Version 5.0    figure 2: Continuous positive airway pressure (CPAP) for sleep apnea     The CPAP mask gently blows air into your nose while you sleep. It puts just enough pressure on your airway to keep it from closing. The mask in this picture fits over just the nose. Other CPAP devices have masks that fit over the nose and mouth.  Graphic 21982 Version 5.0    Consumer Information Use and Disclaimer   This information is not specific medical advice and does not replace information you receive from your health care provider. This is only a brief summary of general information. It does NOT include all information about conditions, illnesses, injuries, tests, procedures, treatments, therapies, discharge instructions or life-style choices that may apply to you. You must talk with your health care provider for complete information about your health and treatment options. This information should not be used to decide whether or not to accept your health care provider's advice, instructions or recommendations. Only your health care provider has the knowledge and training to provide advice that is right for  you. The use of this information is governed by the [employer] End User License Agreement, available at https://www.[employer].AKT/en/solutions/[employer]/about/radha.The use of [employer] content is governed by the [employer] Terms of Use. ©2021 UpToDate, Inc. All rights reserved.  Copyright   © 2021 UpToDate, Inc. and/or its affiliates. All rights reserved.

## 2024-04-09 NOTE — TELEPHONE ENCOUNTER
----- Message from ROCCO Gamez sent at 4/9/2024  7:58 AM CDT -----  Please notify findings of hepatomegaly and nonspecific slight coarsened echotexture with increased echogenicity with could be related to underlying hepatocellular disease and/or fatty infiltration.  Thanks

## 2024-04-15 ENCOUNTER — DOCUMENT SCAN (OUTPATIENT)
Dept: HOME HEALTH SERVICES | Facility: HOSPITAL | Age: 44
End: 2024-04-15
Payer: MEDICARE

## 2024-04-15 DIAGNOSIS — G89.4 CHRONIC PAIN SYNDROME: ICD-10-CM

## 2024-04-15 DIAGNOSIS — E11.42 TYPE 2 DIABETES MELLITUS WITH DIABETIC POLYNEUROPATHY: ICD-10-CM

## 2024-04-15 DIAGNOSIS — E08.42 DIABETIC POLYNEUROPATHY ASSOCIATED WITH DIABETES MELLITUS DUE TO UNDERLYING CONDITION: ICD-10-CM

## 2024-04-15 RX ORDER — GABAPENTIN 800 MG/1
TABLET ORAL
Qty: 90 TABLET | Refills: 3 | Status: SHIPPED | OUTPATIENT
Start: 2024-04-15 | End: 2024-04-29

## 2024-04-15 RX ORDER — CLONAZEPAM 1 MG/1
1 TABLET ORAL 2 TIMES DAILY
Qty: 60 TABLET | Refills: 0 | Status: SHIPPED | OUTPATIENT
Start: 2024-04-15 | End: 2024-05-20

## 2024-04-16 ENCOUNTER — OFFICE VISIT (OUTPATIENT)
Dept: NEPHROLOGY | Facility: CLINIC | Age: 44
End: 2024-04-16
Payer: MEDICARE

## 2024-04-16 VITALS
HEART RATE: 75 BPM | BODY MASS INDEX: 32.85 KG/M2 | RESPIRATION RATE: 20 BRPM | TEMPERATURE: 98 F | WEIGHT: 221.81 LBS | SYSTOLIC BLOOD PRESSURE: 168 MMHG | HEIGHT: 69 IN | DIASTOLIC BLOOD PRESSURE: 94 MMHG | OXYGEN SATURATION: 97 %

## 2024-04-16 DIAGNOSIS — N18.2 TYPE 2 DIABETES MELLITUS WITH STAGE 2 CHRONIC KIDNEY DISEASE, WITH LONG-TERM CURRENT USE OF INSULIN: ICD-10-CM

## 2024-04-16 DIAGNOSIS — Z79.4 TYPE 2 DIABETES MELLITUS WITH STAGE 2 CHRONIC KIDNEY DISEASE, WITH LONG-TERM CURRENT USE OF INSULIN: ICD-10-CM

## 2024-04-16 DIAGNOSIS — G89.4 CHRONIC PAIN SYNDROME: ICD-10-CM

## 2024-04-16 DIAGNOSIS — E11.22 TYPE 2 DIABETES MELLITUS WITH STAGE 2 CHRONIC KIDNEY DISEASE, WITH LONG-TERM CURRENT USE OF INSULIN: ICD-10-CM

## 2024-04-16 DIAGNOSIS — I10 HYPERTENSION, UNSPECIFIED TYPE: ICD-10-CM

## 2024-04-16 DIAGNOSIS — N18.2 CKD STAGE G2/A3, GFR 60-89 AND ALBUMIN CREATININE RATIO >300 MG/G: Primary | ICD-10-CM

## 2024-04-16 DIAGNOSIS — Z72.0 TOBACCO USER: ICD-10-CM

## 2024-04-16 PROCEDURE — 99214 OFFICE O/P EST MOD 30 MIN: CPT | Mod: S$PBB,,, | Performed by: NURSE PRACTITIONER

## 2024-04-16 PROCEDURE — 99215 OFFICE O/P EST HI 40 MIN: CPT | Mod: PBBFAC | Performed by: NURSE PRACTITIONER

## 2024-04-16 RX ORDER — LOSARTAN POTASSIUM 100 MG/1
100 TABLET ORAL DAILY
Qty: 90 TABLET | Refills: 3 | Status: SHIPPED | OUTPATIENT
Start: 2024-04-16 | End: 2025-04-16

## 2024-04-16 NOTE — PROGRESS NOTES
[facility]  Nephrology Clinic Note    Chief complaint: Chronic Kidney Disease (Follow up)    History of present illness:   Bharath Caballero is a 44 y.o. White male with past medical history of insulin-dependent diabetes mellitus type 2, diabetic kidney disease, obesity, familial hypertriglyceridemia, recurrent pancreatitis, hepatic steatosis, hypertension, CICI, hearing loss, recurrent candiduria, polyneuropathy secondary to diabetes, chronic pain (s/p neuro stimulator insertion in July 2022), and tobacco abuse. Hypertriglyceridemia was previously treated with routine plasmapheresis, this was discontinued in August 2018 per patient's request. Patient has undergone multiple AV access creation procedures, including tunneled catheter insertion and removal as well as AV graft creation. Both of his AV grafts are now thrombosed. Patient was managed by hospice in the past, however, was discharged from hospice care due to lack of terminal diagnosis. Presents today for follow-up.  Continues to have multiple complaints, including hearing loss, abdominal pain (chronic), headaches, back pain, occasional weakness.     Review of Systems  12 point review of systems conducted, negative except as stated in the history of present illness.    Allergies: Patient has No Known Allergies.     Past Medical History:  has a past medical history of Acquired perforating dermatosis, Aortic atherosclerosis, Bilateral edema of lower extremity, Bladder outflow obstruction, Blurred vision, right eye, BMI 34.0-34.9,adult, BPH (benign prostatic hyperplasia), Chronic liver failure without hepatic coma, Chronic pain, Chronic pain syndrome, Chronic pancreatitis, Deafness, Diabetes, Diabetic polyneuropathy, Elevated LFTs, GERD (gastroesophageal reflux disease), Hand paresthesia, Hepatic steatosis, History of continuous positive airway pressure (CPAP) therapy at home, History of pancreatitis, Hypertension, Hypertriglyceridemia,  "Kidney disorder, Leg pain, Mixed hyperlipidemia, Mononeuritis multiplex, Morbid obesity, Neuropathy, Nocturia, OA (osteoarthritis), Obstructive sleep apnea syndrome, Onychomycosis of multiple toenails with type 2 diabetes mellitus, Open wound of finger of right hand, CICI (obstructive sleep apnea), Painful diabetic neuropathy, Personal history of colonic polyps, Polyneuropathy, Primary hypertension, Recurrent pancreatitis, Renal insufficiency, Tobacco abuse, Tobacco user, and Type 2 diabetes mellitus with skin complication, with long-term current use of insulin.    Procedure History:  has a past surgical history that includes Esophagogastroduodenoscopy (N/A, 06/07/2021); INSPECTION OF UPPER INTESTINAL TRACT (N/A, 06/07/2021); UPPER GI (N/A, 06/07/2021); PHERESIS OF PLASMA (N/A, 07/13/2018); Excision of arteriovenous fistula (N/A, 06/01/2018); PHERESIS OF PLASMA (N/A, 05/25/2018); ARTERIOVENOUS ANASTOMOSIS, OPEN, UPPER ARM BASILLIC VEIN TRANSPOSITION (N/A, 05/07/2018); Appendectomy; Hernia repair; Trial of spinal cord nerve stimulator (N/A, 5/12/2022); Spinal cord stimulator removal; ANGIOGRAM, CORONARY, WITH LEFT HEART CATHETERIZATION (N/A, 4/10/2023); fractional flow reserve (ffr), coronary (4/10/2023); and egd, with closed biopsy (N/A, 11/20/2023).    Family History: family history includes Obesity in his father.    Social History:  reports that he has been smoking cigarettes. He started smoking about 27 years ago. He has a 13.9 pack-year smoking history. He has never used smokeless tobacco. He reports that he does not currently use alcohol. He reports that he does not currently use drugs.    Physical exam  BP (!) 168/94 (BP Location: Right arm, Patient Position: Sitting, BP Method: Medium (Automatic))   Pulse 75   Temp 98.4 °F (36.9 °C) (Oral)   Resp 20   Ht 5' 9" (1.753 m)   Wt 100.6 kg (221 lb 12.5 oz)   SpO2 97%   BMI 32.75 kg/m²   General appearance:  Chronically ill-appearing male in no acute " distress  HEENT: PERRLA, EOMI, no scleral icterus, no JVD. Neck is supple.  Chest: Respirations are unlabored. Lungs sounds are clear.   Heart: S1, S2.   Abdomen: Benign.  : Deferred.  Extremities: No edema, peripheral pulses are palpable.  Left upper extremity thrombosed AV grafts  Neuro: No focal deficits.     Home Medications:    Current Outpatient Medications:     amitriptyline (ELAVIL) 25 MG tablet, Take 1 tablet (25 mg total) by mouth nightly as needed for Insomnia., Disp: 30 tablet, Rfl: 4    aspirin (ECOTRIN) 81 MG EC tablet, Take 81 mg by mouth once daily., Disp: , Rfl:     atorvastatin (LIPITOR) 40 MG tablet, Take 1 tablet (40 mg total) by mouth once daily., Disp: 90 tablet, Rfl: 3    carvediloL (COREG) 25 MG tablet, Take 1 tablet (25 mg total) by mouth 2 (two) times daily with meals., Disp: 60 tablet, Rfl: 11    clonazePAM (KLONOPIN) 1 MG tablet, TAKE ONE TABLET BY MOUTH TWICE DAILY, Disp: 60 tablet, Rfl: 0    cloNIDine (CATAPRES) 0.3 MG tablet, Take 1 tablet (0.3 mg total) by mouth 3 (three) times daily., Disp: 270 tablet, Rfl: 1    CREON 36,000-114,000- 180,000 unit CpDR, TAKE ONE CAPSULE THREE TIMES DAILY, Disp: 270 capsule, Rfl: 1    EScitalopram oxalate (LEXAPRO) 20 MG tablet, Take 20 mg by mouth once daily., Disp: , Rfl:     esomeprazole (NEXIUM) 40 MG capsule, Take 1 capsule (40 mg total) by mouth before breakfast., Disp: 30 capsule, Rfl: 11    evolocumab (REPATHA SURECLICK) 140 mg/mL PnIj, Inject 1 mL (140 mg total) into the skin every 14 (fourteen) days., Disp: 2 mL, Rfl: 11    FARXIGA 5 mg Tab tablet, TAKE ONE TABLET BY MOUTH EVERY DAY, Disp: 90 tablet, Rfl: 3    gabapentin (NEURONTIN) 400 MG capsule, Two tabs in AM, one in afternoon, Disp: 90 capsule, Rfl: 3    gabapentin (NEURONTIN) 800 MG tablet, TAKE ONE TABLET BY MOUTH IN THE EVENING, Disp: 90 tablet, Rfl: 3    HUMALOG U-100 INSULIN 100 unit/mL injection, INJECT 25 UNITS SUBCUTANEOUSLY BEFORE MEALS THREE TIMES DAILY, Disp: 10 mL, Rfl: 4    " icosapent ethyL (VASCEPA) 1 gram Cap, Take 2 capsules (2 g total) by mouth 2 (two) times daily., Disp: 60 capsule, Rfl: 11    isosorbide mononitrate (IMDUR) 120 MG 24 hr tablet, Take 1 tablet (120 mg total) by mouth once daily., Disp: 90 tablet, Rfl: 3    linaGLIPtin (TRADJENTA) 5 mg Tab tablet, Take 1 tablet (5 mg total) by mouth once daily., Disp: 90 tablet, Rfl: 3    NIFEdipine (PROCARDIA-XL) 60 MG (OSM) 24 hr tablet, Take 1 tablet (60 mg total) by mouth 2 (two) times a day., Disp: 180 tablet, Rfl: 3    nitroGLYCERIN (NITROSTAT) 0.3 MG SL tablet, Place 1 tablet (0.3 mg total) under the tongue every 5 (five) minutes as needed for Chest pain (go to er after 3 doses)., Disp: 30 tablet, Rfl: 6    OXcarbazepine (TRILEPTAL) 600 MG Tab, Take 1 tablet (600 mg total) by mouth 2 (two) times daily., Disp: 60 tablet, Rfl: 11    potassium chloride SA (K-DUR,KLOR-CON) 20 MEQ tablet, Take 1 tablet (20 mEq total) by mouth 2 (two) times daily., Disp: 180 tablet, Rfl: 3    spironolactone (ALDACTONE) 50 MG tablet, Take 1 tablet (50 mg total) by mouth once daily., Disp: 90 tablet, Rfl: 3    sucralfate (CARAFATE) 100 mg/mL suspension, Take 10 mLs (1 g total) by mouth 4 (four) times daily before meals and nightly., Disp: 1200 mL, Rfl: 3    SURE COMFORT INSULIN SYRINGE 0.5 mL 31 gauge x 5/16" Syrg, USE ONE SYRINGE THREE TIMES DAILY, Disp: 100 each, Rfl: 3    tamsulosin (FLOMAX) 0.4 mg Cap, TAKE ONE CAPSULE BY MOUTH EVERY DAY, Disp: 90 capsule, Rfl: 3    torsemide (DEMADEX) 20 MG Tab, Take 1 tablet (20 mg total) by mouth once daily., Disp: 90 tablet, Rfl: 3    TOUJEO SOLOSTAR U-300 INSULIN 300 unit/mL (1.5 mL) InPn pen, Inject 35 Units into the skin 2 (two) times a day., Disp: , Rfl:     HYDROmorphone (DILAUDID) 8 MG tablet, Take 1 tablet (8 mg total) by mouth every 6 (six) hours as needed., Disp: 120 tablet, Rfl: 0    LANTUS U-100 INSULIN 100 unit/mL injection, INJECT 100 UNITS SUBCUTANEOUSLY TWICE DAILY, Disp: 30 mL, Rfl: 4    " losartan (COZAAR) 100 MG tablet, Take 1 tablet (100 mg total) by mouth once daily., Disp: 90 tablet, Rfl: 3    morphine (MS CONTIN) 100 MG 12 hr tablet, TAKE ONE TABLET BY MOUTH THREE TIMES DAILY, Disp: 90 tablet, Rfl: 0    Current Facility-Administered Medications:     triamcinolone acetonide 0.1% ointment, , Topical (Top), BID, Malina Brito FNP    Laboratory data    Lab Results   Component Value Date    WBC 14.77 (H) 10/03/2023    HGB 14.9 10/03/2023    HCT 42.1 10/03/2023     10/03/2023    IRON 52 (L) 08/18/2020    TIBC 296 08/18/2020    TRANS 232.0 08/18/2020    LABIRON 17.6 08/18/2020    FERRITIN 190.9 08/18/2020    BSPSDLCZ43 773 08/13/2020     (H) 02/20/2018     (L) 04/01/2024    K 3.6 04/01/2024    CHLORIDE 98 04/01/2024    CO2 28 04/01/2024    BUN 10.2 04/01/2024    CREATININE 1.04 04/01/2024    EGFRNORACEVR >60 04/01/2024    GLUCOSE 352 (H) 04/01/2024    CALCIUM 9.3 04/01/2024    ALKPHOS 230 (H) 01/16/2024    LABPROT 8.1 01/16/2024    ALBUMIN 3.6 01/16/2024    BILIDIR 0.1 12/20/2021    IBILI 0.20 12/20/2021    AST 24 01/16/2024    ALT 44 01/16/2024    MG 1.80 05/24/2023    PHOS 2.9 12/01/2022      Lab Results   Component Value Date    HGBA1C 9.5 (H) 01/16/2024    PTH 83.7 (H) 04/11/2019    HBFLFHUM46MN 28.1 (L) 06/16/2021    HIV Nonreactive 05/24/2023    HEPCAB Nonreactive 08/13/2020    HEPBSURFAG Nonreactive 08/13/2020    HEPBSAB Nonreactive 09/27/2017     Urine:  Lab Results   Component Value Date    COLORUA Light-Yellow 01/16/2024    APPEARANCEUA Clear 01/16/2024    SGUA 1.021 01/16/2024    PHUA 6.0 01/16/2024    PROTEINUA 2+ (A) 01/16/2024    GLUCOSEUA 4+ (A) 01/16/2024    KETONESUA Negative 01/16/2024    BLOODUA Trace (A) 01/16/2024    NITRITESUA Negative 01/16/2024    LEUKOCYTESUR Negative 01/16/2024    RBCUA 0-5 01/16/2024    WBCUA 0-5 01/16/2024    BACTERIA Trace (A) 01/16/2024    SQEPUA None Seen 01/16/2024    HYALINECASTS None Seen 01/16/2024    CREATRANDUR 69.1  02/27/2024    PROTEINURINE 197.9 01/16/2024    UPROTCREA 2.9 01/16/2024         Imaging  CT Abdomen Pelvis W Wo Contrast 10/19/2023  The lung bases are clear.  The liver appears normal.  No liver mass or lesion is seen.  Portal and hepatic veins appear normal.  The gallbladder appears grossly unremarkable.  The pancreas appears normal.  No pancreatic mass or lesion is seen.  There is a cyst in the lower pole of the spleen.  No other splenic lesion is seen.  Spleen is normal in size.  The adrenal glands appear normal.  No adrenal nodule is seen.  The kidneys are normal in size.  No hydronephrosis is seen.  No hydroureter is seen.  No nephrolithiasis or ureteral stone is seen.  No cortical renal abnormality seen..  Urinary bladder appears normal.  No colitis is seen.  No diverticulitis is seen.  No colonic mass or lesion or inflammatory process is seen.  There is no pooling of contrast seen within the colon to suggest active GI hemorrhage.  No free air is seen.  No free fluid is seen.  The bones appear grossly unremarkable.    Impression  No evidence of active GI hemorrhage seen  Cystic lesion in the spleen appears benign  Electronically signed by: Teresa Ramirez  Date:    10/19/2023  Time:    11:14    Impression    ICD-10-CM ICD-9-CM   1. CKD stage G2/A3, GFR 60-89 and albumin creatinine ratio >300 mg/g  N18.2 585.2   2. Hypertension, unspecified type  I10 401.9   3. Type 2 diabetes mellitus with stage 2 chronic kidney disease, with long-term current use of insulin  E11.22 250.40    N18.2 585.2    Z79.4 V58.67   4. BMI 32.0-32.9,adult  Z68.32 V85.32   5. Tobacco user  Z72.0 305.1        Plan       CKD stage G2/A3, GFR 60-89 and albumin creatinine ratio >300 mg/g  -     Comprehensive Metabolic Panel; Future  -     Protein/Creatinine Ratio, Urine; Future  -     Urinalysis, Reflex to Urine Culture; Future  Diabetic kidney disease.  Continue SGLT2 inhibitor, ARB, and good glycemic control.    Continue renal sparing  activities:  -2 g a day dietary sodium restriction  -optimize glycemic control (goal A1c is less than 7%)  -control high blood pressure (goal blood pressure is less than 130/80, patient was advised to check blood pressure once or twice a week and bring blood pressure logs to next office visit)  -exercise at least 30 minutes a day, 5 days a week  -maintain healthy weight  -decrease or stop alcohol use  -do not smoke  -stay well hydrated (drink water only, avoid juices, sweet tea, and sodas)  -ask about staying up-to-date on vaccinations (flu vaccine, pneumonia vaccine, hepatitis B vaccine)  -avoid excessive use of NSAIDs (ibuprofen, naproxen, Aleve, Advil, Toradol, Mobic), take Tylenol as needed for headache or mild pain  -take cholesterol lowering medications if prescribed (LDL goal less than 100)  Follow up in about 6 months (around 10/16/2024).     Hypertension, unspecified type  -     losartan (COZAAR) 100 MG tablet; Take 1 tablet (100 mg total) by mouth once daily.  Blood pressure reading is above goal.  Will increase losartan dose to 100 mg daily.      Type 2 diabetes mellitus with stage 2 chronic kidney disease, with long-term current use of insulin  Importance of good glycemic control discussed.  Continue SGLT2 inhibitor and ARB.      BMI 32.0-32.9,adult  Lifestyle and dietary interventions discussed, patient encouraged to maintain non-sedentary lifestyle and well-balanced diet.     Tobacco user  Patient was counseled on importance of tobacco use cessation.  Strongly encouraged to quit, offered a referral to a smoking cessation program.          4/16/2024  Kendal Grande NP  [facility]

## 2024-04-17 RX ORDER — MORPHINE SULFATE 100 MG/1
100 TABLET, FILM COATED, EXTENDED RELEASE ORAL 3 TIMES DAILY
Qty: 90 TABLET | Refills: 0 | Status: SHIPPED | OUTPATIENT
Start: 2024-04-17 | End: 2024-05-20

## 2024-04-17 RX ORDER — HYDROMORPHONE HYDROCHLORIDE 8 MG/1
8 TABLET ORAL EVERY 6 HOURS PRN
Qty: 120 TABLET | Refills: 0 | Status: SHIPPED | OUTPATIENT
Start: 2024-04-17 | End: 2024-05-20

## 2024-04-18 ENCOUNTER — EXTERNAL HOME HEALTH (OUTPATIENT)
Dept: HOME HEALTH SERVICES | Facility: HOSPITAL | Age: 44
End: 2024-04-18
Payer: MEDICARE

## 2024-04-25 ENCOUNTER — DOCUMENT SCAN (OUTPATIENT)
Dept: HOME HEALTH SERVICES | Facility: HOSPITAL | Age: 44
End: 2024-04-25
Payer: MEDICARE

## 2024-04-29 ENCOUNTER — OFFICE VISIT (OUTPATIENT)
Dept: NEUROLOGY | Facility: CLINIC | Age: 44
End: 2024-04-29
Payer: MEDICARE

## 2024-04-29 ENCOUNTER — TELEPHONE (OUTPATIENT)
Dept: NEUROLOGY | Facility: CLINIC | Age: 44
End: 2024-04-29
Payer: MEDICARE

## 2024-04-29 VITALS
DIASTOLIC BLOOD PRESSURE: 89 MMHG | HEART RATE: 76 BPM | OXYGEN SATURATION: 98 % | BODY MASS INDEX: 32.62 KG/M2 | HEIGHT: 69 IN | SYSTOLIC BLOOD PRESSURE: 174 MMHG | WEIGHT: 220.25 LBS

## 2024-04-29 DIAGNOSIS — R29.6 FALLS FREQUENTLY: ICD-10-CM

## 2024-04-29 DIAGNOSIS — G89.4 CHRONIC PAIN SYNDROME: Chronic | ICD-10-CM

## 2024-04-29 DIAGNOSIS — E11.40 PAINFUL DIABETIC NEUROPATHY: Primary | Chronic | ICD-10-CM

## 2024-04-29 PROCEDURE — 99214 OFFICE O/P EST MOD 30 MIN: CPT | Mod: S$PBB,,, | Performed by: NURSE PRACTITIONER

## 2024-04-29 PROCEDURE — G2211 COMPLEX E/M VISIT ADD ON: HCPCS | Mod: S$PBB,,, | Performed by: NURSE PRACTITIONER

## 2024-04-29 PROCEDURE — 99215 OFFICE O/P EST HI 40 MIN: CPT | Mod: PBBFAC | Performed by: NURSE PRACTITIONER

## 2024-04-29 RX ORDER — PREGABALIN 150 MG/1
150 CAPSULE ORAL 2 TIMES DAILY
Qty: 60 CAPSULE | Refills: 2 | Status: SHIPPED | OUTPATIENT
Start: 2024-04-29 | End: 2024-10-28

## 2024-04-29 NOTE — PROGRESS NOTES
[facility] Neurology Follow Up Office Visit Note    Subjective:      Patient ID: Bharath Caballero is a 44 y.o. male.    Chief Complaint: Painful diabetic neuropathy (Patient reports no change since last visit, c/o severe pain in RT big toe every night. Also reports edema in BLE)    HPI  This is a 44-year-old  male with past medical history of poorly controlled diabetes mellitus, morbid obesity, familial hypertriglyceridemia, recurrent pancreatitis, hepatic steatosis, hypertension, CICI, and tobacco abuse, CKD II, who was referred for mononeuritis multiplex due to DM II. He was last seen on 9/25/2023. During that visit, OXC 600mg BID was continued. PSG was ordered, Dx with CICI on CPAP. Referral to Dr. Jiménez has been sent, Pt has not heard from them. Elavil 25mg nightly was initiated for sleep.    Interim History  Today, Pt presents alone. Was seen by Dr. Jay Pozo on 1/17/2024, SCS revision was scheduled. However, Revision on hold. MD no longer at [facility]. Continues w/numbness to hands abd R hand weakness. Has completed PT/OT with little/no improvement. Unable to have MRI C-spine due to SCS. Followed by home health. Continues w/worsening burning/numbness and tingling to R lower ext, now worsening to hands. Utilizing CPAP nightly. Admits to multiple falls due to tyingling/burning/numbness to feet. DM not well controlled.    Severe hearing loss, pt states due to nerve damage, cannot afford cochlear implant.    Pt had NEVRO SCS placement for painful neuropathy on 7/21/2022 by Dr. Jay Pozo.     Currently sees Dr. Beasley for pain management.     Presenting History  He noted that he initially presented with right leg stabbing pain and paresthesia radiating up his leg for 2 years followed by paresthesia and allodynia in left foot for 1 year, worse with standing on his feet. He also noted that his hands and joints would hurt. He had quit drinking for 8 years. Still smokes. -280 most of the time for now,  but it was in the 500s for the past 4 year.    Current Medications -  Neurontin 400mg - 400mg - 800mg  OXC 600mg BID (9/25/2023 - present)    Prior Medications -  Effexor XR 37.5mg daily  CBZ ER 200mg-400mg BID (12/30/2020 - 9/25/2023) ineffective    Imaging  - MRI Brain w/w/out 5/29/2023 - small area of chronic gliosis to L temporal lobe, no acute intracranial findings    - MRI Lumbar w/o Bryce 9/20/2019 - I have reviewed the study independently and with the patient. 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    - MRI C spine w/o Bryce 9/20/2019 - I have reviewed the study independently and with the patient. 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 Multilevel spondylosis noted.    Results for orders placed or performed in visit on 04/01/24   GAD65 Ab, Serum   Result Value Ref Range    GAD65 Ab Assay, S 0.00 <=0.02 nmol/L   Basic Metabolic Panel   Result Value Ref Range    Sodium Level 135 (L) 136 - 145 mmol/L    Potassium Level 3.6 3.5 - 5.1 mmol/L    Chloride 98 98 - 107 mmol/L    Carbon Dioxide 28 22 - 29 mmol/L    Glucose Level 352 (H) 74 - 100 mg/dL    Blood Urea Nitrogen 10.2 8.9 - 20.6 mg/dL    Creatinine 1.04 0.73 - 1.18 mg/dL    BUN/Creatinine Ratio 10     Calcium Level Total 9.3 8.4 - 10.2 mg/dL    Anion Gap 9.0 mEq/L    eGFR >60 mls/min/1.73/m2   C-Peptide   Result Value Ref Range    C-Peptide, S 4.1 1.1 - 4.4 ng/mL   Hemoglobin A1C, POCT   Result Value Ref Range    Hemoglobin A1C, POC 12.1 %       Review of Systems   Constitutional: no fever, fatigue, +weakness  Eye: no vision loss, eye redness, drainage, or pain  ENMT: no sore throat, ear pain, sinus pain/congestion, nasal congestion/drainage  Respiratory: no cough, no wheezing, no shortness of breath  Cardiovascular: no chest pain, no palpitations, no edema at this time  Gastrointestinal: no nausea, vomiting, or diarrhea. No abdominal pain  Genitourinary: no dysuria, no urinary frequency or  urgency, no hematuria  Hema/Lymph: no abnormal bruising or bleeding  Endocrine: no heat or cold intolerance, no excessive thirst or excessive urination  Musculoskeletal: + muscle or joint pain, no joint swelling  Integumentary: no skin rash or abnormal lesion  Psychiatric: no depressed mood, + anxiety, no visual/auditory hallucinations, no delusions, no suicidal or homicidal ideation  Neurologic: antalgic     Objective:      Physical Exam    General: well-developed well-nourished in no acute distress  Eye: clear conjunctiva, eyelids normal  HENT: oropharynx without erythema/exudate, oropharynx and nasal mucosal surfaces moist  Neck: full range of motion  Respiratory: no distress on RA  Cardiovascular: regular rate and rhythm   Gastrointestinal: round, no guarding, non-distended  Musculoskeletal: full range of motion of all extremities/spine without limitation or discomfort  Integumentary: no rashes or skin lesions present    Neurologic:  Mental Status- Alert and Oriented to time, self, place, Speech is fluent, with intact reading and comprehension, long term memory intact, No Dysarthria.  CN II-XII - CN I Deferred, STACI, no RAPD, OD blurred vision, VA grossly normal to finger counting at 6ft, No ptosis b/l, EOMI w/o nystagmus, LT/PP symmetric, intact in CN V1-V3 distribution b/l, masseter symmetric b/l, T/U midline, Shoulder shrug symmetric b/l, Right SNHL, VFFC, no facial droop, [place] bilaterally  Motor - Tone and Bulk nml throughout, No involuntary movements, 5/5 to confrontation all ext.  FFM nml b/l, No pronator drift.   Sensory - LT/PP/Temp diminished in RLE up to knee, diminished to RUE from hand to shoulder & LLE up to mid-shin, Vibration absent in bilat Big Toes.   Reflexes - 2+ Throughout except for absent AJ b/l  Cerebellar Exam - FNF wnl b/l no intention tremor.  Gait - Antalgic gait, leaning mostly on his heels, utilizing cane    Assessment:       This is a 44-year-old  male with past medical  history of poorly controlled diabetes mellitus, morbid obesity, familial hypertriglyceridemia, recurrent pancreatitis, hepatic steatosis, hypertension, CICI, and tobacco abuse, CKD II, who was referred for mononeuritis multiplex due to DM II. Continues w/L upper ext numbness/tingling. Continues to cigarette intake to 10 cigarettes daily. Utilizing CPAP nightly. Has fallen a few times since last visit. Followed by home health. Has completed PT/OT. Followed by wound care.    1. Painful diabetic neuropathy  - pregabalin (LYRICA) 150 MG capsule; Take 1 capsule (150 mg total) by mouth 2 (two) times daily.  Dispense: 60 capsule; Refill: 2    2. Chronic pain syndrome    3. Falls frequently      Plan:       [] d/c Neurontin; c/w 400mg in AM and afternoon and 800mg nightly  [] c/w OXC 600mg BID  [] start Lyrica 150mg PO BID  [] c/w amitriptyline 25mg nightly for sleep and neuropathy  [] Rx Capsaicin Topical Cream .075% QID for neuropathic pain  [] advised on better glycemic control. Goal HgA1c < 7.  [] c/w decreased smoking  [] PCP to refer Pt to Dr. Jiménez for SCS revision    RTC 4 months    I have explained the treatment plan, diagnosis, and prognosis to the patient, caretaker, family. All questions are answered to the best of my knowledge.    Face to Face Time 30 minute, including, counseling, education, review of test results, relevant medical records, and coordination of care.    Judith Jean, NP-C  General Neurology

## 2024-04-29 NOTE — TELEPHONE ENCOUNTER
My apologies. Dr. Paulo Jiménez at [facility] in [place]. Phone [phone].

## 2024-04-29 NOTE — TELEPHONE ENCOUNTER
Good afternoon, Dr. Beasley. I am seeing this patient for painful diabetic neuropathy. He had a spinal cord stimulator placed by Dr. Jay Pozo, however, one of the leads has migrated and was set to be revised. As you know Dr. Pozo is no longer with us so he will not be performing the revision. I have referred him to Dr. Jiménez, but their office is now stating that he needs to be referred by his PCP. Mr. Caballero is scheduled to see you in June. Can you kindly send the referral? I'm happy to help in any way I can. Thank you!

## 2024-04-30 ENCOUNTER — TELEPHONE (OUTPATIENT)
Dept: INTERNAL MEDICINE | Facility: CLINIC | Age: 44
End: 2024-04-30
Payer: MEDICARE

## 2024-04-30 ENCOUNTER — TELEPHONE (OUTPATIENT)
Dept: NEUROLOGY | Facility: CLINIC | Age: 44
End: 2024-04-30
Payer: MEDICARE

## 2024-04-30 DIAGNOSIS — G89.29 OTHER CHRONIC PAIN: ICD-10-CM

## 2024-04-30 DIAGNOSIS — E08.42 DIABETIC POLYNEUROPATHY ASSOCIATED WITH DIABETES MELLITUS DUE TO UNDERLYING CONDITION: Primary | ICD-10-CM

## 2024-04-30 NOTE — TELEPHONE ENCOUNTER
Called and left VM providing Pt with phone number to [employer] to assist with stimulator. Informed him that Dr. Beasley will send referral to Dr. Jiménez to evaluate SCS revision.

## 2024-05-13 PROCEDURE — G0179 MD RECERTIFICATION HHA PT: HCPCS | Mod: ,,, | Performed by: NURSE PRACTITIONER

## 2024-05-20 DIAGNOSIS — E08.42 DIABETIC POLYNEUROPATHY ASSOCIATED WITH DIABETES MELLITUS DUE TO UNDERLYING CONDITION: ICD-10-CM

## 2024-05-20 DIAGNOSIS — G89.4 CHRONIC PAIN SYNDROME: ICD-10-CM

## 2024-05-20 RX ORDER — MORPHINE SULFATE 100 MG/1
100 TABLET, FILM COATED, EXTENDED RELEASE ORAL 3 TIMES DAILY
Qty: 90 TABLET | Refills: 0 | Status: SHIPPED | OUTPATIENT
Start: 2024-05-20 | End: 2024-06-18

## 2024-05-20 RX ORDER — HYDROMORPHONE HYDROCHLORIDE 8 MG/1
8 TABLET ORAL EVERY 6 HOURS PRN
Qty: 120 TABLET | Refills: 0 | Status: SHIPPED | OUTPATIENT
Start: 2024-05-20 | End: 2024-06-04 | Stop reason: SDUPTHER

## 2024-05-20 RX ORDER — CLONAZEPAM 1 MG/1
1 TABLET ORAL 2 TIMES DAILY
Qty: 60 TABLET | Refills: 0 | Status: SHIPPED | OUTPATIENT
Start: 2024-05-20 | End: 2024-06-18

## 2024-06-04 ENCOUNTER — OFFICE VISIT (OUTPATIENT)
Dept: INTERNAL MEDICINE | Facility: CLINIC | Age: 44
End: 2024-06-04
Payer: MEDICARE

## 2024-06-04 ENCOUNTER — CLINICAL SUPPORT (OUTPATIENT)
Dept: INTERNAL MEDICINE | Facility: CLINIC | Age: 44
End: 2024-06-04
Attending: INTERNAL MEDICINE
Payer: MEDICARE

## 2024-06-04 ENCOUNTER — LAB VISIT (OUTPATIENT)
Dept: LAB | Facility: HOSPITAL | Age: 44
End: 2024-06-04
Attending: INTERNAL MEDICINE
Payer: MEDICARE

## 2024-06-04 VITALS
RESPIRATION RATE: 18 BRPM | BODY MASS INDEX: 32.32 KG/M2 | TEMPERATURE: 98 F | WEIGHT: 218.19 LBS | DIASTOLIC BLOOD PRESSURE: 72 MMHG | HEIGHT: 69 IN | SYSTOLIC BLOOD PRESSURE: 150 MMHG | OXYGEN SATURATION: 99 % | HEART RATE: 77 BPM

## 2024-06-04 DIAGNOSIS — E55.9 VITAMIN D DEFICIENCY: ICD-10-CM

## 2024-06-04 DIAGNOSIS — K21.00 GASTROESOPHAGEAL REFLUX DISEASE WITH ESOPHAGITIS WITHOUT HEMORRHAGE: ICD-10-CM

## 2024-06-04 DIAGNOSIS — E78.01 FAMILIAL HYPERCHOLESTEROLEMIA: ICD-10-CM

## 2024-06-04 DIAGNOSIS — Z87.19 HISTORY OF PANCREATITIS: ICD-10-CM

## 2024-06-04 DIAGNOSIS — G47.33 OBSTRUCTIVE SLEEP APNEA SYNDROME: ICD-10-CM

## 2024-06-04 DIAGNOSIS — Z79.4 TYPE 2 DIABETES MELLITUS WITH OTHER SKIN ULCER, WITH LONG-TERM CURRENT USE OF INSULIN: ICD-10-CM

## 2024-06-04 DIAGNOSIS — E11.622 TYPE 2 DIABETES MELLITUS WITH OTHER SKIN ULCER, WITH LONG-TERM CURRENT USE OF INSULIN: ICD-10-CM

## 2024-06-04 DIAGNOSIS — K76.0 HEPATIC STEATOSIS: ICD-10-CM

## 2024-06-04 DIAGNOSIS — G89.4 CHRONIC PAIN SYNDROME: Primary | Chronic | ICD-10-CM

## 2024-06-04 DIAGNOSIS — R29.6 FALLS FREQUENTLY: ICD-10-CM

## 2024-06-04 DIAGNOSIS — E78.1 FAMILIAL HYPERTRIGLYCERIDEMIA: ICD-10-CM

## 2024-06-04 DIAGNOSIS — E08.42 DIABETIC POLYNEUROPATHY ASSOCIATED WITH DIABETES MELLITUS DUE TO UNDERLYING CONDITION: ICD-10-CM

## 2024-06-04 DIAGNOSIS — F33.2 MAJOR DEPRESSIVE DISORDER, RECURRENT SEVERE WITHOUT PSYCHOTIC FEATURES: ICD-10-CM

## 2024-06-04 DIAGNOSIS — E55.9 VITAMIN D DEFICIENCY: Primary | ICD-10-CM

## 2024-06-04 DIAGNOSIS — I25.118 CORONARY ARTERY DISEASE OF NATIVE ARTERY OF NATIVE HEART WITH STABLE ANGINA PECTORIS: ICD-10-CM

## 2024-06-04 DIAGNOSIS — L60.2 OVERGROWN TOENAILS: ICD-10-CM

## 2024-06-04 DIAGNOSIS — E11.40 PAINFUL DIABETIC NEUROPATHY: Chronic | ICD-10-CM

## 2024-06-04 DIAGNOSIS — Z72.0 TOBACCO USER: ICD-10-CM

## 2024-06-04 DIAGNOSIS — R79.89 ELEVATED LFTS: ICD-10-CM

## 2024-06-04 DIAGNOSIS — Z86.010 PERSONAL HISTORY OF COLONIC POLYPS: ICD-10-CM

## 2024-06-04 DIAGNOSIS — I10 PRIMARY HYPERTENSION: ICD-10-CM

## 2024-06-04 DIAGNOSIS — Z74.09 IMPAIRED FUNCTIONAL MOBILITY, BALANCE, GAIT, AND ENDURANCE: ICD-10-CM

## 2024-06-04 DIAGNOSIS — E66.9 CLASS 1 OBESITY WITH BODY MASS INDEX (BMI) OF 33.0 TO 33.9 IN ADULT, UNSPECIFIED OBESITY TYPE, UNSPECIFIED WHETHER SERIOUS COMORBIDITY PRESENT: ICD-10-CM

## 2024-06-04 LAB
25(OH)D3+25(OH)D2 SERPL-MCNC: 16 NG/ML (ref 30–80)
ALBUMIN SERPL-MCNC: 3.2 G/DL (ref 3.5–5)
ALBUMIN/GLOB SERPL: 0.7 RATIO (ref 1.1–2)
ALP SERPL-CCNC: 247 UNIT/L (ref 40–150)
ALT SERPL-CCNC: 38 UNIT/L (ref 0–55)
ANION GAP SERPL CALC-SCNC: 7 MEQ/L
AST SERPL-CCNC: 36 UNIT/L (ref 5–34)
BASOPHILS # BLD AUTO: 0.05 X10(3)/MCL
BASOPHILS NFR BLD AUTO: 0.4 %
BILIRUB SERPL-MCNC: 0.4 MG/DL
BUN SERPL-MCNC: 9.1 MG/DL (ref 8.9–20.6)
CALCIUM SERPL-MCNC: 9.4 MG/DL (ref 8.4–10.2)
CHLORIDE SERPL-SCNC: 103 MMOL/L (ref 98–107)
CO2 SERPL-SCNC: 29 MMOL/L (ref 22–29)
CREAT SERPL-MCNC: 0.88 MG/DL (ref 0.73–1.18)
CREAT/UREA NIT SERPL: 10
EOSINOPHIL # BLD AUTO: 0.44 X10(3)/MCL (ref 0–0.9)
EOSINOPHIL NFR BLD AUTO: 3.6 %
ERYTHROCYTE [DISTWIDTH] IN BLOOD BY AUTOMATED COUNT: 12.5 % (ref 11.5–17)
EST. AVERAGE GLUCOSE BLD GHB EST-MCNC: 188.6 MG/DL
GFR SERPLBLD CREATININE-BSD FMLA CKD-EPI: >60 ML/MIN/1.73/M2
GLOBULIN SER-MCNC: 4.5 GM/DL (ref 2.4–3.5)
GLUCOSE SERPL-MCNC: 226 MG/DL (ref 74–100)
HBA1C MFR BLD: 8.2 %
HCT VFR BLD AUTO: 39.1 % (ref 42–52)
HGB BLD-MCNC: 14 G/DL (ref 14–18)
IMM GRANULOCYTES # BLD AUTO: 0.06 X10(3)/MCL (ref 0–0.04)
IMM GRANULOCYTES NFR BLD AUTO: 0.5 %
LYMPHOCYTES # BLD AUTO: 2.88 X10(3)/MCL (ref 0.6–4.6)
LYMPHOCYTES NFR BLD AUTO: 23.7 %
MCH RBC QN AUTO: 31 PG (ref 27–31)
MCHC RBC AUTO-ENTMCNC: 35.8 G/DL (ref 33–36)
MCV RBC AUTO: 86.7 FL (ref 80–94)
MONOCYTES # BLD AUTO: 1.04 X10(3)/MCL (ref 0.1–1.3)
MONOCYTES NFR BLD AUTO: 8.6 %
NEUTROPHILS # BLD AUTO: 7.68 X10(3)/MCL (ref 2.1–9.2)
NEUTROPHILS NFR BLD AUTO: 63.2 %
NRBC BLD AUTO-RTO: 0 %
PLATELET # BLD AUTO: 318 X10(3)/MCL (ref 130–400)
PMV BLD AUTO: 9.2 FL (ref 7.4–10.4)
POTASSIUM SERPL-SCNC: 3.4 MMOL/L (ref 3.5–5.1)
PROT SERPL-MCNC: 7.7 GM/DL (ref 6.4–8.3)
RBC # BLD AUTO: 4.51 X10(6)/MCL (ref 4.7–6.1)
SODIUM SERPL-SCNC: 139 MMOL/L (ref 136–145)
WBC # SPEC AUTO: 12.15 X10(3)/MCL (ref 4.5–11.5)

## 2024-06-04 PROCEDURE — 85025 COMPLETE CBC W/AUTO DIFF WBC: CPT

## 2024-06-04 PROCEDURE — 80053 COMPREHEN METABOLIC PANEL: CPT

## 2024-06-04 PROCEDURE — 92228 IMG RTA DETC/MNTR DS PHY/QHP: CPT | Mod: PBBFAC

## 2024-06-04 PROCEDURE — 83036 HEMOGLOBIN GLYCOSYLATED A1C: CPT

## 2024-06-04 PROCEDURE — G0009 ADMIN PNEUMOCOCCAL VACCINE: HCPCS | Mod: PBBFAC

## 2024-06-04 PROCEDURE — 36415 COLL VENOUS BLD VENIPUNCTURE: CPT

## 2024-06-04 PROCEDURE — 90677 PCV20 VACCINE IM: CPT | Mod: PBBFAC

## 2024-06-04 PROCEDURE — 92228 IMG RTA DETC/MNTR DS PHY/QHP: CPT | Mod: TC,PBBFAC | Performed by: INTERNAL MEDICINE

## 2024-06-04 PROCEDURE — 92228 IMG RTA DETC/MNTR DS PHY/QHP: CPT | Mod: 26,S$PBB,, | Performed by: OPHTHALMOLOGY

## 2024-06-04 PROCEDURE — 82306 VITAMIN D 25 HYDROXY: CPT

## 2024-06-04 PROCEDURE — 99213 OFFICE O/P EST LOW 20 MIN: CPT | Mod: PBBFAC,25 | Performed by: INTERNAL MEDICINE

## 2024-06-04 RX ORDER — NALOXONE HYDROCHLORIDE 4 MG/.1ML
1 SPRAY NASAL ONCE
Qty: 1 EACH | Refills: 0 | Status: SHIPPED | OUTPATIENT
Start: 2024-06-04 | End: 2024-06-04

## 2024-06-04 RX ORDER — VIT C/E/ZN/COPPR/LUTEIN/ZEAXAN 250MG-90MG
2000 CAPSULE ORAL DAILY
Qty: 720 CAPSULE | Refills: 0 | Status: SHIPPED | OUTPATIENT
Start: 2024-06-04 | End: 2025-05-30

## 2024-06-04 RX ORDER — HYDROMORPHONE HYDROCHLORIDE 8 MG/1
8 TABLET ORAL EVERY 8 HOURS PRN
Qty: 90 TABLET | Refills: 0 | Status: SHIPPED | OUTPATIENT
Start: 2024-06-04

## 2024-06-04 RX ADMIN — PNEUMOCOCCAL 20-VALENT CONJUGATE VACCINE 0.5 ML
2.2; 2.2; 2.2; 2.2; 2.2; 2.2; 2.2; 2.2; 2.2; 2.2; 2.2; 2.2; 2.2; 2.2; 2.2; 2.2; 4.4; 2.2; 2.2; 2.2 INJECTION, SUSPENSION INTRAMUSCULAR at 02:06

## 2024-06-04 NOTE — PROGRESS NOTES
Bharath Caballero is a 44 y.o. male here for a diabetic eye screening with non-dilated fundus photos per Dat Beasley MD.    Patient cooperative?: Yes  Small pupils?: No  Last eye exam: unknown    For exam results, see Encounter Report.
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
## 2024-06-06 ENCOUNTER — HOSPITAL ENCOUNTER (OUTPATIENT)
Dept: WOUND CARE | Facility: HOSPITAL | Age: 44
Discharge: HOME | End: 2024-06-06
Attending: NURSE PRACTITIONER
Payer: MEDICARE

## 2024-06-06 VITALS
DIASTOLIC BLOOD PRESSURE: 88 MMHG | TEMPERATURE: 98 F | RESPIRATION RATE: 20 BRPM | HEART RATE: 77 BPM | SYSTOLIC BLOOD PRESSURE: 158 MMHG

## 2024-06-06 DIAGNOSIS — E13.42 DIABETIC POLYNEUROPATHY ASSOCIATED WITH OTHER SPECIFIED DIABETES MELLITUS: ICD-10-CM

## 2024-06-06 DIAGNOSIS — E11.622 TYPE 2 DIABETES MELLITUS WITH OTHER SKIN ULCER, WITH LONG-TERM CURRENT USE OF INSULIN: ICD-10-CM

## 2024-06-06 DIAGNOSIS — Z72.0 TOBACCO USER: ICD-10-CM

## 2024-06-06 DIAGNOSIS — L60.2 OVERGROWN TOENAILS: ICD-10-CM

## 2024-06-06 DIAGNOSIS — L98.9 SKIN LESION OF NECK: ICD-10-CM

## 2024-06-06 DIAGNOSIS — E11.69 ONYCHOMYCOSIS OF MULTIPLE TOENAILS WITH TYPE 2 DIABETES MELLITUS: ICD-10-CM

## 2024-06-06 DIAGNOSIS — B35.1 ONYCHOMYCOSIS OF MULTIPLE TOENAILS WITH TYPE 2 DIABETES MELLITUS: ICD-10-CM

## 2024-06-06 DIAGNOSIS — Z79.4 TYPE 2 DIABETES MELLITUS WITH OTHER SKIN ULCER, WITH LONG-TERM CURRENT USE OF INSULIN: ICD-10-CM

## 2024-06-06 DIAGNOSIS — E11.9 ENCOUNTER FOR DIABETIC FOOT EXAM: Primary | ICD-10-CM

## 2024-06-06 PROCEDURE — 27000999 HC MEDICAL RECORD PHOTO DOCUMENTATION

## 2024-06-06 PROCEDURE — 11720 DEBRIDE NAIL 1-5: CPT

## 2024-06-06 PROCEDURE — 99214 OFFICE O/P EST MOD 30 MIN: CPT | Mod: 25,,, | Performed by: NURSE PRACTITIONER

## 2024-06-06 PROCEDURE — 99211 OFF/OP EST MAY X REQ PHY/QHP: CPT

## 2024-06-06 PROCEDURE — 11719 TRIM NAIL(S) ANY NUMBER: CPT

## 2024-06-07 PROBLEM — L98.9 SKIN LESION OF NECK: Status: ACTIVE | Noted: 2024-06-07
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
## 2024-06-17 ENCOUNTER — OFFICE VISIT (OUTPATIENT)
Dept: CARDIOLOGY | Facility: CLINIC | Age: 44
End: 2024-06-17
Payer: MEDICARE

## 2024-06-17 VITALS
OXYGEN SATURATION: 100 % | HEIGHT: 69 IN | TEMPERATURE: 99 F | SYSTOLIC BLOOD PRESSURE: 148 MMHG | BODY MASS INDEX: 32.11 KG/M2 | DIASTOLIC BLOOD PRESSURE: 82 MMHG | WEIGHT: 216.81 LBS | RESPIRATION RATE: 18 BRPM | HEART RATE: 68 BPM

## 2024-06-17 DIAGNOSIS — E08.42 DIABETIC POLYNEUROPATHY ASSOCIATED WITH DIABETES MELLITUS DUE TO UNDERLYING CONDITION: ICD-10-CM

## 2024-06-17 DIAGNOSIS — I25.118 CORONARY ARTERY DISEASE OF NATIVE ARTERY OF NATIVE HEART WITH STABLE ANGINA PECTORIS: ICD-10-CM

## 2024-06-17 DIAGNOSIS — E78.1 FAMILIAL HYPERTRIGLYCERIDEMIA: Primary | ICD-10-CM

## 2024-06-17 DIAGNOSIS — E78.2 MIXED HYPERLIPIDEMIA: ICD-10-CM

## 2024-06-17 DIAGNOSIS — Z72.0 TOBACCO USER: ICD-10-CM

## 2024-06-17 DIAGNOSIS — E87.6 HYPOKALEMIA: ICD-10-CM

## 2024-06-17 DIAGNOSIS — I10 PRIMARY HYPERTENSION: ICD-10-CM

## 2024-06-17 PROCEDURE — 99215 OFFICE O/P EST HI 40 MIN: CPT | Mod: PBBFAC | Performed by: INTERNAL MEDICINE

## 2024-06-17 RX ORDER — SPIRONOLACTONE 100 MG/1
100 TABLET, FILM COATED ORAL DAILY
Qty: 90 TABLET | Refills: 3 | Status: SHIPPED | OUTPATIENT
Start: 2024-06-17 | End: 2025-06-17
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
## 2024-06-17 NOTE — PATIENT INSTRUCTIONS
Increase aldactone  to 100 mg     Follow up in 5 months      You have been scheduled for a nurse visit, monitor blood pressure and heart rate at home and bring log provided to clinic

## 2024-06-18 DIAGNOSIS — G89.4 CHRONIC PAIN SYNDROME: ICD-10-CM

## 2024-06-18 DIAGNOSIS — E08.42 DIABETIC POLYNEUROPATHY ASSOCIATED WITH DIABETES MELLITUS DUE TO UNDERLYING CONDITION: ICD-10-CM

## 2024-06-18 RX ORDER — INSULIN GLARGINE 300 U/ML
INJECTION, SOLUTION SUBCUTANEOUS
Qty: 4.5 ML | Refills: 3 | Status: SHIPPED | OUTPATIENT
Start: 2024-06-18

## 2024-06-18 RX ORDER — MORPHINE SULFATE 100 MG/1
100 TABLET, FILM COATED, EXTENDED RELEASE ORAL 3 TIMES DAILY
Qty: 90 TABLET | Refills: 0 | Status: SHIPPED | OUTPATIENT
Start: 2024-06-18

## 2024-06-18 RX ORDER — CLONAZEPAM 1 MG/1
1 TABLET ORAL 2 TIMES DAILY
Qty: 60 TABLET | Refills: 0 | Status: SHIPPED | OUTPATIENT
Start: 2024-06-18

## 2024-06-20 ENCOUNTER — EXTERNAL HOME HEALTH (OUTPATIENT)
Dept: HOME HEALTH SERVICES | Facility: HOSPITAL | Age: 44
End: 2024-06-20
Payer: MEDICARE

## 2024-06-25 DIAGNOSIS — I25.118 CORONARY ARTERY DISEASE OF NATIVE ARTERY OF NATIVE HEART WITH STABLE ANGINA PECTORIS: ICD-10-CM

## 2024-06-25 DIAGNOSIS — I10 PRIMARY HYPERTENSION: ICD-10-CM

## 2024-06-25 DIAGNOSIS — I10 HYPERTENSION, UNSPECIFIED TYPE: ICD-10-CM

## 2024-06-25 RX ORDER — CLONIDINE HYDROCHLORIDE 0.3 MG/1
0.3 TABLET ORAL 3 TIMES DAILY
Qty: 270 TABLET | Refills: 3 | Status: SHIPPED | OUTPATIENT
Start: 2024-06-25 | End: 2025-06-25

## 2024-06-25 RX ORDER — ISOSORBIDE MONONITRATE 120 MG/1
120 TABLET, EXTENDED RELEASE ORAL DAILY
Qty: 90 TABLET | Refills: 3 | Status: SHIPPED | OUTPATIENT
Start: 2024-06-25 | End: 2025-06-25

## 2024-06-25 NOTE — TELEPHONE ENCOUNTER
Patient called stating he needs refills on his CLONIDINE and ISOSARBIDE MONO. Patient was placed on hold to assist another patient with check-in, and pt hung phone up. Attempted to call pt back but no answer.    Pt also stated that he had taken his last meds on this morning and his morning read was 200/110. Patient didn't have any complaints at this time.

## 2024-06-26 DIAGNOSIS — I10 PRIMARY HYPERTENSION: ICD-10-CM

## 2024-06-26 DIAGNOSIS — I25.118 CORONARY ARTERY DISEASE OF NATIVE ARTERY OF NATIVE HEART WITH STABLE ANGINA PECTORIS: ICD-10-CM

## 2024-06-26 DIAGNOSIS — G89.4 CHRONIC PAIN SYNDROME: ICD-10-CM

## 2024-06-26 RX ORDER — NIFEDIPINE 60 MG/1
60 TABLET, EXTENDED RELEASE ORAL 2 TIMES DAILY
Qty: 180 TABLET | Refills: 3 | Status: SHIPPED | OUTPATIENT
Start: 2024-06-26 | End: 2025-06-26

## 2024-06-26 RX ORDER — NITROGLYCERIN 0.3 MG/1
0.3 TABLET SUBLINGUAL EVERY 5 MIN PRN
Qty: 30 TABLET | Refills: 6 | Status: SHIPPED | OUTPATIENT
Start: 2024-06-26 | End: 2024-07-26

## 2024-06-26 RX ORDER — CARVEDILOL 25 MG/1
25 TABLET ORAL 2 TIMES DAILY WITH MEALS
Qty: 180 TABLET | Refills: 3 | Status: SHIPPED | OUTPATIENT
Start: 2024-06-26 | End: 2025-06-26

## 2024-07-12 ENCOUNTER — DOCUMENT SCAN (OUTPATIENT)
Dept: HOME HEALTH SERVICES | Facility: HOSPITAL | Age: 44
End: 2024-07-12
Payer: MEDICARE

## 2024-07-18 DIAGNOSIS — Z79.4 TYPE 2 DIABETES MELLITUS WITH DIABETIC NEUROPATHY, WITH LONG-TERM CURRENT USE OF INSULIN: ICD-10-CM

## 2024-07-18 DIAGNOSIS — G89.4 CHRONIC PAIN SYNDROME: ICD-10-CM

## 2024-07-18 DIAGNOSIS — E87.6 HYPOKALEMIA: ICD-10-CM

## 2024-07-18 DIAGNOSIS — E08.42 DIABETIC POLYNEUROPATHY ASSOCIATED WITH DIABETES MELLITUS DUE TO UNDERLYING CONDITION: ICD-10-CM

## 2024-07-18 DIAGNOSIS — E11.40 TYPE 2 DIABETES MELLITUS WITH DIABETIC NEUROPATHY, WITH LONG-TERM CURRENT USE OF INSULIN: ICD-10-CM

## 2024-07-18 RX ORDER — DAPAGLIFLOZIN 5 MG/1
TABLET, FILM COATED ORAL
Qty: 90 TABLET | Refills: 3 | Status: SHIPPED | OUTPATIENT
Start: 2024-07-18

## 2024-07-18 RX ORDER — MORPHINE SULFATE 100 MG/1
100 TABLET, FILM COATED, EXTENDED RELEASE ORAL 3 TIMES DAILY
Qty: 90 TABLET | Refills: 0 | Status: SHIPPED | OUTPATIENT
Start: 2024-07-18

## 2024-07-18 RX ORDER — POTASSIUM CHLORIDE 20 MEQ/1
20 TABLET, EXTENDED RELEASE ORAL 2 TIMES DAILY
Qty: 180 TABLET | Refills: 3 | Status: SHIPPED | OUTPATIENT
Start: 2024-07-18

## 2024-07-18 RX ORDER — CLONAZEPAM 1 MG/1
1 TABLET ORAL 2 TIMES DAILY
Qty: 60 TABLET | Refills: 0 | Status: SHIPPED | OUTPATIENT
Start: 2024-07-18

## 2024-07-18 RX ORDER — HYDROMORPHONE HYDROCHLORIDE 8 MG/1
8 TABLET ORAL EVERY 8 HOURS PRN
Qty: 90 TABLET | Refills: 0 | Status: SHIPPED | OUTPATIENT
Start: 2024-07-18

## 2024-07-24 ENCOUNTER — CLINICAL SUPPORT (OUTPATIENT)
Dept: CARDIOLOGY | Facility: CLINIC | Age: 44
End: 2024-07-24
Payer: MEDICARE

## 2024-07-24 VITALS
RESPIRATION RATE: 20 BRPM | OXYGEN SATURATION: 100 % | SYSTOLIC BLOOD PRESSURE: 132 MMHG | BODY MASS INDEX: 32.46 KG/M2 | TEMPERATURE: 99 F | HEIGHT: 69 IN | DIASTOLIC BLOOD PRESSURE: 72 MMHG | WEIGHT: 219.13 LBS | HEART RATE: 78 BPM

## 2024-07-24 DIAGNOSIS — I10 HYPERTENSION, UNSPECIFIED TYPE: Primary | ICD-10-CM

## 2024-07-24 PROCEDURE — 99215 OFFICE O/P EST HI 40 MIN: CPT | Mod: PBBFAC

## 2024-07-24 NOTE — PROGRESS NOTES
Patient saw for nurse visit for blood pressure check. He reports compliance with medications. Patient has a known history of familial hypertriglyceridemia with recurrent pancreatitis, CAD on medical management, HTN, DM, diabetic neuropathy, Hepatic Steatosis, CICI, and smoking. He did not bring bp log from home. 3 elevated readings reported, but pt said he had not taken medication when 2 of the readings were obtained. First BP check in office 148/84, repeat bp 132/72. Dr Campbell spoke to pt regarding his c/o of dizziness and fatigue. He is on several narcotic medications that cause fatigue. Pt  agreed to continue current cardiac medications. No new orders at this time.

## 2024-08-05 ENCOUNTER — OFFICE VISIT (OUTPATIENT)
Dept: FAMILY MEDICINE | Facility: CLINIC | Age: 44
End: 2024-08-05
Payer: MEDICARE

## 2024-08-05 ENCOUNTER — HOSPITAL ENCOUNTER (OUTPATIENT)
Dept: WOUND CARE | Facility: HOSPITAL | Age: 44
Discharge: HOME | End: 2024-08-05
Attending: NURSE PRACTITIONER
Payer: MEDICARE

## 2024-08-05 ENCOUNTER — TELEPHONE (OUTPATIENT)
Dept: INTERNAL MEDICINE | Facility: CLINIC | Age: 44
End: 2024-08-05

## 2024-08-05 VITALS
TEMPERATURE: 98 F | HEART RATE: 80 BPM | SYSTOLIC BLOOD PRESSURE: 115 MMHG | RESPIRATION RATE: 20 BRPM | DIASTOLIC BLOOD PRESSURE: 62 MMHG | OXYGEN SATURATION: 95 %

## 2024-08-05 VITALS
BODY MASS INDEX: 32.29 KG/M2 | SYSTOLIC BLOOD PRESSURE: 125 MMHG | WEIGHT: 218 LBS | RESPIRATION RATE: 18 BRPM | HEIGHT: 69 IN | DIASTOLIC BLOOD PRESSURE: 70 MMHG | OXYGEN SATURATION: 99 % | HEART RATE: 83 BPM | TEMPERATURE: 99 F

## 2024-08-05 DIAGNOSIS — L97.421 ULCER OF LEFT HEEL AND MIDFOOT, LIMITED TO BREAKDOWN OF SKIN: ICD-10-CM

## 2024-08-05 DIAGNOSIS — E13.42 DIABETIC POLYNEUROPATHY ASSOCIATED WITH OTHER SPECIFIED DIABETES MELLITUS: ICD-10-CM

## 2024-08-05 DIAGNOSIS — L98.9 SKIN LESION OF NECK: Primary | ICD-10-CM

## 2024-08-05 DIAGNOSIS — E11.622 TYPE 2 DIABETES MELLITUS WITH OTHER SKIN ULCER, WITH LONG-TERM CURRENT USE OF INSULIN: ICD-10-CM

## 2024-08-05 DIAGNOSIS — S90.821A BLISTER OF RIGHT FOOT, INITIAL ENCOUNTER: Primary | ICD-10-CM

## 2024-08-05 DIAGNOSIS — Z79.4 TYPE 2 DIABETES MELLITUS WITH OTHER SKIN ULCER, WITH LONG-TERM CURRENT USE OF INSULIN: ICD-10-CM

## 2024-08-05 PROCEDURE — 17000 DESTRUCT PREMALG LESION: CPT | Mod: PBBFAC

## 2024-08-05 PROCEDURE — 99215 OFFICE O/P EST HI 40 MIN: CPT | Mod: PBBFAC,25

## 2024-08-05 PROCEDURE — 27000999 HC MEDICAL RECORD PHOTO DOCUMENTATION

## 2024-08-05 PROCEDURE — 99211 OFF/OP EST MAY X REQ PHY/QHP: CPT | Mod: 27

## 2024-08-05 PROCEDURE — 17003 DESTRUCT PREMALG LES 2-14: CPT | Mod: PBBFAC

## 2024-08-05 PROCEDURE — 99214 OFFICE O/P EST MOD 30 MIN: CPT | Mod: ,,, | Performed by: NURSE PRACTITIONER

## 2024-08-05 PROCEDURE — 11306 SHAVE SKIN LESION 0.6-1.0 CM: CPT | Mod: PBBFAC

## 2024-08-05 RX ORDER — LIDOCAINE HYDROCHLORIDE 10 MG/ML
5 INJECTION, SOLUTION EPIDURAL; INFILTRATION; INTRACAUDAL; PERINEURAL
Status: COMPLETED | OUTPATIENT
Start: 2024-08-05 | End: 2024-08-05

## 2024-08-05 RX ADMIN — LIDOCAINE HYDROCHLORIDE 50 MG: 10 INJECTION, SOLUTION EPIDURAL; INFILTRATION; INTRACAUDAL; PERINEURAL at 09:08

## 2024-08-05 NOTE — TELEPHONE ENCOUNTER
Tried calling pain referral to speak to someone in their referral department, no answer, left message for them to call me back.

## 2024-08-05 NOTE — TELEPHONE ENCOUNTER
----- Message from Liliana Pelayo sent at 8/5/2024 10:37 AM CDT -----  Regarding: zac/referral  The referral you sent to [facility] to fix the lead on his stimuler. They stated the referral is confusing. Fax#[phone]

## 2024-08-06 LAB — GLUCOSE SERPL-MCNC: >500 MG/DL (ref 70–110)

## 2024-08-13 ENCOUNTER — TELEPHONE (OUTPATIENT)
Dept: FAMILY MEDICINE | Facility: CLINIC | Age: 44
End: 2024-08-13
Payer: MEDICARE

## 2024-08-13 NOTE — TELEPHONE ENCOUNTER
Called patient, confirmed identity with , notified patient of pathology report, patient has appointment with [facility] minor procedure clinic set up on 9/3/2024 for further workup. Patient notified of upcoming appointment.

## 2024-08-21 DIAGNOSIS — E08.42 DIABETIC POLYNEUROPATHY ASSOCIATED WITH DIABETES MELLITUS DUE TO UNDERLYING CONDITION: ICD-10-CM

## 2024-08-21 DIAGNOSIS — G89.4 CHRONIC PAIN SYNDROME: ICD-10-CM

## 2024-08-22 ENCOUNTER — TELEPHONE (OUTPATIENT)
Dept: ENDOCRINOLOGY | Facility: CLINIC | Age: 44
End: 2024-08-22
Payer: MEDICARE

## 2024-08-22 RX ORDER — MORPHINE SULFATE 100 MG/1
100 TABLET, FILM COATED, EXTENDED RELEASE ORAL 3 TIMES DAILY
Qty: 90 TABLET | Refills: 0 | Status: SHIPPED | OUTPATIENT
Start: 2024-08-22

## 2024-08-22 RX ORDER — CLONAZEPAM 1 MG/1
1 TABLET ORAL 2 TIMES DAILY
Qty: 60 TABLET | Refills: 0 | Status: SHIPPED | OUTPATIENT
Start: 2024-08-22

## 2024-08-27 ENCOUNTER — DOCUMENT SCAN (OUTPATIENT)
Dept: HOME HEALTH SERVICES | Facility: HOSPITAL | Age: 44
End: 2024-08-27
Payer: MEDICARE

## 2024-08-29 ENCOUNTER — OFFICE VISIT (OUTPATIENT)
Dept: NEUROLOGY | Facility: CLINIC | Age: 44
End: 2024-08-29
Payer: MEDICARE

## 2024-08-29 VITALS
BODY MASS INDEX: 32.58 KG/M2 | DIASTOLIC BLOOD PRESSURE: 90 MMHG | HEART RATE: 74 BPM | SYSTOLIC BLOOD PRESSURE: 194 MMHG | WEIGHT: 215 LBS | OXYGEN SATURATION: 98 % | HEIGHT: 68 IN

## 2024-08-29 DIAGNOSIS — E11.40 PAINFUL DIABETIC NEUROPATHY: Primary | Chronic | ICD-10-CM

## 2024-08-29 DIAGNOSIS — G89.4 CHRONIC PAIN SYNDROME: Chronic | ICD-10-CM

## 2024-08-29 PROCEDURE — 99214 OFFICE O/P EST MOD 30 MIN: CPT | Mod: S$PBB,,, | Performed by: NURSE PRACTITIONER

## 2024-08-29 PROCEDURE — 99213 OFFICE O/P EST LOW 20 MIN: CPT | Mod: PBBFAC | Performed by: NURSE PRACTITIONER

## 2024-08-29 PROCEDURE — G2211 COMPLEX E/M VISIT ADD ON: HCPCS | Mod: S$PBB,,, | Performed by: NURSE PRACTITIONER

## 2024-08-29 RX ORDER — PREGABALIN 150 MG/1
150 CAPSULE ORAL 2 TIMES DAILY
Qty: 60 CAPSULE | Refills: 2 | Status: ON HOLD | OUTPATIENT
Start: 2024-08-29 | End: 2024-10-10 | Stop reason: HOSPADM

## 2024-08-29 RX ORDER — AMITRIPTYLINE HYDROCHLORIDE 50 MG/1
50 TABLET, FILM COATED ORAL NIGHTLY
Qty: 30 TABLET | Refills: 4 | Status: SHIPPED | OUTPATIENT
Start: 2024-08-29 | End: 2025-08-29

## 2024-09-03 ENCOUNTER — OFFICE VISIT (OUTPATIENT)
Dept: FAMILY MEDICINE | Facility: CLINIC | Age: 44
End: 2024-09-03
Payer: MEDICARE

## 2024-09-03 VITALS
WEIGHT: 216 LBS | RESPIRATION RATE: 20 BRPM | OXYGEN SATURATION: 98 % | TEMPERATURE: 98 F | BODY MASS INDEX: 32.74 KG/M2 | HEIGHT: 68 IN | DIASTOLIC BLOOD PRESSURE: 88 MMHG | HEART RATE: 92 BPM | SYSTOLIC BLOOD PRESSURE: 140 MMHG

## 2024-09-03 DIAGNOSIS — L57.0 ACTINIC KERATOSIS: Primary | ICD-10-CM

## 2024-09-03 PROCEDURE — 99215 OFFICE O/P EST HI 40 MIN: CPT | Mod: PBBFAC

## 2024-09-03 PROCEDURE — 17003 DESTRUCT PREMALG LES 2-14: CPT | Mod: PBBFAC

## 2024-09-03 PROCEDURE — 17000 DESTRUCT PREMALG LESION: CPT | Mod: PBBFAC

## 2024-09-04 NOTE — PROGRESS NOTES
I saw and evaluated the patient and was present for the key portions of the exam and separately billed procedures, if any. I have reveiwed and agree with the resident's findings, including all diagnostic interpretations and plans as written.

## 2024-09-04 NOTE — PROGRESS NOTES
"[facility] Minor Surgery Note    Bharath Caballero  [id number]  09/03/2024    Chief Complaint   Patient presents with    Follow-up     Pt. Here for path results       History of Present Illness:  Bharath Caballero is a 44 y.o. male  presenting for pathology results of posterior neck. During skin check, it was noted that the patient had actinic keratosis on the UE which was same as diagnosis of pathology results on posterior neck. The patient has been treated for actinic keratosis with cryotherapy in the past without any complications.      Review of Systems as per HPI    Blood pressure (!) 140/88, pulse 92, temperature 98 °F (36.7 °C), temperature source Oral, resp. rate 20, height 5' 8" (1.727 m), weight 98 kg (216 lb), SpO2 98%.    Physical Exam  Gen: NAD  Skin: multiple rough, scaly patches consistent with actinic keratosis    Skin lesions:  (R) forearm x1  (R) hand x1  (R) side of neck x1  (L) forearm x1    Procedure Note:  Procedure: Cryosurgery  Performed by: Alicja Benites MD   Supervised by: Ina Magaña MD  Consent: signed consent obtained after discussion of risks, benefits, and alternative treatments  Number of Lesions: 4x:(R) forearm x1, (R) hand x1, (R) side of neck x1, (L) forearm x1  Description: multiple rough, scaly patches  Liquid nitrogen used for cryotherapy. Tip held 1 cm from lesion and sprayed in freeze-thaw cycle.   The patient tolerated the procedure well with no complications      Assessment/Plan:  1. Actinic keratosis   -Post care instructions and return precautions discussed        Return to clinic in 4 weeks for follow up, or sooner if needed.     Alicja Benites MD  Family Medicine Resident, [facility]     "

## 2024-09-05 ENCOUNTER — DOCUMENT SCAN (OUTPATIENT)
Dept: HOME HEALTH SERVICES | Facility: HOSPITAL | Age: 44
End: 2024-09-05
Payer: MEDICARE

## 2024-09-05 DIAGNOSIS — G89.4 CHRONIC PAIN SYNDROME: ICD-10-CM

## 2024-09-05 RX ORDER — HYDROMORPHONE HYDROCHLORIDE 8 MG/1
8 TABLET ORAL EVERY 8 HOURS PRN
Qty: 90 TABLET | Refills: 0 | Status: SHIPPED | OUTPATIENT
Start: 2024-09-05

## 2024-09-10 ENCOUNTER — LAB VISIT (OUTPATIENT)
Dept: LAB | Facility: HOSPITAL | Age: 44
End: 2024-09-10
Attending: NURSE PRACTITIONER
Payer: MEDICARE

## 2024-09-10 ENCOUNTER — OFFICE VISIT (OUTPATIENT)
Dept: GASTROENTEROLOGY | Facility: CLINIC | Age: 44
End: 2024-09-10
Payer: MEDICARE

## 2024-09-10 ENCOUNTER — HOSPITAL ENCOUNTER (OUTPATIENT)
Dept: RADIOLOGY | Facility: HOSPITAL | Age: 44
Discharge: HOME | End: 2024-09-10
Attending: NURSE PRACTITIONER
Payer: MEDICARE

## 2024-09-10 ENCOUNTER — HOSPITAL ENCOUNTER (OUTPATIENT)
Dept: WOUND CARE | Facility: HOSPITAL | Age: 44
Discharge: HOME | End: 2024-09-10
Attending: NURSE PRACTITIONER
Payer: MEDICARE

## 2024-09-10 VITALS
RESPIRATION RATE: 16 BRPM | TEMPERATURE: 99 F | DIASTOLIC BLOOD PRESSURE: 71 MMHG | OXYGEN SATURATION: 97 % | WEIGHT: 220.81 LBS | HEIGHT: 68 IN | BODY MASS INDEX: 33.47 KG/M2 | SYSTOLIC BLOOD PRESSURE: 154 MMHG | HEART RATE: 83 BPM

## 2024-09-10 VITALS
TEMPERATURE: 98 F | OXYGEN SATURATION: 97 % | DIASTOLIC BLOOD PRESSURE: 75 MMHG | RESPIRATION RATE: 18 BRPM | SYSTOLIC BLOOD PRESSURE: 159 MMHG | HEART RATE: 84 BPM

## 2024-09-10 DIAGNOSIS — N18.2 CKD STAGE G2/A3, GFR 60-89 AND ALBUMIN CREATININE RATIO >300 MG/G: ICD-10-CM

## 2024-09-10 DIAGNOSIS — R10.11 RUQ ABDOMINAL PAIN: ICD-10-CM

## 2024-09-10 DIAGNOSIS — Z72.0 TOBACCO USER: ICD-10-CM

## 2024-09-10 DIAGNOSIS — Z86.010 PERSONAL HISTORY OF COLONIC POLYPS: ICD-10-CM

## 2024-09-10 DIAGNOSIS — R79.89 ELEVATED LFTS: Primary | ICD-10-CM

## 2024-09-10 DIAGNOSIS — E13.42 DIABETIC POLYNEUROPATHY ASSOCIATED WITH OTHER SPECIFIED DIABETES MELLITUS: ICD-10-CM

## 2024-09-10 DIAGNOSIS — S90.821D BLISTER OF RIGHT FOOT, SUBSEQUENT ENCOUNTER: Primary | ICD-10-CM

## 2024-09-10 DIAGNOSIS — K21.00 GASTROESOPHAGEAL REFLUX DISEASE WITH ESOPHAGITIS WITHOUT HEMORRHAGE: ICD-10-CM

## 2024-09-10 DIAGNOSIS — K76.0 METABOLIC DYSFUNCTION-ASSOCIATED STEATOTIC LIVER DISEASE (MASLD): Primary | ICD-10-CM

## 2024-09-10 DIAGNOSIS — K76.0 METABOLIC DYSFUNCTION-ASSOCIATED STEATOTIC LIVER DISEASE (MASLD): ICD-10-CM

## 2024-09-10 DIAGNOSIS — L60.2 OVERGROWN TOENAILS: ICD-10-CM

## 2024-09-10 DIAGNOSIS — Z79.4 TYPE 2 DIABETES MELLITUS WITH OTHER SKIN ULCER, WITH LONG-TERM CURRENT USE OF INSULIN: ICD-10-CM

## 2024-09-10 DIAGNOSIS — L97.421 ULCER OF LEFT HEEL AND MIDFOOT, LIMITED TO BREAKDOWN OF SKIN: ICD-10-CM

## 2024-09-10 DIAGNOSIS — E11.9 ENCOUNTER FOR DIABETIC FOOT EXAM: ICD-10-CM

## 2024-09-10 DIAGNOSIS — E11.622 TYPE 2 DIABETES MELLITUS WITH OTHER SKIN ULCER, WITH LONG-TERM CURRENT USE OF INSULIN: ICD-10-CM

## 2024-09-10 LAB
ALBUMIN SERPL-MCNC: 3.1 G/DL (ref 3.5–5)
ALBUMIN/GLOB SERPL: 0.7 RATIO (ref 1.1–2)
ALP SERPL-CCNC: 252 UNIT/L (ref 40–150)
ALT SERPL-CCNC: 34 UNIT/L (ref 0–55)
ANION GAP SERPL CALC-SCNC: 8 MEQ/L
AST SERPL-CCNC: 19 UNIT/L (ref 5–34)
BASOPHILS # BLD AUTO: 0.08 X10(3)/MCL
BASOPHILS NFR BLD AUTO: 0.7 %
BILIRUB SERPL-MCNC: 0.4 MG/DL
BUN SERPL-MCNC: 11.3 MG/DL (ref 8.9–20.6)
CALCIUM SERPL-MCNC: 9.2 MG/DL (ref 8.4–10.2)
CHLORIDE SERPL-SCNC: 100 MMOL/L (ref 98–107)
CO2 SERPL-SCNC: 28 MMOL/L (ref 22–29)
CREAT SERPL-MCNC: 1.08 MG/DL (ref 0.73–1.18)
CREAT/UREA NIT SERPL: 10
EOSINOPHIL # BLD AUTO: 0.33 X10(3)/MCL (ref 0–0.9)
EOSINOPHIL NFR BLD AUTO: 2.8 %
ERYTHROCYTE [DISTWIDTH] IN BLOOD BY AUTOMATED COUNT: 12.6 % (ref 11.5–17)
GFR SERPLBLD CREATININE-BSD FMLA CKD-EPI: >60 ML/MIN/1.73/M2
GLOBULIN SER-MCNC: 4.4 GM/DL (ref 2.4–3.5)
GLUCOSE SERPL-MCNC: 283 MG/DL (ref 74–100)
HCT VFR BLD AUTO: 40.4 % (ref 42–52)
HGB BLD-MCNC: 13.9 G/DL (ref 14–18)
IMM GRANULOCYTES # BLD AUTO: 0.05 X10(3)/MCL (ref 0–0.04)
IMM GRANULOCYTES NFR BLD AUTO: 0.4 %
INR PPP: 1
LYMPHOCYTES # BLD AUTO: 2.48 X10(3)/MCL (ref 0.6–4.6)
LYMPHOCYTES NFR BLD AUTO: 20.7 %
MCH RBC QN AUTO: 29.3 PG (ref 27–31)
MCHC RBC AUTO-ENTMCNC: 34.4 G/DL (ref 33–36)
MCV RBC AUTO: 85.1 FL (ref 80–94)
MONOCYTES # BLD AUTO: 1.11 X10(3)/MCL (ref 0.1–1.3)
MONOCYTES NFR BLD AUTO: 9.3 %
NEUTROPHILS # BLD AUTO: 7.91 X10(3)/MCL (ref 2.1–9.2)
NEUTROPHILS NFR BLD AUTO: 66.1 %
NRBC BLD AUTO-RTO: 0 %
PLATELET # BLD AUTO: 332 X10(3)/MCL (ref 130–400)
PMV BLD AUTO: 9.5 FL (ref 7.4–10.4)
POTASSIUM SERPL-SCNC: 3.4 MMOL/L (ref 3.5–5.1)
PROT SERPL-MCNC: 7.5 GM/DL (ref 6.4–8.3)
PROTHROMBIN TIME: 13 SECONDS (ref 11.4–14)
RBC # BLD AUTO: 4.75 X10(6)/MCL (ref 4.7–6.1)
SODIUM SERPL-SCNC: 136 MMOL/L (ref 136–145)
WBC # BLD AUTO: 11.96 X10(3)/MCL (ref 4.5–11.5)

## 2024-09-10 PROCEDURE — 85610 PROTHROMBIN TIME: CPT

## 2024-09-10 PROCEDURE — 99211 OFF/OP EST MAY X REQ PHY/QHP: CPT | Mod: 25,27

## 2024-09-10 PROCEDURE — G0179 MD RECERTIFICATION HHA PT: HCPCS | Mod: ,,, | Performed by: NURSE PRACTITIONER

## 2024-09-10 PROCEDURE — 80053 COMPREHEN METABOLIC PANEL: CPT

## 2024-09-10 PROCEDURE — 99215 OFFICE O/P EST HI 40 MIN: CPT | Mod: S$PBB,,, | Performed by: NURSE PRACTITIONER

## 2024-09-10 PROCEDURE — 99215 OFFICE O/P EST HI 40 MIN: CPT | Mod: PBBFAC,25 | Performed by: NURSE PRACTITIONER

## 2024-09-10 PROCEDURE — 11719 TRIM NAIL(S) ANY NUMBER: CPT

## 2024-09-10 PROCEDURE — 85025 COMPLETE CBC W/AUTO DIFF WBC: CPT

## 2024-09-10 PROCEDURE — 27000999 HC MEDICAL RECORD PHOTO DOCUMENTATION

## 2024-09-10 PROCEDURE — 71100 X-RAY EXAM RIBS UNI 2 VIEWS: CPT | Mod: TC,RT

## 2024-09-10 PROCEDURE — 36415 COLL VENOUS BLD VENIPUNCTURE: CPT

## 2024-09-10 NOTE — PROGRESS NOTES
Please notify CBC stable from previous with improvement in WBC count from 3 months ago.  PT/INR unremarkable.  Potassium mildly decreased at 3.4 and would recommend potassium rich foods.  Glucose elevated at 283 and would defer to PCP and Endocrinology for continued evaluation and monitoring.  Alkaline phosphatase mildly increased but stable from 3 months ago.  I have added further testing for autoimmune conditions that can affect the liver.  Further liver workup has been unremarkable in the past but I will repeat at this time to assure no change over time.  Please see if lab can add this to lab work that was drawn today.  If not, he can have this drawn at his convenience.  Most recent abdominal ultrasound reviewed and abdominal ultrasound ordered at time of office visit.  Please recommend lifestyle and dietary modifications along with good control of comorbidities.  Thanks

## 2024-09-10 NOTE — PATIENT INSTRUCTIONS
Pt seen today by: Malina Brito NP    Wound location:Right plantar foot  Cleanse wound with Vashe or Dakins wound cleanser  Apply Iodaflex putty, gauze and telfa bandaid every other day to the wound      Compression with: Tubi  stockings    Nutrition:  The current daily value (%DV) for protein is 50 grams per day and is meant as a general goal for most people. Further increasing your dietary protein intake is very important for wound healing. Typically one needs over 100g of protein per day to help with wound healing needs.  If you are a dialysis patient or have problems with your kidneys, talk to your Nephrologist about how much protein you can take in with your condition.  Examples of high protein items that can be added to your diet include: eggs, chicken, red meats, almonds, cottage cheese, Greek yogurt, beans, and peanut butter.  Fortified protein bars, shakes and drinks can add 15-30 additional grams of protein per serving.  Also add:   1 daily general multivitamin   Vitamin C : 500mg twice daily   Zinc 220 mg daily  Vit D : once daily    Offloading   Offload your wound. This means to reduce pressure on and around the wound that reduces blood flow to the wound and prevents healing. Your wound care team will discuss specific ways for you to offload your specific wound. Common offloading strategies include:    Turn or reposition every 2 hours or sooner  Use pillows, wedges, ROHO wheelchair cushions or other special devices like boots and shoes to lift the wound off of hard surfaces  Alternating Low Air-loss (ALAL) mattress may be ordered  Padded dressings can reduce wound pressure        Call our [facility] wound clinic for questions/concerns a [phone] .

## 2024-09-10 NOTE — ASSESSMENT & PLAN NOTE
See above  Continue follow up with Dr. Beasley for management of chronic pain syndrome  Right rib X-ray ordered  CBC, CMP, PT/INR  ER precautions provided

## 2024-09-10 NOTE — ASSESSMENT & PLAN NOTE
History of poorly controlled diabetes mellitus, CKD stage II, morbid obesity, familial hypertriglyceridemia with routine plasmapheresis in the past, hepatic steatosis, hypertension, CICI, and tobacco abuse.   Per review of laboratory results, he has had persistent elevation of alkaline phosphatase and intermittent elevation of AST and ALT since January 2019.   Laboratory results August 13, 2020 revealed sodium 132, calcium 8.2, glucose 466, GFR 88, AST 63, , alkaline phosphatase 262, total protein 8.6, albumin 3.3, globulin 5.30, and otherwise unremarkable CMP; vitamin B12 773; cholesterol 230, triglycerides 2216, invalid HDL and LDL secondary to triglyceride level; negative acute viral hepatitis panel.   Hemoglobin A1c was 10.9% July 7, 2020.   Gastric emptying study was unremarkable July 17, 2020.   CT scan abdomen and pelvis with contrast June 1, 2020 revealed hepatomegaly with steatosis and otherwise unremarkable.   Laboratory results August 18, 2020 revealed iron level 52 and otherwise unremarkable iron profile and ferritin, ceruloplasmin, alpha 1 antitrypsin, F-actin, LKM, mitochondrial Ab; PT 11.6, INR 0.88; unremarkable CBC; negative SHAKIR and dsDNA; FibroSure testing revealed F1.   Abdominal ultrasound October 1, 2020 revealed enlarged liver with steatosis.  Laboratory results December 8, 2020 revealed sodium 135, CO2 16, glucose 272, AST 48, ALT 94, alkaline phosphatase 210, total protein 9.1, globulin 5.3, and otherwise unremarkable CMP; hemoglobin A1c 9.9%; vitamin D 12.9; cholesterol 208, HDL 20, triglycerides >1420; TSH 1.3122; and unremarkable CBC.  Laboratory results June 16, 2021 revealed CO2 18, glucose 106, AST 90, , alkaline phosphatase 220, total protein 8.7, globulin 5.0, and otherwise unremarkable CMP; hemoglobin A1c 9.9%; vitamin D 28.1; cholesterol 254, triglycerides 1922; WBC 11.9 and otherwise unremarkable CBC.  FibroScan July 26, 2021 revealed S3 F0/F1.   Abdominal ultrasound  limited August 4, 2021 revealed hepatomegaly with fatty infiltration of the liver and limited study.   CT scan abdomen and pelvis without contrast October 25, 2021 revealed hepatomegaly and otherwise unremarkable.   Laboratory results December 20, 2021 revealed potassium 3.2, glucose 115, AST 38, ALT 97, alkaline phosphatase 294, total protein 8.8, and otherwise unremarkable CMP; WBC 13.4 and otherwise unremarkable CBC.  Abdominal ultrasound February 14, 2022 revealed hepatomegaly and mild to moderate diffuse hepatic steatosis.  No significant change identified compared to the limited abdominal ultrasound 8/4/2021.  FibroScan August 29, 2022 revealed F0-1, S1.  Abdominal ultrasound December 1, 2022 revealed hepatomegaly, hepatic steatosis, no other significant abnormalities identified, and no significant change.   FibroScan March 14, 2023 revealed F2, S0.  Abdominal ultrasound May 29, 2023 revealed hepatomegaly, coarse echotexture of the liver parenchyma with changes of hepatic steatosis, sludge in the gallbladder, and no other significant change.   FibroScan September 29, 2023 revealed S0, F0-1.  Abdominal ultrasound October 19, 2023 revealed hepatomegaly with nonspecific slightly heterogeneous liver echogenicity.  He underwent EGD November 20, 2023 with findings of normal esophagus, esophagogastric landmarks identified, erosive gastropathy with no bleeding or stigmata of recent bleeding, normal examined duodenum.  Pathology revealed mild chronic gastritis with lamina propria fibrosis, negative H pylori and negative dysplasia.  Gastric emptying study March 19, 2024 revealed 82% radiotracer emptying at 62 minutes and 98% radiotracer emptying at 96 minutes is noted.  Abdominal ultrasound April 8, 2024 revealed hepatomegaly is seen.  The liver demonstrates nonspecific slight coarsened echotexture and increased echogenicity which could be related to underlying hepatocellular disease and/or fatty infiltration.  CBC,  CMP, PT/INR  Fibroscan  Abdominal ultrasound in 1 month  Patient has significant risk factors for MASLD  Lifestyle and dietary modifications provided  Recommend weight loss  Recommend good control of comorbidities  Recommend tobacco cessation  Recommend optimization of diabetes mellitus  Call with updates  Follow-up 6 months clinic visit with NP in a 45 minute slot

## 2024-09-10 NOTE — ASSESSMENT & PLAN NOTE
See above  GERD lifestyle modifications  Reflux precautions  Limit NSAID use  Recommend tobacco cessation  Continue Nexium and sucralfate as directed  He underwent EGD with Dr. James June 7, 2021 with findings of LA grade A reflux esophagitis and otherwise unremarkable exam.   He underwent EGD November 20, 2023 with findings of normal esophagus, esophagogastric landmarks identified, erosive gastropathy with no bleeding or stigmata of recent bleeding, normal examined duodenum.  Pathology revealed mild chronic gastritis with lamina propria fibrosis, negative H pylori and negative dysplasia.  Gastric emptying study March 19, 2024 revealed 82% radiotracer emptying at 62 minutes and 98% radiotracer emptying at 96 minutes is noted.

## 2024-09-10 NOTE — PROGRESS NOTES
Subjective:       Patient ID: Bharath Caballero is a 44 y.o. male.    Chief Complaint: Follow-up and Elevated LFTs (Pt states he is having stabbing like pains on right side of stomach, continuing for about 3 weeks.)    This 44-year-old  male with colon polyps, poorly controlled diabetes mellitus, CKD stage II, morbid obesity, familial hypertriglyceridemia with routine plasmapheresis in the past, hepatic steatosis, hypertension, CICI, and tobacco abuse presents unaccompanied for a follow-up visit.  He was initially seen August 18, 2020, referred for elevated LFTs at that time.  Per review of laboratory results, he has had persistent elevation of alkaline phosphatase and intermittent elevation of AST and ALT since January 2019.  Laboratory results August 13, 2020 revealed sodium 132, calcium 8.2, glucose 466, GFR 88, AST 63, , alkaline phosphatase 262, total protein 8.6, albumin 3.3, globulin 5.30, and otherwise unremarkable CMP; vitamin B12 773; cholesterol 230, triglycerides 2216, invalid HDL and LDL secondary to triglyceride level; negative acute viral hepatitis panel.  Hemoglobin A1c was 10.9% July 7, 2020.  Gastric emptying study was unremarkable July 17, 2020.  CT scan abdomen and pelvis with contrast June 1, 2020 revealed hepatomegaly with steatosis and otherwise unremarkable.     He reported intermittent right upper quadrant abdominal pain for the past several months described as sharp and radiating around to his back at times.  The pain was worsened with meal intake and he was unable to identify any specific food triggers.  He denied any relieving factors.  His appetite was poor and his weight was stable.  He had frequent symptoms of acid reflux and heartburn that was controlled on pantoprazole 40 mg daily.  He had frequent intermittent nausea with subsequent vomiting almost daily and with almost every meal (nonbilious and nonbloody).  He did not always have relief of symptoms with vomiting.  He  denied odynophagia, dysphagia, or early satiety.  Bowel habits were described as formed stools once daily without melena, hematochezia, fecal urgency, fecal incontinence, or pain with defecation.  He denied icterus, jaundice, pruritus, confusion, headaches, vision changes, cough, shortness of breath, chest pain, abdominal/lower extremity swelling, dark-colored urine, or pale-colored stools.     Laboratory results August 18, 2020 revealed iron level 52 and otherwise unremarkable iron profile and ferritin, ceruloplasmin, alpha 1 antitrypsin, F-actin, LKM, mitochondrial Ab; PT 11.6, INR 0.88; unremarkable CBC; negative SHAKIR and dsDNA; FibroSure testing revealed F1.  Abdominal ultrasound October 1, 2020 revealed enlarged liver with steatosis.     Laboratory results December 8, 2020 revealed sodium 135, CO2 16, glucose 272, AST 48, ALT 94, alkaline phosphatase 210, total protein 9.1, globulin 5.3, and otherwise unremarkable CMP; hemoglobin A1c 9.9%; vitamin D 12.9; cholesterol 208, HDL 20, triglycerides >1420; TSH 1.3122; and unremarkable CBC.     He was seen for a follow-up visit December 28, 2020.  He reported persistent intermittent right upper quadrant abdominal pain for the past several months described as sharp and radiating around to his back at times.  The pain was worsened with meal intake and he was unable to identify any specific food triggers.  He denied any relieving factors.  His appetite was fair and his weight was stable.  He had frequent symptoms of acid reflux and heartburn that was somewhat controlled on pantoprazole 40 mg daily.  He had frequent intermittent nausea with subsequent vomiting a few times per week (nonbilious and nonbloody).  He did not always have relief of symptoms with vomiting.  He denied odynophagia, dysphagia, or early satiety.  Bowel habits were described as formed stools once daily without melena, hematochezia, fecal urgency, fecal incontinence, or pain with defecation.  He denied  icterus, jaundice, pruritus, confusion, headaches, vision changes, cough, shortness of breath, chest pain, abdominal/lower extremity swelling, dark-colored urine, or pale-colored stools.     He underwent EGD with Dr. James June 7, 2021 with findings of LA grade A reflux esophagitis and otherwise unremarkable exam.  Laboratory results June 16, 2021 revealed CO2 18, glucose 106, AST 90, , alkaline phosphatase 220, total protein 8.7, globulin 5.0, and otherwise unremarkable CMP; hemoglobin A1c 9.9%; vitamin D 28.1; cholesterol 254, triglycerides 1922; WBC 11.9 and otherwise unremarkable CBC.     He was seen for follow-up visit June 28, 2021.  He reported persistent intermittent RUQ abdominal burning and pain with eating.  He denied radiation on pain.  He had occasional nausea without vomiting.  He had frequent acid reflux and pyrosis.  His appetite was good and his weight was stable.  He denied nocturnal symptoms, fever, chills, odynophagia, dysphagia, belching, bloating, or early satiety.  Bowel habits were described as formed stools once daily without melena, hematochezia, fecal urgency, fecal incontinence, or pain with defecation.     FibroScan July 26, 2021 revealed S3 F0/F1.  Abdominal ultrasound limited August 4, 2021 revealed hepatomegaly with fatty infiltration of the liver and limited study.  CT scan abdomen and pelvis without contrast October 25, 2021 revealed hepatomegaly and otherwise unremarkable.  Laboratory results December 20, 2021 revealed potassium 3.2, glucose 115, AST 38, ALT 97, alkaline phosphatase 294, total protein 8.8, and otherwise unremarkable CMP; WBC 13.4 and otherwise unremarkable CBC.     He underwent colonoscopy October 8, 2021 with findings of adenomatous polyp in the transverse colon and rectum with a recommended recall of 5 years.     He was seen for a follow-up visit February 7, 2022.  He reported persistent intermittent right upper quadrant abdominal pain described as  sharp and radiating around to the right side of his back.  The pain was triggered with eating and drinking and he was unable to identify specific food triggers.  He continued to take pantoprazole 40 mg daily and had to occasionally take OTC Tums as well.  He had frequent acid reflux and regurgitation of acid.  He reported occasional vomiting secondary to intensity of abdominal pain.  His appetite was good and his weight was stable.  He denied fever, chills, odynophagia, dysphagia, or early satiety.  Bowel habits remained unchanged and was described as formed stools once daily without melena, hematochezia, fecal urgency, fecal incontinence, or pain with defecation.     Abdominal ultrasound February 14, 2022 revealed hepatomegaly and mild to moderate diffuse hepatic steatosis.  No significant change identified compared to the limited abdominal ultrasound 8/4/2021.     He was seen for a follow-up visit August 18, 2022.  He reported minimal change in symptoms from previous office visit.  He was no longer taking pantoprazole or famotidine secondary to minimal relief of symptoms.     FibroScan August 29, 2022 revealed F0-1, S1.     Abdominal ultrasound December 1, 2022 revealed hepatomegaly, hepatic steatosis, no other significant abnormalities identified, and no significant change.      He was seen for a follow-up visit February 28, 2023 and reported minimal change in symptoms from previous office visit.  He reported some relief of abdominal pain with sucralfate 1 g before meals and at bedtime.  He reported that his Repatha was stopped secondary to insurance coverage and he was awaiting PA approval.     FibroScan March 14, 2023 revealed F2, S0.     Abdominal ultrasound May 29, 2023 revealed hepatomegaly, coarse echotexture of the liver parenchyma with changes of hepatic steatosis, sludge in the gallbladder, and no other significant change.       He was seen for a follow-up visit September 7, 2023.  He reported  hematemesis with almost every meal over the past 2 weeks.  He was taking Nexium 40 mg daily and sucralfate 1 g 4 times daily.  His appetite was good but he was afraid to eat due to vomiting and abdominal pain.  He had nausea that preceded vomiting.  He was unable to identify specific food triggers or relieving factors.  He had a documented weight loss of 6 lb since his last office visit in GI clinic February 28, 2023.  He reported that his weight fluctuated over time.  He denied nocturnal symptoms, fever, chills, odynophagia, dysphagia, acid reflux, pyrosis, or early satiety.  He reported minimal change in regards to symptoms of right upper quadrant and left upper quadrant abdominal pain.  Bowel habits remained unchanged and described as formed stools 1-2 times daily without melena, hematochezia, fecal urgency, fecal incontinence, or pain with defecation.     FibroScan September 29, 2023 revealed S0, F0-1.     Abdominal ultrasound October 19, 2023 revealed hepatomegaly with nonspecific slightly heterogeneous liver echogenicity.     He underwent EGD November 20, 2023 with findings of normal esophagus, esophagogastric landmarks identified, erosive gastropathy with no bleeding or stigmata of recent bleeding, normal examined duodenum.  Pathology revealed mild chronic gastritis with lamina propria fibrosis, negative H pylori and negative dysplasia.     He was seen for a follow-up visit March 7, 2024.  He reported resolution of hematemesis since his last office visit with me.  He continued to take Nexium 40 mg daily and sucralfate 1 g 4 times daily.  His appetite was fair and he was afraid to eat due to abdominal pain and vomiting.  This had remained unchanged from previous office visit.  He was unable to identify specific food triggers or relieving factors and reported not eating every day.  His diabetes was not currently controlled.  His weight continued to fluctuate over time.  He denied fever, chills, odynophagia,  dysphagia, acid reflux, pyrosis, or early satiety.  Bowel habits remained unchanged and described as formed stools 1-2 times daily without melena, hematochezia, fecal urgency, fecal incontinence, or pain with defecation.    Gastric emptying study March 19, 2024 revealed 82% radiotracer emptying at 62 minutes and 98% radiotracer emptying at 96 minutes is noted.    Abdominal ultrasound April 8, 2024 revealed hepatomegaly is seen.  The liver demonstrates nonspecific slight coarsened echotexture and increased echogenicity which could be related to underlying hepatocellular disease and/or fatty infiltration.      Today, he presents for a follow-up visit.  He reports experiencing a fall approximately 3 weeks ago and hitting his right side on a step.  He denies LOC.  the pain is described as sharp and nonradiating and is intermittent to his right upper quadrant/rib area.  He denies any specific relieving factors or exacerbating factors aside from some movement.  He denies shortness of breath.  He denies appetite changes, unintentional weight loss, fever, chills, nausea, vomiting, hematemesis, odynophagia, dysphagia, acid reflux, pyrosis.  Bowel habits are described as formed stools once daily without melena, hematochezia, fecal urgency, fecal incontinence, or pain with defecation.  He denies icterus, jaundice, pruritus, confusion, headaches, vision changes, cough, shortness of breath, chest pain, abdominal/lower extremity swelling, dark-colored urine, or pale-colored stools.      He takes aspirin 81 mg daily.  He smokes 5-10 cigarettes daily and denies alcohol use.  He denies a family history of IBD, colon polyps, or colon cancer.    Review of patient's allergies indicates:  No Known Allergies    Past Medical History:   Diagnosis Date    Acquired perforating dermatosis 8/30/2023    Aortic atherosclerosis 1/24/2023    Bilateral edema of lower extremity 2/6/2023    Bladder outflow obstruction 6/20/2022    Blurred vision,  right eye 10/19/2022    BMI 34.0-34.9,adult 6/20/2022    BPH (benign prostatic hyperplasia)     Chronic liver failure without hepatic coma 4/25/2023    Chronic pain 10/25/2022    Chronic pain syndrome 5/12/2022    Chronic pancreatitis 10/28/2017    Deafness 10/3/2023    Diabetes     Diabetic polyneuropathy 6/20/2022    Elevated LFTs 8/18/2022    GERD (gastroesophageal reflux disease)     Hand paresthesia 6/20/2022    Hepatic steatosis     History of continuous positive airway pressure (CPAP) therapy at home     History of pancreatitis 6/20/2022    Hypertension     Hypertriglyceridemia     Kidney disorder     Leg pain     Mixed hyperlipidemia 10/28/2017    Mononeuritis multiplex 6/20/2022    Morbid obesity     Neuropathy     Nocturia 6/20/2022    OA (osteoarthritis)     Obstructive sleep apnea syndrome 6/20/2022    Onychomycosis of multiple toenails with type 2 diabetes mellitus 10/28/2017    Open wound of finger of right hand 10/20/2023    CICI (obstructive sleep apnea)     Painful diabetic neuropathy 5/12/2022    Personal history of colonic polyps 8/18/2022    Polyneuropathy     Primary hypertension 10/28/2017    Recurrent pancreatitis     Renal insufficiency     Tobacco abuse     Tobacco user 6/20/2022    Type 2 diabetes mellitus with skin complication, with long-term current use of insulin 2/6/2023     Past Surgical History:   Procedure Laterality Date    ANGIOGRAM, CORONARY, WITH LEFT HEART CATHETERIZATION N/A 4/10/2023    Procedure: Angiogram, Coronary, with Left Heart Cath;  Surgeon: Jaswant Pugh MD;  Location: [facility] CATH LAB;  Service: Cardiology;  Laterality: N/A;    APPENDECTOMY      ARTERIOVENOUS ANASTOMOSIS, OPEN, UPPER ARM BASILLIC VEIN TRANSPOSITION N/A 05/07/2018    EGD, WITH CLOSED BIOPSY N/A 11/20/2023    Procedure: EGD, WITH CLOSED BIOPSY;  Surgeon: Christina James MD;  Location: [facility] ENDOSCOPY;  Service: Gastroenterology;  Laterality: N/A;    ESOPHAGOGASTRODUODENOSCOPY N/A 06/07/2021     EXCISION OF ARTERIOVENOUS FISTULA N/A 06/01/2018    FRACTIONAL FLOW RESERVE (FFR), CORONARY  4/10/2023    Procedure: Fractional Flow [place] (FFR), Coronary;  Surgeon: Jaswant Pugh MD;  Location: [facility] CATH LAB;  Service: Cardiology;;    HERNIA REPAIR      INSPECTION OF UPPER INTESTINAL TRACT N/A 06/07/2021    PHERESIS OF PLASMA N/A 07/13/2018    PHERESIS OF PLASMA N/A 05/25/2018    SPINAL CORD STIMULATOR REMOVAL      TRIAL OF SPINAL CORD NERVE STIMULATOR N/A 5/12/2022    Procedure: TRIAL, NEUROSTIMULATOR, SPINAL CORD;  Surgeon: Jay Pozo MD;  Location: [facility] OR;  Service: Neurosurgery;  Laterality: N/A;    UPPER GI N/A 06/07/2021     Family History:   family history includes Obesity in his father.    Social History:    reports that he has been smoking cigarettes. He started smoking about 28 years ago. He has a 12.5 pack-year smoking history. He has never used smokeless tobacco. He reports that he does not currently use alcohol. He reports current drug use. Drug: Morphine.    Review of Systems  Negative except as noted in the HPI.      Objective:      Physical Exam  Constitutional:       Appearance: Normal appearance.      Comments: Ambulates with cane for assistance   HENT:      Head: Normocephalic.      Mouth/Throat:      Mouth: Mucous membranes are moist.   Eyes:      Extraocular Movements: Extraocular movements intact.      Conjunctiva/sclera: Conjunctivae normal.      Pupils: Pupils are equal, round, and reactive to light.   Cardiovascular:      Rate and Rhythm: Normal rate and regular rhythm.      Pulses: Normal pulses.      Heart sounds: Normal heart sounds.   Pulmonary:      Effort: Pulmonary effort is normal.      Breath sounds: Normal breath sounds.   Abdominal:      General: Bowel sounds are normal.      Palpations: Abdomen is soft.      Comments: Tenderness noted to RUQ/right rib area with deep palpation, no rebound or guarding, no crepitus   Musculoskeletal:         General: Normal range of  motion.      Cervical back: Normal range of motion and neck supple.   Skin:     General: Skin is warm and dry.   Neurological:      General: No focal deficit present.      Mental Status: He is alert and oriented to person, place, and time.   Psychiatric:         Mood and Affect: Mood normal.         Behavior: Behavior normal.         Thought Content: Thought content normal.         Judgment: Judgment normal.         Home Medications:     Current Outpatient Medications   Medication Sig    amitriptyline (ELAVIL) 50 MG tablet Take 1 tablet (50 mg total) by mouth every evening.    aspirin (ECOTRIN) 81 MG EC tablet Take 81 mg by mouth once daily.    atorvastatin (LIPITOR) 40 MG tablet Take 1 tablet (40 mg total) by mouth once daily.    carvediloL (COREG) 25 MG tablet Take 1 tablet (25 mg total) by mouth 2 (two) times daily with meals.    cholecalciferol, vitamin D3, (VITAMIN D3) 25 mcg (1,000 unit) capsule Take 2 capsules (2,000 Units total) by mouth once daily.    clonazePAM (KLONOPIN) 1 MG tablet TAKE ONE TABLET BY MOUTH TWICE DAILY    cloNIDine (CATAPRES) 0.3 MG tablet Take 1 tablet (0.3 mg total) by mouth 3 (three) times daily.    CREON 36,000-114,000- 180,000 unit CpDR TAKE ONE CAPSULE THREE TIMES DAILY    EScitalopram oxalate (LEXAPRO) 20 MG tablet Take 20 mg by mouth once daily.    esomeprazole (NEXIUM) 40 MG capsule Take 1 capsule (40 mg total) by mouth before breakfast.    evolocumab (REPATHA SURECLICK) 140 mg/mL PnIj Inject 1 mL (140 mg total) into the skin every 14 (fourteen) days.    FARXIGA 5 mg Tab tablet TAKE ONE TABLET BY MOUTH EVERY DAY    HUMALOG U-100 INSULIN 100 unit/mL injection INJECT 25 UNITS SUBCUTANEOUSLY BEFORE MEALS THREE TIMES DAILY    HYDROmorphone (DILAUDID) 8 MG tablet Take 1 tablet (8 mg total) by mouth every 8 (eight) hours as needed.    icosapent ethyL (VASCEPA) 1 gram Cap Take 2 capsules (2 g total) by mouth 2 (two) times daily.    isosorbide mononitrate (IMDUR) 120 MG 24 hr tablet Take  "1 tablet (120 mg total) by mouth once daily.    linaGLIPtin (TRADJENTA) 5 mg Tab tablet Take 1 tablet (5 mg total) by mouth once daily.    losartan (COZAAR) 100 MG tablet Take 1 tablet (100 mg total) by mouth once daily.    morphine (MS CONTIN) 100 MG 12 hr tablet TAKE ONE TABLET BY MOUTH THREE TIMES DAILY    NIFEdipine (PROCARDIA-XL) 60 MG (OSM) 24 hr tablet Take 1 tablet (60 mg total) by mouth 2 (two) times a day.    nitroGLYCERIN (NITROSTAT) 0.3 MG SL tablet Place 1 tablet (0.3 mg total) under the tongue every 5 (five) minutes as needed for Chest pain (go to er after 3 doses).    OXcarbazepine (TRILEPTAL) 600 MG Tab Take 1 tablet (600 mg total) by mouth 2 (two) times daily.    potassium chloride SA (K-DUR,KLOR-CON) 20 MEQ tablet TAKE ONE TABLET BY MOUTH TWICE DAILY    pregabalin (LYRICA) 150 MG capsule Take 1 capsule (150 mg total) by mouth 2 (two) times daily.    spironolactone (ALDACTONE) 100 MG tablet Take 1 tablet (100 mg total) by mouth once daily.    sucralfate (CARAFATE) 100 mg/mL suspension Take 10 mLs (1 g total) by mouth 4 (four) times daily before meals and nightly.    SURE COMFORT INSULIN SYRINGE 0.5 mL 31 gauge x 5/16" Syrg USE ONE SYRINGE THREE TIMES DAILY    tamsulosin (FLOMAX) 0.4 mg Cap TAKE ONE CAPSULE BY MOUTH EVERY DAY    torsemide (DEMADEX) 20 MG Tab Take 1 tablet (20 mg total) by mouth once daily.    TOUJEO SOLOSTAR U-300 INSULIN 300 unit/mL (1.5 mL) InPn pen INJECT 35 SUBCUTANEOUSLY TWICE DAILY     Current Facility-Administered Medications   Medication Frequency    triamcinolone acetonide 0.1% ointment BID     Laboratory Results:     Recent Results (from the past 4032 hour(s))   Hemoglobin A1C, POCT    Collection Time: 04/01/24  9:38 AM   Result Value Ref Range    Hemoglobin A1C, POC 12.1 %   GAD65 Ab, Serum    Collection Time: 04/01/24  9:59 AM   Result Value Ref Range    GAD65 Ab Assay, S 0.00 <=0.02 nmol/L   Basic Metabolic Panel    Collection Time: 04/01/24  9:59 AM   Result Value Ref " Range    Sodium 135 (L) 136 - 145 mmol/L    Potassium 3.6 3.5 - 5.1 mmol/L    Chloride 98 98 - 107 mmol/L    CO2 28 22 - 29 mmol/L    Glucose 352 (H) 74 - 100 mg/dL    Blood Urea Nitrogen 10.2 8.9 - 20.6 mg/dL    Creatinine 1.04 0.73 - 1.18 mg/dL    BUN/Creatinine Ratio 10     Calcium 9.3 8.4 - 10.2 mg/dL    Anion Gap 9.0 mEq/L    eGFR >60 mls/min/1.73/m2   C-Peptide    Collection Time: 04/01/24  9:59 AM   Result Value Ref Range    C-Peptide, S 4.1 1.1 - 4.4 ng/mL   Comprehensive Metabolic Panel    Collection Time: 06/04/24  2:25 PM   Result Value Ref Range    Sodium 139 136 - 145 mmol/L    Potassium 3.4 (L) 3.5 - 5.1 mmol/L    Chloride 103 98 - 107 mmol/L    CO2 29 22 - 29 mmol/L    Glucose 226 (H) 74 - 100 mg/dL    Blood Urea Nitrogen 9.1 8.9 - 20.6 mg/dL    Creatinine 0.88 0.73 - 1.18 mg/dL    Calcium 9.4 8.4 - 10.2 mg/dL    Protein Total 7.7 6.4 - 8.3 gm/dL    Albumin 3.2 (L) 3.5 - 5.0 g/dL    Globulin 4.5 (H) 2.4 - 3.5 gm/dL    Albumin/Globulin Ratio 0.7 (L) 1.1 - 2.0 ratio    Bilirubin Total 0.4 <=1.5 mg/dL     (H) 40 - 150 unit/L    ALT 38 0 - 55 unit/L    AST 36 (H) 5 - 34 unit/L    eGFR >60 mL/min/1.73/m2    Anion Gap 7.0 mEq/L    BUN/Creatinine Ratio 10    Hemoglobin A1C    Collection Time: 06/04/24  2:25 PM   Result Value Ref Range    Hemoglobin A1c 8.2 (H) <=7.0 %    Estimated Average Glucose 188.6 mg/dL   Vitamin D    Collection Time: 06/04/24  2:25 PM   Result Value Ref Range    Vitamin D 16 (L) 30 - 80 ng/mL   CBC with Differential    Collection Time: 06/04/24  2:25 PM   Result Value Ref Range    WBC 12.15 (H) 4.50 - 11.50 x10(3)/mcL    RBC 4.51 (L) 4.70 - 6.10 x10(6)/mcL    Hgb 14.0 14.0 - 18.0 g/dL    Hct 39.1 (L) 42.0 - 52.0 %    MCV 86.7 80.0 - 94.0 fL    MCH 31.0 27.0 - 31.0 pg    MCHC 35.8 33.0 - 36.0 g/dL    RDW 12.5 11.5 - 17.0 %    Platelet 318 130 - 400 x10(3)/mcL    MPV 9.2 7.4 - 10.4 fL    Neut % 63.2 %    Lymph % 23.7 %    Mono % 8.6 %    Eos % 3.6 %    Basophil % 0.4 %    Lymph #  "2.88 0.6 - 4.6 x10(3)/mcL    Neut # 7.68 2.1 - 9.2 x10(3)/mcL    Mono # 1.04 0.1 - 1.3 x10(3)/mcL    Eos # 0.44 0 - 0.9 x10(3)/mcL    Baso # 0.05 <=0.2 x10(3)/mcL    IG# 0.06 (H) 0 - 0.04 x10(3)/mcL    IG% 0.5 %    NRBC% 0.0 %   Specimen to Pathology Dermatology and skin neoplasms    Collection Time: 08/05/24 10:17 AM   Result Value Ref Range    Case Report       [facility]                            Case: [id number]                                 Authorizing Provider:  Bushra Mott MD           Collected:           08/05/2024 10:17 AM          Ordering Location:     [facility] -       Received:            08/05/2024 12:51 PM                                 Family Medicine                                                              Pathologist:           Deborah Erazo MD                                                        Specimen:    Neck, Posterior                                                                            Clinical Information       Shave biopsy, posterior neck rule out Squamous vs irritated SK vs Basal cell carcinoma      Final Diagnosis         Skin, posterior neck, Shave biopsy:   - Hypertrophic actinic keratosis, ulcerated and impetiginized, see comment.        Comments         Histologic sections show an ulcerated hypertrophic epidermis with mild to moderate keratinocytic atypia and marked background solar elastosis.  No evidence of malignancy is identified.    This case was seen in outside consultation with Dr. Carito Schmitt (Dermatopathologist) at [facility] in [place], [place]        Gross Description       1. Neck, Posterior:   Received in formalin is a shave biopsy of pale tan skin measuring 12 x 10 x 1 mm.  There is a slightly raised granular red lesion measuring 5 mm.  Sectioned and entirely submitted.      Report Footnotes       Unless the case is a "gross only" or additional testing only, the final diagnosis for each specimen is based on a microscopic " examination of appropriate tissue sections.     Miscellaneous Test, Sendout RELIAPATH- CONSULT    Collection Time: 08/05/24 11:39 AM   Result Value Ref Range    See Scanned Report See Scanned Result    POCT Glucose, Hand-Held Device    Collection Time: 08/06/24  7:45 AM   Result Value Ref Range    POC Glucose >500 70 - 110 MG/DL     Imaging Results:     Narrative & Impression  EXAMINATION:  NM GASTRIC EMPTYING     CLINICAL HISTORY:  Gastro-esophageal reflux disease with esophagitis, without bleeding     COMPARISON:  None.     FINDINGS:  The patient received 1.7 mCi orally of sulfur colloid labeled meal in less than 10 minutes. Images of the abdomen were obtained up to 90 minutes. The decay-corrected time activity curve and the percentage of the retention and emptying were obtained.  The T1/2 of gastric emptying is calculated at approximately 48 minutes based on the linear best fit curve (normal is considered approximately  minutes.  82% radiotracer emptying at 62 minutes and 98% radiotracer emptying at 96 minutes is noted.     Impression:     No evidence of delayed gastric emptying is noted.  Please correlate for the possibility of mildly rapid gastric emptying.      Electronically signed by:Jose Perez MD  Date:                                            03/19/2024  Time:                                           09:03        Narrative & Impression  EXAMINATION:  US ABDOMEN LIMITED     CLINICAL HISTORY:  Other specified abnormal findings of blood chemistry     COMPARISON:  Ultrasound 10/19/2023     FINDINGS:  Multiple transverse and sagittal grayscale sonographic images were acquired through the right upper quadrant. The liver measures approximately 20.7 cm in craniocaudal dimension. The liver demonstrates slight increased echogenicity and coarsened appearing echotexture.  The main portal vein demonstrates antegrade flow. The common bile duct measures approximately 5.6 mm in visualized diameter. There  appears to be evidence of layering echogenic material within the gallbladder suggestive of sludge along with floating echogenic debris.  No evidence of gallbladder wall thickening or pericholecystic fluid is demonstrated.  No sonographic Gaytan sign was present per the ultrasound technologist report which should be correlated for any potential analgesic administration. The pancreas is obscured by bowel gas artifact. The limitedly visualized and assessed portions of the pancreas appear to be grossly within normal limits. The right kidney measures approximately 10.8 cm in length. No evidence of hydronephrosis or definite echogenic and shadowing renal calculi is demonstrated.     Impression:     1. Hepatomegaly is seen.  The liver demonstrates nonspecific slight coarsened echotexture and increased echogenicity which could be related to underlying hepatocellular disease and/or fatty infiltration.      Electronically signed by:Jose Perez MD  Date:                                            04/08/2024  Time:                                           17:27    Assessment/Plan:     Problem List Items Addressed This Visit          GI    Gastroesophageal reflux disease with esophagitis without hemorrhage     See above  GERD lifestyle modifications  Reflux precautions  Limit NSAID use  Recommend tobacco cessation  Continue Nexium and sucralfate as directed  He underwent EGD with Dr. James June 7, 2021 with findings of LA grade A reflux esophagitis and otherwise unremarkable exam.   He underwent EGD November 20, 2023 with findings of normal esophagus, esophagogastric landmarks identified, erosive gastropathy with no bleeding or stigmata of recent bleeding, normal examined duodenum.  Pathology revealed mild chronic gastritis with lamina propria fibrosis, negative H pylori and negative dysplasia.  Gastric emptying study March 19, 2024 revealed 82% radiotracer emptying at 62 minutes and 98% radiotracer emptying at 96 minutes  is noted.         Personal history of colonic polyps     He underwent colonoscopy October 8, 2021 with findings of adenomatous polyp in the transverse colon and rectum with a recommended recall of 5 years.          RUQ abdominal pain     See above  Continue follow up with Dr. Beasley for management of chronic pain syndrome  Right rib X-ray ordered  CBC, CMP, PT/INR  ER precautions provided         Relevant Orders    US Elastography Liver w/imaging    CBC Auto Differential    Comprehensive Metabolic Panel    Protime-INR    US Abdomen Limited    X-Ray Ribs 2 View Right       Other    Metabolic dysfunction-associated steatotic liver disease (MASLD) - Primary     History of poorly controlled diabetes mellitus, CKD stage II, morbid obesity, familial hypertriglyceridemia with routine plasmapheresis in the past, hepatic steatosis, hypertension, CICI, and tobacco abuse.   Per review of laboratory results, he has had persistent elevation of alkaline phosphatase and intermittent elevation of AST and ALT since January 2019.   Laboratory results August 13, 2020 revealed sodium 132, calcium 8.2, glucose 466, GFR 88, AST 63, , alkaline phosphatase 262, total protein 8.6, albumin 3.3, globulin 5.30, and otherwise unremarkable CMP; vitamin B12 773; cholesterol 230, triglycerides 2216, invalid HDL and LDL secondary to triglyceride level; negative acute viral hepatitis panel.   Hemoglobin A1c was 10.9% July 7, 2020.   Gastric emptying study was unremarkable July 17, 2020.   CT scan abdomen and pelvis with contrast June 1, 2020 revealed hepatomegaly with steatosis and otherwise unremarkable.   Laboratory results August 18, 2020 revealed iron level 52 and otherwise unremarkable iron profile and ferritin, ceruloplasmin, alpha 1 antitrypsin, F-actin, LKM, mitochondrial Ab; PT 11.6, INR 0.88; unremarkable CBC; negative SHAKIR and dsDNA; FibroSure testing revealed F1.   Abdominal ultrasound October 1, 2020 revealed enlarged liver with  steatosis.  Laboratory results December 8, 2020 revealed sodium 135, CO2 16, glucose 272, AST 48, ALT 94, alkaline phosphatase 210, total protein 9.1, globulin 5.3, and otherwise unremarkable CMP; hemoglobin A1c 9.9%; vitamin D 12.9; cholesterol 208, HDL 20, triglycerides >1420; TSH 1.3122; and unremarkable CBC.  Laboratory results June 16, 2021 revealed CO2 18, glucose 106, AST 90, , alkaline phosphatase 220, total protein 8.7, globulin 5.0, and otherwise unremarkable CMP; hemoglobin A1c 9.9%; vitamin D 28.1; cholesterol 254, triglycerides 1922; WBC 11.9 and otherwise unremarkable CBC.  FibroScan July 26, 2021 revealed S3 F0/F1.   Abdominal ultrasound limited August 4, 2021 revealed hepatomegaly with fatty infiltration of the liver and limited study.   CT scan abdomen and pelvis without contrast October 25, 2021 revealed hepatomegaly and otherwise unremarkable.   Laboratory results December 20, 2021 revealed potassium 3.2, glucose 115, AST 38, ALT 97, alkaline phosphatase 294, total protein 8.8, and otherwise unremarkable CMP; WBC 13.4 and otherwise unremarkable CBC.  Abdominal ultrasound February 14, 2022 revealed hepatomegaly and mild to moderate diffuse hepatic steatosis.  No significant change identified compared to the limited abdominal ultrasound 8/4/2021.  FibroScan August 29, 2022 revealed F0-1, S1.  Abdominal ultrasound December 1, 2022 revealed hepatomegaly, hepatic steatosis, no other significant abnormalities identified, and no significant change.   FibroScan March 14, 2023 revealed F2, S0.  Abdominal ultrasound May 29, 2023 revealed hepatomegaly, coarse echotexture of the liver parenchyma with changes of hepatic steatosis, sludge in the gallbladder, and no other significant change.   FibroScan September 29, 2023 revealed S0, F0-1.  Abdominal ultrasound October 19, 2023 revealed hepatomegaly with nonspecific slightly heterogeneous liver echogenicity.  He underwent EGD November 20, 2023 with  findings of normal esophagus, esophagogastric landmarks identified, erosive gastropathy with no bleeding or stigmata of recent bleeding, normal examined duodenum.  Pathology revealed mild chronic gastritis with lamina propria fibrosis, negative H pylori and negative dysplasia.  Gastric emptying study March 19, 2024 revealed 82% radiotracer emptying at 62 minutes and 98% radiotracer emptying at 96 minutes is noted.  Abdominal ultrasound April 8, 2024 revealed hepatomegaly is seen.  The liver demonstrates nonspecific slight coarsened echotexture and increased echogenicity which could be related to underlying hepatocellular disease and/or fatty infiltration.  CBC, CMP, PT/INR  Fibroscan  Abdominal ultrasound in 1 month  Patient has significant risk factors for MASLD  Lifestyle and dietary modifications provided  Recommend weight loss  Recommend good control of comorbidities  Recommend tobacco cessation  Recommend optimization of diabetes mellitus  Call with updates  Follow-up 6 months clinic visit with NP in a 45 minute slot         Relevant Orders    US Elastography Liver w/imaging    CBC Auto Differential    Comprehensive Metabolic Panel    Protime-INR    US Abdomen Limited    Tobacco user     Recommend tobacco cessation.         Relevant Orders    Ambulatory referral/consult to Smoking Cessation Program

## 2024-09-13 NOTE — PROGRESS NOTES
[facility]   Outpatient Wound Care     Subjective:   Patient ID: Bharath Caballero is a 44 y.o. male.    Chief Complaint: Wound Care      History of Present Illness:   44 y.o. White male presents to wound care clinic today for follow up regarding right plantar surface blister, left heel ulcer, and diabetic foot care..  Reviewed previous medical history since last visit of 08/05/2024.  Past medical history:  Voices was at hospital today for Dermatology appointment for skin biopsy of neck per Dr. Mott and noticed areas to feet.  Voices unaware of injury to feet due to severe neuropathy.  Reviewed all previous medical history prior to visit of 06/06/2024.  Followed by Dat Beasley MD for PCP last appointment 8/5/24.    Today's visit 09/10/2024:  Reviewed previous progress notes since last visit of 08/05/2024.  Right foot plantar surface blister resulting in small ulceration under foot <0.5 cm red granulating wound bed with minimal serosanguineous drainage.  Left heel lateral ulcer fully granulated.  Discussion with the patient today will have him perform wound every other day to right plantar surface foot ulcer.  All wound care performed per nursing staff at bedside.  Right foot plantar surface ulcer cleansed with wash, applied Iodoflex putty, gauze, and white Telfa Band-Aid.  Diabetic foot exam performed.  Monofilament test done decreased sensation noted in all areas.  Bilateral lower extremities cool to touch with absent hair growth.  Trimmed all 10 toenails using podiatry clippers, and filed with [place] board. Instructed the importance of checking feet daily due to decrease sensation high risk for diabetic foot ulcers.  Instructed on proper footwear, nail care, and daily skin assessment.  Will have him return to the clinic in 2 weeks.  Instructed to call the  office with any questions, concerns, or new skin issues.  Verbalized understanding of all instructions.    08/05/2024:  Reviewed previous progress note on 06/06/2024 note copied due to documentation of diabetic foot care.  Right plantar surface foot blood blister noted skin intact.  Left heel lateral ulcer red granulating wound bed with minimal serosanguineous drainage.  Discussion with the patient today will just have him paint all areas with Betadine and leave open air.  Bilateral lower extremities edema noted.  Applied Tubigrip size F.  Instructed the importance of checking feet daily due to high risk for diabetic foot ulcers and injuries.  Instructed if right foot blister skin breaks cleansed area with Vashe and paint with Betadine.  Instructions and supplies given.  During visit today CBG checked due to patient was not feeling well > 500 patient refused to go to ED states have not take my insulin all will take it as soon as I get home.  Instructed the dangers of hyperglycemia.  AMA signed.  Has reschedule appointment for September 10th.  Informed the patient may return then or may return sooner with any questions, concerns, or new skin issues.  Verbalized understanding of all instructions.      6/6/24:   Monofilament test done decreased sensation in all areas bilateral lower extremities cool to touch with absent hair growth.  Patient is fair skin with multiple freckles and new skin lesion to back of neck.  Patient voices does not have a dermatologist.  Place referral for Dermatology.  Instructed to wear sun screen  SPF while out in the sun.  Trimmed all 10 toenails using podiatry clippers, and filed with [place] board.  Bilateral great toes dystrophic debride using [place].  Instructed the importance of checking feet daily due to high risk for diabetic foot ulcers.  Instructed on proper footwear, nail care, and checking skin daily.  Will have him return to clinic in 3 months.  Instructed to call the office  with any questions, concerns, or new skin issues.  Verbalized understanding of all instructions.          History includes:      Past Medical History:   Diagnosis Date    Acquired perforating dermatosis 8/30/2023    Aortic atherosclerosis 1/24/2023    Bilateral edema of lower extremity 2/6/2023    Bladder outflow obstruction 6/20/2022    Blurred vision, right eye 10/19/2022    BMI 34.0-34.9,adult 6/20/2022    BPH (benign prostatic hyperplasia)     Chronic liver failure without hepatic coma 4/25/2023    Chronic pain 10/25/2022    Chronic pain syndrome 5/12/2022    Chronic pancreatitis 10/28/2017    Deafness 10/3/2023    Diabetes     Diabetic polyneuropathy 6/20/2022    Elevated LFTs 8/18/2022    GERD (gastroesophageal reflux disease)     Hand paresthesia 6/20/2022    Hepatic steatosis     History of continuous positive airway pressure (CPAP) therapy at home     History of pancreatitis 6/20/2022    Hypertension     Hypertriglyceridemia     Kidney disorder     Leg pain     Mixed hyperlipidemia 10/28/2017    Mononeuritis multiplex 6/20/2022    Morbid obesity     Neuropathy     Nocturia 6/20/2022    OA (osteoarthritis)     Obstructive sleep apnea syndrome 6/20/2022    Onychomycosis of multiple toenails with type 2 diabetes mellitus 10/28/2017    Open wound of finger of right hand 10/20/2023    CICI (obstructive sleep apnea)     Painful diabetic neuropathy 5/12/2022    Personal history of colonic polyps 8/18/2022    Polyneuropathy     Primary hypertension 10/28/2017    Recurrent pancreatitis     Renal insufficiency     Tobacco abuse     Tobacco user 6/20/2022    Type 2 diabetes mellitus with skin complication, with long-term current use of insulin 2/6/2023      Past Surgical History:   Procedure Laterality Date    ANGIOGRAM, CORONARY, WITH LEFT HEART CATHETERIZATION N/A 4/10/2023    Procedure: Angiogram, Coronary, with Left Heart Cath;  Surgeon: Jaswant Pugh MD;  Location: [facility] CATH LAB;  Service: Cardiology;   Laterality: N/A;    APPENDECTOMY      ARTERIOVENOUS ANASTOMOSIS, OPEN, UPPER ARM BASILLIC VEIN TRANSPOSITION N/A 05/07/2018    EGD, WITH CLOSED BIOPSY N/A 11/20/2023    Procedure: EGD, WITH CLOSED BIOPSY;  Surgeon: Christina James MD;  Location: [facility] ENDOSCOPY;  Service: Gastroenterology;  Laterality: N/A;    ESOPHAGOGASTRODUODENOSCOPY N/A 06/07/2021    EXCISION OF ARTERIOVENOUS FISTULA N/A 06/01/2018    FRACTIONAL FLOW RESERVE (FFR), CORONARY  4/10/2023    Procedure: Fractional Flow Sigel (FFR), Coronary;  Surgeon: Jaswant Pugh MD;  Location: [facility] CATH LAB;  Service: Cardiology;;    HERNIA REPAIR      INSPECTION OF UPPER INTESTINAL TRACT N/A 06/07/2021    PHERESIS OF PLASMA N/A 07/13/2018    PHERESIS OF PLASMA N/A 05/25/2018    SPINAL CORD STIMULATOR REMOVAL      TRIAL OF SPINAL CORD NERVE STIMULATOR N/A 5/12/2022    Procedure: TRIAL, NEUROSTIMULATOR, SPINAL CORD;  Surgeon: Jay Pozo MD;  Location: [facility] OR;  Service: Neurosurgery;  Laterality: N/A;    UPPER GI N/A 06/07/2021      Social History     Socioeconomic History    Marital status: Single   Tobacco Use    Smoking status: Every Day     Average packs/day: 0.5 packs/day for 25.0 years (12.5 ttl pk-yrs)     Types: Cigarettes     Start date: 7/20/1996    Smokeless tobacco: Never    Tobacco comments:     1/2 pk 25 years   Substance and Sexual Activity    Alcohol use: Not Currently    Drug use: Yes     Types: Morphine     Comment: Rx pain    Sexual activity: Yes     Partners: Female     Social Determinants of Health     Financial Resource Strain: Low Risk  (6/4/2024)    Overall Financial Resource Strain (CARDIA)     Difficulty of Paying Living Expenses: Not hard at all   Food Insecurity: No Food Insecurity (6/4/2024)    Hunger Vital Sign     Worried About Running Out of Food in the Last Year: Never true     Ran Out of Food in the Last Year: Never true   Transportation Needs: No Transportation Needs (5/24/2023)    PRAPARE - Transportation      Lack of Transportation (Medical): No     Lack of Transportation (Non-Medical): No   Physical Activity: Insufficiently Active (6/4/2024)    Exercise Vital Sign     Days of Exercise per Week: 7 days     Minutes of Exercise per Session: 10 min   Stress: No Stress Concern Present (6/4/2024)    [employer] - Occupational Stress Questionnaire     Feeling of Stress : Not at all   Housing Stability: Low Risk  (6/4/2024)    Housing Stability Vital Sign     Unable to Pay for Housing in the Last Year: No     Homeless in the Last Year: No   .      Current Outpatient Medications   Medication Sig Dispense Refill    amitriptyline (ELAVIL) 50 MG tablet Take 1 tablet (50 mg total) by mouth every evening. 30 tablet 4    aspirin (ECOTRIN) 81 MG EC tablet Take 81 mg by mouth once daily.      atorvastatin (LIPITOR) 40 MG tablet Take 1 tablet (40 mg total) by mouth once daily. 90 tablet 3    carvediloL (COREG) 25 MG tablet Take 1 tablet (25 mg total) by mouth 2 (two) times daily with meals. 180 tablet 3    cholecalciferol, vitamin D3, (VITAMIN D3) 25 mcg (1,000 unit) capsule Take 2 capsules (2,000 Units total) by mouth once daily. 720 capsule 0    clonazePAM (KLONOPIN) 1 MG tablet TAKE ONE TABLET BY MOUTH TWICE DAILY 60 tablet 0    cloNIDine (CATAPRES) 0.3 MG tablet Take 1 tablet (0.3 mg total) by mouth 3 (three) times daily. 270 tablet 3    CREON 36,000-114,000- 180,000 unit CpDR TAKE ONE CAPSULE THREE TIMES DAILY 270 capsule 1    EScitalopram oxalate (LEXAPRO) 20 MG tablet Take 20 mg by mouth once daily.      esomeprazole (NEXIUM) 40 MG capsule Take 1 capsule (40 mg total) by mouth before breakfast. 30 capsule 11    evolocumab (REPATHA SURECLICK) 140 mg/mL PnIj Inject 1 mL (140 mg total) into the skin every 14 (fourteen) days. 2 mL 11    FARXIGA 5 mg Tab tablet TAKE ONE TABLET BY MOUTH EVERY DAY 90 tablet 3    HUMALOG U-100 INSULIN 100 unit/mL injection INJECT 25 UNITS SUBCUTANEOUSLY BEFORE MEALS THREE  "TIMES DAILY 10 mL 4    HYDROmorphone (DILAUDID) 8 MG tablet Take 1 tablet (8 mg total) by mouth every 8 (eight) hours as needed. 90 tablet 0    icosapent ethyL (VASCEPA) 1 gram Cap Take 2 capsules (2 g total) by mouth 2 (two) times daily. 60 capsule 11    isosorbide mononitrate (IMDUR) 120 MG 24 hr tablet Take 1 tablet (120 mg total) by mouth once daily. 90 tablet 3    linaGLIPtin (TRADJENTA) 5 mg Tab tablet Take 1 tablet (5 mg total) by mouth once daily. 90 tablet 3    losartan (COZAAR) 100 MG tablet Take 1 tablet (100 mg total) by mouth once daily. 90 tablet 3    morphine (MS CONTIN) 100 MG 12 hr tablet TAKE ONE TABLET BY MOUTH THREE TIMES DAILY 90 tablet 0    NIFEdipine (PROCARDIA-XL) 60 MG (OSM) 24 hr tablet Take 1 tablet (60 mg total) by mouth 2 (two) times a day. 180 tablet 3    nitroGLYCERIN (NITROSTAT) 0.3 MG SL tablet Place 1 tablet (0.3 mg total) under the tongue every 5 (five) minutes as needed for Chest pain (go to er after 3 doses). 30 tablet 6    OXcarbazepine (TRILEPTAL) 600 MG Tab Take 1 tablet (600 mg total) by mouth 2 (two) times daily. 60 tablet 11    potassium chloride SA (K-DUR,KLOR-CON) 20 MEQ tablet TAKE ONE TABLET BY MOUTH TWICE DAILY 180 tablet 3    pregabalin (LYRICA) 150 MG capsule Take 1 capsule (150 mg total) by mouth 2 (two) times daily. 60 capsule 2    spironolactone (ALDACTONE) 100 MG tablet Take 1 tablet (100 mg total) by mouth once daily. 90 tablet 3    sucralfate (CARAFATE) 100 mg/mL suspension Take 10 mLs (1 g total) by mouth 4 (four) times daily before meals and nightly. 1200 mL 3    SURE COMFORT INSULIN SYRINGE 0.5 mL 31 gauge x 5/16" Syrg USE ONE SYRINGE THREE TIMES DAILY 100 each 3    tamsulosin (FLOMAX) 0.4 mg Cap TAKE ONE CAPSULE BY MOUTH EVERY DAY 90 capsule 3    torsemide (DEMADEX) 20 MG Tab Take 1 tablet (20 mg total) by mouth once daily. 90 tablet 3    TOUJEO SOLOSTAR U-300 INSULIN 300 unit/mL (1.5 mL) InPn pen INJECT 35 SUBCUTANEOUSLY TWICE DAILY 4.5 mL 3     Current " Facility-Administered Medications   Medication Dose Route Frequency Provider Last Rate Last Admin    triamcinolone acetonide 0.1% ointment   Topical (Top) BID Malina Brito FNP           Review of Systems   Skin:  Positive for wound.   All other systems reviewed and are negative.         Labs Reviewed:   Chemistry:  Lab Results   Component Value Date    BUN 11.3 09/10/2024    BUN 9.1 06/04/2024    CREATININE 1.08 09/10/2024    CREATININE 0.88 06/04/2024    EGFRNORACEVR >60 09/10/2024    EGFRNORACEVR >60 06/04/2024    GLUCOSE 283 (H) 09/10/2024    AST 19 09/10/2024    AST 36 (H) 06/04/2024    ALT 34 09/10/2024    ALT 38 06/04/2024    HGBA1C 8.2 (H) 06/04/2024        Hematology:  Lab Results   Component Value Date    WBC 11.96 (H) 09/10/2024    WBC 12.15 (H) 06/04/2024    HGB 13.9 (L) 09/10/2024    HGB 14.0 06/04/2024    HCT 40.4 (L) 09/10/2024    HCT 39.1 (L) 06/04/2024     09/10/2024     06/04/2024       Inflammatory Markers:  Lab Results   Component Value Date    HSCRP 9.98 (H) 06/23/2020    HSCRP 43.30 (H) 09/10/2019    SEDRATE 26 (H) 06/22/2022    SEDRATE 28 (H) 06/23/2020    SEDRATE 2 09/10/2019        Objective:        Physical Exam  Vitals reviewed.   Cardiovascular:      Pulses:           Dorsalis pedis pulses are 2+ on the right side and 2+ on the left side.        Posterior tibial pulses are 2+ on the right side and 2+ on the left side.   Musculoskeletal:        Feet:    Feet:      Right foot:      Skin integrity: Ulcer present.      Toenail Condition: Right toenails are long.      Left foot:      Toenail Condition: Left toenails are long.   Skin:     General: Skin is warm and dry.      Capillary Refill: Capillary refill takes less than 2 seconds.   Neurological:      Mental Status: He is alert.            Wound 09/10/24 1335 Other (comment) Left medial Ankle (Active)   09/10/24 1335   Present on Original Admission: Y   Primary Wound Type: Other   Side: Left   Orientation: medial    Location: Ankle   Wound Approximate Age at First Assessment (Weeks):    Wound Number:    Is this injury device related?:    Incision Type:    Closure Method:    Wound Description (Comments):    Type:    Additional Comments:    Ankle-Brachial Index:    Pulses:    Removal Indication and Assessment:    Wound Outcome:    Wound Image   09/10/24 1334   Dressing Appearance Moist drainage 09/10/24 1334   Drainage Amount Small 09/10/24 1334   Drainage Characteristics/Odor Serosanguineous 09/10/24 1334   Appearance [place] 09/10/24 1334   Wound Length (cm) 0.5 cm 09/10/24 1334   Wound Width (cm) 0.5 cm 09/10/24 1334   Wound Depth (cm) 0.1 cm 09/10/24 1334   Wound Volume (cm^3) 0.025 cm^3 09/10/24 1334   Wound Surface Area (cm^2) 0.25 cm^2 09/10/24 1334               Assessment:         ICD-10-CM ICD-9-CM   1. Blister of right foot, subsequent encounter  S90.821D V58.89   2. Ulcer of left heel and midfoot, limited to breakdown of skin  L97.421 707.14   3. Encounter for diabetic foot exam  E11.9 250.00   4. Overgrown toenails  L60.2 703.8   5. Type 2 diabetes mellitus with other skin ulcer, with long-term current use of insulin  E11.622 250.80    Z79.4 V58.67   6. Diabetic polyneuropathy associated with other specified diabetes mellitus  E13.42 250.60     357.2   7. Tobacco user  Z72.0 305.1         Plan:   Tissue pathology and/or culture taken:  [] Yes [x] No   Sharp debridement performed:   [] Yes [x] No   Labs ordered this visit:   [] Yes [x] No   Imaging ordered this visit:   [] Yes [x] No         1. Blister of right foot, subsequent encounter     Wound care:  Will cleansed every-other-day with Vashe, apply Iodoflex putty to wound bed, cover with gauze, and secure with white Telfa Band-Aid.    Will continue to monitor for signs of infection.   2. Ulcer of left heel and midfoot, limited to breakdown of skin     Resolved.   3. Encounter for diabetic foot exam     Diabetic foot exam performed.  Instructed on proper foot care.    4. Overgrown toenails     Trimmed.  Instructed on proper nail care.   5. Type 2 diabetes mellitus with other skin ulcer, with long-term current use of insulin       Instructed the importance of taking insulin as directed.    Must have a strict diabetic diet, take glucose lowering medications as prescribed and encourage lower HA1C.    Last A1c 8.2.   6. Diabetic polyneuropathy associated with other specified diabetes mellitus      Will check feet daily will check feet daily due to high risk for foot injury.   7. Tobacco user     Instructed on smoking cessation.        The time spent including preparing to see the patient, obtaining patient history and assessment, evaluation of the plan of care, patient/caregiver counseling and education, orders, documentation, coordination of care, and other professional medical management activities for today's encounter was 20 minute.    Time spent performing procedures during today's encounter was 20 minute.    Follow up in about 2 weeks (around 9/24/2024). Teaching provided on s/s to call wound clinic for promptly.  ER precautions taught for after hours and weekends.       ROCCO Sequeira

## 2024-09-18 ENCOUNTER — TELEPHONE (OUTPATIENT)
Dept: ENDOCRINOLOGY | Facility: CLINIC | Age: 44
End: 2024-09-18
Payer: MEDICARE

## 2024-09-18 ENCOUNTER — PROCEDURE VISIT (OUTPATIENT)
Dept: GASTROENTEROLOGY | Facility: CLINIC | Age: 44
End: 2024-09-18
Payer: MEDICARE

## 2024-09-18 DIAGNOSIS — K76.0 METABOLIC DYSFUNCTION-ASSOCIATED STEATOTIC LIVER DISEASE (MASLD): ICD-10-CM

## 2024-09-18 DIAGNOSIS — R10.11 RUQ ABDOMINAL PAIN: ICD-10-CM

## 2024-09-18 PROCEDURE — 91200 LIVER ELASTOGRAPHY: CPT | Mod: PBBFAC | Performed by: NURSE PRACTITIONER

## 2024-09-18 PROCEDURE — 91200 LIVER ELASTOGRAPHY: CPT | Mod: 26,S$PBB,, | Performed by: NURSE PRACTITIONER

## 2024-09-18 NOTE — PROGRESS NOTES
Patient here for FibroScan visit. Reports NPO X3 hours . Position patient for the procedure to expose the right rib cage. Explained procedure to the patient. FibroScan exam complete and was tolerated without any problems.     All GI labs/imaging results discussed with pt. Verbalized understanding

## 2024-09-18 NOTE — TELEPHONE ENCOUNTER
----- Message from Meghan Newsome sent at 9/18/2024  2:10 PM CDT -----  Pt of [facility]        Pt came in office stating that he needs refill on Dexcom G7 .    Pt call back number [phone]        Please advise

## 2024-09-19 NOTE — PROCEDURES
Fibroscan Procedure     Name: Bharath Caballero  Date of Procedure : 24  Interpreting Physician: Erin Hernandez NP  Diagnosis: MASLD (NAFLD)    Probe: M    Fibroscan readin.2 kPa    Fibrosis: F 0-1     CAP readin dB/m    Steatosis: S0-1    Miscellaneous:   2021: Fibroscan: 7.1 kPa, S3, F 0-1  2022: Fibroscan: 6.7 kPa. S1, F0-1  2023: Fibroscan: 8.6 kPa, S0, F2  2023: Fibroscan: 5.1 kPa, S0, F0-1    Repeat: 1 year

## 2024-09-20 ENCOUNTER — PATIENT MESSAGE (OUTPATIENT)
Dept: GASTROENTEROLOGY | Facility: CLINIC | Age: 44
End: 2024-09-20
Payer: MEDICARE

## 2024-09-20 DIAGNOSIS — G89.4 CHRONIC PAIN SYNDROME: ICD-10-CM

## 2024-09-20 DIAGNOSIS — E08.42 DIABETIC POLYNEUROPATHY ASSOCIATED WITH DIABETES MELLITUS DUE TO UNDERLYING CONDITION: ICD-10-CM

## 2024-09-20 RX ORDER — CLONAZEPAM 1 MG/1
1 TABLET ORAL 2 TIMES DAILY
Qty: 60 TABLET | Refills: 0 | Status: SHIPPED | OUTPATIENT
Start: 2024-09-20

## 2024-09-20 RX ORDER — MORPHINE SULFATE 100 MG/1
100 TABLET, FILM COATED, EXTENDED RELEASE ORAL 3 TIMES DAILY
Qty: 90 TABLET | Refills: 0 | Status: SHIPPED | OUTPATIENT
Start: 2024-09-20

## 2024-09-23 DIAGNOSIS — Z79.4 TYPE 2 DIABETES MELLITUS WITH OTHER SKIN ULCER, WITH LONG-TERM CURRENT USE OF INSULIN: Primary | ICD-10-CM

## 2024-09-23 DIAGNOSIS — E11.622 TYPE 2 DIABETES MELLITUS WITH OTHER SKIN ULCER, WITH LONG-TERM CURRENT USE OF INSULIN: Primary | ICD-10-CM

## 2024-09-23 RX ORDER — PANCRELIPASE 36000; 180000; 114000 [USP'U]/1; [USP'U]/1; [USP'U]/1
CAPSULE, DELAYED RELEASE PELLETS ORAL
Qty: 270 CAPSULE | Refills: 1 | Status: SHIPPED | OUTPATIENT
Start: 2024-09-23

## 2024-09-24 ENCOUNTER — LAB VISIT (OUTPATIENT)
Dept: LAB | Facility: HOSPITAL | Age: 44
End: 2024-09-24
Attending: NURSE PRACTITIONER
Payer: MEDICARE

## 2024-09-24 ENCOUNTER — HOSPITAL ENCOUNTER (OUTPATIENT)
Dept: WOUND CARE | Facility: HOSPITAL | Age: 44
Discharge: HOME | End: 2024-09-24
Attending: NURSE PRACTITIONER
Payer: MEDICARE

## 2024-09-24 VITALS
HEART RATE: 64 BPM | OXYGEN SATURATION: 96 % | DIASTOLIC BLOOD PRESSURE: 79 MMHG | TEMPERATURE: 98 F | SYSTOLIC BLOOD PRESSURE: 144 MMHG | RESPIRATION RATE: 18 BRPM

## 2024-09-24 DIAGNOSIS — Z79.4 TYPE 2 DIABETES MELLITUS WITH OTHER SKIN ULCER, WITH LONG-TERM CURRENT USE OF INSULIN: ICD-10-CM

## 2024-09-24 DIAGNOSIS — R79.89 ELEVATED LFTS: ICD-10-CM

## 2024-09-24 DIAGNOSIS — E13.42 DIABETIC POLYNEUROPATHY ASSOCIATED WITH OTHER SPECIFIED DIABETES MELLITUS: ICD-10-CM

## 2024-09-24 DIAGNOSIS — E11.622 TYPE 2 DIABETES MELLITUS WITH OTHER SKIN ULCER, WITH LONG-TERM CURRENT USE OF INSULIN: ICD-10-CM

## 2024-09-24 DIAGNOSIS — L97.421 ULCER OF LEFT HEEL AND MIDFOOT, LIMITED TO BREAKDOWN OF SKIN: ICD-10-CM

## 2024-09-24 DIAGNOSIS — Z72.0 TOBACCO USER: ICD-10-CM

## 2024-09-24 DIAGNOSIS — S90.821D BLISTER OF RIGHT FOOT, SUBSEQUENT ENCOUNTER: Primary | ICD-10-CM

## 2024-09-24 PROCEDURE — 83516 IMMUNOASSAY NONANTIBODY: CPT

## 2024-09-24 PROCEDURE — 36415 COLL VENOUS BLD VENIPUNCTURE: CPT

## 2024-09-24 PROCEDURE — 99213 OFFICE O/P EST LOW 20 MIN: CPT | Mod: ,,, | Performed by: NURSE PRACTITIONER

## 2024-09-24 PROCEDURE — 86381 MITOCHONDRIAL ANTIBODY EACH: CPT

## 2024-09-24 PROCEDURE — 86225 DNA ANTIBODY NATIVE: CPT

## 2024-09-24 PROCEDURE — 86376 MICROSOMAL ANTIBODY EACH: CPT

## 2024-09-24 PROCEDURE — 86039 ANTINUCLEAR ANTIBODIES (ANA): CPT

## 2024-09-24 PROCEDURE — 27000999 HC MEDICAL RECORD PHOTO DOCUMENTATION

## 2024-09-24 PROCEDURE — 99211 OFF/OP EST MAY X REQ PHY/QHP: CPT

## 2024-09-24 RX ORDER — INSULIN LISPRO 100 [IU]/ML
INJECTION, SOLUTION INTRAVENOUS; SUBCUTANEOUS
Qty: 10 ML | Refills: 4 | Status: SHIPPED | OUTPATIENT
Start: 2024-09-24

## 2024-09-25 LAB
LKM-1 IGG SER IA-ACNC: 0.7 U
MITOCHONDRIA M2 AB SER IA-ACNC: <0.1 U

## 2024-09-25 NOTE — PROGRESS NOTES
[facility]   Outpatient Wound Care     Subjective:   Patient ID: Bharath Caballero is a 44 y.o. male.    Chief Complaint: Wound Care      History of Present Illness:   44 y.o. White male  care clinic today for follow up regarding right plantar surface wound and left heel ulcer.  Presents to clinic alone.  Reviewed previous medical history since last visit of 9/10/2024.  Past medical history:  Voices was at hospital today for Dermatology appointment for skin biopsy of neck per Dr. Mott and noticed areas to feet.  Voices unaware of injury to feet due to severe neuropathy.  Reviewed all previous medical history prior to visit of 06/06/2024.  Followed by Dat Beasley MD for PCP last appointment 8/5/24.    Today's visit 09/24/2024:  Reviewed previous progress notes since last visit on 09/10/2024.  Right foot plantar surface dressing removed per nursing staff at bedside.  Right foot plantar surface wound fully granulated.  Left heel lateral wound fully granulated.  Discussion with the patient today discontinue wound care at this time just wash feet daily with soap and water pat dry may apply Vaseline to moisturize with the exception between toes.  Will have him return to clinic in 1 month.  Instructed to call the office with any questions, concerns, or new skin issues.  Instructed the importance of checking feet daily due to decrease sensation.  Verbalized understanding of all instructions.    09/10/2024:  Reviewed previous progress notes since last visit of 08/05/2024.  Right foot plantar surface blister resulting in small ulceration under foot <0.5 cm red granulating wound bed with minimal serosanguineous drainage.  Left heel lateral ulcer fully granulated.  Discussion with the patient today will have him perform wound every other day to right plantar surface  foot ulcer.  All wound care performed per nursing staff at bedside.  Right foot plantar surface ulcer cleansed with wash, applied Iodoflex putty, gauze, and white Telfa Band-Aid.  Diabetic foot exam performed.  Monofilament test done decreased sensation noted in all areas.  Bilateral lower extremities cool to touch with absent hair growth.  Trimmed all 10 toenails using podiatry clippers, and filed with [place] board. Instructed the importance of checking feet daily due to decrease sensation high risk for diabetic foot ulcers.  Instructed on proper footwear, nail care, and daily skin assessment.  Will have him return to the clinic in 2 weeks.  Instructed to call the office with any questions, concerns, or new skin issues.  Verbalized understanding of all instructions.    08/05/2024:  Reviewed previous progress note on 06/06/2024 note copied due to documentation of diabetic foot care.  Right plantar surface foot blood blister noted skin intact.  Left heel lateral ulcer red granulating wound bed with minimal serosanguineous drainage.  Discussion with the patient today will just have him paint all areas with Betadine and leave open air.  Bilateral lower extremities edema noted.  Applied Tubigrip size F.  Instructed the importance of checking feet daily due to high risk for diabetic foot ulcers and injuries.  Instructed if right foot blister skin breaks cleansed area with Vashe and paint with Betadine.  Instructions and supplies given.  During visit today CBG checked due to patient was not feeling well > 500 patient refused to go to ED states have not take my insulin all will take it as soon as I get home.  Instructed the dangers of hyperglycemia.  AMA signed.  Has reschedule appointment for September 10th.  Informed the patient may return then or may return sooner with any questions, concerns, or new skin issues.  Verbalized understanding of all instructions.      6/6/24:   Monofilament test done decreased sensation in  all areas bilateral lower extremities cool to touch with absent hair growth.  Patient is fair skin with multiple freckles and new skin lesion to back of neck.  Patient voices does not have a dermatologist.  Place referral for Dermatology.  Instructed to wear sun screen  SPF while out in the sun.  Trimmed all 10 toenails using podiatry clippers, and filed with [place] board.  Bilateral great toes dystrophic debride using [place].  Instructed the importance of checking feet daily due to high risk for diabetic foot ulcers.  Instructed on proper footwear, nail care, and checking skin daily.  Will have him return to clinic in 3 months.  Instructed to call the office with any questions, concerns, or new skin issues.  Verbalized understanding of all instructions.          History includes:      Past Medical History:   Diagnosis Date    Acquired perforating dermatosis 8/30/2023    Aortic atherosclerosis 1/24/2023    Bilateral edema of lower extremity 2/6/2023    Bladder outflow obstruction 6/20/2022    Blurred vision, right eye 10/19/2022    BMI 34.0-34.9,adult 6/20/2022    BPH (benign prostatic hyperplasia)     Chronic liver failure without hepatic coma 4/25/2023    Chronic pain 10/25/2022    Chronic pain syndrome 5/12/2022    Chronic pancreatitis 10/28/2017    Deafness 10/3/2023    Diabetes     Diabetic polyneuropathy 6/20/2022    Elevated LFTs 8/18/2022    GERD (gastroesophageal reflux disease)     Hand paresthesia 6/20/2022    Hepatic steatosis     History of continuous positive airway pressure (CPAP) therapy at home     History of pancreatitis 6/20/2022    Hypertension     Hypertriglyceridemia     Kidney disorder     Leg pain     Mixed hyperlipidemia 10/28/2017    Mononeuritis multiplex 6/20/2022    Morbid obesity     Neuropathy     Nocturia 6/20/2022    OA (osteoarthritis)     Obstructive sleep apnea syndrome 6/20/2022    Onychomycosis of multiple toenails with type 2 diabetes mellitus 10/28/2017    Open wound of  finger of right hand 10/20/2023    CICI (obstructive sleep apnea)     Painful diabetic neuropathy 5/12/2022    Personal history of colonic polyps 8/18/2022    Polyneuropathy     Primary hypertension 10/28/2017    Recurrent pancreatitis     Renal insufficiency     Tobacco abuse     Tobacco user 6/20/2022    Type 2 diabetes mellitus with skin complication, with long-term current use of insulin 2/6/2023      Past Surgical History:   Procedure Laterality Date    ANGIOGRAM, CORONARY, WITH LEFT HEART CATHETERIZATION N/A 4/10/2023    Procedure: Angiogram, Coronary, with Left Heart Cath;  Surgeon: Jaswant Pugh MD;  Location: [facility] CATH LAB;  Service: Cardiology;  Laterality: N/A;    APPENDECTOMY      ARTERIOVENOUS ANASTOMOSIS, OPEN, UPPER ARM BASILLIC VEIN TRANSPOSITION N/A 05/07/2018    EGD, WITH CLOSED BIOPSY N/A 11/20/2023    Procedure: EGD, WITH CLOSED BIOPSY;  Surgeon: Christina James MD;  Location: [facility] ENDOSCOPY;  Service: Gastroenterology;  Laterality: N/A;    ESOPHAGOGASTRODUODENOSCOPY N/A 06/07/2021    EXCISION OF ARTERIOVENOUS FISTULA N/A 06/01/2018    FRACTIONAL FLOW RESERVE (FFR), CORONARY  4/10/2023    Procedure: Fractional Flow [place] (FFR), Coronary;  Surgeon: Jaswant Pugh MD;  Location: [facility] CATH LAB;  Service: Cardiology;;    HERNIA REPAIR      INSPECTION OF UPPER INTESTINAL TRACT N/A 06/07/2021    PHERESIS OF PLASMA N/A 07/13/2018    PHERESIS OF PLASMA N/A 05/25/2018    SPINAL CORD STIMULATOR REMOVAL      TRIAL OF SPINAL CORD NERVE STIMULATOR N/A 5/12/2022    Procedure: TRIAL, NEUROSTIMULATOR, SPINAL CORD;  Surgeon: Jay Pozo MD;  Location: [facility] OR;  Service: Neurosurgery;  Laterality: N/A;    UPPER GI N/A 06/07/2021      Social History     Socioeconomic History    Marital status: Single   Tobacco Use    Smoking status: Every Day     Average packs/day: 0.5 packs/day for 25.0 years (12.5 ttl pk-yrs)     Types: Cigarettes     Start date: 7/20/1996    Smokeless tobacco: Never     Tobacco comments:     1/2 pk 25 years   Substance and Sexual Activity    Alcohol use: Not Currently    Drug use: Yes     Types: Morphine     Comment: Rx pain    Sexual activity: Yes     Partners: Female     Social Determinants of Health     Financial Resource Strain: Low Risk  (6/4/2024)    Overall Financial Resource Strain (CARDIA)     Difficulty of Paying Living Expenses: Not hard at all   Food Insecurity: No Food Insecurity (6/4/2024)    Hunger Vital Sign     Worried About Running Out of Food in the Last Year: Never true     Ran Out of Food in the Last Year: Never true   Transportation Needs: No Transportation Needs (5/24/2023)    PRAPARE - Transportation     Lack of Transportation (Medical): No     Lack of Transportation (Non-Medical): No   Physical Activity: Insufficiently Active (6/4/2024)    Exercise Vital Sign     Days of Exercise per Week: 7 days     Minutes of Exercise per Session: 10 min   Stress: No Stress Concern Present (6/4/2024)    [place] [employer] - Occupational Stress Questionnaire     Feeling of Stress : Not at all   Housing Stability: Low Risk  (6/4/2024)    Housing Stability Vital Sign     Unable to Pay for Housing in the Last Year: No     Homeless in the Last Year: No   .      Current Outpatient Medications   Medication Sig Dispense Refill    amitriptyline (ELAVIL) 50 MG tablet Take 1 tablet (50 mg total) by mouth every evening. 30 tablet 4    aspirin (ECOTRIN) 81 MG EC tablet Take 81 mg by mouth once daily.      atorvastatin (LIPITOR) 40 MG tablet Take 1 tablet (40 mg total) by mouth once daily. 90 tablet 3    carvediloL (COREG) 25 MG tablet Take 1 tablet (25 mg total) by mouth 2 (two) times daily with meals. 180 tablet 3    cholecalciferol, vitamin D3, (VITAMIN D3) 25 mcg (1,000 unit) capsule Take 2 capsules (2,000 Units total) by mouth once daily. 720 capsule 0    clonazePAM (KLONOPIN) 1 MG tablet TAKE ONE TABLET BY MOUTH TWICE DAILY 60 tablet 0    cloNIDine  (CATAPRES) 0.3 MG tablet Take 1 tablet (0.3 mg total) by mouth 3 (three) times daily. 270 tablet 3    CREON 36,000-114,000- 180,000 unit CpDR TAKE ONE CAPSULE THREE TIMES DAILY 270 capsule 1    EScitalopram oxalate (LEXAPRO) 20 MG tablet Take 20 mg by mouth once daily.      esomeprazole (NEXIUM) 40 MG capsule Take 1 capsule (40 mg total) by mouth before breakfast. 30 capsule 11    evolocumab (REPATHA SURECLICK) 140 mg/mL PnIj Inject 1 mL (140 mg total) into the skin every 14 (fourteen) days. 2 mL 11    FARXIGA 5 mg Tab tablet TAKE ONE TABLET BY MOUTH EVERY DAY 90 tablet 3    HUMALOG U-100 INSULIN 100 unit/mL injection INJECT 25 UNITS SUBCUTANEOUSLY BEFORE MEALS THREE TIMES DAILY 10 mL 4    HYDROmorphone (DILAUDID) 8 MG tablet Take 1 tablet (8 mg total) by mouth every 8 (eight) hours as needed. 90 tablet 0    icosapent ethyL (VASCEPA) 1 gram Cap Take 2 capsules (2 g total) by mouth 2 (two) times daily. 60 capsule 11    isosorbide mononitrate (IMDUR) 120 MG 24 hr tablet Take 1 tablet (120 mg total) by mouth once daily. 90 tablet 3    linaGLIPtin (TRADJENTA) 5 mg Tab tablet Take 1 tablet (5 mg total) by mouth once daily. 90 tablet 3    losartan (COZAAR) 100 MG tablet Take 1 tablet (100 mg total) by mouth once daily. 90 tablet 3    morphine (MS CONTIN) 100 MG 12 hr tablet TAKE ONE TABLET BY MOUTH THREE TIMES DAILY 90 tablet 0    NIFEdipine (PROCARDIA-XL) 60 MG (OSM) 24 hr tablet Take 1 tablet (60 mg total) by mouth 2 (two) times a day. 180 tablet 3    nitroGLYCERIN (NITROSTAT) 0.3 MG SL tablet Place 1 tablet (0.3 mg total) under the tongue every 5 (five) minutes as needed for Chest pain (go to er after 3 doses). 30 tablet 6    OXcarbazepine (TRILEPTAL) 600 MG Tab Take 1 tablet (600 mg total) by mouth 2 (two) times daily. 60 tablet 11    potassium chloride SA (K-DUR,KLOR-CON) 20 MEQ tablet TAKE ONE TABLET BY MOUTH TWICE DAILY 180 tablet 3    pregabalin (LYRICA) 150 MG capsule Take 1 capsule (150 mg total) by mouth 2  "(two) times daily. 60 capsule 2    spironolactone (ALDACTONE) 100 MG tablet Take 1 tablet (100 mg total) by mouth once daily. 90 tablet 3    sucralfate (CARAFATE) 100 mg/mL suspension Take 10 mLs (1 g total) by mouth 4 (four) times daily before meals and nightly. 1200 mL 3    SURE COMFORT INSULIN SYRINGE 0.5 mL 31 gauge x 5/16" Syrg USE ONE SYRINGE THREE TIMES DAILY 100 each 3    tamsulosin (FLOMAX) 0.4 mg Cap TAKE ONE CAPSULE BY MOUTH EVERY DAY 90 capsule 3    torsemide (DEMADEX) 20 MG Tab Take 1 tablet (20 mg total) by mouth once daily. 90 tablet 3    TOUJEO SOLOSTAR U-300 INSULIN 300 unit/mL (1.5 mL) InPn pen INJECT 35 SUBCUTANEOUSLY TWICE DAILY 4.5 mL 3     Current Facility-Administered Medications   Medication Dose Route Frequency Provider Last Rate Last Admin    triamcinolone acetonide 0.1% ointment   Topical (Top) BID Malina Brito, TIP           Review of Systems   Skin:  Positive for wound.   All other systems reviewed and are negative.         Labs Reviewed:   Chemistry:  Lab Results   Component Value Date    BUN 11.3 09/10/2024    BUN 9.1 06/04/2024    CREATININE 1.08 09/10/2024    CREATININE 0.88 06/04/2024    EGFRNORACEVR >60 09/10/2024    EGFRNORACEVR >60 06/04/2024    GLUCOSE 283 (H) 09/10/2024    AST 19 09/10/2024    AST 36 (H) 06/04/2024    ALT 34 09/10/2024    ALT 38 06/04/2024    HGBA1C 8.2 (H) 06/04/2024        Hematology:  Lab Results   Component Value Date    WBC 11.96 (H) 09/10/2024    WBC 12.15 (H) 06/04/2024    HGB 13.9 (L) 09/10/2024    HGB 14.0 06/04/2024    HCT 40.4 (L) 09/10/2024    HCT 39.1 (L) 06/04/2024     09/10/2024     06/04/2024       Inflammatory Markers:  Lab Results   Component Value Date    HSCRP 9.98 (H) 06/23/2020    HSCRP 43.30 (H) 09/10/2019    SEDRATE 26 (H) 06/22/2022    SEDRATE 28 (H) 06/23/2020    SEDRATE 2 09/10/2019        Objective:        Physical Exam  Vitals reviewed.   Cardiovascular:      Pulses:           Dorsalis pedis pulses are 2+ on the " right side and 2+ on the left side.        Posterior tibial pulses are 2+ on the right side and 2+ on the left side.   Musculoskeletal:        Feet:    Feet:      Right foot:      Skin integrity: Ulcer present.      Toenail Condition: Right toenails are normal.      Left foot:      Toenail Condition: Left toenails are normal.      Comments: Fully granulated.  Skin:     General: Skin is warm and dry.      Capillary Refill: Capillary refill takes less than 2 seconds.   Neurological:      Mental Status: He is alert.                         Assessment:         ICD-10-CM ICD-9-CM   1. Blister of right foot, subsequent encounter  S90.821D V58.89   2. Ulcer of left heel and midfoot, limited to breakdown of skin  L97.421 707.14   3. Type 2 diabetes mellitus with other skin ulcer, with long-term current use of insulin  E11.622 250.80    Z79.4 V58.67   4. Diabetic polyneuropathy associated with other specified diabetes mellitus  E13.42 250.60     357.2   5. Tobacco user  Z72.0 305.1         Plan:   Tissue pathology and/or culture taken:  [] Yes [x] No   Sharp debridement performed:   [] Yes [x] No   Labs ordered this visit:   [] Yes [x] No   Imaging ordered this visit:   [] Yes [x] No       1. Blister of right foot, subsequent encounter     Resolved.   2. Ulcer of left heel and midfoot, limited to breakdown of skin     Resolved.   3. Type 2 diabetes mellitus with other skin ulcer, with long-term current use of insulin     Instructed the importance medication compliance and diet.    Last A1c 8.2.    Must have a strict diabetic diet, take glucose lowering medications as prescribed and encourage lower HA1C.   4. Diabetic polyneuropathy associated with other specified diabetes mellitus     Continue to check feet daily due to high risk for diabetic ulcer.    5. Tobacco user     Instructed on smoking cessation.        The time spent including preparing to see the patient, obtaining patient history and assessment, evaluation of the  plan of care, patient/caregiver counseling and education, orders, documentation, coordination of care, and other professional medical management activities for today's encounter was 20 minute.    Time spent performing procedures during today's encounter was 20  minute.    Follow up in about 1 month (around 10/24/2024). Teaching provided on s/s to call wound clinic for promptly.  ER precautions taught for after hours and weekends.       ROCCO Sequeira

## 2024-09-27 ENCOUNTER — TELEPHONE (OUTPATIENT)
Dept: GASTROENTEROLOGY | Facility: CLINIC | Age: 44
End: 2024-09-27
Payer: MEDICARE

## 2024-09-27 NOTE — TELEPHONE ENCOUNTER
----- Message from Marifer Lovett sent at 9/24/2024  1:40 PM CDT -----  Regarding: Please advise  Pt is in need of a call. Pt stated that he is having bad stomach pain. Pt is also having bad  loose stool and can't hold anything down.Pt can be reached at [phone]    Thank You

## 2024-09-30 LAB
ANTINUCLEAR ANTIBODY SCREEN (OHS): NEGATIVE
DSDNA AB QUANT (OHS): 1.7 IU/ML
SMA IGG SER-ACNC: 9.2 U

## 2024-10-01 ENCOUNTER — TELEPHONE (OUTPATIENT)
Dept: INTERNAL MEDICINE | Facility: CLINIC | Age: 44
End: 2024-10-01
Payer: MEDICARE

## 2024-10-02 ENCOUNTER — EXTERNAL HOME HEALTH (OUTPATIENT)
Dept: HOME HEALTH SERVICES | Facility: HOSPITAL | Age: 44
End: 2024-10-02
Payer: MEDICARE

## 2024-10-04 DIAGNOSIS — G89.4 CHRONIC PAIN SYNDROME: ICD-10-CM

## 2024-10-07 ENCOUNTER — HOSPITAL ENCOUNTER (INPATIENT)
Facility: HOSPITAL | Age: 44
LOS: 3 days | Discharge: HOME HEALTH | DRG: 280 | End: 2024-10-10
Attending: EMERGENCY MEDICINE | Admitting: INTERNAL MEDICINE
Payer: MEDICARE

## 2024-10-07 DIAGNOSIS — I25.10 CORONARY ARTERY DISEASE, UNSPECIFIED VESSEL OR LESION TYPE, UNSPECIFIED WHETHER ANGINA PRESENT, UNSPECIFIED WHETHER NATIVE OR TRANSPLANTED HEART: Primary | ICD-10-CM

## 2024-10-07 DIAGNOSIS — R07.9 CHEST PAIN: ICD-10-CM

## 2024-10-07 DIAGNOSIS — R10.13 EPIGASTRIC PAIN: ICD-10-CM

## 2024-10-07 DIAGNOSIS — R07.9 CHEST PAIN, UNSPECIFIED TYPE: ICD-10-CM

## 2024-10-07 DIAGNOSIS — R79.89 ELEVATED TROPONIN I LEVEL: ICD-10-CM

## 2024-10-07 DIAGNOSIS — I25.118 CORONARY ARTERY DISEASE OF NATIVE ARTERY OF NATIVE HEART WITH STABLE ANGINA PECTORIS: ICD-10-CM

## 2024-10-07 DIAGNOSIS — E87.6 HYPOKALEMIA: ICD-10-CM

## 2024-10-07 DIAGNOSIS — R79.89 ELEVATED TROPONIN: ICD-10-CM

## 2024-10-07 DIAGNOSIS — I21.4 NSTEMI (NON-ST ELEVATED MYOCARDIAL INFARCTION): ICD-10-CM

## 2024-10-07 LAB
ALBUMIN SERPL-MCNC: 3.1 G/DL (ref 3.5–5)
ALBUMIN/GLOB SERPL: 0.7 RATIO (ref 1.1–2)
ALP SERPL-CCNC: 247 UNIT/L (ref 40–150)
ALT SERPL-CCNC: 35 UNIT/L (ref 0–55)
ANION GAP SERPL CALC-SCNC: 8 MEQ/L
APTT PPP: 34.1 SECONDS (ref 23.2–33.7)
AST SERPL-CCNC: 35 UNIT/L (ref 5–34)
BACTERIA #/AREA URNS AUTO: ABNORMAL /HPF
BASOPHILS # BLD AUTO: 0.05 X10(3)/MCL
BASOPHILS NFR BLD AUTO: 0.4 %
BILIRUB DIRECT SERPL-MCNC: 0.1 MG/DL (ref 0–?)
BILIRUB SERPL-MCNC: 0.2 MG/DL
BILIRUB UR QL STRIP.AUTO: NEGATIVE
BNP BLD-MCNC: 246.4 PG/ML
BUN SERPL-MCNC: 8.3 MG/DL (ref 8.9–20.6)
CALCIUM SERPL-MCNC: 9.2 MG/DL (ref 8.4–10.2)
CHLORIDE SERPL-SCNC: 104 MMOL/L (ref 98–107)
CLARITY UR: CLEAR
CO2 SERPL-SCNC: 26 MMOL/L (ref 22–29)
COLOR UR AUTO: YELLOW
CREAT SERPL-MCNC: 1.09 MG/DL (ref 0.73–1.18)
CREAT/UREA NIT SERPL: 8
EOSINOPHIL # BLD AUTO: 0.31 X10(3)/MCL (ref 0–0.9)
EOSINOPHIL NFR BLD AUTO: 2.3 %
ERYTHROCYTE [DISTWIDTH] IN BLOOD BY AUTOMATED COUNT: 12.8 % (ref 11.5–17)
GFR SERPLBLD CREATININE-BSD FMLA CKD-EPI: >60 ML/MIN/1.73/M2
GGT SERPL-CCNC: 315 U/L (ref 12–64)
GLOBULIN SER-MCNC: 4.3 GM/DL (ref 2.4–3.5)
GLUCOSE SERPL-MCNC: 125 MG/DL (ref 70–110)
GLUCOSE SERPL-MCNC: 340 MG/DL (ref 74–100)
GLUCOSE UR QL STRIP: ABNORMAL
HCT VFR BLD AUTO: 40.5 % (ref 42–52)
HGB BLD-MCNC: 14.1 G/DL (ref 14–18)
HGB UR QL STRIP: NEGATIVE
HOLD SPECIMEN: NORMAL
HYALINE CASTS #/AREA URNS LPF: ABNORMAL /LPF
IMM GRANULOCYTES # BLD AUTO: 0.04 X10(3)/MCL (ref 0–0.04)
IMM GRANULOCYTES NFR BLD AUTO: 0.3 %
INR PPP: 1.1
KETONES UR QL STRIP: NEGATIVE
LEUKOCYTE ESTERASE UR QL STRIP: NEGATIVE
LIPASE SERPL-CCNC: 22 U/L
LYMPHOCYTES # BLD AUTO: 2.66 X10(3)/MCL (ref 0.6–4.6)
LYMPHOCYTES NFR BLD AUTO: 19.5 %
MAGNESIUM SERPL-MCNC: 2.3 MG/DL (ref 1.6–2.6)
MCH RBC QN AUTO: 30.2 PG (ref 27–31)
MCHC RBC AUTO-ENTMCNC: 34.8 G/DL (ref 33–36)
MCV RBC AUTO: 86.7 FL (ref 80–94)
MONOCYTES # BLD AUTO: 1.07 X10(3)/MCL (ref 0.1–1.3)
MONOCYTES NFR BLD AUTO: 7.9 %
MUCOUS THREADS URNS QL MICRO: ABNORMAL /LPF
NEUTROPHILS # BLD AUTO: 9.5 X10(3)/MCL (ref 2.1–9.2)
NEUTROPHILS NFR BLD AUTO: 69.6 %
NITRITE UR QL STRIP: NEGATIVE
NRBC BLD AUTO-RTO: 0 %
PH UR STRIP: 5.5 [PH]
PHOSPHATE SERPL-MCNC: 3.1 MG/DL (ref 2.3–4.7)
PLATELET # BLD AUTO: 288 X10(3)/MCL (ref 130–400)
PMV BLD AUTO: 9.8 FL (ref 7.4–10.4)
POCT GLUCOSE: 125 MG/DL (ref 70–110)
POCT GLUCOSE: 83 MG/DL (ref 70–110)
POTASSIUM SERPL-SCNC: 3.4 MMOL/L (ref 3.5–5.1)
PROT SERPL-MCNC: 7.4 GM/DL (ref 6.4–8.3)
PROT UR QL STRIP: ABNORMAL
PROTHROMBIN TIME: 13.9 SECONDS (ref 11.4–14)
RBC # BLD AUTO: 4.67 X10(6)/MCL (ref 4.7–6.1)
RBC #/AREA URNS AUTO: ABNORMAL /HPF
SODIUM SERPL-SCNC: 138 MMOL/L (ref 136–145)
SP GR UR STRIP.AUTO: 1.03 (ref 1–1.03)
SQUAMOUS #/AREA URNS LPF: ABNORMAL /HPF
TROPONIN I SERPL-MCNC: 1.12 NG/ML (ref 0–0.04)
TROPONIN I SERPL-MCNC: 1.22 NG/ML (ref 0–0.04)
TROPONIN I SERPL-MCNC: 1.58 NG/ML (ref 0–0.04)
TSH SERPL-ACNC: 0.56 UIU/ML (ref 0.35–4.94)
UROBILINOGEN UR STRIP-ACNC: NORMAL
WBC # BLD AUTO: 13.63 X10(3)/MCL (ref 4.5–11.5)
WBC #/AREA URNS AUTO: ABNORMAL /HPF

## 2024-10-07 PROCEDURE — 83735 ASSAY OF MAGNESIUM: CPT | Performed by: EMERGENCY MEDICINE

## 2024-10-07 PROCEDURE — 85025 COMPLETE CBC W/AUTO DIFF WBC: CPT | Performed by: EMERGENCY MEDICINE

## 2024-10-07 PROCEDURE — 83690 ASSAY OF LIPASE: CPT | Performed by: EMERGENCY MEDICINE

## 2024-10-07 PROCEDURE — 96361 HYDRATE IV INFUSION ADD-ON: CPT

## 2024-10-07 PROCEDURE — 84484 ASSAY OF TROPONIN QUANT: CPT | Performed by: EMERGENCY MEDICINE

## 2024-10-07 PROCEDURE — 80053 COMPREHEN METABOLIC PANEL: CPT | Performed by: EMERGENCY MEDICINE

## 2024-10-07 PROCEDURE — 99285 EMERGENCY DEPT VISIT HI MDM: CPT | Mod: 25

## 2024-10-07 PROCEDURE — 82977 ASSAY OF GGT: CPT

## 2024-10-07 PROCEDURE — 80061 LIPID PANEL: CPT

## 2024-10-07 PROCEDURE — 84484 ASSAY OF TROPONIN QUANT: CPT

## 2024-10-07 PROCEDURE — 63600175 PHARM REV CODE 636 W HCPCS

## 2024-10-07 PROCEDURE — 83880 ASSAY OF NATRIURETIC PEPTIDE: CPT | Performed by: EMERGENCY MEDICINE

## 2024-10-07 PROCEDURE — 85730 THROMBOPLASTIN TIME PARTIAL: CPT

## 2024-10-07 PROCEDURE — 25000003 PHARM REV CODE 250: Performed by: EMERGENCY MEDICINE

## 2024-10-07 PROCEDURE — 93005 ELECTROCARDIOGRAM TRACING: CPT

## 2024-10-07 PROCEDURE — 25000003 PHARM REV CODE 250

## 2024-10-07 PROCEDURE — 81001 URINALYSIS AUTO W/SCOPE: CPT | Performed by: EMERGENCY MEDICINE

## 2024-10-07 PROCEDURE — 36415 COLL VENOUS BLD VENIPUNCTURE: CPT

## 2024-10-07 PROCEDURE — 82248 BILIRUBIN DIRECT: CPT

## 2024-10-07 PROCEDURE — 84100 ASSAY OF PHOSPHORUS: CPT

## 2024-10-07 PROCEDURE — 96360 HYDRATION IV INFUSION INIT: CPT

## 2024-10-07 PROCEDURE — 25500020 PHARM REV CODE 255

## 2024-10-07 PROCEDURE — 85610 PROTHROMBIN TIME: CPT

## 2024-10-07 PROCEDURE — 84443 ASSAY THYROID STIM HORMONE: CPT

## 2024-10-07 PROCEDURE — 25000003 PHARM REV CODE 250: Performed by: INTERNAL MEDICINE

## 2024-10-07 PROCEDURE — 21400001 HC TELEMETRY ROOM

## 2024-10-07 RX ORDER — LOSARTAN POTASSIUM 25 MG/1
100 TABLET ORAL DAILY
Status: DISCONTINUED | OUTPATIENT
Start: 2024-10-07 | End: 2024-10-08

## 2024-10-07 RX ORDER — LIDOCAINE HYDROCHLORIDE 20 MG/ML
15 SOLUTION OROPHARYNGEAL
Status: COMPLETED | OUTPATIENT
Start: 2024-10-07 | End: 2024-10-07

## 2024-10-07 RX ORDER — ATORVASTATIN CALCIUM 40 MG/1
40 TABLET, FILM COATED ORAL DAILY
Status: DISCONTINUED | OUTPATIENT
Start: 2024-10-08 | End: 2024-10-10 | Stop reason: HOSPADM

## 2024-10-07 RX ORDER — SODIUM CHLORIDE 0.9 % (FLUSH) 0.9 %
10 SYRINGE (ML) INJECTION EVERY 12 HOURS PRN
Status: DISCONTINUED | OUTPATIENT
Start: 2024-10-07 | End: 2024-10-10 | Stop reason: HOSPADM

## 2024-10-07 RX ORDER — NITROGLYCERIN 0.4 MG/1
0.4 TABLET SUBLINGUAL EVERY 5 MIN PRN
Status: DISCONTINUED | OUTPATIENT
Start: 2024-10-07 | End: 2024-10-10 | Stop reason: HOSPADM

## 2024-10-07 RX ORDER — CARVEDILOL 12.5 MG/1
25 TABLET ORAL 2 TIMES DAILY WITH MEALS
Status: DISCONTINUED | OUTPATIENT
Start: 2024-10-07 | End: 2024-10-08

## 2024-10-07 RX ORDER — ALUMINUM HYDROXIDE, MAGNESIUM HYDROXIDE, AND SIMETHICONE 1200; 120; 1200 MG/30ML; MG/30ML; MG/30ML
30 SUSPENSION ORAL
Status: DISCONTINUED | OUTPATIENT
Start: 2024-10-07 | End: 2024-10-10 | Stop reason: HOSPADM

## 2024-10-07 RX ORDER — ISOSORBIDE MONONITRATE 30 MG/1
120 TABLET, EXTENDED RELEASE ORAL DAILY
Status: DISCONTINUED | OUTPATIENT
Start: 2024-10-08 | End: 2024-10-10 | Stop reason: HOSPADM

## 2024-10-07 RX ORDER — SPIRONOLACTONE 25 MG/1
100 TABLET ORAL DAILY
Status: DISCONTINUED | OUTPATIENT
Start: 2024-10-07 | End: 2024-10-08

## 2024-10-07 RX ORDER — TAMSULOSIN HYDROCHLORIDE 0.4 MG/1
1 CAPSULE ORAL DAILY
Status: DISCONTINUED | OUTPATIENT
Start: 2024-10-08 | End: 2024-10-08

## 2024-10-07 RX ORDER — GLUCAGON 1 MG
1 KIT INJECTION
Status: DISCONTINUED | OUTPATIENT
Start: 2024-10-07 | End: 2024-10-08

## 2024-10-07 RX ORDER — CLONIDINE HYDROCHLORIDE 0.1 MG/1
0.3 TABLET ORAL 3 TIMES DAILY
Status: DISCONTINUED | OUTPATIENT
Start: 2024-10-07 | End: 2024-10-08

## 2024-10-07 RX ORDER — SUCRALFATE 1 G/10ML
1 SUSPENSION ORAL
Status: DISCONTINUED | OUTPATIENT
Start: 2024-10-07 | End: 2024-10-10 | Stop reason: HOSPADM

## 2024-10-07 RX ORDER — AMITRIPTYLINE HYDROCHLORIDE 25 MG/1
50 TABLET, FILM COATED ORAL NIGHTLY
Status: DISCONTINUED | OUTPATIENT
Start: 2024-10-07 | End: 2024-10-08

## 2024-10-07 RX ORDER — ASPIRIN 325 MG
325 TABLET ORAL ONCE
Status: COMPLETED | OUTPATIENT
Start: 2024-10-07 | End: 2024-10-07

## 2024-10-07 RX ORDER — IBUPROFEN 200 MG
24 TABLET ORAL
Status: DISCONTINUED | OUTPATIENT
Start: 2024-10-07 | End: 2024-10-10 | Stop reason: HOSPADM

## 2024-10-07 RX ORDER — POTASSIUM CHLORIDE 20 MEQ/1
20 TABLET, EXTENDED RELEASE ORAL 2 TIMES DAILY
Status: DISCONTINUED | OUTPATIENT
Start: 2024-10-07 | End: 2024-10-10 | Stop reason: HOSPADM

## 2024-10-07 RX ORDER — HYDRALAZINE HYDROCHLORIDE 20 MG/ML
10 INJECTION INTRAMUSCULAR; INTRAVENOUS EVERY 6 HOURS PRN
Status: DISCONTINUED | OUTPATIENT
Start: 2024-10-07 | End: 2024-10-10 | Stop reason: HOSPADM

## 2024-10-07 RX ORDER — LOSARTAN POTASSIUM 25 MG/1
100 TABLET ORAL DAILY
Status: DISCONTINUED | OUTPATIENT
Start: 2024-10-08 | End: 2024-10-07

## 2024-10-07 RX ORDER — HYDROMORPHONE HYDROCHLORIDE 2 MG/1
8 TABLET ORAL EVERY 8 HOURS PRN
Status: DISCONTINUED | OUTPATIENT
Start: 2024-10-07 | End: 2024-10-08

## 2024-10-07 RX ORDER — SPIRONOLACTONE 25 MG/1
100 TABLET ORAL DAILY
Status: DISCONTINUED | OUTPATIENT
Start: 2024-10-08 | End: 2024-10-07

## 2024-10-07 RX ORDER — PREGABALIN 25 MG/1
150 CAPSULE ORAL
Status: DISCONTINUED | OUTPATIENT
Start: 2024-10-07 | End: 2024-10-07

## 2024-10-07 RX ORDER — IBUPROFEN 200 MG
16 TABLET ORAL
Status: DISCONTINUED | OUTPATIENT
Start: 2024-10-07 | End: 2024-10-10 | Stop reason: HOSPADM

## 2024-10-07 RX ORDER — HYDROMORPHONE HYDROCHLORIDE 2 MG/1
8 TABLET ORAL
Status: COMPLETED | OUTPATIENT
Start: 2024-10-07 | End: 2024-10-07

## 2024-10-07 RX ORDER — ESCITALOPRAM OXALATE 10 MG/1
20 TABLET ORAL DAILY
Status: DISCONTINUED | OUTPATIENT
Start: 2024-10-08 | End: 2024-10-10 | Stop reason: HOSPADM

## 2024-10-07 RX ORDER — OXCARBAZEPINE 300 MG/1
600 TABLET, FILM COATED ORAL
Status: COMPLETED | OUTPATIENT
Start: 2024-10-07 | End: 2024-10-07

## 2024-10-07 RX ORDER — HEPARIN SODIUM,PORCINE/D5W 25000/250
0-40 INTRAVENOUS SOLUTION INTRAVENOUS CONTINUOUS
Status: DISCONTINUED | OUTPATIENT
Start: 2024-10-07 | End: 2024-10-10 | Stop reason: HOSPADM

## 2024-10-07 RX ORDER — CLONIDINE HYDROCHLORIDE 0.1 MG/1
0.3 TABLET ORAL 3 TIMES DAILY
Status: DISCONTINUED | OUTPATIENT
Start: 2024-10-07 | End: 2024-10-07

## 2024-10-07 RX ORDER — HYDROMORPHONE HYDROCHLORIDE 8 MG/1
8 TABLET ORAL EVERY 8 HOURS PRN
Qty: 90 TABLET | Refills: 0 | Status: SHIPPED | OUTPATIENT
Start: 2024-10-07

## 2024-10-07 RX ORDER — ASPIRIN 81 MG/1
81 TABLET ORAL DAILY
Status: DISCONTINUED | OUTPATIENT
Start: 2024-10-08 | End: 2024-10-07

## 2024-10-07 RX ORDER — CARVEDILOL 12.5 MG/1
25 TABLET ORAL 2 TIMES DAILY WITH MEALS
Status: DISCONTINUED | OUTPATIENT
Start: 2024-10-07 | End: 2024-10-07

## 2024-10-07 RX ORDER — INSULIN GLARGINE 100 [IU]/ML
35 INJECTION, SOLUTION SUBCUTANEOUS 2 TIMES DAILY
Status: DISCONTINUED | OUTPATIENT
Start: 2024-10-07 | End: 2024-10-08

## 2024-10-07 RX ORDER — INSULIN ASPART 100 [IU]/ML
25 INJECTION, SOLUTION INTRAVENOUS; SUBCUTANEOUS
Status: DISCONTINUED | OUTPATIENT
Start: 2024-10-07 | End: 2024-10-08

## 2024-10-07 RX ORDER — PREGABALIN 75 MG/1
150 CAPSULE ORAL 2 TIMES DAILY
Status: DISCONTINUED | OUTPATIENT
Start: 2024-10-07 | End: 2024-10-09

## 2024-10-07 RX ORDER — NIFEDIPINE 30 MG/1
60 TABLET, EXTENDED RELEASE ORAL 2 TIMES DAILY
Status: DISCONTINUED | OUTPATIENT
Start: 2024-10-07 | End: 2024-10-08

## 2024-10-07 RX ORDER — ISOSORBIDE MONONITRATE 30 MG/1
120 TABLET, EXTENDED RELEASE ORAL DAILY
Status: DISCONTINUED | OUTPATIENT
Start: 2024-10-08 | End: 2024-10-07

## 2024-10-07 RX ORDER — OXCARBAZEPINE 150 MG/1
600 TABLET, FILM COATED ORAL 2 TIMES DAILY
Status: DISCONTINUED | OUTPATIENT
Start: 2024-10-07 | End: 2024-10-10 | Stop reason: HOSPADM

## 2024-10-07 RX ORDER — NALOXONE HCL 0.4 MG/ML
0.02 VIAL (ML) INJECTION
Status: DISCONTINUED | OUTPATIENT
Start: 2024-10-07 | End: 2024-10-10 | Stop reason: HOSPADM

## 2024-10-07 RX ADMIN — CARVEDILOL 25 MG: 12.5 TABLET, FILM COATED ORAL at 05:10

## 2024-10-07 RX ADMIN — SUCRALFATE ORAL SUSPENSION 1 G: 1 SUSPENSION ORAL at 08:10

## 2024-10-07 RX ADMIN — IOHEXOL 100 ML: 350 INJECTION, SOLUTION INTRAVENOUS at 06:10

## 2024-10-07 RX ADMIN — HEPARIN SODIUM 12 UNITS/KG/HR: 10000 INJECTION, SOLUTION INTRAVENOUS at 06:10

## 2024-10-07 RX ADMIN — NIFEDIPINE 60 MG: 30 TABLET, FILM COATED, EXTENDED RELEASE ORAL at 08:10

## 2024-10-07 RX ADMIN — HYDROMORPHONE HYDROCHLORIDE 8 MG: 2 TABLET ORAL at 02:10

## 2024-10-07 RX ADMIN — SPIRONOLACTONE 100 MG: 25 TABLET ORAL at 05:10

## 2024-10-07 RX ADMIN — MORPHINE SULFATE 105 MG: 30 TABLET, FILM COATED, EXTENDED RELEASE ORAL at 08:10

## 2024-10-07 RX ADMIN — SODIUM CHLORIDE 1000 ML: 9 INJECTION, SOLUTION INTRAVENOUS at 12:10

## 2024-10-07 RX ADMIN — OXCARBAZEPINE 600 MG: 300 TABLET, FILM COATED ORAL at 02:10

## 2024-10-07 RX ADMIN — PREGABALIN 150 MG: 75 CAPSULE ORAL at 08:10

## 2024-10-07 RX ADMIN — ALUMINUM HYDROXIDE, MAGNESIUM HYDROXIDE, AND DIMETHICONE 30 ML: 200; 20; 200 SUSPENSION ORAL at 08:10

## 2024-10-07 RX ADMIN — ASPIRIN 325 MG ORAL TABLET 325 MG: 325 PILL ORAL at 05:10

## 2024-10-07 RX ADMIN — OXCARBAZEPINE 600 MG: 150 TABLET, FILM COATED ORAL at 08:10

## 2024-10-07 RX ADMIN — LOSARTAN POTASSIUM 100 MG: 25 TABLET, FILM COATED ORAL at 05:10

## 2024-10-07 RX ADMIN — CLONIDINE HYDROCHLORIDE 0.3 MG: 0.1 TABLET ORAL at 05:10

## 2024-10-07 RX ADMIN — LIDOCAINE HYDROCHLORIDE 15 ML: 20 SOLUTION ORAL at 12:10

## 2024-10-07 RX ADMIN — SUCRALFATE ORAL SUSPENSION 1 G: 1 SUSPENSION ORAL at 05:10

## 2024-10-07 RX ADMIN — ALUMINUM HYDROXIDE AND MAGNESIUM HYDROXIDE 30 ML: 200; 200 SUSPENSION ORAL at 12:10

## 2024-10-07 RX ADMIN — POTASSIUM CHLORIDE 20 MEQ: 1500 TABLET, EXTENDED RELEASE ORAL at 08:10

## 2024-10-07 RX ADMIN — HYDROMORPHONE HYDROCHLORIDE 8 MG: 2 TABLET ORAL at 07:10

## 2024-10-07 RX ADMIN — CLONIDINE HYDROCHLORIDE 0.3 MG: 0.1 TABLET ORAL at 08:10

## 2024-10-07 RX ADMIN — AMITRIPTYLINE HYDROCHLORIDE 50 MG: 25 TABLET, FILM COATED ORAL at 08:10

## 2024-10-07 NOTE — Clinical Note
The catheter was repositioned into the left subclavian artery. An angiography was performed of the LIMA visualized. Multiple views were taken. The angiography was performed via power injection.

## 2024-10-07 NOTE — ED PROVIDER NOTES
ED PROVIDER NOTE  10/7/2024    CHIEF COMPLAINT:   Chief Complaint   Patient presents with    Emesis     N/v, diffuse abd pain, bilateral LE edema x2 weeks.       HISTORY OF PRESENT ILLNESS:   Bharath Caballero is a 44 y.o. male who presents with chief complaint Abdominal pain.  Onset was about 2 weeks ago whenever he began having diffuse abdominal pain brought on by eating associated with nausea and vomiting.  States is no specific foods that trigger it, anything he eats causes him to have epigastric abdominal pain although he does not always vomit with it.  Denies any hematemesis, melena, hematochezia.  He also complains of some swelling to his lower legs over the past 2 weeks as well and has noticed some red spots develop.  Denies any pain or discomfort in the legs.    The history is provided by the patient.         REVIEW OF SYSTEMS: as noted in the HPI.  NURSING NOTES REVIEWED      PAST MEDICAL/SURGICAL HISTORY:   Past Medical History:   Diagnosis Date    Acquired perforating dermatosis 8/30/2023    Aortic atherosclerosis 1/24/2023    Bilateral edema of lower extremity 2/6/2023    Bladder outflow obstruction 6/20/2022    Blurred vision, right eye 10/19/2022    BMI 34.0-34.9,adult 6/20/2022    BPH (benign prostatic hyperplasia)     Chronic liver failure without hepatic coma 4/25/2023    Chronic pain 10/25/2022    Chronic pain syndrome 5/12/2022    Chronic pancreatitis 10/28/2017    Deafness 10/3/2023    Diabetes     Diabetic polyneuropathy 6/20/2022    Elevated LFTs 8/18/2022    GERD (gastroesophageal reflux disease)     Hand paresthesia 6/20/2022    Hepatic steatosis     History of continuous positive airway pressure (CPAP) therapy at home     History of pancreatitis 6/20/2022    Hypertension     Hypertriglyceridemia     Kidney disorder     Leg pain     Mixed hyperlipidemia 10/28/2017    Mononeuritis multiplex 6/20/2022    Morbid obesity     Neuropathy     Nocturia 6/20/2022    OA (osteoarthritis)     Obstructive  sleep apnea syndrome 6/20/2022    Onychomycosis of multiple toenails with type 2 diabetes mellitus 10/28/2017    Open wound of finger of right hand 10/20/2023    CICI (obstructive sleep apnea)     Painful diabetic neuropathy 5/12/2022    Personal history of colonic polyps 8/18/2022    Polyneuropathy     Primary hypertension 10/28/2017    Recurrent pancreatitis     Renal insufficiency     Tobacco abuse     Tobacco user 6/20/2022    Type 2 diabetes mellitus with skin complication, with long-term current use of insulin 2/6/2023      Past Surgical History:   Procedure Laterality Date    ANGIOGRAM, CORONARY, WITH LEFT HEART CATHETERIZATION N/A 4/10/2023    Procedure: Angiogram, Coronary, with Left Heart Cath;  Surgeon: Jaswant Pugh MD;  Location: [facility] CATH LAB;  Service: Cardiology;  Laterality: N/A;    APPENDECTOMY      ARTERIOVENOUS ANASTOMOSIS, OPEN, UPPER ARM BASILLIC VEIN TRANSPOSITION N/A 05/07/2018    EGD, WITH CLOSED BIOPSY N/A 11/20/2023    Procedure: EGD, WITH CLOSED BIOPSY;  Surgeon: Christina James MD;  Location: [facility] ENDOSCOPY;  Service: Gastroenterology;  Laterality: N/A;    ESOPHAGOGASTRODUODENOSCOPY N/A 06/07/2021    EXCISION OF ARTERIOVENOUS FISTULA N/A 06/01/2018    FRACTIONAL FLOW RESERVE (FFR), CORONARY  4/10/2023    Procedure: Fractional Flow [place] (FFR), Coronary;  Surgeon: Jaswant Pugh MD;  Location: [facility] CATH LAB;  Service: Cardiology;;    HERNIA REPAIR      INSPECTION OF UPPER INTESTINAL TRACT N/A 06/07/2021    PHERESIS OF PLASMA N/A 07/13/2018    PHERESIS OF PLASMA N/A 05/25/2018    SPINAL CORD STIMULATOR REMOVAL      TRIAL OF SPINAL CORD NERVE STIMULATOR N/A 5/12/2022    Procedure: TRIAL, NEUROSTIMULATOR, SPINAL CORD;  Surgeon: Jay Pozo MD;  Location: [facility] OR;  Service: Neurosurgery;  Laterality: N/A;    UPPER GI N/A 06/07/2021       FAMILY HISTORY:   Family History   Problem Relation Name Age of Onset    Obesity Father         SOCIAL HISTORY:   Social History      Tobacco Use    Smoking status: Every Day     Average packs/day: 0.5 packs/day for 25.0 years (12.5 ttl pk-yrs)     Types: Cigarettes     Start date: 7/20/1996    Smokeless tobacco: Never    Tobacco comments:     1/2 pk 25 years   Substance Use Topics    Alcohol use: Not Currently    Drug use: Yes     Types: Morphine     Comment: Rx pain       ALLERGIES: Review of patient's allergies indicates:  No Known Allergies    PHYSICAL EXAM:  Initial Vitals [10/07/24 1058]   BP Pulse Resp Temp SpO2   (!) 184/101 89 20 98.4 °F (36.9 °C) 97 %      MAP       --         Physical Exam    Nursing note and vitals reviewed.  Constitutional: He appears well-developed and well-nourished. No distress.   HENT:   Head: Normocephalic and atraumatic.   Nose: Nose normal. Mouth/Throat: Oropharynx is clear and moist and mucous membranes are normal.   Hard of hearing   Eyes: Conjunctivae and EOM are normal. Pupils are equal, round, and reactive to light.   Neck: Neck supple. No tracheal deviation present.   Cardiovascular:  Normal rate, regular rhythm, normal heart sounds, intact distal pulses and normal pulses.           Pulmonary/Chest: Effort normal and breath sounds normal. No respiratory distress.   Abdominal: Abdomen is soft. There is abdominal tenderness in the epigastric area. There is no rebound, no guarding, no tenderness at McBurney's point and negative Gaytan's sign.   Musculoskeletal:         General: Normal range of motion.      Cervical back: Neck supple.     Neurological: He is alert and oriented to person, place, and time. GCS eye subscore is 4. GCS verbal subscore is 5. GCS motor subscore is 6.   CN II-XII intact. Moves all extremities. No gross sensory or motor deficits.   Skin: Skin is warm, dry and intact.   Stasis dermatitis bilateral lower extremity   Psychiatric: He has a normal mood and affect. His speech is normal and behavior is normal. Judgment and thought content normal. Cognition and memory are normal.          RESULTS:  Labs Reviewed   B-TYPE NATRIURETIC PEPTIDE - Abnormal       Result Value    Natriuretic Peptide 246.4 (*)    COMPREHENSIVE METABOLIC PANEL - Abnormal    Sodium 138      Potassium 3.4 (*)     Chloride 104      CO2 26      Glucose 340 (*)     Blood Urea Nitrogen 8.3 (*)     Creatinine 1.09      Calcium 9.2      Protein Total 7.4      Albumin 3.1 (*)     Globulin 4.3 (*)     Albumin/Globulin Ratio 0.7 (*)     Bilirubin Total 0.2       (*)     ALT 35      AST 35 (*)     eGFR >60      Anion Gap 8.0      BUN/Creatinine Ratio 8     URINALYSIS, REFLEX TO URINE CULTURE - Abnormal    Color, UA Yellow      Appearance, UA Clear      Specific Gravity, UA 1.028      pH, UA 5.5      Protein, UA 1+ (*)     Glucose, UA 4+ (*)     Ketones, UA Negative      Blood, UA Negative      Bilirubin, UA Negative      Urobilinogen, UA Normal      Nitrites, UA Negative      Leukocyte Esterase, UA Negative      RBC, UA 0-5      WBC, UA 0-5      Bacteria, UA None Seen      Squamous Epithelial Cells, UA None Seen      Mucous, UA Trace (*)     Hyaline Casts, UA None Seen     TROPONIN I - Abnormal    Troponin-I 1.124 (*)    CBC WITH DIFFERENTIAL - Abnormal    WBC 13.63 (*)     RBC 4.67 (*)     Hgb 14.1      Hct 40.5 (*)     MCV 86.7      MCH 30.2      MCHC 34.8      RDW 12.8      Platelet 288      MPV 9.8      Neut % 69.6      Lymph % 19.5      Mono % 7.9      Eos % 2.3      Basophil % 0.4      Lymph # 2.66      Neut # 9.50 (*)     Mono # 1.07      Eos # 0.31      Baso # 0.05      IG# 0.04      IG% 0.3      NRBC% 0.0     LIPASE - Normal    Lipase Level 22     MAGNESIUM - Normal    Magnesium Level 2.30     CBC W/ AUTO DIFFERENTIAL    Narrative:     The following orders were created for panel order CBC auto differential.  Procedure                               Abnormality         Status                     ---------                               -----------         ------                     CBC with Differential[[id number]]        Abnormal            Final result                 Please view results for these tests on the individual orders.   EXTRA TUBES    Narrative:     The following orders were created for panel order EXTRA TUBES.  Procedure                               Abnormality         Status                     ---------                               -----------         ------                     Red Top Hold[[id number]]                                    Final result               Light Green Top Hold[[id number]]                            Final result               Lavender Top Hold[[id number]]                               Final result               Gold Top Hold[[id number]]                                   Final result               Pink Top Hold[[id number]]                                   Final result                 Please view results for these tests on the individual orders.   RED TOP HOLD    Extra Tube Hold for add-ons.     LIGHT GREEN TOP HOLD    Extra Tube Hold for add-ons.     LAVENDER TOP HOLD    Extra Tube Hold for add-ons.     GOLD TOP HOLD    Extra Tube Hold for add-ons.     PINK TOP HOLD    Extra Tube Hold for add-ons.       Imaging Results              X-Ray Chest AP Portable (Final result)  Result time 10/07/24 14:28:09      Final result by Paulo Biggs MD (10/07/24 14:28:09)                   Impression:      No acute findings.      Electronically signed by: Paulo Biggs  Date:    10/07/2024  Time:    14:28               Narrative:    EXAMINATION:  XR CHEST AP PORTABLE    CLINICAL HISTORY:  epigastric pain;    COMPARISON:  10 September 2024    FINDINGS:  Frontal view of the chest was obtained. Heart is not significantly enlarged.  The lungs are grossly clear.  There is no pneumothorax.  Dorsal column stimulator leads are again noted.                                      PROCEDURES:  Critical Care    Date/Time: 10/7/2024 3:33 PM    Performed by: Feliz Herrera DO  Authorized by: Feliz Herrera DO   Direct patient critical care time: 15 minutes  Ordering / reviewing critical care time: 10 minutes  Documentation critical care time: 5 minutes  Total critical care time (exclusive of procedural time) : 30 minutes  Critical care time was exclusive of separately billable procedures and treating other patients.  Critical care was necessary to treat or prevent imminent or life-threatening deterioration of the following conditions: cardiac failure.  Critical care was time spent personally by me on the following activities: development of treatment plan with patient or surrogate, interpretation of cardiac output measurements, evaluation of patient's response to treatment, obtaining history from patient or surrogate, examination of patient, ordering and performing treatments and interventions, ordering and review of laboratory studies, ordering and review of radiographic studies, pulse oximetry, re-evaluation of patient's condition and review of old charts.          ECG:  EKG Readings: (Independently Interpreted)   Initial Reading: No STEMI. Rhythm: Normal Sinus Rhythm. Heart Rate: 78. Ectopy: No Ectopy. Conduction: Normal. Axis: Normal.       ED COURSE AND MEDICAL DECISION MAKING:  Medications   pregabalin capsule 150 mg (has no administration in time range)   sodium chloride 0.9% bolus 1,000 mL 1,000 mL (0 mLs Intravenous Stopped 10/7/24 1438)   aluminum-magnesium hydroxide 200-200 mg/5 mL suspension 30 mL (30 mLs Oral Given 10/7/24 1247)   LIDOcaine viscous HCl 2% oral solution 15 mL (15 mLs Oral Given 10/7/24 1247)   OXcarbazepine tablet 600 mg (600 mg Oral Given 10/7/24 1408)   HYDROmorphone tablet 8 mg (8 mg Oral Given 10/7/24 1408)     ED Course as of 10/07/24 1536   Mon Oct 07, 2024   1319 WBC(!): 13.63 [IB]   1319 Hemoglobin: 14.1 [IB]   1319 Platelet Count: 288 [IB]   1340 Troponin I(!): 1.124 [IB]   1340 BNP(!): 246.4 [IB]   1344 Lipase: 22 [IB]   1344 Magnesium : 2.30 [IB]      ED Course User Index  [IB]  Feliz Herrera, DO        Medical Decision Making  44-year-old male who presents with a postprandial abdominal pain associated with nausea and vomiting.  Differential diagnosis includes gastritis, pancreatitis, peptic ulcer disease.  Troponin elevated at 1.124, this has been negative in the past.  .  CBC shows a leukocytosis of 13.6.  CMP shows normal creatinine.  ECG shows no evidence of STEMI.  Medicine consulted for admission. I have spoken with the patient and/or caregivers. I have explained the patient's condition, diagnoses and treatment plan based on the information available to me at this time. I have answered the patient's and/or caregiver's questions and addressed any concerns. The patient and/or caregivers have as good an understanding of the patient's diagnosis, condition and treatment plan as can be expected at this point. The patient has been stabilized within the capability of the emergency department. The patient will be transported for further care and management or will be moved to an observation or inpatient service. I have communicated with the staff or medical practitioner taking over this patient's care.        Amount and/or Complexity of Data Reviewed  External Data Reviewed: labs and notes.     Details: There was three vessel coronary artery disease. There was non-obstructive coronary artery disease..  ·  The Ost LAD to Mid LAD lesion was 70% stenosed.  ·  The Prox RCA lesion was 90% stenosed.  ·  The Mid RCA lesion was 100% stenosed.  ·  The Dist LAD-2 lesion was 50% stenosed.  ·  The Dist LAD-1 lesion was 70% stenosed.    Labs: ordered. Decision-making details documented in ED Course.  Radiology: ordered.  ECG/medicine tests: ordered and independent interpretation performed.    Risk  OTC drugs.  Prescription drug management.  Decision regarding hospitalization.        CLINICAL IMPRESSION:  1. Elevated troponin I level    2. Epigastric pain    3. NSTEMI (non-ST elevated myocardial  infarction)        DISPOSITION:   ED Disposition Condition    Observation Stable                    Feliz Herrera,   10/07/24 1535

## 2024-10-07 NOTE — H&P
[facility] INTERNAL MEDICINE  ADMISSION HISTORY AND PHYSICAL    Resident Team: [facility] Medicine List 2  Attending Physician: Kayy Dasilva MD  Date of Admit: 10/7/2024    SUBJECTIVE:      HPI: Bharath Caballero is a 44 y.o. male with PMH of hypertriglyceride induced pancreatitis, familial hypercholesterolemia, coronary artery disease, unstable angina, aortic atherosclerosis, BPH, chronic liver failure, chronic pain syndrome, CICI, hard of hearing, and DM type 2 with long-term insulin use, presented to the ED with chief complaint of right lower quadrant abdominal pain that began 2 weeks ago and now epigastric in nature over the last week.  Associated symptoms include nausea and vomiting.  He reports that the pain worsens after a meal and he immediately has diarrhea.  Reports having 3-4 episodes of diarrhea daily.  No recent antibiotic use.  He states that this pain is not like previous pancreatitis pain.  This feels different per patient.  No other associated symptoms.  He denies hematemesis, melena, hematochezia.  He reports needing to use nitroglycerin tablets for this epigastric pain that radiates into his chest.  He states that he frequently uses nitroglycerin tablets.  He was followed by Cardiology at this facility.  Had a cardiac catheterization done 04/2023, that showed three-vessel coronary artery disease to the LAD, RCA, and distal LAD.  There were discussions about needing CABG, and patient was evaluated by Cardiothoracic surgeon who deemed him not to need surgical intervention due to i FR being greater than 0.89.  No stent placement.  This is being medically managed.  No other associated symptoms.  He denies dysuria, hematuria, myalgias, dizziness, headaches, numbness, weakness, tingling.  No visual disturbance or speech issues.    Upon arrival to the ED, initial lab work showing troponin of 1.124.  In the past, patient had not had elevated troponins.  BNP also elevated at 246.  Does have a slight leukocytosis,  likely stress in nature.  H&H is within normal limits.  CMP shows an elevated glucose of 340, ALP of 247, with no evidence of transaminitis.  BUN and creatinine are within normal limits.  TSH within normal limits.  Urinalysis is negative.  Of note, patient does have a spinal stimulator in place for chronic pain.  He has needed multiple plasmapheresis in the past due to severely elevated triglycerides.  Reports compliancy with all home medications.  Is on Creon due to pancreatic insufficiency.    Review of Systems   Constitutional:  Negative for chills and fever.   Respiratory:  Negative for cough.    Cardiovascular:  Negative for chest pain.   Gastrointestinal:  Positive for abdominal pain, nausea and vomiting. Negative for blood in stool.   Genitourinary:  Negative for dysuria and urgency.   Musculoskeletal:  Negative for myalgias.   Neurological:  Negative for dizziness, tremors and headaches.        Home meds:   Current Outpatient Medications   Medication Instructions    amitriptyline (ELAVIL) 50 mg, Oral, Nightly    aspirin (ECOTRIN) 81 mg, Oral, Daily    atorvastatin (LIPITOR) 40 mg, Oral, Daily    carvediloL (COREG) 25 mg, Oral, 2 times daily with meals    cholecalciferol (vitamin D3) (VITAMIN D3) 2,000 Units, Oral, Daily    clonazePAM (KLONOPIN) 1 mg, Oral, 2 times daily    cloNIDine (CATAPRES) 0.3 mg, Oral, 3 times daily    CREON 36,000-114,000- 180,000 unit CpDR TAKE ONE CAPSULE THREE TIMES DAILY    EScitalopram oxalate (LEXAPRO) 20 mg, Oral, Daily    esomeprazole (NEXIUM) 40 mg, Oral, Before breakfast    evolocumab (REPATHA SURECLICK) 140 mg, Subcutaneous, Every 14 days    FARXIGA 5 mg Tab tablet TAKE ONE TABLET BY MOUTH EVERY DAY    HUMALOG U-100 INSULIN 100 unit/mL injection INJECT 25 UNITS SUBCUTANEOUSLY BEFORE MEALS THREE TIMES DAILY    HYDROmorphone (DILAUDID) 8 mg, Oral, Every 8 hours PRN    icosapent ethyL (VASCEPA) 2 g, Oral, 2 times daily    isosorbide mononitrate (IMDUR) 120 mg, Oral, Daily     "losartan (COZAAR) 100 mg, Oral, Daily    morphine (MS CONTIN) 100 mg, Oral, 3 times daily    NIFEdipine (PROCARDIA-XL) 60 mg, Oral, 2 times daily    nitroGLYCERIN (NITROSTAT) 0.3 mg, Sublingual, Every 5 min PRN    OXcarbazepine (TRILEPTAL) 600 mg, Oral, 2 times daily    potassium chloride SA (K-DUR,KLOR-CON) 20 MEQ tablet 20 mEq, Oral, 2 times daily    pregabalin (LYRICA) 150 mg, Oral, 2 times daily    spironolactone (ALDACTONE) 100 mg, Oral, Daily    sucralfate (CARAFATE) 1 g, Oral, Before meals & nightly    SURE COMFORT INSULIN SYRINGE 0.5 mL 31 gauge x 5/16" Syrg USE ONE SYRINGE THREE TIMES DAILY    tamsulosin (FLOMAX) 0.4 mg Cap TAKE ONE CAPSULE BY MOUTH EVERY DAY    torsemide (DEMADEX) 20 mg, Oral, Daily    TOUJEO SOLOSTAR U-300 INSULIN 300 unit/mL (1.5 mL) InPn pen INJECT 35 SUBCUTANEOUSLY TWICE DAILY    TRADJENTA 5 mg, Oral, Daily         OBJECTIVE:     Vital signs:   BP (!) 192/114   Pulse 80   Temp 98.9 °F (37.2 °C) (Oral)   Resp 16   Ht 5' 4" (1.626 m)   Wt 90.7 kg (200 lb)   SpO2 97%   BMI 34.33 kg/m²      Physical Examination:  Physical Exam  Constitutional:       General: He is not in acute distress.     Appearance: Normal appearance. He is obese. He is ill-appearing. He is not toxic-appearing.   HENT:      Head: Normocephalic and atraumatic.      Nose: Nose normal. No congestion or rhinorrhea.      Mouth/Throat:      Mouth: Mucous membranes are moist.      Pharynx: Oropharynx is clear. No oropharyngeal exudate or posterior oropharyngeal erythema.   Eyes:      General: No scleral icterus.        Right eye: No discharge.         Left eye: No discharge.      Extraocular Movements: Extraocular movements intact.      Pupils: Pupils are equal, round, and reactive to light.   Cardiovascular:      Rate and Rhythm: Normal rate and regular rhythm.      Pulses: Normal pulses.      Heart sounds: No murmur heard.     No friction rub.      Comments: Distant heart sounds  Bilateral nonpitting lower extremity " edema  Pulmonary:      Effort: Pulmonary effort is normal. No respiratory distress.      Breath sounds: Normal breath sounds. No wheezing.   Abdominal:      General: Bowel sounds are normal.      Palpations: Abdomen is soft. There is no shifting dullness or hepatomegaly.      Tenderness: There is abdominal tenderness in the epigastric area. There is guarding. There is no right CVA tenderness or left CVA tenderness. Negative signs include Gaytan's sign and McBurney's sign.      Comments: Epigastric tenderness without Gaytan's or McBurney's sign   Musculoskeletal:      Cervical back: Normal range of motion and neck supple.      Right lower le+ Edema present.      Left lower le+ Edema present.   Skin:     General: Skin is warm.      Capillary Refill: Capillary refill takes less than 2 seconds.      Coloration: Skin is pale. Skin is not jaundiced.      Findings: Rash present.      Comments: Slight erythematous rash to the bilateral shins, no drainage or weeping evident   Neurological:      Mental Status: He is alert and oriented to person, place, and time. Mental status is at baseline.      GCS: GCS eye subscore is 4. GCS verbal subscore is 5. GCS motor subscore is 6.      Cranial Nerves: Cranial nerves 2-12 are intact.      Sensory: Sensation is intact.      Motor: Motor function is intact.      Coordination: Coordination is intact.          Laboratory:  Labs on admission reviewed    Imaging:   Imaging Results              X-Ray Chest AP Portable (Final result)  Result time 10/07/24 14:28:09      Final result by Paulo Biggs MD (10/07/24 14:28:09)                   Impression:      No acute findings.      Electronically signed by: Paulo Biggs  Date:    10/07/2024  Time:    14:28               Narrative:    EXAMINATION:  XR CHEST AP PORTABLE    CLINICAL HISTORY:  epigastric pain;    COMPARISON:  10 September 2024    FINDINGS:  Frontal view of the chest was obtained. Heart is not significantly enlarged.  The  lungs are grossly clear.  There is no pneumothorax.  Dorsal column stimulator leads are again noted.                                        ASSESSMENT & PLAN:     NSTEMI  -initial troponin 1.124 and trending upwards  -JAVY of 5  -heart score of 6  -BNP elevated above baseline, 246  -unstable angina at home requiring multiple nitroglycerin tablets  -loaded aspirin 325 mg on admission  -started heparin drip low intensity for ACS protocol  -NPO at midnight for possible cardiac intervention  -left heart catheterization :  Ostial and mid LAD lesion 70% stenosed, proximal RCA 90% stenosed, mid % stenosis, distal LAD 70% stenosed  -echo ordered and pending  -we will need a cardiology consult in the morning, will defer to primary team  -trend troponin until peak, next troponin at 9:00 p.m.    Epigastric abdominal pain  Pancreatic insufficiency, hypertriglyceridemia induced  Familial hypercholesterolemia  -does require evolocumab injections every 2 weeks  -we will continue home statin  -history of multiple episodes of pancreatitis, lipase negative  -CT abdomen and pelvis with and without contrast ordered and pending  -continuing Creon, formulary has low-dose needing 6 tablets t.i.d.  -will continue    Diabetes mellitus type 2  -A1c ordered and pending  -we will continue home insulin of Lantus 35 units b.i.d. and aspart 25 units t.i.d. with meals  -moderate sliding scale in place  -holding all home oral antihyperglycemics    Chronic pain  -on multiple opiates at home  -on Lyrica continued in the hospital  -is requesting home pain medications which include Dilaudid 8 mg tablet  -we will discuss restarting    Hypertension  -initial systolic of 140  -missed taking anti hypertensive  -not meeting hypertensive emergency or urgency criteria  -restarting all home antihypertensives including clonidine 0.3 mg, carvedilol 25 mg, nifedipine 60 mg b.i.d., and Aldactone 100 mg daily  -hydralazine IV 10 mg q.6 p.r.n.      DVT  PPx:  Heparin drip ACS protocol  Diet:  Cardiac, NPO at midnight  O2: Room air  PCP: Dat Beasley MD    Disposition (10/07/2024):  Patient admitted for NSTEMI workup.  Will need cardiology consult in the morning.  NPO at midnight.  Echo ordered and pending.  Troponin trending upwards, and in the absence of any other etiology will follow NSTEMI guidelines.    Maurice Lux MD   [facility], PGY-III

## 2024-10-07 NOTE — Clinical Note
130 ml of contrast were injected throughout the case. 10 mL of contrast was the total wasted during the case. 140 mL was the total amount used during the case.

## 2024-10-07 NOTE — Clinical Note
The radial band was applied to the right radial artery. 14 cc's of air were inserted into the closure device. Fingers warm to touch. Cap refill < 30 sec. Move fingers w/ out any pain or discomfort.

## 2024-10-07 NOTE — ED NOTES
Presents for eval of chronic pain, n/v/d as well as swelling to lower extrimities. Took his prescribed morphine this morning but not his dilaudid. States has nausea med prescribed but it doesn't help.

## 2024-10-08 LAB
ALBUMIN SERPL-MCNC: 2.8 G/DL (ref 3.5–5)
ALBUMIN/GLOB SERPL: 0.7 RATIO (ref 1.1–2)
ALP SERPL-CCNC: 217 UNIT/L (ref 40–150)
ALT SERPL-CCNC: 30 UNIT/L (ref 0–55)
ANION GAP SERPL CALC-SCNC: 10 MEQ/L
APICAL FOUR CHAMBER EJECTION FRACTION: 39 %
APICAL TWO CHAMBER EJECTION FRACTION: 48 %
APTT PPP: 32.1 SECONDS (ref 23.2–33.7)
APTT PPP: 38.3 SECONDS (ref 23.2–33.7)
APTT PPP: 39.1 SECONDS (ref 23.2–33.7)
AST SERPL-CCNC: 26 UNIT/L (ref 5–34)
AV INDEX (PROSTH): 0.58
AV MEAN GRADIENT: 1.5 MMHG
AV PEAK GRADIENT: 2.6 MMHG
AV VALVE AREA BY VELOCITY RATIO: 3.3 CM²
AV VALVE AREA: 3.1 CM²
AV VELOCITY RATIO: 0.63
BASOPHILS # BLD AUTO: 0.04 X10(3)/MCL
BASOPHILS NFR BLD AUTO: 0.4 %
BILIRUB SERPL-MCNC: 0.2 MG/DL
BSA FOR ECHO PROCEDURE: 2.05 M2
BUN SERPL-MCNC: 9.7 MG/DL (ref 8.9–20.6)
CALCIUM SERPL-MCNC: 8.6 MG/DL (ref 8.4–10.2)
CHLORIDE SERPL-SCNC: 108 MMOL/L (ref 98–107)
CHOLEST SERPL-MCNC: 142 MG/DL
CHOLEST/HDLC SERPL: 5 {RATIO} (ref 0–5)
CO2 SERPL-SCNC: 24 MMOL/L (ref 22–29)
CREAT SERPL-MCNC: 1.04 MG/DL (ref 0.73–1.18)
CREAT/UREA NIT SERPL: 9
CV ECHO LV RWT: 0.55 CM
DOP CALC AO PEAK VEL: 0.8 M/S
DOP CALC AO VTI: 17.6 CM
DOP CALC LVOT AREA: 5.3 CM2
DOP CALC LVOT DIAMETER: 2.6 CM
DOP CALC LVOT PEAK VEL: 0.5 M/S
DOP CALC LVOT STROKE VOLUME: 54.1 CM3
DOP CALC MV VTI: 22 CM
DOP CALCLVOT PEAK VEL VTI: 10.2 CM
E WAVE DECELERATION TIME: 226.88 MSEC
E/A RATIO: 1.65
E/E' RATIO: 11.83 M/S
ECHO LV POSTERIOR WALL: 1.3 CM (ref 0.6–1.1)
EOSINOPHIL # BLD AUTO: 0.48 X10(3)/MCL (ref 0–0.9)
EOSINOPHIL NFR BLD AUTO: 4.4 %
ERYTHROCYTE [DISTWIDTH] IN BLOOD BY AUTOMATED COUNT: 13 % (ref 11.5–17)
EST. AVERAGE GLUCOSE BLD GHB EST-MCNC: 220.2 MG/DL
FRACTIONAL SHORTENING: 19.1 % (ref 28–44)
GFR SERPLBLD CREATININE-BSD FMLA CKD-EPI: >60 ML/MIN/1.73/M2
GLOBULIN SER-MCNC: 3.8 GM/DL (ref 2.4–3.5)
GLUCOSE SERPL-MCNC: 101 MG/DL (ref 74–100)
HBA1C MFR BLD: 9.3 %
HCT VFR BLD AUTO: 35.9 % (ref 42–52)
HDLC SERPL-MCNC: 27 MG/DL (ref 35–60)
HGB BLD-MCNC: 12.4 G/DL (ref 14–18)
HOLD SPECIMEN: NORMAL
HR MV ECHO: 62 BPM
IMM GRANULOCYTES # BLD AUTO: 0.04 X10(3)/MCL (ref 0–0.04)
IMM GRANULOCYTES NFR BLD AUTO: 0.4 %
INTERVENTRICULAR SEPTUM: 1.5 CM (ref 0.6–1.1)
LDLC SERPL CALC-MCNC: 85 MG/DL (ref 50–140)
LEFT ATRIUM SIZE: 3.74 CM
LEFT ATRIUM VOLUME INDEX MOD: 26.4 ML/M2
LEFT ATRIUM VOLUME MOD: 52 ML
LEFT INTERNAL DIMENSION IN SYSTOLE: 3.8 CM (ref 2.1–4)
LEFT VENTRICLE END DIASTOLIC VOLUME APICAL 2 CHAMBER: 103.29 ML
LEFT VENTRICLE END DIASTOLIC VOLUME APICAL 4 CHAMBER: 130.65 ML
LEFT VENTRICLE END SYSTOLIC VOLUME APICAL 4 CHAMBER: 146 ML
LEFT VENTRICLE MASS INDEX: 134.6 G/M2
LEFT VENTRICLE SYSTOLIC VOLUME INDEX: 40.6 ML/M2
LEFT VENTRICLE SYSTOLIC VOLUME: 80 ML
LEFT VENTRICULAR INTERNAL DIMENSION IN DIASTOLE: 4.7 CM (ref 3.5–6)
LEFT VENTRICULAR MASS: 265.2 G
LV LATERAL E/E' RATIO: 11.83 M/S
LV SEPTAL E/E' RATIO: 11.83 M/S
LVED V (TEICH): 102.97 ML
LVES V (TEICH): 62.76 ML
LVOT MG: 0.74 MMHG
LVOT MV: 0.41 CM/S
LYMPHOCYTES # BLD AUTO: 3.64 X10(3)/MCL (ref 0.6–4.6)
LYMPHOCYTES NFR BLD AUTO: 33.7 %
MAGNESIUM SERPL-MCNC: 2.4 MG/DL (ref 1.6–2.6)
MCH RBC QN AUTO: 30.5 PG (ref 27–31)
MCHC RBC AUTO-ENTMCNC: 34.5 G/DL (ref 33–36)
MCV RBC AUTO: 88.2 FL (ref 80–94)
MONOCYTES # BLD AUTO: 1.13 X10(3)/MCL (ref 0.1–1.3)
MONOCYTES NFR BLD AUTO: 10.5 %
MV MEAN GRADIENT: 1 MMHG
MV PEAK A VEL: 0.43 M/S
MV PEAK E VEL: 0.71 M/S
MV PEAK GRADIENT: 2 MMHG
MV STENOSIS PRESSURE HALF TIME: 65.8 MS
MV VALVE AREA BY CONTINUITY EQUATION: 2.46 CM2
MV VALVE AREA P 1/2 METHOD: 3.34 CM2
NEUTROPHILS # BLD AUTO: 5.46 X10(3)/MCL (ref 2.1–9.2)
NEUTROPHILS NFR BLD AUTO: 50.6 %
NRBC BLD AUTO-RTO: 0 %
OHS LV EJECTION FRACTION SIMPSONS BIPLANE MOD: 45 %
OHS QRS DURATION: 90 MS
OHS QRS DURATION: 90 MS
OHS QRS DURATION: 94 MS
OHS QTC CALCULATION: 478 MS
OHS QTC CALCULATION: 486 MS
OHS QTC CALCULATION: 507 MS
PHOSPHATE SERPL-MCNC: 3.7 MG/DL (ref 2.3–4.7)
PLATELET # BLD AUTO: 272 X10(3)/MCL (ref 130–400)
PLATELETS.RETICULATED NFR BLD AUTO: 2.2 % (ref 0.9–11.2)
PMV BLD AUTO: 10.9 FL (ref 7.4–10.4)
POCT GLUCOSE: 102 MG/DL (ref 70–110)
POCT GLUCOSE: 108 MG/DL (ref 70–110)
POCT GLUCOSE: 108 MG/DL (ref 70–110)
POCT GLUCOSE: 137 MG/DL (ref 70–110)
POCT GLUCOSE: 52 MG/DL (ref 70–110)
POCT GLUCOSE: 58 MG/DL (ref 70–110)
POCT GLUCOSE: 59 MG/DL (ref 70–110)
POCT GLUCOSE: 61 MG/DL (ref 70–110)
POCT GLUCOSE: 62 MG/DL (ref 70–110)
POCT GLUCOSE: 66 MG/DL (ref 70–110)
POCT GLUCOSE: 79 MG/DL (ref 70–110)
POCT GLUCOSE: 84 MG/DL (ref 70–110)
POCT GLUCOSE: 88 MG/DL (ref 70–110)
POTASSIUM SERPL-SCNC: 3.3 MMOL/L (ref 3.5–5.1)
PROT SERPL-MCNC: 6.6 GM/DL (ref 6.4–8.3)
RA PRESSURE ESTIMATED: 15 MMHG
RBC # BLD AUTO: 4.07 X10(6)/MCL (ref 4.7–6.1)
RIGHT VENTRICULAR END-DIASTOLIC DIMENSION: 2.02 CM
SINUS: 3.2 CM
SODIUM SERPL-SCNC: 142 MMOL/L (ref 136–145)
TDI LATERAL: 0.06 M/S
TDI SEPTAL: 0.06 M/S
TDI: 0.06 M/S
TRICUSPID ANNULAR PLANE SYSTOLIC EXCURSION: 2.3 CM
TRIGL SERPL-MCNC: 148 MG/DL (ref 34–140)
TROPONIN I SERPL-MCNC: 1.46 NG/ML (ref 0–0.04)
TROPONIN I SERPL-MCNC: 1.55 NG/ML (ref 0–0.04)
TROPONIN I SERPL-MCNC: 1.6 NG/ML (ref 0–0.04)
VLDLC SERPL CALC-MCNC: 30 MG/DL
WBC # BLD AUTO: 10.79 X10(3)/MCL (ref 4.5–11.5)
Z-SCORE OF LEFT VENTRICULAR DIMENSION IN END DIASTOLE: -1.86
Z-SCORE OF LEFT VENTRICULAR DIMENSION IN END SYSTOLE: 0.7

## 2024-10-08 PROCEDURE — 80053 COMPREHEN METABOLIC PANEL: CPT

## 2024-10-08 PROCEDURE — 84484 ASSAY OF TROPONIN QUANT: CPT

## 2024-10-08 PROCEDURE — C1751 CATH, INF, PER/CENT/MIDLINE: HCPCS

## 2024-10-08 PROCEDURE — 85730 THROMBOPLASTIN TIME PARTIAL: CPT

## 2024-10-08 PROCEDURE — 25000003 PHARM REV CODE 250

## 2024-10-08 PROCEDURE — 63600175 PHARM REV CODE 636 W HCPCS

## 2024-10-08 PROCEDURE — 25000242 PHARM REV CODE 250 ALT 637 W/ HCPCS

## 2024-10-08 PROCEDURE — 83735 ASSAY OF MAGNESIUM: CPT

## 2024-10-08 PROCEDURE — 84100 ASSAY OF PHOSPHORUS: CPT

## 2024-10-08 PROCEDURE — 36415 COLL VENOUS BLD VENIPUNCTURE: CPT

## 2024-10-08 PROCEDURE — 84484 ASSAY OF TROPONIN QUANT: CPT | Performed by: INTERNAL MEDICINE

## 2024-10-08 PROCEDURE — 76937 US GUIDE VASCULAR ACCESS: CPT

## 2024-10-08 PROCEDURE — 85025 COMPLETE CBC W/AUTO DIFF WBC: CPT

## 2024-10-08 PROCEDURE — 36415 COLL VENOUS BLD VENIPUNCTURE: CPT | Performed by: INTERNAL MEDICINE

## 2024-10-08 PROCEDURE — 93005 ELECTROCARDIOGRAM TRACING: CPT

## 2024-10-08 PROCEDURE — 21400001 HC TELEMETRY ROOM

## 2024-10-08 PROCEDURE — 02HV33Z INSERTION OF INFUSION DEVICE INTO SUPERIOR VENA CAVA, PERCUTANEOUS APPROACH: ICD-10-PCS | Performed by: INTERNAL MEDICINE

## 2024-10-08 PROCEDURE — 85730 THROMBOPLASTIN TIME PARTIAL: CPT | Performed by: INTERNAL MEDICINE

## 2024-10-08 PROCEDURE — 27000221 HC OXYGEN, UP TO 24 HOURS

## 2024-10-08 PROCEDURE — 36410 VNPNXR 3YR/> PHY/QHP DX/THER: CPT

## 2024-10-08 PROCEDURE — 83036 HEMOGLOBIN GLYCOSYLATED A1C: CPT

## 2024-10-08 RX ORDER — MORPHINE SULFATE 2 MG/ML
4 INJECTION, SOLUTION INTRAMUSCULAR; INTRAVENOUS ONCE
Status: DISCONTINUED | OUTPATIENT
Start: 2024-10-08 | End: 2024-10-08

## 2024-10-08 RX ORDER — NAPROXEN SODIUM 220 MG/1
81 TABLET, FILM COATED ORAL DAILY
Status: DISCONTINUED | OUTPATIENT
Start: 2024-10-08 | End: 2024-10-10 | Stop reason: HOSPADM

## 2024-10-08 RX ORDER — RANOLAZINE 500 MG/1
500 TABLET, EXTENDED RELEASE ORAL 2 TIMES DAILY
Status: DISCONTINUED | OUTPATIENT
Start: 2024-10-08 | End: 2024-10-10 | Stop reason: HOSPADM

## 2024-10-08 RX ORDER — AMITRIPTYLINE HYDROCHLORIDE 25 MG/1
50 TABLET, FILM COATED ORAL NIGHTLY PRN
Status: DISCONTINUED | OUTPATIENT
Start: 2024-10-08 | End: 2024-10-10 | Stop reason: HOSPADM

## 2024-10-08 RX ORDER — INSULIN ASPART 100 [IU]/ML
0-5 INJECTION, SOLUTION INTRAVENOUS; SUBCUTANEOUS EVERY 6 HOURS PRN
Status: DISCONTINUED | OUTPATIENT
Start: 2024-10-08 | End: 2024-10-10 | Stop reason: HOSPADM

## 2024-10-08 RX ORDER — INSULIN GLARGINE 100 [IU]/ML
20 INJECTION, SOLUTION SUBCUTANEOUS 2 TIMES DAILY
Status: DISCONTINUED | OUTPATIENT
Start: 2024-10-08 | End: 2024-10-08

## 2024-10-08 RX ORDER — GLUCAGON 1 MG
1 KIT INJECTION
Status: DISCONTINUED | OUTPATIENT
Start: 2024-10-08 | End: 2024-10-10 | Stop reason: HOSPADM

## 2024-10-08 RX ORDER — POTASSIUM CHLORIDE 7.45 MG/ML
10 INJECTION INTRAVENOUS
Status: COMPLETED | OUTPATIENT
Start: 2024-10-08 | End: 2024-10-08

## 2024-10-08 RX ORDER — MORPHINE SULFATE 2 MG/ML
3 INJECTION, SOLUTION INTRAMUSCULAR; INTRAVENOUS ONCE
Status: COMPLETED | OUTPATIENT
Start: 2024-10-08 | End: 2024-10-08

## 2024-10-08 RX ORDER — INSULIN ASPART 100 [IU]/ML
10 INJECTION, SOLUTION INTRAVENOUS; SUBCUTANEOUS
Status: DISCONTINUED | OUTPATIENT
Start: 2024-10-08 | End: 2024-10-08

## 2024-10-08 RX ORDER — SODIUM CHLORIDE 0.9 % (FLUSH) 0.9 %
10 SYRINGE (ML) INJECTION EVERY 6 HOURS
Status: DISCONTINUED | OUTPATIENT
Start: 2024-10-09 | End: 2024-10-10 | Stop reason: HOSPADM

## 2024-10-08 RX ORDER — HYDROMORPHONE HYDROCHLORIDE 2 MG/1
4 TABLET ORAL EVERY 6 HOURS PRN
Status: DISCONTINUED | OUTPATIENT
Start: 2024-10-08 | End: 2024-10-10 | Stop reason: HOSPADM

## 2024-10-08 RX ORDER — SODIUM CHLORIDE 0.9 % (FLUSH) 0.9 %
10 SYRINGE (ML) INJECTION
Status: DISCONTINUED | OUTPATIENT
Start: 2024-10-08 | End: 2024-10-10 | Stop reason: HOSPADM

## 2024-10-08 RX ADMIN — POTASSIUM CHLORIDE 10 MEQ: 7.46 INJECTION, SOLUTION INTRAVENOUS at 08:10

## 2024-10-08 RX ADMIN — SODIUM CHLORIDE, PRESERVATIVE FREE 10 ML: 5 INJECTION INTRAVENOUS at 11:10

## 2024-10-08 RX ADMIN — RANOLAZINE 500 MG: 500 TABLET, EXTENDED RELEASE ORAL at 08:10

## 2024-10-08 RX ADMIN — POTASSIUM CHLORIDE 10 MEQ: 7.46 INJECTION, SOLUTION INTRAVENOUS at 07:10

## 2024-10-08 RX ADMIN — SUCRALFATE ORAL SUSPENSION 1 G: 1 SUSPENSION ORAL at 08:10

## 2024-10-08 RX ADMIN — DEXTROSE MONOHYDRATE 125 ML: 100 INJECTION, SOLUTION INTRAVENOUS at 03:10

## 2024-10-08 RX ADMIN — HEPARIN SODIUM 18 UNITS/KG/HR: 10000 INJECTION, SOLUTION INTRAVENOUS at 04:10

## 2024-10-08 RX ADMIN — POTASSIUM CHLORIDE 10 MEQ: 7.46 INJECTION, SOLUTION INTRAVENOUS at 06:10

## 2024-10-08 RX ADMIN — Medication 16 G: at 01:10

## 2024-10-08 RX ADMIN — PREGABALIN 150 MG: 75 CAPSULE ORAL at 10:10

## 2024-10-08 RX ADMIN — ATORVASTATIN CALCIUM 40 MG: 40 TABLET, FILM COATED ORAL at 10:10

## 2024-10-08 RX ADMIN — OXCARBAZEPINE 600 MG: 150 TABLET, FILM COATED ORAL at 10:10

## 2024-10-08 RX ADMIN — NITROGLYCERIN 0.4 MG: 0.4 TABLET SUBLINGUAL at 04:10

## 2024-10-08 RX ADMIN — OXCARBAZEPINE 600 MG: 150 TABLET, FILM COATED ORAL at 08:10

## 2024-10-08 RX ADMIN — DEXTROSE MONOHYDRATE 125 ML: 100 INJECTION, SOLUTION INTRAVENOUS at 12:10

## 2024-10-08 RX ADMIN — POTASSIUM CHLORIDE 20 MEQ: 1500 TABLET, EXTENDED RELEASE ORAL at 08:10

## 2024-10-08 RX ADMIN — ASPIRIN 81 MG CHEWABLE TABLET 81 MG: 81 TABLET CHEWABLE at 03:10

## 2024-10-08 RX ADMIN — DEXTROSE MONOHYDRATE 125 ML: 100 INJECTION, SOLUTION INTRAVENOUS at 04:10

## 2024-10-08 RX ADMIN — MORPHINE SULFATE 3 MG: 2 INJECTION, SOLUTION INTRAMUSCULAR; INTRAVENOUS at 07:10

## 2024-10-08 RX ADMIN — HYDROMORPHONE HYDROCHLORIDE 4 MG: 2 TABLET ORAL at 05:10

## 2024-10-08 RX ADMIN — PREGABALIN 150 MG: 75 CAPSULE ORAL at 08:10

## 2024-10-08 RX ADMIN — ESCITALOPRAM OXALATE 20 MG: 10 TABLET ORAL at 10:10

## 2024-10-08 RX ADMIN — HEPARIN SODIUM 18 UNITS/KG/HR: 10000 INJECTION, SOLUTION INTRAVENOUS at 12:10

## 2024-10-08 RX ADMIN — ALUMINUM HYDROXIDE, MAGNESIUM HYDROXIDE, AND DIMETHICONE 30 ML: 200; 20; 200 SUSPENSION ORAL at 08:10

## 2024-10-08 NOTE — PROGRESS NOTES
Inpatient Nutrition Assessment    Admit Date: 10/7/2024   Total duration of encounter: 1 day   Patient Age: 44 y.o.    Nutrition Recommendation/Prescription     When appropriate--ADAT to diabetic/low fat   Order ONS--boost breeze tid; Boost Breeze (provides 250 kcal, 9 g protein per serving)   If unable to advance diet --1-2 days; consider use PPN: Clinimix 4.25/5 @ 95 ml/hr with lipids 20% 250 ml 3 times per week( M, W,F) to provide 989 calories, 97 gm protein.   If use lipids / monitor TG level; hx hypertriglyceridemia  Pt education on diet/complete  MVI/fe  Biweekly wt  Will monitor nutrition status     Communication of Recommendations: reviewed with nurse, reviewed with patient, and reviewed with caregiver    Nutrition Assessment     Malnutrition Assessment/Nutrition-Focused Physical Exam    Malnutrition Context: acute illness or injury (10/08/24 1318)  Malnutrition Level: severe (10/08/24 1318)  Energy Intake (Malnutrition): less than or equal to 50% for greater than or equal to 5 days (10/08/24 1318)  Weight Loss (Malnutrition): greater than 5% in 1 month (10/08/24 1318)     Orbital Region (Subcutaneous Fat Loss): well nourished  Upper Arm Region (Subcutaneous Fat Loss): well nourished        [place] Region (Muscle Loss): well nourished  Clavicle Bone Region (Muscle Loss): well nourished         A minimum of two characteristics is recommended for diagnosis of either severe or non-severe malnutrition.    Chart Review    Reason Seen: continuous nutrition monitoring    Malnutrition Screening Tool Results   Have you recently lost weight without trying?: No  Have you been eating poorly because of a decreased appetite?: No   MST Score: 0   Diagnosis:  NSTEMI, abdominal pain, pancreatic insufficiency, elevated TG/Chol, DM, pain, HTN     Relevant Medical History: Elevated TG, pancreatitis, high cholesterol, CAD, angina, aortic atherosclerosis, BPH, liver failure, DM, pain     Scheduled Medications:  aluminum-magnesium  hydroxide-simethicone, 30 mL, QID (AC & HS)  aspirin, 81 mg, Daily  atorvastatin, 40 mg, Daily  EScitalopram oxalate, 20 mg, Daily  insulin glargine U-100 (Lantus), 20 Units, BID  isosorbide mononitrate, 120 mg, Daily  lipase-protease-amylase 6,000-19,000-30,000 units, 6 capsule, TID WM  OXcarbazepine, 600 mg, BID  perflutren protein-a microsphr, 2 mL, Once  potassium chloride SA, 20 mEq, BID  pregabalin, 150 mg, BID  sucralfate, 1 g, QID (AC & HS)    Continuous Infusions:  heparin (porcine) in D5W, Last Rate: 18 Units/kg/hr (10/08/24 1234)    PRN Medications:  amitriptyline, 50 mg, Nightly PRN  dextrose 10%, 12.5 g, PRN  dextrose 10%, 25 g, PRN  glucagon (human recombinant), 1 mg, PRN  glucose, 16 g, PRN  glucose, 24 g, PRN  heparin (PORCINE), 55.7 Units/kg (Adjusted), PRN  heparin (PORCINE), 30 Units/kg (Adjusted), PRN  hydrALAZINE, 10 mg, Q6H PRN  HYDROmorphone, 4 mg, Q6H PRN  insulin aspart U-100, 0-5 Units, Q6H PRN  naloxone, 0.02 mg, PRN  nitroGLYCERIN, 0.4 mg, Q5 Min PRN  sodium chloride 0.9%, 10 mL, Q12H PRN    Calorie Containing IV Medications: no significant kcals from medications at this time    Recent Labs   Lab 10/07/24  1302 10/07/24  1303 10/07/24  1313 10/08/24  0153 10/08/24  0709   NA  --  138  --  142  --    K  --  3.4*  --  3.3*  --    CALCIUM  --  9.2  --  8.6  --    PHOS  --   --  3.1 3.7  --    MG  --  2.30  --  2.40  --    CL  --  104  --  108*  --    CO2  --  26  --  24  --    BUN  --  8.3*  --  9.7  --    CREATININE  --  1.09  --  1.04  --    EGFRNORACEVR  --  >60  --  >60  --    GLUCOSE  --  340*  --  101*  --    BILITOT  --  0.2  --  0.2  --    ALKPHOS  --  247*  --  217*  --    ALT  --  35  --  30  --    AST  --  35*  --  26  --    ALBUMIN  --  3.1*  --  2.8*  --    TRIG  --   --  148*  --   --    HGBA1C  --   --   --   --  9.3*   LIPASE  --  22  --   --   --    WBC 13.63*  --   --  10.79  --    HGB 14.1  --   --  12.4*  --    HCT 40.5*  --   --  35.9*  --      Nutrition Orders:  Diet  "NPO      Appetite/Oral Intake: NPO/NPO  Factors Affecting Nutritional Intake: abdominal distension, abdominal pain, decreased appetite, diarrhea, nausea, NPO, and vomiting  Social Needs Impacting Access to Food: none identified  Food/Episcopalian/Cultural Preferences: none reported  Food Allergies: none reported  Last Bowel Movement: 10/07/24  Wound(s):  hx foot/leg wounds; none reported at this time    Comments    (10/8) Pt NPO /pt sitting in bed; family at bedside; reported 2-3 week history of N/V/D/abdominal pain ; sometimes associated with meals; approx 7% recent wt loss. Pt has hx pancreatitis in past; cardiac enzymes elevated; trending. Diet and PPN recs provided to help maintain nutrition status. Labs acknowledged.     Anthropometrics    Height: 5' 4" (162.6 cm), Height Method: Stated  Last Weight: 92.8 kg (204 lb 9.4 oz) (10/08/24 07), Weight Method: Standard Scale  BMI (Calculated): 35.1  BMI Classification: obese grade II (BMI 35-39.9)        Ideal Body Weight (IBW), Male: 130 lb     % Ideal Body Weight, Male (lb): 157.38 %                 Usual Body Weight (UBW), k.2 kg  % Usual Body Weight: 92.81     Usual Weight Provided By: patient, family/caregiver, and EMR weight history    Wt Readings from Last 5 Encounters:   10/08/24 92.8 kg (204 lb 9.4 oz)   09/10/24 100.2 kg (220 lb 12.8 oz)   24 98 kg (216 lb)   24 97.5 kg (215 lb)   24 98.9 kg (218 lb)     Weight Change(s) Since Admission: wt fluctuates; currently 7% wt loss past few weeks   Wt Readings from Last 1 Encounters:   10/08/24 0726 92.8 kg (204 lb 9.4 oz)   10/08/24 0500 99.9 kg (220 lb 3.8 oz)   10/07/24 1920 99.6 kg (219 lb 9.3 oz)   10/07/24 1058 90.7 kg (200 lb)   Admit Weight: 90.7 kg (200 lb) (10/07/24 1058), Weight Method: Stated    Estimated Needs    Weight Used For Calorie Calculations: 92.8 kg (204 lb 9.4 oz)  Energy Calorie Requirements (kcal): 2041 kcal/d; 22 evon/kg /obese  Energy Need Method: Kcal/kg  Weight Used " For Protein Calculations: 59 kg (130 lb 1.1 oz) (based IBW /obese)  Protein Requirements: 95 gm protein/d; 1.6 gm/kg IBW  Fluid Requirements (mL): 2041 ml/d; 1ml/evon  CHO Requirement: 230 gm CHO/d     Enteral Nutrition     Patient not receiving enteral nutrition at this time.    Parenteral Nutrition     Patient not receiving parenteral nutrition support at this time.    Evaluation of Received Nutrient Intake    Calories: not meeting estimated needs  Protein: not meeting estimated needs    Patient Education     Education Provided: diabetic diet and low fat diet  Teaching Method: explanation and printed materials  Comprehension: verbalizes understanding  Barriers to Learning: acuity of illness  Expected Compliance: fair  Comments: All questions were answered and dietitian's contact information was provided.     Nutrition Diagnosis     PES: Inadequate oral intake related to acute illness as evidenced by NPO/ abdominal pain . (new)     PES: Severe acute disease or injury related malnutrition related to acute illness as evidenced by less than or equal to 50% needs met for greater than or equal to 5 days and greater than 5% weight loss in 1 month. (new)    Nutrition Interventions     Intervention(s): modified composition of meals/snacks, modified composition of parenteral nutrition, modified rate of parenteral nutrition, commercial beverage, multivitamin/mineral supplement therapy, purpose of nutrition education, and collaboration with other providers    Goal: Meet greater than 80% of nutritional needs by follow-up. (new)  Goal: Maintain weight throughout hospitalization. (new)    Nutrition Goals & Monitoring     Dietitian will monitor: food and beverage intake, parenteral nutrition intake, weight, food/nutrition knowledge skill, and glucose/endocrine profile  Discharge planning: too early to determine; pending clinical course  Nutrition Risk/Follow-Up: high (follow-up in 1-4 days)   Please consult if re-assessment needed  sooner.

## 2024-10-08 NOTE — PLAN OF CARE
Problem: Adult Inpatient Plan of Care  Goal: Plan of Care Review  Reactivated  Goal: Patient-Specific Goal (Individualized)  Reactivated  Goal: Absence of Hospital-Acquired Illness or Injury  Reactivated  Goal: Optimal Comfort and Wellbeing  Reactivated  Goal: Readiness for Transition of Care  Reactivated     Problem: Diabetes Comorbidity  Goal: Blood Glucose Level Within Targeted Range  Reactivated     Problem: Wound  Goal: Optimal Coping  Reactivated  Goal: Optimal Functional Ability  Reactivated  Goal: Absence of Infection Signs and Symptoms  Reactivated  Goal: Improved Oral Intake  Reactivated  Goal: Optimal Pain Control and Function  Reactivated  Goal: Skin Health and Integrity  Reactivated  Goal: Optimal Wound Healing  Reactivated

## 2024-10-08 NOTE — PLAN OF CARE
10/08/24 1003   Discharge Assessment   Assessment Type Discharge Planning Assessment   Confirmed/corrected address, phone number and insurance Yes   Confirmed Demographics Correct on Facesheet   Source of Information family   When was your last doctors appointment?   (Dat [place])   Reason For Admission Epigastric pain, NSTEMI, Elevated troponin I level, Chest pain   People in Home alone   Facility Arrived From: Home   Do you expect to return to your current living situation? Yes   Do you have help at home or someone to help you manage your care at home? Yes   Who are your caregiver(s) and their phone number(s)? Radha Wesleyr (Friend)  [phone]; Sister, Beata Caballero ([phone])   Walking or Climbing Stairs Difficulty yes   Walking or Climbing Stairs ambulation difficulty, requires equipment   Mobility Management Cane, Rollator   Dressing/Bathing Difficulty no   Home Layout Able to live on 1st floor   Equipment Currently Used at Home cane, straight;CPAP;rollator   Readmission within 30 days? No   Patient currently being followed by outpatient case management? No   Do you currently have service(s) that help you manage your care at home? Yes   Name and Contact number of agency [facility]   Is the pt/caregiver preference to resume services with current agency Yes   Do you take prescription medications? Yes  ([facility])   Do you have prescription coverage? Yes   Coverage M/D   Do you have any problems affording any of your prescribed medications? No   Is the patient taking medications as prescribed? yes   Who is going to help you get home at discharge? Sister   How do you get to doctors appointments? car, drives self   Are you on dialysis? No   Discharge Plan A Home Health;Home   DME Needed Upon Discharge  none   Discharge Plan discussed with: Sibling   Name(s) and Number(s) Sister, Beata Caballero ([phone])   Transition of Care Barriers None     Pt single with 1 adult child; Resides alone;  Receives disability & SNAP benefits; Current with [facility] - Order to resume services submitted via [facility]; Pt independent with ADL's; CM to follow for d/c planning needs.

## 2024-10-08 NOTE — PROGRESS NOTES
I met with pt while she was sitting up in [facility].  She has been having
nausea and vomitting for the last two weeks partially because of food not
"tasting right"  Chemotherapy has caused altered taste sensations before.  We
used a peppermint to try to get rid of that taste as she had just vomitted her
smoothie because it tasted horrible.  Pt has been having diarrhea for several
years due to some of her cardiac meds she thinks.  "Of all the
problems, that one is the least troublesome."  Her daughter and her son in law
are very supportive as is her  at home.  She has two grandchildren that
she does get to see and friends that call daily.  She is currently on her
third chemotherapy regime.  She started with ABRAXANE for about 5 months but
then developed peripheral neuropathy that led to changing chemotherapy and
just recently started her current chemotherapy to which she attributes the
N/V.  At this point she states "I am about ready to give up but I really want
to know if there are other options for chemotherapy and what kind of life that
I am looking at now".  Her  has a hard time seeing needles and coming
to the hospital makes him very uncomfortable.  She says they haven't talked
about the future may hold because it is too hard for him to do.  Her N/V had
improved but is back now.  She feels that she can manage pretty well at home,
single level, with grab bars already in place.  I just feel weak--this is not
the way I want to live my life."  We discussed that it is her choice regarding
continuing treatment vs other options like hospice.  She will give it some
thought over the weekend and we will talk again on Monday.  I did place a call
to Dr Ngo about other chemotherapy choices, life expectancy, anticipated
side effects from disease and treatment and I am waiting for a response.  Bone
pain is one of her biggest issues in addition to the N/V.  I did speak with Dr Newberry about this consult and pt's desire for information about ongoing
prognosis/treatment. [facility] Progress Note     Resident Team: [facility] Medicine List 2  Attending: Kayy Dasilva MD  Resident: Reji Holland    Subjective:      Brief HPI:  Bharath Caballero is a 44 y.o. male with PMH of hypertriglyceride induced pancreatitis, familial hypercholesterolemia, coronary artery disease, unstable angina, aortic atherosclerosis, BPH, chronic liver failure, chronic pain syndrome, CICI, hard of hearing, and DM type 2 with long-term insulin use, presented to the ED with chief complaint of right lower quadrant abdominal pain that began 2 weeks ago and now epigastric in nature over the last week.  Associated symptoms include nausea and vomiting.  He reports that the pain worsens after a meal and he immediately has diarrhea.  Reports having 3-4 episodes of diarrhea daily.  No recent antibiotic use.  He states that this pain is not like previous pancreatitis pain.  This feels different per patient.  No other associated symptoms.  He denies hematemesis, melena, hematochezia.  He reports needing to use nitroglycerin tablets for this epigastric pain that radiates into his chest.  He states that he frequently uses nitroglycerin tablets.  He was followed by Cardiology at this facility.  Had a cardiac catheterization done 04/2023, that showed three-vessel coronary artery disease to the LAD, RCA, and distal LAD.  There were discussions about needing CABG, and patient was evaluated by Cardiothoracic surgeon who deemed him not to need surgical intervention due to i FR being greater than 0.89.  No stent placement.  This is being medically managed.  No other associated symptoms.  He denies dysuria, hematuria, myalgias, dizziness, headaches, numbness, weakness, tingling.  No visual disturbance or speech issues.     Interval History:   When patient was initially evaluated this morning, he appeared somnolent but arousable; which was different to his baseline yesterday, per nursing staff.    At about 10 a.m., patient was re-evaluated  "during team rounds.  He was more awake and answered questions appropriately.  Patient reported that he has been having midsternal chest pain lasting for a few minutes and occurring daily for over a year now.  Pain is worse with exertion but also occurs during rest.  He describes pain as "punching pain".  He also reported right lower quadrant pain that has been occurring spontaneously along with vomiting and diarrhea whenever he eats.    He has not had any episodes of vomiting or diarrhea since admission as he has been NPO.  He denies any of the above symptoms at this time.  He does report chronic leg pain.  Echo done today; pending results.      Review of Systems:  Review of Systems   Constitutional:  Negative for chills and fever.   Respiratory:  Negative for shortness of breath.    Cardiovascular:  Positive for chest pain.   Gastrointestinal:  Positive for abdominal pain, diarrhea and vomiting. Negative for blood in stool and constipation.        Objective:     Last 24 Hour Vital Signs:  BP  Min: 93/57  Max: 216/142  Temp  Av.3 °F (36.8 °C)  Min: 97.8 °F (36.6 °C)  Max: 98.9 °F (37.2 °C)  Pulse  Av.8  Min: 61  Max: 89  Resp  Av.6  Min: 13  Max: 25  SpO2  Av.9 %  Min: 97 %  Max: 99 %  Height  Av' 4" (162.6 cm)  Min: 5' 4" (162.6 cm)  Max: 5' 4" (162.6 cm)  Weight  Av.7 kg (213 lb 4.4 oz)  Min: 90.7 kg (200 lb)  Max: 99.9 kg (220 lb 3.8 oz)  I/O last 3 completed shifts:  In: 1000 [IV Piggyback:1000]  Out: -     Physical Examination:  Physical Exam  Vitals reviewed.   Constitutional:       General: He is not in acute distress.     Appearance: Normal appearance. He is not ill-appearing, toxic-appearing or diaphoretic.   HENT:      Ears:      Comments: Hearing impaired  Cardiovascular:      Rate and Rhythm: Normal rate and regular rhythm.      Pulses: Normal pulses.      Heart sounds: Normal heart sounds. No murmur heard.     No friction rub.   Pulmonary:      Effort: Pulmonary effort is " normal. No respiratory distress.      Breath sounds: Normal breath sounds. No stridor. No wheezing or rhonchi.   Abdominal:      General: Bowel sounds are normal.      Palpations: Abdomen is soft.      Tenderness: There is no abdominal tenderness. There is no guarding or rebound.   Musculoskeletal:         General: No swelling or tenderness. Normal range of motion.   Neurological:      General: No focal deficit present.      Mental Status: He is alert and oriented to person, place, and time.      Cranial Nerves: Cranial nerves 2-12 are intact.       Laboratory:  Most Recent Data:  CBC:   Lab Results   Component Value Date    WBC 10.79 10/08/2024    HGB 12.4 (L) 10/08/2024    HCT 35.9 (L) 10/08/2024     10/08/2024    MCV 88.2 10/08/2024    RDW 13.0 10/08/2024     WBC Differential:   Recent Labs   Lab 10/07/24  1302 10/08/24  0153   WBC 13.63* 10.79   HGB 14.1 12.4*   HCT 40.5* 35.9*    272   MCV 86.7 88.2     BMP:   Lab Results   Component Value Date     10/08/2024    K 3.3 (L) 10/08/2024     (H) 10/08/2024    CO2 24 10/08/2024    BUN 9.7 10/08/2024    CREATININE 1.04 10/08/2024    CALCIUM 8.6 10/08/2024    MG 2.40 10/08/2024    PHOS 3.7 10/08/2024     LFTs:   Lab Results   Component Value Date    ALBUMIN 2.8 (L) 10/08/2024    BILITOT 0.2 10/08/2024    AST 26 10/08/2024    ALKPHOS 217 (H) 10/08/2024    ALT 30 10/08/2024     (H) 10/07/2024     Coags:   Lab Results   Component Value Date    INR 1.1 10/07/2024     FLP:   Lab Results   Component Value Date    CHOL 142 10/07/2024    HDL 27 (L) 10/07/2024    TRIG 148 (H) 10/07/2024     DM:   Lab Results   Component Value Date    HGBA1C 8.2 (H) 06/04/2024    HGBA1C 9.5 (H) 01/16/2024    HGBA1C 9.4 (H) 05/24/2023    CREATININE 1.04 10/08/2024     Thyroid:   Lab Results   Component Value Date    TSH 0.555 10/07/2024      Anemia:   Lab Results   Component Value Date    IRON 52 (L) 08/18/2020    TIBC 296 08/18/2020    FERRITIN 190.9 08/18/2020  "      Lab Results   Component Value Date    YVGKPMHK44 773 08/13/2020     No results found for: "FOLATE"     Cardiac:   Lab Results   Component Value Date    TROPONINI 1.600 (H) 10/08/2024    .4 (H) 10/07/2024         Microbiology Data:  Microbiology Results (last 7 days)       ** No results found for the last 168 hours. **             Radiology:  Imaging Results              CT Abdomen Pelvis W Wo Contrast (Final result)  Result time 10/07/24 18:27:23      Final result by Paulo Biggs MD (10/07/24 18:27:23)                   Impression:      No acute abdominopelvic findings.      Electronically signed by: Paulo Biggs  Date:    10/07/2024  Time:    18:27               Narrative:    EXAMINATION:  CT ABDOMEN PELVIS W WO CONTRAST    CLINICAL HISTORY:  Abdominal pain, acute, nonlocalized;    TECHNIQUE:  Helical acquisition through the abdomen and pelvis without and with IV contrast.  Three plane reconstructions were provided for review. DLP 1377 mGycm. Automatic exposure control, adjustment of mA/kV or iterative reconstruction technique was used to reduce radiation.    COMPARISON:  19 October 2023    FINDINGS:  The limited imaged lung bases are clear.  There is gynecomastia    No significant abnormality of the liver.  Possible gallstones.  Slightly smaller splenic hypoattenuating lesion.  No acute findings pancreas.  No significant abnormality adrenals or kidneys.    There is no bowel obstruction.  No significant inflammatory changes of the bowel.  No free air.    Urinary bladder is unremarkable. No free fluid. Aorta normal in caliber.  There is moderate atherosclerotic disease.    There are no acute osseous findings.                                       X-Ray Chest AP Portable (Final result)  Result time 10/07/24 14:28:09      Final result by Paulo Biggs MD (10/07/24 14:28:09)                   Impression:      No acute findings.      Electronically signed by: Paulo " Dian  Date:    10/07/2024  Time:    14:28               Narrative:    EXAMINATION:  XR CHEST AP PORTABLE    CLINICAL HISTORY:  epigastric pain;    COMPARISON:  10 September 2024    FINDINGS:  Frontal view of the chest was obtained. Heart is not significantly enlarged.  The lungs are grossly clear.  There is no pneumothorax.  Dorsal column stimulator leads are again noted.                                      Current Medications:     Infusions:   heparin (porcine) in D5W  0-40 Units/kg/hr (Adjusted) Intravenous Continuous 10.8 mL/hr at 10/08/24 0400 15 Units/kg/hr at 10/08/24 0400        Scheduled:   aluminum-magnesium hydroxide-simethicone  30 mL Oral QID (AC & HS)    amitriptyline  50 mg Oral QHS    atorvastatin  40 mg Oral Daily    carvediloL  25 mg Oral BID WM    cloNIDine  0.3 mg Oral TID    EScitalopram oxalate  20 mg Oral Daily    insulin aspart U-100  25 Units Subcutaneous TIDWM    insulin glargine U-100 (Lantus)  35 Units Subcutaneous BID    isosorbide mononitrate  120 mg Oral Daily    lipase-protease-amylase 6,000-19,000-30,000 units  6 capsule Oral TID WM    losartan  100 mg Oral Daily    morphine  105 mg Oral TID    NIFEdipine  60 mg Oral BID    OXcarbazepine  600 mg Oral BID    potassium chloride  10 mEq Intravenous Q1H    potassium chloride SA  20 mEq Oral BID    pregabalin  150 mg Oral BID    spironolactone  100 mg Oral Daily    sucralfate  1 g Oral QID (AC & HS)    tamsulosin  1 capsule Oral Daily        PRN:    Current Facility-Administered Medications:     dextrose 10%, 12.5 g, Intravenous, PRN    dextrose 10%, 25 g, Intravenous, PRN    glucagon (human recombinant), 1 mg, Intramuscular, PRN    glucose, 16 g, Oral, PRN    glucose, 24 g, Oral, PRN    heparin (PORCINE), 55.7 Units/kg (Adjusted), Intravenous, PRN    heparin (PORCINE), 30 Units/kg (Adjusted), Intravenous, PRN    hydrALAZINE, 10 mg, Intravenous, Q6H PRN    HYDROmorphone, 8 mg, Oral, Q8H PRN    naloxone, 0.02 mg, Intravenous, PRN     nitroGLYCERIN, 0.4 mg, Sublingual, Q5 Min PRN    sodium chloride 0.9%, 10 mL, Intravenous, Q12H PRN      Assessment & Plan:     NSTEMI  - Troponin - 1.124 >> 1.218 >> 1.582 >> 1.600 >> 1.554  - JAVY of 5;  Heart score of 6  - BNP elevated above baseline, 246  - EKG revealed NSR with new T-wave abnormalities  - Unstable angina at home requiring multiple nitroglycerin tablets  - Loading dose aspirin 325 mg received on admission, continue daily aspirin  - Heparin drip  - Cardiology consulted; pending recommendations  - NPO for possible cardiac intervention  - Sublingual nitro p.r.n.  - [facility] :  Ostial and mid LAD lesion 70% stenosed, proximal RCA 90% stenosed, mid % stenosis, distal LAD 70% stenosed  - Echo 10/08/2024 revealed EF of 45%.  - Oxygen as needed  - Lipid panel revealed TG of 148, HDL 27, LDL 85      Epigastric & RLQ abdominal pain  Pancreatic insufficiency, hypertriglyceridemia induced  Familial hypercholesterolemia  - Requires evolocumab injections every 2 weeks  - Continue home Lipitor 40 mg q.d.  - History of multiple episodes of pancreatitis, lipase negative  - CT abdomen and pelvis with and without contrast with unremarkable findings  - Continuing Creon, formulary has low-dose needing 6 tablets t.i.d.       Diabetes mellitus type 2  - A1c 10/08/2024 - 9.3  - Home regimen -  35 units of long-acting insulin b.i.d. and 25 units short-acting insulin TIDWM with oral agents.  Hold oral agents.  - Holding insulin as patient is currently NPO and having frequent low glucose readings.  Re-evaluate at a later time.  - SSI       Chronic pain  - On multiple opiates at home. Will hold.  Dilaudid 4 mg p.o. ordered q.6 p.r.n.  - Continue home Lyrica       Hypertension  - Initial blood pressure readings upon hospitalization were elevated.  Patient now with soft blood pressures today 10/8  - Holding all home antihypertensives including clonidine 0.3 mg, carvedilol 25 mg, nifedipine 60 mg b.i.d., and  Aldactone 100 mg daily.  Consider restarting if needed.  - Hydralazine IV 10 mg q.6 p.r.n.         CODE STATUS: Full Code  Access: Peripheral  Antibiotics:  None  Diet: NPO  DVT Prophylaxis: Heparin drip per ACS protocol  Fluids: None      Disposition: day 1 of admission for  NSTEMI.  Cardiology consulted; appreciate their assistance.  Holding home blood pressure medications and antihyperglycemic agents due to low readings.  Continue to monitor blood pressure and glucose levels and add on medications as needed.      Jaclyn Mendoza MD  [facility] Resident, -II

## 2024-10-08 NOTE — NURSING
This nurse went in to assess patient and noted periods of apnea up to 15 seconds with an oxygen sat of 86-88% room air. Patient place on 2 liters o2 via nasal cannula .

## 2024-10-09 PROBLEM — R07.9 CHEST PAIN: Status: ACTIVE | Noted: 2024-10-09

## 2024-10-09 PROBLEM — I21.4 NSTEMI (NON-ST ELEVATED MYOCARDIAL INFARCTION): Status: ACTIVE | Noted: 2024-10-09

## 2024-10-09 LAB
ALBUMIN SERPL-MCNC: 3 G/DL (ref 3.5–5)
ALBUMIN/GLOB SERPL: 0.8 RATIO (ref 1.1–2)
ALP SERPL-CCNC: 382 UNIT/L (ref 40–150)
ALT SERPL-CCNC: 41 UNIT/L (ref 0–55)
AMPHET UR QL SCN: NEGATIVE
ANION GAP SERPL CALC-SCNC: 8 MEQ/L
ANION GAP SERPL CALC-SCNC: 9 MEQ/L
APTT PPP: 42 SECONDS (ref 23.2–33.7)
APTT PPP: 46.7 SECONDS (ref 23.2–33.7)
APTT PPP: 47.7 SECONDS (ref 23.2–33.7)
AST SERPL-CCNC: 59 UNIT/L (ref 5–34)
BARBITURATE SCN PRESENT UR: NEGATIVE
BASOPHILS # BLD AUTO: 0.06 X10(3)/MCL
BASOPHILS NFR BLD AUTO: 0.6 %
BENZODIAZ UR QL SCN: NEGATIVE
BILIRUB SERPL-MCNC: 0.2 MG/DL
BUN SERPL-MCNC: 13.7 MG/DL (ref 8.9–20.6)
BUN SERPL-MCNC: 9.8 MG/DL (ref 8.9–20.6)
CALCIUM SERPL-MCNC: 8.6 MG/DL (ref 8.4–10.2)
CALCIUM SERPL-MCNC: 9.1 MG/DL (ref 8.4–10.2)
CANNABINOIDS UR QL SCN: NEGATIVE
CHLORIDE SERPL-SCNC: 106 MMOL/L (ref 98–107)
CHLORIDE SERPL-SCNC: 107 MMOL/L (ref 98–107)
CO2 SERPL-SCNC: 21 MMOL/L (ref 22–29)
CO2 SERPL-SCNC: 24 MMOL/L (ref 22–29)
COCAINE UR QL SCN: NEGATIVE
CREAT SERPL-MCNC: 0.84 MG/DL (ref 0.73–1.18)
CREAT SERPL-MCNC: 0.99 MG/DL (ref 0.73–1.18)
CREAT/UREA NIT SERPL: 12
CREAT/UREA NIT SERPL: 14
EOSINOPHIL # BLD AUTO: 0.3 X10(3)/MCL (ref 0–0.9)
EOSINOPHIL NFR BLD AUTO: 3.1 %
ERYTHROCYTE [DISTWIDTH] IN BLOOD BY AUTOMATED COUNT: 13.2 % (ref 11.5–17)
FENTANYL UR QL SCN: NEGATIVE
GFR SERPLBLD CREATININE-BSD FMLA CKD-EPI: >60 ML/MIN/1.73/M2
GFR SERPLBLD CREATININE-BSD FMLA CKD-EPI: >60 ML/MIN/1.73/M2
GLOBULIN SER-MCNC: 4 GM/DL (ref 2.4–3.5)
GLUCOSE SERPL-MCNC: 103 MG/DL (ref 74–100)
GLUCOSE SERPL-MCNC: 96 MG/DL (ref 74–100)
HCT VFR BLD AUTO: 36.7 % (ref 42–52)
HGB BLD-MCNC: 12.5 G/DL (ref 14–18)
HOLD SPECIMEN: NORMAL
IMM GRANULOCYTES # BLD AUTO: 0.03 X10(3)/MCL (ref 0–0.04)
IMM GRANULOCYTES NFR BLD AUTO: 0.3 %
LYMPHOCYTES # BLD AUTO: 3.09 X10(3)/MCL (ref 0.6–4.6)
LYMPHOCYTES NFR BLD AUTO: 32.1 %
MAGNESIUM SERPL-MCNC: 2.6 MG/DL (ref 1.6–2.6)
MCH RBC QN AUTO: 29.8 PG (ref 27–31)
MCHC RBC AUTO-ENTMCNC: 34.1 G/DL (ref 33–36)
MCV RBC AUTO: 87.6 FL (ref 80–94)
MDMA UR QL SCN: NEGATIVE
MONOCYTES # BLD AUTO: 0.79 X10(3)/MCL (ref 0.1–1.3)
MONOCYTES NFR BLD AUTO: 8.2 %
NEUTROPHILS # BLD AUTO: 5.37 X10(3)/MCL (ref 2.1–9.2)
NEUTROPHILS NFR BLD AUTO: 55.7 %
NRBC BLD AUTO-RTO: 0 %
NUC REST EJECTION FRACTION: 54
NUC STRESS EJECTION FRACTION: 48 %
OHS CV CPX 85 PERCENT MAX PREDICTED HEART RATE MALE: 150
OHS CV CPX MAX PREDICTED HEART RATE: 176
OHS CV CPX PATIENT IS FEMALE: 0
OHS CV CPX PATIENT IS MALE: 1
OHS CV INITIAL DOSE: 27.6 MCG/KG/MIN
OHS CV PEAK DOSE: 8.7 MCG/KG/MIN
OHS QRS DURATION: 92 MS
OHS QTC CALCULATION: 472 MS
OPIATES UR QL SCN: POSITIVE
PCP UR QL: NEGATIVE
PH UR: 6.5 [PH] (ref 3–11)
PHOSPHATE SERPL-MCNC: 3.2 MG/DL (ref 2.3–4.7)
PLATELET # BLD AUTO: 315 X10(3)/MCL (ref 130–400)
PMV BLD AUTO: 10.1 FL (ref 7.4–10.4)
POCT GLUCOSE: 100 MG/DL (ref 70–110)
POCT GLUCOSE: 141 MG/DL (ref 70–110)
POCT GLUCOSE: 173 MG/DL (ref 70–110)
POCT GLUCOSE: 205 MG/DL (ref 70–110)
POCT GLUCOSE: 65 MG/DL (ref 70–110)
POCT GLUCOSE: 66 MG/DL (ref 70–110)
POCT GLUCOSE: 73 MG/DL (ref 70–110)
POCT GLUCOSE: 83 MG/DL (ref 70–110)
POCT GLUCOSE: 95 MG/DL (ref 70–110)
POTASSIUM SERPL-SCNC: 3.9 MMOL/L (ref 3.5–5.1)
POTASSIUM SERPL-SCNC: 4.2 MMOL/L (ref 3.5–5.1)
PROT SERPL-MCNC: 7 GM/DL (ref 6.4–8.3)
RBC # BLD AUTO: 4.19 X10(6)/MCL (ref 4.7–6.1)
SODIUM SERPL-SCNC: 137 MMOL/L (ref 136–145)
SODIUM SERPL-SCNC: 138 MMOL/L (ref 136–145)
SPECIFIC GRAVITY, URINE AUTO (.000) (OHS): 1.02 (ref 1–1.03)
WBC # BLD AUTO: 9.64 X10(3)/MCL (ref 4.5–11.5)

## 2024-10-09 PROCEDURE — 84100 ASSAY OF PHOSPHORUS: CPT

## 2024-10-09 PROCEDURE — 27000221 HC OXYGEN, UP TO 24 HOURS

## 2024-10-09 PROCEDURE — 25000003 PHARM REV CODE 250

## 2024-10-09 PROCEDURE — 25000003 PHARM REV CODE 250: Performed by: INTERNAL MEDICINE

## 2024-10-09 PROCEDURE — A4216 STERILE WATER/SALINE, 10 ML: HCPCS | Performed by: INTERNAL MEDICINE

## 2024-10-09 PROCEDURE — 36415 COLL VENOUS BLD VENIPUNCTURE: CPT

## 2024-10-09 PROCEDURE — A9502 TC99M TETROFOSMIN: HCPCS | Performed by: INTERNAL MEDICINE

## 2024-10-09 PROCEDURE — 85730 THROMBOPLASTIN TIME PARTIAL: CPT

## 2024-10-09 PROCEDURE — 36415 COLL VENOUS BLD VENIPUNCTURE: CPT | Performed by: INTERNAL MEDICINE

## 2024-10-09 PROCEDURE — 63600175 PHARM REV CODE 636 W HCPCS

## 2024-10-09 PROCEDURE — 85730 THROMBOPLASTIN TIME PARTIAL: CPT | Performed by: INTERNAL MEDICINE

## 2024-10-09 PROCEDURE — 83735 ASSAY OF MAGNESIUM: CPT

## 2024-10-09 PROCEDURE — 21400001 HC TELEMETRY ROOM

## 2024-10-09 PROCEDURE — 63600175 PHARM REV CODE 636 W HCPCS: Performed by: INTERNAL MEDICINE

## 2024-10-09 PROCEDURE — 25000242 PHARM REV CODE 250 ALT 637 W/ HCPCS

## 2024-10-09 PROCEDURE — 80053 COMPREHEN METABOLIC PANEL: CPT

## 2024-10-09 PROCEDURE — 80307 DRUG TEST PRSMV CHEM ANLYZR: CPT

## 2024-10-09 PROCEDURE — 85025 COMPLETE CBC W/AUTO DIFF WBC: CPT

## 2024-10-09 RX ORDER — LOSARTAN POTASSIUM 25 MG/1
100 TABLET ORAL DAILY
Status: DISCONTINUED | OUTPATIENT
Start: 2024-10-09 | End: 2024-10-10 | Stop reason: HOSPADM

## 2024-10-09 RX ORDER — MORPHINE SULFATE 2 MG/ML
1 INJECTION, SOLUTION INTRAMUSCULAR; INTRAVENOUS ONCE
Status: COMPLETED | OUTPATIENT
Start: 2024-10-09 | End: 2024-10-09

## 2024-10-09 RX ORDER — REGADENOSON 0.08 MG/ML
0.4 INJECTION, SOLUTION INTRAVENOUS ONCE
Status: COMPLETED | OUTPATIENT
Start: 2024-10-09 | End: 2024-10-09

## 2024-10-09 RX ORDER — SODIUM CHLORIDE 9 MG/ML
INJECTION, SOLUTION INTRAVENOUS ONCE
OUTPATIENT
Start: 2024-10-09 | End: 2024-10-09

## 2024-10-09 RX ORDER — ACETAMINOPHEN 325 MG/1
650 TABLET ORAL ONCE
Status: COMPLETED | OUTPATIENT
Start: 2024-10-09 | End: 2024-10-09

## 2024-10-09 RX ORDER — PREGABALIN 75 MG/1
150 CAPSULE ORAL 3 TIMES DAILY
Status: DISCONTINUED | OUTPATIENT
Start: 2024-10-09 | End: 2024-10-10 | Stop reason: HOSPADM

## 2024-10-09 RX ORDER — SODIUM CHLORIDE 0.9 % (FLUSH) 0.9 %
10 SYRINGE (ML) INJECTION
Status: DISCONTINUED | OUTPATIENT
Start: 2024-10-09 | End: 2024-10-10 | Stop reason: HOSPADM

## 2024-10-09 RX ADMIN — SODIUM CHLORIDE, PRESERVATIVE FREE 10 ML: 5 INJECTION INTRAVENOUS at 05:10

## 2024-10-09 RX ADMIN — TETROFOSMIN 8.7 MILLICURIE: 1.38 INJECTION, POWDER, LYOPHILIZED, FOR SOLUTION INTRAVENOUS at 02:10

## 2024-10-09 RX ADMIN — MORPHINE SULFATE 1 MG: 2 INJECTION, SOLUTION INTRAMUSCULAR; INTRAVENOUS at 05:10

## 2024-10-09 RX ADMIN — HYDROMORPHONE HYDROCHLORIDE 4 MG: 2 TABLET ORAL at 08:10

## 2024-10-09 RX ADMIN — ACETAMINOPHEN 650 MG: 325 TABLET ORAL at 08:10

## 2024-10-09 RX ADMIN — ALUMINUM HYDROXIDE, MAGNESIUM HYDROXIDE, AND DIMETHICONE 30 ML: 200; 20; 200 SUSPENSION ORAL at 08:10

## 2024-10-09 RX ADMIN — DEXTROSE MONOHYDRATE 125 ML: 100 INJECTION, SOLUTION INTRAVENOUS at 03:10

## 2024-10-09 RX ADMIN — PREGABALIN 150 MG: 75 CAPSULE ORAL at 08:10

## 2024-10-09 RX ADMIN — PREGABALIN 150 MG: 75 CAPSULE ORAL at 03:10

## 2024-10-09 RX ADMIN — NITROGLYCERIN 0.4 MG: 0.4 TABLET SUBLINGUAL at 09:10

## 2024-10-09 RX ADMIN — SUCRALFATE ORAL SUSPENSION 1 G: 1 SUSPENSION ORAL at 03:10

## 2024-10-09 RX ADMIN — REGADENOSON 0.4 MG: 0.08 INJECTION, SOLUTION INTRAVENOUS at 02:10

## 2024-10-09 RX ADMIN — RANOLAZINE 500 MG: 500 TABLET, EXTENDED RELEASE ORAL at 08:10

## 2024-10-09 RX ADMIN — PANCRELIPASE 6 CAPSULE: 30000; 6000; 19000 CAPSULE, DELAYED RELEASE PELLETS ORAL at 04:10

## 2024-10-09 RX ADMIN — ESCITALOPRAM OXALATE 20 MG: 10 TABLET ORAL at 09:10

## 2024-10-09 RX ADMIN — NITROGLYCERIN 0.4 MG: 0.4 TABLET SUBLINGUAL at 08:10

## 2024-10-09 RX ADMIN — HEPARIN SODIUM 24 UNITS/KG/HR: 10000 INJECTION, SOLUTION INTRAVENOUS at 09:10

## 2024-10-09 RX ADMIN — OXCARBAZEPINE 600 MG: 150 TABLET, FILM COATED ORAL at 09:10

## 2024-10-09 RX ADMIN — AMITRIPTYLINE HYDROCHLORIDE 50 MG: 25 TABLET, FILM COATED ORAL at 08:10

## 2024-10-09 RX ADMIN — ALUMINUM HYDROXIDE, MAGNESIUM HYDROXIDE, AND DIMETHICONE 30 ML: 200; 20; 200 SUSPENSION ORAL at 03:10

## 2024-10-09 RX ADMIN — HEPARIN SODIUM 21 UNITS/KG/HR: 10000 INJECTION, SOLUTION INTRAVENOUS at 05:10

## 2024-10-09 RX ADMIN — RANOLAZINE 500 MG: 500 TABLET, EXTENDED RELEASE ORAL at 09:10

## 2024-10-09 RX ADMIN — INSULIN ASPART 1 UNITS: 100 INJECTION, SOLUTION INTRAVENOUS; SUBCUTANEOUS at 11:10

## 2024-10-09 RX ADMIN — HYDROMORPHONE HYDROCHLORIDE 4 MG: 2 TABLET ORAL at 07:10

## 2024-10-09 RX ADMIN — OXCARBAZEPINE 600 MG: 150 TABLET, FILM COATED ORAL at 08:10

## 2024-10-09 RX ADMIN — ATORVASTATIN CALCIUM 40 MG: 40 TABLET, FILM COATED ORAL at 09:10

## 2024-10-09 RX ADMIN — POTASSIUM CHLORIDE 20 MEQ: 1500 TABLET, EXTENDED RELEASE ORAL at 08:10

## 2024-10-09 RX ADMIN — HEPARIN SODIUM 27 UNITS/KG/HR: 10000 INJECTION, SOLUTION INTRAVENOUS at 04:10

## 2024-10-09 RX ADMIN — PREGABALIN 150 MG: 75 CAPSULE ORAL at 09:10

## 2024-10-09 RX ADMIN — DEXTROSE MONOHYDRATE 125 ML: 100 INJECTION, SOLUTION INTRAVENOUS at 06:10

## 2024-10-09 RX ADMIN — HYDRALAZINE HYDROCHLORIDE 10 MG: 20 INJECTION INTRAMUSCULAR; INTRAVENOUS at 11:10

## 2024-10-09 RX ADMIN — SUCRALFATE ORAL SUSPENSION 1 G: 1 SUSPENSION ORAL at 08:10

## 2024-10-09 RX ADMIN — LOSARTAN POTASSIUM 100 MG: 25 TABLET, FILM COATED ORAL at 03:10

## 2024-10-09 RX ADMIN — TETROFOSMIN 27.6 MILLICURIE: 1.38 INJECTION, POWDER, LYOPHILIZED, FOR SOLUTION INTRAVENOUS at 02:10

## 2024-10-09 RX ADMIN — ASPIRIN 81 MG CHEWABLE TABLET 81 MG: 81 TABLET CHEWABLE at 09:10

## 2024-10-09 RX ADMIN — POTASSIUM CHLORIDE 20 MEQ: 1500 TABLET, EXTENDED RELEASE ORAL at 09:10

## 2024-10-09 NOTE — PROGRESS NOTES
[facility] Progress Note     Resident Team: [facility] Medicine List 2  Attending: Kayy Dasilva MD  Resident: Reji Holland    Subjective:      Brief HPI:  Bharath Caballero is a 44 y.o. male with PMH of hypertriglyceride induced pancreatitis, familial hypercholesterolemia, coronary artery disease, unstable angina, aortic atherosclerosis, BPH, chronic liver failure, chronic pain syndrome, CICI, hard of hearing, and DM type 2 with long-term insulin use, presented to the ED with chief complaint of right lower quadrant abdominal pain that began 2 weeks ago and now epigastric in nature over the last week.  Associated symptoms include nausea and vomiting.  He reports that the pain worsens after a meal and he immediately has diarrhea.  Reports having 3-4 episodes of diarrhea daily.  No recent antibiotic use.  He states that this pain is not like previous pancreatitis pain.  This feels different per patient.  No other associated symptoms.  He denies hematemesis, melena, hematochezia.  He reports needing to use nitroglycerin tablets for this epigastric pain that radiates into his chest.  He states that he frequently uses nitroglycerin tablets.  He was followed by Cardiology at this facility.  Had a cardiac catheterization done 04/2023, that showed three-vessel coronary artery disease to the LAD, RCA, and distal LAD.  There were discussions about needing CABG, and patient was evaluated by Cardiothoracic surgeon who deemed him not to need surgical intervention due to i FR being greater than 0.89.  No stent placement.  This is being medically managed.  No other associated symptoms.  He denies dysuria, hematuria, myalgias, dizziness, headaches, numbness, weakness, tingling.  No visual disturbance or speech issues.     Interval History:   No acute events overnight.  Patient denies any acute concerns.  He reports 1 episode of diarrhea after eating dinner last night.  No emesis during this admission.  NPO for stress test with Cardiology  "today.        Review of Systems:  Review of Systems   Constitutional:  Negative for chills and fever.   Respiratory:  Negative for shortness of breath.    Cardiovascular:  Positive for chest pain.   Gastrointestinal:  Positive for abdominal pain, diarrhea and vomiting. Negative for blood in stool and constipation.        Objective:     Last 24 Hour Vital Signs:  BP  Min: 67/38  Max: 172/90  Temp  Av.2 °F (36.8 °C)  Min: 97.7 °F (36.5 °C)  Max: 98.7 °F (37.1 °C)  Pulse  Av.7  Min: 65  Max: 78  Resp  Av.3  Min: 16  Max: 20  SpO2  Av.6 %  Min: 94 %  Max: 99 %  Height  Av' 4" (162.6 cm)  Min: 5' 4" (162.6 cm)  Max: 5' 4" (162.6 cm)  Weight  Av.9 kg (213 lb 9.5 oz)  Min: 92.8 kg (204 lb 9.4 oz)  Max: 101 kg (222 lb 9.6 oz)  I/O last 3 completed shifts:  In: 1047.2 [I.V.:47.2; IV Piggyback:1000]  Out: -     Physical Examination:  Physical Exam  Vitals reviewed.   Constitutional:       General: He is not in acute distress.     Appearance: Normal appearance. He is not ill-appearing, toxic-appearing or diaphoretic.   HENT:      Ears:      Comments: Hearing impaired  Cardiovascular:      Rate and Rhythm: Normal rate and regular rhythm.      Pulses: Normal pulses.      Heart sounds: Normal heart sounds. No murmur heard.     No friction rub.   Pulmonary:      Effort: Pulmonary effort is normal. No respiratory distress.      Breath sounds: Normal breath sounds. No stridor. No wheezing or rhonchi.   Abdominal:      General: Bowel sounds are normal.      Palpations: Abdomen is soft.      Tenderness: There is no abdominal tenderness. There is no guarding or rebound.   Musculoskeletal:         General: No swelling or tenderness. Normal range of motion.   Neurological:      General: No focal deficit present.      Mental Status: He is alert and oriented to person, place, and time.      Cranial Nerves: Cranial nerves 2-12 are intact.         Laboratory:  Most Recent Data:  CBC:   Lab Results   Component " "Value Date    WBC 9.64 10/09/2024    HGB 12.5 (L) 10/09/2024    HCT 36.7 (L) 10/09/2024     10/09/2024    MCV 87.6 10/09/2024    RDW 13.2 10/09/2024     WBC Differential:   Recent Labs   Lab 10/07/24  1302 10/08/24  0153 10/09/24  0315   WBC 13.63* 10.79 9.64   HGB 14.1 12.4* 12.5*   HCT 40.5* 35.9* 36.7*    272 315   MCV 86.7 88.2 87.6     BMP:   Lab Results   Component Value Date     10/09/2024    K 3.9 10/09/2024     10/09/2024    CO2 24 10/09/2024    BUN 13.7 10/09/2024    CREATININE 0.99 10/09/2024    CALCIUM 8.6 10/09/2024    MG 2.60 10/09/2024    PHOS 3.2 10/09/2024     LFTs:   Lab Results   Component Value Date    ALBUMIN 3.0 (L) 10/09/2024    BILITOT 0.2 10/09/2024    AST 59 (H) 10/09/2024    ALKPHOS 382 (H) 10/09/2024    ALT 41 10/09/2024     (H) 10/07/2024     Coags:   Lab Results   Component Value Date    INR 1.1 10/07/2024     FLP:   Lab Results   Component Value Date    CHOL 142 10/07/2024    HDL 27 (L) 10/07/2024    TRIG 148 (H) 10/07/2024     DM:   Lab Results   Component Value Date    HGBA1C 9.3 (H) 10/08/2024    HGBA1C 8.2 (H) 06/04/2024    HGBA1C 9.5 (H) 01/16/2024    CREATININE 0.99 10/09/2024     Thyroid:   Lab Results   Component Value Date    TSH 0.555 10/07/2024      Anemia:   Lab Results   Component Value Date    IRON 52 (L) 08/18/2020    TIBC 296 08/18/2020    FERRITIN 190.9 08/18/2020       Lab Results   Component Value Date    ZNWNWOCG28 773 08/13/2020     No results found for: "FOLATE"     Cardiac:   Lab Results   Component Value Date    TROPONINI 1.455 (H) 10/08/2024    .4 (H) 10/07/2024         Microbiology Data:  Microbiology Results (last 7 days)       ** No results found for the last 168 hours. **             Radiology:  Imaging Results              CT Abdomen Pelvis W Wo Contrast (Final result)  Result time 10/07/24 18:27:23      Final result by Paulo Biggs MD (10/07/24 18:27:23)                   Impression:      No acute abdominopelvic " findings.      Electronically signed by: Paulo Biggs  Date:    10/07/2024  Time:    18:27               Narrative:    EXAMINATION:  CT ABDOMEN PELVIS W WO CONTRAST    CLINICAL HISTORY:  Abdominal pain, acute, nonlocalized;    TECHNIQUE:  Helical acquisition through the abdomen and pelvis without and with IV contrast.  Three plane reconstructions were provided for review. DLP 1377 mGycm. Automatic exposure control, adjustment of mA/kV or iterative reconstruction technique was used to reduce radiation.    COMPARISON:  19 October 2023    FINDINGS:  The limited imaged lung bases are clear.  There is gynecomastia    No significant abnormality of the liver.  Possible gallstones.  Slightly smaller splenic hypoattenuating lesion.  No acute findings pancreas.  No significant abnormality adrenals or kidneys.    There is no bowel obstruction.  No significant inflammatory changes of the bowel.  No free air.    Urinary bladder is unremarkable. No free fluid. Aorta normal in caliber.  There is moderate atherosclerotic disease.    There are no acute osseous findings.                                       X-Ray Chest AP Portable (Final result)  Result time 10/07/24 14:28:09      Final result by Paulo Biggs MD (10/07/24 14:28:09)                   Impression:      No acute findings.      Electronically signed by: Paulo Biggs  Date:    10/07/2024  Time:    14:28               Narrative:    EXAMINATION:  XR CHEST AP PORTABLE    CLINICAL HISTORY:  epigastric pain;    COMPARISON:  10 September 2024    FINDINGS:  Frontal view of the chest was obtained. Heart is not significantly enlarged.  The lungs are grossly clear.  There is no pneumothorax.  Dorsal column stimulator leads are again noted.                                      Current Medications:     Infusions:   heparin (porcine) in D5W  0-40 Units/kg/hr (Adjusted) Intravenous Continuous 15.1 mL/hr at 10/09/24 0632 21 Units/kg/hr at 10/09/24 0632        Scheduled:    aluminum-magnesium hydroxide-simethicone  30 mL Oral QID (AC & HS)    aspirin  81 mg Oral Daily    atorvastatin  40 mg Oral Daily    EScitalopram oxalate  20 mg Oral Daily    isosorbide mononitrate  120 mg Oral Daily    lipase-protease-amylase 6,000-19,000-30,000 units  6 capsule Oral TID WM    OXcarbazepine  600 mg Oral BID    perflutren protein-a microsphr  2 mL Intravenous Once    potassium chloride SA  20 mEq Oral BID    pregabalin  150 mg Oral BID    ranolazine  500 mg Oral BID    sodium chloride 0.9%  10 mL Intravenous Q6H    sucralfate  1 g Oral QID (AC & HS)        PRN:    Current Facility-Administered Medications:     amitriptyline, 50 mg, Oral, Nightly PRN    dextrose 10%, 12.5 g, Intravenous, PRN    dextrose 10%, 25 g, Intravenous, PRN    glucagon (human recombinant), 1 mg, Intramuscular, PRN    glucose, 16 g, Oral, PRN    glucose, 24 g, Oral, PRN    heparin (PORCINE), 55.7 Units/kg (Adjusted), Intravenous, PRN    heparin (PORCINE), 30 Units/kg (Adjusted), Intravenous, PRN    hydrALAZINE, 10 mg, Intravenous, Q6H PRN    HYDROmorphone, 4 mg, Oral, Q6H PRN    insulin aspart U-100, 0-5 Units, Subcutaneous, Q6H PRN    naloxone, 0.02 mg, Intravenous, PRN    nitroGLYCERIN, 0.4 mg, Sublingual, Q5 Min PRN    sodium chloride 0.9%, 10 mL, Intravenous, Q12H PRN    Flushing PICC/Midline Protocol, , , Until Discontinued **AND** sodium chloride 0.9%, 10 mL, Intravenous, Q6H **AND** sodium chloride 0.9%, 10 mL, Intravenous, PRN      Assessment & Plan:     NSTEMI  - Troponin - 1.124 >> 1.218 >> 1.582 >> 1.600 >> 1.554  - JAVY of 5;  Heart score of 6  - BNP elevated above baseline, 246  - EKG revealed NSR with new T-wave abnormalities  - Unstable angina at home requiring multiple nitroglycerin tablets  - Loading dose aspirin 325 mg received on admission, continue daily aspirin  - Heparin drip  - Cardiology consulted; stress test today 10/9, started Ranexa 500 b.i.d.  - NPO for possible cardiac intervention  - Sublingual  nitro p.r.n.  - [facility] :  Ostial and mid LAD lesion 70% stenosed, proximal RCA 90% stenosed, mid % stenosis, distal LAD 70% stenosed  - Echo 10/08/2024 revealed EF of 45%.  - Oxygen as needed  - Lipid panel revealed TG of 148, HDL 27, LDL 85      Epigastric & RLQ abdominal pain  Pancreatic insufficiency, hypertriglyceridemia induced  Familial hypercholesterolemia  Elevated alkaline phosphatase  - Requires evolocumab injections every 2 weeks  - Continue home Lipitor 40 mg q.d.  - History of multiple episodes of pancreatitis, lipase negative  - CT abdomen and pelvis with and without contrast with unremarkable findings  - Continuing Creon, formulary has low-dose needing 6 tablets t.i.d.  - Alkaline phosphatase levels chronically elevated; showed up trend in values today 10/9.  Upon chart review, patient was undergoing workup with GI outpatient.  Will continue to monitor levels.  - Patient was scheduled for an abdominal ultrasound outpatient tomorrow.  Ultrasound ordered to be completed inpatient.       Diabetes mellitus type 2  - A1c 10/08/2024 - 9.3  - Home regimen -  35 units of long-acting insulin b.i.d. and 25 units short-acting insulin TIDWM with oral agents.  Hold oral agents.  - Holding insulin as patient is currently NPO and having frequent low glucose readings.  Re-evaluate at a later time.  - SSI  - Due to intermittent episodes of hypoglycemia while inpatient, patient will need to closely monitor his glucose levels upon discharge.       Chronic pain  - On multiple opiates at home. Will hold.  Dilaudid 4 mg p.o. ordered q.6 p.r.n.  - Continue home Lyrica - increased to t.i.d. dosing  - Encouraged ambulation       Hypertension  - Initial blood pressure readings upon hospitalization were elevated.  Patient now with soft blood pressures today 10/8  - Holding all home antihypertensives including clonidine 0.3 mg, carvedilol 25 mg, nifedipine 60 mg b.i.d., and Aldactone 100 mg daily.  Consider restarting  if needed.  - Hydralazine IV 10 mg q.6 p.r.n.         CODE STATUS: Full Code  Access:  Midline  Antibiotics:  None  Diet: NPO  DVT Prophylaxis: Heparin drip per ACS protocol  Fluids: None      Disposition: day 2 of admission for  NSTEMI.  Cardiology consulted; appreciate their assistance.  Holding home blood pressure medications and antihyperglycemic agents due to low readings.  Continue to monitor blood pressure and glucose levels and add on medications as needed.      Jaclyn Mendoza MD  [facility] Resident, [facility]

## 2024-10-09 NOTE — CONSULTS
Cardiology Consult Note     Admitting Team: 2  Attending Physician: Kayy Dasilva MD  Cardiology Attending Physician: Jaswant Pugh MD    Date of Admit: 10/7/2024    Reason for Consult:   Emesis (N/v, diffuse abd pain, bilateral LE edema x2 weeks.)       Subjective:      History of Present Illness:  Brief HPI:  Bharath Caballero is a 44 y.o. male with PMH of hypertriglyceride induced pancreatitis, familial hypercholesterolemia, coronary artery disease, unstable angina, aortic atherosclerosis, BPH, chronic liver failure, chronic pain syndrome, CICI, hard of hearing, and DM type 2 with long-term insulin use, presented to the ED with chief complaint of right lower quadrant abdominal pain that began 2 weeks ago and now epigastric in nature over the last week.  Associated symptoms include nausea and vomiting.  He reports that the pain worsens after a meal and he immediately has diarrhea.  Reports having 3-4 episodes of diarrhea daily.  No recent antibiotic use.  He states that this pain is not like previous pancreatitis pain.  This feels different per patient.  No other associated symptoms.  He denies hematemesis, melena, hematochezia.  He reports needing to use nitroglycerin tablets for this epigastric pain that radiates into his chest.  He states that he frequently uses nitroglycerin tablets.  He was followed by Cardiology at this facility.  Had a cardiac catheterization done 04/2023, that showed three-vessel coronary artery disease to the LAD, RCA, and distal LAD.  There were discussions about needing CABG, and patient was evaluated by Cardiothoracic surgeon who deemed him not to need surgical intervention due to i FR being greater than 0.89.  No stent placement.  This is being medically managed.  No other associated symptoms.  He denies dysuria, hematuria, myalgias, dizziness, headaches, numbness, weakness, tingling.  No visual disturbance or speech issues.     Interval History per primary team:   When patient was  "initially evaluated this morning, he appeared somnolent but arousable; which was different to his baseline yesterday, per nursing staff. At about 10 a.m., patient was re-evaluated during team rounds. He was more awake and answered questions appropriately.  Patient reported that he has been having midsternal chest pain lasting for a few minutes and occurring daily for over a year now.  Pain is worse with exertion but also occurs during rest.  He describes pain as "punching pain".  He also reported right lower quadrant pain that has been occurring spontaneously along with vomiting and diarrhea whenever he eats. He has not had any episodes of vomiting or diarrhea since admission as he has been NPO.  He denies any of the above symptoms at this time.  He does report chronic leg pain.  Echo done today; pending results.    History obtained by patient interview and supplemented by nursing documentation and chart review.     PMHx:   has a past medical history of Acquired perforating dermatosis (8/30/2023), Aortic atherosclerosis (1/24/2023), Bilateral edema of lower extremity (2/6/2023), Bladder outflow obstruction (6/20/2022), Blurred vision, right eye (10/19/2022), BMI 34.0-34.9,adult (6/20/2022), BPH (benign prostatic hyperplasia), Chronic liver failure without hepatic coma (4/25/2023), Chronic pain (10/25/2022), Chronic pain syndrome (5/12/2022), Chronic pancreatitis (10/28/2017), Deafness (10/3/2023), Diabetes, Diabetic polyneuropathy (6/20/2022), Elevated LFTs (8/18/2022), GERD (gastroesophageal reflux disease), Hand paresthesia (6/20/2022), Hepatic steatosis, History of continuous positive airway pressure (CPAP) therapy at home, History of pancreatitis (6/20/2022), Hypertension, Hypertriglyceridemia, Kidney disorder, Leg pain, Mixed hyperlipidemia (10/28/2017), Mononeuritis multiplex (6/20/2022), Morbid obesity, Neuropathy, Nocturia (6/20/2022), OA (osteoarthritis), Obstructive sleep apnea syndrome (6/20/2022), " Onychomycosis of multiple toenails with type 2 diabetes mellitus (10/28/2017), Open wound of finger of right hand (10/20/2023), CICI (obstructive sleep apnea), Painful diabetic neuropathy (5/12/2022), Personal history of colonic polyps (8/18/2022), Polyneuropathy, Primary hypertension (10/28/2017), Recurrent pancreatitis, Renal insufficiency, Tobacco abuse, Tobacco user (6/20/2022), and Type 2 diabetes mellitus with skin complication, with long-term current use of insulin (2/6/2023).   SurgHx:   has a past surgical history that includes Esophagogastroduodenoscopy (N/A, 06/07/2021); INSPECTION OF UPPER INTESTINAL TRACT (N/A, 06/07/2021); UPPER GI (N/A, 06/07/2021); PHERESIS OF PLASMA (N/A, 07/13/2018); Excision of arteriovenous fistula (N/A, 06/01/2018); PHERESIS OF PLASMA (N/A, 05/25/2018); ARTERIOVENOUS ANASTOMOSIS, OPEN, UPPER ARM BASILLIC VEIN TRANSPOSITION (N/A, 05/07/2018); Appendectomy; Hernia repair; Trial of spinal cord nerve stimulator (N/A, 5/12/2022); Spinal cord stimulator removal; ANGIOGRAM, CORONARY, WITH LEFT HEART CATHETERIZATION (N/A, 4/10/2023); fractional flow reserve (ffr), coronary (4/10/2023); and egd, with closed biopsy (N/A, 11/20/2023).   FamHx:  family history includes Obesity in his father.   SocialHx:   reports that he has been smoking cigarettes. He started smoking about 28 years ago. He has a 12.5 pack-year smoking history. He has never used smokeless tobacco. He reports that he does not currently use alcohol. He reports current drug use. Drug: Morphine.  Allergies: No Known Allergies  Home Meds:    Current Outpatient Medications   Medication Instructions    amitriptyline (ELAVIL) 50 mg, Oral, Nightly    aspirin (ECOTRIN) 81 mg, Oral, Daily    atorvastatin (LIPITOR) 40 mg, Oral, Daily    carvediloL (COREG) 25 mg, Oral, 2 times daily with meals    cholecalciferol (vitamin D3) (VITAMIN D3) 2,000 Units, Oral, Daily    clonazePAM (KLONOPIN) 1 mg, Oral, 2 times daily    cloNIDine (CATAPRES)  "0.3 mg, Oral, 3 times daily    CREON 36,000-114,000- 180,000 unit CpDR TAKE ONE CAPSULE THREE TIMES DAILY    EScitalopram oxalate (LEXAPRO) 20 mg, Oral, Daily    esomeprazole (NEXIUM) 40 mg, Oral, Before breakfast    evolocumab (REPATHA SURECLICK) 140 mg, Subcutaneous, Every 14 days    FARXIGA 5 mg Tab tablet TAKE ONE TABLET BY MOUTH EVERY DAY    HUMALOG U-100 INSULIN 100 unit/mL injection INJECT 25 UNITS SUBCUTANEOUSLY BEFORE MEALS THREE TIMES DAILY    HYDROmorphone (DILAUDID) 8 mg, Oral, Every 8 hours PRN    icosapent ethyL (VASCEPA) 2 g, Oral, 2 times daily    isosorbide mononitrate (IMDUR) 120 mg, Oral, Daily    losartan (COZAAR) 100 mg, Oral, Daily    morphine (MS CONTIN) 100 mg, Oral, 3 times daily    NIFEdipine (PROCARDIA-XL) 60 mg, Oral, 2 times daily    nitroGLYCERIN (NITROSTAT) 0.3 mg, Sublingual, Every 5 min PRN    OXcarbazepine (TRILEPTAL) 600 mg, Oral, 2 times daily    potassium chloride SA (K-DUR,KLOR-CON) 20 MEQ tablet 20 mEq, Oral, 2 times daily    pregabalin (LYRICA) 150 mg, Oral, 2 times daily    spironolactone (ALDACTONE) 100 mg, Oral, Daily    sucralfate (CARAFATE) 1 g, Oral, Before meals & nightly    SURE COMFORT INSULIN SYRINGE 0.5 mL 31 gauge x 5/16" Syrg USE ONE SYRINGE THREE TIMES DAILY    tamsulosin (FLOMAX) 0.4 mg Cap TAKE ONE CAPSULE BY MOUTH EVERY DAY    torsemide (DEMADEX) 20 mg, Oral, Daily    TOUJEO SOLOSTAR U-300 INSULIN 300 unit/mL (1.5 mL) InPn pen INJECT 35 SUBCUTANEOUSLY TWICE DAILY    TRADJENTA 5 mg, Oral, Daily       Review of Systems:   Review of Systems   Constitutional:  Negative for chills and fever.   Respiratory:  Negative for shortness of breath.    Cardiovascular:  Positive for chest pain.   Gastrointestinal:  Positive for abdominal pain, diarrhea and vomiting. Negative for blood in stool and constipation.     Objective:   Last 24 Hour Vital Signs:  BP  Min: 67/38  Max: 172/90  Temp  Av.1 °F (36.7 °C)  Min: 97.7 °F (36.5 °C)  Max: 98.7 °F (37.1 °C)  Pulse  Avg: " "69.4  Min: 61  Max: 77  Resp  Av  Min: 16  Max: 20  SpO2  Av.9 %  Min: 95 %  Max: 99 %  Height  Av' 4" (162.6 cm)  Min: 5' 4" (162.6 cm)  Max: 5' 4" (162.6 cm)  Weight  Av.3 kg (212 lb 6.6 oz)  Min: 92.8 kg (204 lb 9.4 oz)  Max: 99.9 kg (220 lb 3.8 oz)  Body mass index is 35.12 kg/m².  I/O last 3 completed shifts:  In: 1047.2 [I.V.:47.2; IV Piggyback:1000]  Out: -     Physical Examination:  Vitals reviewed.   Constitutional:       General: He is not in acute distress.     Appearance: Normal appearance. He is not ill-appearing, toxic-appearing or diaphoretic.   HENT:      Ears:      Comments: Hearing impaired  Cardiovascular:      Rate and Rhythm: Normal rate and regular rhythm.      Pulses: Normal pulses.      Heart sounds: Normal heart sounds. No murmur heard.     No friction rub.   Pulmonary:      Effort: Pulmonary effort is normal. No respiratory distress.      Breath sounds: Normal breath sounds. No stridor. No wheezing or rhonchi.   Abdominal:      General: Bowel sounds are normal.      Palpations: Abdomen is soft.      Tenderness: There is no abdominal tenderness. There is no guarding or rebound.   Musculoskeletal:         General: No swelling or tenderness. Normal range of motion.   Neurological:      General: No focal deficit present.      Mental Status: He is alert and oriented to person, place, and time.      Cranial Nerves: Cranial nerves 2-12 are intact.     Laboratory:  Recent Labs   Lab 10/07/24  1302 10/07/24  1303 10/08/24  0153   WBC 13.63*  --  10.79   HGB 14.1  --  12.4*   HCT 40.5*  --  35.9*     --  272   MCV 86.7  --  88.2   RDW 12.8  --  13.0   NA  --  138 142   K  --  3.4* 3.3*   CL  --  104 108*   CO2  --  26 24   BUN  --  8.3* 9.7   CREATININE  --  1.09 1.04   ALBUMIN  --  3.1* 2.8*   BILITOT  --  0.2 0.2   AST  --  35* 26   ALKPHOS  --  247* 217*   ALT  --  35 30       Cardiac Data:  Recent Labs   Lab 10/07/24  1302 10/07/24  1303 10/08/24  0153 10/08/24  1126 " 10/08/24  1632   TROPONINI  --    < > 1.600* 1.554* 1.455*   .4*  --   --   --   --     < > = values in this interval not displayed.       Other Results:  EKG (my interpretation):     TTE:   Results for orders placed during the hospital encounter of 10/07/24    Echo    Interpretation Summary    Left Ventricle: The left ventricle is normal in size. Moderately increased ventricular mass. Moderately increased wall thickness. There is moderate concentric hypertrophy. Regional wall motion abnormalities present. See diagram for wall motion findings. Hypokinetic basal and mid inferior and inferoseptal walls. There is mildly reduced systolic function. Biplane (2D) method of discs ejection fraction is 45%. There is indeterminate diastolic function.    Right Ventricle: Normal right ventricular cavity size. Systolic function is normal.    Left Atrium: Normal left atrial size.    Right Atrium: Normal right atrial size.    Aortic Valve: The aortic valve is a trileaflet valve. There is no stenosis. There is no significant regurgitation.    Mitral Valve: The mitral valve is structurally normal. There is no stenosis. There is no significant regurgitation.    Tricuspid Valve: There is no significant regurgitation.    Aorta: Aortic root is normal in size measuring 3.2 cm.    IVC/SVC: Elevated venous pressure at 15 mmHg.    Pericardium: There is no pericardial effusion.      Stress Testing:   Results for orders placed during the hospital encounter of 11/28/22    Nuclear Stress - Cardiology Interpreted    Interpretation Summary    Abnormal myocardial perfusion scan.    There is a moderate to severe intensity, moderate to large sized, reversible perfusion abnormality that is consistent with ischemia in the basal to apical lateral apical wall(s) in the typical distribution of the LCX territory.    There are no other significant perfusion abnormalities.    The gated perfusion images showed an ejection fraction of 64% at rest. The  gated perfusion images showed an ejection fraction of 69% post stress.    The EKG portion of this study is negative for ischemia.    The patient reported no chest pain during the stress test.    There were no arrhythmias during stress.    On the nuclear stress test the EF is normal.  If the patient has an echo, the EF on the echo is considered more accurate.    The patient has a moderate to high risk nuclear stress test.    If clinically indicated, consider further evaluation with coronary angiogram.       Coronary Angiogram:   Results for orders placed during the hospital encounter of 04/10/23    Cardiac catheterization    Conclusion    There was three vessel coronary artery disease. There was non-obstructive coronary artery disease..    The Ost LAD to Mid LAD lesion was 70% stenosed.    The Prox RCA lesion was 90% stenosed.    The Mid RCA lesion was 100% stenosed.    The Dist LAD-2 lesion was 50% stenosed.    The Dist LAD-1 lesion was 70% stenosed.    FINDINGS  LVEDP:  18 mmHg  Left Main:  No stenosis  LAD:   Diffusely diseased.  70 ostial-prox lesion, 70% mid-distal lesion and 50% lesion at apex%  Circumflex:   Mild diffuse disease   30%  lesion at the ostium of the first OM  RCA:   Severe, diffuse disease. Small vessel   90% prox-mid lesion   mid vessel  The patient has Significant two vessel disease  Blood loss:  less than 10 cc.  LIMA:  Medium size vessel.  iFR = 0.94 in proximal and mid LAD.  iFR > 0.89 is not significant    RECOMMENDATIONS  Medical management  Risk factor modifications -- start statin, blood pressure control  Activity -- avoid straining with affected limb for one week  Exercise on regular basis.  Precautions post D/C -- come to ER for hematoma, unusual pain, erythema, or unusual drainage at access site  Precautions post D/C -- if develop bleeding or hematoma at access site, hold pressure at access site, and come to ER      Radiology:  CT Abdomen Pelvis W Wo Contrast   Final Result      No  acute abdominopelvic findings.         Electronically signed by: Paulo Biggs   Date:    10/07/2024   Time:    18:27      X-Ray Chest AP Portable   Final Result      No acute findings.         Electronically signed by: Paulo Biggs   Date:    10/07/2024   Time:    14:28          Current Medications   aluminum-magnesium hydroxide-simethicone  30 mL Oral QID (AC & HS)    aspirin  81 mg Oral Daily    atorvastatin  40 mg Oral Daily    EScitalopram oxalate  20 mg Oral Daily    isosorbide mononitrate  120 mg Oral Daily    lipase-protease-amylase 6,000-19,000-30,000 units  6 capsule Oral TID WM    morphine  3 mg Intravenous Once    OXcarbazepine  600 mg Oral BID    perflutren protein-a microsphr  2 mL Intravenous Once    potassium chloride SA  20 mEq Oral BID    pregabalin  150 mg Oral BID    ranolazine  500 mg Oral BID    sucralfate  1 g Oral QID (AC & HS)       heparin (porcine) in D5W  0-40 Units/kg/hr (Adjusted) Intravenous Continuous 12.9 mL/hr at 10/08/24 1642 18 Units/kg/hr at 10/08/24 1642        Current Facility-Administered Medications:     amitriptyline, 50 mg, Oral, Nightly PRN    dextrose 10%, 12.5 g, Intravenous, PRN    dextrose 10%, 25 g, Intravenous, PRN    glucagon (human recombinant), 1 mg, Intramuscular, PRN    glucose, 16 g, Oral, PRN    glucose, 24 g, Oral, PRN    heparin (PORCINE), 55.7 Units/kg (Adjusted), Intravenous, PRN    heparin (PORCINE), 30 Units/kg (Adjusted), Intravenous, PRN    hydrALAZINE, 10 mg, Intravenous, Q6H PRN    HYDROmorphone, 4 mg, Oral, Q6H PRN    insulin aspart U-100, 0-5 Units, Subcutaneous, Q6H PRN    naloxone, 0.02 mg, Intravenous, PRN    nitroGLYCERIN, 0.4 mg, Sublingual, Q5 Min PRN    sodium chloride 0.9%, 10 mL, Intravenous, Q12H PRN     Assessment:     Bharath Caballero is a 44 y.o. male with PMH of hypertriglyceride induced pancreatitis, familial hypercholesterolemia, coronary artery disease, unstable angina, aortic atherosclerosis, BPH, chronic liver failure, chronic pain  syndrome, CICI, hard of hearing, and DM type 2 with long-term insulin use, presented to the ED with chief complaint of right lower quadrant abdominal pain that began 2 weeks ago and now epigastric in nature over the last week. IM was consulted for NSTEMI and cardiology has been consulted for recommendations regarding management for the same.    There are no active hospital problems to display for this patient.       Plan:     NSTEMI  - Troponin - 1.124 >> 1.218 >> 1.582 >> 1.600 >> 1.554  - JAVY of 5;  Heart score of 6  - BNP elevated above baseline, 246  - EKG revealed NSR with new T-wave abnormalities  - Unstable angina at home requiring multiple nitroglycerin tablets  - Loading dose aspirin 325 mg received on admission, continue daily aspirin  - Heparin drip  - NPO for possible cardiac intervention  - Sublingual nitro p.r.n.  - LHC :  Ostial and mid LAD lesion 70% stenosed, proximal RCA 90% stenosed, mid % stenosis, distal LAD 70% stenosed  - Echo 10/08/2024 revealed EF of 45%.  - Oxygen as needed  - Lipid panel revealed TG of 148, HDL 27, LDL 85    Plan per cardiology  - Started Ranexa 500 mg BID  - Stress test ordered for tomorrow to evaluate for any abnormalities other than the one previously known of in RCA, so as to gauge utility of subsequent LHC.  - NPO after midnight    We will continue to follow with you.    Yung Ramsey MD  [facility] Internal Medicine, PGY-1

## 2024-10-09 NOTE — PROCEDURES
"Bharath Caballero is a 44 y.o. male patient.    Temp: 98.3 °F (36.8 °C) (10/08/24 1927)  Pulse: 70 (10/08/24 1927)  Resp: 20 (10/08/24 1944)  BP: (!) 172/90 (10/08/24 1927)  SpO2: 96 % (10/08/24 1927)  Weight: 92.8 kg (204 lb 9.4 oz) (10/08/24 0726)  Height: 5' 4" (162.6 cm) (10/08/24 0726)    PICC  Date/Time: 10/8/2024 9:02 PM  Performed by: Chino Lynch RN  Consent Done: Yes  Time out: Immediately prior to procedure a time out was called to verify the correct patient, procedure, equipment, support staff and site/side marked as required  Indications: med administration and vascular access  Anesthesia: local infiltration  Local anesthetic: lidocaine 1% without epinephrine    Preparation: skin prepped with ChloraPrep  Skin prep agent dried: skin prep agent completely dried prior to procedure  Sterile barriers: all five maximum sterile barriers used - cap, mask, sterile gown, sterile gloves, and large sterile sheet  Hand hygiene: hand hygiene performed prior to central venous catheter insertion  Location details: right basilic  Catheter type: single lumen  Catheter size: 4 Fr  Catheter Length: 17cm    Ultrasound guidance: yes  Vessel Caliber: patent, compressibility normal  Needle advanced into vessel with real time Ultrasound guidance.  Guidewire confirmed in vessel.  Sterile sheath used.  Number of attempts: 1  Post-procedure: blood return through all ports, chlorhexidine patch and sterile dressing applied            Name TRISTAN Lynch RN   10/8/2024    "

## 2024-10-09 NOTE — PROGRESS NOTES
Cardiology Consult Note     Admitting Team: 2  Attending Physician: Kayy Dasilva MD  Cardiology Attending Physician: Jaswant Pugh MD    Date of Admit: 10/7/2024    Reason for Consult:   Emesis (N/v, diffuse abd pain, bilateral LE edema x2 weeks.)       Subjective:      History of Present Illness:  Brief HPI:  Bharath Caballero is a 44 y.o. male with PMH of hypertriglyceride induced pancreatitis, familial hypercholesterolemia, coronary artery disease, unstable angina, aortic atherosclerosis, BPH, chronic liver failure, chronic pain syndrome, CICI, hard of hearing, and DM type 2 with long-term insulin use, presented to the ED with chief complaint of right lower quadrant abdominal pain that began 2 weeks ago and now epigastric in nature over the last week.  Associated symptoms include nausea and vomiting.  He reports that the pain worsens after a meal and he immediately has diarrhea.  Reports having 3-4 episodes of diarrhea daily.  No recent antibiotic use.  He states that this pain is not like previous pancreatitis pain.  This feels different per patient.  No other associated symptoms.  He denies hematemesis, melena, hematochezia.  He reports needing to use nitroglycerin tablets for this epigastric pain that radiates into his chest.  He states that he frequently uses nitroglycerin tablets.  He was followed by Cardiology at this facility.  Had a cardiac catheterization done 04/2023, that showed three-vessel coronary artery disease to the LAD, RCA, and distal LAD.  There were discussions about needing CABG, and patient was evaluated by Cardiothoracic surgeon who deemed him not to need surgical intervention due to i FR being greater than 0.89.  No stent placement.  This is being medically managed.  No other associated symptoms.  He denies dysuria, hematuria, myalgias, dizziness, headaches, numbness, weakness, tingling.  No visual disturbance or speech issues.     Interval History per primary team:   No acute events  overnight. He reports 1 episode of diarrhea after eating dinner last night. No emesis during this admission. Patient underwent stress test today. Reports intermittent chest discomfort.    History obtained by patient interview and supplemented by nursing documentation and chart review.     PMHx:   has a past medical history of Acquired perforating dermatosis (8/30/2023), Aortic atherosclerosis (1/24/2023), Bilateral edema of lower extremity (2/6/2023), Bladder outflow obstruction (6/20/2022), Blurred vision, right eye (10/19/2022), BMI 34.0-34.9,adult (6/20/2022), BPH (benign prostatic hyperplasia), Chronic liver failure without hepatic coma (4/25/2023), Chronic pain (10/25/2022), Chronic pain syndrome (5/12/2022), Chronic pancreatitis (10/28/2017), Deafness (10/3/2023), Diabetes, Diabetic polyneuropathy (6/20/2022), Elevated LFTs (8/18/2022), GERD (gastroesophageal reflux disease), Hand paresthesia (6/20/2022), Hepatic steatosis, History of continuous positive airway pressure (CPAP) therapy at home, History of pancreatitis (6/20/2022), Hypertension, Hypertriglyceridemia, Kidney disorder, Leg pain, Mixed hyperlipidemia (10/28/2017), Mononeuritis multiplex (6/20/2022), Morbid obesity, Neuropathy, Nocturia (6/20/2022), OA (osteoarthritis), Obstructive sleep apnea syndrome (6/20/2022), Onychomycosis of multiple toenails with type 2 diabetes mellitus (10/28/2017), Open wound of finger of right hand (10/20/2023), CICI (obstructive sleep apnea), Painful diabetic neuropathy (5/12/2022), Personal history of colonic polyps (8/18/2022), Polyneuropathy, Primary hypertension (10/28/2017), Recurrent pancreatitis, Renal insufficiency, Tobacco abuse, Tobacco user (6/20/2022), and Type 2 diabetes mellitus with skin complication, with long-term current use of insulin (2/6/2023).   SurgHx:   has a past surgical history that includes Esophagogastroduodenoscopy (N/A, 06/07/2021); INSPECTION OF UPPER INTESTINAL TRACT (N/A, 06/07/2021);  UPPER GI (N/A, 06/07/2021); PHERESIS OF PLASMA (N/A, 07/13/2018); Excision of arteriovenous fistula (N/A, 06/01/2018); PHERESIS OF PLASMA (N/A, 05/25/2018); ARTERIOVENOUS ANASTOMOSIS, OPEN, UPPER ARM BASILLIC VEIN TRANSPOSITION (N/A, 05/07/2018); Appendectomy; Hernia repair; Trial of spinal cord nerve stimulator (N/A, 5/12/2022); Spinal cord stimulator removal; ANGIOGRAM, CORONARY, WITH LEFT HEART CATHETERIZATION (N/A, 4/10/2023); fractional flow reserve (ffr), coronary (4/10/2023); and egd, with closed biopsy (N/A, 11/20/2023).   FamHx:  family history includes Obesity in his father.   SocialHx:   reports that he has been smoking cigarettes. He started smoking about 28 years ago. He has a 12.5 pack-year smoking history. He has never used smokeless tobacco. He reports that he does not currently use alcohol. He reports current drug use. Drug: Morphine.  Allergies: No Known Allergies  Home Meds:    Current Outpatient Medications   Medication Instructions    amitriptyline (ELAVIL) 50 mg, Oral, Nightly    aspirin (ECOTRIN) 81 mg, Oral, Daily    atorvastatin (LIPITOR) 40 mg, Oral, Daily    carvediloL (COREG) 25 mg, Oral, 2 times daily with meals    cholecalciferol (vitamin D3) (VITAMIN D3) 2,000 Units, Oral, Daily    clonazePAM (KLONOPIN) 1 mg, Oral, 2 times daily    cloNIDine (CATAPRES) 0.3 mg, Oral, 3 times daily    CREON 36,000-114,000- 180,000 unit CpDR TAKE ONE CAPSULE THREE TIMES DAILY    EScitalopram oxalate (LEXAPRO) 20 mg, Oral, Daily    esomeprazole (NEXIUM) 40 mg, Oral, Before breakfast    evolocumab (REPATHA SURECLICK) 140 mg, Subcutaneous, Every 14 days    FARXIGA 5 mg Tab tablet TAKE ONE TABLET BY MOUTH EVERY DAY    HUMALOG U-100 INSULIN 100 unit/mL injection INJECT 25 UNITS SUBCUTANEOUSLY BEFORE MEALS THREE TIMES DAILY    HYDROmorphone (DILAUDID) 8 mg, Oral, Every 8 hours PRN    icosapent ethyL (VASCEPA) 2 g, Oral, 2 times daily    isosorbide mononitrate (IMDUR) 120 mg, Oral, Daily    losartan (COZAAR) 100  "mg, Oral, Daily    morphine (MS CONTIN) 100 mg, Oral, 3 times daily    NIFEdipine (PROCARDIA-XL) 60 mg, Oral, 2 times daily    nitroGLYCERIN (NITROSTAT) 0.3 mg, Sublingual, Every 5 min PRN    OXcarbazepine (TRILEPTAL) 600 mg, Oral, 2 times daily    potassium chloride SA (K-DUR,KLOR-CON) 20 MEQ tablet 20 mEq, Oral, 2 times daily    pregabalin (LYRICA) 150 mg, Oral, 2 times daily    spironolactone (ALDACTONE) 100 mg, Oral, Daily    sucralfate (CARAFATE) 1 g, Oral, Before meals & nightly    SURE COMFORT INSULIN SYRINGE 0.5 mL 31 gauge x 5/16" Syrg USE ONE SYRINGE THREE TIMES DAILY    tamsulosin (FLOMAX) 0.4 mg Cap TAKE ONE CAPSULE BY MOUTH EVERY DAY    torsemide (DEMADEX) 20 mg, Oral, Daily    TOUJEO SOLOSTAR U-300 INSULIN 300 unit/mL (1.5 mL) InPn pen INJECT 35 SUBCUTANEOUSLY TWICE DAILY    TRADJENTA 5 mg, Oral, Daily       Review of Systems:   Review of Systems   Constitutional:  Negative for chills and fever.   Respiratory:  Negative for shortness of breath.    Cardiovascular:  Positive for chest pain.   Gastrointestinal:  Positive for abdominal pain, diarrhea and vomiting. Negative for blood in stool and constipation.     Objective:   Last 24 Hour Vital Signs:  BP  Min: 148/60  Max: 193/104  Temp  Av.3 °F (36.8 °C)  Min: 97.9 °F (36.6 °C)  Max: 98.6 °F (37 °C)  Pulse  Av  Min: 70  Max: 83  Resp  Av.3  Min: 14  Max: 20  SpO2  Av.6 %  Min: 94 %  Max: 98 %  Height  Av' 4" (162.6 cm)  Min: 5' 4" (162.6 cm)  Max: 5' 4" (162.6 cm)  Weight  Av kg (222 lb 10.1 oz)  Min: 101 kg (222 lb 9.6 oz)  Max: 101 kg (222 lb 10.6 oz)  Body mass index is 38.22 kg/m².  I/O last 3 completed shifts:  In: 925.2 [I.V.:524.6; IV Piggyback:400.7]  Out: 1 [Urine:1]    Physical Examination:  Vitals reviewed.   Vitals reviewed.   Constitutional:       General: He is not in acute distress.     Appearance: Normal appearance. He is not ill-appearing, toxic-appearing or diaphoretic.   HENT:      Ears:      Comments: " Hearing impaired  Cardiovascular:      Rate and Rhythm: Normal rate and regular rhythm.      Pulses: Normal pulses.      Heart sounds: Normal heart sounds. No murmur heard.     No friction rub.   Pulmonary:      Effort: Pulmonary effort is normal. No respiratory distress.      Breath sounds: Normal breath sounds. No stridor. No wheezing or rhonchi.   Abdominal:      General: Bowel sounds are normal.      Palpations: Abdomen is soft.      Tenderness: There is no abdominal tenderness. There is no guarding or rebound.   Musculoskeletal:         General: No swelling or tenderness. Normal range of motion.   Neurological:      General: No focal deficit present.      Mental Status: He is alert and oriented to person, place, and time.      Cranial Nerves: Cranial nerves 2-12 are intact.     Laboratory:  Recent Labs   Lab 10/07/24  1302 10/07/24  1303 10/07/24  1303 10/08/24  0153 10/09/24  0315 10/09/24  1548   WBC 13.63*  --   --  10.79 9.64  --    HGB 14.1  --   --  12.4* 12.5*  --    HCT 40.5*  --   --  35.9* 36.7*  --      --   --  272 315  --    MCV 86.7  --   --  88.2 87.6  --    RDW 12.8  --   --  13.0 13.2  --    NA  --  138   < > 142 138 137   K  --  3.4*   < > 3.3* 3.9 4.2   CL  --  104   < > 108* 106 107   CO2  --  26   < > 24 24 21*   BUN  --  8.3*   < > 9.7 13.7 9.8   CREATININE  --  1.09   < > 1.04 0.99 0.84   ALBUMIN  --  3.1*  --  2.8* 3.0*  --    BILITOT  --  0.2  --  0.2 0.2  --    AST  --  35*  --  26 59*  --    ALKPHOS  --  247*  --  217* 382*  --    ALT  --  35  --  30 41  --     < > = values in this interval not displayed.       Cardiac Data:  Recent Labs   Lab 10/07/24  1302 10/07/24  1303 10/08/24  0153 10/08/24  1126 10/08/24  1632   TROPONINI  --    < > 1.600* 1.554* 1.455*   .4*  --   --   --   --     < > = values in this interval not displayed.       Other Results:  EKG (my interpretation):     TTE:   Results for orders placed during the hospital encounter of  10/07/24    Echo    Interpretation Summary    Left Ventricle: The left ventricle is normal in size. Moderately increased ventricular mass. Moderately increased wall thickness. There is moderate concentric hypertrophy. Regional wall motion abnormalities present. See diagram for wall motion findings. Hypokinetic basal and mid inferior and inferoseptal walls. There is mildly reduced systolic function. Biplane (2D) method of discs ejection fraction is 45%. There is indeterminate diastolic function.    Right Ventricle: Normal right ventricular cavity size. Systolic function is normal.    Left Atrium: Normal left atrial size.    Right Atrium: Normal right atrial size.    Aortic Valve: The aortic valve is a trileaflet valve. There is no stenosis. There is no significant regurgitation.    Mitral Valve: The mitral valve is structurally normal. There is no stenosis. There is no significant regurgitation.    Tricuspid Valve: There is no significant regurgitation.    Aorta: Aortic root is normal in size measuring 3.2 cm.    IVC/SVC: Elevated venous pressure at 15 mmHg.    Pericardium: There is no pericardial effusion.      Stress Testing:   Results for orders placed during the hospital encounter of 11/28/22    Nuclear Stress - Cardiology Interpreted    Interpretation Summary    Abnormal myocardial perfusion scan.    There is a moderate to severe intensity, moderate to large sized, reversible perfusion abnormality that is consistent with ischemia in the basal to apical lateral apical wall(s) in the typical distribution of the LCX territory.    There are no other significant perfusion abnormalities.    The gated perfusion images showed an ejection fraction of 64% at rest. The gated perfusion images showed an ejection fraction of 69% post stress.    The EKG portion of this study is negative for ischemia.    The patient reported no chest pain during the stress test.    There were no arrhythmias during stress.    On the nuclear  stress test the EF is normal.  If the patient has an echo, the EF on the echo is considered more accurate.    The patient has a moderate to high risk nuclear stress test.    If clinically indicated, consider further evaluation with coronary angiogram.       Coronary Angiogram:   Results for orders placed during the hospital encounter of 04/10/23    Cardiac catheterization    Conclusion    There was three vessel coronary artery disease. There was non-obstructive coronary artery disease..    The Ost LAD to Mid LAD lesion was 70% stenosed.    The Prox RCA lesion was 90% stenosed.    The Mid RCA lesion was 100% stenosed.    The Dist LAD-2 lesion was 50% stenosed.    The Dist LAD-1 lesion was 70% stenosed.    FINDINGS  LVEDP:  18 mmHg  Left Main:  No stenosis  LAD:   Diffusely diseased.  70 ostial-prox lesion, 70% mid-distal lesion and 50% lesion at apex%  Circumflex:   Mild diffuse disease   30%  lesion at the ostium of the first OM  RCA:   Severe, diffuse disease. Small vessel   90% prox-mid lesion   mid vessel  The patient has Significant two vessel disease  Blood loss:  less than 10 cc.  LIMA:  Medium size vessel.  iFR = 0.94 in proximal and mid LAD.  iFR > 0.89 is not significant    RECOMMENDATIONS  Medical management  Risk factor modifications -- start statin, blood pressure control  Activity -- avoid straining with affected limb for one week  Exercise on regular basis.  Precautions post D/C -- come to ER for hematoma, unusual pain, erythema, or unusual drainage at access site  Precautions post D/C -- if develop bleeding or hematoma at access site, hold pressure at access site, and come to ER      Radiology:  CT Abdomen Pelvis W Wo Contrast   Final Result      No acute abdominopelvic findings.         Electronically signed by: Paulo Biggs   Date:    10/07/2024   Time:    18:27      X-Ray Chest AP Portable   Final Result      No acute findings.         Electronically signed by: Paulo Biggs   Date:    10/07/2024    Time:    14:28      US Abdomen Limited    (Results Pending)       Current Medications   aluminum-magnesium hydroxide-simethicone  30 mL Oral QID (AC & HS)    aspirin  81 mg Oral Daily    atorvastatin  40 mg Oral Daily    EScitalopram oxalate  20 mg Oral Daily    isosorbide mononitrate  120 mg Oral Daily    lipase-protease-amylase 6,000-19,000-30,000 units  6 capsule Oral TID WM    losartan  100 mg Oral Daily    OXcarbazepine  600 mg Oral BID    perflutren protein-a microsphr  2 mL Intravenous Once    potassium chloride SA  20 mEq Oral BID    pregabalin  150 mg Oral TID    ranolazine  500 mg Oral BID    sodium chloride 0.9%  10 mL Intravenous Q6H    sucralfate  1 g Oral QID (AC & HS)       heparin (porcine) in D5W  0-40 Units/kg/hr (Adjusted) Intravenous Continuous 19.4 mL/hr at 10/09/24 1636 27 Units/kg/hr at 10/09/24 1636        Current Facility-Administered Medications:     amitriptyline, 50 mg, Oral, Nightly PRN    dextrose 10%, 12.5 g, Intravenous, PRN    dextrose 10%, 25 g, Intravenous, PRN    glucagon (human recombinant), 1 mg, Intramuscular, PRN    glucose, 16 g, Oral, PRN    glucose, 24 g, Oral, PRN    heparin (PORCINE), 55.7 Units/kg (Adjusted), Intravenous, PRN    heparin (PORCINE), 30 Units/kg (Adjusted), Intravenous, PRN    hydrALAZINE, 10 mg, Intravenous, Q6H PRN    HYDROmorphone, 4 mg, Oral, Q6H PRN    insulin aspart U-100, 0-5 Units, Subcutaneous, Q6H PRN    naloxone, 0.02 mg, Intravenous, PRN    nitroGLYCERIN, 0.4 mg, Sublingual, Q5 Min PRN    sodium chloride 0.9%, 10 mL, Intravenous, Q12H PRN    Flushing PICC/Midline Protocol, , , Until Discontinued **AND** sodium chloride 0.9%, 10 mL, Intravenous, Q6H **AND** sodium chloride 0.9%, 10 mL, Intravenous, PRN    sodium chloride 0.9%, 10 mL, Intravenous, PRN     Assessment:     Bharath Caballero is a 44 y.o. male with PMH of hypertriglyceride induced pancreatitis, familial hypercholesterolemia, coronary artery disease, unstable angina, aortic  atherosclerosis, BPH, chronic liver failure, chronic pain syndrome, CICI, hard of hearing, and DM type 2 with long-term insulin use, presented to the ED with chief complaint of right lower quadrant abdominal pain that began 2 weeks ago and now epigastric in nature over the last week. IM was consulted for NSTEMI and cardiology has been consulted for recommendations regarding management for the same.    Active Hospital Problems    Diagnosis    Chest pain    NSTEMI (non-ST elevated myocardial infarction)    Hypokalemia    Elevated troponin I level        Plan:     NSTEMI  - Troponin - 1.124 >> 1.218 >> 1.582 >> 1.600 >> 1.554  - JAVY of 5;  Heart score of 6  - BNP elevated above baseline, 246  - EKG revealed NSR with new T-wave abnormalities  - Unstable angina at home requiring multiple nitroglycerin tablets  - Loading dose aspirin 325 mg received on admission, continue daily aspirin  - Heparin drip  - Sublingual nitro p.r.n.  - LHC :  Ostial and mid LAD lesion 70% stenosed, proximal RCA 90% stenosed, mid % stenosis, distal LAD 70% stenosed  - Echo 10/08/2024 revealed EF of 45%.  - Oxygen as needed  - Lipid panel revealed TG of 148, HDL 27, LDL 85  - Underwent stress test today 10/09 - abnormal myocardial perfusion scan - moderate to severe,medium sized, reversible perfusion abnormality in mid-apical distribution of LAD territory and similar, mostly reversible perfusion abnormality in RCA territory    Plan per cardiology  - Continue Ranexa 500 mg BID  - Based on findings of stress test, need for LHC was discussed with patient to further explore the perfusion abnormality in his LAD. After verbalizing understanding of the benefits and risks of the procedure, patient has agreed to undergoing it. Consent forms will be signed prior to procedure.  - NPO after midnight    We will continue to follow with you.    Yung Ramsey MD  [facility] Internal Medicine, PGY-1

## 2024-10-09 NOTE — H&P (VIEW-ONLY)
Cardiology Consult Note     Admitting Team: 2  Attending Physician: Kayy Dasilva MD  Cardiology Attending Physician: Jaswant Pugh MD    Date of Admit: 10/7/2024    Reason for Consult:   Emesis (N/v, diffuse abd pain, bilateral LE edema x2 weeks.)       Subjective:      History of Present Illness:  Brief HPI:  Bharath Caballero is a 44 y.o. male with PMH of hypertriglyceride induced pancreatitis, familial hypercholesterolemia, coronary artery disease, unstable angina, aortic atherosclerosis, BPH, chronic liver failure, chronic pain syndrome, ICCI, hard of hearing, and DM type 2 with long-term insulin use, presented to the ED with chief complaint of right lower quadrant abdominal pain that began 2 weeks ago and now epigastric in nature over the last week.  Associated symptoms include nausea and vomiting.  He reports that the pain worsens after a meal and he immediately has diarrhea.  Reports having 3-4 episodes of diarrhea daily.  No recent antibiotic use.  He states that this pain is not like previous pancreatitis pain.  This feels different per patient.  No other associated symptoms.  He denies hematemesis, melena, hematochezia.  He reports needing to use nitroglycerin tablets for this epigastric pain that radiates into his chest.  He states that he frequently uses nitroglycerin tablets.  He was followed by Cardiology at this facility.  Had a cardiac catheterization done 04/2023, that showed three-vessel coronary artery disease to the LAD, RCA, and distal LAD.  There were discussions about needing CABG, and patient was evaluated by Cardiothoracic surgeon who deemed him not to need surgical intervention due to i FR being greater than 0.89.  No stent placement.  This is being medically managed.  No other associated symptoms.  He denies dysuria, hematuria, myalgias, dizziness, headaches, numbness, weakness, tingling.  No visual disturbance or speech issues.     Interval History per primary team:   When patient was  "initially evaluated this morning, he appeared somnolent but arousable; which was different to his baseline yesterday, per nursing staff. At about 10 a.m., patient was re-evaluated during team rounds. He was more awake and answered questions appropriately.  Patient reported that he has been having midsternal chest pain lasting for a few minutes and occurring daily for over a year now.  Pain is worse with exertion but also occurs during rest.  He describes pain as "punching pain".  He also reported right lower quadrant pain that has been occurring spontaneously along with vomiting and diarrhea whenever he eats. He has not had any episodes of vomiting or diarrhea since admission as he has been NPO.  He denies any of the above symptoms at this time.  He does report chronic leg pain.  Echo done today; pending results.    History obtained by patient interview and supplemented by nursing documentation and chart review.     PMHx:   has a past medical history of Acquired perforating dermatosis (8/30/2023), Aortic atherosclerosis (1/24/2023), Bilateral edema of lower extremity (2/6/2023), Bladder outflow obstruction (6/20/2022), Blurred vision, right eye (10/19/2022), BMI 34.0-34.9,adult (6/20/2022), BPH (benign prostatic hyperplasia), Chronic liver failure without hepatic coma (4/25/2023), Chronic pain (10/25/2022), Chronic pain syndrome (5/12/2022), Chronic pancreatitis (10/28/2017), Deafness (10/3/2023), Diabetes, Diabetic polyneuropathy (6/20/2022), Elevated LFTs (8/18/2022), GERD (gastroesophageal reflux disease), Hand paresthesia (6/20/2022), Hepatic steatosis, History of continuous positive airway pressure (CPAP) therapy at home, History of pancreatitis (6/20/2022), Hypertension, Hypertriglyceridemia, Kidney disorder, Leg pain, Mixed hyperlipidemia (10/28/2017), Mononeuritis multiplex (6/20/2022), Morbid obesity, Neuropathy, Nocturia (6/20/2022), OA (osteoarthritis), Obstructive sleep apnea syndrome (6/20/2022), " Onychomycosis of multiple toenails with type 2 diabetes mellitus (10/28/2017), Open wound of finger of right hand (10/20/2023), CICI (obstructive sleep apnea), Painful diabetic neuropathy (5/12/2022), Personal history of colonic polyps (8/18/2022), Polyneuropathy, Primary hypertension (10/28/2017), Recurrent pancreatitis, Renal insufficiency, Tobacco abuse, Tobacco user (6/20/2022), and Type 2 diabetes mellitus with skin complication, with long-term current use of insulin (2/6/2023).   SurgHx:   has a past surgical history that includes Esophagogastroduodenoscopy (N/A, 06/07/2021); INSPECTION OF UPPER INTESTINAL TRACT (N/A, 06/07/2021); UPPER GI (N/A, 06/07/2021); PHERESIS OF PLASMA (N/A, 07/13/2018); Excision of arteriovenous fistula (N/A, 06/01/2018); PHERESIS OF PLASMA (N/A, 05/25/2018); ARTERIOVENOUS ANASTOMOSIS, OPEN, UPPER ARM BASILLIC VEIN TRANSPOSITION (N/A, 05/07/2018); Appendectomy; Hernia repair; Trial of spinal cord nerve stimulator (N/A, 5/12/2022); Spinal cord stimulator removal; ANGIOGRAM, CORONARY, WITH LEFT HEART CATHETERIZATION (N/A, 4/10/2023); fractional flow reserve (ffr), coronary (4/10/2023); and egd, with closed biopsy (N/A, 11/20/2023).   FamHx:  family history includes Obesity in his father.   SocialHx:   reports that he has been smoking cigarettes. He started smoking about 28 years ago. He has a 12.5 pack-year smoking history. He has never used smokeless tobacco. He reports that he does not currently use alcohol. He reports current drug use. Drug: Morphine.  Allergies: No Known Allergies  Home Meds:    Current Outpatient Medications   Medication Instructions    amitriptyline (ELAVIL) 50 mg, Oral, Nightly    aspirin (ECOTRIN) 81 mg, Oral, Daily    atorvastatin (LIPITOR) 40 mg, Oral, Daily    carvediloL (COREG) 25 mg, Oral, 2 times daily with meals    cholecalciferol (vitamin D3) (VITAMIN D3) 2,000 Units, Oral, Daily    clonazePAM (KLONOPIN) 1 mg, Oral, 2 times daily    cloNIDine (CATAPRES)  "0.3 mg, Oral, 3 times daily    CREON 36,000-114,000- 180,000 unit CpDR TAKE ONE CAPSULE THREE TIMES DAILY    EScitalopram oxalate (LEXAPRO) 20 mg, Oral, Daily    esomeprazole (NEXIUM) 40 mg, Oral, Before breakfast    evolocumab (REPATHA SURECLICK) 140 mg, Subcutaneous, Every 14 days    FARXIGA 5 mg Tab tablet TAKE ONE TABLET BY MOUTH EVERY DAY    HUMALOG U-100 INSULIN 100 unit/mL injection INJECT 25 UNITS SUBCUTANEOUSLY BEFORE MEALS THREE TIMES DAILY    HYDROmorphone (DILAUDID) 8 mg, Oral, Every 8 hours PRN    icosapent ethyL (VASCEPA) 2 g, Oral, 2 times daily    isosorbide mononitrate (IMDUR) 120 mg, Oral, Daily    losartan (COZAAR) 100 mg, Oral, Daily    morphine (MS CONTIN) 100 mg, Oral, 3 times daily    NIFEdipine (PROCARDIA-XL) 60 mg, Oral, 2 times daily    nitroGLYCERIN (NITROSTAT) 0.3 mg, Sublingual, Every 5 min PRN    OXcarbazepine (TRILEPTAL) 600 mg, Oral, 2 times daily    potassium chloride SA (K-DUR,KLOR-CON) 20 MEQ tablet 20 mEq, Oral, 2 times daily    pregabalin (LYRICA) 150 mg, Oral, 2 times daily    spironolactone (ALDACTONE) 100 mg, Oral, Daily    sucralfate (CARAFATE) 1 g, Oral, Before meals & nightly    SURE COMFORT INSULIN SYRINGE 0.5 mL 31 gauge x 5/16" Syrg USE ONE SYRINGE THREE TIMES DAILY    tamsulosin (FLOMAX) 0.4 mg Cap TAKE ONE CAPSULE BY MOUTH EVERY DAY    torsemide (DEMADEX) 20 mg, Oral, Daily    TOUJEO SOLOSTAR U-300 INSULIN 300 unit/mL (1.5 mL) InPn pen INJECT 35 SUBCUTANEOUSLY TWICE DAILY    TRADJENTA 5 mg, Oral, Daily       Review of Systems:   Review of Systems   Constitutional:  Negative for chills and fever.   Respiratory:  Negative for shortness of breath.    Cardiovascular:  Positive for chest pain.   Gastrointestinal:  Positive for abdominal pain, diarrhea and vomiting. Negative for blood in stool and constipation.     Objective:   Last 24 Hour Vital Signs:  BP  Min: 67/38  Max: 172/90  Temp  Av.1 °F (36.7 °C)  Min: 97.7 °F (36.5 °C)  Max: 98.7 °F (37.1 °C)  Pulse  Avg: " "69.4  Min: 61  Max: 77  Resp  Av  Min: 16  Max: 20  SpO2  Av.9 %  Min: 95 %  Max: 99 %  Height  Av' 4" (162.6 cm)  Min: 5' 4" (162.6 cm)  Max: 5' 4" (162.6 cm)  Weight  Av.3 kg (212 lb 6.6 oz)  Min: 92.8 kg (204 lb 9.4 oz)  Max: 99.9 kg (220 lb 3.8 oz)  Body mass index is 35.12 kg/m².  I/O last 3 completed shifts:  In: 1047.2 [I.V.:47.2; IV Piggyback:1000]  Out: -     Physical Examination:  Vitals reviewed.   Constitutional:       General: He is not in acute distress.     Appearance: Normal appearance. He is not ill-appearing, toxic-appearing or diaphoretic.   HENT:      Ears:      Comments: Hearing impaired  Cardiovascular:      Rate and Rhythm: Normal rate and regular rhythm.      Pulses: Normal pulses.      Heart sounds: Normal heart sounds. No murmur heard.     No friction rub.   Pulmonary:      Effort: Pulmonary effort is normal. No respiratory distress.      Breath sounds: Normal breath sounds. No stridor. No wheezing or rhonchi.   Abdominal:      General: Bowel sounds are normal.      Palpations: Abdomen is soft.      Tenderness: There is no abdominal tenderness. There is no guarding or rebound.   Musculoskeletal:         General: No swelling or tenderness. Normal range of motion.   Neurological:      General: No focal deficit present.      Mental Status: He is alert and oriented to person, place, and time.      Cranial Nerves: Cranial nerves 2-12 are intact.     Laboratory:  Recent Labs   Lab 10/07/24  1302 10/07/24  1303 10/08/24  0153   WBC 13.63*  --  10.79   HGB 14.1  --  12.4*   HCT 40.5*  --  35.9*     --  272   MCV 86.7  --  88.2   RDW 12.8  --  13.0   NA  --  138 142   K  --  3.4* 3.3*   CL  --  104 108*   CO2  --  26 24   BUN  --  8.3* 9.7   CREATININE  --  1.09 1.04   ALBUMIN  --  3.1* 2.8*   BILITOT  --  0.2 0.2   AST  --  35* 26   ALKPHOS  --  247* 217*   ALT  --  35 30       Cardiac Data:  Recent Labs   Lab 10/07/24  1302 10/07/24  1303 10/08/24  0153 10/08/24  1126 " 10/08/24  1632   TROPONINI  --    < > 1.600* 1.554* 1.455*   .4*  --   --   --   --     < > = values in this interval not displayed.       Other Results:  EKG (my interpretation):     TTE:   Results for orders placed during the hospital encounter of 10/07/24    Echo    Interpretation Summary    Left Ventricle: The left ventricle is normal in size. Moderately increased ventricular mass. Moderately increased wall thickness. There is moderate concentric hypertrophy. Regional wall motion abnormalities present. See diagram for wall motion findings. Hypokinetic basal and mid inferior and inferoseptal walls. There is mildly reduced systolic function. Biplane (2D) method of discs ejection fraction is 45%. There is indeterminate diastolic function.    Right Ventricle: Normal right ventricular cavity size. Systolic function is normal.    Left Atrium: Normal left atrial size.    Right Atrium: Normal right atrial size.    Aortic Valve: The aortic valve is a trileaflet valve. There is no stenosis. There is no significant regurgitation.    Mitral Valve: The mitral valve is structurally normal. There is no stenosis. There is no significant regurgitation.    Tricuspid Valve: There is no significant regurgitation.    Aorta: Aortic root is normal in size measuring 3.2 cm.    IVC/SVC: Elevated venous pressure at 15 mmHg.    Pericardium: There is no pericardial effusion.      Stress Testing:   Results for orders placed during the hospital encounter of 11/28/22    Nuclear Stress - Cardiology Interpreted    Interpretation Summary    Abnormal myocardial perfusion scan.    There is a moderate to severe intensity, moderate to large sized, reversible perfusion abnormality that is consistent with ischemia in the basal to apical lateral apical wall(s) in the typical distribution of the LCX territory.    There are no other significant perfusion abnormalities.    The gated perfusion images showed an ejection fraction of 64% at rest. The  gated perfusion images showed an ejection fraction of 69% post stress.    The EKG portion of this study is negative for ischemia.    The patient reported no chest pain during the stress test.    There were no arrhythmias during stress.    On the nuclear stress test the EF is normal.  If the patient has an echo, the EF on the echo is considered more accurate.    The patient has a moderate to high risk nuclear stress test.    If clinically indicated, consider further evaluation with coronary angiogram.       Coronary Angiogram:   Results for orders placed during the hospital encounter of 04/10/23    Cardiac catheterization    Conclusion    There was three vessel coronary artery disease. There was non-obstructive coronary artery disease..    The Ost LAD to Mid LAD lesion was 70% stenosed.    The Prox RCA lesion was 90% stenosed.    The Mid RCA lesion was 100% stenosed.    The Dist LAD-2 lesion was 50% stenosed.    The Dist LAD-1 lesion was 70% stenosed.    FINDINGS  LVEDP:  18 mmHg  Left Main:  No stenosis  LAD:   Diffusely diseased.  70 ostial-prox lesion, 70% mid-distal lesion and 50% lesion at apex%  Circumflex:   Mild diffuse disease   30%  lesion at the ostium of the first OM  RCA:   Severe, diffuse disease. Small vessel   90% prox-mid lesion   mid vessel  The patient has Significant two vessel disease  Blood loss:  less than 10 cc.  LIMA:  Medium size vessel.  iFR = 0.94 in proximal and mid LAD.  iFR > 0.89 is not significant    RECOMMENDATIONS  Medical management  Risk factor modifications -- start statin, blood pressure control  Activity -- avoid straining with affected limb for one week  Exercise on regular basis.  Precautions post D/C -- come to ER for hematoma, unusual pain, erythema, or unusual drainage at access site  Precautions post D/C -- if develop bleeding or hematoma at access site, hold pressure at access site, and come to ER      Radiology:  CT Abdomen Pelvis W Wo Contrast   Final Result      No  acute abdominopelvic findings.         Electronically signed by: Paulo Biggs   Date:    10/07/2024   Time:    18:27      X-Ray Chest AP Portable   Final Result      No acute findings.         Electronically signed by: Paulo Biggs   Date:    10/07/2024   Time:    14:28          Current Medications   aluminum-magnesium hydroxide-simethicone  30 mL Oral QID (AC & HS)    aspirin  81 mg Oral Daily    atorvastatin  40 mg Oral Daily    EScitalopram oxalate  20 mg Oral Daily    isosorbide mononitrate  120 mg Oral Daily    lipase-protease-amylase 6,000-19,000-30,000 units  6 capsule Oral TID WM    morphine  3 mg Intravenous Once    OXcarbazepine  600 mg Oral BID    perflutren protein-a microsphr  2 mL Intravenous Once    potassium chloride SA  20 mEq Oral BID    pregabalin  150 mg Oral BID    ranolazine  500 mg Oral BID    sucralfate  1 g Oral QID (AC & HS)       heparin (porcine) in D5W  0-40 Units/kg/hr (Adjusted) Intravenous Continuous 12.9 mL/hr at 10/08/24 1642 18 Units/kg/hr at 10/08/24 1642        Current Facility-Administered Medications:     amitriptyline, 50 mg, Oral, Nightly PRN    dextrose 10%, 12.5 g, Intravenous, PRN    dextrose 10%, 25 g, Intravenous, PRN    glucagon (human recombinant), 1 mg, Intramuscular, PRN    glucose, 16 g, Oral, PRN    glucose, 24 g, Oral, PRN    heparin (PORCINE), 55.7 Units/kg (Adjusted), Intravenous, PRN    heparin (PORCINE), 30 Units/kg (Adjusted), Intravenous, PRN    hydrALAZINE, 10 mg, Intravenous, Q6H PRN    HYDROmorphone, 4 mg, Oral, Q6H PRN    insulin aspart U-100, 0-5 Units, Subcutaneous, Q6H PRN    naloxone, 0.02 mg, Intravenous, PRN    nitroGLYCERIN, 0.4 mg, Sublingual, Q5 Min PRN    sodium chloride 0.9%, 10 mL, Intravenous, Q12H PRN     Assessment:     Bharath Caballero is a 44 y.o. male with PMH of hypertriglyceride induced pancreatitis, familial hypercholesterolemia, coronary artery disease, unstable angina, aortic atherosclerosis, BPH, chronic liver failure, chronic pain  syndrome, CICI, hard of hearing, and DM type 2 with long-term insulin use, presented to the ED with chief complaint of right lower quadrant abdominal pain that began 2 weeks ago and now epigastric in nature over the last week. IM was consulted for NSTEMI and cardiology has been consulted for recommendations regarding management for the same.    There are no active hospital problems to display for this patient.       Plan:     NSTEMI  - Troponin - 1.124 >> 1.218 >> 1.582 >> 1.600 >> 1.554  - JAVY of 5;  Heart score of 6  - BNP elevated above baseline, 246  - EKG revealed NSR with new T-wave abnormalities  - Unstable angina at home requiring multiple nitroglycerin tablets  - Loading dose aspirin 325 mg received on admission, continue daily aspirin  - Heparin drip  - NPO for possible cardiac intervention  - Sublingual nitro p.r.n.  - LHC :  Ostial and mid LAD lesion 70% stenosed, proximal RCA 90% stenosed, mid % stenosis, distal LAD 70% stenosed  - Echo 10/08/2024 revealed EF of 45%.  - Oxygen as needed  - Lipid panel revealed TG of 148, HDL 27, LDL 85    Plan per cardiology  - Started Ranexa 500 mg BID  - Stress test ordered for tomorrow to evaluate for any abnormalities other than the one previously known of in RCA, so as to gauge utility of subsequent LHC.  - NPO after midnight    We will continue to follow with you.    Yung Ramsey MD  [facility] Internal Medicine, PGY-1

## 2024-10-10 VITALS
SYSTOLIC BLOOD PRESSURE: 173 MMHG | WEIGHT: 216.5 LBS | HEART RATE: 64 BPM | HEIGHT: 64 IN | OXYGEN SATURATION: 97 % | RESPIRATION RATE: 20 BRPM | DIASTOLIC BLOOD PRESSURE: 94 MMHG | BODY MASS INDEX: 36.96 KG/M2 | TEMPERATURE: 98 F

## 2024-10-10 LAB
ALBUMIN SERPL-MCNC: 2.7 G/DL (ref 3.5–5)
ALBUMIN/GLOB SERPL: 0.7 RATIO (ref 1.1–2)
ALP SERPL-CCNC: 335 UNIT/L (ref 40–150)
ALT SERPL-CCNC: 37 UNIT/L (ref 0–55)
ANION GAP SERPL CALC-SCNC: 6 MEQ/L
APTT PPP: 71 SECONDS (ref 23.2–33.7)
APTT PPP: 82 SECONDS (ref 23.2–33.7)
AST SERPL-CCNC: 38 UNIT/L (ref 5–34)
BASOPHILS # BLD AUTO: 0.04 X10(3)/MCL
BASOPHILS NFR BLD AUTO: 0.4 %
BILIRUB SERPL-MCNC: 0.2 MG/DL
BUN SERPL-MCNC: 11.2 MG/DL (ref 8.9–20.6)
CALCIUM SERPL-MCNC: 8.5 MG/DL (ref 8.4–10.2)
CHLORIDE SERPL-SCNC: 106 MMOL/L (ref 98–107)
CO2 SERPL-SCNC: 25 MMOL/L (ref 22–29)
CREAT SERPL-MCNC: 0.91 MG/DL (ref 0.73–1.18)
CREAT/UREA NIT SERPL: 12
EOSINOPHIL # BLD AUTO: 0.32 X10(3)/MCL (ref 0–0.9)
EOSINOPHIL NFR BLD AUTO: 3.2 %
ERYTHROCYTE [DISTWIDTH] IN BLOOD BY AUTOMATED COUNT: 13 % (ref 11.5–17)
FERRITIN SERPL-MCNC: 216.05 NG/ML (ref 21.81–274.66)
GFR SERPLBLD CREATININE-BSD FMLA CKD-EPI: >60 ML/MIN/1.73/M2
GLOBULIN SER-MCNC: 3.8 GM/DL (ref 2.4–3.5)
GLUCOSE SERPL-MCNC: 140 MG/DL (ref 74–100)
HCT VFR BLD AUTO: 35.9 % (ref 42–52)
HGB BLD-MCNC: 12.4 G/DL (ref 14–18)
IMM GRANULOCYTES # BLD AUTO: 0.02 X10(3)/MCL (ref 0–0.04)
IMM GRANULOCYTES NFR BLD AUTO: 0.2 %
IRON SATN MFR SERPL: 47 % (ref 20–50)
IRON SERPL-MCNC: 97 UG/DL (ref 65–175)
LYMPHOCYTES # BLD AUTO: 3.44 X10(3)/MCL (ref 0.6–4.6)
LYMPHOCYTES NFR BLD AUTO: 34.2 %
MAGNESIUM SERPL-MCNC: 2.7 MG/DL (ref 1.6–2.6)
MCH RBC QN AUTO: 30.2 PG (ref 27–31)
MCHC RBC AUTO-ENTMCNC: 34.5 G/DL (ref 33–36)
MCV RBC AUTO: 87.3 FL (ref 80–94)
MONOCYTES # BLD AUTO: 0.95 X10(3)/MCL (ref 0.1–1.3)
MONOCYTES NFR BLD AUTO: 9.4 %
NEUTROPHILS # BLD AUTO: 5.3 X10(3)/MCL (ref 2.1–9.2)
NEUTROPHILS NFR BLD AUTO: 52.6 %
NRBC BLD AUTO-RTO: 0 %
PLATELET # BLD AUTO: 285 X10(3)/MCL (ref 130–400)
PMV BLD AUTO: 9.7 FL (ref 7.4–10.4)
POCT GLUCOSE: 117 MG/DL (ref 70–110)
POCT GLUCOSE: 79 MG/DL (ref 70–110)
POCT GLUCOSE: 98 MG/DL (ref 70–110)
POTASSIUM SERPL-SCNC: 3.8 MMOL/L (ref 3.5–5.1)
PROT SERPL-MCNC: 6.5 GM/DL (ref 6.4–8.3)
RBC # BLD AUTO: 4.11 X10(6)/MCL (ref 4.7–6.1)
RET# (OHS): 0.05 X10E6/UL (ref 0.03–0.1)
RETICULOCYTE COUNT AUTOMATED (OLG): 1.23 % (ref 1.1–2.1)
SODIUM SERPL-SCNC: 137 MMOL/L (ref 136–145)
TIBC SERPL-MCNC: 108 UG/DL (ref 69–240)
TIBC SERPL-MCNC: 205 UG/DL (ref 250–450)
TRANSFERRIN SERPL-MCNC: 172 MG/DL (ref 174–364)
VIT B12 SERPL-MCNC: 886 PG/ML (ref 213–816)
WBC # BLD AUTO: 10.07 X10(3)/MCL (ref 4.5–11.5)

## 2024-10-10 PROCEDURE — 99153 MOD SED SAME PHYS/QHP EA: CPT | Performed by: INTERNAL MEDICINE

## 2024-10-10 PROCEDURE — C1894 INTRO/SHEATH, NON-LASER: HCPCS | Performed by: INTERNAL MEDICINE

## 2024-10-10 PROCEDURE — 36415 COLL VENOUS BLD VENIPUNCTURE: CPT

## 2024-10-10 PROCEDURE — 93799 UNLISTED CV SVC/PROCEDURE: CPT | Mod: LD | Performed by: INTERNAL MEDICINE

## 2024-10-10 PROCEDURE — 25000003 PHARM REV CODE 250

## 2024-10-10 PROCEDURE — C1769 GUIDE WIRE: HCPCS | Performed by: INTERNAL MEDICINE

## 2024-10-10 PROCEDURE — 25000003 PHARM REV CODE 250: Performed by: INTERNAL MEDICINE

## 2024-10-10 PROCEDURE — 80053 COMPREHEN METABOLIC PANEL: CPT

## 2024-10-10 PROCEDURE — 83550 IRON BINDING TEST: CPT

## 2024-10-10 PROCEDURE — 85730 THROMBOPLASTIN TIME PARTIAL: CPT | Performed by: INTERNAL MEDICINE

## 2024-10-10 PROCEDURE — 27201423 OPTIME MED/SURG SUP & DEVICES STERILE SUPPLY: Performed by: INTERNAL MEDICINE

## 2024-10-10 PROCEDURE — A4216 STERILE WATER/SALINE, 10 ML: HCPCS | Performed by: INTERNAL MEDICINE

## 2024-10-10 PROCEDURE — 99152 MOD SED SAME PHYS/QHP 5/>YRS: CPT | Performed by: INTERNAL MEDICINE

## 2024-10-10 PROCEDURE — 4A033BC MEASUREMENT OF ARTERIAL PRESSURE, CORONARY, PERCUTANEOUS APPROACH: ICD-10-PCS | Performed by: INTERNAL MEDICINE

## 2024-10-10 PROCEDURE — 93459 L HRT ART/GRFT ANGIO: CPT | Performed by: INTERNAL MEDICINE

## 2024-10-10 PROCEDURE — 83735 ASSAY OF MAGNESIUM: CPT

## 2024-10-10 PROCEDURE — C1887 CATHETER, GUIDING: HCPCS | Performed by: INTERNAL MEDICINE

## 2024-10-10 PROCEDURE — 25500020 PHARM REV CODE 255: Performed by: INTERNAL MEDICINE

## 2024-10-10 PROCEDURE — 94761 N-INVAS EAR/PLS OXIMETRY MLT: CPT

## 2024-10-10 PROCEDURE — 85045 AUTOMATED RETICULOCYTE COUNT: CPT

## 2024-10-10 PROCEDURE — 83540 ASSAY OF IRON: CPT

## 2024-10-10 PROCEDURE — B2111ZZ FLUOROSCOPY OF MULTIPLE CORONARY ARTERIES USING LOW OSMOLAR CONTRAST: ICD-10-PCS | Performed by: INTERNAL MEDICINE

## 2024-10-10 PROCEDURE — 82728 ASSAY OF FERRITIN: CPT

## 2024-10-10 PROCEDURE — 63600175 PHARM REV CODE 636 W HCPCS: Performed by: INTERNAL MEDICINE

## 2024-10-10 PROCEDURE — 4A023N7 MEASUREMENT OF CARDIAC SAMPLING AND PRESSURE, LEFT HEART, PERCUTANEOUS APPROACH: ICD-10-PCS | Performed by: INTERNAL MEDICINE

## 2024-10-10 PROCEDURE — 85025 COMPLETE CBC W/AUTO DIFF WBC: CPT

## 2024-10-10 PROCEDURE — 82607 VITAMIN B-12: CPT

## 2024-10-10 RX ORDER — ONDANSETRON HYDROCHLORIDE 2 MG/ML
4 INJECTION, SOLUTION INTRAVENOUS EVERY 8 HOURS PRN
Status: DISCONTINUED | OUTPATIENT
Start: 2024-10-10 | End: 2024-10-10 | Stop reason: HOSPADM

## 2024-10-10 RX ORDER — ACETAMINOPHEN 325 MG/1
650 TABLET ORAL EVERY 4 HOURS PRN
Status: DISCONTINUED | OUTPATIENT
Start: 2024-10-10 | End: 2024-10-10 | Stop reason: HOSPADM

## 2024-10-10 RX ORDER — RANOLAZINE 500 MG/1
500 TABLET, EXTENDED RELEASE ORAL 2 TIMES DAILY
Qty: 60 TABLET | Refills: 5 | Status: SHIPPED | OUTPATIENT
Start: 2024-10-10 | End: 2025-04-08

## 2024-10-10 RX ORDER — LIDOCAINE HYDROCHLORIDE 10 MG/ML
INJECTION, SOLUTION INFILTRATION; PERINEURAL
Status: DISCONTINUED | OUTPATIENT
Start: 2024-10-10 | End: 2024-10-10 | Stop reason: HOSPADM

## 2024-10-10 RX ORDER — PREGABALIN 150 MG/1
150 CAPSULE ORAL 3 TIMES DAILY
Qty: 90 CAPSULE | Refills: 6 | Status: SHIPPED | OUTPATIENT
Start: 2024-10-10 | End: 2025-04-10

## 2024-10-10 RX ORDER — FENTANYL CITRATE 50 UG/ML
INJECTION, SOLUTION INTRAMUSCULAR; INTRAVENOUS
Status: DISCONTINUED | OUTPATIENT
Start: 2024-10-10 | End: 2024-10-10 | Stop reason: HOSPADM

## 2024-10-10 RX ORDER — MIDAZOLAM HYDROCHLORIDE 1 MG/ML
INJECTION INTRAMUSCULAR; INTRAVENOUS
Status: DISCONTINUED | OUTPATIENT
Start: 2024-10-10 | End: 2024-10-10 | Stop reason: HOSPADM

## 2024-10-10 RX ORDER — HEPARIN SODIUM 5000 [USP'U]/ML
INJECTION, SOLUTION INTRAVENOUS; SUBCUTANEOUS
Status: DISCONTINUED | OUTPATIENT
Start: 2024-10-10 | End: 2024-10-10 | Stop reason: HOSPADM

## 2024-10-10 RX ORDER — NITROGLYCERIN 20 MG/100ML
INJECTION INTRAVENOUS
Status: DISCONTINUED | OUTPATIENT
Start: 2024-10-10 | End: 2024-10-10 | Stop reason: HOSPADM

## 2024-10-10 RX ORDER — SODIUM CHLORIDE 9 MG/ML
INJECTION, SOLUTION INTRAVENOUS CONTINUOUS
Status: DISCONTINUED | OUTPATIENT
Start: 2024-10-10 | End: 2024-10-10 | Stop reason: HOSPADM

## 2024-10-10 RX ORDER — DIPHENHYDRAMINE HYDROCHLORIDE 50 MG/ML
INJECTION INTRAMUSCULAR; INTRAVENOUS
Status: DISCONTINUED | OUTPATIENT
Start: 2024-10-10 | End: 2024-10-10 | Stop reason: HOSPADM

## 2024-10-10 RX ADMIN — ESCITALOPRAM OXALATE 20 MG: 10 TABLET ORAL at 01:10

## 2024-10-10 RX ADMIN — ATORVASTATIN CALCIUM 40 MG: 40 TABLET, FILM COATED ORAL at 01:10

## 2024-10-10 RX ADMIN — PREGABALIN 150 MG: 75 CAPSULE ORAL at 01:10

## 2024-10-10 RX ADMIN — ISOSORBIDE MONONITRATE 120 MG: 30 TABLET, EXTENDED RELEASE ORAL at 01:10

## 2024-10-10 RX ADMIN — SODIUM CHLORIDE, PRESERVATIVE FREE 10 ML: 5 INJECTION INTRAVENOUS at 06:10

## 2024-10-10 RX ADMIN — SODIUM CHLORIDE: 9 INJECTION, SOLUTION INTRAVENOUS at 01:10

## 2024-10-10 RX ADMIN — ALUMINUM HYDROXIDE, MAGNESIUM HYDROXIDE, AND DIMETHICONE 30 ML: 200; 20; 200 SUSPENSION ORAL at 01:10

## 2024-10-10 RX ADMIN — SODIUM CHLORIDE, PRESERVATIVE FREE 10 ML: 5 INJECTION INTRAVENOUS at 12:10

## 2024-10-10 RX ADMIN — LOSARTAN POTASSIUM 100 MG: 25 TABLET, FILM COATED ORAL at 01:10

## 2024-10-10 RX ADMIN — ASPIRIN 81 MG CHEWABLE TABLET 81 MG: 81 TABLET CHEWABLE at 01:10

## 2024-10-10 NOTE — PROGRESS NOTES
[facility] Progress Note     Resident Team: [facility] Medicine List 2  Attending: Kayy Dasilva MD  Resident: Reji Holland    Subjective:      Brief HPI:  Bharath Caballero is a 44 y.o. male with PMH of hypertriglyceride induced pancreatitis, familial hypercholesterolemia, coronary artery disease, unstable angina, aortic atherosclerosis, BPH, chronic liver failure, chronic pain syndrome, CICI, hard of hearing, and DM type 2 with long-term insulin use, presented to the ED with chief complaint of right lower quadrant abdominal pain that began 2 weeks ago and now epigastric in nature over the last week.  Associated symptoms include nausea and vomiting.  He reports that the pain worsens after a meal and he immediately has diarrhea.  Reports having 3-4 episodes of diarrhea daily.  No recent antibiotic use.  He states that this pain is not like previous pancreatitis pain.  This feels different per patient.  No other associated symptoms.  He denies hematemesis, melena, hematochezia.  He reports needing to use nitroglycerin tablets for this epigastric pain that radiates into his chest.  He states that he frequently uses nitroglycerin tablets.  He was followed by Cardiology at this facility.  Had a cardiac catheterization done 04/2023, that showed three-vessel coronary artery disease to the LAD, RCA, and distal LAD.  There were discussions about needing CABG, and patient was evaluated by Cardiothoracic surgeon who deemed him not to need surgical intervention due to i FR being greater than 0.89.  No stent placement.  This is being medically managed.  No other associated symptoms.  He denies dysuria, hematuria, myalgias, dizziness, headaches, numbness, weakness, tingling.  No visual disturbance or speech issues.     Interval History:   No acute events overnight.  Patient reports doing well.  He reports 2 episodes of diarrhea after dinner last night.  No episodes of emesis. Scheduled for a [facility] today.      Review of Systems:  Review of  "Systems   Constitutional:  Negative for chills and fever.   Respiratory:  Negative for shortness of breath.    Cardiovascular:  Positive for chest pain.   Gastrointestinal:  Positive for abdominal pain, diarrhea and vomiting. Negative for blood in stool and constipation.        Objective:     Last 24 Hour Vital Signs:  BP  Min: 141/100  Max: 193/104  Temp  Av.4 °F (36.9 °C)  Min: 98.2 °F (36.8 °C)  Max: 98.6 °F (37 °C)  Pulse  Av.9  Min: 71  Max: 94  Resp  Av.2  Min: 12  Max: 20  SpO2  Av.5 %  Min: 96 %  Max: 98 %  Height  Av' 4" (162.6 cm)  Min: 5' 4" (162.6 cm)  Max: 5' 4" (162.6 cm)  Weight  Av kg (222 lb 10.1 oz)  Min: 101 kg (222 lb 9.6 oz)  Max: 101 kg (222 lb 10.6 oz)  I/O last 3 completed shifts:  In: 1338 [P.O.:460; I.V.:477.4; IV Piggyback:400.7]  Out: 2196 [Urine:2196]    Physical Examination:  Physical Exam  Vitals reviewed.   Constitutional:       General: He is not in acute distress.     Appearance: Normal appearance. He is not ill-appearing, toxic-appearing or diaphoretic.   HENT:      Ears:      Comments: Hearing impaired  Cardiovascular:      Rate and Rhythm: Normal rate and regular rhythm.      Pulses: Normal pulses.      Heart sounds: Normal heart sounds. No murmur heard.     No friction rub.   Pulmonary:      Effort: Pulmonary effort is normal. No respiratory distress.      Breath sounds: Normal breath sounds. No stridor. No wheezing or rhonchi.   Abdominal:      General: Bowel sounds are normal.      Palpations: Abdomen is soft.      Tenderness: There is no abdominal tenderness. There is no guarding or rebound.   Musculoskeletal:         General: No swelling or tenderness. Normal range of motion.   Neurological:      General: No focal deficit present.      Mental Status: He is alert and oriented to person, place, and time.      Cranial Nerves: Cranial nerves 2-12 are intact.         Laboratory:  Most Recent Data:  CBC:   Lab Results   Component Value Date    WBC " "9.64 10/09/2024    HGB 12.5 (L) 10/09/2024    HCT 36.7 (L) 10/09/2024     10/09/2024    MCV 87.6 10/09/2024    RDW 13.2 10/09/2024     WBC Differential:   Recent Labs   Lab 10/07/24  1302 10/08/24  0153 10/09/24  0315   WBC 13.63* 10.79 9.64   HGB 14.1 12.4* 12.5*   HCT 40.5* 35.9* 36.7*    272 315   MCV 86.7 88.2 87.6     BMP:   Lab Results   Component Value Date     10/09/2024    K 4.2 10/09/2024     10/09/2024    CO2 21 (L) 10/09/2024    BUN 9.8 10/09/2024    CREATININE 0.84 10/09/2024    CALCIUM 9.1 10/09/2024    MG 2.60 10/09/2024    PHOS 3.2 10/09/2024     LFTs:   Lab Results   Component Value Date    ALBUMIN 3.0 (L) 10/09/2024    BILITOT 0.2 10/09/2024    AST 59 (H) 10/09/2024    ALKPHOS 382 (H) 10/09/2024    ALT 41 10/09/2024     (H) 10/07/2024     Coags:   Lab Results   Component Value Date    INR 1.1 10/07/2024     FLP:   Lab Results   Component Value Date    CHOL 142 10/07/2024    HDL 27 (L) 10/07/2024    TRIG 148 (H) 10/07/2024     DM:   Lab Results   Component Value Date    HGBA1C 9.3 (H) 10/08/2024    HGBA1C 8.2 (H) 06/04/2024    HGBA1C 9.5 (H) 01/16/2024    CREATININE 0.84 10/09/2024     Thyroid:   Lab Results   Component Value Date    TSH 0.555 10/07/2024      Anemia:   Lab Results   Component Value Date    IRON 52 (L) 08/18/2020    TIBC 296 08/18/2020    FERRITIN 190.9 08/18/2020       Lab Results   Component Value Date    XHJJEVGA15 773 08/13/2020     No results found for: "FOLATE"     Cardiac:   Lab Results   Component Value Date    TROPONINI 1.455 (H) 10/08/2024    .4 (H) 10/07/2024         Microbiology Data:  Microbiology Results (last 7 days)       ** No results found for the last 168 hours. **             Radiology:  Imaging Results              CT Abdomen Pelvis W Wo Contrast (Final result)  Result time 10/07/24 18:27:23      Final result by Paulo Biggs MD (10/07/24 18:27:23)                   Impression:      No acute abdominopelvic " findings.      Electronically signed by: Paulo Biggs  Date:    10/07/2024  Time:    18:27               Narrative:    EXAMINATION:  CT ABDOMEN PELVIS W WO CONTRAST    CLINICAL HISTORY:  Abdominal pain, acute, nonlocalized;    TECHNIQUE:  Helical acquisition through the abdomen and pelvis without and with IV contrast.  Three plane reconstructions were provided for review. DLP 1377 mGycm. Automatic exposure control, adjustment of mA/kV or iterative reconstruction technique was used to reduce radiation.    COMPARISON:  19 October 2023    FINDINGS:  The limited imaged lung bases are clear.  There is gynecomastia    No significant abnormality of the liver.  Possible gallstones.  Slightly smaller splenic hypoattenuating lesion.  No acute findings pancreas.  No significant abnormality adrenals or kidneys.    There is no bowel obstruction.  No significant inflammatory changes of the bowel.  No free air.    Urinary bladder is unremarkable. No free fluid. Aorta normal in caliber.  There is moderate atherosclerotic disease.    There are no acute osseous findings.                                       X-Ray Chest AP Portable (Final result)  Result time 10/07/24 14:28:09      Final result by Paulo Biggs MD (10/07/24 14:28:09)                   Impression:      No acute findings.      Electronically signed by: Paulo Biggs  Date:    10/07/2024  Time:    14:28               Narrative:    EXAMINATION:  XR CHEST AP PORTABLE    CLINICAL HISTORY:  epigastric pain;    COMPARISON:  10 September 2024    FINDINGS:  Frontal view of the chest was obtained. Heart is not significantly enlarged.  The lungs are grossly clear.  There is no pneumothorax.  Dorsal column stimulator leads are again noted.                                      Current Medications:     Infusions:   heparin (porcine) in D5W  0-40 Units/kg/hr (Adjusted) Intravenous Continuous 19.4 mL/hr at 10/09/24 1636 27 Units/kg/hr at 10/09/24 1636        Scheduled:    aluminum-magnesium hydroxide-simethicone  30 mL Oral QID (AC & HS)    aspirin  81 mg Oral Daily    atorvastatin  40 mg Oral Daily    EScitalopram oxalate  20 mg Oral Daily    isosorbide mononitrate  120 mg Oral Daily    lipase-protease-amylase 6,000-19,000-30,000 units  6 capsule Oral TID WM    losartan  100 mg Oral Daily    OXcarbazepine  600 mg Oral BID    perflutren protein-a microsphr  2 mL Intravenous Once    potassium chloride SA  20 mEq Oral BID    pregabalin  150 mg Oral TID    ranolazine  500 mg Oral BID    sodium chloride 0.9%  10 mL Intravenous Q6H    sucralfate  1 g Oral QID (AC & HS)        PRN:    Current Facility-Administered Medications:     amitriptyline, 50 mg, Oral, Nightly PRN    dextrose 10%, 12.5 g, Intravenous, PRN    dextrose 10%, 25 g, Intravenous, PRN    glucagon (human recombinant), 1 mg, Intramuscular, PRN    glucose, 16 g, Oral, PRN    glucose, 24 g, Oral, PRN    heparin (PORCINE), 55.7 Units/kg (Adjusted), Intravenous, PRN    heparin (PORCINE), 30 Units/kg (Adjusted), Intravenous, PRN    hydrALAZINE, 10 mg, Intravenous, Q6H PRN    HYDROmorphone, 4 mg, Oral, Q6H PRN    insulin aspart U-100, 0-5 Units, Subcutaneous, Q6H PRN    naloxone, 0.02 mg, Intravenous, PRN    nitroGLYCERIN, 0.4 mg, Sublingual, Q5 Min PRN    sodium chloride 0.9%, 10 mL, Intravenous, Q12H PRN    Flushing PICC/Midline Protocol, , , Until Discontinued **AND** sodium chloride 0.9%, 10 mL, Intravenous, Q6H **AND** sodium chloride 0.9%, 10 mL, Intravenous, PRN    sodium chloride 0.9%, 10 mL, Intravenous, PRN      Assessment & Plan:     NSTEMI  - Troponin - 1.124 >> 1.218 >> 1.582 >> 1.600 >> 1.554  - JAVY of 5;  Heart score of 6  - BNP elevated above baseline, 246  - EKG revealed NSR with new T-wave abnormalities  - Unstable angina at home requiring multiple nitroglycerin tablets  - Loading dose aspirin 325 mg received on admission, continue daily aspirin  - Heparin drip  - Cardiology consulted; stress test done 10/9  revealed to significant perfusion abnormalities in the LAD and RCA territory.  - NPO for [facility] today 10/10/2024  - Sublingual nitro p.r.n.  - [facility] :  Ostial and mid LAD lesion 70% stenosed, proximal RCA 90% stenosed, mid % stenosis, distal LAD 70% stenosed  - Echo 10/08/2024 revealed EF of 45%.  - Oxygen as needed  - Lipid panel revealed TG of 148, HDL 27, LDL 85      Epigastric & RLQ abdominal pain  Pancreatic insufficiency, hypertriglyceridemia induced  Familial hypercholesterolemia  Elevated alkaline phosphatase  - Requires evolocumab injections every 2 weeks  - Continue home Lipitor 40 mg q.d.  - History of multiple episodes of pancreatitis, lipase negative  - CT abdomen and pelvis with and without contrast with unremarkable findings  - Continuing Creon, formulary has low-dose needing 6 tablets t.i.d.  - Alkaline phosphatase levels chronically elevated; showed up trend in values today 10/9.  Upon chart review, patient was undergoing workup with GI outpatient.  Will continue to monitor levels.  - Patient was scheduled for an abdominal ultrasound outpatient tomorrow.  Ultrasound ordered to be completed inpatient.       Diabetes mellitus type 2  - A1c 10/08/2024 - 9.3  - Home regimen -  35 units of long-acting insulin b.i.d. and 25 units short-acting insulin TIDWM with oral agents.  Hold oral agents.  - Holding insulin as patient is currently NPO and having frequent low glucose readings.  Re-evaluate at a later time.  - SSI  - Due to intermittent episodes of hypoglycemia while inpatient, patient will need to closely monitor his glucose levels upon discharge.       Chronic pain  - On multiple opiates at home. Will hold.  Dilaudid 4 mg p.o. ordered q.6 p.r.n.  - Continue home Lyrica - increased to t.i.d. dosing  - Encouraged ambulation       Hypertension  - Initial blood pressure readings upon hospitalization were elevated.  Patient now with soft blood pressures today 10/8  - Holding home antihypertensives  including clonidine 0.3 mg, carvedilol 25 mg, nifedipine 60 mg b.i.d., and Aldactone 100 mg daily.  Restarted losartan 100 mg q.d.  - Hydralazine IV 10 mg q.6 p.r.n.         CODE STATUS: Full Code  Access:  Midline  Antibiotics:  None  Diet: NPO  DVT Prophylaxis: Heparin drip per ACS protocol  Fluids: None      Disposition: day 3 of admission for  NSTEMI.  Cardiology consulted; appreciate their assistance.  Holding home blood pressure medications and antihyperglycemic agents due to low readings.  Continue to monitor blood pressure and glucose levels and add on medications as needed.      Jaclyn Mendoza MD  [facility] Resident, hospitals

## 2024-10-10 NOTE — PROGRESS NOTES
Inpatient Nutrition Assessment    Admit Date: 10/7/2024   Total duration of encounter: 3 days   Patient Age: 44 y.o.    Nutrition Recommendation/Prescription     continue diabetic/low fat   Ordered ONS--boost breeze tid; Boost Breeze (provides 250 kcal, 9 g protein per serving)   Pt education on diet/complete  MVI/fe  Biweekly wt  Consider probiotic use--+ diarrhea   Will monitor nutrition status     Communication of Recommendations: reviewed with nurse, reviewed with patient, and reviewed with caregiver    Nutrition Assessment     Malnutrition Assessment/Nutrition-Focused Physical Exam    Malnutrition Context: acute illness or injury (10/08/24 1318)  Malnutrition Level: severe (10/08/24 1318)  Energy Intake (Malnutrition): less than or equal to 50% for greater than or equal to 5 days (10/08/24 1318)  Weight Loss (Malnutrition): greater than 5% in 1 month (10/08/24 1318)     Orbital Region (Subcutaneous Fat Loss): well nourished  Upper Arm Region (Subcutaneous Fat Loss): well nourished        Elliott Region (Muscle Loss): well nourished  Clavicle Bone Region (Muscle Loss): well nourished         A minimum of two characteristics is recommended for diagnosis of either severe or non-severe malnutrition.    Chart Review    Reason Seen: continuous nutrition monitoring and follow-up    Malnutrition Screening Tool Results   Have you recently lost weight without trying?: No  Have you been eating poorly because of a decreased appetite?: No   MST Score: 0   Diagnosis:  NSTEMI, abdominal pain, pancreatic insufficiency, elevated TG/Chol, DM, pain, HTN     Relevant Medical History: Elevated TG, pancreatitis, high cholesterol, CAD, angina, aortic atherosclerosis, BPH, liver failure, DM, pain     Scheduled Medications:  aluminum-magnesium hydroxide-simethicone, 30 mL, QID (AC & HS)  aspirin, 81 mg, Daily  atorvastatin, 40 mg, Daily  EScitalopram oxalate, 20 mg, Daily  isosorbide mononitrate, 120 mg, Daily  lipase-protease-amylase  6,000-19,000-30,000 units, 6 capsule, TID WM  losartan, 100 mg, Daily  OXcarbazepine, 600 mg, BID  perflutren protein-a microsphr, 2 mL, Once  potassium chloride SA, 20 mEq, BID  pregabalin, 150 mg, TID  ranolazine, 500 mg, BID  sodium chloride 0.9%, 10 mL, Q6H  sucralfate, 1 g, QID (AC & HS)    Continuous Infusions:  0.9% NaCl  heparin (porcine) in D5W, Last Rate: 27 Units/kg/hr (10/09/24 1636)  nitroGLYCERIN in 5 % dextrose    PRN Medications:  acetaminophen, 650 mg, Q4H PRN  amitriptyline, 50 mg, Nightly PRN  dextrose 10%, 12.5 g, PRN  dextrose 10%, 25 g, PRN  glucagon (human recombinant), 1 mg, PRN  glucose, 16 g, PRN  glucose, 24 g, PRN  heparin (PORCINE), 55.7 Units/kg (Adjusted), PRN  heparin (PORCINE), 30 Units/kg (Adjusted), PRN  hydrALAZINE, 10 mg, Q6H PRN  HYDROmorphone, 4 mg, Q6H PRN  insulin aspart U-100, 0-5 Units, Q6H PRN  naloxone, 0.02 mg, PRN  nitroGLYCERIN in 5 % dextrose, , Continuous PRN  nitroGLYCERIN, 0.4 mg, Q5 Min PRN  ondansetron, 4 mg, Q8H PRN  sodium chloride 0.9%, 10 mL, Q12H PRN  sodium chloride 0.9%, 10 mL, PRN  sodium chloride 0.9%, 10 mL, PRN    Calorie Containing IV Medications: no significant kcals from medications at this time    Recent Labs   Lab 10/07/24  1302 10/07/24  1303 10/07/24  1313 10/08/24  0153 10/08/24  0709 10/09/24  0315 10/09/24  1548 10/10/24  0525   NA  --  138  --  142  --  138 137 137   K  --  3.4*  --  3.3*  --  3.9 4.2 3.8   CALCIUM  --  9.2  --  8.6  --  8.6 9.1 8.5   PHOS  --   --  3.1 3.7  --  3.2  --   --    MG  --  2.30  --  2.40  --  2.60  --  2.70*   CL  --  104  --  108*  --  106 107 106   CO2  --  26  --  24  --  24 21* 25   BUN  --  8.3*  --  9.7  --  13.7 9.8 11.2   CREATININE  --  1.09  --  1.04  --  0.99 0.84 0.91   EGFRNORACEVR  --  >60  --  >60  --  >60 >60 >60   GLUCOSE  --  340*  --  101*  --  103* 96 140*   BILITOT  --  0.2  --  0.2  --  0.2  --  0.2   ALKPHOS  --  247*  --  217*  --  382*  --  335*   ALT  --  35  --  30  --  41  --  37  "  AST  --  35*  --  26  --  59*  --  38*   ALBUMIN  --  3.1*  --  2.8*  --  3.0*  --  2.7*   TRIG  --   --  148*  --   --   --   --   --    HGBA1C  --   --   --   --  9.3*  --   --   --    LIPASE  --  22  --   --   --   --   --   --    WBC 13.63*  --   --  10.79  --  9.64  --  10.07   HGB 14.1  --   --  12.4*  --  12.5*  --  12.4*   HCT 40.5*  --   --  35.9*  --  36.7*  --  35.9*     Nutrition Orders:  Diet Heart Healthy Diabetic; 2000 Calories (up to 75 gm per meal)  Dietary nutrition supplements TID; Boost Breeze - Any flavor    Appetite/Oral Intake: fair/50-75% of meals  Factors Affecting Nutritional Intake: decreased appetite, diarrhea, nausea, and vomiting  Social Needs Impacting Access to Food: none identified  Food/Hoahaoism/Cultural Preferences: none reported  Food Allergies: none reported  Last Bowel Movement: 10/08/24  Wound(s):  hx foot/leg wounds; none reported at this time    Comments  (10/10) Pt back from test; pt reported he is feeling better; no N/V currently; still diarrhea --on/off; on pancreatic enzymes; suggest use probiotic tx. Pt reported appetite increasing; ordered ONS for added calories/protein. Noted wt elevated to 98.2kg; +2 edema. Labs acknowledged.     (10/8) Pt NPO /pt sitting in bed; family at bedside; reported 2-3 week history of N/V/D/abdominal pain ; sometimes associated with meals; approx 7% recent wt loss. Pt has hx pancreatitis in past; cardiac enzymes elevated; trending. Diet and PPN recs provided to help maintain nutrition status. Labs acknowledged.     Anthropometrics    Height: 5' 4" (162.6 cm), Height Method: Stated  Last Weight: 98.2 kg (216 lb 7.9 oz) (10/10/24 0715), Weight Method: Bed Scale  BMI (Calculated): 37.1  BMI Classification: obese grade II (BMI 35-39.9)        Ideal Body Weight (IBW), Male: 130 lb     % Ideal Body Weight, Male (lb): 171.28 %                 Usual Body Weight (UBW), k.2 kg  % Usual Body Weight: 92.81     Usual Weight Provided By: patient, " family/caregiver, and EMR weight history    Wt Readings from Last 5 Encounters:   10/10/24 98.2 kg (216 lb 7.9 oz)   09/10/24 100.2 kg (220 lb 12.8 oz)   09/03/24 98 kg (216 lb)   08/29/24 97.5 kg (215 lb)   08/05/24 98.9 kg (218 lb)     Weight Change(s) Since Admission: wt fluctuates; currently 7% wt loss past few weeks   Wt Readings from Last 1 Encounters:   10/10/24 0715 98.2 kg (216 lb 7.9 oz)   10/09/24 1410 101 kg (222 lb 10.6 oz)   10/09/24 0600 101 kg (222 lb 9.6 oz)   10/08/24 0726 92.8 kg (204 lb 9.4 oz)   10/08/24 0500 99.9 kg (220 lb 3.8 oz)   10/07/24 1920 99.6 kg (219 lb 9.3 oz)   10/07/24 1058 90.7 kg (200 lb)   Admit Weight: 90.7 kg (200 lb) (10/07/24 1058), Weight Method: Stated    Estimated Needs    Weight Used For Calorie Calculations: 92.8 kg (204 lb 9.4 oz)  Energy Calorie Requirements (kcal): 2041 kcal/d; 22 evon/kg /obese  Energy Need Method: Kcal/kg  Weight Used For Protein Calculations: 59 kg (130 lb 1.1 oz) (based IBW /obese)  Protein Requirements: 95 gm protein/d; 1.6 gm/kg IBW  Fluid Requirements (mL): 2041 ml/d; 1ml/evon  CHO Requirement: 230 gm CHO/d     Enteral Nutrition     Patient not receiving enteral nutrition at this time.    Parenteral Nutrition     Patient not receiving parenteral nutrition support at this time.    Evaluation of Received Nutrient Intake    Calories: not meeting estimated needs; (10-10) appetite increasing   Protein: not meeting estimated needs    Patient Education     Education Provided: diabetic diet and low fat diet  Teaching Method: explanation and printed materials  Comprehension: verbalizes understanding  Barriers to Learning: acuity of illness  Expected Compliance: fair  Comments: All questions were answered and dietitian's contact information was provided.     Nutrition Diagnosis     PES: Inadequate oral intake related to acute illness as evidenced by NPO/ abdominal pain . (active)     PES: Severe acute disease or injury related malnutrition related to  acute illness as evidenced by less than or equal to 50% needs met for greater than or equal to 5 days and greater than 5% weight loss in 1 month. (active)    Nutrition Interventions     Intervention(s): modified composition of meals/snacks, commercial beverage, multivitamin/mineral supplement therapy, purpose of nutrition education, and collaboration with other providers    Goal: Meet greater than 80% of nutritional needs by follow-up. (goal progressing)  Goal: Maintain weight throughout hospitalization. (goal progressing)    Nutrition Goals & Monitoring     Dietitian will monitor: food and beverage intake, weight, food/nutrition knowledge skill, and glucose/endocrine profile  Discharge planning: too early to determine; pending clinical course  Nutrition Risk/Follow-Up: high (follow-up in 1-4 days)   Please consult if re-assessment needed sooner.

## 2024-10-10 NOTE — INTERVAL H&P NOTE
Bharath Caballero was evaluated as an inpatient and scheduled for [facility].  The patient is stable to proceed with the procedure.  No significant change in the patient's status noted.      Jaswant Pugh MD

## 2024-10-10 NOTE — PLAN OF CARE
Problem: Adult Inpatient Plan of Care  Goal: Plan of Care Review  Outcome: Progressing  Goal: Patient-Specific Goal (Individualized)  Outcome: Progressing  Goal: Absence of Hospital-Acquired Illness or Injury  Outcome: Progressing  Goal: Optimal Comfort and Wellbeing  Outcome: Progressing  Goal: Readiness for Transition of Care  Outcome: Progressing     Problem: Diabetes Comorbidity  Goal: Blood Glucose Level Within Targeted Range  Outcome: Progressing     Problem: Wound  Goal: Optimal Coping  Outcome: Progressing  Goal: Optimal Functional Ability  Outcome: Progressing  Goal: Absence of Infection Signs and Symptoms  Outcome: Progressing  Goal: Improved Oral Intake  Outcome: Progressing  Goal: Optimal Pain Control and Function  Outcome: Progressing  Goal: Skin Health and Integrity  Outcome: Progressing  Goal: Optimal Wound Healing  Outcome: Progressing     Problem: Infection  Goal: Absence of Infection Signs and Symptoms  Outcome: Progressing     Problem: Chest Pain  Goal: Resolution of Chest Pain Symptoms  Outcome: Progressing     Problem: Dysrhythmia  Goal: Normalized Cardiac Rhythm  Outcome: Progressing

## 2024-10-10 NOTE — PROGRESS NOTES
Discharge Summary submitted to [facility]

## 2024-10-10 NOTE — PLAN OF CARE
Problem: Adult Inpatient Plan of Care  Goal: Plan of Care Review  Outcome: Met  Goal: Patient-Specific Goal (Individualized)  Outcome: Met  Goal: Absence of Hospital-Acquired Illness or Injury  Outcome: Met  Goal: Optimal Comfort and Wellbeing  Outcome: Met  Goal: Readiness for Transition of Care  Outcome: Met     Problem: Diabetes Comorbidity  Goal: Blood Glucose Level Within Targeted Range  Outcome: Met     Problem: Wound  Goal: Optimal Coping  Outcome: Met  Goal: Optimal Functional Ability  Outcome: Met  Goal: Absence of Infection Signs and Symptoms  Outcome: Met  Goal: Improved Oral Intake  Outcome: Met  Goal: Optimal Pain Control and Function  Outcome: Met  Goal: Skin Health and Integrity  Outcome: Met  Goal: Optimal Wound Healing  Outcome: Met     Problem: Infection  Goal: Absence of Infection Signs and Symptoms  Outcome: Met     Problem: Chest Pain  Goal: Resolution of Chest Pain Symptoms  Outcome: Met     Problem: Dysrhythmia  Goal: Normalized Cardiac Rhythm  Outcome: Met

## 2024-10-10 NOTE — NURSING
Patient alerted nurse station that he was having chest pain and was given PRN nitroglycerin rechecked in 5 minutes and patient stated the chest pain was still present so another PRN nitroglycerin was administered at 2105, rechecked pain at 2110 and patient stated he no longer had chest pain. Patient is alert, oriented and stable at this time.

## 2024-10-10 NOTE — PLAN OF CARE
10/10/24 1453   Medicare Message   Important Message from Medicare regarding Discharge Appeal Rights Given to patient/caregiver;Explained to patient/caregiver;Signed/date by patient/caregiver   Date IMM was signed 10/10/24   Time IMM was signed [id number]

## 2024-10-10 NOTE — DISCHARGE SUMMARY
[facility] Discharge Summary    Admitting Physician: Kayy Dasilva MD  Attending Physician: Kayy Dasilva MD  Date of Admit: 10/7/2024  Date of Discharge: 10/10/2024    Condition: Stable  Outcome: Patient tolerated treatment/procedure well without complication and is now ready for discharge.  DISPOSITION: Home or Self Care      Discharge Diagnoses     Patient Active Problem List   Diagnosis    Chronic pain syndrome    Painful diabetic neuropathy    History of pancreatitis    Abnormal weight loss    Bladder outflow obstruction    Chronic pancreatitis    Onychomycosis of multiple toenails with type 2 diabetes mellitus    Diabetic polyneuropathy    Primary hypertension    Familial hypercholesterolemia    Hand paresthesia    Mixed hyperlipidemia    Left flank pain    Mononeuritis multiplex    Nocturia    Paresis of single lower extremity    Metabolic dysfunction-associated steatotic liver disease (MASLD)    Tobacco user    Elevated troponin I level    Gastroesophageal reflux disease with esophagitis without hemorrhage    Personal history of colonic polyps    Fall    Impaired functional mobility, balance, gait, and endurance    Blurred vision, right eye    Chronic pain    Aortic atherosclerosis    Ulcer of left heel and midfoot, limited to breakdown of skin    Overgrown toenails    Bilateral edema of lower extremity    Type 2 diabetes mellitus with other skin ulcer, with long-term current use of insulin    Major depressive disorder, recurrent severe without psychotic features    Chronic liver failure without hepatic coma    Encounter for diabetic foot exam    Acquired perforating dermatosis    Hearing loss    Open wound of finger of right hand    Burn injury of skin of finger    Class 1 obesity with body mass index (BMI) of 33.0 to 33.9 in adult    Acute right hemiparesis    Hypokalemia    Familial hypertriglyceridemia    Coronary artery disease of native artery of native heart with stable angina pectoris     Full thickness burn of left index finger    Wound of right leg, subsequent encounter    Falls frequently    Skin lesion of neck    Blister of right foot    RUQ abdominal pain    Chest pain    NSTEMI (non-ST elevated myocardial infarction)       Principal Problem:  NSTEMI (non-ST elevated myocardial infarction)    Consultants and Procedures     Consultants:  IP CONSULT TO HOSPITAL MEDICINE  IP CONSULT TO CARDIOLOGY  IP CONSULT TO SOCIAL WORK/CASE MANAGEMENT  IP CONSULT TO MIDLINE TEAM    Procedures:   Procedure(s) (LRB):  Angiogram, Coronary, with Left Heart Cath (N/A)     Brief Admission History      Bharath Caballero is a 44 y.o. male with PMH of hypertriglyceride induced pancreatitis, familial hypercholesterolemia, coronary artery disease, unstable angina, aortic atherosclerosis, BPH, chronic liver failure, chronic pain syndrome, CICI, hard of hearing, and DM type 2 with long-term insulin use, presented to the ED with chief complaint of right lower quadrant abdominal pain that began 2 weeks ago and now epigastric in nature over the last week.  Associated symptoms include nausea and vomiting.  He reports that the pain worsens after a meal and he immediately has diarrhea.  Reports having 3-4 episodes of diarrhea daily.  No recent antibiotic use.  He states that this pain is not like previous pancreatitis pain.  This feels different per patient.  No other associated symptoms.  He denies hematemesis, melena, hematochezia.  He reports needing to use nitroglycerin tablets for this epigastric pain that radiates into his chest.  He states that he frequently uses nitroglycerin tablets.  He was followed by Cardiology at this facility.  Had a cardiac catheterization done 04/2023, that showed three-vessel coronary artery disease to the LAD, RCA, and distal LAD.  There were discussions about needing CABG, and patient was evaluated by Cardiothoracic surgeon who deemed him not to need surgical intervention due to i FR being  greater than 0.89.  No stent placement.  This is being medically managed.  No other associated symptoms.  He denies dysuria, hematuria, myalgias, dizziness, headaches, numbness, weakness, tingling.  No visual disturbance or speech issues.     Hospital Course with Pertinent Findings     During hospitalization, patient had no further episodes of emesis and had less frequent episodes of diarrhea.  He was tolerating diet without any issues.    Upon hospitalization, troponin and EKG was trended.  Troponin showed consistent up trend x 4 draws and EKG showed normal sinus rhythm with new T-wave abnormalities.  Patient was started on heparin drip and received loading dose of aspirin upon admission.  Cardiology was consulted and stress test was done on 10/09/2024.  Patient was also started on Ranexa 500 mg b.i.d. Stress test done revealed significant perfusion abnormalities in the LAD and RCA territory.  Patient underwent left heart catheterization on 10/10/2024.  Official documentation of procedure still pending.  After his procedure, patient insisted on leaving AMA prior to speaking to Cardiology.    Cardiology came by and discussed with patient about [facility] findings and he was told that he would need to follow up with cardio thoracic surgery at [facility] for bypass surgery due to extensive blockages in cardiac vessels.  Stents were not placed due to small vessels, per Cardiology.  Patient was then observed for a few hours after [facility] and cleared for discharge.    At this time, patient is determined to have maximized benefits of inpatient hospitalization.  He is discharged in stable condition with outpatient follow up recommendation and instructions.  All questions answered, patient and verbalized agreement with the plan of care.  They were given return precautions prior to discharge including symptoms that should prompt return to the emergency department or to call PCP.       Discharge physical exam:  Vitals  BP: (!) 173/94  Temp:  "97.7 °F (36.5 °C)  Temp Source: Oral  Pulse: 64  Resp: 20  SpO2: 97 %  Height: 5' 4" (162.6 cm)  Weight: 98.2 kg (216 lb 7.9 oz)    [unfilled]    TIME SPENT ON DISCHARGE: 60 minutes    Discharge Medications        Medication List        START taking these medications      ranolazine 500 MG Tb12  Commonly known as: RANEXA  Take 1 tablet (500 mg total) by mouth 2 (two) times daily.            CHANGE how you take these medications      pregabalin 150 MG capsule  Commonly known as: LYRICA  Take 1 capsule (150 mg total) by mouth 3 (three) times daily.  What changed: when to take this            CONTINUE taking these medications      amitriptyline 50 MG tablet  Commonly known as: ELAVIL  Take 1 tablet (50 mg total) by mouth every evening.     aspirin 81 MG EC tablet  Commonly known as: ECOTRIN     atorvastatin 40 MG tablet  Commonly known as: LIPITOR  Take 1 tablet (40 mg total) by mouth once daily.     carvediloL 25 MG tablet  Commonly known as: COREG  Take 1 tablet (25 mg total) by mouth 2 (two) times daily with meals.     cholecalciferol (vitamin D3) 25 mcg (1,000 unit) capsule  Commonly known as: VITAMIN D3  Take 2 capsules (2,000 Units total) by mouth once daily.     clonazePAM 1 MG tablet  Commonly known as: KlonoPIN  TAKE ONE TABLET BY MOUTH TWICE DAILY     cloNIDine 0.3 MG tablet  Commonly known as: CATAPRES  Take 1 tablet (0.3 mg total) by mouth 3 (three) times daily.     CREON 36,000-114,000- 180,000 unit Cpdr  Generic drug: lipase-protease-amylase  TAKE ONE CAPSULE THREE TIMES DAILY     EScitalopram oxalate 20 MG tablet  Commonly known as: LEXAPRO     esomeprazole 40 MG capsule  Commonly known as: NEXIUM  Take 1 capsule (40 mg total) by mouth before breakfast.     evolocumab 140 mg/mL Pnij  Commonly known as: REPATHA SURECLICK  Inject 1 mL (140 mg total) into the skin every 14 (fourteen) days.     FARXIGA 5 mg Tab tablet  Generic drug: dapagliflozin propanediol  TAKE ONE TABLET BY MOUTH EVERY DAY   " "  HumaLOG U-100 Insulin 100 unit/mL injection  Generic drug: insulin lispro  INJECT 25 UNITS SUBCUTANEOUSLY BEFORE MEALS THREE TIMES DAILY     HYDROmorphone 8 MG tablet  Commonly known as: DILAUDID  Take 1 tablet (8 mg total) by mouth every 8 (eight) hours as needed.     icosapent ethyL 1 gram Cap  Commonly known as: VASCEPA  Take 2 capsules (2 g total) by mouth 2 (two) times daily.     isosorbide mononitrate 120 MG 24 hr tablet  Commonly known as: IMDUR  Take 1 tablet (120 mg total) by mouth once daily.     losartan 100 MG tablet  Commonly known as: COZAAR  Take 1 tablet (100 mg total) by mouth once daily.     morphine 100 MG 12 hr tablet  Commonly known as: MS CONTIN  TAKE ONE TABLET BY MOUTH THREE TIMES DAILY     NIFEdipine 60 MG (OSM) 24 hr tablet  Commonly known as: PROCARDIA-XL  Take 1 tablet (60 mg total) by mouth 2 (two) times a day.     nitroGLYCERIN 0.3 MG SL tablet  Commonly known as: NITROSTAT  Place 1 tablet (0.3 mg total) under the tongue every 5 (five) minutes as needed for Chest pain (go to er after 3 doses).     OXcarbazepine 600 MG Tab  Commonly known as: TRILEPTAL  Take 1 tablet (600 mg total) by mouth 2 (two) times daily.     potassium chloride SA 20 MEQ tablet  Commonly known as: K-DUR,KLOR-CON  TAKE ONE TABLET BY MOUTH TWICE DAILY     spironolactone 100 MG tablet  Commonly known as: ALDACTONE  Take 1 tablet (100 mg total) by mouth once daily.     sucralfate 100 mg/mL suspension  Commonly known as: CARAFATE  Take 10 mLs (1 g total) by mouth 4 (four) times daily before meals and nightly.     SURE COMFORT INSULIN SYRINGE 0.5 mL 31 gauge x 5/16" Syrg  Generic drug: insulin syringe-needle U-100  USE ONE SYRINGE THREE TIMES DAILY     tamsulosin 0.4 mg Cap  Commonly known as: FLOMAX  TAKE ONE CAPSULE BY MOUTH EVERY DAY     torsemide 20 MG Tab  Commonly known as: DEMADEX  Take 1 tablet (20 mg total) by mouth once daily.     TOUJEO SOLOSTAR U-300 INSULIN 300 unit/mL (1.5 mL) Inpn pen  Generic drug: " insulin glargine (TOUJEO)  INJECT 35 SUBCUTANEOUSLY TWICE DAILY     TRADJENTA 5 mg Tab tablet  Generic drug: linaGLIPtin  Take 1 tablet (5 mg total) by mouth once daily.               Where to Get Your Medications        These medications were sent to [address] [address]      Phone: [phone]   pregabalin 150 MG capsule  ranolazine 500 MG Tb12         Discharge Information:     Start Ranexa 500 mg b.i.d.  Follow up with PCP within 1 week  Cardiothoracic surgery referral sent      Jaclyn Mendoza MD  [facility] Resident, [facility]

## 2024-10-11 ENCOUNTER — PATIENT OUTREACH (OUTPATIENT)
Dept: ADMINISTRATIVE | Facility: CLINIC | Age: 44
End: 2024-10-11
Payer: MEDICARE

## 2024-10-11 NOTE — PROGRESS NOTES
C3 nurse attempted to contact Bharath Caballero for a TCC post hospital discharge follow up call. No answer. Left voicemail with callback information. The patient has a scheduled HOSFU appointment with [facility] on 10/16/24 @ 9:30am.

## 2024-10-16 ENCOUNTER — OFFICE VISIT (OUTPATIENT)
Dept: FAMILY MEDICINE | Facility: CLINIC | Age: 44
End: 2024-10-16
Payer: MEDICARE

## 2024-10-16 ENCOUNTER — OFFICE VISIT (OUTPATIENT)
Dept: NEPHROLOGY | Facility: CLINIC | Age: 44
End: 2024-10-16
Payer: MEDICARE

## 2024-10-16 VITALS
BODY MASS INDEX: 37.07 KG/M2 | DIASTOLIC BLOOD PRESSURE: 82 MMHG | HEIGHT: 64 IN | TEMPERATURE: 98 F | SYSTOLIC BLOOD PRESSURE: 168 MMHG | RESPIRATION RATE: 18 BRPM | OXYGEN SATURATION: 100 % | HEART RATE: 71 BPM | WEIGHT: 217.13 LBS

## 2024-10-16 VITALS
HEIGHT: 64 IN | RESPIRATION RATE: 18 BRPM | SYSTOLIC BLOOD PRESSURE: 167 MMHG | WEIGHT: 207 LBS | OXYGEN SATURATION: 97 % | HEART RATE: 74 BPM | DIASTOLIC BLOOD PRESSURE: 74 MMHG | TEMPERATURE: 98 F | BODY MASS INDEX: 35.34 KG/M2

## 2024-10-16 DIAGNOSIS — L57.0 ACTINIC KERATOSIS: Primary | ICD-10-CM

## 2024-10-16 DIAGNOSIS — Z79.4 TYPE 2 DIABETES MELLITUS WITH STAGE 2 CHRONIC KIDNEY DISEASE, WITH LONG-TERM CURRENT USE OF INSULIN: ICD-10-CM

## 2024-10-16 DIAGNOSIS — N18.2 TYPE 2 DIABETES MELLITUS WITH STAGE 2 CHRONIC KIDNEY DISEASE, WITH LONG-TERM CURRENT USE OF INSULIN: ICD-10-CM

## 2024-10-16 DIAGNOSIS — E66.01 SEVERE OBESITY (BMI 35.0-39.9) WITH COMORBIDITY: ICD-10-CM

## 2024-10-16 DIAGNOSIS — Z72.0 TOBACCO ABUSE: ICD-10-CM

## 2024-10-16 DIAGNOSIS — E11.22 TYPE 2 DIABETES MELLITUS WITH STAGE 2 CHRONIC KIDNEY DISEASE, WITH LONG-TERM CURRENT USE OF INSULIN: ICD-10-CM

## 2024-10-16 DIAGNOSIS — N18.2 CKD STAGE G2/A3, GFR 60-89 AND ALBUMIN CREATININE RATIO >300 MG/G: Primary | ICD-10-CM

## 2024-10-16 DIAGNOSIS — I10 PRIMARY HYPERTENSION: ICD-10-CM

## 2024-10-16 PROCEDURE — 99215 OFFICE O/P EST HI 40 MIN: CPT | Mod: PBBFAC | Performed by: NURSE PRACTITIONER

## 2024-10-16 PROCEDURE — 99213 OFFICE O/P EST LOW 20 MIN: CPT | Mod: PBBFAC,27

## 2024-10-16 NOTE — PROGRESS NOTES
Subjective:       Patient ID: Bharath Caballero is a 44 y.o. male.    Chief Complaint: f/u actinic keratosis    HPI  45 yo in minor surgery for follow up of AK. Pt had cryo at the last visit for lesions on his UE and posterior neck and tolerated it well. No new concerning lesions today.     Review of Systems  As per HPI         Objective:      Vitals:    10/16/24 0947   BP: (!) 167/74   Pulse:    Resp:    Temp:        Physical Exam    Gen: alert, no acute distress  Skin: no rough, scaly lesions consistent with AK on forearms, hands, neck, or face at this time  Psych: cooperative, appropriate mood and affect      Assessment/Plan:  Actinic keratosis    - improved after cryo  - sun protection  - will monitor for need for additional cryo for AK at return visit     Follow up in about 2 months (around 12/16/2024).

## 2024-10-16 NOTE — PROGRESS NOTES
[facility]  Nephrology Clinic Note    Chief complaint: Follow-up (RTC, took meds, c/o N/V)    History of present illness:   Bharath Caballero is a 44 y.o. White male with past medical history of insulin-dependent diabetes mellitus type 2, diabetic kidney disease, obesity, familial hypertriglyceridemia, recurrent pancreatitis, hepatic steatosis, hypertension, CICI, hearing loss, recurrent candiduria, polyneuropathy secondary to diabetes, chronic pain (s/p neuro stimulator insertion in July 2022), and tobacco abuse. Hypertriglyceridemia was previously treated with routine plasmapheresis, this was discontinued in August 2018 per patient's request. Patient has undergone multiple AV access creation procedures, including tunneled catheter insertion and removal as well as AV graft creation. Both of his AV grafts are now thrombosed. Patient was managed by hospice in the past, however, was discharged from hospice care due to lack of terminal diagnosis. Presents today for follow-up. C/o leg pain, which is chronic.     Review of Systems  12 point review of systems conducted, negative except as stated in the history of present illness.    Allergies: Patient has No Known Allergies.     Past Medical History:  has a past medical history of Acquired perforating dermatosis, Aortic atherosclerosis, Bilateral edema of lower extremity, Bladder outflow obstruction, Blurred vision, right eye, BMI 34.0-34.9,adult, BPH (benign prostatic hyperplasia), Chronic liver failure without hepatic coma, Chronic pain, Chronic pain syndrome, Chronic pancreatitis, Deafness, Diabetes, Diabetic polyneuropathy, Elevated LFTs, GERD (gastroesophageal reflux disease), Hand paresthesia, Hepatic steatosis, History of continuous positive airway pressure (CPAP) therapy at home, History of pancreatitis, Hypertension, Hypertriglyceridemia, Kidney disorder, Leg pain, Mixed hyperlipidemia, Mononeuritis multiplex, Morbid obesity, Neuropathy,  "Nocturia, OA (osteoarthritis), Obstructive sleep apnea syndrome, Onychomycosis of multiple toenails with type 2 diabetes mellitus, Open wound of finger of right hand, CICI (obstructive sleep apnea), Painful diabetic neuropathy, Personal history of colonic polyps, Polyneuropathy, Primary hypertension, Recurrent pancreatitis, Renal insufficiency, Tobacco abuse, Tobacco user, and Type 2 diabetes mellitus with skin complication, with long-term current use of insulin.    Procedure History:  has a past surgical history that includes Esophagogastroduodenoscopy (N/A, 06/07/2021); INSPECTION OF UPPER INTESTINAL TRACT (N/A, 06/07/2021); UPPER GI (N/A, 06/07/2021); PHERESIS OF PLASMA (N/A, 07/13/2018); Excision of arteriovenous fistula (N/A, 06/01/2018); PHERESIS OF PLASMA (N/A, 05/25/2018); ARTERIOVENOUS ANASTOMOSIS, OPEN, UPPER ARM BASILLIC VEIN TRANSPOSITION (N/A, 05/07/2018); Appendectomy; Hernia repair; Trial of spinal cord nerve stimulator (N/A, 5/12/2022); Spinal cord stimulator removal; ANGIOGRAM, CORONARY, WITH LEFT HEART CATHETERIZATION (N/A, 4/10/2023); fractional flow reserve (ffr), coronary (4/10/2023); egd, with closed biopsy (N/A, 11/20/2023); and ANGIOGRAM, CORONARY, WITH LEFT HEART CATHETERIZATION (N/A, 10/10/2024).    Family History: family history includes Obesity in his father.    Social History:  reports that he has been smoking cigarettes. He started smoking about 28 years ago. He has a 12.5 pack-year smoking history. He has never used smokeless tobacco. He reports that he does not currently use alcohol. He reports current drug use. Drug: Morphine.    Physical exam  BP (!) 168/82 (BP Location: Right arm, Patient Position: Sitting)   Pulse 71   Temp 98.4 °F (36.9 °C) (Oral)   Resp 18   Ht 5' 3.78" (1.62 m)   Wt 98.5 kg (217 lb 2.5 oz)   SpO2 100%   BMI 37.53 kg/m²   General appearance:  Chronically ill-appearing male in no acute distress  HEENT: PERRLA, EOMI, no scleral icterus, no JVD. Neck is " supple.  Chest: Respirations are unlabored. Lungs sounds are clear.   Heart: S1, S2.   Abdomen: Benign.  : Deferred.  Extremities: No edema, peripheral pulses are palpable.  Left upper extremity thrombosed AV grafts  Neuro: No focal deficits.     Home Medications:    Current Outpatient Medications:     amitriptyline (ELAVIL) 50 MG tablet, Take 1 tablet (50 mg total) by mouth every evening., Disp: 30 tablet, Rfl: 4    aspirin (ECOTRIN) 81 MG EC tablet, Take 81 mg by mouth once daily., Disp: , Rfl:     atorvastatin (LIPITOR) 40 MG tablet, Take 1 tablet (40 mg total) by mouth once daily., Disp: 90 tablet, Rfl: 3    carvediloL (COREG) 25 MG tablet, Take 1 tablet (25 mg total) by mouth 2 (two) times daily with meals., Disp: 180 tablet, Rfl: 3    cholecalciferol, vitamin D3, (VITAMIN D3) 25 mcg (1,000 unit) capsule, Take 2 capsules (2,000 Units total) by mouth once daily., Disp: 720 capsule, Rfl: 0    clonazePAM (KLONOPIN) 1 MG tablet, TAKE ONE TABLET BY MOUTH TWICE DAILY, Disp: 60 tablet, Rfl: 0    cloNIDine (CATAPRES) 0.3 MG tablet, Take 1 tablet (0.3 mg total) by mouth 3 (three) times daily., Disp: 270 tablet, Rfl: 3    CREON 36,000-114,000- 180,000 unit CpDR, TAKE ONE CAPSULE THREE TIMES DAILY, Disp: 270 capsule, Rfl: 1    EScitalopram oxalate (LEXAPRO) 20 MG tablet, Take 20 mg by mouth once daily., Disp: , Rfl:     esomeprazole (NEXIUM) 40 MG capsule, Take 1 capsule (40 mg total) by mouth before breakfast., Disp: 30 capsule, Rfl: 11    evolocumab (REPATHA SURECLICK) 140 mg/mL PnIj, Inject 1 mL (140 mg total) into the skin every 14 (fourteen) days., Disp: 2 mL, Rfl: 11    FARXIGA 5 mg Tab tablet, TAKE ONE TABLET BY MOUTH EVERY DAY, Disp: 90 tablet, Rfl: 3    HUMALOG U-100 INSULIN 100 unit/mL injection, INJECT 25 UNITS SUBCUTANEOUSLY BEFORE MEALS THREE TIMES DAILY, Disp: 10 mL, Rfl: 4    HYDROmorphone (DILAUDID) 8 MG tablet, Take 1 tablet (8 mg total) by mouth every 8 (eight) hours as needed., Disp: 90 tablet, Rfl:  "0    icosapent ethyL (VASCEPA) 1 gram Cap, Take 2 capsules (2 g total) by mouth 2 (two) times daily., Disp: 60 capsule, Rfl: 11    isosorbide mononitrate (IMDUR) 120 MG 24 hr tablet, Take 1 tablet (120 mg total) by mouth once daily., Disp: 90 tablet, Rfl: 3    linaGLIPtin (TRADJENTA) 5 mg Tab tablet, Take 1 tablet (5 mg total) by mouth once daily., Disp: 90 tablet, Rfl: 3    losartan (COZAAR) 100 MG tablet, Take 1 tablet (100 mg total) by mouth once daily., Disp: 90 tablet, Rfl: 3    morphine (MS CONTIN) 100 MG 12 hr tablet, TAKE ONE TABLET BY MOUTH THREE TIMES DAILY, Disp: 90 tablet, Rfl: 0    NIFEdipine (PROCARDIA-XL) 60 MG (OSM) 24 hr tablet, Take 1 tablet (60 mg total) by mouth 2 (two) times a day., Disp: 180 tablet, Rfl: 3    nitroGLYCERIN (NITROSTAT) 0.3 MG SL tablet, Place 1 tablet (0.3 mg total) under the tongue every 5 (five) minutes as needed for Chest pain (go to er after 3 doses)., Disp: 30 tablet, Rfl: 6    OXcarbazepine (TRILEPTAL) 600 MG Tab, Take 1 tablet (600 mg total) by mouth 2 (two) times daily., Disp: 60 tablet, Rfl: 11    potassium chloride SA (K-DUR,KLOR-CON) 20 MEQ tablet, TAKE ONE TABLET BY MOUTH TWICE DAILY, Disp: 180 tablet, Rfl: 3    pregabalin (LYRICA) 150 MG capsule, Take 1 capsule (150 mg total) by mouth 3 (three) times daily., Disp: 90 capsule, Rfl: 6    ranolazine (RANEXA) 500 MG Tb12, Take 1 tablet (500 mg total) by mouth 2 (two) times daily., Disp: 60 tablet, Rfl: 5    spironolactone (ALDACTONE) 100 MG tablet, Take 1 tablet (100 mg total) by mouth once daily., Disp: 90 tablet, Rfl: 3    sucralfate (CARAFATE) 100 mg/mL suspension, Take 10 mLs (1 g total) by mouth 4 (four) times daily before meals and nightly., Disp: 1200 mL, Rfl: 3    SURE COMFORT INSULIN SYRINGE 0.5 mL 31 gauge x 5/16" Syrg, USE ONE SYRINGE THREE TIMES DAILY, Disp: 100 each, Rfl: 3    tamsulosin (FLOMAX) 0.4 mg Cap, TAKE ONE CAPSULE BY MOUTH EVERY DAY, Disp: 90 capsule, Rfl: 3    torsemide (DEMADEX) 20 MG Tab, Take " 1 tablet (20 mg total) by mouth once daily., Disp: 90 tablet, Rfl: 3    TOUJEO SOLOSTAR U-300 INSULIN 300 unit/mL (1.5 mL) InPn pen, INJECT 35 SUBCUTANEOUSLY TWICE DAILY, Disp: 4.5 mL, Rfl: 3    Current Facility-Administered Medications:     triamcinolone acetonide 0.1% ointment, , Topical (Top), BID, Malina Brito FNP    Laboratory data    Lab Results   Component Value Date    WBC 10.07 10/10/2024    HGB 12.4 (L) 10/10/2024    HCT 35.9 (L) 10/10/2024     10/10/2024    IRON 97 10/10/2024    TIBC 205 (L) 10/10/2024    LABIRON 47 10/10/2024    FERRITIN 216.05 10/10/2024    FXHDTKAM85 886 (H) 10/10/2024     (H) 02/20/2018     10/10/2024    K 3.8 10/10/2024    CO2 25 10/10/2024    BUN 11.2 10/10/2024    CREATININE 0.91 10/10/2024    EGFRNORACEVR >60 10/10/2024    GLUCOSE 140 (H) 10/10/2024    CALCIUM 8.5 10/10/2024    ALKPHOS 335 (H) 10/10/2024    LABPROT 6.5 10/10/2024    ALBUMIN 2.7 (L) 10/10/2024    BILIDIR 0.1 10/07/2024    IBILI 0.20 12/20/2021    AST 38 (H) 10/10/2024    ALT 37 10/10/2024    MG 2.70 (H) 10/10/2024    PHOS 3.2 10/09/2024      Lab Results   Component Value Date    HGBA1C 9.3 (H) 10/08/2024    PTH 83.7 (H) 04/11/2019    NDRFYEXF39DR 16 (L) 06/04/2024    HIV Nonreactive 05/24/2023    HEPCAB Nonreactive 08/13/2020    HEPBSAB Nonreactive 09/27/2017     Urine:  Lab Results   Component Value Date    APPEARANCEUA Clear 10/07/2024    SGUA 1.028 10/07/2024    PROTEINUA 1+ (A) 10/07/2024    KETONESUA Negative 10/07/2024    LEUKOCYTESUR Negative 10/07/2024    RBCUA 0-5 10/07/2024    WBCUA 0-5 10/07/2024    BACTERIA None Seen 10/07/2024    SQEPUA None Seen 10/07/2024    HYALINECASTS None Seen 10/07/2024    CREATRANDUR 69.1 02/27/2024    PROTEINURINE 197.9 01/16/2024    UPROTCREA 2.9 01/16/2024         Imaging  CT Abdomen Pelvis W Wo Contrast 10/07/2024    The limited imaged lung bases are clear.  There is gynecomastia  No significant abnormality of the liver.  Possible gallstones.   Slightly smaller splenic hypoattenuating lesion.  No acute findings pancreas.  No significant abnormality adrenals or kidneys.  There is no bowel obstruction.  No significant inflammatory changes of the bowel.  No free air.  Urinary bladder is unremarkable. No free fluid. Aorta normal in caliber.  There is moderate atherosclerotic disease.  There are no acute osseous findings.    Impression  No acute abdominopelvic findings.  Electronically signed by: Paulo Biggs  Date:    10/07/2024  Time:    18:27       Impression    ICD-10-CM ICD-9-CM   1. CKD stage G2/A3, GFR 60-89 and albumin creatinine ratio >300 mg/g  N18.2 585.2   2. Primary hypertension  I10 401.9   3. Type 2 diabetes mellitus with stage 2 chronic kidney disease, with long-term current use of insulin  E11.22 250.40    N18.2 585.2    Z79.4 V58.67   4. Severe obesity (BMI 35.0-39.9) with comorbidity  E66.01 278.01   5. Tobacco abuse  Z72.0 305.1        Plan  CKD stage G2/A3, GFR 60-89 and albumin creatinine ratio >300 mg/g  Diabetic kidney disease.  Continue SGLT2 inhibitor, ARB, and good glycemic control.    Continue renal sparing activities:  -2 g a day dietary sodium restriction  -optimize glycemic control (goal A1c is less than 7%)  -control high blood pressure (goal blood pressure is less than 130/80, patient was advised to check blood pressure once or twice a week and bring blood pressure logs to next office visit)  -exercise at least 30 minutes a day, 5 days a week  -maintain healthy weight  -decrease or stop alcohol use  -do not smoke  -stay well hydrated (drink water only, avoid juices, sweet tea, and sodas)  -ask about staying up-to-date on vaccinations (flu vaccine, pneumonia vaccine, hepatitis B vaccine)  -avoid excessive use of NSAIDs (ibuprofen, naproxen, Aleve, Advil, Toradol, Mobic), take Tylenol as needed for headache or mild pain  -take cholesterol lowering medications if prescribed (LDL goal less than 100)    Primary hypertension  Continue  current antihypertensive regimen and 2 g a day dietary sodium restriction.      Type 2 diabetes mellitus with stage 2 chronic kidney disease, with long-term current use of insulin  Importance of good glycemic control discussed.  Continue SGLT2 inhibitor and ARB.       Severe obesity (BMI 35.0-39.9) with comorbidity  Lifestyle and dietary interventions discussed, patient encouraged to maintain non-sedentary lifestyle and well-balanced diet.     Tobacco abuse  Patient was counseled on importance of tobacco use cessation.  Strongly encouraged to quit, offered a referral to a smoking cessation program.      Follow up in about 6 months (around 4/16/2025).     No orders of the defined types were placed in this encounter.            Kendal Grande NP  [facility]

## 2024-10-16 NOTE — PHYSICIAN QUERY
Please clarify the nutritional diagnosis associated with the clinical findings (include all that apply):    Severe protein calorie malnutrition

## 2024-10-17 ENCOUNTER — DOCUMENT SCAN (OUTPATIENT)
Dept: HOME HEALTH SERVICES | Facility: HOSPITAL | Age: 44
End: 2024-10-17
Payer: MEDICARE

## 2024-10-22 ENCOUNTER — OFFICE VISIT (OUTPATIENT)
Dept: CARDIAC SURGERY | Facility: CLINIC | Age: 44
End: 2024-10-22
Payer: MEDICARE

## 2024-10-22 VITALS
HEIGHT: 63 IN | DIASTOLIC BLOOD PRESSURE: 98 MMHG | OXYGEN SATURATION: 99 % | WEIGHT: 212 LBS | HEART RATE: 73 BPM | SYSTOLIC BLOOD PRESSURE: 198 MMHG | BODY MASS INDEX: 37.56 KG/M2

## 2024-10-22 DIAGNOSIS — I25.10 CORONARY ARTERY DISEASE, UNSPECIFIED VESSEL OR LESION TYPE, UNSPECIFIED WHETHER ANGINA PRESENT, UNSPECIFIED WHETHER NATIVE OR TRANSPLANTED HEART: ICD-10-CM

## 2024-10-22 DIAGNOSIS — E08.42 DIABETIC POLYNEUROPATHY ASSOCIATED WITH DIABETES MELLITUS DUE TO UNDERLYING CONDITION: ICD-10-CM

## 2024-10-22 DIAGNOSIS — I25.10 CORONARY ARTERY DISEASE, UNSPECIFIED VESSEL OR LESION TYPE, UNSPECIFIED WHETHER ANGINA PRESENT, UNSPECIFIED WHETHER NATIVE OR TRANSPLANTED HEART: Primary | ICD-10-CM

## 2024-10-22 DIAGNOSIS — G89.4 CHRONIC PAIN SYNDROME: ICD-10-CM

## 2024-10-22 DIAGNOSIS — E78.2 MIXED HYPERLIPIDEMIA: Primary | ICD-10-CM

## 2024-10-22 DIAGNOSIS — Z79.01 ADMISSION FOR LONG-TERM (CURRENT) USE OF ANTICOAGULANTS: ICD-10-CM

## 2024-10-22 DIAGNOSIS — Z51.81 ADMISSION FOR LONG-TERM (CURRENT) USE OF ANTICOAGULANTS: ICD-10-CM

## 2024-10-22 PROCEDURE — 99214 OFFICE O/P EST MOD 30 MIN: CPT | Mod: ,,, | Performed by: STUDENT IN AN ORGANIZED HEALTH CARE EDUCATION/TRAINING PROGRAM

## 2024-10-22 RX ORDER — MORPHINE SULFATE 100 MG/1
100 TABLET, FILM COATED, EXTENDED RELEASE ORAL 3 TIMES DAILY
Qty: 90 TABLET | Refills: 0 | Status: SHIPPED | OUTPATIENT
Start: 2024-10-22

## 2024-10-22 RX ORDER — ICOSAPENT ETHYL 1000 MG/1
2 CAPSULE ORAL 2 TIMES DAILY
Qty: 60 CAPSULE | Refills: 3 | Status: SHIPPED | OUTPATIENT
Start: 2024-10-22

## 2024-10-22 RX ORDER — HYDROCODONE BITARTRATE AND ACETAMINOPHEN 500; 5 MG/1; MG/1
TABLET ORAL
OUTPATIENT
Start: 2024-10-22

## 2024-10-22 RX ORDER — MUPIROCIN 20 MG/G
OINTMENT TOPICAL
OUTPATIENT
Start: 2024-10-22 | End: 2024-10-22

## 2024-10-22 RX ORDER — CLONAZEPAM 1 MG/1
1 TABLET ORAL 2 TIMES DAILY
Qty: 60 TABLET | Refills: 0 | Status: SHIPPED | OUTPATIENT
Start: 2024-10-22

## 2024-10-22 RX ORDER — ATORVASTATIN CALCIUM 40 MG/1
40 TABLET, FILM COATED ORAL
Qty: 90 TABLET | Refills: 3 | Status: SHIPPED | OUTPATIENT
Start: 2024-10-22

## 2024-10-22 RX ORDER — INSULIN GLARGINE 300 U/ML
INJECTION, SOLUTION SUBCUTANEOUS
Qty: 4.5 ML | Refills: 3 | Status: SHIPPED | OUTPATIENT
Start: 2024-10-22

## 2024-10-22 NOTE — PROGRESS NOTES
[facility]  Cardiothoracic Surgery  Office Consult Note    Patient Name: Bharath Caballero  MRN: [id number]  Date of Service: 10/22/2024      Subjective:     Chief Complaint   Patient presents with    Pre-op Exam     REF DR VALLE, RE CAD, DISCUSS CABG  LHC: 10/10/24 * IN EPIC  NUCLEAR STRESS 10/8/24: ABN RESULTS  ECHO 10/7/24 EF 45%  CAROTID US: NONE  PMH: CAD, Hypertriglyceride, Pancreatitis, BPH, Chronic Liver Failure, Chronic Pain Syn w/ Stimulator implant, CICI, [place], DM type 2          History of Present Illness:  This is a 44-year-old male with an extensive list of medical problems, hypertension, hypertriglyceridemia, pancreatitis, chronic liver disease, hearing loss, diabetes, polyneuropathy presenting for evaluation of CAD.  He was recently admitted to the hospital with a NSTEMI, echocardiogram was done which showed EF of 45%, no major valvular abnormalities.  He had a left heart catheterization done which showed two-vessel CAD, 50% proximal stenosis of the LAD and 50% distal stenosis, IFR of the proximal stenosis was done which was 0.75, he also has proximal RCA lesion of 90% stenosis.     Current Outpatient Medications on File Prior to Visit   Medication Sig    amitriptyline (ELAVIL) 50 MG tablet Take 1 tablet (50 mg total) by mouth every evening.    aspirin (ECOTRIN) 81 MG EC tablet Take 81 mg by mouth once daily.    atorvastatin (LIPITOR) 40 MG tablet Take 1 tablet (40 mg total) by mouth once daily.    carvediloL (COREG) 25 MG tablet Take 1 tablet (25 mg total) by mouth 2 (two) times daily with meals.    cholecalciferol, vitamin D3, (VITAMIN D3) 25 mcg (1,000 unit) capsule Take 2 capsules (2,000 Units total) by mouth once daily.    clonazePAM (KLONOPIN) 1 MG tablet TAKE ONE TABLET BY MOUTH TWICE DAILY    cloNIDine (CATAPRES) 0.3 MG tablet Take 1 tablet (0.3 mg total) by mouth 3 (three) times daily.    CREON 36,000-114,000- 180,000 unit CpDR TAKE ONE CAPSULE THREE TIMES DAILY     "EScitalopram oxalate (LEXAPRO) 20 MG tablet Take 20 mg by mouth once daily.    evolocumab (REPATHA SURECLICK) 140 mg/mL PnIj Inject 1 mL (140 mg total) into the skin every 14 (fourteen) days.    FARXIGA 5 mg Tab tablet TAKE ONE TABLET BY MOUTH EVERY DAY    HUMALOG U-100 INSULIN 100 unit/mL injection INJECT 25 UNITS SUBCUTANEOUSLY BEFORE MEALS THREE TIMES DAILY    HYDROmorphone (DILAUDID) 8 MG tablet Take 1 tablet (8 mg total) by mouth every 8 (eight) hours as needed.    icosapent ethyL (VASCEPA) 1 gram Cap Take 2 capsules (2 g total) by mouth 2 (two) times daily.    isosorbide mononitrate (IMDUR) 120 MG 24 hr tablet Take 1 tablet (120 mg total) by mouth once daily.    linaGLIPtin (TRADJENTA) 5 mg Tab tablet Take 1 tablet (5 mg total) by mouth once daily.    losartan (COZAAR) 100 MG tablet Take 1 tablet (100 mg total) by mouth once daily.    morphine (MS CONTIN) 100 MG 12 hr tablet TAKE ONE TABLET BY MOUTH THREE TIMES DAILY    NIFEdipine (PROCARDIA-XL) 60 MG (OSM) 24 hr tablet Take 1 tablet (60 mg total) by mouth 2 (two) times a day.    nitroGLYCERIN (NITROSTAT) 0.3 MG SL tablet Place 1 tablet (0.3 mg total) under the tongue every 5 (five) minutes as needed for Chest pain (go to er after 3 doses).    OXcarbazepine (TRILEPTAL) 600 MG Tab Take 1 tablet (600 mg total) by mouth 2 (two) times daily.    potassium chloride SA (K-DUR,KLOR-CON) 20 MEQ tablet TAKE ONE TABLET BY MOUTH TWICE DAILY    pregabalin (LYRICA) 150 MG capsule Take 1 capsule (150 mg total) by mouth 3 (three) times daily.    ranolazine (RANEXA) 500 MG Tb12 Take 1 tablet (500 mg total) by mouth 2 (two) times daily.    spironolactone (ALDACTONE) 100 MG tablet Take 1 tablet (100 mg total) by mouth once daily.    sucralfate (CARAFATE) 100 mg/mL suspension Take 10 mLs (1 g total) by mouth 4 (four) times daily before meals and nightly.    SURE COMFORT INSULIN SYRINGE 0.5 mL 31 gauge x 5/16" Syrg USE ONE SYRINGE THREE TIMES DAILY    tamsulosin (FLOMAX) 0.4 mg " Cap TAKE ONE CAPSULE BY MOUTH EVERY DAY    torsemide (DEMADEX) 20 MG Tab Take 1 tablet (20 mg total) by mouth once daily.    TOUJEO SOLOSTAR U-300 INSULIN 300 unit/mL (1.5 mL) InPn pen INJECT 35 SUBCUTANEOUSLY TWICE DAILY    esomeprazole (NEXIUM) 40 MG capsule Take 1 capsule (40 mg total) by mouth before breakfast.     Current Facility-Administered Medications on File Prior to Visit   Medication    triamcinolone acetonide 0.1% ointment       Review of patient's allergies indicates:  No Known Allergies    Past Medical History:   Diagnosis Date    Acquired perforating dermatosis 8/30/2023    Aortic atherosclerosis 1/24/2023    Bilateral edema of lower extremity 2/6/2023    Bladder outflow obstruction 6/20/2022    Blurred vision, right eye 10/19/2022    BMI 34.0-34.9,adult 6/20/2022    BPH (benign prostatic hyperplasia)     Chronic liver failure without hepatic coma 4/25/2023    Chronic pain 10/25/2022    Chronic pain syndrome 5/12/2022    Chronic pancreatitis 10/28/2017    Deafness 10/3/2023    Diabetes     Diabetic polyneuropathy 6/20/2022    Elevated LFTs 8/18/2022    GERD (gastroesophageal reflux disease)     Hand paresthesia 6/20/2022    Hepatic steatosis     History of continuous positive airway pressure (CPAP) therapy at home     History of pancreatitis 6/20/2022    Hypertension     Hypertriglyceridemia     Kidney disorder     Leg pain     Mixed hyperlipidemia 10/28/2017    Mononeuritis multiplex 6/20/2022    Morbid obesity     Neuropathy     Nocturia 6/20/2022    OA (osteoarthritis)     Obstructive sleep apnea syndrome 6/20/2022    Onychomycosis of multiple toenails with type 2 diabetes mellitus 10/28/2017    Open wound of finger of right hand 10/20/2023    CICI (obstructive sleep apnea)     Painful diabetic neuropathy 5/12/2022    Personal history of colonic polyps 8/18/2022    Polyneuropathy     Primary hypertension 10/28/2017    Recurrent pancreatitis     Renal insufficiency     Tobacco abuse     Tobacco user  6/20/2022    Type 2 diabetes mellitus with skin complication, with long-term current use of insulin 2/6/2023     Past Surgical History:   Procedure Laterality Date    ANGIOGRAM, CORONARY, WITH LEFT HEART CATHETERIZATION N/A 4/10/2023    Procedure: Angiogram, Coronary, with Left Heart Cath;  Surgeon: Jaswant Pugh MD;  Location: [facility] CATH LAB;  Service: Cardiology;  Laterality: N/A;    ANGIOGRAM, CORONARY, WITH LEFT HEART CATHETERIZATION N/A 10/10/2024    Procedure: Angiogram, Coronary, with Left Heart Cath;  Surgeon: Jaswant Pugh MD;  Location: [facility] CATH LAB;  Service: Cardiology;  Laterality: N/A;    APPENDECTOMY      ARTERIOVENOUS ANASTOMOSIS, OPEN, UPPER ARM BASILLIC VEIN TRANSPOSITION N/A 05/07/2018    EGD, WITH CLOSED BIOPSY N/A 11/20/2023    Procedure: EGD, WITH CLOSED BIOPSY;  Surgeon: Christina James MD;  Location: [facility] ENDOSCOPY;  Service: Gastroenterology;  Laterality: N/A;    ESOPHAGOGASTRODUODENOSCOPY N/A 06/07/2021    EXCISION OF ARTERIOVENOUS FISTULA N/A 06/01/2018    FRACTIONAL FLOW RESERVE (FFR), CORONARY  4/10/2023    Procedure: Fractional Flow [place] (FFR), Coronary;  Surgeon: Jaswant Pugh MD;  Location: [facility] CATH LAB;  Service: Cardiology;;    HERNIA REPAIR      INSPECTION OF UPPER INTESTINAL TRACT N/A 06/07/2021    PHERESIS OF PLASMA N/A 07/13/2018    PHERESIS OF PLASMA N/A 05/25/2018    SPINAL CORD STIMULATOR REMOVAL      TRIAL OF SPINAL CORD NERVE STIMULATOR N/A 5/12/2022    Procedure: TRIAL, NEUROSTIMULATOR, SPINAL CORD;  Surgeon: Jay Pozo MD;  Location: [facility] OR;  Service: Neurosurgery;  Laterality: N/A;    UPPER GI N/A 06/07/2021     Family History       Problem Relation (Age of Onset)    Obesity Father          Tobacco Use    Smoking status: Every Day     Average packs/day: 0.5 packs/day for 25.0 years (12.5 ttl pk-yrs)     Types: Cigarettes     Start date: 7/20/1996    Smokeless tobacco: Never    Tobacco comments:     1/2 pk 25 years   Substance and Sexual  Activity    Alcohol use: Not Currently    Drug use: Yes     Types: Morphine     Comment: Rx pain    Sexual activity: Yes     Partners: Female     Review of Systems   Constitutional: Negative for fever and night sweats.   HENT:  Positive for hearing loss. Negative for ear discharge.    Eyes:  Negative for blurred vision and double vision.   Cardiovascular:  Negative for chest pain.   Respiratory:  Negative for cough and shortness of breath.    Musculoskeletal:  Positive for joint pain and muscle weakness.   Gastrointestinal:  Negative for bloating and abdominal pain.   Genitourinary:  Negative for dysuria and hematuria.   Neurological:  Negative for headaches and light-headedness.     Objective:     Vitals:    10/22/24 0938   BP: (!) 198/98   Pulse:         Weight: 96.2 kg (212 lb)  Body mass index is 37.55 kg/m².       Physical Exam  Constitutional:       General: He is not in acute distress.  HENT:      Head: Normocephalic and atraumatic.   Eyes:      Extraocular Movements: Extraocular movements intact.   Cardiovascular:      Rate and Rhythm: Normal rate and regular rhythm.      Pulses: Normal pulses.   Pulmonary:      Effort: Pulmonary effort is normal. No respiratory distress.      Breath sounds: Normal breath sounds.   Abdominal:      General: Abdomen is flat. There is no distension.      Palpations: Abdomen is soft.   Skin:     General: Skin is warm and dry.   Neurological:      Mental Status: He is alert and oriented to person, place, and time.          Significant Labs:  Reviewed    Significant Diagnostics:  Reviewed    Assessment/Plan:   Severe Coronary artery disease with hemodynamically significant stenosis  - patient has multiple medical comorbidities, despite that his STS operative mortality risk score is 0.95%, risk of morbidity is 7%.  He understands that he is risk of complications given his medical problems  - I believe that surgical revascularization of the LAD we will also provide him the best  long-term survival benefit especially given his age  -risks and benefits of procedure discussed with the patient who is in agreement with the plan    Gillian Clayton MD  Cardiothoracic Surgery  [facility]

## 2024-10-24 ENCOUNTER — HOSPITAL ENCOUNTER (OUTPATIENT)
Dept: WOUND CARE | Facility: HOSPITAL | Age: 44
Discharge: HOME | End: 2024-10-24
Attending: NURSE PRACTITIONER
Payer: MEDICARE

## 2024-10-24 VITALS
RESPIRATION RATE: 18 BRPM | TEMPERATURE: 98 F | SYSTOLIC BLOOD PRESSURE: 173 MMHG | HEART RATE: 77 BPM | DIASTOLIC BLOOD PRESSURE: 83 MMHG | OXYGEN SATURATION: 99 %

## 2024-10-24 DIAGNOSIS — Z72.0 TOBACCO USER: ICD-10-CM

## 2024-10-24 DIAGNOSIS — E13.42 DIABETIC POLYNEUROPATHY ASSOCIATED WITH OTHER SPECIFIED DIABETES MELLITUS: ICD-10-CM

## 2024-10-24 DIAGNOSIS — E11.622 TYPE 2 DIABETES MELLITUS WITH OTHER SKIN ULCER, WITH LONG-TERM CURRENT USE OF INSULIN: ICD-10-CM

## 2024-10-24 DIAGNOSIS — Z79.4 TYPE 2 DIABETES MELLITUS WITH OTHER SKIN ULCER, WITH LONG-TERM CURRENT USE OF INSULIN: ICD-10-CM

## 2024-10-24 DIAGNOSIS — S90.821D BLISTER OF RIGHT FOOT, SUBSEQUENT ENCOUNTER: Primary | ICD-10-CM

## 2024-10-24 DIAGNOSIS — L97.421 ULCER OF LEFT HEEL AND MIDFOOT, LIMITED TO BREAKDOWN OF SKIN: ICD-10-CM

## 2024-10-24 PROCEDURE — 27000999 HC MEDICAL RECORD PHOTO DOCUMENTATION

## 2024-10-24 PROCEDURE — 99211 OFF/OP EST MAY X REQ PHY/QHP: CPT

## 2024-10-25 NOTE — PROGRESS NOTES
[facility]   Outpatient Wound Care     Subjective:   Patient ID: Bharath Caballero is a 44 y.o. male.    Chief Complaint: Wound Care      History of Present Illness:   44 y.o. White male presents to wound care clinic today for follow up regarding right plantar surface blister, left heel ulcer, and diabetic foot care..  Reviewed previous medical history since last visit of 08/05/2024.  Past medical history:  Voices was at hospital today for Dermatology appointment for skin biopsy of neck per Dr. Mott and noticed areas to feet.  Voices unaware of injury to feet due to severe neuropathy.  Reviewed all previous medical history prior to visit of 06/06/2024.  Followed by Dat Beasley MD for PCP last appointment 8/5/24.    Today's visit 10/24/2024:  Reviewed previous progress notes since last visit of 09/10/2024.  Right foot plantar surface blister resolved.  Left heel ulcer resolved.  Bilateral lower extremities all skin intact.  Patient voices awaiting CABG surgery in December.  Instructed to continue monitoring feet and daily skin assessments.  Will place him on 3 months follow up after surgery regarding diabetic foot care.  Instructed to call the office with any questions, concerns, or new skin issues.  Verbalized understanding of all instructions.    09/10/2024:  Reviewed previous progress notes since last visit of 08/05/2024.  Right foot plantar surface blister resulting in small ulceration under foot <0.5 cm red granulating wound bed with minimal serosanguineous drainage.  Left heel lateral ulcer fully granulated.  Discussion with the patient today will have him perform wound every other day to right plantar surface foot ulcer.  All wound care performed per nursing staff at bedside.  Right foot plantar surface ulcer cleansed with wash, applied Iodoflex putty,  gauze, and white Telfa Band-Aid.  Diabetic foot exam performed.  Monofilament test done decreased sensation noted in all areas.  Bilateral lower extremities cool to touch with absent hair growth.  Trimmed all 10 toenails using podiatry clippers, and filed with [place] board. Instructed the importance of checking feet daily due to decrease sensation high risk for diabetic foot ulcers.  Instructed on proper footwear, nail care, and daily skin assessment.  Will have him return to the clinic in 2 weeks.  Instructed to call the office with any questions, concerns, or new skin issues.  Verbalized understanding of all instructions.    08/05/2024:  Reviewed previous progress note on 06/06/2024 note copied due to documentation of diabetic foot care.  Right plantar surface foot blood blister noted skin intact.  Left heel lateral ulcer red granulating wound bed with minimal serosanguineous drainage.  Discussion with the patient today will just have him paint all areas with Betadine and leave open air.  Bilateral lower extremities edema noted.  Applied Tubigrip size F.  Instructed the importance of checking feet daily due to high risk for diabetic foot ulcers and injuries.  Instructed if right foot blister skin breaks cleansed area with Vashe and paint with Betadine.  Instructions and supplies given.  During visit today CBG checked due to patient was not feeling well > 500 patient refused to go to ED states have not take my insulin all will take it as soon as I get home.  Instructed the dangers of hyperglycemia.  AMSURI signed.  Has reschedule appointment for September 10th.  Informed the patient may return then or may return sooner with any questions, concerns, or new skin issues.  Verbalized understanding of all instructions.      6/6/24:   Monofilament test done decreased sensation in all areas bilateral lower extremities cool to touch with absent hair growth.  Patient is fair skin with multiple freckles and new skin lesion to  back of neck.  Patient voices does not have a dermatologist.  Place referral for Dermatology.  Instructed to wear sun screen  SPF while out in the sun.  Trimmed all 10 toenails using podiatry clippers, and filed with [place] board.  Bilateral great toes dystrophic debride using [place].  Instructed the importance of checking feet daily due to high risk for diabetic foot ulcers.  Instructed on proper footwear, nail care, and checking skin daily.  Will have him return to clinic in 3 months.  Instructed to call the office with any questions, concerns, or new skin issues.  Verbalized understanding of all instructions.          History includes:      Past Medical History:   Diagnosis Date    Acquired perforating dermatosis 8/30/2023    Aortic atherosclerosis 1/24/2023    Bilateral edema of lower extremity 2/6/2023    Bladder outflow obstruction 6/20/2022    Blurred vision, right eye 10/19/2022    BMI 34.0-34.9,adult 6/20/2022    BPH (benign prostatic hyperplasia)     Chronic liver failure without hepatic coma 4/25/2023    Chronic pain 10/25/2022    Chronic pain syndrome 5/12/2022    Chronic pancreatitis 10/28/2017    Deafness 10/3/2023    Diabetes     Diabetic polyneuropathy 6/20/2022    Elevated LFTs 8/18/2022    GERD (gastroesophageal reflux disease)     Hand paresthesia 6/20/2022    Hepatic steatosis     History of continuous positive airway pressure (CPAP) therapy at home     History of pancreatitis 6/20/2022    Hypertension     Hypertriglyceridemia     Kidney disorder     Leg pain     Mixed hyperlipidemia 10/28/2017    Mononeuritis multiplex 6/20/2022    Morbid obesity     Neuropathy     Nocturia 6/20/2022    OA (osteoarthritis)     Obstructive sleep apnea syndrome 6/20/2022    Onychomycosis of multiple toenails with type 2 diabetes mellitus 10/28/2017    Open wound of finger of right hand 10/20/2023    CICI (obstructive sleep apnea)     Painful diabetic neuropathy 5/12/2022    Personal history of colonic polyps  8/18/2022    Polyneuropathy     Primary hypertension 10/28/2017    Recurrent pancreatitis     Renal insufficiency     Tobacco abuse     Tobacco user 6/20/2022    Type 2 diabetes mellitus with skin complication, with long-term current use of insulin 2/6/2023      Past Surgical History:   Procedure Laterality Date    ANGIOGRAM, CORONARY, WITH LEFT HEART CATHETERIZATION N/A 4/10/2023    Procedure: Angiogram, Coronary, with Left Heart Cath;  Surgeon: Jaswant Pugh MD;  Location: [facility] CATH LAB;  Service: Cardiology;  Laterality: N/A;    ANGIOGRAM, CORONARY, WITH LEFT HEART CATHETERIZATION N/A 10/10/2024    Procedure: Angiogram, Coronary, with Left Heart Cath;  Surgeon: Jaswant Pugh MD;  Location: [facility] CATH LAB;  Service: Cardiology;  Laterality: N/A;    APPENDECTOMY      ARTERIOVENOUS ANASTOMOSIS, OPEN, UPPER ARM BASILLIC VEIN TRANSPOSITION N/A 05/07/2018    EGD, WITH CLOSED BIOPSY N/A 11/20/2023    Procedure: EGD, WITH CLOSED BIOPSY;  Surgeon: Christina James MD;  Location: [facility] ENDOSCOPY;  Service: Gastroenterology;  Laterality: N/A;    ESOPHAGOGASTRODUODENOSCOPY N/A 06/07/2021    EXCISION OF ARTERIOVENOUS FISTULA N/A 06/01/2018    FRACTIONAL FLOW RESERVE (FFR), CORONARY  4/10/2023    Procedure: Fractional Flow [place] (FFR), Coronary;  Surgeon: Jaswant Pugh MD;  Location: [facility] CATH LAB;  Service: Cardiology;;    HERNIA REPAIR      INSPECTION OF UPPER INTESTINAL TRACT N/A 06/07/2021    PHERESIS OF PLASMA N/A 07/13/2018    PHERESIS OF PLASMA N/A 05/25/2018    SPINAL CORD STIMULATOR REMOVAL      TRIAL OF SPINAL CORD NERVE STIMULATOR N/A 5/12/2022    Procedure: TRIAL, NEUROSTIMULATOR, SPINAL CORD;  Surgeon: Jay Pozo MD;  Location: [facility] OR;  Service: Neurosurgery;  Laterality: N/A;    UPPER GI N/A 06/07/2021      Social History     Socioeconomic History    Marital status: Single   Tobacco Use    Smoking status: Every Day     Average packs/day: 0.5 packs/day for 25.0 years (12.5 ttl pk-yrs)      Types: Cigarettes     Start date: 7/20/1996    Smokeless tobacco: Never    Tobacco comments:     1/2 pk 25 years   Substance and Sexual Activity    Alcohol use: Not Currently    Drug use: Yes     Types: Morphine     Comment: Rx pain    Sexual activity: Yes     Partners: Female     Social Drivers of Health     Financial Resource Strain: Patient Declined (10/7/2024)    Overall Financial Resource Strain (CARDIA)     Difficulty of Paying Living Expenses: Patient declined   Food Insecurity: Patient Declined (10/7/2024)    Hunger Vital Sign     Worried About Running Out of Food in the Last Year: Patient declined     Ran Out of Food in the Last Year: Patient declined   Transportation Needs: Patient Declined (10/7/2024)    TRANSPORTATION NEEDS     Transportation : Patient declined   Physical Activity: Insufficiently Active (6/4/2024)    Exercise Vital Sign     Days of Exercise per Week: 7 days     Minutes of Exercise per Session: 10 min   Stress: Patient Declined (10/7/2024)    [employer] - Occupational Stress Questionnaire     Feeling of Stress : Patient declined   Housing Stability: Patient Declined (10/7/2024)    Housing Stability Vital Sign     Unable to Pay for Housing in the Last Year: Patient declined     Homeless in the Last Year: Patient declined   .      Current Outpatient Medications   Medication Sig Dispense Refill    amitriptyline (ELAVIL) 50 MG tablet Take 1 tablet (50 mg total) by mouth every evening. 30 tablet 4    aspirin (ECOTRIN) 81 MG EC tablet Take 81 mg by mouth once daily.      atorvastatin (LIPITOR) 40 MG tablet TAKE ONE TABLET BY MOUTH EVERY DAY 90 tablet 3    carvediloL (COREG) 25 MG tablet Take 1 tablet (25 mg total) by mouth 2 (two) times daily with meals. 180 tablet 3    cholecalciferol, vitamin D3, (VITAMIN D3) 25 mcg (1,000 unit) capsule Take 2 capsules (2,000 Units total) by mouth once daily. 720 capsule 0    clonazePAM (KLONOPIN) 1 MG tablet TAKE ONE TABLET BY  MOUTH TWICE DAILY 60 tablet 0    cloNIDine (CATAPRES) 0.3 MG tablet Take 1 tablet (0.3 mg total) by mouth 3 (three) times daily. 270 tablet 3    CREON 36,000-114,000- 180,000 unit CpDR TAKE ONE CAPSULE THREE TIMES DAILY 270 capsule 1    EScitalopram oxalate (LEXAPRO) 20 MG tablet Take 20 mg by mouth once daily.      esomeprazole (NEXIUM) 40 MG capsule Take 1 capsule (40 mg total) by mouth before breakfast. 30 capsule 11    evolocumab (REPATHA SURECLICK) 140 mg/mL PnIj Inject 1 mL (140 mg total) into the skin every 14 (fourteen) days. 2 mL 11    FARXIGA 5 mg Tab tablet TAKE ONE TABLET BY MOUTH EVERY DAY 90 tablet 3    HUMALOG U-100 INSULIN 100 unit/mL injection INJECT 25 UNITS SUBCUTANEOUSLY BEFORE MEALS THREE TIMES DAILY 10 mL 4    HYDROmorphone (DILAUDID) 8 MG tablet Take 1 tablet (8 mg total) by mouth every 8 (eight) hours as needed. 90 tablet 0    isosorbide mononitrate (IMDUR) 120 MG 24 hr tablet Take 1 tablet (120 mg total) by mouth once daily. 90 tablet 3    linaGLIPtin (TRADJENTA) 5 mg Tab tablet Take 1 tablet (5 mg total) by mouth once daily. 90 tablet 3    losartan (COZAAR) 100 MG tablet Take 1 tablet (100 mg total) by mouth once daily. 90 tablet 3    morphine (MS CONTIN) 100 MG 12 hr tablet TAKE ONE TABLET BY MOUTH THREE TIMES DAILY 90 tablet 0    NIFEdipine (PROCARDIA-XL) 60 MG (OSM) 24 hr tablet Take 1 tablet (60 mg total) by mouth 2 (two) times a day. 180 tablet 3    nitroGLYCERIN (NITROSTAT) 0.3 MG SL tablet Place 1 tablet (0.3 mg total) under the tongue every 5 (five) minutes as needed for Chest pain (go to er after 3 doses). 30 tablet 6    OXcarbazepine (TRILEPTAL) 600 MG Tab Take 1 tablet (600 mg total) by mouth 2 (two) times daily. 60 tablet 11    potassium chloride SA (K-DUR,KLOR-CON) 20 MEQ tablet TAKE ONE TABLET BY MOUTH TWICE DAILY 180 tablet 3    pregabalin (LYRICA) 150 MG capsule Take 1 capsule (150 mg total) by mouth 3 (three) times daily. 90 capsule 6    ranolazine (RANEXA) 500 MG Tb12 Take  "1 tablet (500 mg total) by mouth 2 (two) times daily. 60 tablet 5    spironolactone (ALDACTONE) 100 MG tablet Take 1 tablet (100 mg total) by mouth once daily. 90 tablet 3    sucralfate (CARAFATE) 100 mg/mL suspension Take 10 mLs (1 g total) by mouth 4 (four) times daily before meals and nightly. 1200 mL 3    SURE COMFORT INSULIN SYRINGE 0.5 mL 31 gauge x 5/16" Syrg USE ONE SYRINGE THREE TIMES DAILY 100 each 3    tamsulosin (FLOMAX) 0.4 mg Cap TAKE ONE CAPSULE BY MOUTH EVERY DAY 90 capsule 3    torsemide (DEMADEX) 20 MG Tab Take 1 tablet (20 mg total) by mouth once daily. 90 tablet 3    TOUJEO SOLOSTAR U-300 INSULIN 300 unit/mL (1.5 mL) InPn pen INJECT 35 SUBCUTANEOUSLY TWICE DAILY 4.5 mL 3    VASCEPA 1 gram Cap TAKE TWO CAPSULES BY MOUTH TWICE DAILY 60 capsule 3     Current Facility-Administered Medications   Medication Dose Route Frequency Provider Last Rate Last Admin    triamcinolone acetonide 0.1% ointment   Topical (Top) BID Malina Brito, TIP           Review of Systems   Skin:  Positive for wound.   All other systems reviewed and are negative.         Labs Reviewed:   Chemistry:  Lab Results   Component Value Date    BUN 11.2 10/10/2024    BUN 9.8 10/09/2024    CREATININE 0.91 10/10/2024    CREATININE 0.84 10/09/2024    EGFRNORACEVR >60 10/10/2024    EGFRNORACEVR >60 10/09/2024    GLUCOSE 140 (H) 10/10/2024    AST 38 (H) 10/10/2024    AST 59 (H) 10/09/2024    ALT 37 10/10/2024    ALT 41 10/09/2024    HGBA1C 9.3 (H) 10/08/2024        Hematology:  Lab Results   Component Value Date    WBC 10.07 10/10/2024    WBC 9.64 10/09/2024    HGB 12.4 (L) 10/10/2024    HGB 12.5 (L) 10/09/2024    HCT 35.9 (L) 10/10/2024    HCT 36.7 (L) 10/09/2024     10/10/2024     10/09/2024       Inflammatory Markers:  Lab Results   Component Value Date    HSCRP 9.98 (H) 06/23/2020    HSCRP 43.30 (H) 09/10/2019    SEDRATE 26 (H) 06/22/2022    SEDRATE 28 (H) 06/23/2020    SEDRATE 2 09/10/2019        Objective:    "     Physical Exam  Vitals reviewed.   Cardiovascular:      Pulses:           Dorsalis pedis pulses are 2+ on the right side and 2+ on the left side.        Posterior tibial pulses are 2+ on the right side and 2+ on the left side.   Feet:      Right foot:      Skin integrity: Skin integrity normal.      Toenail Condition: Right toenails are normal.      Left foot:      Skin integrity: Skin integrity normal.      Toenail Condition: Left toenails are normal.   Skin:     General: Skin is warm and dry.      Capillary Refill: Capillary refill takes less than 2 seconds.   Neurological:      Mental Status: He is alert.                              Assessment:         ICD-10-CM ICD-9-CM   1. Blister of right foot, subsequent encounter  S90.821D V58.89   2. Ulcer of left heel and midfoot, limited to breakdown of skin  L97.421 707.14   3. Type 2 diabetes mellitus with other skin ulcer, with long-term current use of insulin  E11.622 250.80    Z79.4 V58.67   4. Diabetic polyneuropathy associated with other specified diabetes mellitus  E13.42 250.60     357.2   5. Tobacco user  Z72.0 305.1         Plan:   Tissue pathology and/or culture taken:  [] Yes [x] No   Sharp debridement performed:   [] Yes [x] No   Labs ordered this visit:   [] Yes [x] No   Imaging ordered this visit:   [] Yes [x] No         1. Blister of right foot, subsequent encounter     Fully granulated.   2. Ulcer of left heel and midfoot, limited to breakdown of skin     Fully granulated.   3. Type 2 diabetes mellitus with other skin ulcer, with long-term current use of insulin     Instructed the importance of taking insulin as directed.    Must have a strict diabetic diet, take glucose lowering medications as prescribed and encourage lower HA1C.    Last A1c 8.2.   4. Diabetic polyneuropathy associated with other specified diabetes mellitus     Will check feet daily will check feet daily due to high risk for foot injury.   5. Tobacco user     Instructed on smoking  cessation.       The time spent including preparing to see the patient, obtaining patient history and assessment, evaluation of the plan of care, patient/caregiver counseling and education, orders, documentation, coordination of care, and other professional medical management activities for today's encounter was 20 minute.    Time spent performing procedures during today's encounter was 20 minute.    Follow up in about 3 months (around 1/20/2025) for DFC. Teaching provided on s/s to call wound clinic for promptly.  ER precautions taught for after hours and weekends.       ROCCO Sequeira

## 2024-10-29 ENCOUNTER — DOCUMENT SCAN (OUTPATIENT)
Dept: HOME HEALTH SERVICES | Facility: HOSPITAL | Age: 44
End: 2024-10-29
Payer: MEDICARE

## 2024-11-07 DIAGNOSIS — G89.4 CHRONIC PAIN SYNDROME: ICD-10-CM

## 2024-11-07 RX ORDER — HYDROMORPHONE HYDROCHLORIDE 8 MG/1
8 TABLET ORAL EVERY 8 HOURS PRN
Qty: 90 TABLET | Refills: 0 | Status: SHIPPED | OUTPATIENT
Start: 2024-11-07

## 2024-11-07 RX ORDER — TORSEMIDE 20 MG/1
20 TABLET ORAL DAILY
Qty: 90 TABLET | Refills: 3 | Status: SHIPPED | OUTPATIENT
Start: 2024-11-07 | End: 2025-11-07

## 2024-11-09 PROCEDURE — G0179 MD RECERTIFICATION HHA PT: HCPCS | Mod: ,,, | Performed by: NURSE PRACTITIONER

## 2024-11-13 ENCOUNTER — ANESTHESIA EVENT (OUTPATIENT)
Dept: SURGERY | Facility: HOSPITAL | Age: 44
DRG: 236 | End: 2024-11-13
Payer: MEDICARE

## 2024-11-14 ENCOUNTER — OFFICE VISIT (OUTPATIENT)
Dept: CARDIOLOGY | Facility: CLINIC | Age: 44
End: 2024-11-14
Payer: MEDICARE

## 2024-11-14 VITALS
TEMPERATURE: 99 F | HEIGHT: 69 IN | OXYGEN SATURATION: 99 % | BODY MASS INDEX: 31.05 KG/M2 | HEART RATE: 70 BPM | RESPIRATION RATE: 20 BRPM | WEIGHT: 209.63 LBS | DIASTOLIC BLOOD PRESSURE: 88 MMHG | SYSTOLIC BLOOD PRESSURE: 170 MMHG

## 2024-11-14 DIAGNOSIS — Z87.19 HISTORY OF PANCREATITIS: ICD-10-CM

## 2024-11-14 DIAGNOSIS — E78.1 FAMILIAL HYPERTRIGLYCERIDEMIA: ICD-10-CM

## 2024-11-14 DIAGNOSIS — E08.42 DIABETIC POLYNEUROPATHY ASSOCIATED WITH DIABETES MELLITUS DUE TO UNDERLYING CONDITION: Primary | ICD-10-CM

## 2024-11-14 DIAGNOSIS — Z72.0 TOBACCO USER: ICD-10-CM

## 2024-11-14 DIAGNOSIS — K72.10 CHRONIC LIVER FAILURE WITHOUT HEPATIC COMA: ICD-10-CM

## 2024-11-14 DIAGNOSIS — E78.01 FAMILIAL HYPERCHOLESTEROLEMIA: ICD-10-CM

## 2024-11-14 DIAGNOSIS — K76.0 METABOLIC DYSFUNCTION-ASSOCIATED STEATOTIC LIVER DISEASE (MASLD): ICD-10-CM

## 2024-11-14 DIAGNOSIS — I25.118 CORONARY ARTERY DISEASE OF NATIVE ARTERY OF NATIVE HEART WITH STABLE ANGINA PECTORIS: ICD-10-CM

## 2024-11-14 DIAGNOSIS — I21.4 NSTEMI (NON-ST ELEVATED MYOCARDIAL INFARCTION): ICD-10-CM

## 2024-11-14 DIAGNOSIS — E78.2 MIXED HYPERLIPIDEMIA: ICD-10-CM

## 2024-11-14 DIAGNOSIS — K86.1 CHRONIC PANCREATITIS, UNSPECIFIED PANCREATITIS TYPE: ICD-10-CM

## 2024-11-14 DIAGNOSIS — I70.0 AORTIC ATHEROSCLEROSIS: ICD-10-CM

## 2024-11-14 DIAGNOSIS — I10 PRIMARY HYPERTENSION: ICD-10-CM

## 2024-11-14 PROCEDURE — 99214 OFFICE O/P EST MOD 30 MIN: CPT | Mod: PBBFAC | Performed by: INTERNAL MEDICINE

## 2024-11-14 NOTE — PATIENT INSTRUCTIONS
Nurse visit, monitor bp and hr 2 hours after medications       Follow up in 4 month or sooner

## 2024-11-14 NOTE — PROGRESS NOTES
Cardiovascular [place] [facility]  Cariology Clinic - Follow Up     Cardiology Attending: Dr. Jaswant Pugh MD  Date of Visit: 11/14/2024  Reason for Visit/Chief Complaint:   Chief Complaint    f/u states has chest pain all the time no sob no questions           Subjective:      Bharath Caballero is a 44 y.o. male with familial hypertriglyceridemia with recurrent pancreatitis, CAD on medical management, HTN, DM, diabetic neuropathy, Hepatic Steatosis, CICI, hard of hearing, smoking who presents to cardiology clinic for follow up.      In the past patient was on plasmapheresis for hypertriglyceridemia.  At some point he was also on hospice but was taken off later due to absence of terminal disease.  In Apr 2023, pt underwent a coronary angiogram that revealed some proximal LAD disease, however iFR was 0.94 hence not revascularized. Pt also has mid-RCA  with collaterals but a small PDA. Pt also met with CT surgery Dr. Dallas at [facility] who did not think that the lesions warranted a CABG.     In October 2024, pt presented to [facility] with abdomianl pain and was found to have an NSTEMI with a peak at 1.6.  He underwent a coronary angiogram that revealed prox LAD 50% with IFR 0.75 and 0.64 in proximal and distal LAD respectively, mid RCA .  He was referred for 2 vessel bypass.  Patient has already met with a surgeon and plan is to proceed with 2 vessel bypass on December 2, 2024.  Patient is worried about undergoing the surgery and is interested in exploring percutaneous options.  During hospitalization Ranexa 500 mg bid was added and patient reports minimal episodes of chest pain since then.  He is currently complaining of epigastric pain that radiates all around towards his back.  He has not sought any medical care for that symptom.      Results for orders placed during the hospital encounter of 10/07/24    Cardiac catheterization    Conclusion    The Dist LAD-2 lesion was 50%  stenosed.    The Prox RCA lesion was 90% stenosed.    The Mid RCA lesion was 100% stenosed.    The Prox LAD lesion was 50% stenosed.    The Dist LAD-1 lesion was 50% stenosed.    The pre-procedure left ventricular end diastolic pressure was 20.    The estimated blood loss was none.    FINDINGS  Access:  Right radial  LVEDP:  20 mmHg  Left Main:  No stenosis  LAD: 50% proximal stenosis  Circumflex:  Luminal irregularities  RCA: 100% mid stenosis    LIMA:  Large vessel.  No significant stenosis noted.    iFR = 0.75 in proximal LAD.   iFR is 0.64 in distal LAD.   iFR < 0.89 is significant (associated with myocardial ischemia)  Guide used:  EBU 3.5    Summary:  Two vessel CAD    RECOMMENDATIONS  Refer for CABG evaluation  Risk factor modifications  Activity -- avoid straining with affected limb for one week  Exercise -- as tolerated  Precautions post D/C -- come to ER for hematoma, unusual pain, erythema, or unusual drainage at access site  Precautions post D/C -- if develop bleeding or hematoma at access site, hold pressure at access site, and come to ER    POST-CATH GUIDELINE FOR INPATIENT DISCHARGE  No hematoma or swelling at access site  No unusual tenderness at access site  Adequate distal pulse or capillary refill in limb(s) accessed  No unusual difficulty with ambulation after bed rest is over    The procedure log was documented by No documenter listed and verified by Jaswant Pugh MD.    Date: 10/10/2024  Time: 11:35 AM      Results for orders placed during the hospital encounter of 10/07/24    Echo    Interpretation Summary    Left Ventricle: The left ventricle is normal in size. Moderately increased ventricular mass. Moderately increased wall thickness. There is moderate concentric hypertrophy. Regional wall motion abnormalities present. See diagram for wall motion findings. Hypokinetic basal and mid inferior and inferoseptal walls. There is mildly reduced systolic function. Biplane (2D) method of discs  ejection fraction is 45%. There is indeterminate diastolic function.    Right Ventricle: Normal right ventricular cavity size. Systolic function is normal.    Left Atrium: Normal left atrial size.    Right Atrium: Normal right atrial size.    Aortic Valve: The aortic valve is a trileaflet valve. There is no stenosis. There is no significant regurgitation.    Mitral Valve: The mitral valve is structurally normal. There is no stenosis. There is no significant regurgitation.    Tricuspid Valve: There is no significant regurgitation.    Aorta: Aortic root is normal in size measuring 3.2 cm.    IVC/SVC: Elevated venous pressure at 15 mmHg.    Pericardium: There is no pericardial effusion.      Medications:     Current Outpatient Medications   Medication Sig Dispense Refill    amitriptyline (ELAVIL) 50 MG tablet Take 1 tablet (50 mg total) by mouth every evening. 30 tablet 4    aspirin (ECOTRIN) 81 MG EC tablet Take 81 mg by mouth once daily.      atorvastatin (LIPITOR) 40 MG tablet TAKE ONE TABLET BY MOUTH EVERY DAY 90 tablet 3    carvediloL (COREG) 25 MG tablet Take 1 tablet (25 mg total) by mouth 2 (two) times daily with meals. 180 tablet 3    cholecalciferol, vitamin D3, (VITAMIN D3) 25 mcg (1,000 unit) capsule Take 2 capsules (2,000 Units total) by mouth once daily. 720 capsule 0    clonazePAM (KLONOPIN) 1 MG tablet TAKE ONE TABLET BY MOUTH TWICE DAILY 60 tablet 0    cloNIDine (CATAPRES) 0.3 MG tablet Take 1 tablet (0.3 mg total) by mouth 3 (three) times daily. 270 tablet 3    CREON 36,000-114,000- 180,000 unit CpDR TAKE ONE CAPSULE THREE TIMES DAILY 270 capsule 1    EScitalopram oxalate (LEXAPRO) 20 MG tablet Take 20 mg by mouth once daily.      esomeprazole (NEXIUM) 40 MG capsule Take 1 capsule (40 mg total) by mouth before breakfast. 30 capsule 11    evolocumab (REPATHA SURECLICK) 140 mg/mL PnIj Inject 1 mL (140 mg total) into the skin every 14 (fourteen) days. 2 mL 11    FARXIGA 5 mg Tab tablet TAKE ONE TABLET BY  "MOUTH EVERY DAY 90 tablet 3    HUMALOG U-100 INSULIN 100 unit/mL injection INJECT 25 UNITS SUBCUTANEOUSLY BEFORE MEALS THREE TIMES DAILY 10 mL 4    HYDROmorphone (DILAUDID) 8 MG tablet Take 1 tablet (8 mg total) by mouth every 8 (eight) hours as needed. 90 tablet 0    isosorbide mononitrate (IMDUR) 120 MG 24 hr tablet Take 1 tablet (120 mg total) by mouth once daily. 90 tablet 3    linaGLIPtin (TRADJENTA) 5 mg Tab tablet Take 1 tablet (5 mg total) by mouth once daily. 90 tablet 3    losartan (COZAAR) 100 MG tablet Take 1 tablet (100 mg total) by mouth once daily. 90 tablet 3    morphine (MS CONTIN) 100 MG 12 hr tablet TAKE ONE TABLET BY MOUTH THREE TIMES DAILY 90 tablet 0    NIFEdipine (PROCARDIA-XL) 60 MG (OSM) 24 hr tablet Take 1 tablet (60 mg total) by mouth 2 (two) times a day. 180 tablet 3    nitroGLYCERIN (NITROSTAT) 0.3 MG SL tablet Place 1 tablet (0.3 mg total) under the tongue every 5 (five) minutes as needed for Chest pain (go to er after 3 doses). 30 tablet 6    OXcarbazepine (TRILEPTAL) 600 MG Tab Take 1 tablet (600 mg total) by mouth 2 (two) times daily. 60 tablet 11    potassium chloride SA (K-DUR,KLOR-CON) 20 MEQ tablet TAKE ONE TABLET BY MOUTH TWICE DAILY 180 tablet 3    pregabalin (LYRICA) 150 MG capsule Take 1 capsule (150 mg total) by mouth 3 (three) times daily. 90 capsule 6    ranolazine (RANEXA) 500 MG Tb12 Take 1 tablet (500 mg total) by mouth 2 (two) times daily. 60 tablet 5    spironolactone (ALDACTONE) 100 MG tablet Take 1 tablet (100 mg total) by mouth once daily. 90 tablet 3    sucralfate (CARAFATE) 100 mg/mL suspension Take 10 mLs (1 g total) by mouth 4 (four) times daily before meals and nightly. 1200 mL 3    SURE COMFORT INSULIN SYRINGE 0.5 mL 31 gauge x 5/16" Syrg USE ONE SYRINGE THREE TIMES DAILY 100 each 3    tamsulosin (FLOMAX) 0.4 mg Cap TAKE ONE CAPSULE BY MOUTH EVERY DAY 90 capsule 3    torsemide (DEMADEX) 20 MG Tab Take 1 tablet (20 mg total) by mouth once daily. 90 tablet 3    " "TOUJEO SOLOSTAR U-300 INSULIN 300 unit/mL (1.5 mL) InPn pen INJECT 35 SUBCUTANEOUSLY TWICE DAILY 4.5 mL 3    VASCEPA 1 gram Cap TAKE TWO CAPSULES BY MOUTH TWICE DAILY 60 capsule 3     Current Facility-Administered Medications   Medication Dose Route Frequency Provider Last Rate Last Admin    triamcinolone acetonide 0.1% ointment   Topical (Top) BID Malina Brito FNP           I have reviewed and updated the patient's medications, allergies, past medical history, surgical history, social history and family history as needed.    Review of Systems:     ROS  Objective:     Wt Readings from Last 3 Encounters:   11/14/24 95.1 kg (209 lb 9.6 oz)   10/22/24 96.2 kg (212 lb)   10/16/24 98.5 kg (217 lb 2.5 oz)     Temp Readings from Last 3 Encounters:   11/14/24 99 °F (37.2 °C) (Oral)   10/24/24 98.2 °F (36.8 °C)   10/16/24 98.4 °F (36.9 °C) (Oral)     BP Readings from Last 3 Encounters:   11/14/24 (!) 191/94   10/24/24 (!) 173/83   10/22/24 (!) 198/98     Pulse Readings from Last 3 Encounters:   11/14/24 70   10/24/24 77   10/22/24 73       Vitals:    11/14/24 0951   BP: (!) 191/94   BP Location: Left arm   Patient Position: Sitting   Pulse: 70   Resp: 20   Temp: 99 °F (37.2 °C)   TempSrc: Oral   SpO2: 99%   Weight: 95.1 kg (209 lb 9.6 oz)   Height: 5' 9" (1.753 m)     Body mass index is 30.95 kg/m².    Physical Exam   Labs:   I have reviewed the following labs below:      Lab Results   Component Value Date    WBC 10.07 10/10/2024    HGB 12.4 (L) 10/10/2024    HCT 35.9 (L) 10/10/2024     10/10/2024    MCV 87.3 10/10/2024    RDW 13.0 10/10/2024     Lab Results   Component Value Date     10/10/2024    K 3.8 10/10/2024     10/10/2024    CO2 25 10/10/2024    BUN 11.2 10/10/2024    CALCIUM 8.5 10/10/2024    MG 2.70 (H) 10/10/2024    PHOS 3.2 10/09/2024     Lab Results   Component Value Date    ALBUMIN 2.7 (L) 10/10/2024    BILITOT 0.2 10/10/2024    AST 38 (H) 10/10/2024    ALKPHOS 335 (H) 10/10/2024    ALT " 37 10/10/2024     (H) 10/07/2024     Cholesterol Total   Date Value Ref Range Status   10/07/2024 142 <=200 mg/dL Final     Comment:     Fasting     HDL Cholesterol   Date Value Ref Range Status   10/07/2024 27 (L) 35 - 60 mg/dL Final     Comment:     Fasting     LDL Cholesterol   Date Value Ref Range Status   10/07/2024 85.00 50.00 - 140.00 mg/dL Final     Triglyceride   Date Value Ref Range Status   10/07/2024 148 (H) 34 - 140 mg/dL Final     Comment:     Fasting     Lab Results   Component Value Date    HGBA1C 9.3 (H) 10/08/2024    HGBA1C 8.2 (H) 06/04/2024    HGBA1C 9.5 (H) 01/16/2024    CREATININE 0.91 10/10/2024     Lab Results   Component Value Date    TSH 0.555 10/07/2024     Lab Results   Component Value Date    IRON 97 10/10/2024    TIBC 205 (L) 10/10/2024    FERRITIN 216.05 10/10/2024    [id number] 886 (H) 10/10/2024     Lab Results   Component Value Date    INR 1.1 10/07/2024    APTT 71.0 (H) 10/10/2024      Lab Results   Component Value Date    TROPONINI 1.455 (H) 10/08/2024    TROPONINI 1.554 (H) 10/08/2024    TROPONINI 1.600 (H) 10/08/2024    TROPONINI 1.582 (H) 10/07/2024    TROPONINI 1.218 (H) 10/07/2024    .4 (H) 10/07/2024    BNP <10.0 11/28/2022    BNP <10.0 11/27/2022     Cardiovascular Studies:   I have reviewed the following studies below:      Coronary angiogram 4/2023:   FINDINGS  LVEDP:  18 mmHg  Left Main:  No stenosis    LAD:   Diffusely diseased.  70% ostial-prox lesion, 70% mid-distal lesion and 50% lesion at apex  Circumflex:   Mild diffuse disease   30%  lesion at the ostium of the first OM  RCA:   Severe, diffuse disease. Small vessel   90% prox-mid lesion   mid vessel  The patient has Significant two vessel disease    Blood loss:  less than 10 cc.  LIMA:  Medium size vessel.     iFR = 0.94 in proximal and mid LAD.  iFR > 0.89 is not significant      RECOMMENDATIONS  Medical management  Risk factor modifications -- start statin, blood pressure control        ECHO  5/24/23   The left ventricle is normal in size with mild concentric hypertrophy and normal systolic function.  The estimated ejection fraction is 65%.  Normal left ventricular diastolic function.  There is no evidence of intracardiac shunting.  Elevated central venous pressure (15 mmHg).  The estimated PA systolic pressure is 24 mmHg.  Normal right ventricular size with normal right ventricular systolic function.  Mild left atrial enlargement.    Assessment/Plan:   44 y.o. male with the following medical problems:    CAD   Recent NSTEMI  -In 2023 pt had proximal LAD disease based on cath in 4/2023, however iFR was 0.94 which was considered non-significant, hence not revascularized. Pt also has mid-RCA  with collaterals but a small PDA. Was seen by Dr. Dallas who did not think CABG is indicated.     Pt had NSTEMI in Oct 2024 and angiogram showed prox LAD 50% with iFR 0.75 and distal LAD with IFR 0.64 as well as mid %  Patient was already evaluated by surgeon with plan to proceed with two-vessel CABG in on 12/2/2024 however patient is concerned about mortality with CABG and would like to explore percutaneous options.    I contacted Dr. Jaswant Joseph with [facility] to see if his disease is amenable to PCI and he thinks pt will be better treated with CABG given diffuse LAD involvement that would need extensive PCI and would make CABG not feasible down the line.  As patient had a recent NSTEMI will add Plavix with a 300 mg load followed by 75 mg daily.    Minimal episodes of chest pain since recent hospitalization  Continue aspirin, statin, PCSK9 inhibitor, Coreg 25 b.i.d., Imdur 120, nifedipine 60 b.i.d. and Ranexa 500 mg bid       Ischemic cardiomyopathy  Echo in Oct 2024 (at time of NSTEMI) showed EF 45% with wall motion abnormalities in inferior and inferoseptal walls. (new drop in EF)  LVEDP 20 mmHg on recent catheterization in October 2024  Patient already on losartan 100 mg, Coreg 25 mg bid, Aldactone 100 mg  and Farxiga 5 mg   On nurse visit, Will switch to Entresto  mg bid and increase Farxiga to 10 mg  Appears euvolemic on exam. Continue torsemide 20 mg daily     HTN, resistant and uncontrolled  Initial 191/94 with repeat 170/88  BP at home runs between 140-160s/80-90s   We will have patient keep a BP log and will schedule a nurse visit in 2 weeks  Continue nifedipine 60 bid, coreg 25mg bid, losartan 100mg, aldactone 100mg, clonidine 0.2mg tid, tordemide 20  ** on nurse visit, will plan to switch to entresto  Unclear if severe TG can be contributing to resistant HTN  To note: No renal duplex US done, pt with hx of hypokalemia and could have underlying hyperaldosteronism.  Renin and aldosterone were never checked however as patient is already on Aldactone and RAAS inhibitors testing will be unreliable        Familial Hypertriglyceridemia, hypercholesterolemia   Patient reports triglycerides in the past were 22,000 (no records of that)  Suspect most of his metabolic disorders as well as CAD related to hypertriglyceridemia.  Patient also with recurrent pancreatitis (currently having epigastric pain that could be concerning for pancreatitis and I have advised him to seek medical care)  Patient used to be on plasmapheresis in the past but per his request has stopped doing so.  Most recent triglycerides have normalized at 148  -continue atorvastatin 40mg daily, Repatha, Vascepa 2g BID   **LDL 85 and will increase Lipitor to 80 mg on nurse visit  Patient has been more compliant with decreasing his fat intake    Uncontrolled diabetes  Diabetic neuropathy  A1c 9.3 in October 2024  Continue management per PCP    Smoking  Patient quit smoking 1 week ago.  Congratulated him and encouraged him to keep it up    Recurrent pancreatitis  Liver steatosis  Pt followed by GI

## 2024-11-15 ENCOUNTER — DOCUMENTATION ONLY (OUTPATIENT)
Dept: CARDIOLOGY | Facility: HOSPITAL | Age: 44
End: 2024-11-15
Payer: MEDICARE

## 2024-11-15 PROBLEM — E78.01 FAMILIAL HYPERCHOLESTEROLEMIA: Status: RESOLVED | Noted: 2022-06-20 | Resolved: 2024-11-15

## 2024-11-15 RX ORDER — CLOPIDOGREL BISULFATE 75 MG/1
TABLET ORAL
Qty: 90 TABLET | Refills: 3 | Status: SHIPPED | OUTPATIENT
Start: 2024-11-15

## 2024-11-20 DIAGNOSIS — G89.4 CHRONIC PAIN SYNDROME: ICD-10-CM

## 2024-11-20 DIAGNOSIS — E08.42 DIABETIC POLYNEUROPATHY ASSOCIATED WITH DIABETES MELLITUS DUE TO UNDERLYING CONDITION: ICD-10-CM

## 2024-11-21 ENCOUNTER — DOCUMENT SCAN (OUTPATIENT)
Dept: HOME HEALTH SERVICES | Facility: HOSPITAL | Age: 44
End: 2024-11-21
Payer: MEDICARE

## 2024-11-21 RX ORDER — MORPHINE SULFATE 100 MG/1
100 TABLET, FILM COATED, EXTENDED RELEASE ORAL 3 TIMES DAILY
Qty: 90 TABLET | Refills: 0 | Status: SHIPPED | OUTPATIENT
Start: 2024-11-21

## 2024-11-21 RX ORDER — CLONAZEPAM 1 MG/1
1 TABLET ORAL 2 TIMES DAILY
Qty: 60 TABLET | Refills: 0 | Status: SHIPPED | OUTPATIENT
Start: 2024-11-21

## 2024-11-29 ENCOUNTER — HOSPITAL ENCOUNTER (OUTPATIENT)
Dept: RADIOLOGY | Facility: HOSPITAL | Age: 44
Discharge: HOME | End: 2024-11-29
Attending: STUDENT IN AN ORGANIZED HEALTH CARE EDUCATION/TRAINING PROGRAM
Payer: MEDICARE

## 2024-11-29 DIAGNOSIS — I25.10 CORONARY ARTERY DISEASE, UNSPECIFIED VESSEL OR LESION TYPE, UNSPECIFIED WHETHER ANGINA PRESENT, UNSPECIFIED WHETHER NATIVE OR TRANSPLANTED HEART: ICD-10-CM

## 2024-11-29 PROCEDURE — 93005 ELECTROCARDIOGRAM TRACING: CPT

## 2024-11-29 PROCEDURE — 71046 X-RAY EXAM CHEST 2 VIEWS: CPT | Mod: TC

## 2024-12-02 ENCOUNTER — ANESTHESIA (OUTPATIENT)
Dept: SURGERY | Facility: HOSPITAL | Age: 44
DRG: 236 | End: 2024-12-02
Payer: MEDICARE

## 2024-12-02 DIAGNOSIS — I25.10 CORONARY ARTERY DISEASE, UNSPECIFIED VESSEL OR LESION TYPE, UNSPECIFIED WHETHER ANGINA PRESENT, UNSPECIFIED WHETHER NATIVE OR TRANSPLANTED HEART: Primary | ICD-10-CM

## 2024-12-04 PROCEDURE — 99499 UNLISTED E&M SERVICE: CPT | Mod: ,,, | Performed by: PHYSICIAN ASSISTANT

## 2024-12-04 NOTE — PRE-PROCEDURE INSTRUCTIONS
"[facility]: Outpatient Surgery  Preprocedure Check-In Instructions     Your surgeon's office will call you with time of arrival.  We ask patients to arrive about 2 hours before surgery to allow for enough time to review your health history & medications, start your IV, complete any outstanding labwork or tests, and meet your Anesthesiologist.    Expectations: "Because of inconsistent procedure completion times, an unexpected wait may occur. The Physicians would like you to be here to prepare in the event they run ahead of time. We will make you as comfortable as possible and keep you informed. We apologize in advance if this happens."    You will arrive at [facility], [address], [place].  Enter through the [place] entrance next to the Emergency Room, and come to the 6th floor to the Outpatient Surgery Department.     Visitory Policy:  You are allowed 2 adult visitors to be with you in the hospital. All hospital visitors should be in good current health.  No small children.     What to Bring:  Please have your ID, insurance cards, and all home medication bottles with you at check in.  Bring your CPAP machine if one is used at home.     Fasting:  Nothing to eat after midnight the night before your procedure. This includes no gum and/or tobacco products. You may have clear liquids limited to only: WATER, GATORADE, POWERADE and children can also have PEDIALYTE AND/OR APPLE JUICE , up until 2 hours before your arrival time.   Follow your doctor's instructions for taking any medications on the morning of your procedure.  If no instructions for taking medications were given, do not take any medications but bring your medications in their bottles to your procedure check in.     Follow your doctor's preoperative instructions regarding skin prep, bowel prep, bathing, or showering prior to your procedure.  If any special soaps were provided to you, please use according to " your doctor's instructions. If no instructions were given from your doctor, take a good bath or shower with antibacterial soap the night before and the morning of your procedure.  On the morning of procedure, wear loose, comfortable clothing.  No lotions, makeup, perfumes, colognes, deodorant, or jewelry to your procedure.  Removable items (glasses, contact lenses, dentures, retainers, hearing aids) need to be removed for your procedure.  Bring your storage containers for these items if you wear them.     Artificial nails, body jewelry, eyelash extensions, and/or hair extensions with metal clips are not allowed during your surgery.  If you currently wear any of these items, please arrange for them to be removed prior to your arrival to the hospital.     Outpatient or Same Day Surgeries:  Any patients receiving sedation/anesthesia are advised not to drive for 24 hours after their procedure.  We do not allow patients to drive themselves home after discharge.  If you are going home after your procedure, please have someone available to drive you home from the hospital.        You may call the Outpatient Surgery Department at [phone] with any questions or concerns.  We are looking forward to meeting you and taking great care of you for your procedure.  Thank you for choosing [facility] for your surgical needs.

## 2024-12-04 NOTE — H&P
[facility]  History & Physical  Cardiothoracic Surgery    SUBJECTIVE:     Chief Complaint/Reason for Admission: chest pain    History of Present Illness:  Patient is a 44 y.o. male presents with . This is a 44-year-old male with an extensive list of medical problems, hypertension, hypertriglyceridemia, pancreatitis, chronic liver disease, hearing loss, diabetes, polyneuropathy presenting for evaluation of CAD.  He was recently admitted to the hospital with a NSTEMI, echocardiogram was done which showed EF of 45%, no major valvular abnormalities.  He had a left heart catheterization done which showed two-vessel CAD, 50% proximal stenosis of the LAD and 50% distal stenosis, IFR of the proximal stenosis was done which was 0.75, he also has proximal RCA lesion of 90% stenosis.       Family History of Heart Disease: no    Current Facility-Administered Medications on File Prior to Encounter   Medication Dose Route Frequency Provider Last Rate Last Admin    triamcinolone acetonide 0.1% ointment   Topical (Top) BID Malina Brito FNP         Current Outpatient Medications on File Prior to Encounter   Medication Sig Dispense Refill    amitriptyline (ELAVIL) 50 MG tablet Take 1 tablet (50 mg total) by mouth every evening. 30 tablet 4    aspirin (ECOTRIN) 81 MG EC tablet Take 81 mg by mouth once daily.      atorvastatin (LIPITOR) 40 MG tablet TAKE ONE TABLET BY MOUTH EVERY DAY 90 tablet 3    carvediloL (COREG) 25 MG tablet Take 1 tablet (25 mg total) by mouth 2 (two) times daily with meals. 180 tablet 3    cholecalciferol, vitamin D3, (VITAMIN D3) 25 mcg (1,000 unit) capsule Take 2 capsules (2,000 Units total) by mouth once daily. 720 capsule 0    cloNIDine (CATAPRES) 0.3 MG tablet Take 1 tablet (0.3 mg total) by mouth 3 (three) times daily. 270 tablet 3    CREON 36,000-114,000- 180,000 unit CpDR TAKE ONE CAPSULE THREE TIMES DAILY 270 capsule 1    EScitalopram oxalate (LEXAPRO) 20 MG tablet  Take 20 mg by mouth once daily.      esomeprazole (NEXIUM) 40 MG capsule Take 1 capsule (40 mg total) by mouth before breakfast. 30 capsule 11    evolocumab (REPATHA SURECLICK) 140 mg/mL PnIj Inject 1 mL (140 mg total) into the skin every 14 (fourteen) days. 2 mL 11    FARXIGA 5 mg Tab tablet TAKE ONE TABLET BY MOUTH EVERY DAY 90 tablet 3    HUMALOG U-100 INSULIN 100 unit/mL injection INJECT 25 UNITS SUBCUTANEOUSLY BEFORE MEALS THREE TIMES DAILY 10 mL 4    isosorbide mononitrate (IMDUR) 120 MG 24 hr tablet Take 1 tablet (120 mg total) by mouth once daily. 90 tablet 3    linaGLIPtin (TRADJENTA) 5 mg Tab tablet Take 1 tablet (5 mg total) by mouth once daily. 90 tablet 3    losartan (COZAAR) 100 MG tablet Take 1 tablet (100 mg total) by mouth once daily. 90 tablet 3    NIFEdipine (PROCARDIA-XL) 60 MG (OSM) 24 hr tablet Take 1 tablet (60 mg total) by mouth 2 (two) times a day. 180 tablet 3    nitroGLYCERIN (NITROSTAT) 0.3 MG SL tablet Place 1 tablet (0.3 mg total) under the tongue every 5 (five) minutes as needed for Chest pain (go to er after 3 doses). 30 tablet 6    OXcarbazepine (TRILEPTAL) 600 MG Tab Take 1 tablet (600 mg total) by mouth 2 (two) times daily. 60 tablet 11    potassium chloride SA (K-DUR,KLOR-CON) 20 MEQ tablet TAKE ONE TABLET BY MOUTH TWICE DAILY 180 tablet 3    pregabalin (LYRICA) 150 MG capsule Take 1 capsule (150 mg total) by mouth 3 (three) times daily. 90 capsule 6    sucralfate (CARAFATE) 100 mg/mL suspension Take 10 mLs (1 g total) by mouth 4 (four) times daily before meals and nightly. 1200 mL 3    tamsulosin (FLOMAX) 0.4 mg Cap TAKE ONE CAPSULE BY MOUTH EVERY DAY 90 capsule 3    TOUJEO SOLOSTAR U-300 INSULIN 300 unit/mL (1.5 mL) InPn pen INJECT 35 SUBCUTANEOUSLY TWICE DAILY 4.5 mL 3    VASCEPA 1 gram Cap TAKE TWO CAPSULES BY MOUTH TWICE DAILY 60 capsule 3    ranolazine (RANEXA) 500 MG Tb12 Take 1 tablet (500 mg total) by mouth 2 (two) times daily. 60 tablet 5    spironolactone (ALDACTONE)  "100 MG tablet Take 1 tablet (100 mg total) by mouth once daily. 90 tablet 3    SURE COMFORT INSULIN SYRINGE 0.5 mL 31 gauge x 5/16" Syrg USE ONE SYRINGE THREE TIMES DAILY 100 each 3        Review of patient's allergies indicates:  No Known Allergies     Past Medical History:   Diagnosis Date    Abdominal pain     Acquired perforating dermatosis 08/30/2023    Anxiety     Aortic atherosclerosis 01/24/2023    Appetite loss     Balance problem     Bilateral edema of lower extremity 02/06/2023    Bladder outflow obstruction 06/20/2022    Blurred vision, right eye 10/19/2022    BMI 34.0-34.9,adult 06/20/2022    BPH (benign prostatic hyperplasia)     Chest pain     Chronic liver failure without hepatic coma 04/25/2023    Chronic pain 10/25/2022    Chronic pain syndrome 05/12/2022    Chronic pancreatitis 10/28/2017    Coronary artery disease, unspecified vessel or lesion type, unspecified whether angina present, unspecified whether native or transplanted heart     Deafness 10/03/2023    Depression     Diabetes     Diabetic polyneuropathy 06/20/2022    Elevated LFTs 08/18/2022    Fatigue     GERD (gastroesophageal reflux disease)     Hand paresthesia 06/20/2022    Headache     Hepatic steatosis     History of continuous positive airway pressure (CPAP) therapy at home     History of pancreatitis 06/20/2022    History of recent fall     Hypertension     Hypertriglyceridemia     Insomnia     Kidney disease     Kidney disorder     Leg pain     Mixed hyperlipidemia 10/28/2017    Mononeuritis multiplex 06/20/2022    Morbid obesity     Nausea & vomiting     Neuropathy     Nocturia 06/20/2022    NSTEMI (non-ST elevated myocardial infarction) 10/2024    OA (osteoarthritis)     Obesity     Obstructive sleep apnea syndrome 06/20/2022    Onychomycosis of multiple toenails with type 2 diabetes mellitus 10/28/2017    Open wound of finger of right hand 10/20/2023    CIIC (obstructive sleep apnea)     uses CPAP    Painful diabetic neuropathy " 05/12/2022    Personal history of colonic polyps 08/18/2022    Polyneuropathy     Primary hypertension 10/28/2017    Recurrent pancreatitis     Renal insufficiency     Tobacco abuse     Tobacco user 06/20/2022    Type 2 diabetes mellitus with skin complication, with long-term current use of insulin 02/06/2023    Urinary frequency     Use of cane as ambulatory aid     Weight loss, unintentional         Past Surgical History:   Procedure Laterality Date    ANGIOGRAM, CORONARY, WITH LEFT HEART CATHETERIZATION N/A 04/10/2023    Procedure: Angiogram, Coronary, with Left Heart Cath;  Surgeon: Jaswant Pugh MD;  Location: [facility] CATH LAB;  Service: Cardiology;  Laterality: N/A;    ANGIOGRAM, CORONARY, WITH LEFT HEART CATHETERIZATION N/A 10/10/2024    Procedure: Angiogram, Coronary, with Left Heart Cath;  Surgeon: Jaswant Pugh MD;  Location: [facility] CATH LAB;  Service: Cardiology;  Laterality: N/A;    APPENDECTOMY      ARTERIOVENOUS ANASTOMOSIS, OPEN, UPPER ARM BASILLIC VEIN TRANSPOSITION N/A 05/07/2018    EGD, WITH CLOSED BIOPSY N/A 11/20/2023    Procedure: EGD, WITH CLOSED BIOPSY;  Surgeon: Christina James MD;  Location: [facility] ENDOSCOPY;  Service: Gastroenterology;  Laterality: N/A;    ESOPHAGOGASTRODUODENOSCOPY N/A 06/07/2021    EXCISION OF ARTERIOVENOUS FISTULA N/A 06/01/2018    FRACTIONAL FLOW RESERVE (FFR), CORONARY  04/10/2023    Procedure: Fractional Flow [employer] (FFR), Coronary;  Surgeon: Jaswant Pugh MD;  Location: [facility] CATH LAB;  Service: Cardiology;;    HERNIA REPAIR      INSPECTION OF UPPER INTESTINAL TRACT N/A 06/07/2021    PHERESIS OF PLASMA N/A 07/13/2018    PHERESIS OF PLASMA N/A 05/25/2018    TRIAL OF SPINAL CORD NERVE STIMULATOR N/A 05/12/2022    Procedure: TRIAL, NEUROSTIMULATOR, SPINAL CORD;  Surgeon: Jay Pozo MD;  Location: [facility] OR;  Service: Neurosurgery;  Laterality: N/A;    UPPER GI N/A 06/07/2021           Review of Systems:  Review of Systems   Cardiovascular:  Positive  for chest pain.   All other systems reviewed and are negative.       OBJECTIVE:        Physical Exam:  Physical Exam  Vitals reviewed.   HENT:      Head: Normocephalic.      Right Ear: Tympanic membrane normal.      Left Ear: Tympanic membrane normal.      Nose: Nose normal.      Mouth/Throat:      Mouth: Mucous membranes are moist.   Eyes:      Pupils: Pupils are equal, round, and reactive to light.   Cardiovascular:      Rate and Rhythm: Normal rate and regular rhythm.      Pulses: Normal pulses.   Pulmonary:      Effort: Pulmonary effort is normal.      Breath sounds: Normal breath sounds.   Abdominal:      Palpations: Abdomen is soft.   Musculoskeletal:         General: Normal range of motion.      Cervical back: Normal range of motion.   Skin:     General: Skin is warm.   Neurological:      General: No focal deficit present.      Mental Status: He is alert.   Psychiatric:         Mood and Affect: Mood normal.          Laboratory:  I have reviewed all pertinent lab results within the past 24 hours.    Diagnostic Results:  Cardiac Cath: Reviewed          ASSESSMENT/PLAN:     A: CAD  P: CAB in am

## 2024-12-04 NOTE — ANESTHESIA PREPROCEDURE EVALUATION
12/04/2024  Bharath Caballero is a 44 y.o., male with an extensive list of medical problems, hypertension, hypertriglyceridemia, pancreatitis, chronic liver disease (recurrent pancreatitis), hearing loss, diabetes, polyneuropathy presenting for evaluation of CAD. He was recently admitted to the hospital with a NSTEMI, echocardiogram was done which showed EF of 45%, no major valvular abnormalities. He had a left heart catheterization done which showed two-vessel CAD, 50% proximal stenosis of the LAD and 50% distal stenosis, IFR of the proximal stenosis was done which was 0.75, he also has proximal RCA lesion of 90% stenosis.   HGB 14.8    Cr 1.23    Pre-op Assessment    I have reviewed the Patient Summary Reports.     I have reviewed the Nursing Notes. I have reviewed the NPO Status.   I have reviewed the Medications.     Review of Systems  Cardiovascular:     Hypertension  Past MI CAD      Angina             Cardiovascular Symptoms: Angina       Coronary Artery Disease:          Hx of Myocardial Infarction                  Hypertension         Pulmonary:        Sleep Apnea     Obstructive Sleep Apnea (CICI).           Renal/:  Chronic Renal Disease        Kidney Function/Disease             Hepatic/GI:     GERD Liver Disease,        Gerd       Liver Disease        Musculoskeletal:  Arthritis        Arthritis          Neurological:    Neuromuscular Disease,  Headaches      Dx of Headaches   Arthritis                         Neuromuscular Disease   Endocrine:  Diabetes    Diabetes                      Psych:  Psychiatric History                     Anesthesia Plan  Type of Anesthesia, risks & benefits discussed:    Anesthesia Type: Gen ETT  Intra-op Monitoring Plan: Standard ASA Monitors, Art Line, Central Line, CHIO and CO  Post Op Pain Control Plan: multimodal analgesia and IV/PO Opioids PRN  Induction:   IV  Airway Plan: Direct, Post-Induction  Informed Consent: Informed consent signed with the Patient and all parties understand the risks and agree with anesthesia plan.  All questions answered. Patient consented to blood products? Yes  ASA Score: 3    Ready For Surgery From Anesthesia Perspective.     .

## 2024-12-04 NOTE — CLINICAL REVIEW
labs/EKG/CXR reviewed. GLU^. Dr Clayton aware, per reviewers list. cardiology notes utd. Angio/Echo noted. chart review complete. ccv

## 2024-12-05 ENCOUNTER — EXTERNAL HOME HEALTH (OUTPATIENT)
Dept: HOME HEALTH SERVICES | Facility: HOSPITAL | Age: 44
End: 2024-12-05
Payer: MEDICARE

## 2024-12-05 ENCOUNTER — HOSPITAL ENCOUNTER (INPATIENT)
Facility: HOSPITAL | Age: 44
LOS: 5 days | Discharge: HOME HEALTH | DRG: 236 | End: 2024-12-10
Attending: STUDENT IN AN ORGANIZED HEALTH CARE EDUCATION/TRAINING PROGRAM | Admitting: STUDENT IN AN ORGANIZED HEALTH CARE EDUCATION/TRAINING PROGRAM
Payer: MEDICARE

## 2024-12-05 DIAGNOSIS — I25.10 CAD (CORONARY ARTERY DISEASE): Primary | ICD-10-CM

## 2024-12-05 DIAGNOSIS — I25.10 CORONARY ARTERY DISEASE, UNSPECIFIED VESSEL OR LESION TYPE, UNSPECIFIED WHETHER ANGINA PRESENT, UNSPECIFIED WHETHER NATIVE OR TRANSPLANTED HEART: ICD-10-CM

## 2024-12-05 DIAGNOSIS — I21.4 NSTEMI (NON-ST ELEVATED MYOCARDIAL INFARCTION): ICD-10-CM

## 2024-12-05 LAB
ALBUMIN SERPL-MCNC: 3 G/DL (ref 3.5–5)
ALBUMIN/GLOB SERPL: 1.2 RATIO (ref 1.1–2)
ALLENS TEST BLOOD GAS (OHS): ABNORMAL
ALLENS TEST BLOOD GAS (OHS): ABNORMAL
ALP SERPL-CCNC: 209 UNIT/L (ref 40–150)
ALT SERPL-CCNC: 20 UNIT/L (ref 0–55)
ANION GAP SERPL CALC-SCNC: 8 MEQ/L
ANION GAP SERPL CALC-SCNC: 8 MEQ/L
APTT PPP: 34.7 SECONDS (ref 23.2–33.7)
APTT PPP: 35 SECONDS (ref 23.2–33.7)
AST SERPL-CCNC: 30 UNIT/L (ref 5–34)
BASE EXCESS BLD CALC-SCNC: 1 MMOL/L
BASE EXCESS BLD CALC-SCNC: 3.5 MMOL/L
BASOPHILS # BLD AUTO: 0.04 X10(3)/MCL
BASOPHILS NFR BLD AUTO: 0.2 %
BILIRUB SERPL-MCNC: 0.8 MG/DL
BLOOD GAS SAMPLE TYPE (OHS): ABNORMAL
BLOOD GAS SAMPLE TYPE (OHS): ABNORMAL
BSA FOR ECHO PROCEDURE: 2.1 M2
BUN SERPL-MCNC: 11.1 MG/DL (ref 8.9–20.6)
BUN SERPL-MCNC: 12 MG/DL (ref 8.9–20.6)
CA-I BLD-SCNC: 1.07 MMOL/L (ref 1.12–1.23)
CA-I BLD-SCNC: 1.14 MMOL/L (ref 1.12–1.23)
CALCIUM SERPL-MCNC: 8 MG/DL (ref 8.4–10.2)
CALCIUM SERPL-MCNC: 8.2 MG/DL (ref 8.4–10.2)
CHLORIDE SERPL-SCNC: 107 MMOL/L (ref 98–107)
CHLORIDE SERPL-SCNC: 109 MMOL/L (ref 98–107)
CO2 BLDA-SCNC: 29.1 MMOL/L
CO2 BLDA-SCNC: 31.1 MMOL/L
CO2 SERPL-SCNC: 25 MMOL/L (ref 22–29)
CO2 SERPL-SCNC: 25 MMOL/L (ref 22–29)
CREAT SERPL-MCNC: 0.89 MG/DL (ref 0.72–1.25)
CREAT SERPL-MCNC: 1.05 MG/DL (ref 0.72–1.25)
CREAT/UREA NIT SERPL: 11
CREAT/UREA NIT SERPL: 12
DRAWN BY BLOOD GAS (OHS): ABNORMAL
DRAWN BY BLOOD GAS (OHS): ABNORMAL
EOSINOPHIL # BLD AUTO: 0.17 X10(3)/MCL (ref 0–0.9)
EOSINOPHIL NFR BLD AUTO: 0.9 %
ERYTHROCYTE [DISTWIDTH] IN BLOOD BY AUTOMATED COUNT: 12.7 % (ref 11.5–17)
FIO2: 100
FIO2: 100
FIO2: 80
FIO2: 85
GFR SERPLBLD CREATININE-BSD FMLA CKD-EPI: >60 ML/MIN/1.73/M2
GFR SERPLBLD CREATININE-BSD FMLA CKD-EPI: >60 ML/MIN/1.73/M2
GLOBULIN SER-MCNC: 2.6 GM/DL (ref 2.4–3.5)
GLUCOSE SERPL-MCNC: 109 MG/DL (ref 70–110)
GLUCOSE SERPL-MCNC: 150 MG/DL (ref 70–110)
GLUCOSE SERPL-MCNC: 155 MG/DL (ref 74–100)
GLUCOSE SERPL-MCNC: 163 MG/DL (ref 74–100)
GLUCOSE SERPL-MCNC: 190 MG/DL (ref 70–110)
GLUCOSE SERPL-MCNC: 222 MG/DL (ref 70–110)
GLUCOSE SERPL-MCNC: 231 MG/DL (ref 70–110)
GLUCOSE SERPL-MCNC: 235 MG/DL (ref 70–110)
GLUCOSE SERPL-MCNC: 261 MG/DL (ref 70–110)
GLUCOSE SERPL-MCNC: 278 MG/DL (ref 70–110)
HCO3 BLDA-SCNC: 27.5 MMOL/L (ref 22–26)
HCO3 BLDA-SCNC: 29.6 MMOL/L (ref 22–26)
HCO3 UR-SCNC: 20.3 MMOL/L (ref 24–28)
HCO3 UR-SCNC: 25.4 MMOL/L (ref 24–28)
HCO3 UR-SCNC: 26.1 MMOL/L (ref 24–28)
HCO3 UR-SCNC: 26.7 MMOL/L (ref 24–28)
HCO3 UR-SCNC: 27.3 MMOL/L (ref 24–28)
HCO3 UR-SCNC: 27.5 MMOL/L (ref 24–28)
HCO3 UR-SCNC: 27.8 MMOL/L (ref 24–28)
HCO3 UR-SCNC: 28.5 MMOL/L (ref 24–28)
HCT VFR BLD AUTO: 33 % (ref 42–52)
HCT VFR BLD AUTO: 36.4 % (ref 42–52)
HCT VFR BLD CALC: 24 %PCV (ref 36–54)
HCT VFR BLD CALC: 31 %PCV (ref 36–54)
HCT VFR BLD CALC: 32 %PCV (ref 36–54)
HCT VFR BLD CALC: 33 %PCV (ref 36–54)
HCT VFR BLD CALC: 37 %PCV (ref 36–54)
HCT VFR BLD CALC: 38 %PCV (ref 36–54)
HGB BLD-MCNC: 11 G/DL
HGB BLD-MCNC: 11.1 G/DL (ref 14–18)
HGB BLD-MCNC: 13 G/DL
HGB BLD-MCNC: 13 G/DL
HGB BLD-MCNC: 13 G/DL (ref 14–18)
HGB BLD-MCNC: 8 G/DL
IMM GRANULOCYTES # BLD AUTO: 0.14 X10(3)/MCL (ref 0–0.04)
IMM GRANULOCYTES NFR BLD AUTO: 0.7 %
INHALED O2 CONCENTRATION: 60 %
INR PPP: 1.2
INR PPP: 1.4
LPM (OHS): 2
LYMPHOCYTES # BLD AUTO: 2.24 X10(3)/MCL (ref 0.6–4.6)
LYMPHOCYTES NFR BLD AUTO: 11.6 %
MAGNESIUM SERPL-MCNC: 2.3 MG/DL (ref 1.6–2.6)
MAGNESIUM SERPL-MCNC: 3 MG/DL (ref 1.6–2.6)
MCH RBC QN AUTO: 28.6 PG (ref 27–31)
MCHC RBC AUTO-ENTMCNC: 33.6 G/DL (ref 33–36)
MCV RBC AUTO: 85.1 FL (ref 80–94)
MECH RR (OHS): 20 B/MIN
MODE (OHS): ABNORMAL
MONOCYTES # BLD AUTO: 0.84 X10(3)/MCL (ref 0.1–1.3)
MONOCYTES NFR BLD AUTO: 4.4 %
NEUTROPHILS # BLD AUTO: 15.83 X10(3)/MCL (ref 2.1–9.2)
NEUTROPHILS NFR BLD AUTO: 82.2 %
NRBC BLD AUTO-RTO: 0 %
OXYGEN DEVICE BLOOD GAS (OHS): ABNORMAL
OXYGEN DEVICE BLOOD GAS (OHS): ABNORMAL
PCO2 BLDA: 35.1 MMHG (ref 35–45)
PCO2 BLDA: 35.2 MMHG (ref 35–45)
PCO2 BLDA: 42.9 MMHG (ref 35–45)
PCO2 BLDA: 44.4 MMHG (ref 35–45)
PCO2 BLDA: 49.8 MMHG (ref 35–45)
PCO2 BLDA: 50 MMHG (ref 35–45)
PCO2 BLDA: 51 MMHG (ref 35–45)
PCO2 BLDA: 53.4 MMHG (ref 35–45)
PCO2 BLDA: 59.5 MMHG (ref 35–45)
PCO2 BLDA: 59.9 MMHG (ref 35–45)
PEEP RESPIRATORY: 5 CMH2O
PH BLDA: 7.34 [PH] (ref 7.35–7.45)
PH BLDA: 7.38 [PH] (ref 7.35–7.45)
PH SMN: 7.26 [PH] (ref 7.35–7.45)
PH SMN: 7.29 [PH] (ref 7.35–7.45)
PH SMN: 7.33 [PH] (ref 7.35–7.45)
PH SMN: 7.35 [PH] (ref 7.35–7.45)
PH SMN: 7.37 [PH] (ref 7.35–7.45)
PH SMN: 7.39 [PH] (ref 7.35–7.45)
PH SMN: 7.4 [PH] (ref 7.35–7.45)
PH SMN: 7.47 [PH] (ref 7.35–7.45)
PHOSPHATE SERPL-MCNC: 3.5 MG/DL (ref 2.3–4.7)
PHOSPHATE SERPL-MCNC: 3.7 MG/DL (ref 2.3–4.7)
PLATELET # BLD AUTO: 147 X10(3)/MCL (ref 130–400)
PMV BLD AUTO: 10.4 FL (ref 7.4–10.4)
PO2 BLDA: 118 MMHG (ref 80–100)
PO2 BLDA: 119 MMHG (ref 80–100)
PO2 BLDA: 152 MMHG (ref 80–100)
PO2 BLDA: 169 MMHG (ref 80–100)
PO2 BLDA: 327 MMHG (ref 80–100)
PO2 BLDA: 36 MMHG (ref 40–60)
PO2 BLDA: 381 MMHG (ref 80–100)
PO2 BLDA: 432 MMHG (ref 80–100)
PO2 BLDA: 44 MMHG (ref 40–60)
PO2 BLDA: 53 MMHG (ref 80–100)
POC BE: -5 MMOL/L
POC BE: 0 MMOL/L
POC BE: 1 MMOL/L
POC BE: 2 MMOL/L
POC IONIZED CALCIUM: 0.73 MMOL/L (ref 1.06–1.42)
POC IONIZED CALCIUM: 1.05 MMOL/L (ref 1.06–1.42)
POC IONIZED CALCIUM: 1.05 MMOL/L (ref 1.06–1.42)
POC IONIZED CALCIUM: 1.06 MMOL/L (ref 1.06–1.42)
POC IONIZED CALCIUM: 1.07 MMOL/L (ref 1.06–1.42)
POC IONIZED CALCIUM: 1.14 MMOL/L (ref 1.06–1.42)
POC IONIZED CALCIUM: 1.24 MMOL/L (ref 1.06–1.42)
POC IONIZED CALCIUM: 1.26 MMOL/L (ref 1.06–1.42)
POC PCO2 TEMP: 32.1 MMHG
POC PCO2 TEMP: 36 MMHG
POC PCO2 TEMP: 42.5 MMHG
POC PCO2 TEMP: 47.7 MMHG
POC PCO2 TEMP: 51.1 MMHG
POC PCO2 TEMP: 54.9 MMHG
POC PH TEMP: 7.31
POC PH TEMP: 7.34
POC PH TEMP: 7.37
POC PH TEMP: 7.41
POC PH TEMP: 7.45
POC PH TEMP: 7.5
POC PO2 TEMP: 163 MMHG
POC PO2 TEMP: 31 MMHG
POC PO2 TEMP: 322 MMHG
POC PO2 TEMP: 370 MMHG
POC PO2 TEMP: 408 MMHG
POC PO2 TEMP: 41 MMHG
POC SATURATED O2: 100 % (ref 95–100)
POC SATURATED O2: 60 % (ref 95–100)
POC SATURATED O2: 76 % (ref 95–100)
POC SATURATED O2: 98 % (ref 95–100)
POC SATURATED O2: 99 % (ref 95–100)
POC SATURATED O2: 99 % (ref 95–100)
POC TCO2: 21 MMOL/L (ref 23–27)
POC TCO2: 26 MMOL/L (ref 23–27)
POC TCO2: 27 MMOL/L (ref 23–27)
POC TCO2: 28 MMOL/L (ref 23–27)
POC TCO2: 29 MMOL/L (ref 23–27)
POC TCO2: 29 MMOL/L (ref 23–27)
POC TCO2: 29 MMOL/L (ref 24–29)
POC TCO2: 30 MMOL/L (ref 24–29)
POC TEMPERATURE: ABNORMAL
POCT GLUCOSE: 120 MG/DL (ref 70–110)
POCT GLUCOSE: 123 MG/DL (ref 70–110)
POCT GLUCOSE: 130 MG/DL (ref 70–110)
POCT GLUCOSE: 141 MG/DL (ref 70–110)
POCT GLUCOSE: 155 MG/DL (ref 70–110)
POCT GLUCOSE: 159 MG/DL (ref 70–110)
POCT GLUCOSE: 159 MG/DL (ref 70–110)
POCT GLUCOSE: 163 MG/DL (ref 70–110)
POCT GLUCOSE: 163 MG/DL (ref 70–110)
POCT GLUCOSE: 165 MG/DL (ref 70–110)
POCT GLUCOSE: 167 MG/DL (ref 70–110)
POCT GLUCOSE: 181 MG/DL (ref 70–110)
POCT GLUCOSE: 187 MG/DL (ref 70–110)
POCT GLUCOSE: 267 MG/DL (ref 70–110)
POTASSIUM BLD-SCNC: 2.4 MMOL/L (ref 3.5–5.1)
POTASSIUM BLD-SCNC: 2.9 MMOL/L (ref 3.5–5.1)
POTASSIUM BLD-SCNC: 3 MMOL/L (ref 3.5–5.1)
POTASSIUM BLD-SCNC: 3 MMOL/L (ref 3.5–5.1)
POTASSIUM BLD-SCNC: 3.1 MMOL/L (ref 3.5–5.1)
POTASSIUM BLD-SCNC: 3.2 MMOL/L (ref 3.5–5.1)
POTASSIUM BLD-SCNC: 3.4 MMOL/L (ref 3.5–5.1)
POTASSIUM BLD-SCNC: 3.7 MMOL/L (ref 3.5–5.1)
POTASSIUM BLOOD GAS (OHS): 3.4 MMOL/L (ref 3.5–5)
POTASSIUM BLOOD GAS (OHS): 3.5 MMOL/L (ref 3.5–5)
POTASSIUM SERPL-SCNC: 3.1 MMOL/L (ref 3.5–5.1)
POTASSIUM SERPL-SCNC: 3.9 MMOL/L (ref 3.5–5.1)
PROT SERPL-MCNC: 5.6 GM/DL (ref 6.4–8.3)
PROTHROMBIN TIME: 15.2 SECONDS (ref 12.5–14.5)
PROTHROMBIN TIME: 17.7 SECONDS (ref 12.5–14.5)
PS (OHS): 10 CMH2O
RBC # BLD AUTO: 3.88 X10(6)/MCL (ref 4.7–6.1)
SAMPLE SITE BLOOD GAS (OHS): ABNORMAL
SAMPLE SITE BLOOD GAS (OHS): ABNORMAL
SAMPLE: ABNORMAL
SAO2 % BLDA: 86 %
SAO2 % BLDA: 98 %
SODIUM BLD-SCNC: 136 MMOL/L (ref 136–145)
SODIUM BLD-SCNC: 137 MMOL/L (ref 136–145)
SODIUM BLD-SCNC: 138 MMOL/L (ref 136–145)
SODIUM BLD-SCNC: 139 MMOL/L (ref 136–145)
SODIUM BLD-SCNC: 142 MMOL/L (ref 136–145)
SODIUM BLOOD GAS (OHS): 139 MMOL/L (ref 137–145)
SODIUM BLOOD GAS (OHS): 139 MMOL/L (ref 137–145)
SODIUM SERPL-SCNC: 140 MMOL/L (ref 136–145)
SODIUM SERPL-SCNC: 142 MMOL/L (ref 136–145)
SPONT+MECH VT ON VENT: 450 ML
WBC # BLD AUTO: 19.26 X10(3)/MCL (ref 4.5–11.5)

## 2024-12-05 PROCEDURE — 63600175 PHARM REV CODE 636 W HCPCS

## 2024-12-05 PROCEDURE — 85610 PROTHROMBIN TIME: CPT | Performed by: STUDENT IN AN ORGANIZED HEALTH CARE EDUCATION/TRAINING PROGRAM

## 2024-12-05 PROCEDURE — 80048 BASIC METABOLIC PNL TOTAL CA: CPT | Performed by: STUDENT IN AN ORGANIZED HEALTH CARE EDUCATION/TRAINING PROGRAM

## 2024-12-05 PROCEDURE — 83735 ASSAY OF MAGNESIUM: CPT | Performed by: STUDENT IN AN ORGANIZED HEALTH CARE EDUCATION/TRAINING PROGRAM

## 2024-12-05 PROCEDURE — 99900035 HC TECH TIME PER 15 MIN (STAT)

## 2024-12-05 PROCEDURE — D9220A PRA ANESTHESIA: Mod: ANES,,, | Performed by: ANESTHESIOLOGY

## 2024-12-05 PROCEDURE — 33533 CABG ARTERIAL SINGLE: CPT | Mod: AS,,, | Performed by: PHYSICIAN ASSISTANT

## 2024-12-05 PROCEDURE — P9045 ALBUMIN (HUMAN), 5%, 250 ML: HCPCS | Mod: JG

## 2024-12-05 PROCEDURE — 63600175 PHARM REV CODE 636 W HCPCS: Mod: JG

## 2024-12-05 PROCEDURE — 94760 N-INVAS EAR/PLS OXIMETRY 1: CPT | Mod: XB

## 2024-12-05 PROCEDURE — C1887 CATHETER, GUIDING: HCPCS | Performed by: STUDENT IN AN ORGANIZED HEALTH CARE EDUCATION/TRAINING PROGRAM

## 2024-12-05 PROCEDURE — 27200966 HC CLOSED SUCTION SYSTEM

## 2024-12-05 PROCEDURE — 021009W BYPASS CORONARY ARTERY, ONE ARTERY FROM AORTA WITH AUTOLOGOUS VENOUS TISSUE, OPEN APPROACH: ICD-10-PCS | Performed by: STUDENT IN AN ORGANIZED HEALTH CARE EDUCATION/TRAINING PROGRAM

## 2024-12-05 PROCEDURE — 37799 UNLISTED PX VASCULAR SURGERY: CPT

## 2024-12-05 PROCEDURE — 84100 ASSAY OF PHOSPHORUS: CPT | Performed by: STUDENT IN AN ORGANIZED HEALTH CARE EDUCATION/TRAINING PROGRAM

## 2024-12-05 PROCEDURE — 27100171 HC OXYGEN HIGH FLOW UP TO 24 HOURS

## 2024-12-05 PROCEDURE — 85018 HEMOGLOBIN: CPT | Performed by: STUDENT IN AN ORGANIZED HEALTH CARE EDUCATION/TRAINING PROGRAM

## 2024-12-05 PROCEDURE — 37000008 HC ANESTHESIA 1ST 15 MINUTES: Performed by: STUDENT IN AN ORGANIZED HEALTH CARE EDUCATION/TRAINING PROGRAM

## 2024-12-05 PROCEDURE — 85730 THROMBOPLASTIN TIME PARTIAL: CPT | Performed by: STUDENT IN AN ORGANIZED HEALTH CARE EDUCATION/TRAINING PROGRAM

## 2024-12-05 PROCEDURE — 82803 BLOOD GASES ANY COMBINATION: CPT

## 2024-12-05 PROCEDURE — 25000003 PHARM REV CODE 250: Performed by: STUDENT IN AN ORGANIZED HEALTH CARE EDUCATION/TRAINING PROGRAM

## 2024-12-05 PROCEDURE — 25000003 PHARM REV CODE 250: Performed by: PHYSICIAN ASSISTANT

## 2024-12-05 PROCEDURE — 63600175 PHARM REV CODE 636 W HCPCS: Performed by: STUDENT IN AN ORGANIZED HEALTH CARE EDUCATION/TRAINING PROGRAM

## 2024-12-05 PROCEDURE — 94761 N-INVAS EAR/PLS OXIMETRY MLT: CPT | Mod: XB

## 2024-12-05 PROCEDURE — 25000003 PHARM REV CODE 250

## 2024-12-05 PROCEDURE — 20000000 HC ICU ROOM

## 2024-12-05 PROCEDURE — 63600175 PHARM REV CODE 636 W HCPCS: Performed by: PHYSICIAN ASSISTANT

## 2024-12-05 PROCEDURE — 80053 COMPREHEN METABOLIC PANEL: CPT

## 2024-12-05 PROCEDURE — 02100Z9 BYPASS CORONARY ARTERY, ONE ARTERY FROM LEFT INTERNAL MAMMARY, OPEN APPROACH: ICD-10-PCS | Performed by: STUDENT IN AN ORGANIZED HEALTH CARE EDUCATION/TRAINING PROGRAM

## 2024-12-05 PROCEDURE — 83735 ASSAY OF MAGNESIUM: CPT

## 2024-12-05 PROCEDURE — 85025 COMPLETE CBC W/AUTO DIFF WBC: CPT | Performed by: STUDENT IN AN ORGANIZED HEALTH CARE EDUCATION/TRAINING PROGRAM

## 2024-12-05 PROCEDURE — 94002 VENT MGMT INPAT INIT DAY: CPT

## 2024-12-05 PROCEDURE — 84100 ASSAY OF PHOSPHORUS: CPT

## 2024-12-05 PROCEDURE — 76937 US GUIDE VASCULAR ACCESS: CPT | Mod: 26,,, | Performed by: ANESTHESIOLOGY

## 2024-12-05 PROCEDURE — C1729 CATH, DRAINAGE: HCPCS | Performed by: STUDENT IN AN ORGANIZED HEALTH CARE EDUCATION/TRAINING PROGRAM

## 2024-12-05 PROCEDURE — 36556 INSERT NON-TUNNEL CV CATH: CPT | Mod: 59,,, | Performed by: ANESTHESIOLOGY

## 2024-12-05 PROCEDURE — C9248 INJ, CLEVIDIPINE BUTYRATE: HCPCS | Performed by: STUDENT IN AN ORGANIZED HEALTH CARE EDUCATION/TRAINING PROGRAM

## 2024-12-05 PROCEDURE — 36000712 HC OR TIME LEV V 1ST 15 MIN: Performed by: STUDENT IN AN ORGANIZED HEALTH CARE EDUCATION/TRAINING PROGRAM

## 2024-12-05 PROCEDURE — 06BQ4ZZ EXCISION OF LEFT SAPHENOUS VEIN, PERCUTANEOUS ENDOSCOPIC APPROACH: ICD-10-PCS | Performed by: STUDENT IN AN ORGANIZED HEALTH CARE EDUCATION/TRAINING PROGRAM

## 2024-12-05 PROCEDURE — P9047 ALBUMIN (HUMAN), 25%, 50ML: HCPCS | Mod: JG

## 2024-12-05 PROCEDURE — 94799 UNLISTED PULMONARY SVC/PX: CPT

## 2024-12-05 PROCEDURE — 33533 CABG ARTERIAL SINGLE: CPT | Mod: ,,, | Performed by: STUDENT IN AN ORGANIZED HEALTH CARE EDUCATION/TRAINING PROGRAM

## 2024-12-05 PROCEDURE — 33517 CABG ARTERY-VEIN SINGLE: CPT | Mod: ,,, | Performed by: STUDENT IN AN ORGANIZED HEALTH CARE EDUCATION/TRAINING PROGRAM

## 2024-12-05 PROCEDURE — 27201423 OPTIME MED/SURG SUP & DEVICES STERILE SUPPLY: Performed by: STUDENT IN AN ORGANIZED HEALTH CARE EDUCATION/TRAINING PROGRAM

## 2024-12-05 PROCEDURE — 36000713 HC OR TIME LEV V EA ADD 15 MIN: Performed by: STUDENT IN AN ORGANIZED HEALTH CARE EDUCATION/TRAINING PROGRAM

## 2024-12-05 PROCEDURE — D9220A PRA ANESTHESIA: Mod: CRNA,,,

## 2024-12-05 PROCEDURE — 02L70CK OCCLUSION OF LEFT ATRIAL APPENDAGE WITH EXTRALUMINAL DEVICE, OPEN APPROACH: ICD-10-PCS | Performed by: STUDENT IN AN ORGANIZED HEALTH CARE EDUCATION/TRAINING PROGRAM

## 2024-12-05 PROCEDURE — S5010 5% DEXTROSE AND 0.45% SALINE: HCPCS | Performed by: PHYSICIAN ASSISTANT

## 2024-12-05 PROCEDURE — 82962 GLUCOSE BLOOD TEST: CPT | Performed by: STUDENT IN AN ORGANIZED HEALTH CARE EDUCATION/TRAINING PROGRAM

## 2024-12-05 PROCEDURE — 33517 CABG ARTERY-VEIN SINGLE: CPT | Mod: AS,,, | Performed by: PHYSICIAN ASSISTANT

## 2024-12-05 PROCEDURE — 33508 ENDOSCOPIC VEIN HARVEST: CPT | Mod: 59,,, | Performed by: STUDENT IN AN ORGANIZED HEALTH CARE EDUCATION/TRAINING PROGRAM

## 2024-12-05 PROCEDURE — 5A1221Z PERFORMANCE OF CARDIAC OUTPUT, CONTINUOUS: ICD-10-PCS | Performed by: STUDENT IN AN ORGANIZED HEALTH CARE EDUCATION/TRAINING PROGRAM

## 2024-12-05 PROCEDURE — 36415 COLL VENOUS BLD VENIPUNCTURE: CPT

## 2024-12-05 PROCEDURE — 86923 COMPATIBILITY TEST ELECTRIC: CPT | Performed by: STUDENT IN AN ORGANIZED HEALTH CARE EDUCATION/TRAINING PROGRAM

## 2024-12-05 PROCEDURE — 37000009 HC ANESTHESIA EA ADD 15 MINS: Performed by: STUDENT IN AN ORGANIZED HEALTH CARE EDUCATION/TRAINING PROGRAM

## 2024-12-05 DEVICE — SYS PENDITURE LAA EXCLSN 40MM: Type: IMPLANTABLE DEVICE | Site: HEART | Status: FUNCTIONAL

## 2024-12-05 RX ORDER — ACETAMINOPHEN 10 MG/ML
1000 INJECTION, SOLUTION INTRAVENOUS EVERY 6 HOURS
Status: DISPENSED | OUTPATIENT
Start: 2024-12-05 | End: 2024-12-06

## 2024-12-05 RX ORDER — ACETAMINOPHEN 650 MG/20.3ML
650 LIQUID ORAL EVERY 6 HOURS PRN
Status: DISCONTINUED | OUTPATIENT
Start: 2024-12-05 | End: 2024-12-06

## 2024-12-05 RX ORDER — PROPOFOL 10 MG/ML
VIAL (ML) INTRAVENOUS
Status: DISCONTINUED | OUTPATIENT
Start: 2024-12-05 | End: 2024-12-05

## 2024-12-05 RX ORDER — HEPARIN SODIUM 5000 [USP'U]/ML
INJECTION, SOLUTION INTRAVENOUS; SUBCUTANEOUS
Status: DISCONTINUED | OUTPATIENT
Start: 2024-12-05 | End: 2024-12-05 | Stop reason: HOSPADM

## 2024-12-05 RX ORDER — ESMOLOL HYDROCHLORIDE 10 MG/ML
INJECTION INTRAVENOUS
Status: DISCONTINUED | OUTPATIENT
Start: 2024-12-05 | End: 2024-12-05

## 2024-12-05 RX ORDER — MAGNESIUM SULFATE HEPTAHYDRATE 40 MG/ML
4 INJECTION, SOLUTION INTRAVENOUS
Status: DISCONTINUED | OUTPATIENT
Start: 2024-12-05 | End: 2024-12-10 | Stop reason: HOSPADM

## 2024-12-05 RX ORDER — POTASSIUM CHLORIDE 14.9 MG/ML
20 INJECTION INTRAVENOUS
Status: DISCONTINUED | OUTPATIENT
Start: 2024-12-05 | End: 2024-12-10 | Stop reason: HOSPADM

## 2024-12-05 RX ORDER — MORPHINE SULFATE 4 MG/ML
INJECTION, SOLUTION INTRAMUSCULAR; INTRAVENOUS
Status: COMPLETED
Start: 2024-12-05 | End: 2024-12-05

## 2024-12-05 RX ORDER — POTASSIUM CHLORIDE 29.8 MG/ML
40 INJECTION INTRAVENOUS
Status: DISCONTINUED | OUTPATIENT
Start: 2024-12-05 | End: 2024-12-10 | Stop reason: HOSPADM

## 2024-12-05 RX ORDER — KETOROLAC TROMETHAMINE 30 MG/ML
30 INJECTION, SOLUTION INTRAMUSCULAR; INTRAVENOUS EVERY 6 HOURS PRN
Status: DISPENSED | OUTPATIENT
Start: 2024-12-05 | End: 2024-12-06

## 2024-12-05 RX ORDER — MIDAZOLAM HYDROCHLORIDE 1 MG/ML
INJECTION INTRAMUSCULAR; INTRAVENOUS
Status: DISCONTINUED | OUTPATIENT
Start: 2024-12-05 | End: 2024-12-05

## 2024-12-05 RX ORDER — HYDROCODONE BITARTRATE AND ACETAMINOPHEN 500; 5 MG/1; MG/1
TABLET ORAL
Status: DISCONTINUED | OUTPATIENT
Start: 2024-12-05 | End: 2024-12-05 | Stop reason: HOSPADM

## 2024-12-05 RX ORDER — CEFUROXIME SODIUM 1.5 G/16ML
1.5 INJECTION, POWDER, FOR SOLUTION INTRAVENOUS
Status: DISCONTINUED | OUTPATIENT
Start: 2024-12-05 | End: 2024-12-05 | Stop reason: HOSPADM

## 2024-12-05 RX ORDER — CALCIUM GLUCONATE 20 MG/ML
3 INJECTION, SOLUTION INTRAVENOUS
Status: DISCONTINUED | OUTPATIENT
Start: 2024-12-05 | End: 2024-12-10 | Stop reason: HOSPADM

## 2024-12-05 RX ORDER — MORPHINE SULFATE 4 MG/ML
4 INJECTION, SOLUTION INTRAMUSCULAR; INTRAVENOUS EVERY 4 HOURS PRN
Status: DISCONTINUED | OUTPATIENT
Start: 2024-12-05 | End: 2024-12-10 | Stop reason: HOSPADM

## 2024-12-05 RX ORDER — ETOMIDATE 2 MG/ML
INJECTION INTRAVENOUS
Status: DISCONTINUED | OUTPATIENT
Start: 2024-12-05 | End: 2024-12-05

## 2024-12-05 RX ORDER — DEXMEDETOMIDINE HYDROCHLORIDE 4 UG/ML
0-1.4 INJECTION, SOLUTION INTRAVENOUS CONTINUOUS
Status: DISCONTINUED | OUTPATIENT
Start: 2024-12-05 | End: 2024-12-06

## 2024-12-05 RX ORDER — HEPARIN SODIUM 1000 [USP'U]/ML
INJECTION, SOLUTION INTRAVENOUS; SUBCUTANEOUS
Status: DISCONTINUED | OUTPATIENT
Start: 2024-12-05 | End: 2024-12-05

## 2024-12-05 RX ORDER — CEFAZOLIN SODIUM 2 G/50ML
2 SOLUTION INTRAVENOUS
Status: DISCONTINUED | OUTPATIENT
Start: 2024-12-05 | End: 2024-12-05

## 2024-12-05 RX ORDER — MAGNESIUM SULFATE HEPTAHYDRATE 40 MG/ML
2 INJECTION, SOLUTION INTRAVENOUS
Status: DISCONTINUED | OUTPATIENT
Start: 2024-12-05 | End: 2024-12-10 | Stop reason: HOSPADM

## 2024-12-05 RX ORDER — SUCRALFATE 1 G/1
1 TABLET ORAL
Status: DISCONTINUED | OUTPATIENT
Start: 2024-12-06 | End: 2024-12-10 | Stop reason: HOSPADM

## 2024-12-05 RX ORDER — DOCUSATE SODIUM 100 MG/1
100 CAPSULE, LIQUID FILLED ORAL 2 TIMES DAILY
Status: DISCONTINUED | OUTPATIENT
Start: 2024-12-06 | End: 2024-12-10 | Stop reason: HOSPADM

## 2024-12-05 RX ORDER — TRANEXAMIC ACID 100 MG/ML
INJECTION, SOLUTION INTRAVENOUS
Status: DISCONTINUED | OUTPATIENT
Start: 2024-12-05 | End: 2024-12-05

## 2024-12-05 RX ORDER — GLUCAGON 1 MG
1 KIT INJECTION
Status: DISCONTINUED | OUTPATIENT
Start: 2024-12-05 | End: 2024-12-06

## 2024-12-05 RX ORDER — MUPIROCIN 20 MG/G
OINTMENT TOPICAL 2 TIMES DAILY
Status: DISPENSED | OUTPATIENT
Start: 2024-12-05 | End: 2024-12-10

## 2024-12-05 RX ORDER — CALCIUM GLUCONATE 20 MG/ML
2 INJECTION, SOLUTION INTRAVENOUS
Status: DISCONTINUED | OUTPATIENT
Start: 2024-12-05 | End: 2024-12-10 | Stop reason: HOSPADM

## 2024-12-05 RX ORDER — METOPROLOL TARTRATE 25 MG/1
12.5 TABLET ORAL 2 TIMES DAILY
Status: DISCONTINUED | OUTPATIENT
Start: 2024-12-06 | End: 2024-12-10 | Stop reason: HOSPADM

## 2024-12-05 RX ORDER — METOCLOPRAMIDE HYDROCHLORIDE 5 MG/ML
5 INJECTION INTRAMUSCULAR; INTRAVENOUS EVERY 6 HOURS PRN
Status: DISCONTINUED | OUTPATIENT
Start: 2024-12-05 | End: 2024-12-10 | Stop reason: HOSPADM

## 2024-12-05 RX ORDER — DEXTROSE MONOHYDRATE AND SODIUM CHLORIDE 5; .45 G/100ML; G/100ML
INJECTION, SOLUTION INTRAVENOUS CONTINUOUS
Status: DISCONTINUED | OUTPATIENT
Start: 2024-12-05 | End: 2024-12-10

## 2024-12-05 RX ORDER — HYDROCODONE BITARTRATE AND ACETAMINOPHEN 5; 325 MG/1; MG/1
1 TABLET ORAL EVERY 4 HOURS PRN
Status: DISCONTINUED | OUTPATIENT
Start: 2024-12-05 | End: 2024-12-10 | Stop reason: HOSPADM

## 2024-12-05 RX ORDER — MUPIROCIN 20 MG/G
OINTMENT TOPICAL
Status: COMPLETED | OUTPATIENT
Start: 2024-12-05 | End: 2024-12-05

## 2024-12-05 RX ORDER — ROCURONIUM BROMIDE 10 MG/ML
INJECTION, SOLUTION INTRAVENOUS
Status: DISCONTINUED | OUTPATIENT
Start: 2024-12-05 | End: 2024-12-05

## 2024-12-05 RX ORDER — INSULIN GLARGINE 100 [IU]/ML
35 INJECTION, SOLUTION SUBCUTANEOUS 2 TIMES DAILY
Status: DISCONTINUED | OUTPATIENT
Start: 2024-12-05 | End: 2024-12-06

## 2024-12-05 RX ORDER — CALCIUM CHLORIDE INJECTION 100 MG/ML
INJECTION, SOLUTION INTRAVENOUS
Status: DISCONTINUED | OUTPATIENT
Start: 2024-12-05 | End: 2024-12-05 | Stop reason: HOSPADM

## 2024-12-05 RX ORDER — PROTAMINE SULFATE 10 MG/ML
INJECTION, SOLUTION INTRAVENOUS
Status: DISCONTINUED | OUTPATIENT
Start: 2024-12-05 | End: 2024-12-05

## 2024-12-05 RX ORDER — INSULIN ASPART 100 [IU]/ML
0-15 INJECTION, SOLUTION INTRAVENOUS; SUBCUTANEOUS EVERY 6 HOURS PRN
Status: DISCONTINUED | OUTPATIENT
Start: 2024-12-05 | End: 2024-12-06

## 2024-12-05 RX ORDER — CEFAZOLIN SODIUM 2 G/50ML
2 SOLUTION INTRAVENOUS
Status: COMPLETED | OUTPATIENT
Start: 2024-12-05 | End: 2024-12-06

## 2024-12-05 RX ORDER — LIDOCAINE HYDROCHLORIDE 20 MG/ML
INJECTION, SOLUTION EPIDURAL; INFILTRATION; INTRACAUDAL; PERINEURAL
Status: DISCONTINUED | OUTPATIENT
Start: 2024-12-05 | End: 2024-12-05

## 2024-12-05 RX ORDER — FENTANYL CITRATE 50 UG/ML
INJECTION, SOLUTION INTRAMUSCULAR; INTRAVENOUS
Status: DISCONTINUED | OUTPATIENT
Start: 2024-12-05 | End: 2024-12-05

## 2024-12-05 RX ORDER — PAPAVERINE HYDROCHLORIDE 30 MG/ML
INJECTION INTRAMUSCULAR; INTRAVENOUS
Status: DISCONTINUED | OUTPATIENT
Start: 2024-12-05 | End: 2024-12-05 | Stop reason: HOSPADM

## 2024-12-05 RX ORDER — ALBUMIN HUMAN 50 G/1000ML
12.5 SOLUTION INTRAVENOUS
Status: DISCONTINUED | OUTPATIENT
Start: 2024-12-05 | End: 2024-12-10 | Stop reason: HOSPADM

## 2024-12-05 RX ORDER — FUROSEMIDE 10 MG/ML
INJECTION INTRAMUSCULAR; INTRAVENOUS
Status: DISCONTINUED | OUTPATIENT
Start: 2024-12-05 | End: 2024-12-05

## 2024-12-05 RX ORDER — LACTULOSE 10 G/15ML
20 SOLUTION ORAL EVERY 6 HOURS PRN
Status: DISCONTINUED | OUTPATIENT
Start: 2024-12-05 | End: 2024-12-10 | Stop reason: HOSPADM

## 2024-12-05 RX ORDER — FOLIC ACID 1 MG/1
1 TABLET ORAL DAILY
Status: DISCONTINUED | OUTPATIENT
Start: 2024-12-06 | End: 2024-12-10 | Stop reason: HOSPADM

## 2024-12-05 RX ORDER — OXYCODONE HYDROCHLORIDE 10 MG/1
10 TABLET ORAL EVERY 4 HOURS PRN
Status: DISCONTINUED | OUTPATIENT
Start: 2024-12-05 | End: 2024-12-10 | Stop reason: HOSPADM

## 2024-12-05 RX ORDER — ONDANSETRON HYDROCHLORIDE 2 MG/ML
4 INJECTION, SOLUTION INTRAVENOUS EVERY 4 HOURS PRN
Status: DISCONTINUED | OUTPATIENT
Start: 2024-12-05 | End: 2024-12-10 | Stop reason: HOSPADM

## 2024-12-05 RX ORDER — ASPIRIN 81 MG/1
81 TABLET ORAL DAILY
Status: DISCONTINUED | OUTPATIENT
Start: 2024-12-06 | End: 2024-12-10 | Stop reason: HOSPADM

## 2024-12-05 RX ORDER — LOPERAMIDE HYDROCHLORIDE 2 MG/1
2 CAPSULE ORAL CONTINUOUS PRN
Status: DISCONTINUED | OUTPATIENT
Start: 2024-12-05 | End: 2024-12-10 | Stop reason: HOSPADM

## 2024-12-05 RX ORDER — CALCIUM GLUCONATE 20 MG/ML
1 INJECTION, SOLUTION INTRAVENOUS
Status: DISCONTINUED | OUTPATIENT
Start: 2024-12-05 | End: 2024-12-10 | Stop reason: HOSPADM

## 2024-12-05 RX ORDER — PHENYLEPHRINE HYDROCHLORIDE 10 MG/ML
INJECTION INTRAVENOUS
Status: DISCONTINUED | OUTPATIENT
Start: 2024-12-05 | End: 2024-12-05

## 2024-12-05 RX ORDER — FAMOTIDINE 10 MG/ML
20 INJECTION INTRAVENOUS DAILY
Status: DISCONTINUED | OUTPATIENT
Start: 2024-12-05 | End: 2024-12-10

## 2024-12-05 RX ADMIN — SODIUM CHLORIDE 4 UNITS/HR: 9 INJECTION, SOLUTION INTRAVENOUS at 07:12

## 2024-12-05 RX ADMIN — PHENYLEPHRINE HYDROCHLORIDE 100 MCG: 10 INJECTION INTRAVENOUS at 07:12

## 2024-12-05 RX ADMIN — CLEVIPIDINE 8 MG/HR: 0.5 EMULSION INTRAVENOUS at 06:12

## 2024-12-05 RX ADMIN — PROTAMINE SULFATE 400 MG: 10 INJECTION, SOLUTION INTRAVENOUS at 09:12

## 2024-12-05 RX ADMIN — ACETAMINOPHEN 1000 MG: 10 INJECTION, SOLUTION INTRAVENOUS at 02:12

## 2024-12-05 RX ADMIN — ETOMIDATE 20 MG: 2 INJECTION INTRAVENOUS at 06:12

## 2024-12-05 RX ADMIN — DEXMEDETOMIDINE HYDROCHLORIDE 0.8 MCG/KG/HR: 400 INJECTION INTRAVENOUS at 09:12

## 2024-12-05 RX ADMIN — SODIUM CHLORIDE, SODIUM GLUCONATE, SODIUM ACETATE, POTASSIUM CHLORIDE AND MAGNESIUM CHLORIDE: 526; 502; 368; 37; 30 INJECTION, SOLUTION INTRAVENOUS at 10:12

## 2024-12-05 RX ADMIN — FENTANYL CITRATE 50 MCG: 50 INJECTION, SOLUTION INTRAMUSCULAR; INTRAVENOUS at 07:12

## 2024-12-05 RX ADMIN — CLEVIPIDINE 2 MG/HR: 0.5 EMULSION INTRAVENOUS at 12:12

## 2024-12-05 RX ADMIN — TRANEXAMIC ACID 900 MG: 100 INJECTION, SOLUTION INTRAVENOUS at 10:12

## 2024-12-05 RX ADMIN — FENTANYL CITRATE 100 MCG: 50 INJECTION, SOLUTION INTRAMUSCULAR; INTRAVENOUS at 11:12

## 2024-12-05 RX ADMIN — MIDAZOLAM HYDROCHLORIDE 3 MG: 1 INJECTION, SOLUTION INTRAMUSCULAR; INTRAVENOUS at 09:12

## 2024-12-05 RX ADMIN — PHENYLEPHRINE HYDROCHLORIDE 100 MCG: 10 INJECTION INTRAVENOUS at 08:12

## 2024-12-05 RX ADMIN — MUPIROCIN: 20 OINTMENT TOPICAL at 11:12

## 2024-12-05 RX ADMIN — FUROSEMIDE 10 MG: 10 INJECTION, SOLUTION INTRAMUSCULAR; INTRAVENOUS at 10:12

## 2024-12-05 RX ADMIN — SODIUM CHLORIDE 1.5 G: 9 INJECTION, SOLUTION INTRAVENOUS at 07:12

## 2024-12-05 RX ADMIN — ROCURONIUM BROMIDE 80 MG: 10 SOLUTION INTRAVENOUS at 06:12

## 2024-12-05 RX ADMIN — ESMOLOL HYDROCHLORIDE 20 MG: 100 INJECTION, SOLUTION INTRAVENOUS at 07:12

## 2024-12-05 RX ADMIN — FENTANYL CITRATE 50 MCG: 50 INJECTION, SOLUTION INTRAMUSCULAR; INTRAVENOUS at 10:12

## 2024-12-05 RX ADMIN — PHENYLEPHRINE HYDROCHLORIDE 50 MCG: 10 INJECTION INTRAVENOUS at 08:12

## 2024-12-05 RX ADMIN — LIDOCAINE HYDROCHLORIDE 60 MG: 20 INJECTION, SOLUTION INTRAVENOUS at 06:12

## 2024-12-05 RX ADMIN — MIDAZOLAM HYDROCHLORIDE 2 MG: 1 INJECTION, SOLUTION INTRAMUSCULAR; INTRAVENOUS at 06:12

## 2024-12-05 RX ADMIN — ROCURONIUM BROMIDE 50 MG: 10 SOLUTION INTRAVENOUS at 09:12

## 2024-12-05 RX ADMIN — MUPIROCIN: 20 OINTMENT TOPICAL at 05:12

## 2024-12-05 RX ADMIN — FENTANYL CITRATE 250 MCG: 50 INJECTION, SOLUTION INTRAMUSCULAR; INTRAVENOUS at 06:12

## 2024-12-05 RX ADMIN — CEFAZOLIN SODIUM 2 G: 2 SOLUTION INTRAVENOUS at 08:12

## 2024-12-05 RX ADMIN — MUPIROCIN: 20 OINTMENT TOPICAL at 08:12

## 2024-12-05 RX ADMIN — ROCURONIUM BROMIDE 20 MG: 10 SOLUTION INTRAVENOUS at 07:12

## 2024-12-05 RX ADMIN — TRANEXAMIC ACID 900 MG: 100 INJECTION, SOLUTION INTRAVENOUS at 07:12

## 2024-12-05 RX ADMIN — DEXTROSE AND SODIUM CHLORIDE: 5; 450 INJECTION, SOLUTION INTRAVENOUS at 11:12

## 2024-12-05 RX ADMIN — DEXTROSE AND SODIUM CHLORIDE: 5; 450 INJECTION, SOLUTION INTRAVENOUS at 10:12

## 2024-12-05 RX ADMIN — PROPOFOL 20 MG: 10 INJECTION, EMULSION INTRAVENOUS at 08:12

## 2024-12-05 RX ADMIN — POTASSIUM CHLORIDE 40 MEQ: 400 INJECTION, SOLUTION INTRAVENOUS at 10:12

## 2024-12-05 RX ADMIN — KETOROLAC TROMETHAMINE 30 MG: 30 INJECTION, SOLUTION INTRAMUSCULAR at 08:12

## 2024-12-05 RX ADMIN — DEXMEDETOMIDINE HYDROCHLORIDE 0.6 MCG/KG/HR: 400 INJECTION INTRAVENOUS at 06:12

## 2024-12-05 RX ADMIN — DEXMEDETOMIDINE HYDROCHLORIDE 1 MCG/KG/HR: 400 INJECTION INTRAVENOUS at 11:12

## 2024-12-05 RX ADMIN — ACETAMINOPHEN 1000 MG: 10 INJECTION, SOLUTION INTRAVENOUS at 11:12

## 2024-12-05 RX ADMIN — FAMOTIDINE 20 MG: 10 INJECTION, SOLUTION INTRAVENOUS at 11:12

## 2024-12-05 RX ADMIN — SODIUM CHLORIDE, SODIUM GLUCONATE, SODIUM ACETATE, POTASSIUM CHLORIDE AND MAGNESIUM CHLORIDE: 526; 502; 368; 37; 30 INJECTION, SOLUTION INTRAVENOUS at 07:12

## 2024-12-05 RX ADMIN — MORPHINE SULFATE 4 MG: 4 INJECTION INTRAVENOUS at 11:12

## 2024-12-05 RX ADMIN — HEPARIN SODIUM 30000 UNITS: 1000 INJECTION, SOLUTION INTRAVENOUS; SUBCUTANEOUS at 08:12

## 2024-12-05 RX ADMIN — MORPHINE SULFATE 4 MG: 4 INJECTION, SOLUTION INTRAMUSCULAR; INTRAVENOUS at 11:12

## 2024-12-05 RX ADMIN — DEXMEDETOMIDINE HYDROCHLORIDE 1.2 MCG/KG/HR: 400 INJECTION INTRAVENOUS at 02:12

## 2024-12-05 RX ADMIN — CLEVIPIDINE 3 MG/HR: 0.5 EMULSION INTRAVENOUS at 11:12

## 2024-12-05 RX ADMIN — INSULIN GLARGINE 35 UNITS: 100 INJECTION, SOLUTION SUBCUTANEOUS at 08:12

## 2024-12-05 NOTE — TRANSFER OF CARE
"Anesthesia Transfer of Care Note    Patient: Bharath Caballero    Procedure(s) Performed: Procedure(s) (LRB):  CORONARY ARTERY BYPASS GRAFT (CABG) (N/A)  HARVEST-VEIN-ENDOVASCULAR (Left)  Left atrial appendage occlusion (Left)    Patient location: ICU    Anesthesia Type: general    Transport from OR: Transported from OR intubated on 100% O2 by AMBU with assisted ventilation. Continuous ECG monitoring in transport. Continuous SpO2 monitoring in transport. Continuos invasive BP monitoring in transport    Post pain: adequate analgesia    Post assessment: no apparent anesthetic complications    Post vital signs: stable    Level of consciousness: sedated    Nausea/Vomiting: no nausea/vomiting    Complications: none    Transfer of care protocol was followed    Last vitals: Visit Vitals  BP (!) 189/103 (BP Location: Right arm, Patient Position: Lying)   Pulse 96   Temp 36.9 °C (98.5 °F) (Oral)   Resp 18   Ht 5' 8" (1.727 m)   Wt 91.8 kg (202 lb 6.1 oz)   SpO2 95%   BMI 30.77 kg/m²     "

## 2024-12-05 NOTE — ANESTHESIA PROCEDURE NOTES
Intubation    Date/Time: 12/5/2024 6:59 AM    Performed by: Andrez Hightower CRNA  Authorized by: Shobha Dunbar MD    Intubation:     Induction:  Intravenous    Intubated:  Postinduction    Mask Ventilation:  Easy mask    Attempts:  1    Attempted By:  Student    Method of Intubation:  Video laryngoscopy    Blade:  Ruvalcaba 3    Laryngeal View Grade: Grade I - full view of cords      Difficult Airway Encountered?: No      Complications:  None    Airway Device:  Oral endotracheal tube    Airway Device Size:  8.0    Style/Cuff Inflation:  Cuffed (inflated to minimal occlusive pressure)    Tube secured:  22    Secured at:  The lips    Placement Verified By:  Capnometry    Complicating Factors:  None    Findings Post-Intubation:  BS equal bilateral

## 2024-12-05 NOTE — H&P
[facility]  Pulmonary Critical Care Note    Patient Name: Bharath Caballero  MRN: [id number]  Admission Date: 12/5/2024  Hospital Length of Stay: 0 days  Code Status: Full Code  Attending Provider: Gillian Clayton MD  Primary Care Provider: Dat Beasley MD     Subjective:     HPI:   Bharath Caballero is a 43 yo M w PMHx of severe CAD, HLD, HTN, DM2, CKD stage 3, chronic pancreatitis, MASLD, CICI, GERD and BPH admitted to ICU following CABG x 2 12/5/24. He was recently admitted to the hospital on 10/7/24 with a NSTEMI, echocardiogram was done which showed EF of 45%, no major valvular abnormalities. Abnormal nuclear stress test with 2 x moderate to severe intensity reversible defects of LAD and RCA territories. He had a left heart catheterization done on 10/10/24 which showed two-vessel CAD, 50% proximal stenosis of the LAD and 50% distal stenosis, IFR of the proximal stenosis was done which was 0.75, he also has proximal RCA lesion of 90% stenosis. CABG x 2 12/5/24, no intraoperative complications documented. Remained on insulin drip intraoperatively. Patient was brought to ICU intubated and mechanically ventilated with mediastinal chest tube x 3 in place. Patient is currently sedated with Precedex currently at 0.08 mcg/kg/hr.     Hospital Course/Significant events:  CABG x 2 LIMA to mid LAD, reversed SVG to distal LAD and left atrial appendage ligation - 12/5/24  Admit to ICU - 12/5/24     24 Hour Interval History:  NA     Past Medical History:   Diagnosis Date    Abdominal pain     Acquired perforating dermatosis 08/30/2023    Anxiety     Aortic atherosclerosis 01/24/2023    Appetite loss     Balance problem     Bilateral edema of lower extremity 02/06/2023    Bladder outflow obstruction 06/20/2022    Blurred vision, right eye 10/19/2022    BMI 34.0-34.9,adult 06/20/2022    BPH (benign prostatic hyperplasia)     Chest pain     Chronic liver failure without hepatic coma 04/25/2023     Chronic pain 10/25/2022    Chronic pain syndrome 05/12/2022    Chronic pancreatitis 10/28/2017    Coronary artery disease, unspecified vessel or lesion type, unspecified whether angina present, unspecified whether native or transplanted heart     Deafness 10/03/2023    Depression     Diabetes     Diabetic polyneuropathy 06/20/2022    Elevated LFTs 08/18/2022    Fatigue     GERD (gastroesophageal reflux disease)     Hand paresthesia 06/20/2022    Headache     Hepatic steatosis     History of continuous positive airway pressure (CPAP) therapy at home     History of pancreatitis 06/20/2022    History of recent fall     Hypertension     Hypertriglyceridemia     Insomnia     Kidney disease     Kidney disorder     Leg pain     Mixed hyperlipidemia 10/28/2017    Mononeuritis multiplex 06/20/2022    Morbid obesity     Nausea & vomiting     Neuropathy     Nocturia 06/20/2022    NSTEMI (non-ST elevated myocardial infarction) 10/2024    OA (osteoarthritis)     Obesity     Obstructive sleep apnea syndrome 06/20/2022    Onychomycosis of multiple toenails with type 2 diabetes mellitus 10/28/2017    Open wound of finger of right hand 10/20/2023    CICI (obstructive sleep apnea)     uses CPAP    Painful diabetic neuropathy 05/12/2022    Personal history of colonic polyps 08/18/2022    Polyneuropathy     Primary hypertension 10/28/2017    Recurrent pancreatitis     Renal insufficiency     Tobacco abuse     Tobacco user 06/20/2022    Type 2 diabetes mellitus with skin complication, with long-term current use of insulin 02/06/2023    Urinary frequency     Use of cane as ambulatory aid     Weight loss, unintentional        Past Surgical History:   Procedure Laterality Date    ANGIOGRAM, CORONARY, WITH LEFT HEART CATHETERIZATION N/A 04/10/2023    Procedure: Angiogram, Coronary, with Left Heart Cath;  Surgeon: Jaswant Pugh MD;  Location: [facility] CATH LAB;  Service: Cardiology;  Laterality: N/A;    ANGIOGRAM, CORONARY, WITH LEFT HEART  CATHETERIZATION N/A 10/10/2024    Procedure: Angiogram, Coronary, with Left Heart Cath;  Surgeon: Jaswant Pugh MD;  Location: [facility] CATH LAB;  Service: Cardiology;  Laterality: N/A;    APPENDECTOMY      ARTERIOVENOUS ANASTOMOSIS, OPEN, UPPER ARM BASILLIC VEIN TRANSPOSITION N/A 2018    EGD, WITH CLOSED BIOPSY N/A 2023    Procedure: EGD, WITH CLOSED BIOPSY;  Surgeon: Christina James MD;  Location: [facility] ENDOSCOPY;  Service: Gastroenterology;  Laterality: N/A;    ESOPHAGOGASTRODUODENOSCOPY N/A 2021    EXCISION OF ARTERIOVENOUS FISTULA N/A 2018    FRACTIONAL FLOW RESERVE (FFR), CORONARY  04/10/2023    Procedure: Fractional Flow Schriever (FFR), Coronary;  Surgeon: Jaswant Pugh MD;  Location: [facility] CATH LAB;  Service: Cardiology;;    HERNIA REPAIR      INSPECTION OF UPPER INTESTINAL TRACT N/A 2021    PHERESIS OF PLASMA N/A 2018    PHERESIS OF PLASMA N/A 2018    TRIAL OF SPINAL CORD NERVE STIMULATOR N/A 2022    Procedure: TRIAL, NEUROSTIMULATOR, SPINAL CORD;  Surgeon: Jay Pozo MD;  Location: [facility] OR;  Service: Neurosurgery;  Laterality: N/A;    UPPER GI N/A 2021       Social History     Socioeconomic History    Marital status: Single   Tobacco Use    Smoking status: Former     Current packs/day: 0.00     Average packs/day: 0.5 packs/day for 28.3 years (14.1 ttl pk-yrs)     Types: Cigarettes     Start date: 1996     Quit date: 10/29/2024     Years since quittin.1    Smokeless tobacco: Never    Tobacco comments:     2 pk 25 years   Substance and Sexual Activity    Alcohol use: Not Currently    Drug use: Never    Sexual activity: Yes     Partners: Female     Social Drivers of Health     Financial Resource Strain: Patient Declined (10/7/2024)    Overall Financial Resource Strain (CARDIA)     Difficulty of Paying Living Expenses: Patient declined   Food Insecurity: Patient Declined (10/7/2024)    Hunger Vital Sign     Worried About Running  Out of Food in the Last Year: Patient declined     Ran Out of Food in the Last Year: Patient declined   Transportation Needs: Patient Declined (10/7/2024)    TRANSPORTATION NEEDS     Transportation : Patient declined   Physical Activity: Insufficiently Active (6/4/2024)    Exercise Vital Sign     Days of Exercise per Week: 7 days     Minutes of Exercise per Session: 10 min   Stress: Patient Declined (10/7/2024)    [employer] - Occupational Stress Questionnaire     Feeling of Stress : Patient declined   Housing Stability: Patient Declined (10/7/2024)    Housing Stability Vital Sign     Unable to Pay for Housing in the Last Year: Patient declined     Homeless in the Last Year: Patient declined           Current Outpatient Medications   Medication Instructions    amitriptyline (ELAVIL) 50 mg, Oral, Nightly    aspirin (ECOTRIN) 81 mg, Oral, Daily    atorvastatin (LIPITOR) 40 mg, Oral    carvediloL (COREG) 25 mg, Oral, 2 times daily with meals    cholecalciferol (vitamin D3) (VITAMIN D3) 2,000 Units, Oral, Daily    clonazePAM (KLONOPIN) 1 mg, Oral, 2 times daily    cloNIDine (CATAPRES) 0.3 mg, Oral, 3 times daily    clopidogreL (PLAVIX) 75 mg tablet Take 4 tablets on day 1 and then one tablet daily.    CREON 36,000-114,000- 180,000 unit CpDR TAKE ONE CAPSULE THREE TIMES DAILY    EScitalopram oxalate (LEXAPRO) 20 mg, Oral, Daily    esomeprazole (NEXIUM) 40 mg, Oral, Before breakfast    evolocumab (REPATHA SURECLICK) 140 mg, Subcutaneous, Every 14 days    FARXIGA 5 mg Tab tablet TAKE ONE TABLET BY MOUTH EVERY DAY    HUMALOG U-100 INSULIN 100 unit/mL injection INJECT 25 UNITS SUBCUTANEOUSLY BEFORE MEALS THREE TIMES DAILY    HYDROmorphone (DILAUDID) 8 mg, Oral, Every 8 hours PRN    isosorbide mononitrate (IMDUR) 120 mg, Oral, Daily    losartan (COZAAR) 100 mg, Oral, Daily    morphine (MS CONTIN) 100 mg, Oral, 3 times daily    NIFEdipine (PROCARDIA-XL) 60 mg, Oral, 2 times daily    nitroGLYCERIN  "(NITROSTAT) 0.3 mg, Sublingual, Every 5 min PRN    OXcarbazepine (TRILEPTAL) 600 mg, Oral, 2 times daily    potassium chloride SA (K-DUR,KLOR-CON) 20 MEQ tablet 20 mEq, Oral, 2 times daily    pregabalin (LYRICA) 150 mg, Oral, 3 times daily    ranolazine (RANEXA) 500 mg, Oral, 2 times daily    spironolactone (ALDACTONE) 100 mg, Oral, Daily    sucralfate (CARAFATE) 1 g, Oral, Before meals & nightly    SURE COMFORT INSULIN SYRINGE 0.5 mL 31 gauge x 5/16" Syrg USE ONE SYRINGE THREE TIMES DAILY    tamsulosin (FLOMAX) 0.4 mg Cap TAKE ONE CAPSULE BY MOUTH EVERY DAY    torsemide (DEMADEX) 20 mg, Oral, Daily    TOUJEO SOLOSTAR U-300 INSULIN 300 unit/mL (1.5 mL) InPn pen INJECT 35 SUBCUTANEOUSLY TWICE DAILY    TRADJENTA 5 mg, Oral, Daily    VASCEPA 2,000 mg, Oral, 2 times daily       Current Inpatient Medications   cefUROXime (ZINACEF) IVPB  1.5 g Intravenous Once Pre-Op       Current Intravenous Infusions   dexmedeTOMIDine (Precedex) infusion (titrating)  0-1.4 mcg/kg/hr Intravenous Continuous             ROS   Unable to obtain 2/2 patient intubated and sedated     Objective:       Intake/Output Summary (Last 24 hours) at 12/5/2024 1048  Last data filed at 12/5/2024 1025  Gross per 24 hour   Intake 571 ml   Output 380 ml   Net 191 ml         Vital Signs (Most Recent):  Temp: 98.5 °F (36.9 °C) (12/05/24 0515)  Pulse: 96 (12/05/24 0626)  Resp: 18 (12/05/24 0626)  BP: (!) 189/103 (12/05/24 0626)  SpO2: 95 % (RA) (12/05/24 0626)  Body mass index is 30.77 kg/m².  Weight: 91.8 kg (202 lb 6.1 oz) Vital Signs (24h Range):  Temp:  [98.5 °F (36.9 °C)] 98.5 °F (36.9 °C)  Pulse:  [92-96] 96  Resp:  [18-19] 18  SpO2:  [95 %-98 %] 95 %  BP: (181-189)/() 189/103     Physical Exam  Vitals and nursing note reviewed.   Constitutional:       Appearance: He is obese. He is not toxic-appearing or diaphoretic.      Interventions: He is sedated and intubated.   HENT:      Head: Normocephalic and atraumatic.      Mouth/Throat:      Pharynx: " No pharyngeal swelling.      Comments: ET tube occluding clear view of oropharynx   Eyes:      Pupils: Pupils are equal, round, and reactive to light.   Neck:      Comments: No JVD, no hepatojugular reflux.   Cardiovascular:      Rate and Rhythm: Normal rate.      Heart sounds: No murmur heard.  Pulmonary:      Effort: No respiratory distress. He is intubated.      Breath sounds: No wheezing, rhonchi or rales.      Comments: No dyssynchrony to mechanical ventilation   Abdominal:      General: Abdomen is flat. Bowel sounds are normal. There is no distension.      Palpations: Abdomen is soft.   Musculoskeletal:      Right lower leg: No edema.      Left lower leg: No edema.   Skin:     General: Skin is warm and dry.      Capillary Refill: Capillary refill takes less than 2 seconds.      Coloration: Skin is not jaundiced.      Comments: Sternotomy site appears well approximated, closed, and dry. No signs of active drainage or bleeding at this time. Mediastinal chest tube x 3 in place, no drainage in collection yet.    Neurological:      Comments: Limited 2/2 patient intubated and sedated    Psychiatric:      Comments: Limited 2/2 patient intubated and sedated            Lines/Drains/Airways       Central Venous Catheter Line  Duration             Percutaneous Central Line - Double Lumen  12/05/24 0518 <1 day              Drain  Duration                  Chest Tube 12/05/24 Mediastinal 24 Fr. <1 day         Chest Tube 12/05/24 Mediastinal;Pleural 28 Fr. <1 day         Chest Tube 12/05/24 Right Mediastinal;Pleural 24 Fr. <1 day         Urethral Catheter 12/05/24 0705 Temperature probe 16 Fr. <1 day              Airway  Duration                  Airway - Non-Surgical 12/05/24 0659 <1 day              Peripheral Intravenous Line  Duration                  Peripheral IV - Single Lumen 12/05/24 0550 18 G Right Antecubital <1 day                    Significant Labs:    Lab Results   Component Value Date    WBC 11.71 (H)  "11/29/2024    HGB 14.8 11/29/2024    HCT 43.8 11/29/2024    MCV 85.4 11/29/2024     11/29/2024           BMP  Lab Results   Component Value Date     11/29/2024    K 4.0 11/29/2024    CO2 26 11/29/2024    BUN 12.9 11/29/2024    CREATININE 1.23 11/29/2024    CALCIUM 9.1 11/29/2024    AGAP 9.0 11/29/2024    EGFRNONAA >60 07/20/2022         ABG  No results for input(s): "PH", "PO2", "PCO2", "HCO3", "POCBASEDEF" in the last 168 hours.    Mechanical Ventilation Support:         Significant Imaging:  I have reviewed the pertinent imaging within the past 24 hours.        Assessment/Plan:     Assessment  Severe CAD s/p CABG x 2 LIMA to mid LAD, reversed SVG to distal LAD and left atrial appendage ligation 12/5/24   HFpEF with EF 45% and Grade I Diastolic Dysfunction on CHIO 12/5/24   Neutrophil predominant Leukocytosis   Hypokalemia   HLD  HTN  DM2   CKD Stage G2/A3  GERD  Hx chronic pancreatitis and MASLD  Hx CICI   Hx BPH  Obesity       Plan  Admit to ICU for close monitoring postoperatively   Titrate mechanical ventilation per ARDS net protocol with SpO2 goal 94-96%, wean sedation as tolerated   Rest of management per CT surgery primary, pending postoperative recommendations     DVT Prophylaxis: SCDs   GI Prophylaxis: Pepcid, carafate      32 minutes of critical care was time spent personally by me on the following activities: development of treatment plan with patient or surrogate and bedside caregivers, discussions with consultants, evaluation of patient's response to treatment, examination of patient, ordering and performing treatments and interventions, ordering and review of laboratory studies, ordering and review of radiographic studies, pulse oximetry, re-evaluation of patient's condition.  This critical care time did not overlap with that of any other provider or involve time for any procedures.     Corby Piedra MD  Pulmonary Critical Care Medicine  DOS: 12/05/2024    "

## 2024-12-05 NOTE — ANESTHESIA PROCEDURE NOTES
Central Line    Diagnosis: Coronary Artery Disease  Patient location during procedure: done in OR  Procedure Urgency: Routine  Procedure start time: 12/5/2024 7:02 AM  Timeout: 12/5/2024 7:02 AM  Procedure end time: 12/5/2024 7:06 AM      Staffing  Authorizing Provider: Shobha Dunbar MD  Performing Provider: Shobha Dunbar MD    Staffing  Performed by: Shobha Dunbar MD  Authorized by: Shobha Dunbar MD    Anesthesiologist was present at the time of the procedure.  Preanesthetic Checklist  Completed: patient identified, risks and benefits discussed, monitors and equipment checked, timeout performed and anesthesia consent given  Indication   Indication: hemodynamic monitoring, vascular access, med administration     Anesthesia   general anesthesia    Central Line   Skin Prep: skin prepped with ChloraPrep, skin prep agent completely dried prior to procedure  Sterile Barriers Followed: Yes    All five maximal barriers used- gloves, gown, cap, mask, and large sterile sheet    hand hygiene performed prior to central venous catheter insertion  Location: left internal jugular.   Catheter type: double lumen  Catheter Size: 8 Fr  Inserted Catheter Length: 16 cm  Ultrasound: vascular probe with ultrasound   Vessel Caliber: medium, patent, compressibility normal  Needle advanced into vessel with real time Ultrasound guidance.  Guidewire confirmed in vessel.  Image recorded and saved.  sterile gel and probe cover used in ultrasound-guided central venous catheter insertion  Manometry: none  Insertion Attempts: 1   Securement:line sutured, chlorhexidine patch, sterile dressing applied and blood return through all ports    Post-Procedure   X-Ray: successful placement   Adverse Events:none      Guidewire Guidewire removed intact.

## 2024-12-05 NOTE — OP NOTE
Date of procedure: 12/5/2024    Surgeon: Gillian Clayton MD    Assistant: MARCELINA Parker    Preop diagnosis:  Severe coronary artery disease    Postop diagnosis: Same    Procedure:  Coronary artery bypass grafting X 2, LIMA to mid LAD, reversed SVG to distal LAD, Endoscopic venous harvesting of left greater saphenous vein, left atrial appendage ligation    Anesthesia:  General    Under informed consent the  patient was taken the OR, put in a supine position and  general anesthesia was induced and therefore maintained for remainder of the procedure.All the pressure points were padded equally. The skin over the chest abdomen and legs was prepped and draped in the usual sterile fashion. IV antibiotics were administered. Anesthesia has placed the appropriate lines.     Endoscopic venous harvesting was performed left lower extremity with good quality vein.  A Midline incision was made followed by taking down the subcutaneous tissue and fascia exposing the sternum that was penetrated in the midline. The left internal mammary artery was harvested on a pedicle, the patient was heparinized, the mammary was then divided distally. A midline retractor was placed, the pericardium was opened and pericardial stay sutures were placed. Ascending aortic cannulation was performed, the distal pedicle was ligated and clipped and proximal pedicle was controlled with a bulldog.The mammary had good flow in it.  Dual stage venous cannula was placed in the right atrium and when the ACT was therapeutic the patient went on full cardiopulmonary bypass with good emptying.  Cross-clamp was placed followed by antegrade dose of cardioplegia, 1L of del nido was given.  We had good either isoelectricity throughout the whole case.  The patient's temperature was allowed to drift to 34° C. The root vent was turned on following administration of cardioplegia    Examination over the lateral aspect of the heart revealed the left atrial appendage.  A 40mm penditure exclusion system was used to ligate the appendage.  The distal portion of the LAD was examined and was opened.  This portion was anastomosed to a reverse segment of saphenous vein with good flow down the graft, the vein was cut to the appropriate lengths. The LAD was then opened in the mid epicardium.  This was anastomosed to the mammary in running fashion with 7-0 prolene, the mammary pedicle was tacked to the epicardium.  When the mammary bulldog was released there was brisk blood flow down the LAD indicating a very patent anastomosis.  The patient was being rewarmed, the cross-clamp was removed and a partial occlusion clamp was placed.  One arteriotomy was performed and a 4.8 mm punch was used, the proximal anastomosis was made with running 6-0 Prolene.  The partial occluding clamp was released.  The left chest was evacuated of blood.  The proximal and distal anastomoses were checked for hemostasis.  When the patient was warm, he came off pump.  Heparin was reversed with protamine.  Ventricular pacing wires were placed. The mediastinum and left and right chests were drained. The patient was decannulated.  The sternum was wired and the wounds were closed in layers absorbable sutures. The patient tolerated the procedure well.  Postop CHIO revealed intact ventricular function, he was transferred to the ICU in acceptable condition.    CPB time: 76 mins    Cross clamp time: 50 mins    Dispostion: ICU

## 2024-12-05 NOTE — ANESTHESIA PROCEDURE NOTES
Intraoperative CHIO    Diagnosis: Coronary artery disease  Patient location during procedure: OR    Staffing  Authorizing Provider: Shobha Dunbar MD  Performing Provider: Shobha Dunbar MD    Staffing  Anesthesiologist Present  Yes  Setup & Induction  Patient preparation: bite block inserted  Probe Insertion: easy            Preoperative findings:     1. Right atrium, left atrium, left atrial appendage are well-visualized.  There was no thrombus, tumor evidence of an interatrial septal defect and atria normal in size.      2. Tricuspid valve is anatomically normal without stenosis or significant insufficiency.      3. Pulmonic valve is normal.      4. Right ventricular size and function is normal.      5. Mitral valve annulus is nondilated common leaflets are freely mobile and no stenosis and trivial regurgitation at the A2 P2 segment only is noted.      6. Aortic valve is a normal trileaflet valve without stenosis or insufficiency.      7. The ascending aorta, transverse arch and descending thoracic aorta to the level of diaphragm are well-visualized.  Is normal in caliber throughout its visualized portion.  Grade 2 and 3 atheroma is noted in the mid and lower thoracic aorta.    8. Left ventricular hypertrophy is seen with anterior wall measuring greater than 2.5 cm.  E to a ratio is 1, consistent with grade 1 diastolic dysfunction secondary to relaxation abnormality.  Overall systolic function is well-preserved ejection fraction 60% with no obvious focal segmental wall motion abnormality seen.    9. No pericardial or pleural effusions are seen.      Patient underwent two-vessel coronary artery bypass.  Defibrillated x1 after removal of the aortic cross-clamp and resumed normal sinus rhythm at a rate of 83 beats per minute.  Patient was weaned from cardiopulmonary bypass x1 attempt with no inotropic support required.      Postoperative findings 1. The left atrial appendage has been surgically excluded with a 3 mm  residual only noted.  No flow into the remainder of the appendage is appreciated.    2. Right ventricular function is normal.      3. No change in any valvular structures, a trivial mitral regurgitation only is appreciated as seen preoperatively    4. Again left ventricular hypertrophy is seen, significant.  Diastolic dysfunction again appreciated however overall systolic ejection fraction remains 60-65% with no focal segmental wall motion abnormality seen.    No other changes from the preoperative evaluation are noted.  Heparin was reversed with protamine and the chest was closed.  No interval changes were noted and the patient remained hemodynamically stable.  At the conclusion of the operation the transesophageal echo probe was removed atraumatically.

## 2024-12-06 LAB
ALBUMIN SERPL-MCNC: 2.5 G/DL (ref 3.5–5)
ALBUMIN/GLOB SERPL: 1 RATIO (ref 1.1–2)
ALP SERPL-CCNC: 179 UNIT/L (ref 40–150)
ALT SERPL-CCNC: 16 UNIT/L (ref 0–55)
ANION GAP SERPL CALC-SCNC: 11 MEQ/L
AST SERPL-CCNC: 33 UNIT/L (ref 5–34)
BASOPHILS # BLD AUTO: 0.09 X10(3)/MCL
BASOPHILS NFR BLD AUTO: 0.4 %
BILIRUB SERPL-MCNC: 0.5 MG/DL
BUN SERPL-MCNC: 9.6 MG/DL (ref 8.9–20.6)
CALCIUM SERPL-MCNC: 7.6 MG/DL (ref 8.4–10.2)
CHLORIDE SERPL-SCNC: 108 MMOL/L (ref 98–107)
CO2 SERPL-SCNC: 22 MMOL/L (ref 22–29)
CREAT SERPL-MCNC: 0.84 MG/DL (ref 0.72–1.25)
CREAT/UREA NIT SERPL: 11
EOSINOPHIL # BLD AUTO: 0.09 X10(3)/MCL (ref 0–0.9)
EOSINOPHIL NFR BLD AUTO: 0.4 %
ERYTHROCYTE [DISTWIDTH] IN BLOOD BY AUTOMATED COUNT: 12.8 % (ref 11.5–17)
GFR SERPLBLD CREATININE-BSD FMLA CKD-EPI: >60 ML/MIN/1.73/M2
GLOBULIN SER-MCNC: 2.6 GM/DL (ref 2.4–3.5)
GLUCOSE SERPL-MCNC: 149 MG/DL (ref 74–100)
HCT VFR BLD AUTO: 35.7 % (ref 42–52)
HGB BLD-MCNC: 12.3 G/DL (ref 14–18)
IMM GRANULOCYTES # BLD AUTO: 0.11 X10(3)/MCL (ref 0–0.04)
IMM GRANULOCYTES NFR BLD AUTO: 0.5 %
LYMPHOCYTES # BLD AUTO: 2.07 X10(3)/MCL (ref 0.6–4.6)
LYMPHOCYTES NFR BLD AUTO: 8.8 %
MAGNESIUM SERPL-MCNC: 2.1 MG/DL (ref 1.6–2.6)
MCH RBC QN AUTO: 29.1 PG (ref 27–31)
MCHC RBC AUTO-ENTMCNC: 34.5 G/DL (ref 33–36)
MCV RBC AUTO: 84.6 FL (ref 80–94)
MONOCYTES # BLD AUTO: 1.38 X10(3)/MCL (ref 0.1–1.3)
MONOCYTES NFR BLD AUTO: 5.9 %
NEUTROPHILS # BLD AUTO: 19.77 X10(3)/MCL (ref 2.1–9.2)
NEUTROPHILS NFR BLD AUTO: 84 %
NRBC BLD AUTO-RTO: 0 %
PHOSPHATE SERPL-MCNC: 2.4 MG/DL (ref 2.3–4.7)
PLATELET # BLD AUTO: 214 X10(3)/MCL (ref 130–400)
PMV BLD AUTO: 11.2 FL (ref 7.4–10.4)
POCT GLUCOSE: 143 MG/DL (ref 70–110)
POCT GLUCOSE: 161 MG/DL (ref 70–110)
POCT GLUCOSE: 168 MG/DL (ref 70–110)
POCT GLUCOSE: 176 MG/DL (ref 70–110)
POCT GLUCOSE: 214 MG/DL (ref 70–110)
POTASSIUM SERPL-SCNC: 3.6 MMOL/L (ref 3.5–5.1)
PROT SERPL-MCNC: 5.1 GM/DL (ref 6.4–8.3)
RBC # BLD AUTO: 4.22 X10(6)/MCL (ref 4.7–6.1)
SODIUM SERPL-SCNC: 141 MMOL/L (ref 136–145)
WBC # BLD AUTO: 23.51 X10(3)/MCL (ref 4.5–11.5)

## 2024-12-06 PROCEDURE — 63600175 PHARM REV CODE 636 W HCPCS

## 2024-12-06 PROCEDURE — 99024 POSTOP FOLLOW-UP VISIT: CPT | Mod: POP,,, | Performed by: PHYSICIAN ASSISTANT

## 2024-12-06 PROCEDURE — 97163 PT EVAL HIGH COMPLEX 45 MIN: CPT

## 2024-12-06 PROCEDURE — 36415 COLL VENOUS BLD VENIPUNCTURE: CPT | Performed by: PHYSICIAN ASSISTANT

## 2024-12-06 PROCEDURE — 21400001 HC TELEMETRY ROOM

## 2024-12-06 PROCEDURE — 80053 COMPREHEN METABOLIC PANEL: CPT | Performed by: PHYSICIAN ASSISTANT

## 2024-12-06 PROCEDURE — 97110 THERAPEUTIC EXERCISES: CPT

## 2024-12-06 PROCEDURE — 25000003 PHARM REV CODE 250: Performed by: PHYSICIAN ASSISTANT

## 2024-12-06 PROCEDURE — 83735 ASSAY OF MAGNESIUM: CPT

## 2024-12-06 PROCEDURE — 85025 COMPLETE CBC W/AUTO DIFF WBC: CPT

## 2024-12-06 PROCEDURE — C9248 INJ, CLEVIDIPINE BUTYRATE: HCPCS | Performed by: STUDENT IN AN ORGANIZED HEALTH CARE EDUCATION/TRAINING PROGRAM

## 2024-12-06 PROCEDURE — 97166 OT EVAL MOD COMPLEX 45 MIN: CPT

## 2024-12-06 PROCEDURE — 94760 N-INVAS EAR/PLS OXIMETRY 1: CPT

## 2024-12-06 PROCEDURE — 63600175 PHARM REV CODE 636 W HCPCS: Performed by: PHYSICIAN ASSISTANT

## 2024-12-06 PROCEDURE — 84100 ASSAY OF PHOSPHORUS: CPT

## 2024-12-06 PROCEDURE — 63600175 PHARM REV CODE 636 W HCPCS: Performed by: STUDENT IN AN ORGANIZED HEALTH CARE EDUCATION/TRAINING PROGRAM

## 2024-12-06 RX ORDER — HYDROMORPHONE HYDROCHLORIDE 2 MG/1
8 TABLET ORAL EVERY 8 HOURS PRN
Status: DISCONTINUED | OUTPATIENT
Start: 2024-12-06 | End: 2024-12-10 | Stop reason: HOSPADM

## 2024-12-06 RX ORDER — IBUPROFEN 200 MG
24 TABLET ORAL
Status: DISCONTINUED | OUTPATIENT
Start: 2024-12-06 | End: 2024-12-10 | Stop reason: HOSPADM

## 2024-12-06 RX ORDER — ESCITALOPRAM OXALATE 10 MG/1
20 TABLET ORAL DAILY
Status: DISCONTINUED | OUTPATIENT
Start: 2024-12-06 | End: 2024-12-10 | Stop reason: HOSPADM

## 2024-12-06 RX ORDER — MUPIROCIN 20 MG/G
OINTMENT TOPICAL 2 TIMES DAILY
OUTPATIENT
Start: 2024-12-06 | End: 2024-12-11

## 2024-12-06 RX ORDER — ASPIRIN 81 MG/1
81 TABLET ORAL DAILY
OUTPATIENT
Start: 2024-12-06

## 2024-12-06 RX ORDER — OXCARBAZEPINE 300 MG/1
600 TABLET, FILM COATED ORAL 2 TIMES DAILY
Status: DISCONTINUED | OUTPATIENT
Start: 2024-12-06 | End: 2024-12-10 | Stop reason: HOSPADM

## 2024-12-06 RX ORDER — LOSARTAN POTASSIUM 50 MG/1
100 TABLET ORAL DAILY
Status: DISCONTINUED | OUTPATIENT
Start: 2024-12-06 | End: 2024-12-08

## 2024-12-06 RX ORDER — ICOSAPENT ETHYL 1 G/1
2 CAPSULE ORAL 2 TIMES DAILY
Status: DISCONTINUED | OUTPATIENT
Start: 2024-12-06 | End: 2024-12-10 | Stop reason: HOSPADM

## 2024-12-06 RX ORDER — CLOPIDOGREL BISULFATE 75 MG/1
75 TABLET ORAL DAILY
Status: DISCONTINUED | OUTPATIENT
Start: 2024-12-06 | End: 2024-12-10 | Stop reason: HOSPADM

## 2024-12-06 RX ORDER — POTASSIUM CHLORIDE 20 MEQ/1
20 TABLET, EXTENDED RELEASE ORAL 2 TIMES DAILY
Status: DISCONTINUED | OUTPATIENT
Start: 2024-12-06 | End: 2024-12-10 | Stop reason: HOSPADM

## 2024-12-06 RX ORDER — IBUPROFEN 200 MG
16 TABLET ORAL
Status: DISCONTINUED | OUTPATIENT
Start: 2024-12-06 | End: 2024-12-10 | Stop reason: HOSPADM

## 2024-12-06 RX ORDER — CLONAZEPAM 1 MG/1
1 TABLET ORAL 2 TIMES DAILY
Status: DISCONTINUED | OUTPATIENT
Start: 2024-12-06 | End: 2024-12-10 | Stop reason: HOSPADM

## 2024-12-06 RX ORDER — GLUCAGON 1 MG
1 KIT INJECTION
Status: DISCONTINUED | OUTPATIENT
Start: 2024-12-06 | End: 2024-12-10 | Stop reason: HOSPADM

## 2024-12-06 RX ORDER — POLYETHYLENE GLYCOL 3350 17 G/17G
17 POWDER, FOR SOLUTION ORAL DAILY PRN
OUTPATIENT
Start: 2024-12-06

## 2024-12-06 RX ORDER — ENOXAPARIN SODIUM 100 MG/ML
40 INJECTION SUBCUTANEOUS EVERY 24 HOURS
OUTPATIENT
Start: 2024-12-06

## 2024-12-06 RX ORDER — ATORVASTATIN CALCIUM 40 MG/1
40 TABLET, FILM COATED ORAL NIGHTLY
Status: DISCONTINUED | OUTPATIENT
Start: 2024-12-06 | End: 2024-12-10 | Stop reason: HOSPADM

## 2024-12-06 RX ORDER — TAMSULOSIN HYDROCHLORIDE 0.4 MG/1
1 CAPSULE ORAL DAILY
Status: DISCONTINUED | OUTPATIENT
Start: 2024-12-06 | End: 2024-12-10 | Stop reason: HOSPADM

## 2024-12-06 RX ORDER — PREGABALIN 150 MG/1
150 CAPSULE ORAL 3 TIMES DAILY
Status: DISCONTINUED | OUTPATIENT
Start: 2024-12-06 | End: 2024-12-10 | Stop reason: HOSPADM

## 2024-12-06 RX ORDER — BISACODYL 10 MG/1
10 SUPPOSITORY RECTAL DAILY PRN
OUTPATIENT
Start: 2024-12-06

## 2024-12-06 RX ORDER — NIFEDIPINE 60 MG/1
60 TABLET, EXTENDED RELEASE ORAL 2 TIMES DAILY
Status: DISCONTINUED | OUTPATIENT
Start: 2024-12-06 | End: 2024-12-10 | Stop reason: HOSPADM

## 2024-12-06 RX ORDER — TORSEMIDE 20 MG/1
20 TABLET ORAL DAILY
Status: DISCONTINUED | OUTPATIENT
Start: 2024-12-06 | End: 2024-12-08

## 2024-12-06 RX ORDER — ACETAMINOPHEN 650 MG/20.3ML
650 LIQUID ORAL EVERY 6 HOURS PRN
Status: DISCONTINUED | OUTPATIENT
Start: 2024-12-06 | End: 2024-12-10 | Stop reason: HOSPADM

## 2024-12-06 RX ORDER — RANOLAZINE 500 MG/1
500 TABLET, EXTENDED RELEASE ORAL 2 TIMES DAILY
Status: DISCONTINUED | OUTPATIENT
Start: 2024-12-06 | End: 2024-12-10 | Stop reason: HOSPADM

## 2024-12-06 RX ORDER — SPIRONOLACTONE 25 MG/1
100 TABLET ORAL DAILY
Status: DISCONTINUED | OUTPATIENT
Start: 2024-12-06 | End: 2024-12-08

## 2024-12-06 RX ORDER — DOCUSATE SODIUM 100 MG/1
200 CAPSULE, LIQUID FILLED ORAL 2 TIMES DAILY
OUTPATIENT
Start: 2024-12-06

## 2024-12-06 RX ADMIN — LOSARTAN POTASSIUM 100 MG: 50 TABLET, FILM COATED ORAL at 12:12

## 2024-12-06 RX ADMIN — CLEVIPIDINE 5 MG/HR: 0.5 EMULSION INTRAVENOUS at 05:12

## 2024-12-06 RX ADMIN — TAMSULOSIN HYDROCHLORIDE 0.4 MG: 0.4 CAPSULE ORAL at 08:12

## 2024-12-06 RX ADMIN — CEFAZOLIN SODIUM 2 G: 2 SOLUTION INTRAVENOUS at 08:12

## 2024-12-06 RX ADMIN — OXCARBAZEPINE 600 MG: 300 TABLET, FILM COATED ORAL at 08:12

## 2024-12-06 RX ADMIN — CLONIDINE HYDROCHLORIDE 0.3 MG: 0.2 TABLET ORAL at 12:12

## 2024-12-06 RX ADMIN — CLONAZEPAM 1 MG: 1 TABLET ORAL at 10:12

## 2024-12-06 RX ADMIN — ASPIRIN 81 MG: 81 TABLET, COATED ORAL at 08:12

## 2024-12-06 RX ADMIN — FOLIC ACID 1 MG: 1 TABLET ORAL at 08:12

## 2024-12-06 RX ADMIN — NIFEDIPINE 60 MG: 60 TABLET, FILM COATED, EXTENDED RELEASE ORAL at 12:12

## 2024-12-06 RX ADMIN — SUCRALFATE 1 G: 1 TABLET ORAL at 05:12

## 2024-12-06 RX ADMIN — MUPIROCIN: 20 OINTMENT TOPICAL at 08:12

## 2024-12-06 RX ADMIN — ONDANSETRON 4 MG: 2 INJECTION INTRAMUSCULAR; INTRAVENOUS at 08:12

## 2024-12-06 RX ADMIN — PREGABALIN 150 MG: 150 CAPSULE ORAL at 10:12

## 2024-12-06 RX ADMIN — DOCUSATE SODIUM 100 MG: 100 CAPSULE, LIQUID FILLED ORAL at 09:12

## 2024-12-06 RX ADMIN — METOPROLOL TARTRATE 12.5 MG: 25 TABLET, FILM COATED ORAL at 12:12

## 2024-12-06 RX ADMIN — DOCUSATE SODIUM 100 MG: 100 CAPSULE, LIQUID FILLED ORAL at 10:12

## 2024-12-06 RX ADMIN — SITAGLIPTIN 50 MG: 50 TABLET, FILM COATED ORAL at 08:12

## 2024-12-06 RX ADMIN — OXCARBAZEPINE 600 MG: 300 TABLET, FILM COATED ORAL at 10:12

## 2024-12-06 RX ADMIN — FAMOTIDINE 20 MG: 10 INJECTION, SOLUTION INTRAVENOUS at 08:12

## 2024-12-06 RX ADMIN — CLONIDINE HYDROCHLORIDE 0.3 MG: 0.2 TABLET ORAL at 02:12

## 2024-12-06 RX ADMIN — DEXMEDETOMIDINE HYDROCHLORIDE 1 MCG/KG/HR: 400 INJECTION INTRAVENOUS at 05:12

## 2024-12-06 RX ADMIN — POTASSIUM CHLORIDE 20 MEQ: 1500 TABLET, EXTENDED RELEASE ORAL at 08:12

## 2024-12-06 RX ADMIN — ATORVASTATIN CALCIUM 40 MG: 40 TABLET, FILM COATED ORAL at 10:12

## 2024-12-06 RX ADMIN — INSULIN ASPART 9 UNITS: 100 INJECTION, SOLUTION INTRAVENOUS; SUBCUTANEOUS at 01:12

## 2024-12-06 RX ADMIN — RANOLAZINE 500 MG: 500 TABLET, FILM COATED, EXTENDED RELEASE ORAL at 08:12

## 2024-12-06 RX ADMIN — RANOLAZINE 500 MG: 500 TABLET, FILM COATED, EXTENDED RELEASE ORAL at 10:12

## 2024-12-06 RX ADMIN — METOPROLOL TARTRATE 12.5 MG: 25 TABLET, FILM COATED ORAL at 10:12

## 2024-12-06 RX ADMIN — OXYCODONE HYDROCHLORIDE 10 MG: 10 TABLET ORAL at 05:12

## 2024-12-06 RX ADMIN — MORPHINE SULFATE 105 MG: 30 TABLET, FILM COATED, EXTENDED RELEASE ORAL at 09:12

## 2024-12-06 RX ADMIN — SUCRALFATE 1 G: 1 TABLET ORAL at 10:12

## 2024-12-06 RX ADMIN — SPIRONOLACTONE 100 MG: 25 TABLET ORAL at 08:12

## 2024-12-06 RX ADMIN — CLONAZEPAM 1 MG: 1 TABLET ORAL at 08:12

## 2024-12-06 RX ADMIN — ACETAMINOPHEN 1000 MG: 10 INJECTION, SOLUTION INTRAVENOUS at 05:12

## 2024-12-06 RX ADMIN — ESCITALOPRAM OXALATE 20 MG: 10 TABLET ORAL at 08:12

## 2024-12-06 RX ADMIN — CLOPIDOGREL BISULFATE 75 MG: 75 TABLET ORAL at 08:12

## 2024-12-06 RX ADMIN — PREGABALIN 150 MG: 150 CAPSULE ORAL at 08:12

## 2024-12-06 RX ADMIN — DEXMEDETOMIDINE HYDROCHLORIDE 1 MCG/KG/HR: 400 INJECTION INTRAVENOUS at 03:12

## 2024-12-06 RX ADMIN — DEXMEDETOMIDINE HYDROCHLORIDE 1 MCG/KG/HR: 400 INJECTION INTRAVENOUS at 04:12

## 2024-12-06 RX ADMIN — INSULIN GLARGINE 35 UNITS: 100 INJECTION, SOLUTION SUBCUTANEOUS at 10:12

## 2024-12-06 RX ADMIN — TORSEMIDE 20 MG: 20 TABLET ORAL at 08:12

## 2024-12-06 RX ADMIN — MORPHINE SULFATE 105 MG: 30 TABLET, FILM COATED, EXTENDED RELEASE ORAL at 02:12

## 2024-12-06 RX ADMIN — POTASSIUM CHLORIDE 20 MEQ: 1500 TABLET, EXTENDED RELEASE ORAL at 10:12

## 2024-12-06 RX ADMIN — INSULIN ASPART 6 UNITS: 100 INJECTION, SOLUTION INTRAVENOUS; SUBCUTANEOUS at 05:12

## 2024-12-06 RX ADMIN — POTASSIUM CHLORIDE 20 MEQ: 14.9 INJECTION, SOLUTION INTRAVENOUS at 05:12

## 2024-12-06 NOTE — PT/OT/SLP EVAL
Physical Therapy Evaluation    Patient Name:  Bharath Caballero   MRN:  [id number]    Recommendations:     Discharge therapy intensity: Low Intensity Therapy (pending progress)   Discharge Equipment Recommendations: bedside commode   Barriers to discharge: Impaired mobility and Ongoing medical needs    Assessment:     Bharath Caballero is a 44 y.o. male admitted with a medical diagnosis of CABG x2. Hx of HF, DM II, chronic pancreatitis, CICI, chronic pain, neuropathy, falls.  He presents with the following impairments/functional limitations: weakness, impaired endurance, impaired self care skills, impaired functional mobility, gait instability, impaired balance, pain.     At baseline, pt lives alone and ambulates with a straight cane. Currently, the pt is very pain-focused and fearful of mobility. Pt wanted to stay in the bed all day and was perseverating on wanting his stovall and all other lines taken out. Pt was educated on importance of post op mobility, keeping lines safe, and sternal precautions. Pt labile throughout eval and was cursing often and once threw his sternal bear in frustration. Pt required lots of redirection to task. Anticipate the pt to progress well as long as he participates with PT and staff.     Rehab Prognosis: Fair; patient would benefit from acute skilled PT services to address these deficits and reach maximum level of function.    Recent Surgery: Procedure(s) (LRB):  CORONARY ARTERY BYPASS GRAFT (CABG) (N/A)  HARVEST-VEIN-ENDOVASCULAR (Left)  Left atrial appendage occlusion (Left) 1 Day Post-Op    Plan:     During this hospitalization, patient would benefit from acute PT services 5 x/week to address the identified rehab impairments via gait training, therapeutic activities, therapeutic exercises and progress toward the following goals:    Plan of Care Expires:  01/06/25    Subjective     Chief Complaint: stovall catheter  Patient/Family Comments/goals: to get his catheter taken  out  Pain/Comfort:  Location 1: sternal  Pain Addressed 1: Pre-medicate for activity, Nurse notified    Patients cultural, spiritual, Protestant conflicts given the current situation: no    Living Environment:  Lives alone, 3 steps to enter. Sister in law plans to stay with pt at d/c. Brother will stay at night as well.   Prior to admission, patients level of function was ambulatory with cane.  Equipment used at home: walker, rolling, rollator, cane, straight, hospital bed.    Upon discharge, patient will have assistance from family.    Objective:     Communicated with nurse prior to session.  Patient found supine with chest tube, arterial line, blood pressure cuff, telemetry, pulse ox (continuous), peripheral IV  upon PT entry to room.    General Precautions: Standard, fall, sternal, hearing impaired  Orthopedic Precautions:    Braces: N/A  Respiratory Status: Room air  Blood Pressure: 139/82, 96%, 76 HR      Exams:  RLE ROM: WNL  RLE Strength: Deficits: grossly 4/5  LLE ROM: WNL  LLE Strength: Deficits: grossly 4/5  Skin integrity: Visible skin intact      Functional Mobility:  Bed Mobility:     Scooting: moderate assistance and of 2 persons  Supine to Sit: moderate assistance and of 2 persons  Transfers:     Sit to Stand:  minimum assistance and of 2 persons with rolling walker  Gait: 40ft with RW and Min x2 assist,  VERY slow pace with several standing rest breaks due to sternal pain, VC to continue.       AM-PAC 6 CLICK MOBILITY  Total Score:12       Treatment & Education:    Patient provided with verbal education education regarding PT role/goals/POC, post-op precautions, fall prevention, and safety awareness.  Additional teaching is warranted.     Patient left up in chair with all lines intact, call button in reach, and nurse notified.    GOALS:   Multidisciplinary Problems       Physical Therapy Goals          Problem: Physical Therapy    Goal Priority Disciplines Outcome Interventions   Physical Therapy  Goal     PT, PT/OT Progressing    Description: Goals to be met by: 2025     Patient will increase functional independence with mobility by performin. Supine to sit with Stand-by Assistance  2. Sit to stand transfer with Stand-by Assistance  3. Gait  x 150 feet with Stand-by Assistance using Rolling Walker.   4. Ascend/descend 3 stair with right Handrails Supervision using No Assistive Device.                          History:     Past Medical History:   Diagnosis Date    Abdominal pain     Acquired perforating dermatosis 2023    Anxiety     Aortic atherosclerosis 2023    Appetite loss     Balance problem     Bilateral edema of lower extremity 2023    Bladder outflow obstruction 2022    Blurred vision, right eye 10/19/2022    BMI 34.0-34.9,adult 2022    BPH (benign prostatic hyperplasia)     Chest pain     Chronic liver failure without hepatic coma 2023    Chronic pain 10/25/2022    Chronic pain syndrome 2022    Chronic pancreatitis 10/28/2017    Coronary artery disease, unspecified vessel or lesion type, unspecified whether angina present, unspecified whether native or transplanted heart     Deafness 10/03/2023    Depression     Diabetes     Diabetic polyneuropathy 2022    Elevated LFTs 2022    Fatigue     GERD (gastroesophageal reflux disease)     Hand paresthesia 2022    Headache     Hepatic steatosis     History of continuous positive airway pressure (CPAP) therapy at home     History of pancreatitis 2022    History of recent fall     Hypertension     Hypertriglyceridemia     Insomnia     Kidney disease     Kidney disorder     Leg pain     Mixed hyperlipidemia 10/28/2017    Mononeuritis multiplex 2022    Morbid obesity     Nausea & vomiting     Neuropathy     Nocturia 2022    NSTEMI (non-ST elevated myocardial infarction) 10/2024    OA (osteoarthritis)     Obesity     Obstructive sleep apnea syndrome 2022     Onychomycosis of multiple toenails with type 2 diabetes mellitus 10/28/2017    Open wound of finger of right hand 10/20/2023    CICI (obstructive sleep apnea)     uses CPAP    Painful diabetic neuropathy 05/12/2022    Personal history of colonic polyps 08/18/2022    Polyneuropathy     Primary hypertension 10/28/2017    Recurrent pancreatitis     Renal insufficiency     Tobacco abuse     Tobacco user 06/20/2022    Type 2 diabetes mellitus with skin complication, with long-term current use of insulin 02/06/2023    Urinary frequency     Use of cane as ambulatory aid     Weight loss, unintentional        Past Surgical History:   Procedure Laterality Date    ANGIOGRAM, CORONARY, WITH LEFT HEART CATHETERIZATION N/A 04/10/2023    Procedure: Angiogram, Coronary, with Left Heart Cath;  Surgeon: Jaswant Pugh MD;  Location: [facility] CATH LAB;  Service: Cardiology;  Laterality: N/A;    ANGIOGRAM, CORONARY, WITH LEFT HEART CATHETERIZATION N/A 10/10/2024    Procedure: Angiogram, Coronary, with Left Heart Cath;  Surgeon: Jaswant Pugh MD;  Location: [facility] CATH LAB;  Service: Cardiology;  Laterality: N/A;    APPENDECTOMY      ARTERIOVENOUS ANASTOMOSIS, OPEN, UPPER ARM BASILLIC VEIN TRANSPOSITION N/A 05/07/2018    CORONARY ARTERY BYPASS GRAFT (CABG) N/A 12/5/2024    Procedure: CORONARY ARTERY BYPASS GRAFT (CABG);  Surgeon: Gillian Clayton MD;  Location: [facility] OR;  Service: Cardiothoracic;  Laterality: N/A;  ECHO NOTIFIED    EGD, WITH CLOSED BIOPSY N/A 11/20/2023    Procedure: EGD, WITH CLOSED BIOPSY;  Surgeon: Christina James MD;  Location: [facility] ENDOSCOPY;  Service: Gastroenterology;  Laterality: N/A;    ENDOSCOPIC HARVEST OF VEIN Left 12/5/2024    Procedure: HARVEST-VEIN-ENDOVASCULAR;  Surgeon: Gillian Clayton MD;  Location: [facility] OR;  Service: Cardiothoracic;  Laterality: Left;    ESOPHAGOGASTRODUODENOSCOPY N/A 06/07/2021    EXCISION OF ARTERIOVENOUS FISTULA N/A 06/01/2018    FRACTIONAL FLOW RESERVE (FFR),  CORONARY  04/10/2023    Procedure: Fractional Flow [place] (FFR), Coronary;  Surgeon: Jaswant Pugh MD;  Location: [facility] CATH LAB;  Service: Cardiology;;    HERNIA REPAIR      INSPECTION OF UPPER INTESTINAL TRACT N/A 06/07/2021    OCCLUSION OF LEFT ATRIAL APPENDAGE Left 12/5/2024    Procedure: Left atrial appendage occlusion;  Surgeon: Gillian Clayton MD;  Location: [facility] OR;  Service: Cardiothoracic;  Laterality: Left;    PHERESIS OF PLASMA N/A 07/13/2018    PHERESIS OF PLASMA N/A 05/25/2018    TRIAL OF SPINAL CORD NERVE STIMULATOR N/A 05/12/2022    Procedure: TRIAL, NEUROSTIMULATOR, SPINAL CORD;  Surgeon: Jay Pozo MD;  Location: [facility] OR;  Service: Neurosurgery;  Laterality: N/A;    UPPER GI N/A 06/07/2021       Time Tracking:     PT Received On: 12/06/24  PT Start Time: 0945     PT Stop Time: 1035  PT Total Time (min): 50 min     Billable Minutes: Evaluation 50      12/06/2024

## 2024-12-06 NOTE — PROGRESS NOTES
CT SURGERY PROGRESS NOTE  Bharath Caballero  44 y.o.  1980    Patients Procedure: Procedure(s) (LRB):  CORONARY ARTERY BYPASS GRAFT (CABG) (N/A)  HARVEST-VEIN-ENDOVASCULAR (Left)  Left atrial appendage occlusion (Left)    Subjective  Interval History: POD 1           Date of procedure: 12/5/2024     Surgeon: Gillian Clayton MD     Assistant: MARCELINA Parker     Preop diagnosis:  Severe coronary artery disease     Postop diagnosis: Same     Procedure:  Coronary artery bypass grafting X 2, LIMA to mid LAD, reversed SVG to distal LAD, Endoscopic venous harvesting of left greater saphenous vein, left atrial appendage ligation             ROS    Medication List  Infusions   clevidipine  0-16 mg/hr Intravenous Continuous 10 mL/hr at 12/06/24 0543 5 mg/hr at 12/06/24 0543    dexmedeTOMIDine (Precedex) infusion (titrating)  0-1.4 mcg/kg/hr Intravenous Continuous 22.95 mL/hr at 12/06/24 0544 1 mcg/kg/hr at 12/06/24 0544    D5 and 0.45% NaCl   Intravenous Continuous 100 mL/hr at 12/06/24 0330 Rate Verify at 12/06/24 0330    EPINEPHrine  0-2 mcg/kg/min Intravenous Continuous   Held at 12/05/24 1145    insulin regular 1 units/mL infusion orderable (CTS POST-OP)  0-52 Units/hr Intravenous Continuous 1.8 mL/hr at 12/06/24 0330 1.8 Units/hr at 12/06/24 0330    loperamide  2 mg Oral Continuous PRN         Scheduled   acetaminophen  1,000 mg Intravenous Q6H    aspirin  81 mg Oral Daily    ceFAZolin (Ancef) IV (PEDS and ADULTS)  2 g Intravenous Q12H    docusate sodium  100 mg Oral BID    famotidine (PF)  20 mg Intravenous Daily    folic acid  1 mg Oral Daily    insulin glargine U-100  35 Units Subcutaneous BID    metoprolol tartrate  12.5 mg Oral BID    mupirocin   Nasal BID    sucralfate  1 g Oral QID (AC & HS)       Objective:  Recent Vitals:  Temp:  [98.5 °F (36.9 °C)-98.9 °F (37.2 °C)] 98.7 °F (37.1 °C)  Pulse:  [63-92] 67  Resp:  [15-30] 24  SpO2:  [91 %-100 %] 94 %  BP: (114-137)/(72-89) 137/85  Arterial Line BP:  (117-149)/(52-74) 143/58    Physical Exam     I/O last 24 hrs:  Intake/Output - Last 3 Shifts         12/04 0700 12/05 0659 12/05 0700 12/06 0659 12/06 0700 12/07 0659    P.O.  0     I.V. (mL/kg)  2466.2 (26.9)     Blood  321     IV Piggyback  1599.3     Total Intake(mL/kg)  4386.4 (47.8)     Urine (mL/kg/hr)  3805 (1.7)     Stool  0     Chest Tube  450     Total Output  4255     Net  +131.4            Urine Occurrence  0 x     Stool Occurrence  1 x             Labs  BMP:   Recent Labs   Lab 12/06/24  0329      K 3.6   *   CO2 22   BUN 9.6   CREATININE 0.84   CALCIUM 7.6*   MG 2.10     CBC:   Recent Labs   Lab 12/06/24 0329   WBC 23.51*   RBC 4.22*   HGB 12.3*   HCT 35.7*      MCV 84.6   MCH 29.1   MCHC 34.5     CMP:   Recent Labs   Lab 12/06/24  0329   CALCIUM 7.6*   ALBUMIN 2.5*      K 3.6   CO2 22   *   BUN 9.6   CREATININE 0.84   ALKPHOS 179*   ALT 16   AST 33   BILITOT 0.5         Imaging:   CXR: X-Ray Chest AP Portable    Result Date: 12/5/2024  Postoperative changes Electronically signed by: Keith Black Date:    12/05/2024 Time:    12:16         ASSESSMENT/PLAN:    POD 1  VSS- IV cleviprex  SR   CTA  INC CDI     Resume home meds   Issues with chronic pain   Mobilize    Case and plan of care discussed with MD Jaswant Corrales, PAKamiC

## 2024-12-06 NOTE — PLAN OF CARE
Problem: Physical Therapy  Goal: Physical Therapy Goal  Description: Goals to be met by: 2025     Patient will increase functional independence with mobility by performin. Supine to sit with Stand-by Assistance  2. Sit to stand transfer with Stand-by Assistance  3. Gait  x 150 feet with Stand-by Assistance using Rolling Walker.   4. Ascend/descend 3 stair with right Handrails Supervision using No Assistive Device.     Outcome: Progressing

## 2024-12-06 NOTE — PROGRESS NOTES
[facility] - Periop Services  Pulmonary Critical Care Note    Patient Name: Bharath Caballero  MRN: [id number]  Admission Date: 12/5/2024  Hospital Length of Stay: 1 days  Code Status: Full Code  Attending Provider: Gillian Clayton MD  Primary Care Provider: Dat Beasley MD     Subjective:     HPI:   Bharath Caballero is a 43 yo M w PMHx of severe CAD, HLD, HTN, DM2, CKD stage 3, chronic pancreatitis, MASLD, CICI, GERD and BPH admitted to ICU following CABG x 2 12/5/24. He was recently admitted to the hospital on 10/7/24 with a NSTEMI, echocardiogram was done which showed EF of 45%, no major valvular abnormalities. Abnormal nuclear stress test with 2 x moderate to severe intensity reversible defects of LAD and RCA territories. He had a left heart catheterization done on 10/10/24 which showed two-vessel CAD, 50% proximal stenosis of the LAD and 50% distal stenosis, IFR of the proximal stenosis was done which was 0.75, he also has proximal RCA lesion of 90% stenosis. CABG x 2 12/5/24, no intraoperative complications documented. Remained on insulin drip intraoperatively. Patient was brought to ICU intubated and mechanically ventilated with mediastinal chest tube x 3 in place. Patient is currently sedated with Precedex currently at 0.08 mcg/kg/hr.     Hospital Course/Significant events:  CABG x 2 LIMA to mid LAD, reversed SVG to distal LAD and left atrial appendage ligation - 12/5/24  Admit to ICU - 12/5/24     24 Hour Interval History:  POD1. Patient self extubated yesterday upon admission to ICU. NAEO documented, VSS with mild tachypnea RR20s and AF with IV cleviprex. Appears to be in sinus rhythm with HR in 60-70s. Sedation with Precedex. MMPC escalated per primary yesterday to include home pain regimen for uncontrolled incisional pain. Patient in bed this AM, reports continued uncontrolled incisional pain. Denies anginal pain, shortness of breath, trouble breathing. R CT output 80 cc, pleural CT output  350 cc, and L CT output 20cc. CV surgery primary, transition to home meds today and mobilize.     Past Medical History:   Diagnosis Date    Abdominal pain     Acquired perforating dermatosis 08/30/2023    Anxiety     Aortic atherosclerosis 01/24/2023    Appetite loss     Balance problem     Bilateral edema of lower extremity 02/06/2023    Bladder outflow obstruction 06/20/2022    Blurred vision, right eye 10/19/2022    BMI 34.0-34.9,adult 06/20/2022    BPH (benign prostatic hyperplasia)     Chest pain     Chronic liver failure without hepatic coma 04/25/2023    Chronic pain 10/25/2022    Chronic pain syndrome 05/12/2022    Chronic pancreatitis 10/28/2017    Coronary artery disease, unspecified vessel or lesion type, unspecified whether angina present, unspecified whether native or transplanted heart     Deafness 10/03/2023    Depression     Diabetes     Diabetic polyneuropathy 06/20/2022    Elevated LFTs 08/18/2022    Fatigue     GERD (gastroesophageal reflux disease)     Hand paresthesia 06/20/2022    Headache     Hepatic steatosis     History of continuous positive airway pressure (CPAP) therapy at home     History of pancreatitis 06/20/2022    History of recent fall     Hypertension     Hypertriglyceridemia     Insomnia     Kidney disease     Kidney disorder     Leg pain     Mixed hyperlipidemia 10/28/2017    Mononeuritis multiplex 06/20/2022    Morbid obesity     Nausea & vomiting     Neuropathy     Nocturia 06/20/2022    NSTEMI (non-ST elevated myocardial infarction) 10/2024    OA (osteoarthritis)     Obesity     Obstructive sleep apnea syndrome 06/20/2022    Onychomycosis of multiple toenails with type 2 diabetes mellitus 10/28/2017    Open wound of finger of right hand 10/20/2023    CICI (obstructive sleep apnea)     uses CPAP    Painful diabetic neuropathy 05/12/2022    Personal history of colonic polyps 08/18/2022    Polyneuropathy     Primary hypertension 10/28/2017    Recurrent pancreatitis     Renal  insufficiency     Tobacco abuse     Tobacco user 06/20/2022    Type 2 diabetes mellitus with skin complication, with long-term current use of insulin 02/06/2023    Urinary frequency     Use of cane as ambulatory aid     Weight loss, unintentional        Past Surgical History:   Procedure Laterality Date    ANGIOGRAM, CORONARY, WITH LEFT HEART CATHETERIZATION N/A 04/10/2023    Procedure: Angiogram, Coronary, with Left Heart Cath;  Surgeon: Jaswant Pugh MD;  Location: [facility] CATH LAB;  Service: Cardiology;  Laterality: N/A;    ANGIOGRAM, CORONARY, WITH LEFT HEART CATHETERIZATION N/A 10/10/2024    Procedure: Angiogram, Coronary, with Left Heart Cath;  Surgeon: Jaswant Pugh MD;  Location: [facility] CATH LAB;  Service: Cardiology;  Laterality: N/A;    APPENDECTOMY      ARTERIOVENOUS ANASTOMOSIS, OPEN, UPPER ARM BASILLIC VEIN TRANSPOSITION N/A 05/07/2018    EGD, WITH CLOSED BIOPSY N/A 11/20/2023    Procedure: EGD, WITH CLOSED BIOPSY;  Surgeon: Christina James MD;  Location: [facility] ENDOSCOPY;  Service: Gastroenterology;  Laterality: N/A;    ESOPHAGOGASTRODUODENOSCOPY N/A 06/07/2021    EXCISION OF ARTERIOVENOUS FISTULA N/A 06/01/2018    FRACTIONAL FLOW RESERVE (FFR), CORONARY  04/10/2023    Procedure: Fractional Flow [place] (FFR), Coronary;  Surgeon: Jaswant Pugh MD;  Location: [facility] CATH LAB;  Service: Cardiology;;    HERNIA REPAIR      INSPECTION OF UPPER INTESTINAL TRACT N/A 06/07/2021    PHERESIS OF PLASMA N/A 07/13/2018    PHERESIS OF PLASMA N/A 05/25/2018    TRIAL OF SPINAL CORD NERVE STIMULATOR N/A 05/12/2022    Procedure: TRIAL, NEUROSTIMULATOR, SPINAL CORD;  Surgeon: Jay Pozo MD;  Location: [facility] OR;  Service: Neurosurgery;  Laterality: N/A;    UPPER GI N/A 06/07/2021       Social History     Socioeconomic History    Marital status: Single   Tobacco Use    Smoking status: Former     Current packs/day: 0.00     Average packs/day: 0.5 packs/day for 28.3 years (14.1 ttl pk-yrs)     Types:  Cigarettes     Start date: 1996     Quit date: 10/29/2024     Years since quittin.1    Smokeless tobacco: Never    Tobacco comments:     1/2 pk 25 years   Substance and Sexual Activity    Alcohol use: Not Currently    Drug use: Never    Sexual activity: Yes     Partners: Female     Social Drivers of Health     Financial Resource Strain: Patient Declined (10/7/2024)    Overall Financial Resource Strain (CARDIA)     Difficulty of Paying Living Expenses: Patient declined   Food Insecurity: Patient Declined (10/7/2024)    Hunger Vital Sign     Worried About Running Out of Food in the Last Year: Patient declined     Ran Out of Food in the Last Year: Patient declined   Transportation Needs: Patient Declined (10/7/2024)    TRANSPORTATION NEEDS     Transportation : Patient declined   Physical Activity: Insufficiently Active (2024)    Exercise Vital Sign     Days of Exercise per Week: 7 days     Minutes of Exercise per Session: 10 min   Stress: Patient Declined (10/7/2024)    [employer] - Occupational Stress Questionnaire     Feeling of Stress : Patient declined   Housing Stability: Patient Declined (10/7/2024)    Housing Stability Vital Sign     Unable to Pay for Housing in the Last Year: Patient declined     Homeless in the Last Year: Patient declined           Current Outpatient Medications   Medication Instructions    amitriptyline (ELAVIL) 50 mg, Oral, Nightly    aspirin (ECOTRIN) 81 mg, Oral, Daily    atorvastatin (LIPITOR) 40 mg, Oral    carvediloL (COREG) 25 mg, Oral, 2 times daily with meals    cholecalciferol (vitamin D3) (VITAMIN D3) 2,000 Units, Oral, Daily    clonazePAM (KLONOPIN) 1 mg, Oral, 2 times daily    cloNIDine (CATAPRES) 0.3 mg, Oral, 3 times daily    clopidogreL (PLAVIX) 75 mg tablet Take 4 tablets on day 1 and then one tablet daily.    CREON 36,000-114,000- 180,000 unit CpDR TAKE ONE CAPSULE THREE TIMES DAILY    EScitalopram oxalate (LEXAPRO) 20 mg, Oral,  "Daily    esomeprazole (NEXIUM) 40 mg, Oral, Before breakfast    evolocumab (REPATHA SURECLICK) 140 mg, Subcutaneous, Every 14 days    FARXIGA 5 mg Tab tablet TAKE ONE TABLET BY MOUTH EVERY DAY    HUMALOG U-100 INSULIN 100 unit/mL injection INJECT 25 UNITS SUBCUTANEOUSLY BEFORE MEALS THREE TIMES DAILY    HYDROmorphone (DILAUDID) 8 mg, Oral, Every 8 hours PRN    isosorbide mononitrate (IMDUR) 120 mg, Oral, Daily    losartan (COZAAR) 100 mg, Oral, Daily    morphine (MS CONTIN) 100 mg, Oral, 3 times daily    NIFEdipine (PROCARDIA-XL) 60 mg, Oral, 2 times daily    nitroGLYCERIN (NITROSTAT) 0.3 mg, Sublingual, Every 5 min PRN    OXcarbazepine (TRILEPTAL) 600 mg, Oral, 2 times daily    potassium chloride SA (K-DUR,KLOR-CON) 20 MEQ tablet 20 mEq, Oral, 2 times daily    pregabalin (LYRICA) 150 mg, Oral, 3 times daily    ranolazine (RANEXA) 500 mg, Oral, 2 times daily    spironolactone (ALDACTONE) 100 mg, Oral, Daily    sucralfate (CARAFATE) 1 g, Oral, Before meals & nightly    SURE COMFORT INSULIN SYRINGE 0.5 mL 31 gauge x 5/16" Syrg USE ONE SYRINGE THREE TIMES DAILY    tamsulosin (FLOMAX) 0.4 mg Cap TAKE ONE CAPSULE BY MOUTH EVERY DAY    torsemide (DEMADEX) 20 mg, Oral, Daily    TOUJEO SOLOSTAR U-300 INSULIN 300 unit/mL (1.5 mL) InPn pen INJECT 35 SUBCUTANEOUSLY TWICE DAILY    TRADJENTA 5 mg, Oral, Daily    VASCEPA 2,000 mg, Oral, 2 times daily       Current Inpatient Medications   acetaminophen  1,000 mg Intravenous Q6H    aspirin  81 mg Oral Daily    atorvastatin  40 mg Oral QHS    ceFAZolin (Ancef) IV (PEDS and ADULTS)  2 g Intravenous Q12H    clonazePAM  1 mg Oral BID    cloNIDine  0.3 mg Oral TID    clopidogreL  75 mg Oral Daily    docusate sodium  100 mg Oral BID    EScitalopram oxalate  20 mg Oral Daily    famotidine (PF)  20 mg Intravenous Daily    folic acid  1 mg Oral Daily    icosapent ethyL  2 capsule Oral BID    insulin glargine U-100  35 Units Subcutaneous BID    losartan  100 mg Oral Daily    metoprolol " tartrate  12.5 mg Oral BID    morphine  105 mg Oral TID    mupirocin   Nasal BID    NIFEdipine  60 mg Oral BID    OXcarbazepine  600 mg Oral BID    potassium chloride SA  20 mEq Oral BID    pregabalin  150 mg Oral TID    ranolazine  500 mg Oral BID    SITagliptin phosphate  50 mg Oral Daily    spironolactone  100 mg Oral Daily    sucralfate  1 g Oral QID (AC & HS)    tamsulosin  1 capsule Oral Daily    torsemide  20 mg Oral Daily       Current Intravenous Infusions   clevidipine  0-16 mg/hr Intravenous Continuous 10 mL/hr at 12/06/24 0543 5 mg/hr at 12/06/24 0543    dexmedeTOMIDine (Precedex) infusion (titrating)  0-1.4 mcg/kg/hr Intravenous Continuous 22.95 mL/hr at 12/06/24 0544 1 mcg/kg/hr at 12/06/24 0544    D5 and 0.45% NaCl   Intravenous Continuous 100 mL/hr at 12/06/24 0330 Rate Verify at 12/06/24 0330    EPINEPHrine  0-2 mcg/kg/min Intravenous Continuous   Held at 12/05/24 1145    insulin regular 1 units/mL infusion orderable (CTS POST-OP)  0-52 Units/hr Intravenous Continuous 1.8 mL/hr at 12/06/24 0330 1.8 Units/hr at 12/06/24 0330    loperamide  2 mg Oral Continuous PRN             Review of Systems   Constitutional:  Negative for chills, fever and malaise/fatigue.   Respiratory:  Negative for sputum production, shortness of breath and wheezing.    Cardiovascular:  Positive for chest pain. Negative for palpitations, leg swelling and PND.        Incisional chest pain    Gastrointestinal:  Negative for abdominal pain, blood in stool, constipation, diarrhea, melena, nausea and vomiting.   Musculoskeletal:  Positive for back pain and myalgias. Negative for falls, joint pain and neck pain.   Neurological:  Negative for sensory change, focal weakness, seizures, loss of consciousness and headaches.   Psychiatric/Behavioral:  Negative for depression, hallucinations and suicidal ideas.       Unable to obtain 2/2 patient intubated and sedated     Objective:       Intake/Output Summary (Last 24 hours) at 12/6/2024  0744  Last data filed at 12/6/2024 0609  Gross per 24 hour   Intake 4386.44 ml   Output 3955 ml   Net 431.44 ml         Vital Signs (Most Recent):  Temp: 98.7 °F (37.1 °C) (12/06/24 0400)  Pulse: 67 (12/06/24 0600)  Resp: (!) 24 (12/06/24 0600)  BP: 137/85 (12/06/24 0600)  SpO2: (!) 94 % (12/06/24 0600)  Body mass index is 30.77 kg/m².  Weight: 91.8 kg (202 lb 6.1 oz) Vital Signs (24h Range):  Temp:  [98.5 °F (36.9 °C)-98.9 °F (37.2 °C)] 98.7 °F (37.1 °C)  Pulse:  [63-92] 67  Resp:  [15-30] 24  SpO2:  [91 %-100 %] 94 %  BP: (114-137)/(72-89) 137/85  Arterial Line BP: (117-149)/(52-74) 143/58     Physical Exam  Vitals and nursing note reviewed.   Constitutional:       General: He is in acute distress.      Appearance: He is obese. He is not toxic-appearing or diaphoretic.      Interventions: He is sedated. He is not intubated.  HENT:      Head: Normocephalic and atraumatic.      Nose: Nose normal. No congestion or rhinorrhea.      Mouth/Throat:      Mouth: Mucous membranes are moist.      Pharynx: Oropharynx is clear. No pharyngeal swelling.   Eyes:      Pupils: Pupils are equal, round, and reactive to light.   Neck:      Comments: No JVD, no hepatojugular reflux.   Cardiovascular:      Rate and Rhythm: Normal rate.      Heart sounds: No murmur heard.  Pulmonary:      Effort: Tachypnea present. No accessory muscle usage, prolonged expiration, respiratory distress or retractions. He is not intubated.      Breath sounds: Normal breath sounds and air entry. No decreased air movement. No decreased breath sounds, wheezing, rhonchi or rales.      Comments: Normal labor of breathing   Abdominal:      General: Abdomen is flat. Bowel sounds are normal. There is no distension.      Palpations: Abdomen is soft.   Musculoskeletal:      Cervical back: Normal range of motion.      Right lower leg: No edema.      Left lower leg: No edema.   Skin:     General: Skin is warm and dry.      Capillary Refill: Capillary refill takes less  than 2 seconds.      Coloration: Skin is not jaundiced.      Comments: Sternotomy site appears well approximated, closed, and dry. No signs of active drainage or bleeding at this time. Mediastinal chest tube x 3 in place, serosanginous output in all tubes.    Neurological:      General: No focal deficit present.      Mental Status: He is alert and oriented to person, place, and time.      GCS: GCS eye subscore is 4. GCS verbal subscore is 5. GCS motor subscore is 6.   Psychiatric:         Behavior: Behavior is uncooperative and agitated.      Comments: Limited 2/2 patient cooperation            Lines/Drains/Airways       Central Venous Catheter Line  Duration             Percutaneous Central Line - Double Lumen  12/05/24 0518 1 day              Drain  Duration                  Chest Tube 12/05/24 Left Mediastinal 24 Fr. 1 day         Chest Tube 12/05/24 Mediastinal;Pleural 28 Fr. 1 day         Chest Tube 12/05/24 Right Mediastinal;Pleural 24 Fr. 1 day         Urethral Catheter 12/05/24 0705 Temperature probe 16 Fr. 1 day              Peripheral Intravenous Line  Duration                  Peripheral IV - Single Lumen 12/05/24 0550 18 G Right Antecubital 1 day                    Significant Labs:    Lab Results   Component Value Date    WBC 23.51 (H) 12/06/2024    HGB 12.3 (L) 12/06/2024    HCT 35.7 (L) 12/06/2024    MCV 84.6 12/06/2024     12/06/2024           BMP  Lab Results   Component Value Date     12/06/2024    K 3.6 12/06/2024    CO2 22 12/06/2024    BUN 9.6 12/06/2024    CREATININE 0.84 12/06/2024    CALCIUM 7.6 (L) 12/06/2024    AGAP 11.0 12/06/2024    EGFRNONAA >60 07/20/2022         ABG  Recent Labs   Lab 12/05/24  1637   PH 7.380   PO2 53.0*   PCO2 50.0*   HCO3 29.6*   POCBASEDEF 3.50       Mechanical Ventilation Support:  Vent Mode: SIMV (12/05/24 1117)  Set Rate: 20 BPM (12/05/24 1117)  Vt Set: 450 mL (12/05/24 1117)  Pressure Support: 10 cmH20 (12/05/24 1117)  PEEP/CPAP: 5 cmH20 (12/05/24  1117)  Oxygen Concentration (%): (S) 40 (12/05/24 1205)  Peak Airway Pressure: 21 cmH20 (12/05/24 1117)  Total Ve: 7.9 L/m (12/05/24 1117)  F/VT Ratio<105 (RSBI): (!) 51.28 (12/05/24 1117)      Significant Imaging:  I have reviewed the pertinent imaging within the past 24 hours.        Assessment/Plan:     Assessment  Severe CAD s/p CABG x 2 LIMA to mid LAD, reversed SVG to distal LAD and left atrial appendage ligation 12/5/24   HFpEF with EF 45% and Grade I Diastolic Dysfunction on CHIO 12/5/24   Neutrophil predominant Leukocytosis   Hypokalemia   HLD  HTN  DM2   CKD Stage G2/A3  GERD  Hx chronic pancreatitis and MASLD  Hx CICI   Hx BPH  Obesity       Plan  POD1, doing well with uncontrolled incisional pain.    Self extubated 12/6 upon admission, stable respiratory status since admission   Wean sedation as tolerated   Rest of management per CT surgery primary  Will restart home PO medications for Cleviprex wean   Mobilize   Medically stable for downgrade to floor after weaning Cleviprex and Precedex     DVT Prophylaxis: SCDs   GI Prophylaxis: Pepcid, carafate      32 minutes of critical care was time spent personally by me on the following activities: development of treatment plan with patient or surrogate and bedside caregivers, discussions with consultants, evaluation of patient's response to treatment, examination of patient, ordering and performing treatments and interventions, ordering and review of laboratory studies, ordering and review of radiographic studies, pulse oximetry, re-evaluation of patient's condition.  This critical care time did not overlap with that of any other provider or involve time for any procedures.     Corby Piedra MD  Pulmonary Critical Care Medicine  DOS: 12/06/2024

## 2024-12-06 NOTE — PROGRESS NOTES
12/06/24 1510   Pre Exercise Vitals   /70   Pulse 89   Supplemental O2? No   SpO2 94 %   During Exercise Vitals   Pulse 96   Supplemental O2? No   SpO2 96 %   Distance Walked 150 feet   Post Exercise Vitals   /69   Pulse 99   Supplemental O2? No   SpO2 95 %   Modality   Modality   (rollator)     Communicated with nurse prior to and post activity.   Up in chair pre and post activity.   CT x3 to suction cont.  Min assist sit to stand, maintains sternal precautions with brief verbal reminder.  Noted that pt was sitting on bedpan placed on chair--removed.  Gait slightly unsteady, required seated rest break x1 and standing rest breaks x2.  (Pre-existing neuropathy, chronic pain with stimulator).  Pt nearly deaf--spoke to nurse to place sign on door.  IS provided and pt performed x3 to 1500ml.  Brother and friend present.  Call bell within reach.  Encouraged increased activity and use of IS.

## 2024-12-06 NOTE — PT/OT/SLP EVAL
Occupational Therapy  Evaluation    Name: Bharath Caballero  MRN: [id number]  Admitting Diagnosis: s/p CABG  Recent Surgery: Procedure(s) (LRB):  CORONARY ARTERY BYPASS GRAFT (CABG) (N/A)  HARVEST-VEIN-ENDOVASCULAR (Left)  Left atrial appendage occlusion (Left) 1 Day Post-Op    Recommendations:     Discharge therapy intensity: Low Intensity Therapy (pending progress)   Discharge Equipment Recommendations:  bedside commode, bath bench  Barriers to discharge:  Other (Comment) (ongoing medical needs)    Assessment:     Bharath Caballero is a 44 y.o. male with a medical diagnosis of CABG x2. Hx of HF, DM II, chronic pancreatitis, CICI, chronic pain, neuropathy, falls.  He is A&Ox3 and tolerated OT evaluation fairly well. He presents anxious with mobility and fixated on pain due to stovall catheter throughout session. Pt perseverating on wanting to stay in bed and have tsovall catheter and all lines removed. Pt educated on importance of mobility for recovery and sternal precautions. He required verbal and tactile cues throughout for adherence to precautions as well as encouragement to participate. Pt emotionally labile throughout session; noted to curse and threw his sternal bear one time. He presents with the following performance deficits affecting function: weakness, impaired endurance, impaired self care skills, impaired functional mobility, gait instability, impaired balance, decreased safety awareness, pain, decreased lower extremity function, decreased upper extremity function. He required mod A x2 for bed mobility and min A x2 for functional mobility using rolling walker; pt required increased time to complete all tasks. Recommend moderate intensity therapy upon d/c.     Rehab Prognosis: Good; patient would benefit from acute skilled OT services to address these deficits and reach maximum level of function.       Plan:     Patient to be seen 5 x/week to address the above listed problems via self-care/home management, therapeutic  activities, therapeutic exercises  Plan of Care Expires: 01/06/25  Plan of Care Reviewed with: patient, family    Subjective     Chief Complaint: stovall cath pain   Patient/Family Comments/goals: to have all lines removed     Occupational Profile:  Living Environment: Pt lives alone in a single level home with x3 steps to enter. Pt has a tub/shower combo with no shower chair.   Previous level of function: Pt reports being independent with ADLs prior.   Roles and Routines: brother   Equipment Used at Home: walker, rolling, cane, straight, hospital bed, rollator  Assistance upon Discharge: Pt will have assist from his brother and sister-in-law upon d/c.     Pain/Comfort:  Pain Rating 1: other (see comments) (verbalized pain but did not rate)  Location 1:  (sternal and groin)  Pain Addressed 1: Pre-medicate for activity, Nurse notified    Patients cultural, spiritual, Jehovah's witness conflicts given the current situation: no    Objective:     OT communicated with RN prior to session.      Patient was found HOB elevated with chest tube, arterial line, blood pressure cuff, pulse ox (continuous), telemetry, peripheral IV, stovall catheter upon OT entry to room.    General Precautions: Standard, fall, sternal, hearing impaired  Orthopedic Precautions: N/A  Braces: N/A    Vital Signs: Blood Pressure: 139/82  Sp02: 96%  Respiratory Status: on room air    Bed Mobility:    Patient completed Supine to Sit with moderate assistance and x2 persons    Functional Mobility/Transfers:  Patient completed Sit <> Stand Transfer with minimum assistance and of x2 persons  with  rolling walker   Patient completed Bed <> Chair Transfer using Step Transfer technique with minimum assistance and of x2 persons with rolling walker  Functional Mobility: pt ambulated 40 ft with RW with min A x2 and required multiple standing rest breaks due to pain.     Activities of Daily Living:  Lower Body Dressing: maximal assistance to don socks.     [facility] 6 Click  [facility]    Functional Cognition:  Orientation: oriented to Person, Place, and Time  Safety Awareness: Impaired.      Visual Perceptual Skills:  Intact    Upper Extremity Function:  Right Upper Extremity:   WFL within sternal precautions.     Left Upper Extremity:  WFL within sternal precautions.     Balance:   SBA for static sitting balance, min A x2 for mobility     Therapeutic Positioning  Risk for acquired pressure injuries is increased due to relative decrease in mobility d/t hospitalization  and impaired mobility.    OT interventions performed during the course of today's session:   Therapeutic positioning was provided at the conclusion of session to offload all bony prominences for the prevention and/or reduction of pressure injuries    Skin assessment: all bony prominences were assessed    Findings:  sternal incison.     OT recommendations for therapeutic positioning throughout hospitalization:   Follow [facility] Pressure Injury Prevention Protocol    Patient Education:  Patient and family were provided with verbal education education regarding OT role/goals/POC, post op precautions, fall prevention, safety awareness, and Discharge/DME recommendations.  Understanding was verbalized.     Patient left up in chair with all lines intact, call button in reach, and RN notified.    GOALS:   Multidisciplinary Problems       Occupational Therapy Goals          Problem: Occupational Therapy    Goal Priority Disciplines Outcome Interventions   Occupational Therapy Goal     OT, PT/OT Progressing    Description: LTG: Pt will perform basic ADLs and ADL transfers with Modified independence and good compliance to sternal precautions using LRAD by discharge.    STG: to be met by 1/6/25    Pt will complete grooming standing at sink with LRAD with SBA.  Pt will complete UB dressing with SBA.  Pt will complete LB dressing with SBA using LRAD and AE prn.  Pt will complete toileting with SBA using LRAD.  Pt will  complete functional mobility to/from toilet and toilet transfer with SBA using LRAD.   Pt will independently verbalize sternal precautions with >90% accuracy.                        History:     Past Medical History:   Diagnosis Date    Abdominal pain     Acquired perforating dermatosis 08/30/2023    Anxiety     Aortic atherosclerosis 01/24/2023    Appetite loss     Balance problem     Bilateral edema of lower extremity 02/06/2023    Bladder outflow obstruction 06/20/2022    Blurred vision, right eye 10/19/2022    BMI 34.0-34.9,adult 06/20/2022    BPH (benign prostatic hyperplasia)     Chest pain     Chronic liver failure without hepatic coma 04/25/2023    Chronic pain 10/25/2022    Chronic pain syndrome 05/12/2022    Chronic pancreatitis 10/28/2017    Coronary artery disease, unspecified vessel or lesion type, unspecified whether angina present, unspecified whether native or transplanted heart     Deafness 10/03/2023    Depression     Diabetes     Diabetic polyneuropathy 06/20/2022    Elevated LFTs 08/18/2022    Fatigue     GERD (gastroesophageal reflux disease)     Hand paresthesia 06/20/2022    Headache     Hepatic steatosis     History of continuous positive airway pressure (CPAP) therapy at home     History of pancreatitis 06/20/2022    History of recent fall     Hypertension     Hypertriglyceridemia     Insomnia     Kidney disease     Kidney disorder     Leg pain     Mixed hyperlipidemia 10/28/2017    Mononeuritis multiplex 06/20/2022    Morbid obesity     Nausea & vomiting     Neuropathy     Nocturia 06/20/2022    NSTEMI (non-ST elevated myocardial infarction) 10/2024    OA (osteoarthritis)     Obesity     Obstructive sleep apnea syndrome 06/20/2022    Onychomycosis of multiple toenails with type 2 diabetes mellitus 10/28/2017    Open wound of finger of right hand 10/20/2023    CICI (obstructive sleep apnea)     uses CPAP    Painful diabetic neuropathy 05/12/2022    Personal history of colonic polyps  08/18/2022    Polyneuropathy     Primary hypertension 10/28/2017    Recurrent pancreatitis     Renal insufficiency     Tobacco abuse     Tobacco user 06/20/2022    Type 2 diabetes mellitus with skin complication, with long-term current use of insulin 02/06/2023    Urinary frequency     Use of cane as ambulatory aid     Weight loss, unintentional          Past Surgical History:   Procedure Laterality Date    ANGIOGRAM, CORONARY, WITH LEFT HEART CATHETERIZATION N/A 04/10/2023    Procedure: Angiogram, Coronary, with Left Heart Cath;  Surgeon: Jaswant Pugh MD;  Location: [facility] CATH LAB;  Service: Cardiology;  Laterality: N/A;    ANGIOGRAM, CORONARY, WITH LEFT HEART CATHETERIZATION N/A 10/10/2024    Procedure: Angiogram, Coronary, with Left Heart Cath;  Surgeon: Jaswant Pugh MD;  Location: [facility] CATH LAB;  Service: Cardiology;  Laterality: N/A;    APPENDECTOMY      ARTERIOVENOUS ANASTOMOSIS, OPEN, UPPER ARM BASILLIC VEIN TRANSPOSITION N/A 05/07/2018    CORONARY ARTERY BYPASS GRAFT (CABG) N/A 12/5/2024    Procedure: CORONARY ARTERY BYPASS GRAFT (CABG);  Surgeon: Gillian Clayton MD;  Location: [facility] OR;  Service: Cardiothoracic;  Laterality: N/A;  ECHO NOTIFIED    EGD, WITH CLOSED BIOPSY N/A 11/20/2023    Procedure: EGD, WITH CLOSED BIOPSY;  Surgeon: Christina James MD;  Location: [facility] ENDOSCOPY;  Service: Gastroenterology;  Laterality: N/A;    ENDOSCOPIC HARVEST OF VEIN Left 12/5/2024    Procedure: HARVEST-VEIN-ENDOVASCULAR;  Surgeon: Gillian Clayton MD;  Location: [facility] OR;  Service: Cardiothoracic;  Laterality: Left;    ESOPHAGOGASTRODUODENOSCOPY N/A 06/07/2021    EXCISION OF ARTERIOVENOUS FISTULA N/A 06/01/2018    FRACTIONAL FLOW RESERVE (FFR), CORONARY  04/10/2023    Procedure: Fractional Flow [place] (FFR), Coronary;  Surgeon: Jaswant Pugh MD;  Location: [facility] CATH LAB;  Service: Cardiology;;    HERNIA REPAIR      INSPECTION OF UPPER INTESTINAL TRACT N/A 06/07/2021    OCCLUSION OF LEFT  ATRIAL APPENDAGE Left 12/5/2024    Procedure: Left atrial appendage occlusion;  Surgeon: Gillian Clayton MD;  Location: [facility] OR;  Service: Cardiothoracic;  Laterality: Left;    PHERESIS OF PLASMA N/A 07/13/2018    PHERESIS OF PLASMA N/A 05/25/2018    TRIAL OF SPINAL CORD NERVE STIMULATOR N/A 05/12/2022    Procedure: TRIAL, NEUROSTIMULATOR, SPINAL CORD;  Surgeon: Jay Pozo MD;  Location: [facility] OR;  Service: Neurosurgery;  Laterality: N/A;    UPPER GI N/A 06/07/2021       Time Tracking:     OT Date of Treatment: 12/06/24  OT Start Time: 0944  OT Stop Time: 1038  OT Total Time (min): 54 min    Billable Minutes:Evaluation Moderate Complexity.     12/6/2024

## 2024-12-06 NOTE — PLAN OF CARE
12/06/24 1234   Discharge Assessment   Assessment Type Discharge Planning Assessment   Confirmed/corrected address, phone number and insurance Yes   Confirmed Demographics Correct on Facesheet   Source of Information patient;other (see comments)  (Yogesh (Friend))   Communicated MERISSA with patient/caregiver Date not available/Unable to determine   Reason For Admission CAD   People in Home alone   Do you expect to return to your current living situation? Yes   Do you have help at home or someone to help you manage your care at home? Yes   Who are your caregiver(s) and their phone number(s)? Yogesh Wesley [phone]   Prior to hospitilization cognitive status: Unable to Assess   Current cognitive status: Alert/Oriented  (Hard of hearing; reads lips)   Walking or Climbing Stairs Difficulty yes   Walking or Climbing Stairs ambulation difficulty, requires equipment   Mobility Management cane, rollator, rw   Dressing/Bathing Difficulty no   Home Accessibility stairs to enter home   Number of Stairs, Main Entrance three   Stair Railings, Main Entrance railing on right side (ascending)   Home Layout Able to live on 1st floor   Equipment Currently Used at Home walker, rolling;rollator;CPAP;cane, straight   Do you currently have service(s) that help you manage your care at home? Yes   Name and Contact number of agency [facility]   Is the pt/caregiver preference to resume services with current agency Yes   Do you have prescription coverage? Yes   Coverage Medicare   Who is going to help you get home at discharge? Friends   How do you get to doctors appointments? car, drives self   Are you on dialysis? No   Do you take coumadin? No   Discharge Plan A Home Health   Discharge Plan B Home Health   DME Needed Upon Discharge  none   Discharge Plan discussed with: Patient   Transition of Care Barriers None     Patient current with [employer] [facility] health.     PCP Dat Beasley MD  Pharmacy [facility]

## 2024-12-06 NOTE — PLAN OF CARE
Problem: Occupational Therapy  Goal: Occupational Therapy Goal  Description: LTG: Pt will perform basic ADLs and ADL transfers with Modified independence and good compliance to sternal precautions using LRAD by discharge.    STG: to be met by 1/6/25    Pt will complete grooming standing at sink with LRAD with SBA.  Pt will complete UB dressing with SBA.  Pt will complete LB dressing with SBA using LRAD and AE prn.  Pt will complete toileting with SBA using LRAD.  Pt will complete functional mobility to/from toilet and toilet transfer with SBA using LRAD.   Pt will independently verbalize sternal precautions with >90% accuracy.   Outcome: Progressing

## 2024-12-06 NOTE — CONSULTS
[facility] Medicine Consultation Note        Patient Name: Bharath Caballero  MRN: [id number]  Patient Class: IP- Inpatient   Admission Date: 12/5/2024  5:08 AM  Length of Stay: 1  Attending Physician: [unfilled]   Primary Care Provider: Dat Beasley MD  Face-to-Face encounter date: 12/06/2024 g  Consulting Physician: [facility] Medicine - Acosta Garza MD  Reason for Consult: Medical Management  Chief Complaint: No chief complaint on file.      Screening for Social Drivers for health:  Patient screened for food insecurity, housing instability, transportation needs, utility difficulties, and interpersonal safety (select all that apply as identified as concern)  []Housing or Food  []Transportation Needs  []Utility Difficulties  []Interpersonal safety  [x]None          Patient information was obtained from patient, patient's family, past medical records.    HISTORY OF PRESENT ILLNESS:   Bharath Caballero is a 44 y.o. male CAD, HLD, HTN, DM2, CKD stage 3, chronic pancreatitis, MASLD, CICI, GERD and BPH was admitted to [facility] under Dr. Clayton on 12/5/2024 for CABG with left atrial appendage ligation..      He was recently admitted to the hospital on 10/7/24 with a NSTEMI, echocardiogram was done which showed EF of 45%, no major valvular abnormalities. Abnormal nuclear stress test with 2 x moderate to severe intensity reversible defects of LAD and RCA territories. He had a left heart catheterization done on 10/10/24 which showed two-vessel CAD, 50% proximal stenosis of the LAD and 50% distal stenosis, IFR of the proximal stenosis was done which was 0.75, he also has proximal RCA lesion of 90% stenosis. CABG x 2 12/5/24, no intraoperative complications documented.     Patient was brought to ICU intubated and mechanically ventilated with mediastinal chest tube x 3 in place. Patient self extubated yesterday upon admission to ICU.   IV insulin switched to sq insulin.    On postop day  1, status post two-vessel CABG with left atrial appendage ligation. Home medications resumed as appropriate and patient downgraded from the ICU.     On my eval - patient was sitting upright in bed complaining about chronic pain and anterior chest pain status post CABG. Otherwise on exam he has coarse breath sounds bilaterally with shallow respiratory effort no accessory muscle usage or distress regular rate rhythm no murmurs rubs gallops median sternotomy wound site clean and dry mediastinal chest tubes in place with serosanguineous output abdomen soft nontender bowel sounds hypoactive no upper or lower extremity edema moves all extremities follows commands.     Discontinue lines tubes medications per CV surgery protocol.  Hospital Medicine team was consulted for medical management     PAST MEDICAL HISTORY:     Past Medical History:   Diagnosis Date    Abdominal pain     Acquired perforating dermatosis 08/30/2023    Anxiety     Aortic atherosclerosis 01/24/2023    Appetite loss     Balance problem     Bilateral edema of lower extremity 02/06/2023    Bladder outflow obstruction 06/20/2022    Blurred vision, right eye 10/19/2022    BMI 34.0-34.9,adult 06/20/2022    BPH (benign prostatic hyperplasia)     Chest pain     Chronic liver failure without hepatic coma 04/25/2023    Chronic pain 10/25/2022    Chronic pain syndrome 05/12/2022    Chronic pancreatitis 10/28/2017    Coronary artery disease, unspecified vessel or lesion type, unspecified whether angina present, unspecified whether native or transplanted heart     Deafness 10/03/2023    Depression     Diabetes     Diabetic polyneuropathy 06/20/2022    Elevated LFTs 08/18/2022    Fatigue     GERD (gastroesophageal reflux disease)     Hand paresthesia 06/20/2022    Headache     Hepatic steatosis     History of continuous positive airway pressure (CPAP) therapy at home     History of pancreatitis 06/20/2022    History of recent fall     Hypertension      Hypertriglyceridemia     Insomnia     Kidney disease     Kidney disorder     Leg pain     Mixed hyperlipidemia 10/28/2017    Mononeuritis multiplex 06/20/2022    Morbid obesity     Nausea & vomiting     Neuropathy     Nocturia 06/20/2022    NSTEMI (non-ST elevated myocardial infarction) 10/2024    OA (osteoarthritis)     Obesity     Obstructive sleep apnea syndrome 06/20/2022    Onychomycosis of multiple toenails with type 2 diabetes mellitus 10/28/2017    Open wound of finger of right hand 10/20/2023    CICI (obstructive sleep apnea)     uses CPAP    Painful diabetic neuropathy 05/12/2022    Personal history of colonic polyps 08/18/2022    Polyneuropathy     Primary hypertension 10/28/2017    Recurrent pancreatitis     Renal insufficiency     Tobacco abuse     Tobacco user 06/20/2022    Type 2 diabetes mellitus with skin complication, with long-term current use of insulin 02/06/2023    Urinary frequency     Use of cane as ambulatory aid     Weight loss, unintentional        PAST SURGICAL HISTORY:     Past Surgical History:   Procedure Laterality Date    ANGIOGRAM, CORONARY, WITH LEFT HEART CATHETERIZATION N/A 04/10/2023    Procedure: Angiogram, Coronary, with Left Heart Cath;  Surgeon: Jaswant Pugh MD;  Location: [facility] CATH LAB;  Service: Cardiology;  Laterality: N/A;    ANGIOGRAM, CORONARY, WITH LEFT HEART CATHETERIZATION N/A 10/10/2024    Procedure: Angiogram, Coronary, with Left Heart Cath;  Surgeon: Jaswant Pugh MD;  Location: [facility] CATH LAB;  Service: Cardiology;  Laterality: N/A;    APPENDECTOMY      ARTERIOVENOUS ANASTOMOSIS, OPEN, UPPER ARM BASILLIC VEIN TRANSPOSITION N/A 05/07/2018    CORONARY ARTERY BYPASS GRAFT (CABG) N/A 12/5/2024    Procedure: CORONARY ARTERY BYPASS GRAFT (CABG);  Surgeon: Gillian Clayton MD;  Location: [facility] OR;  Service: Cardiothoracic;  Laterality: N/A;  ECHO NOTIFIED    EGD, WITH CLOSED BIOPSY N/A 11/20/2023    Procedure: EGD, WITH CLOSED BIOPSY;  Surgeon: Erika  Christina SANTANA MD;  Location: [facility] ENDOSCOPY;  Service: Gastroenterology;  Laterality: N/A;    ENDOSCOPIC HARVEST OF VEIN Left 2024    Procedure: HARVEST-VEIN-ENDOVASCULAR;  Surgeon: Gillian Clayton MD;  Location: [facility] OR;  Service: Cardiothoracic;  Laterality: Left;    ESOPHAGOGASTRODUODENOSCOPY N/A 2021    EXCISION OF ARTERIOVENOUS FISTULA N/A 2018    FRACTIONAL FLOW RESERVE (FFR), CORONARY  04/10/2023    Procedure: Fractional Flow [place] (FFR), Coronary;  Surgeon: Jaswant Pugh MD;  Location: [facility] CATH LAB;  Service: Cardiology;;    HERNIA REPAIR      INSPECTION OF UPPER INTESTINAL TRACT N/A 2021    OCCLUSION OF LEFT ATRIAL APPENDAGE Left 2024    Procedure: Left atrial appendage occlusion;  Surgeon: Gillian Clayton MD;  Location: [facility] OR;  Service: Cardiothoracic;  Laterality: Left;    PHERESIS OF PLASMA N/A 2018    PHERESIS OF PLASMA N/A 2018    TRIAL OF SPINAL CORD NERVE STIMULATOR N/A 2022    Procedure: TRIAL, NEUROSTIMULATOR, SPINAL CORD;  Surgeon: Jay Pozo MD;  Location: [facility] OR;  Service: Neurosurgery;  Laterality: N/A;    UPPER GI N/A 2021       ALLERGIES:   Patient has no known allergies.    FAMILY HISTORY:   Reviewed and negative    SOCIAL HISTORY:     Social History     Tobacco Use    Smoking status: Former     Current packs/day: 0.00     Average packs/day: 0.5 packs/day for 28.3 years (14.1 ttl pk-yrs)     Types: Cigarettes     Start date: 1996     Quit date: 10/29/2024     Years since quittin.1    Smokeless tobacco: Never    Tobacco comments:      pk 25 years   Substance Use Topics    Alcohol use: Not Currently        HOME MEDICATIONS:     Prior to Admission medications    Medication Sig Start Date End Date Taking? Authorizing Provider   aspirin (ECOTRIN) 81 MG EC tablet Take 81 mg by mouth once daily.   Yes Provider, Historical   atorvastatin (LIPITOR) 40 MG tablet TAKE ONE TABLET BY MOUTH EVERY DAY 10/22/24   Yes Dat Beasley MD   carvediloL (COREG) 25 MG tablet Take 1 tablet (25 mg total) by mouth 2 (two) times daily with meals. 6/26/24 6/26/25 Yes Zoie Campbell MD   cholecalciferol, vitamin D3, (VITAMIN D3) 25 mcg (1,000 unit) capsule Take 2 capsules (2,000 Units total) by mouth once daily. 6/4/24 5/30/25 Yes Dat Beasley MD   clonazePAM (KLONOPIN) 1 MG tablet TAKE ONE TABLET BY MOUTH TWICE DAILY 11/21/24  Yes Dat Beasley MD   cloNIDine (CATAPRES) 0.3 MG tablet Take 1 tablet (0.3 mg total) by mouth 3 (three) times daily. 6/25/24 6/25/25 Yes Zoie Campbell MD   CREON 36,000-114,000- 180,000 unit CpDR TAKE ONE CAPSULE THREE TIMES DAILY 9/23/24  Yes Kendal Grande FNP   EScitalopram oxalate (LEXAPRO) 20 MG tablet Take 20 mg by mouth once daily.   Yes Provider, Historical   esomeprazole (NEXIUM) 40 MG capsule Take 1 capsule (40 mg total) by mouth before breakfast. 8/18/22 12/5/24 Yes Erin Hernandez FNP   evolocumab (REPATHA SURECLICK) 140 mg/mL PnIj Inject 1 mL (140 mg total) into the skin every 14 (fourteen) days. 1/17/24 1/16/25 Yes Kendal Grande FNP   FARXIGA 5 mg Tab tablet TAKE ONE TABLET BY MOUTH EVERY DAY 7/18/24  Yes Kendal Grande FNP   HUMALOG U-100 INSULIN 100 unit/mL injection INJECT 25 UNITS SUBCUTANEOUSLY BEFORE MEALS THREE TIMES DAILY 9/24/24  Yes Dat Beasley MD   HYDROmorphone (DILAUDID) 8 MG tablet Take 1 tablet (8 mg total) by mouth every 8 (eight) hours as needed. 11/7/24  Yes Dat Beasley MD   isosorbide mononitrate (IMDUR) 120 MG 24 hr tablet Take 1 tablet (120 mg total) by mouth once daily. 6/25/24 6/25/25 Yes Zoie Campbell MD   linaGLIPtin (TRADJENTA) 5 mg Tab tablet Take 1 tablet (5 mg total) by mouth once daily. 4/8/24 4/8/25 Yes Gilda Erwin NP   losartan (COZAAR) 100 MG tablet Take 1 tablet (100 mg total) by mouth once daily. 4/16/24 4/16/25 Yes Kendal Grande FNP   morphine (MS CONTIN) 100 MG 12 hr tablet TAKE ONE TABLET BY MOUTH THREE  TIMES DAILY 11/21/24  Yes Dat Beasley MD   NIFEdipine (PROCARDIA-XL) 60 MG (OSM) 24 hr tablet Take 1 tablet (60 mg total) by mouth 2 (two) times a day. 6/26/24 6/26/25 Yes Zoie Campbell MD   nitroGLYCERIN (NITROSTAT) 0.3 MG SL tablet Place 1 tablet (0.3 mg total) under the tongue every 5 (five) minutes as needed for Chest pain (go to er after 3 doses). 6/26/24 12/5/24 Yes Zoie Campbell MD   OXcarbazepine (TRILEPTAL) 600 MG Tab Take 1 tablet (600 mg total) by mouth 2 (two) times daily. 9/25/23 12/5/24 Yes Judith Jean ANP   potassium chloride SA (K-DUR,KLOR-CON) 20 MEQ tablet TAKE ONE TABLET BY MOUTH TWICE DAILY 7/18/24  Yes Kendal Grande FNP   spironolactone (ALDACTONE) 100 MG tablet Take 1 tablet (100 mg total) by mouth once daily. 6/17/24 6/17/25 Yes Zoie Campbell MD   sucralfate (CARAFATE) 100 mg/mL suspension Take 10 mLs (1 g total) by mouth 4 (four) times daily before meals and nightly. 3/7/24  Yes Erin Hernandez FNP   tamsulosin (FLOMAX) 0.4 mg Cap TAKE ONE CAPSULE BY MOUTH EVERY DAY 1/18/24  Yes Dat Beasley MD   torsemide (DEMADEX) 20 MG Tab Take 1 tablet (20 mg total) by mouth once daily. 11/7/24 11/7/25 Yes Dat Beasley MD TOUJEO SOLOSTAR U-300 INSULIN 300 unit/mL (1.5 mL) InPn pen INJECT 35 SUBCUTANEOUSLY TWICE DAILY 10/22/24  Yes Dat Beasley MD   VASCEPA 1 gram Cap TAKE TWO CAPSULES BY MOUTH TWICE DAILY 10/22/24  Yes Dat Beasley MD   amitriptyline (ELAVIL) 50 MG tablet Take 1 tablet (50 mg total) by mouth every evening. 8/29/24 8/29/25  Judith Jean ANP   clopidogreL (PLAVIX) 75 mg tablet Take 4 tablets on day 1 and then one tablet daily. 11/15/24   Zoie Campebll MD   pregabalin (LYRICA) 150 MG capsule Take 1 capsule (150 mg total) by mouth 3 (three) times daily. 10/10/24 4/10/25  Jaclyn Mendoza MD   ranolazine (RANEXA) 500 MG Tb12 Take 1 tablet (500 mg total) by mouth 2 (two) times daily. 10/10/24 4/8/25  Jaclyn Mendoza MD   SURE  "COMFORT INSULIN SYRINGE 0.5 mL 31 gauge x 5/16" Syrg USE ONE SYRINGE THREE TIMES DAILY 9/13/23   Kendal Grande FNP       REVIEW OF SYSTEMS:   Except as documented, all other systems reviewed and negative     PHYSICAL EXAM:     VITAL SIGNS: 24 HRS MIN & MAX LAST   Temp  Min: 98.2 °F (36.8 °C)  Max: 98.9 °F (37.2 °C) 98.2 °F (36.8 °C)   BP  Min: 113/56  Max: 161/89 (!) 152/74   Pulse  Min: 65  Max: 94  94   Resp  Min: 18  Max: 30 20   SpO2  Min: 91 %  Max: 98 % (!) 94 %       Vitals Reviewed  General appearance: Well-developed, well-nourished male in no apparent distress.  HENT: Atraumatic head. Moist mucous membranes of oral cavity.  Eyes: Normal extraocular movements.   Neck: Supple.   Lungs: Clear to auscultation bilaterally. No wheezing present.   Heart: Regular rate and rhythm. S1 and S2 present with no murmurs/gallop/rub. No pedal edema. No JVD present.   Abdomen: Soft, non-distended, non-tender. No rebound tenderness/guarding. Bowel sounds are normal.   Extremities: No cyanosis, clubbing, or edema.  Skin: Sternotomy site appears well approximated, closed, and dry. No signs of active drainage or bleeding at this time. Mediastinal chest tube x 3 in place, serosanginous output in all tubes.     Neuro: Motor and sensory exams grossly intact. Good tone. Muscle strength 5/5 in all 4 extremities  Psych/mental status: Appropriate mood and affect. Responds appropriately to questions.     LABS AND IMAGING:     Recent Labs   Lab 12/05/24  1014 12/05/24  1027 12/05/24  1044 12/05/24  1408 12/06/24  0329   WBC 19.26*  --   --   --  23.51*   RBC 3.88*  --   --   --  4.22*   HGB 11.1*  --   --  13.0* 12.3*   HCT 33.0*   < > 24* 36.4* 35.7*   MCV 85.1  --   --   --  84.6   MCH 28.6  --   --   --  29.1   MCHC 33.6  --   --   --  34.5   RDW 12.7  --   --   --  12.8     --   --   --  214   MPV 10.4  --   --   --  11.2*    < > = values in this interval not displayed.       Recent Labs   Lab 12/05/24  1014 12/05/24  1027 " 12/05/24  1044 12/05/24  1200 12/05/24  1408 12/05/24  1637 12/06/24  0329     --   --   --  142  --  141   K 3.1*  --   --   --  3.9  --  3.6     --   --   --  109*  --  108*   CO2 25  --   --   --  25  --  22   BUN 12.0  --   --   --  11.1  --  9.6   CREATININE 1.05  --   --   --  0.89  --  0.84   CALCIUM 8.2*  --   --   --  8.0*  --  7.6*   PH  --    < > 7.369 7.340*  --  7.380  --    MG 3.00*  --   --   --  2.30  --  2.10   ALBUMIN  --   --   --   --  3.0*  --  2.5*   ALKPHOS  --   --   --   --  209*  --  179*   ALT  --   --   --   --  20  --  16   AST  --   --   --   --  30  --  33   BILITOT  --   --   --   --  0.8  --  0.5    < > = values in this interval not displayed.        Microbiology Results (last 7 days)       ** No results found for the last 168 hours. **               ASSESSMENT & PLAN:     #Severe CAD s/p CABG x 2 LIMA to mid LAD, reversed SVG to distal LAD and left atrial appendage ligation 12/5/24   #HFpEF with EF 45% and Grade I Diastolic Dysfunction on CHIO 12/5/24   #Neutrophil predominant Leukocytosis   #DM2 - A1c 9.3 on 10/2024  #GERD on pepcid, sucralfate   #Hx chronic pancreatitis and MASLD  #Hx CICI   #Hx BPH on tamsulosin  #Mood disorder on escitalopram  #Obesity   #HLD  #HTN     Self extubated 12/6 upon admission, stable respiratory status since admission      Plan  POD1, incisional pain on Prn pain meds - IV morphine, po oxy, po morphine    Monitor leukocytosis - possibly reactive  On sq insulin - lantus 35 units BID, sitagliptin 50 mg daily, SSI as needed; hypoglycemia precautions    Will restart home PO medications as appropriate  Encourage mobilization    Labs am    Rest of management per CT surgery primary  PT/OT    DVT Prophylaxis: SCDs   GI Prophylaxis: Pepcid, carafate     Thank you for the consult, will follow along, if you have any questions, please do not hesitate to reach out to us, our team is available 24/7 to assist you    Thank you for the consult, will be happy  to follow alongside you    All diagnosis and differential diagnosis have been reviewed; assessment and plan has been documented; I have personally reviewed the labs and test results that are presently available; I have reviewed the patients medication list; I have reviewed the consulting providers response and recommendations. I have reviewed or attempted to review medical records based upon their availability.    All of the patient and family questions have been addressed and answered. Patient's is agreeable to the above stated plan. I will continue to monitor closely and make adjustments to medical management as needed.    __________________________________________________________________________  INPATIENT LIST OF MEDICATIONS     Current Facility-Administered Medications:     acetaminophen 1,000 mg/100 mL (10 mg/mL) injection 1,000 mg, 1,000 mg, Intravenous, Q6H, Rolo Marquis PA, Stopped at 12/06/24 0537    acetaminophen oral solution 650 mg, 650 mg, Oral, Q6H PRN, Gillian Clayton MD    albumin human 5% bottle 12.5 g, 12.5 g, Intravenous, PRN, Rolo Marquis PA    aspirin EC tablet 81 mg, 81 mg, Oral, Daily, Rolo Marquis PA, 81 mg at 12/06/24 0812    atorvastatin tablet 40 mg, 40 mg, Oral, QHS, Jaswant Corrales PA-C    calcium gluconate 1 g in NS IVPB (premixed), 1 g, Intravenous, PRN, Rolo Marquis PA    calcium gluconate 1 g in NS IVPB (premixed), 2 g, Intravenous, PRN, Rolo Marquis PA    calcium gluconate 1 g in NS IVPB (premixed), 3 g, Intravenous, PRN, Rolo Marquis PA    clonazePAM tablet 1 mg, 1 mg, Oral, BID, Jaswant Corrales PA-C, 1 mg at 12/06/24 0812    cloNIDine tablet 0.3 mg, 0.3 mg, Oral, TID, Jaswant Corrales PA-C, 0.3 mg at 12/06/24 1428    clopidogreL tablet 75 mg, 75 mg, Oral, Daily, Jaswant Corrales PA-C, 75 mg at 12/06/24 0813    dextrose 10% bolus 125 mL 125 mL, 12.5 g, Intravenous, PRN, Rolo Marquis PA    dextrose 10% bolus 125 mL 125  mL, 12.5 g, Intravenous, PRN, Corby Piedra MD    dextrose 10% bolus 250 mL 250 mL, 25 g, Intravenous, PRN, Rolo Marquis PA    dextrose 10% bolus 250 mL 250 mL, 25 g, Intravenous, PRN, Corby Piedra MD    dextrose 5 % and 0.45 % NaCl infusion, , Intravenous, Continuous, Rolo Marquis PA Last Rate: 100 mL/hr at 12/06/24 0330, Rate Verify at 12/06/24 0330    docusate sodium capsule 100 mg, 100 mg, Oral, BID, Rolo Marquis PA, 100 mg at 12/06/24 0900    EScitalopram oxalate tablet 20 mg, 20 mg, Oral, Daily, Jaswant Corrales PA-C, 20 mg at 12/06/24 0813    famotidine (PF) injection 20 mg, 20 mg, Intravenous, Daily, Rolo Marquis PA, 20 mg at 12/06/24 0812    folic acid tablet 1 mg, 1 mg, Oral, Daily, Rolo Marquis PA, 1 mg at 12/06/24 0812    glucagon (human recombinant) injection 1 mg, 1 mg, Intramuscular, PRN, Corby Piedra MD    HYDROcodone-acetaminophen 5-325 mg per tablet 1 tablet, 1 tablet, Oral, Q4H PRN, Rolo Marquis PA    HYDROmorphone tablet 8 mg, 8 mg, Oral, Q8H PRN, Jaswant Corrales PA-C    icosapent ethyL Cap 2,000 mg, 2 capsule, Oral, BID, Jaswant Corrales PA-C    insulin aspart U-100 injection 0-15 Units, 0-15 Units, Subcutaneous, Q6H PRN, Corby Piedra MD, 9 Units at 12/06/24 1319    insulin glargine U-100 (Lantus) injection 35 Units, 35 Units, Subcutaneous, BID, Corby Piedra MD, 35 Units at 12/06/24 1025    lactulose 10 gram/15 ml solution 20 g, 20 g, Oral, Q6H PRN, Rolo Marquis PA    loperamide capsule 2 mg, 2 mg, Oral, Continuous PRN, Rolo Marquis PA    losartan tablet 100 mg, 100 mg, Oral, Daily, Jaswant Corrales PA-C, 100 mg at 12/06/24 1217    magnesium sulfate 2g in water 50mL IVPB (premix), 2 g, Intravenous, PRN, Rolo Marquis PA    magnesium sulfate 2g in water 50mL IVPB (premix), 4 g, Intravenous, PRN, Rolo Marquis PA    metoclopramide injection 5 mg, 5 mg, Intravenous, Q6H PRN, Benitez,  MARCELINA Al    metoprolol tartrate (LOPRESSOR) split tablet 12.5 mg, 12.5 mg, Oral, BID, Rolo Marquis, PA, 12.5 mg at 12/06/24 1217    morphine 12 hr tablet 105 mg, 105 mg, Oral, TID, Jaswant Corrales PA-C, 105 mg at 12/06/24 1428    morphine injection 4 mg, 4 mg, Intravenous, Q4H PRN, Rolo Marquis, PA, 4 mg at 12/05/24 1150    mupirocin 2 % ointment, , Nasal, BID, Rolo Marquis PA, Given at 12/06/24 0816    NIFEdipine 24 hr tablet 60 mg, 60 mg, Oral, BID, Jaswant Corrales PA-C, 60 mg at 12/06/24 1217    ondansetron injection 4 mg, 4 mg, Intravenous, Q4H PRN, Rolo Marquis, PA, 4 mg at 12/06/24 0812    OXcarbazepine tablet 600 mg, 600 mg, Oral, BID, Jaswant Corrales PA-C, 600 mg at 12/06/24 0814    oxyCODONE immediate release tablet Tab 10 mg, 10 mg, Oral, Q4H PRN, Rolo Marquis, PA    potassium chloride 20 mEq in 100 mL IVPB (FOR CENTRAL LINE ADMINISTRATION ONLY), 20 mEq, Intravenous, PRN, Rolo Marquis, PA, Last Rate: 50 mL/hr at 12/06/24 0554, 20 mEq at 12/06/24 0554    potassium chloride 20 mEq in 100 mL IVPB (FOR CENTRAL LINE ADMINISTRATION ONLY), 20 mEq, Intravenous, PRN, Rolo Marquis, PA    potassium chloride 40 mEq in 100 mL IVPB (FOR CENTRAL LINE ADMINISTRATION ONLY), 40 mEq, Intravenous, PRN, Rolo Marquis PA, 40 mEq at 12/05/24 1032    potassium chloride SA CR tablet 20 mEq, 20 mEq, Oral, BID, Jaswant Corrales PA-C, 20 mEq at 12/06/24 0815    pregabalin capsule 150 mg, 150 mg, Oral, TID, Jaswant Corrales PA-C, 150 mg at 12/06/24 0811    ranolazine 12 hr tablet 500 mg, 500 mg, Oral, BID, Jaswant Corrales PA-C, 500 mg at 12/06/24 0812    SITagliptin phosphate tablet 50 mg, 50 mg, Oral, Daily, Jaswant Corrales PA-C, 50 mg at 12/06/24 0815    sodium phosphate 15 mmol in D5W 250 mL IVPB, 15 mmol, Intravenous, PRN, Rolo Marquis, PA    sodium phosphate 20.01 mmol in D5W 250 mL IVPB, 20.01 mmol, Intravenous, PRN, Rolo Marquis, PA    sodium phosphate  30 mmol in D5W 250 mL IVPB, 30 mmol, Intravenous, PRN, Rolo Marquis PA    spironolactone tablet 100 mg, 100 mg, Oral, Daily, Jaswant Corrales PA-C, 100 mg at 12/06/24 0812    sucralfate tablet 1 g, 1 g, Oral, QID (AC & HS), Rolo Marquis PA, 1 g at 12/06/24 0553    tamsulosin 24 hr capsule 0.4 mg, 1 capsule, Oral, Daily, Jaswant Corrales PA-C, 0.4 mg at 12/06/24 0813    torsemide tablet 20 mg, 20 mg, Oral, Daily, Jaswant Corrales PA-C, 20 mg at 12/06/24 0814      Scheduled Meds:   acetaminophen  1,000 mg Intravenous Q6H    aspirin  81 mg Oral Daily    atorvastatin  40 mg Oral QHS    clonazePAM  1 mg Oral BID    cloNIDine  0.3 mg Oral TID    clopidogreL  75 mg Oral Daily    docusate sodium  100 mg Oral BID    EScitalopram oxalate  20 mg Oral Daily    famotidine (PF)  20 mg Intravenous Daily    folic acid  1 mg Oral Daily    icosapent ethyL  2 capsule Oral BID    insulin glargine U-100  35 Units Subcutaneous BID    losartan  100 mg Oral Daily    metoprolol tartrate  12.5 mg Oral BID    morphine  105 mg Oral TID    mupirocin   Nasal BID    NIFEdipine  60 mg Oral BID    OXcarbazepine  600 mg Oral BID    potassium chloride SA  20 mEq Oral BID    pregabalin  150 mg Oral TID    ranolazine  500 mg Oral BID    SITagliptin phosphate  50 mg Oral Daily    spironolactone  100 mg Oral Daily    sucralfate  1 g Oral QID (AC & HS)    tamsulosin  1 capsule Oral Daily    torsemide  20 mg Oral Daily     Continuous Infusions:   D5 and 0.45% NaCl   Intravenous Continuous 100 mL/hr at 12/06/24 0330 Rate Verify at 12/06/24 0330    loperamide  2 mg Oral Continuous PRN         PRN Meds:.  Current Facility-Administered Medications:     acetaminophen, 650 mg, Oral, Q6H PRN    albumin human 5%, 12.5 g, Intravenous, PRN    calcium gluconate IVPB, 1 g, Intravenous, PRN    calcium gluconate IVPB, 2 g, Intravenous, PRN    calcium gluconate IVPB, 3 g, Intravenous, PRN    dextrose 10%, 12.5 g, Intravenous, PRN    dextrose 10%, 12.5 g,  Intravenous, PRN    dextrose 10%, 25 g, Intravenous, PRN    dextrose 10%, 25 g, Intravenous, PRN    glucagon (human recombinant), 1 mg, Intramuscular, PRN    HYDROcodone-acetaminophen, 1 tablet, Oral, Q4H PRN    HYDROmorphone, 8 mg, Oral, Q8H PRN    insulin aspart U-100, 0-15 Units, Subcutaneous, Q6H PRN    lactulose 10 gram/15 ml, 20 g, Oral, Q6H PRN    loperamide, 2 mg, Oral, Continuous PRN    magnesium sulfate IVPB, 2 g, Intravenous, PRN    magnesium sulfate IVPB, 4 g, Intravenous, PRN    metoclopramide, 5 mg, Intravenous, Q6H PRN    morphine, 4 mg, Intravenous, Q4H PRN    ondansetron, 4 mg, Intravenous, Q4H PRN    oxyCODONE, 10 mg, Oral, Q4H PRN    potassium chloride in water, 20 mEq, Intravenous, PRN    potassium chloride in water, 20 mEq, Intravenous, PRN    potassium chloride in water, 40 mEq, Intravenous, PRN    sodium phosphate 15 mmol in D5W 250 mL IVPB, 15 mmol, Intravenous, PRN    sodium phosphate 20.01 mmol in D5W 250 mL IVPB, 20.01 mmol, Intravenous, PRN    sodium phosphate 30 mmol in D5W 250 mL IVPB, 30 mmol, Intravenous, PRN    RADIOLOGY                                                                                                    Cardiac catheterization  Procedure performed in the Invasive Lab    - See Procedure Log link below for nursing documentation    - See OpNote on Surgeries Tab for physician findings    - See Imaging Tab for radiologist dictation  Cardiac catheterization  Procedure performed in the Invasive Lab    - See Procedure Log link below for nursing documentation    - See OpNote on Surgeries Tab for physician findings    - See Imaging Tab for radiologist dictation  X-Ray Chest AP Portable  Narrative: EXAMINATION:  XR CHEST AP PORTABLE    CLINICAL HISTORY:  Post-op;    TECHNIQUE:  Single frontal view of the chest was performed.    COMPARISON:  December 5, 2024    FINDINGS:  Examination reveals cardiomediastinal silhouette and pleuroparenchymal changes to be essentially unchanged as  compared with the previous exam.    Support catheters remain in place.    Interval removal of endotracheal tube and nasogastric tube  Impression: Interval removal of endotracheal tube and nasogastric tube.    No other change    Electronically signed by: Keith Black  Date:    12/06/2024  Time:    10:17        Acosta Garza MD   [facility] Medicine Team  12/06/2024

## 2024-12-07 LAB
ALBUMIN SERPL-MCNC: 2.4 G/DL (ref 3.5–5)
ALBUMIN/GLOB SERPL: 0.6 RATIO (ref 1.1–2)
ALP SERPL-CCNC: 242 UNIT/L (ref 40–150)
ALT SERPL-CCNC: 26 UNIT/L (ref 0–55)
ANION GAP SERPL CALC-SCNC: 5 MEQ/L
AST SERPL-CCNC: 37 UNIT/L (ref 5–34)
BASOPHILS # BLD AUTO: 0.04 X10(3)/MCL
BASOPHILS NFR BLD AUTO: 0.2 %
BILIRUB SERPL-MCNC: 0.5 MG/DL
BUN SERPL-MCNC: 14.7 MG/DL (ref 8.9–20.6)
CALCIUM SERPL-MCNC: 8 MG/DL (ref 8.4–10.2)
CHLORIDE SERPL-SCNC: 107 MMOL/L (ref 98–107)
CO2 SERPL-SCNC: 26 MMOL/L (ref 22–29)
CREAT SERPL-MCNC: 0.95 MG/DL (ref 0.72–1.25)
CREAT/UREA NIT SERPL: 15
EOSINOPHIL # BLD AUTO: 0.18 X10(3)/MCL (ref 0–0.9)
EOSINOPHIL NFR BLD AUTO: 1.1 %
ERYTHROCYTE [DISTWIDTH] IN BLOOD BY AUTOMATED COUNT: 13.3 % (ref 11.5–17)
GFR SERPLBLD CREATININE-BSD FMLA CKD-EPI: >60 ML/MIN/1.73/M2
GLOBULIN SER-MCNC: 3.8 GM/DL (ref 2.4–3.5)
GLUCOSE SERPL-MCNC: 64 MG/DL (ref 74–100)
HCT VFR BLD AUTO: 33 % (ref 42–52)
HGB BLD-MCNC: 10.9 G/DL (ref 14–18)
IMM GRANULOCYTES # BLD AUTO: 0.12 X10(3)/MCL (ref 0–0.04)
IMM GRANULOCYTES NFR BLD AUTO: 0.7 %
LYMPHOCYTES # BLD AUTO: 2.65 X10(3)/MCL (ref 0.6–4.6)
LYMPHOCYTES NFR BLD AUTO: 15.6 %
MAGNESIUM SERPL-MCNC: 2 MG/DL (ref 1.6–2.6)
MCH RBC QN AUTO: 29.2 PG (ref 27–31)
MCHC RBC AUTO-ENTMCNC: 33 G/DL (ref 33–36)
MCV RBC AUTO: 88.5 FL (ref 80–94)
MONOCYTES # BLD AUTO: 1.75 X10(3)/MCL (ref 0.1–1.3)
MONOCYTES NFR BLD AUTO: 10.3 %
NEUTROPHILS # BLD AUTO: 12.25 X10(3)/MCL (ref 2.1–9.2)
NEUTROPHILS NFR BLD AUTO: 72.1 %
NRBC BLD AUTO-RTO: 0 %
PHOSPHATE SERPL-MCNC: 2.8 MG/DL (ref 2.3–4.7)
PLATELET # BLD AUTO: 190 X10(3)/MCL (ref 130–400)
PMV BLD AUTO: 11 FL (ref 7.4–10.4)
POCT GLUCOSE: 153 MG/DL (ref 70–110)
POCT GLUCOSE: 192 MG/DL (ref 70–110)
POCT GLUCOSE: 253 MG/DL (ref 70–110)
POCT GLUCOSE: 95 MG/DL (ref 70–110)
POCT GLUCOSE: 99 MG/DL (ref 70–110)
POTASSIUM SERPL-SCNC: 3.5 MMOL/L (ref 3.5–5.1)
PROT SERPL-MCNC: 6.2 GM/DL (ref 6.4–8.3)
RBC # BLD AUTO: 3.73 X10(6)/MCL (ref 4.7–6.1)
SODIUM SERPL-SCNC: 138 MMOL/L (ref 136–145)
WBC # BLD AUTO: 16.99 X10(3)/MCL (ref 4.5–11.5)

## 2024-12-07 PROCEDURE — 85025 COMPLETE CBC W/AUTO DIFF WBC: CPT

## 2024-12-07 PROCEDURE — 25000003 PHARM REV CODE 250: Performed by: PHYSICIAN ASSISTANT

## 2024-12-07 PROCEDURE — 36415 COLL VENOUS BLD VENIPUNCTURE: CPT

## 2024-12-07 PROCEDURE — 97110 THERAPEUTIC EXERCISES: CPT

## 2024-12-07 PROCEDURE — 83735 ASSAY OF MAGNESIUM: CPT

## 2024-12-07 PROCEDURE — 80053 COMPREHEN METABOLIC PANEL: CPT | Performed by: PHYSICIAN ASSISTANT

## 2024-12-07 PROCEDURE — 27000221 HC OXYGEN, UP TO 24 HOURS

## 2024-12-07 PROCEDURE — 84100 ASSAY OF PHOSPHORUS: CPT

## 2024-12-07 PROCEDURE — 99024 POSTOP FOLLOW-UP VISIT: CPT | Mod: POP,,, | Performed by: PHYSICIAN ASSISTANT

## 2024-12-07 PROCEDURE — 21400001 HC TELEMETRY ROOM

## 2024-12-07 PROCEDURE — 94760 N-INVAS EAR/PLS OXIMETRY 1: CPT

## 2024-12-07 RX ADMIN — PREGABALIN 150 MG: 150 CAPSULE ORAL at 10:12

## 2024-12-07 RX ADMIN — TAMSULOSIN HYDROCHLORIDE 0.4 MG: 0.4 CAPSULE ORAL at 10:12

## 2024-12-07 RX ADMIN — METOPROLOL TARTRATE 12.5 MG: 25 TABLET, FILM COATED ORAL at 10:12

## 2024-12-07 RX ADMIN — MORPHINE SULFATE 105 MG: 30 TABLET, FILM COATED, EXTENDED RELEASE ORAL at 04:12

## 2024-12-07 RX ADMIN — FOLIC ACID 1 MG: 1 TABLET ORAL at 10:12

## 2024-12-07 RX ADMIN — CLONIDINE HYDROCHLORIDE 0.3 MG: 0.2 TABLET ORAL at 10:12

## 2024-12-07 RX ADMIN — FAMOTIDINE 20 MG: 10 INJECTION, SOLUTION INTRAVENOUS at 10:12

## 2024-12-07 RX ADMIN — DOCUSATE SODIUM 100 MG: 100 CAPSULE, LIQUID FILLED ORAL at 08:12

## 2024-12-07 RX ADMIN — OXCARBAZEPINE 600 MG: 300 TABLET, FILM COATED ORAL at 10:12

## 2024-12-07 RX ADMIN — ASPIRIN 81 MG: 81 TABLET, COATED ORAL at 10:12

## 2024-12-07 RX ADMIN — OXCARBAZEPINE 600 MG: 300 TABLET, FILM COATED ORAL at 08:12

## 2024-12-07 RX ADMIN — SUCRALFATE 1 G: 1 TABLET ORAL at 10:12

## 2024-12-07 RX ADMIN — DOCUSATE SODIUM 100 MG: 100 CAPSULE, LIQUID FILLED ORAL at 10:12

## 2024-12-07 RX ADMIN — ESCITALOPRAM OXALATE 20 MG: 10 TABLET ORAL at 10:12

## 2024-12-07 RX ADMIN — TORSEMIDE 20 MG: 20 TABLET ORAL at 10:12

## 2024-12-07 RX ADMIN — CLONAZEPAM 1 MG: 1 TABLET ORAL at 10:12

## 2024-12-07 RX ADMIN — SITAGLIPTIN 50 MG: 50 TABLET, FILM COATED ORAL at 10:12

## 2024-12-07 RX ADMIN — SUCRALFATE 1 G: 1 TABLET ORAL at 06:12

## 2024-12-07 RX ADMIN — POTASSIUM CHLORIDE 20 MEQ: 1500 TABLET, EXTENDED RELEASE ORAL at 10:12

## 2024-12-07 RX ADMIN — SPIRONOLACTONE 100 MG: 25 TABLET ORAL at 10:12

## 2024-12-07 RX ADMIN — RANOLAZINE 500 MG: 500 TABLET, FILM COATED, EXTENDED RELEASE ORAL at 10:12

## 2024-12-07 RX ADMIN — ATORVASTATIN CALCIUM 40 MG: 40 TABLET, FILM COATED ORAL at 08:12

## 2024-12-07 RX ADMIN — SUCRALFATE 1 G: 1 TABLET ORAL at 08:12

## 2024-12-07 RX ADMIN — CLOPIDOGREL BISULFATE 75 MG: 75 TABLET ORAL at 10:12

## 2024-12-07 RX ADMIN — SUCRALFATE 1 G: 1 TABLET ORAL at 04:12

## 2024-12-07 RX ADMIN — LOSARTAN POTASSIUM 100 MG: 50 TABLET, FILM COATED ORAL at 10:12

## 2024-12-07 RX ADMIN — PREGABALIN 150 MG: 150 CAPSULE ORAL at 04:12

## 2024-12-07 RX ADMIN — POTASSIUM CHLORIDE 20 MEQ: 1500 TABLET, EXTENDED RELEASE ORAL at 08:12

## 2024-12-07 RX ADMIN — MORPHINE SULFATE 105 MG: 30 TABLET, FILM COATED, EXTENDED RELEASE ORAL at 10:12

## 2024-12-07 RX ADMIN — CLONIDINE HYDROCHLORIDE 0.3 MG: 0.2 TABLET ORAL at 04:12

## 2024-12-07 RX ADMIN — NIFEDIPINE 60 MG: 60 TABLET, FILM COATED, EXTENDED RELEASE ORAL at 10:12

## 2024-12-07 NOTE — PROGRESS NOTES
[facility] Hospital Medicine Progress Note        Chief Complaint: Inpatient Follow-up for     HPI:   44 y.o. male CAD, HLD, HTN, DM2, CKD stage 3, chronic pancreatitis, MASLD, CICI, GERD and BPH was admitted to [facility] under Dr. Clayton on 12/5/2024 for CABG with left atrial appendage ligation..       He was recently admitted to the hospital on 10/7/24 with a NSTEMI, echocardiogram was done which showed EF of 45%, no major valvular abnormalities. Abnormal nuclear stress test with 2 x moderate to severe intensity reversible defects of LAD and RCA territories. He had a left heart catheterization done on 10/10/24 which showed two-vessel CAD, 50% proximal stenosis of the LAD and 50% distal stenosis, IFR of the proximal stenosis was done which was 0.75, he also has proximal RCA lesion of 90% stenosis. CABG x 2 12/5/24, no intraoperative complications documented.      Patient was brought to ICU intubated and mechanically ventilated with mediastinal chest tube x 3 in place. Patient self extubated yesterday upon admission to ICU.   IV insulin switched to sq insulin.     On postop day 1, status post two-vessel CABG with left atrial appendage ligation. Home medications resumed as appropriate and patient downgraded from the ICU. Continues to have chest tubes with output.      Hospital Medicine team was consulted for medical management. PT/OT recommending low intensity therapy.    Interval Hx:   Today, patient complained of chest pain particularly at chest tube insertion sites. On PRN pain regimen. BG better controlled. Will follow CV Surgery recs.    Case was discussed with patient's nurse on the floor.    Objective/physical exam:  General: alert male lying comfortably in bed, in no acute distress  HENT: oral and oropharyngeal mucosa moist, pink, with no erythema or exudates, no ear pain or discharge  Neck: normal neck movement, no lymph nodes or swellings, no JVD or Carotid bruit  Respiratory:  chest tubes in place; clear breathing sounds bilaterally, no crackles, rales, ronchi or wheezes  Cardiovascular: midline surgical incision; clear S1 and S2, no murmurs, rubs or gallops  Peripheral Vascular: no lesions, ulcers or erosions, normal peripheral pulses and no pedal edema  Gastrointestinal: soft, non-tender, non-distended abdomen, no guarding, rigidity or rebound tenderness, normal bowel sounds  Integumentary: normal skin color, no rashes or lesions  Neuro: AAO x 3; motor strength 5/5 in B/L UEs & LEs; sensation intact to gross and fine touch B/L; CN II-XII grossly intact    VITAL SIGNS: 24 HRS MIN & MAX LAST   Temp  Min: 98.2 °F (36.8 °C)  Max: 99.4 °F (37.4 °C) 99.1 °F (37.3 °C)   BP  Min: 96/62  Max: 152/74 130/71   Pulse  Min: 78  Max: 94  93   Resp  Min: 18  Max: 30 20   SpO2  Min: 89 %  Max: 96 % (!) 92 %     I have reviewed the following labs:  Recent Labs   Lab 12/05/24  1014 12/05/24  1027 12/05/24  1408 12/06/24  0329 12/07/24  0515   WBC 19.26*  --   --  23.51* 16.99*   RBC 3.88*  --   --  4.22* 3.73*   HGB 11.1*  --  13.0* 12.3* 10.9*   HCT 33.0*   < > 36.4* 35.7* 33.0*   MCV 85.1  --   --  84.6 88.5   MCH 28.6  --   --  29.1 29.2   MCHC 33.6  --   --  34.5 33.0   RDW 12.7  --   --  12.8 13.3     --   --  214 190   MPV 10.4  --   --  11.2* 11.0*    < > = values in this interval not displayed.     Recent Labs   Lab 12/05/24  1044 12/05/24  1200 12/05/24  1408 12/05/24  1637 12/06/24  0329 12/07/24  0515   NA  --   --  142  --  141 138   K  --   --  3.9  --  3.6 3.5   CL  --   --  109*  --  108* 107   CO2  --   --  25  --  22 26   BUN  --   --  11.1  --  9.6 14.7   CREATININE  --   --  0.89  --  0.84 0.95   CALCIUM  --   --  8.0*  --  7.6* 8.0*   PH 7.369 7.340*  --  7.380  --   --    MG  --   --  2.30  --  2.10 2.00   ALBUMIN  --   --  3.0*  --  2.5* 2.4*   ALKPHOS  --   --  209*  --  179* 242*   ALT  --   --  20  --  16 26   AST  --   --  30  --  33 37*   BILITOT  --   --  0.8  --  0.5  0.5       Assessment/Plan:  #Severe CAD s/p CABG x 2 LIMA to mid LAD, reversed SVG to distal LAD and left atrial appendage ligation 12/5/24   #HFpEF with EF 45% and Grade I Diastolic Dysfunction on CHIO 12/5/24   #Neutrophil predominant Leukocytosis   #DM2 - A1c 9.3 on 10/2024  #GERD on pepcid, sucralfate   #Hx chronic pancreatitis and MASLD  #Hx CICI   #Hx BPH on tamsulosin  #Mood disorder on escitalopram  #Obesity   #HLD  #HTN    Continues to be admitted   Complaining of chest pain particularly at chest tube insertion sites   Chest tubes remain in place   CV surgery primary   PT/OT on board; recommending low intensity therapy   BGs better controlled   Remains on aspirin, atorvastatin, clonazepam b.i.d., clonidine t.i.d., Plavix, docusate b.i.d., escitalopram, Pepcid, folic acid, losartan, metoprolol tartrate b.i.d., nifedipine b.i.d., oxcarbazepine b.i.d., KCl b.i.d., pregabalin t.i.d., ranolazine b.i.d., sitagliptin, spironolactone, Carafate q.i.d., tamsulosin, torsemide  P.r.n. pain medications ordered as well as scheduled morphine   Continue monitoring patient's symptoms    VTE prophylaxis: SCDs    Patient condition:  Stable    Anticipated discharge and Disposition:     Pending    All diagnosis and differential diagnosis have been reviewed; assessment and plan has been documented; I have personally reviewed the labs and test results that are presently available; I have reviewed the patients medication list; I have reviewed the consulting providers response and recommendations. I have reviewed or attempted to review medical records based upon their availability    All of the patient's questions have been  addressed and answered. Patient's is agreeable to the above stated plan. I will continue to monitor closely and make adjustments to medical management as needed.    Portions of this note dictated using EMR integrated voice recognition software, and may be subject to voice recognition errors not corrected at  proofreading. Please contact writer for clarification if needed.   _____________________________________________________________________    Radiology:  I have personally reviewed the following imaging and agree with the radiologist.     X-Ray Chest AP Portable  Narrative: EXAMINATION:  XR CHEST AP PORTABLE    CLINICAL HISTORY:  Post-op;    TECHNIQUE:  Single frontal view of the chest was performed.    COMPARISON:  12/06/2024    FINDINGS:  LINES AND TUBES: EKG/telemetry leads overlie the chest.  There are anterior mediastinal and pleural drains.    MEDIASTINUM AND CORDELIA: Cardiac silhouette is enlarged. There is a left atrial appendage clip.    LUNGS: Lung volumes are low with associated atelectatic change.  There are bilateral interstitial opacities.    PLEURA:No pleural effusion. No pneumothorax.    BONES: Postop sternotomy.  Impression: Lung volumes are low with associated atelectatic change.  Persistent bilateral interstitial opacities.    Electronically signed by: Moni Hodges  Date:    12/07/2024  Time:    09:10      Abundio Ramsey MD  Department of Hospital Medicine   [facility]   12/07/2024

## 2024-12-07 NOTE — PROGRESS NOTES
CT SURGERY PROGRESS NOTE  Bharath Caballero  44 y.o.  1980    Patients Procedure: Procedure(s) (LRB):  CORONARY ARTERY BYPASS GRAFT (CABG) (N/A)  HARVEST-VEIN-ENDOVASCULAR (Left)  Left atrial appendage occlusion (Left)    Subjective  Interval History: POD 2      Date of procedure: 12/5/2024     Surgeon: Gillian Clayton MD     Assistant: MARCELINA Parker     Preop diagnosis:  Severe coronary artery disease     Postop diagnosis: Same     Procedure:  Coronary artery bypass grafting X 2, LIMA to mid LAD, reversed SVG to distal LAD, Endoscopic venous harvesting of left greater saphenous vein, left atrial appendage ligation           ROS    Medication List  Infusions   D5 and 0.45% NaCl   Intravenous Continuous 100 mL/hr at 12/06/24 0330 Rate Verify at 12/06/24 0330    loperamide  2 mg Oral Continuous PRN         Scheduled   aspirin  81 mg Oral Daily    atorvastatin  40 mg Oral QHS    clonazePAM  1 mg Oral BID    cloNIDine  0.3 mg Oral TID    clopidogreL  75 mg Oral Daily    docusate sodium  100 mg Oral BID    EScitalopram oxalate  20 mg Oral Daily    famotidine (PF)  20 mg Intravenous Daily    folic acid  1 mg Oral Daily    icosapent ethyL  2 capsule Oral BID    losartan  100 mg Oral Daily    metoprolol tartrate  12.5 mg Oral BID    morphine  105 mg Oral TID    mupirocin   Nasal BID    NIFEdipine  60 mg Oral BID    OXcarbazepine  600 mg Oral BID    potassium chloride SA  20 mEq Oral BID    pregabalin  150 mg Oral TID    ranolazine  500 mg Oral BID    SITagliptin phosphate  50 mg Oral Daily    spironolactone  100 mg Oral Daily    sucralfate  1 g Oral QID (AC & HS)    tamsulosin  1 capsule Oral Daily    torsemide  20 mg Oral Daily       Objective:  Recent Vitals:  Temp:  [98 °F (36.7 °C)-99.4 °F (37.4 °C)] 98 °F (36.7 °C)  Pulse:  [82-94] 89  Resp:  [18-20] 20  SpO2:  [89 %-97 %] 97 %  BP: ()/(62-78) 121/78    Physical Exam     I/O last 24 hrs:  Intake/Output - Last 3 Shifts         12/05 0700  12/06 0659  "12/06 0700 12/07 0659 12/07 0700 12/08 0659    P.O. 0 240     I.V. (mL/kg) 2466.2 (26.9)      Blood 321      IV Piggyback 1599.3      Total Intake(mL/kg) 4386.4 (47.8) 240 (2.6)     Urine (mL/kg/hr) 3805 (1.7) 600 (0.3)     Stool 0 0     Chest Tube 450 165     Total Output 4255 765     Net +131.4 -525            Urine Occurrence 0 x 1 x     Stool Occurrence 1 x 1 x             Labs  BMP:   Recent Labs   Lab 12/07/24 0515      K 3.5      CO2 26   BUN 14.7   CREATININE 0.95   CALCIUM 8.0*   MG 2.00     CBC:   Recent Labs   Lab 12/07/24 0515   WBC 16.99*   RBC 3.73*   HGB 10.9*   HCT 33.0*      MCV 88.5   MCH 29.2   MCHC 33.0     CMP:   Recent Labs   Lab 12/07/24 0515   CALCIUM 8.0*   ALBUMIN 2.4*      K 3.5   CO2 26      BUN 14.7   CREATININE 0.95   ALKPHOS 242*   ALT 26   AST 37*   BILITOT 0.5     Coagulation: No results for input(s): "PT", "INR", "APTT" in the last 48 hours.      Imaging:   CXR: X-Ray Chest AP Portable    Result Date: 12/7/2024  Lung volumes are low with associated atelectatic change.  Persistent bilateral interstitial opacities. Electronically signed by: Moni Hodges Date:    12/07/2024 Time:    09:10         ASSESSMENT/PLAN:    POD 2  Resting comfortabley at this time   SR   CTA  Drains in     A/P  Mobilzie   DC drains pm vs 24  DC planning       Case and plan of care discussed with MD Jaswant Corrales PA-C   "

## 2024-12-07 NOTE — PROGRESS NOTES
12/07/24 1100   Pre Exercise Vitals   BP   (currently on BSC)   Pulse 94   Supplemental O2? Yes   O2 Device nasal cannula   O2 Flow (L/min) 4   SpO2 93 %   During Exercise Vitals   Pulse 96   Supplemental O2? Yes   O2 Device nasal cannula   O2 Flow (L/min) 4   SpO2 (!) 88 %  (88-93%)   Distance Walked 50 feer   Post Exercise Vitals   BP 95/67   Pulse 83   Supplemental O2? Yes   O2 Device nasal cannula   O2 Flow (L/min) 4   SpO2 94 %   Modality   Modality   (rollator, chair followed behind for safety)     Communicated with nurse prior to activity.   Upon entering room, pt currently on BSC, CT x3 cont, 02 cont.  Pt's nurse arrived for assistance and followed pt with chair for ambulation for safety.  Min assist sit to stand, maintains sternal precautions with verbal reminders.  Poor memory noted but follows directions without difficulty.  Also somewhat sleepy--just received pain med, nurse aware of BP.  Encouraged increased activity and use of IS.   Call bell within reach.

## 2024-12-08 LAB
ABS NEUT (OLG): 13.25 X10(3)/MCL (ref 2.1–9.2)
ALBUMIN SERPL-MCNC: 2.4 G/DL (ref 3.5–5)
ALBUMIN SERPL-MCNC: 2.6 G/DL (ref 3.5–5)
ALBUMIN/GLOB SERPL: 0.6 RATIO (ref 1.1–2)
ALBUMIN/GLOB SERPL: 0.8 RATIO (ref 1.1–2)
ALP SERPL-CCNC: 255 UNIT/L (ref 40–150)
ALP SERPL-CCNC: 265 UNIT/L (ref 40–150)
ALT SERPL-CCNC: 28 UNIT/L (ref 0–55)
ALT SERPL-CCNC: 29 UNIT/L (ref 0–55)
AMMONIA PLAS-MSCNC: 33.4 UMOL/L (ref 18–72)
ANION GAP SERPL CALC-SCNC: 8 MEQ/L
ANION GAP SERPL CALC-SCNC: 9 MEQ/L
AST SERPL-CCNC: 39 UNIT/L (ref 5–34)
AST SERPL-CCNC: 41 UNIT/L (ref 5–34)
BASOPHILS # BLD AUTO: 0.05 X10(3)/MCL
BASOPHILS NFR BLD AUTO: 0.3 %
BILIRUB SERPL-MCNC: 0.3 MG/DL
BILIRUB SERPL-MCNC: 0.5 MG/DL
BNP BLD-MCNC: 118.6 PG/ML
BUN SERPL-MCNC: 27.8 MG/DL (ref 8.9–20.6)
BUN SERPL-MCNC: 34 MG/DL (ref 8.9–20.6)
CALCIUM SERPL-MCNC: 8 MG/DL (ref 8.4–10.2)
CALCIUM SERPL-MCNC: 8.6 MG/DL (ref 8.4–10.2)
CHLORIDE SERPL-SCNC: 105 MMOL/L (ref 98–107)
CHLORIDE SERPL-SCNC: 106 MMOL/L (ref 98–107)
CO2 SERPL-SCNC: 20 MMOL/L (ref 22–29)
CO2 SERPL-SCNC: 23 MMOL/L (ref 22–29)
CREAT SERPL-MCNC: 2.03 MG/DL (ref 0.72–1.25)
CREAT SERPL-MCNC: 2.07 MG/DL (ref 0.72–1.25)
CREAT/UREA NIT SERPL: 14
CREAT/UREA NIT SERPL: 16
EOSINOPHIL # BLD AUTO: 0.22 X10(3)/MCL (ref 0–0.9)
EOSINOPHIL NFR BLD AUTO: 1.2 %
EOSINOPHIL NFR BLD MANUAL: 0.18 X10(3)/MCL (ref 0–0.9)
EOSINOPHIL NFR BLD MANUAL: 1 %
ERYTHROCYTE [DISTWIDTH] IN BLOOD BY AUTOMATED COUNT: 13.3 % (ref 11.5–17)
ERYTHROCYTE [DISTWIDTH] IN BLOOD BY AUTOMATED COUNT: 13.4 % (ref 11.5–17)
GFR SERPLBLD CREATININE-BSD FMLA CKD-EPI: 40 ML/MIN/1.73/M2
GFR SERPLBLD CREATININE-BSD FMLA CKD-EPI: 41 ML/MIN/1.73/M2
GLOBULIN SER-MCNC: 3.4 GM/DL (ref 2.4–3.5)
GLOBULIN SER-MCNC: 4.3 GM/DL (ref 2.4–3.5)
GLUCOSE SERPL-MCNC: 142 MG/DL (ref 74–100)
GLUCOSE SERPL-MCNC: 189 MG/DL (ref 74–100)
HCT VFR BLD AUTO: 32.7 % (ref 42–52)
HCT VFR BLD AUTO: 32.7 % (ref 42–52)
HGB BLD-MCNC: 10.9 G/DL (ref 14–18)
HGB BLD-MCNC: 10.9 G/DL (ref 14–18)
IMM GRANULOCYTES # BLD AUTO: 0.11 X10(3)/MCL (ref 0–0.04)
IMM GRANULOCYTES NFR BLD AUTO: 0.6 %
INSTRUMENT WBC (OLG): 17.9 X10(3)/MCL
LYMPHOCYTES # BLD AUTO: 2.93 X10(3)/MCL (ref 0.6–4.6)
LYMPHOCYTES NFR BLD AUTO: 16.6 %
LYMPHOCYTES NFR BLD MANUAL: 16 %
LYMPHOCYTES NFR BLD MANUAL: 2.86 X10(3)/MCL
MAGNESIUM SERPL-MCNC: 2.1 MG/DL (ref 1.6–2.6)
MAGNESIUM SERPL-MCNC: 2.2 MG/DL (ref 1.6–2.6)
MCH RBC QN AUTO: 28.9 PG (ref 27–31)
MCH RBC QN AUTO: 29.5 PG (ref 27–31)
MCHC RBC AUTO-ENTMCNC: 33.3 G/DL (ref 33–36)
MCHC RBC AUTO-ENTMCNC: 33.3 G/DL (ref 33–36)
MCV RBC AUTO: 86.7 FL (ref 80–94)
MCV RBC AUTO: 88.6 FL (ref 80–94)
MONOCYTES # BLD AUTO: 2.07 X10(3)/MCL (ref 0.1–1.3)
MONOCYTES NFR BLD AUTO: 11.7 %
MONOCYTES NFR BLD MANUAL: 1.79 X10(3)/MCL (ref 0.1–1.3)
MONOCYTES NFR BLD MANUAL: 10 %
NEUTROPHILS # BLD AUTO: 12.28 X10(3)/MCL (ref 2.1–9.2)
NEUTROPHILS NFR BLD AUTO: 69.6 %
NEUTROPHILS NFR BLD MANUAL: 74 %
NRBC BLD AUTO-RTO: 0 %
NRBC BLD AUTO-RTO: 0 %
PHOSPHATE SERPL-MCNC: 4.1 MG/DL (ref 2.3–4.7)
PLATELET # BLD AUTO: 207 X10(3)/MCL (ref 130–400)
PLATELET # BLD AUTO: 215 X10(3)/MCL (ref 130–400)
PLATELET # BLD EST: NORMAL 10*3/UL
PMV BLD AUTO: 10.9 FL (ref 7.4–10.4)
PMV BLD AUTO: 11.3 FL (ref 7.4–10.4)
POCT GLUCOSE: 191 MG/DL (ref 70–110)
POTASSIUM SERPL-SCNC: 4.8 MMOL/L (ref 3.5–5.1)
POTASSIUM SERPL-SCNC: 4.8 MMOL/L (ref 3.5–5.1)
PROT SERPL-MCNC: 6 GM/DL (ref 6.4–8.3)
PROT SERPL-MCNC: 6.7 GM/DL (ref 6.4–8.3)
RBC # BLD AUTO: 3.69 X10(6)/MCL (ref 4.7–6.1)
RBC # BLD AUTO: 3.77 X10(6)/MCL (ref 4.7–6.1)
RBC MORPH BLD: NORMAL
SODIUM SERPL-SCNC: 134 MMOL/L (ref 136–145)
SODIUM SERPL-SCNC: 137 MMOL/L (ref 136–145)
TROPONIN I SERPL-MCNC: 1.22 NG/ML (ref 0–0.04)
WBC # BLD AUTO: 17.66 X10(3)/MCL (ref 4.5–11.5)
WBC # BLD AUTO: 17.9 X10(3)/MCL (ref 4.5–11.5)

## 2024-12-08 PROCEDURE — 83735 ASSAY OF MAGNESIUM: CPT

## 2024-12-08 PROCEDURE — 84484 ASSAY OF TROPONIN QUANT: CPT | Performed by: STUDENT IN AN ORGANIZED HEALTH CARE EDUCATION/TRAINING PROGRAM

## 2024-12-08 PROCEDURE — 99900031 HC PATIENT EDUCATION (STAT)

## 2024-12-08 PROCEDURE — 82140 ASSAY OF AMMONIA: CPT | Performed by: STUDENT IN AN ORGANIZED HEALTH CARE EDUCATION/TRAINING PROGRAM

## 2024-12-08 PROCEDURE — 83880 ASSAY OF NATRIURETIC PEPTIDE: CPT | Performed by: STUDENT IN AN ORGANIZED HEALTH CARE EDUCATION/TRAINING PROGRAM

## 2024-12-08 PROCEDURE — 99024 POSTOP FOLLOW-UP VISIT: CPT | Mod: POP,,, | Performed by: PHYSICIAN ASSISTANT

## 2024-12-08 PROCEDURE — 99900035 HC TECH TIME PER 15 MIN (STAT)

## 2024-12-08 PROCEDURE — 27000221 HC OXYGEN, UP TO 24 HOURS

## 2024-12-08 PROCEDURE — 85025 COMPLETE CBC W/AUTO DIFF WBC: CPT

## 2024-12-08 PROCEDURE — 94761 N-INVAS EAR/PLS OXIMETRY MLT: CPT

## 2024-12-08 PROCEDURE — 80053 COMPREHEN METABOLIC PANEL: CPT | Performed by: PHYSICIAN ASSISTANT

## 2024-12-08 PROCEDURE — 80053 COMPREHEN METABOLIC PANEL: CPT | Performed by: STUDENT IN AN ORGANIZED HEALTH CARE EDUCATION/TRAINING PROGRAM

## 2024-12-08 PROCEDURE — 85025 COMPLETE CBC W/AUTO DIFF WBC: CPT | Performed by: STUDENT IN AN ORGANIZED HEALTH CARE EDUCATION/TRAINING PROGRAM

## 2024-12-08 PROCEDURE — 21400001 HC TELEMETRY ROOM

## 2024-12-08 PROCEDURE — 25000003 PHARM REV CODE 250: Performed by: PHYSICIAN ASSISTANT

## 2024-12-08 PROCEDURE — 84100 ASSAY OF PHOSPHORUS: CPT

## 2024-12-08 PROCEDURE — 63600175 PHARM REV CODE 636 W HCPCS: Performed by: PHYSICIAN ASSISTANT

## 2024-12-08 PROCEDURE — 83735 ASSAY OF MAGNESIUM: CPT | Performed by: STUDENT IN AN ORGANIZED HEALTH CARE EDUCATION/TRAINING PROGRAM

## 2024-12-08 PROCEDURE — 36415 COLL VENOUS BLD VENIPUNCTURE: CPT | Performed by: PHYSICIAN ASSISTANT

## 2024-12-08 PROCEDURE — 36415 COLL VENOUS BLD VENIPUNCTURE: CPT

## 2024-12-08 RX ORDER — SODIUM CHLORIDE, SODIUM LACTATE, POTASSIUM CHLORIDE, CALCIUM CHLORIDE 600; 310; 30; 20 MG/100ML; MG/100ML; MG/100ML; MG/100ML
INJECTION, SOLUTION INTRAVENOUS CONTINUOUS
Status: DISCONTINUED | OUTPATIENT
Start: 2024-12-08 | End: 2024-12-10

## 2024-12-08 RX ADMIN — MUPIROCIN: 20 OINTMENT TOPICAL at 08:12

## 2024-12-08 RX ADMIN — ESCITALOPRAM OXALATE 20 MG: 10 TABLET ORAL at 09:12

## 2024-12-08 RX ADMIN — POTASSIUM CHLORIDE 20 MEQ: 1500 TABLET, EXTENDED RELEASE ORAL at 08:12

## 2024-12-08 RX ADMIN — TAMSULOSIN HYDROCHLORIDE 0.4 MG: 0.4 CAPSULE ORAL at 09:12

## 2024-12-08 RX ADMIN — DOCUSATE SODIUM 100 MG: 100 CAPSULE, LIQUID FILLED ORAL at 09:12

## 2024-12-08 RX ADMIN — METOPROLOL TARTRATE 12.5 MG: 25 TABLET, FILM COATED ORAL at 09:12

## 2024-12-08 RX ADMIN — MUPIROCIN: 20 OINTMENT TOPICAL at 09:12

## 2024-12-08 RX ADMIN — SUCRALFATE 1 G: 1 TABLET ORAL at 08:12

## 2024-12-08 RX ADMIN — CLOPIDOGREL BISULFATE 75 MG: 75 TABLET ORAL at 09:12

## 2024-12-08 RX ADMIN — FAMOTIDINE 20 MG: 10 INJECTION, SOLUTION INTRAVENOUS at 09:12

## 2024-12-08 RX ADMIN — SUCRALFATE 1 G: 1 TABLET ORAL at 09:12

## 2024-12-08 RX ADMIN — SUCRALFATE 1 G: 1 TABLET ORAL at 05:12

## 2024-12-08 RX ADMIN — ATORVASTATIN CALCIUM 40 MG: 40 TABLET, FILM COATED ORAL at 08:12

## 2024-12-08 RX ADMIN — DOCUSATE SODIUM 100 MG: 100 CAPSULE, LIQUID FILLED ORAL at 08:12

## 2024-12-08 RX ADMIN — RANOLAZINE 500 MG: 500 TABLET, FILM COATED, EXTENDED RELEASE ORAL at 09:12

## 2024-12-08 RX ADMIN — SODIUM CHLORIDE, POTASSIUM CHLORIDE, SODIUM LACTATE AND CALCIUM CHLORIDE: 600; 310; 30; 20 INJECTION, SOLUTION INTRAVENOUS at 06:12

## 2024-12-08 RX ADMIN — ASPIRIN 81 MG: 81 TABLET, COATED ORAL at 09:12

## 2024-12-08 RX ADMIN — SITAGLIPTIN 50 MG: 50 TABLET, FILM COATED ORAL at 09:12

## 2024-12-08 RX ADMIN — POTASSIUM CHLORIDE 20 MEQ: 1500 TABLET, EXTENDED RELEASE ORAL at 09:12

## 2024-12-08 RX ADMIN — OXCARBAZEPINE 600 MG: 300 TABLET, FILM COATED ORAL at 09:12

## 2024-12-08 RX ADMIN — RANOLAZINE 500 MG: 500 TABLET, FILM COATED, EXTENDED RELEASE ORAL at 08:12

## 2024-12-08 RX ADMIN — OXCARBAZEPINE 600 MG: 300 TABLET, FILM COATED ORAL at 08:12

## 2024-12-08 RX ADMIN — FOLIC ACID 1 MG: 1 TABLET ORAL at 09:12

## 2024-12-08 NOTE — PT/OT/SLP PROGRESS
Physical Therapy      Patient Name:  Bharath Caballero   MRN:  [id number]    Patient not seen today secondary to Low BP . Will follow-up tomorrow.

## 2024-12-08 NOTE — PROGRESS NOTES
CT SURGERY PROGRESS NOTE  Bharath Caballero  44 y.o.  1980    Patients Procedure: Procedure(s) (LRB):  CORONARY ARTERY BYPASS GRAFT (CABG) (N/A)  HARVEST-VEIN-ENDOVASCULAR (Left)  Left atrial appendage occlusion (Left)    Subjective  Interval History: POD 3      Date of procedure: 12/5/2024     Surgeon: Gillian Clayton MD     Assistant: MARCELINA Parker     Preop diagnosis:  Severe coronary artery disease     Postop diagnosis: Same     Procedure:  Coronary artery bypass grafting X 2, LIMA to mid LAD, reversed SVG to distal LAD, Endoscopic venous harvesting of left greater saphenous vein, left atrial appendage ligation           ROS    Medication List  Infusions   D5 and 0.45% NaCl   Intravenous Continuous 100 mL/hr at 12/06/24 0330 Rate Verify at 12/06/24 0330    loperamide  2 mg Oral Continuous PRN         Scheduled   aspirin  81 mg Oral Daily    atorvastatin  40 mg Oral QHS    clonazePAM  1 mg Oral BID    cloNIDine  0.3 mg Oral TID    clopidogreL  75 mg Oral Daily    docusate sodium  100 mg Oral BID    EScitalopram oxalate  20 mg Oral Daily    famotidine (PF)  20 mg Intravenous Daily    folic acid  1 mg Oral Daily    icosapent ethyL  2 capsule Oral BID    metoprolol tartrate  12.5 mg Oral BID    morphine  105 mg Oral TID    mupirocin   Nasal BID    NIFEdipine  60 mg Oral BID    OXcarbazepine  600 mg Oral BID    potassium chloride SA  20 mEq Oral BID    pregabalin  150 mg Oral TID    ranolazine  500 mg Oral BID    SITagliptin phosphate  50 mg Oral Daily    sucralfate  1 g Oral QID (AC & HS)    tamsulosin  1 capsule Oral Daily       Objective:  Recent Vitals:  Temp:  [97.7 °F (36.5 °C)-99.1 °F (37.3 °C)] 97.7 °F (36.5 °C)  Pulse:  [74-90] 77  Resp:  [20] 20  SpO2:  [91 %-97 %] 96 %  BP: ()/(33-78) 100/60    Physical Exam     I/O last 24 hrs:  Intake/Output - Last 3 Shifts         12/06 0700 12/07 0659 12/07 0700 12/08 0659 12/08 0700 12/09 0659    P.O. 240 360     I.V. (mL/kg)       Blood       IV  "Piggyback       Total Intake(mL/kg) 240 (2.6) 360 (3.9)     Urine (mL/kg/hr) 600 (0.3) 0 (0)     Stool 0 0     Chest Tube 165 200     Total Output 765 200     Net -525 +160            Urine Occurrence 1 x 0 x     Stool Occurrence 1 x 1 x             Labs  BMP:   Recent Labs   Lab 12/08/24  0743      K 4.8      CO2 23   BUN 27.8*   CREATININE 2.03*   CALCIUM 8.0*   MG 2.20     CBC:   Recent Labs   Lab 12/08/24  0416   WBC 17.66*   RBC 3.69*   HGB 10.9*   HCT 32.7*      MCV 88.6   MCH 29.5   MCHC 33.3     CMP:   Recent Labs   Lab 12/08/24  0743   CALCIUM 8.0*   ALBUMIN 2.6*      K 4.8   CO2 23      BUN 27.8*   CREATININE 2.03*   ALKPHOS 265*   ALT 28   AST 41*   BILITOT 0.5     Coagulation: No results for input(s): "PT", "INR", "APTT" in the last 48 hours.      Imaging:   CXR: X-Ray Chest AP Portable    Result Date: 12/7/2024  Lung volumes are low with associated atelectatic change.  Persistent bilateral interstitial opacities. Electronically signed by: Moni Hodges Date:    12/07/2024 Time:    09:10         ASSESSMENT/PLAN:    POD 3  Resting comfortably at this time   SR   CTA  Drains - decreased last 24    A/P  Mobilize   DC drains now   DC planning       Case and plan of care discussed with MD Jaswant Corrales, PACHAYO   "

## 2024-12-08 NOTE — PROGRESS NOTES
[facility]  Hospital Medicine Progress Note        Chief Complaint: Inpatient Follow-up for     HPI:   44 y.o. male CAD, HLD, HTN, DM2, CKD stage 3, chronic pancreatitis, MASLD, CICI, GERD and BPH was admitted to [facility] under Dr. Clayton on 12/5/2024 for CABG with left atrial appendage ligation..       He was recently admitted to the hospital on 10/7/24 with a NSTEMI, echocardiogram was done which showed EF of 45%, no major valvular abnormalities. Abnormal nuclear stress test with 2 x moderate to severe intensity reversible defects of LAD and RCA territories. He had a left heart catheterization done on 10/10/24 which showed two-vessel CAD, 50% proximal stenosis of the LAD and 50% distal stenosis, IFR of the proximal stenosis was done which was 0.75, he also has proximal RCA lesion of 90% stenosis. CABG x 2 12/5/24, no intraoperative complications documented.      Patient was brought to ICU intubated and mechanically ventilated with mediastinal chest tube x 3 in place. Patient self extubated yesterday upon admission to ICU.   IV insulin switched to sq insulin.     On postop day 1, status post two-vessel CABG with left atrial appendage ligation. Home medications resumed as appropriate and patient downgraded from the ICU. Continues to have chest tubes with output.      Hospital Medicine team was consulted for medical management. PT/OT recommending low intensity therapy.    Interval Hx:   Today, patient complained of chest pain particularly at chest tube insertion sites. Noted to have DANNY today; will hold Torsemide, Aldactone and Losartan today. Can consider small IVF bolus if tolerated and continue watching renal indices. Repeat CXR today showing improvement in airspace opacities on R with persistence of some airspace opacities on L middle and lower zones.    Case was discussed with patient's nurse on the floor.    Objective/physical exam:  General: alert male lying comfortably in bed, in  no acute distress  HENT: oral and oropharyngeal mucosa moist, pink, with no erythema or exudates, no ear pain or discharge  Neck: normal neck movement, no lymph nodes or swellings, no JVD or Carotid bruit  Respiratory: chest tubes in place; clear breathing sounds bilaterally, no crackles, rales, ronchi or wheezes  Cardiovascular: midline surgical incision; clear S1 and S2, no murmurs, rubs or gallops  Peripheral Vascular: no lesions, ulcers or erosions, normal peripheral pulses and no pedal edema  Gastrointestinal: soft, non-tender, non-distended abdomen, no guarding, rigidity or rebound tenderness, normal bowel sounds  Integumentary: normal skin color, no rashes or lesions  Neuro: AAO x 3; motor strength 5/5 in B/L UEs & LEs; sensation intact to gross and fine touch B/L; CN II-XII grossly intact    VITAL SIGNS: 24 HRS MIN & MAX LAST   Temp  Min: 97.7 °F (36.5 °C)  Max: 99.1 °F (37.3 °C) 97.7 °F (36.5 °C)   BP  Min: 76/33  Max: 133/74 100/60   Pulse  Min: 74  Max: 90  77   Resp  Min: 20  Max: 20 20   SpO2  Min: 91 %  Max: 97 % 96 %     I have reviewed the following labs:  Recent Labs   Lab 12/06/24  0329 12/07/24  0515 12/08/24  0416   WBC 23.51* 16.99* 17.66*   RBC 4.22* 3.73* 3.69*   HGB 12.3* 10.9* 10.9*   HCT 35.7* 33.0* 32.7*   MCV 84.6 88.5 88.6   MCH 29.1 29.2 29.5   MCHC 34.5 33.0 33.3   RDW 12.8 13.3 13.4    190 207   MPV 11.2* 11.0* 11.3*     Recent Labs   Lab 12/05/24  1044 12/05/24  1200 12/05/24  1408 12/05/24  1637 12/06/24  0329 12/07/24  0515 12/08/24  0743   NA  --   --    < >  --  141 138 137   K  --   --    < >  --  3.6 3.5 4.8   CL  --   --    < >  --  108* 107 106   CO2  --   --    < >  --  22 26 23   BUN  --   --    < >  --  9.6 14.7 27.8*   CREATININE  --   --    < >  --  0.84 0.95 2.03*   CALCIUM  --   --    < >  --  7.6* 8.0* 8.0*   PH 7.369 7.340*  --  7.380  --   --   --    MG  --   --    < >  --  2.10 2.00 2.20   ALBUMIN  --   --    < >  --  2.5* 2.4* 2.6*   ALKPHOS  --   --    < >   --  179* 242* 265*   ALT  --   --    < >  --  16 26 28   AST  --   --    < >  --  33 37* 41*   BILITOT  --   --    < >  --  0.5 0.5 0.5    < > = values in this interval not displayed.       Assessment/Plan:  #Severe CAD s/p CABG x 2 LIMA to mid LAD, reversed SVG to distal LAD and left atrial appendage ligation 12/5/24   #HFpEF with EF 45% and Grade I Diastolic Dysfunction on CHIO 12/5/24   #Neutrophil predominant Leukocytosis   #DM2 - A1c 9.3 on 10/2024  #GERD on pepcid, sucralfate   #Hx chronic pancreatitis and MASLD  #Hx CICI   #Hx BPH on tamsulosin  #Mood disorder on escitalopram  #Obesity   #HLD  #HTN    Continues to be admitted   Complaining of chest pain particularly at chest tube insertion sites   Chest tubes remain in place with bloody output  CXR   CV surgery primary   PT/OT on board; recommending low intensity therapy   BGs better controlled   DANNY noted; holding Torsemide, Aldactone and Losartan today  Remains on aspirin, atorvastatin, clonazepam b.i.d., clonidine t.i.d., Plavix, docusate b.i.d., escitalopram, Pepcid, folic acid, metoprolol tartrate b.i.d., nifedipine b.i.d., oxcarbazepine b.i.d., KCl b.i.d., pregabalin t.i.d., ranolazine b.i.d., sitagliptin, Carafate q.i.d., tamsulosin  P.r.n. pain medications ordered as well as scheduled morphine   Continue monitoring patient's symptoms    VTE prophylaxis: SCDs    Patient condition:  Stable    Anticipated discharge and Disposition:     Pending    All diagnosis and differential diagnosis have been reviewed; assessment and plan has been documented; I have personally reviewed the labs and test results that are presently available; I have reviewed the patients medication list; I have reviewed the consulting providers response and recommendations. I have reviewed or attempted to review medical records based upon their availability    All of the patient's questions have been  addressed and answered. Patient's is agreeable to the above stated plan. I will  continue to monitor closely and make adjustments to medical management as needed.    Portions of this note dictated using EMR integrated voice recognition software, and may be subject to voice recognition errors not corrected at proofreading. Please contact writer for clarification if needed.   _____________________________________________________________________    Radiology:  I have personally reviewed the following imaging and agree with the radiologist.     X-Ray Chest AP Portable  Narrative: EXAMINATION:  XR CHEST AP PORTABLE    CLINICAL HISTORY:  Post-op;    TECHNIQUE:  Single frontal view of the chest was performed.    COMPARISON:  12/06/2024    FINDINGS:  LINES AND TUBES: EKG/telemetry leads overlie the chest.  There are anterior mediastinal and pleural drains.    MEDIASTINUM AND CORDELIA: Cardiac silhouette is enlarged. There is a left atrial appendage clip.    LUNGS: Lung volumes are low with associated atelectatic change.  There are bilateral interstitial opacities.    PLEURA:No pleural effusion. No pneumothorax.    BONES: Postop sternotomy.  Impression: Lung volumes are low with associated atelectatic change.  Persistent bilateral interstitial opacities.    Electronically signed by: Moni Hodges  Date:    12/07/2024  Time:    09:10      Abundio Ramsey MD  Department of Hospital Medicine   [facility]   12/08/2024

## 2024-12-09 LAB
ALBUMIN SERPL-MCNC: 2.6 G/DL (ref 3.5–5)
ALBUMIN/GLOB SERPL: 0.7 RATIO (ref 1.1–2)
ALP SERPL-CCNC: 520 UNIT/L (ref 40–150)
ALT SERPL-CCNC: 43 UNIT/L (ref 0–55)
ANION GAP SERPL CALC-SCNC: 8 MEQ/L
AST SERPL-CCNC: 90 UNIT/L (ref 5–34)
BASOPHILS # BLD AUTO: 0.04 X10(3)/MCL
BASOPHILS NFR BLD AUTO: 0.3 %
BILIRUB SERPL-MCNC: 0.5 MG/DL
BUN SERPL-MCNC: 28.8 MG/DL (ref 8.9–20.6)
CALCIUM SERPL-MCNC: 8.2 MG/DL (ref 8.4–10.2)
CHLORIDE SERPL-SCNC: 105 MMOL/L (ref 98–107)
CO2 SERPL-SCNC: 22 MMOL/L (ref 22–29)
CREAT SERPL-MCNC: 1.31 MG/DL (ref 0.72–1.25)
CREAT/UREA NIT SERPL: 22
EOSINOPHIL # BLD AUTO: 0.29 X10(3)/MCL (ref 0–0.9)
EOSINOPHIL NFR BLD AUTO: 2 %
ERYTHROCYTE [DISTWIDTH] IN BLOOD BY AUTOMATED COUNT: 13.1 % (ref 11.5–17)
GFR SERPLBLD CREATININE-BSD FMLA CKD-EPI: >60 ML/MIN/1.73/M2
GLOBULIN SER-MCNC: 3.8 GM/DL (ref 2.4–3.5)
GLUCOSE SERPL-MCNC: 198 MG/DL (ref 74–100)
HCT VFR BLD AUTO: 34.4 % (ref 42–52)
HGB BLD-MCNC: 11.9 G/DL (ref 14–18)
IMM GRANULOCYTES # BLD AUTO: 0.06 X10(3)/MCL (ref 0–0.04)
IMM GRANULOCYTES NFR BLD AUTO: 0.4 %
LYMPHOCYTES # BLD AUTO: 1.34 X10(3)/MCL (ref 0.6–4.6)
LYMPHOCYTES NFR BLD AUTO: 9.2 %
MAGNESIUM SERPL-MCNC: 2.1 MG/DL (ref 1.6–2.6)
MCH RBC QN AUTO: 29.7 PG (ref 27–31)
MCHC RBC AUTO-ENTMCNC: 34.6 G/DL (ref 33–36)
MCV RBC AUTO: 85.8 FL (ref 80–94)
MONOCYTES # BLD AUTO: 1.47 X10(3)/MCL (ref 0.1–1.3)
MONOCYTES NFR BLD AUTO: 10.1 %
NEUTROPHILS # BLD AUTO: 11.39 X10(3)/MCL (ref 2.1–9.2)
NEUTROPHILS NFR BLD AUTO: 78 %
NRBC BLD AUTO-RTO: 0.3 %
PHOSPHATE SERPL-MCNC: 3.4 MG/DL (ref 2.3–4.7)
PLATELET # BLD AUTO: 265 X10(3)/MCL (ref 130–400)
PMV BLD AUTO: 11 FL (ref 7.4–10.4)
POTASSIUM SERPL-SCNC: 4.9 MMOL/L (ref 3.5–5.1)
PROT SERPL-MCNC: 6.4 GM/DL (ref 6.4–8.3)
RBC # BLD AUTO: 4.01 X10(6)/MCL (ref 4.7–6.1)
SODIUM SERPL-SCNC: 135 MMOL/L (ref 136–145)
WBC # BLD AUTO: 14.59 X10(3)/MCL (ref 4.5–11.5)

## 2024-12-09 PROCEDURE — 99024 POSTOP FOLLOW-UP VISIT: CPT | Mod: POP,,, | Performed by: PHYSICIAN ASSISTANT

## 2024-12-09 PROCEDURE — 97110 THERAPEUTIC EXERCISES: CPT

## 2024-12-09 PROCEDURE — 36415 COLL VENOUS BLD VENIPUNCTURE: CPT

## 2024-12-09 PROCEDURE — 85025 COMPLETE CBC W/AUTO DIFF WBC: CPT

## 2024-12-09 PROCEDURE — 94799 UNLISTED PULMONARY SVC/PX: CPT

## 2024-12-09 PROCEDURE — 84100 ASSAY OF PHOSPHORUS: CPT

## 2024-12-09 PROCEDURE — 80053 COMPREHEN METABOLIC PANEL: CPT

## 2024-12-09 PROCEDURE — 25000003 PHARM REV CODE 250: Performed by: PHYSICIAN ASSISTANT

## 2024-12-09 PROCEDURE — 94760 N-INVAS EAR/PLS OXIMETRY 1: CPT

## 2024-12-09 PROCEDURE — 99900035 HC TECH TIME PER 15 MIN (STAT)

## 2024-12-09 PROCEDURE — 63600175 PHARM REV CODE 636 W HCPCS: Performed by: PHYSICIAN ASSISTANT

## 2024-12-09 PROCEDURE — 97530 THERAPEUTIC ACTIVITIES: CPT

## 2024-12-09 PROCEDURE — 21400001 HC TELEMETRY ROOM

## 2024-12-09 PROCEDURE — 83735 ASSAY OF MAGNESIUM: CPT

## 2024-12-09 PROCEDURE — 27000221 HC OXYGEN, UP TO 24 HOURS

## 2024-12-09 RX ORDER — BUMETANIDE 0.25 MG/ML
1 INJECTION, SOLUTION INTRAMUSCULAR; INTRAVENOUS DAILY
Status: DISCONTINUED | OUTPATIENT
Start: 2024-12-09 | End: 2024-12-10 | Stop reason: HOSPADM

## 2024-12-09 RX ORDER — CODEINE PHOSPHATE AND GUAIFENESIN 10; 100 MG/5ML; MG/5ML
5 SOLUTION ORAL EVERY 4 HOURS PRN
Status: DISCONTINUED | OUTPATIENT
Start: 2024-12-09 | End: 2024-12-10 | Stop reason: HOSPADM

## 2024-12-09 RX ORDER — GUAIFENESIN 600 MG/1
600 TABLET, EXTENDED RELEASE ORAL 2 TIMES DAILY
Status: DISCONTINUED | OUTPATIENT
Start: 2024-12-09 | End: 2024-12-10 | Stop reason: HOSPADM

## 2024-12-09 RX ADMIN — GUAIFENESIN 600 MG: 600 TABLET, EXTENDED RELEASE ORAL at 09:12

## 2024-12-09 RX ADMIN — POTASSIUM CHLORIDE 20 MEQ: 1500 TABLET, EXTENDED RELEASE ORAL at 09:12

## 2024-12-09 RX ADMIN — ESCITALOPRAM OXALATE 20 MG: 10 TABLET ORAL at 09:12

## 2024-12-09 RX ADMIN — NIFEDIPINE 60 MG: 60 TABLET, FILM COATED, EXTENDED RELEASE ORAL at 09:12

## 2024-12-09 RX ADMIN — DOCUSATE SODIUM 100 MG: 100 CAPSULE, LIQUID FILLED ORAL at 09:12

## 2024-12-09 RX ADMIN — RANOLAZINE 500 MG: 500 TABLET, FILM COATED, EXTENDED RELEASE ORAL at 09:12

## 2024-12-09 RX ADMIN — FOLIC ACID 1 MG: 1 TABLET ORAL at 09:12

## 2024-12-09 RX ADMIN — CLONIDINE HYDROCHLORIDE 0.3 MG: 0.2 TABLET ORAL at 09:12

## 2024-12-09 RX ADMIN — ATORVASTATIN CALCIUM 40 MG: 40 TABLET, FILM COATED ORAL at 09:12

## 2024-12-09 RX ADMIN — FAMOTIDINE 20 MG: 10 INJECTION, SOLUTION INTRAVENOUS at 09:12

## 2024-12-09 RX ADMIN — MUPIROCIN: 20 OINTMENT TOPICAL at 09:12

## 2024-12-09 RX ADMIN — CLONAZEPAM 1 MG: 1 TABLET ORAL at 09:12

## 2024-12-09 RX ADMIN — MORPHINE SULFATE 105 MG: 30 TABLET, FILM COATED, EXTENDED RELEASE ORAL at 09:12

## 2024-12-09 RX ADMIN — METOPROLOL TARTRATE 12.5 MG: 25 TABLET, FILM COATED ORAL at 09:12

## 2024-12-09 RX ADMIN — CLOPIDOGREL BISULFATE 75 MG: 75 TABLET ORAL at 09:12

## 2024-12-09 RX ADMIN — SUCRALFATE 1 G: 1 TABLET ORAL at 09:12

## 2024-12-09 RX ADMIN — SITAGLIPTIN 50 MG: 50 TABLET, FILM COATED ORAL at 09:12

## 2024-12-09 RX ADMIN — TAMSULOSIN HYDROCHLORIDE 0.4 MG: 0.4 CAPSULE ORAL at 09:12

## 2024-12-09 RX ADMIN — PREGABALIN 150 MG: 150 CAPSULE ORAL at 09:12

## 2024-12-09 RX ADMIN — OXCARBAZEPINE 600 MG: 300 TABLET, FILM COATED ORAL at 09:12

## 2024-12-09 RX ADMIN — HYDROCODONE BITARTRATE AND ACETAMINOPHEN 1 TABLET: 5; 325 TABLET ORAL at 03:12

## 2024-12-09 RX ADMIN — BUMETANIDE 1 MG: 0.25 INJECTION INTRAMUSCULAR; INTRAVENOUS at 11:12

## 2024-12-09 NOTE — PT/OT/SLP PROGRESS
"Occupational Therapy   Treatment    Name: Bharath Caballero  MRN: [id number]    Recommendations:     Recommended therapy intensity at discharge: Low Intensity Therapy (pending progress)   Discharge Equipment Recommendations:  bedside commode, bath bench  Barriers to discharge:   (ongoing medical needs)    Assessment:     Bharath Caballero is a 44 y.o. male with a medical diagnosis of CABG x2. Hx of HF, DM II, chronic pancreatitis, CICI, chronic pain, neuropathy, falls. He presents with motivation to ambulate.     Inappropriate behaviors throughout session including hand in underwear "having trouble" finding his penis to urinate in urinal ~7-8 minutes, borderline crying once initially standing from chair due to patient thinking he "hurt' the therapist requiring max verbal cues to calm patient, and patient repeatedly asking for and requiring affirmations while ambulating that he is not disappointing the therapist and/or doctor with how well he is currently performing.     Performance deficits affecting function are weakness, impaired endurance, impaired self care skills, impaired functional mobility, gait instability, impaired balance, decreased safety awareness, pain, decreased lower extremity function, decreased upper extremity function.     Rehab Prognosis:  Good; patient would benefit from acute skilled OT services to address these deficits and reach maximum level of function.       Plan:     Patient to be seen 5 x/week to address the above listed problems via self-care/home management, therapeutic activities, therapeutic exercises  Plan of Care Expires: 01/06/25  Plan of Care Reviewed with: patient, family    Subjective     Pain/Comfort:  Pain Rating 1: 0/10    Objective:     Communicated with: nurse prior to session.  Patient found up in chair with peripheral IV, telemetry, pulse ox (continuous) upon OT entry to room.    General Precautions: Standard, fall, sternal, hearing impaired    Orthopedic Precautions:N/A  Braces: " N/A  Respiratory Status: Room air  Vital Signs: Sp02: 88%-98% while ambulating RA     Occupational Performance:     Functional Mobility/Transfers:  Patient completed Sit <> Stand Transfer with moderate assistance  with  hand-held assist   Functional Mobility: cues for hand placement and adherence to sternal precautions    Activities of Daily Living:  Toileting: minimum assistance attempt to urinate in urinal; could not re-direct to transfer to toilet  Patient declined grooming tasks    Therapeutic Activities:  Ambulated ~350ft with RW with CGA with 2 standing rest breaks; no SOB noted     Patient Education:  Patient provided with verbal education education regarding OT role/goals/POC, post op precautions, fall prevention, safety awareness, and Discharge/DME recommendations.  Understanding was verbalized, however additional teaching warranted.      Patient left up in chair with all lines intact, call button in reach, and nurse notified.    GOALS:   Multidisciplinary Problems       Occupational Therapy Goals          Problem: Occupational Therapy    Goal Priority Disciplines Outcome Interventions   Occupational Therapy Goal     OT, PT/OT Progressing    Description: LTG: Pt will perform basic ADLs and ADL transfers with Modified independence and good compliance to sternal precautions using LRAD by discharge.    STG: to be met by 1/6/25    Pt will complete grooming standing at sink with LRAD with SBA.  Pt will complete UB dressing with SBA.  Pt will complete LB dressing with SBA using LRAD and AE prn.  Pt will complete toileting with SBA using LRAD.  Pt will complete functional mobility to/from toilet and toilet transfer with SBA using LRAD.   Pt will independently verbalize sternal precautions with >90% accuracy.                        Time Tracking:     OT Date of Treatment: 12/09/24  OT Start Time: 1231  OT Stop Time: 1314  OT Total Time (min): 43 min    Billable Minutes:Therapeutic Activity 43    OT/HELADIO: OT      Number of HELADIO visits since last OT visit: 1    12/9/2024

## 2024-12-09 NOTE — PROGRESS NOTES
[facility]  Hospital Medicine Progress Note        Chief Complaint: Inpatient Follow-up for     HPI:   44 y.o. male CAD, HLD, HTN, DM2, CKD stage 3, chronic pancreatitis, MASLD, CICI, GERD and BPH was admitted to [facility] under Dr. Clayton on 12/5/2024 for CABG with left atrial appendage ligation..       He was recently admitted to the hospital on 10/7/24 with a NSTEMI, echocardiogram was done which showed EF of 45%, no major valvular abnormalities. Abnormal nuclear stress test with 2 x moderate to severe intensity reversible defects of LAD and RCA territories. He had a left heart catheterization done on 10/10/24 which showed two-vessel CAD, 50% proximal stenosis of the LAD and 50% distal stenosis, IFR of the proximal stenosis was done which was 0.75, he also has proximal RCA lesion of 90% stenosis. CABG x 2 12/5/24, no intraoperative complications documented.      Patient was brought to ICU intubated and mechanically ventilated with mediastinal chest tube x 3 in place. Patient self extubated yesterday upon admission to ICU.   IV insulin switched to sq insulin.     On postop day 1, status post two-vessel CABG with left atrial appendage ligation. Home medications resumed as appropriate and patient downgraded from the ICU. Continues to have chest tubes with output.      Hospital Medicine team was consulted for medical management. PT/OT recommending low intensity therapy. Noted to have DANNY on 12/08; held Torsemide, Aldactone and Losartan.     Interval Hx:   Today, patient denied any new complaints. Can consider small IVF bolus if tolerated and Renal indices improved with IVF overnight. CTs removed yesterday.     Case was discussed with patient's nurse on the floor.    Objective/physical exam:  General: alert male lying comfortably in bed, in no acute distress  HENT: oral and oropharyngeal mucosa moist, pink, with no erythema or exudates, no ear pain or discharge  Neck: normal neck movement,  no lymph nodes or swellings, no JVD or Carotid bruit  Respiratory: chest tubes in place; clear breathing sounds bilaterally, no crackles, rales, ronchi or wheezes  Cardiovascular: midline surgical incision; clear S1 and S2, no murmurs, rubs or gallops  Peripheral Vascular: no lesions, ulcers or erosions, normal peripheral pulses and no pedal edema  Gastrointestinal: soft, non-tender, non-distended abdomen, no guarding, rigidity or rebound tenderness, normal bowel sounds  Integumentary: normal skin color, no rashes or lesions  Neuro: AAO x 3; motor strength 5/5 in B/L UEs & LEs; sensation intact to gross and fine touch B/L; CN II-XII grossly intact    VITAL SIGNS: 24 HRS MIN & MAX LAST   Temp  Min: 97.6 °F (36.4 °C)  Max: 98.6 °F (37 °C) 98 °F (36.7 °C)   BP  Min: 91/53  Max: 143/81 124/78   Pulse  Min: 68  Max: 91  72   Resp  Min: 18  Max: 20 18   SpO2  Min: 91 %  Max: 97 % 95 %     I have reviewed the following labs:  Recent Labs   Lab 12/08/24  0416 12/08/24  1649 12/09/24  0502   WBC 17.66* 17.9  17.90* 14.59*   RBC 3.69* 3.77* 4.01*   HGB 10.9* 10.9* 11.9*   HCT 32.7* 32.7* 34.4*   MCV 88.6 86.7 85.8   MCH 29.5 28.9 29.7   MCHC 33.3 33.3 34.6   RDW 13.4 13.3 13.1    215 265   MPV 11.3* 10.9* 11.0*     Recent Labs   Lab 12/05/24  1044 12/05/24  1200 12/05/24  1408 12/05/24  1637 12/06/24  0329 12/08/24  0743 12/08/24  1649 12/09/24  0502   NA  --   --    < >  --    < > 137 134* 135*   K  --   --    < >  --    < > 4.8 4.8 4.9   CL  --   --    < >  --    < > 106 105 105   CO2  --   --    < >  --    < > 23 20* 22   BUN  --   --    < >  --    < > 27.8* 34.0* 28.8*   CREATININE  --   --    < >  --    < > 2.03* 2.07* 1.31*   CALCIUM  --   --    < >  --    < > 8.0* 8.6 8.2*   PH 7.369 7.340*  --  7.380  --   --   --   --    MG  --   --    < >  --    < > 2.20 2.10 2.10   ALBUMIN  --   --    < >  --    < > 2.6* 2.4* 2.6*   ALKPHOS  --   --    < >  --    < > 265* 255* 520*   ALT  --   --    < >  --    < > 28 29 43    AST  --   --    < >  --    < > 41* 39* 90*   BILITOT  --   --    < >  --    < > 0.5 0.3 0.5    < > = values in this interval not displayed.       Assessment/Plan:  #Severe CAD s/p CABG x 2 LIMA to mid LAD, reversed SVG to distal LAD and left atrial appendage ligation 12/5/24   #HFpEF with EF 45% and Grade I Diastolic Dysfunction on CHIO 12/5/24   #Neutrophil predominant Leukocytosis   #DM2 - A1c 9.3 on 10/2024  #GERD on pepcid, sucralfate   #Hx chronic pancreatitis and MASLD  #Hx CICI   #Hx BPH on tamsulosin  #Mood disorder on escitalopram  #Obesity   #HLD  #HTN    Continues to be admitted   No new complaints  Chest tubes removed on 12/08  CV surgery primary   PT/OT on board; recommending low intensity therapy   BGs better controlled   DANNY improved with IVF; holding Torsemide, Aldactone and Losartan   Remains on aspirin, atorvastatin, clonazepam b.i.d., clonidine t.i.d., Plavix, docusate b.i.d., escitalopram, Pepcid, folic acid, metoprolol tartrate b.i.d., nifedipine b.i.d., oxcarbazepine b.i.d., KCl b.i.d., pregabalin t.i.d., ranolazine b.i.d., sitagliptin, Carafate q.i.d., tamsulosin  P.r.n. pain medications ordered as well as scheduled morphine   May plan for DC with HH if renal indices stay stable    VTE prophylaxis: SCDs    Patient condition:  Stable    Anticipated discharge and Disposition:     Pending    All diagnosis and differential diagnosis have been reviewed; assessment and plan has been documented; I have personally reviewed the labs and test results that are presently available; I have reviewed the patients medication list; I have reviewed the consulting providers response and recommendations. I have reviewed or attempted to review medical records based upon their availability    All of the patient's questions have been  addressed and answered. Patient's is agreeable to the above stated plan. I will continue to monitor closely and make adjustments to medical management as needed.    Portions of this  note dictated using EMR integrated voice recognition software, and may be subject to voice recognition errors not corrected at proofreading. Please contact writer for clarification if needed.   _____________________________________________________________________    Radiology:  I have personally reviewed the following imaging and agree with the radiologist.     X-Ray Chest AP Portable  Narrative: EXAMINATION:  XR CHEST AP PORTABLE    CLINICAL HISTORY:  Post-op;    TECHNIQUE:  Single frontal view of the chest was performed.    COMPARISON:  12/07/2024    FINDINGS:  LINES AND TUBES: There are anterior mediastinal and pleural drains.  EKG/telemetry leads overlie the chest.    MEDIASTINUM AND CORDELIA: Cardiac silhouette is at the upper limits of normal. There is a left atrial appendage clip.    LUNGS: Lung volumes are low with associated atelectatic change.  Improving bilateral interstitial opacities.    PLEURA:No pleural effusion.Trace left apical pneumothorax.    BONES: Postop sternotomy.  Impression: Trace left apical pneumothorax.    Improving bilateral interstitial opacities.    Electronically signed by: Moni Hodges  Date:    12/08/2024  Time:    09:39      Abundio Ramsey MD  Department of Hospital Medicine   [facility]   12/09/2024

## 2024-12-10 VITALS
RESPIRATION RATE: 20 BRPM | HEART RATE: 72 BPM | DIASTOLIC BLOOD PRESSURE: 76 MMHG | WEIGHT: 205.19 LBS | HEIGHT: 68 IN | OXYGEN SATURATION: 94 % | TEMPERATURE: 99 F | BODY MASS INDEX: 31.1 KG/M2 | SYSTOLIC BLOOD PRESSURE: 118 MMHG

## 2024-12-10 DIAGNOSIS — G89.4 CHRONIC PAIN SYNDROME: ICD-10-CM

## 2024-12-10 LAB
ABO + RH BLD: NORMAL
ALBUMIN SERPL-MCNC: 2.3 G/DL (ref 3.5–5)
ALBUMIN/GLOB SERPL: 0.7 RATIO (ref 1.1–2)
ALP SERPL-CCNC: 515 UNIT/L (ref 40–150)
ALT SERPL-CCNC: 46 UNIT/L (ref 0–55)
ANION GAP SERPL CALC-SCNC: 8 MEQ/L
AST SERPL-CCNC: 56 UNIT/L (ref 5–34)
BASOPHILS # BLD AUTO: 0.04 X10(3)/MCL
BASOPHILS NFR BLD AUTO: 0.3 %
BILIRUB SERPL-MCNC: 0.4 MG/DL
BLD PROD TYP BPU: NORMAL
BLOOD UNIT EXPIRATION DATE: NORMAL
BLOOD UNIT TYPE CODE: 1700
BUN SERPL-MCNC: 22 MG/DL (ref 8.9–20.6)
CALCIUM SERPL-MCNC: 7.8 MG/DL (ref 8.4–10.2)
CHLORIDE SERPL-SCNC: 104 MMOL/L (ref 98–107)
CO2 SERPL-SCNC: 23 MMOL/L (ref 22–29)
CREAT SERPL-MCNC: 1 MG/DL (ref 0.72–1.25)
CREAT/UREA NIT SERPL: 22
CROSSMATCH INTERPRETATION: NORMAL
DISPENSE STATUS: NORMAL
EOSINOPHIL # BLD AUTO: 0.29 X10(3)/MCL (ref 0–0.9)
EOSINOPHIL NFR BLD AUTO: 2.2 %
ERYTHROCYTE [DISTWIDTH] IN BLOOD BY AUTOMATED COUNT: 13.1 % (ref 11.5–17)
GFR SERPLBLD CREATININE-BSD FMLA CKD-EPI: >60 ML/MIN/1.73/M2
GLOBULIN SER-MCNC: 3.4 GM/DL (ref 2.4–3.5)
GLUCOSE SERPL-MCNC: 198 MG/DL (ref 74–100)
HCT VFR BLD AUTO: 30 % (ref 42–52)
HGB BLD-MCNC: 9.9 G/DL (ref 14–18)
IMM GRANULOCYTES # BLD AUTO: 0.07 X10(3)/MCL (ref 0–0.04)
IMM GRANULOCYTES NFR BLD AUTO: 0.5 %
LYMPHOCYTES # BLD AUTO: 1.79 X10(3)/MCL (ref 0.6–4.6)
LYMPHOCYTES NFR BLD AUTO: 13.7 %
MAGNESIUM SERPL-MCNC: 2.1 MG/DL (ref 1.6–2.6)
MCH RBC QN AUTO: 28.8 PG (ref 27–31)
MCHC RBC AUTO-ENTMCNC: 33 G/DL (ref 33–36)
MCV RBC AUTO: 87.2 FL (ref 80–94)
MONOCYTES # BLD AUTO: 1.41 X10(3)/MCL (ref 0.1–1.3)
MONOCYTES NFR BLD AUTO: 10.8 %
NEUTROPHILS # BLD AUTO: 9.49 X10(3)/MCL (ref 2.1–9.2)
NEUTROPHILS NFR BLD AUTO: 72.5 %
NRBC BLD AUTO-RTO: 0 %
PHOSPHATE SERPL-MCNC: 3.2 MG/DL (ref 2.3–4.7)
PLATELET # BLD AUTO: 260 X10(3)/MCL (ref 130–400)
PMV BLD AUTO: 11 FL (ref 7.4–10.4)
POTASSIUM SERPL-SCNC: 4.8 MMOL/L (ref 3.5–5.1)
PROT SERPL-MCNC: 5.7 GM/DL (ref 6.4–8.3)
RBC # BLD AUTO: 3.44 X10(6)/MCL (ref 4.7–6.1)
SODIUM SERPL-SCNC: 135 MMOL/L (ref 136–145)
UNIT NUMBER: NORMAL
WBC # BLD AUTO: 13.09 X10(3)/MCL (ref 4.5–11.5)

## 2024-12-10 PROCEDURE — 97116 GAIT TRAINING THERAPY: CPT | Mod: CQ

## 2024-12-10 PROCEDURE — 99900035 HC TECH TIME PER 15 MIN (STAT)

## 2024-12-10 PROCEDURE — 84100 ASSAY OF PHOSPHORUS: CPT

## 2024-12-10 PROCEDURE — 36415 COLL VENOUS BLD VENIPUNCTURE: CPT

## 2024-12-10 PROCEDURE — 97535 SELF CARE MNGMENT TRAINING: CPT | Mod: CO

## 2024-12-10 PROCEDURE — 80053 COMPREHEN METABOLIC PANEL: CPT

## 2024-12-10 PROCEDURE — 25000003 PHARM REV CODE 250: Performed by: PHYSICIAN ASSISTANT

## 2024-12-10 PROCEDURE — 97530 THERAPEUTIC ACTIVITIES: CPT | Mod: CQ

## 2024-12-10 PROCEDURE — 94799 UNLISTED PULMONARY SVC/PX: CPT

## 2024-12-10 PROCEDURE — 99024 POSTOP FOLLOW-UP VISIT: CPT | Mod: POP,,, | Performed by: PHYSICIAN ASSISTANT

## 2024-12-10 PROCEDURE — 85025 COMPLETE CBC W/AUTO DIFF WBC: CPT

## 2024-12-10 PROCEDURE — 99024 POSTOP FOLLOW-UP VISIT: CPT | Mod: ,,, | Performed by: STUDENT IN AN ORGANIZED HEALTH CARE EDUCATION/TRAINING PROGRAM

## 2024-12-10 PROCEDURE — 83735 ASSAY OF MAGNESIUM: CPT

## 2024-12-10 RX ORDER — FAMOTIDINE 20 MG/1
20 TABLET, FILM COATED ORAL DAILY
Status: DISCONTINUED | OUTPATIENT
Start: 2024-12-11 | End: 2024-12-10 | Stop reason: HOSPADM

## 2024-12-10 RX ORDER — CODEINE PHOSPHATE AND GUAIFENESIN 10; 100 MG/5ML; MG/5ML
5 SOLUTION ORAL EVERY 4 HOURS PRN
Qty: 100 ML | Refills: 0 | Status: SHIPPED | OUTPATIENT
Start: 2024-12-10 | End: 2024-12-20

## 2024-12-10 RX ORDER — METOPROLOL TARTRATE 25 MG/1
12.5 TABLET, FILM COATED ORAL 2 TIMES DAILY
Qty: 90 TABLET | Refills: 3 | Status: SHIPPED | OUTPATIENT
Start: 2024-12-10 | End: 2025-12-10

## 2024-12-10 RX ORDER — HYDROCODONE BITARTRATE AND ACETAMINOPHEN 5; 325 MG/1; MG/1
1 TABLET ORAL EVERY 4 HOURS PRN
Qty: 40 TABLET | Refills: 0 | Status: SHIPPED | OUTPATIENT
Start: 2024-12-10

## 2024-12-10 RX ADMIN — ESCITALOPRAM OXALATE 20 MG: 10 TABLET ORAL at 08:12

## 2024-12-10 RX ADMIN — HYDROCODONE BITARTRATE AND ACETAMINOPHEN 1 TABLET: 5; 325 TABLET ORAL at 08:12

## 2024-12-10 RX ADMIN — NIFEDIPINE 60 MG: 60 TABLET, FILM COATED, EXTENDED RELEASE ORAL at 08:12

## 2024-12-10 RX ADMIN — TAMSULOSIN HYDROCHLORIDE 0.4 MG: 0.4 CAPSULE ORAL at 08:12

## 2024-12-10 RX ADMIN — GUAIFENESIN 600 MG: 600 TABLET, EXTENDED RELEASE ORAL at 08:12

## 2024-12-10 RX ADMIN — RANOLAZINE 500 MG: 500 TABLET, FILM COATED, EXTENDED RELEASE ORAL at 08:12

## 2024-12-10 RX ADMIN — PREGABALIN 150 MG: 150 CAPSULE ORAL at 08:12

## 2024-12-10 RX ADMIN — FAMOTIDINE 20 MG: 10 INJECTION, SOLUTION INTRAVENOUS at 08:12

## 2024-12-10 RX ADMIN — FOLIC ACID 1 MG: 1 TABLET ORAL at 08:12

## 2024-12-10 RX ADMIN — SITAGLIPTIN 50 MG: 50 TABLET, FILM COATED ORAL at 08:12

## 2024-12-10 RX ADMIN — DOCUSATE SODIUM 100 MG: 100 CAPSULE, LIQUID FILLED ORAL at 08:12

## 2024-12-10 RX ADMIN — CLOPIDOGREL BISULFATE 75 MG: 75 TABLET ORAL at 08:12

## 2024-12-10 RX ADMIN — OXCARBAZEPINE 600 MG: 300 TABLET, FILM COATED ORAL at 08:12

## 2024-12-10 RX ADMIN — CLONAZEPAM 1 MG: 1 TABLET ORAL at 08:12

## 2024-12-10 RX ADMIN — POTASSIUM CHLORIDE 20 MEQ: 1500 TABLET, EXTENDED RELEASE ORAL at 08:12

## 2024-12-10 RX ADMIN — METOPROLOL TARTRATE 12.5 MG: 25 TABLET, FILM COATED ORAL at 08:12

## 2024-12-10 RX ADMIN — ASPIRIN 81 MG: 81 TABLET, COATED ORAL at 08:12

## 2024-12-10 NOTE — PLAN OF CARE
12/10/24 1122   Discharge Reassessment   Assessment Type Discharge Planning Reassessment   Discharge Plan discussed with: Patient   Discharge Plan A Home Health   Discharge Plan B Home Health   DME Needed Upon Discharge  walker, rolling   Post-Acute Status   Post-Acute Authorization Home Health     Brother and sister in law will assist during recovery. Patient is current with [facility]. PT recommended a RW. Referral faxed to Bill. Per Bill, patient received a rollator in 2022, so he does not qualify for a new one. Notified patient and brother Kenny. They will purchase out of pocket.

## 2024-12-10 NOTE — PLAN OF CARE
12/10/24 1133   Final Note   Assessment Type Final Discharge Note   Anticipated Discharge Disposition Home-Health   Post-Acute Status   Post-Acute Authorization Home Health     Discharge documentation sent to [facility] via [facility]. Notified Melinda. Brother will transport home.

## 2024-12-10 NOTE — ANESTHESIA POSTPROCEDURE EVALUATION
Anesthesia Post Evaluation    Patient: Bharath Caballero    Procedure(s) Performed: Procedure(s) (LRB):  CORONARY ARTERY BYPASS GRAFT (CABG) (N/A)  HARVEST-VEIN-ENDOVASCULAR (Left)  Left atrial appendage occlusion (Left)    Final Anesthesia Type: general      Patient location during evaluation: floor  Patient participation: Yes- Able to Participate  Level of consciousness: awake and alert  Post-procedure vital signs: reviewed and stable  Pain management: adequate  Airway patency: patent      Anesthetic complications: no      Cardiovascular status: hemodynamically stable  Respiratory status: unassisted  Hydration status: euvolemic  Follow-up not needed.              Vitals Value Taken Time   /76 12/10/24 0738   Temp 37 °C (98.6 °F) 12/10/24 0738   Pulse 70 12/10/24 0738   Resp 20 12/10/24 0835   SpO2 94 % 12/10/24 0738         No case tracking events are documented in the log.      Pain/Merlyn Score: Pain Rating Prior to Med Admin: 3 (12/10/2024  8:35 AM)  Pain Rating Post Med Admin: 0 (12/9/2024 10:27 AM)

## 2024-12-10 NOTE — PROGRESS NOTES
Pharmacist Intervention IV to PO Note    Bharath Caballero is a 44 y.o. male being treated with IV medication famotidine    Patient Data:    Vital Signs (Most Recent):  Temp: 98.6 °F (37 °C) (12/10/24 0738)  Pulse: 70 (12/10/24 0738)  Resp: 20 (12/10/24 0835)  BP: 118/76 (12/10/24 0738)  SpO2: (!) 94 % (12/10/24 0738) Vital Signs (72h Range):  Temp:  [97.6 °F (36.4 °C)-99.1 °F (37.3 °C)]   Pulse:  [68-91]   Resp:  [18-20]   BP: ()/(33-81)   SpO2:  [89 %-97 %]      CBC:  Recent Labs   Lab 12/08/24  1649 12/09/24  0502 12/10/24  0341   WBC 17.9  17.90* 14.59* 13.09*   RBC 3.77* 4.01* 3.44*   HGB 10.9* 11.9* 9.9*   HCT 32.7* 34.4* 30.0*    265 260   MCV 86.7 85.8 87.2   MCH 28.9 29.7 28.8   MCHC 33.3 34.6 33.0     CMP:     Recent Labs   Lab 12/08/24  1649 12/09/24  0502 12/10/24  0341   CALCIUM 8.6 8.2* 7.8*   ALBUMIN 2.4* 2.6* 2.3*   * 135* 135*   K 4.8 4.9 4.8   CO2 20* 22 23    105 104   BUN 34.0* 28.8* 22.0*   CREATININE 2.07* 1.31* 1.00   ALKPHOS 255* 520* 515*   ALT 29 43 46   AST 39* 90* 56*   BILITOT 0.3 0.5 0.4       Dietary Orders:  Diet Orders            Diet Adult Regular Diabetic, Cardiac (Low Na/Chol); 2000 Calories (up to 75 gm per meal): Regular starting at 12/06 1039            Based on the following criteria, this patient qualifies for intravenous to oral conversion:  [x] The patients gastrointestinal tract is functioning (tolerating medications via oral or enteral route for 24 hours and tolerating food or enteral feeds for 24 hours).    IV medication famotidine 20mg daily will be changed to oral medication famotidine 20mg daily.    Pharmacist's Name: Justin Davis  Pharmacist's Extension: [phone]

## 2024-12-10 NOTE — NURSING
AVS virtually reviewed with patient and friends in its entirety with emphasis on diet, medications, follow-up appointments and reasons to return to the ED or contact the [facility]. Patient also encouraged to utilize their patient portal. Ease and convenience of use reiterated. Education complete and patient and friends voiced understanding. All questions answered. Discharge teaching complete.

## 2024-12-10 NOTE — PT/OT/SLP PROGRESS
Physical Therapy Treatment    Patient Name:  Bharath Caballero   MRN:  [id number]    Recommendations:     Discharge therapy intensity: Low Intensity Therapy   Discharge Equipment Recommendations: walker, rolling  Barriers to discharge: Impaired mobility    Assessment:     Bharath Caballero is a 44 y.o. male admitted with a medical diagnosis of CABG x2. Hx of HF, DM II, chronic pancreatitis, CICI, chronic pain, neuropathy, falls.  He presents with the following impairments/functional limitations: weakness, gait instability, impaired cardiopulmonary response to activity, impaired endurance, impaired balance, impaired self care skills, impaired functional mobility, decreased lower extremity function.    Rehab Prognosis: Good; patient would benefit from acute skilled PT services to address these deficits and reach maximum level of function.    Recent Surgery: Procedure(s) (LRB):  CORONARY ARTERY BYPASS GRAFT (CABG) (N/A)  HARVEST-VEIN-ENDOVASCULAR (Left)  Left atrial appendage occlusion (Left) 5 Days Post-Op    Plan:     During this hospitalization, patient would benefit from acute PT services 5 x/week to address the identified rehab impairments via gait training, therapeutic activities, therapeutic exercises and progress toward the following goals:    Plan of Care Expires:  01/06/25    Subjective     Chief Complaint: none  Patient/Family Comments/goals: to go home  Pain/Comfort:  Pain Rating 1: 0/10      Objective:     Communicated with RN prior to session.  Patient found sitting edge of bed with pulse ox (continuous), telemetry, peripheral IV upon PT entry to room.     General Precautions: Standard, sternal, fall, hearing impaired  Orthopedic Precautions: N/A  Braces: N/A  Respiratory Status: Room air  Blood Pressure:   Skin Integrity: Visible skin intact      Functional Mobility:  Transfers:    Sit<->stand from bed level and recliner with SBA  Cues for technique like scooting to edge of chair, maintained pxns  Gait:   200' with  RW, intermittent increased lateral sway, no major LOB, CGA for safety  Stairs:    Ascend/descend 3 steps with JUSTIN UE support on single rail on R. CGA to min assist with step to pattern.     Therapeutic Activities/Exercises:  Increased time to complete all activities. Pt reports he will have a family member with him for the next 2 weeks.     Education:  Patient provided with verbal education education regarding PT role/goals/POC, post-op precautions, fall prevention, and discharge/DME recommendations.  Understanding was verbalized.     Patient left up in chair with all lines intact, call button in reach, and nurse notified    GOALS:   Multidisciplinary Problems       Physical Therapy Goals          Problem: Physical Therapy    Goal Priority Disciplines Outcome Interventions   Physical Therapy Goal     PT, PT/OT Progressing    Description: Goals to be met by: 2025     Patient will increase functional independence with mobility by performin. Supine to sit with Stand-by Assistance  2. Sit to stand transfer with Stand-by Assistance  3. Gait  x 150 feet with Stand-by Assistance using Rolling Walker.   4. Ascend/descend 3 stair with right Handrails Supervision using No Assistive Device.                          Time Tracking:     PT Received On: 12/10/24  PT Start Time: 938     PT Stop Time: 1005  PT Total Time (min): 27 min     Billable Minutes: Gait Training 1 unit and Therapeutic Activity 1 unit    Treatment Type: Treatment  PT/PTA: PTA     Number of PTA visits since last PT visit: 1     12/10/2024

## 2024-12-10 NOTE — PLAN OF CARE
Problem: Adult Inpatient Plan of Care  Goal: Plan of Care Review  Outcome: Adequate for Care Transition  Goal: Patient-Specific Goal (Individualized)  Outcome: Adequate for Care Transition  Goal: Absence of Hospital-Acquired Illness or Injury  Outcome: Adequate for Care Transition  Goal: Optimal Comfort and Wellbeing  Outcome: Adequate for Care Transition  Goal: Readiness for Transition of Care  Outcome: Adequate for Care Transition     Problem: Diabetes Comorbidity  Goal: Blood Glucose Level Within Targeted Range  Outcome: Adequate for Care Transition     Problem: Infection  Goal: Absence of Infection Signs and Symptoms  Outcome: Adequate for Care Transition     Problem: Wound  Goal: Optimal Coping  Outcome: Adequate for Care Transition  Goal: Optimal Functional Ability  Outcome: Adequate for Care Transition  Goal: Absence of Infection Signs and Symptoms  Outcome: Adequate for Care Transition  Goal: Improved Oral Intake  Outcome: Adequate for Care Transition  Goal: Optimal Pain Control and Function  Outcome: Adequate for Care Transition  Goal: Skin Health and Integrity  Outcome: Adequate for Care Transition  Goal: Optimal Wound Healing  Outcome: Adequate for Care Transition     Problem: Fall Injury Risk  Goal: Absence of Fall and Fall-Related Injury  Outcome: Adequate for Care Transition

## 2024-12-10 NOTE — PROGRESS NOTES
12/09/24 1500   Pre Exercise Vitals   /73   Pulse 70   Supplemental O2? No   SpO2 97 %   During Exercise Vitals   Pulse 88   Supplemental O2? No   SpO2 92 %   Distance Walked 125 feet   Post Exercise Vitals   /62   Pulse 77   Supplemental O2? No   SpO2 96 %   Modality   Modality Walker     Communicated with nurse prior to session; pt sitting up in chair; min assist to stand; maintains sternal precautions; walked 125 feet, followed with chair; did not require sitting rest break; O2 sat @92% while walking; assisted back to chair; encouraged use of IS

## 2024-12-10 NOTE — PROGRESS NOTES
[facility]  Hospital Medicine Progress Note        Chief Complaint: Inpatient Follow-up for     HPI:   44 y.o. male CAD, HLD, HTN, DM2, CKD stage 3, chronic pancreatitis, MASLD, CICI, GERD and BPH was admitted to [facility] under Dr. Clayton on 12/5/2024 for CABG with left atrial appendage ligation..       He was recently admitted to the hospital on 10/7/24 with a NSTEMI, echocardiogram was done which showed EF of 45%, no major valvular abnormalities. Abnormal nuclear stress test with 2 x moderate to severe intensity reversible defects of LAD and RCA territories. He had a left heart catheterization done on 10/10/24 which showed two-vessel CAD, 50% proximal stenosis of the LAD and 50% distal stenosis, IFR of the proximal stenosis was done which was 0.75, he also has proximal RCA lesion of 90% stenosis. CABG x 2 12/5/24, no intraoperative complications documented.      Patient was brought to ICU intubated and mechanically ventilated with mediastinal chest tube x 3 in place. Patient self extubated yesterday upon admission to ICU.   IV insulin switched to sq insulin.     On postop day 1, status post two-vessel CABG with left atrial appendage ligation. Home medications resumed as appropriate and patient downgraded from the ICU. Continues to have chest tubes with output.      Hospital Medicine team was consulted for medical management. PT/OT recommending low intensity therapy. Noted to have DANNY on 12/08; held Torsemide, Aldactone and Losartan. CTs removed 12/08.     Interval Hx:   Today, patient denied any new complaints. BUN/Cr improved to 22.0/1.0; can stop IVF and resume maintenance diuretic upon DC. Ambulating well, can plan for DC with HH.    Case was discussed with patient's nurse on the floor.    Objective/physical exam:  General: alert male lying comfortably in bed, in no acute distress  HENT: oral and oropharyngeal mucosa moist, pink, with no erythema or exudates, no ear pain or  discharge  Neck: normal neck movement, no lymph nodes or swellings, no JVD or Carotid bruit  Respiratory: chest tubes in place; clear breathing sounds bilaterally, no crackles, rales, ronchi or wheezes  Cardiovascular: midline surgical incision; clear S1 and S2, no murmurs, rubs or gallops  Peripheral Vascular: no lesions, ulcers or erosions, normal peripheral pulses and no pedal edema  Gastrointestinal: soft, non-tender, non-distended abdomen, no guarding, rigidity or rebound tenderness, normal bowel sounds  Integumentary: normal skin color, no rashes or lesions  Neuro: AAO x 3; motor strength 5/5 in B/L UEs & LEs; sensation intact to gross and fine touch B/L; CN II-XII grossly intact    VITAL SIGNS: 24 HRS MIN & MAX LAST   Temp  Min: 98 °F (36.7 °C)  Max: 99 °F (37.2 °C) 98.6 °F (37 °C)   BP  Min: 94/62  Max: 126/73 118/76   Pulse  Min: 68  Max: 74  70   Resp  Min: 18  Max: 20 20   SpO2  Min: 91 %  Max: 97 % (!) 94 %     I have reviewed the following labs:  Recent Labs   Lab 12/08/24  1649 12/09/24  0502 12/10/24  0341   WBC 17.9  17.90* 14.59* 13.09*   RBC 3.77* 4.01* 3.44*   HGB 10.9* 11.9* 9.9*   HCT 32.7* 34.4* 30.0*   MCV 86.7 85.8 87.2   MCH 28.9 29.7 28.8   MCHC 33.3 34.6 33.0   RDW 13.3 13.1 13.1    265 260   MPV 10.9* 11.0* 11.0*     Recent Labs   Lab 12/05/24  1044 12/05/24  1200 12/05/24  1408 12/05/24  1637 12/06/24  0329 12/08/24  1649 12/09/24  0502 12/10/24  0341   NA  --   --    < >  --    < > 134* 135* 135*   K  --   --    < >  --    < > 4.8 4.9 4.8   CL  --   --    < >  --    < > 105 105 104   CO2  --   --    < >  --    < > 20* 22 23   BUN  --   --    < >  --    < > 34.0* 28.8* 22.0*   CREATININE  --   --    < >  --    < > 2.07* 1.31* 1.00   CALCIUM  --   --    < >  --    < > 8.6 8.2* 7.8*   PH 7.369 7.340*  --  7.380  --   --   --   --    MG  --   --    < >  --    < > 2.10 2.10 2.10   ALBUMIN  --   --    < >  --    < > 2.4* 2.6* 2.3*   ALKPHOS  --   --    < >  --    < > 255* 520* 515*    ALT  --   --    < >  --    < > 29 43 46   AST  --   --    < >  --    < > 39* 90* 56*   BILITOT  --   --    < >  --    < > 0.3 0.5 0.4    < > = values in this interval not displayed.       Assessment/Plan:  #Severe CAD s/p CABG x 2 LIMA to mid LAD, reversed SVG to distal LAD and left atrial appendage ligation 12/5/24   #HFpEF with EF 45% and Grade I Diastolic Dysfunction on CHIO 12/5/24   #Neutrophil predominant Leukocytosis   #DM2 - A1c 9.3 on 10/2024  #GERD on pepcid, sucralfate   #Hx chronic pancreatitis and MASLD  #Hx CICI   #Hx BPH on tamsulosin  #Mood disorder on escitalopram  #Obesity   #HLD  #HTN    Continues to be admitted   No new complaints  Chest tubes removed on 12/08  CV surgery primary   PT/OT on board; recommending low intensity therapy   BGs better controlled   DANNY resolved with BUN/Cr improved to 22/1.0  On IV Bumex 1 mg daily  Can resume maintenance diuretic upon DC; would hold off on Aldactone and Losartan d/t soft Bps, can be restarted by Cardiology outpatient  Remains on aspirin, atorvastatin, clonazepam b.i.d., clonidine t.i.d., Plavix, docusate b.i.d., escitalopram, Pepcid, folic acid, metoprolol tartrate b.i.d., nifedipine b.i.d., oxcarbazepine b.i.d., KCl b.i.d., pregabalin t.i.d., ranolazine b.i.d., sitagliptin, Carafate q.i.d., tamsulosin  P.r.n. pain medications ordered as well as scheduled morphine   May plan for DC with     Critical care note:  Critical care diagnosis: CHF req IV diuresis  Critical care interventions: Hands-on evaluation, review of labs/radiographs/records and discussion with patient and family if present  Critical care time spent: 45 minutes    VTE prophylaxis: SCDs (recommend starting ppx lovenox)    Patient condition:  Stable    Anticipated discharge and Disposition:     Pending    All diagnosis and differential diagnosis have been reviewed; assessment and plan has been documented; I have personally reviewed the labs and test results that are presently available; I  have reviewed the patients medication list; I have reviewed the consulting providers response and recommendations. I have reviewed or attempted to review medical records based upon their availability    All of the patient's questions have been  addressed and answered. Patient's is agreeable to the above stated plan. I will continue to monitor closely and make adjustments to medical management as needed.    Portions of this note dictated using EMR integrated voice recognition software, and may be subject to voice recognition errors not corrected at proofreading. Please contact writer for clarification if needed.   _____________________________________________________________________    Radiology:  I have personally reviewed the following imaging and agree with the radiologist.     Cardiac catheterization  Procedure performed in the Invasive Lab    - See Procedure Log link below for nursing documentation    - See OpNote on Surgeries Tab for physician findings    - See Imaging Tab for radiologist dictation  Cardiac catheterization  Procedure performed in the Invasive Lab    - See Procedure Log link below for nursing documentation    - See OpNote on Surgeries Tab for physician findings    - See Imaging Tab for radiologist dictation      Abundio Ramsey MD  Department of Hospital Medicine   [facility]   12/10/2024

## 2024-12-10 NOTE — DISCHARGE INSTRUCTIONS
HOME CARE INSTRUCTIONS AFTER  OPEN HEART SURGERY    WHAT TO DO:  · Eat whatever appeals to you for the first 2 weeks at home. Do not limit your diet when you have no appetite.  · Continue to use the Incentive Spirometer (breathing exercises) 10 times every hour while youre awake for at least 2 weeks.  · Elevate legs above chest, while lying flat, 4 times a day for at least 30 minutes.  o Do so more often if necessary to decrease swelling.  o Recliners do not allow for proper elevation, beds or couches are recommended.  · Wear compression stockings during the day until you are up and walking frequently and you have no real swelling (usually about 2 weeks)  · Shower daily, use mild bath soap (dial or safeguard) on incisions and rinse well. Pat dry and leave open to air.  o Inspect incisions daily. Notify surgeon of any redness, swelling, drainage or opening of the incision.  · Walk frequently throughout the day as tolerated.  o Try not to sit for longer than 2 hour periods at a time.    WHAT TO AVOID:  · NO STRAINING, LIFTING, PULLING OR PUSHING >5-10 POUNDS FOR 8 WEEKS.  · No hydrogen peroxide, ointment, creams, or powders on incisions unless specifically instructed by surgeon. SOAP AND WATER ONLY.  · NO DRIVING UNTIL CLEARED BY SURGEON.    REPORT TO SURGEON  · Any increased redness, swelling, thick/cloudy yellow/greenish drainage, or opening of incision.  · Any fever above 100.2  · If you hear or feel any tingling, clicking, or popping in chest.  · Weigh yourself EVERY morning AFTER urinating, BEFORE eating and wearing the same amount of clothes.  · Experiencing new/continuous shortness of breath, fast/irregular heartbeat, vomiting, vision or speech changes/disturbances, confusion, or unusual bleeding.

## 2024-12-10 NOTE — PT/OT/SLP PROGRESS
Occupational Therapy   Treatment    Name: Bharath Caballero  MRN: [id number]  Admitting Diagnosis:  NSTEMI (non-ST elevated myocardial infarction)  5 Days Post-Op    Recommendations:     Recommended therapy intensity at discharge: Low Intensity Therapy   Discharge Equipment Recommendations:  bedside commode, bath bench  Barriers to discharge:       Assessment:     hBarath Caballero is a 44 y.o. male with a medical diagnosis of NSTEMI (non-ST elevated myocardial infarction).  Performance deficits affecting function are weakness, impaired endurance, impaired self care skills, impaired functional mobility, gait instability, impaired balance, decreased safety awareness, pain, decreased lower extremity function, decreased upper extremity function.     Rehab Prognosis:  Fair; patient would benefit from acute skilled OT services to address these deficits and reach maximum level of function.       Plan:     Patient to be seen 5 x/week to address the above listed problems via self-care/home management, therapeutic activities, therapeutic exercises  Plan of Care Expires: 01/06/25  Plan of Care Reviewed with: patient, family    Subjective     Pain/Comfort:       Objective:     Communicated with: RN prior to session.  Patient found sitting edge of bed with   upon OT entry to room.    General Precautions: Standard, fall, sternal, hearing impaired    Orthopedic Precautions:N/A  Braces: N/A     Occupational Performance:     Functional Mobility/Transfers:  Patient completed Sit <> Stand Transfer with minimum assistance  with  no assistive device   Patient completed Bed <> Chair Transfer using Step Transfer technique with minimum assistance with rolling walker  Functional Mobility: no LOB noted while mobilizing in room, additional time warrented 2/2 rest breaks and patient having hiccups during t/f    Activities of Daily Living:  Lower Body Dressing: minimum assistance donning/doffing B socks    Patient Education:  Patient provided with verbal  education education regarding OT role/goals/POC.  Understanding was verbalized. Education provided on Gait belt and use of it in home environment for his safety.       Patient left up in chair with all lines intact and call button in reach.    GOALS:   Multidisciplinary Problems       Occupational Therapy Goals          Problem: Occupational Therapy    Goal Priority Disciplines Outcome Interventions   Occupational Therapy Goal     OT, PT/OT Progressing    Description: LTG: Pt will perform basic ADLs and ADL transfers with Modified independence and good compliance to sternal precautions using LRAD by discharge.    STG: to be met by 1/6/25    Pt will complete grooming standing at sink with LRAD with SBA.  Pt will complete UB dressing with SBA.  Pt will complete LB dressing with SBA using LRAD and AE prn.  Pt will complete toileting with SBA using LRAD.  Pt will complete functional mobility to/from toilet and toilet transfer with SBA using LRAD.   Pt will independently verbalize sternal precautions with >90% accuracy.                        Time Tracking:     OT Date of Treatment: 12/10/24  OT Start Time: 0938  OT Stop Time: 1015  OT Total Time (min): 37 min    Billable Minutes:Self Care/Home Management 2    OT/HELADIO: HELADIO     Number of HELADIO visits since last OT visit: 2    12/10/2024

## 2024-12-11 ENCOUNTER — PATIENT OUTREACH (OUTPATIENT)
Dept: ADMINISTRATIVE | Facility: CLINIC | Age: 44
End: 2024-12-11
Payer: MEDICARE

## 2024-12-11 ENCOUNTER — PATIENT MESSAGE (OUTPATIENT)
Dept: ADMINISTRATIVE | Facility: CLINIC | Age: 44
End: 2024-12-11
Payer: MEDICARE

## 2024-12-11 RX ORDER — HYDROMORPHONE HYDROCHLORIDE 8 MG/1
8 TABLET ORAL EVERY 8 HOURS PRN
Qty: 90 TABLET | Refills: 0 | Status: SHIPPED | OUTPATIENT
Start: 2024-12-11

## 2024-12-11 NOTE — PROGRESS NOTES
C3 nurse attempted to contact Bharath Caballero for a TCC post hospital discharge follow up call. No answer. Left voicemail with callback information. The patient has a scheduled HOSFU appointment with Dat Beasley MD (PCP) on 12/17/24 @ 1:30pm, and a Post-Op appointment with Gillian Clayton MD (cardiothoracic surgery) on 12/31/24 @ 9:50am.

## 2024-12-11 NOTE — PROGRESS NOTES
"C3 nurse spoke with Bharath Caballero, and his sister-in-law, Beata, for a TCC post hospital discharge follow up call. The patient has a scheduled \[facility]\"" appointment with Dat Beasley MD (PCP) on 12/17/24 @ 1:30pm. The patient will be going to Cardiology on 12/16/24 for suture removal. Per his sister-in-law, Home Health nurse will be coming out daily for 1 week, then will decrease to 2-3 times per week. The patient did note that, "The nurses on the 7th floor were ugly to me, but the 6th floor nurses were awesome! They always explained things to me and were willing to help me." Beata, noted that she has been giving the patient Ensure when he does not want to eat, but C3 nurse encouraged her to discuss this with PCP and see if a prescription for Glucerna would be possible, due to the patient being diabetic? She will discuss with PCP at \[facility]\"".   "

## 2024-12-11 NOTE — DISCHARGE SUMMARY
[facility] 6th Floor Medical Telemetry  Cardiothoracic Surgery  Discharge Summary      Patient Name: Bharath Caballero  MRN: [id number]  Admission Date: 12/5/2024  Hospital Length of Stay: 5 days  Discharge Date and Time: 12/10/2024  3:58 PM  Attending Physician: No att. providers found   Discharging Provider: MARCELINA Velásquez  Primary Care Provider: Dat Beasley MD    HPI:   No notes on file    Procedure(s) (LRB):  CORONARY ARTERY BYPASS GRAFT (CABG) (N/A)  HARVEST-VEIN-ENDOVASCULAR (Left)  Left atrial appendage occlusion (Left)      Indwelling Lines/Drains at time of discharge:   Lines/Drains/Airways       None                 Hospital Course: No notes on file    Goals of Care Treatment Preferences:  Code Status: Full Code      Consults (From admission, onward)          Status Ordering Provider     Inpatient consult to Hospital Medicine  Once        Provider:  Jatin Cornell MD    Completed KEL RUVALCABA            Significant Diagnostic Studies: Labs: All labs within the past 24 hours have been reviewed    Pending Diagnostic Studies:       None            No new Assessment & Plan notes have been filed under this hospital service since the last note was generated.  Service: Cardiothoracic Surgery    Final Active Diagnoses:    Diagnosis Date Noted POA    PRINCIPAL PROBLEM:  NSTEMI (non-ST elevated myocardial infarction) [I21.4] 10/09/2024 Yes      Problems Resolved During this Admission:      Discharged Condition: good    Disposition: Home or Self Care    Follow Up:   Follow-up Information       [facility].    Specialties: Home Health Services, Physical Therapy  Why: This is your Home Health Agency--Home Health nurse to remove chest tube suture on 12/16.  Contact information:  [address] [place] [address]  [phone]               Gillian Clayton MD Follow up on 12/31/2024.    Specialty: Cardiothoracic Surgery  Why: @9:50  Contact information:  [address] [place] [address]  [phone]                           Patient Instructions:      WALKER FOR HOME USE     Order Specific Question Answer Comments   Type of Walker: Adult (5'4"-6'6")    With wheels? Yes    Height: 5' 8" (1.727 m)    Weight: 93.1 kg (205 lb 3.2 oz)    Length of need (1-99 months): 99    Does patient have medical equipment at home? walker, rolling    Does patient have medical equipment at home? cane, straight    Does patient have medical equipment at home? hospital bed    Does patient have medical equipment at home? rollator    Please check all that apply: Patient's condition impairs ambulation.    Please check all that apply: Patient is unable to safely ambulate without equipment.      HOME HEALTH ORDERS   Order Comments: Eval and Treat All Services     Order Specific Question Answer Comments   What Home Health Agency is the patient currently using? Other/External      Medications:  Reconciled Home Medications:      Medication List        START taking these medications      guaiFENesin-codeine 100-10 mg/5 ml  mg/5 mL syrup  Commonly known as: TUSSI-ORGANIDIN NR  Take 5 mLs by mouth every 4 (four) hours as needed for Cough or Congestion.     HYDROcodone-acetaminophen 5-325 mg per tablet  Commonly known as: NORCO  Take 1 tablet by mouth every 4 (four) hours as needed for Pain.     metoprolol tartrate 25 MG tablet  Commonly known as: LOPRESSOR  Take 0.5 tablets (12.5 mg total) by mouth 2 (two) times daily.            CONTINUE taking these medications      amitriptyline 50 MG tablet  Commonly known as: ELAVIL  Take 1 tablet (50 mg total) by mouth every evening.     aspirin 81 MG EC tablet  Commonly known as: ECOTRIN  Take 81 mg by mouth once daily.     atorvastatin 40 MG tablet  Commonly known as: LIPITOR  TAKE ONE TABLET BY MOUTH EVERY DAY     cholecalciferol (vitamin D3) 25 mcg (1,000 unit) capsule  Commonly known as: VITAMIN D3  Take 2 capsules (2,000 Units total) by " mouth once daily.     clonazePAM 1 MG tablet  Commonly known as: KlonoPIN  TAKE ONE TABLET BY MOUTH TWICE DAILY     cloNIDine 0.3 MG tablet  Commonly known as: CATAPRES  Take 1 tablet (0.3 mg total) by mouth 3 (three) times daily.     clopidogreL 75 mg tablet  Commonly known as: PLAVIX  Take 4 tablets on day 1 and then one tablet daily.     CREON 36,000-114,000- 180,000 unit Cpdr  Generic drug: lipase-protease-amylase  TAKE ONE CAPSULE THREE TIMES DAILY     EScitalopram oxalate 20 MG tablet  Commonly known as: LEXAPRO  Take 20 mg by mouth once daily.     esomeprazole 40 MG capsule  Commonly known as: NEXIUM  Take 1 capsule (40 mg total) by mouth before breakfast.     evolocumab 140 mg/mL Pnij  Commonly known as: REPATHA SURECLICK  Inject 1 mL (140 mg total) into the skin every 14 (fourteen) days.     FARXIGA 5 mg Tab tablet  Generic drug: dapagliflozin propanediol  TAKE ONE TABLET BY MOUTH EVERY DAY     HumaLOG U-100 Insulin 100 unit/mL injection  Generic drug: insulin lispro  INJECT 25 UNITS SUBCUTANEOUSLY BEFORE MEALS THREE TIMES DAILY     isosorbide mononitrate 120 MG 24 hr tablet  Commonly known as: IMDUR  Take 1 tablet (120 mg total) by mouth once daily.     morphine 100 MG 12 hr tablet  Commonly known as: MS CONTIN  TAKE ONE TABLET BY MOUTH THREE TIMES DAILY     NIFEdipine 60 MG (OSM) 24 hr tablet  Commonly known as: PROCARDIA-XL  Take 1 tablet (60 mg total) by mouth 2 (two) times a day.     nitroGLYCERIN 0.3 MG SL tablet  Commonly known as: NITROSTAT  Place 1 tablet (0.3 mg total) under the tongue every 5 (five) minutes as needed for Chest pain (go to er after 3 doses).     OXcarbazepine 600 MG Tab  Commonly known as: TRILEPTAL  Take 1 tablet (600 mg total) by mouth 2 (two) times daily.     potassium chloride SA 20 MEQ tablet  Commonly known as: K-DUR,KLOR-CON  TAKE ONE TABLET BY MOUTH TWICE DAILY     pregabalin 150 MG capsule  Commonly known as: LYRICA  Take 1 capsule (150 mg total) by mouth 3 (three) times  "daily.     ranolazine 500 MG Tb12  Commonly known as: RANEXA  Take 1 tablet (500 mg total) by mouth 2 (two) times daily.     sucralfate 100 mg/mL suspension  Commonly known as: CARAFATE  Take 10 mLs (1 g total) by mouth 4 (four) times daily before meals and nightly.     SURE COMFORT INSULIN SYRINGE 0.5 mL 31 gauge x 5/16" Syrg  Generic drug: insulin syringe-needle U-100  USE ONE SYRINGE THREE TIMES DAILY     tamsulosin 0.4 mg Cap  Commonly known as: FLOMAX  TAKE ONE CAPSULE BY MOUTH EVERY DAY     torsemide 20 MG Tab  Commonly known as: DEMADEX  Take 1 tablet (20 mg total) by mouth once daily.     TOUJEO SOLOSTAR U-300 INSULIN 300 unit/mL (1.5 mL) Inpn pen  Generic drug: insulin glargine (TOUJEO)  INJECT 35 SUBCUTANEOUSLY TWICE DAILY     TRADJENTA 5 mg Tab tablet  Generic drug: linaGLIPtin  Take 1 tablet (5 mg total) by mouth once daily.     VASCEPA 1 gram Cap  Generic drug: icosapent ethyL  TAKE TWO CAPSULES BY MOUTH TWICE DAILY            STOP taking these medications      carvediloL 25 MG tablet  Commonly known as: COREG     losartan 100 MG tablet  Commonly known as: COZAAR     spironolactone 100 MG tablet  Commonly known as: ALDACTONE            Time spent on the discharge of patient: 33 minutes    MARCELINA Velásquez  Cardiothoracic Surgery  [facility] - 6th Floor Medical Telemetry  "

## 2024-12-16 ENCOUNTER — CLINICAL SUPPORT (OUTPATIENT)
Dept: CARDIOLOGY | Facility: CLINIC | Age: 44
End: 2024-12-16
Payer: MEDICARE

## 2024-12-16 VITALS
DIASTOLIC BLOOD PRESSURE: 90 MMHG | RESPIRATION RATE: 20 BRPM | SYSTOLIC BLOOD PRESSURE: 185 MMHG | HEIGHT: 68 IN | OXYGEN SATURATION: 100 % | WEIGHT: 196 LBS | HEART RATE: 68 BPM | BODY MASS INDEX: 29.7 KG/M2 | TEMPERATURE: 64 F

## 2024-12-16 DIAGNOSIS — I10 PRIMARY HYPERTENSION: Primary | ICD-10-CM

## 2024-12-16 PROCEDURE — 99215 OFFICE O/P EST HI 40 MIN: CPT | Mod: PBBFAC

## 2024-12-16 NOTE — PATIENT INSTRUCTIONS
HERE FOR B/P CHECK 201/95 HR----68   MANUAL RIGHT 185/90  STATES HAD A CABG X2 12/5 AND WAS TOLD NOT TO TAKE CLONIDINE  STATES FEELS FINE NO OTHER CARDIAC ISSUES VOICED  PER FR OQUENDO TO RESUME CLONIDINE AND TO MONITOR B/P DAILY   HAVE HOME HEALTH NURSE TO FAX OVER READINGS OR BRING LOG   TO NEXT VISIT   VERBALIZED UNDERSTANDING   ED PRECAUTIONS GIVEN

## 2024-12-17 ENCOUNTER — OFFICE VISIT (OUTPATIENT)
Dept: INTERNAL MEDICINE | Facility: CLINIC | Age: 44
End: 2024-12-17
Payer: MEDICARE

## 2024-12-17 VITALS
HEART RATE: 67 BPM | SYSTOLIC BLOOD PRESSURE: 162 MMHG | TEMPERATURE: 98 F | WEIGHT: 194 LBS | HEIGHT: 68 IN | OXYGEN SATURATION: 100 % | DIASTOLIC BLOOD PRESSURE: 90 MMHG | RESPIRATION RATE: 18 BRPM | BODY MASS INDEX: 29.4 KG/M2

## 2024-12-17 DIAGNOSIS — Z72.0 TOBACCO USER: ICD-10-CM

## 2024-12-17 DIAGNOSIS — K76.0 METABOLIC DYSFUNCTION-ASSOCIATED STEATOTIC LIVER DISEASE (MASLD): ICD-10-CM

## 2024-12-17 DIAGNOSIS — Z95.1 HX OF CABG: ICD-10-CM

## 2024-12-17 DIAGNOSIS — I25.118 CORONARY ARTERY DISEASE OF NATIVE ARTERY OF NATIVE HEART WITH STABLE ANGINA PECTORIS: ICD-10-CM

## 2024-12-17 DIAGNOSIS — E78.2 MIXED HYPERLIPIDEMIA: ICD-10-CM

## 2024-12-17 DIAGNOSIS — G89.29 OTHER CHRONIC PAIN: ICD-10-CM

## 2024-12-17 DIAGNOSIS — I10 PRIMARY HYPERTENSION: ICD-10-CM

## 2024-12-17 DIAGNOSIS — L60.2 OVERGROWN TOENAILS: ICD-10-CM

## 2024-12-17 DIAGNOSIS — K21.00 GASTROESOPHAGEAL REFLUX DISEASE WITH ESOPHAGITIS WITHOUT HEMORRHAGE: ICD-10-CM

## 2024-12-17 DIAGNOSIS — H90.0 CONDUCTIVE HEARING LOSS, BILATERAL: ICD-10-CM

## 2024-12-17 DIAGNOSIS — E08.42 DIABETIC POLYNEUROPATHY ASSOCIATED WITH DIABETES MELLITUS DUE TO UNDERLYING CONDITION: Primary | ICD-10-CM

## 2024-12-17 DIAGNOSIS — Z87.19 HISTORY OF PANCREATITIS: ICD-10-CM

## 2024-12-17 DIAGNOSIS — E11.69 ONYCHOMYCOSIS OF MULTIPLE TOENAILS WITH TYPE 2 DIABETES MELLITUS: ICD-10-CM

## 2024-12-17 DIAGNOSIS — Z86.0100 HISTORY OF COLONIC POLYPS: ICD-10-CM

## 2024-12-17 DIAGNOSIS — I20.2 REFRACTORY ANGINA PECTORIS: ICD-10-CM

## 2024-12-17 DIAGNOSIS — R29.6 FALLS FREQUENTLY: ICD-10-CM

## 2024-12-17 DIAGNOSIS — B35.1 ONYCHOMYCOSIS OF MULTIPLE TOENAILS WITH TYPE 2 DIABETES MELLITUS: ICD-10-CM

## 2024-12-17 DIAGNOSIS — H53.8 BLURRED VISION, RIGHT EYE: ICD-10-CM

## 2024-12-17 PROCEDURE — 99215 OFFICE O/P EST HI 40 MIN: CPT | Mod: PBBFAC | Performed by: INTERNAL MEDICINE

## 2024-12-17 RX ORDER — ATORVASTATIN CALCIUM 40 MG/1
80 TABLET, FILM COATED ORAL DAILY
Qty: 90 TABLET | Refills: 3 | Status: SHIPPED | OUTPATIENT
Start: 2024-12-17 | End: 2025-12-17

## 2024-12-17 NOTE — PROGRESS NOTES
Subjective     Patient ID: Bharath Caballero is a 44 y.o. male.    Chief Complaint: Follow-up    Patient had CABG 2 weeks ago while have a follow-up on 12/31.  His incision looks good he is taking Toujeo 36 units b.i.d. and insulin 25 units a.c. t.i.d. he will check his glucoses q.a.m. and call if greater than 180 we are increasing his Lipitor from 40-80 he has no other complaints chronic pain is controlled laboratories have been reviewed he will get a CMP and CBC on return follow up in 2 months sooner if any problems  Review of Systems   All other systems reviewed and are negative.         Objective     Physical Exam  Vitals and nursing note reviewed.   Constitutional:       Appearance: Normal appearance.      Comments: Sternal incision is healing patient uses a walker   HENT:      Head: Normocephalic and atraumatic.      Right Ear: Tympanic membrane, ear canal and external ear normal.      Left Ear: Tympanic membrane, ear canal and external ear normal.      Nose: Nose normal.      Mouth/Throat:      Mouth: Mucous membranes are moist.      Pharynx: Oropharynx is clear.   Eyes:      Extraocular Movements: Extraocular movements intact.      Conjunctiva/sclera: Conjunctivae normal.      Pupils: Pupils are equal, round, and reactive to light.   Cardiovascular:      Rate and Rhythm: Normal rate.      Pulses: Normal pulses.      Heart sounds: Normal heart sounds.   Pulmonary:      Effort: Pulmonary effort is normal.      Breath sounds: Normal breath sounds.   Abdominal:      General: Bowel sounds are normal.      Palpations: Abdomen is soft.   Musculoskeletal:      Cervical back: Normal range of motion and neck supple.   Skin:     General: Skin is warm and dry.   Neurological:      General: No focal deficit present.      Mental Status: He is alert and oriented to person, place, and time. Mental status is at baseline.   Psychiatric:         Mood and Affect: Mood normal.         Behavior: Behavior normal.         Thought  Content: Thought content normal.         Judgment: Judgment normal.            Assessment and Plan     1. Diabetic polyneuropathy associated with diabetes mellitus due to underlying condition    2. Other chronic pain    3. Blurred vision, right eye    4. Conductive hearing loss, bilateral    5. Onychomycosis of multiple toenails with type 2 diabetes mellitus    6. Overgrown toenails    7. Primary hypertension    8. Mixed hyperlipidemia  -     atorvastatin (LIPITOR) 40 MG tablet; Take 2 tablets (80 mg total) by mouth once daily.  Dispense: 90 tablet; Refill: 3    9. Coronary artery disease of native artery of native heart with stable angina pectoris    10. Refractory angina pectoris    11. History of pancreatitis    12. Gastroesophageal reflux disease with esophagitis without hemorrhage    13. Metabolic dysfunction-associated steatotic liver disease (MASLD)    14. History of colonic polyps    15. Tobacco user    16. Falls frequently    17. Hx of CABG        As above thank you         Follow up in about 2 months (around 2/17/2025).

## 2024-12-23 DIAGNOSIS — E08.42 DIABETIC POLYNEUROPATHY ASSOCIATED WITH DIABETES MELLITUS DUE TO UNDERLYING CONDITION: ICD-10-CM

## 2024-12-23 DIAGNOSIS — G89.4 CHRONIC PAIN SYNDROME: ICD-10-CM

## 2024-12-23 RX ORDER — CLONAZEPAM 1 MG/1
1 TABLET ORAL 2 TIMES DAILY
Qty: 60 TABLET | Refills: 0 | Status: SHIPPED | OUTPATIENT
Start: 2024-12-23

## 2024-12-23 RX ORDER — MORPHINE SULFATE 100 MG/1
100 TABLET, FILM COATED, EXTENDED RELEASE ORAL 3 TIMES DAILY
Qty: 90 TABLET | Refills: 0 | Status: SHIPPED | OUTPATIENT
Start: 2024-12-23

## 2024-12-30 ENCOUNTER — OFFICE VISIT (OUTPATIENT)
Dept: NEUROLOGY | Facility: CLINIC | Age: 44
End: 2024-12-30
Payer: MEDICARE

## 2024-12-30 VITALS
RESPIRATION RATE: 16 BRPM | HEART RATE: 80 BPM | SYSTOLIC BLOOD PRESSURE: 178 MMHG | HEIGHT: 68 IN | TEMPERATURE: 98 F | BODY MASS INDEX: 31.28 KG/M2 | DIASTOLIC BLOOD PRESSURE: 86 MMHG | WEIGHT: 206.38 LBS | OXYGEN SATURATION: 99 %

## 2024-12-30 DIAGNOSIS — E08.42 DIABETIC POLYNEUROPATHY ASSOCIATED WITH DIABETES MELLITUS DUE TO UNDERLYING CONDITION: ICD-10-CM

## 2024-12-30 DIAGNOSIS — E11.40 PAINFUL DIABETIC NEUROPATHY: Primary | Chronic | ICD-10-CM

## 2024-12-30 DIAGNOSIS — T85.192D MALFUNCTION OF SPINAL CORD STIMULATOR, SUBSEQUENT ENCOUNTER: ICD-10-CM

## 2024-12-30 PROCEDURE — 99214 OFFICE O/P EST MOD 30 MIN: CPT | Mod: S$PBB,,, | Performed by: NURSE PRACTITIONER

## 2024-12-30 PROCEDURE — 99214 OFFICE O/P EST MOD 30 MIN: CPT | Mod: PBBFAC | Performed by: NURSE PRACTITIONER

## 2024-12-30 RX ORDER — PREGABALIN 150 MG/1
150 CAPSULE ORAL 3 TIMES DAILY
Qty: 90 CAPSULE | Refills: 3 | Status: SHIPPED | OUTPATIENT
Start: 2024-12-30 | End: 2025-12-30

## 2024-12-30 RX ORDER — OXCARBAZEPINE 600 MG/1
600 TABLET, FILM COATED ORAL 2 TIMES DAILY
Qty: 60 TABLET | Refills: 11 | Status: SHIPPED | OUTPATIENT
Start: 2024-12-30 | End: 2025-12-30

## 2024-12-30 NOTE — PROGRESS NOTES
[facility] Neurology Follow Up Office Visit Note    Subjective:      Patient ID: Bharath Caballero is a 44 y.o. male.    Chief Complaint: Painful diabetic neuropathy (Pt states that his legs have been hurting again, but he thinks its due to the weather changing. States that his left leg has been swelling lately, but he notices it going down. He also states that his hands have been really weak lately as well. 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 He was last seen on 8/29/2024. During that visit, OXC 600mg BID, Elavil 50mg PO QHS and Lyrica 150mg PO BID were continued. Several referral placed to neurosurgery for NEVRO SCS lead re-attachment made with no success.     Interim History  Today, Pt presents alone. Was seen by Dr. Jay Pozo on 1/17/2024, SCS revision was scheduled. However, revision on hold. MD no longer at [facility]. Continues w/numbness to hands and R hand weakness. Has completed PT/OT with little/no improvement. Unable to have MRI C-spine due to SCS. Followed by home health. Continues w/worsening burning/numbness and tingling to R lower ext, now worsening to hands. Utilizing CPAP nightly. Admits to multiple falls due to tyingling/burning/numbness to feet. DM not well controlled.    Recent CABG at [facility] on 12/5/2024 by Dr. Clayton.  Echo showed EF of 45%, no major valvular abnormalities. L heart catheterization done (10/10/2024) which showed two-vessel CAD, 50% proximal stenosis of the LAD and 50% distal stenosis, IFR of the proximal stenosis was done which was 0.75, he also has proximal RCA lesion of 90% stenosis.     C/O swelling to L lower ext (harvested vein leg). Wrapped in ACE at this time. Finds Nevro SCS effective, but  needs lead re-attached. Original placement on 7/21/2022. Dr. Beasley providing Rx for narcotic pain medication. Decreased smoking to 1/4 ppd    Severe hearing loss, pt states due to nerve damage, cannot afford cochlear implant.    Presenting History  He noted that he initially presented with right leg stabbing pain and paresthesia radiating up his leg for 2 years followed by paresthesia and allodynia in left foot for 1 year, worse with standing on his feet. He also noted that his hands and joints would hurt. He had quit drinking for 8 years. Still smokes. -280 most of the time for now, but it was in the 500s for the past 4 year.    Current Medications -  Lyrica 150mg PO TID (5/1/2024 - present) somewhat effective  OXC 600mg BID (9/25/2023 - present) somewhat effective  Amitriptyline 50mg PO QHS (12/28/2023 - present) somewhat effective    Prior Medications -  Effexor XR 37.5mg daily  CBZ ER 200mg-400mg BID (12/30/2020 - 9/25/2023) ineffective  Neurontin 400mg - 400mg - 800mg    Imaging  - MRI Brain w/w/out 5/29/2023 - small area of chronic gliosis to L temporal lobe, no acute intracranial findings    - MRI Lumbar w/o Bryce 9/20/2019 - I have reviewed the study independently and with the patient. 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    - MRI C spine w/o Bryce 9/20/2019 - I have reviewed the study independently and with the patient. 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 Multilevel spondylosis noted.    Results for orders placed or performed during the hospital encounter of 12/05/24   Prepare RBC 4 Units; Surgery - stay ahead 2 units    Collection Time: 11/29/24 10:11 AM   Result Value Ref Range    UNIT NUMBER [id number]     UNIT ABO/RH B NEG     DISPENSE STATUS Released     Unit Expiration [id number]     Product Code V7273Q08     Unit Blood Type Code 1700     CROSSMATCH INTERPRETATION Compatible     UNIT NUMBER [id number]     UNIT ABO/RH B  NEG     DISPENSE STATUS Released     Unit Expiration [id number]     Product Code B8738P88     Unit Blood Type Code 1700     CROSSMATCH INTERPRETATION Compatible     UNIT NUMBER [id number]     UNIT ABO/RH B NEG     DISPENSE STATUS Released     Unit Expiration [id number]     Product Code N9313J00     Unit Blood Type Code 1700     CROSSMATCH INTERPRETATION Compatible     UNIT NUMBER [id number]     UNIT ABO/RH B NEG     DISPENSE STATUS Released     Unit Expiration [id number]     Product Code M2329T02     Unit Blood Type Code 1700     CROSSMATCH INTERPRETATION Compatible    POCT glucose    Collection Time: 12/05/24  6:00 AM   Result Value Ref Range    POCT Glucose 267 (H) 70 - 110 mg/dL   ISTAT PROCEDURE    Collection Time: 12/05/24  7:22 AM   Result Value Ref Range    POC PH 7.325 (L) 7.35 - 7.45    POC PCO2 53.4 (H) 35 - 45 mmHg    POC PO2 44 40 - 60 mmHg    POC HCO3 27.8 24 - 28 mmol/L    POC BE 2 -2 to 2 mmol/L    POC SATURATED O2 76 95 - 100 %    POC pH Temp 7.339     POC pCO2 Temp 51.1 mmHg    POC pO2 Temp 41 mmHg    POC Glucose 278 (H) 70 - 110 mg/dL    POC Sodium 138 136 - 145 mmol/L    POC Potassium 3.1 (L) 3.5 - 5.1 mmol/L    POC TCO2 29 24 - 29 mmol/L    POC Ionized Calcium 0.73 (LL) 1.06 - 1.42 mmol/L    POC Hematocrit 38 36 - 54 %PCV    POC HEMOGLOBIN 13 g/dL    Sample VENOUS     FiO2 100     POC Temp 36.0 C    ISTAT PROCEDURE    Collection Time: 12/05/24  8:15 AM   Result Value Ref Range    POC PH 7.351 7.35 - 7.45    POC PCO2 49.8 (H) 35 - 45 mmHg    POC PO2 169 (H) 80 - 100 mmHg    POC HCO3 27.5 24 - 28 mmol/L    POC BE 2 -2 to 2 mmol/L    POC SATURATED O2 99 95 - 100 %    POC pH Temp 7.365     POC pCO2 Temp 47.7 mmHg    POC pO2 Temp 163 mmHg    POC Glucose 261 (H) 70 - 110 mg/dL    POC Sodium 136 136 - 145 mmol/L    POC Potassium 3.2 (L) 3.5 - 5.1 mmol/L    POC TCO2 29 (H) 23 - 27 mmol/L    POC Ionized Calcium 1.14 1.06 - 1.42 mmol/L    POC Hematocrit 37 36 - 54 %PCV    POC HEMOGLOBIN 13 g/dL     Sample ARTERIAL     FiO2 100     POC Temp 36.0 C    ISTAT PROCEDURE    Collection Time: 12/05/24  8:38 AM   Result Value Ref Range    POC PH 7.286 (LL) 7.35 - 7.45    POC PCO2 59.9 (H) 35 - 45 mmHg    POC PO2 36 (LL) 40 - 60 mmHg    POC HCO3 28.5 (H) 24 - 28 mmol/L    POC BE 2 -2 to 2 mmol/L    POC SATURATED O2 60 95 - 100 %    POC pH Temp 7.314     POC pCO2 Temp 54.9 mmHg    POC pO2 Temp 31 mmHg    POC Glucose 235 (H) 70 - 110 mg/dL    POC Sodium 137 136 - 145 mmol/L    POC Potassium 3.0 (L) 3.5 - 5.1 mmol/L    POC TCO2 30 (H) 24 - 29 mmol/L    POC Ionized Calcium 1.07 1.06 - 1.42 mmol/L    POC Hematocrit 33 (L) 36 - 54 %PCV    POC HEMOGLOBIN 11 g/dL    Sample VENOUS     FiO2 80     POC Temp 35.0 C    ISTAT PROCEDURE    Collection Time: 12/05/24  8:48 AM   Result Value Ref Range    POC PH 7.392 7.35 - 7.45    POC PCO2 42.9 35 - 45 mmHg    POC PO2 432 (H) 80 - 100 mmHg    POC HCO3 26.1 24 - 28 mmol/L    POC BE 1 -2 to 2 mmol/L    POC SATURATED O2 100 95 - 100 %    POC pH Temp 7.451     POC pCO2 Temp 36.0 mmHg    POC pO2 Temp 408 mmHg    POC Glucose 231 (H) 70 - 110 mg/dL    POC Sodium 137 136 - 145 mmol/L    POC Potassium 3.7 3.5 - 5.1 mmol/L    POC TCO2 27 23 - 27 mmol/L    POC Ionized Calcium 1.05 (L) 1.06 - 1.42 mmol/L    POC Hematocrit 32 (L) 36 - 54 %PCV    POC HEMOGLOBIN 11 g/dL    Sample ARTERIAL     FiO2 80     POC Temp 33.0 C    ISTAT PROCEDURE    Collection Time: 12/05/24  9:19 AM   Result Value Ref Range    POC PH 7.467 (H) 7.35 - 7.45    POC PCO2 35.1 35 - 45 mmHg    POC PO2 381 (H) 80 - 100 mmHg    POC HCO3 25.4 24 - 28 mmol/L    POC BE 2 -2 to 2 mmol/L    POC SATURATED O2 100 95 - 100 %    POC pH Temp 7.498     POC pCO2 Temp 32.1 mmHg    POC pO2 Temp 370 mmHg    POC Glucose 222 (H) 70 - 110 mg/dL    POC Sodium 137 136 - 145 mmol/L    POC Potassium 3.0 (L) 3.5 - 5.1 mmol/L    POC TCO2 26 23 - 27 mmol/L    POC Ionized Calcium 1.06 1.06 - 1.42 mmol/L    POC Hematocrit 32 (L) 36 - 54 %PCV    POC  HEMOGLOBIN 11 g/dL    Sample ARTERIAL     FiO2 80     POC Temp 35.0 C    ISTAT PROCEDURE    Collection Time: 12/05/24  9:49 AM   Result Value Ref Range    POC PH 7.397 7.35 - 7.45    POC PCO2 44.4 35 - 45 mmHg    POC PO2 327 (H) 80 - 100 mmHg    POC HCO3 27.3 24 - 28 mmol/L    POC BE 2 -2 to 2 mmol/L    POC SATURATED O2 100 95 - 100 %    POC pH Temp 7.412     POC pCO2 Temp 42.5 mmHg    POC pO2 Temp 322 mmHg    POC Glucose 190 (H) 70 - 110 mg/dL    POC Sodium 137 136 - 145 mmol/L    POC Potassium 3.4 (L) 3.5 - 5.1 mmol/L    POC TCO2 29 (H) 23 - 27 mmol/L    POC Ionized Calcium 1.26 1.06 - 1.42 mmol/L    POC Hematocrit 32 (L) 36 - 54 %PCV    POC HEMOGLOBIN 11 g/dL    Sample ARTERIAL     FiO2 85     POC Temp 36.0 C    Basic Metabolic Panel    Collection Time: 12/05/24 10:14 AM   Result Value Ref Range    Sodium 140 136 - 145 mmol/L    Potassium 3.1 (L) 3.5 - 5.1 mmol/L    Chloride 107 98 - 107 mmol/L    CO2 25 22 - 29 mmol/L    Glucose 155 (H) 74 - 100 mg/dL    Blood Urea Nitrogen 12.0 8.9 - 20.6 mg/dL    Creatinine 1.05 0.72 - 1.25 mg/dL    BUN/Creatinine Ratio 11     Calcium 8.2 (L) 8.4 - 10.2 mg/dL    Anion Gap 8.0 mEq/L    eGFR >60 mL/min/1.73/m2   Protime-INR    Collection Time: 12/05/24 10:14 AM   Result Value Ref Range    PT 17.7 (H) 12.5 - 14.5 seconds    INR 1.4 (H) <=1.3   APTT    Collection Time: 12/05/24 10:14 AM   Result Value Ref Range    PTT 35.0 (H) 23.2 - 33.7 seconds   CBC with Differential    Collection Time: 12/05/24 10:14 AM   Result Value Ref Range    WBC 19.26 (H) 4.50 - 11.50 x10(3)/mcL    RBC 3.88 (L) 4.70 - 6.10 x10(6)/mcL    Hgb 11.1 (L) 14.0 - 18.0 g/dL    Hct 33.0 (L) 42.0 - 52.0 %    MCV 85.1 80.0 - 94.0 fL    MCH 28.6 27.0 - 31.0 pg    MCHC 33.6 33.0 - 36.0 g/dL    RDW 12.7 11.5 - 17.0 %    Platelet 147 130 - 400 x10(3)/mcL    MPV 10.4 7.4 - 10.4 fL    Neut % 82.2 %    Lymph % 11.6 %    Mono % 4.4 %    Eos % 0.9 %    Basophil % 0.2 %    Lymph # 2.24 0.6 - 4.6 x10(3)/mcL    Neut # 15.83  (H) 2.1 - 9.2 x10(3)/mcL    Mono # 0.84 0.1 - 1.3 x10(3)/mcL    Eos # 0.17 0 - 0.9 x10(3)/mcL    Baso # 0.04 <=0.2 x10(3)/mcL    IG# 0.14 (H) 0 - 0.04 x10(3)/mcL    IG% 0.7 %    NRBC% 0.0 %   Magnesium    Collection Time: 12/05/24 10:14 AM   Result Value Ref Range    Magnesium Level 3.00 (H) 1.60 - 2.60 mg/dL   Phosphorus    Collection Time: 12/05/24 10:14 AM   Result Value Ref Range    Phosphorus Level 3.5 2.3 - 4.7 mg/dL   ISTAT PROCEDURE    Collection Time: 12/05/24 10:27 AM   Result Value Ref Range    POC PH 7.260 (LL) 7.35 - 7.45    POC PCO2 59.5 (HH) 35 - 45 mmHg    POC PO2 118 (H) 80 - 100 mmHg    POC HCO3 26.7 24 - 28 mmol/L    POC BE 0 -2 to 2 mmol/L    POC SATURATED O2 98 95 - 100 %    POC Glucose 150 (H) 70 - 110 mg/dL    POC Sodium 139 136 - 145 mmol/L    POC Potassium 2.9 (L) 3.5 - 5.1 mmol/L    POC TCO2 28 (H) 23 - 27 mmol/L    POC Ionized Calcium 1.24 1.06 - 1.42 mmol/L    POC Hematocrit 31 (L) 36 - 54 %PCV    POC HEMOGLOBIN 11 g/dL    Sample ARTERIAL    ISTAT PROCEDURE    Collection Time: 12/05/24 10:44 AM   Result Value Ref Range    POC PH 7.369 7.35 - 7.45    POC PCO2 35.2 35 - 45 mmHg    POC PO2 152 (H) 80 - 100 mmHg    POC HCO3 20.3 (L) 24 - 28 mmol/L    POC BE -5 (L) -2 to 2 mmol/L    POC SATURATED O2 99 95 - 100 %    POC Glucose 109 70 - 110 mg/dL    POC Sodium 142 136 - 145 mmol/L    POC Potassium 2.4 (LL) 3.5 - 5.1 mmol/L    POC TCO2 21 (L) 23 - 27 mmol/L    POC Ionized Calcium 1.05 (L) 1.06 - 1.42 mmol/L    POC Hematocrit 24 (L) 36 - 54 %PCV    POC HEMOGLOBIN 8 g/dL    Sample ARTERIAL    Transesophageal echo (CHIO)    Collection Time: 12/05/24 10:54 AM   Result Value Ref Range    BSA 2.1 m2   POCT glucose    Collection Time: 12/05/24 11:33 AM   Result Value Ref Range    POCT Glucose 123 (H) 70 - 110 mg/dL   RT Blood Gas    Collection Time: 12/05/24 12:00 PM   Result Value Ref Range    Sample Type Arterial Blood     Sample site Arterial Line     Drawn by mm,rrt     pH, Blood gas 7.340 (L)  7.350 - 7.450    pCO2, Blood gas 51.0 (HH) 35.0 - 45.0 mmHg    pO2, Blood gas 119.0 (H) 80.0 - 100.0 mmHg    Sodium, Blood Gas 139 137 - 145 mmol/L    Potassium, Blood Gas 3.4 (L) 3.5 - 5.0 mmol/L    Calcium Level Ionized 1.14 1.12 - 1.23 mmol/L    TOC2, Blood gas 29.1 mmol/L    Base Excess, Blood gas 1.00 mmol/L    sO2, Blood gas 98.0 %    HCO3, Blood gas 27.5 (H) 22.0 - 26.0 mmol/L    Allens Test N/A     MODE SIMV     Oxygen Device, Blood gas Ventilator     FIO2, Blood gas 60 %    Mech Vt 450 ml    Mech RR 20 b/min    PEEP 5.0 cmH2O    PS 10.0 cmH2O   POCT glucose    Collection Time: 12/05/24 12:17 PM   Result Value Ref Range    POCT Glucose 120 (H) 70 - 110 mg/dL   POCT glucose    Collection Time: 12/05/24  1:20 PM   Result Value Ref Range    POCT Glucose 130 (H) 70 - 110 mg/dL   Comprehensive Metabolic Panel    Collection Time: 12/05/24  2:08 PM   Result Value Ref Range    Sodium 142 136 - 145 mmol/L    Potassium 3.9 3.5 - 5.1 mmol/L    Chloride 109 (H) 98 - 107 mmol/L    CO2 25 22 - 29 mmol/L    Glucose 163 (H) 74 - 100 mg/dL    Blood Urea Nitrogen 11.1 8.9 - 20.6 mg/dL    Creatinine 0.89 0.72 - 1.25 mg/dL    Calcium 8.0 (L) 8.4 - 10.2 mg/dL    Protein Total 5.6 (L) 6.4 - 8.3 gm/dL    Albumin 3.0 (L) 3.5 - 5.0 g/dL    Globulin 2.6 2.4 - 3.5 gm/dL    Albumin/Globulin Ratio 1.2 1.1 - 2.0 ratio    Bilirubin Total 0.8 <=1.5 mg/dL     (H) 40 - 150 unit/L    ALT 20 0 - 55 unit/L    AST 30 5 - 34 unit/L    eGFR >60 mL/min/1.73/m2    Anion Gap 8.0 mEq/L    BUN/Creatinine Ratio 12    Hemoglobin and Hematocrit    Collection Time: 12/05/24  2:08 PM   Result Value Ref Range    Hgb 13.0 (L) 14.0 - 18.0 g/dL    Hct 36.4 (L) 42.0 - 52.0 %   Magnesium    Collection Time: 12/05/24  2:08 PM   Result Value Ref Range    Magnesium Level 2.30 1.60 - 2.60 mg/dL   Phosphorus    Collection Time: 12/05/24  2:08 PM   Result Value Ref Range    Phosphorus Level 3.7 2.3 - 4.7 mg/dL   APTT    Collection Time: 12/05/24  2:08 PM    Result Value Ref Range    PTT 34.7 (H) 23.2 - 33.7 seconds   Protime-INR    Collection Time: 12/05/24  2:08 PM   Result Value Ref Range    PT 15.2 (H) 12.5 - 14.5 seconds    INR 1.2 <=1.3   POCT glucose    Collection Time: 12/05/24  2:10 PM   Result Value Ref Range    POCT Glucose 159 (H) 70 - 110 mg/dL   POCT glucose    Collection Time: 12/05/24  3:45 PM   Result Value Ref Range    POCT Glucose 165 (H) 70 - 110 mg/dL   RT Blood Gas    Collection Time: 12/05/24  4:37 PM   Result Value Ref Range    Sample Type Arterial Blood     Sample site Arterial Line     Drawn by MM,RRT     pH, Blood gas 7.380 7.350 - 7.450    pCO2, Blood gas 50.0 (H) 35.0 - 45.0 mmHg    pO2, Blood gas 53.0 (LL) 80.0 - 100.0 mmHg    Sodium, Blood Gas 139 137 - 145 mmol/L    Potassium, Blood Gas 3.5 3.5 - 5.0 mmol/L    Calcium Level Ionized 1.07 (L) 1.12 - 1.23 mmol/L    TOC2, Blood gas 31.1 mmol/L    Base Excess, Blood gas 3.50 mmol/L    sO2, Blood gas 86.0 %    HCO3, Blood gas 29.6 (H) 22.0 - 26.0 mmol/L    Allens Test N/A     Oxygen Device, Blood gas Oxy Mask     LPM 2    POCT glucose    Collection Time: 12/05/24  4:46 PM   Result Value Ref Range    POCT Glucose 159 (H) 70 - 110 mg/dL   POCT glucose    Collection Time: 12/05/24  5:29 PM   Result Value Ref Range    POCT Glucose 141 (H) 70 - 110 mg/dL   POCT glucose    Collection Time: 12/05/24  6:33 PM   Result Value Ref Range    POCT Glucose 155 (H) 70 - 110 mg/dL   POCT glucose    Collection Time: 12/05/24  6:57 PM   Result Value Ref Range    POCT Glucose 163 (H) 70 - 110 mg/dL   POCT glucose    Collection Time: 12/05/24  7:59 PM   Result Value Ref Range    POCT Glucose 167 (H) 70 - 110 mg/dL   POCT glucose    Collection Time: 12/05/24  9:11 PM   Result Value Ref Range    POCT Glucose 163 (H) 70 - 110 mg/dL   POCT glucose    Collection Time: 12/05/24 10:01 PM   Result Value Ref Range    POCT Glucose 187 (H) 70 - 110 mg/dL   POCT glucose    Collection Time: 12/05/24 10:59 PM   Result Value  Ref Range    POCT Glucose 181 (H) 70 - 110 mg/dL   POCT glucose    Collection Time: 12/06/24 12:04 AM   Result Value Ref Range    POCT Glucose 176 (H) 70 - 110 mg/dL   POCT glucose    Collection Time: 12/06/24  1:11 AM   Result Value Ref Range    POCT Glucose 168 (H) 70 - 110 mg/dL   POCT glucose    Collection Time: 12/06/24  2:35 AM   Result Value Ref Range    POCT Glucose 161 (H) 70 - 110 mg/dL   Comprehensive Metabolic Panel    Collection Time: 12/06/24  3:29 AM   Result Value Ref Range    Sodium 141 136 - 145 mmol/L    Potassium 3.6 3.5 - 5.1 mmol/L    Chloride 108 (H) 98 - 107 mmol/L    CO2 22 22 - 29 mmol/L    Glucose 149 (H) 74 - 100 mg/dL    Blood Urea Nitrogen 9.6 8.9 - 20.6 mg/dL    Creatinine 0.84 0.72 - 1.25 mg/dL    Calcium 7.6 (L) 8.4 - 10.2 mg/dL    Protein Total 5.1 (L) 6.4 - 8.3 gm/dL    Albumin 2.5 (L) 3.5 - 5.0 g/dL    Globulin 2.6 2.4 - 3.5 gm/dL    Albumin/Globulin Ratio 1.0 (L) 1.1 - 2.0 ratio    Bilirubin Total 0.5 <=1.5 mg/dL     (H) 40 - 150 unit/L    ALT 16 0 - 55 unit/L    AST 33 5 - 34 unit/L    eGFR >60 mL/min/1.73/m2    Anion Gap 11.0 mEq/L    BUN/Creatinine Ratio 11    Magnesium    Collection Time: 12/06/24  3:29 AM   Result Value Ref Range    Magnesium Level 2.10 1.60 - 2.60 mg/dL   Phosphorus    Collection Time: 12/06/24  3:29 AM   Result Value Ref Range    Phosphorus Level 2.4 2.3 - 4.7 mg/dL   CBC with Differential    Collection Time: 12/06/24  3:29 AM   Result Value Ref Range    WBC 23.51 (H) 4.50 - 11.50 x10(3)/mcL    RBC 4.22 (L) 4.70 - 6.10 x10(6)/mcL    Hgb 12.3 (L) 14.0 - 18.0 g/dL    Hct 35.7 (L) 42.0 - 52.0 %    MCV 84.6 80.0 - 94.0 fL    MCH 29.1 27.0 - 31.0 pg    MCHC 34.5 33.0 - 36.0 g/dL    RDW 12.8 11.5 - 17.0 %    Platelet 214 130 - 400 x10(3)/mcL    MPV 11.2 (H) 7.4 - 10.4 fL    Neut % 84.0 %    Lymph % 8.8 %    Mono % 5.9 %    Eos % 0.4 %    Basophil % 0.4 %    Lymph # 2.07 0.6 - 4.6 x10(3)/mcL    Neut # 19.77 (H) 2.1 - 9.2 x10(3)/mcL    Mono # 1.38 (H) 0.1 -  1.3 x10(3)/mcL    Eos # 0.09 0 - 0.9 x10(3)/mcL    Baso # 0.09 <=0.2 x10(3)/mcL    IG# 0.11 (H) 0 - 0.04 x10(3)/mcL    IG% 0.5 %    NRBC% 0.0 %   POCT glucose    Collection Time: 12/06/24  3:56 AM   Result Value Ref Range    POCT Glucose 143 (H) 70 - 110 mg/dL   POCT glucose    Collection Time: 12/06/24  5:51 AM   Result Value Ref Range    POCT Glucose 153 (H) 70 - 110 mg/dL   POCT glucose    Collection Time: 12/06/24  9:41 AM   Result Value Ref Range    POCT Glucose 192 (H) 70 - 110 mg/dL   POCT glucose    Collection Time: 12/06/24  1:13 PM   Result Value Ref Range    POCT Glucose 253 (H) 70 - 110 mg/dL   POCT glucose    Collection Time: 12/06/24  5:26 PM   Result Value Ref Range    POCT Glucose 214 (H) 70 - 110 mg/dL   Comprehensive Metabolic Panel    Collection Time: 12/07/24  5:15 AM   Result Value Ref Range    Sodium 138 136 - 145 mmol/L    Potassium 3.5 3.5 - 5.1 mmol/L    Chloride 107 98 - 107 mmol/L    CO2 26 22 - 29 mmol/L    Glucose 64 (L) 74 - 100 mg/dL    Blood Urea Nitrogen 14.7 8.9 - 20.6 mg/dL    Creatinine 0.95 0.72 - 1.25 mg/dL    Calcium 8.0 (L) 8.4 - 10.2 mg/dL    Protein Total 6.2 (L) 6.4 - 8.3 gm/dL    Albumin 2.4 (L) 3.5 - 5.0 g/dL    Globulin 3.8 (H) 2.4 - 3.5 gm/dL    Albumin/Globulin Ratio 0.6 (L) 1.1 - 2.0 ratio    Bilirubin Total 0.5 <=1.5 mg/dL     (H) 40 - 150 unit/L    ALT 26 0 - 55 unit/L    AST 37 (H) 5 - 34 unit/L    eGFR >60 mL/min/1.73/m2    Anion Gap 5.0 mEq/L    BUN/Creatinine Ratio 15    Magnesium    Collection Time: 12/07/24  5:15 AM   Result Value Ref Range    Magnesium Level 2.00 1.60 - 2.60 mg/dL   Phosphorus    Collection Time: 12/07/24  5:15 AM   Result Value Ref Range    Phosphorus Level 2.8 2.3 - 4.7 mg/dL   CBC with Differential    Collection Time: 12/07/24  5:15 AM   Result Value Ref Range    WBC 16.99 (H) 4.50 - 11.50 x10(3)/mcL    RBC 3.73 (L) 4.70 - 6.10 x10(6)/mcL    Hgb 10.9 (L) 14.0 - 18.0 g/dL    Hct 33.0 (L) 42.0 - 52.0 %    MCV 88.5 80.0 - 94.0 fL     MCH 29.2 27.0 - 31.0 pg    MCHC 33.0 33.0 - 36.0 g/dL    RDW 13.3 11.5 - 17.0 %    Platelet 190 130 - 400 x10(3)/mcL    MPV 11.0 (H) 7.4 - 10.4 fL    Neut % 72.1 %    Lymph % 15.6 %    Mono % 10.3 %    Eos % 1.1 %    Basophil % 0.2 %    Lymph # 2.65 0.6 - 4.6 x10(3)/mcL    Neut # 12.25 (H) 2.1 - 9.2 x10(3)/mcL    Mono # 1.75 (H) 0.1 - 1.3 x10(3)/mcL    Eos # 0.18 0 - 0.9 x10(3)/mcL    Baso # 0.04 <=0.2 x10(3)/mcL    IG# 0.12 (H) 0 - 0.04 x10(3)/mcL    IG% 0.7 %    NRBC% 0.0 %   POCT glucose    Collection Time: 12/07/24 10:17 AM   Result Value Ref Range    POCT Glucose 99 70 - 110 mg/dL   POCT glucose    Collection Time: 12/07/24  4:48 PM   Result Value Ref Range    POCT Glucose 95 70 - 110 mg/dL   CBC with Differential    Collection Time: 12/08/24  4:16 AM   Result Value Ref Range    WBC 17.66 (H) 4.50 - 11.50 x10(3)/mcL    RBC 3.69 (L) 4.70 - 6.10 x10(6)/mcL    Hgb 10.9 (L) 14.0 - 18.0 g/dL    Hct 32.7 (L) 42.0 - 52.0 %    MCV 88.6 80.0 - 94.0 fL    MCH 29.5 27.0 - 31.0 pg    MCHC 33.3 33.0 - 36.0 g/dL    RDW 13.4 11.5 - 17.0 %    Platelet 207 130 - 400 x10(3)/mcL    MPV 11.3 (H) 7.4 - 10.4 fL    Neut % 69.6 %    Lymph % 16.6 %    Mono % 11.7 %    Eos % 1.2 %    Basophil % 0.3 %    Lymph # 2.93 0.6 - 4.6 x10(3)/mcL    Neut # 12.28 (H) 2.1 - 9.2 x10(3)/mcL    Mono # 2.07 (H) 0.1 - 1.3 x10(3)/mcL    Eos # 0.22 0 - 0.9 x10(3)/mcL    Baso # 0.05 <=0.2 x10(3)/mcL    IG# 0.11 (H) 0 - 0.04 x10(3)/mcL    IG% 0.6 %    NRBC% 0.0 %   BNP    Collection Time: 12/08/24  4:16 AM   Result Value Ref Range    Natriuretic Peptide 118.6 (H) <=100.0 pg/mL   Phosphorus    Collection Time: 12/08/24  6:16 AM   Result Value Ref Range    Phosphorus Level 4.1 2.3 - 4.7 mg/dL   Comprehensive Metabolic Panel    Collection Time: 12/08/24  7:43 AM   Result Value Ref Range    Sodium 137 136 - 145 mmol/L    Potassium 4.8 3.5 - 5.1 mmol/L    Chloride 106 98 - 107 mmol/L    CO2 23 22 - 29 mmol/L    Glucose 142 (H) 74 - 100 mg/dL    Blood Urea  Nitrogen 27.8 (H) 8.9 - 20.6 mg/dL    Creatinine 2.03 (H) 0.72 - 1.25 mg/dL    Calcium 8.0 (L) 8.4 - 10.2 mg/dL    Protein Total 6.0 (L) 6.4 - 8.3 gm/dL    Albumin 2.6 (L) 3.5 - 5.0 g/dL    Globulin 3.4 2.4 - 3.5 gm/dL    Albumin/Globulin Ratio 0.8 (L) 1.1 - 2.0 ratio    Bilirubin Total 0.5 <=1.5 mg/dL     (H) 40 - 150 unit/L    ALT 28 0 - 55 unit/L    AST 41 (H) 5 - 34 unit/L    eGFR 41 mL/min/1.73/m2    Anion Gap 8.0 mEq/L    BUN/Creatinine Ratio 14    Magnesium    Collection Time: 12/08/24  7:43 AM   Result Value Ref Range    Magnesium Level 2.20 1.60 - 2.60 mg/dL   Troponin I    Collection Time: 12/08/24  7:43 AM   Result Value Ref Range    Troponin-I 1.217 (H) 0.000 - 0.045 ng/mL   Ammonia    Collection Time: 12/08/24  4:49 PM   Result Value Ref Range    Ammonia Level 33.4 18.0 - 72.0 umol/L   Comprehensive Metabolic Panel    Collection Time: 12/08/24  4:49 PM   Result Value Ref Range    Sodium 134 (L) 136 - 145 mmol/L    Potassium 4.8 3.5 - 5.1 mmol/L    Chloride 105 98 - 107 mmol/L    CO2 20 (L) 22 - 29 mmol/L    Glucose 189 (H) 74 - 100 mg/dL    Blood Urea Nitrogen 34.0 (H) 8.9 - 20.6 mg/dL    Creatinine 2.07 (H) 0.72 - 1.25 mg/dL    Calcium 8.6 8.4 - 10.2 mg/dL    Protein Total 6.7 6.4 - 8.3 gm/dL    Albumin 2.4 (L) 3.5 - 5.0 g/dL    Globulin 4.3 (H) 2.4 - 3.5 gm/dL    Albumin/Globulin Ratio 0.6 (L) 1.1 - 2.0 ratio    Bilirubin Total 0.3 <=1.5 mg/dL     (H) 40 - 150 unit/L    ALT 29 0 - 55 unit/L    AST 39 (H) 5 - 34 unit/L    eGFR 40 mL/min/1.73/m2    Anion Gap 9.0 mEq/L    BUN/Creatinine Ratio 16    Magnesium    Collection Time: 12/08/24  4:49 PM   Result Value Ref Range    Magnesium Level 2.10 1.60 - 2.60 mg/dL   CBC with Differential    Collection Time: 12/08/24  4:49 PM   Result Value Ref Range    WBC 17.90 (H) 4.50 - 11.50 x10(3)/mcL    RBC 3.77 (L) 4.70 - 6.10 x10(6)/mcL    Hgb 10.9 (L) 14.0 - 18.0 g/dL    Hct 32.7 (L) 42.0 - 52.0 %    MCV 86.7 80.0 - 94.0 fL    MCH 28.9 27.0 - 31.0 pg     MCHC 33.3 33.0 - 36.0 g/dL    RDW 13.3 11.5 - 17.0 %    Platelet 215 130 - 400 x10(3)/mcL    MPV 10.9 (H) 7.4 - 10.4 fL    NRBC% 0.0 %   Manual Differential    Collection Time: 12/08/24  4:49 PM   Result Value Ref Range    WBC 17.9 x10(3)/mcL    Neutrophils % 74 %    Lymphs % 16 %    Monocytes % 10 %    Eosinophils % 1 %    Neutrophils Abs 13.246 (H) 2.1 - 9.2 x10(3)/mcL    Lymphs Abs 2.864 0.6 - 4.6 x10(3)/mcL    Monocytes Abs 1.79 (H) 0.1 - 1.3 x10(3)/mcL    Eosinophils Abs 0.179 0 - 0.9 x10(3)/mcL    Platelets Normal Normal, Adequate    RBC Morph Normal Normal   POCT glucose    Collection Time: 12/08/24  5:17 PM   Result Value Ref Range    POCT Glucose 191 (H) 70 - 110 mg/dL   Comprehensive Metabolic Panel    Collection Time: 12/09/24  5:02 AM   Result Value Ref Range    Sodium 135 (L) 136 - 145 mmol/L    Potassium 4.9 3.5 - 5.1 mmol/L    Chloride 105 98 - 107 mmol/L    CO2 22 22 - 29 mmol/L    Glucose 198 (H) 74 - 100 mg/dL    Blood Urea Nitrogen 28.8 (H) 8.9 - 20.6 mg/dL    Creatinine 1.31 (H) 0.72 - 1.25 mg/dL    Calcium 8.2 (L) 8.4 - 10.2 mg/dL    Protein Total 6.4 6.4 - 8.3 gm/dL    Albumin 2.6 (L) 3.5 - 5.0 g/dL    Globulin 3.8 (H) 2.4 - 3.5 gm/dL    Albumin/Globulin Ratio 0.7 (L) 1.1 - 2.0 ratio    Bilirubin Total 0.5 <=1.5 mg/dL     (H) 40 - 150 unit/L    ALT 43 0 - 55 unit/L    AST 90 (H) 5 - 34 unit/L    eGFR >60 mL/min/1.73/m2    Anion Gap 8.0 mEq/L    BUN/Creatinine Ratio 22    Magnesium    Collection Time: 12/09/24  5:02 AM   Result Value Ref Range    Magnesium Level 2.10 1.60 - 2.60 mg/dL   Phosphorus    Collection Time: 12/09/24  5:02 AM   Result Value Ref Range    Phosphorus Level 3.4 2.3 - 4.7 mg/dL   CBC with Differential    Collection Time: 12/09/24  5:02 AM   Result Value Ref Range    WBC 14.59 (H) 4.50 - 11.50 x10(3)/mcL    RBC 4.01 (L) 4.70 - 6.10 x10(6)/mcL    Hgb 11.9 (L) 14.0 - 18.0 g/dL    Hct 34.4 (L) 42.0 - 52.0 %    MCV 85.8 80.0 - 94.0 fL    MCH 29.7 27.0 - 31.0 pg    MCHC  34.6 33.0 - 36.0 g/dL    RDW 13.1 11.5 - 17.0 %    Platelet 265 130 - 400 x10(3)/mcL    MPV 11.0 (H) 7.4 - 10.4 fL    Neut % 78.0 %    Lymph % 9.2 %    Mono % 10.1 %    Eos % 2.0 %    Basophil % 0.3 %    Lymph # 1.34 0.6 - 4.6 x10(3)/mcL    Neut # 11.39 (H) 2.1 - 9.2 x10(3)/mcL    Mono # 1.47 (H) 0.1 - 1.3 x10(3)/mcL    Eos # 0.29 0 - 0.9 x10(3)/mcL    Baso # 0.04 <=0.2 x10(3)/mcL    IG# 0.06 (H) 0 - 0.04 x10(3)/mcL    IG% 0.4 %    NRBC% 0.3 %   Comprehensive Metabolic Panel    Collection Time: 12/10/24  3:41 AM   Result Value Ref Range    Sodium 135 (L) 136 - 145 mmol/L    Potassium 4.8 3.5 - 5.1 mmol/L    Chloride 104 98 - 107 mmol/L    CO2 23 22 - 29 mmol/L    Glucose 198 (H) 74 - 100 mg/dL    Blood Urea Nitrogen 22.0 (H) 8.9 - 20.6 mg/dL    Creatinine 1.00 0.72 - 1.25 mg/dL    Calcium 7.8 (L) 8.4 - 10.2 mg/dL    Protein Total 5.7 (L) 6.4 - 8.3 gm/dL    Albumin 2.3 (L) 3.5 - 5.0 g/dL    Globulin 3.4 2.4 - 3.5 gm/dL    Albumin/Globulin Ratio 0.7 (L) 1.1 - 2.0 ratio    Bilirubin Total 0.4 <=1.5 mg/dL     (H) 40 - 150 unit/L    ALT 46 0 - 55 unit/L    AST 56 (H) 5 - 34 unit/L    eGFR >60 mL/min/1.73/m2    Anion Gap 8.0 mEq/L    BUN/Creatinine Ratio 22    Magnesium    Collection Time: 12/10/24  3:41 AM   Result Value Ref Range    Magnesium Level 2.10 1.60 - 2.60 mg/dL   Phosphorus    Collection Time: 12/10/24  3:41 AM   Result Value Ref Range    Phosphorus Level 3.2 2.3 - 4.7 mg/dL   CBC with Differential    Collection Time: 12/10/24  3:41 AM   Result Value Ref Range    WBC 13.09 (H) 4.50 - 11.50 x10(3)/mcL    RBC 3.44 (L) 4.70 - 6.10 x10(6)/mcL    Hgb 9.9 (L) 14.0 - 18.0 g/dL    Hct 30.0 (L) 42.0 - 52.0 %    MCV 87.2 80.0 - 94.0 fL    MCH 28.8 27.0 - 31.0 pg    MCHC 33.0 33.0 - 36.0 g/dL    RDW 13.1 11.5 - 17.0 %    Platelet 260 130 - 400 x10(3)/mcL    MPV 11.0 (H) 7.4 - 10.4 fL    Neut % 72.5 %    Lymph % 13.7 %    Mono % 10.8 %    Eos % 2.2 %    Basophil % 0.3 %    Lymph # 1.79 0.6 - 4.6 x10(3)/mcL    Neut  # 9.49 (H) 2.1 - 9.2 x10(3)/mcL    Mono # 1.41 (H) 0.1 - 1.3 x10(3)/mcL    Eos # 0.29 0 - 0.9 x10(3)/mcL    Baso # 0.04 <=0.2 x10(3)/mcL    IG# 0.07 (H) 0 - 0.04 x10(3)/mcL    IG% 0.5 %    NRBC% 0.0 %     *Note: Due to a large number of results and/or encounters for the requested time period, some results have not been displayed. A complete set of results can be found in Results Review.       Review of Systems   Constitutional: no fever, fatigue, +weakness  Eye: no vision loss, eye redness, drainage, or pain  ENMT: no sore throat, ear pain, sinus pain/congestion, nasal congestion/drainage  Respiratory: no cough, no wheezing, no shortness of breath  Cardiovascular: no chest pain, no palpitations; L lower ext edema/ACE wrap  Gastrointestinal: no nausea, vomiting, or diarrhea. No abdominal pain  Genitourinary: no dysuria, no urinary frequency or urgency, no hematuria  Hema/Lymph: no abnormal bruising or bleeding  Endocrine: no heat or cold intolerance, no excessive thirst or excessive urination  Musculoskeletal: + muscle or joint pain, no joint swelling  Integumentary: no skin rash or abnormal lesion  Psychiatric: no depressed mood, + anxiety, no visual/auditory hallucinations, no delusions, no suicidal or homicidal ideation  Neurologic: antalgic     Objective:      Physical Exam    General: well-developed well-nourished in no acute distress  Eye: clear conjunctiva, eyelids normal  HENT: oropharynx without erythema/exudate, oropharynx and nasal mucosal surfaces moist  Neck: full range of motion  Respiratory: no distress on RA  Cardiovascular: regular rate and rhythm   Gastrointestinal: round, no guarding, non-distended  Musculoskeletal: full range of motion of all extremities/spine without limitation or discomfort  Integumentary: healing/scabbed scar to chest midline and epigastric area    Neurologic:  Mental Status- Alert and Oriented to time, self, place, Speech is fluent, with intact reading and comprehension, long  term memory intact, No Dysarthria.  CN II-XII - CN I Deferred, STACI, no RAPD, OD blurred vision, VA grossly normal to finger counting at 6ft, No ptosis b/l, EOMI w/o nystagmus, LT/PP symmetric, intact in CN V1-V3 distribution b/l, masseter symmetric b/l, T/U midline, Shoulder shrug symmetric b/l, Right SNHL, VFFC, no facial droop, [place] bilaterally  Motor - Tone and Bulk nml throughout, No involuntary movements, 5/5 to confrontation all ext.  FFM nml b/l, No pronator drift.   Sensory - LT/PP/Temp diminished in RLE up to knee, diminished to RUE from hand to shoulder & LLE up to mid-shin, Vibration absent in bilat [place].   Reflexes - 2+ Throughout except for absent AJ b/l  Cerebellar Exam - FNF wnl b/l no intention tremor.  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 Continues w/L upper ext numbness/tingling and bilat lower ext numbness and tingling. Smoking 1/4 ppd. Utilizing CPAP nightly. Has fallen a few times since last visit. Followed by [facility]. Recent CABG x 2 12/5/2024. Have tried and failed to refer Pt to neurosurgeon for SCS lead revision without success. Pt states he was effective after initial placement. Dr. Beasley currently providing pain management.    1. Painful diabetic neuropathy    2. Diabetic polyneuropathy associated with diabetes mellitus due to underlying condition  - OXcarbazepine (TRILEPTAL) 600 MG Tab; Take 1 tablet (600 mg total) by mouth 2 (two) times daily.  Dispense: 60 tablet; Refill: 11  - pregabalin (LYRICA) 150 MG capsule; Take 1 capsule (150 mg total) by mouth 3 (three) times daily.  Dispense: 90 capsule;  Refill: 3        Plan:      [] c/w OXC 600mg BID  [] c/w Lyrica 150mg PO TID  [] hold amitriptyline 50mg nightly for sleep and neuropathy until next visit  [] Rx Capsaicin Topical Cream .075% QID for neuropathic pain  [] advised on better glycemic control. Goal HgA1c < 7.  [] c/w decreased smoking  [] refer to Dr. Spencer Baugh for SCS lead revision    RTC 4 months    I have explained the treatment plan, diagnosis, and prognosis to the patient, caretaker, family. All questions are answered to the best of my knowledge.    Face to Face Time 30 minute, including, counseling, education, review of test results, relevant medical records, and coordination of care.    Judith Jean, NP-C  General Neurology

## 2024-12-31 ENCOUNTER — OFFICE VISIT (OUTPATIENT)
Dept: CARDIAC SURGERY | Facility: CLINIC | Age: 44
End: 2024-12-31
Payer: MEDICARE

## 2024-12-31 VITALS
DIASTOLIC BLOOD PRESSURE: 86 MMHG | OXYGEN SATURATION: 98 % | SYSTOLIC BLOOD PRESSURE: 178 MMHG | BODY MASS INDEX: 30.01 KG/M2 | HEIGHT: 68 IN | WEIGHT: 198 LBS

## 2024-12-31 DIAGNOSIS — Z95.1 S/P CABG (CORONARY ARTERY BYPASS GRAFT): Primary | ICD-10-CM

## 2024-12-31 PROCEDURE — 99024 POSTOP FOLLOW-UP VISIT: CPT | Mod: POP,,, | Performed by: STUDENT IN AN ORGANIZED HEALTH CARE EDUCATION/TRAINING PROGRAM

## 2024-12-31 NOTE — PROGRESS NOTES
Patient is status post coronary artery bypass grafting x 2 doing well overall.   Vital signs stable/afebrile   Regular rate and rhythm without murmurs rubs or gallops  Coarse breath sounds bilaterally   Wounds CDI    A/P:  F/u in 6 months.  Continue f/u with Cardiology

## 2025-01-05 ENCOUNTER — DOCUMENT SCAN (OUTPATIENT)
Dept: HOME HEALTH SERVICES | Facility: HOSPITAL | Age: 45
End: 2025-01-05
Payer: MEDICARE

## 2025-01-07 ENCOUNTER — OFFICE VISIT (OUTPATIENT)
Dept: CARDIOLOGY | Facility: CLINIC | Age: 45
End: 2025-01-07
Payer: MEDICARE

## 2025-01-07 VITALS
RESPIRATION RATE: 18 BRPM | HEIGHT: 69 IN | DIASTOLIC BLOOD PRESSURE: 74 MMHG | TEMPERATURE: 98 F | BODY MASS INDEX: 29.68 KG/M2 | HEART RATE: 84 BPM | WEIGHT: 200.38 LBS | OXYGEN SATURATION: 100 % | SYSTOLIC BLOOD PRESSURE: 138 MMHG

## 2025-01-07 DIAGNOSIS — I10 PRIMARY HYPERTENSION: ICD-10-CM

## 2025-01-07 DIAGNOSIS — E78.1 FAMILIAL HYPERTRIGLYCERIDEMIA: ICD-10-CM

## 2025-01-07 DIAGNOSIS — I25.118 CORONARY ARTERY DISEASE OF NATIVE ARTERY OF NATIVE HEART WITH STABLE ANGINA PECTORIS: Primary | ICD-10-CM

## 2025-01-07 PROCEDURE — 93005 ELECTROCARDIOGRAM TRACING: CPT

## 2025-01-07 PROCEDURE — 99215 OFFICE O/P EST HI 40 MIN: CPT | Mod: PBBFAC,25 | Performed by: INTERNAL MEDICINE

## 2025-01-07 RX ORDER — INFANT FORM.IRON LAC-F/DHA/ARA 3.1 G/1
273 POWDER (GRAM) ORAL 2 TIMES DAILY
Qty: 60 EACH | Refills: 4 | Status: SHIPPED | OUTPATIENT
Start: 2025-01-07

## 2025-01-07 RX ORDER — INFANT FORM.IRON LAC-F/DHA/ARA 3.1 G/1
237 POWDER (GRAM) ORAL 2 TIMES DAILY
Qty: 60 EACH | Refills: 2 | Status: CANCELLED | OUTPATIENT
Start: 2025-01-07 | End: 2025-02-06

## 2025-01-07 NOTE — PROGRESS NOTES
[facility] [place] [facility]  Cariology Clinic - Follow Up     Cardiology Attending: Dr. Jaswant Pugh MD  Date of Visit: 1/7/2025  Reason for Visit/Chief Complaint:   Chief Complaint    4 mo follow up vsiit           Subjective:      Bharath Caballero is a 44 y.o. male with familial hypertriglyceridemia with recurrent pancreatitis, CAD s/p 2 vessel CABG 12/05, HTN, DM, diabetic neuropathy, Hepatic Steatosis, CICI, hard of hearing, smoking who presents to cardiology clinic for follow up.      The patient underwent CABG 12/05 without complication. He feels okay. Reports chest pain that has improved since pre-surgery. He also report PENA that has been stable post-op. He continues on DAPT. /74, HR 84. Home BP lower. He continues to smoke but is working on stopping. Last LDL 85, trig 148. Since then, statin was increased. Sternal wound scarring, no drainage. EKG NSR, prior infarcts    Results for orders placed during the hospital encounter of 10/07/24    Cardiac catheterization    Conclusion    The Dist LAD-2 lesion was 50% stenosed.    The Prox RCA lesion was 90% stenosed.    The Mid RCA lesion was 100% stenosed.    The Prox LAD lesion was 50% stenosed.    The Dist LAD-1 lesion was 50% stenosed.    The pre-procedure left ventricular end diastolic pressure was 20.    The estimated blood loss was none.    FINDINGS  Access:  Right radial  LVEDP:  20 mmHg  Left Main:  No stenosis  LAD: 50% proximal stenosis  Circumflex:  Luminal irregularities  RCA: 100% mid stenosis    LIMA:  Large vessel.  No significant stenosis noted.    iFR = 0.75 in proximal LAD.   iFR is 0.64 in distal LAD.   iFR < 0.89 is significant (associated with myocardial ischemia)  Guide used:  EBU 3.5    Summary:  Two vessel CAD    RECOMMENDATIONS  Refer for CABG evaluation  Risk factor modifications  Activity -- avoid straining with affected limb for one week  Exercise -- as tolerated  Precautions post D/C  -- come to ER for hematoma, unusual pain, erythema, or unusual drainage at access site  Precautions post D/C -- if develop bleeding or hematoma at access site, hold pressure at access site, and come to ER    POST-CATH GUIDELINE FOR INPATIENT DISCHARGE  No hematoma or swelling at access site  No unusual tenderness at access site  Adequate distal pulse or capillary refill in limb(s) accessed  No unusual difficulty with ambulation after bed rest is over    The procedure log was documented by No documenter listed and verified by Jaswant Pugh MD.    Date: 10/10/2024  Time: 11:35 AM      Results for orders placed during the hospital encounter of 10/07/24    Echo    Interpretation Summary    Left Ventricle: The left ventricle is normal in size. Moderately increased ventricular mass. Moderately increased wall thickness. There is moderate concentric hypertrophy. Regional wall motion abnormalities present. See diagram for wall motion findings. Hypokinetic basal and mid inferior and inferoseptal walls. There is mildly reduced systolic function. Biplane (2D) method of discs ejection fraction is 45%. There is indeterminate diastolic function.    Right Ventricle: Normal right ventricular cavity size. Systolic function is normal.    Left Atrium: Normal left atrial size.    Right Atrium: Normal right atrial size.    Aortic Valve: The aortic valve is a trileaflet valve. There is no stenosis. There is no significant regurgitation.    Mitral Valve: The mitral valve is structurally normal. There is no stenosis. There is no significant regurgitation.    Tricuspid Valve: There is no significant regurgitation.    Aorta: Aortic root is normal in size measuring 3.2 cm.    IVC/SVC: Elevated venous pressure at 15 mmHg.    Pericardium: There is no pericardial effusion.      Medications:     Current Outpatient Medications   Medication Sig Dispense Refill    amitriptyline (ELAVIL) 50 MG tablet Take 1 tablet (50 mg total) by mouth every  evening. 30 tablet 4    aspirin (ECOTRIN) 81 MG EC tablet Take 81 mg by mouth once daily.      atorvastatin (LIPITOR) 40 MG tablet Take 2 tablets (80 mg total) by mouth once daily. 90 tablet 3    cholecalciferol, vitamin D3, (VITAMIN D3) 25 mcg (1,000 unit) capsule Take 2 capsules (2,000 Units total) by mouth once daily. 720 capsule 0    clonazePAM (KLONOPIN) 1 MG tablet TAKE ONE TABLET BY MOUTH TWICE DAILY 60 tablet 0    cloNIDine (CATAPRES) 0.3 MG tablet Take 1 tablet (0.3 mg total) by mouth 3 (three) times daily. 270 tablet 3    clopidogreL (PLAVIX) 75 mg tablet Take 4 tablets on day 1 and then one tablet daily. 90 tablet 3    CREON 36,000-114,000- 180,000 unit CpDR TAKE ONE CAPSULE THREE TIMES DAILY 270 capsule 1    EScitalopram oxalate (LEXAPRO) 20 MG tablet Take 20 mg by mouth once daily.      esomeprazole (NEXIUM) 40 MG capsule Take 1 capsule (40 mg total) by mouth before breakfast. 30 capsule 11    evolocumab (REPATHA SURECLICK) 140 mg/mL PnIj Inject 1 mL (140 mg total) into the skin every 14 (fourteen) days. 2 mL 11    FARXIGA 5 mg Tab tablet TAKE ONE TABLET BY MOUTH EVERY DAY 90 tablet 3    HUMALOG U-100 INSULIN 100 unit/mL injection INJECT 25 UNITS SUBCUTANEOUSLY BEFORE MEALS THREE TIMES DAILY 10 mL 4    HYDROmorphone (DILAUDID) 8 MG tablet Take 1 tablet (8 mg total) by mouth every 8 (eight) hours as needed. 90 tablet 0    isosorbide mononitrate (IMDUR) 120 MG 24 hr tablet Take 1 tablet (120 mg total) by mouth once daily. 90 tablet 3    linaGLIPtin (TRADJENTA) 5 mg Tab tablet Take 1 tablet (5 mg total) by mouth once daily. 90 tablet 3    metoprolol tartrate (LOPRESSOR) 25 MG tablet Take 0.5 tablets (12.5 mg total) by mouth 2 (two) times daily. 90 tablet 3    morphine (MS CONTIN) 100 MG 12 hr tablet TAKE ONE TABLET BY MOUTH THREE TIMES DAILY 90 tablet 0    NIFEdipine (PROCARDIA-XL) 60 MG (OSM) 24 hr tablet Take 1 tablet (60 mg total) by mouth 2 (two) times a day. 180 tablet 3    nitroGLYCERIN (NITROSTAT)  "0.3 MG SL tablet Place 1 tablet (0.3 mg total) under the tongue every 5 (five) minutes as needed for Chest pain (go to er after 3 doses). 30 tablet 6    OXcarbazepine (TRILEPTAL) 600 MG Tab Take 1 tablet (600 mg total) by mouth 2 (two) times daily. 60 tablet 11    potassium chloride SA (K-DUR,KLOR-CON) 20 MEQ tablet TAKE ONE TABLET BY MOUTH TWICE DAILY 180 tablet 3    pregabalin (LYRICA) 150 MG capsule Take 1 capsule (150 mg total) by mouth 3 (three) times daily. 90 capsule 3    ranolazine (RANEXA) 500 MG Tb12 Take 1 tablet (500 mg total) by mouth 2 (two) times daily. 60 tablet 5    sucralfate (CARAFATE) 100 mg/mL suspension Take 10 mLs (1 g total) by mouth 4 (four) times daily before meals and nightly. 1200 mL 3    SURE COMFORT INSULIN SYRINGE 0.5 mL 31 gauge x 5/16" Syrg USE ONE SYRINGE THREE TIMES DAILY 100 each 3    tamsulosin (FLOMAX) 0.4 mg Cap TAKE ONE CAPSULE BY MOUTH EVERY DAY 90 capsule 3    torsemide (DEMADEX) 20 MG Tab Take 1 tablet (20 mg total) by mouth once daily. 90 tablet 3    TOUJEO SOLOSTAR U-300 INSULIN 300 unit/mL (1.5 mL) InPn pen INJECT 35 SUBCUTANEOUSLY TWICE DAILY 4.5 mL 3    VASCEPA 1 gram Cap TAKE TWO CAPSULES BY MOUTH TWICE DAILY 60 capsule 3     Current Facility-Administered Medications   Medication Dose Route Frequency Provider Last Rate Last Admin    triamcinolone acetonide 0.1% ointment   Topical (Top) BID Malina Brito, FNP           I have reviewed and updated the patient's medications, allergies, past medical history, surgical history, social history and family history as needed.    Review of Systems:     Review of Systems   Constitutional: Negative.    Respiratory:  Positive for shortness of breath.    Cardiovascular:  Positive for chest pain.     Objective:     Wt Readings from Last 3 Encounters:   01/07/25 90.9 kg (200 lb 6.4 oz)   12/31/24 89.8 kg (198 lb)   12/30/24 93.6 kg (206 lb 6.4 oz)     Temp Readings from Last 3 Encounters:   01/07/25 97.9 °F (36.6 °C) (Oral) " "  12/30/24 98.4 °F (36.9 °C) (Oral)   12/17/24 98.4 °F (36.9 °C) (Oral)     BP Readings from Last 3 Encounters:   01/07/25 138/74   12/31/24 (!) 178/86   12/30/24 (!) 178/86     Pulse Readings from Last 3 Encounters:   01/07/25 84   12/30/24 80   12/17/24 67       Vitals:    01/07/25 0845 01/07/25 0940   BP: (!) 170/90 138/74   BP Location: Right arm Right arm   Patient Position: Sitting Sitting   Pulse: 84    Resp: 18    Temp: 97.9 °F (36.6 °C)    TempSrc: Oral    SpO2: 100%    Weight: 90.9 kg (200 lb 6.4 oz)    Height: 5' 9" (1.753 m)      Body mass index is 29.59 kg/m².    Physical Exam  Vitals reviewed.   HENT:      Head: Normocephalic.   Cardiovascular:      Rate and Rhythm: Normal rate and regular rhythm.      Pulses: Normal pulses.      Heart sounds: Normal heart sounds.   Pulmonary:      Effort: Pulmonary effort is normal.      Breath sounds: Normal breath sounds.   Musculoskeletal:      Right lower leg: No edema.      Left lower leg: No edema.   Skin:     General: Skin is warm and dry.   Neurological:      General: No focal deficit present.      Mental Status: He is alert and oriented to person, place, and time.        Labs:   I have reviewed the following labs below:      Lab Results   Component Value Date    WBC 13.09 (H) 12/10/2024    HGB 9.9 (L) 12/10/2024    HCT 30.0 (L) 12/10/2024     12/10/2024    MCV 87.2 12/10/2024    RDW 13.1 12/10/2024     Lab Results   Component Value Date     (L) 12/10/2024    K 4.8 12/10/2024     12/10/2024    CO2 23 12/10/2024    BUN 22.0 (H) 12/10/2024    CALCIUM 7.8 (L) 12/10/2024    MG 2.10 12/10/2024    PHOS 3.2 12/10/2024     Lab Results   Component Value Date    ALBUMIN 2.3 (L) 12/10/2024    BILITOT 0.4 12/10/2024    AST 56 (H) 12/10/2024    ALKPHOS 515 (H) 12/10/2024    ALT 46 12/10/2024     (H) 10/07/2024     Cholesterol Total   Date Value Ref Range Status   10/07/2024 142 <=200 mg/dL Final     Comment:     Fasting     HDL Cholesterol   Date " Value Ref Range Status   10/07/2024 27 (L) 35 - 60 mg/dL Final     Comment:     Fasting     LDL Cholesterol   Date Value Ref Range Status   10/07/2024 85.00 50.00 - 140.00 mg/dL Final     Triglyceride   Date Value Ref Range Status   10/07/2024 148 (H) 34 - 140 mg/dL Final     Comment:     Fasting     Lab Results   Component Value Date    HGBA1C 9.3 (H) 10/08/2024    HGBA1C 8.2 (H) 06/04/2024    HGBA1C 9.5 (H) 01/16/2024    CREATININE 1.00 12/10/2024     Lab Results   Component Value Date    TSH 0.555 10/07/2024     Lab Results   Component Value Date    IRON 97 10/10/2024    TIBC 205 (L) 10/10/2024    FERRITIN 216.05 10/10/2024    [id number] 886 (H) 10/10/2024     Lab Results   Component Value Date    INR 1.2 12/05/2024    APTT 34.7 (H) 12/05/2024      Lab Results   Component Value Date    TROPONINI 1.217 (H) 12/08/2024    TROPONINI 1.455 (H) 10/08/2024    TROPONINI 1.554 (H) 10/08/2024    TROPONINI 1.600 (H) 10/08/2024    TROPONINI 1.582 (H) 10/07/2024    .6 (H) 12/08/2024    .4 (H) 10/07/2024    BNP <10.0 11/28/2022     Cardiovascular Studies:   I have reviewed the following studies below:      Coronary angiogram 4/2023:   FINDINGS  LVEDP:  18 mmHg  Left Main:  No stenosis    LAD:   Diffusely diseased.  70% ostial-prox lesion, 70% mid-distal lesion and 50% lesion at apex  Circumflex:   Mild diffuse disease   30%  lesion at the ostium of the first OM  RCA:   Severe, diffuse disease. Small vessel   90% prox-mid lesion   mid vessel  The patient has Significant two vessel disease    Blood loss:  less than 10 cc.  LIMA:  Medium size vessel.     iFR = 0.94 in proximal and mid LAD.  iFR > 0.89 is not significant      RECOMMENDATIONS  Medical management  Risk factor modifications -- start statin, blood pressure control        ECHO 5/24/23   The left ventricle is normal in size with mild concentric hypertrophy and normal systolic function.  The estimated ejection fraction is 65%.  Normal left ventricular  diastolic function.  There is no evidence of intracardiac shunting.  Elevated central venous pressure (15 mmHg).  The estimated PA systolic pressure is 24 mmHg.  Normal right ventricular size with normal right ventricular systolic function.  Mild left atrial enlargement.    Assessment/Plan:   44 y.o. male with the following medical problems:    CAD s/p CABG 12/05  Recent NSTEMI  -In 2023 pt had proximal LAD disease based on cath in 4/2023, however iFR was 0.94 which was considered non-significant, hence not revascularized. Pt also has mid-RCA  with collaterals but a small PDA. Was seen by Dr. Dallas who did not think CABG is indicated.     Pt had NSTEMI in Oct 2024 and angiogram showed prox LAD 50% with iFR 0.75 and distal LAD with IFR 0.64 as well as mid %  Patient was already evaluated by surgeon with plan to proceed with two-vessel CABG in on 12/2/2024 however patient is concerned about mortality with CABG and would like to explore percutaneous options.    I contacted Dr. Jaswant Joseph with [facility] to see if his disease is amenable to PCI and he thinks pt will be better treated with CABG given diffuse LAD involvement that would need extensive PCI and would make CABG not feasible down the line.  As patient had a recent NSTEMI will add Plavix with a 300 mg load followed by 75 mg daily.    Minimal episodes of chest pain since recent hospitalization  Continue aspirin, statin, PCSK9 inhibitor, Coreg 25 b.i.d., Imdur 120, nifedipine 60 b.i.d. and Ranexa 500 mg bid   CABG 12/05 without complications      Ischemic cardiomyopathy  Echo in Oct 2024 (at time of NSTEMI) showed EF 45% with wall motion abnormalities in inferior and inferoseptal walls. (new drop in EF)  LVEDP 20 mmHg on recent catheterization in October 2024  Patient already on losartan 100 mg, Coreg 25 mg bid, Aldactone 100 mg and Farxiga 5 mg   On nurse visit, Will switch to Entresto  mg bid and increase Farxiga to 10 mg  Appears euvolemic on  exam. Continue torsemide 20 mg daily     HTN, resistant and controlled  At goal today  We will have patient keep a BP log  Continue nifedipine 60 bid, coreg 25mg bid, aldactone 100mg, clonidine 0.2mg tid, tordemide 20  Consider resuming entresto at next visit  Unclear if severe TG can be contributing to resistant HTN  To note: No renal duplex US done, pt with hx of hypokalemia and could have underlying hyperaldosteronism.  Renin and aldosterone were never checked however as patient is already on Aldactone and RAAS inhibitors testing will be unreliable      Familial Hypertriglyceridemia, hypercholesterolemia   Patient reports triglycerides in the past were 22,000 (no records of that)  Suspect most of his metabolic disorders as well as CAD related to hypertriglyceridemia.  Patient also with recurrent pancreatitis (currently having epigastric pain that could be concerning for pancreatitis and I have advised him to seek medical care)  Patient used to be on plasmapheresis in the past but per his request has stopped doing so.  Most recent triglycerides have normalized at 148  -continue atorvastatin 40mg daily, Repatha, Vascepa 2g BID   **LDL 85, started atorva 80, lipid panel ordered with PCP  Patient has been more compliant with decreasing his fat intake    Uncontrolled diabetes  Diabetic neuropathy  A1c 9.3 in October 2024  Continue management per PCP    Smoking  -encouraged cessation    Recurrent pancreatitis  Liver steatosis  Pt followed by GI    4mo follow up  Get labs

## 2025-01-08 ENCOUNTER — OFFICE VISIT (OUTPATIENT)
Dept: FAMILY MEDICINE | Facility: CLINIC | Age: 45
End: 2025-01-08
Payer: MEDICARE

## 2025-01-08 VITALS
WEIGHT: 198.44 LBS | DIASTOLIC BLOOD PRESSURE: 88 MMHG | BODY MASS INDEX: 29.39 KG/M2 | RESPIRATION RATE: 18 BRPM | HEIGHT: 69 IN | HEART RATE: 68 BPM | OXYGEN SATURATION: 99 % | SYSTOLIC BLOOD PRESSURE: 160 MMHG

## 2025-01-08 DIAGNOSIS — L57.0 ACTINIC KERATOSIS: Primary | ICD-10-CM

## 2025-01-08 LAB
OHS QRS DURATION: 84 MS
OHS QTC CALCULATION: 394 MS

## 2025-01-08 PROCEDURE — 17000 DESTRUCT PREMALG LESION: CPT | Mod: PBBFAC | Performed by: FAMILY MEDICINE

## 2025-01-08 PROCEDURE — G0179 MD RECERTIFICATION HHA PT: HCPCS | Mod: ,,, | Performed by: NURSE PRACTITIONER

## 2025-01-08 PROCEDURE — 99213 OFFICE O/P EST LOW 20 MIN: CPT | Mod: PBBFAC

## 2025-01-08 PROCEDURE — 17003 DESTRUCT PREMALG LES 2-14: CPT | Mod: PBBFAC | Performed by: FAMILY MEDICINE

## 2025-01-08 RX ORDER — RANOLAZINE 500 MG/1
500 TABLET, EXTENDED RELEASE ORAL 2 TIMES DAILY
Qty: 180 TABLET | Refills: 3 | Status: SHIPPED | OUTPATIENT
Start: 2025-01-08

## 2025-01-08 NOTE — PROGRESS NOTES
Cardiology attending attestation  Patient was seen and examined with Dr. Alondra Silva. Agree with plan as outlined below.    Zoie Campbell MD  Cardiology staff-[facility]

## 2025-01-08 NOTE — PROGRESS NOTES
Subjective:       Patient ID: Bharath Caballero is a 44 y.o. male.    Chief Complaint: actinic keratosis    HPI  43 yo with h/o AK here for follow up. Pt has tolerated previous cryo well. Pt has noted a few new lesions on his arms. He responded well to previous cryo with resolution of the previously treated lesions.     Review of Systems  As per HPI         Objective:      Vitals:    01/08/25 1108   BP: (!) 160/88   Pulse: 68   Resp: 18       Physical Exam    Gen: alert, no acute distress  Skin: rough, scaly lesions x 4 to bilateral upper extremities  Psych: cooperative, appropriate mood and affect    Procedure Note:  Procedure: cryo for AK x 4  Performed by: Ina Magaña MD  Consent: signed consent obtained after discussion of risks, benefits, and alternative treatments. Time out completed  Description: Liquid nitrogen used for cryotherapy. Tip held 1 cm from lesion and sprayed in freeze-thaw cycle.   The patient tolerated the procedure well with no complications.       Assessment/Plan:  Actinic keratosis    - cryo today  - Post care instructions and return precautions discussed.     Pt has a skin tag to his right axilla that he would like to remove in the future.      Follow up in about 4 months (around 5/8/2025).

## 2025-01-17 DIAGNOSIS — E78.2 MIXED HYPERLIPIDEMIA: ICD-10-CM

## 2025-01-17 DIAGNOSIS — E08.42 DIABETIC POLYNEUROPATHY ASSOCIATED WITH DIABETES MELLITUS DUE TO UNDERLYING CONDITION: ICD-10-CM

## 2025-01-17 DIAGNOSIS — Z79.4 TYPE 2 DIABETES MELLITUS WITH OTHER SKIN ULCER, WITH LONG-TERM CURRENT USE OF INSULIN: ICD-10-CM

## 2025-01-17 DIAGNOSIS — G89.4 CHRONIC PAIN SYNDROME: ICD-10-CM

## 2025-01-17 DIAGNOSIS — E11.622 TYPE 2 DIABETES MELLITUS WITH OTHER SKIN ULCER, WITH LONG-TERM CURRENT USE OF INSULIN: ICD-10-CM

## 2025-01-17 DIAGNOSIS — E78.01 FAMILIAL HYPERCHOLESTEROLEMIA: ICD-10-CM

## 2025-01-17 RX ORDER — EVOLOCUMAB 140 MG/ML
INJECTION, SOLUTION SUBCUTANEOUS
Qty: 2 ML | Refills: 11 | Status: SHIPPED | OUTPATIENT
Start: 2025-01-17

## 2025-01-17 RX ORDER — MORPHINE SULFATE 100 MG/1
100 TABLET, FILM COATED, EXTENDED RELEASE ORAL 3 TIMES DAILY
Qty: 90 TABLET | Refills: 0 | Status: SHIPPED | OUTPATIENT
Start: 2025-01-17

## 2025-01-17 RX ORDER — HYDROMORPHONE HYDROCHLORIDE 8 MG/1
8 TABLET ORAL EVERY 8 HOURS PRN
Qty: 90 TABLET | Refills: 0 | Status: SHIPPED | OUTPATIENT
Start: 2025-01-17

## 2025-01-20 ENCOUNTER — PATIENT MESSAGE (OUTPATIENT)
Dept: UROLOGY | Facility: CLINIC | Age: 45
End: 2025-01-20
Payer: MEDICARE

## 2025-01-23 RX ORDER — INSULIN LISPRO 100 [IU]/ML
INJECTION, SOLUTION INTRAVENOUS; SUBCUTANEOUS
Qty: 10 ML | Refills: 3 | Status: SHIPPED | OUTPATIENT
Start: 2025-01-23

## 2025-01-23 RX ORDER — CLONAZEPAM 1 MG/1
1 TABLET ORAL 2 TIMES DAILY
Qty: 60 TABLET | Refills: 0 | Status: SHIPPED | OUTPATIENT
Start: 2025-01-23

## 2025-01-23 RX ORDER — ICOSAPENT ETHYL 1000 MG/1
2 CAPSULE ORAL 2 TIMES DAILY
Qty: 60 CAPSULE | Refills: 3 | Status: SHIPPED | OUTPATIENT
Start: 2025-01-23

## 2025-01-28 ENCOUNTER — DOCUMENT SCAN (OUTPATIENT)
Dept: HOME HEALTH SERVICES | Facility: HOSPITAL | Age: 45
End: 2025-01-28
Payer: MEDICARE

## 2025-01-30 ENCOUNTER — DOCUMENT SCAN (OUTPATIENT)
Dept: HOME HEALTH SERVICES | Facility: HOSPITAL | Age: 45
End: 2025-01-30
Payer: MEDICARE

## 2025-02-18 ENCOUNTER — OFFICE VISIT (OUTPATIENT)
Dept: INTERNAL MEDICINE | Facility: CLINIC | Age: 45
End: 2025-02-18
Payer: MEDICARE

## 2025-02-18 ENCOUNTER — HOSPITAL ENCOUNTER (OUTPATIENT)
Dept: RADIOLOGY | Facility: HOSPITAL | Age: 45
Discharge: HOME | End: 2025-02-18
Attending: INTERNAL MEDICINE
Payer: MEDICARE

## 2025-02-18 ENCOUNTER — HOSPITAL ENCOUNTER (OUTPATIENT)
Dept: WOUND CARE | Facility: HOSPITAL | Age: 45
Discharge: HOME | End: 2025-02-18
Attending: NURSE PRACTITIONER
Payer: MEDICARE

## 2025-02-18 VITALS
HEART RATE: 71 BPM | RESPIRATION RATE: 18 BRPM | DIASTOLIC BLOOD PRESSURE: 77 MMHG | TEMPERATURE: 98 F | OXYGEN SATURATION: 100 % | SYSTOLIC BLOOD PRESSURE: 187 MMHG

## 2025-02-18 DIAGNOSIS — E13.42 DIABETIC POLYNEUROPATHY ASSOCIATED WITH OTHER SPECIFIED DIABETES MELLITUS: ICD-10-CM

## 2025-02-18 DIAGNOSIS — I25.118 CORONARY ARTERY DISEASE OF NATIVE ARTERY OF NATIVE HEART WITH STABLE ANGINA PECTORIS: ICD-10-CM

## 2025-02-18 DIAGNOSIS — E11.622 TYPE 2 DIABETES MELLITUS WITH OTHER SKIN ULCER, WITH LONG-TERM CURRENT USE OF INSULIN: ICD-10-CM

## 2025-02-18 DIAGNOSIS — E87.6 HYPOKALEMIA: ICD-10-CM

## 2025-02-18 DIAGNOSIS — K72.10 CHRONIC LIVER FAILURE WITHOUT HEPATIC COMA: ICD-10-CM

## 2025-02-18 DIAGNOSIS — R10.9 LEFT FLANK PAIN: ICD-10-CM
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
## 2025-02-18 DIAGNOSIS — G89.4 CHRONIC PAIN SYNDROME: Chronic | ICD-10-CM

## 2025-02-18 DIAGNOSIS — E11.42 DIABETIC POLYNEUROPATHY ASSOCIATED WITH TYPE 2 DIABETES MELLITUS: Primary | ICD-10-CM

## 2025-02-18 DIAGNOSIS — E78.1 FAMILIAL HYPERTRIGLYCERIDEMIA: ICD-10-CM

## 2025-02-18 DIAGNOSIS — L98.9 SKIN LESION OF NECK: ICD-10-CM

## 2025-02-18 DIAGNOSIS — B35.1 ONYCHOMYCOSIS OF MULTIPLE TOENAILS WITH TYPE 2 DIABETES MELLITUS: ICD-10-CM

## 2025-02-18 DIAGNOSIS — Z79.4 TYPE 2 DIABETES MELLITUS WITH OTHER SKIN ULCER, WITH LONG-TERM CURRENT USE OF INSULIN: ICD-10-CM

## 2025-02-18 DIAGNOSIS — L60.2 OVERGROWN TOENAILS: ICD-10-CM

## 2025-02-18 DIAGNOSIS — N32.0 BLADDER OUTFLOW OBSTRUCTION: ICD-10-CM

## 2025-02-18 DIAGNOSIS — E78.2 MIXED HYPERLIPIDEMIA: ICD-10-CM

## 2025-02-18 DIAGNOSIS — R10.11 RUQ ABDOMINAL PAIN: ICD-10-CM

## 2025-02-18 DIAGNOSIS — Z72.0 TOBACCO USER: ICD-10-CM

## 2025-02-18 DIAGNOSIS — R60.0 BILATERAL EDEMA OF LOWER EXTREMITY: ICD-10-CM

## 2025-02-18 DIAGNOSIS — R63.4 ABNORMAL WEIGHT LOSS: ICD-10-CM

## 2025-02-18 DIAGNOSIS — Z86.0100 HISTORY OF COLONIC POLYPS: ICD-10-CM

## 2025-02-18 DIAGNOSIS — H53.8 BLURRED VISION, RIGHT EYE: ICD-10-CM

## 2025-02-18 DIAGNOSIS — G58.7 MONONEURITIS MULTIPLEX: ICD-10-CM

## 2025-02-18 DIAGNOSIS — L98.8 ACQUIRED PERFORATING DERMATOSIS: ICD-10-CM

## 2025-02-18 DIAGNOSIS — R29.898 RIGHT HAND WEAKNESS: ICD-10-CM

## 2025-02-18 DIAGNOSIS — K21.00 GASTROESOPHAGEAL REFLUX DISEASE WITH ESOPHAGITIS WITHOUT HEMORRHAGE: ICD-10-CM

## 2025-02-18 DIAGNOSIS — Z79.4 TYPE 2 DIABETES MELLITUS WITH HYPERGLYCEMIA, WITH LONG-TERM CURRENT USE OF INSULIN: ICD-10-CM

## 2025-02-18 DIAGNOSIS — Z87.19 HISTORY OF PANCREATITIS: ICD-10-CM

## 2025-02-18 DIAGNOSIS — Z95.1 HX OF CABG: ICD-10-CM

## 2025-02-18 DIAGNOSIS — L97.421 ULCER OF LEFT HEEL AND MIDFOOT, LIMITED TO BREAKDOWN OF SKIN: ICD-10-CM

## 2025-02-18 DIAGNOSIS — K86.1 CHRONIC BILIARY PANCREATITIS: ICD-10-CM

## 2025-02-18 DIAGNOSIS — I21.4 NSTEMI (NON-ST ELEVATED MYOCARDIAL INFARCTION): ICD-10-CM

## 2025-02-18 DIAGNOSIS — E11.9 ENCOUNTER FOR DIABETIC FOOT EXAM: Primary | ICD-10-CM

## 2025-02-18 DIAGNOSIS — I10 PRIMARY HYPERTENSION: ICD-10-CM

## 2025-02-18 DIAGNOSIS — R29.6 FALLS FREQUENTLY: ICD-10-CM

## 2025-02-18 DIAGNOSIS — K76.0 METABOLIC DYSFUNCTION-ASSOCIATED STEATOTIC LIVER DISEASE (MASLD): ICD-10-CM

## 2025-02-18 DIAGNOSIS — G83.10 PARESIS OF SINGLE LOWER EXTREMITY: ICD-10-CM

## 2025-02-18 DIAGNOSIS — L60.3 DYSTROPHIC NAIL: ICD-10-CM

## 2025-02-18 DIAGNOSIS — F33.2 MAJOR DEPRESSIVE DISORDER, RECURRENT SEVERE WITHOUT PSYCHOTIC FEATURES: ICD-10-CM

## 2025-02-18 DIAGNOSIS — E55.9 VITAMIN D DEFICIENCY: ICD-10-CM

## 2025-02-18 DIAGNOSIS — E66.1 CLASS 1 DRUG-INDUCED OBESITY WITH SERIOUS COMORBIDITY AND BODY MASS INDEX (BMI) OF 33.0 TO 33.9 IN ADULT: ICD-10-CM

## 2025-02-18 DIAGNOSIS — G89.29 OTHER CHRONIC PAIN: ICD-10-CM

## 2025-02-18 DIAGNOSIS — G89.4 CHRONIC PAIN SYNDROME: ICD-10-CM

## 2025-02-18 DIAGNOSIS — R20.2 HAND PARESTHESIA: ICD-10-CM

## 2025-02-18 DIAGNOSIS — R35.1 NOCTURIA: ICD-10-CM

## 2025-02-18 DIAGNOSIS — K52.9 CHRONIC DIARRHEA OF UNKNOWN ORIGIN: ICD-10-CM

## 2025-02-18 DIAGNOSIS — E66.811 CLASS 1 DRUG-INDUCED OBESITY WITH SERIOUS COMORBIDITY AND BODY MASS INDEX (BMI) OF 33.0 TO 33.9 IN ADULT: ICD-10-CM

## 2025-02-18 DIAGNOSIS — E11.40 PAINFUL DIABETIC NEUROPATHY: Chronic | ICD-10-CM

## 2025-02-18 DIAGNOSIS — E11.65 TYPE 2 DIABETES MELLITUS WITH HYPERGLYCEMIA, WITH LONG-TERM CURRENT USE OF INSULIN: ICD-10-CM

## 2025-02-18 DIAGNOSIS — I70.0 AORTIC ATHEROSCLEROSIS: ICD-10-CM

## 2025-02-18 DIAGNOSIS — E11.69 ONYCHOMYCOSIS OF MULTIPLE TOENAILS WITH TYPE 2 DIABETES MELLITUS: ICD-10-CM

## 2025-02-18 DIAGNOSIS — Z74.09 IMPAIRED FUNCTIONAL MOBILITY, BALANCE, GAIT, AND ENDURANCE: ICD-10-CM

## 2025-02-18 PROBLEM — R07.9 CHEST PAIN: Status: RESOLVED | Noted: 2024-10-09 | Resolved: 2025-02-18

## 2025-02-18 PROBLEM — S61.209A OPEN WOUND OF FINGER OF RIGHT HAND: Status: RESOLVED | Noted: 2023-10-20 | Resolved: 2025-02-18

## 2025-02-18 PROBLEM — S81.801D WOUND OF RIGHT LEG, SUBSEQUENT ENCOUNTER: Status: RESOLVED | Noted: 2024-03-06 | Resolved: 2025-02-18

## 2025-02-18 PROBLEM — R79.89 ELEVATED TROPONIN I LEVEL: Status: RESOLVED | Noted: 2022-08-18 | Resolved: 2025-02-18

## 2025-02-18 PROBLEM — T23.029A: Status: RESOLVED | Noted: 2023-10-27 | Resolved: 2025-02-18

## 2025-02-18 PROBLEM — G81.91 ACUTE RIGHT HEMIPARESIS: Status: RESOLVED | Noted: 2024-02-06 | Resolved: 2025-02-18

## 2025-02-18 PROBLEM — S90.821A BLISTER OF RIGHT FOOT: Status: RESOLVED | Noted: 2024-08-05 | Resolved: 2025-02-18

## 2025-02-18 PROBLEM — T23.322A: Status: RESOLVED | Noted: 2024-02-28 | Resolved: 2025-02-18

## 2025-02-18 PROCEDURE — 27000999 HC MEDICAL RECORD PHOTO DOCUMENTATION

## 2025-02-18 PROCEDURE — 99211 OFF/OP EST MAY X REQ PHY/QHP: CPT

## 2025-02-18 PROCEDURE — 72050 X-RAY EXAM NECK SPINE 4/5VWS: CPT | Mod: TC

## 2025-02-18 PROCEDURE — 99215 OFFICE O/P EST HI 40 MIN: CPT | Mod: PBBFAC,25,27 | Performed by: INTERNAL MEDICINE

## 2025-02-18 PROCEDURE — 11720 DEBRIDE NAIL 1-5: CPT

## 2025-02-18 PROCEDURE — 11719 TRIM NAIL(S) ANY NUMBER: CPT

## 2025-02-18 RX ORDER — HYDROMORPHONE HYDROCHLORIDE 8 MG/1
8 TABLET ORAL EVERY 8 HOURS PRN
Qty: 90 TABLET | Refills: 0 | Status: SHIPPED | OUTPATIENT
Start: 2025-02-18

## 2025-02-18 RX ORDER — CHOLECALCIFEROL (VITAMIN D3) 50 MCG
1 TABLET ORAL DAILY
Qty: 30 TABLET | Refills: 11 | Status: SHIPPED | OUTPATIENT
Start: 2025-02-18 | End: 2026-02-18

## 2025-02-18 NOTE — PROGRESS NOTES
Subjective     Patient ID: Bharath Caballero is a 44 y.o. male.    Chief Complaint: Follow-up (Pt here today for f/u visit. )    HPI  Patient comes to us saying that he is having problems with postprandial diarrhea.  We will refer him to GI for this problem he also has right hand weakness having trouble holding things we will get an MRI x-ray of his cervical spine prior proceed to MRI and then nerve conduction studies might need to have an neurology consult.  Vitamin-D level is low we will start him on 2000 units of vitamin D3 daily we will see him back in 1 month    Review of Systems   All other systems reviewed and are negative.         Objective     Physical Exam  Vitals and nursing note reviewed.   Constitutional:       Appearance: Normal appearance.   HENT:      Head: Normocephalic and atraumatic.      Right Ear: Tympanic membrane, ear canal and external ear normal.      Left Ear: Tympanic membrane, ear canal and external ear normal.      Nose: Nose normal.      Mouth/Throat:      Mouth: Mucous membranes are moist.      Pharynx: Oropharynx is clear.   Eyes:      Extraocular Movements: Extraocular movements intact.      Conjunctiva/sclera: Conjunctivae normal.      Pupils: Pupils are equal, round, and reactive to light.   Cardiovascular:      Rate and Rhythm: Normal rate.      Pulses: Normal pulses.      Heart sounds: Normal heart sounds.   Pulmonary:      Effort: Pulmonary effort is normal.      Breath sounds: Normal breath sounds.   Abdominal:      General: Bowel sounds are normal.      Palpations: Abdomen is soft.   Musculoskeletal:      Cervical back: Normal range of motion and neck supple.   Skin:     General: Skin is warm and dry.   Neurological:      General: No focal deficit present.      Mental Status: He is alert and oriented to person, place, and time. Mental status is at baseline.   Psychiatric:         Mood and Affect: Mood normal.         Behavior: Behavior normal.         Thought Content: Thought  content normal.         Judgment: Judgment normal.            Assessment and Plan     1. Diabetic polyneuropathy associated with type 2 diabetes mellitus    2. Hand paresthesia    3. Mononeuritis multiplex    4. Paresis of single lower extremity    5. Other chronic pain    6. Chronic pain syndrome    7. Painful diabetic neuropathy    8. Major depressive disorder, recurrent severe without psychotic features    9. Blurred vision, right eye    10. Onychomycosis of multiple toenails with type 2 diabetes mellitus    11. Overgrown toenails    12. Acquired perforating dermatosis    13. Skin lesion of neck    14. Primary hypertension    15. Mixed hyperlipidemia    16. Aortic atherosclerosis    17. Familial hypertriglyceridemia    18. Coronary artery disease of native artery of native heart with stable angina pectoris    19. NSTEMI (non-ST elevated myocardial infarction)    20. Hx of CABG    21. Bladder outflow obstruction    22. Nocturia    23. Hypokalemia    24. Abnormal weight loss    25. Type 2 diabetes mellitus with other skin ulcer, with long-term current use of insulin    26. Class 1 drug-induced obesity with serious comorbidity and body mass index (BMI) of 33.0 to 33.9 in adult    27. History of pancreatitis    28. Chronic biliary pancreatitis    29. Left flank pain    30. Metabolic dysfunction-associated steatotic liver disease (MASLD)    31. Gastroesophageal reflux disease with esophagitis without hemorrhage    32. History of colonic polyps    33. Chronic liver failure without hepatic coma    34. RUQ abdominal pain    35. Fall, subsequent encounter    36. Ulcer of left heel and midfoot, limited to breakdown of skin    37. Tobacco user    38. Impaired functional mobility, balance, gait, and endurance    39. Bilateral edema of lower extremity    40. Falls frequently    41. Right hand weakness  -     X-Ray Cervical Spine Complete 5 view; Future; Expected date: 02/18/2025  -     EMG W/ ULTRASOUND AND NERVE CONDUCTION  TEST 1 Extremity; Future; Expected date: 02/25/2025        One-month follow up see above other problems discussed to include ability to afford his medications need for chronic pain medications etc.         Follow up in about 4 weeks (around 3/18/2025).

## 2025-02-19 ENCOUNTER — TELEPHONE (OUTPATIENT)
Dept: HEPATOLOGY | Facility: HOSPITAL | Age: 45
End: 2025-02-19
Payer: MEDICARE

## 2025-02-19 DIAGNOSIS — R20.2 HAND PARESTHESIA: Primary | ICD-10-CM

## 2025-02-19 PROBLEM — L60.3 DYSTROPHIC NAIL: Status: ACTIVE | Noted: 2023-02-06

## 2025-02-19 PROBLEM — E11.65 TYPE 2 DIABETES MELLITUS WITH HYPERGLYCEMIA, WITHOUT LONG-TERM CURRENT USE OF INSULIN: Status: ACTIVE | Noted: 2025-02-19

## 2025-02-19 NOTE — PROGRESS NOTES
[facility]   Outpatient Wound Care     Subjective:   Patient ID: Bharath Caballero is a 44 y.o. male.    Chief Complaint: C      History of Present Illness:   44 y.o. White male presents to wound care clinic today for diabetic foot care.  Presents to clinic alone.  Ambulates with a cane.  Hard of hearing.  Reviewed previous medical history.  Followed by Dat Beasley MD for PCP last appointment 02/18/2025.      Today's visit 02/18/2025:  Elevation of blood pressure today during initial vital signs readings.  Instructed the patient to the danger of hypertension.  Patient saw PCP today.  Patient voices has taking a.m. medications will take pm medications when return home.  ER precautions given.Diabetic foot exam performed.  Monofilament test done absence sensation noted in all areas.  Bilateral lower extremities cool to touch with absent hair growth.  Trimmed all 10 toenails using podiatry clippers, and filed with [place] board. 2 of 10 dystrophic.  Debride dystrophic nails using D[place]mel.  Rationale for debridement to decrease pressure and prevent ulcers.  Instructed the importance of checking feet daily due to absence sensation high risk for diabetic foot ulcers, and injury to feet.  Instructed on proper footwear, nail care, and daily skin assessment.  Will have him return to the clinic in 3 months.  Instructed to call the office with any questions, concerns, or new skin issues.  Verbalized understanding of all instructions.                 History includes:      Past Medical History:   Diagnosis Date    Abdominal pain     Acquired perforating dermatosis 08/30/2023    Anxiety     Aortic atherosclerosis 01/24/2023    Appetite loss     Balance problem     Bilateral edema of lower extremity 02/06/2023    Bladder outflow obstruction 06/20/2022    Blurred vision,  right eye 10/19/2022    BMI 34.0-34.9,adult 06/20/2022    BPH (benign prostatic hyperplasia)     Chest pain     Chronic liver failure without hepatic coma 04/25/2023    Chronic pain 10/25/2022    Chronic pain syndrome 05/12/2022    Chronic pancreatitis 10/28/2017    Coronary artery disease, unspecified vessel or lesion type, unspecified whether angina present, unspecified whether native or transplanted heart     Deafness 10/03/2023    Depression     Diabetes     Diabetic polyneuropathy 06/20/2022    Elevated LFTs 08/18/2022    Fatigue     GERD (gastroesophageal reflux disease)     Hand paresthesia 06/20/2022    Headache     Hepatic steatosis     History of continuous positive airway pressure (CPAP) therapy at home     History of pancreatitis 06/20/2022    History of recent fall     Hypertension     Hypertriglyceridemia     Insomnia     Kidney disease     Kidney disorder     Leg pain     Mixed hyperlipidemia 10/28/2017    Mononeuritis multiplex 06/20/2022    Morbid obesity     Nausea & vomiting     Neuropathy     Nocturia 06/20/2022    NSTEMI (non-ST elevated myocardial infarction) 10/2024    OA (osteoarthritis)     Obesity     Obstructive sleep apnea syndrome 06/20/2022    Onychomycosis of multiple toenails with type 2 diabetes mellitus 10/28/2017    Open wound of finger of right hand 10/20/2023    CICI (obstructive sleep apnea)     uses CPAP    Painful diabetic neuropathy 05/12/2022    Personal history of colonic polyps 08/18/2022    Polyneuropathy     Primary hypertension 10/28/2017    Recurrent pancreatitis     Renal insufficiency     Tobacco abuse     Tobacco user 06/20/2022    Type 2 diabetes mellitus with skin complication, with long-term current use of insulin 02/06/2023    Urinary frequency     Use of cane as ambulatory aid     Weight loss, unintentional       Past Surgical History:   Procedure Laterality Date    ANGIOGRAM, CORONARY, WITH LEFT HEART CATHETERIZATION N/A 04/10/2023    Procedure: Angiogram,  Coronary, with Left Heart Cath;  Surgeon: Jaswant Pugh MD;  Location: [facility] CATH LAB;  Service: Cardiology;  Laterality: N/A;    ANGIOGRAM, CORONARY, WITH LEFT HEART CATHETERIZATION N/A 10/10/2024    Procedure: Angiogram, Coronary, with Left Heart Cath;  Surgeon: Jaswant Pugh MD;  Location: [facility] CATH LAB;  Service: Cardiology;  Laterality: N/A;    APPENDECTOMY      ARTERIOVENOUS ANASTOMOSIS, OPEN, UPPER ARM BASILLIC VEIN TRANSPOSITION N/A 05/07/2018    CORONARY ARTERY BYPASS GRAFT (CABG) N/A 12/5/2024    Procedure: CORONARY ARTERY BYPASS GRAFT (CABG);  Surgeon: Gillian Clayton MD;  Location: [facility] OR;  Service: Cardiothoracic;  Laterality: N/A;  ECHO NOTIFIED    EGD, WITH CLOSED BIOPSY N/A 11/20/2023    Procedure: EGD, WITH CLOSED BIOPSY;  Surgeon: Christina James MD;  Location: [facility] ENDOSCOPY;  Service: Gastroenterology;  Laterality: N/A;    ENDOSCOPIC HARVEST OF VEIN Left 12/5/2024    Procedure: HARVEST-VEIN-ENDOVASCULAR;  Surgeon: Gillian Clayton MD;  Location: [facility] OR;  Service: Cardiothoracic;  Laterality: Left;    ESOPHAGOGASTRODUODENOSCOPY N/A 06/07/2021    EXCISION OF ARTERIOVENOUS FISTULA N/A 06/01/2018    FRACTIONAL FLOW RESERVE (FFR), CORONARY  04/10/2023    Procedure: Fractional Flow [place] (FFR), Coronary;  Surgeon: Jaswant Pugh MD;  Location: [facility] CATH LAB;  Service: Cardiology;;    HERNIA REPAIR      INSPECTION OF UPPER INTESTINAL TRACT N/A 06/07/2021    OCCLUSION OF LEFT ATRIAL APPENDAGE Left 12/5/2024    Procedure: Left atrial appendage occlusion;  Surgeon: Gillian Clayton MD;  Location: [facility] OR;  Service: Cardiothoracic;  Laterality: Left;    PHERESIS OF PLASMA N/A 07/13/2018    PHERESIS OF PLASMA N/A 05/25/2018    TRIAL OF SPINAL CORD NERVE STIMULATOR N/A 05/12/2022    Procedure: TRIAL, NEUROSTIMULATOR, SPINAL CORD;  Surgeon: Jay Pozo MD;  Location: [facility] OR;  Service: Neurosurgery;  Laterality: N/A;    UPPER GI N/A 06/07/2021      Social History      Socioeconomic History    Marital status: Single   Tobacco Use    Smoking status: Every Day     Current packs/day: 0.25     Average packs/day: 0.5 packs/day for 28.5 years (14.2 ttl pk-yrs)     Types: Cigarettes     Start date: 7/20/1996     Last attempt to quit: 10/29/2024    Smokeless tobacco: Never    Tobacco comments:     1/2 pk 25 years   Substance and Sexual Activity    Alcohol use: Not Currently    Drug use: Never    Sexual activity: Yes     Partners: Female     Social Drivers of Health     Financial Resource Strain: Patient Declined (12/7/2024)    Overall Financial Resource Strain (CARDIA)     Difficulty of Paying Living Expenses: Patient declined   Food Insecurity: Patient Declined (12/7/2024)    Hunger Vital Sign     Worried About Running Out of Food in the Last Year: Patient declined     Ran Out of Food in the Last Year: Patient declined   Transportation Needs: Patient Declined (12/7/2024)    TRANSPORTATION NEEDS     Transportation : Patient declined   Physical Activity: Insufficiently Active (6/4/2024)    Exercise Vital Sign     Days of Exercise per Week: 7 days     Minutes of Exercise per Session: 10 min   Stress: Patient Declined (12/7/2024)    [employer] - Occupational Stress Questionnaire     Feeling of Stress : Patient declined   Housing Stability: Patient Declined (12/7/2024)    Housing Stability Vital Sign     Unable to Pay for Housing in the Last Year: Patient declined     Homeless in the Last Year: Patient declined   .      Current Medications[1]    Review of Systems   All other systems reviewed and are negative.         Labs Reviewed:   Chemistry:  Lab Results   Component Value Date    BUN 9.5 02/18/2025    BUN 22.0 (H) 12/10/2024    CREATININE 0.88 02/18/2025    CREATININE 1.00 12/10/2024    EGFRNORACEVR >60 02/18/2025    EGFRNORACEVR >60 12/10/2024    GLUCOSE 250 (H) 02/18/2025    AST 45 (H) 02/18/2025    AST 56 (H) 12/10/2024    ALT 54 02/18/2025    ALT 46  12/10/2024    HGBA1C 11.1 (H) 02/18/2025        Hematology:  Lab Results   Component Value Date    WBC 13.77 (H) 02/18/2025    WBC 13.09 (H) 12/10/2024    HGB 15.5 02/18/2025    HGB 9.9 (L) 12/10/2024    HCT 46.2 02/18/2025    HCT 30.0 (L) 12/10/2024     02/18/2025     12/10/2024       Inflammatory Markers:  Lab Results   Component Value Date    HSCRP 9.98 (H) 06/23/2020    HSCRP 43.30 (H) 09/10/2019    SEDRATE 26 (H) 06/22/2022    SEDRATE 28 (H) 06/23/2020    SEDRATE 2 09/10/2019        Objective:        Physical Exam  Vitals reviewed.   Cardiovascular:      Pulses:           Dorsalis pedis pulses are 2+ on the right side and 2+ on the left side.        Posterior tibial pulses are 2+ on the right side and 2+ on the left side.   Feet:      Right foot:      Toenail Condition: Right toenails are abnormally thick and long.      Left foot:      Toenail Condition: Left toenails are abnormally thick and long.      Comments: Monofilament test done absence of sensation noted in all areas.  Skin:     General: Skin is cool and dry.      Capillary Refill: Capillary refill takes less than 2 seconds.   Neurological:      Mental Status: He is alert.                  Assessment:         ICD-10-CM ICD-9-CM   1. Encounter for diabetic foot exam  E11.9 250.00   2. Overgrown toenails  L60.2 703.8   3. Dystrophic nail  L60.3 703.8   4. Primary hypertension  I10 401.9   5. Diabetic polyneuropathy associated with other specified diabetes mellitus  E13.42 250.60     357.2   6. Type 2 diabetes mellitus with hyperglycemia, with long-term current use of insulin  E11.65 250.00    Z79.4 790.29     V58.67   7. Tobacco user  Z72.0 305.1         Plan:   Tissue pathology and/or culture taken:  [] Yes [x] No   Sharp debridement performed:   [] Yes [x] No   Labs ordered this visit:   [] Yes [x] No   Imaging ordered this visit:   [] Yes [x] No         1. Encounter for diabetic foot exam     Diabetic foot exam performed.  Instructed on  proper foot care.   2. Overgrown toenails     Trimmed.  Instructed on proper nail care.   3. Dystrophic nail     Debride.  Instructed on proper nail care.   4. Primary hypertension     Instructed on the danger of hypertension.  ER precautions given.   5. Diabetic polyneuropathy associated with other specified diabetes mellitus     Will check feet daily.    High risk for foot injury.      6. Type 2 diabetes mellitus with hyperglycemia, with long-term current use of insulin     Co-factor in wound development in delayed wound healing.    Last A1c today 11.1.    Must have a strict diabetic diet, take glucose lowering medications as prescribed and encourage lower HA1C.      7. Tobacco user     Reinforced smoking cessation.    Must stop smoking. Explained the negative impact this has on not only the wounds (delayed healing), but overall health as well.       The time spent including preparing to see the patient, obtaining patient history and assessment, evaluation of the plan of care, patient/caregiver counseling and education, orders, documentation, coordination of care, and other professional medical management activities for today's encounter was 20 minute.    Time spent performing procedures during today's encounter was 20 minute.    Follow up in about 3 months (around 5/18/2025).  Teaching provided on s/s to call wound clinic for promptly.  ER precautions taught for after hours and weekends.       ROCCO Sequeira          [1]   Current Outpatient Medications   Medication Sig Dispense Refill    amitriptyline (ELAVIL) 50 MG tablet Take 1 tablet (50 mg total) by mouth every evening. 30 tablet 4    aspirin (ECOTRIN) 81 MG EC tablet Take 81 mg by mouth once daily.      atorvastatin (LIPITOR) 40 MG tablet Take 2 tablets (80 mg total) by mouth once daily. 90 tablet 3    cholecalciferol, vitamin D3, (VITAMIN D3) 25 mcg (1,000 unit) capsule Take 2 capsules (2,000 Units total) by mouth once daily. 720 capsule 0     clonazePAM (KLONOPIN) 1 MG tablet TAKE ONE TABLET BY MOUTH TWICE DAILY 60 tablet 0    cloNIDine (CATAPRES) 0.3 MG tablet Take 1 tablet (0.3 mg total) by mouth 3 (three) times daily. 270 tablet 3    clopidogreL (PLAVIX) 75 mg tablet Take 4 tablets on day 1 and then one tablet daily. 90 tablet 3    CREON 36,000-114,000- 180,000 unit CpDR TAKE ONE CAPSULE THREE TIMES DAILY 270 capsule 1    EScitalopram oxalate (LEXAPRO) 20 MG tablet Take 20 mg by mouth once daily.      FARXIGA 5 mg Tab tablet TAKE ONE TABLET BY MOUTH EVERY DAY 90 tablet 3    HUMALOG U-100 INSULIN 100 unit/mL injection INJECT 25 UNITS SUBCUTANEOUSLY BEFORE MEALS THREE TIMES DAILY 10 mL 3    isosorbide mononitrate (IMDUR) 120 MG 24 hr tablet Take 1 tablet (120 mg total) by mouth once daily. 90 tablet 3    linaGLIPtin (TRADJENTA) 5 mg Tab tablet Take 1 tablet (5 mg total) by mouth once daily. 90 tablet 3    metoprolol tartrate (LOPRESSOR) 25 MG tablet Take 0.5 tablets (12.5 mg total) by mouth 2 (two) times daily. 90 tablet 3    morphine (MS CONTIN) 100 MG 12 hr tablet TAKE ONE TABLET BY MOUTH THREE TIMES DAILY 90 tablet 0    NIFEdipine (PROCARDIA-XL) 60 MG (OSM) 24 hr tablet Take 1 tablet (60 mg total) by mouth 2 (two) times a day. 180 tablet 3    nut.tx.gluc intol,lf,soy-fiber (GLUCERNA 1.5 CEDRIC) 0.08-1.5 gram-kcal/mL Liqd Take 273 mLs by mouth 2 (two) times a day. 60 each 4    OXcarbazepine (TRILEPTAL) 600 MG Tab Take 1 tablet (600 mg total) by mouth 2 (two) times daily. 60 tablet 11    potassium chloride SA (K-DUR,KLOR-CON) 20 MEQ tablet TAKE ONE TABLET BY MOUTH TWICE DAILY 180 tablet 3    pregabalin (LYRICA) 150 MG capsule Take 1 capsule (150 mg total) by mouth 3 (three) times daily. 90 capsule 3    ranolazine (RANEXA) 500 MG Tb12 Take 1 tablet (500 mg total) by mouth 2 (two) times daily. 180 tablet 3    REPATHA SURECLICK 140 mg/mL PnIj INJECT 1ml INTRAMUSCULARLY EVERY 14 DAYS 2 mL 11    sucralfate (CARAFATE) 100 mg/mL suspension Take 10 mLs (1 g  "total) by mouth 4 (four) times daily before meals and nightly. 1200 mL 3    SURE COMFORT INSULIN SYRINGE 0.5 mL 31 gauge x 5/16" Syrg USE ONE SYRINGE THREE TIMES DAILY 100 each 3    tamsulosin (FLOMAX) 0.4 mg Cap TAKE ONE CAPSULE BY MOUTH EVERY DAY 90 capsule 3    torsemide (DEMADEX) 20 MG Tab Take 1 tablet (20 mg total) by mouth once daily. 90 tablet 3    TOUJEO SOLOSTAR U-300 INSULIN 300 unit/mL (1.5 mL) InPn pen INJECT 35 SUBCUTANEOUSLY TWICE DAILY 4.5 mL 3    VASCEPA 1 gram Cap TAKE TWO CAPSULES BY MOUTH TWICE DAILY 60 capsule 3    cholecalciferol, vitamin D3, (VITAMIN D3) 50 mcg (2,000 unit) Tab Take 1 tablet (2,000 Units total) by mouth once daily. 30 tablet 11    esomeprazole (NEXIUM) 40 MG capsule Take 1 capsule (40 mg total) by mouth before breakfast. 30 capsule 11    HYDROmorphone (DILAUDID) 8 MG tablet Take 1 tablet (8 mg total) by mouth every 8 (eight) hours as needed. 90 tablet 0    nitroGLYCERIN (NITROSTAT) 0.3 MG SL tablet Place 1 tablet (0.3 mg total) under the tongue every 5 (five) minutes as needed for Chest pain (go to er after 3 doses). 30 tablet 6     Current Facility-Administered Medications   Medication Dose Route Frequency Provider Last Rate Last Admin    triamcinolone acetonide 0.1% ointment   Topical (Top) BID Malina Brito, TIP         "

## 2025-02-20 VITALS
WEIGHT: 192.81 LBS | HEIGHT: 68 IN | DIASTOLIC BLOOD PRESSURE: 101 MMHG | HEART RATE: 74 BPM | SYSTOLIC BLOOD PRESSURE: 204 MMHG | TEMPERATURE: 98 F | RESPIRATION RATE: 20 BRPM | BODY MASS INDEX: 29.22 KG/M2

## 2025-02-20 NOTE — TELEPHONE ENCOUNTER
Spoke with patient and informed of normal xray and need for MRI per Dr Beasley, will receive call from scheduling department, patient verbalized understanding and pleased.

## 2025-02-22 DIAGNOSIS — G89.4 CHRONIC PAIN SYNDROME: ICD-10-CM

## 2025-02-22 DIAGNOSIS — E08.42 DIABETIC POLYNEUROPATHY ASSOCIATED WITH DIABETES MELLITUS DUE TO UNDERLYING CONDITION: ICD-10-CM

## 2025-02-27 DIAGNOSIS — E11.40 PAINFUL DIABETIC NEUROPATHY: Chronic | ICD-10-CM

## 2025-02-28 RX ORDER — AMITRIPTYLINE HYDROCHLORIDE 50 MG/1
50 TABLET, FILM COATED ORAL NIGHTLY
Qty: 30 TABLET | Refills: 4 | Status: SHIPPED | OUTPATIENT
Start: 2025-02-28

## 2025-03-03 RX ORDER — CLONAZEPAM 1 MG/1
1 TABLET ORAL 2 TIMES DAILY
Qty: 60 TABLET | Refills: 0 | Status: SHIPPED | OUTPATIENT
Start: 2025-03-03

## 2025-03-03 RX ORDER — MORPHINE SULFATE 100 MG/1
100 TABLET, FILM COATED, EXTENDED RELEASE ORAL 3 TIMES DAILY
Qty: 90 TABLET | Refills: 0 | Status: SHIPPED | OUTPATIENT
Start: 2025-03-03

## 2025-03-06 ENCOUNTER — DOCUMENT SCAN (OUTPATIENT)
Dept: HOME HEALTH SERVICES | Facility: HOSPITAL | Age: 45
End: 2025-03-06
Payer: MEDICARE

## 2025-03-07 ENCOUNTER — EXTERNAL HOME HEALTH (OUTPATIENT)
Dept: HOME HEALTH SERVICES | Facility: HOSPITAL | Age: 45
End: 2025-03-07
Payer: MEDICARE

## 2025-03-07 DIAGNOSIS — E08.42 DIABETIC POLYNEUROPATHY ASSOCIATED WITH DIABETES MELLITUS DUE TO UNDERLYING CONDITION: ICD-10-CM

## 2025-03-07 RX ORDER — INSULIN GLARGINE 300 U/ML
INJECTION, SOLUTION SUBCUTANEOUS
Qty: 6 ML | Refills: 0 | Status: SHIPPED | OUTPATIENT
Start: 2025-03-07

## 2025-03-11 DIAGNOSIS — G62.9 NEUROPATHY: Primary | ICD-10-CM

## 2025-03-11 DIAGNOSIS — R20.2 HAND PARESTHESIA: ICD-10-CM

## 2025-03-11 DIAGNOSIS — R53.1 WEAKNESS: ICD-10-CM

## 2025-03-13 ENCOUNTER — TELEPHONE (OUTPATIENT)
Dept: INTERNAL MEDICINE | Facility: CLINIC | Age: 45
End: 2025-03-13
Payer: MEDICARE

## 2025-03-13 DIAGNOSIS — M54.2 CERVICALGIA: Primary | ICD-10-CM

## 2025-03-13 DIAGNOSIS — R20.2 HAND PARESTHESIA: ICD-10-CM

## 2025-03-13 DIAGNOSIS — R29.6 FALLS FREQUENTLY: ICD-10-CM

## 2025-03-13 NOTE — TELEPHONE ENCOUNTER
Dr. Bealsey,      Mr. Caballero has been approved to be scheduled for his MRI.   The order expires on 03/19/25. There aren't any  openings before 03/19/25, the expiration date on the order would need to be extended. Would you be so kind as to EXTEND THE EXPIRATION DATE out for a few months (2-3) to give scheduling a proper window to get him scheduled.  THEY ARE AWARE OF HIS IMPLANT.      Please Advise, Thank you.    Spoke with KAMLESH in scheduling.    [phone]

## 2025-03-14 ENCOUNTER — DOCUMENT SCAN (OUTPATIENT)
Dept: HOME HEALTH SERVICES | Facility: HOSPITAL | Age: 45
End: 2025-03-14
Payer: MEDICARE

## 2025-03-16 ENCOUNTER — DOCUMENT SCAN (OUTPATIENT)
Dept: HOME HEALTH SERVICES | Facility: HOSPITAL | Age: 45
End: 2025-03-16
Payer: MEDICARE

## 2025-03-17 ENCOUNTER — TELEPHONE (OUTPATIENT)
Dept: NEPHROLOGY | Facility: CLINIC | Age: 45
End: 2025-03-17
Payer: MEDICARE

## 2025-03-18 ENCOUNTER — OFFICE VISIT (OUTPATIENT)
Dept: GASTROENTEROLOGY | Facility: CLINIC | Age: 45
End: 2025-03-18
Payer: MEDICARE

## 2025-03-18 VITALS
HEIGHT: 68 IN | HEART RATE: 75 BPM | DIASTOLIC BLOOD PRESSURE: 67 MMHG | BODY MASS INDEX: 28.67 KG/M2 | SYSTOLIC BLOOD PRESSURE: 105 MMHG | WEIGHT: 189.19 LBS | TEMPERATURE: 98 F | RESPIRATION RATE: 16 BRPM | OXYGEN SATURATION: 100 %

## 2025-03-18 DIAGNOSIS — K21.00 GASTROESOPHAGEAL REFLUX DISEASE WITH ESOPHAGITIS WITHOUT HEMORRHAGE: ICD-10-CM

## 2025-03-18 DIAGNOSIS — K76.0 METABOLIC DYSFUNCTION-ASSOCIATED STEATOTIC LIVER DISEASE (MASLD): Primary | ICD-10-CM

## 2025-03-18 DIAGNOSIS — K52.9 CHRONIC DIARRHEA OF UNKNOWN ORIGIN: ICD-10-CM

## 2025-03-18 DIAGNOSIS — G89.4 CHRONIC PAIN SYNDROME: ICD-10-CM

## 2025-03-18 DIAGNOSIS — Z72.0 TOBACCO USER: ICD-10-CM

## 2025-03-18 DIAGNOSIS — Z86.0100 HISTORY OF COLONIC POLYPS: ICD-10-CM

## 2025-03-18 PROBLEM — R10.11 RUQ ABDOMINAL PAIN: Status: RESOLVED | Noted: 2024-09-10 | Resolved: 2025-03-18

## 2025-03-18 PROCEDURE — 99215 OFFICE O/P EST HI 40 MIN: CPT | Mod: S$PBB,,, | Performed by: NURSE PRACTITIONER

## 2025-03-18 PROCEDURE — 99215 OFFICE O/P EST HI 40 MIN: CPT | Mod: PBBFAC | Performed by: NURSE PRACTITIONER

## 2025-03-18 RX ORDER — HYDROMORPHONE HYDROCHLORIDE 8 MG/1
8 TABLET ORAL EVERY 8 HOURS PRN
Qty: 90 TABLET | Refills: 0 | Status: SHIPPED | OUTPATIENT
Start: 2025-03-18

## 2025-03-18 NOTE — ASSESSMENT & PLAN NOTE
See above  He underwent colonoscopy October 8, 2021 with findings of adenomatous polyp in the transverse colon and rectum with a recommended recall of 5 years.   Stool studies ordered  Colonoscopy  Take Creon as directed, 2 with meals and 1 with snacks

## 2025-03-18 NOTE — ASSESSMENT & PLAN NOTE
History of poorly controlled diabetes mellitus, CKD stage II, morbid obesity, familial hypertriglyceridemia with routine plasmapheresis in the past, hepatic steatosis, hypertension, CICI, and tobacco abuse.   Per review of laboratory results, he has had persistent elevation of alkaline phosphatase and intermittent elevation of AST and ALT since January 2019.   Laboratory results August 13, 2020 revealed sodium 132, calcium 8.2, glucose 466, GFR 88, AST 63, , alkaline phosphatase 262, total protein 8.6, albumin 3.3, globulin 5.30, and otherwise unremarkable CMP; vitamin B12 773; cholesterol 230, triglycerides 2216, invalid HDL and LDL secondary to triglyceride level; negative acute viral hepatitis panel.   Hemoglobin A1c was 10.9% July 7, 2020.   Gastric emptying study was unremarkable July 17, 2020.   CT scan abdomen and pelvis with contrast June 1, 2020 revealed hepatomegaly with steatosis and otherwise unremarkable.   Laboratory results August 18, 2020 revealed iron level 52 and otherwise unremarkable iron profile and ferritin, ceruloplasmin, alpha 1 antitrypsin, F-actin, LKM, mitochondrial Ab; PT 11.6, INR 0.88; unremarkable CBC; negative SHAKIR and dsDNA; FibroSure testing revealed F1.   Abdominal ultrasound October 1, 2020 revealed enlarged liver with steatosis.  Laboratory results December 8, 2020 revealed sodium 135, CO2 16, glucose 272, AST 48, ALT 94, alkaline phosphatase 210, total protein 9.1, globulin 5.3, and otherwise unremarkable CMP; hemoglobin A1c 9.9%; vitamin D 12.9; cholesterol 208, HDL 20, triglycerides >1420; TSH 1.3122; and unremarkable CBC.  Laboratory results June 16, 2021 revealed CO2 18, glucose 106, AST 90, , alkaline phosphatase 220, total protein 8.7, globulin 5.0, and otherwise unremarkable CMP; hemoglobin A1c 9.9%; vitamin D 28.1; cholesterol 254, triglycerides 1922; WBC 11.9 and otherwise unremarkable CBC.  FibroScan July 26, 2021 revealed S3 F0/F1.   Abdominal ultrasound  limited August 4, 2021 revealed hepatomegaly with fatty infiltration of the liver and limited study.   CT scan abdomen and pelvis without contrast October 25, 2021 revealed hepatomegaly and otherwise unremarkable.   Laboratory results December 20, 2021 revealed potassium 3.2, glucose 115, AST 38, ALT 97, alkaline phosphatase 294, total protein 8.8, and otherwise unremarkable CMP; WBC 13.4 and otherwise unremarkable CBC.  Abdominal ultrasound February 14, 2022 revealed hepatomegaly and mild to moderate diffuse hepatic steatosis.  No significant change identified compared to the limited abdominal ultrasound 8/4/2021.  FibroScan August 29, 2022 revealed F0-1, S1.  Abdominal ultrasound December 1, 2022 revealed hepatomegaly, hepatic steatosis, no other significant abnormalities identified, and no significant change.   FibroScan March 14, 2023 revealed F2, S0.  Abdominal ultrasound May 29, 2023 revealed hepatomegaly, coarse echotexture of the liver parenchyma with changes of hepatic steatosis, sludge in the gallbladder, and no other significant change.   FibroScan September 29, 2023 revealed S0, F0-1.  Abdominal ultrasound October 19, 2023 revealed hepatomegaly with nonspecific slightly heterogeneous liver echogenicity.  He underwent EGD November 20, 2023 with findings of normal esophagus, esophagogastric landmarks identified, erosive gastropathy with no bleeding or stigmata of recent bleeding, normal examined duodenum.  Pathology revealed mild chronic gastritis with lamina propria fibrosis, negative H pylori and negative dysplasia.  Gastric emptying study March 19, 2024 revealed 82% radiotracer emptying at 62 minutes and 98% radiotracer emptying at 96 minutes is noted.  Abdominal ultrasound April 8, 2024 revealed hepatomegaly is seen.  The liver demonstrates nonspecific slight coarsened echotexture and increased echogenicity which could be related to underlying hepatocellular disease and/or fatty  infiltration.  FibroScan September 18, 2024 revealed F0 to 1, S0 to 1 with recommended repeat in 1 year.  Abdominal ultrasound October 10, 2024 revealed viscous bile/sludge without acute inflammatory change.   CBC, CMP, PT/INR in 4 months  Fibroscan in September 2025  Abdominal ultrasound in 1 month  Patient has significant risk factors for MASLD  Lifestyle and dietary modifications provided  Recommend weight loss  Recommend good control of comorbidities  Recommend tobacco cessation  Recommend optimization of diabetes mellitus  Call with updates  Follow-up 6 months clinic visit with NP in a 45 minute slot

## 2025-03-18 NOTE — PROGRESS NOTES
Subjective:       Patient ID: Bharath Caballero is a 45 y.o. male.    Chief Complaint: MASLD    This 45-year-old  male with colon polyps, poorly controlled diabetes mellitus, CKD stage II, morbid obesity, familial hypertriglyceridemia with routine plasmapheresis in the past, hepatic steatosis, hypertension, CICI, and tobacco abuse presents unaccompanied for a follow-up visit.  He was initially seen August 18, 2020, referred for elevated LFTs at that time.  Per review of laboratory results, he has had persistent elevation of alkaline phosphatase and intermittent elevation of AST and ALT since January 2019.  Laboratory results August 13, 2020 revealed sodium 132, calcium 8.2, glucose 466, GFR 88, AST 63, , alkaline phosphatase 262, total protein 8.6, albumin 3.3, globulin 5.30, and otherwise unremarkable CMP; vitamin B12 773; cholesterol 230, triglycerides 2216, invalid HDL and LDL secondary to triglyceride level; negative acute viral hepatitis panel.  Hemoglobin A1c was 10.9% July 7, 2020.  Gastric emptying study was unremarkable July 17, 2020.  CT scan abdomen and pelvis with contrast June 1, 2020 revealed hepatomegaly with steatosis and otherwise unremarkable.     He reported intermittent right upper quadrant abdominal pain for the past several months described as sharp and radiating around to his back at times.  The pain was worsened with meal intake and he was unable to identify any specific food triggers.  He denied any relieving factors.  His appetite was poor and his weight was stable.  He had frequent symptoms of acid reflux and heartburn that was controlled on pantoprazole 40 mg daily.  He had frequent intermittent nausea with subsequent vomiting almost daily and with almost every meal (nonbilious and nonbloody).  He did not always have relief of symptoms with vomiting.  He denied odynophagia, dysphagia, or early satiety.  Bowel habits were described as formed stools once daily without melena,  hematochezia, fecal urgency, fecal incontinence, or pain with defecation.  He denied icterus, jaundice, pruritus, confusion, headaches, vision changes, cough, shortness of breath, chest pain, abdominal/lower extremity swelling, dark-colored urine, or pale-colored stools.     Laboratory results August 18, 2020 revealed iron level 52 and otherwise unremarkable iron profile and ferritin, ceruloplasmin, alpha 1 antitrypsin, F-actin, LKM, mitochondrial Ab; PT 11.6, INR 0.88; unremarkable CBC; negative SHAKIR and dsDNA; FibroSure testing revealed F1.  Abdominal ultrasound October 1, 2020 revealed enlarged liver with steatosis.     Laboratory results December 8, 2020 revealed sodium 135, CO2 16, glucose 272, AST 48, ALT 94, alkaline phosphatase 210, total protein 9.1, globulin 5.3, and otherwise unremarkable CMP; hemoglobin A1c 9.9%; vitamin D 12.9; cholesterol 208, HDL 20, triglycerides >1420; TSH 1.3122; and unremarkable CBC.     He was seen for a follow-up visit December 28, 2020.  He reported persistent intermittent right upper quadrant abdominal pain for the past several months described as sharp and radiating around to his back at times.  The pain was worsened with meal intake and he was unable to identify any specific food triggers.  He denied any relieving factors.  His appetite was fair and his weight was stable.  He had frequent symptoms of acid reflux and heartburn that was somewhat controlled on pantoprazole 40 mg daily.  He had frequent intermittent nausea with subsequent vomiting a few times per week (nonbilious and nonbloody).  He did not always have relief of symptoms with vomiting.  He denied odynophagia, dysphagia, or early satiety.  Bowel habits were described as formed stools once daily without melena, hematochezia, fecal urgency, fecal incontinence, or pain with defecation.  He denied icterus, jaundice, pruritus, confusion, headaches, vision changes, cough, shortness of breath, chest pain, abdominal/lower  extremity swelling, dark-colored urine, or pale-colored stools.     He underwent EGD with Dr. James June 7, 2021 with findings of LA grade A reflux esophagitis and otherwise unremarkable exam.  Laboratory results June 16, 2021 revealed CO2 18, glucose 106, AST 90, , alkaline phosphatase 220, total protein 8.7, globulin 5.0, and otherwise unremarkable CMP; hemoglobin A1c 9.9%; vitamin D 28.1; cholesterol 254, triglycerides 1922; WBC 11.9 and otherwise unremarkable CBC.     He was seen for follow-up visit June 28, 2021.  He reported persistent intermittent RUQ abdominal burning and pain with eating.  He denied radiation on pain.  He had occasional nausea without vomiting.  He had frequent acid reflux and pyrosis.  His appetite was good and his weight was stable.  He denied nocturnal symptoms, fever, chills, odynophagia, dysphagia, belching, bloating, or early satiety.  Bowel habits were described as formed stools once daily without melena, hematochezia, fecal urgency, fecal incontinence, or pain with defecation.     FibroScan July 26, 2021 revealed S3 F0/F1.  Abdominal ultrasound limited August 4, 2021 revealed hepatomegaly with fatty infiltration of the liver and limited study.  CT scan abdomen and pelvis without contrast October 25, 2021 revealed hepatomegaly and otherwise unremarkable.  Laboratory results December 20, 2021 revealed potassium 3.2, glucose 115, AST 38, ALT 97, alkaline phosphatase 294, total protein 8.8, and otherwise unremarkable CMP; WBC 13.4 and otherwise unremarkable CBC.     He underwent colonoscopy October 8, 2021 with findings of adenomatous polyp in the transverse colon and rectum with a recommended recall of 5 years.     He was seen for a follow-up visit February 7, 2022.  He reported persistent intermittent right upper quadrant abdominal pain described as sharp and radiating around to the right side of his back.  The pain was triggered with eating and drinking and he was unable to  identify specific food triggers.  He continued to take pantoprazole 40 mg daily and had to occasionally take OTC Tums as well.  He had frequent acid reflux and regurgitation of acid.  He reported occasional vomiting secondary to intensity of abdominal pain.  His appetite was good and his weight was stable.  He denied fever, chills, odynophagia, dysphagia, or early satiety.  Bowel habits remained unchanged and was described as formed stools once daily without melena, hematochezia, fecal urgency, fecal incontinence, or pain with defecation.     Abdominal ultrasound February 14, 2022 revealed hepatomegaly and mild to moderate diffuse hepatic steatosis.  No significant change identified compared to the limited abdominal ultrasound 8/4/2021.     He was seen for a follow-up visit August 18, 2022.  He reported minimal change in symptoms from previous office visit.  He was no longer taking pantoprazole or famotidine secondary to minimal relief of symptoms.     FibroScan August 29, 2022 revealed F0-1, S1.     Abdominal ultrasound December 1, 2022 revealed hepatomegaly, hepatic steatosis, no other significant abnormalities identified, and no significant change.      He was seen for a follow-up visit February 28, 2023 and reported minimal change in symptoms from previous office visit.  He reported some relief of abdominal pain with sucralfate 1 g before meals and at bedtime.  He reported that his Repatha was stopped secondary to insurance coverage and he was awaiting PA approval.     FibroScan March 14, 2023 revealed F2, S0.     Abdominal ultrasound May 29, 2023 revealed hepatomegaly, coarse echotexture of the liver parenchyma with changes of hepatic steatosis, sludge in the gallbladder, and no other significant change.       He was seen for a follow-up visit September 7, 2023.  He reported hematemesis with almost every meal over the past 2 weeks.  He was taking Nexium 40 mg daily and sucralfate 1 g 4 times daily.  His  appetite was good but he was afraid to eat due to vomiting and abdominal pain.  He had nausea that preceded vomiting.  He was unable to identify specific food triggers or relieving factors.  He had a documented weight loss of 6 lb since his last office visit in GI clinic February 28, 2023.  He reported that his weight fluctuated over time.  He denied nocturnal symptoms, fever, chills, odynophagia, dysphagia, acid reflux, pyrosis, or early satiety.  He reported minimal change in regards to symptoms of right upper quadrant and left upper quadrant abdominal pain.  Bowel habits remained unchanged and described as formed stools 1-2 times daily without melena, hematochezia, fecal urgency, fecal incontinence, or pain with defecation.     FibroScan September 29, 2023 revealed S0, F0-1.     Abdominal ultrasound October 19, 2023 revealed hepatomegaly with nonspecific slightly heterogeneous liver echogenicity.     He underwent EGD November 20, 2023 with findings of normal esophagus, esophagogastric landmarks identified, erosive gastropathy with no bleeding or stigmata of recent bleeding, normal examined duodenum.  Pathology revealed mild chronic gastritis with lamina propria fibrosis, negative H pylori and negative dysplasia.     He was seen for a follow-up visit March 7, 2024.  He reported resolution of hematemesis since his last office visit with me.  He continued to take Nexium 40 mg daily and sucralfate 1 g 4 times daily.  His appetite was fair and he was afraid to eat due to abdominal pain and vomiting.  This had remained unchanged from previous office visit.  He was unable to identify specific food triggers or relieving factors and reported not eating every day.  His diabetes was not currently controlled.  His weight continued to fluctuate over time.  He denied fever, chills, odynophagia, dysphagia, acid reflux, pyrosis, or early satiety.  Bowel habits remained unchanged and described as formed stools 1-2 times daily  without melena, hematochezia, fecal urgency, fecal incontinence, or pain with defecation.     Gastric emptying study March 19, 2024 revealed 82% radiotracer emptying at 62 minutes and 98% radiotracer emptying at 96 minutes is noted.     Abdominal ultrasound April 8, 2024 revealed hepatomegaly is seen.  The liver demonstrates nonspecific slight coarsened echotexture and increased echogenicity which could be related to underlying hepatocellular disease and/or fatty infiltration.      He was seen for a follow-up visit September 10, 2024.  He reported experiencing a fall approximately 3 weeks prior and hitting his right side on a step.  He denied LOC.  The pain was described as sharp and nonradiating and was intermittent to his right upper quadrant/rib area.  He denied any specific relieving factors or exacerbating factors aside from some movement.  He denied shortness of breath.  He denied appetite changes, unintentional weight loss, fever, chills, nausea, vomiting, hematemesis, odynophagia, dysphagia, acid reflux, pyrosis.  Bowel habits were described as formed stools once daily without melena, hematochezia, fecal urgency, fecal incontinence, or pain with defecation.  He denied icterus, jaundice, pruritus, confusion, headaches, vision changes, cough, shortness of breath, chest pain, abdominal/lower extremity swelling, dark-colored urine, or pale-colored stools.    FibroScan September 18, 2024 revealed F0 to 1, S0 to 1 with recommended repeat in 1 year.    Abdominal ultrasound October 10, 2024 revealed viscous bile/sludge without acute inflammatory change.     He underwent CABG December 5, 2024.    Today, he presents for a follow-up visit.  He reports more frequent soft to loose stools since his CABG procedure and going up to 8 times per day with frequent nocturnal awakening with urgency and diarrhea.  He has occasional red blood with bowel movements noted on the tissue after wiping and in the stool.  He reports that  "his stomach feels "sickly."  He has generalized abdominal discomfort that is not always relieved with defecation.  He has occasional nausea with vomiting (emesis nonbilious and nonbloody).  He is afraid to eat secondary to diarrhea and abdominal discomfort.  He reports weight loss since his CABG procedure and maybe eating 1 meal per day.  He denies fever, chills, hematemesis, odynophagia, dysphagia, acid reflux, pyrosis, or early satiety.  He denies melena, fecal incontinence, or pain with defecation.  He denies icterus, jaundice, pruritus, confusion, headaches, vision changes, cough, shortness of breath, chest pain, abdominal/lower extremity swelling, dark-colored urine, or pale-colored stools.  He thinks he is only taking Creon twice daily as he isn't eating much food.      He takes aspirin 81 mg daily.  He smokes 5-10 cigarettes daily and denies alcohol use.  He denies a family history of IBD, colon polyps, or colon cancer.    Review of patient's allergies indicates:  No Known Allergies    Past Medical History:   Diagnosis Date    Abdominal pain     Acquired perforating dermatosis 08/30/2023    Anxiety     Aortic atherosclerosis 01/24/2023    Appetite loss     Balance problem     Bilateral edema of lower extremity 02/06/2023    Bladder outflow obstruction 06/20/2022    Blurred vision, right eye 10/19/2022    BMI 34.0-34.9,adult 06/20/2022    BPH (benign prostatic hyperplasia)     Chest pain     Chronic liver failure without hepatic coma 04/25/2023    Chronic pain 10/25/2022    Chronic pain syndrome 05/12/2022    Chronic pancreatitis 10/28/2017    Coronary artery disease, unspecified vessel or lesion type, unspecified whether angina present, unspecified whether native or transplanted heart     Deafness 10/03/2023    Depression     Diabetes     Diabetic polyneuropathy 06/20/2022    Elevated LFTs 08/18/2022    Fatigue     GERD (gastroesophageal reflux disease)     Hand paresthesia 06/20/2022    Headache     Hepatic " steatosis     History of continuous positive airway pressure (CPAP) therapy at home     History of pancreatitis 06/20/2022    History of recent fall     Hypertension     Hypertriglyceridemia     Insomnia     Kidney disease     Kidney disorder     Leg pain     Mixed hyperlipidemia 10/28/2017    Mononeuritis multiplex 06/20/2022    Morbid obesity     Nausea & vomiting     Neuropathy     Nocturia 06/20/2022    NSTEMI (non-ST elevated myocardial infarction) 10/2024    OA (osteoarthritis)     Obesity     Obstructive sleep apnea syndrome 06/20/2022    Onychomycosis of multiple toenails with type 2 diabetes mellitus 10/28/2017    Open wound of finger of right hand 10/20/2023    CICI (obstructive sleep apnea)     uses CPAP    Painful diabetic neuropathy 05/12/2022    Personal history of colonic polyps 08/18/2022    Polyneuropathy     Primary hypertension 10/28/2017    Recurrent pancreatitis     Renal insufficiency     Tobacco abuse     Tobacco user 06/20/2022    Type 2 diabetes mellitus with skin complication, with long-term current use of insulin 02/06/2023    Urinary frequency     Use of cane as ambulatory aid     Weight loss, unintentional      Past Surgical History:   Procedure Laterality Date    ANGIOGRAM, CORONARY, WITH LEFT HEART CATHETERIZATION N/A 04/10/2023    Procedure: Angiogram, Coronary, with Left Heart Cath;  Surgeon: Jaswant Pugh MD;  Location: [facility] CATH LAB;  Service: Cardiology;  Laterality: N/A;    ANGIOGRAM, CORONARY, WITH LEFT HEART CATHETERIZATION N/A 10/10/2024    Procedure: Angiogram, Coronary, with Left Heart Cath;  Surgeon: Jaswant Pugh MD;  Location: [facility] CATH LAB;  Service: Cardiology;  Laterality: N/A;    APPENDECTOMY      ARTERIOVENOUS ANASTOMOSIS, OPEN, UPPER ARM BASILLIC VEIN TRANSPOSITION N/A 05/07/2018    CORONARY ARTERY BYPASS GRAFT (CABG) N/A 12/05/2024    Procedure: CORONARY ARTERY BYPASS GRAFT (CABG);  Surgeon: Gillian Clayton MD;  Location: [facility];  Service:  Cardiothoracic;  Laterality: N/A;  ECHO NOTIFIED    EGD, WITH CLOSED BIOPSY N/A 11/20/2023    Procedure: EGD, WITH CLOSED BIOPSY;  Surgeon: Christina James MD;  Location: [facility] ENDOSCOPY;  Service: Gastroenterology;  Laterality: N/A;    ENDOSCOPIC HARVEST OF VEIN Left 12/05/2024    Procedure: HARVEST-VEIN-ENDOVASCULAR;  Surgeon: Gillian Clayton MD;  Location: [facility] OR;  Service: Cardiothoracic;  Laterality: Left;    ESOPHAGOGASTRODUODENOSCOPY N/A 06/07/2021    EXCISION OF ARTERIOVENOUS FISTULA N/A 06/01/2018    FRACTIONAL FLOW RESERVE (FFR), CORONARY  04/10/2023    Procedure: Fractional Flow [place] (FFR), Coronary;  Surgeon: Jaswant Pugh MD;  Location: [facility] CATH LAB;  Service: Cardiology;;    HERNIA REPAIR      INSPECTION OF UPPER INTESTINAL TRACT N/A 06/07/2021    OCCLUSION OF LEFT ATRIAL APPENDAGE Left 12/05/2024    Procedure: Left atrial appendage occlusion;  Surgeon: Gillian Clayton MD;  Location: [facility] OR;  Service: Cardiothoracic;  Laterality: Left;    PHERESIS OF PLASMA N/A 07/13/2018    PHERESIS OF PLASMA N/A 05/25/2018    TRIAL OF SPINAL CORD NERVE STIMULATOR N/A 05/12/2022    Procedure: TRIAL, NEUROSTIMULATOR, SPINAL CORD;  Surgeon: Jay Pozo MD;  Location: [facility];  Service: Neurosurgery;  Laterality: N/A;    UPPER GI N/A 06/07/2021     Family History:   family history includes Obesity in his father.    Social History:    reports that he has been smoking cigarettes. He started smoking about 28 years ago. He has a 14.2 pack-year smoking history. He has never used smokeless tobacco. He reports that he does not currently use alcohol. He reports that he does not use drugs.    Review of Systems  Negative except as noted in the HPI.      Objective:      Physical Exam  Constitutional:       Appearance: Normal appearance.      Comments: Ambulates with cane   HENT:      Head: Normocephalic.      Mouth/Throat:      Mouth: Mucous membranes are moist.   Eyes:      Extraocular  Movements: Extraocular movements intact.      Conjunctiva/sclera: Conjunctivae normal.      Pupils: Pupils are equal, round, and reactive to light.   Cardiovascular:      Rate and Rhythm: Normal rate and regular rhythm.      Pulses: Normal pulses.      Heart sounds: Normal heart sounds.   Pulmonary:      Effort: Pulmonary effort is normal.      Breath sounds: Normal breath sounds.   Abdominal:      General: Bowel sounds are normal.      Palpations: Abdomen is soft.   Musculoskeletal:         General: Normal range of motion.      Cervical back: Normal range of motion and neck supple.   Skin:     General: Skin is warm and dry.   Neurological:      General: No focal deficit present.      Mental Status: He is alert and oriented to person, place, and time.   Psychiatric:         Mood and Affect: Mood normal.         Behavior: Behavior normal.         Thought Content: Thought content normal.         Judgment: Judgment normal.         Home Medications:     Current Outpatient Medications   Medication Sig    amitriptyline (ELAVIL) 50 MG tablet TAKE ONE TABLET BY MOUTH IN THE EVENING    aspirin (ECOTRIN) 81 MG EC tablet Take 81 mg by mouth once daily.    atorvastatin (LIPITOR) 40 MG tablet Take 2 tablets (80 mg total) by mouth once daily.    cholecalciferol, vitamin D3, (VITAMIN D3) 25 mcg (1,000 unit) capsule Take 2 capsules (2,000 Units total) by mouth once daily.    cholecalciferol, vitamin D3, (VITAMIN D3) 50 mcg (2,000 unit) Tab Take 1 tablet (2,000 Units total) by mouth once daily.    clonazePAM (KLONOPIN) 1 MG tablet TAKE ONE TABLET BY MOUTH TWICE DAILY    cloNIDine (CATAPRES) 0.3 MG tablet Take 1 tablet (0.3 mg total) by mouth 3 (three) times daily.    clopidogreL (PLAVIX) 75 mg tablet Take 4 tablets on day 1 and then one tablet daily.    CREON 36,000-114,000- 180,000 unit CpDR TAKE ONE CAPSULE THREE TIMES DAILY    EScitalopram oxalate (LEXAPRO) 20 MG tablet Take 20 mg by mouth once daily.    esomeprazole (NEXIUM) 40  "MG capsule Take 1 capsule (40 mg total) by mouth before breakfast.    FARXIGA 5 mg Tab tablet TAKE ONE TABLET BY MOUTH EVERY DAY    HUMALOG U-100 INSULIN 100 unit/mL injection INJECT 25 UNITS SUBCUTANEOUSLY BEFORE MEALS THREE TIMES DAILY    HYDROmorphone (DILAUDID) 8 MG tablet Take 1 tablet (8 mg total) by mouth every 8 (eight) hours as needed.    isosorbide mononitrate (IMDUR) 120 MG 24 hr tablet Take 1 tablet (120 mg total) by mouth once daily.    linaGLIPtin (TRADJENTA) 5 mg Tab tablet Take 1 tablet (5 mg total) by mouth once daily.    metoprolol tartrate (LOPRESSOR) 25 MG tablet Take 0.5 tablets (12.5 mg total) by mouth 2 (two) times daily.    morphine (MS CONTIN) 100 MG 12 hr tablet TAKE ONE TABLET BY MOUTH THREE TIMES DAILY    NIFEdipine (PROCARDIA-XL) 60 MG (OSM) 24 hr tablet Take 1 tablet (60 mg total) by mouth 2 (two) times a day.    nitroGLYCERIN (NITROSTAT) 0.3 MG SL tablet Place 1 tablet (0.3 mg total) under the tongue every 5 (five) minutes as needed for Chest pain (go to er after 3 doses).    nut.tx.gluc intol,lf,soy-fiber (GLUCERNA 1.5 CEDRIC) 0.08-1.5 gram-kcal/mL Liqd Take 273 mLs by mouth 2 (two) times a day.    OXcarbazepine (TRILEPTAL) 600 MG Tab Take 1 tablet (600 mg total) by mouth 2 (two) times daily.    potassium chloride SA (K-DUR,KLOR-CON) 20 MEQ tablet TAKE ONE TABLET BY MOUTH TWICE DAILY    pregabalin (LYRICA) 150 MG capsule Take 1 capsule (150 mg total) by mouth 3 (three) times daily.    ranolazine (RANEXA) 500 MG Tb12 Take 1 tablet (500 mg total) by mouth 2 (two) times daily.    REPATHA SURECLICK 140 mg/mL PnIj INJECT 1ml INTRAMUSCULARLY EVERY 14 DAYS    sucralfate (CARAFATE) 100 mg/mL suspension Take 10 mLs (1 g total) by mouth 4 (four) times daily before meals and nightly.    SURE COMFORT INSULIN SYRINGE 0.5 mL 31 gauge x 5/16" Syrg USE ONE SYRINGE THREE TIMES DAILY    tamsulosin (FLOMAX) 0.4 mg Cap TAKE ONE CAPSULE BY MOUTH EVERY DAY    torsemide (DEMADEX) 20 MG Tab Take 1 tablet (20 mg " total) by mouth once daily.    TOUJEO SOLOSTAR U-300 INSULIN 300 unit/mL (1.5 mL) InPn pen INJECT 35 UNITS SUB-Q TWICE DAILY    VASCEPA 1 gram Cap TAKE TWO CAPSULES BY MOUTH TWICE DAILY     Current Facility-Administered Medications   Medication Frequency    triamcinolone acetonide 0.1% ointment BID     Laboratory Results:     Recent Results (from the past 12 weeks)   IN OFFICE EKG 12-LEAD (to [place])    Collection Time: 01/07/25 10:37 AM   Result Value Ref Range    QRS Duration 84 ms    [facility] QTC Calculation 394 ms   Comprehensive Metabolic Panel    Collection Time: 02/18/25 12:24 PM   Result Value Ref Range    Sodium 137 136 - 145 mmol/L    Potassium 3.6 3.5 - 5.1 mmol/L    Chloride 101 98 - 107 mmol/L    CO2 29 22 - 29 mmol/L    Glucose 250 (H) 74 - 100 mg/dL    Blood Urea Nitrogen 9.5 8.9 - 20.6 mg/dL    Creatinine 0.88 0.72 - 1.25 mg/dL    Calcium 9.1 8.4 - 10.2 mg/dL    Protein Total 8.2 6.4 - 8.3 gm/dL    Albumin 3.1 (L) 3.5 - 5.0 g/dL    Globulin 5.1 (H) 2.4 - 3.5 gm/dL    Albumin/Globulin Ratio 0.6 (L) 1.1 - 2.0 ratio    Bilirubin Total 0.3 <=1.5 mg/dL     (H) 40 - 150 unit/L    ALT 54 0 - 55 unit/L    AST 45 (H) 5 - 34 unit/L    eGFR >60 mL/min/1.73/m2    Anion Gap 7.0 mEq/L    BUN/Creatinine Ratio 11    Hemoglobin A1C    Collection Time: 02/18/25 12:24 PM   Result Value Ref Range    Hemoglobin A1c 11.1 (H) <=7.0 %    Estimated Average Glucose 271.9 mg/dL   Lipid Panel    Collection Time: 02/18/25 12:24 PM   Result Value Ref Range    Cholesterol Total 165 <=200 mg/dL    HDL Cholesterol 27 (L) 35 - 60 mg/dL    Triglyceride 198 (H) 34 - 140 mg/dL    Cholesterol/HDL Ratio 6 (H) 0 - 5    Very Low Density Lipoprotein 40     LDL Cholesterol 98.00 50.00 - 140.00 mg/dL   CBC with Differential    Collection Time: 02/18/25 12:24 PM   Result Value Ref Range    WBC 13.77 (H) 4.50 - 11.50 x10(3)/mcL    RBC 5.43 4.70 - 6.10 x10(6)/mcL    Hgb 15.5 14.0 - 18.0 g/dL    Hct 46.2 42.0 - 52.0 %    MCV 85.1 80.0 - 94.0 fL     MCH 28.5 27.0 - 31.0 pg    MCHC 33.5 33.0 - 36.0 g/dL    RDW 12.8 11.5 - 17.0 %    Platelet 342 130 - 400 x10(3)/mcL    MPV 9.5 7.4 - 10.4 fL    Neut % 69.4 %    Lymph % 19.4 %    Mono % 6.7 %    Eos % 3.5 %    Basophil % 0.4 %    Imm Grans % 0.6 %    Neut # 9.56 (H) 2.1 - 9.2 x10(3)/mcL    Lymph # 2.67 0.6 - 4.6 x10(3)/mcL    Mono # 0.92 0.1 - 1.3 x10(3)/mcL    Eos # 0.48 0 - 0.9 x10(3)/mcL    Baso # 0.06 <=0.2 x10(3)/mcL    Imm Gran # 0.08 (H) 0.00 - 0.04 x10(3)/mcL    NRBC% 0.0 %     Imaging Results:     Narrative & Impression  EXAMINATION:  US ABDOMEN LIMITED     CLINICAL HISTORY:  Abdominal pain, Hx of liver steatosis;     TECHNIQUE:  Limited ultrasound of the right upper quadrant of the abdomen was performed.     COMPARISON:  CT abdomen pelvis 10/07/2024     FINDINGS:  LIVER: 18 cm cranial caudal at the midclavicular line. Normal echotexture. No focal mass appreciable. Portal vein is patent with appropriate directional flow.     PANCREAS: Suboptimally evaluated.  Heterogeneous appearance of the pancreas on prior cross-sectional imaging not well evaluated on sonogram..     GALLBLADDER: Nonshadowing sludge at the gallbladder.  No appreciable shadowing stone or wall thickening.     BILE DUCTS: 5 mm common bile duct.  Distal common bile duct is obscured by shadowing bowel gas.     INFERIOR VENA CAVA: Normal in appearance.     RIGHT KIDNEY: 9.9 cm. No hydronephrosis.     OTHER: No ascites.     Impression:     Viscous bile/sludge without acute inflammatory change.      Electronically signed by:Moni Hodges  Date:                                            10/10/2024  Time:                                           15:04    Assessment/Plan:     Problem List Items Addressed This Visit          GI    Metabolic dysfunction-associated steatotic liver disease (MASLD) - Primary    History of poorly controlled diabetes mellitus, CKD stage II, morbid obesity, familial hypertriglyceridemia with routine plasmapheresis in  the past, hepatic steatosis, hypertension, CICI, and tobacco abuse.   Per review of laboratory results, he has had persistent elevation of alkaline phosphatase and intermittent elevation of AST and ALT since January 2019.   Laboratory results August 13, 2020 revealed sodium 132, calcium 8.2, glucose 466, GFR 88, AST 63, , alkaline phosphatase 262, total protein 8.6, albumin 3.3, globulin 5.30, and otherwise unremarkable CMP; vitamin B12 773; cholesterol 230, triglycerides 2216, invalid HDL and LDL secondary to triglyceride level; negative acute viral hepatitis panel.   Hemoglobin A1c was 10.9% July 7, 2020.   Gastric emptying study was unremarkable July 17, 2020.   CT scan abdomen and pelvis with contrast June 1, 2020 revealed hepatomegaly with steatosis and otherwise unremarkable.   Laboratory results August 18, 2020 revealed iron level 52 and otherwise unremarkable iron profile and ferritin, ceruloplasmin, alpha 1 antitrypsin, F-actin, LKM, mitochondrial Ab; PT 11.6, INR 0.88; unremarkable CBC; negative SHAKIR and dsDNA; FibroSure testing revealed F1.   Abdominal ultrasound October 1, 2020 revealed enlarged liver with steatosis.  Laboratory results December 8, 2020 revealed sodium 135, CO2 16, glucose 272, AST 48, ALT 94, alkaline phosphatase 210, total protein 9.1, globulin 5.3, and otherwise unremarkable CMP; hemoglobin A1c 9.9%; vitamin D 12.9; cholesterol 208, HDL 20, triglycerides >1420; TSH 1.3122; and unremarkable CBC.  Laboratory results June 16, 2021 revealed CO2 18, glucose 106, AST 90, , alkaline phosphatase 220, total protein 8.7, globulin 5.0, and otherwise unremarkable CMP; hemoglobin A1c 9.9%; vitamin D 28.1; cholesterol 254, triglycerides 1922; WBC 11.9 and otherwise unremarkable CBC.  FibroScan July 26, 2021 revealed S3 F0/F1.   Abdominal ultrasound limited August 4, 2021 revealed hepatomegaly with fatty infiltration of the liver and limited study.   CT scan abdomen and pelvis without  contrast October 25, 2021 revealed hepatomegaly and otherwise unremarkable.   Laboratory results December 20, 2021 revealed potassium 3.2, glucose 115, AST 38, ALT 97, alkaline phosphatase 294, total protein 8.8, and otherwise unremarkable CMP; WBC 13.4 and otherwise unremarkable CBC.  Abdominal ultrasound February 14, 2022 revealed hepatomegaly and mild to moderate diffuse hepatic steatosis.  No significant change identified compared to the limited abdominal ultrasound 8/4/2021.  FibroScan August 29, 2022 revealed F0-1, S1.  Abdominal ultrasound December 1, 2022 revealed hepatomegaly, hepatic steatosis, no other significant abnormalities identified, and no significant change.   FibroScan March 14, 2023 revealed F2, S0.  Abdominal ultrasound May 29, 2023 revealed hepatomegaly, coarse echotexture of the liver parenchyma with changes of hepatic steatosis, sludge in the gallbladder, and no other significant change.   FibroScan September 29, 2023 revealed S0, F0-1.  Abdominal ultrasound October 19, 2023 revealed hepatomegaly with nonspecific slightly heterogeneous liver echogenicity.  He underwent EGD November 20, 2023 with findings of normal esophagus, esophagogastric landmarks identified, erosive gastropathy with no bleeding or stigmata of recent bleeding, normal examined duodenum.  Pathology revealed mild chronic gastritis with lamina propria fibrosis, negative H pylori and negative dysplasia.  Gastric emptying study March 19, 2024 revealed 82% radiotracer emptying at 62 minutes and 98% radiotracer emptying at 96 minutes is noted.  Abdominal ultrasound April 8, 2024 revealed hepatomegaly is seen.  The liver demonstrates nonspecific slight coarsened echotexture and increased echogenicity which could be related to underlying hepatocellular disease and/or fatty infiltration.  FibroScan September 18, 2024 revealed F0 to 1, S0 to 1 with recommended repeat in 1 year.  Abdominal ultrasound October 10, 2024 revealed viscous  bile/sludge without acute inflammatory change.   CBC, CMP, PT/INR in 4 months  Fibroscan in September 2025  Abdominal ultrasound in 1 month  Patient has significant risk factors for MASLD  Lifestyle and dietary modifications provided  Recommend weight loss  Recommend good control of comorbidities  Recommend tobacco cessation  Recommend optimization of diabetes mellitus  Call with updates  Follow-up 6 months clinic visit with NP in a 45 minute slot         Relevant Orders    CBC Auto Differential    Comprehensive Metabolic Panel    Protime-INR    US Abdomen Limited    US Elastography Liver w/imaging    Gastroesophageal reflux disease with esophagitis without hemorrhage    See above  GERD lifestyle modifications  Reflux precautions  Limit NSAID use  Recommend tobacco cessation  Continue Nexium and sucralfate as directed  He underwent EGD with Dr. James June 7, 2021 with findings of LA grade A reflux esophagitis and otherwise unremarkable exam.   He underwent EGD November 20, 2023 with findings of normal esophagus, esophagogastric landmarks identified, erosive gastropathy with no bleeding or stigmata of recent bleeding, normal examined duodenum.  Pathology revealed mild chronic gastritis with lamina propria fibrosis, negative H pylori and negative dysplasia.  Gastric emptying study March 19, 2024 revealed 82% radiotracer emptying at 62 minutes and 98% radiotracer emptying at 96 minutes is noted.         Chronic diarrhea of unknown origin    See above  He underwent colonoscopy October 8, 2021 with findings of adenomatous polyp in the transverse colon and rectum with a recommended recall of 5 years.   Stool studies ordered  Colonoscopy  Take Creon as directed, 2 with meals and 1 with snacks         Relevant Orders    GIARDIA/CRYPTOSPORIDIUM ANTIGEN    Pancreatic elastase, fecal    Stool Culture    Stool Exam-Ova,Cysts,Parasites    Clostridium Diff Toxin, A & B, EIA    FECAL LEUKOCYTES - LACTOFERRIN ON  STOOL    Case  Request Endoscopy: COLONOSCOPY (Completed)    History of colonic polyps    He underwent colonoscopy October 8, 2021 with findings of adenomatous polyp in the transverse colon and rectum with a recommended recall of 5 years.          Relevant Orders    Case Request Endoscopy: COLONOSCOPY (Completed)       Other    Tobacco user    Recommend tobacco cessation.         Relevant Orders    Ambulatory referral/consult to Smoking Cessation Program

## 2025-03-20 ENCOUNTER — RESULTS FOLLOW-UP (OUTPATIENT)
Dept: GASTROENTEROLOGY | Facility: CLINIC | Age: 45
End: 2025-03-20

## 2025-03-20 ENCOUNTER — HOSPITAL ENCOUNTER (OUTPATIENT)
Dept: RADIOLOGY | Facility: HOSPITAL | Age: 45
Discharge: HOME | End: 2025-03-20
Attending: NURSE PRACTITIONER
Payer: MEDICARE

## 2025-03-20 DIAGNOSIS — K76.0 METABOLIC DYSFUNCTION-ASSOCIATED STEATOTIC LIVER DISEASE (MASLD): ICD-10-CM

## 2025-03-20 PROCEDURE — 76705 ECHO EXAM OF ABDOMEN: CPT | Mod: TC

## 2025-03-20 NOTE — PROGRESS NOTES
Please notify findings of hepatomegaly, limited study, gallbladder sludge.  No focal liver lesions noted.  He has had evidence of gallbladder sludge in the past.  We can offer referral to surgery clinic to evaluate abdominal pain with findings of gallbladder sludge noted if he is amenable.  Please let me know and I can place order.  Thanks

## 2025-03-20 NOTE — PROGRESS NOTES
Please notify stool findings of positive fecal leukocyte which is a nonspecific finding.  Negative C diff, negative Giardia, negative Cryptosporidium.  Thanks

## 2025-03-25 ENCOUNTER — TELEPHONE (OUTPATIENT)
Dept: NEPHROLOGY | Facility: CLINIC | Age: 45
End: 2025-03-25
Payer: MEDICARE

## 2025-03-25 ENCOUNTER — HOSPITAL ENCOUNTER (OUTPATIENT)
Dept: RADIOLOGY | Facility: HOSPITAL | Age: 45
Discharge: HOME | End: 2025-03-25
Attending: INTERNAL MEDICINE
Payer: MEDICARE

## 2025-03-25 ENCOUNTER — TELEPHONE (OUTPATIENT)
Dept: INTERNAL MEDICINE | Facility: CLINIC | Age: 45
End: 2025-03-25
Payer: MEDICARE

## 2025-03-25 DIAGNOSIS — R20.2 HAND PARESTHESIA: ICD-10-CM

## 2025-03-25 DIAGNOSIS — R29.6 FALLS FREQUENTLY: ICD-10-CM

## 2025-03-25 DIAGNOSIS — M54.2 CERVICALGIA: ICD-10-CM

## 2025-03-25 NOTE — TELEPHONE ENCOUNTER
Received call from  / Ortho MRI tech Sirisha , Stated patient was unable to have MRI done. The [facility] tech stated pt failed the impedence test and is therefore unable to have MRI's completed. Will notify

## 2025-03-25 NOTE — PROGRESS NOTES
Please notify pancreatic elastase low.  He was prescribed Creon in the past.  Please see if he is continuing to take this.  He needs to take 36019 units 2 with meals and 1 with snacks.  If he needs another prescription, I can send in for him.  Thanks

## 2025-03-25 NOTE — TELEPHONE ENCOUNTER
"Patient presented to the window and stated he has been having trouble scheduling his MRI due to SCS that has been broken for the past two years.   Patient stated he would like to remove the SCS due to "it causes nothing but problems and the office I call to fix it never returns my call" Also stated it has been causing him pain "where the wire sticks out and they are able to complete a CAT scan"    Patient can be reached at [phone].     Please Advise   "

## 2025-03-25 NOTE — PROGRESS NOTES
This patient has a spinal cord stimulator. I ran an impedance check and the stimulator failed. I called the [employer] and talked to a representative they stated due to the failure it is not safe for the patient to have an MRI at this time. I spoke with a nurse at Dr Beasley's office and explained the situation. The patient also stated that one of the leads is not connected.

## 2025-03-26 ENCOUNTER — TELEPHONE (OUTPATIENT)
Dept: INTERNAL MEDICINE | Facility: CLINIC | Age: 45
End: 2025-03-26
Payer: MEDICARE

## 2025-03-26 NOTE — TELEPHONE ENCOUNTER
----- Message from Ade sent at 3/25/2025  3:32 PM CDT -----  Regarding: Device not approved for MRI  Good afternoon, We just received information that Stimulator failed impedance check. Not safe for the patient to have an MRI. Thank You,Ade [facility]ek[facility] Scheduling Dept

## 2025-04-04 DIAGNOSIS — E08.42 DIABETIC POLYNEUROPATHY ASSOCIATED WITH DIABETES MELLITUS DUE TO UNDERLYING CONDITION: ICD-10-CM

## 2025-04-04 DIAGNOSIS — G89.4 CHRONIC PAIN SYNDROME: ICD-10-CM

## 2025-04-04 RX ORDER — CLONAZEPAM 1 MG/1
1 TABLET ORAL 2 TIMES DAILY
Qty: 60 TABLET | Refills: 0 | Status: SHIPPED | OUTPATIENT
Start: 2025-04-04

## 2025-04-04 RX ORDER — MORPHINE SULFATE 100 MG/1
100 TABLET, FILM COATED, EXTENDED RELEASE ORAL 3 TIMES DAILY
Qty: 90 TABLET | Refills: 0 | Status: SHIPPED | OUTPATIENT
Start: 2025-04-04

## 2025-04-08 ENCOUNTER — OFFICE VISIT (OUTPATIENT)
Dept: INTERNAL MEDICINE | Facility: CLINIC | Age: 45
End: 2025-04-08
Payer: MEDICARE

## 2025-04-08 VITALS
TEMPERATURE: 98 F | RESPIRATION RATE: 16 BRPM | HEART RATE: 83 BPM | HEIGHT: 68 IN | OXYGEN SATURATION: 100 % | WEIGHT: 189 LBS | DIASTOLIC BLOOD PRESSURE: 88 MMHG | SYSTOLIC BLOOD PRESSURE: 144 MMHG | BODY MASS INDEX: 28.64 KG/M2

## 2025-04-08 DIAGNOSIS — E78.1 FAMILIAL HYPERTRIGLYCERIDEMIA: ICD-10-CM

## 2025-04-08 DIAGNOSIS — L97.421 ULCER OF LEFT HEEL AND MIDFOOT, LIMITED TO BREAKDOWN OF SKIN: ICD-10-CM

## 2025-04-08 DIAGNOSIS — G89.4 CHRONIC PAIN SYNDROME: Chronic | ICD-10-CM

## 2025-04-08 DIAGNOSIS — E11.9 ENCOUNTER FOR DIABETIC FOOT EXAM: ICD-10-CM

## 2025-04-08 DIAGNOSIS — H53.8 BLURRED VISION, RIGHT EYE: ICD-10-CM

## 2025-04-08 DIAGNOSIS — K21.00 GASTROESOPHAGEAL REFLUX DISEASE WITH ESOPHAGITIS WITHOUT HEMORRHAGE: ICD-10-CM

## 2025-04-08 DIAGNOSIS — Z79.4 TYPE 2 DIABETES MELLITUS WITH OTHER SKIN ULCER, WITH LONG-TERM CURRENT USE OF INSULIN: ICD-10-CM

## 2025-04-08 DIAGNOSIS — K76.0 METABOLIC DYSFUNCTION-ASSOCIATED STEATOTIC LIVER DISEASE (MASLD): ICD-10-CM

## 2025-04-08 DIAGNOSIS — I25.118 CORONARY ARTERY DISEASE OF NATIVE ARTERY OF NATIVE HEART WITH STABLE ANGINA PECTORIS: ICD-10-CM

## 2025-04-08 DIAGNOSIS — R20.2 HAND PARESTHESIA: ICD-10-CM

## 2025-04-08 DIAGNOSIS — E55.9 VITAMIN D DEFICIENCY: ICD-10-CM

## 2025-04-08 DIAGNOSIS — L98.8 ACQUIRED PERFORATING DERMATOSIS: ICD-10-CM

## 2025-04-08 DIAGNOSIS — Z95.1 HX OF CABG: ICD-10-CM

## 2025-04-08 DIAGNOSIS — Z74.09 IMPAIRED FUNCTIONAL MOBILITY, BALANCE, GAIT, AND ENDURANCE: ICD-10-CM

## 2025-04-08 DIAGNOSIS — L98.9 SKIN LESION OF NECK: ICD-10-CM

## 2025-04-08 DIAGNOSIS — E11.622 TYPE 2 DIABETES MELLITUS WITH OTHER SKIN ULCER, WITH LONG-TERM CURRENT USE OF INSULIN: ICD-10-CM

## 2025-04-08 DIAGNOSIS — I21.4 NSTEMI (NON-ST ELEVATED MYOCARDIAL INFARCTION): ICD-10-CM

## 2025-04-08 DIAGNOSIS — R10.9 LEFT FLANK PAIN: ICD-10-CM

## 2025-04-08 DIAGNOSIS — E11.69 ONYCHOMYCOSIS OF MULTIPLE TOENAILS WITH TYPE 2 DIABETES MELLITUS: ICD-10-CM

## 2025-04-08 DIAGNOSIS — I70.0 AORTIC ATHEROSCLEROSIS: ICD-10-CM

## 2025-04-08 DIAGNOSIS — Z86.0100 HISTORY OF COLONIC POLYPS: ICD-10-CM

## 2025-04-08 DIAGNOSIS — F33.2 MAJOR DEPRESSIVE DISORDER, RECURRENT SEVERE WITHOUT PSYCHOTIC FEATURES: ICD-10-CM

## 2025-04-08 DIAGNOSIS — K86.1 OTHER CHRONIC PANCREATITIS: ICD-10-CM

## 2025-04-08 DIAGNOSIS — Z72.0 TOBACCO USER: ICD-10-CM

## 2025-04-08 DIAGNOSIS — E87.6 HYPOKALEMIA: ICD-10-CM

## 2025-04-08 DIAGNOSIS — E11.40 PAINFUL DIABETIC NEUROPATHY: Chronic | ICD-10-CM

## 2025-04-08 DIAGNOSIS — L60.3 DYSTROPHIC NAIL: ICD-10-CM

## 2025-04-08 DIAGNOSIS — R35.1 NOCTURIA: ICD-10-CM

## 2025-04-08 DIAGNOSIS — R29.6 FALLS FREQUENTLY: ICD-10-CM

## 2025-04-08 DIAGNOSIS — R63.4 ABNORMAL WEIGHT LOSS: ICD-10-CM
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
## 2025-04-08 DIAGNOSIS — G58.7 MONONEURITIS MULTIPLEX: ICD-10-CM

## 2025-04-08 DIAGNOSIS — K52.9 CHRONIC DIARRHEA OF UNKNOWN ORIGIN: ICD-10-CM

## 2025-04-08 DIAGNOSIS — B35.1 ONYCHOMYCOSIS OF MULTIPLE TOENAILS WITH TYPE 2 DIABETES MELLITUS: ICD-10-CM

## 2025-04-08 DIAGNOSIS — G83.10 PARESIS OF SINGLE LOWER EXTREMITY: ICD-10-CM

## 2025-04-08 DIAGNOSIS — I10 PRIMARY HYPERTENSION: ICD-10-CM

## 2025-04-08 DIAGNOSIS — K72.10 CHRONIC LIVER FAILURE WITHOUT HEPATIC COMA: ICD-10-CM

## 2025-04-08 DIAGNOSIS — E08.42 DIABETIC POLYNEUROPATHY ASSOCIATED WITH DIABETES MELLITUS DUE TO UNDERLYING CONDITION: Primary | ICD-10-CM

## 2025-04-08 DIAGNOSIS — Z87.19 HISTORY OF PANCREATITIS: ICD-10-CM

## 2025-04-08 DIAGNOSIS — E11.65 TYPE 2 DIABETES MELLITUS WITH HYPERGLYCEMIA, WITHOUT LONG-TERM CURRENT USE OF INSULIN: ICD-10-CM

## 2025-04-08 DIAGNOSIS — H83.3X1 NOISE-INDUCED HEARING LOSS OF RIGHT EAR, UNSPECIFIED HEARING STATUS ON CONTRALATERAL SIDE: ICD-10-CM

## 2025-04-08 DIAGNOSIS — E78.2 MIXED HYPERLIPIDEMIA: ICD-10-CM

## 2025-04-08 DIAGNOSIS — N32.0 BLADDER OUTFLOW OBSTRUCTION: ICD-10-CM

## 2025-04-08 DIAGNOSIS — R60.0 BILATERAL EDEMA OF LOWER EXTREMITY: ICD-10-CM

## 2025-04-08 PROCEDURE — 99215 OFFICE O/P EST HI 40 MIN: CPT | Mod: PBBFAC | Performed by: INTERNAL MEDICINE

## 2025-04-08 RX ORDER — NALOXONE HYDROCHLORIDE 4 MG/.1ML
1 SPRAY NASAL ONCE
Qty: 1 EACH | Refills: 0 | Status: SHIPPED | OUTPATIENT
Start: 2025-04-08 | End: 2025-04-08

## 2025-04-08 NOTE — PROGRESS NOTES
Subjective     Patient ID: Bhartah Caballero is a 45 y.o. male.    Chief Complaint: Follow-up (Follow up for bilateral side pain. )    Follow-up      Complex patient who was placed in hospice 8 years ago.  Severe diabetes familial hypercholesterolemia and hypertriglyceridemia chronic severe pancreatitis having had a pheresis Plex therapy multiple times for severe hypertriglyceridemia causing acute pancreatitis supposedly had 26 surgeries on his left arm by Dr. Forbes writer with shunts stents MediPort etc. actually was in hospice when I took him over a years ago doing fairly well.  We are going to increase his Creon to 1 tablet 4 times a day rather than twice a day for mild diarrhea his stools or oily pale colored with doing fairly well considering foot exam was remarkable for anesthesia below mid calf bilaterally there is a dry ulcer in his left 2nd toe tuft which is dry and healing he says he skinned it when cutting his nails his main problem is that he is having chronic pain in his legs and now his hands he thinks it is his back spine disease but he has been unable to get an MRI because he has a spinal stimulator placed by Dr. Pozo at least 2 or 3 years ago Dr. Pozo is no longer here supposedly lost his privileges at [facility] he was supposed to have removed did because of a broken wire no one he said we will remove it for him we will call his neurologist Dr. Modi discuss what we can do to have this device remove that is not functioning is supposed the broken.  We have discussed that his glucoses or elevated he says they average in the morning fasting from 140-200 60 his last hemoglobin A1c was 11.1 I told him his goal is 150-180 it he should call me if it is not averaging that we are going to increase his Lantus from 45 units b.i.d. to 50 b.i.d. his triglycerides were 198 which is excellent for him which suggests that he is getting an adequate amount of insulin relative to his usual he is  markedly more compliant than he used to be wishes a great when for all of us he does have EMG nerve conductions ordered for October this is the earliest they could schedule them we will try a CT scan of the lumbar sacral spine and C-spine if we can not get anything better this device was placed in his back is not compatible with MRI at this time blood pressure was 144/88 his weight is stable this is remarkable that he was smoking 3 packs of cigarettes a day when we started he is now down to 5 cigarettes a day we will check a vitamin-D level urine microalbumin foot exam was accomplished he is due for diabetic eye photograph of his retina in June follow up in 2 months  Review of Systems   All other systems reviewed and are negative.         Objective     Physical Exam  Vitals and nursing note reviewed.   Constitutional:       Appearance: Normal appearance.      Comments: Uses a cane to walk   HENT:      Head: Normocephalic and atraumatic.      Right Ear: Tympanic membrane, ear canal and external ear normal.      Left Ear: Tympanic membrane, ear canal and external ear normal.      Nose: Nose normal.      Mouth/Throat:      Mouth: Mucous membranes are moist.      Pharynx: Oropharynx is clear.   Eyes:      Extraocular Movements: Extraocular movements intact.      Conjunctiva/sclera: Conjunctivae normal.      Pupils: Pupils are equal, round, and reactive to light.   Cardiovascular:      Rate and Rhythm: Normal rate.      Pulses: Normal pulses.      Heart sounds: Normal heart sounds.   Pulmonary:      Effort: Pulmonary effort is normal.      Breath sounds: Normal breath sounds.   Abdominal:      General: Bowel sounds are normal.      Palpations: Abdomen is soft.   Musculoskeletal:      Cervical back: Normal range of motion and neck supple.   Skin:     General: Skin is warm and dry.   Neurological:      General: No focal deficit present.      Mental Status: He is alert and oriented to person, place, and time. Mental status  is at baseline.   Psychiatric:         Mood and Affect: Mood normal.         Behavior: Behavior normal.         Thought Content: Thought content normal.         Judgment: Judgment normal.            Assessment and Plan     1. Diabetic polyneuropathy associated with diabetes mellitus due to underlying condition  -     Microalbumin/Creatinine Ratio, Urine; Future; Expected date: 04/08/2025    2. Hand paresthesia    3. Mononeuritis multiplex    4. Paresis of single lower extremity    5. Painful diabetic neuropathy  -     TSH; Future; Expected date: 04/08/2025    6. Chronic pain syndrome  -     naloxone (NARCAN) 4 mg/actuation Spry; 1 spray (4 mg total) by Nasal route once. for 1 dose  Dispense: 1 each; Refill: 0    7. Major depressive disorder, recurrent severe without psychotic features    8. Blurred vision, right eye    9. Noise-induced hearing loss of right ear, unspecified hearing status on contralateral side    10. Onychomycosis of multiple toenails with type 2 diabetes mellitus    11. Acquired perforating dermatosis    12. Dystrophic nail    13. Skin lesion of neck    14. Primary hypertension    15. Mixed hyperlipidemia    16. Aortic atherosclerosis    17. Familial hypertriglyceridemia    18. Coronary artery disease of native artery of native heart with stable angina pectoris    19. NSTEMI (non-ST elevated myocardial infarction)    20. Hx of CABG    21. Bladder outflow obstruction    22. Nocturia    23. Hypokalemia    24. Abnormal weight loss    25. Type 2 diabetes mellitus with other skin ulcer, with long-term current use of insulin  -     Microalbumin/Creatinine Ratio, Urine; Future; Expected date: 04/08/2025    26. Encounter for diabetic foot exam    27. Type 2 diabetes mellitus with hyperglycemia, without long-term current use of insulin    28. History of pancreatitis    29. Other chronic pancreatitis    30. Left flank pain    31. Metabolic dysfunction-associated steatotic liver disease (MASLD)    32.  Gastroesophageal reflux disease with esophagitis without hemorrhage    33. History of colonic polyps    34. Chronic liver failure without hepatic coma    35. Chronic diarrhea of unknown origin    36. Fall, sequela    37. Tobacco user    38. Ulcer of left heel and midfoot, limited to breakdown of skin    39. Impaired functional mobility, balance, gait, and endurance    40. Bilateral edema of lower extremity    41. Falls frequently    42. Vitamin D deficiency  -     Vitamin D; Future; Expected date: 04/08/2025        As above time for the visit is 1 hour follow up in 2 months         Follow up in about 2 months (around 6/8/2025).

## 2025-04-15 ENCOUNTER — TELEPHONE (OUTPATIENT)
Dept: SMOKING CESSATION | Facility: CLINIC | Age: 45
End: 2025-04-15
Payer: MEDICARE

## 2025-04-15 PROBLEM — E66.01 SEVERE OBESITY (BMI 35.0-39.9) WITH COMORBIDITY: Status: RESOLVED | Noted: 2023-10-27 | Resolved: 2025-04-15

## 2025-04-15 NOTE — TELEPHONE ENCOUNTER
Pt called stating that the person that was supposed to bring him today could not  bring him.  Pt stated that his neuropathy is so bad that he is unable to drive.  Pt has another appointment on tomorrow in [place].  Pt requested to reschedule for tomorrow.  Pt rescheduled to 4/16/25 at 3 pm.

## 2025-04-15 NOTE — PROGRESS NOTES
[facility]  Nephrology Clinic Note    Chief complaint: Chronic Kidney Disease (Follow up)    History of present illness:   Bharath Caballero is a 45 y.o. White male with past medical history of  insulin-dependent diabetes mellitus type 2, diabetic kidney disease, obesity, familial hypertriglyceridemia, recurrent pancreatitis, hepatic steatosis, hypertension, CICI, hearing loss, recurrent candiduria, polyneuropathy secondary to diabetes, chronic pain (s/p neuro stimulator insertion in July 2022), CAD (s/p CABG in December 2024) and tobacco abuse. Hypertriglyceridemia was previously treated with routine plasmapheresis, this was discontinued in August 2018 per patient's request. Patient has undergone multiple AV access creation procedures, including tunneled catheter insertion and removal as well as AV graft creation. Both of his AV grafts are now thrombosed. Patient was managed by hospice in the past, however, was discharged from hospice care due to lack of terminal diagnosis. Presents today for follow-up. C/o abdominal pain, which is chronic.     Review of Systems  12 point review of systems conducted, negative except as stated in the history of present illness.    Allergies: Patient has no known allergies.     Past Medical History:  has a past medical history of Abdominal pain, Acquired perforating dermatosis, Anxiety, Aortic atherosclerosis, Appetite loss, Balance problem, Bilateral edema of lower extremity, Bladder outflow obstruction, Blurred vision, right eye, BMI 34.0-34.9,adult, BPH (benign prostatic hyperplasia), Chest pain, Chronic liver failure without hepatic coma, Chronic pain, Chronic pain syndrome, Chronic pancreatitis, Coronary artery disease, unspecified vessel or lesion type, unspecified whether angina present, unspecified whether native or transplanted heart, Deafness, Depression, Diabetes, Diabetic polyneuropathy, Elevated LFTs, Fatigue, GERD (gastroesophageal reflux  disease), Hand paresthesia, Headache, Hepatic steatosis, History of continuous positive airway pressure (CPAP) therapy at home, History of pancreatitis, History of recent fall, Hypertension, Hypertriglyceridemia, Insomnia, Kidney disease, Kidney disorder, Leg pain, Mixed hyperlipidemia, Mononeuritis multiplex, Morbid obesity, Nausea & vomiting, Neuropathy, Nocturia, NSTEMI (non-ST elevated myocardial infarction), OA (osteoarthritis), Obesity, Obstructive sleep apnea syndrome, Onychomycosis of multiple toenails with type 2 diabetes mellitus, Open wound of finger of right hand, CICI (obstructive sleep apnea), Painful diabetic neuropathy, Personal history of colonic polyps, Polyneuropathy, Primary hypertension, Recurrent pancreatitis, Renal insufficiency, Tobacco abuse, Tobacco user, Type 2 diabetes mellitus with skin complication, with long-term current use of insulin, Urinary frequency, Use of cane as ambulatory aid, and Weight loss, unintentional.    Procedure History:  has a past surgical history that includes Esophagogastroduodenoscopy (N/A, 06/07/2021); INSPECTION OF UPPER INTESTINAL TRACT (N/A, 06/07/2021); UPPER GI (N/A, 06/07/2021); PHERESIS OF PLASMA (N/A, 07/13/2018); Excision of arteriovenous fistula (N/A, 06/01/2018); PHERESIS OF PLASMA (N/A, 05/25/2018); ARTERIOVENOUS ANASTOMOSIS, OPEN, UPPER ARM BASILLIC VEIN TRANSPOSITION (N/A, 05/07/2018); Appendectomy; Hernia repair; Trial of spinal cord nerve stimulator (N/A, 05/12/2022); ANGIOGRAM, CORONARY, WITH LEFT HEART CATHETERIZATION (N/A, 04/10/2023); fractional flow reserve (ffr), coronary (04/10/2023); egd, with closed biopsy (N/A, 11/20/2023); ANGIOGRAM, CORONARY, WITH LEFT HEART CATHETERIZATION (N/A, 10/10/2024); Coronary Artery Bypass Graft (CABG) (N/A, 12/05/2024); Endoscopic harvest of vein (Left, 12/05/2024); and Occlusion of left atrial appendage (Left, 12/05/2024).    Family History: family history includes Obesity in his father.    Social History:   "reports that he has been smoking cigarettes. He started smoking about 33 years ago. He has a 84.1 pack-year smoking history. He has been exposed to tobacco smoke. He has quit using smokeless tobacco.  His smokeless tobacco use included chew. He reports that he does not currently use alcohol. He reports that he does not use drugs.    Physical exam  BP (!) 146/86 (BP Location: Right arm, Patient Position: Sitting)   Pulse 84   Temp 98.1 °F (36.7 °C) (Oral)   Resp 20   Ht 5' 8" (1.727 m)   Wt 86.1 kg (189 lb 12.8 oz)   SpO2 99%   BMI 28.86 kg/m²   General appearance: Patient is in no acute distress.  Skin: No rashes or wounds.  HEENT: PERRLA, EOMI, no scleral icterus, no JVD. Neck is supple.  Chest: Respirations are unlabored. Lungs sounds are clear.   Heart: S1, S2.   Abdomen: Benign.  : Deferred.  Extremities: No edema, peripheral pulses are palpable. Left upper extremity thrombosed AV grafts   Neuro: No focal deficits.     Home Medications:  Current Medications[1]    Laboratory data    Lab Results   Component Value Date    WBC 13.77 (H) 02/18/2025    HGB 15.5 02/18/2025    HCT 46.2 02/18/2025     02/18/2025    IRON 97 10/10/2024    TIBC 205 (L) 10/10/2024    LABIRON 47 10/10/2024    FERRITIN 216.05 10/10/2024    XANNIOIW46 886 (H) 10/10/2024     (H) 02/20/2018     04/16/2025    K 3.6 04/16/2025    CO2 26 04/16/2025    BUN 10.3 04/16/2025    CREATININE 0.85 04/16/2025    EGFRNORACEVR >60 04/16/2025    GLUCOSE 152 (H) 04/16/2025    CALCIUM 9.2 04/16/2025    ALKPHOS 493 (H) 04/16/2025    LABPROT 8.3 04/16/2025    ALBUMIN 3.2 (L) 04/16/2025    BILIDIR 0.1 10/07/2024    IBILI 0.20 12/20/2021    AST 71 (H) 04/16/2025    ALT 69 (H) 04/16/2025    MG 2.10 12/10/2024    PHOS 3.2 12/10/2024      Lab Results   Component Value Date    HGBA1C 11.1 (H) 02/18/2025    PTH 83.7 (H) 04/11/2019    [id number] 14 (L) 04/16/2025    HIV Nonreactive 05/24/2023    HEPCAB Nonreactive 08/13/2020    HEPBSAB " Nonreactive 09/27/2017     Urine:  Lab Results   Component Value Date    APPEARANCEUA Clear 04/16/2025    SGUA 1.015 04/16/2025    PROTEINUA 2+ (A) 04/16/2025    KETONESUA Negative 04/16/2025    LEUKOCYTESUR 250 (A) 04/16/2025    RBCUA 0-5 04/16/2025    WBCUA 6-10 (A) 04/16/2025    BACTERIA Trace (A) 04/16/2025    SQEPUA Few (A) 04/16/2025    HYALINECASTS None Seen 04/16/2025    CREATRANDUR 84.1 04/16/2025    CREATRANDUR 85.5 04/16/2025    PROTEINURINE 177.2 04/16/2025    UPROTCREA 2.1 04/16/2025         Imaging  CT Abdomen Pelvis W Wo Contrast 10/07/2024    The limited imaged lung bases are clear.  There is gynecomastia  No significant abnormality of the liver.  Possible gallstones.  Slightly smaller splenic hypoattenuating lesion.  No acute findings pancreas.  No significant abnormality adrenals or kidneys.  There is no bowel obstruction.  No significant inflammatory changes of the bowel.  No free air.  Urinary bladder is unremarkable. No free fluid. Aorta normal in caliber.  There is moderate atherosclerotic disease.  There are no acute osseous findings.    Impression  No acute abdominopelvic findings.  Electronically signed by: Paulo Biggs  Date:    10/07/2024  Time:    18:27    Impression    ICD-10-CM ICD-9-CM   1. CKD stage G2/A3, GFR 60-89 and albumin creatinine ratio >300 mg/g  N18.2 585.2   2. Primary hypertension  I10 401.9   3. Familial hypercholesterolemia  E78.01 272.0   4. Type 2 diabetes mellitus with stage 2 chronic kidney disease, with long-term current use of insulin  E11.22 250.40    N18.2 585.2    Z79.4 V58.67   5. Tobacco abuse  Z72.0 305.1   6. Hypovitaminosis D  E55.9 268.9        Plan  CKD stage G2/A3, GFR 60-89 and albumin creatinine ratio >300 mg/g  -     Comprehensive Metabolic Panel; Future; Expected date: 10/06/2025  -     Microalbumin/Creatinine Ratio, Urine; Future; Expected date: 10/06/2025  -     Urinalysis, Reflex to Urine Culture; Future; Expected date: 10/06/2025  Diabetic kidney  disease.  Continue SGLT2 inhibitor, ARB, and good glycemic control.    Continue renal sparing activities:  -2 g a day dietary sodium restriction  -optimize glycemic control (goal A1c is less than 7%)  -control high blood pressure (goal blood pressure is less than 130/80, patient was advised to check blood pressure once or twice a week and bring blood pressure logs to next office visit)  -exercise at least 30 minutes a day, 5 days a week  -maintain healthy weight  -decrease or stop alcohol use  -do not smoke  -stay well hydrated (drink water only, avoid juices, sweet tea, and sodas)  -ask about staying up-to-date on vaccinations (flu vaccine, pneumonia vaccine, hepatitis B vaccine)  -avoid excessive use of NSAIDs (ibuprofen, naproxen, Aleve, Advil, Toradol, Mobic), take Tylenol as needed for headache or mild pain  -take cholesterol lowering medications if prescribed (LDL goal less than 100)    Primary hypertension  -     losartan (COZAAR) 100 MG tablet; Take 1 tablet (100 mg total) by mouth once daily.  Dispense: 90 tablet; Refill: 3  BP is above goal. Will resume ARB.     Familial hypercholesterolemia  Triglyceride level is at goal. Continue PCSK9 inhibitor.     Type 2 diabetes mellitus with stage 2 chronic kidney disease, with long-term current use of insulin  A1C is above goal. Follow up with PCP for medication adjustment.     Tobacco abuse  Patient was counseled on importance of tobacco use cessation.  Strongly encouraged to quit, offered a referral to a smoking cessation program.    Hypovitaminosis D  -     ergocalciferol (ERGOCALCIFEROL) 50,000 unit Cap; Take 1 capsule (50,000 Units total) by mouth every 7 days.  Dispense: 4 capsule; Refill: 2  -     Vitamin D; Future; Expected date: 10/06/2025  Will start 37913 units of vitamin D weekly.     Follow up in about 6 months (around 10/16/2025).       Kendal Grande NP  [facility]            [1]   Current Outpatient Medications:     amitriptyline (ELAVIL) 50  MG tablet, TAKE ONE TABLET BY MOUTH IN THE EVENING, Disp: 30 tablet, Rfl: 4    aspirin (ECOTRIN) 81 MG EC tablet, Take 81 mg by mouth once daily., Disp: , Rfl:     atorvastatin (LIPITOR) 40 MG tablet, Take 2 tablets (80 mg total) by mouth once daily., Disp: 90 tablet, Rfl: 3    clonazePAM (KLONOPIN) 1 MG tablet, Take 1 tablet (1 mg total) by mouth 2 (two) times daily., Disp: 60 tablet, Rfl: 0    cloNIDine (CATAPRES) 0.3 MG tablet, Take 1 tablet (0.3 mg total) by mouth 3 (three) times daily., Disp: 270 tablet, Rfl: 3    clopidogreL (PLAVIX) 75 mg tablet, Take 4 tablets on day 1 and then one tablet daily., Disp: 90 tablet, Rfl: 3    CREON 36,000-114,000- 180,000 unit CpDR, TAKE ONE CAPSULE THREE TIMES DAILY, Disp: 270 capsule, Rfl: 1    EScitalopram oxalate (LEXAPRO) 20 MG tablet, Take 20 mg by mouth once daily., Disp: , Rfl:     esomeprazole (NEXIUM) 40 MG capsule, Take 1 capsule (40 mg total) by mouth before breakfast., Disp: 30 capsule, Rfl: 11    FARXIGA 5 mg Tab tablet, TAKE ONE TABLET BY MOUTH EVERY DAY, Disp: 90 tablet, Rfl: 3    HUMALOG U-100 INSULIN 100 unit/mL injection, INJECT 25 UNITS SUBCUTANEOUSLY BEFORE MEALS THREE TIMES DAILY, Disp: 10 mL, Rfl: 3    HYDROmorphone (DILAUDID) 8 MG tablet, Take 1 tablet (8 mg total) by mouth every 8 (eight) hours as needed., Disp: 90 tablet, Rfl: 0    isosorbide mononitrate (IMDUR) 120 MG 24 hr tablet, Take 1 tablet (120 mg total) by mouth once daily., Disp: 90 tablet, Rfl: 3    linaGLIPtin (TRADJENTA) 5 mg Tab tablet, Take 1 tablet (5 mg total) by mouth once daily., Disp: 90 tablet, Rfl: 3    metoprolol tartrate (LOPRESSOR) 25 MG tablet, Take 0.5 tablets (12.5 mg total) by mouth 2 (two) times daily., Disp: 90 tablet, Rfl: 3    morphine (MS CONTIN) 100 MG 12 hr tablet, Take 1 tablet (100 mg total) by mouth 3 (three) times daily., Disp: 90 tablet, Rfl: 0    NIFEdipine (PROCARDIA-XL) 60 MG (OSM) 24 hr tablet, Take 1 tablet (60 mg total) by mouth 2 (two) times a day., Disp:  "180 tablet, Rfl: 3    nitroGLYCERIN (NITROSTAT) 0.3 MG SL tablet, Place 1 tablet (0.3 mg total) under the tongue every 5 (five) minutes as needed for Chest pain (go to er after 3 doses)., Disp: 30 tablet, Rfl: 6    nut.tx.gluc intol,lf,soy-fiber (GLUCERNA 1.5 CEDRIC) 0.08-1.5 gram-kcal/mL Liqd, Take 273 mLs by mouth 2 (two) times a day., Disp: 60 each, Rfl: 4    OXcarbazepine (TRILEPTAL) 600 MG Tab, Take 1 tablet (600 mg total) by mouth 2 (two) times daily., Disp: 60 tablet, Rfl: 11    potassium chloride SA (K-DUR,KLOR-CON) 20 MEQ tablet, TAKE ONE TABLET BY MOUTH TWICE DAILY, Disp: 180 tablet, Rfl: 3    pregabalin (LYRICA) 150 MG capsule, Take 1 capsule (150 mg total) by mouth 3 (three) times daily., Disp: 90 capsule, Rfl: 3    ranolazine (RANEXA) 500 MG Tb12, Take 1 tablet (500 mg total) by mouth 2 (two) times daily., Disp: 180 tablet, Rfl: 3    REPATHA SURECLICK 140 mg/mL PnIj, INJECT 1ml INTRAMUSCULARLY EVERY 14 DAYS, Disp: 2 mL, Rfl: 11    sucralfate (CARAFATE) 100 mg/mL suspension, Take 10 mLs (1 g total) by mouth 4 (four) times daily before meals and nightly., Disp: 1200 mL, Rfl: 3    SURE COMFORT INSULIN SYRINGE 0.5 mL 31 gauge x 5/16" Syrg, USE ONE SYRINGE THREE TIMES DAILY, Disp: 100 each, Rfl: 3    tamsulosin (FLOMAX) 0.4 mg Cap, TAKE ONE CAPSULE BY MOUTH EVERY DAY, Disp: 90 capsule, Rfl: 3    torsemide (DEMADEX) 20 MG Tab, Take 1 tablet (20 mg total) by mouth once daily., Disp: 90 tablet, Rfl: 3    TOUJEO SOLOSTAR U-300 INSULIN 300 unit/mL (1.5 mL) InPn pen, INJECT 35 UNITS SUB-Q TWICE DAILY, Disp: 6 mL, Rfl: 0    VASCEPA 1 gram Cap, TAKE TWO CAPSULES BY MOUTH TWICE DAILY, Disp: 60 capsule, Rfl: 3    cholecalciferol, vitamin D3, 125 mcg (5,000 unit) capsule, Take 1 capsule (5,000 Units total) by mouth once daily., Disp: 30 capsule, Rfl: 11    ergocalciferol (ERGOCALCIFEROL) 50,000 unit Cap, Take 1 capsule (50,000 Units total) by mouth every 7 days., Disp: 4 capsule, Rfl: 2    losartan (COZAAR) 100 MG tablet, " Take 1 tablet (100 mg total) by mouth once daily., Disp: 90 tablet, Rfl: 3    nicotine (NICODERM CQ) 21 mg/24 hr, Place 1 patch onto the skin once daily., Disp: 28 patch, Rfl: 0    Current Facility-Administered Medications:     triamcinolone acetonide 0.1% ointment, , Topical (Top), BID, Malina Brito, TIP

## 2025-04-16 ENCOUNTER — TELEPHONE (OUTPATIENT)
Dept: HEPATOLOGY | Facility: HOSPITAL | Age: 45
End: 2025-04-16
Payer: MEDICARE

## 2025-04-16 ENCOUNTER — LAB VISIT (OUTPATIENT)
Dept: LAB | Facility: HOSPITAL | Age: 45
End: 2025-04-16
Attending: INTERNAL MEDICINE
Payer: MEDICARE

## 2025-04-16 ENCOUNTER — CLINICAL SUPPORT (OUTPATIENT)
Dept: SMOKING CESSATION | Facility: CLINIC | Age: 45
End: 2025-04-16
Payer: MEDICARE

## 2025-04-16 ENCOUNTER — OFFICE VISIT (OUTPATIENT)
Dept: NEPHROLOGY | Facility: CLINIC | Age: 45
End: 2025-04-16
Payer: MEDICARE

## 2025-04-16 VITALS
BODY MASS INDEX: 28.77 KG/M2 | HEART RATE: 84 BPM | OXYGEN SATURATION: 99 % | RESPIRATION RATE: 20 BRPM | WEIGHT: 189.81 LBS | HEIGHT: 68 IN | SYSTOLIC BLOOD PRESSURE: 146 MMHG | TEMPERATURE: 98 F | DIASTOLIC BLOOD PRESSURE: 86 MMHG

## 2025-04-16 DIAGNOSIS — E55.9 VITAMIN D DEFICIENCY: Primary | ICD-10-CM

## 2025-04-16 DIAGNOSIS — N18.2 CKD STAGE G2/A3, GFR 60-89 AND ALBUMIN CREATININE RATIO >300 MG/G: ICD-10-CM

## 2025-04-16 DIAGNOSIS — Z72.0 TOBACCO ABUSE: ICD-10-CM

## 2025-04-16 DIAGNOSIS — E55.9 VITAMIN D DEFICIENCY: ICD-10-CM

## 2025-04-16 DIAGNOSIS — E55.9 HYPOVITAMINOSIS D: ICD-10-CM

## 2025-04-16 DIAGNOSIS — E78.01 FAMILIAL HYPERCHOLESTEROLEMIA: ICD-10-CM

## 2025-04-16 DIAGNOSIS — Z79.4 TYPE 2 DIABETES MELLITUS WITH STAGE 2 CHRONIC KIDNEY DISEASE, WITH LONG-TERM CURRENT USE OF INSULIN: ICD-10-CM

## 2025-04-16 DIAGNOSIS — E11.40 PAINFUL DIABETIC NEUROPATHY: Chronic | ICD-10-CM

## 2025-04-16 DIAGNOSIS — Z79.4 TYPE 2 DIABETES MELLITUS WITH OTHER SKIN ULCER, WITH LONG-TERM CURRENT USE OF INSULIN: ICD-10-CM

## 2025-04-16 DIAGNOSIS — N18.2 TYPE 2 DIABETES MELLITUS WITH STAGE 2 CHRONIC KIDNEY DISEASE, WITH LONG-TERM CURRENT USE OF INSULIN: ICD-10-CM

## 2025-04-16 DIAGNOSIS — I10 PRIMARY HYPERTENSION: ICD-10-CM

## 2025-04-16 DIAGNOSIS — F17.200 NICOTINE DEPENDENCE: Primary | ICD-10-CM

## 2025-04-16 DIAGNOSIS — E11.22 TYPE 2 DIABETES MELLITUS WITH STAGE 2 CHRONIC KIDNEY DISEASE, WITH LONG-TERM CURRENT USE OF INSULIN: ICD-10-CM

## 2025-04-16 DIAGNOSIS — E11.622 TYPE 2 DIABETES MELLITUS WITH OTHER SKIN ULCER, WITH LONG-TERM CURRENT USE OF INSULIN: ICD-10-CM

## 2025-04-16 DIAGNOSIS — N18.2 CKD STAGE G2/A3, GFR 60-89 AND ALBUMIN CREATININE RATIO >300 MG/G: Primary | ICD-10-CM

## 2025-04-16 DIAGNOSIS — E08.42 DIABETIC POLYNEUROPATHY ASSOCIATED WITH DIABETES MELLITUS DUE TO UNDERLYING CONDITION: ICD-10-CM

## 2025-04-16 LAB
25(OH)D3+25(OH)D2 SERPL-MCNC: 14 NG/ML (ref 30–80)
ALBUMIN SERPL-MCNC: 3.2 G/DL (ref 3.5–5)
ALBUMIN/GLOB SERPL: 0.6 RATIO (ref 1.1–2)
ALP SERPL-CCNC: 493 UNIT/L (ref 40–150)
ALT SERPL-CCNC: 69 UNIT/L (ref 0–55)
ANION GAP SERPL CALC-SCNC: 9 MEQ/L
AST SERPL-CCNC: 71 UNIT/L (ref 11–45)
BACTERIA #/AREA URNS AUTO: ABNORMAL /HPF
BILIRUB SERPL-MCNC: 0.4 MG/DL
BILIRUB UR QL STRIP.AUTO: NEGATIVE
BUN SERPL-MCNC: 10.3 MG/DL (ref 8.9–20.6)
CALCIUM SERPL-MCNC: 9.2 MG/DL (ref 8.4–10.2)
CAOX CRY UR QL COMP ASSIST: ABNORMAL
CHLORIDE SERPL-SCNC: 106 MMOL/L (ref 98–107)
CLARITY UR: CLEAR
CO2 SERPL-SCNC: 26 MMOL/L (ref 22–29)
COLOR UR AUTO: ABNORMAL
CREAT SERPL-MCNC: 0.85 MG/DL (ref 0.72–1.25)
CREAT UR-MCNC: 84.1 MG/DL (ref 63–166)
CREAT UR-MCNC: 85.5 MG/DL (ref 63–166)
CREAT/UREA NIT SERPL: 12
GFR SERPLBLD CREATININE-BSD FMLA CKD-EPI: >60 ML/MIN/1.73/M2
GLOBULIN SER-MCNC: 5.1 GM/DL (ref 2.4–3.5)
GLUCOSE SERPL-MCNC: 152 MG/DL (ref 74–100)
GLUCOSE UR QL STRIP: NORMAL
HGB UR QL STRIP: NEGATIVE
HYALINE CASTS #/AREA URNS LPF: ABNORMAL /LPF
KETONES UR QL STRIP: NEGATIVE
LEUKOCYTE ESTERASE UR QL STRIP: 250
MICROALBUMIN UR-MCNC: 1133.3 UG/ML
MICROALBUMIN/CREAT RATIO PNL UR: 1325.5 MG/GM CR (ref 0–30)
MUCOUS THREADS URNS QL MICRO: ABNORMAL /LPF
NITRITE UR QL STRIP: NEGATIVE
PH UR STRIP: 6 [PH]
POTASSIUM SERPL-SCNC: 3.6 MMOL/L (ref 3.5–5.1)
PROT SERPL-MCNC: 8.3 GM/DL (ref 6.4–8.3)
PROT UR QL STRIP: ABNORMAL
PROT UR STRIP-MCNC: 177.2 MG/DL
RBC #/AREA URNS AUTO: ABNORMAL /HPF
SODIUM SERPL-SCNC: 141 MMOL/L (ref 136–145)
SP GR UR STRIP.AUTO: 1.01 (ref 1–1.03)
SQUAMOUS #/AREA URNS LPF: ABNORMAL /HPF
TSH SERPL-ACNC: 3.03 UIU/ML (ref 0.35–4.94)
URINE PROTEIN/CREATININE RATIO (OLG): 2.1
UROBILINOGEN UR STRIP-ACNC: NORMAL
WBC #/AREA URNS AUTO: ABNORMAL /HPF

## 2025-04-16 PROCEDURE — 80053 COMPREHEN METABOLIC PANEL: CPT

## 2025-04-16 PROCEDURE — 99215 OFFICE O/P EST HI 40 MIN: CPT | Mod: PBBFAC | Performed by: NURSE PRACTITIONER

## 2025-04-16 PROCEDURE — 84156 ASSAY OF PROTEIN URINE: CPT

## 2025-04-16 PROCEDURE — 82570 ASSAY OF URINE CREATININE: CPT | Mod: 59

## 2025-04-16 PROCEDURE — 82306 VITAMIN D 25 HYDROXY: CPT

## 2025-04-16 PROCEDURE — 84443 ASSAY THYROID STIM HORMONE: CPT

## 2025-04-16 PROCEDURE — 36415 COLL VENOUS BLD VENIPUNCTURE: CPT

## 2025-04-16 PROCEDURE — 81001 URINALYSIS AUTO W/SCOPE: CPT

## 2025-04-16 RX ORDER — IBUPROFEN 200 MG
1 TABLET ORAL DAILY
Qty: 28 PATCH | Refills: 0 | Status: SHIPPED | OUTPATIENT
Start: 2025-04-16

## 2025-04-16 RX ORDER — LOSARTAN POTASSIUM 100 MG/1
100 TABLET ORAL DAILY
Qty: 90 TABLET | Refills: 3 | Status: SHIPPED | OUTPATIENT
Start: 2025-04-16 | End: 2026-04-16

## 2025-04-16 RX ORDER — VIT C/E/ZN/COPPR/LUTEIN/ZEAXAN 250MG-90MG
5000 CAPSULE ORAL DAILY
Qty: 30 CAPSULE | Refills: 11 | Status: SHIPPED | OUTPATIENT
Start: 2025-04-16 | End: 2026-04-16

## 2025-04-16 RX ORDER — ERGOCALCIFEROL 1.25 MG/1
50000 CAPSULE ORAL
Qty: 4 CAPSULE | Refills: 2 | Status: SHIPPED | OUTPATIENT
Start: 2025-04-16 | End: 2025-07-15

## 2025-04-16 NOTE — PROGRESS NOTES
Patient will be participating in tobacco cessation meetings and will begin the prescribed tobacco cessation medication regimen of 21 mg nicotine patch QD. Patient currently smokes 20-40 cigarettes per day depending on what's going on or how he is feeling.  Discussed the role of tobacco cessation program, role of nicotine replacement therapy (NRT) and behavioral changes to assist the patient to reach his goal of being tobacco free. Education and instruction on the role of the NRT, usage and proper placement of the patch. Pt started on rate reduction and wait time of 15 min prior to smoking. Pt's exhaled carbon monoxide level was 36 ppm as per [employer]. (non- smoker = 0-5 ppm.)

## 2025-04-16 NOTE — TELEPHONE ENCOUNTER
Spoke with patient, flavia verified, informed of vitamin d level and need for vitamin d, patient states that VIOLA Grande sent in once a week vitamin d 50,000 units weekly on today, verbalized understanding and pleased.

## 2025-04-21 DIAGNOSIS — G89.4 CHRONIC PAIN SYNDROME: ICD-10-CM

## 2025-04-21 RX ORDER — HYDROMORPHONE HYDROCHLORIDE 8 MG/1
8 TABLET ORAL EVERY 8 HOURS PRN
Qty: 90 TABLET | Refills: 0 | Status: SHIPPED | OUTPATIENT
Start: 2025-04-21

## 2025-04-22 DIAGNOSIS — E11.42 TYPE 2 DIABETES MELLITUS WITH DIABETIC POLYNEUROPATHY: ICD-10-CM

## 2025-04-22 RX ORDER — GABAPENTIN 800 MG/1
800 TABLET ORAL NIGHTLY
Qty: 90 TABLET | Refills: 3 | Status: SHIPPED | OUTPATIENT
Start: 2025-04-22

## 2025-04-23 DIAGNOSIS — E08.42 DIABETIC POLYNEUROPATHY ASSOCIATED WITH DIABETES MELLITUS DUE TO UNDERLYING CONDITION: ICD-10-CM

## 2025-04-24 RX ORDER — INSULIN GLARGINE 300 U/ML
INJECTION, SOLUTION SUBCUTANEOUS
Qty: 4.5 ML | Refills: 3 | Status: SHIPPED | OUTPATIENT
Start: 2025-04-24

## 2025-04-24 RX ORDER — INSULIN GLARGINE 300 U/ML
INJECTION, SOLUTION SUBCUTANEOUS
Qty: 6 ML | Refills: 0 | Status: SHIPPED | OUTPATIENT
Start: 2025-04-24

## 2025-04-25 ENCOUNTER — TELEPHONE (OUTPATIENT)
Dept: NEUROLOGY | Facility: CLINIC | Age: 45
End: 2025-04-25
Payer: MEDICARE

## 2025-04-30 ENCOUNTER — OFFICE VISIT (OUTPATIENT)
Dept: NEUROLOGY | Facility: CLINIC | Age: 45
End: 2025-04-30
Payer: MEDICARE

## 2025-04-30 VITALS
BODY MASS INDEX: 29.07 KG/M2 | TEMPERATURE: 98 F | RESPIRATION RATE: 18 BRPM | HEIGHT: 68 IN | WEIGHT: 191.81 LBS | OXYGEN SATURATION: 98 % | HEART RATE: 91 BPM | SYSTOLIC BLOOD PRESSURE: 158 MMHG | DIASTOLIC BLOOD PRESSURE: 84 MMHG

## 2025-04-30 DIAGNOSIS — G89.4 CHRONIC PAIN SYNDROME: Primary | Chronic | ICD-10-CM

## 2025-04-30 DIAGNOSIS — Z72.0 TOBACCO USER: ICD-10-CM

## 2025-04-30 DIAGNOSIS — R20.2 HAND PARESTHESIA: Primary | ICD-10-CM

## 2025-04-30 DIAGNOSIS — T85.192S MALFUNCTION OF SPINAL CORD STIMULATOR, SEQUELA: ICD-10-CM

## 2025-04-30 DIAGNOSIS — Z74.09 IMPAIRED FUNCTIONAL MOBILITY, BALANCE, GAIT, AND ENDURANCE: Chronic | ICD-10-CM

## 2025-04-30 DIAGNOSIS — R29.6 FALLS FREQUENTLY: ICD-10-CM

## 2025-04-30 PROBLEM — T85.192A MALFUNCTION OF SPINAL CORD STIMULATOR: Status: ACTIVE | Noted: 2025-04-30

## 2025-04-30 PROCEDURE — 99215 OFFICE O/P EST HI 40 MIN: CPT | Mod: PBBFAC | Performed by: NURSE PRACTITIONER

## 2025-04-30 PROCEDURE — G2211 COMPLEX E/M VISIT ADD ON: HCPCS | Mod: S$PBB,,, | Performed by: NURSE PRACTITIONER

## 2025-04-30 PROCEDURE — 99214 OFFICE O/P EST MOD 30 MIN: CPT | Mod: S$PBB,,, | Performed by: NURSE PRACTITIONER

## 2025-04-30 NOTE — PROGRESS NOTES
[facility] Follow Up Office Visit Note    Subjective:      Patient ID: Bharath Caballero is a 45 y.o. male.    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 He was last seen on 12/30/2024. During that visit, OXC 600mg BID, and Lyrica 150mg PO BID were continued. Elavil 50mg PO QHS was held due to MI. Several referral placed to neurosurgery for NEVRO SCS lead re-attachment made with no success. Pt was referred to Dr. Baugh for SCS revision, denied.    Interim History  Today, Pt presents alone. States his pain is worsening although he does have an appointment with Dr. Genao, neurosurgery in [place] who will evaluate him for SCS repair.  Was seen by Dr. Jay Pozo on 1/17/2024, SCS revision was scheduled. However, revision on hold. MD no longer at [facility]. Admits to worsening numbness and weakness to hands. Has completed PT/OT with little/no improvement. Unable to have MRI C-spine due to SCS. Followed by home health. Continues w/worsening burning/numbness and tingling to R lower ext, now worsening to hands. Not currently utilizing CPAP as he states it is broken.  Admits to multiple falls due to tyingling/burning/numbness to feet. DM not well controlled, last A1C 11.1 on 2/18/2025.    C/O swelling to L lower ext (harvested vein leg). Wrapped in ACE at this time. Finds Nevro SCS effective, but needs lead re-attached. Original placement on 7/21/2022. Dr. Beasley providing Rx for narcotic pain medication. Decreased smoking to 3 cigarettes daily, participating in smoking cessation program.     Severe hearing loss, pt states due to nerve damage, cannot afford  cochlear implant.    CABG at [facility] on 12/5/2024 by Dr. Clayton.  Echo showed EF of 45%, no major valvular abnormalities. L heart catheterization done (10/10/2024) which showed two-vessel CAD, 50% proximal stenosis of the LAD and 50% distal stenosis, IFR of the proximal stenosis was done which was 0.75, he also has proximal RCA lesion of 90% stenosis.     Presenting History  He noted that he initially presented with right leg stabbing pain and paresthesia radiating up his leg for 2 years followed by paresthesia and allodynia in left foot for 1 year, worse with standing on his feet. He also noted that his hands and joints would hurt. He had quit drinking for 8 years. Still smokes. -280 most of the time for now, but it was in the 500s for the past 4 year.    Current Medications -  Lyrica 150mg PO TID (5/1/2024 - present) somewhat effective  OXC 600mg BID (9/25/2023 - present) somewhat effective  Amitriptyline 50mg PO QHS (12/28/2023 - present) somewhat effective    Prior Medications -  Effexor XR 37.5mg daily  CBZ ER 200mg-400mg BID (12/30/2020 - 9/25/2023) ineffective  Neurontin 400mg - 400mg - 800mg    Imaging  - MRI Brain w/w/out 5/29/2023 - small area of chronic gliosis to L temporal lobe, no acute intracranial findings    - MRI Lumbar w/o Bryce 9/20/2019 - I have reviewed the study independently and with the patient. 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    - MRI C spine w/o Bryce 9/20/2019 - I have reviewed the study independently and with the patient. 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 Multilevel spondylosis noted.    Results for orders placed or performed in visit on 04/16/25   Comprehensive Metabolic Panel    Collection Time: 04/16/25  9:06 AM   Result Value Ref Range    Sodium 141 136 - 145 mmol/L    Potassium 3.6 3.5 - 5.1 mmol/L    Chloride 106 98 - 107 mmol/L    CO2 26 22 - 29 mmol/L    Glucose 152 (H) 74 - 100 mg/dL    Blood  Urea Nitrogen 10.3 8.9 - 20.6 mg/dL    Creatinine 0.85 0.72 - 1.25 mg/dL    Calcium 9.2 8.4 - 10.2 mg/dL    Protein Total 8.3 6.4 - 8.3 gm/dL    Albumin 3.2 (L) 3.5 - 5.0 g/dL    Globulin 5.1 (H) 2.4 - 3.5 gm/dL    Albumin/Globulin Ratio 0.6 (L) 1.1 - 2.0 ratio    Bilirubin Total 0.4 <=1.5 mg/dL     (H) 40 - 150 unit/L    ALT 69 (H) 0 - 55 unit/L    AST 71 (H) 11 - 45 unit/L    eGFR >60 mL/min/1.73/m2    Anion Gap 9.0 mEq/L    BUN/Creatinine Ratio 12    Vitamin D    Collection Time: 04/16/25  9:06 AM   Result Value Ref Range    Vitamin D 14 (L) 30 - 80 ng/mL   TSH    Collection Time: 04/16/25  9:06 AM   Result Value Ref Range    TSH 3.029 0.350 - 4.940 uIU/mL   Protein/Creatinine Ratio, Urine    Collection Time: 04/16/25  9:08 AM   Result Value Ref Range    Urine Protein Level 177.2 mg/dL    Urine Creatinine 84.1 63.0 - 166.0 mg/dL    Urine Protein/Creatinine Ratio 2.1    Urinalysis, Reflex to Urine Culture    Collection Time: 04/16/25  9:08 AM    Specimen: Urine   Result Value Ref Range    Color, UA Light-Yellow Yellow, Light-Yellow, Dark Yellow, Celsa, Straw    Appearance, UA Clear Clear    Specific Gravity, UA 1.015 1.005 - 1.030    pH, UA 6.0 5.0 - 8.5    Protein, UA 2+ (A) Negative    Glucose, UA Normal Negative, Normal    Ketones, UA Negative Negative    Blood, UA Negative Negative    Bilirubin, UA Negative Negative    Urobilinogen, UA Normal 0.2, 1.0, Normal    Nitrites, UA Negative Negative    Leukocyte Esterase,  (A) Negative    RBC, UA 0-5 None Seen, 0-2, 3-5, 0-5 /HPF    WBC, UA 6-10 (A) None Seen, 0-2, 3-5, 0-5 /HPF    Bacteria, UA Trace (A) None Seen /HPF    Squamous Epithelial Cells, UA Few (A) None Seen /HPF    Mucous, UA Trace (A) None Seen /LPF    Hyaline Casts, UA None Seen None Seen /lpf    Calcium Oxalate Crystals, UA Trace (A) None Seen   Microalbumin/Creatinine Ratio, Urine    Collection Time: 04/16/25  9:08 AM   Result Value Ref Range    Urine Microalbumin 1,133.3 (H) <=30.0 ug/mL     Urine Creatinine 85.5 63.0 - 166.0 mg/dL    Microalbumin Creatinine Ratio 1,325.5 (H) 0.0 - 30.0 mg/gm Cr       Review of Systems   Constitutional: no fever, fatigue, +weakness  Eye: no vision loss, eye redness, drainage, or pain  ENMT: no sore throat, ear pain, sinus pain/congestion, nasal congestion/drainage  Respiratory: no cough, no wheezing, no shortness of breath  Cardiovascular: no chest pain, no palpitations; L lower ext edema/ACE wrap  Gastrointestinal: no nausea, vomiting, or diarrhea. No abdominal pain  Genitourinary: no dysuria, no urinary frequency or urgency, no hematuria  Hema/Lymph: no abnormal bruising or bleeding  Endocrine: no heat or cold intolerance, no excessive thirst or excessive urination  Musculoskeletal: + muscle or joint pain, no joint swelling  Integumentary: no skin rash or abnormal lesion  Psychiatric: no depressed mood, + anxiety, no visual/auditory hallucinations, no delusions, no suicidal or homicidal ideation  Neurologic: antalgic     Objective:      Physical Exam    General: well-developed well-nourished in no acute distress  Eye: clear conjunctiva, eyelids normal  HENT: oropharynx without erythema/exudate, oropharynx and nasal mucosal surfaces moist  Neck: full range of motion  Respiratory: no distress on RA  Cardiovascular: regular rate and rhythm   Gastrointestinal: round, no guarding, non-distended  Musculoskeletal: full range of motion of all extremities/spine without limitation or discomfort  Integumentary: scab to R calf and L 4th finger    Neurologic:  Mental Status- Alert and Oriented to time, self, place, Speech is fluent, with intact reading and comprehension, long term memory intact, No Dysarthria.  CN II-XII - CN I Deferred, STACI, no RAPD, OD blurred vision, VA grossly normal to finger counting at 6ft, No ptosis b/l, EOMI w/o nystagmus, LT/PP symmetric, intact in CN V1-V3 distribution b/l, masseter symmetric b/l, T/U midline, Shoulder shrug symmetric b/l, Right  SNHL, VFFC, no facial droop, C[place]il DeHe bilaterally  Motor - Tone and Bulk nml throughout, No involuntary movements, 5/5 to confrontation all ext.  FFM nml b/l, No pronator drift.   Sensory - LT/PP/Temp diminished in RLE up to knee, diminished to RUE from hand to shoulder & LLE up to mid-shin, Vibration absent in bilat Big Toes.   Reflexes - 2+ Throughout except for absent AJ b/l  Cerebellar Exam - FNF wnl b/l no intention tremor.  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 C/O worsening L upper ext numbness/tingling and bilat lower ext numbness and tingling. Decreased smoking to 3 cigarettes daily. Not currently utilizing CPAP nightly as machine is broken. Has fallen a few times since last visit. No longer followed by home health. CABG x 2 12/5/2024. Has appointment with neurosurgeon in [place] for SCS revision. Pt states stimulator was effective after initial placement. Dr. Beasley currently providing pain management.    1. Chronic pain syndrome    2. Malfunction of spinal cord stimulator, sequela    3. Falls frequently    4. Impaired functional mobility, balance, gait, and endurance    5. Tobacco user      Plan:      [] c/w OXC 600mg BID  [] c/w Lyrica 150mg PO TID  [] advised on better glycemic control. Goal HgA1c < 7.  [] c/w decreased smoking  [] discussed having MRI done at [facility] as they have ELENA 1.0 which is compatible with SCS    RTC 6 months    Visit today is associated with current or anticipated ongoing medical care related to this patient's single serious condition/complex condition as documented above.     I have explained the treatment  plan, diagnosis, and prognosis to the patient, caretaker, family. All questions are answered to the best of my knowledge.    Face to Face Time 30 minute, including, counseling, education, review of test results, relevant medical records, and coordination of care.    Judith Jean, NP-C  General Neurology

## 2025-05-05 DIAGNOSIS — E08.42 DIABETIC POLYNEUROPATHY ASSOCIATED WITH DIABETES MELLITUS DUE TO UNDERLYING CONDITION: ICD-10-CM

## 2025-05-05 DIAGNOSIS — G89.4 CHRONIC PAIN SYNDROME: ICD-10-CM

## 2025-05-05 RX ORDER — CLONAZEPAM 1 MG/1
1 TABLET ORAL 2 TIMES DAILY
Qty: 60 TABLET | Refills: 0 | Status: SHIPPED | OUTPATIENT
Start: 2025-05-05

## 2025-05-05 RX ORDER — MORPHINE SULFATE 100 MG/1
100 TABLET, FILM COATED, EXTENDED RELEASE ORAL 3 TIMES DAILY
Qty: 90 TABLET | Refills: 0 | Status: SHIPPED | OUTPATIENT
Start: 2025-05-05

## 2025-05-07 ENCOUNTER — TELEPHONE (OUTPATIENT)
Dept: SMOKING CESSATION | Facility: CLINIC | Age: 45
End: 2025-05-07
Payer: MEDICARE

## 2025-05-07 ENCOUNTER — OFFICE VISIT (OUTPATIENT)
Dept: CARDIOLOGY | Facility: CLINIC | Age: 45
End: 2025-05-07
Payer: MEDICARE

## 2025-05-07 VITALS
DIASTOLIC BLOOD PRESSURE: 83 MMHG | RESPIRATION RATE: 20 BRPM | SYSTOLIC BLOOD PRESSURE: 127 MMHG | TEMPERATURE: 98 F | OXYGEN SATURATION: 100 % | BODY MASS INDEX: 28.34 KG/M2 | HEIGHT: 68 IN | WEIGHT: 187 LBS | HEART RATE: 84 BPM

## 2025-05-07 DIAGNOSIS — E78.2 MIXED HYPERLIPIDEMIA: ICD-10-CM

## 2025-05-07 DIAGNOSIS — I25.118 CORONARY ARTERY DISEASE OF NATIVE ARTERY OF NATIVE HEART WITH STABLE ANGINA PECTORIS: ICD-10-CM

## 2025-05-07 DIAGNOSIS — I20.81 ANGINA PECTORIS WITH CORONARY MICROVASCULAR DYSFUNCTION: Primary | ICD-10-CM

## 2025-05-07 PROCEDURE — 99215 OFFICE O/P EST HI 40 MIN: CPT | Mod: PBBFAC | Performed by: INTERNAL MEDICINE

## 2025-05-07 RX ORDER — METOPROLOL SUCCINATE 25 MG/1
25 TABLET, EXTENDED RELEASE ORAL DAILY
Qty: 90 TABLET | Refills: 3 | Status: SHIPPED | OUTPATIENT
Start: 2025-05-07 | End: 2026-05-07

## 2025-05-07 RX ORDER — ATORVASTATIN CALCIUM 80 MG/1
80 TABLET, FILM COATED ORAL DAILY
Qty: 90 TABLET | Refills: 3 | Status: SHIPPED | OUTPATIENT
Start: 2025-05-07 | End: 2026-05-07

## 2025-05-07 NOTE — TELEPHONE ENCOUNTER
Pt called stating that he is still at his cardiology appointment and will not be able to make it in today.  Pt requested to reschedule.  Pt rescheduled to May 15, 2025 at 4:30 pm.

## 2025-05-07 NOTE — PROGRESS NOTES
Patient examined with medical resident or cardiology fellow, record reviewed, agree with findings and recommendations as documented above.

## 2025-05-07 NOTE — PROGRESS NOTES
[facility] [place] [facility]  Cariology Clinic - Follow Up     Cardiology Attending: Dr. Shannan john MD  Date of Visit: 5/7/2025  Reason for Visit/Chief Complaint:   Chief Complaint    Follow-up          Subjective:      Bharath Caballero is a 44 y.o. male with familial hypertriglyceridemia with recurrent pancreatitis, CAD s/p 2 vessel CABG 12/05, HTN, DM, diabetic neuropathy, Hepatic Steatosis, CICI, hard of hearing, smoking who presents to cardiology clinic for follow up.      Patient reports improved chest pain episodes, SOB, PENA since CABG, reports having 2-3 chest pain episodes in a week. He continues on DAPT. /83, HR 84.  Patient still continues to smoke, reports working on stopping.  Last LDL 98, trig 198.  Reports taking atorvastatin 40 mg only (half of the prescribed) Sternal wound scarring, no drainage. EKG NSR, prior infarcts    Results for orders placed during the hospital encounter of 10/07/24    Cardiac catheterization    Conclusion    The Dist LAD-2 lesion was 50% stenosed.    The Prox RCA lesion was 90% stenosed.    The Mid RCA lesion was 100% stenosed.    The Prox LAD lesion was 50% stenosed.    The Dist LAD-1 lesion was 50% stenosed.    The pre-procedure left ventricular end diastolic pressure was 20.    The estimated blood loss was none.    FINDINGS  Access:  Right radial  LVEDP:  20 mmHg  Left Main:  No stenosis  LAD: 50% proximal stenosis  Circumflex:  Luminal irregularities  RCA: 100% mid stenosis    LIMA:  Large vessel.  No significant stenosis noted.    iFR = 0.75 in proximal LAD.   iFR is 0.64 in distal LAD.   iFR < 0.89 is significant (associated with myocardial ischemia)  Guide used:  EBU 3.5    Summary:  Two vessel CAD    RECOMMENDATIONS  Refer for CABG evaluation  Risk factor modifications  Activity -- avoid straining with affected limb for one week  Exercise -- as tolerated  Precautions post D/C -- come to ER for hematoma, unusual  pain, erythema, or unusual drainage at access site  Precautions post D/C -- if develop bleeding or hematoma at access site, hold pressure at access site, and come to ER    POST-CATH GUIDELINE FOR INPATIENT DISCHARGE  No hematoma or swelling at access site  No unusual tenderness at access site  Adequate distal pulse or capillary refill in limb(s) accessed  No unusual difficulty with ambulation after bed rest is over    The procedure log was documented by No documenter listed and verified by Jaswant Pugh MD.    Date: 10/10/2024  Time: 11:35 AM      Results for orders placed during the hospital encounter of 10/07/24    Echo    Interpretation Summary    Left Ventricle: The left ventricle is normal in size. Moderately increased ventricular mass. Moderately increased wall thickness. There is moderate concentric hypertrophy. Regional wall motion abnormalities present. See diagram for wall motion findings. Hypokinetic basal and mid inferior and inferoseptal walls. There is mildly reduced systolic function. Biplane (2D) method of discs ejection fraction is 45%. There is indeterminate diastolic function.    Right Ventricle: Normal right ventricular cavity size. Systolic function is normal.    Left Atrium: Normal left atrial size.    Right Atrium: Normal right atrial size.    Aortic Valve: The aortic valve is a trileaflet valve. There is no stenosis. There is no significant regurgitation.    Mitral Valve: The mitral valve is structurally normal. There is no stenosis. There is no significant regurgitation.    Tricuspid Valve: There is no significant regurgitation.    Aorta: Aortic root is normal in size measuring 3.2 cm.    IVC/SVC: Elevated venous pressure at 15 mmHg.    Pericardium: There is no pericardial effusion.      Medications:     Current Outpatient Medications   Medication Sig Dispense Refill    amitriptyline (ELAVIL) 50 MG tablet TAKE ONE TABLET BY MOUTH IN THE EVENING 30 tablet 4    aspirin (ECOTRIN) 81 MG EC  tablet Take 81 mg by mouth once daily.      cholecalciferol, vitamin D3, 125 mcg (5,000 unit) capsule Take 1 capsule (5,000 Units total) by mouth once daily. 30 capsule 11    clonazePAM (KLONOPIN) 1 MG tablet TAKE ONE TABLET BY MOUTH TWICE DAILY 60 tablet 0    cloNIDine (CATAPRES) 0.3 MG tablet Take 1 tablet (0.3 mg total) by mouth 3 (three) times daily. 270 tablet 3    clopidogreL (PLAVIX) 75 mg tablet Take 4 tablets on day 1 and then one tablet daily. 90 tablet 3    CREON 36,000-114,000- 180,000 unit CpDR TAKE ONE CAPSULE THREE TIMES DAILY 270 capsule 1    ergocalciferol (ERGOCALCIFEROL) 50,000 unit Cap Take 1 capsule (50,000 Units total) by mouth every 7 days. 4 capsule 2    EScitalopram oxalate (LEXAPRO) 20 MG tablet Take 20 mg by mouth once daily.      esomeprazole (NEXIUM) 40 MG capsule Take 1 capsule (40 mg total) by mouth before breakfast. 30 capsule 11    FARXIGA 5 mg Tab tablet TAKE ONE TABLET BY MOUTH EVERY DAY 90 tablet 3    gabapentin (NEURONTIN) 800 MG tablet TAKE ONE TABLET BY MOUTH IN THE EVENING 90 tablet 3    HUMALOG U-100 INSULIN 100 unit/mL injection INJECT 25 UNITS SUBCUTANEOUSLY BEFORE MEALS THREE TIMES DAILY 10 mL 3    HYDROmorphone (DILAUDID) 8 MG tablet Take 1 tablet (8 mg total) by mouth every 8 (eight) hours as needed. 90 tablet 0    isosorbide mononitrate (IMDUR) 120 MG 24 hr tablet Take 1 tablet (120 mg total) by mouth once daily. 90 tablet 3    linaGLIPtin (TRADJENTA) 5 mg Tab tablet Take 1 tablet (5 mg total) by mouth once daily. 90 tablet 3    losartan (COZAAR) 100 MG tablet Take 1 tablet (100 mg total) by mouth once daily. 90 tablet 3    morphine (MS CONTIN) 100 MG 12 hr tablet TAKE ONE TABLET BY MOUTH THREE TIMES DAILY 90 tablet 0    nicotine (NICODERM CQ) 21 mg/24 hr Place 1 patch onto the skin once daily. 28 patch 0    NIFEdipine (PROCARDIA-XL) 60 MG (OSM) 24 hr tablet Take 1 tablet (60 mg total) by mouth 2 (two) times a day. 180 tablet 3    nut.tx.gluc intol,lf,soy-fiber  "(GLUCERNA 1.5 CEDRIC) 0.08-1.5 gram-kcal/mL Liqd Take 273 mLs by mouth 2 (two) times a day. 60 each 4    OXcarbazepine (TRILEPTAL) 600 MG Tab Take 1 tablet (600 mg total) by mouth 2 (two) times daily. 60 tablet 11    potassium chloride SA (K-DUR,KLOR-CON) 20 MEQ tablet TAKE ONE TABLET BY MOUTH TWICE DAILY 180 tablet 3    pregabalin (LYRICA) 150 MG capsule Take 1 capsule (150 mg total) by mouth 3 (three) times daily. 90 capsule 3    ranolazine (RANEXA) 500 MG Tb12 Take 1 tablet (500 mg total) by mouth 2 (two) times daily. 180 tablet 3    REPATHA SURECLICK 140 mg/mL PnIj INJECT 1ml INTRAMUSCULARLY EVERY 14 DAYS 2 mL 11    sucralfate (CARAFATE) 100 mg/mL suspension Take 10 mLs (1 g total) by mouth 4 (four) times daily before meals and nightly. 1200 mL 3    SURE COMFORT INSULIN SYRINGE 0.5 mL 31 gauge x 5/16" Syrg USE ONE SYRINGE THREE TIMES DAILY 100 each 3    tamsulosin (FLOMAX) 0.4 mg Cap TAKE ONE CAPSULE BY MOUTH EVERY DAY 90 capsule 3    torsemide (DEMADEX) 20 MG Tab Take 1 tablet (20 mg total) by mouth once daily. 90 tablet 3    TOUJEO SOLOSTAR U-300 INSULIN 300 unit/mL (1.5 mL) InPn pen INJECT 35 UNITS SUBCUTANEOUSLY TWICE DAILY 6 mL 0    TOUJEO SOLOSTAR U-300 INSULIN 300 unit/mL (1.5 mL) InPn pen INJECT 35 SUBCUTANEOUSLY TWICE DAILY 4.5 mL 3    VASCEPA 1 gram Cap TAKE TWO CAPSULES BY MOUTH TWICE DAILY 60 capsule 3    atorvastatin (LIPITOR) 80 MG tablet Take 1 tablet (80 mg total) by mouth once daily. 90 tablet 3    metoprolol succinate (TOPROL-XL) 25 MG 24 hr tablet Take 1 tablet (25 mg total) by mouth once daily. 90 tablet 3    nitroGLYCERIN (NITROSTAT) 0.3 MG SL tablet Place 1 tablet (0.3 mg total) under the tongue every 5 (five) minutes as needed for Chest pain (go to er after 3 doses). 30 tablet 6     Current Facility-Administered Medications   Medication Dose Route Frequency Provider Last Rate Last Admin    triamcinolone acetonide 0.1% ointment   Topical (Top) BID Malina Brito, TIP           I have " "reviewed and updated the patient's medications, allergies, past medical history, surgical history, social history and family history as needed.    Review of Systems:     Review of Systems   Constitutional: Negative.    Respiratory:  Positive for shortness of breath.    Cardiovascular:  Positive for chest pain.     Objective:     Wt Readings from Last 3 Encounters:   05/07/25 84.8 kg (187 lb)   04/30/25 87 kg (191 lb 12.8 oz)   04/16/25 86.1 kg (189 lb 12.8 oz)     Temp Readings from Last 3 Encounters:   05/07/25 98.4 °F (36.9 °C) (Oral)   04/30/25 98.3 °F (36.8 °C)   04/16/25 98.1 °F (36.7 °C) (Oral)     BP Readings from Last 3 Encounters:   05/07/25 127/83   04/30/25 (!) 158/84   04/16/25 (!) 146/86     Pulse Readings from Last 3 Encounters:   05/07/25 84   04/30/25 91   04/16/25 84       Vitals:    05/07/25 1047   BP: 127/83   BP Location: Left arm   Patient Position: Sitting   Pulse: 84   Resp: 20   Temp: 98.4 °F (36.9 °C)   TempSrc: Oral   SpO2: 100%   Weight: 84.8 kg (187 lb)   Height: 5' 8" (1.727 m)     Body mass index is 28.43 kg/m².    Physical Exam  Vitals reviewed.   HENT:      Head: Normocephalic.   Cardiovascular:      Rate and Rhythm: Normal rate and regular rhythm.      Pulses: Normal pulses.      Heart sounds: Normal heart sounds.   Pulmonary:      Effort: Pulmonary effort is normal.      Breath sounds: Normal breath sounds.   Musculoskeletal:      Right lower leg: No edema.      Left lower leg: No edema.   Skin:     General: Skin is warm and dry.   Neurological:      General: No focal deficit present.      Mental Status: He is alert and oriented to person, place, and time.        Labs:   I have reviewed the following labs below:      Lab Results   Component Value Date    WBC 13.77 (H) 02/18/2025    HGB 15.5 02/18/2025    HCT 46.2 02/18/2025     02/18/2025    MCV 85.1 02/18/2025    RDW 12.8 02/18/2025     Lab Results   Component Value Date     04/16/2025    K 3.6 04/16/2025     " 04/16/2025    CO2 26 04/16/2025    BUN 10.3 04/16/2025     (H) 04/16/2025    CALCIUM 9.2 04/16/2025    MG 2.10 12/10/2024    PHOS 3.2 12/10/2024     Lab Results   Component Value Date    PROT 8.3 04/16/2025    ALBUMIN 3.2 (L) 04/16/2025    BILITOT 0.4 04/16/2025    AST 71 (H) 04/16/2025    ALKPHOS 493 (H) 04/16/2025    ALT 69 (H) 04/16/2025     (H) 10/07/2024     Cholesterol Total   Date Value Ref Range Status   02/18/2025 165 <=200 mg/dL Final     HDL Cholesterol   Date Value Ref Range Status   02/18/2025 27 (L) 35 - 60 mg/dL Final     LDL Cholesterol   Date Value Ref Range Status   02/18/2025 98.00 50.00 - 140.00 mg/dL Final     Comment:     LDL calculated using the Friedewald equation.     Triglyceride   Date Value Ref Range Status   02/18/2025 198 (H) 34 - 140 mg/dL Final     Lab Results   Component Value Date    HGBA1C 11.1 (H) 02/18/2025    HGBA1C 9.3 (H) 10/08/2024    HGBA1C 8.2 (H) 06/04/2024    CREATININE 0.85 04/16/2025     Lab Results   Component Value Date    TSH 3.029 04/16/2025     Lab Results   Component Value Date    IRON 97 10/10/2024    TIBC 205 (L) 10/10/2024    FERRITIN 216.05 10/10/2024    [id number] 886 (H) 10/10/2024     Lab Results   Component Value Date    INR 1.2 12/05/2024    PROTIME 15.2 (H) 12/05/2024    APTT 34.7 (H) 12/05/2024      Lab Results   Component Value Date    TROPONINI 1.217 (H) 12/08/2024    TROPONINI 1.455 (H) 10/08/2024    TROPONINI 1.554 (H) 10/08/2024    TROPONINI 1.600 (H) 10/08/2024    TROPONINI 1.582 (H) 10/07/2024    .6 (H) 12/08/2024    .4 (H) 10/07/2024    BNP <10.0 11/28/2022     Cardiovascular Studies:   I have reviewed the following studies below:      Coronary angiogram 4/2023:   FINDINGS  LVEDP:  18 mmHg  Left Main:  No stenosis    LAD:   Diffusely diseased.  70% ostial-prox lesion, 70% mid-distal lesion and 50% lesion at apex  Circumflex:   Mild diffuse disease   30%  lesion at the ostium of the first OM  RCA:   Severe, diffuse  disease. Small vessel   90% prox-mid lesion   mid vessel  The patient has Significant two vessel disease    Blood loss:  less than 10 cc.  LIMA:  Medium size vessel.     iFR = 0.94 in proximal and mid LAD.  iFR > 0.89 is not significant      RECOMMENDATIONS  Medical management  Risk factor modifications -- start statin, blood pressure control        ECHO 5/24/23   The left ventricle is normal in size with mild concentric hypertrophy and normal systolic function.  The estimated ejection fraction is 65%.  Normal left ventricular diastolic function.  There is no evidence of intracardiac shunting.  Elevated central venous pressure (15 mmHg).  The estimated PA systolic pressure is 24 mmHg.  Normal right ventricular size with normal right ventricular systolic function.  Mild left atrial enlargement.    Assessment/Plan:   45 y.o. male with the following medical problems:    CAD s/p CABG 12/05  Recent NSTEMI  -In 2023 pt had proximal LAD disease based on cath in 4/2023, however iFR was 0.94 which was considered non-significant, hence not revascularized. Pt also has mid-RCA  with collaterals but a small PDA. Was seen by Dr. Dallas who did not think CABG is indicated.     Pt had NSTEMI in Oct 2024 and angiogram showed prox LAD 50% with iFR 0.75 and distal LAD with IFR 0.64 as well as mid %  Patient was already evaluated by surgeon with plan to proceed with two-vessel CABG in on 12/2/2024 however patient is concerned about mortality with CABG and would like to explore percutaneous options.    I contacted Dr. Jaswant Joseph with [facility] to see if his disease is amenable to PCI and he thinks pt will be better treated with CABG given diffuse LAD involvement that would need extensive PCI and would make CABG not feasible down the line.  As patient had a recent NSTEMI will add Plavix with a 300 mg load followed by 75 mg daily.    Minimal episodes of chest pain since recent hospitalization  Continue aspirin, statin, PCSK9  inhibitor, Coreg 25 b.i.d., Imdur 120, nifedipine 60 b.i.d. and Ranexa 500 mg bid   CABG 12/05 without complications      Ischemic cardiomyopathy  Echo in Oct 2024 (at time of NSTEMI) showed EF 45% with wall motion abnormalities in inferior and inferoseptal walls. (new drop in EF)  LVEDP 20 mmHg on recent catheterization in October 2024  Patient already on losartan 100 mg, Aldactone 100 mg  Will increase Farxiga to 10 mg, we will switch from Lopressor to Toprol-XL.  Appears euvolemic on exam. Continue torsemide 20 mg daily   Ordered repeat Echo    HTN, resistant and controlled  At goal today  Continue nifedipine 60 bid,  aldactone 100mg, clonidine 0.2mg tid, tordemide 20  Unclear if severe TG can be contributing to resistant HTN  To note: No renal duplex US done, pt with hx of hypokalemia and could have underlying hyperaldosteronism.  Renin and aldosterone were never checked however as patient is already on Aldactone and RAAS inhibitors testing will be unreliable      Familial Hypertriglyceridemia, hypercholesterolemia   Patient reports triglycerides in the past were 22,000 (no records of that)  Suspect most of his metabolic disorders as well as CAD related to hypertriglyceridemia.  Patient also with recurrent pancreatitis (currently having epigastric pain that could be concerning for pancreatitis and I have advised him to seek medical care)  Patient used to be on plasmapheresis in the past but per his request has stopped doing so.  Most recent triglycerides have normalized at 198  -continue atorvastatin 80mg daily, Repatha, Vascepa 2g BID   Patient has been more compliant with decreasing his fat intake    Uncontrolled diabetes  Diabetic neuropathy  A1c 11.1 2/18/25  Continue management per PCP    Smoking  -encouraged cessation    Recurrent pancreatitis  Liver steatosis  Pt followed by GI    4mo follow up  Get labs    Rivas Lorenzo MD  Internal Medicine - PGY-1          acetaminophen 160 mg/5 mL oral suspension: 5 milliliter(s) orally every 6 hours, As needed, Temp greater or equal to 38 C (100.4 F), Moderate Pain (4 - 6)  levETIRAcetam 100 mg/mL oral solution: 1.4 milliliter(s) orally every 12 hours

## 2025-05-13 ENCOUNTER — OFFICE VISIT (OUTPATIENT)
Dept: FAMILY MEDICINE | Facility: CLINIC | Age: 45
End: 2025-05-13
Payer: MEDICARE

## 2025-05-13 VITALS
DIASTOLIC BLOOD PRESSURE: 91 MMHG | BODY MASS INDEX: 28.34 KG/M2 | HEIGHT: 68 IN | RESPIRATION RATE: 20 BRPM | HEART RATE: 88 BPM | TEMPERATURE: 99 F | WEIGHT: 187 LBS | SYSTOLIC BLOOD PRESSURE: 170 MMHG

## 2025-05-13 DIAGNOSIS — L57.0 ACTINIC KERATOSIS: Primary | ICD-10-CM

## 2025-05-13 PROCEDURE — 99213 OFFICE O/P EST LOW 20 MIN: CPT | Mod: PBBFAC

## 2025-05-13 PROCEDURE — 17000 DESTRUCT PREMALG LESION: CPT | Mod: PBBFAC | Performed by: FAMILY MEDICINE

## 2025-05-13 PROCEDURE — 17003 DESTRUCT PREMALG LES 2-14: CPT | Mod: PBBFAC | Performed by: FAMILY MEDICINE

## 2025-05-13 NOTE — PROGRESS NOTES
Subjective:       Patient ID: Bharath Caballero is a 45 y.o. male.    Chief Complaint: follow up AK    HPI  46 yo with h/o AK in minor surgery clinic for follow up. Pt had cryo for 4 lesions to his upper extremities at the last appointment. Pt tolerated cryo well and notes a few similar skin lesions on upper extremities today    Review of Systems  As per HPI         Objective:      Vitals:    05/13/25 1312   BP: (!) 170/91   Pulse:    Resp:    Temp:        Physical Exam      Gen: alert, no acute distress  Skin: rough, scaly lesions consistent with AK x 6 on bilateral forearms/hands  Psych: cooperative, appropriate mood and affect    Procedure Note:  Procedure: cryotherapy for AK x 6  Performed by: Ina Magaña MD  Consent: signed consent obtained after discussion of risks, benefits, and alternative treatments. Time out completed  Description: Liquid nitrogen used for cryotherapy. Tip held 1 cm from lesion and sprayed in freeze-thaw cycle.   The patient tolerated the procedure well with no complications.         Assessment/Plan:  Actinic keratosis    Cryo today  Post care instructions and return precautions discussed.        Follow up in about 6 weeks (around 6/24/2025) for AK.

## 2025-05-14 ENCOUNTER — TELEPHONE (OUTPATIENT)
Dept: INTERNAL MEDICINE | Facility: CLINIC | Age: 45
End: 2025-05-14
Payer: MEDICARE

## 2025-05-15 ENCOUNTER — CLINICAL SUPPORT (OUTPATIENT)
Dept: SMOKING CESSATION | Facility: CLINIC | Age: 45
End: 2025-05-15
Payer: MEDICARE

## 2025-05-15 DIAGNOSIS — F17.200 NICOTINE DEPENDENCE: Primary | ICD-10-CM

## 2025-05-15 NOTE — PROGRESS NOTES
Individual Follow-Up Form    5/15/2025    Quit Date:     Clinical Status of Patient: Outpatient    Length of Service: 45 minutes    Continuing Medication: yes  Nicotine gum    Other Medications: Nicotine patches     Target Symptoms: Withdrawal and medication side effects. The following were  rated moderate (3) to severe (4) on TCRS:  Moderate (3): none  Severe (4): none    Comments: Patient presents for follow up smoking 30 cigarettes per day. Pt stated that he never picked up the 21 mg nicotine patches because he could not afford them. Pt did purchase 4 mg nicotine gum and has been using this. No adverse effects noted at this time. I gave pt one box of 21 mg nicotine patches that was given to me by a prior patient that had quit smoking. Pt doing well with rate reduction and wait times prior to smoking. Pt encouraged to pick a quit day. Discussed smoking triggers.  Pt smokes due to boredom. Encouraged pt to find a new hobby or find things to do for distraction. Discussed coping skills.  Also gave pt a handout on the coping skills. Reviewed learned addiction model, personal reasons for quitting, medications, goals, quit date. Encouraged pt to move his cigarettes, make the inside of his home and vehicle tobacco free and wait 15 minutes before smoking each cigarette.  Reviewed rate reduction. Encouraged pt to decrease to 20 cigarettes per day and then decrease by 2 cigarettes each week. Commended pt on his progress thus far.  Will follow up with pt in a few weeks. Pt's exhaled carbon monoxide level was 45 ppm as per [employer]oker[employer]zer. (non- smoker = 0-5 ppm.) Pt stated that he arrived one hour before his appointment therefore he sat in his car and smoked 3 cigarettes.     Diagnosis: F17.200    Next Visit: 2 weeks

## 2025-05-22 DIAGNOSIS — Z79.4 TYPE 2 DIABETES MELLITUS WITH OTHER SKIN ULCER, WITH LONG-TERM CURRENT USE OF INSULIN: ICD-10-CM

## 2025-05-22 DIAGNOSIS — G89.4 CHRONIC PAIN SYNDROME: ICD-10-CM

## 2025-05-22 DIAGNOSIS — E78.2 MIXED HYPERLIPIDEMIA: ICD-10-CM

## 2025-05-22 DIAGNOSIS — E11.622 TYPE 2 DIABETES MELLITUS WITH OTHER SKIN ULCER, WITH LONG-TERM CURRENT USE OF INSULIN: ICD-10-CM

## 2025-05-23 RX ORDER — HYDROMORPHONE HYDROCHLORIDE 8 MG/1
8 TABLET ORAL EVERY 8 HOURS PRN
Qty: 90 TABLET | Refills: 0 | Status: SHIPPED | OUTPATIENT
Start: 2025-05-23

## 2025-05-23 RX ORDER — ICOSAPENT ETHYL 1 G/1
2 CAPSULE ORAL 2 TIMES DAILY
Qty: 60 CAPSULE | Refills: 3 | Status: SHIPPED | OUTPATIENT
Start: 2025-05-23

## 2025-05-23 RX ORDER — INSULIN LISPRO 100 [IU]/ML
INJECTION, SOLUTION INTRAVENOUS; SUBCUTANEOUS
Qty: 10 ML | Refills: 3 | Status: SHIPPED | OUTPATIENT
Start: 2025-05-23

## 2025-05-27 ENCOUNTER — HOSPITAL ENCOUNTER (OUTPATIENT)
Dept: WOUND CARE | Facility: HOSPITAL | Age: 45
Discharge: HOME | End: 2025-05-27
Attending: NURSE PRACTITIONER
Payer: MEDICARE

## 2025-05-27 ENCOUNTER — TELEPHONE (OUTPATIENT)
Dept: NEPHROLOGY | Facility: CLINIC | Age: 45
End: 2025-05-27
Payer: MEDICARE

## 2025-05-27 VITALS
OXYGEN SATURATION: 99 % | TEMPERATURE: 98 F | HEART RATE: 79 BPM | RESPIRATION RATE: 18 BRPM | DIASTOLIC BLOOD PRESSURE: 73 MMHG | SYSTOLIC BLOOD PRESSURE: 176 MMHG

## 2025-05-27 DIAGNOSIS — E11.65 TYPE 2 DIABETES MELLITUS WITH HYPERGLYCEMIA, WITH LONG-TERM CURRENT USE OF INSULIN: ICD-10-CM

## 2025-05-27 DIAGNOSIS — L60.2 OVERGROWN TOENAILS: ICD-10-CM

## 2025-05-27 DIAGNOSIS — Z72.0 TOBACCO USER: ICD-10-CM

## 2025-05-27 DIAGNOSIS — L60.3 DYSTROPHIC NAIL: ICD-10-CM

## 2025-05-27 DIAGNOSIS — Z79.4 TYPE 2 DIABETES MELLITUS WITH HYPERGLYCEMIA, WITH LONG-TERM CURRENT USE OF INSULIN: ICD-10-CM

## 2025-05-27 DIAGNOSIS — E13.42 DIABETIC POLYNEUROPATHY ASSOCIATED WITH OTHER SPECIFIED DIABETES MELLITUS: ICD-10-CM

## 2025-05-27 DIAGNOSIS — I10 PRIMARY HYPERTENSION: ICD-10-CM

## 2025-05-27 DIAGNOSIS — R23.4 SCAB: ICD-10-CM

## 2025-05-27 DIAGNOSIS — E11.9 ENCOUNTER FOR DIABETIC FOOT EXAM: Primary | ICD-10-CM

## 2025-05-27 PROCEDURE — 99213 OFFICE O/P EST LOW 20 MIN: CPT | Mod: 25,,, | Performed by: NURSE PRACTITIONER

## 2025-05-27 PROCEDURE — 11730 AVULSION NAIL PLATE SIMPLE 1: CPT

## 2025-05-27 PROCEDURE — 99211 OFF/OP EST MAY X REQ PHY/QHP: CPT

## 2025-05-27 PROCEDURE — 11730 AVULSION NAIL PLATE SIMPLE 1: CPT | Mod: ,,, | Performed by: NURSE PRACTITIONER

## 2025-05-27 PROCEDURE — 11720 DEBRIDE NAIL 1-5: CPT

## 2025-05-27 PROCEDURE — 11719 TRIM NAIL(S) ANY NUMBER: CPT

## 2025-05-27 PROCEDURE — 27000999 HC MEDICAL RECORD PHOTO DOCUMENTATION

## 2025-05-27 NOTE — PATIENT INSTRUCTIONS
Pt seen today by: Malina VALIENTE    Home health and self care DRESSING INSTRUCTIONS:        Wound location: Right posterior leg    Dressings to be changed daily and as needed if soiled or not intact.      Cleanse wound with Vashe or Dakins wound cleanser  Paint scab with betadine         Return visit: 2 weeks at 1:00    FOR ANY WORSENING CONDITION PRIOR TO NEXT APPOINTMENT REPORT TO THE NEAREST EMERGENCY ROOM    Nutrition:  The current daily value (%DV) for protein is 50 grams per day and is meant as a general goal for most people. Further increasing your dietary protein intake is very important for wound healing. Typically one needs over 100g of protein per day to help with wound healing needs.  If you are a dialysis patient or have problems with your kidneys, talk to your Nephrologist about how much protein you can take in with your condition.  Examples of high protein items that can be added to your diet include: eggs, chicken, red meats, almonds, cottage cheese, Greek yogurt, beans, and peanut butter.  Fortified protein bars, shakes and drinks can add 15-30 additional grams of protein per serving.  Also add:   1 daily general multivitamin   Vitamin C : 500mg twice daily   Zinc 220 mg daily  Vit D : once daily    Offloading   Offload your wound. This means to reduce pressure on and around the wound that reduces blood flow to the wound and prevents healing. Your wound care team will discuss specific ways for you to offload your specific wound. Common offloading strategies include:    Turn or reposition every 2 hours or sooner  Use pillows, wedges, ROHO wheelchair cushions or other special devices like boots and shoes to lift the wound off of hard surfaces  Alternating Low Air-loss (ALAL) mattress may be ordered  Padded dressings can reduce wound pressure      Call our [facility] wound clinic for questions/concerns a [phone] .

## 2025-05-27 NOTE — PROGRESS NOTES
[facility]   Outpatient Wound Care     Subjective:   Patient ID: Bharath Caballero is a 45 y.o. male.    Chief Complaint: Wound Care      History of Present Illness:   45 y.o. White male presents to wound care clinic today for diabetic foot care.  Ambulates with a cane.  Presents to clinic alone.  Hard of hearing.  Reviewed previous medical history since last visit of 02/18/2025.  Followed by Dat Beasley MD for PCP last appointment 05/13/2025.    Today's visit 05/27/2025:  Presents to clinic with right lower leg scab new drainage noted.  Reinforced the importance in danger of hypertension.  Patient had smoked and took blood pressure medications prior to appointment.  Currently on smoking cessation treatment.  Diabetic foot exam performed.  Monofilament test done absent sensation noted in all areas.  Bilateral lower extremities cool to touch with absent hair growth.  Trimmed all 10 toenails using podiatry clippers, and filed with [place] board.  During trimming of toenails toenail avulsion to right foot 5th toe tolerated well skin intact to nail plate no drainage noted.  2 of 10 nails dystrophic.  Debride dystrophic toenails using Dremmel.  Applied Vaseline to bilateral feet dryness.  Instructed may apply Vaseline to feet air dry areas daily with the exception between toes.  Rationale for debridement to decrease pressure, alleviate pain, and prevent ulcers.  We will have him paint right lower leg scab with Betadine leave open air perform daily.  Will have him return in 2 weeks to re-evaluate right foot 5th toe avulsion and right lower legs scab.  Instructed the importance of checking feet daily due to decrease sensation high risk for diabetic foot ulcers.  Instructed on proper footwear, nail care, and daily skin assessment.  Instructed to call the office with  any questions, concerns, or new skin issues.  Verbalized understanding of all instructions.    02/18/2025:  Elevation of blood pressure today during initial vital signs readings.  Instructed the patient to the danger of hypertension.  Patient saw PCP today.  Patient voices has taking a.m. medications will take pm medications when return home.  ER precautions given.Diabetic foot exam performed.  Monofilament test done absence sensation noted in all areas.  Bilateral lower extremities cool to touch with absent hair growth.  Trimmed all 10 toenails using podiatry clippers, and filed with [place] board. 2 of 10 dystrophic.  Debride dystrophic nails using Dremmel.  Rationale for debridement to decrease pressure and prevent ulcers.  Instructed the importance of checking feet daily due to absence sensation high risk for diabetic foot ulcers, and injury to feet.  Instructed on proper footwear, nail care, and daily skin assessment.  Will have him return to the clinic in 3 months.  Instructed to call the office with any questions, concerns, or new skin issues.  Verbalized understanding of all instructions.                 History includes:      Past Medical History:   Diagnosis Date    Abdominal pain     Acquired perforating dermatosis 08/30/2023    Anxiety     Aortic atherosclerosis 01/24/2023    Appetite loss     Balance problem     Bilateral edema of lower extremity 02/06/2023    Bladder outflow obstruction 06/20/2022    Blurred vision, right eye 10/19/2022    BMI 34.0-34.9,adult 06/20/2022    BPH (benign prostatic hyperplasia)     Chest pain     Chronic liver failure without hepatic coma 04/25/2023    Chronic pain 10/25/2022    Chronic pain syndrome 05/12/2022    Chronic pancreatitis 10/28/2017    Coronary artery disease, unspecified vessel or lesion type, unspecified whether angina present, unspecified whether native or transplanted heart     Deafness 10/03/2023    Depression     Diabetes     Diabetic polyneuropathy  06/20/2022    Elevated LFTs 08/18/2022    Fatigue     GERD (gastroesophageal reflux disease)     Hand paresthesia 06/20/2022    Headache     Hepatic steatosis     History of continuous positive airway pressure (CPAP) therapy at home     History of pancreatitis 06/20/2022    History of recent fall     Hypertension     Hypertriglyceridemia     Insomnia     Kidney disease     Kidney disorder     Leg pain     Mixed hyperlipidemia 10/28/2017    Mononeuritis multiplex 06/20/2022    Morbid obesity     Nausea & vomiting     Neuropathy     Nocturia 06/20/2022    NSTEMI (non-ST elevated myocardial infarction) 10/2024    OA (osteoarthritis)     Obesity     Obstructive sleep apnea syndrome 06/20/2022    Onychomycosis of multiple toenails with type 2 diabetes mellitus 10/28/2017    Open wound of finger of right hand 10/20/2023    CICI (obstructive sleep apnea)     uses CPAP    Painful diabetic neuropathy 05/12/2022    Personal history of colonic polyps 08/18/2022    Polyneuropathy     Primary hypertension 10/28/2017    Recurrent pancreatitis     Renal insufficiency     Tobacco abuse     Tobacco user 06/20/2022    Type 2 diabetes mellitus with skin complication, with long-term current use of insulin 02/06/2023    Urinary frequency     Use of cane as ambulatory aid     Weight loss, unintentional       Past Surgical History:   Procedure Laterality Date    ANGIOGRAM, CORONARY, WITH LEFT HEART CATHETERIZATION N/A 04/10/2023    Procedure: Angiogram, Coronary, with Left Heart Cath;  Surgeon: Jaswant Pugh MD;  Location: [facility] CATH LAB;  Service: Cardiology;  Laterality: N/A;    ANGIOGRAM, CORONARY, WITH LEFT HEART CATHETERIZATION N/A 10/10/2024    Procedure: Angiogram, Coronary, with Left Heart Cath;  Surgeon: Jaswant Pugh MD;  Location: [facility] CATH LAB;  Service: Cardiology;  Laterality: N/A;    APPENDECTOMY      ARTERIOVENOUS ANASTOMOSIS, OPEN, UPPER ARM BASILLIC VEIN TRANSPOSITION N/A 05/07/2018    CORONARY ARTERY BYPASS GRAFT  (CABG) N/A 12/05/2024    Procedure: CORONARY ARTERY BYPASS GRAFT (CABG);  Surgeon: Gillian Clayton MD;  Location: [facility] OR;  Service: Cardiothoracic;  Laterality: N/A;  ECHO NOTIFIED    EGD, WITH CLOSED BIOPSY N/A 11/20/2023    Procedure: EGD, WITH CLOSED BIOPSY;  Surgeon: Christina James MD;  Location: [facility] ENDOSCOPY;  Service: Gastroenterology;  Laterality: N/A;    ENDOSCOPIC HARVEST OF VEIN Left 12/05/2024    Procedure: HARVEST-VEIN-ENDOVASCULAR;  Surgeon: Gillian Clayton MD;  Location: [facility] OR;  Service: Cardiothoracic;  Laterality: Left;    ESOPHAGOGASTRODUODENOSCOPY N/A 06/07/2021    EXCISION OF ARTERIOVENOUS FISTULA N/A 06/01/2018    FRACTIONAL FLOW RESERVE (FFR), CORONARY  04/10/2023    Procedure: Fractional Flow [place] (FFR), Coronary;  Surgeon: Jaswant Pugh MD;  Location: [facility] CATH LAB;  Service: Cardiology;;    HERNIA REPAIR      INSPECTION OF UPPER INTESTINAL TRACT N/A 06/07/2021    OCCLUSION OF LEFT ATRIAL APPENDAGE Left 12/05/2024    Procedure: Left atrial appendage occlusion;  Surgeon: Gillian Clayton MD;  Location: [facility] OR;  Service: Cardiothoracic;  Laterality: Left;    PHERESIS OF PLASMA N/A 07/13/2018    PHERESIS OF PLASMA N/A 05/25/2018    TRIAL OF SPINAL CORD NERVE STIMULATOR N/A 05/12/2022    Procedure: TRIAL, NEUROSTIMULATOR, SPINAL CORD;  Surgeon: Jay Pozo MD;  Location: [facility];  Service: Neurosurgery;  Laterality: N/A;    UPPER GI N/A 06/07/2021      Social History     Socioeconomic History    Marital status: Single   Tobacco Use    Smoking status: Every Day     Current packs/day: 2.00     Average packs/day: 2.6 packs/day for 32.9 years (84.3 ttl pk-yrs)     Types: Cigarettes     Start date: 1992     Last attempt to quit: 01/1999     Passive exposure: Current    Smokeless tobacco: Former     Types: Chew    Tobacco comments:     Pt is smoking 20-40 cpd   Substance and Sexual Activity    Alcohol use: Not Currently    Drug use: Never    Sexual  activity: Yes     Partners: Female     Social Drivers of Health     Financial Resource Strain: Patient Declined (12/7/2024)    Overall Financial Resource Strain (CARDIA)     Difficulty of Paying Living Expenses: Patient declined   Food Insecurity: Patient Declined (12/7/2024)    Hunger Vital Sign     Worried About Running Out of Food in the Last Year: Patient declined     Ran Out of Food in the Last Year: Patient declined   Transportation Needs: Patient Declined (12/7/2024)    TRANSPORTATION NEEDS     Transportation : Patient declined   Physical Activity: Insufficiently Active (6/4/2024)    Exercise Vital Sign     Days of Exercise per Week: 7 days     Minutes of Exercise per Session: 10 min   Stress: Patient Declined (12/7/2024)    [employer] [place] [employer] - Occupational Stress Questionnaire     Feeling of Stress : Patient declined   Housing Stability: Patient Declined (12/7/2024)    Housing Stability Vital Sign     Unable to Pay for Housing in the Last Year: Patient declined     Homeless in the Last Year: Patient declined   .      Current Medications[1]    Review of Systems   Skin:  Positive for wound.   All other systems reviewed and are negative.         Labs Reviewed:   Chemistry:  Lab Results   Component Value Date    BUN 10.3 04/16/2025    BUN 9.5 02/18/2025    CREATININE 0.85 04/16/2025    CREATININE 0.88 02/18/2025    EGFRNORACEVR >60 04/16/2025    EGFRNORACEVR >60 02/18/2025    AST 71 (H) 04/16/2025    AST 45 (H) 02/18/2025    ALT 69 (H) 04/16/2025    ALT 54 02/18/2025    HGBA1C 11.1 (H) 02/18/2025        Hematology:  Lab Results   Component Value Date    WBC 13.77 (H) 02/18/2025    WBC 13.09 (H) 12/10/2024    HGB 15.5 02/18/2025    HGB 9.9 (L) 12/10/2024    HCT 46.2 02/18/2025    HCT 30.0 (L) 12/10/2024     02/18/2025     12/10/2024       Inflammatory Markers:  Lab Results   Component Value Date    HSCRP 9.98 (H) 06/23/2020    HSCRP 43.30 (H) 09/10/2019    SEDRATE 26 (H)  06/22/2022    SEDRATE 28 (H) 06/23/2020    SEDRATE 2 09/10/2019        Objective:        Physical Exam  Vitals reviewed.   Cardiovascular:      Pulses:           Dorsalis pedis pulses are 2+ on the right side and 2+ on the left side.        Posterior tibial pulses are 2+ on the right side and 2+ on the left side.   Feet:      Right foot:      Skin integrity: Dry skin present.      Toenail Condition: Right toenails are abnormally thick and long.      Left foot:      Skin integrity: Dry skin present.      Toenail Condition: Left toenails are abnormally thick and long.      Comments: Monofilament test done absence of sensation in all areas.    Right foot 5th toenail avulsion.  Skin:     General: Skin is cool and dry.      Capillary Refill: Capillary refill takes less than 2 seconds.             Comments: Right lower leg scab no drainage noted.   Neurological:      Mental Status: He is alert.                          Assessment:         ICD-10-CM ICD-9-CM   1. Encounter for diabetic foot exam  E11.9 250.00   2. Overgrown toenails  L60.2 703.8   3. Dystrophic nail  L60.3 703.8   4. Scab  R23.4 782.8   5. Primary hypertension  I10 401.9   6. Diabetic polyneuropathy associated with other specified diabetes mellitus  E13.42 250.60     357.2   7. Type 2 diabetes mellitus with hyperglycemia, with long-term current use of insulin  E11.65 250.00    Z79.4 790.29     V58.67   8. Tobacco user  Z72.0 305.1         Plan:   Tissue pathology and/or culture taken:  [] Yes [x] No   Sharp debridement performed:   [] Yes [x] No   Labs ordered this visit:   [] Yes [x] No   Imaging ordered this visit:   [] Yes [x] No         1. Encounter for diabetic foot exam     Diabetic foot exam performed.  Instructed on proper foot care.   2. Overgrown toenails     Trimmed.  Instructed on proper nail care.   3. Dystrophic nail     Debride.  Instructed on proper nail care.   4. Scab     Will paint daily with Betadine leave open air.   5. Primary  hypertension     Reinforced the danger of hypertension and medication compliance.   6. Diabetic polyneuropathy associated with other specified diabetes mellitus     High risk for injury.    Will check feet daily.      7. Type 2 diabetes mellitus with hyperglycemia, with long-term current use of insulin     Last A1c 11.1.    Reinforced the importance of compliance with diabetic medication and diet.    Co-factor in wound development and delayed wound healing.    Must have a strict diabetic diet, take glucose lowering medications as prescribed and encourage lower HA1C   8. Tobacco user     Currently on smoking cessation program.    Must stop smoking. Explained the negative impact this has on not only the wounds (delayed healing), but overall health as well.       The time spent including preparing to see the patient, obtaining patient history and assessment, evaluation of the plan of care, patient/caregiver counseling and education, orders, documentation, coordination of care, and other professional medical management activities for today's encounter was 20 minute.    Time spent performing procedures during today's encounter was 25 minute.    Follow up in about 2 weeks (around 6/10/2025) for 1:00 triple book for leg wound check.  Teaching provided on s/s to call wound clinic for promptly.  ER precautions taught for after hours and weekends.       ROCCO Sequeira          [1]   Current Outpatient Medications   Medication Sig Dispense Refill    amitriptyline (ELAVIL) 50 MG tablet TAKE ONE TABLET BY MOUTH IN THE EVENING 30 tablet 4    aspirin (ECOTRIN) 81 MG EC tablet Take 81 mg by mouth once daily.      atorvastatin (LIPITOR) 80 MG tablet Take 1 tablet (80 mg total) by mouth once daily. 90 tablet 3    cholecalciferol, vitamin D3, 125 mcg (5,000 unit) capsule Take 1 capsule (5,000 Units total) by mouth once daily. 30 capsule 11    clonazePAM (KLONOPIN) 1 MG tablet TAKE ONE TABLET BY MOUTH TWICE DAILY 60 tablet 0     cloNIDine (CATAPRES) 0.3 MG tablet Take 1 tablet (0.3 mg total) by mouth 3 (three) times daily. 270 tablet 3    clopidogreL (PLAVIX) 75 mg tablet Take 4 tablets on day 1 and then one tablet daily. 90 tablet 3    CREON 36,000-114,000- 180,000 unit CpDR TAKE ONE CAPSULE THREE TIMES DAILY 270 capsule 1    dapagliflozin propanediol (FARXIGA) 5 mg Tab tablet Take 2 tablets (10 mg total) by mouth once daily. 180 tablet 1    ergocalciferol (ERGOCALCIFEROL) 50,000 unit Cap Take 1 capsule (50,000 Units total) by mouth every 7 days. 4 capsule 2    EScitalopram oxalate (LEXAPRO) 20 MG tablet Take 20 mg by mouth once daily.      esomeprazole (NEXIUM) 40 MG capsule Take 1 capsule (40 mg total) by mouth before breakfast. 30 capsule 11    gabapentin (NEURONTIN) 800 MG tablet TAKE ONE TABLET BY MOUTH IN THE EVENING 90 tablet 3    HUMALOG U-100 INSULIN 100 unit/mL injection INJECT 25 UNITS SUBCUTANEOUSLY BEFORE MEALS THREE TIMES DAILY 10 mL 3    HYDROmorphone (DILAUDID) 8 MG tablet Take 1 tablet (8 mg total) by mouth every 8 (eight) hours as needed. 90 tablet 0    icosapent ethyL (VASCEPA) 1 gram Cap TAKE TWO CAPSULES BY MOUTH TWICE DAILY 60 capsule 3    isosorbide mononitrate (IMDUR) 120 MG 24 hr tablet Take 1 tablet (120 mg total) by mouth once daily. 90 tablet 3    linaGLIPtin (TRADJENTA) 5 mg Tab tablet Take 1 tablet (5 mg total) by mouth once daily. 90 tablet 3    losartan (COZAAR) 100 MG tablet Take 1 tablet (100 mg total) by mouth once daily. 90 tablet 3    metoprolol succinate (TOPROL-XL) 25 MG 24 hr tablet Take 1 tablet (25 mg total) by mouth once daily. 90 tablet 3    morphine (MS CONTIN) 100 MG 12 hr tablet TAKE ONE TABLET BY MOUTH THREE TIMES DAILY 90 tablet 0    nicotine (NICODERM CQ) 21 mg/24 hr Place 1 patch onto the skin once daily. 28 patch 0    NIFEdipine (PROCARDIA-XL) 60 MG (OSM) 24 hr tablet Take 1 tablet (60 mg total) by mouth 2 (two) times a day. 180 tablet 3    nitroGLYCERIN (NITROSTAT) 0.3 MG SL tablet Place  "1 tablet (0.3 mg total) under the tongue every 5 (five) minutes as needed for Chest pain (go to er after 3 doses). 30 tablet 6    nut.tx.gluc intol,lf,soy-fiber (GLUCERNA 1.5 CEDRIC) 0.08-1.5 gram-kcal/mL Liqd Take 273 mLs by mouth 2 (two) times a day. 60 each 4    OXcarbazepine (TRILEPTAL) 600 MG Tab Take 1 tablet (600 mg total) by mouth 2 (two) times daily. 60 tablet 11    potassium chloride SA (K-DUR,KLOR-CON) 20 MEQ tablet Take 1 tablet (20 mEq total) by mouth 2 (two) times daily. 180 tablet 3    pregabalin (LYRICA) 150 MG capsule Take 1 capsule (150 mg total) by mouth 3 (three) times daily. 90 capsule 3    ranolazine (RANEXA) 500 MG Tb12 Take 1 tablet (500 mg total) by mouth 2 (two) times daily. 180 tablet 3    REPATHA SURECLICK 140 mg/mL PnIj INJECT 1ml INTRAMUSCULARLY EVERY 14 DAYS 2 mL 11    sucralfate (CARAFATE) 100 mg/mL suspension Take 10 mLs (1 g total) by mouth 4 (four) times daily before meals and nightly. 1200 mL 3    SURE COMFORT INSULIN SYRINGE 0.5 mL 31 gauge x 5/16" Syrg USE ONE SYRINGE THREE TIMES DAILY 100 each 3    tamsulosin (FLOMAX) 0.4 mg Cap TAKE ONE CAPSULE BY MOUTH EVERY DAY 90 capsule 3    torsemide (DEMADEX) 20 MG Tab Take 1 tablet (20 mg total) by mouth once daily. 90 tablet 3    TOUJEO SOLOSTAR U-300 INSULIN 300 unit/mL (1.5 mL) InPn pen INJECT 35 UNITS SUBCUTANEOUSLY TWICE DAILY 6 mL 0    TOUJEO SOLOSTAR U-300 INSULIN 300 unit/mL (1.5 mL) InPn pen INJECT 35 SUBCUTANEOUSLY TWICE DAILY 4.5 mL 3     Current Facility-Administered Medications   Medication Dose Route Frequency Provider Last Rate Last Admin    triamcinolone acetonide 0.1% ointment   Topical (Top) BID Malina Brito, FNP         "

## 2025-05-28 ENCOUNTER — HOSPITAL ENCOUNTER (OUTPATIENT)
Dept: CARDIOLOGY | Facility: HOSPITAL | Age: 45
Discharge: HOME | End: 2025-05-28
Attending: INTERNAL MEDICINE
Payer: MEDICARE

## 2025-05-28 VITALS
SYSTOLIC BLOOD PRESSURE: 185 MMHG | DIASTOLIC BLOOD PRESSURE: 88 MMHG | BODY MASS INDEX: 28.34 KG/M2 | HEIGHT: 68 IN | WEIGHT: 187 LBS

## 2025-05-28 DIAGNOSIS — I25.118 CORONARY ARTERY DISEASE OF NATIVE ARTERY OF NATIVE HEART WITH STABLE ANGINA PECTORIS: ICD-10-CM

## 2025-05-28 DIAGNOSIS — I20.81 ANGINA PECTORIS WITH CORONARY MICROVASCULAR DYSFUNCTION: ICD-10-CM

## 2025-05-28 LAB
AORTIC SIZE INDEX (SOV): 1.7 CM/M2
APICAL FOUR CHAMBER EJECTION FRACTION: 73 %
APICAL TWO CHAMBER EJECTION FRACTION: 57 %
AV INDEX (PROSTH): 0.89
AV MEAN GRADIENT: 2 MMHG
AV PEAK GRADIENT: 3 MMHG
AV VALVE AREA BY VELOCITY RATIO: 4.4 CM²
AV VALVE AREA: 4.4 CM²
AV VELOCITY RATIO: 0.89
BSA FOR ECHO PROCEDURE: 2.02 M2
CV ECHO LV RWT: 0.46 CM
DOP CALC AO PEAK VEL: 0.9 M/S
DOP CALC AO VTI: 24 CM
DOP CALC LVOT AREA: 4.9 CM2
DOP CALC LVOT DIAMETER: 2.5 CM
DOP CALC LVOT PEAK VEL: 0.8 M/S
DOP CALC LVOT STROKE VOLUME: 104.5 CM3
DOP CALC MV VTI: 31.3 CM
DOP CALCLVOT PEAK VEL VTI: 21.3 CM
E WAVE DECELERATION TIME: 237 MSEC
E/A RATIO: 1.16
E/E' RATIO: 10 M/S
ECHO LV POSTERIOR WALL: 1.1 CM (ref 0.6–1.1)
FRACTIONAL SHORTENING: 35.4 % (ref 28–44)
HR MV ECHO: 67 BPM
INTERVENTRICULAR SEPTUM: 1.6 CM (ref 0.6–1.1)
LEFT ATRIUM SIZE: 4.3 CM
LEFT ATRIUM VOLUME INDEX MOD: 34 ML/M2
LEFT ATRIUM VOLUME MOD: 67 ML
LEFT INTERNAL DIMENSION IN SYSTOLE: 3.1 CM (ref 2.1–4)
LEFT VENTRICLE DIASTOLIC VOLUME INDEX: 52.76 ML/M2
LEFT VENTRICLE DIASTOLIC VOLUME: 105 ML
LEFT VENTRICLE END DIASTOLIC VOLUME APICAL 2 CHAMBER: 97.51 ML
LEFT VENTRICLE END DIASTOLIC VOLUME APICAL 4 CHAMBER: 144.05 ML
LEFT VENTRICLE MASS INDEX: 130.4 G/M2
LEFT VENTRICLE SYSTOLIC VOLUME INDEX: 18.6 ML/M2
LEFT VENTRICLE SYSTOLIC VOLUME: 37 ML
LEFT VENTRICULAR INTERNAL DIMENSION IN DIASTOLE: 4.8 CM (ref 3.5–6)
LEFT VENTRICULAR MASS: 259.6 G
LV LATERAL E/E' RATIO: 8.1 M/S
LV SEPTAL E/E' RATIO: 11.6 M/S
LVED V (TEICH): 104.79 ML
LVES V (TEICH): 37.19 ML
LVOT MG: 1.57 MMHG
LVOT MV: 0.58 CM/S
MV MEAN GRADIENT: 1 MMHG
MV PEAK A VEL: 0.7 M/S
MV PEAK E VEL: 0.81 M/S
MV PEAK GRADIENT: 3 MMHG
MV STENOSIS PRESSURE HALF TIME: 68.74 MS
MV VALVE AREA BY CONTINUITY EQUATION: 3.34 CM2
MV VALVE AREA P 1/2 METHOD: 3.2 CM2
OHS CV RV/LV RATIO: 0.58 CM
OHS LV EJECTION FRACTION SIMPSONS BIPLANE MOD: 70 %
PISA TR MAX VEL: 1.4 M/S
RA PRESSURE ESTIMATED: 3 MMHG
RIGHT VENTRICLE DIASTOLIC BASEL DIMENSION: 2.8 CM
RIGHT VENTRICULAR END-DIASTOLIC DIMENSION: 2.84 CM
RV TB RVSP: 4 MMHG
SINUS: 3.4 CM
TDI LATERAL: 0.1 M/S
TDI SEPTAL: 0.07 M/S
TDI: 0.09 M/S
TR MAX PG: 7 MMHG
TRICUSPID ANNULAR PLANE SYSTOLIC EXCURSION: 2.4 CM
TV REST PULMONARY ARTERY PRESSURE: 11 MMHG
Z-SCORE OF LEFT VENTRICULAR DIMENSION IN END DIASTOLE: -1.84
Z-SCORE OF LEFT VENTRICULAR DIMENSION IN END SYSTOLE: -1.06

## 2025-05-28 PROCEDURE — 93306 TTE W/DOPPLER COMPLETE: CPT

## 2025-06-09 ENCOUNTER — TELEPHONE (OUTPATIENT)
Dept: INTERNAL MEDICINE | Facility: CLINIC | Age: 45
End: 2025-06-09
Payer: MEDICARE

## 2025-06-09 DIAGNOSIS — G89.4 CHRONIC PAIN SYNDROME: ICD-10-CM

## 2025-06-09 DIAGNOSIS — E08.42 DIABETIC POLYNEUROPATHY ASSOCIATED WITH DIABETES MELLITUS DUE TO UNDERLYING CONDITION: ICD-10-CM

## 2025-06-09 RX ORDER — MORPHINE SULFATE 100 MG/1
100 TABLET, FILM COATED, EXTENDED RELEASE ORAL 3 TIMES DAILY
Qty: 90 TABLET | Refills: 0 | Status: SHIPPED | OUTPATIENT
Start: 2025-06-09

## 2025-06-09 RX ORDER — CLONAZEPAM 1 MG/1
1 TABLET ORAL 2 TIMES DAILY
Qty: 60 TABLET | Refills: 0 | Status: SHIPPED | OUTPATIENT
Start: 2025-06-09

## 2025-06-10 ENCOUNTER — HOSPITAL ENCOUNTER (OUTPATIENT)
Dept: WOUND CARE | Facility: HOSPITAL | Age: 45
Discharge: HOME | End: 2025-06-10
Attending: NURSE PRACTITIONER | Admitting: INTERNAL MEDICINE
Payer: MEDICARE

## 2025-06-10 ENCOUNTER — OFFICE VISIT (OUTPATIENT)
Dept: CARDIAC SURGERY | Facility: CLINIC | Age: 45
End: 2025-06-10
Payer: MEDICARE

## 2025-06-10 ENCOUNTER — TELEPHONE (OUTPATIENT)
Dept: SMOKING CESSATION | Facility: CLINIC | Age: 45
End: 2025-06-10
Payer: MEDICARE

## 2025-06-10 ENCOUNTER — HOSPITAL ENCOUNTER (INPATIENT)
Facility: HOSPITAL | Age: 45
LOS: 1 days | Discharge: HOME | DRG: 637 | End: 2025-06-13
Attending: INTERNAL MEDICINE | Admitting: INTERNAL MEDICINE
Payer: MEDICARE

## 2025-06-10 ENCOUNTER — OFFICE VISIT (OUTPATIENT)
Dept: INTERNAL MEDICINE | Facility: CLINIC | Age: 45
End: 2025-06-10
Payer: MEDICARE

## 2025-06-10 VITALS
DIASTOLIC BLOOD PRESSURE: 97 MMHG | SYSTOLIC BLOOD PRESSURE: 161 MMHG | HEART RATE: 89 BPM | OXYGEN SATURATION: 98 % | WEIGHT: 179.38 LBS | RESPIRATION RATE: 20 BRPM | HEIGHT: 68 IN | BODY MASS INDEX: 27.19 KG/M2

## 2025-06-10 VITALS
HEART RATE: 69 BPM | DIASTOLIC BLOOD PRESSURE: 82 MMHG | WEIGHT: 185.63 LBS | OXYGEN SATURATION: 99 % | BODY MASS INDEX: 28.13 KG/M2 | RESPIRATION RATE: 18 BRPM | SYSTOLIC BLOOD PRESSURE: 150 MMHG | TEMPERATURE: 98 F | HEIGHT: 68 IN

## 2025-06-10 VITALS
RESPIRATION RATE: 18 BRPM | TEMPERATURE: 98 F | HEART RATE: 79 BPM | SYSTOLIC BLOOD PRESSURE: 152 MMHG | DIASTOLIC BLOOD PRESSURE: 78 MMHG | OXYGEN SATURATION: 98 %

## 2025-06-10 DIAGNOSIS — S90.852A: Primary | ICD-10-CM

## 2025-06-10 DIAGNOSIS — R35.1 NOCTURIA: ICD-10-CM

## 2025-06-10 DIAGNOSIS — Z87.19 HISTORY OF PANCREATITIS: ICD-10-CM

## 2025-06-10 DIAGNOSIS — B35.1 ONYCHOMYCOSIS OF MULTIPLE TOENAILS WITH TYPE 2 DIABETES MELLITUS: ICD-10-CM

## 2025-06-10 DIAGNOSIS — E78.2 MIXED HYPERLIPIDEMIA: ICD-10-CM

## 2025-06-10 DIAGNOSIS — K86.89 MASS OF HEAD OF PANCREAS: ICD-10-CM

## 2025-06-10 DIAGNOSIS — E11.622 TYPE 2 DIABETES MELLITUS WITH OTHER SKIN ULCER, WITH LONG-TERM CURRENT USE OF INSULIN: Primary | ICD-10-CM

## 2025-06-10 DIAGNOSIS — E11.9 ENCOUNTER FOR DIABETIC FOOT EXAM: ICD-10-CM

## 2025-06-10 DIAGNOSIS — Z79.4 TYPE 2 DIABETES MELLITUS WITH OTHER SKIN ULCER, WITH LONG-TERM CURRENT USE OF INSULIN: Primary | ICD-10-CM

## 2025-06-10 DIAGNOSIS — K21.00 GASTROESOPHAGEAL REFLUX DISEASE WITH ESOPHAGITIS WITHOUT HEMORRHAGE: ICD-10-CM

## 2025-06-10 DIAGNOSIS — I25.118 CORONARY ARTERY DISEASE OF NATIVE ARTERY OF NATIVE HEART WITH STABLE ANGINA PECTORIS: ICD-10-CM

## 2025-06-10 DIAGNOSIS — Z95.1 HX OF CABG: ICD-10-CM

## 2025-06-10 DIAGNOSIS — Z72.0 TOBACCO USER: ICD-10-CM

## 2025-06-10 DIAGNOSIS — R29.6 FALLS FREQUENTLY: Primary | ICD-10-CM

## 2025-06-10 DIAGNOSIS — G83.10 PARESIS OF SINGLE LOWER EXTREMITY: ICD-10-CM

## 2025-06-10 DIAGNOSIS — E13.42 DIABETIC POLYNEUROPATHY ASSOCIATED WITH OTHER SPECIFIED DIABETES MELLITUS: ICD-10-CM

## 2025-06-10 DIAGNOSIS — K72.10 CHRONIC LIVER FAILURE WITHOUT HEPATIC COMA: ICD-10-CM

## 2025-06-10 DIAGNOSIS — R60.0 BILATERAL EDEMA OF LOWER EXTREMITY: ICD-10-CM

## 2025-06-10 DIAGNOSIS — T85.192D MALFUNCTION OF SPINAL CORD STIMULATOR, SUBSEQUENT ENCOUNTER: ICD-10-CM

## 2025-06-10 DIAGNOSIS — N32.0 BLADDER OUTFLOW OBSTRUCTION: ICD-10-CM

## 2025-06-10 DIAGNOSIS — R23.4 SCAB: ICD-10-CM

## 2025-06-10 DIAGNOSIS — E11.69 ONYCHOMYCOSIS OF MULTIPLE TOENAILS WITH TYPE 2 DIABETES MELLITUS: ICD-10-CM

## 2025-06-10 DIAGNOSIS — E87.6 HYPOKALEMIA: ICD-10-CM

## 2025-06-10 DIAGNOSIS — E11.65 TYPE 2 DIABETES MELLITUS WITH HYPERGLYCEMIA, WITH LONG-TERM CURRENT USE OF INSULIN: ICD-10-CM

## 2025-06-10 DIAGNOSIS — Z86.0100 HISTORY OF COLONIC POLYPS: ICD-10-CM

## 2025-06-10 DIAGNOSIS — Z72.0 TOBACCO USER: Chronic | ICD-10-CM

## 2025-06-10 DIAGNOSIS — Z74.09 IMPAIRED FUNCTIONAL MOBILITY, BALANCE, GAIT, AND ENDURANCE: Chronic | ICD-10-CM

## 2025-06-10 DIAGNOSIS — K76.0 METABOLIC DYSFUNCTION-ASSOCIATED STEATOTIC LIVER DISEASE (MASLD): ICD-10-CM

## 2025-06-10 DIAGNOSIS — R63.4 ABNORMAL WEIGHT LOSS: ICD-10-CM

## 2025-06-10 DIAGNOSIS — F17.200 NICOTINE DEPENDENCE: ICD-10-CM

## 2025-06-10 DIAGNOSIS — H90.0 CONDUCTIVE HEARING LOSS, BILATERAL: ICD-10-CM

## 2025-06-10 DIAGNOSIS — I21.4 NSTEMI (NON-ST ELEVATED MYOCARDIAL INFARCTION): ICD-10-CM

## 2025-06-10 DIAGNOSIS — H53.8 BLURRED VISION, RIGHT EYE: ICD-10-CM

## 2025-06-10 DIAGNOSIS — E11.40 PAINFUL DIABETIC NEUROPATHY: Chronic | ICD-10-CM

## 2025-06-10 DIAGNOSIS — K52.9 CHRONIC DIARRHEA OF UNKNOWN ORIGIN: ICD-10-CM

## 2025-06-10 DIAGNOSIS — R10.9 LEFT FLANK PAIN: ICD-10-CM

## 2025-06-10 DIAGNOSIS — E78.1 FAMILIAL HYPERTRIGLYCERIDEMIA: ICD-10-CM

## 2025-06-10 DIAGNOSIS — L60.2 OVERGROWN TOENAILS: ICD-10-CM

## 2025-06-10 DIAGNOSIS — L97.421 ULCER OF LEFT HEEL AND MIDFOOT, LIMITED TO BREAKDOWN OF SKIN: ICD-10-CM

## 2025-06-10 DIAGNOSIS — R20.2 HAND PARESTHESIA: ICD-10-CM

## 2025-06-10 DIAGNOSIS — E11.65 UNCONTROLLED DIABETES MELLITUS WITH HYPERGLYCEMIA: ICD-10-CM

## 2025-06-10 DIAGNOSIS — I70.0 AORTIC ATHEROSCLEROSIS: ICD-10-CM

## 2025-06-10 DIAGNOSIS — K86.0 ALCOHOL-INDUCED CHRONIC PANCREATITIS: ICD-10-CM

## 2025-06-10 DIAGNOSIS — F33.2 MAJOR DEPRESSIVE DISORDER, RECURRENT SEVERE WITHOUT PSYCHOTIC FEATURES: ICD-10-CM

## 2025-06-10 DIAGNOSIS — E11.65 UNCONTROLLED TYPE 2 DIABETES MELLITUS WITH HYPERGLYCEMIA: ICD-10-CM

## 2025-06-10 DIAGNOSIS — I10 PRIMARY HYPERTENSION: ICD-10-CM

## 2025-06-10 DIAGNOSIS — L98.8 ACQUIRED PERFORATING DERMATOSIS: ICD-10-CM

## 2025-06-10 DIAGNOSIS — E44.0 MODERATE MALNUTRITION: ICD-10-CM

## 2025-06-10 DIAGNOSIS — E08.42 DIABETIC POLYNEUROPATHY ASSOCIATED WITH DIABETES MELLITUS DUE TO UNDERLYING CONDITION: ICD-10-CM

## 2025-06-10 DIAGNOSIS — Z79.4 TYPE 2 DIABETES MELLITUS WITH HYPERGLYCEMIA, WITH LONG-TERM CURRENT USE OF INSULIN: ICD-10-CM

## 2025-06-10 DIAGNOSIS — I25.118 CORONARY ARTERY DISEASE OF NATIVE ARTERY OF NATIVE HEART WITH STABLE ANGINA PECTORIS: Primary | ICD-10-CM
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
## 2025-06-10 DIAGNOSIS — R07.9 CHEST PAIN: ICD-10-CM

## 2025-06-10 DIAGNOSIS — G58.7 MONONEURITIS MULTIPLEX: ICD-10-CM

## 2025-06-10 DIAGNOSIS — R29.6 FALLS FREQUENTLY: ICD-10-CM

## 2025-06-10 DIAGNOSIS — G89.4 CHRONIC PAIN SYNDROME: Chronic | ICD-10-CM

## 2025-06-10 LAB
BACTERIA #/AREA URNS AUTO: ABNORMAL /HPF
BASOPHILS # BLD AUTO: 0.07 X10(3)/MCL
BASOPHILS NFR BLD AUTO: 0.7 %
BILIRUB UR QL STRIP.AUTO: NEGATIVE
CLARITY UR: CLEAR
COLOR UR AUTO: ABNORMAL
EOSINOPHIL # BLD AUTO: 0.46 X10(3)/MCL (ref 0–0.9)
EOSINOPHIL NFR BLD AUTO: 4.4 %
ERYTHROCYTE [DISTWIDTH] IN BLOOD BY AUTOMATED COUNT: 12.3 % (ref 11.5–17)
EST. AVERAGE GLUCOSE BLD GHB EST-MCNC: 292 MG/DL
GLUCOSE UR QL STRIP: ABNORMAL
HBA1C MFR BLD: 11.8 %
HBA1C MFR BLD: 12.6 %
HCT VFR BLD AUTO: 38.5 % (ref 42–52)
HGB BLD-MCNC: 13.6 G/DL (ref 14–18)
HGB UR QL STRIP: ABNORMAL
HOLD SPECIMEN: NORMAL
HYALINE CASTS #/AREA URNS LPF: ABNORMAL /LPF
IMM GRANULOCYTES # BLD AUTO: 0.04 X10(3)/MCL (ref 0–0.04)
IMM GRANULOCYTES NFR BLD AUTO: 0.4 %
KETONES UR QL STRIP: NEGATIVE
LEUKOCYTE ESTERASE UR QL STRIP: NEGATIVE
LYMPHOCYTES # BLD AUTO: 3.11 X10(3)/MCL (ref 0.6–4.6)
LYMPHOCYTES NFR BLD AUTO: 29.8 %
MCH RBC QN AUTO: 30.2 PG (ref 27–31)
MCHC RBC AUTO-ENTMCNC: 35.3 G/DL (ref 33–36)
MCV RBC AUTO: 85.6 FL (ref 80–94)
MONOCYTES # BLD AUTO: 1.11 X10(3)/MCL (ref 0.1–1.3)
MONOCYTES NFR BLD AUTO: 10.7 %
NEUTROPHILS # BLD AUTO: 5.63 X10(3)/MCL (ref 2.1–9.2)
NEUTROPHILS NFR BLD AUTO: 54 %
NITRITE UR QL STRIP: NEGATIVE
NRBC BLD AUTO-RTO: 0 %
PH UR STRIP: 6 [PH]
PLATELET # BLD AUTO: 211 X10(3)/MCL (ref 130–400)
PMV BLD AUTO: 11.4 FL (ref 7.4–10.4)
POCT GLUCOSE: 75 MG/DL (ref 70–110)
PROT UR QL STRIP: ABNORMAL
RBC # BLD AUTO: 4.5 X10(6)/MCL (ref 4.7–6.1)
RBC #/AREA URNS AUTO: ABNORMAL /HPF
SP GR UR STRIP.AUTO: 1.01 (ref 1–1.03)
SQUAMOUS #/AREA URNS LPF: ABNORMAL /HPF
TROPONIN I SERPL-MCNC: <0.01 NG/ML (ref 0–0.04)
UROBILINOGEN UR STRIP-ACNC: NORMAL
WBC # BLD AUTO: 10.42 X10(3)/MCL (ref 4.5–11.5)
WBC #/AREA URNS AUTO: ABNORMAL /HPF

## 2025-06-10 PROCEDURE — 83036 HEMOGLOBIN GLYCOSYLATED A1C: CPT | Mod: PBBFAC | Performed by: INTERNAL MEDICINE

## 2025-06-10 PROCEDURE — 80053 COMPREHEN METABOLIC PANEL: CPT

## 2025-06-10 PROCEDURE — 27000999 HC MEDICAL RECORD PHOTO DOCUMENTATION

## 2025-06-10 PROCEDURE — G0379 DIRECT REFER HOSPITAL OBSERV: HCPCS

## 2025-06-10 PROCEDURE — 84100 ASSAY OF PHOSPHORUS: CPT

## 2025-06-10 PROCEDURE — 36415 COLL VENOUS BLD VENIPUNCTURE: CPT | Performed by: INTERNAL MEDICINE

## 2025-06-10 PROCEDURE — 85025 COMPLETE CBC W/AUTO DIFF WBC: CPT

## 2025-06-10 PROCEDURE — 99215 OFFICE O/P EST HI 40 MIN: CPT | Mod: PBBFAC,27 | Performed by: INTERNAL MEDICINE

## 2025-06-10 PROCEDURE — 99213 OFFICE O/P EST LOW 20 MIN: CPT | Mod: ,,, | Performed by: NURSE PRACTITIONER

## 2025-06-10 PROCEDURE — G0378 HOSPITAL OBSERVATION PER HR: HCPCS

## 2025-06-10 PROCEDURE — 83690 ASSAY OF LIPASE: CPT

## 2025-06-10 PROCEDURE — 94761 N-INVAS EAR/PLS OXIMETRY MLT: CPT

## 2025-06-10 PROCEDURE — 81001 URINALYSIS AUTO W/SCOPE: CPT

## 2025-06-10 PROCEDURE — 83036 HEMOGLOBIN GLYCOSYLATED A1C: CPT | Performed by: INTERNAL MEDICINE

## 2025-06-10 PROCEDURE — 25000003 PHARM REV CODE 250

## 2025-06-10 PROCEDURE — 93005 ELECTROCARDIOGRAM TRACING: CPT

## 2025-06-10 PROCEDURE — 80061 LIPID PANEL: CPT

## 2025-06-10 PROCEDURE — 83735 ASSAY OF MAGNESIUM: CPT

## 2025-06-10 PROCEDURE — 99211 OFF/OP EST MAY X REQ PHY/QHP: CPT

## 2025-06-10 PROCEDURE — 84484 ASSAY OF TROPONIN QUANT: CPT

## 2025-06-10 PROCEDURE — 36415 COLL VENOUS BLD VENIPUNCTURE: CPT

## 2025-06-10 RX ORDER — ONDANSETRON 4 MG/1
4 TABLET, ORALLY DISINTEGRATING ORAL EVERY 8 HOURS PRN
Status: DISCONTINUED | OUTPATIENT
Start: 2025-06-10 | End: 2025-06-13 | Stop reason: HOSPADM

## 2025-06-10 RX ORDER — INSULIN ASPART 100 [IU]/ML
0-10 INJECTION, SOLUTION INTRAVENOUS; SUBCUTANEOUS
Status: DISCONTINUED | OUTPATIENT
Start: 2025-06-10 | End: 2025-06-10

## 2025-06-10 RX ORDER — IBUPROFEN 200 MG
16 TABLET ORAL
Status: DISCONTINUED | OUTPATIENT
Start: 2025-06-10 | End: 2025-06-10

## 2025-06-10 RX ORDER — IBUPROFEN 200 MG
1 TABLET ORAL DAILY
Qty: 28 PATCH | Refills: 0 | Status: SHIPPED | OUTPATIENT
Start: 2025-06-10

## 2025-06-10 RX ORDER — IBUPROFEN 200 MG
24 TABLET ORAL
Status: DISCONTINUED | OUTPATIENT
Start: 2025-06-10 | End: 2025-06-10

## 2025-06-10 RX ORDER — IBUPROFEN 200 MG
24 TABLET ORAL
Status: DISCONTINUED | OUTPATIENT
Start: 2025-06-10 | End: 2025-06-13 | Stop reason: HOSPADM

## 2025-06-10 RX ORDER — GLUCAGON 1 MG
1 KIT INJECTION
Status: DISCONTINUED | OUTPATIENT
Start: 2025-06-10 | End: 2025-06-10

## 2025-06-10 RX ORDER — INSULIN GLARGINE 100 [IU]/ML
40 INJECTION, SOLUTION SUBCUTANEOUS DAILY
Status: DISCONTINUED | OUTPATIENT
Start: 2025-06-11 | End: 2025-06-13 | Stop reason: HOSPADM

## 2025-06-10 RX ORDER — INSULIN ASPART 100 [IU]/ML
0-15 INJECTION, SOLUTION INTRAVENOUS; SUBCUTANEOUS
Status: DISCONTINUED | OUTPATIENT
Start: 2025-06-10 | End: 2025-06-10

## 2025-06-10 RX ORDER — HYDROMORPHONE HYDROCHLORIDE 2 MG/1
8 TABLET ORAL EVERY 8 HOURS PRN
Refills: 0 | Status: DISCONTINUED | OUTPATIENT
Start: 2025-06-10 | End: 2025-06-11

## 2025-06-10 RX ORDER — INSULIN ASPART 100 [IU]/ML
0-15 INJECTION, SOLUTION INTRAVENOUS; SUBCUTANEOUS
Status: DISCONTINUED | OUTPATIENT
Start: 2025-06-10 | End: 2025-06-13 | Stop reason: HOSPADM

## 2025-06-10 RX ORDER — IBUPROFEN 200 MG
1 TABLET ORAL DAILY
Status: DISCONTINUED | OUTPATIENT
Start: 2025-06-11 | End: 2025-06-13 | Stop reason: HOSPADM

## 2025-06-10 RX ORDER — INSULIN GLARGINE 100 [IU]/ML
40 INJECTION, SOLUTION SUBCUTANEOUS NIGHTLY
Status: DISCONTINUED | OUTPATIENT
Start: 2025-06-10 | End: 2025-06-10

## 2025-06-10 RX ORDER — INSULIN GLARGINE 100 [IU]/ML
40 INJECTION, SOLUTION SUBCUTANEOUS DAILY
Status: DISCONTINUED | OUTPATIENT
Start: 2025-06-11 | End: 2025-06-10

## 2025-06-10 RX ORDER — OXCARBAZEPINE 300 MG/1
600 TABLET, FILM COATED ORAL 2 TIMES DAILY
Status: DISCONTINUED | OUTPATIENT
Start: 2025-06-10 | End: 2025-06-13 | Stop reason: HOSPADM

## 2025-06-10 RX ORDER — SODIUM CHLORIDE 0.9 % (FLUSH) 0.9 %
10 SYRINGE (ML) INJECTION
Status: DISCONTINUED | OUTPATIENT
Start: 2025-06-10 | End: 2025-06-13 | Stop reason: HOSPADM

## 2025-06-10 RX ORDER — INSULIN GLARGINE 100 [IU]/ML
40 INJECTION, SOLUTION SUBCUTANEOUS NIGHTLY
Status: DISCONTINUED | OUTPATIENT
Start: 2025-06-10 | End: 2025-06-13 | Stop reason: HOSPADM

## 2025-06-10 RX ORDER — CLONAZEPAM 1 MG/1
1 TABLET ORAL 2 TIMES DAILY
Status: DISCONTINUED | OUTPATIENT
Start: 2025-06-10 | End: 2025-06-11

## 2025-06-10 RX ORDER — ISOSORBIDE MONONITRATE 30 MG/1
120 TABLET, EXTENDED RELEASE ORAL DAILY
Status: DISCONTINUED | OUTPATIENT
Start: 2025-06-11 | End: 2025-06-13 | Stop reason: HOSPADM

## 2025-06-10 RX ORDER — IBUPROFEN 200 MG
16 TABLET ORAL
Status: DISCONTINUED | OUTPATIENT
Start: 2025-06-10 | End: 2025-06-13 | Stop reason: HOSPADM

## 2025-06-10 RX ORDER — AMITRIPTYLINE HYDROCHLORIDE 50 MG/1
50 TABLET, FILM COATED ORAL NIGHTLY
Status: DISCONTINUED | OUTPATIENT
Start: 2025-06-10 | End: 2025-06-11

## 2025-06-10 RX ORDER — NIFEDIPINE 30 MG/1
60 TABLET, EXTENDED RELEASE ORAL 2 TIMES DAILY
Status: DISCONTINUED | OUTPATIENT
Start: 2025-06-10 | End: 2025-06-11

## 2025-06-10 RX ORDER — TALC
6 POWDER (GRAM) TOPICAL NIGHTLY PRN
Status: DISCONTINUED | OUTPATIENT
Start: 2025-06-10 | End: 2025-06-13 | Stop reason: HOSPADM

## 2025-06-10 RX ORDER — INSULIN ASPART 100 [IU]/ML
20 INJECTION, SOLUTION INTRAVENOUS; SUBCUTANEOUS
Status: DISCONTINUED | OUTPATIENT
Start: 2025-06-11 | End: 2025-06-10

## 2025-06-10 RX ORDER — SODIUM CHLORIDE 0.9 % (FLUSH) 0.9 %
10 SYRINGE (ML) INJECTION
Status: CANCELLED | OUTPATIENT
Start: 2025-06-10

## 2025-06-10 RX ORDER — ASPIRIN 81 MG/1
81 TABLET ORAL DAILY
Status: DISCONTINUED | OUTPATIENT
Start: 2025-06-11 | End: 2025-06-13 | Stop reason: HOSPADM

## 2025-06-10 RX ORDER — ACETAMINOPHEN 325 MG/1
650 TABLET ORAL EVERY 8 HOURS PRN
Status: DISCONTINUED | OUTPATIENT
Start: 2025-06-10 | End: 2025-06-13 | Stop reason: HOSPADM

## 2025-06-10 RX ORDER — NITROGLYCERIN 0.4 MG/1
0.4 TABLET SUBLINGUAL EVERY 5 MIN PRN
Status: DISCONTINUED | OUTPATIENT
Start: 2025-06-10 | End: 2025-06-13 | Stop reason: HOSPADM

## 2025-06-10 RX ORDER — ENOXAPARIN SODIUM 100 MG/ML
40 INJECTION SUBCUTANEOUS EVERY 24 HOURS
Status: CANCELLED | OUTPATIENT
Start: 2025-06-11

## 2025-06-10 RX ORDER — TAMSULOSIN HYDROCHLORIDE 0.4 MG/1
1 CAPSULE ORAL DAILY
Status: DISCONTINUED | OUTPATIENT
Start: 2025-06-11 | End: 2025-06-13 | Stop reason: HOSPADM

## 2025-06-10 RX ORDER — RANOLAZINE 500 MG/1
500 TABLET, EXTENDED RELEASE ORAL 2 TIMES DAILY
Status: DISCONTINUED | OUTPATIENT
Start: 2025-06-10 | End: 2025-06-13 | Stop reason: HOSPADM

## 2025-06-10 RX ORDER — CLOPIDOGREL BISULFATE 75 MG/1
75 TABLET ORAL DAILY
Status: DISCONTINUED | OUTPATIENT
Start: 2025-06-11 | End: 2025-06-13 | Stop reason: HOSPADM

## 2025-06-10 RX ORDER — ESCITALOPRAM OXALATE 10 MG/1
20 TABLET ORAL DAILY
Status: DISCONTINUED | OUTPATIENT
Start: 2025-06-11 | End: 2025-06-13 | Stop reason: HOSPADM

## 2025-06-10 RX ORDER — CLONIDINE HYDROCHLORIDE 0.1 MG/1
0.3 TABLET ORAL 3 TIMES DAILY
Status: DISCONTINUED | OUTPATIENT
Start: 2025-06-10 | End: 2025-06-11

## 2025-06-10 RX ORDER — GLUCAGON 1 MG
1 KIT INJECTION
Status: DISCONTINUED | OUTPATIENT
Start: 2025-06-10 | End: 2025-06-13 | Stop reason: HOSPADM

## 2025-06-10 RX ORDER — ATORVASTATIN CALCIUM 40 MG/1
80 TABLET, FILM COATED ORAL DAILY
Status: DISCONTINUED | OUTPATIENT
Start: 2025-06-11 | End: 2025-06-13 | Stop reason: HOSPADM

## 2025-06-10 RX ORDER — GABAPENTIN 400 MG/1
800 CAPSULE ORAL NIGHTLY
Status: DISCONTINUED | OUTPATIENT
Start: 2025-06-10 | End: 2025-06-11

## 2025-06-10 RX ORDER — METOPROLOL SUCCINATE 25 MG/1
25 TABLET, EXTENDED RELEASE ORAL DAILY
Status: DISCONTINUED | OUTPATIENT
Start: 2025-06-11 | End: 2025-06-13 | Stop reason: HOSPADM

## 2025-06-10 RX ORDER — FUROSEMIDE 20 MG/1
20 TABLET ORAL DAILY
Status: DISCONTINUED | OUTPATIENT
Start: 2025-06-11 | End: 2025-06-13 | Stop reason: HOSPADM

## 2025-06-10 RX ORDER — LOSARTAN POTASSIUM 25 MG/1
100 TABLET ORAL DAILY
Status: DISCONTINUED | OUTPATIENT
Start: 2025-06-11 | End: 2025-06-13 | Stop reason: HOSPADM

## 2025-06-10 RX ORDER — PREGABALIN 50 MG/1
150 CAPSULE ORAL 3 TIMES DAILY
Status: DISCONTINUED | OUTPATIENT
Start: 2025-06-10 | End: 2025-06-11

## 2025-06-10 RX ADMIN — RANOLAZINE 500 MG: 500 TABLET, EXTENDED RELEASE ORAL at 09:06

## 2025-06-10 RX ADMIN — CLONIDINE HYDROCHLORIDE 0.3 MG: 0.1 TABLET ORAL at 09:06

## 2025-06-10 RX ADMIN — AMITRIPTYLINE HYDROCHLORIDE 50 MG: 50 TABLET, FILM COATED ORAL at 09:06

## 2025-06-10 RX ADMIN — NIFEDIPINE 60 MG: 30 TABLET, FILM COATED, EXTENDED RELEASE ORAL at 09:06

## 2025-06-10 RX ADMIN — MORPHINE SULFATE 105 MG: 15 TABLET, FILM COATED, EXTENDED RELEASE ORAL at 09:06

## 2025-06-10 RX ADMIN — OXCARBAZEPINE 600 MG: 300 TABLET, FILM COATED ORAL at 09:06

## 2025-06-10 RX ADMIN — CLONAZEPAM 1 MG: 1 TABLET ORAL at 09:06

## 2025-06-10 RX ADMIN — PREGABALIN 150 MG: 50 CAPSULE ORAL at 09:06

## 2025-06-10 NOTE — PATIENT INSTRUCTIONS
Pt seen today by: Malina Brito BEATRIZ    Home health and self care DRESSING INSTRUCTIONS:        Wound location: Left plantar foot    Dressings to be changed daily and as needed if soiled or not intact.      Cleanse wound with Full strength Dakins wound cleanser  Cover with telfa bandage          Return visit: 2 weeks    FOR ANY WORSENING CONDITION PRIOR TO NEXT APPOINTMENT REPORT TO THE NEAREST EMERGENCY ROOM    Nutrition:  The current daily value (%DV) for protein is 50 grams per day and is meant as a general goal for most people. Further increasing your dietary protein intake is very important for wound healing. Typically one needs over 100g of protein per day to help with wound healing needs.  If you are a dialysis patient or have problems with your kidneys, talk to your Nephrologist about how much protein you can take in with your condition.  Examples of high protein items that can be added to your diet include: eggs, chicken, red meats, almonds, cottage cheese, Greek yogurt, beans, and peanut butter.  Fortified protein bars, shakes and drinks can add 15-30 additional grams of protein per serving.  Also add:   1 daily general multivitamin   Vitamin C : 500mg twice daily   Zinc 220 mg daily  Vit D : once daily    Offloading   Offload your wound. This means to reduce pressure on and around the wound that reduces blood flow to the wound and prevents healing. Your wound care team will discuss specific ways for you to offload your specific wound. Common offloading strategies include:    Turn or reposition every 2 hours or sooner  Use pillows, wedges, ROHO wheelchair cushions or other special devices like boots and shoes to lift the wound off of hard surfaces  Alternating Low Air-loss (ALAL) mattress may be ordered  Padded dressings can reduce wound pressure      Call our [facility] wound clinic for questions/concerns [phone] .

## 2025-06-10 NOTE — PROGRESS NOTES
Subjective     Patient ID: Bharath Caballero is a 45 y.o. male.    Chief Complaint: Follow-up    Follow-up      In patient comes in today saying that he is having chronic diarrhea postprandial diarrhea he was seen by Erin collazo nurse practitioner with GI who ordered calprotectin it was positive for leukocytes in March she did not ultrasound showed gallbladder sludge offered to refer to surgery for cholecystectomy electively I do not know that it was addressed beside that.  He has quit smoking I am sorry reduced his smoking to 1/2 pack per day he stepped on a piece of glass was seen in Wound Care yesterday the wound is in his right lateral foot at the so small about proximally 2 cm in length it looks clean chest pain is chronic hypertension is better unfortunately his A1c is 12.6 I think this patient probably needs admission to observation so that we can control his diabetes we have been trying diligently as an outpatient very difficult we will also consider getting a GI consult thank you  Review of Systems   All other systems reviewed and are negative.         Objective     Physical Exam  Constitutional:       Appearance: Normal appearance.      Comments: Walks with a cane   HENT:      Head: Normocephalic and atraumatic.      Right Ear: Tympanic membrane, ear canal and external ear normal.      Left Ear: Tympanic membrane, ear canal and external ear normal.      Nose: Nose normal.      Mouth/Throat:      Mouth: Mucous membranes are moist.      Pharynx: Oropharynx is clear.   Eyes:      Extraocular Movements: Extraocular movements intact.      Conjunctiva/sclera: Conjunctivae normal.      Pupils: Pupils are equal, round, and reactive to light.   Cardiovascular:      Rate and Rhythm: Normal rate.      Pulses: Normal pulses.      Heart sounds: Normal heart sounds.   Pulmonary:      Effort: Pulmonary effort is normal.      Breath sounds: Normal breath sounds.   Abdominal:      General: Bowel sounds are normal.       Palpations: Abdomen is soft.   Musculoskeletal:      Cervical back: Normal range of motion and neck supple.      Comments: Right foot has clean 2 cm lesion sole of his foot laterally   Skin:     General: Skin is warm and dry.   Neurological:      General: No focal deficit present.      Mental Status: He is alert and oriented to person, place, and time. Mental status is at baseline.   Psychiatric:         Mood and Affect: Mood normal.         Behavior: Behavior normal.         Thought Content: Thought content normal.         Judgment: Judgment normal.            Assessment and Plan     1. Type 2 diabetes mellitus with other skin ulcer, with long-term current use of insulin  -     POCT HEMOGLOBIN A1C    2. Diabetic polyneuropathy associated with diabetes mellitus due to underlying condition    3. Hand paresthesia    4. Mononeuritis multiplex    5. Paresis of single lower extremity    6. Chronic pain syndrome    7. Malfunction of spinal cord stimulator, subsequent encounter    8. Painful diabetic neuropathy    9. Blurred vision, right eye    10. Major depressive disorder, recurrent severe without psychotic features    11. Conductive hearing loss, bilateral    12. Onychomycosis of multiple toenails with type 2 diabetes mellitus    13. Overgrown toenails    14. Acquired perforating dermatosis    15. Scab    16. Primary hypertension    17. Mixed hyperlipidemia    18. Aortic atherosclerosis    19. Coronary artery disease of native artery of native heart with stable angina pectoris    20. Familial hypertriglyceridemia    21. NSTEMI (non-ST elevated myocardial infarction)    22. Hx of CABG    23. Bladder outflow obstruction    24. Nocturia    25. Hypokalemia    26. Abnormal weight loss    27. Encounter for diabetic foot exam    28. Type 2 diabetes mellitus with hyperglycemia, with long-term current use of insulin    29. Alcohol-induced chronic pancreatitis    30. History of pancreatitis    31. Metabolic  dysfunction-associated steatotic liver disease (MASLD)    32. Gastroesophageal reflux disease with esophagitis without hemorrhage    33. Left flank pain    34. History of colonic polyps    35. Chronic diarrhea of unknown origin    36. Chronic liver failure without hepatic coma    37. Fall, subsequent encounter    38. Ulcer of left heel and midfoot, limited to breakdown of skin    39. Bilateral edema of lower extremity    40. Falls frequently    41. Tobacco user    42. Impaired functional mobility, balance, gait, and endurance        See above thank you plan admit to observation for glucose control, consult GI for this chronic diarrhea,.         Follow up in about 4 weeks (around 7/8/2025).

## 2025-06-10 NOTE — PROGRESS NOTES
"[facility] Heart and Vascular      Cardiothoracic Surgery  Post Operative Office Follow Up Visit     Patient Name: Bharath Caballero  MRN: [id number]  Visit Date: 06/10/2025  Primary Care Physician: Dat Beasley MD    Subjective:     Chief Complaint/Reason for Follow-Up Visit: Post CABG Follow Up    HPI: Mr. Caballero is a 46 y/o male who underwent a CABG (12.5.24) 6 Months ago with results of LIMA to LAD, rSVG to Distal LAD, EVH and Ligation of ROBYN. He presented for a 6 months follow up with no complaints. "I am feeling great." Denies CP, SOB and Palps.    Previous Cardiac Diagnostics: Reviewed    Past Medical History:   Diagnosis Date    Abdominal pain     Acquired perforating dermatosis 08/30/2023    Anxiety     Aortic atherosclerosis 01/24/2023    Appetite loss     Balance problem     Bilateral edema of lower extremity 02/06/2023    Bladder outflow obstruction 06/20/2022    Blurred vision, right eye 10/19/2022    BMI 34.0-34.9,adult 06/20/2022    BPH (benign prostatic hyperplasia)     Chest pain     Chronic liver failure without hepatic coma 04/25/2023    Chronic pain 10/25/2022    Chronic pain syndrome 05/12/2022    Chronic pancreatitis 10/28/2017    Coronary artery disease, unspecified vessel or lesion type, unspecified whether angina present, unspecified whether native or transplanted heart     Deafness 10/03/2023    Depression     Diabetes     Diabetic polyneuropathy 06/20/2022    Elevated LFTs 08/18/2022    Fatigue     GERD (gastroesophageal reflux disease)     Hand paresthesia 06/20/2022    Headache     Hepatic steatosis     History of continuous positive airway pressure (CPAP) therapy at home     History of pancreatitis 06/20/2022    History of recent fall     Hypertension     Hypertriglyceridemia     Insomnia     Kidney disease     Kidney disorder     Leg pain     Mixed hyperlipidemia 10/28/2017    Mononeuritis multiplex 06/20/2022    Morbid obesity     Nausea & vomiting     Neuropathy     Nocturia 06/20/2022 "    NSTEMI (non-ST elevated myocardial infarction) 10/2024    OA (osteoarthritis)     Obesity     Obstructive sleep apnea syndrome 06/20/2022    Onychomycosis of multiple toenails with type 2 diabetes mellitus 10/28/2017    Open wound of finger of right hand 10/20/2023    CICI (obstructive sleep apnea)     uses CPAP    Painful diabetic neuropathy 05/12/2022    Personal history of colonic polyps 08/18/2022    Polyneuropathy     Primary hypertension 10/28/2017    Recurrent pancreatitis     Renal insufficiency     Tobacco abuse     Tobacco user 06/20/2022    Type 2 diabetes mellitus with skin complication, with long-term current use of insulin 02/06/2023    Urinary frequency     Use of cane as ambulatory aid     Weight loss, unintentional      Past Surgical History:   Procedure Laterality Date    ANGIOGRAM, CORONARY, WITH LEFT HEART CATHETERIZATION N/A 04/10/2023    Procedure: Angiogram, Coronary, with Left Heart Cath;  Surgeon: Jaswant Pugh MD;  Location: [facility] CATH LAB;  Service: Cardiology;  Laterality: N/A;    ANGIOGRAM, CORONARY, WITH LEFT HEART CATHETERIZATION N/A 10/10/2024    Procedure: Angiogram, Coronary, with Left Heart Cath;  Surgeon: Jaswant Pugh MD;  Location: [facility] CATH LAB;  Service: Cardiology;  Laterality: N/A;    APPENDECTOMY      ARTERIOVENOUS ANASTOMOSIS, OPEN, UPPER ARM BASILLIC VEIN TRANSPOSITION N/A 05/07/2018    CORONARY ARTERY BYPASS GRAFT (CABG) N/A 12/05/2024    Procedure: CORONARY ARTERY BYPASS GRAFT (CABG);  Surgeon: Gillian Clayton MD;  Location: [facility] OR;  Service: Cardiothoracic;  Laterality: N/A;  ECHO NOTIFIED    EGD, WITH CLOSED BIOPSY N/A 11/20/2023    Procedure: EGD, WITH CLOSED BIOPSY;  Surgeon: Christina James MD;  Location: [facility] ENDOSCOPY;  Service: Gastroenterology;  Laterality: N/A;    ENDOSCOPIC HARVEST OF VEIN Left 12/05/2024    Procedure: HARVEST-VEIN-ENDOVASCULAR;  Surgeon: Gillian Clayton MD;  Location: [facility] OR;  Service: Cardiothoracic;   Laterality: Left;    ESOPHAGOGASTRODUODENOSCOPY N/A 06/07/2021    EXCISION OF ARTERIOVENOUS FISTULA N/A 06/01/2018    FRACTIONAL FLOW RESERVE (FFR), CORONARY  04/10/2023    Procedure: Fractional Flow [place] (FFR), Coronary;  Surgeon: Jaswant Pugh MD;  Location: [facility] CATH LAB;  Service: Cardiology;;    HERNIA REPAIR      INSPECTION OF UPPER INTESTINAL TRACT N/A 06/07/2021    OCCLUSION OF LEFT ATRIAL APPENDAGE Left 12/05/2024    Procedure: Left atrial appendage occlusion;  Surgeon: Gillian Clayton MD;  Location: [facility] OR;  Service: Cardiothoracic;  Laterality: Left;    PHERESIS OF PLASMA N/A 07/13/2018    PHERESIS OF PLASMA N/A 05/25/2018    TRIAL OF SPINAL CORD NERVE STIMULATOR N/A 05/12/2022    Procedure: TRIAL, NEUROSTIMULATOR, SPINAL CORD;  Surgeon: Jay Pozo MD;  Location: [facility] OR;  Service: Neurosurgery;  Laterality: N/A;    UPPER GI N/A 06/07/2021     Family History       Problem Relation (Age of Onset)    Obesity Father          Tobacco Use    Smoking status: Every Day     Current packs/day: 2.00     Average packs/day: 2.6 packs/day for 32.9 years (84.4 ttl pk-yrs)     Types: Cigarettes     Start date: 1992     Last attempt to quit: 01/1999     Passive exposure: Current    Smokeless tobacco: Former     Types: Chew    Tobacco comments:     Pt is smoking 20-40 cpd   Substance and Sexual Activity    Alcohol use: Not Currently    Drug use: Never    Sexual activity: Yes     Partners: Female     Review of patient's allergies indicates:  No Known Allergies    Review of Systems   Constitutional: Negative for malaise/fatigue.   Cardiovascular:  Negative for chest pain, leg swelling, palpitations and syncope.   Respiratory:  Negative for shortness of breath.    All other systems reviewed and are negative.    Objective:     Vital Signs (Most Recent):  Pulse: 89 (06/10/25 0916)  Resp: 20 (06/10/25 0916)  BP: (!) 161/97 (06/10/25 0945)  SpO2: 98 % (06/10/25 0916) Vital Signs (24h Range):  [unfilled]    Weight: 81.4 kg (179 lb 6.4 oz)  Body mass index is 27.28 kg/m².    Physical Exam  Constitutional:       Appearance: Normal appearance.   HENT:      Head: Normocephalic.   Eyes:      Extraocular Movements: Extraocular movements intact.   Cardiovascular:      Rate and Rhythm: Normal rate and regular rhythm.      Heart sounds: Normal heart sounds.   Pulmonary:      Effort: Pulmonary effort is normal. No respiratory distress.      Breath sounds: Normal breath sounds.   Abdominal:      Palpations: Abdomen is soft.   Musculoskeletal:         General: Normal range of motion.   Skin:     General: Skin is warm and dry.      Capillary Refill: Capillary refill takes less than 2 seconds.      Comments: Midline Sternotomy HELADIO with Edges Well Approximated/Healed, No Sign of Bleed/Infection. CT Sites/EVH Sites Edges Well Approximately/Healed.    Neurological:      General: No focal deficit present.      Mental Status: He is alert and oriented to person, place, and time.   Psychiatric:         Mood and Affect: Mood normal.       Significant Labs: Reviewed  No results found for this or any previous visit (from the past 72 hours).  Significant Imaging: Reviewed     Assessment:   MVCAD    - s/p CABG (12.5.24) - LIMA to LAD, rSVG to Distal LAD, EVH and Ligation of ROBYN  HTN  HLD  Obesity    Plan:   BP Elevated at F/U Appt  Encouraged Compliance with Medical Therapy as he did not take AM BP Medications  Continue GDMT  Continue to Follow Primary Cardiologist  Follow Up as Needed    [facility] Heart [facility] Vascular   Chuy Anaya, ANP  Cardiothoracic Surgery   [facility]

## 2025-06-10 NOTE — TELEPHONE ENCOUNTER
Pt requested to reschedule his tobacco cessation follow up appointment. Pt rescheduled to June 23, 2025 at 4 pm.

## 2025-06-11 PROBLEM — E44.0 MODERATE MALNUTRITION: Status: ACTIVE | Noted: 2025-06-11

## 2025-06-11 PROBLEM — S90.852A: Status: ACTIVE | Noted: 2025-06-11

## 2025-06-11 LAB
ALBUMIN SERPL-MCNC: 2.8 G/DL (ref 3.5–5)
ALBUMIN SERPL-MCNC: 2.9 G/DL (ref 3.5–5)
ALBUMIN/GLOB SERPL: 0.6 RATIO (ref 1.1–2)
ALBUMIN/GLOB SERPL: 0.6 RATIO (ref 1.1–2)
ALP SERPL-CCNC: 464 UNIT/L (ref 40–150)
ALP SERPL-CCNC: 516 UNIT/L (ref 40–150)
ALT SERPL-CCNC: 44 UNIT/L (ref 0–55)
ALT SERPL-CCNC: 62 UNIT/L (ref 0–55)
ANION GAP SERPL CALC-SCNC: 12 MEQ/L
ANION GAP SERPL CALC-SCNC: 9 MEQ/L
AST SERPL-CCNC: 40 UNIT/L (ref 11–45)
AST SERPL-CCNC: 98 UNIT/L (ref 11–45)
BASOPHILS # BLD AUTO: 0.06 X10(3)/MCL
BASOPHILS NFR BLD AUTO: 0.5 %
BILIRUB SERPL-MCNC: 0.3 MG/DL
BILIRUB SERPL-MCNC: 0.4 MG/DL
BUN SERPL-MCNC: 10.6 MG/DL (ref 8.9–20.6)
BUN SERPL-MCNC: 7.6 MG/DL (ref 8.9–20.6)
CALCIUM SERPL-MCNC: 8.5 MG/DL (ref 8.4–10.2)
CALCIUM SERPL-MCNC: 8.9 MG/DL (ref 8.4–10.2)
CHLORIDE SERPL-SCNC: 97 MMOL/L (ref 98–107)
CHLORIDE SERPL-SCNC: 99 MMOL/L (ref 98–107)
CHOLEST SERPL-MCNC: 146 MG/DL
CHOLEST/HDLC SERPL: 6 {RATIO} (ref 0–5)
CO2 SERPL-SCNC: 30 MMOL/L (ref 22–29)
CO2 SERPL-SCNC: 34 MMOL/L (ref 22–29)
CREAT SERPL-MCNC: 0.92 MG/DL (ref 0.72–1.25)
CREAT SERPL-MCNC: 0.93 MG/DL (ref 0.72–1.25)
CREAT/UREA NIT SERPL: 12
CREAT/UREA NIT SERPL: 8
EOSINOPHIL # BLD AUTO: 0.42 X10(3)/MCL (ref 0–0.9)
EOSINOPHIL NFR BLD AUTO: 3.5 %
ERYTHROCYTE [DISTWIDTH] IN BLOOD BY AUTOMATED COUNT: 12.6 % (ref 11.5–17)
GFR SERPLBLD CREATININE-BSD FMLA CKD-EPI: >60 ML/MIN/1.73/M2
GFR SERPLBLD CREATININE-BSD FMLA CKD-EPI: >60 ML/MIN/1.73/M2
GLOBULIN SER-MCNC: 4.7 GM/DL (ref 2.4–3.5)
GLOBULIN SER-MCNC: 4.8 GM/DL (ref 2.4–3.5)
GLUCOSE SERPL-MCNC: 113 MG/DL (ref 74–100)
GLUCOSE SERPL-MCNC: 255 MG/DL (ref 74–100)
HCT VFR BLD AUTO: 41.2 % (ref 42–52)
HDLC SERPL-MCNC: 25 MG/DL (ref 35–60)
HGB BLD-MCNC: 14.4 G/DL (ref 14–18)
HOLD SPECIMEN: NORMAL
IMM GRANULOCYTES # BLD AUTO: 0.05 X10(3)/MCL (ref 0–0.04)
IMM GRANULOCYTES NFR BLD AUTO: 0.4 %
LDLC SERPL CALC-MCNC: 77 MG/DL (ref 50–140)
LIPASE SERPL-CCNC: <4 U/L
LYMPHOCYTES # BLD AUTO: 2.53 X10(3)/MCL (ref 0.6–4.6)
LYMPHOCYTES NFR BLD AUTO: 21.4 %
MAGNESIUM SERPL-MCNC: 1.9 MG/DL (ref 1.6–2.6)
MAGNESIUM SERPL-MCNC: 2 MG/DL (ref 1.6–2.6)
MCH RBC QN AUTO: 30.1 PG (ref 27–31)
MCHC RBC AUTO-ENTMCNC: 35 G/DL (ref 33–36)
MCV RBC AUTO: 86.2 FL (ref 80–94)
MONOCYTES # BLD AUTO: 1.13 X10(3)/MCL (ref 0.1–1.3)
MONOCYTES NFR BLD AUTO: 9.5 %
NEUTROPHILS # BLD AUTO: 7.65 X10(3)/MCL (ref 2.1–9.2)
NEUTROPHILS NFR BLD AUTO: 64.7 %
NRBC BLD AUTO-RTO: 0 %
OHS QRS DURATION: 96 MS
OHS QTC CALCULATION: 469 MS
PHOSPHATE SERPL-MCNC: 3.4 MG/DL (ref 2.3–4.7)
PHOSPHATE SERPL-MCNC: 3.6 MG/DL (ref 2.3–4.7)
PLATELET # BLD AUTO: 239 X10(3)/MCL (ref 130–400)
PMV BLD AUTO: 10.6 FL (ref 7.4–10.4)
POCT GLUCOSE: 194 MG/DL (ref 70–110)
POCT GLUCOSE: 305 MG/DL (ref 70–110)
POCT GLUCOSE: 311 MG/DL (ref 70–110)
POCT GLUCOSE: 63 MG/DL (ref 70–110)
POCT GLUCOSE: 96 MG/DL (ref 70–110)
POTASSIUM SERPL-SCNC: 2.6 MMOL/L (ref 3.5–5.1)
POTASSIUM SERPL-SCNC: 3.1 MMOL/L (ref 3.5–5.1)
PROT SERPL-MCNC: 7.5 GM/DL (ref 6.4–8.3)
PROT SERPL-MCNC: 7.7 GM/DL (ref 6.4–8.3)
RBC # BLD AUTO: 4.78 X10(6)/MCL (ref 4.7–6.1)
SODIUM SERPL-SCNC: 140 MMOL/L (ref 136–145)
SODIUM SERPL-SCNC: 141 MMOL/L (ref 136–145)
TRIGL SERPL-MCNC: 220 MG/DL (ref 34–140)
VLDLC SERPL CALC-MCNC: 44 MG/DL
WBC # BLD AUTO: 11.84 X10(3)/MCL (ref 4.5–11.5)

## 2025-06-11 PROCEDURE — 25000003 PHARM REV CODE 250: Performed by: INTERNAL MEDICINE

## 2025-06-11 PROCEDURE — 80053 COMPREHEN METABOLIC PANEL: CPT

## 2025-06-11 PROCEDURE — 96372 THER/PROPH/DIAG INJ SC/IM: CPT

## 2025-06-11 PROCEDURE — 84100 ASSAY OF PHOSPHORUS: CPT

## 2025-06-11 PROCEDURE — 25500020 PHARM REV CODE 255: Performed by: INTERNAL MEDICINE

## 2025-06-11 PROCEDURE — 97165 OT EVAL LOW COMPLEX 30 MIN: CPT

## 2025-06-11 PROCEDURE — 94761 N-INVAS EAR/PLS OXIMETRY MLT: CPT

## 2025-06-11 PROCEDURE — G0378 HOSPITAL OBSERVATION PER HR: HCPCS

## 2025-06-11 PROCEDURE — 25000003 PHARM REV CODE 250

## 2025-06-11 PROCEDURE — 83735 ASSAY OF MAGNESIUM: CPT

## 2025-06-11 PROCEDURE — 63600175 PHARM REV CODE 636 W HCPCS: Performed by: INTERNAL MEDICINE

## 2025-06-11 PROCEDURE — 63600175 PHARM REV CODE 636 W HCPCS

## 2025-06-11 PROCEDURE — 96372 THER/PROPH/DIAG INJ SC/IM: CPT | Performed by: INTERNAL MEDICINE

## 2025-06-11 PROCEDURE — 36415 COLL VENOUS BLD VENIPUNCTURE: CPT

## 2025-06-11 PROCEDURE — 85025 COMPLETE CBC W/AUTO DIFF WBC: CPT

## 2025-06-11 PROCEDURE — 97161 PT EVAL LOW COMPLEX 20 MIN: CPT

## 2025-06-11 PROCEDURE — S4991 NICOTINE PATCH NONLEGEND: HCPCS

## 2025-06-11 RX ORDER — CLONAZEPAM 1 MG/1
1 TABLET ORAL NIGHTLY
Status: DISCONTINUED | OUTPATIENT
Start: 2025-06-11 | End: 2025-06-13 | Stop reason: HOSPADM

## 2025-06-11 RX ORDER — POTASSIUM CHLORIDE 750 MG/1
50 CAPSULE, EXTENDED RELEASE ORAL ONCE
Status: DISCONTINUED | OUTPATIENT
Start: 2025-06-11 | End: 2025-06-11

## 2025-06-11 RX ORDER — HYDROMORPHONE HYDROCHLORIDE 2 MG/1
4 TABLET ORAL EVERY 8 HOURS PRN
Refills: 0 | Status: DISCONTINUED | OUTPATIENT
Start: 2025-06-11 | End: 2025-06-13 | Stop reason: HOSPADM

## 2025-06-11 RX ORDER — NIFEDIPINE 30 MG/1
30 TABLET, EXTENDED RELEASE ORAL 2 TIMES DAILY
Status: DISCONTINUED | OUTPATIENT
Start: 2025-06-11 | End: 2025-06-13

## 2025-06-11 RX ORDER — INSULIN ASPART 100 [IU]/ML
7 INJECTION, SOLUTION INTRAVENOUS; SUBCUTANEOUS
Status: DISCONTINUED | OUTPATIENT
Start: 2025-06-11 | End: 2025-06-13 | Stop reason: HOSPADM

## 2025-06-11 RX ORDER — AMITRIPTYLINE HYDROCHLORIDE 25 MG/1
25 TABLET, FILM COATED ORAL NIGHTLY
Status: DISCONTINUED | OUTPATIENT
Start: 2025-06-11 | End: 2025-06-13 | Stop reason: HOSPADM

## 2025-06-11 RX ORDER — INSULIN ASPART 100 [IU]/ML
7 INJECTION, SOLUTION INTRAVENOUS; SUBCUTANEOUS ONCE
Status: COMPLETED | OUTPATIENT
Start: 2025-06-11 | End: 2025-06-11

## 2025-06-11 RX ORDER — POTASSIUM CHLORIDE 20 MEQ/1
40 TABLET, EXTENDED RELEASE ORAL ONCE
Status: DISCONTINUED | OUTPATIENT
Start: 2025-06-11 | End: 2025-06-13 | Stop reason: HOSPADM

## 2025-06-11 RX ORDER — NALOXONE HCL 0.4 MG/ML
0.4 VIAL (ML) INJECTION
Status: DISCONTINUED | OUTPATIENT
Start: 2025-06-11 | End: 2025-06-13 | Stop reason: HOSPADM

## 2025-06-11 RX ORDER — MORPHINE SULFATE 30 MG/1
60 TABLET, FILM COATED, EXTENDED RELEASE ORAL 3 TIMES DAILY
Refills: 0 | Status: DISCONTINUED | OUTPATIENT
Start: 2025-06-11 | End: 2025-06-13 | Stop reason: HOSPADM

## 2025-06-11 RX ORDER — LOPERAMIDE HYDROCHLORIDE 2 MG/1
2 CAPSULE ORAL 4 TIMES DAILY PRN
Status: DISCONTINUED | OUTPATIENT
Start: 2025-06-11 | End: 2025-06-13 | Stop reason: HOSPADM

## 2025-06-11 RX ADMIN — RANOLAZINE 500 MG: 500 TABLET, EXTENDED RELEASE ORAL at 08:06

## 2025-06-11 RX ADMIN — NIFEDIPINE 60 MG: 30 TABLET, FILM COATED, EXTENDED RELEASE ORAL at 09:06

## 2025-06-11 RX ADMIN — SODIUM CHLORIDE, POTASSIUM CHLORIDE, SODIUM LACTATE AND CALCIUM CHLORIDE 500 ML: 600; 310; 30; 20 INJECTION, SOLUTION INTRAVENOUS at 04:06

## 2025-06-11 RX ADMIN — Medication 16 G: at 04:06

## 2025-06-11 RX ADMIN — ESCITALOPRAM 20 MG: 10 TABLET, FILM COATED ORAL at 09:06

## 2025-06-11 RX ADMIN — LOSARTAN POTASSIUM 100 MG: 25 TABLET, FILM COATED ORAL at 09:06

## 2025-06-11 RX ADMIN — RANOLAZINE 500 MG: 500 TABLET, EXTENDED RELEASE ORAL at 09:06

## 2025-06-11 RX ADMIN — ASPIRIN 81 MG: 81 TABLET, COATED ORAL at 09:06

## 2025-06-11 RX ADMIN — TAMSULOSIN HYDROCHLORIDE 0.4 MG: 0.4 CAPSULE ORAL at 09:06

## 2025-06-11 RX ADMIN — INSULIN ASPART 7 UNITS: 100 INJECTION, SOLUTION INTRAVENOUS; SUBCUTANEOUS at 11:06

## 2025-06-11 RX ADMIN — PANCRELIPASE 3 CAPSULE: 30000; 6000; 19000 CAPSULE, DELAYED RELEASE PELLETS ORAL at 09:06

## 2025-06-11 RX ADMIN — PREGABALIN 150 MG: 50 CAPSULE ORAL at 09:06

## 2025-06-11 RX ADMIN — INSULIN GLARGINE 40 UNITS: 100 INJECTION, SOLUTION SUBCUTANEOUS at 08:06

## 2025-06-11 RX ADMIN — MORPHINE SULFATE 60 MG: 30 TABLET, FILM COATED, EXTENDED RELEASE ORAL at 09:06

## 2025-06-11 RX ADMIN — METOPROLOL SUCCINATE 25 MG: 25 TABLET, EXTENDED RELEASE ORAL at 09:06

## 2025-06-11 RX ADMIN — PANCRELIPASE 3 CAPSULE: 30000; 6000; 19000 CAPSULE, DELAYED RELEASE PELLETS ORAL at 11:06

## 2025-06-11 RX ADMIN — PANCRELIPASE 3 CAPSULE: 30000; 6000; 19000 CAPSULE, DELAYED RELEASE PELLETS ORAL at 04:06

## 2025-06-11 RX ADMIN — INSULIN GLARGINE 40 UNITS: 100 INJECTION, SOLUTION SUBCUTANEOUS at 09:06

## 2025-06-11 RX ADMIN — ISOSORBIDE MONONITRATE 120 MG: 30 TABLET, EXTENDED RELEASE ORAL at 09:06

## 2025-06-11 RX ADMIN — CLONIDINE HYDROCHLORIDE 0.3 MG: 0.1 TABLET ORAL at 09:06

## 2025-06-11 RX ADMIN — CLONAZEPAM 1 MG: 1 TABLET ORAL at 08:06

## 2025-06-11 RX ADMIN — INSULIN ASPART 7 UNITS: 100 INJECTION, SOLUTION INTRAVENOUS; SUBCUTANEOUS at 09:06

## 2025-06-11 RX ADMIN — OXCARBAZEPINE 600 MG: 300 TABLET, FILM COATED ORAL at 08:06

## 2025-06-11 RX ADMIN — INSULIN ASPART 12 UNITS: 100 INJECTION, SOLUTION INTRAVENOUS; SUBCUTANEOUS at 11:06

## 2025-06-11 RX ADMIN — FUROSEMIDE 20 MG: 20 TABLET ORAL at 09:06

## 2025-06-11 RX ADMIN — ATORVASTATIN CALCIUM 80 MG: 40 TABLET, FILM COATED ORAL at 09:06

## 2025-06-11 RX ADMIN — NICOTINE 1 PATCH: 21 PATCH, EXTENDED RELEASE TRANSDERMAL at 09:06

## 2025-06-11 RX ADMIN — IOHEXOL 100 ML: 350 INJECTION, SOLUTION INTRAVENOUS at 03:06

## 2025-06-11 RX ADMIN — OXCARBAZEPINE 600 MG: 300 TABLET, FILM COATED ORAL at 09:06

## 2025-06-11 RX ADMIN — CLOPIDOGREL BISULFATE 75 MG: 75 TABLET, FILM COATED ORAL at 09:06

## 2025-06-11 NOTE — PT/OT/SLP EVAL
Occupational Therapy   Evaluation and Discharge Note    Name: Bharath Caballero  MRN: [id number]  Admitting Diagnosis: Uncontrolled type 2 diabetes mellitus with hyperglycemia  Problem List[1]       PMH significant for T2DM, HLD, HTN, CAD, Hx of CABG w/ L atrial appendage ligation (12/2024) CKD stage 3, chronic pancreatitis, MASLD, CICI, GERD and BPH was a direct admit from IM clinic yesterday with complaints of postprandial diarrhea and uncontrolled diabetes. Previous work up with GI in March of 2025 with inpatient consult for GI. A1C remains elevated at 11.8 compared to 8.2 one year ago. Patient was also seen in wound care clinic yesterday for left plantar foot wound from supposedly stepping on a piece of glass. Wound care consulted to follow up on left foot wound in the setting of uncontrolled DM.     Recent Surgery: * No surgery found *      Recommendations:     Discharge Recommendations: No Therapy Indicated due to poor cooperation and willingness to participate   Discharge Equipment Recommendations: other (see comments) (unable to assess due to limited cooperation in eval process)  Barriers to discharge:  Other (Comment) (fall risk)    Assessment:     Bharath Caballero is a 45 y.o. male with a medical diagnosis of Uncontrolled type 2 diabetes mellitus with hyperglycemia. At this time, patient is with limited level of alertness and increased lethargy impacting safety OOB  and is functioning at SBA for basic self care tasks and ambulation in room with straight cane during self care tasks.  Recommend bed alarm at all times due to safety concerns and high fall risk.     Pt is severely [place] and was sleeping upon arrival and became agitated upon OT attempt to arouse him by tactile input -tapping on shoulder - in addition to verbal request for acknowledgement of OT presence in room. Pt did not open eyes to recognize presence of OT and did not make eye contact throughout eval process.  Due to agitation, pt jumped OOB and took off  "socks in standing and reported that he was "walking " to [employer] App[employer] machine. Pt ambulated 160 feet in hallway with straight cane and SBA and then ambulated back to bed to "order a pizza" . Poor cooperation in OT eval process. D/c OT orders due to poor ability to train and non compliance during eval process     Plan:     During this hospitalization, patient does not require further acute OT services.  Please re-consult if situation changes.    Plan of Care Reviewed with: patient    Subjective     Chief Complaint: being aroused by OT  Patient/Family Comments/goals: go home     Occupational Profile: pt uncooperative in questioning regarding PLOF and obtained from PT   Living Environment: pt lives alone in mobile home with 5-6 steps and no handrail  Previous level of function: independent basic and I ADL's and ambulated with straight cane   Roles and Routines: pt is unemployed; pt drives , shops, cooks and performs chores  Equipment Used at home: cane, straight  Assistance upon Discharge: unknown    Pain/Comfort:  Pain Rating 1: 0/10  Pain Rating Post-Intervention 1: 0/10    Patients cultural, spiritual, Anabaptist conflicts given the current situation: no    Objective:     Communicated with: Nurse Peacock  prior to session.  Nurse informed of lethargy / decreased arousal and lengthy meds list from home possibly impacting level of alertness. Patient found HOB elevated sleeping with bed alarm , peripheral IV upon OT entry to room.    General Precautions: Standard, fall, hearing impaired  Orthopedic Precautions: N/A  Braces: N/A  Respiratory Status: Room air     Occupational Performance:    Bed Mobility:    Patient completed Supine to Sit with modified independence    Functional Mobility/Transfers:  Patient completed Sit <> Stand Transfer with supervision  with  no assistive device   Functional Mobility: SBA  to occasional CGA ambulating 160 feet in hallway with straight cane- unsteady gait and high fall risk; note pt with " decreased level of awareness of his  room location and ambulated to wrong room x 2 and needed guidance to his personal room and had little regard and awareness  to obstacles in hallway     Activities of Daily Living:  Pt refused participation in basic self care assessment except that he doffed both socks in standing  - without instruction - prior to ambulation in hallway; pt did not respond to verbal command and education of safety concerns ambulating without proper footwear and continued to ambulate into hallway    Cognitive/Visual Perceptual:  Cognitive/Psychosocial Skills:     -       Safety awareness/insight to disability: impaired   -       Mood/Affect/Coping skills/emotional control: Lethargic and Agitated    Physical Exam:  Balance: -       sitting EOB independent ; standing static supervision and dynamic with unilateral UE support on cane SBA to CGA at times    BUE AROM grossly WFL's per observation- pt unwilling to participate in UE exam     Treatment & Education:  Pt educated on importance of use of call bell for OOB activity- no evidence of learning as noted during eval process  Pt educated on importance of proper footwear during mobility- no evidence of learning as noted during eval process  Pt educated on safety concerns OOB  - pt with poor regard to education and agitated with instruction     Patient left sitting edge of bed with all lines intact, call button in reach, bed alarm on, and nurse  notified    GOALS:   Multidisciplinary Problems       Occupational Therapy Goals       Not on file                    DME Justifications:  TBD- see PT note     History:     Past Medical History:   Diagnosis Date    Abdominal pain     Acquired perforating dermatosis 08/30/2023    Anxiety     Aortic atherosclerosis 01/24/2023    Appetite loss     Balance problem     Bilateral edema of lower extremity 02/06/2023    Bladder outflow obstruction 06/20/2022    Blurred vision, right eye 10/19/2022    BMI 34.0-34.9,adult  06/20/2022    BPH (benign prostatic hyperplasia)     Chest pain     Chronic liver failure without hepatic coma 04/25/2023    Chronic pain 10/25/2022    Chronic pain syndrome 05/12/2022    Chronic pancreatitis 10/28/2017    Coronary artery disease, unspecified vessel or lesion type, unspecified whether angina present, unspecified whether native or transplanted heart     Deafness 10/03/2023    Depression     Diabetes     Diabetic polyneuropathy 06/20/2022    Elevated LFTs 08/18/2022    Fatigue     GERD (gastroesophageal reflux disease)     Hand paresthesia 06/20/2022    Headache     Hepatic steatosis     History of continuous positive airway pressure (CPAP) therapy at home     History of pancreatitis 06/20/2022    History of recent fall     Hypertension     Hypertriglyceridemia     Insomnia     Kidney disease     Kidney disorder     Leg pain     Mixed hyperlipidemia 10/28/2017    Mononeuritis multiplex 06/20/2022    Morbid obesity     Nausea & vomiting     Neuropathy     Nocturia 06/20/2022    NSTEMI (non-ST elevated myocardial infarction) 10/2024    OA (osteoarthritis)     Obesity     Obstructive sleep apnea syndrome 06/20/2022    Onychomycosis of multiple toenails with type 2 diabetes mellitus 10/28/2017    Open wound of finger of right hand 10/20/2023    CICI (obstructive sleep apnea)     uses CPAP    Painful diabetic neuropathy 05/12/2022    Personal history of colonic polyps 08/18/2022    Polyneuropathy     Primary hypertension 10/28/2017    Recurrent pancreatitis     Renal insufficiency     Tobacco abuse     Tobacco user 06/20/2022    Type 2 diabetes mellitus with skin complication, with long-term current use of insulin 02/06/2023    Urinary frequency     Use of cane as ambulatory aid     Weight loss, unintentional          Past Surgical History:   Procedure Laterality Date    ANGIOGRAM, CORONARY, WITH LEFT HEART CATHETERIZATION N/A 04/10/2023    Procedure: Angiogram, Coronary, with Left Heart Cath;  Surgeon:  Jaswant Pugh MD;  Location: [facility] CATH LAB;  Service: Cardiology;  Laterality: N/A;    ANGIOGRAM, CORONARY, WITH LEFT HEART CATHETERIZATION N/A 10/10/2024    Procedure: Angiogram, Coronary, with Left Heart Cath;  Surgeon: Jaswant Pugh MD;  Location: [facility] CATH LAB;  Service: Cardiology;  Laterality: N/A;    APPENDECTOMY      ARTERIOVENOUS ANASTOMOSIS, OPEN, UPPER ARM BASILLIC VEIN TRANSPOSITION N/A 05/07/2018    CORONARY ARTERY BYPASS GRAFT (CABG) N/A 12/05/2024    Procedure: CORONARY ARTERY BYPASS GRAFT (CABG);  Surgeon: Gillian Clayton MD;  Location: [facility] OR;  Service: Cardiothoracic;  Laterality: N/A;  ECHO NOTIFIED    EGD, WITH CLOSED BIOPSY N/A 11/20/2023    Procedure: EGD, WITH CLOSED BIOPSY;  Surgeon: Christina James MD;  Location: [facility] ENDOSCOPY;  Service: Gastroenterology;  Laterality: N/A;    ENDOSCOPIC HARVEST OF VEIN Left 12/05/2024    Procedure: HARVEST-VEIN-ENDOVASCULAR;  Surgeon: Gillian Clayton MD;  Location: [facility] OR;  Service: Cardiothoracic;  Laterality: Left;    ESOPHAGOGASTRODUODENOSCOPY N/A 06/07/2021    EXCISION OF ARTERIOVENOUS FISTULA N/A 06/01/2018    FRACTIONAL FLOW RESERVE (FFR), CORONARY  04/10/2023    Procedure: Fractional Flow [place] (FFR), Coronary;  Surgeon: Jaswant Pugh MD;  Location: [facility] CATH LAB;  Service: Cardiology;;    HERNIA REPAIR      INSPECTION OF UPPER INTESTINAL TRACT N/A 06/07/2021    OCCLUSION OF LEFT ATRIAL APPENDAGE Left 12/05/2024    Procedure: Left atrial appendage occlusion;  Surgeon: Gillian Clayton MD;  Location: [facility] OR;  Service: Cardiothoracic;  Laterality: Left;    PHERESIS OF PLASMA N/A 07/13/2018    PHERESIS OF PLASMA N/A 05/25/2018    TRIAL OF SPINAL CORD NERVE STIMULATOR N/A 05/12/2022    Procedure: TRIAL, NEUROSTIMULATOR, SPINAL CORD;  Surgeon: Jay Pozo MD;  Location: [facility] OR;  Service: Neurosurgery;  Laterality: N/A;    UPPER GI N/A 06/07/2021       Time Tracking:     OT Date of Treatment: 06/11/25  OT  Start Time: 1340  OT Stop Time: 1352  OT Total Time (min): 12 min    Billable Minutes:Evaluation 12 min     6/11/2025       [1]   Patient Active Problem List  Diagnosis    Chronic pain syndrome    Painful diabetic neuropathy    History of pancreatitis    Abnormal weight loss    Bladder outflow obstruction    Chronic pancreatitis    Onychomycosis of multiple toenails with type 2 diabetes mellitus    Diabetic polyneuropathy    Primary hypertension    Hand paresthesia    Mixed hyperlipidemia    Left flank pain    Mononeuritis multiplex    Nocturia    Paresis of single lower extremity    Metabolic dysfunction-associated steatotic liver disease (MASLD)    Tobacco user    Gastroesophageal reflux disease with esophagitis without hemorrhage    History of colonic polyps    Fall    Impaired functional mobility, balance, gait, and endurance    Blurred vision, right eye    Aortic atherosclerosis    Ulcer of left heel and midfoot, limited to breakdown of skin    Overgrown toenails    Bilateral edema of lower extremity    Type 2 diabetes mellitus with other skin ulcer, with long-term current use of insulin    Major depressive disorder, recurrent severe without psychotic features    Chronic liver failure without hepatic coma    Encounter for diabetic foot exam    Acquired perforating dermatosis    Hearing loss    Hypokalemia    Familial hypertriglyceridemia    Coronary artery disease of native artery of native heart with stable angina pectoris    Falls frequently    Scab    NSTEMI (non-ST elevated myocardial infarction)    Hx of CABG    Type 2 diabetes mellitus with hyperglycemia, with long-term current use of insulin    Chronic diarrhea of unknown origin    Malfunction of spinal cord stimulator    Uncontrolled type 2 diabetes mellitus with hyperglycemia    Moderate malnutrition    Superficial foreign body of left foot without major open wound and without infection

## 2025-06-11 NOTE — PROGRESS NOTES
[facility]   Outpatient Wound Care     Subjective:   Patient ID: Bharath Caballero is a 45 y.o. male.    Chief Complaint: Wound Care      History of Present Illness:   45 y.o. White male presents to wound care clinic today for follow up regarding right lower leg scab.  Presents to clinic with left plantar surface foot wound voices stepped on a piece of glass.   Ambulates with a cane.  Presents to clinic alone.  Hard of hearing.  Reviewed previous medical history since last visit of 02/18/2025.  Followed by Dat Beasley MD for PCP last appointment 05/13/2025.    Today's visit 06/10/2025:  Reviewed previous progress notes since last visit of 05/27/2025.  Right lower leg scab resolved.  Left foot plantar surface wound bed red granulating wound bed able to extract small black metal.  Cleansed wound using half strength Dakin's, will cover with gauze and Telfa dressing.  Instructed perform daily.  Will return to the clinic in 2 weeks to re-evaluate.  All wound care performed per nursing staff at bedside.  Instructed to call the office with any questions, concerns, or new skin issues.  Instructions and supplies given.  Verbalized understanding of all instructions.    05/27/2025:  Presents to clinic with right lower leg scab new drainage noted.  Reinforced the importance in danger of hypertension.  Patient had smoked and took blood pressure medications prior to appointment.  Currently on smoking cessation treatment.  Diabetic foot exam performed.  Monofilament test done absent sensation noted in all areas.  Bilateral lower extremities cool to touch with absent hair growth.  Trimmed all 10 toenails using podiatry clippers, and filed with [place] board.  During trimming of toenails toenail avulsion to right foot 5th toe tolerated well skin intact to nail plate no  drainage noted.  2 of 10 nails dystrophic.  Debride dystrophic toenails using Dremmel.  Applied Vaseline to bilateral feet dryness.  Instructed may apply Vaseline to feet air dry areas daily with the exception between toes.  Rationale for debridement to decrease pressure, alleviate pain, and prevent ulcers.  We will have him paint right lower leg scab with Betadine leave open air perform daily.  Will have him return in 2 weeks to re-evaluate right foot 5th toe avulsion and right lower legs scab.  Instructed the importance of checking feet daily due to decrease sensation high risk for diabetic foot ulcers.  Instructed on proper footwear, nail care, and daily skin assessment.  Instructed to call the office with any questions, concerns, or new skin issues.  Verbalized understanding of all instructions.    02/18/2025:  Elevation of blood pressure today during initial vital signs readings.  Instructed the patient to the danger of hypertension.  Patient saw PCP today.  Patient voices has taking a.m. medications will take pm medications when return home.  ER precautions given.Diabetic foot exam performed.  Monofilament test done absence sensation noted in all areas.  Bilateral lower extremities cool to touch with absent hair growth.  Trimmed all 10 toenails using podiatry clippers, and filed with [place] board. 2 of 10 dystrophic.  Debride dystrophic nails using Dremmel.  Rationale for debridement to decrease pressure and prevent ulcers.  Instructed the importance of checking feet daily due to absence sensation high risk for diabetic foot ulcers, and injury to feet.  Instructed on proper footwear, nail care, and daily skin assessment.  Will have him return to the clinic in 3 months.  Instructed to call the office with any questions, concerns, or new skin issues.  Verbalized understanding of all instructions.               History includes:      Past Medical History:   Diagnosis Date    Abdominal pain     Acquired perforating  dermatosis 08/30/2023    Anxiety     Aortic atherosclerosis 01/24/2023    Appetite loss     Balance problem     Bilateral edema of lower extremity 02/06/2023    Bladder outflow obstruction 06/20/2022    Blurred vision, right eye 10/19/2022    BMI 34.0-34.9,adult 06/20/2022    BPH (benign prostatic hyperplasia)     Chest pain     Chronic liver failure without hepatic coma 04/25/2023    Chronic pain 10/25/2022    Chronic pain syndrome 05/12/2022    Chronic pancreatitis 10/28/2017    Coronary artery disease, unspecified vessel or lesion type, unspecified whether angina present, unspecified whether native or transplanted heart     Deafness 10/03/2023    Depression     Diabetes     Diabetic polyneuropathy 06/20/2022    Elevated LFTs 08/18/2022    Fatigue     GERD (gastroesophageal reflux disease)     Hand paresthesia 06/20/2022    Headache     Hepatic steatosis     History of continuous positive airway pressure (CPAP) therapy at home     History of pancreatitis 06/20/2022    History of recent fall     Hypertension     Hypertriglyceridemia     Insomnia     Kidney disease     Kidney disorder     Leg pain     Mixed hyperlipidemia 10/28/2017    Mononeuritis multiplex 06/20/2022    Morbid obesity     Nausea & vomiting     Neuropathy     Nocturia 06/20/2022    NSTEMI (non-ST elevated myocardial infarction) 10/2024    OA (osteoarthritis)     Obesity     Obstructive sleep apnea syndrome 06/20/2022    Onychomycosis of multiple toenails with type 2 diabetes mellitus 10/28/2017    Open wound of finger of right hand 10/20/2023    CICI (obstructive sleep apnea)     uses CPAP    Painful diabetic neuropathy 05/12/2022    Personal history of colonic polyps 08/18/2022    Polyneuropathy     Primary hypertension 10/28/2017    Recurrent pancreatitis     Renal insufficiency     Tobacco abuse     Tobacco user 06/20/2022    Type 2 diabetes mellitus with skin complication, with long-term current use of insulin 02/06/2023    Urinary frequency      Use of cane as ambulatory aid     Weight loss, unintentional       Past Surgical History:   Procedure Laterality Date    ANGIOGRAM, CORONARY, WITH LEFT HEART CATHETERIZATION N/A 04/10/2023    Procedure: Angiogram, Coronary, with Left Heart Cath;  Surgeon: Jaswant Pugh MD;  Location: [facility] CATH LAB;  Service: Cardiology;  Laterality: N/A;    ANGIOGRAM, CORONARY, WITH LEFT HEART CATHETERIZATION N/A 10/10/2024    Procedure: Angiogram, Coronary, with Left Heart Cath;  Surgeon: Jaswant Pugh MD;  Location: [facility] CATH LAB;  Service: Cardiology;  Laterality: N/A;    APPENDECTOMY      ARTERIOVENOUS ANASTOMOSIS, OPEN, UPPER ARM BASILLIC VEIN TRANSPOSITION N/A 05/07/2018    CORONARY ARTERY BYPASS GRAFT (CABG) N/A 12/05/2024    Procedure: CORONARY ARTERY BYPASS GRAFT (CABG);  Surgeon: Gillian Clayton MD;  Location: [facility] OR;  Service: Cardiothoracic;  Laterality: N/A;  ECHO NOTIFIED    EGD, WITH CLOSED BIOPSY N/A 11/20/2023    Procedure: EGD, WITH CLOSED BIOPSY;  Surgeon: Christina James MD;  Location: [facility] ENDOSCOPY;  Service: Gastroenterology;  Laterality: N/A;    ENDOSCOPIC HARVEST OF VEIN Left 12/05/2024    Procedure: HARVEST-VEIN-ENDOVASCULAR;  Surgeon: Gillian Clayton MD;  Location: [facility] OR;  Service: Cardiothoracic;  Laterality: Left;    ESOPHAGOGASTRODUODENOSCOPY N/A 06/07/2021    EXCISION OF ARTERIOVENOUS FISTULA N/A 06/01/2018    FRACTIONAL FLOW RESERVE (FFR), CORONARY  04/10/2023    Procedure: Fractional Flow [place] (FFR), Coronary;  Surgeon: Jaswant Pugh MD;  Location: [facility] CATH LAB;  Service: Cardiology;;    HERNIA REPAIR      INSPECTION OF UPPER INTESTINAL TRACT N/A 06/07/2021    OCCLUSION OF LEFT ATRIAL APPENDAGE Left 12/05/2024    Procedure: Left atrial appendage occlusion;  Surgeon: Gillian Clayton MD;  Location: [facility] OR;  Service: Cardiothoracic;  Laterality: Left;    PHERESIS OF PLASMA N/A 07/13/2018    PHERESIS OF PLASMA N/A 05/25/2018    TRIAL OF SPINAL CORD NERVE  STIMULATOR N/A 05/12/2022    Procedure: TRIAL, NEUROSTIMULATOR, SPINAL CORD;  Surgeon: Jay Pozo MD;  Location: [facility];  Service: Neurosurgery;  Laterality: N/A;    UPPER GI N/A 06/07/2021      Social History     Socioeconomic History    Marital status: Single   Tobacco Use    Smoking status: Every Day     Current packs/day: 0.50     Average packs/day: 2.6 packs/day for 32.9 years (84.3 ttl pk-yrs)     Types: Cigarettes     Start date: 1992     Last attempt to quit: 01/1999     Passive exposure: Current    Smokeless tobacco: Former     Types: Chew    Tobacco comments:     Pt is smoking 1/2 pack per day   Vaping Use    Vaping status: Never Used   Substance and Sexual Activity    Alcohol use: Not Currently    Drug use: Never    Sexual activity: Yes     Partners: Female     Social Drivers of Health     Financial Resource Strain: Patient Declined (12/7/2024)    Overall Financial Resource Strain (CARDIA)     Difficulty of Paying Living Expenses: Patient declined   Food Insecurity: Patient Declined (12/7/2024)    Hunger Vital Sign     Worried About Running Out of Food in the Last Year: Patient declined     Ran Out of Food in the Last Year: Patient declined   Transportation Needs: Patient Declined (12/7/2024)    TRANSPORTATION NEEDS     Transportation : Patient declined   Physical Activity: Insufficiently Active (6/4/2024)    Exercise Vital Sign     Days of Exercise per Week: 7 days     Minutes of Exercise per Session: 10 min   Stress: Patient Declined (12/7/2024)    [place] [employer] - Occupational Stress Questionnaire     Feeling of Stress : Patient declined   Housing Stability: Patient Declined (12/7/2024)    Housing Stability Vital Sign     Unable to Pay for Housing in the Last Year: Patient declined     Homeless in the Last Year: Patient declined   .      Current Medications[1]    Review of Systems   Skin:  Positive for wound.   All other systems reviewed and are negative.         Labs  Reviewed:   Chemistry:  Lab Results   Component Value Date    BUN 10.6 06/11/2025    BUN 7.6 (L) 06/10/2025    CREATININE 0.92 06/11/2025    CREATININE 0.93 06/10/2025    EGFRNORACEVR >60 06/11/2025    EGFRNORACEVR >60 06/10/2025    AST 98 (H) 06/11/2025    AST 40 06/10/2025    ALT 62 (H) 06/11/2025    ALT 44 06/10/2025    HGBA1C 11.8 (H) 06/10/2025        Hematology:  Lab Results   Component Value Date    WBC 11.84 (H) 06/11/2025    WBC 10.42 06/10/2025    HGB 14.4 06/11/2025    HGB 13.6 (L) 06/10/2025    HCT 41.2 (L) 06/11/2025    HCT 38.5 (L) 06/10/2025     06/11/2025     06/10/2025       Inflammatory Markers:  Lab Results   Component Value Date    HSCRP 9.98 (H) 06/23/2020    HSCRP 43.30 (H) 09/10/2019    SEDRATE 26 (H) 06/22/2022    SEDRATE 28 (H) 06/23/2020    SEDRATE 2 09/10/2019        Objective:        Physical Exam  Vitals reviewed.   Cardiovascular:      Pulses:           Dorsalis pedis pulses are 2+ on the right side and 2+ on the left side.        Posterior tibial pulses are 2+ on the right side and 2+ on the left side.   Musculoskeletal:        Feet:    Feet:      Right foot:      Skin integrity: Dry skin present.      Toenail Condition: Right toenails are abnormally thick.      Left foot:      Skin integrity: Ulcer and dry skin present.      Toenail Condition: Left toenails are abnormally thick.      Comments: Left plantar surface foot wound red granulating wound bed with moderate serosanguineous drainage.  Skin:     General: Skin is cool and dry.      Capillary Refill: Capillary refill takes less than 2 seconds.             Comments: Right lower leg scab resolved.   Neurological:      Mental Status: He is alert.          Wound 06/10/25 1307 Other (comment) Left plantar Foot (Active)   06/10/25 1307 Foot   Present on Original Admission: Y   Primary Wound Type: Other   Side: Left   Orientation: plantar   Wound Approximate Age at First Assessment (Weeks):    Wound Number:    Is this injury  device related?:    Incision Type:    Closure Method:    Wound Description (Comments):    Type:    Additional Comments:    Ankle-Brachial Index:    Pulses:    Removal Indication and Assessment:    Wound Outcome:    Wound Image   06/10/25 1307   Dressing Appearance Moist drainage 06/10/25 1307   Drainage Characteristics/Odor Serosanguineous 06/10/25 1307   Appearance Pink;Bleeding 06/10/25 1307   Wound Length (cm) 0.2 cm 06/10/25 1307   Wound Width (cm) 0.4 cm 06/10/25 1307   Wound Depth (cm) 0.4 cm 06/10/25 1307   Wound Volume (cm^3) 0.017 cm^3 06/10/25 1307   Wound Surface Area (cm^2) 0.06 cm^2 06/10/25 1307           Assessment:         ICD-10-CM ICD-9-CM   1. Superficial foreign body of left foot without major open wound and without infection, initial encounter  S90.852A 917.6   2. Scab  R23.4 782.8   3. Diabetic polyneuropathy associated with other specified diabetes mellitus  E13.42 250.60     357.2   4. Type 2 diabetes mellitus with hyperglycemia, with long-term current use of insulin  E11.65 250.00    Z79.4 790.29     V58.67   5. Tobacco user  Z72.0 305.1           Plan:   Tissue pathology and/or culture taken:  [] Yes [x] No   Sharp debridement performed:   [] Yes [x] No   Labs ordered this visit:   [] Yes [x] No   Imaging ordered this visit:   [] Yes [x] No         1. Superficial foreign body of left foot without major open wound and without infection, initial encounter     Wound care:    Will strength Dakin's, apply dry gauze and Telfa Band-Aid.  Perform daily.   2. Scab     Resolved.   3. Diabetic polyneuropathy associated with other specified diabetes mellitus     Co-factor in wound development.    Last A1c 11.1.    Must have a strict diabetic diet, take glucose lowering medications as prescribed and encourage lower HA1C.      4. Type 2 diabetes mellitus with hyperglycemia, with long-term current use of insulin     Co-factor delayed wound healing.    Last A1c 11.1.    Reinforced diet and medication  compliance.         5. Tobacco user     Currently on smoking cessation program.    Must stop smoking. Explained the negative impact this has on not only the wounds (delayed healing), but overall health as well.       Patient Instructions   Pt seen today by: Malina VALIENTE    Home health and self care DRESSING INSTRUCTIONS:        Wound location: Left plantar foot    Dressings to be changed daily and as needed if soiled or not intact.      Cleanse wound with Full strength Dakins wound cleanser  Cover with telfa bandage          Return visit: 2 weeks    FOR ANY WORSENING CONDITION PRIOR TO NEXT APPOINTMENT REPORT TO THE NEAREST EMERGENCY ROOM    Nutrition:  The current daily value (%DV) for protein is 50 grams per day and is meant as a general goal for most people. Further increasing your dietary protein intake is very important for wound healing. Typically one needs over 100g of protein per day to help with wound healing needs.  If you are a dialysis patient or have problems with your kidneys, talk to your Nephrologist about how much protein you can take in with your condition.  Examples of high protein items that can be added to your diet include: eggs, chicken, red meats, almonds, cottage cheese, Greek yogurt, beans, and peanut butter.  Fortified protein bars, shakes and drinks can add 15-30 additional grams of protein per serving.  Also add:   1 daily general multivitamin   Vitamin C : 500mg twice daily   Zinc 220 mg daily  Vit D : once daily    Offloading   Offload your wound. This means to reduce pressure on and around the wound that reduces blood flow to the wound and prevents healing. Your wound care team will discuss specific ways for you to offload your specific wound. Common offloading strategies include:    Turn or reposition every 2 hours or sooner  Use pillows, wedges, ROHO wheelchair cushions or other special devices like boots and shoes to lift the wound off of hard surfaces  Alternating Low  Air-loss (ALAL) mattress may be ordered  Padded dressings can reduce wound pressure      Call our [facility] wound clinic for questions/concerns [phone] .     The time spent including preparing to see the patient, obtaining patient history and assessment, evaluation of the plan of care, patient/caregiver counseling and education, orders, documentation, coordination of care, and other professional medical management activities for today's encounter was 20 minute.    Time spent performing procedures during today's encounter was 25 minute.    Follow up in about 2 weeks (around 6/24/2025) for foot wound.  Teaching provided on s/s to call wound clinic for promptly.  ER precautions taught for after hours and weekends.       ROCCO Sequeira          [1]   No current facility-administered medications for this encounter.     No current outpatient medications on file.     Facility-Administered Medications Ordered in Other Encounters   Medication Dose Route Frequency Provider Last Rate Last Admin    acetaminophen tablet 650 mg  650 mg Oral Q8H PRN Jessica Nolasco MD        amitriptyline tablet 25 mg  25 mg Oral QHS Dat Beasley MD        aspirin EC tablet 81 mg  81 mg Oral Daily Jessica Nolasco MD   81 mg at 06/11/25 0935    atorvastatin tablet 80 mg  80 mg Oral Daily Jessica Nolasco MD   80 mg at 06/11/25 0935    clonazePAM tablet 1 mg  1 mg Oral QHS Dat Beasley MD        cloNIDine tablet 0.3 mg  0.3 mg Oral TID Jessica Nolasco MD   0.3 mg at 06/11/25 0935    clopidogreL tablet 75 mg  75 mg Oral Daily Jessica Nolasco MD   75 mg at 06/11/25 0935    dextrose 50% injection 12.5 g  12.5 g Intravenous PRN Jessica Nolasco MD        dextrose 50% injection 25 g  25 g Intravenous PRN Jessica Nolasco MD        EScitalopram oxalate tablet 20 mg  20 mg Oral Daily Jessica Nolasco MD   20 mg at 06/11/25 0937    furosemide tablet 20 mg  20 mg Oral Daily Jessica Nolasco MD   20 mg at 06/11/25 0935    glucagon (human  recombinant) injection 1 mg  1 mg Intramuscular PRN Jessica Nolasco MD        glucose chewable tablet 16 g  16 g Oral PRN Jessica Nolasco MD        glucose chewable tablet 24 g  24 g Oral PRN Jessica Nolasco MD        HYDROmorphone tablet 4 mg  4 mg Oral Q8H PRN Dat Beasley MD        insulin aspart U-100 injection 0-15 Units  0-15 Units Subcutaneous QID (AC + HS) PRN Jessica Nolasco MD   12 Units at 06/11/25 1156    insulin aspart U-100 injection 7 Units  7 Units Subcutaneous TIDWM Dat Beasley MD   7 Units at 06/11/25 1154    insulin glargine U-100 (Lantus) injection 40 Units  40 Units Subcutaneous Daily Jessica Nolasco MD   40 Units at 06/11/25 0940    insulin glargine U-100 (Lantus) injection 40 Units  40 Units Subcutaneous QHS Jessica Nolasco MD        isosorbide mononitrate 24 hr tablet 120 mg  120 mg Oral Daily Jessica Nolasco MD   120 mg at 06/11/25 0937    lipase-protease-amylase 6,000-19,000-30,000 units per capsule 3 capsule  3 capsule Oral TID WM Jessica Nolasco MD   3 capsule at 06/11/25 1154    losartan tablet 100 mg  100 mg Oral Daily Jessica Nolasco MD   100 mg at 06/11/25 0936    melatonin tablet 6 mg  6 mg Oral Nightly PRN Jessica Nolasco MD        metoprolol succinate (TOPROL-XL) 24 hr tablet 25 mg  25 mg Oral Daily Jessica Nolasco MD   25 mg at 06/11/25 0938    morphine 12 hr tablet 60 mg  60 mg Oral TID Dat Beasley MD   60 mg at 06/11/25 0936    naloxone 0.4 mg/mL injection 0.4 mg  0.4 mg Intravenous PRN Shoaib Eason MD        nicotine 21 mg/24 hr 1 patch  1 patch Transdermal Daily Jessica Nolasco MD   1 patch at 06/11/25 0938    NIFEdipine 24 hr tablet 60 mg  60 mg Oral BID Jessica Nolasco MD   60 mg at 06/11/25 0936    nitroGLYCERIN SL tablet 0.4 mg  0.4 mg Sublingual Q5 Min PRN Jessica Nolasco MD        ondansetron disintegrating tablet 4 mg  4 mg Oral Q8H PRN Jessica Nolasco MD        OXcarbazepine tablet 600 mg  600 mg Oral BID Jessica Nolasco MD   600 mg at 06/11/25  0937    potassium chloride SA CR tablet 40 mEq  40 mEq Oral Once Joselito Holland MD        pregabalin capsule 75 mg  75 mg Oral TID Dat Beasley MD        ranolazine 12 hr tablet 500 mg  500 mg Oral BID Jessica Nolasco MD   500 mg at 06/11/25 0936    sodium chloride 0.9% flush 10 mL  10 mL Intravenous PRN Jessica Nolasco MD        tamsulosin 24 hr capsule 0.4 mg  1 capsule Oral Daily Jessica Nolasco MD   0.4 mg at 06/11/25 0937

## 2025-06-11 NOTE — PLAN OF CARE
06/11/25 1100   Discharge Assessment   Assessment Type Discharge Planning Assessment   Confirmed/corrected address, phone number and insurance Yes   Confirmed Demographics Correct on Facesheet   Source of Information patient   Communicated MERISSA with patient/caregiver Date not available/Unable to determine   People in Home alone   Do you expect to return to your current living situation? Yes   Do you have help at home or someone to help you manage your care at home? No   Prior to hospitilization cognitive status: Unable to Assess   Current cognitive status: Alert/Oriented   Walking or Climbing Stairs Difficulty yes   Walking or Climbing Stairs ambulation difficulty, requires equipment   Mobility Management cane   Dressing/Bathing Difficulty no   Equipment Currently Used at Home cane, straight   Readmission within 30 days? No   Patient currently being followed by outpatient case management? No   Do you currently have service(s) that help you manage your care at home? No   Do you take prescription medications? Yes   Do you have prescription coverage? Yes   Do you have any problems affording any of your prescribed medications? No   Is the patient taking medications as prescribed? yes   Who is going to help you get home at discharge? family   How do you get to doctors appointments? family or friend will provide   Are you on dialysis? No   Discharge Plan A Home Health   DME Needed Upon Discharge  other (see comments)  (TBD)   Discharge Plan discussed with: Patient   Physical Activity   On average, how many days per week do you engage in moderate to strenuous exercise (like a brisk walk)? 0 days   On average, how many minutes do you engage in exercise at this level? 0 min   Financial Resource Strain   How hard is it for you to pay for the very basics like food, housing, medical care, and heating? Not hard   Housing Stability   In the last 12 months, was there a time when you were not able to pay the mortgage or rent on  time? N   At any time in the past 12 months, were you homeless or living in a shelter (including now)? N   Transportation Needs   In the past 12 months, has lack of transportation kept you from medical appointments or from getting medications? no   In the past 12 months, has lack of transportation kept you from meetings, work, or from getting things needed for daily living? No   Food Insecurity   Within the past 12 months, you worried that your food would run out before you got the money to buy more. Never true   Within the past 12 months, the food you bought just didn't last and you didn't have money to get more. Never true   Stress   Do you feel stress - tense, restless, nervous, or anxious, or unable to sleep at night because your mind is troubled all the time - these days? Not at all   Social Isolation   How often do you feel lonely or isolated from those around you?  Never   Alcohol Use   Q1: How often do you have a drink containing alcohol? Never   Q2: How many drinks containing alcohol do you have on a typical day when you are drinking? None   Q3: How often do you have six or more drinks on one occasion? Never   Utilities   In the past 12 months has the electric, gas, oil, or water company threatened to shut off services in your home? No   Health Literacy   How often do you need to have someone help you when you read instructions, pamphlets, or other written material from your doctor or pharmacy? Never     Pt is single, lives alone, unemployed. Receives disability income. CM to follow for needs.

## 2025-06-11 NOTE — H&P
[facility] Medicine Wards History and Physical Note      Resident Team: [facility] Medicine List 2  Attending Physician: Dat Beasley MD  Resident: Skyler  Intern: [place]     Date of Admit: 6/10/2025  Chief Complaint   No chief complaint on file.    HPI   Bharath Caballero is a 45 y.o. male with PMH significant for T2DM, HLD, HTN, CAD, Hx of CABG w/ L atrial appendage ligation (12/2024) CKD stage 3, chronic pancreatitis, MASLD, CICI, GERD and BPH was a direct admit from clinic with complaints of postprandial diarrhea and uncontrolled diabetes on 6/10/2025. Patient states that diarrhea has been on going for about a year, with loose, watery stools red-orange in color typically occurring about 30 minutes to 1 hour after eating. States that he has been avoiding eating to avoid abdominal pain and having a bowel movement. Abdominal pain is right sided and worse after eating. Patient additionally reports having a couple episodes of hematemesis within the past 2-3 months. Patient is a diabetic, diagnosed in 2016, on insulin. States he takes basal insulin in the morning and evening, 80 U each, as well as prandial insulin TID of 25 U. POC A1c in clinic was 12.6. Patient additionally has chronic chest pain and shortness of breath, s/p CABG 12/2024 with L atrial appendage ablation. Patient has a left lateral foot wound following stepping a piece of glass a and was seen by wound care on 6/9/25. Patient smokes 1/2 ppd. Of note, per his PCP's note, patient was seen by GI and a GI work up in March 2025 revealed nonspecific finding of stool positive for leukocytes, negative for parasites, Giardia, and cryptosporidium. Final stool culture negative March 2025. U/S abdomen 3/18/25 revealed gallbladder sludge and was offered a referral for cholecystectomy     ED Course - On arrival, patient afebrile (98.1 F), hypertensive (172/90), HR 79, RR 17, O2 98% RA. POC A1c 12.6 in clinic. Repeat on arrival was 11.8. Labs and GI work up ordered. GI to be  consulted. Will be admitted for management and uncontrolled diabetes and work up of chronic diarrhea.    History    PMH:  Past Medical History:   Diagnosis Date    Abdominal pain     Acquired perforating dermatosis 08/30/2023    Anxiety     Aortic atherosclerosis 01/24/2023    Appetite loss     Balance problem     Bilateral edema of lower extremity 02/06/2023    Bladder outflow obstruction 06/20/2022    Blurred vision, right eye 10/19/2022    BMI 34.0-34.9,adult 06/20/2022    BPH (benign prostatic hyperplasia)     Chest pain     Chronic liver failure without hepatic coma 04/25/2023    Chronic pain 10/25/2022    Chronic pain syndrome 05/12/2022    Chronic pancreatitis 10/28/2017    Coronary artery disease, unspecified vessel or lesion type, unspecified whether angina present, unspecified whether native or transplanted heart     Deafness 10/03/2023    Depression     Diabetes     Diabetic polyneuropathy 06/20/2022    Elevated LFTs 08/18/2022    Fatigue     GERD (gastroesophageal reflux disease)     Hand paresthesia 06/20/2022    Headache     Hepatic steatosis     History of continuous positive airway pressure (CPAP) therapy at home     History of pancreatitis 06/20/2022    History of recent fall     Hypertension     Hypertriglyceridemia     Insomnia     Kidney disease     Kidney disorder     Leg pain     Mixed hyperlipidemia 10/28/2017    Mononeuritis multiplex 06/20/2022    Morbid obesity     Nausea & vomiting     Neuropathy     Nocturia 06/20/2022    NSTEMI (non-ST elevated myocardial infarction) 10/2024    OA (osteoarthritis)     Obesity     Obstructive sleep apnea syndrome 06/20/2022    Onychomycosis of multiple toenails with type 2 diabetes mellitus 10/28/2017    Open wound of finger of right hand 10/20/2023    CICI (obstructive sleep apnea)     uses CPAP    Painful diabetic neuropathy 05/12/2022    Personal history of colonic polyps 08/18/2022    Polyneuropathy     Primary hypertension 10/28/2017    Recurrent  pancreatitis     Renal insufficiency     Tobacco abuse     Tobacco user 06/20/2022    Type 2 diabetes mellitus with skin complication, with long-term current use of insulin 02/06/2023    Urinary frequency     Use of cane as ambulatory aid     Weight loss, unintentional      Past Surgical History:   Past Surgical History:   Procedure Laterality Date    ANGIOGRAM, CORONARY, WITH LEFT HEART CATHETERIZATION N/A 04/10/2023    Procedure: Angiogram, Coronary, with Left Heart Cath;  Surgeon: Jaswant Pugh MD;  Location: [facility] CATH LAB;  Service: Cardiology;  Laterality: N/A;    ANGIOGRAM, CORONARY, WITH LEFT HEART CATHETERIZATION N/A 10/10/2024    Procedure: Angiogram, Coronary, with Left Heart Cath;  Surgeon: Jaswant Pugh MD;  Location: [facility] CATH LAB;  Service: Cardiology;  Laterality: N/A;    APPENDECTOMY      ARTERIOVENOUS ANASTOMOSIS, OPEN, UPPER ARM BASILLIC VEIN TRANSPOSITION N/A 05/07/2018    CORONARY ARTERY BYPASS GRAFT (CABG) N/A 12/05/2024    Procedure: CORONARY ARTERY BYPASS GRAFT (CABG);  Surgeon: Gillian Clayton MD;  Location: [facility] OR;  Service: Cardiothoracic;  Laterality: N/A;  ECHO NOTIFIED    EGD, WITH CLOSED BIOPSY N/A 11/20/2023    Procedure: EGD, WITH CLOSED BIOPSY;  Surgeon: Christina James MD;  Location: [facility] ENDOSCOPY;  Service: Gastroenterology;  Laterality: N/A;    ENDOSCOPIC HARVEST OF VEIN Left 12/05/2024    Procedure: HARVEST-VEIN-ENDOVASCULAR;  Surgeon: Gillian Clayton MD;  Location: [facility] OR;  Service: Cardiothoracic;  Laterality: Left;    ESOPHAGOGASTRODUODENOSCOPY N/A 06/07/2021    EXCISION OF ARTERIOVENOUS FISTULA N/A 06/01/2018    FRACTIONAL FLOW RESERVE (FFR), CORONARY  04/10/2023    Procedure: Fractional Flow Balm (FFR), Coronary;  Surgeon: Jaswant Pugh MD;  Location: [facility] CATH LAB;  Service: Cardiology;;    HERNIA REPAIR      INSPECTION OF UPPER INTESTINAL TRACT N/A 06/07/2021    OCCLUSION OF LEFT ATRIAL APPENDAGE Left 12/05/2024    Procedure: Left  atrial appendage occlusion;  Surgeon: Gillian Clayton MD;  Location: [facility] OR;  Service: Cardiothoracic;  Laterality: Left;    PHERESIS OF PLASMA N/A 07/13/2018    PHERESIS OF PLASMA N/A 05/25/2018    TRIAL OF SPINAL CORD NERVE STIMULATOR N/A 05/12/2022    Procedure: TRIAL, NEUROSTIMULATOR, SPINAL CORD;  Surgeon: Jay Pozo MD;  Location: [facility] OR;  Service: Neurosurgery;  Laterality: N/A;    UPPER GI N/A 06/07/2021     Family History:   Family History   Problem Relation Name Age of Onset    Obesity Father       Social: Social History[1]  Allergies: Review of patient's allergies indicates:  No Known Allergies  Home Medications   Current Outpatient Medications   Medication Instructions    amitriptyline (ELAVIL) 50 mg, Oral, Nightly    aspirin (ECOTRIN) 81 mg, Daily    atorvastatin (LIPITOR) 80 mg, Oral, Daily    cholecalciferol (vitamin D3) 5,000 Units, Oral, Daily    clonazePAM (KLONOPIN) 1 mg, Oral, 2 times daily    cloNIDine (CATAPRES) 0.3 mg, Oral, 3 times daily    clopidogreL (PLAVIX) 75 mg tablet Take 4 tablets on day 1 and then one tablet daily.    CREON 36,000-114,000- 180,000 unit CpDR TAKE ONE CAPSULE THREE TIMES DAILY    ergocalciferol (ERGOCALCIFEROL) 50,000 Units, Oral, Every 7 days    EScitalopram oxalate (LEXAPRO) 20 mg, Daily    esomeprazole (NEXIUM) 40 mg, Oral, Before breakfast    FARXIGA 10 mg, Oral, Daily    gabapentin (NEURONTIN) 800 mg, Oral, Nightly    HUMALOG U-100 INSULIN 100 unit/mL injection INJECT 25 UNITS SUBCUTANEOUSLY BEFORE MEALS THREE TIMES DAILY    HYDROmorphone (DILAUDID) 8 mg, Oral, Every 8 hours PRN    icosapent ethyL (VASCEPA) 2,000 mg, Oral, 2 times daily    isosorbide mononitrate (IMDUR) 120 mg, Oral, Daily    losartan (COZAAR) 100 mg, Oral, Daily    metoprolol succinate (TOPROL-XL) 25 mg, Oral, Daily    morphine (MS CONTIN) 100 mg, Oral, 3 times daily    nicotine (NICODERM CQ) 21 mg/24 hr 1 patch, Transdermal, Daily    NIFEdipine (PROCARDIA-XL) 60 mg, Oral, 2  "times daily    nitroGLYCERIN (NITROSTAT) 0.3 mg, Sublingual, Every 5 min PRN    nut.tx.gluc intol,lf,soy-fiber (GLUCERNA 1.5 CEDRIC) 0.08-1.5 gram-kcal/mL Liqd 273 mLs, Oral, 2 times daily    OXcarbazepine (TRILEPTAL) 600 mg, Oral, 2 times daily    potassium chloride SA (K-DUR,KLOR-CON) 20 MEQ tablet 20 mEq, Oral, 2 times daily    pregabalin (LYRICA) 150 mg, Oral, 3 times daily    ranolazine (RANEXA) 500 mg, Oral, 2 times daily    REPATHA SURECLICK 140 mg/mL PnIj INJECT 1ml INTRAMUSCULARLY EVERY 14 DAYS    sucralfate (CARAFATE) 1 g, Oral, Before meals & nightly    SURE COMFORT INSULIN SYRINGE 0.5 mL 31 gauge x 5/16" Syrg USE ONE SYRINGE THREE TIMES DAILY    tamsulosin (FLOMAX) 0.4 mg Cap TAKE ONE CAPSULE BY MOUTH EVERY DAY    torsemide (DEMADEX) 20 mg, Oral, Daily    TOUJEO SOLOSTAR U-300 INSULIN 300 unit/mL (1.5 mL) InPn pen INJECT 35 UNITS SUBCUTANEOUSLY TWICE DAILY    TOUJEO SOLOSTAR U-300 INSULIN 300 unit/mL (1.5 mL) InPn pen INJECT 35 SUBCUTANEOUSLY TWICE DAILY    TRADJENTA 5 mg, Oral, Daily       Review of Systems   Review of Systems   Constitutional:  Negative for fever and malaise/fatigue.   HENT:  Negative for congestion and sore throat.    Eyes:  Negative for blurred vision.   Respiratory:  Positive for shortness of breath. Negative for cough.    Cardiovascular:  Positive for chest pain. Negative for leg swelling.   Gastrointestinal:  Positive for abdominal pain, diarrhea, nausea and vomiting.   Genitourinary:  Negative for dysuria.   Musculoskeletal:  Negative for falls.   Neurological:  Negative for dizziness and weakness.      Vital Signs    BP  Min: 150/82  Max: 195/95  Temp  Av.2 °F (36.8 °C)  Min: 98.1 °F (36.7 °C)  Max: 98.4 °F (36.9 °C)  Pulse  Av  Min: 69  Max: 89  Resp  Av.3  Min: 17  Max: 20  SpO2  Av.3 %  Min: 98 %  Max: 99 %  Height  Av' 8.3" (173.5 cm)  Min: 5' 8" (172.7 cm)  Max: 5' 9" (175.3 cm)  Weight  Av.3 kg (183 lb 10.7 oz)  Min: 81.4 kg (179 lb 6.4 oz)  Max: " "84.4 kg (186 lb)  Body mass index is 27.47 kg/m².  No intake/output data recorded.    Physical Exam    Physical Exam  Constitutional:       General: He is not in acute distress.  HENT:      Head: Normocephalic and atraumatic.      Nose: No congestion or rhinorrhea.      Mouth/Throat:      Mouth: Mucous membranes are moist.      Pharynx: Oropharynx is clear. No posterior oropharyngeal erythema.   Eyes:      Conjunctiva/sclera: Conjunctivae normal.      Pupils: Pupils are equal, round, and reactive to light.   Cardiovascular:      Rate and Rhythm: Normal rate and regular rhythm.      Pulses: Normal pulses.      Heart sounds: No murmur heard.     No friction rub.   Pulmonary:      Effort: Pulmonary effort is normal.      Breath sounds: Wheezing present. No rhonchi.   Abdominal:      General: Bowel sounds are normal. There is no distension.      Palpations: Abdomen is soft.      Tenderness: There is abdominal tenderness.   Musculoskeletal:         General: No swelling or tenderness.   Skin:     General: Skin is warm and dry.      Comments: Laceration to left lateral foot, covered with some serosanguinous drainage on the bandage. Wound was clean, without erythema    Neurological:      General: No focal deficit present.      Mental Status: He is alert.   Psychiatric:         Mood and Affect: Mood normal.             Labs    Most Recent Data:  CBC:   Lab Results   Component Value Date    WBC 13.77 (H) 02/18/2025    HGB 15.5 02/18/2025    HCT 46.2 02/18/2025     02/18/2025    MCV 85.1 02/18/2025    RDW 12.8 02/18/2025     WBC Differential: No results for input(s): "WBC", "HGB", "HCT", "PLT", "MCV" in the last 168 hours.  BMP:   Lab Results   Component Value Date     04/16/2025    K 3.6 04/16/2025     04/16/2025    CO2 26 04/16/2025    BUN 10.3 04/16/2025    CREATININE 0.85 04/16/2025     (H) 04/16/2025    CALCIUM 9.2 04/16/2025    MG 2.10 12/10/2024    PHOS 3.2 12/10/2024     LFTs:   Lab Results " "  Component Value Date    PROT 8.3 04/16/2025    ALBUMIN 3.2 (L) 04/16/2025    BILITOT 0.4 04/16/2025    AST 71 (H) 04/16/2025    ALKPHOS 493 (H) 04/16/2025    ALT 69 (H) 04/16/2025     (H) 10/07/2024     Coags:   Lab Results   Component Value Date    INR 1.2 12/05/2024    PROTIME 15.2 (H) 12/05/2024     FLP:   Lab Results   Component Value Date    CHOL 165 02/18/2025    HDL 27 (L) 02/18/2025    TRIG 198 (H) 02/18/2025     DM:   Lab Results   Component Value Date    HGBA1C 11.8 (H) 06/10/2025    HGBA1C 11.1 (H) 02/18/2025    HGBA1C 9.3 (H) 10/08/2024    CREATININE 0.85 04/16/2025     Thyroid:   Lab Results   Component Value Date    TSH 3.029 04/16/2025      Anemia:   Lab Results   Component Value Date    IRON 97 10/10/2024    TIBC 205 (L) 10/10/2024    FERRITIN 216.05 10/10/2024       Lab Results   Component Value Date    SBGBVJSY26 886 (H) 10/10/2024     No results found for: "FOLATE"     Cardiac:   Lab Results   Component Value Date    TROPONINI 1.217 (H) 12/08/2024    .6 (H) 12/08/2024     Urinalysis:   Lab Results   Component Value Date    COLORU Light-Yellow 04/16/2025    NITRITE Negative 04/16/2025    KETONESU Negative 12/20/2021    UROBILINOGEN Normal 04/16/2025    WBCUA 6-10 (A) 04/16/2025       Trended Lab Data:  No results for input(s): "WBC", "HGB", "HCT", "PLT", "MCV", "RDW", "NA", "K", "CL", "CO2", "BUN", "CREATININE", "GLU", "PROT", "ALBUMIN", "BILITOT", "AST", "ALKPHOS", "ALT" in the last 168 hours.    Trended Cardiac Data:  No results for input(s): "TROPONINI", "CKTOTAL", "CKMB", "BNP" in the last 168 hours.    Microbiology Data:  Microbiology Results (last 7 days)       Procedure Component Value Units Date/Time    Clostridium Diff Toxin, A & B, EIA [[id number]]     Order Status: Canceled Specimen: Stool             Imaging         Assessment and Plan     Uncontrolled T2DM, with long term insulin use  Peripheral neuropathy   - POC A1c in clinic was 12.6. Repeat on arrival was 11.8.  - " Patient reported home regimen of basal insulin in the morning and evening, 80 U each, as well as prandial insulin TID of 25 U.   - Will initiate Lantus 40 U BID with HDSSI  [facility]   - Resume home regimen of Pregabalin 150 mg TID     Chronic diarrhea  Chronic pancreatitis   MASLD  Biliary Sludge on U/S  - Previously seen by GI on 3/2025 with workup revealing nonspecific finding of stool positive for leukocytes, negative for parasites, Giardia, and cryptosporidium. Final stool culture negative March 2025.   - U/S abdomen 3/18/25 -- gallbladder sludge, was offered a referral for cholecystectomy OP  - Home regimen of CREON 36,000-114,000-180,000, one tablet TID. Medication not on formulary will initiate CREON 6,000-19,000-30,000 3 tablets TID with meals    - GI consulted     Left lateral food wound   - Seen by wound care on 6/9/25, and glass foreign body removed   - Consider wound care consult while IP     HTN  HLD  CAD s/p CABG x2 w/ L atrial appendage ligation (12/2024)   HFpEF with EF 70%  - Initial BP  172/90  - TTE 5/28/25 -- Moderately increased ventricular mass with moderate septal thickening. There is moderate concentric hypertrophy. Normal wall motion. There is normal systolic function. Quantitated ejection fraction is 70%. There is normal diastolic function.   - Troponin and EKG ordered   - Resume home medications of Atorvastatin 80 mg daily,  Losartan 100 mg daily, Nifedipine 60 mg daily, ASA 81 mg daily, Plavix 75 mg daily, Imdur 120 mg daily, Furosemide 20 mg daily, Metoprolol 25 mg daily, and Ranexa 500 mg BID   - PRN Nitro for chest pain     CKD Stage III  - Labs pending. Monitor renal indices   - Avoid nephrotoxic agents if possible     CICI  - Not currently using CPAP at home due to being broken   - Consider CPAP usage while inpatient and coordinating new machine OP upon discharge      BPH  - Resume home Tamsulosin 0.4 mg daily     Tobacco abuse   - Smokes 1/2 ppd  - Nicotine patch  ordered     Chronic pain   - History of NERVO SCS 7/21/2022 placement. Patient had appointment for possible revision on 6/11/25  - Resume home pain medication regimen IP: Morphine 105 mg TID and Hydromorphone 8 mg q8h PRN    Mood disorder   - Resume home regimen of Amytriptiline 50 mg nightly, Klonipin 1 mg BID, Lexapro 20 mg daily, and Trileptal 600 mg BID         CODE STATUS: Full  Access: pIV  Antibiotics: --  Diet: Diabetic   DVT Prophylaxis: SCDs  GI Prophylaxis: --  Fluids: --      Disposition: Patient direct admit for management of uncontrolled diabetes and IP work up with GI consultation of chronic diarrhea. Discharge pending hospital course.       Jessica Nolasco, DO  [facility] HO-1     If patient is agreeable, would consult surgery to do elective cholecystectomy while in hospital. Thank you.         [1]   Social History  Tobacco Use    Smoking status: Every Day     Current packs/day: 0.50     Average packs/day: 2.6 packs/day for 32.9 years (84.3 ttl pk-yrs)     Types: Cigarettes     Start date: 1992     Last attempt to quit: 01/1999     Passive exposure: Current    Smokeless tobacco: Former     Types: Chew    Tobacco comments:     Pt is smoking 1/2 pack per day   Vaping Use    Vaping status: Never Used   Substance Use Topics    Alcohol use: Not Currently    Drug use: Never

## 2025-06-11 NOTE — PT/OT/SLP EVAL
Physical Therapy Evaluation    Patient Name:  Bharath Caballero   MRN:  [id number]    Recommendations:     Therapy Intensity Recommendations at Discharge: Low Intensity Therapy  Discharge Equipment Recommendations: none   Equipment to be obtained for discharge: none.  Barriers to discharge: fall risk    Assessment:     Bharath Caballero is a 45 y.o. male admitted with a medical diagnosis of Uncontrolled type 2 diabetes mellitus with hyperglycemia.  1. Falls frequently    2. Fall, subsequent encounter    3. Uncontrolled diabetes mellitus with hyperglycemia    4. Chest pain    5. Uncontrolled type 2 diabetes mellitus with hyperglycemia       Problem List[1]   He presents with the following impairments/functional limitations:  impaired functional mobility, gait instability, impaired balance, pain, impaired cardiopulmonary response to activity.    Rehab Prognosis: Good.    Patient would benefit from continued skilled acute PT services to: address above listed impairments/functional limitations; receive patient/caregiver education; reduce fall risk; and maximize independency/safety with functional mobility.    Recent Surgery: * No surgery found *      Plan:     During this hospitalization, patient to be seen 3 x/week to address the identified impairments/functional limitations via gait training, therapeutic activities, therapeutic exercises, neuromuscular re-education and progress toward the established goals.    Plan of Care Expires:  07/09/25    Subjective     Communicated with patient's nurse Madonna prior to session.    Patient agreeable to participate in evaluation.     Chief Complaint: R leg pain  Patient/Family Comments/goals: None stated   Pain/Comfort:  Pain Rating 1: 7/10  Location - Side 1: Right  Location 1: leg  Pain Addressed 1: Nurse notified  Pain Rating Post-Intervention 1: 7/10    Patients cultural, spiritual, Jehovah's witness conflicts given the current situation: no    Social History  Living Environment: Patient lives  alone in a mobile home, with 6 steps steps outside, with tub-shower combo.  Functional Level: Prior to admission patient ambulated without assistive device, was independent in ADL's, and was independent in IADL's.  Equipment Used at Home: cane, straight  Equipment owned (not currently used): none.  Assistance Upon Discharge: none available.        Objective:     Patient found sitting edge of bed with peripheral IV  bed alarm upon PT entry to room.    General Precautions: Standard, fall, hearing impaired , contact   Orthopedic Precautions:    Braces:  N/A  Respiratory Status: room air    Vitals   At Rest (pre-session)  BP  107/69   HR  90   O2 Sat %  97      With Activity (post-session)  BP  116/70   HR  92   O2 Sat %  95     Exams:  Orientation: Patient is oriented to person, place, time, situation  Commands: Patient follows commands consistently  BILAT UE ROM/strength - defer to OT - see OT note for details  RLE ROM: WFL  RLE Strength: WFL  LLE ROM: WFL  LLE Strength: WFL    Functional Mobility:    Bed Mobility:  N/A - Patient seated EOB at start of session and left EOB at end of session, pt noted to be nodding off at times sitting EOB     Transfers:  Sit to Stand: stand by assistance with straight cane  Stand to Sit: stand by assistance with straight cane    Gait:  Patient ambulated 130ft with standing rest break leaning against wall unprompted straight cane and contact guard assistance.  Patient demonstrates :       occasional unsteady gait.    Other Mobility:  N/A    Balance:  Sit  Static: NORMAL: No deviations seen in posture held statically  Dynamic: NORMAL: No deviations seen in posture held dynamically  Stand  Static: FAIR: Maintains without assist but unable to take challenges  Dynamic: FAIR: Needs CONTACT GUARD during gait    Additional Treatment Session  N/A    Patient left sitting edge of bed with all lines intact, call button in reach, patient's nurse notified, and bed alarm on.    DME  Justifications:  No DME recommended requiring DME justifications    Education     Patient educated on the importance of early mobility to prevent functional decline during hospital stay.  Patient educated on and assisted with functional mobility as noted above.  Patient educated on PT Plan of Care and role of PT in acute care.  Patient was instructed to utilize staff assistance for mobility/transfers.  White board updated regarding patient's safest level of mobility with staff assistance    Goals     Multidisciplinary Problems       Physical Therapy Goals          Problem: Physical Therapy    Goal Priority Disciplines Outcome Interventions   Physical Therapy Goal     PT, PT/OT Progressing    Description: Goals to be met by: Discharge     Patient will increase functional independence with mobility by performin. Sit to stand transfer with Modified [place]  2. Bed to chair transfer with Modified [place] using Single-point Cane   3. Gait  x 390 feet with Modified [place] using Single-point Cane .   4. Ascend/descend 6 stair with no Handrails Supervision using No Assistive Device.                        History:     Past Medical History:   Diagnosis Date    Abdominal pain     Acquired perforating dermatosis 2023    Anxiety     Aortic atherosclerosis 2023    Appetite loss     Balance problem     Bilateral edema of lower extremity 2023    Bladder outflow obstruction 2022    Blurred vision, right eye 10/19/2022    BMI 34.0-34.9,adult 2022    BPH (benign prostatic hyperplasia)     Chest pain     Chronic liver failure without hepatic coma 2023    Chronic pain 10/25/2022    Chronic pain syndrome 2022    Chronic pancreatitis 10/28/2017    Coronary artery disease, unspecified vessel or lesion type, unspecified whether angina present, unspecified whether native or transplanted heart     Deafness 10/03/2023    Depression     Diabetes     Diabetic polyneuropathy  06/20/2022    Elevated LFTs 08/18/2022    Fatigue     GERD (gastroesophageal reflux disease)     Hand paresthesia 06/20/2022    Headache     Hepatic steatosis     History of continuous positive airway pressure (CPAP) therapy at home     History of pancreatitis 06/20/2022    History of recent fall     Hypertension     Hypertriglyceridemia     Insomnia     Kidney disease     Kidney disorder     Leg pain     Mixed hyperlipidemia 10/28/2017    Mononeuritis multiplex 06/20/2022    Morbid obesity     Nausea & vomiting     Neuropathy     Nocturia 06/20/2022    NSTEMI (non-ST elevated myocardial infarction) 10/2024    OA (osteoarthritis)     Obesity     Obstructive sleep apnea syndrome 06/20/2022    Onychomycosis of multiple toenails with type 2 diabetes mellitus 10/28/2017    Open wound of finger of right hand 10/20/2023    CICI (obstructive sleep apnea)     uses CPAP    Painful diabetic neuropathy 05/12/2022    Personal history of colonic polyps 08/18/2022    Polyneuropathy     Primary hypertension 10/28/2017    Recurrent pancreatitis     Renal insufficiency     Tobacco abuse     Tobacco user 06/20/2022    Type 2 diabetes mellitus with skin complication, with long-term current use of insulin 02/06/2023    Urinary frequency     Use of cane as ambulatory aid     Weight loss, unintentional      Past Surgical History:   Procedure Laterality Date    ANGIOGRAM, CORONARY, WITH LEFT HEART CATHETERIZATION N/A 04/10/2023    Procedure: Angiogram, Coronary, with Left Heart Cath;  Surgeon: Jaswant Pugh MD;  Location: [facility] CATH LAB;  Service: Cardiology;  Laterality: N/A;    ANGIOGRAM, CORONARY, WITH LEFT HEART CATHETERIZATION N/A 10/10/2024    Procedure: Angiogram, Coronary, with Left Heart Cath;  Surgeon: Jaswant Pugh MD;  Location: [facility] CATH LAB;  Service: Cardiology;  Laterality: N/A;    APPENDECTOMY      ARTERIOVENOUS ANASTOMOSIS, OPEN, UPPER ARM BASILLIC VEIN TRANSPOSITION N/A 05/07/2018    CORONARY ARTERY BYPASS GRAFT  (CABG) N/A 12/05/2024    Procedure: CORONARY ARTERY BYPASS GRAFT (CABG);  Surgeon: Gillian Clayton MD;  Location: [facility] OR;  Service: Cardiothoracic;  Laterality: N/A;  ECHO NOTIFIED    EGD, WITH CLOSED BIOPSY N/A 11/20/2023    Procedure: EGD, WITH CLOSED BIOPSY;  Surgeon: Christina James MD;  Location: [facility] ENDOSCOPY;  Service: Gastroenterology;  Laterality: N/A;    ENDOSCOPIC HARVEST OF VEIN Left 12/05/2024    Procedure: HARVEST-VEIN-ENDOVASCULAR;  Surgeon: Gillian Clayton MD;  Location: [facility] OR;  Service: Cardiothoracic;  Laterality: Left;    ESOPHAGOGASTRODUODENOSCOPY N/A 06/07/2021    EXCISION OF ARTERIOVENOUS FISTULA N/A 06/01/2018    FRACTIONAL FLOW RESERVE (FFR), CORONARY  04/10/2023    Procedure: Fractional Flow [place] (FFR), Coronary;  Surgeon: Jaswant Pugh MD;  Location: [facility] CATH LAB;  Service: Cardiology;;    HERNIA REPAIR      INSPECTION OF UPPER INTESTINAL TRACT N/A 06/07/2021    OCCLUSION OF LEFT ATRIAL APPENDAGE Left 12/05/2024    Procedure: Left atrial appendage occlusion;  Surgeon: Gillian Clayton MD;  Location: [facility] OR;  Service: Cardiothoracic;  Laterality: Left;    PHERESIS OF PLASMA N/A 07/13/2018    PHERESIS OF PLASMA N/A 05/25/2018    TRIAL OF SPINAL CORD NERVE STIMULATOR N/A 05/12/2022    Procedure: TRIAL, NEUROSTIMULATOR, SPINAL CORD;  Surgeon: Jay Pozo MD;  Location: [facility] ER;  Service: Neurosurgery;  Laterality: N/A;    UPPER GI N/A 06/07/2021     Time Tracking:     PT Received On: 06/11/25  PT Start Time: 1035     PT Stop Time: 1049  PT Total Time (min): 14 min     Billable Minutes: Evaluation LOW 14 mins     06/11/2025         [1]   Patient Active Problem List  Diagnosis    Chronic pain syndrome    Painful diabetic neuropathy    History of pancreatitis    Abnormal weight loss    Bladder outflow obstruction    Chronic pancreatitis    Onychomycosis of multiple toenails with type 2 diabetes mellitus    Diabetic polyneuropathy    Primary  hypertension    Hand paresthesia    Mixed hyperlipidemia    Left flank pain    Mononeuritis multiplex    Nocturia    Paresis of single lower extremity    Metabolic dysfunction-associated steatotic liver disease (MASLD)    Tobacco user    Gastroesophageal reflux disease with esophagitis without hemorrhage    History of colonic polyps    Fall    Impaired functional mobility, balance, gait, and endurance    Blurred vision, right eye    Aortic atherosclerosis    Ulcer of left heel and midfoot, limited to breakdown of skin    Overgrown toenails    Bilateral edema of lower extremity    Type 2 diabetes mellitus with other skin ulcer, with long-term current use of insulin    Major depressive disorder, recurrent severe without psychotic features    Chronic liver failure without hepatic coma    Encounter for diabetic foot exam    Acquired perforating dermatosis    Hearing loss    Hypokalemia    Familial hypertriglyceridemia    Coronary artery disease of native artery of native heart with stable angina pectoris    Falls frequently    Scab    NSTEMI (non-ST elevated myocardial infarction)    Hx of CABG    Type 2 diabetes mellitus with hyperglycemia, with long-term current use of insulin    Chronic diarrhea of unknown origin    Malfunction of spinal cord stimulator    Uncontrolled type 2 diabetes mellitus with hyperglycemia    Moderate malnutrition    Superficial foreign body of left foot without major open wound and without infection

## 2025-06-11 NOTE — CONSULTS
[facility]   Inpatient Wound Care Consultation    Physician requesting consultation: Dat Beasley MD  Service requesting consultation: Internal Medicine  Reason for consultation: Left plantar foot wound    Historian: Patient and Electronic Medical Record        HPI:     Bharath Caballero is a 45 y.o. male with PMH significant for T2DM, HLD, HTN, CAD, Hx of CABG w/ L atrial appendage ligation (12/2024) CKD stage 3, chronic pancreatitis, MASLD, CICI, GERD and BPH was a direct admit from IM clinic yesterday with complaints of postprandial diarrhea and uncontrolled diabetes. Previous work up with GI in March of 2025 with inpatient consult for GI. A1C remains elevated at 11.8 compared to 8.2 one year ago. Patient was also seen in wound care clinic yesterday for left plantar foot wound from supposedly stepping on a piece of glass. Wound care consulted to follow up on left foot wound in the setting of uncontrolled DM.       Past Medical History/Past Surgical History/Social History     See HPI for pertinent history.     ALLERGIES: Review of patient's allergies indicates:  No Known Allergies      Review of Systems:     Review of Systems   Constitutional:  Negative for chills and fever.   Cardiovascular:  Negative for leg swelling.   Gastrointestinal:  Positive for abdominal pain, diarrhea, nausea and vomiting.   Integumentary:  Positive for wound (left plantar foot).      ROS negative except for above    MEDICATIONS: See MAR      Physical exam:     Temp:  [97.3 °F (36.3 °C)-98.4 °F (36.9 °C)] 97.4 °F (36.3 °C)  Pulse:  [67-89] 77  Resp:  [17-20] 18  SpO2:  [90 %-99 %] 95 %  BP: (112-195)/(69-97) 113/69       GENERAL: A&Ox3, NAD   HEENT: atraumatic, normocephalic, anicteric, moist oral mucosa without lesions, exudate, or erythema  LUNGS: breathing unlabored, lungs CTA bilateral  HEART: RRR; S1S2 no murmur, rub, or gallop  ABDOMEN: abdomen soft, nondistended, BS noted x 4 quadrants, no tympany, no  rebound, guarding, or organomegaly. Abdominal tenderness noted  : no CVA tenderness  EXTREMITIES: no edema, clubbing, or cyanosis   SKIN: Left plantar foot wound, see wound assessment   NEURO: speech fluent and intact, facial symmetry preserved, no tremor  PSYCH: cooperative, normal mood and affect        Labs:     I have personally reviewed patient's labs.  Pertinent results noted below.      Recent Labs   Lab 06/11/25  0651   WBC 11.84*   HGB 14.4   HCT 41.2*           Recent Labs     06/10/25  2308 06/11/25  0651    141   K 2.6* 3.1*   CL 97* 99   CO2 34* 30*   BUN 7.6* 10.6   CREATININE 0.93 0.92          Recent Labs   Lab 02/18/25  1224 06/10/25  1925   HGBA1C 11.1* 11.8*       Microbiology Results (last 7 days)       Procedure Component Value Units Date/Time    Clostridium Diff Toxin, A & B, EIA [[id number]]     Order Status: Canceled Specimen: Stool        Wound Assessment:   Small (<0.5cm) shallow wound noted with red wound base. Pt reports a puncture through his slipper that he believes was glass. No foreign objects noted today. No erythema or purulence noted therefore culture was not obtained. Area is cleaned and dressed. Pt educated on proper footwear to better protect his feet. PT was given supplies at clinic apt yesterday to continue care. He has a follow up apt in wound care clinic already scheduled. Severe hearing loss greatly limited my attempts at DM education today. Of note pt was visibly upset about missing an apt today with Dr Rojas (neurosurgery) in [place]. I assisted the pt by calling and rescheduling his apt, this was entered into his DC summary and primary team updated.       Imaging:     No recent pertinent imaging to review    Impression:     Left plantar foot wound- traumatic/puncture (stepping on glass)  Uncontrolled DM    Plan/Recommendations:         New wound care orders placed. No purulence or erythema prompting the need for culture   DM foot care education  provided including avoiding walking barefoot due to lack of protective sensation    Thank you for the consult.     The time spent including preparing to see the patient, obtaining patient history and assessment, evaluation of the plan of care, patient/caregiver counseling and education, orders, documentation, coordination of care, and other professional medical management activities for today's encounter was 55 minutes.        Mariana HORVATH-BC, WCC, DWC  [facility] Inpatient Woundcare

## 2025-06-11 NOTE — PLAN OF CARE
Problem: Physical Therapy  Goal: Physical Therapy Goal  Description: Goals to be met by: Discharge     Patient will increase functional independence with mobility by performin. Sit to stand transfer with Modified [place]  2. Bed to chair transfer with Modified [place] using Single-point Cane   3. Gait  x 390 feet with Modified [place] using Single-point Cane .   4. Ascend/descend 6 stair with no Handrails Supervision using No Assistive Device.     Outcome: Progressing

## 2025-06-11 NOTE — PROGRESS NOTES
Inpatient Nutrition Assessment    Admit Date: 6/10/2025   Total duration of encounter: 1 day   Patient Age: 45 y.o.    Nutrition Recommendation/Prescription     Carb consistent, Heart Healthy diet  Banatrol Plus (provides 40 kcal per packet) BID for diarrhea  Boost Breeze (provides 250 kcal, 9 g protein per serving) TID  Monitor Weight Twice Weekly  Replete electrolytes as needed    Communication of Recommendations: reviewed with nurse    Nutrition Assessment     Malnutrition Assessment/Nutrition-Focused Physical Exam    Malnutrition Context: chronic illness (06/11/25 1111)  Malnutrition Level: moderate (06/11/25 1111)  Energy Intake (Malnutrition): less than or equal to 75% for greater than or equal to 1 month (06/11/25 1111)  Weight Loss (Malnutrition): 10% in 6 months (06/11/25 1111)              Muscle Mass (Malnutrition): mild depletion (06/11/25 1111)  [place] (Muscle Loss): mild depletion                       Fluid Accumulation (Malnutrition): other (see comments) (Not present) (06/11/25 1111)        A minimum of two characteristics is recommended for diagnosis of either severe or non-severe malnutrition.    Chart Review    Reason Seen: continuous nutrition monitoring    Malnutrition Screening Tool Results              Diagnosis:  Persistent postprandial abdominal pain  Persistent diarrhea  Pancreatic insufficiency   Uncontrolled T2DM, with long term insulin use  Peripheral neuropathy   Left lateral food wound   HTN  HLD  CAD s/p CABG x2 w/ L atrial appendage ligation (12/2024)   HFpEF with EF 70%  CKD Stage III   CICI  BPH  Tobacco abuse  Chronic pain  Mood Disorder    Relevant Medical History: DM, HLD, HTN, CAD, CKD, Chronic pancreatitis, CICI, GERD, BPH, MASLD    Scheduled Medications:  amitriptyline, 25 mg, QHS  aspirin, 81 mg, Daily  atorvastatin, 80 mg, Daily  clonazePAM, 1 mg, QHS  cloNIDine, 0.3 mg, TID  clopidogreL, 75 mg, Daily  EScitalopram oxalate, 20 mg, Daily  furosemide, 20 mg,  Daily  insulin aspart U-100, 7 Units, TIDWM  insulin glargine U-100, 40 Units, Daily  insulin glargine U-100, 40 Units, QHS  isosorbide mononitrate, 120 mg, Daily  lipase-protease-amylase 6,000-19,000-30,000 units, 3 capsule, TID WM  losartan, 100 mg, Daily  metoprolol succinate, 25 mg, Daily  morphine, 60 mg, TID  nicotine, 1 patch, Daily  NIFEdipine, 60 mg, BID  OXcarbazepine, 600 mg, BID  potassium chloride, 40 mEq, Once  pregabalin, 75 mg, TID  ranolazine, 500 mg, BID  tamsulosin, 1 capsule, Daily    Continuous Infusions:   PRN Medications:  acetaminophen, 650 mg, Q8H PRN  dextrose 50%, 12.5 g, PRN  dextrose 50%, 25 g, PRN  glucagon (human recombinant), 1 mg, PRN  glucose, 16 g, PRN  glucose, 24 g, PRN  HYDROmorphone, 4 mg, Q8H PRN  insulin aspart U-100, 0-15 Units, QID (AC + HS) PRN  melatonin, 6 mg, Nightly PRN  naloxone, 0.4 mg, PRN  nitroGLYCERIN, 0.4 mg, Q5 Min PRN  ondansetron, 4 mg, Q8H PRN  sodium chloride 0.9%, 10 mL, PRN    Calorie Containing IV Medications: no significant kcals from medications at this time    Recent Labs   Lab 06/10/25  1925 06/10/25  2308 06/11/25  0651   NA  --  140 141   K  --  2.6* 3.1*   CALCIUM  --  8.9 8.5   PHOS  --  3.4 3.6   MG  --  1.90 2.00   CL  --  97* 99   CO2  --  34* 30*   BUN  --  7.6* 10.6   CREATININE  --  0.93 0.92   EGFRNORACEVR  --  >60 >60   GLU  --  113* 255*   BILITOT  --  0.3 0.4   ALKPHOS  --  464* 516*   ALT  --  44 62*   AST  --  40 98*   ALBUMIN  --  2.9* 2.8*   TRIG  --  220*  --    HGBA1C 11.8*  --   --    LIPASE  --  <4  --    WBC  --  10.42 11.84*   HGB  --  13.6* 14.4   HCT  --  38.5* 41.2*     Nutrition Orders:  Diet Consistent Carbohydrate 2000 Calories (up to 75 gm per meal)  Dietary nutrition supplements BID; Banatrol Plus with [place] Prebiotic - Banana    Appetite/Oral Intake: poor/0-25% of meals  Factors Affecting Nutritional Intake: abdominal pain, decreased appetite, and diarrhea  Social Needs Impacting Access to Food: unable to assess at  "this time; will attempt on follow-up  Food/Episcopal/Cultural Preferences: unable to obtain  Food Allergies: no known food allergies  Last Bowel Movement: 06/10/25  Wound(s):  left plantar foot laceration, glass foreign body removed on     Comments    25 -- Pt sleeping at visit; breakfast untouched; pt with decreased po intake per H&P avoiding eating d/t ongoing diarrhea & abdominal pain; Significant weight loss noted over the last 6 months to 1 year per EMR weight history review; most recent labs/meds reviewed, continues on Creon; Receiving KCL for hypokalemia; Suggest carb consistent heart healthy diet with low fat ONS such as boost breeze; Will order Banantrol BID for diarrhea    Anthropometrics    Height: 5' 9" (175.3 cm), Height Method: Stated  Last Weight: 84.4 kg (186 lb) (06/10/25 2015), Weight Method: Standard Scale  BMI (Calculated): 27.5  BMI Classification: overweight (BMI 25-29.9)        Ideal Body Weight (IBW), Male: 160 lb     % Ideal Body Weight, Male (lb): 116.25 %                 Usual Body Weight (UBW), k kg  % Usual Body Weight: 84.55     Usual Weight Provided By: EMR weight history    Wt Readings from Last 5 Encounters:   06/10/25 84.4 kg (186 lb)   06/10/25 84.2 kg (185 lb 9.6 oz)   06/10/25 81.4 kg (179 lb 6.4 oz)   25 84.8 kg (187 lb)   25 84.8 kg (187 lb)     Weight Change(s) Since Admission: new admit  Wt Readings from Last 1 Encounters:   06/10/25 2015 84.4 kg (186 lb)   Admit Weight: 84.4 kg (186 lb) (06/10/25 2015), Weight Method: Standard Scale    Estimated Needs    Weight Used For Calorie Calculations: 84.5 kg (186 lb 4.6 oz)  Energy Calorie Requirements (kcal): 2838-3124 kcal (25 - 28 kcal/kg)  Energy Need Method: Kcal/kg  Weight Used For Protein Calculations: 84.5 kg (186 lb 4.6 oz)  Protein Requirements:  gm (1 - 1.2 gm/kg)  Fluid Requirements (mL): 7916-9496 ml (1ml/kcal)  CHO Requirement: 237-266 gm/d (45% EER)     Enteral Nutrition     Patient not " receiving enteral nutrition at this time.    Parenteral Nutrition     Patient not receiving parenteral nutrition support at this time.    Evaluation of Received Nutrient Intake    Calories: not meeting estimated needs  Protein: not meeting estimated needs    Patient Education     Not applicable.    Nutrition Diagnosis     PES: Inadequate oral intake related to altered GI function as evidenced by chronic diarrhea, abdominal pain, < 50% meal intake since admit. (new)     PES: Moderate chronic disease or condition related malnutrition Related to chronic illness - pancreatitis, chronic diarrhea As Evidenced by:  - weight loss: 10% in 6 months - energy intake: <= 75% for 3 months (meets criteria for <= 75% >= 1 month (severe - chronic)) new    Nutrition Interventions       Intervention(s): Care coordination or referral;Oral diet/nutrient modifications;Oral nutritional supplement    Goal: Meet greater than 80% of nutritional needs by follow-up. (new)  Goal: Maintain weight throughout hospitalization. (new)    Nutrition Goals & Monitoring     Dietitian will monitor: energy intake, weight, electrolyte/renal panel, glucose/endocrine profile, and gastrointestinal profile  Discharge planning: continue carb consistent, heart healthy diet with boost breeze or equivalent oral supplements  Nutrition Risk/Follow-Up: patient at increased nutrition risk; dietitian will follow-up twice weekly   Please consult if re-assessment needed sooner.

## 2025-06-11 NOTE — H&P
"Gastroenterology/Hepatology Initial Consult Note    Patient Name: Bharath Caballero  Age: 45 y.o.  : 1980  MRN: [id number]  Admission Date: 6/10/2025    Reason for Consult:      Medical Management  No chief complaint on file.        Dat Beasley MD    HPI:     Bharath Caballero is a 45 y.o. male with a complex medical history including deafness, DM II uncontrolled, chronic liver failure, hx of pancreatitis, GERD, CAD s/p CABG, HTN, pancreatic insufficiency and HLD was admitted from IM clinic yesterday for multiple issues primarily uncontrolled DM with persistent hyperglycemia.    GI was consulted for persistent abdominal pain, mostly post prandial and diarrhea for the last year.W/u mostly unrevealing, noted to have leukocytes in stool.     Patient noted to have been followed by GI due to aforementioned symptoms. Had U/S abdomen 3/20/2025 revealing gallbladder sludge and hepatomegaly. CBD 2mm in size without evidence of cholecystitis. Patient was referred to general surgery for outpatient cholecystectomy. Case request also made for colonoscopy which is scheduled for 10/8/2025.     Patient states that abdominal pain occurs 30 minutes after eating and has been happening for the last year. Also reports persistent diarrhea "orange" in color. No other symptoms. Does have pancreatic insufficiency, on creon.         Dat Beasley MD    Past Medical History:   Diagnosis Date    Abdominal pain     Acquired perforating dermatosis 2023    Anxiety     Aortic atherosclerosis 2023    Appetite loss     Balance problem     Bilateral edema of lower extremity 2023    Bladder outflow obstruction 2022    Blurred vision, right eye 10/19/2022    BMI 34.0-34.9,adult 2022    BPH (benign prostatic hyperplasia)     Chest pain     Chronic liver failure without hepatic coma 2023    Chronic pain 10/25/2022    Chronic pain syndrome 2022    Chronic pancreatitis 10/28/2017    Coronary artery disease, " unspecified vessel or lesion type, unspecified whether angina present, unspecified whether native or transplanted heart     Deafness 10/03/2023    Depression     Diabetes     Diabetic polyneuropathy 06/20/2022    Elevated LFTs 08/18/2022    Fatigue     GERD (gastroesophageal reflux disease)     Hand paresthesia 06/20/2022    Headache     Hepatic steatosis     History of continuous positive airway pressure (CPAP) therapy at home     History of pancreatitis 06/20/2022    History of recent fall     Hypertension     Hypertriglyceridemia     Insomnia     Kidney disease     Kidney disorder     Leg pain     Mixed hyperlipidemia 10/28/2017    Mononeuritis multiplex 06/20/2022    Morbid obesity     Nausea & vomiting     Neuropathy     Nocturia 06/20/2022    NSTEMI (non-ST elevated myocardial infarction) 10/2024    OA (osteoarthritis)     Obesity     Obstructive sleep apnea syndrome 06/20/2022    Onychomycosis of multiple toenails with type 2 diabetes mellitus 10/28/2017    Open wound of finger of right hand 10/20/2023    CICI (obstructive sleep apnea)     uses CPAP    Painful diabetic neuropathy 05/12/2022    Personal history of colonic polyps 08/18/2022    Polyneuropathy     Primary hypertension 10/28/2017    Recurrent pancreatitis     Renal insufficiency     Tobacco abuse     Tobacco user 06/20/2022    Type 2 diabetes mellitus with skin complication, with long-term current use of insulin 02/06/2023    Urinary frequency     Use of cane as ambulatory aid     Weight loss, unintentional         Past Surgical History:   Procedure Laterality Date    ANGIOGRAM, CORONARY, WITH LEFT HEART CATHETERIZATION N/A 04/10/2023    Procedure: Angiogram, Coronary, with Left Heart Cath;  Surgeon: Jaswant Pugh MD;  Location: [facility] CATH LAB;  Service: Cardiology;  Laterality: N/A;    ANGIOGRAM, CORONARY, WITH LEFT HEART CATHETERIZATION N/A 10/10/2024    Procedure: Angiogram, Coronary, with Left Heart Cath;  Surgeon: Jaswant Pugh MD;   Location: [facility] CATH LAB;  Service: Cardiology;  Laterality: N/A;    APPENDECTOMY      ARTERIOVENOUS ANASTOMOSIS, OPEN, UPPER ARM BASILLIC VEIN TRANSPOSITION N/A 05/07/2018    CORONARY ARTERY BYPASS GRAFT (CABG) N/A 12/05/2024    Procedure: CORONARY ARTERY BYPASS GRAFT (CABG);  Surgeon: Gillian Clayton MD;  Location: [facility] OR;  Service: Cardiothoracic;  Laterality: N/A;  ECHO NOTIFIED    EGD, WITH CLOSED BIOPSY N/A 11/20/2023    Procedure: EGD, WITH CLOSED BIOPSY;  Surgeon: Christina James MD;  Location: [facility] ENDOSCOPY;  Service: Gastroenterology;  Laterality: N/A;    ENDOSCOPIC HARVEST OF VEIN Left 12/05/2024    Procedure: HARVEST-VEIN-ENDOVASCULAR;  Surgeon: Gillian Clayton MD;  Location: [facility] OR;  Service: Cardiothoracic;  Laterality: Left;    ESOPHAGOGASTRODUODENOSCOPY N/A 06/07/2021    EXCISION OF ARTERIOVENOUS FISTULA N/A 06/01/2018    FRACTIONAL FLOW RESERVE (FFR), CORONARY  04/10/2023    Procedure: Fractional Flow [place] (FFR), Coronary;  Surgeon: Jaswant Pugh MD;  Location: [facility] CATH LAB;  Service: Cardiology;;    HERNIA REPAIR      INSPECTION OF UPPER INTESTINAL TRACT N/A 06/07/2021    OCCLUSION OF LEFT ATRIAL APPENDAGE Left 12/05/2024    Procedure: Left atrial appendage occlusion;  Surgeon: Gillian Clayton MD;  Location: [facility] OR;  Service: Cardiothoracic;  Laterality: Left;    PHERESIS OF PLASMA N/A 07/13/2018    PHERESIS OF PLASMA N/A 05/25/2018    TRIAL OF SPINAL CORD NERVE STIMULATOR N/A 05/12/2022    Procedure: TRIAL, NEUROSTIMULATOR, SPINAL CORD;  Surgeon: Jay Pozo MD;  Location: [facility] OR;  Service: Neurosurgery;  Laterality: N/A;    UPPER GI N/A 06/07/2021        Family History   Problem Relation Name Age of Onset    Obesity Father         Social History     Tobacco Use    Smoking status: Every Day     Current packs/day: 0.50     Average packs/day: 2.6 packs/day for 32.9 years (84.3 ttl pk-yrs)     Types: Cigarettes     Start date: 1992     Last attempt to  quit: 01/1999     Passive exposure: Current    Smokeless tobacco: Former     Types: Chew    Tobacco comments:     Pt is smoking 1/2 pack per day   Substance Use Topics    Alcohol use: Not Currently         Review of patient's allergies indicates:  No Known Allergies     Prescriptions Prior to Admission[1]      INPATIENT MEDICATIONS    Scheduled Meds:   amitriptyline  25 mg Oral QHS    aspirin  81 mg Oral Daily    atorvastatin  80 mg Oral Daily    clonazePAM  1 mg Oral QHS    cloNIDine  0.3 mg Oral TID    clopidogreL  75 mg Oral Daily    EScitalopram oxalate  20 mg Oral Daily    furosemide  20 mg Oral Daily    insulin aspart U-100  7 Units Subcutaneous TIDWM    insulin glargine U-100  40 Units Subcutaneous Daily    insulin glargine U-100  40 Units Subcutaneous QHS    isosorbide mononitrate  120 mg Oral Daily    lipase-protease-amylase 6,000-19,000-30,000 units  3 capsule Oral TID WM    losartan  100 mg Oral Daily    metoprolol succinate  25 mg Oral Daily    morphine  60 mg Oral TID    nicotine  1 patch Transdermal Daily    NIFEdipine  60 mg Oral BID    OXcarbazepine  600 mg Oral BID    potassium chloride  40 mEq Oral Once    pregabalin  75 mg Oral TID    ranolazine  500 mg Oral BID    tamsulosin  1 capsule Oral Daily     Continuous Infusions:  PRN Meds:    Current Facility-Administered Medications:     acetaminophen, 650 mg, Oral, Q8H PRN    dextrose 50%, 12.5 g, Intravenous, PRN    dextrose 50%, 25 g, Intravenous, PRN    glucagon (human recombinant), 1 mg, Intramuscular, PRN    glucose, 16 g, Oral, PRN    glucose, 24 g, Oral, PRN    HYDROmorphone, 4 mg, Oral, Q8H PRN    insulin aspart U-100, 0-15 Units, Subcutaneous, QID (AC + HS) PRN    melatonin, 6 mg, Oral, Nightly PRN    naloxone, 0.4 mg, Intravenous, PRN    nitroGLYCERIN, 0.4 mg, Sublingual, Q5 Min PRN    ondansetron, 4 mg, Oral, Q8H PRN    sodium chloride 0.9%, 10 mL, Intravenous, PRN          Review of Systems:       Review of Systems   Constitutional:   Negative for fever.   Respiratory:  Negative for cough and shortness of breath.    Gastrointestinal:  Positive for abdominal pain and diarrhea. Negative for blood in stool, change in bowel habit, nausea and vomiting.   Genitourinary:  Negative for dysuria.   Musculoskeletal:  Negative for myalgias.   Neurological:  Negative for dizziness.   Psychiatric/Behavioral:  Negative for behavioral problems and depressed mood.           Objective:       VITAL SIGNS: 24 HR MIN & MAX LAST    Temp  Min: 97.3 °F (36.3 °C)  Max: 98.3 °F (36.8 °C)  97.8 °F (36.6 °C)        BP  Min: 112/72  Max: 195/95  125/70     Pulse  Min: 67  Max: 87  87     Resp  Min: 17  Max: 20  20    SpO2  Min: 90 %  Max: 99 %  96 %        Physical Exam  Constitutional:       Appearance: Normal appearance. He is obese. He is ill-appearing. He is not toxic-appearing.   HENT:      Head: Normocephalic and atraumatic.      Mouth/Throat:      Mouth: Mucous membranes are moist.      Pharynx: Oropharynx is clear. No oropharyngeal exudate or posterior oropharyngeal erythema.   Eyes:      General: No scleral icterus.     Extraocular Movements: Extraocular movements intact.      Conjunctiva/sclera: Conjunctivae normal.      Pupils: Pupils are equal, round, and reactive to light.   Cardiovascular:      Rate and Rhythm: Normal rate and regular rhythm.      Pulses: Normal pulses.      Heart sounds: Normal heart sounds. No murmur heard.     No friction rub. No gallop.   Pulmonary:      Effort: Pulmonary effort is normal.      Breath sounds: Wheezing present.   Abdominal:      General: Bowel sounds are normal. There is no distension.      Palpations: Abdomen is soft.      Tenderness: There is no abdominal tenderness.   Musculoskeletal:         General: Normal range of motion.      Cervical back: Normal range of motion and neck supple.      Right lower leg: No edema.      Left lower leg: No edema.   Skin:     Capillary Refill: Capillary refill takes less than 2 seconds.      " Coloration: Skin is pale. Skin is not jaundiced.      Findings: Wound present.      Comments: Dorsal left foot   Neurological:      General: No focal deficit present.      Mental Status: He is alert and oriented to person, place, and time. Mental status is at baseline.      Cranial Nerves: No cranial nerve deficit.      Sensory: No sensory deficit.      Motor: No weakness.      Comments: Hard of hearing              LABS:      Recent Labs   Lab 06/10/25  2308 06/11/25  0651   WBC 10.42 11.84*   HGB 13.6* 14.4   HCT 38.5* 41.2*    239   MCV 85.6 86.2       Recent Labs   Lab 06/10/25  2308 06/11/25  0651   HGB 13.6* 14.4   HCT 38.5* 41.2*        Recent Labs   Lab 06/10/25  2308 06/11/25  0651    141   K 2.6* 3.1*   CO2 34* 30*   BUN 7.6* 10.6   CREATININE 0.93 0.92   BILITOT 0.3 0.4   ALKPHOS 464* 516*   AST 40 98*   ALT 44 62*   ALBUMIN 2.9* 2.8*        No results for input(s): "INR", "PROTIME", "PTT" in the last 168 hours.     No results for input(s): "IRON", "FERRITIN" in the last 168 hours.       IMAGING:   Echo  Result Date: 5/28/2025    Left Ventricle: The left ventricle is normal in size. Moderately increased ventricular mass. Moderate septal thickening. There is moderate concentric hypertrophy. Normal wall motion. There is normal systolic function. Quantitated ejection fraction is 70%. There is normal diastolic function.   Right Ventricle: The right ventricle is normal in size Systolic function is normal.   Left Atrium: The left atrium is at the upper limit of normal in size measuring 34 mL/m2.   Right Atrium: The right atrium is normal in size.   Aortic Valve: The aortic valve is a trileaflet valve. There is no stenosis. There is no significant regurgitation.   Mitral Valve: The mitral valve is structurally normal. There is no stenosis. There is no significant regurgitation.   Tricuspid Valve: There is physiologically normal regurgitation.   Aorta: The aortic root is normal in size measuring 3.4 " cm.   Pulmonary Artery: The estimated pulmonary artery systolic pressure is 11 mmHg.   IVC/SVC: Normal venous pressure at 3 mmHg.   Pericardium: There is no pericardial effusion.     CT Abdomen Pelvis  Without Contrast  Result Date: 5/21/2025  EXAMINATION: CT ABDOMEN PELVIS WITHOUT CONTRAST CLINICAL HISTORY: Chronic pain syndrome TECHNIQUE: Low dose axial images, sagittal and coronal reformations were obtained from the lung bases to the pubic symphysis.  Oral contrast was not administered. COMPARISON: October 7, 2024 FINDINGS: Subpleural fibrosis is present in the anterior left lung.  There are surgical changes of CABG.  Left atrial appendage clip is also noted. There is no significant hepatic steatosis.  No liver lesion is seen.  The gallbladder appears hydropic.  Intra and extrahepatic bile duct dilation is mild. The spleen is mildly enlarged, measuring 12.6 cm in span.  There is new abnormal pancreatic duct dilation, mild peripancreatic edema, a discrete 16 mm fluid attenuation focus at the pancreatic neck, and soft tissue fullness in the pancreatic head measuring 3.1 cm in diameter.  There are venous collaterals in the upper abdomen, and although today study is a noncontrast exam stenosis or thrombosis of the portal vein is expected.  The adrenal glands are unremarkable.  There is no calcified renal stone or hydronephrosis.  The bladder shows no gross wall thickening or intraluminal abnormality. The stomach is distended with particulate food debris.  The small bowel is not dilated.  The terminal ileum and appendix are normal.  Limited evaluation of the colon is unremarkable. There is no retroperitoneal lymphadenopathy or abdominal aortic aneurysm.  Moderate calcified atherosclerosis is present in the aorta.  Facet arthropathy is present at L4-L5 and L5-S1.  Subcutaneous stimulator leads are present in the thoracic spinal canal.  Umbilical hernia repair changes are noted.     Postsurgical changes in the thorax.  Mild splenomegaly. Abnormal appearance of the pancreas, with soft tissue fullness in the pancreatic head, pancreatic duct dilation, 16 mm fluid density structure at the pancreatic neck, and mild peripancreatic edema.  Given noncontrast nature of today's study, it is difficult to discern if this represents an underlying pancreatic head mass or changes of pancreatitis with potential pancreatic duct stricture and pseudocyst. Probable stenosis or thrombosis of the main portal vein, with venous collaterals in the upper abdomen. These findings would be better evaluated with a dedicated pancreatic protocol MRI, with and without contrast or if MRI contraindicated a dedicated contrast enhanced pancreatic protocol CT. Lower lumbar spine facet arthropathy. Subcutaneous stimulator lead in the thoracic spine. Umbilical hernia repair changes. This report was flagged in Epic as abnormal. Electronically signed by: Telly Hernandez Date:    05/21/2025 Time:    11:54    X-Ray Thoracic Spine AP Lateral  Result Date: 5/21/2025  EXAMINATION: XR THORACIC SPINE AP LATERAL CLINICAL HISTORY: Chronic pain syndrome TECHNIQUE: AP, lateral, swimmer's views of the thoracic spine were performed. COMPARISON: Thoracic spine radiographs 11/08/2023 FINDINGS: Thoracic spine alignment is anatomic without scoliosis or spondylolisthesis.  The thoracic vertebral bodies and intervertebral disc spaces are normal in morphology.  There is no significant degenerative disc space narrowing, osseous lesion, or fracture.  The cervicothoracic junction is normal on the swimmer's view.  No acute soft tissue abnormality is identified.  Post median sternotomy, CABG, and left atrial appendage occlusion noted.  There are two chronic thoracic spinal cord stimulator leads in place extending cranially along the midline posterior lower thoracic spinal canal.  The more cranial lead tip terminates at the T9-T10 disc space level and the more caudal lead tip at the mid T10  vertebral body level.  There is partially visualized lumbar degenerative facet arthrosis and mild atherosclerotic calcification scattered along the abdominal aorta.     Thoracic spinal cord stimulator leads in place with no acute pathology identified. Post median sternotomy, CABG, and left atrial appendage clipping, new since the radiographs 11/08/2023 Electronically signed by: Mike Gonzalez Date:    05/21/2025 Time:    11:37        Assessment / Plan:     Bharath Caballero is a 45 y.o. male with history of pancreatitic insufficiency, DM II uncontrolled, CAD s/p CABG, liver failure who is admitted to hospital medicine for multiple issues. GI consulted for evaluation of abdominal pain.     Persistent postprandial abdominal pain  Persistent diarrhea  -abdominal pain likely related to gallbladder sludge, worse after eating  -will need cholecystectomy as previously planned, was due to be scheduled outpatient surgery  -diarrhea likely related to pancreatic insuffiency, workup with GI clinic unrevealing--noted to have leukocytes in stool  -GI panel and c diff noted to be normal in the past, has not had bowel movement in 2 days so c diff less likely  -GI panel pending     Pancreatic insufficiency?  Chronic diarrhea  -question need for creon without official diagnosis of exocrine insufficiency or chronic pancreatitis  -no calcifications seen on imaging, will need EUS to make diagnosis of   -CT A/P 5/15 noted to have abnormal appearing pancreas with soft tissue fullness at pancreatic head 3.1cm in diameter, distinct attenuation focus at neck, and PVT?  -needs CT A/P with IV contrast to further assess abnormalities of the pancreas seen on noncontrast study  -order fecal elastase, IgA, TTG, celiac panel  -initiate imodium for diarrhea and assess improvement--can also use cholestyramine if diarrhea doesn't resolve      Maurice Lux MD, MPH  PGY-3, [facility] IM GI         [1]   Facility-Administered Medications Prior to Admission    Medication Dose Route Frequency Provider Last Rate Last Admin    triamcinolone acetonide 0.1% ointment   Topical (Top) BID Malina Brito FNP         Medications Prior to Admission   Medication Sig Dispense Refill Last Dose/Taking    amitriptyline (ELAVIL) 50 MG tablet TAKE ONE TABLET BY MOUTH IN THE EVENING 30 tablet 4     aspirin (ECOTRIN) 81 MG EC tablet Take 81 mg by mouth once daily.       atorvastatin (LIPITOR) 80 MG tablet Take 1 tablet (80 mg total) by mouth once daily. 90 tablet 3     cholecalciferol, vitamin D3, 125 mcg (5,000 unit) capsule Take 1 capsule (5,000 Units total) by mouth once daily. 30 capsule 11     clonazePAM (KLONOPIN) 1 MG tablet TAKE ONE TABLET BY MOUTH TWICE DAILY 60 tablet 0     cloNIDine (CATAPRES) 0.3 MG tablet Take 1 tablet (0.3 mg total) by mouth 3 (three) times daily. 270 tablet 3     clopidogreL (PLAVIX) 75 mg tablet Take 4 tablets on day 1 and then one tablet daily. 90 tablet 3     CREON 36,000-114,000- 180,000 unit CpDR TAKE ONE CAPSULE THREE TIMES DAILY 270 capsule 1     dapagliflozin propanediol (FARXIGA) 5 mg Tab tablet Take 2 tablets (10 mg total) by mouth once daily. 180 tablet 1     ergocalciferol (ERGOCALCIFEROL) 50,000 unit Cap Take 1 capsule (50,000 Units total) by mouth every 7 days. 4 capsule 2     EScitalopram oxalate (LEXAPRO) 20 MG tablet Take 20 mg by mouth once daily.       esomeprazole (NEXIUM) 40 MG capsule Take 1 capsule (40 mg total) by mouth before breakfast. 30 capsule 11     gabapentin (NEURONTIN) 800 MG tablet TAKE ONE TABLET BY MOUTH IN THE EVENING 90 tablet 3     HUMALOG U-100 INSULIN 100 unit/mL injection INJECT 25 UNITS SUBCUTANEOUSLY BEFORE MEALS THREE TIMES DAILY 10 mL 3     HYDROmorphone (DILAUDID) 8 MG tablet Take 1 tablet (8 mg total) by mouth every 8 (eight) hours as needed. 90 tablet 0     icosapent ethyL (VASCEPA) 1 gram Cap TAKE TWO CAPSULES BY MOUTH TWICE DAILY 60 capsule 3     isosorbide mononitrate (IMDUR) 120 MG 24 hr tablet Take 1  "tablet (120 mg total) by mouth once daily. 90 tablet 3     linaGLIPtin (TRADJENTA) 5 mg Tab tablet Take 1 tablet (5 mg total) by mouth once daily. 90 tablet 3     losartan (COZAAR) 100 MG tablet Take 1 tablet (100 mg total) by mouth once daily. 90 tablet 3     metoprolol succinate (TOPROL-XL) 25 MG 24 hr tablet Take 1 tablet (25 mg total) by mouth once daily. 90 tablet 3     morphine (MS CONTIN) 100 MG 12 hr tablet TAKE ONE TABLET BY MOUTH THREE TIMES DAILY 90 tablet 0     nicotine (NICODERM CQ) 21 mg/24 hr Place 1 patch onto the skin once daily. 28 patch 0     NIFEdipine (PROCARDIA-XL) 60 MG (OSM) 24 hr tablet Take 1 tablet (60 mg total) by mouth 2 (two) times a day. 180 tablet 3     nitroGLYCERIN (NITROSTAT) 0.3 MG SL tablet Place 1 tablet (0.3 mg total) under the tongue every 5 (five) minutes as needed for Chest pain (go to er after 3 doses). 30 tablet 6     nut.tx.gluc intol,lf,soy-fiber (GLUCERNA 1.5 CEDRIC) 0.08-1.5 gram-kcal/mL Liqd Take 273 mLs by mouth 2 (two) times a day. 60 each 4     OXcarbazepine (TRILEPTAL) 600 MG Tab Take 1 tablet (600 mg total) by mouth 2 (two) times daily. 60 tablet 11     potassium chloride SA (K-DUR,KLOR-CON) 20 MEQ tablet Take 1 tablet (20 mEq total) by mouth 2 (two) times daily. 180 tablet 3     pregabalin (LYRICA) 150 MG capsule Take 1 capsule (150 mg total) by mouth 3 (three) times daily. 90 capsule 3     ranolazine (RANEXA) 500 MG Tb12 Take 1 tablet (500 mg total) by mouth 2 (two) times daily. 180 tablet 3     REPATHA SURECLICK 140 mg/mL PnIj INJECT 1ml INTRAMUSCULARLY EVERY 14 DAYS 2 mL 11     sucralfate (CARAFATE) 100 mg/mL suspension Take 10 mLs (1 g total) by mouth 4 (four) times daily before meals and nightly. 1200 mL 3     SURE COMFORT INSULIN SYRINGE 0.5 mL 31 gauge x 5/16" Syrg USE ONE SYRINGE THREE TIMES DAILY 100 each 3     tamsulosin (FLOMAX) 0.4 mg Cap TAKE ONE CAPSULE BY MOUTH EVERY DAY 90 capsule 3     torsemide (DEMADEX) 20 MG Tab Take 1 tablet (20 mg total) by " mouth once daily. 90 tablet 3     TOUJEO SOLOSTAR U-300 INSULIN 300 unit/mL (1.5 mL) InPn pen INJECT 35 UNITS SUBCUTANEOUSLY TWICE DAILY 6 mL 0     TOUJEO SOLOSTAR U-300 INSULIN 300 unit/mL (1.5 mL) InPn pen INJECT 35 SUBCUTANEOUSLY TWICE DAILY 4.5 mL 3

## 2025-06-11 NOTE — PROGRESS NOTES
[facility] Medicine Wards Progress Note     Resident Team: [facility] Medicine List 2  Attending Physician: Dat Beasley MD  Resident: Shoaib Eason MD;   Intern: Dr. Benites      Subjective:      Brief HPI:  Bharath Caballero is a 45 y.o. male with PMH significant for T2DM, HLD, HTN, CAD, Hx of CABG w/ L atrial appendage ligation (12/2024) CKD stage 3, chronic pancreatitis, MASLD, CICI, GERD and BPH was a direct admit from clinic with complaints of postprandial diarrhea and uncontrolled diabetes on 6/10/2025. Patient states that diarrhea has been on going for about a year, with loose, watery stools red-orange in color typically occurring about 30 minutes to 1 hour after eating. States that he has been avoiding eating to avoid abdominal pain and having a bowel movement. Abdominal pain is right sided and worse after eating. Patient additionally reports having a couple episodes of hematemesis within the past 2-3 months. Patient is a diabetic, diagnosed in 2016, on insulin. States he takes basal insulin in the morning and evening, 80 U each, as well as prandial insulin TID of 25 U. POC A1c in clinic was 12.6. Patient additionally has chronic chest pain and shortness of breath, s/p CABG 12/2024 with L atrial appendage ablation. Patient has a left lateral foot wound following stepping a piece of glass a and was seen by wound care on 6/9/25. Patient smokes 1/2 ppd. Of note, per his PCP's note, patient was seen by GI and a GI work up in March 2025 revealed nonspecific finding of stool positive for leukocytes, negative for parasites, Giardia, and cryptosporidium. Final stool culture negative March 2025. U/S abdomen 3/18/25 revealed gallbladder sludge and was offered a referral for cholecystectomy     ED Course - On arrival, patient afebrile (98.1 F), hypertensive (172/90), HR 79, RR 17, O2 98% RA. POC A1c 12.6 in clinic. Repeat on arrival was 11.8. Labs and GI work up ordered. GI to be consulted. Will be admitted for management and  "uncontrolled diabetes and work up of chronic diarrhea.      Interval History:   Poorly responsive this morning. VSS. Appears over medicated though. No fevers overnight. Consults in place.       Review of Systems:  ROS completed and negative except as indicated above.     Objective:     Last 24 Hour Vital Signs:  BP  Min: 112/72  Max: 172/90  Temp  Av.8 °F (36.6 °C)  Min: 97.3 °F (36.3 °C)  Max: 98.3 °F (36.8 °C)  Pulse  Av  Min: 65  Max: 87  Resp  Av.1  Min: 17  Max: 20  SpO2  Av.4 %  Min: 90 %  Max: 98 %  Height  Av' 9" (175.3 cm)  Min: 5' 9" (175.3 cm)  Max: 5' 9" (175.3 cm)  Weight  Av.4 kg (186 lb)  Min: 84.4 kg (186 lb)  Max: 84.4 kg (186 lb)  I/O last 3 completed shifts:  In: -   Out: 700 [Urine:700]    Physical Examination:  Physical Exam  Constitutional:       Comments: Somnolent    Cardiovascular:      Rate and Rhythm: Normal rate and regular rhythm.      Heart sounds: No murmur heard.  Pulmonary:      Effort: No respiratory distress.      Breath sounds: Wheezing present.   Abdominal:      Palpations: Abdomen is soft. There is no mass.      Tenderness: There is abdominal tenderness.      Comments: Upper abd TTP   Skin:     General: Skin is warm.      Findings: Lesion present.      Comments: Wound to l foot         Laboratory:  Most Recent Data:  CBC:   Lab Results   Component Value Date    WBC 11.84 (H) 2025    HGB 14.4 2025    HCT 41.2 (L) 2025     2025    MCV 86.2 2025    RDW 12.6 2025     WBC Differential:   Recent Labs   Lab 06/10/25  2308 25  0651   WBC 10.42 11.84*   HGB 13.6* 14.4   HCT 38.5* 41.2*    239   MCV 85.6 86.2     BMP:   Lab Results   Component Value Date     2025    K 3.1 (L) 2025    CL 99 2025    CO2 30 (H) 2025    BUN 10.6 2025    CREATININE 0.92 2025     (H) 2025    CALCIUM 8.5 2025    MG 2.00 2025    PHOS 3.6 2025     LFTs:   Lab " "Results   Component Value Date    PROT 7.5 06/11/2025    ALBUMIN 2.8 (L) 06/11/2025    BILITOT 0.4 06/11/2025    AST 98 (H) 06/11/2025    ALKPHOS 516 (H) 06/11/2025    ALT 62 (H) 06/11/2025     (H) 10/07/2024     Coags:   Lab Results   Component Value Date    INR 1.2 12/05/2024    PROTIME 15.2 (H) 12/05/2024     FLP:   Lab Results   Component Value Date    CHOL 146 06/10/2025    HDL 25 (L) 06/10/2025    TRIG 220 (H) 06/10/2025     DM:   Lab Results   Component Value Date    HGBA1C 11.8 (H) 06/10/2025    HGBA1C 11.1 (H) 02/18/2025    HGBA1C 9.3 (H) 10/08/2024    CREATININE 0.92 06/11/2025     Thyroid:   Lab Results   Component Value Date    TSH 3.029 04/16/2025      Anemia:   Lab Results   Component Value Date    IRON 97 10/10/2024    TIBC 205 (L) 10/10/2024    FERRITIN 216.05 10/10/2024       Lab Results   Component Value Date    [id number] 886 (H) 10/10/2024     No results found for: "FOLATE"     Cardiac:   Lab Results   Component Value Date    TROPONINI <0.010 06/10/2025    .6 (H) 12/08/2024         Microbiology Data:  Microbiology Results (last 7 days)       Procedure Component Value Units Date/Time    Clostridium Diff Toxin, A & B, EIA [[id number]]     Order Status: Canceled Specimen: Stool              Other Results:  EKG   Results for orders placed or performed during the hospital encounter of 06/10/25   EKG 12-lead    Collection Time: 06/10/25 11:45 PM   Result Value Ref Range    QRS Duration 96 ms    OHS QTC Calculation 469 ms    Narrative    Test Reason : R07.9,    Vent. Rate :  61 BPM     Atrial Rate :  61 BPM     P-R Int : 148 ms          QRS Dur :  96 ms      QT Int : 466 ms       P-R-T Axes :  72  16  99 degrees    QTcB Int : 469 ms    Normal sinus rhythm  Incomplete right bundle branch block  Nonspecific T wave abnormality  Abnormal ECG  When compared with ECG of 07-Jan-2025 10:37,  The axis Shifted right  Minimal criteria for Inferior infarct are no longer Present  QT has " lengthened    Referred By: NELY BORGES           Confirmed By:        Radiology:      Current Medications:     Infusions:       Scheduled:   amitriptyline  25 mg Oral QHS    aspirin  81 mg Oral Daily    atorvastatin  80 mg Oral Daily    clonazePAM  1 mg Oral QHS    cloNIDine  0.3 mg Oral TID    clopidogreL  75 mg Oral Daily    EScitalopram oxalate  20 mg Oral Daily    furosemide  20 mg Oral Daily    insulin aspart U-100  7 Units Subcutaneous TIDWM    insulin glargine U-100  40 Units Subcutaneous Daily    insulin glargine U-100  40 Units Subcutaneous QHS    isosorbide mononitrate  120 mg Oral Daily    lipase-protease-amylase 6,000-19,000-30,000 units  3 capsule Oral TID WM    losartan  100 mg Oral Daily    metoprolol succinate  25 mg Oral Daily    morphine  60 mg Oral TID    nicotine  1 patch Transdermal Daily    NIFEdipine  60 mg Oral BID    OXcarbazepine  600 mg Oral BID    potassium chloride  40 mEq Oral Once    pregabalin  75 mg Oral TID    ranolazine  500 mg Oral BID    tamsulosin  1 capsule Oral Daily        PRN:    Current Facility-Administered Medications:     acetaminophen, 650 mg, Oral, Q8H PRN    dextrose 50%, 12.5 g, Intravenous, PRN    dextrose 50%, 25 g, Intravenous, PRN    glucagon (human recombinant), 1 mg, Intramuscular, PRN    glucose, 16 g, Oral, PRN    glucose, 24 g, Oral, PRN    HYDROmorphone, 4 mg, Oral, Q8H PRN    insulin aspart U-100, 0-15 Units, Subcutaneous, QID (AC + HS) PRN    melatonin, 6 mg, Oral, Nightly PRN    naloxone, 0.4 mg, Intravenous, PRN    nitroGLYCERIN, 0.4 mg, Sublingual, Q5 Min PRN    ondansetron, 4 mg, Oral, Q8H PRN    sodium chloride 0.9%, 10 mL, Intravenous, PRN      Assessment & Plan:     Uncontrolled T2DM, with long term insulin use  Peripheral neuropathy   - POC A1c in clinic was 12.6. Repeat on arrival was 11.8.  - Patient reported home regimen of basal insulin in the morning and evening, 80 U each, as well as prandial insulin TID of 25 U.   - Will initiate Lantus  40 U BID with [facility]SSI  [facility]   - Decrease Lyrica to 75mg  - Adding Jardiance   - 5U of Aspart prior to meals     Chronic diarrhea  Chronic pancreatitis   MASLD  Biliary Sludge on U/S  - Previously seen by GI on 3/2025 with workup revealing nonspecific finding of stool positive for leukocytes, negative for parasites, Giardia, and cryptosporidium. Final stool culture negative March 2025.   - U/S abdomen 3/18/25 -- gallbladder sludge, was offered a referral for cholecystectomy OP  - Home regimen of CREON 36,000-114,000-180,000, one tablet TID. Medication not on formulary will initiate CREON 6,000-19,000-30,000 3 tablets TID with meals    - GI consulted      Left lateral food wound   - Seen by wound care on 6/9/25, and glass foreign body removed   - Consider wound care consult while IP      HTN  HLD  CAD s/p CABG x2 w/ L atrial appendage ligation (12/2024)   HFpEF with EF 70%  - Initial BP  172/90  - TTE 5/28/25 -- Moderately increased ventricular mass with moderate septal thickening. There is moderate concentric hypertrophy. Normal wall motion. There is normal systolic function. Quantitated ejection fraction is 70%. There is normal diastolic function.   - Troponin and EKG ordered   - Resume home medications of Atorvastatin 80 mg daily,  Losartan 100 mg daily, Nifedipine 60 mg daily, ASA 81 mg daily, Plavix 75 mg daily, Imdur 120 mg daily, Furosemide 20 mg daily, Metoprolol 25 mg daily, and Ranexa 500 mg BID   - PRN Nitro for chest pain      CKD Stage III  - Labs pending. Monitor renal indices   - Avoid nephrotoxic agents if possible      CICI  - Not currently using CPAP at home due to being broken   - Consider CPAP usage while inpatient and coordinating new machine OP upon discharge       BPH  - Resume home Tamsulosin 0.4 mg daily      Tobacco abuse   - Smokes 1/2 ppd  - Nicotine patch ordered      Chronic pain   - History of NERVO SCS 7/21/2022 placement. Patient had appointment for possible revision on  6/11/25  - Decreasing morphine to 60 TID and hydromorphone to 4 mg     Mood disorder   - Resume home lexapro 20 mg daily, and Trileptal 600 mg BID   - Elavil decreased to 25mg  - Decreasing Klonopin frequency to qHS    CODE STATUS: Full Code  Diet: diabetic   DVT Prophylaxis: scd   Anticoagulants   Medication Route Frequency         Shoaib Eason MD  [facility] [facility], PGY-2

## 2025-06-12 DIAGNOSIS — R93.89 ABNORMAL CT SCAN: Primary | ICD-10-CM

## 2025-06-12 DIAGNOSIS — K86.89 PANCREATIC MASS: ICD-10-CM

## 2025-06-12 LAB
ALBUMIN SERPL-MCNC: 2.5 G/DL (ref 3.5–5)
ALBUMIN/GLOB SERPL: 0.6 RATIO (ref 1.1–2)
ALP SERPL-CCNC: 442 UNIT/L (ref 40–150)
ALT SERPL-CCNC: 44 UNIT/L (ref 0–55)
ANION GAP SERPL CALC-SCNC: 8 MEQ/L
AST SERPL-CCNC: 59 UNIT/L (ref 11–45)
BASOPHILS # BLD AUTO: 0.04 X10(3)/MCL
BASOPHILS NFR BLD AUTO: 0.3 %
BILIRUB SERPL-MCNC: 0.3 MG/DL
BUN SERPL-MCNC: 20 MG/DL (ref 8.9–20.6)
CALCIUM SERPL-MCNC: 8.2 MG/DL (ref 8.4–10.2)
CANCER AG19-9 SERPL-ACNC: 769.21 UNIT/ML (ref 0–37)
CEA SERPL-MCNC: 11.77 NG/ML (ref 0–3)
CHLORIDE SERPL-SCNC: 101 MMOL/L (ref 98–107)
CO2 SERPL-SCNC: 31 MMOL/L (ref 22–29)
CREAT SERPL-MCNC: 1.15 MG/DL (ref 0.72–1.25)
CREAT/UREA NIT SERPL: 17
EOSINOPHIL # BLD AUTO: 0.3 X10(3)/MCL (ref 0–0.9)
EOSINOPHIL NFR BLD AUTO: 2.6 %
ERYTHROCYTE [DISTWIDTH] IN BLOOD BY AUTOMATED COUNT: 13.2 % (ref 11.5–17)
GFR SERPLBLD CREATININE-BSD FMLA CKD-EPI: >60 ML/MIN/1.73/M2
GLOBULIN SER-MCNC: 4.2 GM/DL (ref 2.4–3.5)
GLUCOSE SERPL-MCNC: 107 MG/DL (ref 74–100)
HCT VFR BLD AUTO: 37.5 % (ref 42–52)
HGB BLD-MCNC: 12.9 G/DL (ref 14–18)
HOLD SPECIMEN: NORMAL
IGA SERPL-MCNC: 620 MG/DL (ref 63–484)
IMM GRANULOCYTES # BLD AUTO: 0.05 X10(3)/MCL (ref 0–0.04)
IMM GRANULOCYTES NFR BLD AUTO: 0.4 %
LYMPHOCYTES # BLD AUTO: 1.96 X10(3)/MCL (ref 0.6–4.6)
LYMPHOCYTES NFR BLD AUTO: 17.1 %
MAGNESIUM SERPL-MCNC: 2.2 MG/DL (ref 1.6–2.6)
MCH RBC QN AUTO: 29.9 PG (ref 27–31)
MCHC RBC AUTO-ENTMCNC: 34.4 G/DL (ref 33–36)
MCV RBC AUTO: 86.8 FL (ref 80–94)
MONOCYTES # BLD AUTO: 0.89 X10(3)/MCL (ref 0.1–1.3)
MONOCYTES NFR BLD AUTO: 7.8 %
NEUTROPHILS # BLD AUTO: 8.23 X10(3)/MCL (ref 2.1–9.2)
NEUTROPHILS NFR BLD AUTO: 71.8 %
NRBC BLD AUTO-RTO: 0 %
PHOSPHATE SERPL-MCNC: 4 MG/DL (ref 2.3–4.7)
PLATELET # BLD AUTO: 252 X10(3)/MCL (ref 130–400)
PMV BLD AUTO: 10.9 FL (ref 7.4–10.4)
POCT GLUCOSE: 101 MG/DL (ref 70–110)
POCT GLUCOSE: 145 MG/DL (ref 70–110)
POCT GLUCOSE: 182 MG/DL (ref 70–110)
POCT GLUCOSE: 259 MG/DL (ref 70–110)
POCT GLUCOSE: 87 MG/DL (ref 70–110)
POTASSIUM SERPL-SCNC: 2.9 MMOL/L (ref 3.5–5.1)
PROT SERPL-MCNC: 6.7 GM/DL (ref 6.4–8.3)
RBC # BLD AUTO: 4.32 X10(6)/MCL (ref 4.7–6.1)
SODIUM SERPL-SCNC: 140 MMOL/L (ref 136–145)
WBC # BLD AUTO: 11.47 X10(3)/MCL (ref 4.5–11.5)

## 2025-06-12 PROCEDURE — 83735 ASSAY OF MAGNESIUM: CPT

## 2025-06-12 PROCEDURE — 80053 COMPREHEN METABOLIC PANEL: CPT

## 2025-06-12 PROCEDURE — 63600175 PHARM REV CODE 636 W HCPCS

## 2025-06-12 PROCEDURE — 25000003 PHARM REV CODE 250: Performed by: INTERNAL MEDICINE

## 2025-06-12 PROCEDURE — 25000003 PHARM REV CODE 250

## 2025-06-12 PROCEDURE — 86364 TISS TRNSGLTMNASE EA IG CLAS: CPT

## 2025-06-12 PROCEDURE — 82784 ASSAY IGA/IGD/IGG/IGM EACH: CPT

## 2025-06-12 PROCEDURE — 84100 ASSAY OF PHOSPHORUS: CPT

## 2025-06-12 PROCEDURE — 11000001 HC ACUTE MED/SURG PRIVATE ROOM

## 2025-06-12 PROCEDURE — 94761 N-INVAS EAR/PLS OXIMETRY MLT: CPT

## 2025-06-12 PROCEDURE — 81376 HLA II TYPING 1 LOCUS LR: CPT

## 2025-06-12 PROCEDURE — 82378 CARCINOEMBRYONIC ANTIGEN: CPT | Performed by: STUDENT IN AN ORGANIZED HEALTH CARE EDUCATION/TRAINING PROGRAM

## 2025-06-12 PROCEDURE — 86301 IMMUNOASSAY TUMOR CA 19-9: CPT | Performed by: STUDENT IN AN ORGANIZED HEALTH CARE EDUCATION/TRAINING PROGRAM

## 2025-06-12 PROCEDURE — 36415 COLL VENOUS BLD VENIPUNCTURE: CPT

## 2025-06-12 PROCEDURE — 97530 THERAPEUTIC ACTIVITIES: CPT

## 2025-06-12 PROCEDURE — 36415 COLL VENOUS BLD VENIPUNCTURE: CPT | Performed by: STUDENT IN AN ORGANIZED HEALTH CARE EDUCATION/TRAINING PROGRAM

## 2025-06-12 PROCEDURE — 63600175 PHARM REV CODE 636 W HCPCS: Performed by: INTERNAL MEDICINE

## 2025-06-12 PROCEDURE — 97116 GAIT TRAINING THERAPY: CPT

## 2025-06-12 PROCEDURE — S4991 NICOTINE PATCH NONLEGEND: HCPCS

## 2025-06-12 PROCEDURE — 82653 EL-1 FECAL QUANTITATIVE: CPT

## 2025-06-12 PROCEDURE — 87507 IADNA-DNA/RNA PROBE TQ 12-25: CPT

## 2025-06-12 PROCEDURE — 85025 COMPLETE CBC W/AUTO DIFF WBC: CPT

## 2025-06-12 RX ORDER — POTASSIUM CHLORIDE 7.45 MG/ML
INJECTION INTRAVENOUS
Status: COMPLETED
Start: 2025-06-12 | End: 2025-06-12

## 2025-06-12 RX ORDER — POTASSIUM CHLORIDE 20 MEQ/1
40 TABLET, EXTENDED RELEASE ORAL ONCE
Status: COMPLETED | OUTPATIENT
Start: 2025-06-12 | End: 2025-06-12

## 2025-06-12 RX ORDER — POTASSIUM CHLORIDE 7.45 MG/ML
10 INJECTION INTRAVENOUS
Status: COMPLETED | OUTPATIENT
Start: 2025-06-12 | End: 2025-06-12

## 2025-06-12 RX ORDER — POTASSIUM CHLORIDE 7.45 MG/ML
INJECTION INTRAVENOUS
Status: DISPENSED
Start: 2025-06-12 | End: 2025-06-12

## 2025-06-12 RX ORDER — POTASSIUM CHLORIDE 20 MEQ/1
TABLET, EXTENDED RELEASE ORAL
Status: DISPENSED
Start: 2025-06-12 | End: 2025-06-12

## 2025-06-12 RX ADMIN — AMITRIPTYLINE HYDROCHLORIDE 25 MG: 25 TABLET, FILM COATED ORAL at 09:06

## 2025-06-12 RX ADMIN — PREGABALIN 75 MG: 25 CAPSULE ORAL at 09:06

## 2025-06-12 RX ADMIN — RANOLAZINE 500 MG: 500 TABLET, EXTENDED RELEASE ORAL at 09:06

## 2025-06-12 RX ADMIN — CLOPIDOGREL BISULFATE 75 MG: 75 TABLET, FILM COATED ORAL at 09:06

## 2025-06-12 RX ADMIN — MORPHINE SULFATE 60 MG: 30 TABLET, FILM COATED, EXTENDED RELEASE ORAL at 09:06

## 2025-06-12 RX ADMIN — POTASSIUM CHLORIDE 10 MEQ: 7.46 INJECTION, SOLUTION INTRAVENOUS at 12:06

## 2025-06-12 RX ADMIN — INSULIN GLARGINE 40 UNITS: 100 INJECTION, SOLUTION SUBCUTANEOUS at 09:06

## 2025-06-12 RX ADMIN — PREGABALIN 75 MG: 25 CAPSULE ORAL at 02:06

## 2025-06-12 RX ADMIN — OXCARBAZEPINE 600 MG: 300 TABLET, FILM COATED ORAL at 09:06

## 2025-06-12 RX ADMIN — POTASSIUM CHLORIDE 40 MEQ: 1500 TABLET, EXTENDED RELEASE ORAL at 10:06

## 2025-06-12 RX ADMIN — POTASSIUM CHLORIDE 10 MEQ: 7.46 INJECTION, SOLUTION INTRAVENOUS at 09:06

## 2025-06-12 RX ADMIN — ESCITALOPRAM 20 MG: 10 TABLET, FILM COATED ORAL at 09:06

## 2025-06-12 RX ADMIN — PANCRELIPASE 3 CAPSULE: 30000; 6000; 19000 CAPSULE, DELAYED RELEASE PELLETS ORAL at 05:06

## 2025-06-12 RX ADMIN — MORPHINE SULFATE 60 MG: 30 TABLET, FILM COATED, EXTENDED RELEASE ORAL at 02:06

## 2025-06-12 RX ADMIN — ASPIRIN 81 MG: 81 TABLET, COATED ORAL at 09:06

## 2025-06-12 RX ADMIN — POTASSIUM CHLORIDE: 7.46 INJECTION, SOLUTION INTRAVENOUS at 11:06

## 2025-06-12 RX ADMIN — NIFEDIPINE 30 MG: 30 TABLET, FILM COATED, EXTENDED RELEASE ORAL at 09:06

## 2025-06-12 RX ADMIN — INSULIN ASPART 7 UNITS: 100 INJECTION, SOLUTION INTRAVENOUS; SUBCUTANEOUS at 05:06

## 2025-06-12 RX ADMIN — CLONAZEPAM 1 MG: 1 TABLET ORAL at 09:06

## 2025-06-12 RX ADMIN — TAMSULOSIN HYDROCHLORIDE 0.4 MG: 0.4 CAPSULE ORAL at 08:06

## 2025-06-12 RX ADMIN — NICOTINE 1 PATCH: 21 PATCH, EXTENDED RELEASE TRANSDERMAL at 09:06

## 2025-06-12 RX ADMIN — METOPROLOL SUCCINATE 25 MG: 25 TABLET, EXTENDED RELEASE ORAL at 09:06

## 2025-06-12 RX ADMIN — ATORVASTATIN CALCIUM 80 MG: 40 TABLET, FILM COATED ORAL at 09:06

## 2025-06-12 RX ADMIN — ISOSORBIDE MONONITRATE 120 MG: 30 TABLET, EXTENDED RELEASE ORAL at 09:06

## 2025-06-12 RX ADMIN — POTASSIUM CHLORIDE 10 MEQ: 7.46 INJECTION, SOLUTION INTRAVENOUS at 10:06

## 2025-06-12 RX ADMIN — LOSARTAN POTASSIUM 100 MG: 25 TABLET, FILM COATED ORAL at 09:06

## 2025-06-12 RX ADMIN — POTASSIUM CHLORIDE 10 MEQ: 7.46 INJECTION, SOLUTION INTRAVENOUS at 11:06

## 2025-06-12 RX ADMIN — FUROSEMIDE 20 MG: 20 TABLET ORAL at 09:06

## 2025-06-12 NOTE — PROGRESS NOTES
[facility] Medicine Wards Progress Note     Resident Team: [facility] Medicine List 2  Attending Physician: Dat Beasley MD  Resident: Dr. Perez  Intern: Dr. Benites      Subjective:      Brief HPI:  Bharath Caballero is a 45 y.o. male with PMH significant for T2DM, HLD, HTN, CAD, Hx of CABG w/ L atrial appendage ligation (12/2024) CKD stage 3, chronic pancreatitis, MASLD, CICI, GERD and BPH was a direct admit from clinic with complaints of postprandial diarrhea and uncontrolled diabetes on 6/10/2025. Patient states that diarrhea has been on going for about a year, with loose, watery stools red-orange in color typically occurring about 30 minutes to 1 hour after eating. States that he has been avoiding eating to avoid abdominal pain and having a bowel movement. Abdominal pain is right sided and worse after eating. Patient additionally reports having a couple episodes of hematemesis within the past 2-3 months. Patient is a diabetic, diagnosed in 2016, on insulin. States he takes basal insulin in the morning and evening, 80 U each, as well as prandial insulin TID of 25 U. POC A1c in clinic was 12.6. Patient additionally has chronic chest pain and shortness of breath, s/p CABG 12/2024 with L atrial appendage ablation. Patient has a left lateral foot wound following stepping a piece of glass a and was seen by wound care on 6/9/25. Patient smokes 1/2 ppd. Of note, per his PCP's note, patient was seen by GI and a GI work up in March 2025 revealed nonspecific finding of stool positive for leukocytes, negative for parasites, Giardia, and cryptosporidium. Final stool culture negative March 2025. U/S abdomen 3/18/25 revealed gallbladder sludge and was offered a referral for cholecystectomy     ED Course - On arrival, patient afebrile (98.1 F), hypertensive (172/90), HR 79, RR 17, O2 98% RA. POC A1c 12.6 in clinic. Repeat on arrival was 11.8. Labs and GI work up ordered. GI to be consulted. Will be admitted for management and  uncontrolled diabetes and work up of chronic diarrhea.      Interval History:   VSS, AF  Antihypertensive meds held last night due to soft BP  Very hard to awake likely 2/2 over medication        Review of Systems:  ROS completed and negative except as indicated above.     Objective:     Last 24 Hour Vital Signs:  BP  Min: 83/51  Max: 143/64  Temp  Av.1 °F (36.7 °C)  Min: 97.7 °F (36.5 °C)  Max: 98.4 °F (36.9 °C)  Pulse  Av.8  Min: 64  Max: 78  Resp  Av.3  Min: 18  Max: 20  SpO2  Av.4 %  Min: 92 %  Max: 98 %  I/O last 3 completed shifts:  In: 354 [P.O.:354]  Out: 700 [Urine:700]    Physical Examination:  Physical Exam  Constitutional:       Comments: Somnolent    Cardiovascular:      Rate and Rhythm: Normal rate and regular rhythm.      Heart sounds: No murmur heard.  Pulmonary:      Effort: No respiratory distress.      Breath sounds: Wheezing present.   Abdominal:      Palpations: Abdomen is soft. There is no mass.      Tenderness: There is abdominal tenderness.      Comments: Upper abd TTP   Skin:     General: Skin is warm.      Findings: Lesion present.      Comments: Wound to l foot         Laboratory:  Most Recent Data:  CBC:   Lab Results   Component Value Date    WBC 11.47 2025    HGB 12.9 (L) 2025    HCT 37.5 (L) 2025     2025    MCV 86.8 2025    RDW 13.2 2025     WBC Differential:   Recent Labs   Lab 06/10/25  2308 25  0651 25  0718   WBC 10.42 11.84* 11.47   HGB 13.6* 14.4 12.9*   HCT 38.5* 41.2* 37.5*    239 252   MCV 85.6 86.2 86.8     BMP:   Lab Results   Component Value Date     2025    K 2.9 (L) 2025     2025    CO2 31 (H) 2025    BUN 20.0 2025    CREATININE 1.15 2025     (H) 2025    CALCIUM 8.2 (L) 2025    MG 2.20 2025    PHOS 4.0 2025     LFTs:   Lab Results   Component Value Date    PROT 6.7 2025    ALBUMIN 2.5 (L) 2025     "BILITOT 0.3 06/12/2025    AST 59 (H) 06/12/2025    ALKPHOS 442 (H) 06/12/2025    ALT 44 06/12/2025     (H) 10/07/2024     Coags:   Lab Results   Component Value Date    INR 1.2 12/05/2024    PROTIME 15.2 (H) 12/05/2024     FLP:   Lab Results   Component Value Date    CHOL 146 06/10/2025    HDL 25 (L) 06/10/2025    TRIG 220 (H) 06/10/2025     DM:   Lab Results   Component Value Date    HGBA1C 11.8 (H) 06/10/2025    HGBA1C 11.1 (H) 02/18/2025    HGBA1C 9.3 (H) 10/08/2024    CREATININE 1.15 06/12/2025     Thyroid:   Lab Results   Component Value Date    TSH 3.029 04/16/2025      Anemia:   Lab Results   Component Value Date    IRON 97 10/10/2024    TIBC 205 (L) 10/10/2024    FERRITIN 216.05 10/10/2024       Lab Results   Component Value Date    [id number] 886 (H) 10/10/2024     No results found for: "FOLATE"     Cardiac:   Lab Results   Component Value Date    TROPONINI <0.010 06/10/2025    .6 (H) 12/08/2024         Microbiology Data:  Microbiology Results (last 7 days)       Procedure Component Value Units Date/Time    Clostridium Diff Toxin, A & B, EIA [[id number]]     Order Status: Canceled Specimen: Stool              Other Results:  EKG   Results for orders placed or performed during the hospital encounter of 06/10/25   EKG 12-lead    Collection Time: 06/10/25 11:45 PM   Result Value Ref Range    QRS Duration 96 ms    OHS QTC Calculation 469 ms    Narrative    Test Reason : R07.9,    Vent. Rate :  61 BPM     Atrial Rate :  61 BPM     P-R Int : 148 ms          QRS Dur :  96 ms      QT Int : 466 ms       P-R-T Axes :  72  16  99 degrees    QTcB Int : 469 ms    Normal sinus rhythm  Incomplete right bundle branch block  Nonspecific T wave abnormality  Abnormal ECG  When compared with ECG of 07-Jan-2025 10:37,  The axis Shifted right  Minimal criteria for Inferior infarct are no longer Present  QT has lengthened  Confirmed by Zoie Campbell ([id number]) on 6/11/2025 5:21:21 PM    Referred By: NELY BORGES      "      Confirmed By: Zoie Campbell       Radiology:      Current Medications:     Infusions:       Scheduled:   amitriptyline  25 mg Oral QHS    aspirin  81 mg Oral Daily    atorvastatin  80 mg Oral Daily    clonazePAM  1 mg Oral QHS    clopidogreL  75 mg Oral Daily    EScitalopram oxalate  20 mg Oral Daily    furosemide  20 mg Oral Daily    insulin aspart U-100  7 Units Subcutaneous TIDWM    insulin glargine U-100  40 Units Subcutaneous Daily    insulin glargine U-100  40 Units Subcutaneous QHS    isosorbide mononitrate  120 mg Oral Daily    lipase-protease-amylase 6,000-19,000-30,000 units  3 capsule Oral TID WM    losartan  100 mg Oral Daily    metoprolol succinate  25 mg Oral Daily    morphine  60 mg Oral TID    nicotine  1 patch Transdermal Daily    NIFEdipine  30 mg Oral BID    OXcarbazepine  600 mg Oral BID    potassium chloride in water        potassium chloride SA        potassium chloride  40 mEq Oral Once    pregabalin  75 mg Oral TID    ranolazine  500 mg Oral BID    tamsulosin  1 capsule Oral Daily        PRN:    Current Facility-Administered Medications:     acetaminophen, 650 mg, Oral, Q8H PRN    dextrose 50%, 12.5 g, Intravenous, PRN    dextrose 50%, 25 g, Intravenous, PRN    glucagon (human recombinant), 1 mg, Intramuscular, PRN    glucose, 16 g, Oral, PRN    glucose, 24 g, Oral, PRN    HYDROmorphone, 4 mg, Oral, Q8H PRN    insulin aspart U-100, 0-15 Units, Subcutaneous, QID (AC + HS) PRN    loperamide, 2 mg, Oral, QID PRN    melatonin, 6 mg, Oral, Nightly PRN    naloxone, 0.4 mg, Intravenous, PRN    nitroGLYCERIN, 0.4 mg, Sublingual, Q5 Min PRN    ondansetron, 4 mg, Oral, Q8H PRN    potassium chloride in water, , ,     potassium chloride SA, , ,     sodium chloride 0.9%, 10 mL, Intravenous, PRN      Assessment & Plan:   Uncontrolled T2DM, with long term insulin use  Peripheral neuropathy   - POC A1c in clinic was 12.6. Repeat on arrival was 11.8.  - Patient reported home regimen of basal insulin in  the morning and evening, 80 U each, as well as prandial insulin TID of 25 U.   - Will initiate Lantus 40 U BID with HDSSI  [facility]   - Decre[facility] Lyrica to 75mg  - Adding Jardiance   - 5U of Aspart prior to meals     Chronic diarrhea  Chronic pancreatitis   MASLD  Biliary Sludge on U/S  - Previously seen by GI on 3/2025 with workup revealing nonspecific finding of stool positive for leukocytes, negative for parasites, Giardia, and cryptosporidium. Final stool culture negative March 2025.   - U/S abdomen 3/18/25 -- gallbladder sludge, was offered a referral for cholecystectomy OP  - Home regimen of CREON 36,000-114,000-180,000, one tablet TID. Medication not on formulary will initiate CREON 6,000-19,000-30,000 3 tablets TID with meals    -CT A/P w/o contrast: enlargement of head of pancreas w/ some peripancreatic edema and inflammatory changes in the hypoattenuated area in the head of the pancreas  -CT A/P w/ IV contrast: mass in the head of the pancreas with secondary pancreatic ductal dilatation; High-grade area of stenosis in the portal vein at the level of the pancreatic head mass or splenic confluence   -Abdominal U/S order: pending  - GI consulted      Left lateral food wound   - Seen by wound care on 6/9/25, and glass foreign body removed   - Consider wound care consult while IP      HTN  HLD  CAD s/p CABG x2 w/ L atrial appendage ligation (12/2024)   HFpEF with EF 70%  - Initial BP  172/90  - TTE 5/28/25 -- Moderately increased ventricular mass with moderate septal thickening. There is moderate concentric hypertrophy. Normal wall motion. There is normal systolic function. Quantitated ejection fraction is 70%. There is normal diastolic function.   - Troponin and EKG ordered   - Resume home medications of Atorvastatin 80 mg daily,  Losartan 100 mg daily, Nifedipine 60 mg daily, ASA 81 mg daily, Plavix 75 mg daily, Imdur 120 mg daily, Furosemide 20 mg daily, Metoprolol 25 mg daily, and Ranexa 500 mg  BID   - PRN Nitro for chest pain      CKD Stage III  - Labs pending. Monitor renal indices   - Avoid nephrotoxic agents if possible      CICI  - Not currently using CPAP at home due to being broken   - Consider CPAP usage while inpatient and coordinating new machine OP upon discharge       BPH  - Resume home Tamsulosin 0.4 mg daily      Tobacco abuse   - Smokes 1/2 ppd  - Nicotine patch ordered      Chronic pain   - History of NERVO SCS 7/21/2022 placement. Patient had appointment for possible revision on 6/11/25  - Decreasing morphine to 60 TID and hydromorphone to 4 mg     Mood disorder   - Resume home lexapro 20 mg daily, and Trileptal 600 mg BID   - Elavil decreased to 25mg  - Decreasing Klonopin frequency to qHS    Hypokalemia   Replete PRN    CODE STATUS: Full Code  Diet: diabetic   DVT Prophylaxis: scd     Disposition: Discharge pending workup; Plan for EUS for further assessment of mass on imaging outpatient     Alicja Benites MD  Family Medicine Resident HO-1

## 2025-06-12 NOTE — CONSULTS
"[facility]/General Surgery  History & Physical    SUBJECTIVE:     Chief Complaint:   Postprandial diarrhea and diabetes    Reason for consult: "distended gallbladder"    History of Present Illness:  45-year-old male with past medical history notable for type 2 diabetes, hyperlipidemia, hypertension, CAD, status post CABG, on aspirin, Plavix, CKD 3, chronic pancreatitis, followed by GI for NAFLD, on Creon.  Patient has been having right upper quadrant abdominal pain for the last 1 year, as well as diarrhea since 2016.  Recent abdominal ultrasound is notable for sludge and gallbladder, no cholelithiasis.  This was an ultrasound performed in March. General surgery is consulted for symptomatic cholelithiasis. No cholecystitis on US or CT AP.     Of far greater concern, the patient has a CT AP from yesterday evening concerning for a pancreatic head mass, pancreatic ductal dilation, possible portal vein encasement, portal venous thrombosis with varicotisites at the zainab hepatis. This has yet to be worked up.     Allergies:  Review of patient's allergies indicates:  No Known Allergies    Home Medications:  No current facility-administered medications on file prior to encounter.     Current Outpatient Medications on File Prior to Encounter   Medication Sig    amitriptyline (ELAVIL) 50 MG tablet TAKE ONE TABLET BY MOUTH IN THE EVENING    aspirin (ECOTRIN) 81 MG EC tablet Take 81 mg by mouth once daily.    atorvastatin (LIPITOR) 80 MG tablet Take 1 tablet (80 mg total) by mouth once daily.    cholecalciferol, vitamin D3, 125 mcg (5,000 unit) capsule Take 1 capsule (5,000 Units total) by mouth once daily.    clonazePAM (KLONOPIN) 1 MG tablet TAKE ONE TABLET BY MOUTH TWICE DAILY    cloNIDine (CATAPRES) 0.3 MG tablet Take 1 tablet (0.3 mg total) by mouth 3 (three) times daily.    clopidogreL (PLAVIX) 75 mg tablet Take 4 tablets on day 1 and then one tablet daily.    CREON 36,000-114,000- 180,000 unit CpDR TAKE ONE CAPSULE " THREE TIMES DAILY    dapagliflozin propanediol (FARXIGA) 5 mg Tab tablet Take 2 tablets (10 mg total) by mouth once daily.    ergocalciferol (ERGOCALCIFEROL) 50,000 unit Cap Take 1 capsule (50,000 Units total) by mouth every 7 days.    EScitalopram oxalate (LEXAPRO) 20 MG tablet Take 20 mg by mouth once daily.    esomeprazole (NEXIUM) 40 MG capsule Take 1 capsule (40 mg total) by mouth before breakfast.    gabapentin (NEURONTIN) 800 MG tablet TAKE ONE TABLET BY MOUTH IN THE EVENING    HUMALOG U-100 INSULIN 100 unit/mL injection INJECT 25 UNITS SUBCUTANEOUSLY BEFORE MEALS THREE TIMES DAILY    HYDROmorphone (DILAUDID) 8 MG tablet Take 1 tablet (8 mg total) by mouth every 8 (eight) hours as needed.    icosapent ethyL (VASCEPA) 1 gram Cap TAKE TWO CAPSULES BY MOUTH TWICE DAILY    isosorbide mononitrate (IMDUR) 120 MG 24 hr tablet Take 1 tablet (120 mg total) by mouth once daily.    linaGLIPtin (TRADJENTA) 5 mg Tab tablet Take 1 tablet (5 mg total) by mouth once daily.    losartan (COZAAR) 100 MG tablet Take 1 tablet (100 mg total) by mouth once daily.    metoprolol succinate (TOPROL-XL) 25 MG 24 hr tablet Take 1 tablet (25 mg total) by mouth once daily.    morphine (MS CONTIN) 100 MG 12 hr tablet TAKE ONE TABLET BY MOUTH THREE TIMES DAILY    nicotine (NICODERM CQ) 21 mg/24 hr Place 1 patch onto the skin once daily.    NIFEdipine (PROCARDIA-XL) 60 MG (OSM) 24 hr tablet Take 1 tablet (60 mg total) by mouth 2 (two) times a day.    nitroGLYCERIN (NITROSTAT) 0.3 MG SL tablet Place 1 tablet (0.3 mg total) under the tongue every 5 (five) minutes as needed for Chest pain (go to er after 3 doses).    nut.tx.gluc intol,lf,soy-fiber (GLUCERNA 1.5 CEDRIC) 0.08-1.5 gram-kcal/mL Liqd Take 273 mLs by mouth 2 (two) times a day.    OXcarbazepine (TRILEPTAL) 600 MG Tab Take 1 tablet (600 mg total) by mouth 2 (two) times daily.    potassium chloride SA (K-DUR,KLOR-CON) 20 MEQ tablet Take 1 tablet (20 mEq total) by mouth 2 (two) times daily.  "   pregabalin (LYRICA) 150 MG capsule Take 1 capsule (150 mg total) by mouth 3 (three) times daily.    ranolazine (RANEXA) 500 MG Tb12 Take 1 tablet (500 mg total) by mouth 2 (two) times daily.    REPATHA SURECLICK 140 mg/mL PnIj INJECT 1ml INTRAMUSCULARLY EVERY 14 DAYS    sucralfate (CARAFATE) 100 mg/mL suspension Take 10 mLs (1 g total) by mouth 4 (four) times daily before meals and nightly.    SURE COMFORT INSULIN SYRINGE 0.5 mL 31 gauge x 5/16" Syrg USE ONE SYRINGE THREE TIMES DAILY    tamsulosin (FLOMAX) 0.4 mg Cap TAKE ONE CAPSULE BY MOUTH EVERY DAY    torsemide (DEMADEX) 20 MG Tab Take 1 tablet (20 mg total) by mouth once daily.    TOUJEO SOLOSTAR U-300 INSULIN 300 unit/mL (1.5 mL) InPn pen INJECT 35 UNITS SUBCUTANEOUSLY TWICE DAILY    TOUJEO SOLOSTAR U-300 INSULIN 300 unit/mL (1.5 mL) InPn pen INJECT 35 SUBCUTANEOUSLY TWICE DAILY       Past Medical History:   Diagnosis Date    Abdominal pain     Acquired perforating dermatosis 08/30/2023    Anxiety     Aortic atherosclerosis 01/24/2023    Appetite loss     Balance problem     Bilateral edema of lower extremity 02/06/2023    Bladder outflow obstruction 06/20/2022    Blurred vision, right eye 10/19/2022    BMI 34.0-34.9,adult 06/20/2022    BPH (benign prostatic hyperplasia)     Chest pain     Chronic liver failure without hepatic coma 04/25/2023    Chronic pain 10/25/2022    Chronic pain syndrome 05/12/2022    Chronic pancreatitis 10/28/2017    Coronary artery disease, unspecified vessel or lesion type, unspecified whether angina present, unspecified whether native or transplanted heart     Deafness 10/03/2023    Depression     Diabetes     Diabetic polyneuropathy 06/20/2022    Elevated LFTs 08/18/2022    Fatigue     GERD (gastroesophageal reflux disease)     Hand paresthesia 06/20/2022    Headache     Hepatic steatosis     History of continuous positive airway pressure (CPAP) therapy at home     History of pancreatitis 06/20/2022    History of recent fall     " Hypertension     Hypertriglyceridemia     Insomnia     Kidney disease     Kidney disorder     Leg pain     Mixed hyperlipidemia 10/28/2017    Mononeuritis multiplex 06/20/2022    Morbid obesity     Nausea & vomiting     Neuropathy     Nocturia 06/20/2022    NSTEMI (non-ST elevated myocardial infarction) 10/2024    OA (osteoarthritis)     Obesity     Obstructive sleep apnea syndrome 06/20/2022    Onychomycosis of multiple toenails with type 2 diabetes mellitus 10/28/2017    Open wound of finger of right hand 10/20/2023    CICI (obstructive sleep apnea)     uses CPAP    Painful diabetic neuropathy 05/12/2022    Personal history of colonic polyps 08/18/2022    Polyneuropathy     Primary hypertension 10/28/2017    Recurrent pancreatitis     Renal insufficiency     Tobacco abuse     Tobacco user 06/20/2022    Type 2 diabetes mellitus with skin complication, with long-term current use of insulin 02/06/2023    Urinary frequency     Use of cane as ambulatory aid     Weight loss, unintentional      Past Surgical History:   Procedure Laterality Date    ANGIOGRAM, CORONARY, WITH LEFT HEART CATHETERIZATION N/A 04/10/2023    Procedure: Angiogram, Coronary, with Left Heart Cath;  Surgeon: Jaswant Pugh MD;  Location: [facility] CATH LAB;  Service: Cardiology;  Laterality: N/A;    ANGIOGRAM, CORONARY, WITH LEFT HEART CATHETERIZATION N/A 10/10/2024    Procedure: Angiogram, Coronary, with Left Heart Cath;  Surgeon: Jaswant Pugh MD;  Location: [facility] CATH LAB;  Service: Cardiology;  Laterality: N/A;    APPENDECTOMY      ARTERIOVENOUS ANASTOMOSIS, OPEN, UPPER ARM BASILLIC VEIN TRANSPOSITION N/A 05/07/2018    CORONARY ARTERY BYPASS GRAFT (CABG) N/A 12/05/2024    Procedure: CORONARY ARTERY BYPASS GRAFT (CABG);  Surgeon: Gillian Clayton MD;  Location: [facility];  Service: Cardiothoracic;  Laterality: N/A;  ECHO NOTIFIED    EGD, WITH CLOSED BIOPSY N/A 11/20/2023    Procedure: EGD, WITH CLOSED BIOPSY;  Surgeon: Christina James  MD;  Location: [facility] ENDOSCOPY;  Service: Gastroenterology;  Laterality: N/A;    ENDOSCOPIC HARVEST OF VEIN Left 12/05/2024    Procedure: HARVEST-VEIN-ENDOVASCULAR;  Surgeon: Gillian Clayton MD;  Location: [facility] OR;  Service: Cardiothoracic;  Laterality: Left;    ESOPHAGOGASTRODUODENOSCOPY N/A 06/07/2021    EXCISION OF ARTERIOVENOUS FISTULA N/A 06/01/2018    FRACTIONAL FLOW RESERVE (FFR), CORONARY  04/10/2023    Procedure: Fractional Flow [place] (FFR), Coronary;  Surgeon: Jaswant Pugh MD;  Location: [facility] CATH LAB;  Service: Cardiology;;    HERNIA REPAIR      INSPECTION OF UPPER INTESTINAL TRACT N/A 06/07/2021    OCCLUSION OF LEFT ATRIAL APPENDAGE Left 12/05/2024    Procedure: Left atrial appendage occlusion;  Surgeon: Gillian Clayton MD;  Location: [facility] OR;  Service: Cardiothoracic;  Laterality: Left;    PHERESIS OF PLASMA N/A 07/13/2018    PHERESIS OF PLASMA N/A 05/25/2018    TRIAL OF SPINAL CORD NERVE STIMULATOR N/A 05/12/2022    Procedure: TRIAL, NEUROSTIMULATOR, SPINAL CORD;  Surgeon: Jay Pozo MD;  Location: [facility] OR;  Service: Neurosurgery;  Laterality: N/A;    UPPER GI N/A 06/07/2021     Family History   Problem Relation Name Age of Onset    Obesity Father       Social History[1]     Review of Systems:  Constitutional: no fever or chills  Eyes: no visual changes  ENT: no nasal congestion or sore throat  Respiratory: no cough or shortness of breath  Cardiovascular: no chest pain or palpitations  Gastrointestinal: no nausea or vomiting, no abdominal pain or change in bowel habits  Genitourinary: no hematuria or dysuria  Integument/Breast: no rash or pruritis  Hematologic/Lymphatic: no easy bruising or lymphadenopathy  Musculoskeletal: no arthralgias or myalgias  Neurological: no seizures or tremors  Behavioral/Psych: no auditory or visual hallucinations  Endocrine: no heat or cold intolerance    OBJECTIVE:     Vital Signs:  Temp: 98.2 °F (36.8 °C) (06/12/25 0445)  Pulse: 72 (06/12/25  0445)  Resp: 18 (06/12/25 0445)  BP: 96/60 (06/12/25 0445)  SpO2: 98 % (06/12/25 0445)    Physical Exam:  General: well developed, well nourished, no distress  HEENT: normocephalic, atraumatic, hearing grossly normal bilaterally, mucous membranes moist, EOM intact, no scleral icterus  Neck: supple, symmetrical, trachea midline, no JVD  Lungs:  clear to auscultation bilaterally and normal respiratory effort  Cardiovascular: regular rate and rhythm.  Extremities: no cyanosis or edema, or clubbing, distal pulses palpable and symmetric  Abdomen: soft, TTP RUQ, midepigastrium  Skin: Skin color, texture, turgor normal. No rashes or lesions  Musculoskeletal:no clubbing, cyanosis, no deformities  Neurologic: No focal numbness or weakness  Psych/Behavioral:  Alert and oriented, appropriate affect.    Laboratory:  Labs Reviewed   HEMOGLOBIN A1C - Abnormal       Result Value    Hemoglobin A1c 11.8 (*)     Estimated Average Glucose 292.0     URINALYSIS, REFLEX TO URINE CULTURE - Abnormal    Color, UA Light-Yellow      Appearance, UA Clear      Specific Gravity, UA 1.009      pH, UA 6.0      Protein, UA 1+ (*)     Glucose, UA 4+ (*)     Ketones, UA Negative      Blood, UA Trace (*)     Bilirubin, UA Negative      Urobilinogen, UA Normal      Nitrites, UA Negative      Leukocyte Esterase, UA Negative      RBC, UA 0-5      WBC, UA 0-5      Bacteria, UA None Seen      Squamous Epithelial Cells, UA Trace (*)     Hyaline Casts, UA None Seen     COMPREHENSIVE METABOLIC PANEL - Abnormal    Sodium 140      Potassium 2.6 (*)     Chloride 97 (*)     CO2 34 (*)     Glucose 113 (*)     Blood Urea Nitrogen 7.6 (*)     Creatinine 0.93      Calcium 8.9      Protein Total 7.7      Albumin 2.9 (*)     Globulin 4.8 (*)     Albumin/Globulin Ratio 0.6 (*)     Bilirubin Total 0.3       (*)     ALT 44      AST 40      eGFR >60      Anion Gap 9.0      BUN/Creatinine Ratio 8     LIPID PANEL - Abnormal    Cholesterol Total 146      HDL  Cholesterol 25 (*)     Triglyceride 220 (*)     Cholesterol/HDL Ratio 6 (*)     Very Low Density Lipoprotein 44      LDL Cholesterol 77.00     CBC WITH DIFFERENTIAL - Abnormal    WBC 10.42      RBC 4.50 (*)     Hgb 13.6 (*)     Hct 38.5 (*)     MCV 85.6      MCH 30.2      MCHC 35.3      RDW 12.3      Platelet 211      MPV 11.4 (*)     Neut % 54.0      Lymph % 29.8      Mono % 10.7      Eos % 4.4      Basophil % 0.7      Imm Grans % 0.4      Neut # 5.63      Lymph # 3.11      Mono # 1.11      Eos # 0.46      Baso # 0.07      Imm Gran # 0.04      NRBC% 0.0     COMPREHENSIVE METABOLIC PANEL - Abnormal    Sodium 141      Potassium 3.1 (*)     Chloride 99      CO2 30 (*)     Glucose 255 (*)     Blood Urea Nitrogen 10.6      Creatinine 0.92      Calcium 8.5      Protein Total 7.5      Albumin 2.8 (*)     Globulin 4.7 (*)     Albumin/Globulin Ratio 0.6 (*)     Bilirubin Total 0.4       (*)     ALT 62 (*)     AST 98 (*)     eGFR >60      Anion Gap 12.0      BUN/Creatinine Ratio 12     CBC WITH DIFFERENTIAL - Abnormal    WBC 11.84 (*)     RBC 4.78      Hgb 14.4      Hct 41.2 (*)     MCV 86.2      MCH 30.1      MCHC 35.0      RDW 12.6      Platelet 239      MPV 10.6 (*)     Neut % 64.7      Lymph % 21.4      Mono % 9.5      Eos % 3.5      Basophil % 0.5      Imm Grans % 0.4      Neut # 7.65      Lymph # 2.53      Mono # 1.13      Eos # 0.42      Baso # 0.06      Imm Gran # 0.05 (*)     NRBC% 0.0     POCT GLUCOSE - Abnormal    POCT Glucose 305 (*)    POCT GLUCOSE - Abnormal    POCT Glucose 311 (*)    POCT GLUCOSE - Abnormal    POCT Glucose 63 (*)    POCT GLUCOSE - Abnormal    POCT Glucose 194 (*)    MAGNESIUM - Normal    Magnesium Level 1.90     PHOSPHORUS - Normal    Phosphorus Level 3.4     LIPASE - Normal    Lipase Level <4     TROPONIN I - Normal    Troponin-I <0.010     PHOSPHORUS - Normal    Phosphorus Level 3.6     MAGNESIUM - Normal    Magnesium Level 2.00     EXTRA TUBES    Narrative:     The following orders  were created for panel order EXTRA TUBES.                  Procedure                               Abnormality         Status                                     ---------                               -----------         ------                                     Light Blue Top Hold[[id number]]                             Final result                               Light Green Top Hold[[id number]]                            Final result                               Light Green Top Hold[[id number]]                            Final result                               Lavender Top Hold[[id number]]                               Final result                               Gold Top Hold[[id number]]                                   Final result                                                 Please view results for these tests on the individual orders.   LIGHT BLUE TOP HOLD    Extra Tube Hold for add-ons.     LIGHT GREEN TOP HOLD    Extra Tube Hold for add-ons.     LIGHT GREEN TOP HOLD    Extra Tube Hold for add-ons.     LAVENDER TOP HOLD    Extra Tube Hold for add-ons.     GOLD TOP HOLD    Extra Tube Hold for add-ons.     CBC W/ AUTO DIFFERENTIAL    Narrative:     The following orders were created for panel order CBC Auto Differential.                  Procedure                               Abnormality         Status                                     ---------                               -----------         ------                                     CBC with Differential[[id number]]       Abnormal            Final result                                                 Please view results for these tests on the individual orders.   CBC W/ AUTO DIFFERENTIAL    Narrative:     The following orders were created for panel order CBC Auto Differential.                  Procedure                               Abnormality         Status                                     ---------                               -----------          ------                                     CBC with Differential[[id number]]       Abnormal            Final result                                                 Please view results for these tests on the individual orders.   EXTRA TUBES    Narrative:     The following orders were created for panel order EXTRA TUBES.                  Procedure                               Abnormality         Status                                     ---------                               -----------         ------                                     Light Blue Top Hold[[id number]]                             Final result                               Red Top Hold[[id number]]                                    Final result                               Gold Top Hold[[id number]]                                   Final result                               Gold Top Hold[[id number]]                                   Final result                                                 Please view results for these tests on the individual orders.   LIGHT BLUE TOP HOLD    Extra Tube Hold for add-ons.     RED TOP HOLD    Extra Tube Hold for add-ons.     GOLD TOP HOLD    Extra Tube Hold for add-ons.     GOLD TOP HOLD    Extra Tube Hold for add-ons.     EXTRA TUBES    Narrative:     The following orders were created for panel order EXTRA TUBES.                  Procedure                               Abnormality         Status                                     ---------                               -----------         ------                                     Tracy Top Hold[[id number]]                                   Final result                                                 Please view results for these tests on the individual orders.   GREY TOP HOLD    Extra Tube Hold for add-ons.     GI PANEL   PHOSPHORUS   MAGNESIUM   COMPREHENSIVE METABOLIC PANEL   CBC W/ AUTO DIFFERENTIAL    Narrative:     The following orders were created for  panel order CBC Auto Differential.                  Procedure                               Abnormality         Status                                     ---------                               -----------         ------                                     CBC with Differential[[id number]]                                                                                        Please view results for these tests on the individual orders.   PANCREATIC ELASTASE, FECAL   IGA   TISSUE TRANSGLUTAMINASE IGA ANTIBODY   CELIAC DISEASE PANEL   CBC WITH DIFFERENTIAL   CANCER ANTIGEN 19-9   CEA   POCT GLUCOSE    POCT Glucose 75     POCT GLUCOSE    POCT Glucose 96     POCT GLUCOSE MONITORING CONTINUOUS       Diagnostic Results:      ASSESSMENT:     45-year-old male with past medical history notable for type 2 diabetes, hyperlipidemia, hypertension, CAD, status post CABG, on aspirin, Plavix, CKD 3, chronic pancreatitis, followed by GI for NAFLD, on Creon. CT AP w/ concern for new pancreatic head mass, PD dilation. Surgery consulted for symptomatic cholelithiasis.     PLAN:     - no indication for surgical intervention at this time. Patient is also on aspirin and plavix which would preclude inpatient operation for elective surgery requiring 5d  - no evidence of cholecystitis  - recommend working up the concerning pancreatic head mass...  - portal vein thrombosis with portal varicosities preclude safe surgery in this area        Edward Cunningham MD  [facility] General Surgery PGY5  7:15 AM         [1]   Social History  Tobacco Use    Smoking status: Every Day     Current packs/day: 0.50     Average packs/day: 2.6 packs/day for 32.9 years (84.3 ttl pk-yrs)     Types: Cigarettes     Start date: 1992     Last attempt to quit: 01/1999     Passive exposure: Current    Smokeless tobacco: Former     Types: Chew    Tobacco comments:     Pt is smoking 1/2 pack per day   Vaping Use    Vaping status: Never Used   Substance Use Topics    Alcohol use: Not  Currently    Drug use: Never

## 2025-06-12 NOTE — PT/OT/SLP PROGRESS
Physical Therapy Treatment    Patient Name:  Bharath Caballero   MRN:  [id number]    Recommendations     Therapy Intensity Recommendations at Discharge: Low Intensity Therapy  Discharge Equipment Recommendations:  (TBD - pending progress - straight cane vs rolling walker)   Barriers to discharge: fall risk    Assessment     Bharath Caballero is a 45 y.o. male admitted with a medical diagnosis of Uncontrolled type 2 diabetes mellitus with hyperglycemia.  1. Falls frequently    2. Fall, subsequent encounter    3. Uncontrolled diabetes mellitus with hyperglycemia    4. Chest pain    5. Uncontrolled type 2 diabetes mellitus with hyperglycemia    6. Mass of head of pancreas       Problem List[1]   He presents with the following impairments/functional limitations:  gait instability, impaired balance, pain, impaired cardiopulmonary response to activity, impaired endurance, impaired functional mobility (hearing impaired).    Rehab Prognosis: Good.    Patient would benefit from continued skilled acute PT services to: address above listed impairments/functional limitations; receive patient/caregiver education; reduce fall risk; and maximize independency/safety with functional mobility.    -continued: ambulation, with progression of gait distance/frequency/duration and speed, with wheelchair in close tow, as tolerated/appropriate, with assistance and supervision    Recent Surgery: * No surgery found *      Plan     During this hospitalization, patient to be seen 5 x/week to address the identified impairments/functional limitations via gait training, therapeutic activities, therapeutic exercises and progress toward the established goals.    Plan of Care Expires:  07/09/25    Subjective     Communicated with patient's nurse Suad prior to session.    Patient agreeable to participate in treatment session.    Chief Complaint: hungry, waiting for test  Patient/Family Comments/goals: home  Pain/Comfort:  Pain Rating 1: 0/10  Location 1:   (belly pain complaints but patient did not rate)  Pain Addressed 1: Nurse notified  Pain Rating Post-Intervention 1: 0/10    Objective     Patient found supine in bed, with HOB elevated, and with bed rails up left HOB only with peripheral IV, bed alarm upon PT entry to room.    General Precautions: Standard, fall, hearing impaired   Orthopedic Precautions:N/A   Braces: N/A  Respiratory Status: room air  SAT O2 Check: n/a    Functional Mobility:    Bed Mobility:  Rolling Left: modified independence  Scooting: modified independence  Supine to Sit: modified independence  Sit to Supine: modified independence  Repositioning to HOB and center of bed: modified independence  with no cues required  with HOB elevated, hand rail, and firm mattress    Transfers: - sit-stand x3 at bedside prior to 1st gait session  Sit to Stand: stand by assistance with straight cane  Stand to Sit: stand by assistance with straight cane  with no cues required    *PM&R TECH [place] to room to follow follow patient w/ transport chair during gait sessions    Gait:  Patient ambulated 110ft  Patient requested wheelchair  Sitting rest x4 minutes  Patient ambulated 150ft  Sitting rest on edge of bed x2 minutes (prior to transitioning to Lt side lying in bed) with straight cane and contact guard assistance.  Patient demonstrates :       occasional unsteady gait       decreased erick       decreased bilateral step length       inconsistent bilateral foot-floor clearance, heel 'initial contact', toe-off, foot placement, and base of support.    Other Mobility:  Therapeutic Exercises performed:   1 set times 10 repetitions  active range of motion  bilat lower extremity       -seated exercises:              ankle pumps  Therapeutic Activities performed:        -sitting balance activities:              weight shifting:                   -laterally (left)                 -laterally (right)            scooting:                   -forward                  -backward            donned Lt  sock and adjusted Rt  sock            donned 2nd 'back' gown            repositioning at edge of bed       -standing balance activities:              weight shifting:                   -laterally (left)                 -laterally (right)            side steps            static standing at edge of bed x~2 minutes    Balance:  Sit  Patient demonstrated static balance on level surface with independence with no verbal cues.  Patient demonstrated dynamic balance on level surface with modified independence with no verbal cues during moderate excursions.  Stand  Patient demonstrated static balance on level surface  using straight cane with supervision with no verbal cues.    Patient left with HOB elevated, with bed rails up bilateral HOB and right FOB, and left side lying in bed with peripheral IV, with all lines intact, call button in reach, tray table at bedside, patient's nurse notified, and bed alarm on.    *patients nurse Suad to room to adjust IV Pump    DME Justifications:  TBD - currently - No DME recommended requiring DME justifications    Education     Patient educated on and assisted with functional mobility as noted above.  Patient educated on PT Plan of Care  Patient was instructed to utilize staff assistance for mobility/transfers.  White board updated regarding patient's safest level of mobility with staff assistance.    Goals     Multidisciplinary Problems       Physical Therapy Goals       Problem: Physical Therapy    Goal Priority Disciplines Outcome Interventions   Physical Therapy Goal     PT, PT/OT Progressing    Description: REVIEWED 2025  Goals to be met by: DISCHARGE  Patient will increase functional independence with mobility by performin. Sit to stand transfer with Modified [place] - ONGOING  2. Bed to chair transfer with Modified [place] using Single-point Cane - ONGOING  3. Gait  x 390 feet with Modified [place] using  Single-point Cane - ONGOING  4. Ascend/descend 6 stair with no Handrails Supervision using No Assistive Device - ONGOING           Time Tracking     PT Received On: 06/12/25  PT Start Time: 1108     PT Stop Time: 1135  PT Total Time (min): 27 min     Billable Minutes: Gait Training 18 and Therapeutic Activity 9  Non-Billable Minutes: N/A  06/12/2025       [1]   Patient Active Problem List  Diagnosis    Chronic pain syndrome    Painful diabetic neuropathy    History of pancreatitis    Abnormal weight loss    Bladder outflow obstruction    Chronic pancreatitis    Onychomycosis of multiple toenails with type 2 diabetes mellitus    Diabetic polyneuropathy    Primary hypertension    Hand paresthesia    Mixed hyperlipidemia    Left flank pain    Mononeuritis multiplex    Nocturia    Paresis of single lower extremity    Metabolic dysfunction-associated steatotic liver disease (MASLD)    Tobacco user    Gastroesophageal reflux disease with esophagitis without hemorrhage    History of colonic polyps    Fall    Impaired functional mobility, balance, gait, and endurance    Blurred vision, right eye    Aortic atherosclerosis    Ulcer of left heel and midfoot, limited to breakdown of skin    Overgrown toenails    Bilateral edema of lower extremity    Type 2 diabetes mellitus with other skin ulcer, with long-term current use of insulin    Major depressive disorder, recurrent severe without psychotic features    Chronic liver failure without hepatic coma    Encounter for diabetic foot exam    Acquired perforating dermatosis    Hearing loss    Hypokalemia    Familial hypertriglyceridemia    Coronary artery disease of native artery of native heart with stable angina pectoris    Falls frequently    Scab    NSTEMI (non-ST elevated myocardial infarction)    Hx of CABG    Type 2 diabetes mellitus with hyperglycemia, with long-term current use of insulin    Chronic diarrhea of unknown origin    Malfunction of spinal cord stimulator     Uncontrolled type 2 diabetes mellitus with hyperglycemia    Moderate malnutrition    Superficial foreign body of left foot without major open wound and without infection    Mass of head of pancreas

## 2025-06-12 NOTE — CARE UPDATE
Patient informed this morning of imaging findings concerning for a mass in the head of the pancreas with secondary pancreatic ductal dilatation. Mass noted to be 3.4 x 2.6 cm encasing a portion of the portal vein at the zainab splenic confluence causing portal vein high grade stenosis. From a GI standpoint, patient will need an EUS to further assess this mass. Referral sent to GI scheduling. Discussed findings and plan with primary team as well.     Maurice Lux MD MPH  [facility] IM, PGY-3  GI Service

## 2025-06-12 NOTE — PROGRESS NOTES
[facility]   Inpatient Wound Care Progress Note            HPI:     Bharath Caballero is a 45 y.o. male with PMH significant for T2DM, HLD, HTN, CAD, Hx of CABG w/ L atrial appendage ligation (12/2024) CKD stage 3, chronic pancreatitis, MASLD, CICI, GERD and BPH was a direct admit from IM clinic yesterday with complaints of postprandial diarrhea and uncontrolled diabetes. Previous work up with GI in March of 2025 with inpatient consult for GI. A1C remains elevated at 11.8 compared to 8.2 one year ago. Patient was also seen in wound care clinic yesterday for left plantar foot wound from supposedly stepping on a piece of glass. Wound care consulted to follow up on left foot wound in the setting of uncontrolled DM.       Interval History:    Patient is seen at bedside to follow up on left plantar foot wound. Mild improvement noted today with wound contraction and decreasing drainage noted. PCOT glucose readings remain elevated but improved from yesterday, 182 this morning. Continued daily wound care with plans for clinic follow up after discharge. General surgery consulted for symptomatic cholelithiasis with no surgical intervention recommended at this time. GI following for a mass in the head of the pancreas with secondary pancreatic ductal dilatation recommending an outpatient EUS.          Past Medical History/Past Surgical History/Social History     See HPI for pertinent history.    ALLERGIES: Review of patient's allergies indicates:  No Known Allergies      Review of Systems:     Review of Systems   Constitutional:  Negative for chills and fever.   Cardiovascular:  Negative for leg swelling.   Gastrointestinal:  Positive for abdominal pain, diarrhea and nausea.   Integumentary:  Positive for wound (left plantar foot).        ROS negative except for above.      MEDICATIONS: See MAR      Physical exam:     Temp:  [97.7 °F (36.5 °C)-98.4 °F (36.9 °C)] 98.4 °F (36.9 °C)  Pulse:  [64-87]  74  Resp:  [18-20] 18  SpO2:  [92 %-98 %] 96 %  BP: ()/(51-70) 120/68     GENERAL: A&Ox3, NAD   HEENT: atraumatic, normocephalic, anicteric, moist oral mucosa without lesions, exudate, or erythema  LUNGS: breathing unlabored, lungs CTA bilateral  HEART: RRR; S1S2 no murmur, rub, or gallop  ABDOMEN: abdomen soft, nondistended, BS noted x 4 quadrants, no tympany, no rebound, guarding, or organomegaly. Abdominal tenderness noted  : no CVA tenderness  EXTREMITIES: no edema, clubbing, or cyanosis   SKIN: Left plantar foot wound, see wound assessment   NEURO: speech fluent and intact, facial symmetry preserved, no tremor  PSYCH: cooperative, normal mood and affect    Labs:     I have personally reviewed patient's labs.  Pertinent results noted below.      Recent Labs   Lab 06/12/25  0718   WBC 11.47   HGB 12.9*   HCT 37.5*           Recent Labs     06/10/25  2308 06/11/25  0651 06/12/25  0718    141 140   K 2.6* 3.1* 2.9*   CL 97* 99 101   CO2 34* 30* 31*   BUN 7.6* 10.6 20.0   CREATININE 0.93 0.92 1.15        Results for orders placed or performed in visit on 06/22/22   Sedimentation rate, automated   Result Value Ref Range    Sed Rate 26 (H) 0 - 15 mm/hr        Results for orders placed or performed in visit on 06/22/22   C-reactive protein   Result Value Ref Range    CRP 11.20 (H) <5.00 mg/L       Recent Labs   Lab 02/18/25  1224 06/10/25  1925   HGBA1C 11.1* 11.8*       Wound Assessment:   Left plantar foot wound with mild improvement noted today with wound contraction and decreasing drainage noted. No purulence, erythema or pain noted.       Imaging:     I have personally reviewed patient's imaging. Pertinent results noted below.  CT Abdomen Pelvis With IV Contrast NO Oral Contrast   Final Result      Findings concerning for mass in the head of the pancreas with secondary pancreatic ductal dilatation as outlined above.  There is atrophy of the body and tail the pancreas as well.      Hepatomegaly  and hepatic steatosis      High-grade area of stenosis in the portal vein at the level of the pancreatic head mass or splenic confluence.  This is likely due to tumor encasing the portal vein versus thrombus within the portal vein.  There are multiple varicosities seen at the zainab hepatis extending down the right pericolic gutter.         Electronically signed by: David Ramirez   Date:    06/11/2025   Time:    18:05      CT Abdomen Pelvis  Without Contrast   Final Result      Persistent enlargement of the pancreatic head with some peripancreatic edema and inflammatory changes in the hypoattenuated area in the head of the pancreas.  This could represent  pancreatitis with pseudocyst formation versus underlying pancreatic lesion.  As stated on the prior report contrast enhanced examination with delayed imaging, pancreatic mass protocol is recommended.      Distended gallbladder      Multiple varicosities seen near the zainab hepatis and extending down the right pericolic gutter.  Portal vein thrombosis should be excluded.  This can be evaluated during the CT scan Recommended above         Electronically signed by: David Ramirez   Date:    06/11/2025   Time:    15:48      US Abdomen Limited with Doppler (xpd)    (Results Pending)         Impression:     Left plantar foot wound- traumatic/puncture (stepping on glass)  Uncontrolled DM (improving)    Plan/Recommendations:         Continue daily wound care. Pt to follow in our wound care clinic after DC  DM foot care education provided including avoiding walking barefoot due to lack of protective sensation. Remainder of DM care per IM team       The time spent including preparing to see the patient, obtaining patient history and assessment, evaluation of the plan of care, patient/caregiver counseling and education, orders, documentation, coordination of care, and other professional medical management activities for today's encounter was 35 minutes.        Mariana Church  LUIS, BARTC, DWC  [facility]patient [facility]

## 2025-06-13 VITALS
WEIGHT: 153.13 LBS | TEMPERATURE: 99 F | OXYGEN SATURATION: 95 % | SYSTOLIC BLOOD PRESSURE: 103 MMHG | BODY MASS INDEX: 22.68 KG/M2 | HEIGHT: 69 IN | DIASTOLIC BLOOD PRESSURE: 60 MMHG | RESPIRATION RATE: 18 BRPM | HEART RATE: 76 BPM

## 2025-06-13 LAB
ADV 40+41 DNA STL QL NAA+NON-PROBE: NOT DETECTED
ALBUMIN SERPL-MCNC: 2.5 G/DL (ref 3.5–5)
ALBUMIN/GLOB SERPL: 0.5 RATIO (ref 1.1–2)
ALP SERPL-CCNC: 637 UNIT/L (ref 40–150)
ALT SERPL-CCNC: 69 UNIT/L (ref 0–55)
ANION GAP SERPL CALC-SCNC: 8 MEQ/L
AST SERPL-CCNC: 131 UNIT/L (ref 11–45)
ASTRO TYP 1-8 RNA STL QL NAA+NON-PROBE: NOT DETECTED
BASOPHILS # BLD AUTO: 0.03 X10(3)/MCL
BASOPHILS NFR BLD AUTO: 0.3 %
BILIRUB SERPL-MCNC: 0.4 MG/DL
BUN SERPL-MCNC: 13.1 MG/DL (ref 8.9–20.6)
C CAYETANENSIS DNA STL QL NAA+NON-PROBE: NOT DETECTED
C COLI+JEJ+UPSA DNA STL QL NAA+NON-PROBE: NOT DETECTED
CALCIUM SERPL-MCNC: 8.3 MG/DL (ref 8.4–10.2)
CHLORIDE SERPL-SCNC: 108 MMOL/L (ref 98–107)
CO2 SERPL-SCNC: 24 MMOL/L (ref 22–29)
CREAT SERPL-MCNC: 0.84 MG/DL (ref 0.72–1.25)
CREAT/UREA NIT SERPL: 16
CRYPTOSP DNA STL QL NAA+NON-PROBE: NOT DETECTED
E HISTOLYT DNA STL QL NAA+NON-PROBE: NOT DETECTED
EAEC PAA PLAS AGGR+AATA ST NAA+NON-PRB: NOT DETECTED
EC STX1+STX2 GENES STL QL NAA+NON-PROBE: NOT DETECTED
EOSINOPHIL # BLD AUTO: 0.26 X10(3)/MCL (ref 0–0.9)
EOSINOPHIL NFR BLD AUTO: 2.3 %
EPEC EAE GENE STL QL NAA+NON-PROBE: NOT DETECTED
ERYTHROCYTE [DISTWIDTH] IN BLOOD BY AUTOMATED COUNT: 13.1 % (ref 11.5–17)
ETEC LTA+ST1A+ST1B TOX ST NAA+NON-PROBE: DETECTED
G LAMBLIA DNA STL QL NAA+NON-PROBE: NOT DETECTED
GFR SERPLBLD CREATININE-BSD FMLA CKD-EPI: >60 ML/MIN/1.73/M2
GLOBULIN SER-MCNC: 4.7 GM/DL (ref 2.4–3.5)
GLUCOSE SERPL-MCNC: 156 MG/DL (ref 74–100)
HCT VFR BLD AUTO: 39.3 % (ref 42–52)
HGB BLD-MCNC: 13.5 G/DL (ref 14–18)
HOLD SPECIMEN: NORMAL
IMM GRANULOCYTES # BLD AUTO: 0.03 X10(3)/MCL (ref 0–0.04)
IMM GRANULOCYTES NFR BLD AUTO: 0.3 %
LYMPHOCYTES # BLD AUTO: 1.94 X10(3)/MCL (ref 0.6–4.6)
LYMPHOCYTES NFR BLD AUTO: 17.5 %
MAGNESIUM SERPL-MCNC: 2.4 MG/DL (ref 1.6–2.6)
MCH RBC QN AUTO: 30 PG (ref 27–31)
MCHC RBC AUTO-ENTMCNC: 34.4 G/DL (ref 33–36)
MCV RBC AUTO: 87.3 FL (ref 80–94)
MONOCYTES # BLD AUTO: 1.16 X10(3)/MCL (ref 0.1–1.3)
MONOCYTES NFR BLD AUTO: 10.5 %
NEUTROPHILS # BLD AUTO: 7.66 X10(3)/MCL (ref 2.1–9.2)
NEUTROPHILS NFR BLD AUTO: 69.1 %
NOROVIRUS GI+II RNA STL QL NAA+NON-PROBE: NOT DETECTED
NRBC BLD AUTO-RTO: 0 %
P SHIGELLOIDES DNA STL QL NAA+NON-PROBE: NOT DETECTED
PHOSPHATE SERPL-MCNC: 2.7 MG/DL (ref 2.3–4.7)
PLATELET # BLD AUTO: 214 X10(3)/MCL (ref 130–400)
PMV BLD AUTO: 10.8 FL (ref 7.4–10.4)
POCT GLUCOSE: 107 MG/DL (ref 70–110)
POCT GLUCOSE: 114 MG/DL (ref 70–110)
POCT GLUCOSE: 175 MG/DL (ref 70–110)
POTASSIUM SERPL-SCNC: 4 MMOL/L (ref 3.5–5.1)
PROT SERPL-MCNC: 7.2 GM/DL (ref 6.4–8.3)
RBC # BLD AUTO: 4.5 X10(6)/MCL (ref 4.7–6.1)
RVA RNA STL QL NAA+NON-PROBE: NOT DETECTED
S ENT+BONG DNA STL QL NAA+NON-PROBE: NOT DETECTED
SAPO I+II+IV+V RNA STL QL NAA+NON-PROBE: NOT DETECTED
SHIGELLA SP+EIEC IPAH ST NAA+NON-PROBE: NOT DETECTED
SODIUM SERPL-SCNC: 140 MMOL/L (ref 136–145)
V CHOL+PARA+VUL DNA STL QL NAA+NON-PROBE: NOT DETECTED
V CHOLERAE DNA STL QL NAA+NON-PROBE: NOT DETECTED
WBC # BLD AUTO: 11.08 X10(3)/MCL (ref 4.5–11.5)
Y ENTEROCOL DNA STL QL NAA+NON-PROBE: NOT DETECTED

## 2025-06-13 PROCEDURE — 25000003 PHARM REV CODE 250: Performed by: INTERNAL MEDICINE

## 2025-06-13 PROCEDURE — 83735 ASSAY OF MAGNESIUM: CPT

## 2025-06-13 PROCEDURE — 36415 COLL VENOUS BLD VENIPUNCTURE: CPT

## 2025-06-13 PROCEDURE — 94761 N-INVAS EAR/PLS OXIMETRY MLT: CPT

## 2025-06-13 PROCEDURE — 84100 ASSAY OF PHOSPHORUS: CPT

## 2025-06-13 PROCEDURE — 63600175 PHARM REV CODE 636 W HCPCS

## 2025-06-13 PROCEDURE — 97116 GAIT TRAINING THERAPY: CPT

## 2025-06-13 PROCEDURE — 80053 COMPREHEN METABOLIC PANEL: CPT

## 2025-06-13 PROCEDURE — 63600175 PHARM REV CODE 636 W HCPCS: Performed by: INTERNAL MEDICINE

## 2025-06-13 PROCEDURE — 25000003 PHARM REV CODE 250

## 2025-06-13 PROCEDURE — 85025 COMPLETE CBC W/AUTO DIFF WBC: CPT

## 2025-06-13 PROCEDURE — S4991 NICOTINE PATCH NONLEGEND: HCPCS

## 2025-06-13 RX ORDER — HYDRALAZINE HYDROCHLORIDE 20 MG/ML
10 INJECTION INTRAMUSCULAR; INTRAVENOUS EVERY 6 HOURS PRN
Status: DISCONTINUED | OUTPATIENT
Start: 2025-06-13 | End: 2025-06-13 | Stop reason: HOSPADM

## 2025-06-13 RX ORDER — INSULIN GLARGINE 100 [IU]/ML
40 INJECTION, SOLUTION SUBCUTANEOUS 2 TIMES DAILY
Qty: 24 ML | Refills: 11 | Status: SHIPPED | OUTPATIENT
Start: 2025-06-13 | End: 2026-06-13

## 2025-06-13 RX ORDER — INSULIN ASPART 100 [IU]/ML
7 INJECTION, SOLUTION INTRAVENOUS; SUBCUTANEOUS
Qty: 12.6 ML | Refills: 0 | Status: SHIPPED | OUTPATIENT
Start: 2025-06-13 | End: 2025-06-17

## 2025-06-13 RX ORDER — NIFEDIPINE 30 MG/1
90 TABLET, EXTENDED RELEASE ORAL DAILY
Status: DISCONTINUED | OUTPATIENT
Start: 2025-06-14 | End: 2025-06-13

## 2025-06-13 RX ORDER — NIFEDIPINE 90 MG/1
90 TABLET, EXTENDED RELEASE ORAL DAILY
Qty: 30 TABLET | Refills: 1 | Status: SHIPPED | OUTPATIENT
Start: 2025-06-13 | End: 2025-08-12

## 2025-06-13 RX ORDER — LABETALOL HCL 20 MG/4 ML
10 SYRINGE (ML) INTRAVENOUS EVERY 6 HOURS PRN
Status: DISCONTINUED | OUTPATIENT
Start: 2025-06-13 | End: 2025-06-13 | Stop reason: HOSPADM

## 2025-06-13 RX ORDER — NIFEDIPINE 30 MG/1
60 TABLET, EXTENDED RELEASE ORAL DAILY
Status: DISCONTINUED | OUTPATIENT
Start: 2025-06-14 | End: 2025-06-13 | Stop reason: HOSPADM

## 2025-06-13 RX ADMIN — FUROSEMIDE 20 MG: 20 TABLET ORAL at 08:06

## 2025-06-13 RX ADMIN — ASPIRIN 81 MG: 81 TABLET, COATED ORAL at 08:06

## 2025-06-13 RX ADMIN — PANCRELIPASE 3 CAPSULE: 30000; 6000; 19000 CAPSULE, DELAYED RELEASE PELLETS ORAL at 12:06

## 2025-06-13 RX ADMIN — NIFEDIPINE 30 MG: 30 TABLET, FILM COATED, EXTENDED RELEASE ORAL at 08:06

## 2025-06-13 RX ADMIN — OXCARBAZEPINE 600 MG: 300 TABLET, FILM COATED ORAL at 08:06

## 2025-06-13 RX ADMIN — LABETALOL HYDROCHLORIDE 10 MG: 5 INJECTION, SOLUTION INTRAVENOUS at 04:06

## 2025-06-13 RX ADMIN — HYDRALAZINE HYDROCHLORIDE 10 MG: 20 INJECTION INTRAMUSCULAR; INTRAVENOUS at 06:06

## 2025-06-13 RX ADMIN — ATORVASTATIN CALCIUM 80 MG: 40 TABLET, FILM COATED ORAL at 08:06

## 2025-06-13 RX ADMIN — CLOPIDOGREL BISULFATE 75 MG: 75 TABLET, FILM COATED ORAL at 08:06

## 2025-06-13 RX ADMIN — NICOTINE 1 PATCH: 21 PATCH, EXTENDED RELEASE TRANSDERMAL at 08:06

## 2025-06-13 RX ADMIN — INSULIN GLARGINE 40 UNITS: 100 INJECTION, SOLUTION SUBCUTANEOUS at 08:06

## 2025-06-13 RX ADMIN — MORPHINE SULFATE 60 MG: 30 TABLET, FILM COATED, EXTENDED RELEASE ORAL at 08:06

## 2025-06-13 RX ADMIN — TAMSULOSIN HYDROCHLORIDE 0.4 MG: 0.4 CAPSULE ORAL at 08:06

## 2025-06-13 RX ADMIN — ISOSORBIDE MONONITRATE 120 MG: 30 TABLET, EXTENDED RELEASE ORAL at 08:06

## 2025-06-13 RX ADMIN — PANCRELIPASE 3 CAPSULE: 30000; 6000; 19000 CAPSULE, DELAYED RELEASE PELLETS ORAL at 08:06

## 2025-06-13 RX ADMIN — ESCITALOPRAM 20 MG: 10 TABLET, FILM COATED ORAL at 08:06

## 2025-06-13 RX ADMIN — METOPROLOL SUCCINATE 25 MG: 25 TABLET, EXTENDED RELEASE ORAL at 08:06

## 2025-06-13 RX ADMIN — PREGABALIN 75 MG: 25 CAPSULE ORAL at 08:06

## 2025-06-13 RX ADMIN — RANOLAZINE 500 MG: 500 TABLET, EXTENDED RELEASE ORAL at 08:06

## 2025-06-13 RX ADMIN — LOSARTAN POTASSIUM 100 MG: 25 TABLET, FILM COATED ORAL at 08:06

## 2025-06-13 RX ADMIN — INSULIN ASPART 7 UNITS: 100 INJECTION, SOLUTION INTRAVENOUS; SUBCUTANEOUS at 08:06

## 2025-06-13 NOTE — PLAN OF CARE
Problem: Adult Inpatient Plan of Care  Goal: Plan of Care Review  Outcome: Progressing  Flowsheets (Taken 6/13/2025 0018)  Plan of Care Reviewed With: patient  Goal: Patient-Specific Goal (Individualized)  Outcome: Progressing  Flowsheets (Taken 6/13/2025 0018)  Individualized Care Needs: Monitor blood sugar, pain management  Anxieties, Fears or Concerns: Concerned about new scan findings  Patient/Family-Specific Goals (Include Timeframe): none voiced  Goal: Absence of Hospital-Acquired Illness or Injury  Outcome: Progressing  Goal: Optimal Comfort and Wellbeing  Outcome: Progressing  Goal: Readiness for Transition of Care  Outcome: Progressing     Problem: Diabetes Comorbidity  Goal: Blood Glucose Level Within Targeted Range  Outcome: Progressing  Intervention: Monitor and Manage Glycemia  Flowsheets (Taken 6/13/2025 0018)  Glycemic Management:   blood glucose monitored   oral hydration promoted     Problem: Wound  Goal: Optimal Coping  Outcome: Progressing  Intervention: Support Patient and Family Response  Flowsheets (Taken 6/13/2025 0018)  Family/Support System Care: self-care encouraged  Goal: Optimal Functional Ability  Outcome: Progressing  Goal: Absence of Infection Signs and Symptoms  Outcome: Progressing  Goal: Improved Oral Intake  Outcome: Progressing  Intervention: Promote and Optimize Oral Intake  Flowsheets (Taken 6/13/2025 0018)  Nutrition Interventions: supplemental drinks provided  Goal: Optimal Pain Control and Function  Outcome: Progressing  Intervention: Prevent or Manage Pain  Flowsheets (Taken 6/13/2025 0018)  Sleep/Rest Enhancement:   awakenings minimized   consistent schedule promoted   noise level reduced   regular sleep/rest pattern promoted   relaxation techniques promoted   room darkened  Goal: Skin Health and Integrity  Outcome: Progressing  Intervention: Optimize Skin Protection  Flowsheets (Taken 6/13/2025 0018)  Pressure Reduction Techniques: frequent weight shift encouraged  Goal:  Optimal Wound Healing  Outcome: Progressing  Intervention: Promote Wound Healing  Flowsheets (Taken 6/13/2025 0018)  Sleep/Rest Enhancement:   awakenings minimized   consistent schedule promoted   noise level reduced   regular sleep/rest pattern promoted   relaxation techniques promoted   room darkened

## 2025-06-13 NOTE — PT/OT/SLP PROGRESS
Physical Therapy Treatment    Patient Name:  Bharath Caballero   MRN:  [id number]    Recommendations     Therapy Intensity Recommendations at Discharge:  (moderate vs low intensity therapy)  Discharge Equipment Recommendations:  (TBD - pending progress - straight cane vs rolling walker)   Barriers to discharge: fall risk and decreased endurance    Assessment     Bharath Caballero is a 45 y.o. male admitted with a medical diagnosis of Uncontrolled type 2 diabetes mellitus with hyperglycemia.  1. Falls frequently    2. Fall, subsequent encounter    3. Uncontrolled diabetes mellitus with hyperglycemia    4. Chest pain    5. Uncontrolled type 2 diabetes mellitus with hyperglycemia    6. Mass of head of pancreas    7. Moderate malnutrition    8. Tobacco user       Problem List[1]   He presents with the following impairments/functional limitations:  gait instability, impaired balance, pain, impaired cardiopulmonary response to activity, impaired endurance, impaired functional mobility (hearing impaired).    Rehab Prognosis: Good.    Patient would benefit from continued skilled acute PT services to: address above listed impairments/functional limitations; receive patient/caregiver education; reduce fall risk; and maximize independency/safety with functional mobility.    -continued: ambulation, with progression of gait distance/frequency/duration and speed, as tolerated/appropriate, with assistance and supervision    Recent Surgery: * No surgery found *      Plan     During this hospitalization, patient to be seen 5 x/week to address the identified impairments/functional limitations via gait training, therapeutic activities, therapeutic exercises and progress toward the established goals.    Plan of Care Expires:  07/09/25    Subjective     Communicated with patient's nurse Suad prior to session.    Patient agreeable to participate in treatment session.    Chief Complaint: pain complaints  Patient/Family Comments/goals: return to  [facility] (pre-admit)  Pain/Comfort:  Pain Rating 1: 8/10  Location 1:  (belly and bilateral LE)  Pain Addressed 1: Nurse notified  Pain Rating Post-Intervention 1: 8/10    Objective     Patient found supine in bed, with HOB elevated, and with bed rails up bilateral HOB, left FOB, and right FOB with peripheral IV, bed alarm  upon PT entry to room.    General Precautions: Standard, fall, hearing impaired   Orthopedic Precautions:N/A   Braces: N/A  Respiratory Status: room air  SAT O2 Check: n/a    Functional Mobility:    Bed Mobility:  Rolling Left: modified independence  Scooting: modified independence  Supine to Sit: modified independence  Sit to Supine: modified independence  Repositioning to head-of-bed: modified independence  Repositioning to center-of-bed: modified independence  with no cues required  with HOB elevated, hand rail, and firm mattress    Transfers:  Sit to Stand: stand by-contact guard assistance with straight cane  Stand to Sit: stand by-contact guard assistance with straight cane  with no cues required    Gait:  Patient ambulated 210ft  Sitting rest x4 minutes  Patient ambulated 180ft  with straight cane and contact guard-minimum assistance.  Patient demonstrates :       occasional unsteady gait       decreased erick       decreased bilateral step length       inconsistent bilateral foot-floor clearance, foot placement, and base of support       antalgic gait       mis-step x1 toward Rt side (straight cane side) during 2nd gait session requiring minimal       assistance from therapy to correct    Other Mobility:  N/A    Balance:  Sit  Patient demonstrated static balance on level surface with independence with no verbal cues.  Patient demonstrated dynamic balance on level surface with modified independence with no verbal cues during moderate excursions.  Stand  Patient demonstrated static balance on level surface  using straight cane with supervision with no verbal cues.    Patient left supine in  bed with HOB elevated, with bed rails up bilateral HOB and right FOB, with peripheral IV, with all lines intact, call button in reach, tray table at bedside, patient's nurse notified, and bed alarm on.    *patients nurse Suad to room to check blood sugar (at tx end)    DME Justifications:  TBD - currently - No DME recommended requiring DME justifications    Education     Patient educated on and assisted with functional mobility as noted above.  Patient educated on PT Plan of Care  Patient was instructed to utilize staff assistance for mobility/transfers.  White board updated regarding patient's safest level of mobility with staff assistance.    Goals     Multidisciplinary Problems       Physical Therapy Goals       Problem: Physical Therapy    Goal Priority Disciplines Outcome Interventions   Physical Therapy Goal     PT, PT/OT Progressing    Description: REVIEWED 2025  Goals to be met by: DISCHARGE  Patient will increase functional independence with mobility by performin. Sit to stand transfer with Modified [place] - ONGOING  2. Bed to chair transfer with Modified [place] using Single-point Cane - ONGOING  3. Gait  x 390 feet with Modified [place] using Single-point Cane - ONGOING  4. Ascend/descend 6 stair with no Handrails Supervision using No Assistive Device - ONGOING           Time Tracking     PT Received On: 25  PT Start Time: 1031     PT Stop Time: 1055  PT Total Time (min): 24 min     Billable Minutes: Gait Training 24  Non-Billable Minutes: N/A  2025       [1]   Patient Active Problem List  Diagnosis    Chronic pain syndrome    Painful diabetic neuropathy    History of pancreatitis    Abnormal weight loss    Bladder outflow obstruction    Chronic pancreatitis    Onychomycosis of multiple toenails with type 2 diabetes mellitus    Diabetic polyneuropathy    Primary hypertension    Hand paresthesia    Mixed hyperlipidemia    Left flank pain    Mononeuritis multiplex     Nocturia    Paresis of single lower extremity    Metabolic dysfunction-associated steatotic liver disease (MASLD)    Tobacco user    Gastroesophageal reflux disease with esophagitis without hemorrhage    History of colonic polyps    Fall    Impaired functional mobility, balance, gait, and endurance    Blurred vision, right eye    Aortic atherosclerosis    Ulcer of left heel and midfoot, limited to breakdown of skin    Overgrown toenails    Bilateral edema of lower extremity    Type 2 diabetes mellitus with other skin ulcer, with long-term current use of insulin    Major depressive disorder, recurrent severe without psychotic features    Chronic liver failure without hepatic coma    Encounter for diabetic foot exam    Acquired perforating dermatosis    Hearing loss    Hypokalemia    Familial hypertriglyceridemia    Coronary artery disease of native artery of native heart with stable angina pectoris    Falls frequently    Scab    NSTEMI (non-ST elevated myocardial infarction)    Hx of CABG    Type 2 diabetes mellitus with hyperglycemia, with long-term current use of insulin    Chronic diarrhea of unknown origin    Malfunction of spinal cord stimulator    Uncontrolled type 2 diabetes mellitus with hyperglycemia    Moderate malnutrition    Superficial foreign body of left foot without major open wound and without infection    Mass of head of pancreas

## 2025-06-13 NOTE — DISCHARGE SUMMARY
[facility] Discharge Summary    Admitting Physician: Dat Beasley MD  Attending Physician: Dat Beasley MD  Date of Admit: 6/10/2025  Date of Discharge: 6/13/2025    Condition: Stable  Outcome: Condition has improved and patient is now ready for discharge.  DISPOSITION: Home with Home Health      Discharge Diagnoses     Problem List[1]    Principal Problem:  Uncontrolled type 2 diabetes mellitus with hyperglycemia    Consultants and Procedures     Consultants:  IP CONSULT TO GI  IP CONSULT TO WOUND CARE PROVIDER  IP CONSULT TO GENERAL SURGERY  IP CONSULT TO SNF NURSING HOME  IP CONSULT TO SOCIAL WORK/CASE MANAGEMENT    Procedures:   * No surgery found *     Brief Admission History      Bharath Caballero is a 45 y.o. male with PMH significant for T2DM, HLD, HTN, CAD, Hx of CABG w/ L atrial appendage ligation (12/2024) CKD stage 3, chronic pancreatitis, MASLD, CICI, GERD and BPH was a direct admit from clinic with complaints of postprandial diarrhea and uncontrolled diabetes on 6/10/2025. Patient states that diarrhea has been on going for about a year, with loose, watery stools red-orange in color typically occurring about 30 minutes to 1 hour after eating. States that he has been avoiding eating to avoid abdominal pain and having a bowel movement. Abdominal pain is right sided and worse after eating. Patient additionally reports having a couple episodes of hematemesis within the past 2-3 months. Patient is a diabetic, diagnosed in 2016, on insulin. States he takes basal insulin in the morning and evening, 80 U each, as well as prandial insulin TID of 25 U. POC A1c in clinic was 12.6. Patient additionally has chronic chest pain and shortness of breath, s/p CABG 12/2024 with L atrial appendage ablation. Patient has a left lateral foot wound following stepping a piece of glass a and was seen by wound care on 6/9/25. Patient smokes 1/2 ppd. Of note, per his PCP's note, patient was seen by GI and a GI  "work up in March 2025 revealed nonspecific finding of stool positive for leukocytes, negative for parasites, Giardia, and cryptosporidium. Final stool culture negative March 2025. U/S abdomen 3/18/25 revealed gallbladder sludge and was offered a referral for cholecystectomy     ED Course - On arrival, patient afebrile (98.1 F), hypertensive (172/90), HR 79, RR 17, O2 98% RA. POC A1c 12.6 in clinic. Repeat on arrival was 11.8. Labs and GI work up ordered. GI to be consulted. Will be admitted for management and uncontrolled diabetes and work up of chronic diarrhea    Hospital Course with Pertinent Findings     Blood sugars much better controlled on new regime. Work up did reveal a pancreatic head mass. CA-19-9 elevated to over 700. Given his current anticoagulation status, he will need EUS as an outpatient. Mass noted to be 3.4 x 2.6 cm encasing a portion of the portal vein at the zainab splenic confluence causing portal vein high grade stenosis.  Diarrhea improved. Patient on Lantus 40U BID and aspart 7U TID    Discharge physical exam:  Vitals  BP: 103/60  Temp: 98.7 °F (37.1 °C)  Temp Source: Oral  Pulse: 76  Resp: 18  SpO2: 95 %  Height: 5' 9" (175.3 cm)  Weight: 69.4 kg (153 lb 1.6 oz)    Physical Exam  Constitutional:       Comments: Awake    Cardiovascular:      Rate and Rhythm: Normal rate and regular rhythm.      Heart sounds: No murmur heard.  Pulmonary:      Effort: No respiratory distress.   Abdominal:      Palpations: Abdomen is soft. There is no mass.      Tenderness: There is abdominal tenderness.      Comments: Mild epigastric TTP. No guarding or rebound    Skin:     General: Skin is warm.      Findings: Lesion present.      Comments: Wound to l foot           TIME SPENT ON DISCHARGE: 60 minutes    Discharge Medications        Medication List        START taking these medications      insulin aspart U-100 100 unit/mL (3 mL) Inpn pen  Commonly known as: NovoLOG  Inject 7 Units into the skin 3 (three) " times daily with meals.            CHANGE how you take these medications      * NIFEdipine 60 MG (OSM) 24 hr tablet  Commonly known as: PROCARDIA-XL  Take 1 tablet (60 mg total) by mouth 2 (two) times a day.  What changed: Another medication with the same name was added. Make sure you understand how and when to take each.     * NIFEdipine 90 MG (OSM) 24 hr tablet  Commonly known as: PROCARDIA-XL  Take 1 tablet (90 mg total) by mouth once daily.  What changed: You were already taking a medication with the same name, and this prescription was added. Make sure you understand how and when to take each.     * TOUJEO SOLOSTAR U-300 INSULIN 300 unit/mL (1.5 mL) Inpn pen  Generic drug: insulin glargine (TOUJEO)  INJECT 35 UNITS SUBCUTANEOUSLY TWICE DAILY  What changed: Another medication with the same name was added. Make sure you understand how and when to take each.     * TOUJEO SOLOSTAR U-300 INSULIN 300 unit/mL (1.5 mL) Inpn pen  Generic drug: insulin glargine (TOUJEO)  INJECT 35 SUBCUTANEOUSLY TWICE DAILY  What changed: Another medication with the same name was added. Make sure you understand how and when to take each.     * LANTUS SOLOSTAR U-100 INSULIN 100 unit/mL (3 mL) Inpn pen  Generic drug: insulin glargine U-100 (Lantus)  Inject 40 Units into the skin 2 (two) times a day.  What changed: You were already taking a medication with the same name, and this prescription was added. Make sure you understand how and when to take each.           * This list has 5 medication(s) that are the same as other medications prescribed for you. Read the directions carefully, and ask your doctor or other care provider to review them with you.                CONTINUE taking these medications      amitriptyline 50 MG tablet  Commonly known as: ELAVIL  TAKE ONE TABLET BY MOUTH IN THE EVENING     aspirin 81 MG EC tablet  Commonly known as: ECOTRIN     atorvastatin 80 MG tablet  Commonly known as: LIPITOR  Take 1 tablet (80 mg total) by  mouth once daily.     cholecalciferol (vitamin D3) 125 mcg (5,000 unit) capsule  Take 1 capsule (5,000 Units total) by mouth once daily.     clonazePAM 1 MG tablet  Commonly known as: KlonoPIN  TAKE ONE TABLET BY MOUTH TWICE DAILY     cloNIDine 0.3 MG tablet  Commonly known as: CATAPRES  Take 1 tablet (0.3 mg total) by mouth 3 (three) times daily.     clopidogreL 75 mg tablet  Commonly known as: PLAVIX  Take 4 tablets on day 1 and then one tablet daily.     CREON 36,000-114,000- 180,000 unit Cpdr  Generic drug: lipase-protease-amylase  TAKE ONE CAPSULE THREE TIMES DAILY     ergocalciferol 50,000 unit Cap  Commonly known as: ERGOCALCIFEROL  Take 1 capsule (50,000 Units total) by mouth every 7 days.     EScitalopram oxalate 20 MG tablet  Commonly known as: LEXAPRO     esomeprazole 40 MG capsule  Commonly known as: NEXIUM  Take 1 capsule (40 mg total) by mouth before breakfast.     FARXIGA 5 mg Tab tablet  Generic drug: dapagliflozin propanediol  Take 2 tablets (10 mg total) by mouth once daily.     gabapentin 800 MG tablet  Commonly known as: NEURONTIN  TAKE ONE TABLET BY MOUTH IN THE EVENING     GLUCERNA 1.5 CEDRIC 0.08-1.5 gram-kcal/mL Liqd  Generic drug: nut.tx.gluc intol,lf,soy-fiber  Take 273 mLs by mouth 2 (two) times a day.     HumaLOG U-100 Insulin 100 unit/mL injection  Generic drug: insulin lispro  INJECT 25 UNITS SUBCUTANEOUSLY BEFORE MEALS THREE TIMES DAILY     HYDROmorphone 8 MG tablet  Commonly known as: DILAUDID  Take 1 tablet (8 mg total) by mouth every 8 (eight) hours as needed.     icosapent ethyL 1 gram Cap  Commonly known as: VASCEPA  TAKE TWO CAPSULES BY MOUTH TWICE DAILY     isosorbide mononitrate 120 MG 24 hr tablet  Commonly known as: IMDUR  Take 1 tablet (120 mg total) by mouth once daily.     losartan 100 MG tablet  Commonly known as: COZAAR  Take 1 tablet (100 mg total) by mouth once daily.     metoprolol succinate 25 MG 24 hr tablet  Commonly known as: TOPROL-XL  Take 1 tablet (25 mg total) by  "mouth once daily.     morphine 100 MG 12 hr tablet  Commonly known as: MS CONTIN  TAKE ONE TABLET BY MOUTH THREE TIMES DAILY     nicotine 21 mg/24 hr  Commonly known as: NICODERM CQ  Place 1 patch onto the skin once daily.     nitroGLYCERIN 0.3 MG SL tablet  Commonly known as: NITROSTAT  Place 1 tablet (0.3 mg total) under the tongue every 5 (five) minutes as needed for Chest pain (go to er after 3 doses).     OXcarbazepine 600 MG Tab  Commonly known as: TRILEPTAL  Take 1 tablet (600 mg total) by mouth 2 (two) times daily.     potassium chloride SA 20 MEQ tablet  Commonly known as: K-DUR,KLOR-CON  Take 1 tablet (20 mEq total) by mouth 2 (two) times daily.     pregabalin 150 MG capsule  Commonly known as: LYRICA  Take 1 capsule (150 mg total) by mouth 3 (three) times daily.     ranolazine 500 MG Tb12  Commonly known as: RANEXA  Take 1 tablet (500 mg total) by mouth 2 (two) times daily.     REPATHA SURECLICK 140 mg/mL Pnij  Generic drug: evolocumab  INJECT 1ml INTRAMUSCULARLY EVERY 14 DAYS     sucralfate 100 mg/mL suspension  Commonly known as: CARAFATE  Take 10 mLs (1 g total) by mouth 4 (four) times daily before meals and nightly.     SURE COMFORT INSULIN SYRINGE 0.5 mL 31 gauge x 5/16" Syrg  Generic drug: insulin syringe-needle U-100  USE ONE SYRINGE THREE TIMES DAILY     tamsulosin 0.4 mg Cap  Commonly known as: FLOMAX  TAKE ONE CAPSULE BY MOUTH EVERY DAY     torsemide 20 MG Tab  Commonly known as: DEMADEX  Take 1 tablet (20 mg total) by mouth once daily.     TRADJENTA 5 mg Tab tablet  Generic drug: linaGLIPtin  Take 1 tablet (5 mg total) by mouth once daily.               Where to Get Your Medications        These medications were sent to [facility] [address] [address] [facility] [address]      Phone: [phone]   insulin aspart U-100 100 unit/mL (3 mL) Inpn pen  LANTUS SOLOSTAR U-100 INSULIN 100 unit/mL (3 mL) Inpn pen  NIFEdipine 90 MG (OSM) 24 hr tablet   "       Discharge Information:     F/u with GI for EUS   F/u w PCP on 6/17  Follow ith wound care clinic  ; DM foot care education was provided.  Will discharge with home health  Family is here with the pt   Increased CCB to 90mg qd.   Continue on Lantus 40U BID and aspart 7U TID until discussed with PCP  Keep blood sugar log (SNF will likely assist with this)     Follow-up Information       Dr Rocky Rojas Follow up on 7/10/2025.    Why: Thursday July 10th at 12:45 PM  This was first available, clinic to call if any openings become available sooner  Contact information:  [address] [address]    [phone]             [facility]. Schedule an appointment as soon as possible for a visit.    Specialty: Gastroenterology  Why: Follow up with GI for EUS  Contact information:  [address] [facility] [id number] [phone]  Additional information:  Entrance 1 for clinic visits   If your appointment is a fibroscan, please present to GI Lab on 5th Floor.              Dat Beasley MD Follow up on 6/17/2025.    Specialties: Pulmonary Disease, Internal Medicine  Contact information:  [address] [facility] [address]  [phone]                             Future Appointments   Date Time Provider Department Center   6/17/2025 12:30 PM Dat Beasley MD [facility] IM RES [place]    6/17/2025 12:30 PM SCHEDULE,  MINOR SURGERY [facility] FM RES [place]   6/23/2025  4:00 PM Shira Porter, TRISHA LGOCO MSMOK [place]   6/24/2025  1:00 PM Malina Brito FNP [facility] OPWND [place]   7/1/2025  2:00 PM Dat Beasley MD [facility] IM RES [place]   7/14/2025  3:00 PM Channing Fong MD [facility] [address] [place]   9/5/2025 11:00 AM FIBROSCAN, [facility] GASTROENTEROLOGY [facility] GASTRO [place]   9/16/2025  8:00 AM Zoie Campbell MD [facility] CARD [place]   10/16/2025 10:00 AM Kendal Grande FNP [facility] NEPHR [place]    10/28/2025  11:00 AM Erin Hernandez FNP [facility] GASTRO [place]   10/30/2025  3:30 PM Bere Modi MD [facility] NEURO [place]       Shoaib Eason MD  \[facility] [place] [facility]\"" Family Medicine, PGY-2                   [1]  Patient Active Problem List  Diagnosis    Chronic pain syndrome    Painful diabetic neuropathy    History of pancreatitis    Abnormal weight loss    Bladder outflow obstruction    Chronic pancreatitis    Onychomycosis of multiple toenails with type 2 diabetes mellitus    Diabetic polyneuropathy    Primary hypertension    Hand paresthesia    Mixed hyperlipidemia    Left flank pain    Mononeuritis multiplex    Nocturia    Paresis of single lower extremity    Metabolic dysfunction-associated steatotic liver disease (MASLD)    Tobacco user    Gastroesophageal reflux disease with esophagitis without hemorrhage    History of colonic polyps    Fall    Impaired functional mobility, balance, gait, and endurance    Blurred vision, right eye    Aortic atherosclerosis    Ulcer of left heel and midfoot, limited to breakdown of skin    Overgrown toenails    Bilateral edema of lower extremity    Type 2 diabetes mellitus with other skin ulcer, with long-term current use of insulin    Major depressive disorder, recurrent severe without psychotic features    Chronic liver failure without hepatic coma    Encounter for diabetic foot exam    Acquired perforating dermatosis    Hearing loss    Hypokalemia    Familial hypertriglyceridemia    Coronary artery disease of native artery of native heart with stable angina pectoris    Falls frequently    Scab    NSTEMI (non-ST elevated myocardial infarction)    Hx of CABG    Type 2 diabetes mellitus with hyperglycemia, with long-term current use of insulin    Chronic diarrhea of unknown origin    Malfunction of spinal cord stimulator    Uncontrolled type 2 diabetes mellitus with hyperglycemia    Moderate malnutrition    Superficial foreign body of left foot  without major open wound and without infection    Mass of head of pancreas

## 2025-06-13 NOTE — PT/OT/SLP PROGRESS
Physical Therapy    Missed Treatment Session - cancel note - 06/13/2025    Patient Name:  Bharath Caballero   MRN:  [id number]      Patient not seen at this time secondary to  (Pt discharged.).    Will follow-up as patient is appropriate/available/agreeable to participate and as therapists' schedule allows.

## 2025-06-13 NOTE — CARE UPDATE
Spoke with [facility], pt does not meet criteria, they are unable to accept at this time.Pt is more home health appropriate.

## 2025-06-13 NOTE — CARE UPDATE
[facility] requesting weekly lab orders: CBC with diff & CMP. Orders faxed to [phone].

## 2025-06-13 NOTE — CONSULTS
Spoke with pt about SNF, agreed. Would like [facility]. Referral submitted via Epic.Acceptance pending. Will follow.

## 2025-06-14 NOTE — PT/OT/SLP DISCHARGE
POST DISCHARGE DOCUMENTATION - 2025 8:15 AM    Physical Therapy Discharge Summary    Name: Bharath Caballero  MRN: [id number]   Principal Problem: Uncontrolled type 2 diabetes mellitus with hyperglycemia   1. Falls frequently    2. Fall, subsequent encounter    3. Uncontrolled diabetes mellitus with hyperglycemia    4. Chest pain    5. Uncontrolled type 2 diabetes mellitus with hyperglycemia    6. Mass of head of pancreas    7. Moderate malnutrition    8. Tobacco user       Problem List[1]   Recommendations - per last treatment session     Therapy Intensity Recommendations at Discharge:  (moderate vs low intensity therapy)  Discharge Equipment Recommendations:  (TBD - pending progress - straight cane vs rolling walker)     Assessment:     Patient last seen by PT SERVICES on 2025    Patient was unexpectedly discharged from hospital.    Refer to therapy's initial evaluation or last treatment (progress) note for patient's last known functional status, goal achievement and therapists' recommendations.    Objective     GOALS: - 0 of 4 STG's met by patient    Multidisciplinary Problems       Physical Therapy Goals       Problem: Physical Therapy    Goal Priority Disciplines Outcome Interventions   Physical Therapy Goal     PT, PT/OT Progressing    Description: REVIEWED 2025  Goals to be met by: DISCHARGE  Patient will increase functional independence with mobility by performin. Sit to stand transfer with Modified [place] - ONGOING  2. Bed to chair transfer with Modified [place] using Single-point Cane - ONGOING  3. Gait  x 390 feet with Modified [place] using Single-point Cane - ONGOING  4. Ascend/descend 6 stair with no Handrails Supervision using No Assistive Device - ONGOING           Plan     Patient Discharged to: home or self care per chart.    2025         [1]   Patient Active Problem List  Diagnosis    Chronic pain syndrome    Painful diabetic neuropathy    History of  pancreatitis    Abnormal weight loss    Bladder outflow obstruction    Chronic pancreatitis    Onychomycosis of multiple toenails with type 2 diabetes mellitus    Diabetic polyneuropathy    Primary hypertension    Hand paresthesia    Mixed hyperlipidemia    Left flank pain    Mononeuritis multiplex    Nocturia    Paresis of single lower extremity    Metabolic dysfunction-associated steatotic liver disease (MASLD)    Tobacco user    Gastroesophageal reflux disease with esophagitis without hemorrhage    History of colonic polyps    Fall    Impaired functional mobility, balance, gait, and endurance    Blurred vision, right eye    Aortic atherosclerosis    Ulcer of left heel and midfoot, limited to breakdown of skin    Overgrown toenails    Bilateral edema of lower extremity    Type 2 diabetes mellitus with other skin ulcer, with long-term current use of insulin    Major depressive disorder, recurrent severe without psychotic features    Chronic liver failure without hepatic coma    Encounter for diabetic foot exam    Acquired perforating dermatosis    Hearing loss    Hypokalemia    Familial hypertriglyceridemia    Coronary artery disease of native artery of native heart with stable angina pectoris    Falls frequently    Scab    NSTEMI (non-ST elevated myocardial infarction)    Hx of CABG    Type 2 diabetes mellitus with hyperglycemia, with long-term current use of insulin    Chronic diarrhea of unknown origin    Malfunction of spinal cord stimulator    Uncontrolled type 2 diabetes mellitus with hyperglycemia    Moderate malnutrition    Superficial foreign body of left foot without major open wound and without infection    Mass of head of pancreas

## 2025-06-16 ENCOUNTER — PATIENT OUTREACH (OUTPATIENT)
Dept: ADMINISTRATIVE | Facility: CLINIC | Age: 45
End: 2025-06-16
Payer: MEDICARE

## 2025-06-16 ENCOUNTER — PATIENT MESSAGE (OUTPATIENT)
Dept: ADMINISTRATIVE | Facility: CLINIC | Age: 45
End: 2025-06-16
Payer: MEDICARE

## 2025-06-16 LAB
ELASTASE PANC STL-MCNT: <40 MCG/G
ELIA CELIKEY IGA (TTG IGA) QUANTITATIVE: 1.7 U/ML
TTG IGA SER IA-ACNC: 1.4 U/ML

## 2025-06-16 NOTE — PROGRESS NOTES
C3 nurse attempted to contact Bharath Caballero for a TCC post hospital discharge follow up call. No answer. The patient has a scheduled HOSFU appointment with Dat Beasley MD (Internal Medicine) on Tuesday Jun 17, 2025 12:30 PM .

## 2025-06-17 ENCOUNTER — CLINICAL SUPPORT (OUTPATIENT)
Dept: INTERNAL MEDICINE | Facility: CLINIC | Age: 45
End: 2025-06-17
Attending: INTERNAL MEDICINE
Payer: MEDICARE

## 2025-06-17 ENCOUNTER — TELEPHONE (OUTPATIENT)
Dept: NEPHROLOGY | Facility: CLINIC | Age: 45
End: 2025-06-17
Payer: MEDICARE

## 2025-06-17 ENCOUNTER — OFFICE VISIT (OUTPATIENT)
Dept: INTERNAL MEDICINE | Facility: CLINIC | Age: 45
End: 2025-06-17
Payer: MEDICARE

## 2025-06-17 VITALS
WEIGHT: 180.38 LBS | TEMPERATURE: 99 F | RESPIRATION RATE: 18 BRPM | HEART RATE: 79 BPM | SYSTOLIC BLOOD PRESSURE: 172 MMHG | OXYGEN SATURATION: 100 % | DIASTOLIC BLOOD PRESSURE: 90 MMHG | HEIGHT: 69 IN | BODY MASS INDEX: 26.72 KG/M2

## 2025-06-17 DIAGNOSIS — E11.65 UNCONTROLLED TYPE 2 DIABETES MELLITUS WITH HYPERGLYCEMIA: ICD-10-CM

## 2025-06-17 DIAGNOSIS — R63.4 ABNORMAL WEIGHT LOSS: ICD-10-CM

## 2025-06-17 DIAGNOSIS — Z86.0100 HISTORY OF COLONIC POLYPS: ICD-10-CM

## 2025-06-17 DIAGNOSIS — K52.9 CHRONIC DIARRHEA OF UNKNOWN ORIGIN: ICD-10-CM

## 2025-06-17 DIAGNOSIS — Z87.19 HISTORY OF PANCREATITIS: ICD-10-CM

## 2025-06-17 DIAGNOSIS — E11.40 PAINFUL DIABETIC NEUROPATHY: Chronic | ICD-10-CM

## 2025-06-17 DIAGNOSIS — K86.89 PANCREATIC MASS: ICD-10-CM

## 2025-06-17 DIAGNOSIS — T85.192D MALFUNCTION OF SPINAL CORD STIMULATOR, SUBSEQUENT ENCOUNTER: ICD-10-CM

## 2025-06-17 DIAGNOSIS — I25.118 CORONARY ARTERY DISEASE OF NATIVE ARTERY OF NATIVE HEART WITH STABLE ANGINA PECTORIS: ICD-10-CM

## 2025-06-17 DIAGNOSIS — G89.4 CHRONIC PAIN SYNDROME: Chronic | ICD-10-CM

## 2025-06-17 DIAGNOSIS — H90.0 CONDUCTIVE HEARING LOSS, BILATERAL: ICD-10-CM

## 2025-06-17 DIAGNOSIS — R20.2 HAND PARESTHESIA: ICD-10-CM

## 2025-06-17 DIAGNOSIS — E44.0 MODERATE MALNUTRITION: ICD-10-CM

## 2025-06-17 DIAGNOSIS — R29.6 FALLS FREQUENTLY: ICD-10-CM

## 2025-06-17 DIAGNOSIS — L60.2 OVERGROWN TOENAILS: ICD-10-CM

## 2025-06-17 DIAGNOSIS — I21.4 NSTEMI (NON-ST ELEVATED MYOCARDIAL INFARCTION): ICD-10-CM

## 2025-06-17 DIAGNOSIS — Z79.4 TYPE 2 DIABETES MELLITUS WITH OTHER SKIN ULCER, WITH LONG-TERM CURRENT USE OF INSULIN: ICD-10-CM

## 2025-06-17 DIAGNOSIS — L98.8 ACQUIRED PERFORATING DERMATOSIS: ICD-10-CM

## 2025-06-17 DIAGNOSIS — E11.69 ONYCHOMYCOSIS OF MULTIPLE TOENAILS WITH TYPE 2 DIABETES MELLITUS: ICD-10-CM

## 2025-06-17 DIAGNOSIS — R35.1 NOCTURIA: ICD-10-CM

## 2025-06-17 DIAGNOSIS — N32.0 BLADDER OUTFLOW OBSTRUCTION: ICD-10-CM

## 2025-06-17 DIAGNOSIS — R60.0 BILATERAL EDEMA OF LOWER EXTREMITY: ICD-10-CM

## 2025-06-17 DIAGNOSIS — E11.622 TYPE 2 DIABETES MELLITUS WITH OTHER SKIN ULCER, WITH LONG-TERM CURRENT USE OF INSULIN: ICD-10-CM

## 2025-06-17 DIAGNOSIS — Z79.4 TYPE 2 DIABETES MELLITUS WITH HYPERGLYCEMIA, WITH LONG-TERM CURRENT USE OF INSULIN: ICD-10-CM

## 2025-06-17 DIAGNOSIS — E08.42 DIABETIC POLYNEUROPATHY ASSOCIATED WITH DIABETES MELLITUS DUE TO UNDERLYING CONDITION: Primary | ICD-10-CM

## 2025-06-17 DIAGNOSIS — K86.1 OTHER CHRONIC PANCREATITIS: ICD-10-CM

## 2025-06-17 DIAGNOSIS — K72.10 CHRONIC LIVER FAILURE WITHOUT HEPATIC COMA: ICD-10-CM

## 2025-06-17 DIAGNOSIS — E11.65 TYPE 2 DIABETES MELLITUS WITH HYPERGLYCEMIA, WITH LONG-TERM CURRENT USE OF INSULIN: ICD-10-CM

## 2025-06-17 DIAGNOSIS — G58.7 MONONEURITIS MULTIPLEX: ICD-10-CM

## 2025-06-17 DIAGNOSIS — K21.00 GASTROESOPHAGEAL REFLUX DISEASE WITH ESOPHAGITIS WITHOUT HEMORRHAGE: ICD-10-CM

## 2025-06-17 DIAGNOSIS — F33.2 MAJOR DEPRESSIVE DISORDER, RECURRENT SEVERE WITHOUT PSYCHOTIC FEATURES: ICD-10-CM

## 2025-06-17 DIAGNOSIS — R10.9 LEFT FLANK PAIN: ICD-10-CM

## 2025-06-17 DIAGNOSIS — B35.1 ONYCHOMYCOSIS OF MULTIPLE TOENAILS WITH TYPE 2 DIABETES MELLITUS: ICD-10-CM

## 2025-06-17 DIAGNOSIS — Z74.09 IMPAIRED FUNCTIONAL MOBILITY, BALANCE, GAIT, AND ENDURANCE: Chronic | ICD-10-CM

## 2025-06-17 DIAGNOSIS — K76.0 METABOLIC DYSFUNCTION-ASSOCIATED STEATOTIC LIVER DISEASE (MASLD): ICD-10-CM

## 2025-06-17 DIAGNOSIS — H53.8 BLURRED VISION, RIGHT EYE: ICD-10-CM

## 2025-06-17 DIAGNOSIS — G83.10 PARESIS OF SINGLE LOWER EXTREMITY: ICD-10-CM

## 2025-06-17 DIAGNOSIS — E78.2 MIXED HYPERLIPIDEMIA: ICD-10-CM

## 2025-06-17 DIAGNOSIS — I70.0 AORTIC ATHEROSCLEROSIS: ICD-10-CM

## 2025-06-17 DIAGNOSIS — S90.852D: ICD-10-CM

## 2025-06-17 DIAGNOSIS — Z72.0 TOBACCO USER: Chronic | ICD-10-CM

## 2025-06-17 DIAGNOSIS — I10 PRIMARY HYPERTENSION: ICD-10-CM

## 2025-06-17 DIAGNOSIS — Z95.1 HX OF CABG: ICD-10-CM

## 2025-06-17 DIAGNOSIS — E87.6 HYPOKALEMIA: ICD-10-CM

## 2025-06-17 DIAGNOSIS — L97.421 ULCER OF LEFT HEEL AND MIDFOOT, LIMITED TO BREAKDOWN OF SKIN: ICD-10-CM

## 2025-06-17 DIAGNOSIS — E78.1 FAMILIAL HYPERTRIGLYCERIDEMIA: ICD-10-CM

## 2025-06-17 PROBLEM — R23.4 SCAB: Status: RESOLVED | Noted: 2024-06-07 | Resolved: 2025-06-17

## 2025-06-17 PROBLEM — E11.9 ENCOUNTER FOR DIABETIC FOOT EXAM: Status: RESOLVED | Noted: 2023-05-09 | Resolved: 2025-06-17
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
## 2025-06-17 LAB
ANNOTATION COMMENT IMP: NO
HLA-DQA1: ABNORMAL
HLA-DQB1: ABNORMAL
IGA SERPL-MCNC: 572 MG/DL (ref 61–356)
IMMUNOLOGIST REVIEW: ABNORMAL

## 2025-06-17 PROCEDURE — 99215 OFFICE O/P EST HI 40 MIN: CPT | Mod: PBBFAC | Performed by: INTERNAL MEDICINE

## 2025-06-17 PROCEDURE — 92228 IMG RTA DETC/MNTR DS PHY/QHP: CPT | Mod: TC,PBBFAC

## 2025-06-17 PROCEDURE — 92228 IMG RTA DETC/MNTR DS PHY/QHP: CPT | Mod: PBBFAC

## 2025-06-17 RX ORDER — CHOLESTYRAMINE 4 G/9G
4 POWDER, FOR SUSPENSION ORAL
Qty: 270 PACKET | Refills: 3 | Status: SHIPPED | OUTPATIENT
Start: 2025-06-17 | End: 2026-06-17

## 2025-06-17 NOTE — PROGRESS NOTES
C3 nurse spoke with Bharath Caballero who is unable to complete the call at this time and requested a callback. Patient has a HOSFU with  Dat Beasley MD (Internal Medicine) on Tuesday Jun 17, 2025 12:30 PM .

## 2025-06-17 NOTE — PROGRESS NOTES
Subjective     Patient ID: Bharath Caballero is a 45 y.o. male.    Chief Complaint: Follow-up    Follow-up     patient is seeing in follow up to this past week's hospitalization.  A 3 cm head of the pancreas mass was discovered on CT of the abdomen gastroenterology has seen him and recommends EUS biopsy per Dr. Mcclure.  I spoke to Dr. Torres nurse today who has scheduled the patient for next Thursday to have this done as an outpatient she will contact his cardiologist as to when they get a hold the Plavix and aspirin.  In addition to the pancreatic head mass there was stenosis of the proximal portal vein which appeared to be due to tumor constricting this ex terminally there was flow as estimated on the ultrasound it was not felt to be thrombus but external compression.  I will see him back in 2 and 4 weeks.    Diabetes is much better controlled although he has not able to test his sugars at present his Dexcom is not functioning properly they will get the string now with by nurse.  He also has a manual glucometer which she says only needs batteries they will contact me if they need anything else we suggest he check his glucoses a.c. breakfast and HS in turn them into us every 2 weeks his glucoses were very poorly controlled but in the hospital the regimen that we came up with was Lantus 40 units b.i.d. aspartame 7 units a.c. t.i.d.    Pain is well controlled on current therapy as listed he is not overly sedated or sleepy functioning well his implanted pain stimulator which is broken is being dealt with by a neurosurgeon in [facility] he misses appointment because he was in the hospital but is re scheduling that with them    Foot wound left foot is completely healed    Diarrhea with no change postprandial diarrhea continues he is taking his Creon t.i.d. a.c. we will add cholestyramine 4 g a.c. t.i.d.    CA 19 9 was over 700 CEA was elevated as well    Patient attended this visit with the sister-in-law she will try to  make all visits with him in his helping to care for him in his home we did discuss nursing home placement while he was in the hospital which he is open to at least discussion for as we go forward.  Review of Systems   All other systems reviewed and are negative.         Objective     Physical Exam  Constitutional:       Appearance: Normal appearance.      Comments: Poor dentition, obviously losing weight.  In fairly good spirits.  Uses a walker or cane for ambulation   HENT:      Head: Normocephalic and atraumatic.      Right Ear: Tympanic membrane, ear canal and external ear normal.      Left Ear: Tympanic membrane, ear canal and external ear normal.      Nose: Nose normal.      Mouth/Throat:      Mouth: Mucous membranes are moist.      Pharynx: Oropharynx is clear.   Eyes:      Extraocular Movements: Extraocular movements intact.      Conjunctiva/sclera: Conjunctivae normal.      Pupils: Pupils are equal, round, and reactive to light.   Cardiovascular:      Rate and Rhythm: Normal rate.      Pulses: Normal pulses.      Heart sounds: Normal heart sounds.   Pulmonary:      Effort: Pulmonary effort is normal.      Breath sounds: Normal breath sounds.   Abdominal:      General: Bowel sounds are normal.      Palpations: Abdomen is soft.   Musculoskeletal:      Cervical back: Normal range of motion and neck supple.   Skin:     General: Skin is warm and dry.   Neurological:      General: No focal deficit present.      Mental Status: He is alert and oriented to person, place, and time. Mental status is at baseline.   Psychiatric:         Mood and Affect: Mood normal.         Behavior: Behavior normal.         Thought Content: Thought content normal.         Judgment: Judgment normal.            Assessment and Plan     1. Diabetic polyneuropathy associated with diabetes mellitus due to underlying condition    2. Hand paresthesia    3. Mononeuritis multiplex    4. Paresis of single lower extremity    5. Malfunction of  spinal cord stimulator, subsequent encounter    6. Chronic pain syndrome    7. Painful diabetic neuropathy    8. Major depressive disorder, recurrent severe without psychotic features    9. Blurred vision, right eye    10. Conductive hearing loss, bilateral    11. Onychomycosis of multiple toenails with type 2 diabetes mellitus    12. Overgrown toenails    13. Acquired perforating dermatosis    14. Primary hypertension    15. Mixed hyperlipidemia    16. Aortic atherosclerosis    17. Familial hypertriglyceridemia    18. Coronary artery disease of native artery of native heart with stable angina pectoris    19. Hx of CABG    20. NSTEMI (non-ST elevated myocardial infarction)    21. Bladder outflow obstruction    22. Nocturia    23. Hypokalemia    24. Abnormal weight loss    25. Type 2 diabetes mellitus with other skin ulcer, with long-term current use of insulin    26. Type 2 diabetes mellitus with hyperglycemia, with long-term current use of insulin    27. Uncontrolled type 2 diabetes mellitus with hyperglycemia    28. Moderate malnutrition    29. History of pancreatitis    30. Other chronic pancreatitis    31. Left flank pain    32. Metabolic dysfunction-associated steatotic liver disease (MASLD)    33. Gastroesophageal reflux disease with esophagitis without hemorrhage    34. History of colonic polyps    35. Chronic liver failure without hepatic coma    36. Chronic diarrhea of unknown origin  -     cholestyramine (QUESTRAN) 4 gram packet; Take 1 packet (4 g total) by mouth 3 (three) times daily with meals.  Dispense: 270 packet; Refill: 3    37. Pancreatic mass    38. Ulcer of left heel and midfoot, limited to breakdown of skin    39. Superficial foreign body of left foot without major open wound and without infection, subsequent encounter    40. Bilateral edema of lower extremity    41. Falls frequently    42. Tobacco user    43. Impaired functional mobility, balance, gait, and endurance        Supposedly having  EUS on Thursday June 26 per Dr. Mcclure.  Follow up in 2 and 4 weeks with me he can call if any problems in the meantime         Follow up in about 4 weeks (around 7/15/2025), or isaiah has appointment in 12  weeks with me, then in one month as well from now.  thanks.

## 2025-06-17 NOTE — PROGRESS NOTES
Bharath Caballero is a 45 y.o. male here for a diabetic eye screening with non-dilated fundus photos per Dr Beasley.    Patient cooperative?: Yes  Small pupils?: Yes  Last eye exam: 6/4/2024    For exam results, see Encounter Report.

## 2025-06-18 NOTE — PROGRESS NOTES
C3 nurse attempted to contact Bharath Caballero  for a TCC post hospital discharge follow-up call. The patient declined call at this time.

## 2025-06-19 ENCOUNTER — DOCUMENTATION ONLY (OUTPATIENT)
Dept: CARDIOLOGY | Facility: HOSPITAL | Age: 45
End: 2025-06-19
Payer: MEDICARE

## 2025-06-19 NOTE — PROGRESS NOTES
Cardiology Attending  06/19/2025 12:01 PM    Pre-Op Cardiac Risk Assessment  Past Medical History  Problem List[1]    Past Medical History  Problem List[2]   Surgical History    Past Surgical History:   Procedure Laterality Date    ANGIOGRAM, CORONARY, WITH LEFT HEART CATHETERIZATION N/A 04/10/2023    Procedure: Angiogram, Coronary, with Left Heart Cath;  Surgeon: Jaswant Pugh MD;  Location: [facility] CATH LAB;  Service: Cardiology;  Laterality: N/A;    ANGIOGRAM, CORONARY, WITH LEFT HEART CATHETERIZATION N/A 10/10/2024    Procedure: Angiogram, Coronary, with Left Heart Cath;  Surgeon: Jaswant Pugh MD;  Location: [facility] CATH LAB;  Service: Cardiology;  Laterality: N/A;    APPENDECTOMY      ARTERIOVENOUS ANASTOMOSIS, OPEN, UPPER ARM BASILLIC VEIN TRANSPOSITION N/A 05/07/2018    CORONARY ARTERY BYPASS GRAFT (CABG) N/A 12/05/2024    Procedure: CORONARY ARTERY BYPASS GRAFT (CABG);  Surgeon: Gillian Clayton MD;  Location: [facility] OR;  Service: Cardiothoracic;  Laterality: N/A;  ECHO NOTIFIED    EGD, WITH CLOSED BIOPSY N/A 11/20/2023    Procedure: EGD, WITH CLOSED BIOPSY;  Surgeon: Christina James MD;  Location: [facility] ENDOSCOPY;  Service: Gastroenterology;  Laterality: N/A;    ENDOSCOPIC HARVEST OF VEIN Left 12/05/2024    Procedure: HARVEST-VEIN-ENDOVASCULAR;  Surgeon: Gillian Clayton MD;  Location: [facility] OR;  Service: Cardiothoracic;  Laterality: Left;    ESOPHAGOGASTRODUODENOSCOPY N/A 06/07/2021    EXCISION OF ARTERIOVENOUS FISTULA N/A 06/01/2018    FRACTIONAL FLOW RESERVE (FFR), CORONARY  04/10/2023    Procedure: Fractional Flow [place] (FFR), Coronary;  Surgeon: Jaswant Pugh MD;  Location: [facility] CATH LAB;  Service: Cardiology;;    HERNIA REPAIR      INSPECTION OF UPPER INTESTINAL TRACT N/A 06/07/2021    OCCLUSION OF LEFT ATRIAL APPENDAGE Left 12/05/2024    Procedure: Left atrial appendage occlusion;  Surgeon: Gillian Clayton MD;  Location: [facility] OR;  Service: Cardiothoracic;  Laterality:  Left;    PHERESIS OF PLASMA N/A 07/13/2018    PHERESIS OF PLASMA N/A 05/25/2018    TRIAL OF SPINAL CORD NERVE STIMULATOR N/A 05/12/2022    Procedure: TRIAL, NEUROSTIMULATOR, SPINAL CORD;  Surgeon: Jay Pozo MD;  Location: [facility];  Service: Neurosurgery;  Laterality: N/A;    UPPER GI N/A 06/07/2021       The patient had the following CARDIAC TESTING:  Echo:    Results for orders placed during the hospital encounter of 05/28/25    Echo    Interpretation Summary    Left Ventricle: The left ventricle is normal in size. Moderately increased ventricular mass. Moderate septal thickening. There is moderate concentric hypertrophy. Normal wall motion. There is normal systolic function. Quantitated ejection fraction is 70%. There is normal diastolic function.    Right Ventricle: The right ventricle is normal in size Systolic function is normal.    Left Atrium: The left atrium is at the upper limit of normal in size measuring 34 mL/m2.    Right Atrium: The right atrium is normal in size.    Aortic Valve: The aortic valve is a trileaflet valve. There is no stenosis. There is no significant regurgitation.    Mitral Valve: The mitral valve is structurally normal. There is no stenosis. There is no significant regurgitation.    Tricuspid Valve: There is physiologically normal regurgitation.    Aorta: The aortic root is normal in size measuring 3.4 cm.    Pulmonary Artery: The estimated pulmonary artery systolic pressure is 11 mmHg.    IVC/SVC: Normal venous pressure at 3 mmHg.    Pericardium: There is no pericardial effusion.    Stress test:  Results for orders placed during the hospital encounter of 10/07/24    Nuclear Stress - Cardiology Interpreted    Interpretation Summary    Abnormal myocardial perfusion scan.    There are two significant perfusion abnormalities.    Perfusion Abnormality #1 - There is a moderate to severe intensity, medium sized, reversible perfusion abnormality that is consistent with ischemia in the  mid to apical anteroapical wall(s) in the typical distribution of the LAD territory.    Perfusion Abnormality #2 - There is a medium sized, moderate to severe intensity, mostly reversible perfusion abnormality with some fixed areas in the basal to distal inferior wall(s) in the typical distribution of the RCA territory.    There are no other significant perfusion abnormalities.    The gated perfusion images showed an ejection fraction of 54% at rest. The gated perfusion images showed an ejection fraction of 48% post stress.    There were no arrhythmias during stress.    The nuclear stress test is  fair quality.  On the nuclear stress test the EF is equivocal.  If the patient has an echo, the EF on the echo is considered more accurate.    The patient has a moderate risk nuclear stress test due to anterior and inferior reversible defect.     Coronary angiogram:  Results for orders placed during the hospital encounter of 12/05/24    Cardiac catheterization    Narrative  Procedure performed in the Invasive Lab  - See Procedure Log link below for nursing documentation  - See OpNote on Surgeries Tab for physician findings  - See Imaging Tab for radiologist dictation       Current Outpatient Medications   Medication Instructions    amitriptyline (ELAVIL) 50 mg, Oral, Nightly    aspirin (ECOTRIN) 81 mg, Daily    atorvastatin (LIPITOR) 80 mg, Oral, Daily    cholecalciferol (vitamin D3) 5,000 Units, Oral, Daily    cholestyramine (QUESTRAN) 4 gram packet 4 g, Oral, 3 times daily with meals    clonazePAM (KLONOPIN) 1 mg, Oral, 2 times daily    cloNIDine (CATAPRES) 0.3 mg, Oral, 3 times daily    clopidogreL (PLAVIX) 75 mg tablet Take 4 tablets on day 1 and then one tablet daily.    CREON 36,000-114,000- 180,000 unit CpDR TAKE ONE CAPSULE THREE TIMES DAILY    ergocalciferol (ERGOCALCIFEROL) 50,000 Units, Oral, Every 7 days    EScitalopram oxalate (LEXAPRO) 20 mg, Daily    esomeprazole (NEXIUM) 40 mg, Oral, Before breakfast     "FARXIGA 10 mg, Oral, Daily    gabapentin (NEURONTIN) 800 mg, Oral, Nightly    HUMALOG U-100 INSULIN 100 unit/mL injection INJECT 25 UNITS SUBCUTANEOUSLY BEFORE MEALS THREE TIMES DAILY    HYDROmorphone (DILAUDID) 8 mg, Oral, Every 8 hours PRN    icosapent ethyL (VASCEPA) 2,000 mg, Oral, 2 times daily    isosorbide mononitrate (IMDUR) 120 mg, Oral, Daily    LANTUS SOLOSTAR U-100 INSULIN 40 Units, Subcutaneous, 2 times daily    losartan (COZAAR) 100 mg, Oral, Daily    metoprolol succinate (TOPROL-XL) 25 mg, Oral, Daily    morphine (MS CONTIN) 100 mg, Oral, 3 times daily    nicotine (NICODERM CQ) 21 mg/24 hr 1 patch, Transdermal, Daily    NIFEdipine (PROCARDIA-XL) 90 mg, Oral, Daily    nitroGLYCERIN (NITROSTAT) 0.3 mg, Sublingual, Every 5 min PRN    nut.tx.gluc intol,lf,soy-fiber (GLUCERNA 1.5 CEDRIC) 0.08-1.5 gram-kcal/mL Liqd 273 mLs, Oral, 2 times daily    OXcarbazepine (TRILEPTAL) 600 mg, Oral, 2 times daily    potassium chloride SA (K-DUR,KLOR-CON) 20 MEQ tablet 20 mEq, Oral, 2 times daily    pregabalin (LYRICA) 150 mg, Oral, 3 times daily    ranolazine (RANEXA) 500 mg, Oral, 2 times daily    REPATHA SURECLICK 140 mg/mL PnIj INJECT 1ml INTRAMUSCULARLY EVERY 14 DAYS    sucralfate (CARAFATE) 1 g, Oral, Before meals & nightly    SURE COMFORT INSULIN SYRINGE 0.5 mL 31 gauge x 5/16" Syrg USE ONE SYRINGE THREE TIMES DAILY    tamsulosin (FLOMAX) 0.4 mg Cap TAKE ONE CAPSULE BY MOUTH EVERY DAY    torsemide (DEMADEX) 20 mg, Oral, Daily    TOUJEO SOLOSTAR U-300 INSULIN 300 unit/mL (1.5 mL) InPn pen INJECT 35 SUBCUTANEOUSLY TWICE DAILY    TRADJENTA 5 mg, Oral, Daily       PREOP RISK ASSESSMENT     DATA FOR RCRI:    Has CAD.    Is being treated with insulin.     The patient's RCRI is 2.  Based on this score the patient's 30-day risk of death, MI, or cardiac arrest with surgery is 10.1%.  The patient has intermediate risk for surgery.     Antiplatelets:  The patient is at moderate cardiac risk for holding Plavix for 5 days prior to " the procedure.    After the procedure, resume the antiplatelet when safe from a surgical standpoint.    PRE-OP RECOMMENDATIONS  Continue aspirin 81 mg daily during the perioperative period.    After the procedure please resume the Plavix when it is safe to do so from a s procedural standpoint.   Continue the metoprolol in the perioperative period.  Please monitor the patient for evidence of volume overload, and manage as appropriate. The patient may be at risk for developing heart failure symptoms.        Jaswant Pugh MD              [1]   Patient Active Problem List  Diagnosis    Chronic pain syndrome    Painful diabetic neuropathy    History of pancreatitis    Abnormal weight loss    Bladder outflow obstruction    Chronic pancreatitis    Onychomycosis of multiple toenails with type 2 diabetes mellitus    Diabetic polyneuropathy    Primary hypertension    Hand paresthesia    Mixed hyperlipidemia    Left flank pain    Nocturia    Paresis of single lower extremity    Metabolic dysfunction-associated steatotic liver disease (MASLD)    Tobacco user    Gastroesophageal reflux disease with esophagitis without hemorrhage    History of colonic polyps    Impaired functional mobility, balance, gait, and endurance    Blurred vision, right eye    Aortic atherosclerosis    Ulcer of left heel and midfoot, limited to breakdown of skin    Overgrown toenails    Bilateral edema of lower extremity    Type 2 diabetes mellitus with other skin ulcer, with long-term current use of insulin    Major depressive disorder, recurrent severe without psychotic features    Chronic liver failure without hepatic coma    Acquired perforating dermatosis    Hearing loss    Hypokalemia    Familial hypertriglyceridemia    Coronary artery disease of native artery of native heart with stable angina pectoris    Falls frequently    NSTEMI (non-ST elevated myocardial infarction)    Hx of CABG    Type 2 diabetes mellitus with hyperglycemia, with  long-term current use of insulin    Chronic diarrhea of unknown origin    Malfunction of spinal cord stimulator    Uncontrolled type 2 diabetes mellitus with hyperglycemia    Moderate malnutrition    Superficial foreign body of left foot without major open wound and without infection    Pancreatic mass   [2]   Patient Active Problem List  Diagnosis    Chronic pain syndrome    Painful diabetic neuropathy    History of pancreatitis    Abnormal weight loss    Bladder outflow obstruction    Chronic pancreatitis    Onychomycosis of multiple toenails with type 2 diabetes mellitus    Diabetic polyneuropathy    Primary hypertension    Hand paresthesia    Mixed hyperlipidemia    Left flank pain    Nocturia    Paresis of single lower extremity    Metabolic dysfunction-associated steatotic liver disease (MASLD)    Tobacco user    Gastroesophageal reflux disease with esophagitis without hemorrhage    History of colonic polyps    Impaired functional mobility, balance, gait, and endurance    Blurred vision, right eye    Aortic atherosclerosis    Ulcer of left heel and midfoot, limited to breakdown of skin    Overgrown toenails    Bilateral edema of lower extremity    Type 2 diabetes mellitus with other skin ulcer, with long-term current use of insulin    Major depressive disorder, recurrent severe without psychotic features    Chronic liver failure without hepatic coma    Acquired perforating dermatosis    Hearing loss    Hypokalemia    Familial hypertriglyceridemia    Coronary artery disease of native artery of native heart with stable angina pectoris    Falls frequently    NSTEMI (non-ST elevated myocardial infarction)    Hx of CABG    Type 2 diabetes mellitus with hyperglycemia, with long-term current use of insulin    Chronic diarrhea of unknown origin    Malfunction of spinal cord stimulator    Uncontrolled type 2 diabetes mellitus with hyperglycemia    Moderate malnutrition    Superficial foreign body of left foot without  major open wound and without infection    Pancreatic mass

## 2025-06-23 ENCOUNTER — CLINICAL SUPPORT (OUTPATIENT)
Dept: SMOKING CESSATION | Facility: CLINIC | Age: 45
End: 2025-06-23
Payer: COMMERCIAL

## 2025-06-23 ENCOUNTER — TELEPHONE (OUTPATIENT)
Dept: INTERNAL MEDICINE | Facility: CLINIC | Age: 45
End: 2025-06-23
Payer: MEDICARE

## 2025-06-23 DIAGNOSIS — F17.200 NICOTINE DEPENDENCE: Primary | ICD-10-CM

## 2025-06-23 PROCEDURE — 99403 PREV MED CNSL INDIV APPRX 45: CPT | Mod: S$GLB,,,

## 2025-06-23 RX ORDER — IBUPROFEN 200 MG
1 TABLET ORAL DAILY
Qty: 28 PATCH | Refills: 0 | Status: SHIPPED | OUTPATIENT
Start: 2025-06-23

## 2025-06-23 NOTE — TELEPHONE ENCOUNTER
Spoke to patient and he states he still has not received the documents mailed. He states he has had issues with the mail delivery before. He will get me another address to mail out and call me back

## 2025-06-23 NOTE — PROGRESS NOTES
Individual Follow-Up Form    6/23/2025    Quit Date:     Clinical Status of Patient: Outpatient    Length of Service: 45 minutes    Continuing Medication: yes  Patches    Other Medications: none     Target Symptoms: Withdrawal and medication side effects. The following were  rated moderate (3) to severe (4) on TCRS:  Moderate (3): none  Severe (4): none    Comments: Patient presents for follow up smoking 20 cigarettes per day. Pt remains on tobacco cessation medication of 21 mg nicotine patch QD. No adverse effects noted at this time. Pt is no longer using the 4 mg nicotine gum because he stated that he did not feel that it was working. Pt stated that he was down to 5 cigarettes per day when he was using the patches. Pt stated that since he ran out of patches he started smoking more. Pt doing well with rate reduction and wait times prior to smoking. Pt encouraged to pick a quit day. Pt's quit day will be July 11, 2025. Discussed a plan for his quit. Pt stated that he was recently diagnosed with cancer. Reviewed strategies, cues, and triggers. Introduced the negative impact of tobacco on health, the health advantages of discontinuing the use of tobacco, time line improved health changes after a quit, withdrawal issues to expect from nicotine and habit, and ways to achieve the goal of a quit. Commended pt on his progress thus far. Encouraged pt to move his cigarettes again, smoke outdoors only, make his vehicle tobacco free and continue to work on rate reduction. Encouraged pt to find that willpower to quit. Will follow up with pt in a few weeks. Pt's exhaled carbon monoxide level was 58 ppm as per [employer]. (non- smoker = 0-5 ppm.)    Diagnosis: F17.200    Next Visit: 2 weeks

## 2025-06-24 ENCOUNTER — HOSPITAL ENCOUNTER (OUTPATIENT)
Dept: WOUND CARE | Facility: HOSPITAL | Age: 45
Discharge: HOME | End: 2025-06-24
Attending: NURSE PRACTITIONER | Admitting: INTERNAL MEDICINE
Payer: MEDICARE

## 2025-06-24 VITALS
SYSTOLIC BLOOD PRESSURE: 195 MMHG | DIASTOLIC BLOOD PRESSURE: 81 MMHG | RESPIRATION RATE: 20 BRPM | HEART RATE: 76 BPM | OXYGEN SATURATION: 98 % | TEMPERATURE: 98 F

## 2025-06-24 DIAGNOSIS — Z79.4 TYPE 2 DIABETES MELLITUS WITH HYPERGLYCEMIA, WITH LONG-TERM CURRENT USE OF INSULIN: ICD-10-CM

## 2025-06-24 DIAGNOSIS — R23.4 SCAB: ICD-10-CM

## 2025-06-24 DIAGNOSIS — Z72.0 TOBACCO USER: ICD-10-CM

## 2025-06-24 DIAGNOSIS — E11.65 TYPE 2 DIABETES MELLITUS WITH HYPERGLYCEMIA, WITH LONG-TERM CURRENT USE OF INSULIN: ICD-10-CM

## 2025-06-24 DIAGNOSIS — S90.852D: Primary | ICD-10-CM

## 2025-06-24 DIAGNOSIS — E13.42 DIABETIC POLYNEUROPATHY ASSOCIATED WITH OTHER SPECIFIED DIABETES MELLITUS: ICD-10-CM

## 2025-06-24 PROCEDURE — 99213 OFFICE O/P EST LOW 20 MIN: CPT | Mod: ,,, | Performed by: NURSE PRACTITIONER

## 2025-06-24 PROCEDURE — 27000999 HC MEDICAL RECORD PHOTO DOCUMENTATION

## 2025-06-24 PROCEDURE — 99211 OFF/OP EST MAY X REQ PHY/QHP: CPT

## 2025-06-25 NOTE — PROGRESS NOTES
[facility]   Outpatient Wound Care     Subjective:   Patient ID: Bharath Caballero is a 45 y.o. male.    Chief Complaint: Wound Care      History of Present Illness:   45 y.o. White male presents to wound care clinic today for follow up regarding right lower leg scab.  Presents to clinic with left plantar surface foot wound voices stepped on a piece of glass.   Ambulates with a cane.  Presents to clinic alone.  Hard of hearing.  Reviewed previous medical history since last visit of 02/18/2025.  Followed by Dat Beasley MD for PCP last appointment 05/13/2025.    Today's visit 06/24/2025:  Reviewed previous progress notes since last visit of 06/10/2025.  Follow up today regarding left foot plantar surface wound.  Left foot plantar surface wound granulated.  Instructed just to wash feet daily with soap and water rinse pat dry.  Instructed to wear clean dry socks.  No other signs of skin breakdown.  Reinforced the importance of smoking cessation.  Reinforced the importance of checking feet daily.  We will have him return to the clinic in 1 month post healing.  Instructed to call the office with any questions, concerns, or new skin issues.  Verbalized understanding of all instructions.    06/10/2025:  Reviewed previous progress notes since last visit of 05/27/2025.  Right lower leg scab resolved.  Left foot plantar surface wound bed red granulating wound bed able to extract small black metal.  Cleansed wound using half strength Dakin's, will cover with gauze and Telfa dressing.  Instructed perform daily.  Will return to the clinic in 2 weeks to re-evaluate.  All wound care performed per nursing staff at bedside.  Instructed to call the office with any questions, concerns, or new skin issues.  Instructions and supplies given.  Verbalized understanding of all  instructions.    05/27/2025:  Presents to clinic with right lower leg scab new drainage noted.  Reinforced the importance in danger of hypertension.  Patient had smoked and took blood pressure medications prior to appointment.  Currently on smoking cessation treatment.  Diabetic foot exam performed.  Monofilament test done absent sensation noted in all areas.  Bilateral lower extremities cool to touch with absent hair growth.  Trimmed all 10 toenails using podiatry clippers, and filed with Yoovi board.  During trimming of toenails toenail avulsion to right foot 5th toe tolerated well skin intact to nail plate no drainage noted.  2 of 10 nails dystrophic.  Debride dystrophic toenails using Dremmel.  Applied Vaseline to bilateral feet dryness.  Instructed may apply Vaseline to feet air dry areas daily with the exception between toes.  Rationale for debridement to decrease pressure, alleviate pain, and prevent ulcers.  We will have him paint right lower leg scab with Betadine leave open air perform daily.  Will have him return in 2 weeks to re-evaluate right foot 5th toe avulsion and right lower legs scab.  Instructed the importance of checking feet daily due to decrease sensation high risk for diabetic foot ulcers.  Instructed on proper footwear, nail care, and daily skin assessment.  Instructed to call the office with any questions, concerns, or new skin issues.  Verbalized understanding of all instructions.    02/18/2025:  Elevation of blood pressure today during initial vital signs readings.  Instructed the patient to the danger of hypertension.  Patient saw PCP today.  Patient voices has taking a.m. medications will take pm medications when return home.  ER precautions given.Diabetic foot exam performed.  Monofilament test done absence sensation noted in all areas.  Bilateral lower extremities cool to touch with absent hair growth.  Trimmed all 10 toenails using podiatry clippers, and filed with Yoovi board. 2 of  10 dystrophic.  Debride dystrophic nails using Dremmel.  Rationale for debridement to decrease pressure and prevent ulcers.  Instructed the importance of checking feet daily due to absence sensation high risk for diabetic foot ulcers, and injury to feet.  Instructed on proper footwear, nail care, and daily skin assessment.  Will have him return to the clinic in 3 months.  Instructed to call the office with any questions, concerns, or new skin issues.  Verbalized understanding of all instructions.                 History includes:      Past Medical History:   Diagnosis Date    Abdominal pain     Acquired perforating dermatosis 08/30/2023    Anxiety     Aortic atherosclerosis 01/24/2023    Appetite loss     Balance problem     Bilateral edema of lower extremity 02/06/2023    Bladder outflow obstruction 06/20/2022    Blurred vision, right eye 10/19/2022    BMI 34.0-34.9,adult 06/20/2022    BPH (benign prostatic hyperplasia)     Chest pain     Chronic liver failure without hepatic coma 04/25/2023    Chronic pain 10/25/2022    Chronic pain syndrome 05/12/2022    Chronic pancreatitis 10/28/2017    Coronary artery disease, unspecified vessel or lesion type, unspecified whether angina present, unspecified whether native or transplanted heart     Deafness 10/03/2023    Depression     Diabetes     Diabetic polyneuropathy 06/20/2022    Elevated LFTs 08/18/2022    Fatigue     GERD (gastroesophageal reflux disease)     Hand paresthesia 06/20/2022    Headache     Hepatic steatosis     History of continuous positive airway pressure (CPAP) therapy at home     History of pancreatitis 06/20/2022    History of recent fall     Hypertension     Hypertriglyceridemia     Insomnia     Kidney disease     Kidney disorder     Leg pain     Mixed hyperlipidemia 10/28/2017    Mononeuritis multiplex 06/20/2022    Morbid obesity     Nausea & vomiting     Neuropathy     Nocturia 06/20/2022    NSTEMI (non-ST elevated myocardial infarction) 10/2024     OA (osteoarthritis)     Obesity     Obstructive sleep apnea syndrome 06/20/2022    Onychomycosis of multiple toenails with type 2 diabetes mellitus 10/28/2017    Open wound of finger of right hand 10/20/2023    CICI (obstructive sleep apnea)     uses CPAP    Painful diabetic neuropathy 05/12/2022    Personal history of colonic polyps 08/18/2022    Polyneuropathy     Primary hypertension 10/28/2017    Recurrent pancreatitis     Renal insufficiency     Tobacco abuse     Tobacco user 06/20/2022    Type 2 diabetes mellitus with skin complication, with long-term current use of insulin 02/06/2023    Urinary frequency     Use of cane as ambulatory aid     Weight loss, unintentional       Past Surgical History:   Procedure Laterality Date    ANGIOGRAM, CORONARY, WITH LEFT HEART CATHETERIZATION N/A 04/10/2023    Procedure: Angiogram, Coronary, with Left Heart Cath;  Surgeon: Jaswant Pugh MD;  Location: [facility] CATH LAB;  Service: Cardiology;  Laterality: N/A;    ANGIOGRAM, CORONARY, WITH LEFT HEART CATHETERIZATION N/A 10/10/2024    Procedure: Angiogram, Coronary, with Left Heart Cath;  Surgeon: Jaswant Pugh MD;  Location: [facility] CATH LAB;  Service: Cardiology;  Laterality: N/A;    APPENDECTOMY      ARTERIOVENOUS ANASTOMOSIS, OPEN, UPPER ARM BASILLIC VEIN TRANSPOSITION N/A 05/07/2018    CORONARY ARTERY BYPASS GRAFT (CABG) N/A 12/05/2024    Procedure: CORONARY ARTERY BYPASS GRAFT (CABG);  Surgeon: Gillian Clayton MD;  Location: [facility] OR;  Service: Cardiothoracic;  Laterality: N/A;  ECHO NOTIFIED    EGD, WITH CLOSED BIOPSY N/A 11/20/2023    Procedure: EGD, WITH CLOSED BIOPSY;  Surgeon: Christina James MD;  Location: [facility] ENDOSCOPY;  Service: Gastroenterology;  Laterality: N/A;    ENDOSCOPIC HARVEST OF VEIN Left 12/05/2024    Procedure: HARVEST-VEIN-ENDOVASCULAR;  Surgeon: Gillian Clayton MD;  Location: [facility] OR;  Service: Cardiothoracic;  Laterality: Left;    ESOPHAGOGASTRODUODENOSCOPY N/A 06/07/2021     EXCISION OF ARTERIOVENOUS FISTULA N/A 06/01/2018    FRACTIONAL FLOW RESERVE (FFR), CORONARY  04/10/2023    Procedure: Fractional Flow [place] (FFR), Coronary;  Surgeon: Jaswant Pugh MD;  Location: [facility] CATH LAB;  Service: Cardiology;;    HERNIA REPAIR      INSPECTION OF UPPER INTESTINAL TRACT N/A 06/07/2021    OCCLUSION OF LEFT ATRIAL APPENDAGE Left 12/05/2024    Procedure: Left atrial appendage occlusion;  Surgeon: Gillian Clayton MD;  Location: [facility] OR;  Service: Cardiothoracic;  Laterality: Left;    PHERESIS OF PLASMA N/A 07/13/2018    PHERESIS OF PLASMA N/A 05/25/2018    TRIAL OF SPINAL CORD NERVE STIMULATOR N/A 05/12/2022    Procedure: TRIAL, NEUROSTIMULATOR, SPINAL CORD;  Surgeon: Jay Pozo MD;  Location: [facility] OR;  Service: Neurosurgery;  Laterality: N/A;    UPPER GI N/A 06/07/2021      Social History     Socioeconomic History    Marital status: Single   Tobacco Use    Smoking status: Every Day     Current packs/day: 0.50     Average packs/day: 2.6 packs/day for 33.0 years (84.4 ttl pk-yrs)     Types: Cigarettes     Start date: 1992     Last attempt to quit: 01/1999     Passive exposure: Current    Smokeless tobacco: Former     Types: Chew    Tobacco comments:     Pt is smoking 1/2 pack per day   Vaping Use    Vaping status: Never Used   Substance and Sexual Activity    Alcohol use: Not Currently    Drug use: Never    Sexual activity: Yes     Partners: Female     Social Drivers of Health     Financial Resource Strain: Low Risk  (6/11/2025)    Overall Financial Resource Strain (CARDIA)     Difficulty of Paying Living Expenses: Not hard at all   Food Insecurity: No Food Insecurity (6/11/2025)    Hunger Vital Sign     Worried About Running Out of Food in the Last Year: Never true     Ran Out of Food in the Last Year: Never true   Transportation Needs: No Transportation Needs (6/11/2025)    PRAPARE - Transportation     Lack of Transportation (Medical): No     Lack of Transportation  (Non-Medical): No   Physical Activity: Inactive (6/11/2025)    Exercise Vital Sign     Days of Exercise per Week: 0 days     Minutes of Exercise per Session: 0 min   Stress: No Stress Concern Present (6/11/2025)    [employer] - Occupational Stress Questionnaire     Feeling of Stress : Not at all   Housing Stability: Low Risk  (6/11/2025)    Housing Stability Vital Sign     Unable to Pay for Housing in the Last Year: No     Homeless in the Last Year: No   .      Current Medications[1]    Review of Systems   Skin:  Positive for wound.   All other systems reviewed and are negative.         Labs Reviewed:   Chemistry:  Lab Results   Component Value Date    BUN 13.1 06/13/2025    BUN 20.0 06/12/2025    CREATININE 0.84 06/13/2025    CREATININE 1.15 06/12/2025    EGFRNORACEVR >60 06/13/2025    EGFRNORACEVR >60 06/12/2025     (H) 06/13/2025    AST 59 (H) 06/12/2025    ALT 69 (H) 06/13/2025    ALT 44 06/12/2025    HGBA1C 11.8 (H) 06/10/2025        Hematology:  Lab Results   Component Value Date    WBC 11.08 06/13/2025    WBC 11.47 06/12/2025    HGB 13.5 (L) 06/13/2025    HGB 12.9 (L) 06/12/2025    HCT 39.3 (L) 06/13/2025    HCT 37.5 (L) 06/12/2025     06/13/2025     06/12/2025       Inflammatory Markers:  Lab Results   Component Value Date    HSCRP 9.98 (H) 06/23/2020    HSCRP 43.30 (H) 09/10/2019    SEDRATE 26 (H) 06/22/2022    SEDRATE 28 (H) 06/23/2020    SEDRATE 2 09/10/2019        Objective:        Physical Exam  Vitals reviewed.   Cardiovascular:      Pulses:           Dorsalis pedis pulses are 2+ on the right side and 2+ on the left side.        Posterior tibial pulses are 2+ on the right side and 2+ on the left side.   Musculoskeletal:        Feet:    Feet:      Right foot:      Skin integrity: Dry skin present.      Toenail Condition: Right toenails are abnormally thick.      Left foot:      Skin integrity: Ulcer and dry skin present.      Toenail Condition: Left  toenails are abnormally thick.      Comments: Left plantar surface wound granulated.  Skin:     General: Skin is cool and dry.      Capillary Refill: Capillary refill takes less than 2 seconds.   Neurological:      Mental Status: He is alert.                  Assessment:         ICD-10-CM ICD-9-CM   1. Superficial foreign body of left foot without major open wound and without infection, subsequent encounter  S90.852D V58.89   2. Scab  R23.4 782.8   3. Diabetic polyneuropathy associated with other specified diabetes mellitus  E13.42 250.60     357.2   4. Type 2 diabetes mellitus with hyperglycemia, with long-term current use of insulin  E11.65 250.00    Z79.4 790.29     V58.67   5. Tobacco user  Z72.0 305.1           Plan:   Tissue pathology and/or culture taken:  [] Yes [x] No   Sharp debridement performed:   [] Yes [x] No   Labs ordered this visit:   [] Yes [x] No   Imaging ordered this visit:   [] Yes [x] No         1. Superficial foreign body of left foot without major open wound and without infection, subsequent and counter    Granulated.    Will cleanse daily with soap and water rinse pat dry wear clean dry socks.   2. Scab     Resolved.   3. Diabetic polyneuropathy associated with other specified diabetes mellitus     Reinforced diet and medication compliance.    Co-factor in wound development.    Last A1c 11.1.    Must have a strict diabetic diet, take glucose lowering medications as prescribed and encourage lower HA1C.      4. Type 2 diabetes mellitus with hyperglycemia, with long-term current use of insulin     Co-factor delayed wound healing.    Last A1c 11.1.    Reinforced diet and medication compliance.         5. Tobacco user     Reinforced the importance of smoking cessation.    Currently on smoking cessation program.    Must stop smoking. Explained the negative impact this has on not only the wounds (delayed healing), but overall health as well.       There are no Patient Instructions on file for this  visit.   The time spent including preparing to see the patient, obtaining patient history and assessment, evaluation of the plan of care, patient/caregiver counseling and education, orders, documentation, coordination of care, and other professional medical management activities for today's encounter was 20 minute.    Time spent performing procedures during today's encounter was 20 minute.    Follow up in about 1 month (around 7/24/2025).  Teaching provided on s/s to call wound clinic for promptly.  ER precautions taught for after hours and weekends.       ROCCO Sequeira            [1]   Current Outpatient Medications   Medication Sig Dispense Refill    amitriptyline (ELAVIL) 50 MG tablet TAKE ONE TABLET BY MOUTH IN THE EVENING 30 tablet 4    aspirin (ECOTRIN) 81 MG EC tablet Take 81 mg by mouth once daily.      atorvastatin (LIPITOR) 80 MG tablet Take 1 tablet (80 mg total) by mouth once daily. 90 tablet 3    cholecalciferol, vitamin D3, 125 mcg (5,000 unit) capsule Take 1 capsule (5,000 Units total) by mouth once daily. 30 capsule 11    cholestyramine (QUESTRAN) 4 gram packet Take 1 packet (4 g total) by mouth 3 (three) times daily with meals. 270 packet 3    clonazePAM (KLONOPIN) 1 MG tablet TAKE ONE TABLET BY MOUTH TWICE DAILY 60 tablet 0    cloNIDine (CATAPRES) 0.3 MG tablet Take 1 tablet (0.3 mg total) by mouth 3 (three) times daily. 270 tablet 3    clopidogreL (PLAVIX) 75 mg tablet Take 4 tablets on day 1 and then one tablet daily. 90 tablet 3    CREON 36,000-114,000- 180,000 unit CpDR TAKE ONE CAPSULE THREE TIMES DAILY 270 capsule 1    dapagliflozin propanediol (FARXIGA) 5 mg Tab tablet Take 2 tablets (10 mg total) by mouth once daily. 180 tablet 1    ergocalciferol (ERGOCALCIFEROL) 50,000 unit Cap Take 1 capsule (50,000 Units total) by mouth every 7 days. 4 capsule 2    EScitalopram oxalate (LEXAPRO) 20 MG tablet Take 20 mg by mouth once daily.      esomeprazole (NEXIUM) 40 MG capsule Take 1 capsule  (40 mg total) by mouth before breakfast. 30 capsule 11    gabapentin (NEURONTIN) 800 MG tablet TAKE ONE TABLET BY MOUTH IN THE EVENING 90 tablet 3    HUMALOG U-100 INSULIN 100 unit/mL injection INJECT 25 UNITS SUBCUTANEOUSLY BEFORE MEALS THREE TIMES DAILY 10 mL 3    HYDROmorphone (DILAUDID) 8 MG tablet Take 1 tablet (8 mg total) by mouth every 8 (eight) hours as needed. 90 tablet 0    icosapent ethyL (VASCEPA) 1 gram Cap TAKE TWO CAPSULES BY MOUTH TWICE DAILY 60 capsule 3    insulin glargine U-100, Lantus, (LANTUS SOLOSTAR U-100 INSULIN) 100 unit/mL (3 mL) InPn pen Inject 40 Units into the skin 2 (two) times a day. 24 mL 11    isosorbide mononitrate (IMDUR) 120 MG 24 hr tablet Take 1 tablet (120 mg total) by mouth once daily. 90 tablet 3    linaGLIPtin (TRADJENTA) 5 mg Tab tablet Take 1 tablet (5 mg total) by mouth once daily. 90 tablet 3    losartan (COZAAR) 100 MG tablet Take 1 tablet (100 mg total) by mouth once daily. 90 tablet 3    metoprolol succinate (TOPROL-XL) 25 MG 24 hr tablet Take 1 tablet (25 mg total) by mouth once daily. 90 tablet 3    morphine (MS CONTIN) 100 MG 12 hr tablet TAKE ONE TABLET BY MOUTH THREE TIMES DAILY 90 tablet 0    nicotine (NICODERM CQ) 21 mg/24 hr Place 1 patch onto the skin once daily. 28 patch 0    NIFEdipine (PROCARDIA-XL) 90 MG (OSM) 24 hr tablet Take 1 tablet (90 mg total) by mouth once daily. 30 tablet 1    nitroGLYCERIN (NITROSTAT) 0.3 MG SL tablet Place 1 tablet (0.3 mg total) under the tongue every 5 (five) minutes as needed for Chest pain (go to er after 3 doses). 30 tablet 6    nut.tx.gluc intol,lf,soy-fiber (GLUCERNA 1.5 CEDRIC) 0.08-1.5 gram-kcal/mL Liqd Take 273 mLs by mouth 2 (two) times a day. 60 each 4    OXcarbazepine (TRILEPTAL) 600 MG Tab Take 1 tablet (600 mg total) by mouth 2 (two) times daily. 60 tablet 11    potassium chloride SA (K-DUR,KLOR-CON) 20 MEQ tablet Take 1 tablet (20 mEq total) by mouth 2 (two) times daily. 180 tablet 3    pregabalin (LYRICA) 150 MG  "capsule Take 1 capsule (150 mg total) by mouth 3 (three) times daily. 90 capsule 3    ranolazine (RANEXA) 500 MG Tb12 Take 1 tablet (500 mg total) by mouth 2 (two) times daily. 180 tablet 3    REPATHA SURECLICK 140 mg/mL PnIj INJECT 1ml INTRAMUSCULARLY EVERY 14 DAYS 2 mL 11    sucralfate (CARAFATE) 100 mg/mL suspension Take 10 mLs (1 g total) by mouth 4 (four) times daily before meals and nightly. 1200 mL 3    SURE COMFORT INSULIN SYRINGE 0.5 mL 31 gauge x 5/16" Syrg USE ONE SYRINGE THREE TIMES DAILY 100 each 3    tamsulosin (FLOMAX) 0.4 mg Cap TAKE ONE CAPSULE BY MOUTH EVERY DAY 90 capsule 3    torsemide (DEMADEX) 20 MG Tab Take 1 tablet (20 mg total) by mouth once daily. 90 tablet 3    TOUJEO SOLOSTAR U-300 INSULIN 300 unit/mL (1.5 mL) InPn pen INJECT 35 SUBCUTANEOUSLY TWICE DAILY 4.5 mL 3     No current facility-administered medications for this encounter.     "

## 2025-07-01 ENCOUNTER — EXTERNAL HOME HEALTH (OUTPATIENT)
Dept: HOME HEALTH SERVICES | Facility: HOSPITAL | Age: 45
End: 2025-07-01
Payer: MEDICARE

## 2025-07-01 ENCOUNTER — DOCUMENT SCAN (OUTPATIENT)
Dept: HOME HEALTH SERVICES | Facility: HOSPITAL | Age: 45
End: 2025-07-01
Payer: MEDICARE

## 2025-07-02 ENCOUNTER — TELEPHONE (OUTPATIENT)
Dept: INTERNAL MEDICINE | Facility: CLINIC | Age: 45
End: 2025-07-02
Payer: MEDICARE

## 2025-07-02 DIAGNOSIS — C25.9 MALIGNANT NEOPLASM OF PANCREAS, UNSPECIFIED LOCATION OF MALIGNANCY: Primary | ICD-10-CM

## 2025-07-02 NOTE — TELEPHONE ENCOUNTER
I spoke to Dr. Mcclure's office at patient's request - Biopsy EUS confirms pancreatic cancer. I have callede the patient and told him we are making an appointment with Dr. Venessa little - Dr. Mcclure's nurse said she is trying to make an appointment for him as well. Patient is to see me 7/15/2025. Will call if he needs anything before.  Please call Dr Berry's office forn an appointment asap.  I have put in the referral.

## 2025-07-03 ENCOUNTER — TELEPHONE (OUTPATIENT)
Dept: HEMATOLOGY/ONCOLOGY | Facility: CLINIC | Age: 45
End: 2025-07-03
Payer: MEDICARE

## 2025-07-03 ENCOUNTER — TELEPHONE (OUTPATIENT)
Dept: SMOKING CESSATION | Facility: CLINIC | Age: 45
End: 2025-07-03
Payer: MEDICARE

## 2025-07-06 PROBLEM — C77.2: Status: ACTIVE | Noted: 2025-07-06

## 2025-07-06 PROBLEM — R59.0: Status: ACTIVE | Noted: 2025-07-06

## 2025-07-06 PROBLEM — K52.9 POSTPRANDIAL DIARRHEA: Status: ACTIVE | Noted: 2025-07-06

## 2025-07-06 PROBLEM — R79.89 HIGH SERUM CARBOHYDRATE ANTIGEN 19-9 (CA19-9): Status: ACTIVE | Noted: 2025-07-06

## 2025-07-06 PROBLEM — R59.0 PERIPORTAL LYMPHADENOPATHY: Status: ACTIVE | Noted: 2025-07-06

## 2025-07-06 PROBLEM — C25.0 ADENOCARCINOMA OF HEAD OF PANCREAS: Status: ACTIVE | Noted: 2025-07-06

## 2025-07-06 PROBLEM — R97.0 HIGH CARCINOEMBRYONIC ANTIGEN (CEA): Status: ACTIVE | Noted: 2025-07-06

## 2025-07-06 PROBLEM — K86.89 PANCREATIC ATROPHY: Status: ACTIVE | Noted: 2025-07-06

## 2025-07-06 PROBLEM — R10.13 POSTPRANDIAL EPIGASTRIC PAIN: Status: ACTIVE | Noted: 2025-07-06

## 2025-07-06 PROBLEM — C25.9: Status: ACTIVE | Noted: 2025-07-06

## 2025-07-06 NOTE — PROGRESS NOTES
History:  Past Medical History:   Diagnosis Date    Abdominal pain     Acquired perforating dermatosis 08/30/2023    Anxiety     Aortic atherosclerosis 01/24/2023    Appetite loss     Balance problem     Bilateral edema of lower extremity 02/06/2023    Bladder outflow obstruction 06/20/2022    Blurred vision, right eye 10/19/2022    BMI 34.0-34.9,adult 06/20/2022    BPH (benign prostatic hyperplasia)     Chest pain     Chronic liver failure without hepatic coma 04/25/2023    Chronic pain 10/25/2022    Chronic pain syndrome 05/12/2022    Chronic pancreatitis 10/28/2017    Coronary artery disease, unspecified vessel or lesion type, unspecified whether angina present, unspecified whether native or transplanted heart     Deafness 10/03/2023    Depression     Diabetes     Diabetic polyneuropathy 06/20/2022    Elevated LFTs 08/18/2022    Fatigue     GERD (gastroesophageal reflux disease)     Hand paresthesia 06/20/2022    Headache     Hepatic steatosis     History of continuous positive airway pressure (CPAP) therapy at home     History of pancreatitis 06/20/2022    History of recent fall     Hypertension     Hypertriglyceridemia     Insomnia     Kidney disease     Kidney disorder     Leg pain     Mixed hyperlipidemia 10/28/2017    Mononeuritis multiplex 06/20/2022    Morbid obesity     Nausea & vomiting     Neuropathy     Nocturia 06/20/2022    NSTEMI (non-ST elevated myocardial infarction) 10/2024    OA (osteoarthritis)     Obesity     Obstructive sleep apnea syndrome 06/20/2022    Onychomycosis of multiple toenails with type 2 diabetes mellitus 10/28/2017    Open wound of finger of right hand 10/20/2023    CICI (obstructive sleep apnea)     uses CPAP    Painful diabetic neuropathy 05/12/2022    Personal history of colonic polyps 08/18/2022    Polyneuropathy     Primary hypertension 10/28/2017    Recurrent pancreatitis     Renal insufficiency     Tobacco abuse     Tobacco user 06/20/2022    Type 2 diabetes mellitus with  skin complication, with long-term current use of insulin 02/06/2023    Urinary frequency     Use of cane as ambulatory aid     Weight loss, unintentional        Past Surgical History:   Procedure Laterality Date    ANGIOGRAM, CORONARY, WITH LEFT HEART CATHETERIZATION N/A 04/10/2023    Procedure: Angiogram, Coronary, with Left Heart Cath;  Surgeon: Jaswant Pugh MD;  Location: [facility] CATH LAB;  Service: Cardiology;  Laterality: N/A;    ANGIOGRAM, CORONARY, WITH LEFT HEART CATHETERIZATION N/A 10/10/2024    Procedure: Angiogram, Coronary, with Left Heart Cath;  Surgeon: Jaswant Pugh MD;  Location: [facility] CATH LAB;  Service: Cardiology;  Laterality: N/A;    APPENDECTOMY      ARTERIOVENOUS ANASTOMOSIS, OPEN, UPPER ARM BASILLIC VEIN TRANSPOSITION N/A 05/07/2018    CORONARY ARTERY BYPASS GRAFT (CABG) N/A 12/05/2024    Procedure: CORONARY ARTERY BYPASS GRAFT (CABG);  Surgeon: Gillian Clayton MD;  Location: [facility] OR;  Service: Cardiothoracic;  Laterality: N/A;  ECHO NOTIFIED    EGD, WITH CLOSED BIOPSY N/A 11/20/2023    Procedure: EGD, WITH CLOSED BIOPSY;  Surgeon: Christina James MD;  Location: [facility] ENDOSCOPY;  Service: Gastroenterology;  Laterality: N/A;    ENDOSCOPIC HARVEST OF VEIN Left 12/05/2024    Procedure: HARVEST-VEIN-ENDOVASCULAR;  Surgeon: Gillian Clayton MD;  Location: [facility] OR;  Service: Cardiothoracic;  Laterality: Left;    ESOPHAGOGASTRODUODENOSCOPY N/A 06/07/2021    EXCISION OF ARTERIOVENOUS FISTULA N/A 06/01/2018    FRACTIONAL FLOW RESERVE (FFR), CORONARY  04/10/2023    Procedure: Fractional Flow [place] (FFR), Coronary;  Surgeon: Jaswant Pugh MD;  Location: [facility] CATH LAB;  Service: Cardiology;;    HERNIA REPAIR      INSPECTION OF UPPER INTESTINAL TRACT N/A 06/07/2021    OCCLUSION OF LEFT ATRIAL APPENDAGE Left 12/05/2024    Procedure: Left atrial appendage occlusion;  Surgeon: Gillian Clayton MD;  Location: [facility] OR;  Service: Cardiothoracic;  Laterality: Left;    PHERESIS  OF PLASMA N/A 07/13/2018    PHERESIS OF PLASMA N/A 05/25/2018    TRIAL OF SPINAL CORD NERVE STIMULATOR N/A 05/12/2022    Procedure: TRIAL, NEUROSTIMULATOR, SPINAL CORD;  Surgeon: Jay Pozo MD;  Location: [facility];  Service: Neurosurgery;  Laterality: N/A;    UPPER GI N/A 06/07/2021      Social History     Socioeconomic History    Marital status: Single   Tobacco Use    Smoking status: Every Day     Current packs/day: 0.50     Average packs/day: 2.6 packs/day for 33.0 years (84.4 ttl pk-yrs)     Types: Cigarettes     Start date: 1992     Last attempt to quit: 01/1999     Passive exposure: Current    Smokeless tobacco: Former     Types: Chew    Tobacco comments:     Pt is smoking 1/2 pack per day   Vaping Use    Vaping status: Never Used   Substance and Sexual Activity    Alcohol use: Not Currently    Drug use: Never    Sexual activity: Yes     Partners: Female     Social Drivers of Health     Financial Resource Strain: Low Risk  (6/11/2025)    Overall Financial Resource Strain (CARDIA)     Difficulty of Paying Living Expenses: Not hard at all   Food Insecurity: No Food Insecurity (6/11/2025)    Hunger Vital Sign     Worried About Running Out of Food in the Last Year: Never true     Ran Out of Food in the Last Year: Never true   Transportation Needs: No Transportation Needs (6/11/2025)    PRAPARE - Transportation     Lack of Transportation (Medical): No     Lack of Transportation (Non-Medical): No   Physical Activity: Inactive (6/11/2025)    Exercise Vital Sign     Days of Exercise per Week: 0 days     Minutes of Exercise per Session: 0 min   Stress: No Stress Concern Present (6/11/2025)    [employer] - Occupational Stress Questionnaire     Feeling of Stress : Not at all   Housing Stability: Low Risk  (6/11/2025)    Housing Stability Vital Sign     Unable to Pay for Housing in the Last Year: No     Homeless in the Last Year: No      Family History   Problem Relation Name Age of  Onset    Obesity Father        Reason for Follow-up:  Reason for consultation:  -adenocarcinoma of pancreatic head and neck, poorly-differentiated, S/P upper EUS 06/26/2025; 3.4 x 2.6 cm per CT, 30 mm x 30 mm per upper EUS; borderline resectable; malignant portal and peripancreatic lymphadenopathy per upper EUS (not biopsied); endo sonographically T3 N2 MX; TNM cT2 cN0 MX, at least stage IB  -chronic postprandial abdominal pain, chronic postprandial diarrhea, chronic pancreatitis, familial hypertriglyceridemia, pancreatic atrophy, chronic steatorrhea, uncontrolled NIDDM  -painful peripheral diabetic neuropathy, mononeuritis multiplex, spinal cord stimulator, chronic liver failure, hepatic steatosis, hypertension, CKD, CAD, CABG, history of NSTEMI, etc.     History of Present Illness:   Pancreatic Cancer (Adenocarcinoma of head of pancreas)      Oncologic/Hematologic History:  Oncology History   Adenocarcinoma of head of pancreas   6/26/2025 Cancer Staged    Staging form: Exocrine Pancreas, AJCC 8th Edition  - Clinical stage from 6/26/2025: Stage III (cT3, cN2, cM0)     7/6/2025 Initial Diagnosis    Adenocarcinoma of head of pancreas     Past medical history:  A multitude of medical illnesses including anxiety, depression, NIDDM (diagnosed 2016), familial hypertriglyceridemia, blurred vision right eye, ulcer of left heel and midfoot, chronic liver failure without hepatic coma, chronic pancreatitis (diagnosed more than 10 years ago and placed on pancreatic enzyme replacement), diabetic polyneuropathy (painful; left upper extremity numbness/tingling; bilateral lower extremity numbness and tingling) (NERVO spinal cord stimulator placed 07/21/2022), hepatic steatosis, acquired perforating dermatosis, anorexia, hypertension, kidney disease, mononeuritis multiplex, morbid obesity, history of NSTEMI (10/2024), CICI, tobacco abuse, etc.; impaired functional mobility, balance, gait, and endurance; numbness and weakness in  hands (no improvement with PT/OT) (unable to have MRI C-spine done due to spinal cord stimulator) (worsening burning/numbness and tingling right lower extremity; worsening in hands)  Very hard of hearing  Procedure/surgical history:  A multitude of procedures including coronary angiogram and C 04/10/2023; coronary angiogram and C 10/10/2024; appendectomy; arteriovenous anastomosis, open, upper arm basilic vein transposition 05/07/2018; CABG 12/05/2024 (CAD, S/P CABG x2 with the left atrial appendage ligation 12/2024); EGD 11/20/2023; endoscopic harvest of vein 12/05/2024; EGD 06/07/2021; excisional of arteriovenous fistula 06/01/2018; hernia repair; occlusion of left atrial appendage 12/05/2024; plasmapheresis 07/13/2018, 05/25/2018; trial of spinal cord nerve stimulation 05/12/2022 (NERVO spinal cord stimulator placed 07/21/2022), etc.  -05/12/2022:  Fluoroscopically guided placement of 2 spinal cord stimulator leads under anesthesia for programming and trial (indication: Chronic pain syndrome; painful diabetic neuropathy)  -11/20/2023:  Gastric biopsy: Mild chronic gastritis with a lamina propria fibrosis; negative for H pylori; negative for dysplasia  Social history: Single.  Lives in [place].  His brother and sister-in-law live a short distance from him.  Has a 21-year-old son who lives separately.  Does not work.  Has been smoking 1-1-1/2 ppd X 14 years.  Used to drink 6 pack beer over the weekend; drank for 2 years; quit 13 years ago.  Denies illicit drugs.  Family history:  Several family members on father's side of family experienced cancer (he does not know the details)  Health maintenance: PCP at [facility].      45-year-old gentleman, referred by Kolby Mcclure MD, GI, with adenocarcinoma of pancreas.    Hospitalized 06/10/2025-06/13/2025:  direct admit from clinic with complaints of postprandial diarrhea and uncontrolled diabetes on 6/10/2025.   patient states that diarrhea has been on going for about a year,  with loose, watery stools red-orange in color typically occurring about 30 minutes to 1 hour after eating.   States that he has been avoiding eating to avoid abdominal pain and having a bowel movement. Abdominal pain is right sided and worse after eating.   Patient additionally reports having a couple episodes of hematemesis within the past 2-3 months.   Patient is a diabetic, diagnosed in 2016, on insulin. States he takes basal insulin in the morning and evening, 80 U each, as well as prandial insulin TID of 25 U. POC A1c in clinic was 12.6.   Patient additionally has chronic chest pain and shortness of breath, s/p CABG 12/2024 with L atrial appendage ablation.   Patient has a left lateral foot wound following stepping a piece of glass a and was seen by wound care on 6/9/25.   Patient smokes 1/2 ppd. Of note, per his PCP's note, patient was seen by GI and a GI work up in March 2025 revealed nonspecific finding of stool positive for leukocytes, negative for parasites, Giardia, and cryptosporidium. Final stool culture negative March 2025.  U/S abdomen 3/18/25 revealed gallbladder sludge and was offered a referral for cholecystectomy   ED Course - On arrival, patient afebrile (98.1 F), hypertensive (172/90), HR 79, RR 17, O2 98% RA. POC A1c 12.6 in clinic. Repeat on arrival was 11.8. Labs and GI work up ordered. GI to be consulted. Will be admitted for management and uncontrolled diabetes and work up of chronic diarrhea  Blood sugars much better controlled on new regime.   Work up did reveal a pancreatic head mass. CA-19-9 elevated to over 700. Given his current anticoagulation status, he will need EUS as an outpatient. Mass noted to be 3.4 x 2.6 cm encasing a portion of the portal vein at the zainab splenic confluence causing portal vein high grade stenosis.  Diarrhea improved. Patient on Lantus 40U BID and aspart 7U TID    -postprandial diarrhea; on Creon and cholestyramine  -chronic postprandial abdominal  pain  -follows up with cardiology for history of CAD  -chronic liver failure, chronically uncontrolled NIDDM, pancreatic insufficiency, chronic pancreatitis diagnosed more than 10 years ago, etc.    Investigations reviewed:  -03/20/2025:  Limited abdominal ultrasound (fatty change of liver; comparison 10/10/2024): Hepatomegaly; sludge in gallbladder (liver enlarged, 20 cm; no focal liver lesions; pancreas not visualized)  -05/21/2025:  CT A/P without contrast (chronic pain syndrome; comparison 10/07/2024):  Postsurgical changes in the thorax.   Mild splenomegaly.   Abnormal appearance of the pancreas, with soft tissue fullness in the pancreatic head, pancreatic duct dilation, 16 mm fluid density structure at the pancreatic neck, and mild peripancreatic edema.  Given noncontrast nature of today's study, it is difficult to discern if this represents an underlying pancreatic head mass or changes of pancreatitis with potential pancreatic duct stricture and pseudocyst.   Probable stenosis or thrombosis of the main portal vein, with venous collaterals in the upper abdomen.   These findings would be better evaluated with a dedicated pancreatic protocol MRI, with and without contrast or if MRI contraindicated a dedicated contrast enhanced pancreatic protocol CT.   Lower lumbar spine facet arthropathy.   Subcutaneous stimulator lead in the thoracic spine.   Umbilical hernia repair changes.  -06/11/2025: CT A/P without contrast (abdominal pain; comparison 05/21/2025):  Persistent enlargement of the pancreatic head (1.5 cm) with some peripancreatic edema and inflammatory changes in the hypoattenuated area in the head of the pancreas.  This could represent  pancreatitis with pseudocyst formation versus underlying pancreatic lesion.  As stated on the prior report contrast enhanced examination with delayed imaging, pancreatic mass protocol is recommended.   Distended gallbladder   Multiple varicosities seen near the zainab hepatis  and extending down the right pericolic gutter.  Portal vein thrombosis should be excluded.  This can be evaluated during the CT scan Recommended above  -06/11/2025: CT abdomen pelvis with IV contrast:  Findings concerning for mass in the head of the pancreas with secondary pancreatic ductal dilatation as outlined above.  There is atrophy of the body and tail the pancreas as well.  (pancreatic duct is dilated; body and tail of the pancreas are atrophic; pancreatic head is enlarged and there is a suspected pancreatic head mass 3.4 x 2.6 cm; some pancreatic ductal dilatation seen distal to it; dilated pancreatic duct just proximal to pancreatic head lesion; the lesion encases a portion of the portal vein at the portal splenic confluence which seems to be causing the portal vein high-grade stenosis; gastroduodenal artery intimately abuts the lesion but is not encased; hepatic artery also abuts the lesion but is not encased; celiac axis is not encased; SMA is not encased)   (A couple of punctate subcentimeter lymph nodes are seen at the mesenteric root)  Hepatomegaly and hepatic steatosis  High-grade area of stenosis in the portal vein at the level of the pancreatic head mass or splenic confluence.  This is likely due to tumor encasing the portal vein versus thrombus within the portal vein.  There are multiple varicosities seen at the zainab hepatis extending down the right pericolic gutter.  (high-grade area of stenosis measuring 95% in the portal vein near the portal splenic confluence; it may represent portal vein thrombosis; some collateral varicosities are seen at the level of the zainab hepatis and extending down the right pericolic gutter)  Gallbladder is distended; no gallstones; no biliary dilatation; no pericholecystic fluid  -06/12/2025: Limited abdominal ultrasound (question of portal vein thrombosis):  Mild hepatomegaly; patent portal vein; gallbladder sludge but no gallstones; gallbladder is distended; no  gallstones  -06/26/2025:  EGD (Dr. Kolby Mcclure MD, GI):  Antral gastritis  -06/26/2025:  Upper EUS (Kolby Mcclure MD, GI):  Normal EUS of whole examined duodenum, CBD, gallbladder, ampulla; additional finding includes mass in the head and neck of pancreas (poorly-defined mass, 30 mm x 30 mm; multiple malignant-appearing portal and peripancreatic lymph nodes noted, largest lymph node 11 x 14 mm; the mass encases the portal vein consonance (endoscopically, T3 N2 MX); main pancreatic duct was dilated 11 mm in the head just distal to the mass and 4 mm in the body which also appears dilated; severe atrophy noted in the body and tail of pancreas; normal EUS appearance left lobe of the liver  -06/26/2025:  Head and neck of pancreas, EUS guided biopsy:  Pancreatic ductal adenocarcinoma, poorly-differentiated    Labs reviewed:  -CBC unremarkable  -alk-phos elevated:  637 on 06/13/2025, 442 on 06/12/2025, 516 on 06/11/2025, etc.; alk-phos is chronically elevated, at least as far back as 01/2017)  -chronic hyperbilirubinemia: Albumin 2.5 on 06/13/2025  -bilirubin normal  -AST chronically elevated  -ALT intermittently elevated    -06/12/2025: CEA 11.77 elevated  -06/12/2025:  CA 19-9 level 769.21    07/07/2025:   Fully alert, reasonably healthy-appearing gentleman who presents for initial medical oncology consultation.  Accompanied by his sister-in-law.  Very hard of hearing.  He sister in law help intrinsic conversation.  Poor appetite.  Unspecified weight loss.  ECOG 1.  Postprandial pain.  -postprandial diarrhea; on Creon and cholestyramine  -chronic postprandial abdominal pain; says that the pain started after CABG in December 2024  -follows up with cardiology for history of CAD  -chronic liver failure, chronically uncontrolled NIDDM, pancreatic insufficiency, chronic pancreatitis diagnosed more than 10 years ago, etc.  -relates history of plasmapheresis for severe hypertriglyceridemia (according to him, in the 22,000  range) by nephrology service at [facility] several years ago  ECOG 1    Interval History:  [No matching plan found]   [No matching plan found]     Immunization History   Administered Date(s) Administered    Influenza - Quadrivalent 10/01/2019, 09/22/2020, 12/14/2021    Influenza - Quadrivalent - PF (6-35 months) 11/16/2017, 10/23/2018    Influenza - Quadrivalent - PF *Preferred* (6 months and older) 09/23/2016, 10/01/2019, 09/22/2020, 12/14/2021, 10/25/2022    Influenza - Trivalent - Fluarix, Flulaval, Fluzone, Afluria - PF 12/15/2014, 11/25/2015    Pneumococcal Conjugate - 20 Valent 06/04/2024    Pneumococcal Polysaccharide - 23 Valent 07/16/2019    Tdap 11/14/2016     Allergies as of 07/07/2025    (No Known Allergies)     Medications:  Medications Ordered Prior to Encounter[1]    Review of Systems:   All systems reviewed and found to be negative except for the symptoms detailed above    Physical Examination:   VITAL SIGNS:   Vitals:    07/07/25 1008   BP: (!) 188/94   Pulse: 75   Resp:    Temp:      GENERAL:  In no apparent distress.    HEAD:  No signs of head trauma.  EYES:  Pupils are equal.  Extraocular motions intact.    EARS:  Hearing grossly intact.  MOUTH:  Oropharynx is normal.   NECK:  No adenopathy, no JVD.     CHEST:  Chest with clear breath sounds bilaterally.  No wheezes, rales, rhonchi.    CARDIAC:  Regular rate and rhythm.  S1 and S2, without murmurs, gallops, rubs.  VASCULAR:  No Edema.  Peripheral pulses normal and equal in all extremities.  ABDOMEN:  Soft, without detectable tenderness.  No sign of distention.  No   rebound or guarding, and no masses palpated.   Bowel Sounds normal.  MUSCULOSKELETAL:  Good range of motion of all major joints. Extremities without clubbing, cyanosis or edema.    NEUROLOGIC EXAM:  Alert and oriented x 3.  No focal sensory or strength deficits.   Speech normal.  Follows commands.  PSYCHIATRIC:  Mood normal.    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 Spencer Molina, as well at this time for him to get family rise with the case  -we will consult Surgical Oncology at this time as well for them to get familiarized with the case  -07/07/2025:  Refer to surgery for MediPort placement for chemotherapy  Follow-up in 2 weeks, after scans and labs    Above discussed with the patient.  All questions answered.    Discussed scans, labs, pathology report, and pancreatic cancer staging, and gave him copies of relevant results.  Plan of management discussed in detail.  Guarded prognosis discussed.  He understands and agrees with this plan.  ================================      # Adenocarcinoma pancreatic head:  -presentation:  Chronic postprandial abdominal pain, chronic postprandial diarrhea  -in the setting of familial hypertriglyceridemia, chronic pancreatitis, chronic steatorrhea  -uncontrolled NIDDM, etc.  -limited abdominal ultrasound 03/20/2025:  Hepatomegaly, sludge in gallbladder, liver 20 cm  -CT abdomen pelvis without contrast 05/21/2025:  Abnormal pancreatic head with  a 16 mm fluid density pancreatic neck; probable stenosis/thrombosis main portal vein with venous collaterals upper abdomen  -CT abdomen pelvis without contrast 06/11/2025:  Persistently enlarged pancreatic head 1.5 cm (pancreatitis with a pseudocyst formation versus underlying lesion); distended gallbladder; multiple varicosities near the zainab hepatis and extending down the right pericolic gutter, portal vein thrombosis not extruded  -contrast-enhanced CT scans of A/P 06/11/2025:  Pancreatic head mass 3.4 x 2.6 cm, pancreatic head enlargement, atrophy of body and tail of pancreas, the lesion encases a portion of portal vein at the portal splenic confluence, lesion causing high-grade stenosis of portal vein; no encasement of gastroduodenal artery, hepatic artery, celiac axis, SMA; gastroduodenal artery and hepatic artery abuts the lesion; a couple of punctate subcentimeter lymph nodes seen at the mesenteric root; hepatomegaly, hepatic steatosis; high-grade area of stenosis in the portal vein at the level of pancreatic head mass or splenic confluence due to tumor encasing the portal vein versus thrombus within the portal vein; multiple varicosities zainab hepatis extending down the right pericolic gutter; high-grade area of stenosis measures 95% in the portal vein near the portal splenic confluence; some collateral varicosities are seen at the level of the zainab hepatis and extending down the right pericolic gutter; gallbladder is distended; no gallstones; no biliary ductal dilatation  -06/12/2025: Limited abdominal ultrasound:  Mild hepatomegaly, patent portal vein, gallbladder sludge, gallbladder distended, no gallstones  -06/26/2025: Upper EUS:  Mass head and neck of pancreas, poorly-defined, 30 mm x 30 mm, malignant portal and peripancreatic lymphadenopathy (largest lymph node 11 x 14 mm), mass encases the portal vein confluence; endoscopically T3 N2 MX; main pancreatic duct dilated 11 mm in the head just distal  to the mass and 4 mm in the body which also appears dilated; severe atrophy noted in the body and tail of pancreas  -06/26/2025:  Head and neck pancreas, EUS guided biopsy: Pancreatic ductal adenocarcinoma, poorly-differentiated  -CBC unremarkable  -alk-phos elevated:  637 on 06/13/2025, 442 on 06/12/2025, 516 on 06/11/2025, etc.; alk-phos is chronically elevated, at least as far back as 01/2017)  -chronic hyperbilirubinemia: Albumin 2.5 on 06/13/2025  -bilirubin normal  -AST chronically elevated  -ALT intermittently elevated  -06/12/2025: CEA 11.77 elevated  -06/12/2025:  CA 19-9 level 769.21  Briefly:  -Adenocarcinoma head and neck of pancreas:   -presentation: Chronic postprandial abdominal pain, chronic postprandial diarrhea  -in the setting of familial hypertriglyceridemia, chronic pancreatitis, chronic steatorrhea, uncontrolled NIDDM, etc.  -imaging:   Limited abdominal ultrasound 03/20/2025   CT abdomen pelvis without contrast 05/21/2025   CT A/P without contrast 06/11/2025  Contrast-enhanced CT scans of abdomen pelvis 06/11/2025  Limited abdominal ultrasound 06/12/2025  Upper EUS 06/26/2025  -lesions of interest:   Pancreatic head mass 3.4 x 2.6 cm per CT  Pancreatic head and neck mass 30 mm x 30 mm per upper EUS  Malignant portal and peripancreatic lymphadenopathy per upper EUS  Tumor encases a portion of portal vein causing high-grade stenosis of portal vein, 95%, with multiple varicosity/collaterals zainab hepatis  Tumor abuts gastroduodenal artery and hepatic artery but no encasement  No encasement of gastroduodenal artery, hepatic artery, celiac axis, SMA  Severe atrophy body anterior of pancreas  Endo sonographically, T3 N2 MX  TNM:  cT2 cN0 MX, at least stage IB  -by virtue of encasement of portal vein and abutment of gastroduodenal artery and hepatic artery, borderline resectable  >>>  Plan:  -check CBC, CMP, CA 19-9 level  -stage with pancreatic protocol CT of abdomen +contrast-enhanced CT scans of  chest and pelvis  -PET-CT (recommendations:  maybe considered after formal pancreatic CT protocol in patients with high-risk detect extra pancreatic metastases)  -genetic testing for inherited mutations using comprehensive gene panel for hereditary cancer syndromes including pathogenic mutations LEIF, BRCA1, BRCA2, CDKN2A,, MLH1, MSH2, MSH6, PALB2, PMS2, STK11, and TP53  -consider testing for potentially actionable somatic findings including but not limited to fusions (AL K, NRG 1, NTRK, ROS1, FGFR 2, and RET), mutations (BRAF, BRCA1/2, KRAS, PALB2), amplification (HER2), microsatellite instability (MSI), mismatch repair deficiency  -we will consult Surgical Oncology at this time as well for them to get familiarized with the case  -needs neoadjuvant therapy  -subsequent chemoradiation is sometimes included  -for neoadjuvant therapy, consider PET-CT scan before and after initiation to assess response to systemic therapy and for restaging  -1st line neoadjuvant systemic therapy for 6 months pre or perioperatively is recommended  -response assessment: Pancreatic protocol CT or MRI abdomen; CT chest/pelvis with contrast; PET-CT; posttreatment CA 19-9 (check CEA and CA-125 level in patients who are non secreting or those with normal CA 19-9 level)  -we will re-stage after 3 months of neoadjuvant modified FOLFIRINOX, with pancreatic protocol CT +contrast-enhanced CT scans of chest/pelvis, PET-CT, and repeat CA 19-9 level  -will re-stage after 6 months of neoadjuvant modified FOLFIRINOX with pancreatic protocol CT +contrast-enhanced CT scans of chest/pelvis, PET-CT, and repeat CA 19-9 level  -we will request a baseline Surgical Oncology consultation for them to get familiarized with case  -07/07/2025:  Refer to surgery for MediPort placement for chemotherapy  Plans of neoadjuvant chemotherapy finalized after ruling out metastatic disease with scans which we have ordered    Discussion:  Preferred regimens for neoadjuvant  chemotherapy:  -FOLFIRINOX or modified FOLFIRINOX +/- chemoradiation  (FOLFIRINOX or modified FOLFIRINOX she will be limited to those with ECOG 0-1)  -gemcitabine +albumin bound paclitaxel +/- subsequent chemoradiation  >>>  -we will treat with neoadjuvant FOLFIRINOX every 2 weeks for up to 6 months pre or perioperatively, +/-subsequent neoadjuvant chemoradiation therapy    Only if positive for BRCA1/2 or PALB2 mutations:  -FOLFIRINOX or modified FOLFIRINOX +/- subsequent chemoradiation  -gemcitabine +cisplatin (=/> 2-6 cycles) +/- subsequent chemoradiation    Discussion: Monitoring with modified FOLFIRINOX:  -CBC with differential and platelet count prior to each treatment.  -Electrolytes (especially potassium and magnesium) and liver and renal function prior to each treatment.  -Irinotecan is associated with early and late diarrhea, both of which may be severe.  If patient develops abdominal cramping and/or diarrhea within 24 hours of receiving irinotecan, administer atropine (0.3 to 0.6 mg IV) and premedicate with atropine during later cycles. Patient must be instructed in the early use of loperamide for late diarrhea.  -Assess changes in neurologic function prior to each treatment.    Pre treatment considerations with modified FOLFIRINOX:  -Emesis risk: HIGH (greater than 90% frequency of emesis).  -Primary prophylaxis with G-CSF is not warranted in metastatic setting. However, there is risk of grade 3 or 4 neutropenia (46%)  -Do not recommend administration of FOLFIRINOX unless serum bilirubin is normal.  -Maneuvers to prevent neurotoxicity:  patient to avoid exposure to cold during and for approximately 48 hours after each infusion. Prolongation of the oxaliplatin infusion time from two to six hours may mitigate acute neurotoxicity.  -Cardiac issues: QT prolongation and ventricular arrhythmias have been reported after oxaliplatin. ECG monitoring is recommended if therapy is initiated in patients with  heart failure, bradyarrhythmias, coadministration of drugs known to prolong the QT interval, and electrolyte abnormalities. Avoid oxaliplatin in patients with congenital long QT syndrome. Correct hypokalemia and hypomagnesemia prior to initiating oxaliplatin.  Cardiotoxicity observed with FU includes myocardial infarction/ischemia, angina, dysrhythmias, cardiac arrest, cardiac failure, sudden death, electrocardiographic changes, and cardiomyopathy.    Suggested dose modifications for myelotoxicity (modified FOLFIRINOX):  # Do not retreat unless neutrophil count is >=1500/microL and platelets are >=75,000/microL.  # Neutropenia:  If day 1 treatment delayed for granulocytes is <1500/microL or febrile neutropenia or grade 4 neutropenia >7 days: Reduce irinotecan dose to 120 mg/m2.  For second occurrence: Reduce oxaliplatin dose to 60 mg/m2.  If nonrecovery after a two-week delay, or if there is a third occurrence of granulocytes <1500/microL on day 1, discontinue treatment.  For grade 4 neutropenia >7 days during treatment or febrile neutropenia, reduce oxaliplatin dose to 60 mg/m2 and the infusional FU dose to 75% of the original dose.  For the second occurrence, reduce dose of irinotecan to 120 mg/m2 and the dose of infusional FU an additional 25%. Discontinue treatment for third occurrence.  # Thrombocytopenia:  If day 1 treatment delayed for platelet count <75,000/microL, reduce oxaliplatin dose to 60 mg/m2 and reduce the continuous infusion FU to 75% of original doses.  For second occurrence, reduce irinotecan dose to 120 mg/m2.  If nonrecovery after a two-week delay, or if there is a third occurrence of platelets <75,000/microL, discontinue treatment.  # For grade 3 or 4 thrombocytopenia during treatment, reduce oxaliplatin dose to 60 mg/m2 and the infusional FU dose to 75% of the original dose.  For the second occurrence, reduce dose of irinotecan to 120 mg/m2 and the dose of infusional FU an additional 25%.  Discontinue treatment for third occurrence.     #A multitude of comorbid medical conditions:  anxiety, depression,   NIDDM (diagnosed 2016), uncontrolled, on insulin, complicated with painful peripheral diabetic neuropathy  Painful peripheral diabetic neuropathy, mononeuritis multiplex (left upper extremity numbness/tingling; bilateral lower extremity numbness and tingling; NERVO spinal cord stimulator placed 07/21/2022)  Unable to have MRI cervical spine done because of the presence of spinal cord stimulator; worsening burning/tingling right lower extremity; worsening in hands)  Numbness and weakness in hands with no improvement with PT/OT  Chronic liver failure without hepatic coma, hepatic steatosis  Hypertension, CKD  CAD, history of NSTEMI (10/2024)  CICI, tobacco abuse  familial hypertriglyceridemia, chronic pancreatitis (diagnosed more than 10 years ago; placed on pancreatic enzyme replacement)  blurred vision right eye,  Impaired functional ability, balance, gait, and endurance  -chronic postprandial diarrhea, on Creon and cholestyramine  -chronic postprandial abdominal pain, mainly right-sided    # A multitude of surgeries/procedures:  coronary angiogram and LHC 04/10/2023;   coronary angiogram and LHC 10/10/2024;   CABG 12/05/2024 (CAD, S/P CABG x2 with the left atrial appendage ligation 12/2024);   EGD 11/20/2023;   excisional of arteriovenous fistula 06/01/2018; hernia repair  plasmapheresis 07/13/2018, 05/25/2018;   trial of spinal cord nerve stimulation 05/12/2022 (NERVO spinal cord stimulator placed 07/21/2022), etc.  -05/12/2022:  Fluoroscopically guided placement of 2 spinal cord stimulator leads under anesthesia for programming and trial (indication: Chronic pain syndrome; painful diabetic neuropathy)  -11/20/2023:  Gastric biopsy: Mild chronic gastritis with a lamina propria fibrosis; negative for H pylori; negative for dysplasia  Social history:       Follow-up:  No follow-ups on file.            [1]   Current Outpatient Medications on File Prior to Visit   Medication Sig Dispense Refill    amitriptyline (ELAVIL) 50 MG tablet TAKE ONE TABLET BY MOUTH IN THE EVENING 30 tablet 4    aspirin (ECOTRIN) 81 MG EC tablet Take 81 mg by mouth once daily.      atorvastatin (LIPITOR) 80 MG tablet Take 1 tablet (80 mg total) by mouth once daily. 90 tablet 3    cholecalciferol, vitamin D3, 125 mcg (5,000 unit) capsule Take 1 capsule (5,000 Units total) by mouth once daily. 30 capsule 11    clonazePAM (KLONOPIN) 1 MG tablet TAKE ONE TABLET BY MOUTH TWICE DAILY 60 tablet 0    cloNIDine (CATAPRES) 0.3 MG tablet Take 1 tablet (0.3 mg total) by mouth 3 (three) times daily. 270 tablet 3    clopidogreL (PLAVIX) 75 mg tablet Take 4 tablets on day 1 and then one tablet daily. 90 tablet 3    CREON 36,000-114,000- 180,000 unit CpDR TAKE ONE CAPSULE THREE TIMES DAILY 270 capsule 1    dapagliflozin propanediol (FARXIGA) 5 mg Tab tablet Take 2 tablets (10 mg total) by mouth once daily. 180 tablet 1    ergocalciferol (ERGOCALCIFEROL) 50,000 unit Cap Take 1 capsule (50,000 Units total) by mouth every 7 days. 4 capsule 2    EScitalopram oxalate (LEXAPRO) 20 MG tablet Take 20 mg by mouth once daily.      esomeprazole (NEXIUM) 40 MG capsule Take 1 capsule (40 mg total) by mouth before breakfast. 30 capsule 11    HUMALOG U-100 INSULIN 100 unit/mL injection INJECT 25 UNITS SUBCUTANEOUSLY BEFORE MEALS THREE TIMES DAILY 10 mL 3    HYDROmorphone (DILAUDID) 8 MG tablet Take 1 tablet (8 mg total) by mouth every 8 (eight) hours as needed. 90 tablet 0    icosapent ethyL (VASCEPA) 1 gram Cap TAKE TWO CAPSULES BY MOUTH TWICE DAILY 60 capsule 3    insulin glargine U-100, Lantus, (LANTUS SOLOSTAR U-100 INSULIN) 100 unit/mL (3 mL) InPn pen Inject 40 Units into the skin 2 (two) times a day. 24 mL 11    isosorbide mononitrate (IMDUR) 120 MG 24 hr tablet Take 1 tablet (120 mg total) by mouth once daily. 90 tablet 3    linaGLIPtin (TRADJENTA) 5 mg Tab  "tablet Take 1 tablet (5 mg total) by mouth once daily. 90 tablet 3    losartan (COZAAR) 100 MG tablet Take 1 tablet (100 mg total) by mouth once daily. 90 tablet 3    metoprolol succinate (TOPROL-XL) 25 MG 24 hr tablet Take 1 tablet (25 mg total) by mouth once daily. 90 tablet 3    morphine (MS CONTIN) 100 MG 12 hr tablet TAKE ONE TABLET BY MOUTH THREE TIMES DAILY 90 tablet 0    nicotine (NICODERM CQ) 21 mg/24 hr Place 1 patch onto the skin once daily. 28 patch 0    NIFEdipine (PROCARDIA-XL) 90 MG (OSM) 24 hr tablet Take 1 tablet (90 mg total) by mouth once daily. 30 tablet 1    nitroGLYCERIN (NITROSTAT) 0.3 MG SL tablet Place 1 tablet (0.3 mg total) under the tongue every 5 (five) minutes as needed for Chest pain (go to er after 3 doses). 30 tablet 6    nut.tx.gluc intol,lf,soy-fiber (GLUCERNA 1.5 CEDRIC) 0.08-1.5 gram-kcal/mL Liqd Take 273 mLs by mouth 2 (two) times a day. 60 each 4    OXcarbazepine (TRILEPTAL) 600 MG Tab Take 1 tablet (600 mg total) by mouth 2 (two) times daily. 60 tablet 11    potassium chloride SA (K-DUR,KLOR-CON) 20 MEQ tablet Take 1 tablet (20 mEq total) by mouth 2 (two) times daily. 180 tablet 3    pregabalin (LYRICA) 150 MG capsule Take 1 capsule (150 mg total) by mouth 3 (three) times daily. 90 capsule 3    ranolazine (RANEXA) 500 MG Tb12 Take 1 tablet (500 mg total) by mouth 2 (two) times daily. 180 tablet 3    REPATHA SURECLICK 140 mg/mL PnIj INJECT 1ml INTRAMUSCULARLY EVERY 14 DAYS 2 mL 11    sucralfate (CARAFATE) 100 mg/mL suspension Take 10 mLs (1 g total) by mouth 4 (four) times daily before meals and nightly. 1200 mL 3    SURE COMFORT INSULIN SYRINGE 0.5 mL 31 gauge x 5/16" Syrg USE ONE SYRINGE THREE TIMES DAILY 100 each 3    tamsulosin (FLOMAX) 0.4 mg Cap TAKE ONE CAPSULE BY MOUTH EVERY DAY 90 capsule 3    torsemide (DEMADEX) 20 MG Tab Take 1 tablet (20 mg total) by mouth once daily. 90 tablet 3    TOUJEO SOLOSTAR U-300 INSULIN 300 unit/mL (1.5 mL) InPn pen INJECT 35 SUBCUTANEOUSLY " TWICE DAILY 4.5 mL 3    cholestyramine (QUESTRAN) 4 gram packet Take 1 packet (4 g total) by mouth 3 (three) times daily with meals. (Patient not taking: Reported on 7/7/2025) 270 packet 3    gabapentin (NEURONTIN) 800 MG tablet TAKE ONE TABLET BY MOUTH IN THE EVENING (Patient not taking: Reported on 7/7/2025) 90 tablet 3     No current facility-administered medications on file prior to visit.

## 2025-07-07 ENCOUNTER — TELEPHONE (OUTPATIENT)
Dept: HEMATOLOGY/ONCOLOGY | Facility: CLINIC | Age: 45
End: 2025-07-07

## 2025-07-07 ENCOUNTER — OFFICE VISIT (OUTPATIENT)
Dept: HEMATOLOGY/ONCOLOGY | Facility: CLINIC | Age: 45
End: 2025-07-07
Attending: INTERNAL MEDICINE
Payer: MEDICARE

## 2025-07-07 ENCOUNTER — DOCUMENTATION ONLY (OUTPATIENT)
Dept: HEMATOLOGY/ONCOLOGY | Facility: CLINIC | Age: 45
End: 2025-07-07

## 2025-07-07 ENCOUNTER — RESULTS FOLLOW-UP (OUTPATIENT)
Dept: HEMATOLOGY/ONCOLOGY | Facility: CLINIC | Age: 45
End: 2025-07-07

## 2025-07-07 VITALS
TEMPERATURE: 98 F | BODY MASS INDEX: 27.28 KG/M2 | RESPIRATION RATE: 18 BRPM | WEIGHT: 184.19 LBS | SYSTOLIC BLOOD PRESSURE: 188 MMHG | HEIGHT: 69 IN | DIASTOLIC BLOOD PRESSURE: 94 MMHG | OXYGEN SATURATION: 100 % | HEART RATE: 75 BPM

## 2025-07-07 DIAGNOSIS — R59.0 PERIPORTAL LYMPHADENOPATHY: ICD-10-CM

## 2025-07-07 DIAGNOSIS — R97.0 HIGH CARCINOEMBRYONIC ANTIGEN (CEA): ICD-10-CM

## 2025-07-07 DIAGNOSIS — C77.2 MALIGNANT NEOPLASM OF PANCREAS METASTATIC TO INTRA-ABDOMINAL LYMPH NODE: ICD-10-CM

## 2025-07-07 DIAGNOSIS — K52.9 POSTPRANDIAL DIARRHEA: ICD-10-CM

## 2025-07-07 DIAGNOSIS — G89.4 CHRONIC PAIN SYNDROME: ICD-10-CM

## 2025-07-07 DIAGNOSIS — E08.42 DIABETIC POLYNEUROPATHY ASSOCIATED WITH DIABETES MELLITUS DUE TO UNDERLYING CONDITION: ICD-10-CM

## 2025-07-07 DIAGNOSIS — R59.0 PANCREATICODUODENAL LYMPHADENOPATHY: ICD-10-CM

## 2025-07-07 DIAGNOSIS — K86.89 PANCREATIC ATROPHY: ICD-10-CM

## 2025-07-07 DIAGNOSIS — Z95.1 HX OF CABG: ICD-10-CM

## 2025-07-07 DIAGNOSIS — E11.40 PAINFUL DIABETIC NEUROPATHY: Chronic | ICD-10-CM

## 2025-07-07 DIAGNOSIS — R79.89 HIGH SERUM CARBOHYDRATE ANTIGEN 19-9 (CA19-9): ICD-10-CM

## 2025-07-07 DIAGNOSIS — Z51.11 CHEMOTHERAPY MANAGEMENT, ENCOUNTER FOR: ICD-10-CM

## 2025-07-07 DIAGNOSIS — C25.0 ADENOCARCINOMA OF HEAD OF PANCREAS: Primary | ICD-10-CM

## 2025-07-07 DIAGNOSIS — K86.1 CHRONIC PANCREATITIS, UNSPECIFIED PANCREATITIS TYPE: ICD-10-CM

## 2025-07-07 DIAGNOSIS — C25.9 MALIGNANT NEOPLASM OF PANCREAS METASTATIC TO INTRA-ABDOMINAL LYMPH NODE: ICD-10-CM

## 2025-07-07 DIAGNOSIS — R10.13 POSTPRANDIAL EPIGASTRIC PAIN: ICD-10-CM

## 2025-07-07 LAB
ALBUMIN SERPL-MCNC: 3 G/DL (ref 3.5–5)
ALBUMIN/GLOB SERPL: 0.6 RATIO (ref 1.1–2)
ALP SERPL-CCNC: 431 UNIT/L (ref 40–150)
ALT SERPL-CCNC: 46 UNIT/L (ref 0–55)
ANION GAP SERPL CALC-SCNC: 10 MEQ/L
AST SERPL-CCNC: 32 UNIT/L (ref 11–45)
BASOPHILS # BLD AUTO: 0.04 X10(3)/MCL
BASOPHILS NFR BLD AUTO: 0.5 %
BILIRUB SERPL-MCNC: 0.3 MG/DL
BUN SERPL-MCNC: 6.3 MG/DL (ref 8.9–20.6)
CALCIUM SERPL-MCNC: 8.6 MG/DL (ref 8.4–10.2)
CANCER AG19-9 SERPL-ACNC: 927.4 UNIT/ML (ref 0–37)
CHLORIDE SERPL-SCNC: 98 MMOL/L (ref 98–107)
CO2 SERPL-SCNC: 27 MMOL/L (ref 22–29)
CREAT SERPL-MCNC: 0.94 MG/DL (ref 0.72–1.25)
CREAT/UREA NIT SERPL: 7
EOSINOPHIL # BLD AUTO: 0.17 X10(3)/MCL (ref 0–0.9)
EOSINOPHIL NFR BLD AUTO: 2.1 %
ERYTHROCYTE [DISTWIDTH] IN BLOOD BY AUTOMATED COUNT: 12.3 % (ref 11.5–17)
GFR SERPLBLD CREATININE-BSD FMLA CKD-EPI: >60 ML/MIN/1.73/M2
GLOBULIN SER-MCNC: 4.9 GM/DL (ref 2.4–3.5)
GLUCOSE SERPL-MCNC: 568 MG/DL (ref 74–100)
HCT VFR BLD AUTO: 40.8 % (ref 42–52)
HGB BLD-MCNC: 14.1 G/DL (ref 14–18)
IMM GRANULOCYTES # BLD AUTO: 0.03 X10(3)/MCL (ref 0–0.04)
IMM GRANULOCYTES NFR BLD AUTO: 0.4 %
LYMPHOCYTES # BLD AUTO: 2 X10(3)/MCL (ref 0.6–4.6)
LYMPHOCYTES NFR BLD AUTO: 24.2 %
MCH RBC QN AUTO: 30 PG (ref 27–31)
MCHC RBC AUTO-ENTMCNC: 34.6 G/DL (ref 33–36)
MCV RBC AUTO: 86.8 FL (ref 80–94)
MONOCYTES # BLD AUTO: 0.77 X10(3)/MCL (ref 0.1–1.3)
MONOCYTES NFR BLD AUTO: 9.3 %
NEUTROPHILS # BLD AUTO: 5.24 X10(3)/MCL (ref 2.1–9.2)
NEUTROPHILS NFR BLD AUTO: 63.5 %
NRBC BLD AUTO-RTO: 0 %
PLATELET # BLD AUTO: 209 X10(3)/MCL (ref 130–400)
PMV BLD AUTO: 11.2 FL (ref 7.4–10.4)
POTASSIUM SERPL-SCNC: 3.3 MMOL/L (ref 3.5–5.1)
PROT SERPL-MCNC: 7.9 GM/DL (ref 6.4–8.3)
RBC # BLD AUTO: 4.7 X10(6)/MCL (ref 4.7–6.1)
SODIUM SERPL-SCNC: 135 MMOL/L (ref 136–145)
WBC # BLD AUTO: 8.25 X10(3)/MCL (ref 4.5–11.5)

## 2025-07-07 PROCEDURE — 86301 IMMUNOASSAY TUMOR CA 19-9: CPT | Performed by: INTERNAL MEDICINE

## 2025-07-07 PROCEDURE — 80053 COMPREHEN METABOLIC PANEL: CPT | Performed by: INTERNAL MEDICINE

## 2025-07-07 PROCEDURE — 85025 COMPLETE CBC W/AUTO DIFF WBC: CPT | Performed by: INTERNAL MEDICINE

## 2025-07-07 PROCEDURE — 99205 OFFICE O/P NEW HI 60 MIN: CPT | Mod: S$PBB,,, | Performed by: INTERNAL MEDICINE

## 2025-07-07 PROCEDURE — 99215 OFFICE O/P EST HI 40 MIN: CPT | Mod: PBBFAC | Performed by: INTERNAL MEDICINE

## 2025-07-07 RX ORDER — CLONAZEPAM 1 MG/1
1 TABLET ORAL 2 TIMES DAILY
Qty: 60 TABLET | Refills: 0 | Status: SHIPPED | OUTPATIENT
Start: 2025-07-07

## 2025-07-07 RX ORDER — LIDOCAINE AND PRILOCAINE 25; 25 MG/G; MG/G
CREAM TOPICAL
Qty: 5 G | Refills: 2 | Status: SHIPPED | OUTPATIENT
Start: 2025-07-07

## 2025-07-07 RX ORDER — MORPHINE SULFATE 100 MG/1
100 TABLET, FILM COATED, EXTENDED RELEASE ORAL 3 TIMES DAILY
Qty: 90 TABLET | Refills: 0 | Status: SHIPPED | OUTPATIENT
Start: 2025-07-07

## 2025-07-07 RX ORDER — HYDROMORPHONE HYDROCHLORIDE 8 MG/1
8 TABLET ORAL EVERY 8 HOURS PRN
Qty: 90 TABLET | Refills: 0 | Status: SHIPPED | OUTPATIENT
Start: 2025-07-07

## 2025-07-07 NOTE — TELEPHONE ENCOUNTER
Called patient per Dr. Clifford to inform to report to the ER for critical glucose of 568. No answer left detailed voicemail for patient to report to ER to be evaluated.  also attempted to call alternate contacts on file (elisa clayton, and federico) without success. No answer

## 2025-07-07 NOTE — Clinical Note
Orders for 07/07/2025:  Check CBC, CMP, CA 19-9 level  Stage with pancreatic protocol CT scan of abdomen (make sure that the CT scan is done with pancreatic protocol) Stage with contrast-enhanced CT scans of chest and pelvis Genetic testing NGS testing on recent pancreatic biopsy FDG PET-CT Consult Surgical Oncology, Dr. Spencer Molina, as well at this time for him to get family rise with the case -we will consult Surgical Oncology at this time as well for them to get familiarized with the case Follow-up in 2 weeks, after scans and labs

## 2025-07-07 NOTE — TELEPHONE ENCOUNTER
Patient called the clinic stating that he got the voicemail about needed to go to the ER due to his CBG. Patient reports that he woke up late this morning and forgot to take his insulin and while he was at his appointment he drunk two cokes. Per patient he checked is sugar and took his insulin and he is now 157 and he feels fine. Educated patient on the importance of taking his insulin as prescribed and CBG checks. Patient verbalized understanding.   
no

## 2025-07-07 NOTE — NURSING
CARMITA Morejon met with patient today after his consult appointment with Dr. Clifford. Patient attended appointment accompanied by his sister-in-law. Provided patient with RN Jean-Pierre contact and explained role in patient's care. NCCN Distress Screen completed with a reported distress score of 5. Patient indicates his distress is related to recent diagnosis. Reports he has Medicare and SSDI benefits. Social support reported as adequate. Provided information on in-house and community support/counseling services. Provided education on Pneumonia Vaccine. Patient received vaccine in 2024. Educated on importance of dental services. Patient says he does not have a dentist and agrees to obtain dental services. Provided information on [facility] for nutritional supplement. Referral/order sent. Answered all patient questions regarding treatment and expectations. Provided cancer center packet with clinic team, contact numbers, and information on cancer treatment. Patient verbalized understanding and agrees to contact CARMITA Morejon if any needs or concerns arise.     Oncology Navigation   Intake  Cancer Type: Pancreatic  Appointment Date: 07/07/25     Treatment  Current Status: Staging work-up    Surgical Oncologist: Spencer Molina MD    Medical Oncologist: Brandon Clifford MD  Consult Date: 07/07/25  Chemotherapy: Planned  Chemotherapy Regimen: neoadjuvant chemotherapy finalized after ruling out metastatic disease with scans       Procedures: CT; PET scan; Port / PICC  CT Schedule Date: 07/14/25  PET Scan Schedule Date: 07/15/25    General Referrals: [facility]          Support Systems: Family members; Friends / neighbors  Barriers of Care: Comorbidities  Comorbidities: multiple comorbidities     Acuity  Stage: 2  Systemic Treatment - predicted or initiated: Chemotherapy Regimen with Multiple drugs (+1)  Treatment Tolerability: Has not started treatment yet/treatment fully completed and side effects resolved  ECOG:  0  Comorbidities in Medical History: 2  Hospitalization Within the Past Month: 1   Needed: 0  Support: 0  Verbalizes Financial Concerns: 0  Transportation: 0  Mental Health: PHQ Score: 0  History of noncompliance/frequent no shows and cancellations: 0  Verbalizes the need for more education: 0  Navigation Acuity: 7     Follow Up  No follow-ups on file.

## 2025-07-08 NOTE — PROGRESS NOTES
Ketan Varela LPN Aggarwal, Sudhir, MD; Marlon Odom LPN  Patient called the clinic stating that he got the voicemail about him needing to go to the ER due to his CBG. Patient reports that he woke up late this morning and forgot to take his insulin and while he was at his appointment he drunk two cokes. Per patient he checked is sugar and took his insulin and he is now 157 and he feels fine. Educated patient on the importance of taking his insulin as prescribed and CBG checks.

## 2025-07-09 DIAGNOSIS — K86.1 CHRONIC PANCREATITIS, UNSPECIFIED PANCREATITIS TYPE: ICD-10-CM

## 2025-07-09 DIAGNOSIS — R79.89 HIGH SERUM CARBOHYDRATE ANTIGEN 19-9 (CA19-9): ICD-10-CM

## 2025-07-09 DIAGNOSIS — C25.0 ADENOCARCINOMA OF HEAD OF PANCREAS: Primary | ICD-10-CM

## 2025-07-10 ENCOUNTER — OFFICE VISIT (OUTPATIENT)
Dept: SURGERY | Facility: CLINIC | Age: 45
End: 2025-07-10
Attending: INTERNAL MEDICINE
Payer: MEDICARE

## 2025-07-10 ENCOUNTER — TELEPHONE (OUTPATIENT)
Dept: HEPATOLOGY | Facility: HOSPITAL | Age: 45
End: 2025-07-10
Payer: MEDICARE

## 2025-07-10 VITALS
OXYGEN SATURATION: 98 % | HEIGHT: 69 IN | DIASTOLIC BLOOD PRESSURE: 80 MMHG | WEIGHT: 186 LBS | HEART RATE: 77 BPM | BODY MASS INDEX: 27.55 KG/M2 | SYSTOLIC BLOOD PRESSURE: 150 MMHG | RESPIRATION RATE: 18 BRPM

## 2025-07-10 DIAGNOSIS — C25.0 ADENOCARCINOMA OF HEAD OF PANCREAS: ICD-10-CM

## 2025-07-10 DIAGNOSIS — R79.89 HIGH SERUM CARBOHYDRATE ANTIGEN 19-9 (CA19-9): ICD-10-CM

## 2025-07-10 PROCEDURE — 99215 OFFICE O/P EST HI 40 MIN: CPT | Mod: PBBFAC

## 2025-07-10 RX ORDER — SODIUM CHLORIDE 9 MG/ML
INJECTION, SOLUTION INTRAVENOUS CONTINUOUS
OUTPATIENT
Start: 2025-07-10

## 2025-07-10 RX ORDER — CEFAZOLIN SODIUM 2 G/50ML
2 SOLUTION INTRAVENOUS
OUTPATIENT
Start: 2025-07-10

## 2025-07-10 RX ORDER — HEPARIN SODIUM 5000 [USP'U]/ML
5000 INJECTION, SOLUTION INTRAVENOUS; SUBCUTANEOUS ONCE
OUTPATIENT
Start: 2025-07-10

## 2025-07-10 NOTE — H&P (VIEW-ONLY)
[facility] General Surgery Clinic Note    HPI: 45 y.o. male with a PMHx of DM, CKD, familial hypertriglyceridemia with routine plasmapheresis in the past, hepatic steatosis, and HTN was diagnosed 3 weeks ago with adenocarcinoma of the head of the pancreas. He was referred for a Mediport evaluation. He is currently taking Plavix due to a CABG procedure performed in December. The patient is R hand dominant and would prefer the port to be placed on the L chest. The patient has a history of multiple bilateral chest wall tunneled catheters, a right groin (femoral) catheter, and multiple thrombosed left upper extremity arteriovenous (AV) fistula. Has not required plasmapheresis and not had tunneled catheter in ~ 7 years.     PMH:   Past Medical History:   Diagnosis Date    Abdominal pain     Acquired perforating dermatosis 08/30/2023    Anxiety     Aortic atherosclerosis 01/24/2023    Appetite loss     Balance problem     Bilateral edema of lower extremity 02/06/2023    Bladder outflow obstruction 06/20/2022    Blurred vision, right eye 10/19/2022    BMI 34.0-34.9,adult 06/20/2022    BPH (benign prostatic hyperplasia)     Chest pain     Chronic liver failure without hepatic coma 04/25/2023    Chronic pain 10/25/2022    Chronic pain syndrome 05/12/2022    Chronic pancreatitis 10/28/2017    Coronary artery disease, unspecified vessel or lesion type, unspecified whether angina present, unspecified whether native or transplanted heart     Deafness 10/03/2023    Depression     Diabetes     Diabetic polyneuropathy 06/20/2022    Elevated LFTs 08/18/2022    Fatigue     GERD (gastroesophageal reflux disease)     Hand paresthesia 06/20/2022    Headache     Hepatic steatosis     History of continuous positive airway pressure (CPAP) therapy at home     History of pancreatitis 06/20/2022    History of recent fall     Hypertension     Hypertriglyceridemia     Insomnia     Kidney disease     Kidney disorder     Leg pain     Mixed  hyperlipidemia 10/28/2017    Mononeuritis multiplex 06/20/2022    Morbid obesity     Nausea & vomiting     Neuropathy     Nocturia 06/20/2022    NSTEMI (non-ST elevated myocardial infarction) 10/2024    OA (osteoarthritis)     Obesity     Obstructive sleep apnea syndrome 06/20/2022    Onychomycosis of multiple toenails with type 2 diabetes mellitus 10/28/2017    Open wound of finger of right hand 10/20/2023    CICI (obstructive sleep apnea)     uses CPAP    Painful diabetic neuropathy 05/12/2022    Personal history of colonic polyps 08/18/2022    Polyneuropathy     Primary hypertension 10/28/2017    Recurrent pancreatitis     Renal insufficiency     Tobacco abuse     Tobacco user 06/20/2022    Type 2 diabetes mellitus with skin complication, with long-term current use of insulin 02/06/2023    Urinary frequency     Use of cane as ambulatory aid     Weight loss, unintentional       Meds: Current Medications[1]  Allergies: Review of patient's allergies indicates:  No Known Allergies  Social History: Social History[2]  Family History:   Family History   Problem Relation Name Age of Onset    Obesity Father       Surgical History:   Past Surgical History:   Procedure Laterality Date    ANGIOGRAM, CORONARY, WITH LEFT HEART CATHETERIZATION N/A 04/10/2023    Procedure: Angiogram, Coronary, with Left Heart Cath;  Surgeon: Jaswant Pugh MD;  Location: [facility] CATH LAB;  Service: Cardiology;  Laterality: N/A;    ANGIOGRAM, CORONARY, WITH LEFT HEART CATHETERIZATION N/A 10/10/2024    Procedure: Angiogram, Coronary, with Left Heart Cath;  Surgeon: Jaswant Pugh MD;  Location: [facility] CATH LAB;  Service: Cardiology;  Laterality: N/A;    APPENDECTOMY      ARTERIOVENOUS ANASTOMOSIS, OPEN, UPPER ARM BASILLIC VEIN TRANSPOSITION N/A 05/07/2018    CORONARY ARTERY BYPASS GRAFT (CABG) N/A 12/05/2024    Procedure: CORONARY ARTERY BYPASS GRAFT (CABG);  Surgeon: Gillian Clayton MD;  Location: [facility];  Service: Cardiothoracic;   Laterality: N/A;  ECHO NOTIFIED    EGD, WITH CLOSED BIOPSY N/A 11/20/2023    Procedure: EGD, WITH CLOSED BIOPSY;  Surgeon: Christina James MD;  Location: [facility] ENDOSCOPY;  Service: Gastroenterology;  Laterality: N/A;    ENDOSCOPIC HARVEST OF VEIN Left 12/05/2024    Procedure: HARVEST-VEIN-ENDOVASCULAR;  Surgeon: Gillian Clayton MD;  Location: [facility] OR;  Service: Cardiothoracic;  Laterality: Left;    ESOPHAGOGASTRODUODENOSCOPY N/A 06/07/2021    EXCISION OF ARTERIOVENOUS FISTULA N/A 06/01/2018    FRACTIONAL FLOW RESERVE (FFR), CORONARY  04/10/2023    Procedure: Fractional Flow [place] (FFR), Coronary;  Surgeon: Jaswant Pugh MD;  Location: [facility] CATH LAB;  Service: Cardiology;;    HERNIA REPAIR      INSPECTION OF UPPER INTESTINAL TRACT N/A 06/07/2021    OCCLUSION OF LEFT ATRIAL APPENDAGE Left 12/05/2024    Procedure: Left atrial appendage occlusion;  Surgeon: Gillian Clayton MD;  Location: [facility] OR;  Service: Cardiothoracic;  Laterality: Left;    PHERESIS OF PLASMA N/A 07/13/2018    PHERESIS OF PLASMA N/A 05/25/2018    TRIAL OF SPINAL CORD NERVE STIMULATOR N/A 05/12/2022    Procedure: TRIAL, NEUROSTIMULATOR, SPINAL CORD;  Surgeon: Jay Pozo MD;  Location: [facility] OR;  Service: Neurosurgery;  Laterality: N/A;    UPPER GI N/A 06/07/2021     Review of Systems:  Skin: No rashes or itching.  Head: Denies headache or recent trauma.  Eyes: Denies eye pain or double vision.  Neck: Denies swelling or hoarseness of voice.  Respiratory: Denies shortness of breath or chest pain  Cardiac: Denies palpitations or swelling in hands/feet.  Gastrointestinal: Denies nausea, denies vomiting.  Urinary: Denies dysuria or hematuria.  Vascular: Denies claudication or leg swelling.  Neuro: Denies motor deficits. Denies weakness.  Endocrine: Denies excessive sweating or cold intolerance.  Psych: Denies memory problems. Denies anxiety.    Objective:    Vitals:  Vitals:    07/10/25 1106   BP: (!) 150/80   Pulse:     Resp:         Physical Exam:  Gen: NAD; ambulates with cane for assistance  Neuro: awake, alert, answering questions appropriately  CV: RRR  Resp: non-labored breathing, SAHARA  Abd: soft, ND, NT  Ext: moves all 4 spontaneously and purposefully  Skin: warm, well perfused    Pertinent Labs:  7/7: CA 19-9 927.40 High    CEA High    Imaging:  No new imaging.    Micro/Path/Other:  No new micro/path.    Assessment/Plan:  45 y.o. male with a PMHx of DM, CKD, familial hypertriglyceridemia with routine plasmapheresis in the past, hepatic steatosis, and HTN was diagnosed 3 weeks ago with adenocarcinoma of the head of the pancreas.    - Port-A-Cath surgery scheduled for 7/23/2025.    Charles Hale MS3  [facility] General Surgery  07/10/2025 11:12 AM      PGY-3 Attestation:  Patient is a 45 year old male with history CAD s/p CABG in 12/2024 now on plavix,  familiar hypertriglyceridemia s/p multiple TDC in bilateral chest, R groin, and multiple thrombosed LUE AVF w/ newly diagnosed PDAC who presents for mediport placement. Explained to him that given his numerous dialysis catheters and failed vascular access option in the past, there is a chance we may not be able to successfully place a mediport. He understands. Plan for mediport placement 7/23/25. Will send for cardiac clearance prior as he will need to hold plavix for 5 days.    Chuck Cortes MD  [facility] General Surgery PGY-3       [1]   Current Outpatient Medications:     amitriptyline (ELAVIL) 50 MG tablet, TAKE ONE TABLET BY MOUTH IN THE EVENING, Disp: 30 tablet, Rfl: 4    aspirin (ECOTRIN) 81 MG EC tablet, Take 81 mg by mouth once daily., Disp: , Rfl:     atorvastatin (LIPITOR) 80 MG tablet, Take 1 tablet (80 mg total) by mouth once daily., Disp: 90 tablet, Rfl: 3    cholecalciferol, vitamin D3, 125 mcg (5,000 unit) capsule, Take 1 capsule (5,000 Units total) by mouth once daily., Disp: 30 capsule, Rfl: 11    clonazePAM (KLONOPIN) 1 MG tablet, TAKE ONE TABLET BY MOUTH TWICE  DAILY, Disp: 60 tablet, Rfl: 0    cloNIDine (CATAPRES) 0.3 MG tablet, Take 1 tablet (0.3 mg total) by mouth 3 (three) times daily., Disp: 270 tablet, Rfl: 3    clopidogreL (PLAVIX) 75 mg tablet, Take 4 tablets on day 1 and then one tablet daily., Disp: 90 tablet, Rfl: 3    CREON 36,000-114,000- 180,000 unit CpDR, TAKE ONE CAPSULE THREE TIMES DAILY, Disp: 270 capsule, Rfl: 1    dapagliflozin propanediol (FARXIGA) 5 mg Tab tablet, Take 2 tablets (10 mg total) by mouth once daily., Disp: 180 tablet, Rfl: 1    ergocalciferol (ERGOCALCIFEROL) 50,000 unit Cap, Take 1 capsule (50,000 Units total) by mouth every 7 days., Disp: 4 capsule, Rfl: 2    EScitalopram oxalate (LEXAPRO) 20 MG tablet, Take 20 mg by mouth once daily., Disp: , Rfl:     HUMALOG U-100 INSULIN 100 unit/mL injection, INJECT 25 UNITS SUBCUTANEOUSLY BEFORE MEALS THREE TIMES DAILY, Disp: 10 mL, Rfl: 3    HYDROmorphone (DILAUDID) 8 MG tablet, Take 1 tablet (8 mg total) by mouth every 8 (eight) hours as needed., Disp: 90 tablet, Rfl: 0    icosapent ethyL (VASCEPA) 1 gram Cap, TAKE TWO CAPSULES BY MOUTH TWICE DAILY, Disp: 60 capsule, Rfl: 3    insulin glargine U-100, Lantus, (LANTUS SOLOSTAR U-100 INSULIN) 100 unit/mL (3 mL) InPn pen, Inject 40 Units into the skin 2 (two) times a day., Disp: 24 mL, Rfl: 11    isosorbide mononitrate (IMDUR) 120 MG 24 hr tablet, Take 1 tablet (120 mg total) by mouth once daily., Disp: 90 tablet, Rfl: 3    LIDOcaine-prilocaine (EMLA) cream, Apply topically as needed. To mediport 30-45 minutes prior to access, Disp: 5 g, Rfl: 2    losartan (COZAAR) 100 MG tablet, Take 1 tablet (100 mg total) by mouth once daily., Disp: 90 tablet, Rfl: 3    metoprolol succinate (TOPROL-XL) 25 MG 24 hr tablet, Take 1 tablet (25 mg total) by mouth once daily., Disp: 90 tablet, Rfl: 3    morphine (MS CONTIN) 100 MG 12 hr tablet, TAKE ONE TABLET BY MOUTH THREE TIMES DAILY, Disp: 90 tablet, Rfl: 0    nicotine (NICODERM CQ) 21 mg/24 hr, Place 1 patch onto  "the skin once daily., Disp: 28 patch, Rfl: 0    NIFEdipine (PROCARDIA-XL) 90 MG (OSM) 24 hr tablet, Take 1 tablet (90 mg total) by mouth once daily., Disp: 30 tablet, Rfl: 1    nut.tx.gluc intol,lf,soy-fiber (GLUCERNA 1.5 CEDRIC) 0.08-1.5 gram-kcal/mL Liqd, Take 273 mLs by mouth 2 (two) times a day., Disp: 60 each, Rfl: 4    OXcarbazepine (TRILEPTAL) 600 MG Tab, Take 1 tablet (600 mg total) by mouth 2 (two) times daily., Disp: 60 tablet, Rfl: 11    potassium chloride SA (K-DUR,KLOR-CON) 20 MEQ tablet, Take 1 tablet (20 mEq total) by mouth 2 (two) times daily., Disp: 180 tablet, Rfl: 3    pregabalin (LYRICA) 150 MG capsule, Take 1 capsule (150 mg total) by mouth 3 (three) times daily., Disp: 90 capsule, Rfl: 3    ranolazine (RANEXA) 500 MG Tb12, Take 1 tablet (500 mg total) by mouth 2 (two) times daily., Disp: 180 tablet, Rfl: 3    REPATHA SURECLICK 140 mg/mL PnIj, INJECT 1ml INTRAMUSCULARLY EVERY 14 DAYS, Disp: 2 mL, Rfl: 11    sucralfate (CARAFATE) 100 mg/mL suspension, Take 10 mLs (1 g total) by mouth 4 (four) times daily before meals and nightly., Disp: 1200 mL, Rfl: 3    SURE COMFORT INSULIN SYRINGE 0.5 mL 31 gauge x 5/16" Syrg, USE ONE SYRINGE THREE TIMES DAILY, Disp: 100 each, Rfl: 3    tamsulosin (FLOMAX) 0.4 mg Cap, TAKE ONE CAPSULE BY MOUTH EVERY DAY, Disp: 90 capsule, Rfl: 3    torsemide (DEMADEX) 20 MG Tab, Take 1 tablet (20 mg total) by mouth once daily., Disp: 90 tablet, Rfl: 3    TOUJEO SOLOSTAR U-300 INSULIN 300 unit/mL (1.5 mL) InPn pen, INJECT 35 SUBCUTANEOUSLY TWICE DAILY, Disp: 4.5 mL, Rfl: 3    cholestyramine (QUESTRAN) 4 gram packet, Take 1 packet (4 g total) by mouth 3 (three) times daily with meals. (Patient not taking: Reported on 7/10/2025), Disp: 270 packet, Rfl: 3    esomeprazole (NEXIUM) 40 MG capsule, Take 1 capsule (40 mg total) by mouth before breakfast., Disp: 30 capsule, Rfl: 11    gabapentin (NEURONTIN) 800 MG tablet, TAKE ONE TABLET BY MOUTH IN THE EVENING (Patient not taking: " Reported on 7/10/2025), Disp: 90 tablet, Rfl: 3    linaGLIPtin (TRADJENTA) 5 mg Tab tablet, Take 1 tablet (5 mg total) by mouth once daily., Disp: 90 tablet, Rfl: 3    nitroGLYCERIN (NITROSTAT) 0.3 MG SL tablet, Place 1 tablet (0.3 mg total) under the tongue every 5 (five) minutes as needed for Chest pain (go to er after 3 doses)., Disp: 30 tablet, Rfl: 6  [2]   Social History  Tobacco Use    Smoking status: Every Day     Current packs/day: 0.50     Average packs/day: 2.6 packs/day for 33.0 years (84.4 ttl pk-yrs)     Types: Cigarettes     Start date: 1992     Last attempt to quit: 01/1999     Passive exposure: Current    Smokeless tobacco: Former     Types: Chew    Tobacco comments:     Pt is smoking 1/2 pack per day   Vaping Use    Vaping status: Never Used   Substance Use Topics    Alcohol use: Not Currently    Drug use: Never

## 2025-07-10 NOTE — PROGRESS NOTES
Pt seen by Dr. Cortes. Pt will be scheduled for surgery on 07/23/2025. Consents signed at visit. RTC PRN.

## 2025-07-10 NOTE — TELEPHONE ENCOUNTER
I spoke to Dr. Rocky Rojas, [phone], who said he can either repair or remaove Bharath's spinal stimulator Tuesday.  It then will be Mri COMPATIBLE AGAIN.   He will only have to hold off of chemtherapy for 2 weeks /until his wound is healed.  I then spoke to Dr. Tirado, his Oncologist, who agreed to proceed as such for he will probably will need Mri's in the future. I have called his sister to relay this and heard Bharath in the background/

## 2025-07-10 NOTE — PROGRESS NOTES
[facility] General Surgery Clinic Note    HPI: 45 y.o. male with a PMHx of DM, CKD, familial hypertriglyceridemia with routine plasmapheresis in the past, hepatic steatosis, and HTN was diagnosed 3 weeks ago with adenocarcinoma of the head of the pancreas. He was referred for a Mediport evaluation. He is currently taking Plavix due to a CABG procedure performed in December. The patient is R hand dominant and would prefer the port to be placed on the L chest. The patient has a history of multiple bilateral chest wall tunneled catheters, a right groin (femoral) catheter, and multiple thrombosed left upper extremity arteriovenous (AV) fistula. Has not required plasmapheresis and not had tunneled catheter in ~ 7 years.     PMH:   Past Medical History:   Diagnosis Date    Abdominal pain     Acquired perforating dermatosis 08/30/2023    Anxiety     Aortic atherosclerosis 01/24/2023    Appetite loss     Balance problem     Bilateral edema of lower extremity 02/06/2023    Bladder outflow obstruction 06/20/2022    Blurred vision, right eye 10/19/2022    BMI 34.0-34.9,adult 06/20/2022    BPH (benign prostatic hyperplasia)     Chest pain     Chronic liver failure without hepatic coma 04/25/2023    Chronic pain 10/25/2022    Chronic pain syndrome 05/12/2022    Chronic pancreatitis 10/28/2017    Coronary artery disease, unspecified vessel or lesion type, unspecified whether angina present, unspecified whether native or transplanted heart     Deafness 10/03/2023    Depression     Diabetes     Diabetic polyneuropathy 06/20/2022    Elevated LFTs 08/18/2022    Fatigue     GERD (gastroesophageal reflux disease)     Hand paresthesia 06/20/2022    Headache     Hepatic steatosis     History of continuous positive airway pressure (CPAP) therapy at home     History of pancreatitis 06/20/2022    History of recent fall     Hypertension     Hypertriglyceridemia     Insomnia     Kidney disease     Kidney disorder     Leg pain     Mixed  hyperlipidemia 10/28/2017    Mononeuritis multiplex 06/20/2022    Morbid obesity     Nausea & vomiting     Neuropathy     Nocturia 06/20/2022    NSTEMI (non-ST elevated myocardial infarction) 10/2024    OA (osteoarthritis)     Obesity     Obstructive sleep apnea syndrome 06/20/2022    Onychomycosis of multiple toenails with type 2 diabetes mellitus 10/28/2017    Open wound of finger of right hand 10/20/2023    CICI (obstructive sleep apnea)     uses CPAP    Painful diabetic neuropathy 05/12/2022    Personal history of colonic polyps 08/18/2022    Polyneuropathy     Primary hypertension 10/28/2017    Recurrent pancreatitis     Renal insufficiency     Tobacco abuse     Tobacco user 06/20/2022    Type 2 diabetes mellitus with skin complication, with long-term current use of insulin 02/06/2023    Urinary frequency     Use of cane as ambulatory aid     Weight loss, unintentional       Meds: Current Medications[1]  Allergies: Review of patient's allergies indicates:  No Known Allergies  Social History: Social History[2]  Family History:   Family History   Problem Relation Name Age of Onset    Obesity Father       Surgical History:   Past Surgical History:   Procedure Laterality Date    ANGIOGRAM, CORONARY, WITH LEFT HEART CATHETERIZATION N/A 04/10/2023    Procedure: Angiogram, Coronary, with Left Heart Cath;  Surgeon: Jaswant Pugh MD;  Location: [facility] CATH LAB;  Service: Cardiology;  Laterality: N/A;    ANGIOGRAM, CORONARY, WITH LEFT HEART CATHETERIZATION N/A 10/10/2024    Procedure: Angiogram, Coronary, with Left Heart Cath;  Surgeon: Jaswant Pugh MD;  Location: [facility] CATH LAB;  Service: Cardiology;  Laterality: N/A;    APPENDECTOMY      ARTERIOVENOUS ANASTOMOSIS, OPEN, UPPER ARM BASILLIC VEIN TRANSPOSITION N/A 05/07/2018    CORONARY ARTERY BYPASS GRAFT (CABG) N/A 12/05/2024    Procedure: CORONARY ARTERY BYPASS GRAFT (CABG);  Surgeon: Gillian Clayton MD;  Location: [facility];  Service: Cardiothoracic;   Laterality: N/A;  ECHO NOTIFIED    EGD, WITH CLOSED BIOPSY N/A 11/20/2023    Procedure: EGD, WITH CLOSED BIOPSY;  Surgeon: Christina James MD;  Location: [facility] ENDOSCOPY;  Service: Gastroenterology;  Laterality: N/A;    ENDOSCOPIC HARVEST OF VEIN Left 12/05/2024    Procedure: HARVEST-VEIN-ENDOVASCULAR;  Surgeon: Gillian Clayton MD;  Location: [facility] OR;  Service: Cardiothoracic;  Laterality: Left;    ESOPHAGOGASTRODUODENOSCOPY N/A 06/07/2021    EXCISION OF ARTERIOVENOUS FISTULA N/A 06/01/2018    FRACTIONAL FLOW RESERVE (FFR), CORONARY  04/10/2023    Procedure: Fractional Flow [place] (FFR), Coronary;  Surgeon: Jaswant Pugh MD;  Location: [facility] CATH LAB;  Service: Cardiology;;    HERNIA REPAIR      INSPECTION OF UPPER INTESTINAL TRACT N/A 06/07/2021    OCCLUSION OF LEFT ATRIAL APPENDAGE Left 12/05/2024    Procedure: Left atrial appendage occlusion;  Surgeon: Gillian Clayton MD;  Location: [facility] OR;  Service: Cardiothoracic;  Laterality: Left;    PHERESIS OF PLASMA N/A 07/13/2018    PHERESIS OF PLASMA N/A 05/25/2018    TRIAL OF SPINAL CORD NERVE STIMULATOR N/A 05/12/2022    Procedure: TRIAL, NEUROSTIMULATOR, SPINAL CORD;  Surgeon: Jay Pozo MD;  Location: [facility] OR;  Service: Neurosurgery;  Laterality: N/A;    UPPER GI N/A 06/07/2021     Review of Systems:  Skin: No rashes or itching.  Head: Denies headache or recent trauma.  Eyes: Denies eye pain or double vision.  Neck: Denies swelling or hoarseness of voice.  Respiratory: Denies shortness of breath or chest pain  Cardiac: Denies palpitations or swelling in hands/feet.  Gastrointestinal: Denies nausea, denies vomiting.  Urinary: Denies dysuria or hematuria.  Vascular: Denies claudication or leg swelling.  Neuro: Denies motor deficits. Denies weakness.  Endocrine: Denies excessive sweating or cold intolerance.  Psych: Denies memory problems. Denies anxiety.    Objective:    Vitals:  Vitals:    07/10/25 1106   BP: (!) 150/80   Pulse:     Resp:         Physical Exam:  Gen: NAD; ambulates with cane for assistance  Neuro: awake, alert, answering questions appropriately  CV: RRR  Resp: non-labored breathing, SAHARA  Abd: soft, ND, NT  Ext: moves all 4 spontaneously and purposefully  Skin: warm, well perfused    Pertinent Labs:  7/7: CA 19-9 927.40 High    CEA High    Imaging:  No new imaging.    Micro/Path/Other:  No new micro/path.    Assessment/Plan:  45 y.o. male with a PMHx of DM, CKD, familial hypertriglyceridemia with routine plasmapheresis in the past, hepatic steatosis, and HTN was diagnosed 3 weeks ago with adenocarcinoma of the head of the pancreas.    - Port-A-Cath surgery scheduled for 7/23/2025.    Charles Hale MS3  [facility] General Surgery  07/10/2025 11:12 AM      PGY-3 Attestation:  Patient is a 45 year old male with history CAD s/p CABG in 12/2024 now on plavix,  familiar hypertriglyceridemia s/p multiple TDC in bilateral chest, R groin, and multiple thrombosed LUE AVF w/ newly diagnosed PDAC who presents for mediport placement. Explained to him that given his numerous dialysis catheters and failed vascular access option in the past, there is a chance we may not be able to successfully place a mediport. He understands. Plan for mediport placement 7/23/25. Will send for cardiac clearance prior as he will need to hold plavix for 5 days.    Chuck Cortes MD  [facility] General Surgery PGY-3       [1]   Current Outpatient Medications:     amitriptyline (ELAVIL) 50 MG tablet, TAKE ONE TABLET BY MOUTH IN THE EVENING, Disp: 30 tablet, Rfl: 4    aspirin (ECOTRIN) 81 MG EC tablet, Take 81 mg by mouth once daily., Disp: , Rfl:     atorvastatin (LIPITOR) 80 MG tablet, Take 1 tablet (80 mg total) by mouth once daily., Disp: 90 tablet, Rfl: 3    cholecalciferol, vitamin D3, 125 mcg (5,000 unit) capsule, Take 1 capsule (5,000 Units total) by mouth once daily., Disp: 30 capsule, Rfl: 11    clonazePAM (KLONOPIN) 1 MG tablet, TAKE ONE TABLET BY MOUTH TWICE  DAILY, Disp: 60 tablet, Rfl: 0    cloNIDine (CATAPRES) 0.3 MG tablet, Take 1 tablet (0.3 mg total) by mouth 3 (three) times daily., Disp: 270 tablet, Rfl: 3    clopidogreL (PLAVIX) 75 mg tablet, Take 4 tablets on day 1 and then one tablet daily., Disp: 90 tablet, Rfl: 3    CREON 36,000-114,000- 180,000 unit CpDR, TAKE ONE CAPSULE THREE TIMES DAILY, Disp: 270 capsule, Rfl: 1    dapagliflozin propanediol (FARXIGA) 5 mg Tab tablet, Take 2 tablets (10 mg total) by mouth once daily., Disp: 180 tablet, Rfl: 1    ergocalciferol (ERGOCALCIFEROL) 50,000 unit Cap, Take 1 capsule (50,000 Units total) by mouth every 7 days., Disp: 4 capsule, Rfl: 2    EScitalopram oxalate (LEXAPRO) 20 MG tablet, Take 20 mg by mouth once daily., Disp: , Rfl:     HUMALOG U-100 INSULIN 100 unit/mL injection, INJECT 25 UNITS SUBCUTANEOUSLY BEFORE MEALS THREE TIMES DAILY, Disp: 10 mL, Rfl: 3    HYDROmorphone (DILAUDID) 8 MG tablet, Take 1 tablet (8 mg total) by mouth every 8 (eight) hours as needed., Disp: 90 tablet, Rfl: 0    icosapent ethyL (VASCEPA) 1 gram Cap, TAKE TWO CAPSULES BY MOUTH TWICE DAILY, Disp: 60 capsule, Rfl: 3    insulin glargine U-100, Lantus, (LANTUS SOLOSTAR U-100 INSULIN) 100 unit/mL (3 mL) InPn pen, Inject 40 Units into the skin 2 (two) times a day., Disp: 24 mL, Rfl: 11    isosorbide mononitrate (IMDUR) 120 MG 24 hr tablet, Take 1 tablet (120 mg total) by mouth once daily., Disp: 90 tablet, Rfl: 3    LIDOcaine-prilocaine (EMLA) cream, Apply topically as needed. To mediport 30-45 minutes prior to access, Disp: 5 g, Rfl: 2    losartan (COZAAR) 100 MG tablet, Take 1 tablet (100 mg total) by mouth once daily., Disp: 90 tablet, Rfl: 3    metoprolol succinate (TOPROL-XL) 25 MG 24 hr tablet, Take 1 tablet (25 mg total) by mouth once daily., Disp: 90 tablet, Rfl: 3    morphine (MS CONTIN) 100 MG 12 hr tablet, TAKE ONE TABLET BY MOUTH THREE TIMES DAILY, Disp: 90 tablet, Rfl: 0    nicotine (NICODERM CQ) 21 mg/24 hr, Place 1 patch onto  "the skin once daily., Disp: 28 patch, Rfl: 0    NIFEdipine (PROCARDIA-XL) 90 MG (OSM) 24 hr tablet, Take 1 tablet (90 mg total) by mouth once daily., Disp: 30 tablet, Rfl: 1    nut.tx.gluc intol,lf,soy-fiber (GLUCERNA 1.5 CEDRIC) 0.08-1.5 gram-kcal/mL Liqd, Take 273 mLs by mouth 2 (two) times a day., Disp: 60 each, Rfl: 4    OXcarbazepine (TRILEPTAL) 600 MG Tab, Take 1 tablet (600 mg total) by mouth 2 (two) times daily., Disp: 60 tablet, Rfl: 11    potassium chloride SA (K-DUR,KLOR-CON) 20 MEQ tablet, Take 1 tablet (20 mEq total) by mouth 2 (two) times daily., Disp: 180 tablet, Rfl: 3    pregabalin (LYRICA) 150 MG capsule, Take 1 capsule (150 mg total) by mouth 3 (three) times daily., Disp: 90 capsule, Rfl: 3    ranolazine (RANEXA) 500 MG Tb12, Take 1 tablet (500 mg total) by mouth 2 (two) times daily., Disp: 180 tablet, Rfl: 3    REPATHA SURECLICK 140 mg/mL PnIj, INJECT 1ml INTRAMUSCULARLY EVERY 14 DAYS, Disp: 2 mL, Rfl: 11    sucralfate (CARAFATE) 100 mg/mL suspension, Take 10 mLs (1 g total) by mouth 4 (four) times daily before meals and nightly., Disp: 1200 mL, Rfl: 3    SURE COMFORT INSULIN SYRINGE 0.5 mL 31 gauge x 5/16" Syrg, USE ONE SYRINGE THREE TIMES DAILY, Disp: 100 each, Rfl: 3    tamsulosin (FLOMAX) 0.4 mg Cap, TAKE ONE CAPSULE BY MOUTH EVERY DAY, Disp: 90 capsule, Rfl: 3    torsemide (DEMADEX) 20 MG Tab, Take 1 tablet (20 mg total) by mouth once daily., Disp: 90 tablet, Rfl: 3    TOUJEO SOLOSTAR U-300 INSULIN 300 unit/mL (1.5 mL) InPn pen, INJECT 35 SUBCUTANEOUSLY TWICE DAILY, Disp: 4.5 mL, Rfl: 3    cholestyramine (QUESTRAN) 4 gram packet, Take 1 packet (4 g total) by mouth 3 (three) times daily with meals. (Patient not taking: Reported on 7/10/2025), Disp: 270 packet, Rfl: 3    esomeprazole (NEXIUM) 40 MG capsule, Take 1 capsule (40 mg total) by mouth before breakfast., Disp: 30 capsule, Rfl: 11    gabapentin (NEURONTIN) 800 MG tablet, TAKE ONE TABLET BY MOUTH IN THE EVENING (Patient not taking: " Reported on 7/10/2025), Disp: 90 tablet, Rfl: 3    linaGLIPtin (TRADJENTA) 5 mg Tab tablet, Take 1 tablet (5 mg total) by mouth once daily., Disp: 90 tablet, Rfl: 3    nitroGLYCERIN (NITROSTAT) 0.3 MG SL tablet, Place 1 tablet (0.3 mg total) under the tongue every 5 (five) minutes as needed for Chest pain (go to er after 3 doses)., Disp: 30 tablet, Rfl: 6  [2]   Social History  Tobacco Use    Smoking status: Every Day     Current packs/day: 0.50     Average packs/day: 2.6 packs/day for 33.0 years (84.4 ttl pk-yrs)     Types: Cigarettes     Start date: 1992     Last attempt to quit: 01/1999     Passive exposure: Current    Smokeless tobacco: Former     Types: Chew    Tobacco comments:     Pt is smoking 1/2 pack per day   Vaping Use    Vaping status: Never Used   Substance Use Topics    Alcohol use: Not Currently    Drug use: Never

## 2025-07-10 NOTE — PROGRESS NOTES
I have reviewed the notes, assessments, and/or procedures performed by Dr Cortes, I concur with her/his documentation of Bharath Caballero.  Date of Service: 7/10/2025    [facility]

## 2025-07-14 ENCOUNTER — HOSPITAL ENCOUNTER (OUTPATIENT)
Dept: RADIOLOGY | Facility: HOSPITAL | Age: 45
Discharge: HOME | End: 2025-07-14
Attending: INTERNAL MEDICINE
Payer: MEDICARE

## 2025-07-14 DIAGNOSIS — R97.0 HIGH CARCINOEMBRYONIC ANTIGEN (CEA): ICD-10-CM

## 2025-07-14 DIAGNOSIS — R79.89 HIGH SERUM CARBOHYDRATE ANTIGEN 19-9 (CA19-9): ICD-10-CM

## 2025-07-14 DIAGNOSIS — C25.0 ADENOCARCINOMA OF HEAD OF PANCREAS: ICD-10-CM

## 2025-07-14 PROCEDURE — 25500020 PHARM REV CODE 255: Performed by: INTERNAL MEDICINE

## 2025-07-14 PROCEDURE — 71260 CT THORAX DX C+: CPT | Mod: TC

## 2025-07-14 RX ADMIN — IOHEXOL 100 ML: 350 INJECTION, SOLUTION INTRAVENOUS at 09:07

## 2025-07-15 ENCOUNTER — HOSPITAL ENCOUNTER (OUTPATIENT)
Dept: RADIOLOGY | Facility: HOSPITAL | Age: 45
Discharge: HOME | End: 2025-07-15
Attending: INTERNAL MEDICINE
Payer: MEDICARE

## 2025-07-15 ENCOUNTER — OFFICE VISIT (OUTPATIENT)
Dept: INTERNAL MEDICINE | Facility: CLINIC | Age: 45
End: 2025-07-15
Payer: MEDICARE

## 2025-07-15 ENCOUNTER — ANESTHESIA EVENT (OUTPATIENT)
Dept: SURGERY | Facility: HOSPITAL | Age: 45
End: 2025-07-15
Payer: MEDICARE

## 2025-07-15 ENCOUNTER — DOCUMENTATION ONLY (OUTPATIENT)
Dept: CARDIOLOGY | Facility: HOSPITAL | Age: 45
End: 2025-07-15
Payer: MEDICARE

## 2025-07-15 ENCOUNTER — TELEPHONE (OUTPATIENT)
Dept: SMOKING CESSATION | Facility: CLINIC | Age: 45
End: 2025-07-15
Payer: MEDICARE

## 2025-07-15 ENCOUNTER — PROCEDURE VISIT (OUTPATIENT)
Dept: NEUROLOGY | Facility: CLINIC | Age: 45
End: 2025-07-15
Payer: MEDICARE

## 2025-07-15 VITALS
OXYGEN SATURATION: 98 % | HEART RATE: 74 BPM | SYSTOLIC BLOOD PRESSURE: 176 MMHG | RESPIRATION RATE: 18 BRPM | BODY MASS INDEX: 27.75 KG/M2 | TEMPERATURE: 98 F | DIASTOLIC BLOOD PRESSURE: 92 MMHG | WEIGHT: 187.38 LBS | HEIGHT: 69 IN

## 2025-07-15 DIAGNOSIS — C25.0 ADENOCARCINOMA OF HEAD OF PANCREAS: ICD-10-CM

## 2025-07-15 DIAGNOSIS — G58.7 MONONEURITIS MULTIPLEX: ICD-10-CM

## 2025-07-15 DIAGNOSIS — H53.8 BLURRED VISION, RIGHT EYE: ICD-10-CM

## 2025-07-15 DIAGNOSIS — G83.10 PARESIS OF SINGLE LOWER EXTREMITY: ICD-10-CM

## 2025-07-15 DIAGNOSIS — E08.42 DIABETIC POLYNEUROPATHY ASSOCIATED WITH DIABETES MELLITUS DUE TO UNDERLYING CONDITION: Primary | ICD-10-CM

## 2025-07-15 DIAGNOSIS — Z87.19 HISTORY OF PANCREATITIS: ICD-10-CM

## 2025-07-15 DIAGNOSIS — R60.0 BILATERAL EDEMA OF LOWER EXTREMITY: ICD-10-CM

## 2025-07-15 DIAGNOSIS — R79.89 HIGH SERUM CARBOHYDRATE ANTIGEN 19-9 (CA19-9): ICD-10-CM

## 2025-07-15 DIAGNOSIS — K86.89 PANCREATIC MASS: ICD-10-CM

## 2025-07-15 DIAGNOSIS — G89.4 CHRONIC PAIN SYNDROME: Chronic | ICD-10-CM

## 2025-07-15 DIAGNOSIS — R35.1 NOCTURIA: ICD-10-CM

## 2025-07-15 DIAGNOSIS — C25.9 MALIGNANT NEOPLASM OF PANCREAS METASTATIC TO INTRA-ABDOMINAL LYMPH NODE: ICD-10-CM

## 2025-07-15 DIAGNOSIS — I70.0 AORTIC ATHEROSCLEROSIS: ICD-10-CM

## 2025-07-15 DIAGNOSIS — E87.6 HYPOKALEMIA: ICD-10-CM

## 2025-07-15 DIAGNOSIS — I21.4 NSTEMI (NON-ST ELEVATED MYOCARDIAL INFARCTION): ICD-10-CM

## 2025-07-15 DIAGNOSIS — R97.0 HIGH CARCINOEMBRYONIC ANTIGEN (CEA): ICD-10-CM

## 2025-07-15 DIAGNOSIS — K72.10 CHRONIC LIVER FAILURE WITHOUT HEPATIC COMA: ICD-10-CM

## 2025-07-15 DIAGNOSIS — C77.2 MALIGNANT NEOPLASM OF PANCREAS METASTATIC TO INTRA-ABDOMINAL LYMPH NODE: ICD-10-CM

## 2025-07-15 DIAGNOSIS — R23.4 SCAB: ICD-10-CM

## 2025-07-15 DIAGNOSIS — R53.1 WEAKNESS: ICD-10-CM

## 2025-07-15 DIAGNOSIS — N32.0 BLADDER OUTFLOW OBSTRUCTION: ICD-10-CM

## 2025-07-15 DIAGNOSIS — E44.0 MODERATE MALNUTRITION: ICD-10-CM

## 2025-07-15 DIAGNOSIS — F33.2 MAJOR DEPRESSIVE DISORDER, RECURRENT SEVERE WITHOUT PSYCHOTIC FEATURES: ICD-10-CM

## 2025-07-15 DIAGNOSIS — L60.2 OVERGROWN TOENAILS: ICD-10-CM

## 2025-07-15 DIAGNOSIS — K52.9 POSTPRANDIAL DIARRHEA: ICD-10-CM

## 2025-07-15 DIAGNOSIS — E78.2 MIXED HYPERLIPIDEMIA: ICD-10-CM

## 2025-07-15 DIAGNOSIS — E11.622 TYPE 2 DIABETES MELLITUS WITH OTHER SKIN ULCER, WITH LONG-TERM CURRENT USE OF INSULIN: ICD-10-CM

## 2025-07-15 DIAGNOSIS — R59.0 PERIPORTAL LYMPHADENOPATHY: ICD-10-CM

## 2025-07-15 DIAGNOSIS — L97.421 ULCER OF LEFT HEEL AND MIDFOOT, LIMITED TO BREAKDOWN OF SKIN: ICD-10-CM

## 2025-07-15 DIAGNOSIS — Z79.4 TYPE 2 DIABETES MELLITUS WITH HYPERGLYCEMIA, WITH LONG-TERM CURRENT USE OF INSULIN: ICD-10-CM

## 2025-07-15 DIAGNOSIS — S90.852A: ICD-10-CM

## 2025-07-15 DIAGNOSIS — R20.2 HAND PARESTHESIA: ICD-10-CM

## 2025-07-15 DIAGNOSIS — Z79.4 TYPE 2 DIABETES MELLITUS WITH OTHER SKIN ULCER, WITH LONG-TERM CURRENT USE OF INSULIN: ICD-10-CM

## 2025-07-15 DIAGNOSIS — E11.40 PAINFUL DIABETIC NEUROPATHY: Chronic | ICD-10-CM

## 2025-07-15 DIAGNOSIS — Z72.0 TOBACCO USER: Chronic | ICD-10-CM

## 2025-07-15 DIAGNOSIS — E78.1 FAMILIAL HYPERTRIGLYCERIDEMIA: ICD-10-CM

## 2025-07-15 DIAGNOSIS — E11.65 TYPE 2 DIABETES MELLITUS WITH HYPERGLYCEMIA, WITH LONG-TERM CURRENT USE OF INSULIN: ICD-10-CM

## 2025-07-15 DIAGNOSIS — K76.0 METABOLIC DYSFUNCTION-ASSOCIATED STEATOTIC LIVER DISEASE (MASLD): ICD-10-CM

## 2025-07-15 DIAGNOSIS — R59.0 PANCREATICODUODENAL LYMPHADENOPATHY: ICD-10-CM

## 2025-07-15 DIAGNOSIS — T85.192A MALFUNCTION OF SPINAL CORD STIMULATOR, INITIAL ENCOUNTER: ICD-10-CM

## 2025-07-15 DIAGNOSIS — H83.3X1 NOISE-INDUCED HEARING LOSS OF RIGHT EAR, UNSPECIFIED HEARING STATUS ON CONTRALATERAL SIDE: ICD-10-CM

## 2025-07-15 DIAGNOSIS — K21.00 GASTROESOPHAGEAL REFLUX DISEASE WITH ESOPHAGITIS WITHOUT HEMORRHAGE: ICD-10-CM

## 2025-07-15 DIAGNOSIS — R29.6 FALLS FREQUENTLY: ICD-10-CM

## 2025-07-15 DIAGNOSIS — Z95.1 HX OF CABG: ICD-10-CM

## 2025-07-15 DIAGNOSIS — I10 PRIMARY HYPERTENSION: ICD-10-CM

## 2025-07-15 DIAGNOSIS — L98.8 ACQUIRED PERFORATING DERMATOSIS: ICD-10-CM

## 2025-07-15 DIAGNOSIS — Z74.09 IMPAIRED FUNCTIONAL MOBILITY, BALANCE, GAIT, AND ENDURANCE: Chronic | ICD-10-CM

## 2025-07-15 DIAGNOSIS — K86.0 ALCOHOL-INDUCED CHRONIC PANCREATITIS: ICD-10-CM

## 2025-07-15 DIAGNOSIS — R10.9 LEFT FLANK PAIN: ICD-10-CM

## 2025-07-15 DIAGNOSIS — E11.69 ONYCHOMYCOSIS OF MULTIPLE TOENAILS WITH TYPE 2 DIABETES MELLITUS: ICD-10-CM

## 2025-07-15 DIAGNOSIS — Z86.0100 HISTORY OF COLONIC POLYPS: ICD-10-CM

## 2025-07-15 DIAGNOSIS — I25.118 CORONARY ARTERY DISEASE OF NATIVE ARTERY OF NATIVE HEART WITH STABLE ANGINA PECTORIS: ICD-10-CM

## 2025-07-15 DIAGNOSIS — E11.65 UNCONTROLLED TYPE 2 DIABETES MELLITUS WITH HYPERGLYCEMIA: ICD-10-CM

## 2025-07-15 DIAGNOSIS — B35.1 ONYCHOMYCOSIS OF MULTIPLE TOENAILS WITH TYPE 2 DIABETES MELLITUS: ICD-10-CM

## 2025-07-15 DIAGNOSIS — G62.9 NEUROPATHY: ICD-10-CM

## 2025-07-15 DIAGNOSIS — K86.89 PANCREATIC ATROPHY: ICD-10-CM

## 2025-07-15 DIAGNOSIS — R10.13 POSTPRANDIAL EPIGASTRIC PAIN: ICD-10-CM

## 2025-07-15 DIAGNOSIS — R63.4 ABNORMAL WEIGHT LOSS: ICD-10-CM

## 2025-07-15 DIAGNOSIS — K52.9 CHRONIC DIARRHEA OF UNKNOWN ORIGIN: ICD-10-CM

## 2025-07-15 LAB — POCT GLUCOSE: 352 MG/DL (ref 70–110)

## 2025-07-15 PROCEDURE — 99215 OFFICE O/P EST HI 40 MIN: CPT | Mod: PBBFAC,25 | Performed by: INTERNAL MEDICINE

## 2025-07-15 PROCEDURE — 78815 PET IMAGE W/CT SKULL-THIGH: CPT | Mod: TC

## 2025-07-15 PROCEDURE — 95886 MUSC TEST DONE W/N TEST COMP: CPT | Mod: PBBFAC | Performed by: SPECIALIST

## 2025-07-15 PROCEDURE — 95908 NRV CNDJ TST 3-4 STUDIES: CPT | Mod: PBBFAC | Performed by: SPECIALIST

## 2025-07-15 PROCEDURE — A9552 F18 FDG: HCPCS | Performed by: INTERNAL MEDICINE

## 2025-07-15 RX ORDER — FLUDEOXYGLUCOSE F18 500 MCI/ML
10 INJECTION INTRAVENOUS
Status: COMPLETED | OUTPATIENT
Start: 2025-07-15 | End: 2025-07-15

## 2025-07-15 RX ADMIN — FLUDEOXYGLUCOSE F-18 8.7 MILLICURIE: 500 INJECTION INTRAVENOUS at 07:07

## 2025-07-15 NOTE — TELEPHONE ENCOUNTER
Pt had not shown up for his tobacco cessation follow up appointment. Called pt. No answer. Left voice message with contact information.

## 2025-07-15 NOTE — PROGRESS NOTES
Preop CV assessment    Pt needs to undergo mediport placement given recent diagnosis is pancreatic mass.  RCRI 2.    Patient is at moderate cardiovascular risk for a low risk procedure     He can hold Plavix for 5 days prior to the procedure.   Ideally, pt needs to remain on aspirin perioperatively, if feasible. Stopping aspirin can increase cardiovascular risk.    Zoie Campbell MD  Cardiology staff

## 2025-07-15 NOTE — PROGRESS NOTES
Subjective     Patient ID: Bharath Caballero is a 45 y.o. male.    Chief Complaint: Follow-up    Follow-up      Patient was supposed to have his pain stimulator that was implanted in his back with a broken only today it was canceled by his neurosurgeon because his glucose was 596 after all we have done to get this taken care of over all these years it was canceled today because of his glucose we have proven that when he takes his medicines correctly as it his recent admission on his home medicines his glucoses are perfectly controlled.  I spoke to Dr. Rocky Rojas [phone] the neurosurgeon who is willing to remove this device for him today who agrees to reschedule him for Monday morning.  He is to be at the doctor's surgery center at [facility] 6:00 a.m. Monday morning we will admit him here Friday to observation to ensure that his glucoses are controlled fax his labs to Dr. Delgado Sunday afternoon to include CBC CMP PT PTT and urinalysis I will be out of town but the resident physician taking care of him is aware of the plans if there any problems they will call me he is then to have a MediPort placed for chemotherapy Wednesday 2 days after this has been removed    Time taking care of the situation equaled 1-1/2 hours  Review of Systems   All other systems reviewed and are negative.         Objective     Physical Exam  Vitals and nursing note reviewed.   Constitutional:       Appearance: Normal appearance.   HENT:      Head: Normocephalic and atraumatic.      Right Ear: Tympanic membrane, ear canal and external ear normal.      Left Ear: Tympanic membrane, ear canal and external ear normal.      Nose: Nose normal.      Mouth/Throat:      Mouth: Mucous membranes are moist.      Pharynx: Oropharynx is clear.   Eyes:      Extraocular Movements: Extraocular movements intact.      Conjunctiva/sclera: Conjunctivae normal.      Pupils: Pupils are equal, round, and reactive to light.   Cardiovascular:      Rate  and Rhythm: Normal rate.      Pulses: Normal pulses.      Heart sounds: Normal heart sounds.   Pulmonary:      Effort: Pulmonary effort is normal.      Breath sounds: Normal breath sounds.   Abdominal:      General: Bowel sounds are normal.      Palpations: Abdomen is soft.   Musculoskeletal:      Cervical back: Normal range of motion and neck supple.   Skin:     General: Skin is warm and dry.   Neurological:      General: No focal deficit present.      Mental Status: He is alert and oriented to person, place, and time. Mental status is at baseline.   Psychiatric:         Mood and Affect: Mood normal.         Behavior: Behavior normal.         Thought Content: Thought content normal.         Judgment: Judgment normal.            Assessment and Plan     1. Diabetic polyneuropathy associated with diabetes mellitus due to underlying condition    2. Hand paresthesia    3. Mononeuritis multiplex    4. Paresis of single lower extremity    5. Malfunction of spinal cord stimulator, initial encounter    6. Chronic pain syndrome    7. Painful diabetic neuropathy    8. Major depressive disorder, recurrent severe without psychotic features    9. Blurred vision, right eye    10. Noise-induced hearing loss of right ear, unspecified hearing status on contralateral side    11. Onychomycosis of multiple toenails with type 2 diabetes mellitus    12. Overgrown toenails    13. Acquired perforating dermatosis    14. Scab    15. Primary hypertension    16. Mixed hyperlipidemia    17. Aortic atherosclerosis    18. Familial hypertriglyceridemia    19. Coronary artery disease of native artery of native heart with stable angina pectoris    20. NSTEMI (non-ST elevated myocardial infarction)    21. Hx of CABG    22. Bladder outflow obstruction    23. Nocturia    24. Hypokalemia    25. Adenocarcinoma of head of pancreas    26. High serum carbohydrate antigen 19-9 ()    27. High carcinoembryonic antigen (CEA)    28. 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 Abnormal weight loss    30. Type 2 diabetes mellitus with other skin ulcer, with long-term current use of insulin    31. Type 2 diabetes mellitus with hyperglycemia, with long-term current use of insulin    32. Uncontrolled type 2 diabetes mellitus with hyperglycemia    33. Moderate malnutrition    34. History of pancreatitis    35. Alcohol-induced chronic pancreatitis    36. Left flank pain    37. Metabolic dysfunction-associated steatotic liver disease (MASLD)    38. Gastroesophageal reflux disease with esophagitis without hemorrhage    39. History of colonic polyps    40. Chronic liver failure without hepatic coma    41. Pancreatic mass    42. Chronic diarrhea of unknown origin    43. Postprandial epigastric pain    44. Postprandial diarrhea    45. Pancreatic atrophy    46. Ulcer of left heel and midfoot, limited to breakdown of skin    47. Superficial foreign body of left foot without major open wound and without infection, initial encounter    48. Bilateral edema of lower extremity    49. Periportal lymphadenopathy    50. Pancreaticoduodenal lymphadenopathy    51. Falls frequently    52. Tobacco user    53. Impaired functional mobility, balance, gait, and endurance        As above         Follow up in about 2 weeks (around 7/29/2025).

## 2025-07-15 NOTE — PROCEDURES
Nerve conductions / EMG performed and full report scanned in chart. (Media tab)   summary comments, if any:   ..  Ulnar neuropathy findings and potential c7 c8 root pathology by needle emg also       Channing Fong MD                   karyotyping

## 2025-07-15 NOTE — ANESTHESIA PREPROCEDURE EVALUATION
Bharath Caballero is a 45 y.o. male PRESENTING FOR [id number] (Chest) with a history of       -Adenocarcinoma of head of pancreas/High serum carbohydrate antigen 19-9   -T2DM ( 7/7/25) wwith panc insufficiency ; AM CBG       -hospitalized w/ diarrhea & uncontrolled DM 6/2025 (A1c 12.9)  -CAD S/P CABG 12/5/24  -CKD 3  -chronic pancreatitis   -familial hypertriglyceridemia with routine plasmapheresis in the past (last approx 7 yr ago)  -hepatic steatosis  -HTN  -GERD  -SMOKER  -CICI     BETA-BLOCKER: METOPROLOL    Last dose: 7/23/25 @ 0400  ASA LD (cont):  PLAVIX LD (hold x 5 days prior per sx): 7/9/25    New Orders for Anesthesia: DM PROTOCOL     Active Ambulatory Problems     Diagnosis Date Noted    Chronic pain syndrome 05/12/2022    Painful diabetic neuropathy 05/12/2022    History of pancreatitis 06/20/2022    Abnormal weight loss 06/20/2022    Bladder outflow obstruction 06/20/2022    Chronic pancreatitis 10/28/2017    Onychomycosis of multiple toenails with type 2 diabetes mellitus 10/28/2017    Diabetic polyneuropathy 06/20/2022    Primary hypertension 10/28/2017    Hand paresthesia 06/20/2022    Mixed hyperlipidemia 10/28/2017    Left flank pain 06/20/2022    Mononeuritis multiplex 06/20/2022    Nocturia 06/20/2022    Paresis of single lower extremity 06/20/2022    Metabolic dysfunction-associated steatotic liver disease (MASLD) 06/20/2022    Tobacco user 06/20/2022    Gastroesophageal reflux disease with esophagitis without hemorrhage 08/18/2022    History of colonic polyps 08/18/2022    Impaired functional mobility, balance, gait, and endurance 10/19/2022    Blurred vision, right eye 10/19/2022    Aortic atherosclerosis 01/24/2023    Ulcer of left heel and midfoot, limited to breakdown of skin 01/24/2023    Overgrown toenails 02/06/2023    Bilateral edema of lower extremity 02/06/2023    Type 2 diabetes mellitus with other skin ulcer, with long-term current use of insulin 02/06/2023    Major  H&P/Consult Note/Progress Note/Office Note:   Dennis Ferguson  MRN: [id number]  CATHI:9/36/[id number]  Age:78 y.o.    HPI: Dennis Ferguson is a 66 y.o. female who was admitted on 4/27/20 by the Hospitalists   after she presented to the ER a 2nd time in 2 days for with constant and progressive N/V/lower abd pain for 2 days. Nothing made symptoms better or worse. No associated fever  She is s/p hysterectomy approx 2011    CT on 4/28/21 showed thickening of focal small bowel wall with proximal dilation and likely partial SBO  Dr Robert Pennington was consulted for SBO on 4/29/21. Her symptoms worsened today and she had the below abd Xray with AF levels and signs of persistent SBO       4/28/21 CT abd/pelvis with oral and IV contrast  Hx:  Follow-up of abnormal small bowel with subacute progressive worsening generalized abdominal distention and pain. Spontaneous bacterial peritonitis.      FINDINGS: Partially included lung bases demonstrate a new small posterior  layering pleural effusion on the right and mild infiltrates/atelectasis in both  posterior lower lungs. No dense consolidation. Nonspecific mild wall thickening  of distal esophagus and probable tiny hiatal hernia.     Abdomen: Unchanged indeterminate right adrenal nodule, most likely incidental  adenoma in the absence of clinical suspicion. The kidneys, adrenals, spleen,  liver, pancreas, gallbladder have not changed in 2 days. Right renal cyst again  noted. Diffuse atherosclerotic changes again noted of aorta. Contrast does  opacify major vessels. No evidence of free air, large volume ascites, or focal  fluid collection in the abdomen. There is increased tracer small volume of  abdominopelvic ascites.     GI tract demonstrates contrast from level of stomach to rectum. Stomach,  duodenum, large bowel, distal ileum are not distended. There is a focal right  lower quadrant small bowel loop with wall thickening and surrounding  inflammatory stranding.  Proximal to this there are depressive disorder, recurrent severe without psychotic features 04/25/2023    Chronic liver failure without hepatic coma 04/25/2023    Acquired perforating dermatosis 08/30/2023    Hearing loss 10/03/2023    Hypokalemia 02/15/2024    Familial hypertriglyceridemia 02/15/2024    Coronary artery disease of native artery of native heart with stable angina pectoris 02/15/2024    Falls frequently 04/29/2024    Scab 06/07/2024    NSTEMI (non-ST elevated myocardial infarction) 10/09/2024    Hx of CABG 12/17/2024    Type 2 diabetes mellitus with hyperglycemia, with long-term current use of insulin 02/19/2025    Chronic diarrhea of unknown origin 03/18/2025    Malfunction of spinal cord stimulator 04/30/2025    Uncontrolled type 2 diabetes mellitus with hyperglycemia 06/10/2025    Moderate malnutrition 06/11/2025    Superficial foreign body of left foot without major open wound and without infection 06/11/2025    Pancreatic mass 06/12/2025    Adenocarcinoma of head of pancreas 07/06/2025    High serum carbohydrate antigen 19-9 () 07/06/2025    High carcinoembryonic antigen (CEA) 07/06/2025    Postprandial epigastric pain 07/06/2025    Postprandial diarrhea 07/06/2025    Pancreatic atrophy 07/06/2025    Periportal lymphadenopathy 07/06/2025    Malignant neoplasm of pancreas metastatic to intra-abdominal lymph node 07/06/2025    Pancreaticoduodenal lymphadenopathy 07/06/2025    Hyperglycemia due to diabetes mellitus 07/18/2025     Resolved Ambulatory Problems     Diagnosis Date Noted    Acute abdominal pain 06/20/2022    Candiduria 06/20/2022    Familial hypercholesterolemia 06/20/2022    BMI 34.0-34.9,adult 06/20/2022    Obstructive sleep apnea syndrome 06/20/2022    Elevated troponin I level 08/18/2022    Fall 10/19/2022    Chronic pain 10/25/2022    Hypertensive emergency 11/28/2022    Encounter for diabetic foot exam 05/09/2023    Multiple insect bites 08/16/2023    Open wound of finger of right hand 10/20/2023     multiple mildly dilated small  bowel loops which have slightly increased since prior. Nonspecific wall thickening is also noted scattered and several other small bowel loops. nura: No developing abscess, lymphadenopathy or mass. Small volume ascites.     Bones: No acute osseous lesions. IMPRESSION  1. Abnormal small bowel, most prominently involving focal wall thickening and inflammation in a right lower quadrant loop. 2. Dilatation of bowel loops proximal to this, likely partial small bowel obstruction. Contrast does progress to the level of the rectum. 3. Increased ascites, small volume. 4. Right small pleural effusion. Mild bibasilar lung infiltrates versus atelectasis        5/1/21 abd Xray, 2views  Hx:  Abdominal pain and bloating    Increased small bowel distention. Multiple air-fluid levels are now present. Contrast is present in the colon from prior CT scan. There is no free air. There are no renal calculi. The lung bases are clear.     IMPRESSION  Increased small bowel distention, likely high-grade obstruction     5/3/21  Pt denies N/V or abdominal pain. +BM x 2, no flatus. Remains distended. Feels poorly in general.  Afebrile. WBC 11. K+ 3.7. NGT with 1L output. KUB from yesterday demonstrates contrast in the colon and rectum. 5/4/21 Pt denies N/V or abdominal pain. +BM, + flatus. Less distended. Still feels poorly in general.  Afebrile. WBC 11. K+ 3.5. NGT with 200mL output- came out and was replace overnight. 5/5/21  Pt denies abdominal pain, N/V. +flatus. WBC WNL. Afebrile. Tolerating NGT clamping overnight. 5/6/21  Pt denies abdominal pain, N/V. +flatus. WBC 8. Afebrile. BM X 1 recorded. Tolerating CLD and grits. Feels weak. 5/7/21 Pt denies abdominal pain, N/V. +Flatus and BM X 3. Felt distended yesterday after FLD, KUB demonstrated improvement. Feeling better after BMs but anxious about eating.     Past Medical History:   Diagnosis Date    DDD (degenerative disc disease), lumbar 2/19/2020    DM2 (diabetes mellitus, type 2) ([facility] [address] 3/4/2014    Gastroesophageal reflux disease without esophagitis 5/22/2015    HLD (hyperlipidemia)     HTN (hypertension)     Menopause     Sciatica of right side 2/19/2020    UTI (urinary tract infection) 4/27/2021     Past Surgical History:   Procedure Laterality Date    HX COLONOSCOPY  01/25/2010    HX HYSTERECTOMY      10 years ago    HX OTHER SURGICAL      Acoustic neuroma ressected 2/2014.  HX PARTIAL THYROIDECTOMY      Lobectomy for benign mass.     NEUROLOGICAL PROCEDURE UNLISTED  2014    Brain Sx-[facility] [place]     Current Facility-Administered Medications   Medication Dose Route Frequency    hydrALAZINE (APRESOLINE) tablet 50 mg  50 mg Oral TID    bisacodyL (DULCOLAX) suppository 10 mg  10 mg Rectal DAILY    hydrALAZINE (APRESOLINE) 20 mg/mL injection 10 mg  10 mg IntraVENous Q6H PRN    pantoprazole (PROTONIX) 40 mg in 0.9% sodium chloride 10 mL injection  40 mg IntraVENous DAILY    insulin lispro (HUMALOG) injection   SubCUTAneous TIDAC    lactated Ringers infusion  75 mL/hr IntraVENous CONTINUOUS    phenol throat spray (CHLORASEPTIC) 1 Spray  1 Spray Oral PRN    enoxaparin (LOVENOX) injection 30 mg  30 mg SubCUTAneous DAILY    diphenhydrAMINE (BENADRYL) injection 12.5 mg  12.5 mg IntraVENous Q6H PRN    [Held by provider] metFORMIN (GLUCOPHAGE) tablet 1,000 mg  1,000 mg Oral BID WITH MEALS    rosuvastatin (CRESTOR) tablet 20 mg  20 mg Oral QHS    sodium chloride (NS) flush 5-40 mL  5-40 mL IntraVENous Q8H    sodium chloride (NS) flush 5-40 mL  5-40 mL IntraVENous PRN    acetaminophen (TYLENOL) tablet 650 mg  650 mg Oral Q6H PRN    Or    acetaminophen (TYLENOL) suppository 650 mg  650 mg Rectal Q6H PRN    polyethylene glycol (MIRALAX) packet 17 g  17 g Oral DAILY PRN    promethazine (PHENERGAN) tablet 12.5 mg  12.5 mg Oral Q6H PRN    Or    ondansetron (ZOFRAN) injection 4 mg  4 mg IntraVENous Q6H PRN Burn injury of skin of finger 10/27/2023    Severe obesity (BMI 35.0-39.9) with comorbidity 10/27/2023    Acute right hemiparesis 02/06/2024    Full thickness burn of left index finger 02/28/2024    Wound of right leg, subsequent encounter 03/06/2024    Blister of right foot 08/05/2024    RUQ abdominal pain 09/10/2024    Chest pain 10/09/2024     Past Medical History:   Diagnosis Date    Abdominal pain     Anxiety     Appetite loss     Balance problem     BPH (benign prostatic hyperplasia)     Coronary artery disease, unspecified vessel or lesion type, unspecified whether angina present, unspecified whether native or transplanted heart     Deafness 10/03/2023    Depression     Diabetes     Elevated LFTs 08/18/2022    Fatigue     GERD (gastroesophageal reflux disease)     Headache     Hepatic steatosis     History of continuous positive airway pressure (CPAP) therapy at home     History of recent fall     Hypertension     Hypertriglyceridemia     Insomnia     Kidney disease     Kidney disorder     Leg pain     Morbid obesity     Nausea & vomiting     Neuropathy     OA (osteoarthritis)     Obesity     CICI (obstructive sleep apnea)     Personal history of colonic polyps 08/18/2022    Polyneuropathy     Recurrent pancreatitis     Renal insufficiency     Tobacco abuse     Type 2 diabetes mellitus with skin complication, with long-term current use of insulin 02/06/2023    Urinary frequency     Use of cane as ambulatory aid     Weight loss, unintentional            Pre-op Assessment    I have reviewed the NPO Status.      Review of Systems  Anesthesia Hx:  No problems with previous Anesthesia   History of prior surgery of interest to airway management or planning: heart surgery. Previous anesthesia: General 12/5/2024 CABG LAAexc BMI31.2: EM. Mac Grath 3. Gr1x1. ET8.0;7/21/2022 Insert SC [id number]: EM w OAW. Videoscope Mac 4. Gr1x1. ET7.5. 5/12/2022 Trial SC Neurostimilator: EM w OAW. DL Mil2. Gr1x1. ET7.5 with general  Lortab [hydrocodone-acetaminophen] and Acetaminophen  Social History     Socioeconomic History    Marital status:      Spouse name: Not on file    Number of children: Not on file    Years of education: Not on file    Highest education level: Not on file   Tobacco Use    Smoking status: Never Smoker    Smokeless tobacco: Never Used   Substance and Sexual Activity    Alcohol use: No    Drug use: No    Sexual activity: Yes     Partners: Male     Birth control/protection: Surgical   Other Topics Concern     Social History     Tobacco Use   Smoking Status Never Smoker   Smokeless Tobacco Never Used     Family History   Problem Relation Age of Onset    Heart Disease Father 76    Kidney Disease Father         hepatitis, post-surgery (tranfusion)    Cancer Mother [de-identified]        Breast    Breast Cancer Mother 80    Alzheimer Sister [de-identified]    Arthritis-osteo Sister     Arthritis-osteo Brother         Back surgery    Diabetes Neg Hx      ROS: The patient has no difficulty with chest pain or shortness of breath. No fever or chills. Comprehensive review of systems was otherwise unremarkable except as noted above. Physical Exam:   Visit Vitals  BP (!) 175/62   Pulse 88   Temp 98.1 °F (36.7 °C)   Resp 20   Ht 5' 4\" (1.626 m)   Wt 134 lb (60.8 kg)   SpO2 93%   BMI 23.00 kg/m²     Vitals:    05/06/21 2237 05/07/21 0237 05/07/21 0751 05/07/21 1050   BP: (!) 145/67 (!) 148/59 (!) 167/92 (!) 175/62   Pulse: 88 98 100 88   Resp: 18 18 20    Temp: 98.1 °F (36.7 °C) 98.5 °F (36.9 °C) 98.1 °F (36.7 °C)    SpO2: 95% 93% 93% 93%   Weight:       Height:         05/07 0701 - 05/07 1900  In: 240 [P.O.:240]  Out: -   05/05 1901 - 05/07 0700  In: 4129 [P.O.:240; I.V.:1594]  Out: 900 [Urine:900]    Constitutional: Alert, oriented, cooperative patient in no acute distress; appears stated age    Eyes:Sclera are clear.  EOMs intact  ENMT: no external lesions gross hearing normal; no obvious neck masses, no ear or lip lesions, anesthesia.  Procedure performed at an [facility].      Airway issues documented on chart review include GETA        Social:  Smoker       Hematology/Oncology:                      Current/Recent Cancer.            Oncology Comments: Panc Head adenoma     Cardiovascular:     Hypertension, poorly controlled  Past MI CAD   CABG/stent   Angina     hyperlipidemia    12/5/2024 CABG. CAD2-vessel Dz  NSTEMI 10/2024: Ischemic CMO EF=45%, LVEDP=20.  Echo 5/2025: EF=70%.  PASP=11.No valvu morpho abn.  Familial hyperTGmia. [facility] 10/2024:  Patient on beta blockers   Cardiovascular Symptoms: Angina       Coronary Artery Disease:          Hx of Myocardial Infarction                  Hypertension      Disorder of Cardiac Conduction, Intraventricular Conduct Defect, Incomplete (RBBB)    Pulmonary:        Sleep Apnea     Obstructive Sleep Apnea (CICI).           Renal/:  Chronic Renal Disease, CKD        Kidney Function/Disease             Hepatic/GI:     GERD, well controlled Liver Disease,  Hep steatosis      Gerd       Liver Disease        Musculoskeletal:  Arthritis        Arthritis          Neurological:    Neuromuscular Disease,  Headaches      Dx of Headaches   Arthritis                         Neuromuscular Disease   Endocrine:  Diabetes, poorly controlled, type 2, using insulin    Diabetes                      Psych:  Psychiatric History anxiety depression              Vitals:    07/23/25 0610 07/23/25 0626 07/23/25 0647 07/23/25 0718   BP: (!) 199/115 (!) 197/104 (!) 188/103 (!) 179/91   Pulse: 95      Temp: 36.7 °C (98 °F)      TempSrc: Oral      SpO2: 99%      Weight:             Physical Exam  General: Alert, Cooperative and Well nourished    Airway:  Mallampati: / I  Mouth Opening: Normal  TM Distance: Normal  Tongue: Normal  Neck ROM: Normal ROM    Dental:  Periodontal disease  Decaying dentition noted at multiple sites -- patient is aware of risk of damage and dislodgment        Chest/Lungs:  Clear to  auscultation, Normal Respiratory Rate    Heart:  Rate: Normal  Rhythm: Regular Rhythm  Sounds: Normal       Latest Reference Range & Units 07/23/25 06:13   POCT Glucose 70 - 110 mg/dL 138 (H)   (H): Data is abnormally high  Recent Labs     07/20/25  0513   WBC 8.37   RBC 4.14*   HGB 12.5*   HCT 37.3*      MCV 90.1   MCH 30.2   MCHC 33.5     Recent Labs     07/20/25  0513 07/20/25  0722    140   K 3.6 3.5    106   CO2 29 28   BUN 10.7 10.7   CREATININE 0.88 0.83   GLU 43* 113*   CALCIUM 8.2* 8.4   ALBUMIN 2.3* 2.4*   PROT 6.6 7.0   ALKPHOS 398* 428*   ALT 46 49   AST 57* 55*   BILITOT 0.3 0.3     Recent Labs     07/20/25  0512   INR 1.0   PROTIME 13.1   APTT 38.2*     Lab Results   Component Value Date    HGBA1C 11.3 (H) 07/20/2025           Results for orders placed during the hospital encounter of 10/07/24     Cardiac catheterization     Conclusion    The Dist LAD-2 lesion was 50% stenosed.    The Prox RCA lesion was 90% stenosed.    The Mid RCA lesion was 100% stenosed.    The Prox LAD lesion was 50% stenosed.    The Dist LAD-1 lesion was 50% stenosed.    The pre-procedure left ventricular end diastolic pressure was 20.    The estimated blood loss was none.     FINDINGS  Access:  Right radial  LVEDP:  20 mmHg  Left Main:  No stenosis  LAD: 50% proximal stenosis  Circumflex:  Luminal irregularities  RCA: 100% mid stenosis     LIMA:  Large vessel.  No significant stenosis noted.     iFR = 0.75 in proximal LAD.   iFR is 0.64 in distal LAD.   iFR < 0.89 is significant (associated with myocardial ischemia)  Guide used:  EBU 3.5     Summary:  Two vessel CAD     RECOMMENDATIONS  Refer for CABG evaluation  Risk factor modifications  Activity -- avoid straining with affected limb for one week  Exercise -- as tolerated  Precautions post D/C -- come to ER for hematoma, unusual pain, erythema, or unusual drainage at access site  Precautions post D/C -- if develop bleeding or hematoma at access site, hold  nares normal  CV: RRR. Normal perfusion  Resp: No JVD. Breathing is  non-labored; no audible wheezing. GI: soft, non distended; non tender, +BS; no peritoneal signs     Musculoskeletal: unremarkable with normal function. No embolic signs or cyanosis. Neuro:  Oriented; moves all 4; no focal deficits  Psychiatric: normal affect and mood, no memory impairment    Recent vitals (if inpt):  Patient Vitals for the past 24 hrs:   BP Temp Pulse Resp SpO2   05/07/21 1050 (!) 175/62  88  93 %   05/07/21 0751 (!) 167/92 98.1 °F (36.7 °C) 100 20 93 %   05/07/21 0237 (!) 148/59 98.5 °F (36.9 °C) 98 18 93 %   05/06/21 2237 (!) 145/67 98.1 °F (36.7 °C) 88 18 95 %   05/06/21 1927 (!) 164/53 98.1 °F (36.7 °C) 99 18 92 %   05/06/21 1517 (!) 155/69 98.6 °F (37 °C) 98 18 93 %   05/06/21 1253 (!) 155/55  94 18 93 %   05/06/21 1211 (!) 144/51  (!) 104         Amount and/or Complexity of Data Reviewed and Analyzed:  I reviewed and analyzed all of the unique labs and radiologic studies that are shown below as well as any that are in the HPI, and any that are in the expanded problem list below  *Each unique test, order, or document contributes to the combination of 2 or combination of 3 in Category 1 below. For this visit I also reviewed old records and prior notes.       Recent Labs     05/07/21 0528   WBC 9.8   HGB 12.4         K 3.1*      CO2 28   BUN 3*   CREA 0.31*   *     Review of most recent CBC  Lab Results   Component Value Date/Time    WBC 9.8 05/07/2021 05:28 AM    HGB 12.4 05/07/2021 05:28 AM    HCT 35.5 (L) 05/07/2021 05:28 AM    PLATELET 791 90/05/4545 05:28 AM    MCV 94.9 05/07/2021 05:28 AM       Review of most recent BMP  Lab Results   Component Value Date/Time    Sodium 140 05/07/2021 05:28 AM    Potassium 3.1 (L) 05/07/2021 05:28 AM    Chloride 104 05/07/2021 05:28 AM    CO2 28 05/07/2021 05:28 AM    Anion gap 8 05/07/2021 05:28 AM    Glucose 136 (H) 05/07/2021 05:28 AM    BUN 3 (L) 05/07/2021 05:28 AM    Creatinine 0.31 (L) 05/07/2021 05:28 AM    BUN/Creatinine ratio 29 (H) 04/21/2021 10:34 AM    GFR est AA >60 05/07/2021 05:28 AM    GFR est non-AA >60 05/07/2021 05:28 AM    Calcium 8.5 05/07/2021 05:28 AM       Review of most recent LFTs (and lipase if done)  Lab Results   Component Value Date/Time    ALT (SGPT) 19 05/02/2021 08:28 AM    AST (SGOT) 18 05/02/2021 08:28 AM    Alk. phosphatase 46 (L) 05/02/2021 08:28 AM    Bilirubin, direct 0.11 04/21/2021 10:34 AM    Bilirubin, total 0.5 05/02/2021 08:28 AM     Lab Results   Component Value Date/Time    Lipase 48 (L) 04/27/2021 05:54 PM       Lab Results   Component Value Date/Time    Bilirubin, direct 0.11 04/21/2021 10:34 AM    Troponin-I <0.05 12/30/2010 05:07 AM       Review of most recent HgbA1c  Lab Results   Component Value Date/Time    Hemoglobin A1c 6.4 (H) 04/21/2021 10:34 AM       Nutritional assessment screen for wound healing issues:  Lab Results   Component Value Date/Time    Protein, total 5.4 (L) 05/02/2021 08:28 AM    Albumin 2.9 (L) 05/02/2021 08:28 AM       @lastcovr@  XR Results (most recent):  Results from [place] encounter on 04/27/21   XR ABD (KUB)    Narrative EXAMINATION: ABDOMINAL RADIOGRAPHS 5/6/2021 1:37 PM    ACCESSION NUMBER: [id number]    INDICATION: abdominal distention, recent small bowel obstruction    COMPARISON: Abdominal x-ray 5/3/2021, 5/2/2021, CT abdomen and pelvis 4/28/2021    TECHNIQUE: A single AP supine view of the abdomen was obtained. FINDINGS:     Small bowel distention is improved in comparison to the 5/2/2021 exam. There is  persistent small bowel dilation, measuring up to 3.4 cm. There is stool and gas  in the rectum. Scattered small bowel wall thickening, improved in comparison to  the 5/2/2021 exam.    An enteric tube is not present within the field-of-view. Lumbar spine spondylosis. No radiopaque foreign body. Impression 1.  Persistent but improving small bowel pressure at access site, and come to ER    CARDS OV 5/7/25 6/19/25    Past anesthesia records: CABG          Anesthesia Plan  Type of Anesthesia, risks & benefits discussed:    Anesthesia Type: MAC  Intra-op Monitoring Plan: Standard ASA Monitors  Post Op Pain Control Plan: IV/PO Opioids PRN  Induction:  IV  Informed Consent: Informed consent signed with the Patient and all parties understand the risks and agree with anesthesia plan.  All questions answered.   ASA Score: 3  Day of Surgery Review of History & Physical: H&P Update referred to the surgeon/provider.  Anesthesia Plan Notes:       Ready For Surgery From Anesthesia Perspective.     .       distention and small bowel wall  thickening in comparison to the 5/2/2021 exam.    2. An enteric tube is not visualized on this examination. VOICE DICTATED BY: Dr. Enoch Rueda Results (most recent):  Results from Hospital Encounter encounter on 04/27/21   CT ABD PELV W CONT    Narrative CT of the Abdomen and Pelvis with contrast    CLINICAL INDICATION:  Follow-up of abnormal small bowel with subacute  progressive worsening generalized abdominal distention and pain. Spontaneous  bacterial peritonitis. COMPARISON: 4/26/2021    TECHNIQUE: Automated exposure Control was used. Multiple axial images were  obtained through the abdomen and pelvis after intravenous injection of 125cc of  isovue 370 IV contrast to further evaluate vessels and organs. Coronal  reformatted images were done for further evaluation of bones and organs. Oral  contrast was given for further evaluation of GI tract and adjacent organs. FINDINGS: Partially included lung bases demonstrate a new small posterior  layering pleural effusion on the right and mild infiltrates/atelectasis in both  posterior lower lungs. No dense consolidation. Nonspecific mild wall thickening  of distal esophagus and probable tiny hiatal hernia. Abdomen: Unchanged indeterminate right adrenal nodule, most likely incidental  adenoma in the absence of clinical suspicion. The kidneys, adrenals, spleen,  liver, pancreas, gallbladder have not changed in 2 days. Right renal cyst again  noted. Diffuse atherosclerotic changes again noted of aorta. Contrast does  opacify major vessels. No evidence of free air, large volume ascites, or focal  fluid collection in the abdomen. There is increased tracer small volume of  abdominopelvic ascites. GI tract demonstrates contrast from level of stomach to rectum. Stomach,  duodenum, large bowel, distal ileum are not distended.  There is a focal right  lower quadrant small bowel loop with wall thickening and surrounding  inflammatory stranding. Proximal to this there are multiple mildly dilated small  bowel loops which have slightly increased since prior. Nonspecific wall  thickening is also noted scattered and several other small bowel loops. Pelvis: No developing abscess, lymphadenopathy or mass. Small volume ascites. Bones: No acute osseous lesions. Impression 1. Abnormal small bowel, most prominently involving focal wall thickening and  inflammation in a right lower quadrant loop. 2. Dilatation of bowel loops proximal to this, likely partial small bowel  obstruction. Contrast does progress to the level of the rectum. 3. Increased ascites, small volume. 4. Right small pleural effusion. Mild bibasilar lung infiltrates versus  atelectasis. US Results (most recent):  No results found for this or any previous visit.       Admission date (for inpatients): 4/27/2021   * No surgery found *  * No surgery found *        ASSESSMENT/PLAN:  Problem List  Date Reviewed: 4/21/2021          Codes Class Noted    Hypokalemia ICD-10-CM: E87.6  ICD-9-CM: 276.8  5/5/2021        Hypomagnesemia ICD-10-CM: E83.42  ICD-9-CM: 275.2  5/1/2021        * (Principal) SBO (small bowel obstruction) ([facility] ICD-10-CM: D00.762  ICD-9-CM: 560.9  4/27/2021        Leukocytosis ICD-10-CM: R98.059  ICD-9-CM: 288.60  4/27/2021        UTI (urinary tract infection) ICD-10-CM: N39.0  ICD-9-CM: 599.0  4/27/2021        Lumbar radiculopathy ICD-10-CM: M54.16  ICD-9-CM: 724.4  2/19/2020        DDD (degenerative disc disease), lumbar ICD-10-CM: M51.36  ICD-9-CM: 722.52  2/19/2020        Gastroesophageal reflux disease without esophagitis ICD-10-CM: K21.9  ICD-9-CM: 530.81  6/7/2017        Type 2 diabetes mellitus without complication, without long-term current use of insulin ([facility] ICD-10-CM: E11.9  ICD-9-CM: 250.00  11/29/2016        Mixed hyperlipidemia ICD-10-CM: Q54.2  ICD-9-CM: 272.2  11/29/2016        Osteopenia of multiple sites ICD-10-CM: M85.89  ICD-9-CM: 733.90  11/29/2016    Overview Signed 4/21/2021  8:19 AM by Simeon Avendano MD     Not compliant with multiple BMD orders.               Essential hypertension ICD-10-CM: I10  ICD-9-CM: 401.9  2/23/2016            Principal Problem:    SBO (small bowel obstruction) ([facility] (4/27/2021)    Active Problems:    Essential hypertension (2/23/2016)      Type 2 diabetes mellitus without complication, without long-term current use of insulin ([facility] (11/29/2016)      Mixed hyperlipidemia (11/29/2016)      Gastroesophageal reflux disease without esophagitis (6/7/2017)      Leukocytosis (4/27/2021)      UTI (urinary tract infection) (4/27/2021)      Hypomagnesemia (5/1/2021)      Hypokalemia (5/5/2021)           Number and Complexity of Problems addressed and   Risks of complications and/or morbidity of management      Partial SBO  Abdomen benign; KUB shows improvement  Advance diet as tolerated to GI soft  No surgical follow up necessary  Okay to discharge from a surgical perspective once tolerating advance in diet  Will sign off    Adeola Billings

## 2025-07-16 ENCOUNTER — DOCUMENTATION ONLY (OUTPATIENT)
Dept: HEMATOLOGY/ONCOLOGY | Facility: CLINIC | Age: 45
End: 2025-07-16
Payer: MEDICARE

## 2025-07-16 ENCOUNTER — OFFICE VISIT (OUTPATIENT)
Dept: HEMATOLOGY/ONCOLOGY | Facility: CLINIC | Age: 45
End: 2025-07-16
Payer: MEDICARE

## 2025-07-16 DIAGNOSIS — R97.0 HIGH CARCINOEMBRYONIC ANTIGEN (CEA): ICD-10-CM

## 2025-07-16 DIAGNOSIS — C25.0 ADENOCARCINOMA OF HEAD OF PANCREAS: Primary | ICD-10-CM

## 2025-07-16 DIAGNOSIS — R79.89 HIGH SERUM CARBOHYDRATE ANTIGEN 19-9 (CA19-9): ICD-10-CM

## 2025-07-16 NOTE — PROGRESS NOTES
Audio Only Telehealth Visit     The patient location is: [place]  The chief complaint leading to consultation is: pancreatic cancer  Visit type: Virtual visit with audio only (telephone)  Total time spent in medical discussion with patient: 20 minutes  Total time spent on date of the encounter:40 minutes       The reason for the audio only service rather than synchronous audio and video virtual visit was related to technical difficulties or patient preference/necessity.       Each patient to whom I provide medical services by telemedicine is:  (1) informed of the relationship between the physician and patient and the respective role of any other health care provider with respect to management of the patient; and (2) notified that they may decline to receive medical services by telemedicine and may withdraw from such care at any time. Patient verbally consented to receive this service via voice-only telephone call.        REFERRING PROVIDER: Dr. Brandon Clifford      Patient ID: Bharath Caballero is a 45 y.o. male.    Chief Complaint: Personal history of pancreatitis, now with pancreatic cancer and a family history of cancer. Patient presented via Telemedicine Visit (audio only) today for risk assessment, genetic counseling, and consideration for genetic testing.    HPI  Past Medical History:   Diagnosis Date    Abdominal pain     Acquired perforating dermatosis 08/30/2023    Anxiety     Aortic atherosclerosis 01/24/2023    Appetite loss     Balance problem     Bilateral edema of lower extremity 02/06/2023    Bladder outflow obstruction 06/20/2022    Blurred vision, right eye 10/19/2022    BMI 34.0-34.9,adult 06/20/2022    BPH (benign prostatic hyperplasia)     Chest pain     Chronic liver failure without hepatic coma 04/25/2023    Chronic pain 10/25/2022    Chronic pain syndrome 05/12/2022    Chronic pancreatitis 10/28/2017    Coronary artery disease, unspecified vessel or lesion type, unspecified whether angina  present, unspecified whether native or transplanted heart     Deafness 10/03/2023    Depression     Diabetes     Diabetic polyneuropathy 06/20/2022    Elevated LFTs 08/18/2022    Fatigue     GERD (gastroesophageal reflux disease)     Hand paresthesia 06/20/2022    Headache     Hepatic steatosis     History of continuous positive airway pressure (CPAP) therapy at home     History of pancreatitis 06/20/2022    History of recent fall     Hypertension     Hypertriglyceridemia     Insomnia     Kidney disease     Kidney disorder     Leg pain     Mixed hyperlipidemia 10/28/2017    Mononeuritis multiplex 06/20/2022    Morbid obesity     Nausea & vomiting     Neuropathy     Nocturia 06/20/2022    NSTEMI (non-ST elevated myocardial infarction) 10/2024    OA (osteoarthritis)     Obesity     Obstructive sleep apnea syndrome 06/20/2022    Onychomycosis of multiple toenails with type 2 diabetes mellitus 10/28/2017    Open wound of finger of right hand 10/20/2023    CICI (obstructive sleep apnea)     uses CPAP    Painful diabetic neuropathy 05/12/2022    Personal history of colonic polyps 08/18/2022    Polyneuropathy     Primary hypertension 10/28/2017    Recurrent pancreatitis     Renal insufficiency     Tobacco abuse     Tobacco user 06/20/2022    Type 2 diabetes mellitus with skin complication, with long-term current use of insulin 02/06/2023    Urinary frequency     Use of cane as ambulatory aid     Weight loss, unintentional         Past Surgical History:   Procedure Laterality Date    ANGIOGRAM, CORONARY, WITH LEFT HEART CATHETERIZATION N/A 04/10/2023    Procedure: Angiogram, Coronary, with Left Heart Cath;  Surgeon: Jaswant Pugh MD;  Location: [facility] CATH LAB;  Service: Cardiology;  Laterality: N/A;    ANGIOGRAM, CORONARY, WITH LEFT HEART CATHETERIZATION N/A 10/10/2024    Procedure: Angiogram, Coronary, with Left Heart Cath;  Surgeon: Jaswant Pugh MD;  Location: [facility] CATH LAB;  Service: Cardiology;  Laterality: N/A;     APPENDECTOMY      ARTERIOVENOUS ANASTOMOSIS, OPEN, UPPER ARM BASILLIC VEIN TRANSPOSITION N/A 05/07/2018    CORONARY ARTERY BYPASS GRAFT (CABG) N/A 12/05/2024    Procedure: CORONARY ARTERY BYPASS GRAFT (CABG);  Surgeon: Gillian Clayton MD;  Location: [facility] OR;  Service: Cardiothoracic;  Laterality: N/A;  ECHO NOTIFIED    EGD, WITH CLOSED BIOPSY N/A 11/20/2023    Procedure: EGD, WITH CLOSED BIOPSY;  Surgeon: Christina James MD;  Location: [facility] ENDOSCOPY;  Service: Gastroenterology;  Laterality: N/A;    ENDOSCOPIC HARVEST OF VEIN Left 12/05/2024    Procedure: HARVEST-VEIN-ENDOVASCULAR;  Surgeon: Gillian Clayton MD;  Location: [facility] OR;  Service: Cardiothoracic;  Laterality: Left;    ESOPHAGOGASTRODUODENOSCOPY N/A 06/07/2021    EXCISION OF ARTERIOVENOUS FISTULA N/A 06/01/2018    FRACTIONAL FLOW RESERVE (FFR), CORONARY  04/10/2023    Procedure: Fractional Flow [place] (FFR), Coronary;  Surgeon: Jaswant Pugh MD;  Location: [facility] CATH LAB;  Service: Cardiology;;    HERNIA REPAIR      INSPECTION OF UPPER INTESTINAL TRACT N/A 06/07/2021    OCCLUSION OF LEFT ATRIAL APPENDAGE Left 12/05/2024    Procedure: Left atrial appendage occlusion;  Surgeon: Gillian Clayton MD;  Location: [facility] OR;  Service: Cardiothoracic;  Laterality: Left;    PHERESIS OF PLASMA N/A 07/13/2018    PHERESIS OF PLASMA N/A 05/25/2018    TRIAL OF SPINAL CORD NERVE STIMULATOR N/A 05/12/2022    Procedure: TRIAL, NEUROSTIMULATOR, SPINAL CORD;  Surgeon: Jay Pozo MD;  Location: [facility] OR;  Service: Neurosurgery;  Laterality: N/A;    UPPER GI N/A 06/07/2021        Review of patient's allergies indicates:  Patient has no known allergies.     Review of Systems        Problem List Items Addressed This Visit    None       Oncology History    No history exists.      Family History   Problem Relation Name Age of Onset    Obesity Father      Cancer Paternal Uncle        NOTE: estranged from paternal family        Assessment:   Risk  Assessment:  This patient is at increased risk of having an inherited genetic mutation that increases the risk for cancer. Patient meets criteria for genetic testing based on the National Comprehensive Cancer Network (NCCN) criteria due to a diagnosis of pancreatic cancer, whereas genetic test result could influence treatment, with additional evidence due to unknown paternal family history (see family history and pedigree). Based on the likelihood of having a mutation, BRCA1/2 Analysis with [employer] panel testing was described in detail.    Education and Counseling:  [employer] evaluates a broad number of hereditary cancer syndromes to help define patients' cancer risk. This cancer panel tests (77 total): AIP, ALK, APC, LEIF, BAP1, BARD1, BMPR1A, BRCA1, BRCA2, BRIP1, CDC73, CDH1, CDK4, CDKN1B, CDKN2A, CEBPA, CHEK2, DICER1, ETV6, FH, FLCN, GATA2, LZTR1, MAX, MEN1, MET, MLH1, MSH2, MSH6, MUTYH, NF1, NF2, NTHL1, PALB2, PHOX2B, PMS2, POT1, EQVSR5K, PTCH1, PTEN, RAD51C, RAD51D, RB1, RET, RPS20, RUNX1, SDHA, SDHAF2, SDHB, SDHC, SDHD, SMAD4, SMARCA4, SMARCB1, SMARCE1, STK11, SUFU, JMPA323, TP53, TSC1, TSC2, VHL and WT1 (sequencing and deletion/duplication); AXIN2, CTNNA1, DDX41, EGFR, HOXB13, KIT, MBD4, MITF, MSH3, PDGFRA, POLD1 and POLE (sequencing only); EPCAM and GREM1 (deletion/duplication only). RNA data is routinely analyzed for use in variant interpretation for all genes.     Risks of cancer associated with inherited cancer predisposition mutations were discussed in detail.  If a mutation were found, this patient would have a significantly increased risk for cancer.  Inherited cancer syndromes included in this test, may have different, but still significant risk for cancer.  Risk of cancer with any particular gene mutation will be discussed at the time of results disclosure and based on the results.    The availability of clinical management options for  inherited cancer predisposition mutation carriers was discussed, including increased surveillance, chemoprevention, and prophylactic surgery. Details of the testing process, including benefits and limitations of genetic analysis as well as the implications of possible test results, were discussed.  Because this patient is the first member of the family to be tested comprehensive panel testing was presented.  Related insurance issues were discussed.      Summary:  This patient was evaluated for hereditary risk of cancer and was found to be at an increased risk of having an inherited cancer predisposition gene mutation.  The option of genetic testing was explained in detail, including the possible impact of this information on family members.  Since this patient wishes to proceed with testing an informed consent was obtained and blood drawn and sent to [facility].  Results will be expected 4 weeks from this time.  A follow-up appointment will be scheduled for results disclosure.         IVETH LENNON, PhD

## 2025-07-16 NOTE — PROGRESS NOTES
Patient missed appointment for genetic counseling today (did not answer phone with several attempts). I placed an order for genetic testing anyway since needed for treatment decision-making.     Patient was able to complete appointment - see note.

## 2025-07-16 NOTE — OR NURSING
[facility] NOTE     Date of procedure: 7/23/2025     Procedure: Insertion Port-a Cath    Patient is visually and audio impaired. He was not able to complete the medication reconciliation portion with me. The following instructions were relayed via TTY/TDD over the phone. He states his sister will be able to see all of the instructions given during our call and she help him with his medications/instructions.         Patient Instructed to ONLY TAKE Losartan, Metoprolol, Nifedipine, Clonidine, Isosorbide, Trileptal, Ranexa, Lexapro (if he normally takes in the am) with sip of water morning of procedure.     Hold Plavix 5 days prior (last dose on 7/18/2025)     Take 1/2 regular dose of Lantus evening of 7/22/2025    Nothing to eat or drink after midnight evening before.     Do not apply lotions, creams, deodorants after you bathe/shower prior to arrival     Be prepared to remove jewelry, piercings, contact lenses, dentures before going to the operating room.     You will not be allowed to drive home after your procedure. Please be sure to make arrangements for a .       We are located at [address], [place]  [phone]     Enter through [address]  Take Elevators to the 6th Floor  Check in at window     We look forward to meeting you soon!

## 2025-07-17 ENCOUNTER — TELEPHONE (OUTPATIENT)
Dept: HEMATOLOGY/ONCOLOGY | Facility: CLINIC | Age: 45
End: 2025-07-17
Payer: MEDICARE

## 2025-07-17 NOTE — TELEPHONE ENCOUNTER
Spoke to patient after receiving referral. Scheduled appointment on the same day as his appointment with Dr. Clifford.

## 2025-07-18 ENCOUNTER — HOSPITAL ENCOUNTER (OUTPATIENT)
Facility: HOSPITAL | Age: 45
Discharge: HOME | End: 2025-07-20
Attending: INTERNAL MEDICINE | Admitting: INTERNAL MEDICINE
Payer: MEDICARE

## 2025-07-18 DIAGNOSIS — E11.65 UNCONTROLLED TYPE 2 DIABETES MELLITUS WITH HYPERGLYCEMIA: Primary | ICD-10-CM

## 2025-07-18 DIAGNOSIS — E11.65 HYPERGLYCEMIA DUE TO DIABETES MELLITUS: ICD-10-CM

## 2025-07-18 LAB
ALBUMIN SERPL-MCNC: 2.6 G/DL (ref 3.5–5)
ALBUMIN/GLOB SERPL: 0.7 RATIO (ref 1.1–2)
ALP SERPL-CCNC: 392 UNIT/L (ref 40–150)
ALT SERPL-CCNC: 44 UNIT/L (ref 0–55)
ANION GAP SERPL CALC-SCNC: 10 MEQ/L
AST SERPL-CCNC: 60 UNIT/L (ref 11–45)
BASOPHILS # BLD AUTO: 0.04 X10(3)/MCL
BASOPHILS NFR BLD AUTO: 0.4 %
BILIRUB SERPL-MCNC: 0.3 MG/DL
BUN SERPL-MCNC: 9 MG/DL (ref 8.9–20.6)
CALCIUM SERPL-MCNC: 7.9 MG/DL (ref 8.4–10.2)
CHLORIDE SERPL-SCNC: 104 MMOL/L (ref 98–107)
CO2 SERPL-SCNC: 25 MMOL/L (ref 22–29)
CREAT SERPL-MCNC: 0.81 MG/DL (ref 0.72–1.25)
CREAT/UREA NIT SERPL: 11
EOSINOPHIL # BLD AUTO: 0.21 X10(3)/MCL (ref 0–0.9)
EOSINOPHIL NFR BLD AUTO: 2.1 %
ERYTHROCYTE [DISTWIDTH] IN BLOOD BY AUTOMATED COUNT: 13.1 % (ref 11.5–17)
GFR SERPLBLD CREATININE-BSD FMLA CKD-EPI: >60 ML/MIN/1.73/M2
GLOBULIN SER-MCNC: 4 GM/DL (ref 2.4–3.5)
GLUCOSE SERPL-MCNC: 177 MG/DL (ref 74–100)
HCT VFR BLD AUTO: 37.5 % (ref 42–52)
HGB BLD-MCNC: 12.8 G/DL (ref 14–18)
HOLD SPECIMEN: NORMAL
IMM GRANULOCYTES # BLD AUTO: 0.03 X10(3)/MCL (ref 0–0.04)
IMM GRANULOCYTES NFR BLD AUTO: 0.3 %
LYMPHOCYTES # BLD AUTO: 2.4 X10(3)/MCL (ref 0.6–4.6)
LYMPHOCYTES NFR BLD AUTO: 24.1 %
MAGNESIUM SERPL-MCNC: 2 MG/DL (ref 1.6–2.6)
MCH RBC QN AUTO: 30 PG (ref 27–31)
MCHC RBC AUTO-ENTMCNC: 34.1 G/DL (ref 33–36)
MCV RBC AUTO: 88 FL (ref 80–94)
MONOCYTES # BLD AUTO: 0.9 X10(3)/MCL (ref 0.1–1.3)
MONOCYTES NFR BLD AUTO: 9.1 %
NEUTROPHILS # BLD AUTO: 6.36 X10(3)/MCL (ref 2.1–9.2)
NEUTROPHILS NFR BLD AUTO: 64 %
NRBC BLD AUTO-RTO: 0 %
PHOSPHATE SERPL-MCNC: 3.7 MG/DL (ref 2.3–4.7)
PLATELET # BLD AUTO: 167 X10(3)/MCL (ref 130–400)
PMV BLD AUTO: 11 FL (ref 7.4–10.4)
POCT GLUCOSE: 138 MG/DL (ref 70–110)
POCT GLUCOSE: 184 MG/DL (ref 70–110)
POCT GLUCOSE: 201 MG/DL (ref 70–110)
POTASSIUM SERPL-SCNC: 4 MMOL/L (ref 3.5–5.1)
PROT SERPL-MCNC: 6.6 GM/DL (ref 6.4–8.3)
RBC # BLD AUTO: 4.26 X10(6)/MCL (ref 4.7–6.1)
SODIUM SERPL-SCNC: 139 MMOL/L (ref 136–145)
WBC # BLD AUTO: 9.94 X10(3)/MCL (ref 4.5–11.5)

## 2025-07-18 PROCEDURE — 25000003 PHARM REV CODE 250

## 2025-07-18 PROCEDURE — 84100 ASSAY OF PHOSPHORUS: CPT

## 2025-07-18 PROCEDURE — 63600175 PHARM REV CODE 636 W HCPCS

## 2025-07-18 PROCEDURE — 94760 N-INVAS EAR/PLS OXIMETRY 1: CPT

## 2025-07-18 PROCEDURE — S4991 NICOTINE PATCH NONLEGEND: HCPCS

## 2025-07-18 PROCEDURE — G0379 DIRECT REFER HOSPITAL OBSERV: HCPCS

## 2025-07-18 PROCEDURE — 80053 COMPREHEN METABOLIC PANEL: CPT

## 2025-07-18 PROCEDURE — 36415 COLL VENOUS BLD VENIPUNCTURE: CPT

## 2025-07-18 PROCEDURE — 83735 ASSAY OF MAGNESIUM: CPT

## 2025-07-18 PROCEDURE — G0378 HOSPITAL OBSERVATION PER HR: HCPCS

## 2025-07-18 PROCEDURE — 96372 THER/PROPH/DIAG INJ SC/IM: CPT

## 2025-07-18 PROCEDURE — 85025 COMPLETE CBC W/AUTO DIFF WBC: CPT

## 2025-07-18 RX ORDER — AMITRIPTYLINE HYDROCHLORIDE 50 MG/1
50 TABLET, FILM COATED ORAL NIGHTLY
Status: DISCONTINUED | OUTPATIENT
Start: 2025-07-18 | End: 2025-07-20 | Stop reason: HOSPADM

## 2025-07-18 RX ORDER — IBUPROFEN 200 MG
1 TABLET ORAL DAILY
Status: DISCONTINUED | OUTPATIENT
Start: 2025-07-19 | End: 2025-07-18

## 2025-07-18 RX ORDER — PREGABALIN 50 MG/1
150 CAPSULE ORAL 3 TIMES DAILY
Status: DISCONTINUED | OUTPATIENT
Start: 2025-07-18 | End: 2025-07-20 | Stop reason: HOSPADM

## 2025-07-18 RX ORDER — IBUPROFEN 200 MG
24 TABLET ORAL
Status: DISCONTINUED | OUTPATIENT
Start: 2025-07-18 | End: 2025-07-20 | Stop reason: HOSPADM

## 2025-07-18 RX ORDER — OXCARBAZEPINE 300 MG/1
600 TABLET, FILM COATED ORAL 2 TIMES DAILY
Status: DISCONTINUED | OUTPATIENT
Start: 2025-07-18 | End: 2025-07-20 | Stop reason: HOSPADM

## 2025-07-18 RX ORDER — IBUPROFEN 200 MG
16 TABLET ORAL
Status: DISCONTINUED | OUTPATIENT
Start: 2025-07-18 | End: 2025-07-20 | Stop reason: HOSPADM

## 2025-07-18 RX ORDER — INSULIN GLARGINE 100 [IU]/ML
40 INJECTION, SOLUTION SUBCUTANEOUS 2 TIMES DAILY
Status: DISCONTINUED | OUTPATIENT
Start: 2025-07-18 | End: 2025-07-18

## 2025-07-18 RX ORDER — RANOLAZINE 500 MG/1
500 TABLET, EXTENDED RELEASE ORAL 2 TIMES DAILY
Status: DISCONTINUED | OUTPATIENT
Start: 2025-07-18 | End: 2025-07-20 | Stop reason: HOSPADM

## 2025-07-18 RX ORDER — GLUCAGON 1 MG
1 KIT INJECTION
Status: DISCONTINUED | OUTPATIENT
Start: 2025-07-18 | End: 2025-07-20 | Stop reason: HOSPADM

## 2025-07-18 RX ORDER — ATORVASTATIN CALCIUM 40 MG/1
80 TABLET, FILM COATED ORAL DAILY
Status: DISCONTINUED | OUTPATIENT
Start: 2025-07-19 | End: 2025-07-20 | Stop reason: HOSPADM

## 2025-07-18 RX ORDER — ISOSORBIDE MONONITRATE 30 MG/1
120 TABLET, EXTENDED RELEASE ORAL DAILY
Status: DISCONTINUED | OUTPATIENT
Start: 2025-07-19 | End: 2025-07-20 | Stop reason: HOSPADM

## 2025-07-18 RX ORDER — TAMSULOSIN HYDROCHLORIDE 0.4 MG/1
1 CAPSULE ORAL DAILY
Status: DISCONTINUED | OUTPATIENT
Start: 2025-07-19 | End: 2025-07-20 | Stop reason: HOSPADM

## 2025-07-18 RX ORDER — LOSARTAN POTASSIUM 25 MG/1
100 TABLET ORAL DAILY
Status: DISCONTINUED | OUTPATIENT
Start: 2025-07-19 | End: 2025-07-20 | Stop reason: HOSPADM

## 2025-07-18 RX ORDER — INSULIN GLARGINE 100 [IU]/ML
34 INJECTION, SOLUTION SUBCUTANEOUS 2 TIMES DAILY
Status: DISCONTINUED | OUTPATIENT
Start: 2025-07-18 | End: 2025-07-19

## 2025-07-18 RX ORDER — INSULIN ASPART 100 [IU]/ML
7 INJECTION, SOLUTION INTRAVENOUS; SUBCUTANEOUS
Status: DISCONTINUED | OUTPATIENT
Start: 2025-07-18 | End: 2025-07-19

## 2025-07-18 RX ORDER — IBUPROFEN 200 MG
1 TABLET ORAL DAILY
Status: DISCONTINUED | OUTPATIENT
Start: 2025-07-18 | End: 2025-07-20 | Stop reason: HOSPADM

## 2025-07-18 RX ORDER — INSULIN ASPART 100 [IU]/ML
0-5 INJECTION, SOLUTION INTRAVENOUS; SUBCUTANEOUS
Status: DISCONTINUED | OUTPATIENT
Start: 2025-07-18 | End: 2025-07-20 | Stop reason: HOSPADM

## 2025-07-18 RX ORDER — CLONIDINE HYDROCHLORIDE 0.1 MG/1
0.3 TABLET ORAL 3 TIMES DAILY
Status: DISCONTINUED | OUTPATIENT
Start: 2025-07-18 | End: 2025-07-20 | Stop reason: HOSPADM

## 2025-07-18 RX ORDER — ENOXAPARIN SODIUM 100 MG/ML
40 INJECTION SUBCUTANEOUS EVERY 24 HOURS
Status: DISCONTINUED | OUTPATIENT
Start: 2025-07-18 | End: 2025-07-18

## 2025-07-18 RX ORDER — SODIUM CHLORIDE 0.9 % (FLUSH) 0.9 %
10 SYRINGE (ML) INJECTION
Status: DISCONTINUED | OUTPATIENT
Start: 2025-07-18 | End: 2025-07-20 | Stop reason: HOSPADM

## 2025-07-18 RX ORDER — METOPROLOL SUCCINATE 25 MG/1
25 TABLET, EXTENDED RELEASE ORAL DAILY
Status: DISCONTINUED | OUTPATIENT
Start: 2025-07-19 | End: 2025-07-20 | Stop reason: HOSPADM

## 2025-07-18 RX ADMIN — RANOLAZINE 500 MG: 500 TABLET, EXTENDED RELEASE ORAL at 08:07

## 2025-07-18 RX ADMIN — AMITRIPTYLINE HYDROCHLORIDE 50 MG: 50 TABLET ORAL at 08:07

## 2025-07-18 RX ADMIN — INSULIN ASPART 7 UNITS: 100 INJECTION, SOLUTION INTRAVENOUS; SUBCUTANEOUS at 06:07

## 2025-07-18 RX ADMIN — INSULIN GLARGINE 34 UNITS: 100 INJECTION, SOLUTION SUBCUTANEOUS at 08:07

## 2025-07-18 RX ADMIN — NICOTINE 1 PATCH: 21 PATCH, EXTENDED RELEASE TRANSDERMAL at 09:07

## 2025-07-18 RX ADMIN — MORPHINE SULFATE 105 MG: 15 TABLET, FILM COATED, EXTENDED RELEASE ORAL at 08:07

## 2025-07-18 RX ADMIN — OXCARBAZEPINE 600 MG: 300 TABLET, FILM COATED ORAL at 08:07

## 2025-07-18 RX ADMIN — PREGABALIN 150 MG: 50 CAPSULE ORAL at 08:07

## 2025-07-18 RX ADMIN — CLONIDINE HYDROCHLORIDE 0.3 MG: 0.1 TABLET ORAL at 08:07

## 2025-07-18 NOTE — H&P
[facility] Internal Medicine  HISTORY & PHYSICAL NOTE    Patient's Name: Bharath Caballero  : 1980  MRN: [id number]    Admission Date: 2025  Attending Physician: Violeta López MD  Resident: Francois Bashir MD  Intern: Nando Carbajal MD  Primary Care Provider: Dat Beasley MD    SUBJECTIVE   No chief complaint on file.    History of Present Illness:  Bharath Caballero is a 45 y.o. male with a complex medical history including deafness, adenocarcinoma of head of pancrease, DM II uncontrolled, chronic liver failure, hx of pancreatitis, GERD, CAD s/p CABG, HTN, pancreatic insufficiency and HLD was admitted to the floor for blood sugar control pending this Monday procedure to remove his pain stimulator implanted in his back by Dr. Pozo at least 2 or 3 years ago. Pt needs the device removed to enable him have other diagnostic tests like MRIs. This procedure has been previously rescheduled by Dr. Rocky Rojas [phone] due to poor blood sugar control. Dr. Rojas is willing to have the device removed on Monday. He is to be at the doctor's surgery center at [facility] 6:00 a.m, monday morning. He is being admitted  for closer observation to ensure that his glucoses are controlled fax his labs to Dr. Delgado  to include CBC CMP PT PTT and urinalysis. If concerns, Dr. Beasley can be contacted. Pt has no new positive for ROS. Pt has no new complains.       Review of Systems:  A 12-pt ROS was done and was negative unless stated in the above HPI.    Past Medical History:   Diagnosis Date    Abdominal pain     Acquired perforating dermatosis 2023    Anxiety     Aortic atherosclerosis 2023    Appetite loss     Balance problem     Bilateral edema of lower extremity 2023    Bladder outflow obstruction 2022    Blurred vision, right eye 10/19/2022    BMI 34.0-34.9,adult 2022    BPH (benign prostatic hyperplasia)     Chest pain      Chronic liver failure without hepatic coma 04/25/2023    Chronic pain 10/25/2022    Chronic pain syndrome 05/12/2022    Chronic pancreatitis 10/28/2017    Coronary artery disease, unspecified vessel or lesion type, unspecified whether angina present, unspecified whether native or transplanted heart     Deafness 10/03/2023    Depression     Diabetes     Diabetic polyneuropathy 06/20/2022    Elevated LFTs 08/18/2022    Fatigue     GERD (gastroesophageal reflux disease)     Hand paresthesia 06/20/2022    Headache     Hepatic steatosis     History of continuous positive airway pressure (CPAP) therapy at home     History of pancreatitis 06/20/2022    History of recent fall     Hypertension     Hypertriglyceridemia     Insomnia     Kidney disease     Kidney disorder     Leg pain     Mixed hyperlipidemia 10/28/2017    Mononeuritis multiplex 06/20/2022    Morbid obesity     Nausea & vomiting     Neuropathy     Nocturia 06/20/2022    NSTEMI (non-ST elevated myocardial infarction) 10/2024    OA (osteoarthritis)     Obesity     Obstructive sleep apnea syndrome 06/20/2022    Onychomycosis of multiple toenails with type 2 diabetes mellitus 10/28/2017    Open wound of finger of right hand 10/20/2023    CICI (obstructive sleep apnea)     uses CPAP    Painful diabetic neuropathy 05/12/2022    Personal history of colonic polyps 08/18/2022    Polyneuropathy     Primary hypertension 10/28/2017    Recurrent pancreatitis     Renal insufficiency     Tobacco abuse     Tobacco user 06/20/2022    Type 2 diabetes mellitus with skin complication, with long-term current use of insulin 02/06/2023    Urinary frequency     Use of cane as ambulatory aid     Weight loss, unintentional      Past Surgical History:   Procedure Laterality Date    ANGIOGRAM, CORONARY, WITH LEFT HEART CATHETERIZATION N/A 04/10/2023    Procedure: Angiogram, Coronary, with Left Heart Cath;  Surgeon: Jaswant Pugh MD;  Location: [facility] CATH LAB;  Service: Cardiology;   Laterality: N/A;    ANGIOGRAM, CORONARY, WITH LEFT HEART CATHETERIZATION N/A 10/10/2024    Procedure: Angiogram, Coronary, with Left Heart Cath;  Surgeon: Jaswant Pugh MD;  Location: [facility] CATH LAB;  Service: Cardiology;  Laterality: N/A;    APPENDECTOMY      ARTERIOVENOUS ANASTOMOSIS, OPEN, UPPER ARM BASILLIC VEIN TRANSPOSITION N/A 05/07/2018    CORONARY ARTERY BYPASS GRAFT (CABG) N/A 12/05/2024    Procedure: CORONARY ARTERY BYPASS GRAFT (CABG);  Surgeon: Gillian lCayton MD;  Location: [facility] OR;  Service: Cardiothoracic;  Laterality: N/A;  ECHO NOTIFIED    EGD, WITH CLOSED BIOPSY N/A 11/20/2023    Procedure: EGD, WITH CLOSED BIOPSY;  Surgeon: Christina James MD;  Location: [facility] ENDOSCOPY;  Service: Gastroenterology;  Laterality: N/A;    ENDOSCOPIC HARVEST OF VEIN Left 12/05/2024    Procedure: HARVEST-VEIN-ENDOVASCULAR;  Surgeon: Gillian Clayton MD;  Location: [facility] OR;  Service: Cardiothoracic;  Laterality: Left;    ESOPHAGOGASTRODUODENOSCOPY N/A 06/07/2021    EXCISION OF ARTERIOVENOUS FISTULA N/A 06/01/2018    FRACTIONAL FLOW RESERVE (FFR), CORONARY  04/10/2023    Procedure: Fractional Flow [place] (FFR), Coronary;  Surgeon: Jaswant Pugh MD;  Location: [facility] CATH LAB;  Service: Cardiology;;    HERNIA REPAIR      INSPECTION OF UPPER INTESTINAL TRACT N/A 06/07/2021    OCCLUSION OF LEFT ATRIAL APPENDAGE Left 12/05/2024    Procedure: Left atrial appendage occlusion;  Surgeon: Gillian Clayton MD;  Location: [facility] OR;  Service: Cardiothoracic;  Laterality: Left;    PHERESIS OF PLASMA N/A 07/13/2018    PHERESIS OF PLASMA N/A 05/25/2018    TRIAL OF SPINAL CORD NERVE STIMULATOR N/A 05/12/2022    Procedure: TRIAL, NEUROSTIMULATOR, SPINAL CORD;  Surgeon: Jay Pozo MD;  Location: [facility] OR;  Service: Neurosurgery;  Laterality: N/A;    UPPER GI N/A 06/07/2021     Social History[1]  Family History   Problem Relation Name Age of Onset    Obesity Father      Cancer Paternal Uncle       Home  Medications:  Current Outpatient Medications   Medication Instructions    amitriptyline (ELAVIL) 50 mg, Oral, Nightly    aspirin (ECOTRIN) 81 mg, Daily    atorvastatin (LIPITOR) 80 mg, Oral, Daily    cholecalciferol (vitamin D3) 5,000 Units, Oral, Daily    cholestyramine (QUESTRAN) 4 gram packet 4 g, Oral, 3 times daily with meals    clonazePAM (KLONOPIN) 1 mg, Oral, 2 times daily    cloNIDine (CATAPRES) 0.3 mg, Oral, 3 times daily    clopidogreL (PLAVIX) 75 mg tablet Take 4 tablets on day 1 and then one tablet daily.    CREON 36,000-114,000- 180,000 unit CpDR TAKE ONE CAPSULE THREE TIMES DAILY    EScitalopram oxalate (LEXAPRO) 20 mg, Daily    esomeprazole (NEXIUM) 40 mg, Oral, Before breakfast    FARXIGA 10 mg, Oral, Daily    gabapentin (NEURONTIN) 800 mg, Oral, Nightly    HUMALOG U-100 INSULIN 100 unit/mL injection INJECT 25 UNITS SUBCUTANEOUSLY BEFORE MEALS THREE TIMES DAILY    HYDROmorphone (DILAUDID) 8 mg, Oral, Every 8 hours PRN    icosapent ethyL (VASCEPA) 2,000 mg, Oral, 2 times daily    isosorbide mononitrate (IMDUR) 120 mg, Oral, Daily    LANTUS SOLOSTAR U-100 INSULIN 40 Units, Subcutaneous, 2 times daily    LIDOcaine-prilocaine (EMLA) cream Topical (Top), As needed (PRN), To mediport 30-45 minutes prior to access    losartan (COZAAR) 100 mg, Oral, Daily    metoprolol succinate (TOPROL-XL) 25 mg, Oral, Daily    morphine (MS CONTIN) 100 mg, Oral, 3 times daily    nicotine (NICODERM CQ) 21 mg/24 hr 1 patch, Transdermal, Daily    NIFEdipine (PROCARDIA-XL) 90 mg, Oral, Daily    nitroGLYCERIN (NITROSTAT) 0.3 mg, Sublingual, Every 5 min PRN    nut.tx.gluc intol,lf,soy-fiber (GLUCERNA 1.5 CEDRIC) 0.08-1.5 gram-kcal/mL Liqd 273 mLs, Oral, 2 times daily    OXcarbazepine (TRILEPTAL) 600 mg, Oral, 2 times daily    potassium chloride SA (K-DUR,KLOR-CON) 20 MEQ tablet 20 mEq, Oral, 2 times daily    pregabalin (LYRICA) 150 mg, Oral, 3 times daily    ranolazine (RANEXA) 500 mg, Oral, 2 times daily    REPATHA SURECLICK 140  "mg/mL PnIj INJECT 1ml INTRAMUSCULARLY EVERY 14 DAYS    sucralfate (CARAFATE) 1 g, Oral, Before meals & nightly    SURE COMFORT INSULIN SYRINGE 0.5 mL 31 gauge x 5/16" Syrg USE ONE SYRINGE THREE TIMES DAILY    tamsulosin (FLOMAX) 0.4 mg Cap TAKE ONE CAPSULE BY MOUTH EVERY DAY    torsemide (DEMADEX) 20 mg, Oral, Daily    TOUJEO SOLOSTAR U-300 INSULIN 300 unit/mL (1.5 mL) InPn pen INJECT 35 SUBCUTANEOUSLY TWICE DAILY    TRADJENTA 5 mg, Oral, Daily      Allergies:  Review of patient's allergies indicates:  No Known Allergies    OBJECTIVE   Vital Signs:  Initial Vitals in ED Most Recent Vitals   Temp: 97.9 °F (36.6 °C)  97.9 °F (36.6 °C)   BP: (!) 143/84  (!) 143/84    Pulse: 79  79   Resp: 16  16    SpO2: 98 %  98 %    Body mass index is 29.07 kg/m².    Physical Exam  Constitutional:       Appearance: Normal appearance.   HENT:      Head: Normocephalic.      Right Ear: Decreased hearing noted.      Left Ear: Decreased hearing noted.      Nose: No congestion.   Eyes:      Extraocular Movements: Extraocular movements intact.      Pupils: Pupils are equal, round, and reactive to light.   Cardiovascular:      Pulses: Normal pulses.      Heart sounds: Normal heart sounds. No murmur heard.  Pulmonary:      Effort: Pulmonary effort is normal. No respiratory distress.      Breath sounds: Normal breath sounds.   Chest:      Chest wall: No tenderness.   Abdominal:      General: Bowel sounds are normal. There is distension.      Palpations: There is mass.      Tenderness: There is no abdominal tenderness.      Comments: Mass located in epigastric region   Musculoskeletal:      Cervical back: Normal range of motion.      Right lower leg: No edema.      Left lower leg: Edema present.      Comments: Chronic non-pitting edema on left leg   Skin:     General: Skin is warm.   Neurological:      General: No focal deficit present.      Mental Status: He is alert and oriented to person, place, and time.      Sensory: Sensory deficit " "present.      Comments: Bilateral sensory deficit up to the proximal knee   Psychiatric:         Mood and Affect: Mood normal.         Behavior: Behavior normal.         Thought Content: Thought content normal.       Labs:  CBC:  No results for input(s): "WBC", "HGB", "HCT", "PLT", "MCV", "RDW" in the last 168 hours.  BMP/CMP:  No results for input(s): "NA", "K", "CL", "CO2", "BUN", "CREATININE", "GLUCOSE", "EGFRNORACEVR" in the last 168 hours.  RFTs/LFTs:  No results for input(s): "CALCIUM", "LABPROT", "ALBUMIN", "AST", "ALT", "ALKPHOS", "BILITOT", "BILIDIR", "IBILI" in the last 168 hours.  No results for input(s): "MG", "PHOS" in the last 168 hours.  Cardiac Panel:  No results for input(s): "TROPONINI", "CKTOTAL", "CKMB", "BNP" in the last 168 hours.  Lipid Panel:  Lab Results   Component Value Date    CHOL 146 06/10/2025    CHOL 165 02/18/2025    HDL 25 (L) 06/10/2025    HDL 27 (L) 02/18/2025    TOTALCHOLEST 6 (H) 06/10/2025    TOTALCHOLEST 6 (H) 02/18/2025    LDL 77.00 06/10/2025    LDL 98.00 02/18/2025    TRIG 220 (H) 06/10/2025    TRIG 198 (H) 02/18/2025     Diabetes Panel:  Lab Results   Component Value Date    AINOYBH5I 12.6 06/10/2025    ZEYDAQT2R 12.1 04/01/2024    HGBA1C 11.8 (H) 06/10/2025    HGBA1C 11.1 (H) 02/18/2025    MICALBCREAT 1,325.5 (H) 04/16/2025    MICALBCREAT 1,228.8 (H) 02/27/2024    CREATRANDUR 84.1 04/16/2025    CREATRANDUR 85.5 04/16/2025     Thyroid Panel:  Lab Results   Component Value Date    TSH 3.029 04/16/2025    VUZSZR0KBUT 0.95 11/30/2022     Anemia Panel:  Lab Results   Component Value Date    IRON 97 10/10/2024    TIBC 205 (L) 10/10/2024    FERRITIN 216.05 10/10/2024    SZRFAMWB50 886 (H) 10/10/2024     Coagulation Panel:  No results for input(s): "PT", "INR", "PTT" in the last 168 hours.  No results for input(s): "DDIMER", "FIBRINOGEN", "ANTIXA" in the last 168 hours.    Invalid input(s): "HEPXA"  Urinalysis:  Lab Results   Component Value Date    APPEARANCEUA Clear 06/10/2025 "    SGUA 1.009 06/10/2025    PROTEINUA 1+ (A) 06/10/2025    KETONESUA Negative 06/10/2025    LEUKOCYTESUR Negative 06/10/2025    RBCUA 0-5 06/10/2025    WBCUA 0-5 06/10/2025    BACTERIA None Seen 06/10/2025    SQEPUA Trace (A) 06/10/2025    HYALINECASTS None Seen 06/10/2025    CREATRANDUR 84.1 04/16/2025    CREATRANDUR 85.5 04/16/2025    PROTEINURINE 177.2 04/16/2025    UPROTCREA 2.1 04/16/2025      Urine Drug Screening:  Lab Results   Component Value Date    AMPHETAMINES Negative 10/09/2024    BARBITURATES Negative 10/09/2024    LABBENZ Negative 10/09/2024    CANNABUR Negative 10/09/2024    COCAINEMETAB Negative 10/09/2024    FENTANYL Negative 10/09/2024    MDMA Negative 10/09/2024    OPIATESCREEN Positive (A) 10/09/2024    PCDSOPHENCYN Negative 10/09/2024     Imaging  NM PET CT FDG Skull Base to Mid Thigh  Narrative: EXAMINATION:  NM PET CT FDG SKULL BASE TO MID THIGH    CLINICAL HISTORY:  staging; Malignant neoplasm of head of pancreas    TECHNIQUE:  Fasting blood glucose level was 352 mg/dl. 8.7 mCi of F18-FDG was given intravenously with subsequent PET imaging performed from the skull base through the upper thighs. Corresponding helical CT images were acquired for anatomic correlation and attenuation correction. Dose length product is 691 mGycm.    COMPARISON:  CT chest abdomen pelvis dated 07/14/2025    FINDINGS:  Evaluation is limited by elevated glucose levels.    There is no hypermetabolic cervical lymphadenopathy.    There is no hypermetabolic lymphadenopathy in the chest.  The heart is normal in size.  There is no pericardial effusion.  There is no hypermetabolic pulmonary nodule.  There is no pleural effusion or pneumothorax.    Ill-defined pancreatic head mass has a max SUV of 3.  There are 2 small foci of increased FDG uptake between the pancreatic head and duodenum with a max SUV of 3.1, which could represent small lymph nodes (fused axial image 159).  There is physiologic uptake in the solid organs,  urinary system and bowel.    Focal FDG uptake in the sternum is related to prior median sternotomy.  There is a likely healing fracture of the right anterior 6th rib with a max SUV of 2.9 (series 3, image 118).  There is otherwise no focal abnormal FDG uptake in the bones.  Impression: 1. Ill-defined mildly hypermetabolic pancreatic head mass.  2. Two small foci of adjacent FDG uptake may represent small lymph nodes, indeterminate.  3. No evidence of distant hypermetabolic metastatic disease.    Electronically signed by: Katiuska Bess  Date:    07/17/2025  Time:    12:46    EKG     Microbiology  Microbiology Results (last 7 days)       ** No results found for the last 168 hours. **           Antibiotics  Antibiotics (From admission, onward)      None             ASSESSMENT AND PLAN   Bharath Caballero is a 45 y.o. male who is admitted for:    Uncontrolled diabetes  Diabetic neuropathy  A1c 11.1 2/18/25  Patient could not have surgery because blood gluc was not controlled (568 on 7/7/2025). Pt currently admitted for blood gluc control for possible surgery to remove his pain stimulator on Monday.   - CBC, CMP, Phos, Mg  - Lantus 34 units bid  - Aspart 7 units tid.  - Continue management per PCP       CAD s/p CABG 12/05  Recent NSTEMI  Pt had CABG in 12/2025. Pt was cleared for procedure by Dr. Campbell 7/15/2025.   Continue aspirin, statin, PCSK9 inhibitor, Coreg 25 b.i.d., Imdur 120, nifedipine 60 b.i.d. and Ranexa 500 mg bid. Pause anticoagulant for surgery prep.   -Pt follows up with cardiologist.        Adenocarcinoma of head of pancreas   Pt is following up with his oncologist.     Familial Hypertriglyceridemia, hypercholesterolemia   Patient reports triglycerides in the past were 22,000 (no records of that)  Suspect most of his metabolic disorders as well as CAD related to hypertriglyceridemia.  Most recent triglycerides have normalized at 198  -continue atorvastatin 80mg daily, Repatha, Vascepa 2g BID   Patient has  been more compliant healthy diet.         Smoking  -encouraged cessation  - on nicotine patches     Recurrent pancreatitis  Liver steatosis  Pt followed by GI           DVT Prophylaxis: SCD  GI Prophylaxis: None  Abx: none  Access: PIV  Fluids: None  Diet: Diet Consistent Carbohydrate 2000 Calories (up to 75 gm per meal)    Code Status: Full Code    Disposition: 45 y.o. male admitted for blood glucose management prior to Monday surgery. Discharge pending further workup.          Nando Carbajal MD  PGY-I Resident  [facility] Internal Medicine Team 1  07/18/2025          [1]   Social History  Tobacco Use    Smoking status: Every Day     Current packs/day: 0.50     Average packs/day: 2.6 packs/day for 33.0 years (84.4 ttl pk-yrs)     Types: Cigarettes     Start date: 1992     Last attempt to quit: 01/1999     Passive exposure: Current    Smokeless tobacco: Former     Types: Chew    Tobacco comments:     Pt is smoking 1/2 pack per day   Vaping Use    Vaping status: Never Used   Substance Use Topics    Alcohol use: Not Currently    Drug use: Never

## 2025-07-19 LAB
C DIFF TOX A+B STL QL IA: NEGATIVE
CLOSTRIDIOIDES DIFFICILE GDH ANTIGEN (OHS): NEGATIVE
POCT GLUCOSE: 105 MG/DL (ref 70–110)
POCT GLUCOSE: 128 MG/DL (ref 70–110)
POCT GLUCOSE: 271 MG/DL (ref 70–110)
POCT GLUCOSE: 36 MG/DL (ref 70–110)
POCT GLUCOSE: 42 MG/DL (ref 70–110)
POCT GLUCOSE: 62 MG/DL (ref 70–110)
POCT GLUCOSE: 88 MG/DL (ref 70–110)
POCT GLUCOSE: 97 MG/DL (ref 70–110)

## 2025-07-19 PROCEDURE — 63600175 PHARM REV CODE 636 W HCPCS

## 2025-07-19 PROCEDURE — 25000003 PHARM REV CODE 250

## 2025-07-19 PROCEDURE — 94760 N-INVAS EAR/PLS OXIMETRY 1: CPT

## 2025-07-19 PROCEDURE — 94761 N-INVAS EAR/PLS OXIMETRY MLT: CPT

## 2025-07-19 PROCEDURE — 96372 THER/PROPH/DIAG INJ SC/IM: CPT

## 2025-07-19 PROCEDURE — 96374 THER/PROPH/DIAG INJ IV PUSH: CPT

## 2025-07-19 PROCEDURE — 87507 IADNA-DNA/RNA PROBE TQ 12-25: CPT

## 2025-07-19 PROCEDURE — G0378 HOSPITAL OBSERVATION PER HR: HCPCS

## 2025-07-19 PROCEDURE — 86318 IA INFECTIOUS AGENT ANTIBODY: CPT

## 2025-07-19 PROCEDURE — S4991 NICOTINE PATCH NONLEGEND: HCPCS

## 2025-07-19 RX ORDER — LORAZEPAM 1 MG/1
1 TABLET ORAL EVERY 12 HOURS PRN
Status: DISCONTINUED | OUTPATIENT
Start: 2025-07-19 | End: 2025-07-20 | Stop reason: HOSPADM

## 2025-07-19 RX ORDER — INSULIN GLARGINE 100 [IU]/ML
25 INJECTION, SOLUTION SUBCUTANEOUS 2 TIMES DAILY
Status: DISCONTINUED | OUTPATIENT
Start: 2025-07-19 | End: 2025-07-19

## 2025-07-19 RX ORDER — INSULIN GLARGINE 100 [IU]/ML
20 INJECTION, SOLUTION SUBCUTANEOUS NIGHTLY
Status: DISCONTINUED | OUTPATIENT
Start: 2025-07-19 | End: 2025-07-19

## 2025-07-19 RX ORDER — HEPARIN SODIUM 5000 [USP'U]/ML
5000 INJECTION, SOLUTION INTRAVENOUS; SUBCUTANEOUS ONCE
Status: COMPLETED | OUTPATIENT
Start: 2025-07-19 | End: 2025-07-19

## 2025-07-19 RX ORDER — HEPARIN SODIUM 5000 [USP'U]/ML
5000 INJECTION, SOLUTION INTRAVENOUS; SUBCUTANEOUS EVERY 12 HOURS
Status: DISCONTINUED | OUTPATIENT
Start: 2025-07-19 | End: 2025-07-19

## 2025-07-19 RX ORDER — HYDROMORPHONE HYDROCHLORIDE 1 MG/ML
1 INJECTION, SOLUTION INTRAMUSCULAR; INTRAVENOUS; SUBCUTANEOUS EVERY 6 HOURS PRN
Status: DISCONTINUED | OUTPATIENT
Start: 2025-07-19 | End: 2025-07-20 | Stop reason: HOSPADM

## 2025-07-19 RX ADMIN — Medication 24 G: at 07:07

## 2025-07-19 RX ADMIN — OXCARBAZEPINE 600 MG: 300 TABLET, FILM COATED ORAL at 09:07

## 2025-07-19 RX ADMIN — PREGABALIN 150 MG: 50 CAPSULE ORAL at 09:07

## 2025-07-19 RX ADMIN — CLONIDINE HYDROCHLORIDE 0.3 MG: 0.1 TABLET ORAL at 04:07

## 2025-07-19 RX ADMIN — ISOSORBIDE MONONITRATE 120 MG: 30 TABLET, EXTENDED RELEASE ORAL at 09:07

## 2025-07-19 RX ADMIN — CLONIDINE HYDROCHLORIDE 0.3 MG: 0.1 TABLET ORAL at 08:07

## 2025-07-19 RX ADMIN — PREGABALIN 150 MG: 50 CAPSULE ORAL at 04:07

## 2025-07-19 RX ADMIN — MORPHINE SULFATE 105 MG: 15 TABLET, FILM COATED, EXTENDED RELEASE ORAL at 09:07

## 2025-07-19 RX ADMIN — MORPHINE SULFATE 105 MG: 15 TABLET, FILM COATED, EXTENDED RELEASE ORAL at 04:07

## 2025-07-19 RX ADMIN — AMITRIPTYLINE HYDROCHLORIDE 50 MG: 50 TABLET ORAL at 08:07

## 2025-07-19 RX ADMIN — OXCARBAZEPINE 600 MG: 300 TABLET, FILM COATED ORAL at 08:07

## 2025-07-19 RX ADMIN — HYDROMORPHONE HYDROCHLORIDE 1 MG: 1 INJECTION, SOLUTION INTRAMUSCULAR; INTRAVENOUS; SUBCUTANEOUS at 04:07

## 2025-07-19 RX ADMIN — HEPARIN SODIUM 5000 UNITS: 5000 INJECTION, SOLUTION INTRAVENOUS; SUBCUTANEOUS at 12:07

## 2025-07-19 RX ADMIN — PREGABALIN 150 MG: 50 CAPSULE ORAL at 08:07

## 2025-07-19 RX ADMIN — INSULIN ASPART 7 UNITS: 100 INJECTION, SOLUTION INTRAVENOUS; SUBCUTANEOUS at 11:07

## 2025-07-19 RX ADMIN — NICOTINE 1 PATCH: 21 PATCH, EXTENDED RELEASE TRANSDERMAL at 09:07

## 2025-07-19 RX ADMIN — RANOLAZINE 500 MG: 500 TABLET, EXTENDED RELEASE ORAL at 09:07

## 2025-07-19 RX ADMIN — MORPHINE SULFATE 105 MG: 15 TABLET, FILM COATED, EXTENDED RELEASE ORAL at 08:07

## 2025-07-19 RX ADMIN — RANOLAZINE 500 MG: 500 TABLET, EXTENDED RELEASE ORAL at 08:07

## 2025-07-19 RX ADMIN — ATORVASTATIN CALCIUM 80 MG: 40 TABLET, FILM COATED ORAL at 09:07

## 2025-07-19 RX ADMIN — METOPROLOL SUCCINATE 25 MG: 25 TABLET, EXTENDED RELEASE ORAL at 09:07

## 2025-07-19 RX ADMIN — TAMSULOSIN HYDROCHLORIDE 0.4 MG: 0.4 CAPSULE ORAL at 09:07

## 2025-07-19 RX ADMIN — INSULIN ASPART 1 UNITS: 100 INJECTION, SOLUTION INTRAVENOUS; SUBCUTANEOUS at 11:07

## 2025-07-19 RX ADMIN — CLONIDINE HYDROCHLORIDE 0.3 MG: 0.1 TABLET ORAL at 09:07

## 2025-07-19 RX ADMIN — LOSARTAN POTASSIUM 100 MG: 25 TABLET, FILM COATED ORAL at 09:07

## 2025-07-19 NOTE — PROGRESS NOTES
[facility]  PROGRESS NOTE    Patient's Name: Bharath Caballero  : 1980  MRN: [id number]    Admission Date: 2025  Length of Stay: 0  Attending Physician: Violeta López MD  Resident: Francois Bashir MD  Intern: Nando Carbajal MD    SUBJECTIVE   Hyperglycemia 2/2 poor diabetes control.    History of Present Illness:  Bharath Caballero is a 45 y.o. male with a complex medical history including deafness, adenocarcinoma of head of pancrease, DM II uncontrolled, chronic liver failure, hx of pancreatitis, GERD, CAD s/p CABG, HTN, pancreatic insufficiency and HLD was admitted to the floor for blood sugar control pending this Monday procedure to remove his pain stimulator implanted in his back by Dr. Pozo at least 2 or 3 years ago. Pt needs the device removed to enable him have other diagnostic tests like MRIs. This procedure has been previously rescheduled by Dr. Rocky Rojas [phone] due to poor blood sugar control. Dr. Rojas is willing to have the device removed on Monday. He is to be at the doctor's surgery center at [facility] 6:00 a.m, monday morning. He is being admitted  for closer observation to ensure that his glucoses are controlled fax his labs to Dr. Delgado  to include CBC CMP PT PTT and urinalysis. If concerns, Dr. Beasley can be contacted. Pt has no new positive for ROS. Pt has no new complains.     Hospital Course/Significant Events:  2025: Admitted to [facility].    Interval History:  No acute event overnight. Patient seen resting comfortably in bed. Patient states he is feeling well without any acute events. Denies fever, chills, chest pain, shortness of breath, and changes in urination. Pt complains of diarrhea and abdominal cramping pains after meals. He states it is chronic. Team Requested that the family bring in his Creon 2/2 pancreatic cancer. Pt blood glu was low after 34 units Lantus. Will hold off  morning lantus dose and continue to monitor and adjust as needed. Labs is unrevealing.       Review of Systems:  A 12-pt ROS was conducted and was negative unless stated above.    OBJECTIVE     VITAL SIGNS: 24 HR MIN & MAX Most Recent Vitals   Temp  Min: 97.9 °F (36.6 °C)  Max: 97.9 °F (36.6 °C)  97.9 °F (36.6 °C)   BP  Min: 116/72  Max: 155/88  116/72    Pulse  Min: 71  Max: 83  72   Resp  Min: 16  Max: 20  16    SpO2  Min: 94 %  Max: 98 %  98 %    Body mass index is 29.07 kg/m².    Intake/Output:  No intake/output data recorded.  Constitutional:       Appearance: Normal appearance.   HENT:      Head: Normocephalic.      Right Ear: Decreased hearing noted.      Left Ear: Decreased hearing noted.      Nose: No congestion.   Eyes:      Extraocular Movements: Extraocular movements intact.      Pupils: Pupils are equal, round, and reactive to light.   Cardiovascular:      Pulses: Normal pulses.      Heart sounds: Normal heart sounds. No murmur heard.  Pulmonary:      Effort: Pulmonary effort is normal. No respiratory distress.      Breath sounds: Normal breath sounds.   Chest:      Chest wall: No tenderness.   Abdominal:      General: Bowel sounds are normal. There is distension.      Palpations: There is mass.      Tenderness:  Cramping with diarrhea after meals.       Comments: Mass located in epigastric region   Musculoskeletal:      Cervical back: Normal range of motion.      Right lower leg: No edema.      Left lower leg: Edema present.      Comments: Chronic non-pitting edema on left leg   Skin:     General: Skin is warm.   Neurological:      General: No focal deficit present.      Mental Status: He is alert and oriented to person, place, and time.      Sensory: Sensory deficit present.      Comments: Bilateral sensory deficit up to the proximal knee   Psychiatric:         Mood and Affect: Mood normal.         Behavior: Behavior normal.         Thought Content: Thought content normal.       Current  "Medications:  Scheduled:   amitriptyline  50 mg Oral QHS    atorvastatin  80 mg Oral Daily    cloNIDine  0.3 mg Oral TID    insulin aspart U-100  7 Units Subcutaneous TIDWM    isosorbide mononitrate  120 mg Oral Daily    losartan  100 mg Oral Daily    metoprolol succinate  25 mg Oral Daily    morphine  105 mg Oral TID    nicotine  1 patch Transdermal Daily    OXcarbazepine  600 mg Oral BID    pregabalin  150 mg Oral TID    ranolazine  500 mg Oral BID    tamsulosin  1 capsule Oral Daily      Infusions:    PRNs:    Current Facility-Administered Medications:     dextrose 50%, 12.5 g, Intravenous, PRN    dextrose 50%, 25 g, Intravenous, PRN    glucagon (human recombinant), 1 mg, Intramuscular, PRN    glucose, 16 g, Oral, PRN    glucose, 24 g, Oral, PRN    insulin aspart U-100, 0-5 Units, Subcutaneous, QID (AC + HS) PRN    sodium chloride 0.9%, 10 mL, Intravenous, PRN  Labs:  CBC:  Recent Labs   Lab 07/18/25 1926   WBC 9.94   HGB 12.8*   HCT 37.5*      MCV 88.0   RDW 13.1     BMP/CMP:  Recent Labs   Lab 07/18/25 1926      K 4.0      CO2 25   BUN 9.0   CREATININE 0.81   EGFRNORACEVR >60     RFTs/LFTs:  Recent Labs   Lab 07/18/25 1926   CALCIUM 7.9*   ALBUMIN 2.6*   AST 60*   ALT 44   ALKPHOS 392*   BILITOT 0.3     Recent Labs   Lab 07/18/25 1926   MG 2.00   PHOS 3.7     Cardiac Panel:  No results for input(s): "TROPONINI", "CKTOTAL", "CKMB", "BNP" in the last 168 hours.  Interval Imaging:  NM PET CT FDG Skull Base to Mid Thigh  Narrative: EXAMINATION:  NM PET CT FDG SKULL BASE TO MID THIGH    CLINICAL HISTORY:  staging; Malignant neoplasm of head of pancreas    TECHNIQUE:  Fasting blood glucose level was 352 mg/dl. 8.7 mCi of F18-FDG was given intravenously with subsequent PET imaging performed from the skull base through the upper thighs. Corresponding helical CT images were acquired for anatomic correlation and attenuation correction. Dose length product is 691 mGycm.    COMPARISON:  CT chest " abdomen pelvis dated 07/14/2025    FINDINGS:  Evaluation is limited by elevated glucose levels.    There is no hypermetabolic cervical lymphadenopathy.    There is no hypermetabolic lymphadenopathy in the chest.  The heart is normal in size.  There is no pericardial effusion.  There is no hypermetabolic pulmonary nodule.  There is no pleural effusion or pneumothorax.    Ill-defined pancreatic head mass has a max SUV of 3.  There are 2 small foci of increased FDG uptake between the pancreatic head and duodenum with a max SUV of 3.1, which could represent small lymph nodes (fused axial image 159).  There is physiologic uptake in the solid organs, urinary system and bowel.    Focal FDG uptake in the sternum is related to prior median sternotomy.  There is a likely healing fracture of the right anterior 6th rib with a max SUV of 2.9 (series 3, image 118).  There is otherwise no focal abnormal FDG uptake in the bones.  Impression: 1. Ill-defined mildly hypermetabolic pancreatic head mass.  2. Two small foci of adjacent FDG uptake may represent small lymph nodes, indeterminate.  3. No evidence of distant hypermetabolic metastatic disease.    Electronically signed by: Katiuska Bess  Date:    07/17/2025  Time:    12:46     Microbiology:  Microbiology Results (last 7 days)       Procedure Component Value Units Date/Time    Clostridium Diff Toxin, A & B, EIA [[id number]]     Order Status: Sent Specimen: Stool           Antibiotics:  Antibiotics (From admission, onward)      None             ASSESSMENT AND PLAN     Uncontrolled diabetes  Diabetic neuropathy  A1c 11.1 2/18/25  Patient could not have surgery because blood gluc was not controlled (568 on 7/7/2025). Pt currently admitted for blood gluc control for possible surgery to remove his pain stimulator on Monday. Blood gluc was low in the AM despite receiving a lower dose of home medication suggesting medication nonadherent.   - PAUSE MORNING  Lantus 34 units and  monitor  - Aspart 7 units tid with meals and monitor  - Continue management per PCP    Adenocarcinoma of head of pancreas  Diarrhea  Pt states diarrhea is chronic and have been using creon. Pt asked to call family to bring his Creon.    - Creon for pancreatic cancer.  - GI panel and José Luis eval.   - Pt is following up with his oncologist.      CAD s/p CABG 12/05  Recent NSTEMI  Pt had CABG in 12/2025. Pt was cleared for procedure by Dr. Campbell 7/15/2025.   Continue aspirin, statin, PCSK9 inhibitor, Coreg 25 b.i.d., Imdur 120, nifedipine 60 b.i.d. and Ranexa 500 mg bid. Pause anticoagulant for surgery prep.   -Pt follows up with cardiologist.      Familial Hypertriglyceridemia, hypercholesterolemia   Patient reports triglycerides in the past were 22,000 (no records of that)  Suspect most of his metabolic disorders as well as CAD related to hypertriglyceridemia.  Most recent triglycerides have normalized at 198  -continue atorvastatin 80mg daily, Repatha, Vascepa 2g BID   Patient has been more compliant healthy diet.      Smoking  -encouraged cessation  - on nicotine patches     Recurrent pancreatitis  Liver steatosis  Pt followed by GI            DVT Prophylaxis: SCD  GI Prophylaxis:   Abx:   Access: PIV  Fluids: PIV  Diet: Diet Consistent Carbohydrate 2000 Calories (up to 75 gm per meal)    Code Status: Full Code    Disposition: 45 y.o. male admitted for blood glucose management prior to Monday surgery. Discharge pending further workup.        Case was discussed and seen with Dr. López. Please appreciate attending's attestation to follow.    Nando Carbajal MD  PGY-1 Resident  U Internal Medicine Team 1  07/19/2025

## 2025-07-20 VITALS
OXYGEN SATURATION: 98 % | HEART RATE: 74 BPM | DIASTOLIC BLOOD PRESSURE: 77 MMHG | RESPIRATION RATE: 18 BRPM | WEIGHT: 196.88 LBS | HEIGHT: 69 IN | TEMPERATURE: 98 F | BODY MASS INDEX: 29.16 KG/M2 | SYSTOLIC BLOOD PRESSURE: 123 MMHG

## 2025-07-20 LAB
ADV 40+41 DNA STL QL NAA+NON-PROBE: NOT DETECTED
ALBUMIN SERPL-MCNC: 2.3 G/DL (ref 3.5–5)
ALBUMIN SERPL-MCNC: 2.4 G/DL (ref 3.5–5)
ALBUMIN/GLOB SERPL: 0.5 RATIO (ref 1.1–2)
ALBUMIN/GLOB SERPL: 0.5 RATIO (ref 1.1–2)
ALP SERPL-CCNC: 398 UNIT/L (ref 40–150)
ALP SERPL-CCNC: 428 UNIT/L (ref 40–150)
ALT SERPL-CCNC: 46 UNIT/L (ref 0–55)
ALT SERPL-CCNC: 49 UNIT/L (ref 0–55)
ANION GAP SERPL CALC-SCNC: 5 MEQ/L
ANION GAP SERPL CALC-SCNC: 6 MEQ/L
APTT PPP: 38.2 SECONDS (ref 23.2–33.7)
AST SERPL-CCNC: 55 UNIT/L (ref 11–45)
AST SERPL-CCNC: 57 UNIT/L (ref 11–45)
ASTRO TYP 1-8 RNA STL QL NAA+NON-PROBE: NOT DETECTED
BACTERIA #/AREA URNS AUTO: ABNORMAL /HPF
BASOPHILS # BLD AUTO: 0.04 X10(3)/MCL
BASOPHILS NFR BLD AUTO: 0.5 %
BILIRUB SERPL-MCNC: 0.3 MG/DL
BILIRUB SERPL-MCNC: 0.3 MG/DL
BILIRUB UR QL STRIP.AUTO: NEGATIVE
BUN SERPL-MCNC: 10.7 MG/DL (ref 8.9–20.6)
BUN SERPL-MCNC: 10.7 MG/DL (ref 8.9–20.6)
C CAYETANENSIS DNA STL QL NAA+NON-PROBE: NOT DETECTED
C COLI+JEJ+UPSA DNA STL QL NAA+NON-PROBE: NOT DETECTED
CALCIUM SERPL-MCNC: 8.2 MG/DL (ref 8.4–10.2)
CALCIUM SERPL-MCNC: 8.4 MG/DL (ref 8.4–10.2)
CHLORIDE SERPL-SCNC: 106 MMOL/L (ref 98–107)
CHLORIDE SERPL-SCNC: 107 MMOL/L (ref 98–107)
CLARITY UR: ABNORMAL
CO2 SERPL-SCNC: 28 MMOL/L (ref 22–29)
CO2 SERPL-SCNC: 29 MMOL/L (ref 22–29)
COLOR UR AUTO: YELLOW
CREAT SERPL-MCNC: 0.83 MG/DL (ref 0.72–1.25)
CREAT SERPL-MCNC: 0.88 MG/DL (ref 0.72–1.25)
CREAT/UREA NIT SERPL: 12
CREAT/UREA NIT SERPL: 13
CRYPTOSP DNA STL QL NAA+NON-PROBE: NOT DETECTED
E HISTOLYT DNA STL QL NAA+NON-PROBE: NOT DETECTED
EAEC PAA PLAS AGGR+AATA ST NAA+NON-PRB: NOT DETECTED
EC STX1+STX2 GENES STL QL NAA+NON-PROBE: NOT DETECTED
EOSINOPHIL # BLD AUTO: 0.19 X10(3)/MCL (ref 0–0.9)
EOSINOPHIL NFR BLD AUTO: 2.3 %
EPEC EAE GENE STL QL NAA+NON-PROBE: DETECTED
ERYTHROCYTE [DISTWIDTH] IN BLOOD BY AUTOMATED COUNT: 13.3 % (ref 11.5–17)
EST. AVERAGE GLUCOSE BLD GHB EST-MCNC: 277.6 MG/DL
ETEC LTA+ST1A+ST1B TOX ST NAA+NON-PROBE: NOT DETECTED
G LAMBLIA DNA STL QL NAA+NON-PROBE: NOT DETECTED
GFR SERPLBLD CREATININE-BSD FMLA CKD-EPI: >60 ML/MIN/1.73/M2
GFR SERPLBLD CREATININE-BSD FMLA CKD-EPI: >60 ML/MIN/1.73/M2
GLOBULIN SER-MCNC: 4.3 GM/DL (ref 2.4–3.5)
GLOBULIN SER-MCNC: 4.6 GM/DL (ref 2.4–3.5)
GLUCOSE SERPL-MCNC: 113 MG/DL (ref 74–100)
GLUCOSE SERPL-MCNC: 43 MG/DL (ref 74–100)
GLUCOSE UR QL STRIP: NORMAL
HBA1C MFR BLD: 11.3 %
HCT VFR BLD AUTO: 37.3 % (ref 42–52)
HGB BLD-MCNC: 12.5 G/DL (ref 14–18)
HGB UR QL STRIP: NEGATIVE
HOLD SPECIMEN: NORMAL
HYALINE CASTS #/AREA URNS LPF: ABNORMAL /LPF
IMM GRANULOCYTES # BLD AUTO: 0.03 X10(3)/MCL (ref 0–0.04)
IMM GRANULOCYTES NFR BLD AUTO: 0.4 %
INR PPP: 1
KETONES UR QL STRIP: NEGATIVE
LEUKOCYTE ESTERASE UR QL STRIP: 250
LYMPHOCYTES # BLD AUTO: 2.13 X10(3)/MCL (ref 0.6–4.6)
LYMPHOCYTES NFR BLD AUTO: 25.4 %
MCH RBC QN AUTO: 30.2 PG (ref 27–31)
MCHC RBC AUTO-ENTMCNC: 33.5 G/DL (ref 33–36)
MCV RBC AUTO: 90.1 FL (ref 80–94)
MONOCYTES # BLD AUTO: 1.02 X10(3)/MCL (ref 0.1–1.3)
MONOCYTES NFR BLD AUTO: 12.2 %
MUCOUS THREADS URNS QL MICRO: ABNORMAL /LPF
NEUTROPHILS # BLD AUTO: 4.96 X10(3)/MCL (ref 2.1–9.2)
NEUTROPHILS NFR BLD AUTO: 59.2 %
NITRITE UR QL STRIP: NEGATIVE
NOROVIRUS GI+II RNA STL QL NAA+NON-PROBE: NOT DETECTED
NRBC BLD AUTO-RTO: 0 %
P SHIGELLOIDES DNA STL QL NAA+NON-PROBE: NOT DETECTED
PH UR STRIP: 5.5 [PH]
PLATELET # BLD AUTO: 205 X10(3)/MCL (ref 130–400)
PMV BLD AUTO: 10.6 FL (ref 7.4–10.4)
POCT GLUCOSE: 127 MG/DL (ref 70–110)
POCT GLUCOSE: 145 MG/DL (ref 70–110)
POCT GLUCOSE: 53 MG/DL (ref 70–110)
POCT GLUCOSE: 57 MG/DL (ref 70–110)
POCT GLUCOSE: 75 MG/DL (ref 70–110)
POCT GLUCOSE: 84 MG/DL (ref 70–110)
POTASSIUM SERPL-SCNC: 3.5 MMOL/L (ref 3.5–5.1)
POTASSIUM SERPL-SCNC: 3.6 MMOL/L (ref 3.5–5.1)
PROT SERPL-MCNC: 6.6 GM/DL (ref 6.4–8.3)
PROT SERPL-MCNC: 7 GM/DL (ref 6.4–8.3)
PROT UR QL STRIP: ABNORMAL
PROTHROMBIN TIME: 13.1 SECONDS (ref 11.4–14)
RBC # BLD AUTO: 4.14 X10(6)/MCL (ref 4.7–6.1)
RBC #/AREA URNS AUTO: ABNORMAL /HPF
RVA RNA STL QL NAA+NON-PROBE: NOT DETECTED
S ENT+BONG DNA STL QL NAA+NON-PROBE: NOT DETECTED
SAPO I+II+IV+V RNA STL QL NAA+NON-PROBE: NOT DETECTED
SHIGELLA SP+EIEC IPAH ST NAA+NON-PROBE: NOT DETECTED
SODIUM SERPL-SCNC: 140 MMOL/L (ref 136–145)
SODIUM SERPL-SCNC: 141 MMOL/L (ref 136–145)
SP GR UR STRIP.AUTO: 1.02 (ref 1–1.03)
SQUAMOUS #/AREA URNS LPF: ABNORMAL /HPF
UROBILINOGEN UR STRIP-ACNC: NORMAL
V CHOL+PARA+VUL DNA STL QL NAA+NON-PROBE: NOT DETECTED
V CHOLERAE DNA STL QL NAA+NON-PROBE: NOT DETECTED
WBC # BLD AUTO: 8.37 X10(3)/MCL (ref 4.5–11.5)
WBC #/AREA URNS AUTO: ABNORMAL /HPF
Y ENTEROCOL DNA STL QL NAA+NON-PROBE: NOT DETECTED

## 2025-07-20 PROCEDURE — 94761 N-INVAS EAR/PLS OXIMETRY MLT: CPT

## 2025-07-20 PROCEDURE — S4991 NICOTINE PATCH NONLEGEND: HCPCS

## 2025-07-20 PROCEDURE — 63600175 PHARM REV CODE 636 W HCPCS

## 2025-07-20 PROCEDURE — 85610 PROTHROMBIN TIME: CPT

## 2025-07-20 PROCEDURE — 96376 TX/PRO/DX INJ SAME DRUG ADON: CPT

## 2025-07-20 PROCEDURE — 36415 COLL VENOUS BLD VENIPUNCTURE: CPT

## 2025-07-20 PROCEDURE — 96375 TX/PRO/DX INJ NEW DRUG ADDON: CPT

## 2025-07-20 PROCEDURE — 85730 THROMBOPLASTIN TIME PARTIAL: CPT

## 2025-07-20 PROCEDURE — 25000003 PHARM REV CODE 250

## 2025-07-20 PROCEDURE — 85025 COMPLETE CBC W/AUTO DIFF WBC: CPT

## 2025-07-20 PROCEDURE — 81001 URINALYSIS AUTO W/SCOPE: CPT

## 2025-07-20 PROCEDURE — 83036 HEMOGLOBIN GLYCOSYLATED A1C: CPT

## 2025-07-20 PROCEDURE — 80053 COMPREHEN METABOLIC PANEL: CPT | Mod: 91

## 2025-07-20 PROCEDURE — G0378 HOSPITAL OBSERVATION PER HR: HCPCS

## 2025-07-20 PROCEDURE — 80053 COMPREHEN METABOLIC PANEL: CPT

## 2025-07-20 RX ORDER — SULFAMETHOXAZOLE AND TRIMETHOPRIM 800; 160 MG/1; MG/1
1 TABLET ORAL 2 TIMES DAILY
Qty: 14 TABLET | Refills: 0 | Status: SHIPPED | OUTPATIENT
Start: 2025-07-20 | End: 2025-07-27

## 2025-07-20 RX ADMIN — CLONIDINE HYDROCHLORIDE 0.3 MG: 0.1 TABLET ORAL at 08:07

## 2025-07-20 RX ADMIN — TAMSULOSIN HYDROCHLORIDE 0.4 MG: 0.4 CAPSULE ORAL at 08:07

## 2025-07-20 RX ADMIN — Medication 16 G: at 05:07

## 2025-07-20 RX ADMIN — HYDROMORPHONE HYDROCHLORIDE 1 MG: 1 INJECTION, SOLUTION INTRAMUSCULAR; INTRAVENOUS; SUBCUTANEOUS at 08:07

## 2025-07-20 RX ADMIN — PREGABALIN 150 MG: 50 CAPSULE ORAL at 04:07

## 2025-07-20 RX ADMIN — NICOTINE 1 PATCH: 21 PATCH, EXTENDED RELEASE TRANSDERMAL at 08:07

## 2025-07-20 RX ADMIN — PREGABALIN 150 MG: 50 CAPSULE ORAL at 08:07

## 2025-07-20 RX ADMIN — METOPROLOL SUCCINATE 25 MG: 25 TABLET, EXTENDED RELEASE ORAL at 08:07

## 2025-07-20 RX ADMIN — ISOSORBIDE MONONITRATE 120 MG: 30 TABLET, EXTENDED RELEASE ORAL at 08:07

## 2025-07-20 RX ADMIN — CLONIDINE HYDROCHLORIDE 0.3 MG: 0.1 TABLET ORAL at 04:07

## 2025-07-20 RX ADMIN — RANOLAZINE 500 MG: 500 TABLET, EXTENDED RELEASE ORAL at 08:07

## 2025-07-20 RX ADMIN — MORPHINE SULFATE 105 MG: 15 TABLET, FILM COATED, EXTENDED RELEASE ORAL at 04:07

## 2025-07-20 RX ADMIN — MORPHINE SULFATE 105 MG: 15 TABLET, FILM COATED, EXTENDED RELEASE ORAL at 08:07

## 2025-07-20 RX ADMIN — HYDROMORPHONE HYDROCHLORIDE 1 MG: 1 INJECTION, SOLUTION INTRAMUSCULAR; INTRAVENOUS; SUBCUTANEOUS at 02:07

## 2025-07-20 RX ADMIN — ATORVASTATIN CALCIUM 80 MG: 40 TABLET, FILM COATED ORAL at 08:07

## 2025-07-20 RX ADMIN — OXCARBAZEPINE 600 MG: 300 TABLET, FILM COATED ORAL at 08:07

## 2025-07-20 RX ADMIN — DEXTROSE MONOHYDRATE 12.5 G: 25 INJECTION, SOLUTION INTRAVENOUS at 06:07

## 2025-07-20 RX ADMIN — LOSARTAN POTASSIUM 100 MG: 25 TABLET, FILM COATED ORAL at 08:07

## 2025-07-20 NOTE — DISCHARGE SUMMARY
[facility] Internal Medicine Discharge Summary  [facility]    Admitting Physician: Violeta López MD  Attending Physician: Violeta López MD  Date of admit: 7/18/2025  Date of discharge: No discharge date for patient encounter.    Condition: Stable  Outcome: Condition has improved and patient is now ready for discharge.  Disposition: Home or Self Care    Hospital Course Summary and Problem List     Bharath Caballero is a 45 y.o. male with a complex medical history including deafness, adenocarcinoma of head of pancrease, DM II uncontrolled, chronic liver failure, hx of pancreatitis, GERD, CAD s/p CABG, HTN, pancreatic insufficiency and HLD was admitted to the floor for blood sugar control pending this Monday procedure to remove his pain stimulator implanted in his back.  Patient's home insulin regimen includes Lantus 40 units b.i.d., aspart 7 units t.i.d. WM.  During hospital stay we reduced Lantus by 15%, gave 34 units of Lantus at night, patient hypoglycemic the next morning, asymptomatic.  Throughout the day he was eating meals although insulin doses reduced with a few additional asymptomatic hypoglycemic episodes.  After further discussion with the patient he reports that he drinks approximately 4 L of soda daily in addition to 1-2 monster energy drinks.  A1c obtained at 11.3.  Labs obtained this morning.  Patient is stable for discharge.  Instructed patient to decrease today's dose of Lantus to 20 units as he will be NPO for procedure tomorrow morning.  He will require close monitoring and titration of his insulin regimen in outpatient setting.      Discharge Procedure Orders   Ambulatory referral/consult to Internal Medicine   Standing Status: Future   Referral Priority: Routine Referral Type: Consultation   Referral Reason: Specialty Services Required   Requested Specialty: Internal Medicine   Number of Visits Requested: 1       To address at [facility] Visit:  Significant medication  "changes: None  Results not resulted during admission: None  Recommended Labs: None  Recommended Imaging: None    Problem List  Uncontrolled diabetes mellitus   Adenocarcinoma of pancreas   CAD s/p CABG      Objective     Vital Signs:  Vitals  BP: 107/67  Temp: 97.6 °F (36.4 °C)  Temp Source: Oral  Pulse: 77  Resp: 18  SpO2: (!) 93 %  Height: 5' 9" (175.3 cm)  Weight: 89.3 kg (196 lb 13.9 oz)    Physical Exam  General: well-developed, well-nourished, no acute distress  Eye: clear conjunctiva, eyelids normal  Head: normocephalic and atraumatic  Neck: full range of motion, supple  Respiratory: clear to auscultation bilaterally without wheezes, rales, rhonchi  Cardiovascular: regular rate and rhythm without murmurs. No JVD. Capillary refill within normal limits.  Gastrointestinal: soft, non-tender, non-distended with normal bowel sounds in all four quadrants. No masses palpated  Musculoskeletal: full range of motion of all extremities without limitation or discomfort  Integumentary: no rashes or skin lesions present, no erythema  Neurologic: AAO x 3, no dysarthria.  Psychiatric: cooperative with exam, good eye contact.      Discharge Medications        Medication List        CONTINUE taking these medications      amitriptyline 50 MG tablet  Commonly known as: ELAVIL  TAKE ONE TABLET BY MOUTH IN THE EVENING     aspirin 81 MG EC tablet  Commonly known as: ECOTRIN     atorvastatin 80 MG tablet  Commonly known as: LIPITOR  Take 1 tablet (80 mg total) by mouth once daily.     cholecalciferol (vitamin D3) 125 mcg (5,000 unit) capsule  Take 1 capsule (5,000 Units total) by mouth once daily.     clonazePAM 1 MG tablet  Commonly known as: KlonoPIN  TAKE ONE TABLET BY MOUTH TWICE DAILY     cloNIDine 0.3 MG tablet  Commonly known as: CATAPRES  Take 1 tablet (0.3 mg total) by mouth 3 (three) times daily.     clopidogreL 75 mg tablet  Commonly known as: PLAVIX  Take 4 tablets on day 1 and then one tablet daily.     CREON " 36,000-114,000- 180,000 unit Cpdr  Generic drug: lipase-protease-amylase  TAKE ONE CAPSULE THREE TIMES DAILY     EScitalopram oxalate 20 MG tablet  Commonly known as: LEXAPRO     esomeprazole 40 MG capsule  Commonly known as: NEXIUM  Take 1 capsule (40 mg total) by mouth before breakfast.     FARXIGA 5 mg Tab tablet  Generic drug: dapagliflozin propanediol  Take 2 tablets (10 mg total) by mouth once daily.     gabapentin 800 MG tablet  Commonly known as: NEURONTIN  TAKE ONE TABLET BY MOUTH IN THE EVENING     GLUCERNA 1.5 CEDRIC 0.08-1.5 gram-kcal/mL Liqd  Generic drug: nut.tx.gluc intol,lf,soy-fiber  Take 273 mLs by mouth 2 (two) times a day.     HumaLOG U-100 Insulin 100 unit/mL injection  Generic drug: insulin lispro  INJECT 25 UNITS SUBCUTANEOUSLY BEFORE MEALS THREE TIMES DAILY     HYDROmorphone 8 MG tablet  Commonly known as: DILAUDID  Take 1 tablet (8 mg total) by mouth every 8 (eight) hours as needed.     icosapent ethyL 1 gram Cap  Commonly known as: VASCEPA  TAKE TWO CAPSULES BY MOUTH TWICE DAILY     isosorbide mononitrate 120 MG 24 hr tablet  Commonly known as: IMDUR  Take 1 tablet (120 mg total) by mouth once daily.     LIDOcaine-prilocaine cream  Commonly known as: EMLA  Apply topically as needed. To mediport 30-45 minutes prior to access     losartan 100 MG tablet  Commonly known as: COZAAR  Take 1 tablet (100 mg total) by mouth once daily.     metoprolol succinate 25 MG 24 hr tablet  Commonly known as: TOPROL-XL  Take 1 tablet (25 mg total) by mouth once daily.     morphine 100 MG 12 hr tablet  Commonly known as: MS CONTIN  TAKE ONE TABLET BY MOUTH THREE TIMES DAILY     nicotine 21 mg/24 hr  Commonly known as: NICODERM CQ  Place 1 patch onto the skin once daily.     NIFEdipine 90 MG (OSM) 24 hr tablet  Commonly known as: PROCARDIA-XL  Take 1 tablet (90 mg total) by mouth once daily.     nitroGLYCERIN 0.3 MG SL tablet  Commonly known as: NITROSTAT  Place 1 tablet (0.3 mg total) under the tongue every 5  "(five) minutes as needed for Chest pain (go to er after 3 doses).     OXcarbazepine 600 MG Tab  Commonly known as: TRILEPTAL  Take 1 tablet (600 mg total) by mouth 2 (two) times daily.     potassium chloride SA 20 MEQ tablet  Commonly known as: K-DUR,KLOR-CON  Take 1 tablet (20 mEq total) by mouth 2 (two) times daily.     pregabalin 150 MG capsule  Commonly known as: LYRICA  Take 1 capsule (150 mg total) by mouth 3 (three) times daily.     ranolazine 500 MG Tb12  Commonly known as: RANEXA  Take 1 tablet (500 mg total) by mouth 2 (two) times daily.     REPATHA SURECLICK 140 mg/mL Pnij  Generic drug: evolocumab  INJECT 1ml INTRAMUSCULARLY EVERY 14 DAYS     sucralfate 100 mg/mL suspension  Commonly known as: CARAFATE  Take 10 mLs (1 g total) by mouth 4 (four) times daily before meals and nightly.     SURE COMFORT INSULIN SYRINGE 0.5 mL 31 gauge x 5/16" Syrg  Generic drug: insulin syringe-needle U-100  USE ONE SYRINGE THREE TIMES DAILY     tamsulosin 0.4 mg Cap  Commonly known as: FLOMAX  TAKE ONE CAPSULE BY MOUTH EVERY DAY     torsemide 20 MG Tab  Commonly known as: DEMADEX  Take 1 tablet (20 mg total) by mouth once daily.     * TOUJEO SOLOSTAR U-300 INSULIN 300 unit/mL (1.5 mL) Inpn pen  Generic drug: insulin glargine (TOUJEO)  INJECT 35 SUBCUTANEOUSLY TWICE DAILY     * LANTUS SOLOSTAR U-100 INSULIN 100 unit/mL (3 mL) Inpn pen  Generic drug: insulin glargine U-100 (Lantus)  Inject 40 Units into the skin 2 (two) times a day.     TRADJENTA 5 mg Tab tablet  Generic drug: linaGLIPtin  Take 1 tablet (5 mg total) by mouth once daily.           * This list has 2 medication(s) that are the same as other medications prescribed for you. Read the directions carefully, and ask your doctor or other care provider to review them with you.                ASK your doctor about these medications      cholestyramine 4 gram packet  Commonly known as: QUESTRAN  Take 1 packet (4 g total) by mouth 3 (three) times daily with meals.        "       Outpatient Follow Up       At this time, Bharath Caballero is determined to have maximized benefits of IP hospitalization. he is discharged in stable condition with outpatient f/u recommendations and instructions. All questions were answered, and patient verbalized agreement with the POC. They were given return precautions prior to discharge including symptoms that should prompt return to ED or to call PCP.     Total time spent at discharge: >30 minutes    Francois Bashir  [facility] Internal Medicine

## 2025-07-20 NOTE — PLAN OF CARE
Problem: Adult Inpatient Plan of Care  Goal: Plan of Care Review  7/20/2025 1753 by Naty Herrera RN  Outcome: Met  7/20/2025 0741 by Naty Herrera RN  Outcome: Progressing  Goal: Patient-Specific Goal (Individualized)  7/20/2025 1753 by Naty Herrera RN  Outcome: Met  7/20/2025 0741 by Naty Herrera RN  Outcome: Progressing  Goal: Absence of Hospital-Acquired Illness or Injury  7/20/2025 1753 by Naty Herrera RN  Outcome: Met  7/20/2025 0741 by Naty Herrera RN  Outcome: Progressing  Goal: Optimal Comfort and Wellbeing  7/20/2025 1753 by Naty Herrera RN  Outcome: Met  7/20/2025 0741 by Naty Herrera RN  Outcome: Progressing  Goal: Readiness for Transition of Care  7/20/2025 1753 by Naty Herrera RN  Outcome: Met  7/20/2025 0741 by Naty Herrera RN  Outcome: Progressing

## 2025-07-22 ENCOUNTER — PATIENT MESSAGE (OUTPATIENT)
Dept: SURGERY | Facility: HOSPITAL | Age: 45
End: 2025-07-22
Payer: MEDICARE

## 2025-07-22 ENCOUNTER — TELEPHONE (OUTPATIENT)
Dept: INTERNAL MEDICINE | Facility: CLINIC | Age: 45
End: 2025-07-22
Payer: MEDICARE

## 2025-07-22 NOTE — TELEPHONE ENCOUNTER
Spoke with patient,  verified, patient states he is feeling ok, his blood sugar today was 107, he is having trouble keeping food down, since he been home, encouraged the brat diet, and to check blood sugars acbid, and bring to clinic every 2 weeks, also on ED precautions of blood sugars less than 80 consistently, patient verbalized understanding and to inform Dr Beasley he is doing ok.

## 2025-07-22 NOTE — TELEPHONE ENCOUNTER
I am trying to reach Mr. Caballero to check on him.  [place] of [place] when the surgery center nurse texted me and said they took out his device ut his gluoses were in the 30's and 40's. Please ty to check on him. Have him check glucosed acbid and bring to us q 2 weeks and go to er if < 80 consistently.

## 2025-07-23 ENCOUNTER — DOCUMENTATION ONLY (OUTPATIENT)
Dept: HEMATOLOGY/ONCOLOGY | Facility: CLINIC | Age: 45
End: 2025-07-23
Payer: MEDICARE

## 2025-07-23 ENCOUNTER — TELEPHONE (OUTPATIENT)
Dept: SMOKING CESSATION | Facility: CLINIC | Age: 45
End: 2025-07-23
Payer: MEDICARE

## 2025-07-23 ENCOUNTER — ANESTHESIA (OUTPATIENT)
Dept: SURGERY | Facility: HOSPITAL | Age: 45
End: 2025-07-23
Payer: MEDICARE

## 2025-07-23 ENCOUNTER — HOSPITAL ENCOUNTER (OUTPATIENT)
Facility: HOSPITAL | Age: 45
Discharge: HOME | End: 2025-07-23
Attending: SURGERY | Admitting: SURGERY
Payer: MEDICARE

## 2025-07-23 VITALS
SYSTOLIC BLOOD PRESSURE: 185 MMHG | RESPIRATION RATE: 18 BRPM | OXYGEN SATURATION: 93 % | BODY MASS INDEX: 27.85 KG/M2 | HEART RATE: 68 BPM | TEMPERATURE: 98 F | WEIGHT: 188.63 LBS | DIASTOLIC BLOOD PRESSURE: 95 MMHG

## 2025-07-23 DIAGNOSIS — R79.89 HIGH SERUM CARBOHYDRATE ANTIGEN 19-9 (CA19-9): ICD-10-CM

## 2025-07-23 DIAGNOSIS — C25.0 ADENOCARCINOMA OF HEAD OF PANCREAS: Primary | ICD-10-CM

## 2025-07-23 DIAGNOSIS — C25.0 ADENOCARCINOMA OF HEAD OF PANCREAS: ICD-10-CM

## 2025-07-23 DIAGNOSIS — C25.9 PANCREATIC CANCER: ICD-10-CM

## 2025-07-23 LAB — POCT GLUCOSE: 138 MG/DL (ref 70–110)

## 2025-07-23 PROCEDURE — 71000033 HC RECOVERY, INTIAL HOUR: Performed by: SURGERY

## 2025-07-23 PROCEDURE — 37000009 HC ANESTHESIA EA ADD 15 MINS: Performed by: SURGERY

## 2025-07-23 PROCEDURE — 36000706: Performed by: SURGERY

## 2025-07-23 PROCEDURE — 25000003 PHARM REV CODE 250: Performed by: NURSE ANESTHETIST, CERTIFIED REGISTERED

## 2025-07-23 PROCEDURE — 25000003 PHARM REV CODE 250: Performed by: ANESTHESIOLOGY

## 2025-07-23 PROCEDURE — 63600175 PHARM REV CODE 636 W HCPCS

## 2025-07-23 PROCEDURE — 71000015 HC POSTOP RECOV 1ST HR: Performed by: SURGERY

## 2025-07-23 PROCEDURE — 71000016 HC POSTOP RECOV ADDL HR: Performed by: SURGERY

## 2025-07-23 PROCEDURE — 37000008 HC ANESTHESIA 1ST 15 MINUTES: Performed by: SURGERY

## 2025-07-23 PROCEDURE — 63600175 PHARM REV CODE 636 W HCPCS: Performed by: NURSE ANESTHETIST, CERTIFIED REGISTERED

## 2025-07-23 PROCEDURE — 63600175 PHARM REV CODE 636 W HCPCS: Performed by: ANESTHESIOLOGY

## 2025-07-23 PROCEDURE — 36000707: Performed by: SURGERY

## 2025-07-23 PROCEDURE — 25000242 PHARM REV CODE 250 ALT 637 W/ HCPCS

## 2025-07-23 RX ORDER — HEPARIN SODIUM 5000 [USP'U]/ML
5000 INJECTION, SOLUTION INTRAVENOUS; SUBCUTANEOUS ONCE
Status: COMPLETED | OUTPATIENT
Start: 2025-07-23 | End: 2025-07-23

## 2025-07-23 RX ORDER — SODIUM CHLORIDE, SODIUM LACTATE, POTASSIUM CHLORIDE, CALCIUM CHLORIDE 600; 310; 30; 20 MG/100ML; MG/100ML; MG/100ML; MG/100ML
INJECTION, SOLUTION INTRAVENOUS CONTINUOUS
Status: DISCONTINUED | OUTPATIENT
Start: 2025-07-23 | End: 2025-07-23 | Stop reason: HOSPADM

## 2025-07-23 RX ORDER — FAMOTIDINE 20 MG/1
40 TABLET, FILM COATED ORAL ONCE
Status: COMPLETED | OUTPATIENT
Start: 2025-07-23 | End: 2025-07-23

## 2025-07-23 RX ORDER — SODIUM CHLORIDE, SODIUM LACTATE, POTASSIUM CHLORIDE, CALCIUM CHLORIDE 600; 310; 30; 20 MG/100ML; MG/100ML; MG/100ML; MG/100ML
INJECTION, SOLUTION INTRAVENOUS CONTINUOUS
Status: CANCELLED | OUTPATIENT
Start: 2025-07-23

## 2025-07-23 RX ORDER — SODIUM CHLORIDE, SODIUM LACTATE, POTASSIUM CHLORIDE, CALCIUM CHLORIDE 600; 310; 30; 20 MG/100ML; MG/100ML; MG/100ML; MG/100ML
INJECTION, SOLUTION INTRAVENOUS CONTINUOUS
Status: ACTIVE | OUTPATIENT
Start: 2025-07-23 | End: 2025-07-23

## 2025-07-23 RX ORDER — CEFAZOLIN SODIUM 1 G/3ML
2 INJECTION, POWDER, FOR SOLUTION INTRAMUSCULAR; INTRAVENOUS
Status: DISCONTINUED | OUTPATIENT
Start: 2025-07-23 | End: 2025-07-23 | Stop reason: HOSPADM

## 2025-07-23 RX ORDER — CEFAZOLIN SODIUM 2 G/50ML
2 SOLUTION INTRAVENOUS
OUTPATIENT
Start: 2025-07-23

## 2025-07-23 RX ORDER — MIDAZOLAM HYDROCHLORIDE 2 MG/2ML
2 INJECTION, SOLUTION INTRAMUSCULAR; INTRAVENOUS ONCE AS NEEDED
Status: COMPLETED | OUTPATIENT
Start: 2025-07-23 | End: 2025-07-23

## 2025-07-23 RX ORDER — ONDANSETRON HYDROCHLORIDE 2 MG/ML
4 INJECTION, SOLUTION INTRAVENOUS ONCE AS NEEDED
Status: COMPLETED | OUTPATIENT
Start: 2025-07-23 | End: 2025-07-23

## 2025-07-23 RX ORDER — HYDROMORPHONE HYDROCHLORIDE 1 MG/ML
0.4 INJECTION, SOLUTION INTRAMUSCULAR; INTRAVENOUS; SUBCUTANEOUS EVERY 10 MIN PRN
Status: DISCONTINUED | OUTPATIENT
Start: 2025-07-23 | End: 2025-07-23 | Stop reason: HOSPADM

## 2025-07-23 RX ORDER — PROPOFOL 10 MG/ML
INJECTION, EMULSION INTRAVENOUS
Status: DISCONTINUED | OUTPATIENT
Start: 2025-07-23 | End: 2025-07-23 | Stop reason: HOSPADM

## 2025-07-23 RX ORDER — ONDANSETRON HYDROCHLORIDE 2 MG/ML
4 INJECTION, SOLUTION INTRAVENOUS ONCE
Status: COMPLETED | OUTPATIENT
Start: 2025-07-23 | End: 2025-07-23

## 2025-07-23 RX ORDER — GLUCAGON 1 MG
1 KIT INJECTION
Status: DISCONTINUED | OUTPATIENT
Start: 2025-07-23 | End: 2025-07-23 | Stop reason: HOSPADM

## 2025-07-23 RX ORDER — CEFAZOLIN 2 G/1
INJECTION, POWDER, FOR SOLUTION INTRAMUSCULAR; INTRAVENOUS
Status: DISCONTINUED | OUTPATIENT
Start: 2025-07-23 | End: 2025-07-23 | Stop reason: HOSPADM

## 2025-07-23 RX ORDER — HEPARIN SODIUM 5000 [USP'U]/ML
5000 INJECTION, SOLUTION INTRAVENOUS; SUBCUTANEOUS ONCE
OUTPATIENT
Start: 2025-07-23

## 2025-07-23 RX ORDER — IPRATROPIUM BROMIDE AND ALBUTEROL SULFATE 2.5; .5 MG/3ML; MG/3ML
SOLUTION RESPIRATORY (INHALATION)
Status: COMPLETED
Start: 2025-07-23 | End: 2025-07-23

## 2025-07-23 RX ORDER — INSULIN ASPART 100 [IU]/ML
0-5 INJECTION, SOLUTION INTRAVENOUS; SUBCUTANEOUS ONCE AS NEEDED
Status: DISCONTINUED | OUTPATIENT
Start: 2025-07-23 | End: 2025-07-23 | Stop reason: HOSPADM

## 2025-07-23 RX ORDER — SODIUM CHLORIDE 9 MG/ML
INJECTION, SOLUTION INTRAVENOUS CONTINUOUS
OUTPATIENT
Start: 2025-07-23

## 2025-07-23 RX ORDER — SODIUM CHLORIDE 9 MG/ML
INJECTION, SOLUTION INTRAVENOUS CONTINUOUS
Status: DISCONTINUED | OUTPATIENT
Start: 2025-07-23 | End: 2025-07-23 | Stop reason: HOSPADM

## 2025-07-23 RX ORDER — IPRATROPIUM BROMIDE AND ALBUTEROL SULFATE 2.5; .5 MG/3ML; MG/3ML
3 SOLUTION RESPIRATORY (INHALATION) ONCE
OUTPATIENT
Start: 2025-07-23

## 2025-07-23 RX ORDER — DEXMEDETOMIDINE HYDROCHLORIDE 100 UG/ML
INJECTION, SOLUTION INTRAVENOUS
Status: DISCONTINUED | OUTPATIENT
Start: 2025-07-23 | End: 2025-07-23 | Stop reason: HOSPADM

## 2025-07-23 RX ORDER — ACETAMINOPHEN 325 MG/1
650 TABLET ORAL
Status: COMPLETED | OUTPATIENT
Start: 2025-07-23 | End: 2025-07-23

## 2025-07-23 RX ADMIN — IPRATROPIUM BROMIDE AND ALBUTEROL SULFATE 3 ML: .5; 3 SOLUTION RESPIRATORY (INHALATION) at 09:07

## 2025-07-23 RX ADMIN — PROPOFOL 100 MCG/KG/MIN: 10 INJECTION, EMULSION INTRAVENOUS at 08:07

## 2025-07-23 RX ADMIN — SODIUM CHLORIDE, POTASSIUM CHLORIDE, SODIUM LACTATE AND CALCIUM CHLORIDE: 600; 310; 30; 20 INJECTION, SOLUTION INTRAVENOUS at 06:07

## 2025-07-23 RX ADMIN — ONDANSETRON 4 MG: 2 INJECTION INTRAMUSCULAR; INTRAVENOUS at 06:07

## 2025-07-23 RX ADMIN — CEFAZOLIN 2 G: 2 INJECTION, POWDER, FOR SOLUTION INTRAMUSCULAR; INTRAVENOUS at 08:07

## 2025-07-23 RX ADMIN — FAMOTIDINE 40 MG: 20 TABLET, FILM COATED ORAL at 06:07

## 2025-07-23 RX ADMIN — DEXTROSE MONOHYDRATE 12.5 G: 25 INJECTION, SOLUTION INTRAVENOUS at 08:07

## 2025-07-23 RX ADMIN — DEXMEDETOMIDINE 20 MCG: 200 INJECTION, SOLUTION INTRAVENOUS at 08:07

## 2025-07-23 RX ADMIN — ONDANSETRON 4 MG: 2 INJECTION INTRAMUSCULAR; INTRAVENOUS at 09:07

## 2025-07-23 RX ADMIN — ACETAMINOPHEN 650 MG: 325 TABLET ORAL at 06:07

## 2025-07-23 RX ADMIN — HEPARIN SODIUM 5000 UNITS: 5000 INJECTION INTRAVENOUS; SUBCUTANEOUS at 06:07

## 2025-07-23 RX ADMIN — MIDAZOLAM HYDROCHLORIDE 2 MG: 1 INJECTION, SOLUTION INTRAMUSCULAR; INTRAVENOUS at 08:07

## 2025-07-23 NOTE — DISCHARGE INSTRUCTIONS
The plan as of now is to reschedule procedure for next week, date unknown at this time.  You will need to be on a clear liquid diet 2 days prior to surgery date.  I have attached clear liquid education to this paperwork.           · Clinics number is [phone].     · Thanks for choosing [facility]! Have a smooth recovery!

## 2025-07-23 NOTE — OR NURSING
Patient re assessed at bedside by Dr Cortes, ESDRASVO:  OK for discharge home.  Patient awake and alert. No signs or symptoms of respiratory distress.  Patient voiced no complaints.

## 2025-07-23 NOTE — TRANSFER OF CARE
Anesthesia Transfer of Care Note    Patient: Bharath Caballero    Procedure(s) Performed: Procedure(s) (LRB):  [id number] (N/A)    Patient location: PACU    Anesthesia Type: MAC    Transport from OR: Transported from OR on room air with adequate spontaneous ventilation    Post pain: adequate analgesia    Post assessment: no apparent anesthetic complications    Post vital signs: stable    Level of consciousness: awake    Nausea/Vomiting: no nausea/vomiting    Complications: none    Transfer of care protocol was followed      Last vitals: Visit Vitals  BP (!) 184/92   Pulse 90   Temp 36.7 °C (98 °F) (Oral)   Resp 18   Wt 85.5 kg (188 lb 9.6 oz)   SpO2 99%   BMI 27.85 kg/m²

## 2025-07-23 NOTE — INTERVAL H&P NOTE
The patient has been examined and the H&P has been reviewed:    I concur with the findings and no changes have occurred since H&P was written. Patient reports holding his plavix for past two weeks. Patient still requesting left sided port if possible, he understands given his history left side might not be possible and there is a chance placement may not be successful.     Surgery risks, benefits and alternative options discussed and understood by patient/family.          There are no hospital problems to display for this patient.

## 2025-07-23 NOTE — PROGRESS NOTES
Alexis Valladares RPH to Me (Selected Message)  MB      7/23/25  1:46 PM  He can get full dose. DYPD and UGT1A1 are both normal. 5-FU and irinotecan OK.  Me to Alexis Valladares RPH        7/23/25  1:30 PM  Jaswant, any recommendations based upon pharmacogenomics panel?

## 2025-07-23 NOTE — OP NOTE
[facility] - Periop Services  Operative Note    Surgery Date: 7/23/2025     Surgeons and Role:     * Houston Hubbard Jr., MD - Primary     * Chuck Cortes MD - Resident - Assisting     * Odalis Bates MD - Resident - Assisting    Pre-op Diagnosis:  Pancreatic ductal adenocarcinoma    Post-op Diagnosis:  Same    Procedure(s) (LRB):  [id number] (N/A)    Anesthesia: Monitor Anesthesia Care    Operative Findings:  Aborted procedure due to possible aspiration event    Technique:  Patient was evaluated and examined preoperatively by myself. Risks, benefits, and alternatives discussed with the patient. Questions answered and patient was consented for surgery. Patient was prepped and draped in the standard fashion. Timeout was completed.     The left internal jugular vein was located and successfully accessed with 16 gauge needle under ultrasound guidance. A guidewire was then attempted to be advanced into the central circulation under fluoroscopic guidance. There was resistance and wire was unable to be advanced into the SVC. Left subclavian access was then attempted and unsuccessful. Decision was made to attempt right IJ line placement. Patient then had an episode of emesis and anesthesia were concerned for a possible aspiration event. Procedure was aborted at this time. Patient remained stable on oxygen via facemask and taken to the postoperative care area.     Dr. Hubbard was present for critical portions and available throughout the case.     Estimated Blood Loss: * No values recorded between 7/23/2025  8:08 AM and 7/23/2025  8:53 AM *    Estimated Blood Loss has been documented.         Specimens:   Specimen (24h ago, onward)      None            [id number]    Chuck Cortes MD  07/23/2025 8:53 AM

## 2025-07-24 ENCOUNTER — TELEPHONE (OUTPATIENT)
Dept: SMOKING CESSATION | Facility: CLINIC | Age: 45
End: 2025-07-24
Payer: MEDICARE

## 2025-07-24 LAB
POCT GLUCOSE: 34 MG/DL (ref 70–110)
POCT GLUCOSE: 93 MG/DL (ref 70–110)

## 2025-07-26 PROBLEM — I87.1 PORTAL VEIN STENOSIS: Status: ACTIVE | Noted: 2025-07-26

## 2025-07-26 PROBLEM — H91.90 HARD OF HEARING: Status: ACTIVE | Noted: 2025-07-26

## 2025-07-26 NOTE — PROGRESS NOTES
History:  Past Medical History:   Diagnosis Date    Abdominal pain     Acquired perforating dermatosis 08/30/2023    Anxiety     Aortic atherosclerosis 01/24/2023    Appetite loss     Balance problem     Bilateral edema of lower extremity 02/06/2023    Bladder outflow obstruction 06/20/2022    Blurred vision, right eye 10/19/2022    BMI 34.0-34.9,adult 06/20/2022    BPH (benign prostatic hyperplasia)     Chest pain     Chronic liver failure without hepatic coma 04/25/2023    Chronic pain 10/25/2022    Chronic pain syndrome 05/12/2022    Chronic pancreatitis 10/28/2017    Coronary artery disease, unspecified vessel or lesion type, unspecified whether angina present, unspecified whether native or transplanted heart     Deafness 10/03/2023    Depression     Diabetes     Diabetic polyneuropathy 06/20/2022    Elevated LFTs 08/18/2022    Fatigue     GERD (gastroesophageal reflux disease)     Hand paresthesia 06/20/2022    Headache     Hepatic steatosis     History of continuous positive airway pressure (CPAP) therapy at home     History of pancreatitis 06/20/2022    History of recent fall     Hypertension     Hypertriglyceridemia     Insomnia     Kidney disease     Kidney disorder     Leg pain     Mixed hyperlipidemia 10/28/2017    Mononeuritis multiplex 06/20/2022    Morbid obesity     Nausea & vomiting     Neuropathy     Nocturia 06/20/2022    NSTEMI (non-ST elevated myocardial infarction) 10/2024    OA (osteoarthritis)     Obesity     Obstructive sleep apnea syndrome 06/20/2022    Onychomycosis of multiple toenails with type 2 diabetes mellitus 10/28/2017    Open wound of finger of right hand 10/20/2023    CICI (obstructive sleep apnea)     uses CPAP    Painful diabetic neuropathy 05/12/2022    Personal history of colonic polyps 08/18/2022    Polyneuropathy     Primary hypertension 10/28/2017    Recurrent pancreatitis     Renal insufficiency     Tobacco abuse     Tobacco user 06/20/2022    Type 2 diabetes mellitus with  skin complication, with long-term current use of insulin 02/06/2023    Urinary frequency     Use of cane as ambulatory aid     Weight loss, unintentional        Past Surgical History:   Procedure Laterality Date    ANGIOGRAM, CORONARY, WITH LEFT HEART CATHETERIZATION N/A 04/10/2023    Procedure: Angiogram, Coronary, with Left Heart Cath;  Surgeon: Jaswant Pugh MD;  Location: [facility];  Service: Cardiology;  Laterality: N/A;    ANGIOGRAM, CORONARY, WITH LEFT HEART CATHETERIZATION N/A 10/10/2024    Procedure: Angiogram, Coronary, with Left Heart Cath;  Surgeon: Jaswant Pugh MD;  Location: [facility];  Service: Cardiology;  Laterality: N/A;    APPENDECTOMY      ARTERIOVENOUS ANASTOMOSIS, OPEN, UPPER ARM BASILLIC VEIN TRANSPOSITION N/A 05/07/2018    CORONARY ARTERY BYPASS GRAFT (CABG) N/A 12/05/2024    Procedure: CORONARY ARTERY BYPASS GRAFT (CABG);  Surgeon: Gillian Clayton MD;  Location: [facility] OR;  Service: Cardiothoracic;  Laterality: N/A;  ECHO NOTIFIED    EGD, WITH CLOSED BIOPSY N/A 11/20/2023    Procedure: EGD, WITH CLOSED BIOPSY;  Surgeon: Christina James MD;  Location: [facility];  Service: Gastroenterology;  Laterality: N/A;    ENDOSCOPIC HARVEST OF VEIN Left 12/05/2024    Procedure: HARVEST-VEIN-ENDOVASCULAR;  Surgeon: Gillian Clayton MD;  Location: [facility] OR;  Service: Cardiothoracic;  Laterality: Left;    ESOPHAGOGASTRODUODENOSCOPY N/A 06/07/2021    EXCISION OF ARTERIOVENOUS FISTULA N/A 06/01/2018    FRACTIONAL FLOW RESERVE (FFR), CORONARY  04/10/2023    Procedure: Fractional Flow Malcolm (FFR), Coronary;  Surgeon: Jaswant Pugh MD;  Location: [facility];  Service: Cardiology;;    HERNIA REPAIR      INSPECTION OF UPPER INTESTINAL TRACT N/A 06/07/2021    OCCLUSION OF LEFT ATRIAL APPENDAGE Left 12/05/2024    Procedure: Left atrial appendage occlusion;  Surgeon: Gillian Clayton MD;  Location: [facility] OR;  Service: Cardiothoracic;  Laterality: Left;    PHERESIS  OF PLASMA N/A 07/13/2018    PHERESIS OF PLASMA N/A 05/25/2018    TRIAL OF SPINAL CORD NERVE STIMULATOR N/A 05/12/2022    Procedure: TRIAL, NEUROSTIMULATOR, SPINAL CORD;  Surgeon: Jay Pozo MD;  Location: [facility];  Service: Neurosurgery;  Laterality: N/A;    UPPER GI N/A 06/07/2021      Social History     Socioeconomic History    Marital status: Single   Tobacco Use    Smoking status: Every Day     Current packs/day: 0.50     Average packs/day: 2.6 packs/day for 33.1 years (84.4 ttl pk-yrs)     Types: Cigarettes     Start date: 1992     Last attempt to quit: 01/1999     Passive exposure: Current    Smokeless tobacco: Former     Types: Chew    Tobacco comments:     Pt is smoking 1/2 pack per day   Vaping Use    Vaping status: Never Used   Substance and Sexual Activity    Alcohol use: Not Currently    Drug use: Never    Sexual activity: Yes     Partners: Female     Social Drivers of Health     Financial Resource Strain: Low Risk  (6/11/2025)    Overall Financial Resource Strain (CARDIA)     Difficulty of Paying Living Expenses: Not hard at all   Food Insecurity: No Food Insecurity (6/11/2025)    Hunger Vital Sign     Worried About Running Out of Food in the Last Year: Never true     Ran Out of Food in the Last Year: Never true   Transportation Needs: No Transportation Needs (6/11/2025)    PRAPARE - Transportation     Lack of Transportation (Medical): No     Lack of Transportation (Non-Medical): No   Physical Activity: Inactive (6/11/2025)    Exercise Vital Sign     Days of Exercise per Week: 0 days     Minutes of Exercise per Session: 0 min   Stress: No Stress Concern Present (6/11/2025)    [employer] [place] [employer] - Occupational Stress Questionnaire     Feeling of Stress : Not at all   Housing Stability: Low Risk  (6/11/2025)    Housing Stability Vital Sign     Unable to Pay for Housing in the Last Year: No     Homeless in the Last Year: No      Family History   Problem Relation Name Age of  Onset    Obesity Father      Cancer Paternal Uncle        Reason for Follow-up:  -adenocarcinoma of pancreatic head and neck, poorly-differentiated, S/P upper EUS 06/26/2025; 3.4 x 2.6 cm per CT, 30 mm x 30 mm per upper EUS; borderline resectable; malignant portal and peripancreatic lymphadenopathy per upper EUS (not biopsied); endosonographically T3 N2 MX; TNM cT2 cN0 MX, at least stage IB  -chronic postprandial abdominal pain, chronic postprandial diarrhea, chronic pancreatitis, familial hypertriglyceridemia, pancreatic atrophy, chronic steatorrhea, uncontrolled NIDDM  -painful peripheral diabetic neuropathy, mononeuritis multiplex, spinal cord stimulator, chronic liver failure, hepatic steatosis, hypertension, CKD, CAD, CABG, history of NSTEMI, etc.     History of Present Illness:   Adenocarcinoma of head of pancreas      Oncologic/Hematologic History:  Oncology History   Adenocarcinoma of head of pancreas   6/26/2025 Cancer Staged    Staging form: Exocrine Pancreas, AJCC 8th Edition  - Clinical stage from 6/26/2025: Stage III (cT3, cN2, cM0)     7/6/2025 Initial Diagnosis    Adenocarcinoma of head of pancreas     7/21/2025 -  Chemotherapy    Treatment Summary   Plan Name: OP GI mFOLFIRINOX (oxaliplatin leucovorin irinotecan fluorouracil) Q2W  Treatment Goal: Curative  Status: Active  Start Date: 7/21/2025 (Planned)  End Date: 12/24/2025 (Planned)  Provider: Brandon Clifford MD  Chemotherapy: irinotecan (CAMPTOSAR) 150 mg/m2 = 304 mg in 0.9% NaCl 515.2 mL chemo infusion, 150 mg/m2 = 304 mg, Intravenous, Clinic/HOD 1 time, 0 of 12 cycles  oxaliplatin (ELOXATIN) 85 mg/m2 = 172 mg in D5W 534.4 mL chemo infusion, 85 mg/m2 = 172 mg, Intravenous, Clinic/HOD 1 time, 0 of 12 cycles  fluorouracil (Adrucil) 2,400 mg/m2 = 4,850 mg in 0.9% NaCl 100 mL chemo infusion, 2,400 mg/m2 = 4,850 mg, Intravenous, Over 46 hours, 0 of 12 cycles     Past medical history:  A multitude of medical illnesses including anxiety, depression,  NIDDM (diagnosed 2016), familial hypertriglyceridemia, blurred vision right eye, ulcer of left heel and midfoot, chronic liver failure without hepatic coma, chronic pancreatitis (diagnosed more than 10 years ago and placed on pancreatic enzyme replacement), diabetic polyneuropathy (painful; left upper extremity numbness/tingling; bilateral lower extremity numbness and tingling) (NERVO spinal cord stimulator placed 07/21/2022), hepatic steatosis, acquired perforating dermatosis, anorexia, hypertension, kidney disease, mononeuritis multiplex, morbid obesity, history of NSTEMI (10/2024), CICI, tobacco abuse, etc.; impaired functional mobility, balance, gait, and endurance; numbness and weakness in hands (no improvement with PT/OT) (unable to have MRI C-spine done due to spinal cord stimulator) (worsening burning/numbness and tingling right lower extremity; worsening in hands)  Very hard of hearing  Procedure/surgical history:  A multitude of procedures including coronary angiogram and LHC 04/10/2023; coronary angiogram and LHC 10/10/2024; appendectomy; arteriovenous anastomosis, open, upper arm basilic vein transposition 05/07/2018; CABG 12/05/2024 (CAD, S/P CABG x2 with the left atrial appendage ligation 12/2024); EGD 11/20/2023; endoscopic harvest of vein 12/05/2024; EGD 06/07/2021; excisional of arteriovenous fistula 06/01/2018; hernia repair; occlusion of left atrial appendage 12/05/2024; plasmapheresis 07/13/2018, 05/25/2018; trial of spinal cord nerve stimulation 05/12/2022 (NERVO spinal cord stimulator placed 07/21/2022), etc.  -05/12/2022:  Fluoroscopically guided placement of 2 spinal cord stimulator leads under anesthesia for programming and trial (indication: Chronic pain syndrome; painful diabetic neuropathy)  -11/20/2023:  Gastric biopsy: Mild chronic gastritis with a lamina propria fibrosis; negative for H pylori; negative for dysplasia  Social history: Single.  Lives in [place].  His brother and  sister-in-law live a short distance from him.  Has a 21-year-old son who lives separately.  Does not work.  Has been smoking 1-1-1/2 ppd X 14 years.  Used to drink 6 pack beer over the weekend; drank for 2 years; quit 13 years ago.  Denies illicit drugs.  Family history:  Several family members on father's side of family experienced cancer (he does not know the details)  Health maintenance: PCP at [facility].      45-year-old gentleman, referred by Kolby Mcclure MD, GI, with adenocarcinoma of pancreas.  Please refer to assessment and plan section for details.    07/07/2025:   Fully alert, reasonably healthy-appearing gentleman who presents for initial medical oncology consultation.  Accompanied by his sister-in-law.  Very hard of hearing.  He sister in law help intrinsic conversation.  Poor appetite.  Unspecified weight loss.  ECOG 1.  Postprandial pain.  -postprandial diarrhea; on Creon and cholestyramine  -chronic postprandial abdominal pain; says that the pain started after CABG in December 2024  -follows up with cardiology for history of CAD  -chronic liver failure, chronically uncontrolled NIDDM, pancreatic insufficiency, chronic pancreatitis diagnosed more than 10 years ago, etc.  -relates history of plasmapheresis for severe hypertriglyceridemia (according to him, in the 22,000 range) by nephrology service at [facility] several years ago  ECOG 1      Oncologic history:  # Adenocarcinoma pancreatic head:  -presentation:  Chronic postprandial abdominal pain, chronic postprandial diarrhea  -in the setting of familial hypertriglyceridemia, chronic pancreatitis, chronic steatorrhea  -uncontrolled NIDDM, etc.  -limited abdominal ultrasound 03/20/2025:  Hepatomegaly, sludge in gallbladder, liver 20 cm  -CT abdomen pelvis without contrast 05/21/2025:  Abnormal pancreatic head with a 16 mm fluid density pancreatic neck; probable stenosis/thrombosis main portal vein with venous collaterals upper abdomen  -CT abdomen pelvis without  contrast 06/11/2025:  Persistently enlarged pancreatic head 1.5 cm (pancreatitis with a pseudocyst formation versus underlying lesion); distended gallbladder; multiple varicosities near the zainab hepatis and extending down the right pericolic gutter, portal vein thrombosis not extruded  -contrast-enhanced CT scans of A/P 06/11/2025:  Pancreatic head mass 3.4 x 2.6 cm, pancreatic head enlargement, atrophy of body and tail of pancreas, the lesion encases a portion of portal vein at the portal splenic confluence, lesion causing high-grade stenosis of portal vein; no encasement of gastroduodenal artery, hepatic artery, celiac axis, SMA; gastroduodenal artery and hepatic artery abuts the lesion; a couple of punctate subcentimeter lymph nodes seen at the mesenteric root; hepatomegaly, hepatic steatosis; high-grade area of stenosis in the portal vein at the level of pancreatic head mass or splenic confluence due to tumor encasing the portal vein versus thrombus within the portal vein; multiple varicosities zainab hepatis extending down the right pericolic gutter; high-grade area of stenosis measures 95% in the portal vein near the portal splenic confluence; some collateral varicosities are seen at the level of the zainab hepatis and extending down the right pericolic gutter; gallbladder is distended; no gallstones; no biliary ductal dilatation  -06/12/2025: Limited abdominal ultrasound:  Mild hepatomegaly, patent portal vein, gallbladder sludge, gallbladder distended, no gallstones  -06/26/2025: Upper EUS:  Mass head and neck of pancreas, poorly-defined, 30 mm x 30 mm, malignant portal and peripancreatic lymphadenopathy (largest lymph node 11 x 14 mm), mass encases the portal vein confluence; endoscopically T3 N2 MX; main pancreatic duct dilated 11 mm in the head just distal to the mass and 4 mm in the body which also appears dilated; severe atrophy noted in the body and tail of pancreas  -06/26/2025:  Head and neck  pancreas, EUS guided biopsy: Pancreatic ductal adenocarcinoma, poorly-differentiated  -CBC unremarkable  -alk-phos elevated:  637 on 06/13/2025, 442 on 06/12/2025, 516 on 06/11/2025, etc.; alk-phos is chronically elevated, at least as far back as 01/2017)  -chronic hyperbilirubinemia: Albumin 2.5 on 06/13/2025  -bilirubin normal  -AST chronically elevated  -ALT intermittently elevated  -06/12/2025: CEA 11.77 elevated  -06/12/2025:  CA 19-9 level 769.21  -06/26/2025:  Tissue testing (pancreas head and neck biopsy:  [id number] testing:  KRAS G12D mutation positive; TMB 2.99 (low); MSI stable (MAYURI)  -07/14/2025:  Pharmacogenomics panel: Genotypes UGT1A1 *1/*1, normal metabolizer; Genotypes DPYD *1/*1, normal metabolizer   (patient can safely receive full doses of both 5 FU and irinotecan)  -CTs C/A/P 07/14/2025:  No new findings  -FDG PET-CT 07/15/2025:  No hypermetabolic distant metastases  -CA 19-9 level: 927 on 07/07/2025; 769 on 06/12/2025  -07/16/2025:  Nerve conduction study (Channing Fong MD):  Ulnar neuropathy findings and potential C7 and C8 root pathology by needle EMG as well  -hospitalized 07/18/2025-07/20/2025:  Multiple episodes of hypoglycemia in spite of minimal dose of insulin; he consumes upward of 4 L of regular Coke every day along with cans of monster drinks; admission hemoglobin A1c 11.3; hospitalized for blood sugar control pending procedure to remove his pain stimulator implanted in his back; patient hypoglycemic during hospitalization despite reduced doses of insulin and despite eating regular meals throughout the day; a few additional asymptomatic hypoglycemic episodes  -07/23/2025:  MediPort placement aborted due to possible aspiration event during anesthesia and difficult venous access    Interval History:  [No matching plan found]   OP GI mFOLFIRINOX (oxaliplatin leucovorin irinotecan fluorouracil) Q2W     07/29/2025:   -06/26/2025:  Tissue testing (pancreas head and neck biopsy:   [id number] testing:  KRAS G12D mutation positive; TMB 2.99 (low); MSI stable (MAYURI)  -07/14/2025:  Pharmacogenomics panel: Genotypes UGT1A1 *1/*1, normal metabolizer; Genotypes DPYD *1/*1, normal metabolizer   (patient can safely receive full doses of both 5 FU and irinotecan)  -07/14/2025: CTs C/A/P with contrast: Unchanged pancreatic head mass; no metastases in C/A/P; no definite evidence of arterial encasement; mass effect on main portal vein and likely encasement, however, this is not well visualized on this study given the phase of contrast; no periportal lymphadenopathy; no pelvic or retroperitoneal lymphadenopathy; spinal stimulator in place  -07/15/2025:  FDG PET-CT: Ill-defined mildly hypermetabolic pancreatic head mass (maximum SUV 3); 2 small foci of adjacent FDG uptake may represent small lymph nodes, indeterminate (maximum SUV 3.1); no evidence of distant hypermetabolic metastatic disease  -CA 19-9 level: 927 on 07/07/2025; 769 on 06/12/2025  -07/16/2025:  Nerve conduction study (Channing Fong MD):  Ulnar neuropathy findings and potential C7 and C8 root pathology by needle EMG as well  -hospitalized 07/18/2025-07/20/2025:  Multiple episodes of hypoglycemia in spite of minimal dose of insulin; he consumes upward of 4 L of regular Coke every day along with cans of monster drinks; admission hemoglobin A1c 11.3; hospitalized for blood sugar control pending procedure to remove his pain stimulator implanted in his back; patient hypoglycemic during hospitalization despite reduced doses of insulin and despite eating regular meals throughout the day; a few additional asymptomatic hypoglycemic episodes  -07/23/2025:  MediPort placement aborted due to possible aspiration event during anesthesia and difficult venous access  Presents for a follow-up visit.  Very hard of hearing.  In no acute discomfort.  Complains of postprandial epigastric pain.  We discussed labs and scans in detail.   Very hard of hearing.  Poor appetite.  Unspecified weight loss. -postprandial diarrhea; on Creon and cholestyramine  -chronic postprandial abdominal pain; says that the pain started after CABG in December 2024  -follows up with cardiology for history of CAD  -chronic liver failure, chronically uncontrolled NIDDM, pancreatic insufficiency, chronic pancreatitis diagnosed more than 10 years ago, etc.  -relates history of plasmapheresis for severe hypertriglyceridemia (according to him, in the 22,000 range) by nephrology service at [facility] several years ago  ECOG 1    Immunization History   Administered Date(s) Administered    Influenza - Quadrivalent 10/01/2019, 09/22/2020, 12/14/2021    Influenza - Quadrivalent - PF (6-35 months) 11/16/2017, 10/23/2018    Influenza - Quadrivalent - PF *Preferred* (6 months and older) 09/23/2016, 10/01/2019, 09/22/2020, 12/14/2021, 10/25/2022    Influenza - Trivalent - Fluarix, Flulaval, Fluzone, Afluria - PF 12/15/2014, 11/25/2015    Pneumococcal Conjugate - 20 Valent 06/04/2024    Pneumococcal Polysaccharide - 23 Valent 07/16/2019    Tdap 11/14/2016     Allergies as of 07/29/2025    (No Known Allergies)     Medications:  Medications Ordered Prior to Encounter[1]    Review of Systems:   All systems reviewed and found to be negative except for the symptoms detailed above    Physical Examination:   VITAL SIGNS:   Vitals:    07/29/25 0854   BP: (!) 154/89   Pulse: 79   Resp: 20   Temp: 97.9 °F (36.6 °C)       GENERAL:  In no apparent distress.    HEAD:  No signs of head trauma.  EYES:  Pupils are equal.  Extraocular motions intact.    EARS:  Hearing grossly intact.  MOUTH:  Oropharynx is normal.   NECK:  No adenopathy, no JVD.     CHEST:  Chest with clear breath sounds bilaterally.  No wheezes, rales, rhonchi.    CARDIAC:  Regular rate and rhythm.  S1 and S2, without murmurs, gallops, rubs.  VASCULAR:  No Edema.  Peripheral pulses normal and equal in all extremities.  ABDOMEN:  Soft, without detectable  tenderness.  No sign of distention.  No   rebound or guarding, and no masses palpated.   Bowel Sounds normal.  MUSCULOSKELETAL:  Good range of motion of all major joints. Extremities without clubbing, cyanosis or edema.    NEUROLOGIC EXAM:  Alert and oriented x 3.  No focal sensory or strength deficits.   Speech normal.  Follows commands.  PSYCHIATRIC:  Mood normal.    Assessment:  Problem List Items Addressed This Visit       Chronic pain syndrome - Primary (Chronic)    Relevant Orders    CBC w/ DIFF    CMP    Painful diabetic neuropathy (Chronic)    History of pancreatitis    Chronic pancreatitis    Diabetic polyneuropathy    Hand paresthesia    RESOLVED: Mononeuritis multiplex    Metabolic dysfunction-associated steatotic liver disease (MASLD)    Tobacco user (Chronic)    History of colonic polyps    RESOLVED: Chronic liver failure without hepatic coma    Familial hypertriglyceridemia    NSTEMI (non-ST elevated myocardial infarction)    Hx of CABG    Type 2 diabetes mellitus with hyperglycemia, with long-term current use of insulin    Adenocarcinoma of head of pancreas    Relevant Orders    Ambulatory referral/consult to Genetics    CBC w/ DIFF    CMP    CA19.9    High serum carbohydrate antigen 19-9 ()    High carcinoembryonic antigen (CEA)    Relevant Orders    CA19.9    RESOLVED: Postprandial epigastric pain    RESOLVED: Postprandial diarrhea    RESOLVED: Pancreatic atrophy    RESOLVED: Periportal lymphadenopathy    Malignant neoplasm of pancreas metastatic to intra-abdominal lymph node    Relevant Orders    Ambulatory referral/consult to Genetics    RESOLVED: Pancreaticoduodenal lymphadenopathy    Hard of hearing    Portal vein stenosis     Orders for 07/29/2025:   Genetic testing   MediPort placement   Start chemotherapy ASAP  Three months after starting chemotherapy, re-stage with pancreatic protocol CT +contrast-enhanced CT scans of chest and pelvis as well as PET-CT   Check CBC and CMP every couple  of weeks   Check CA 19-9 level every month  Follow-up with NP in 2 weeks    Above discussed with the patient.  All questions answered.    Discussed scans, labs, pathology report, and pancreatic cancer staging, and gave him copies of relevant results.  Plan of management discussed in detail.  Guarded prognosis discussed.  He understands and agrees with this plan.  ================================      # Adenocarcinoma pancreatic head:  Briefly:  -Adenocarcinoma head and neck of pancreas:   -presentation: Chronic postprandial abdominal pain, chronic postprandial diarrhea  -in the setting of familial hypertriglyceridemia, chronic pancreatitis, chronic steatorrhea, uncontrolled NIDDM, etc.  -S/P extensive imaging  -S/P upper EUS 06/26/2025  -lesions of interest:   Pancreatic head mass 3.4 x 2.6 cm per CT  Pancreatic head and neck mass 30 mm x 30 mm per upper EUS  Malignant portal and peripancreatic lymphadenopathy per upper EUS (not biopsied)  Tumor encases a portion of portal vein causing high-grade stenosis of portal vein, 95%, with multiple varicosity/collaterals zainab hepatis  Tumor abuts gastroduodenal artery and hepatic artery but no encasement  No encasement of gastroduodenal artery, hepatic artery, celiac axis, SMA  Severe atrophy body of pancreas  Endo sonographically, T3 N2 MX  TNM:  cT2 cN0 MX, at least stage IB  -by virtue of encasement of portal vein and abutment of gastroduodenal artery and hepatic artery, at least borderline resectable, if not unresectable  -06/26/2025:  Tissue testing (pancreas head and neck biopsy:  [id number] testing:  KRAS G12D mutation positive; TMB 2.99 (low); MSI stable (MAYURI)  -07/14/2025:  Pharmacogenomics panel: Genotypes UGT1A1 *1/*1, normal metabolizer; Genotypes DPYD *1/*1, normal metabolizer   (patient can safely receive full doses of both 5 FU and irinotecan)  -CTs, FDG PET-CT: No distant metastases  -CA 19-9 level is elevated  -episodes of severe asymptomatic hypoglycemia  despite minimal doses of insulin, noted during hospitalization 07/2025  >>>  Plan:  -Genetic testing for inherited mutations using comprehensive gene panel for hereditary cancer syndromes including pathogenic mutations LEIF, BRCA1, BRCA2, CDKN2A,, MLH1, MSH2, MSH6, PALB2, PMS2, STK11, and TP53  -somatic testing on pancreatic mass biopsy, is essentially negative  -MediPort placement is pending  Needs neoadjuvant chemoradiation therapy +/-chemoradiation therapy subsequently  -consult Surgical Oncology for them to get family rise in the case as well  -will treat with neoadjuvant chemotherapy with modified FOLFIRINOX every 2 weeks x6 months (12 cycles)  -1st line neoadjuvant systemic therapy for 6 months pre or perioperatively is recommended  -we will re-stage after 3 months of neoadjuvant modified FOLFIRINOX, with pancreatic protocol CT +contrast-enhanced CT scans of chest/pelvis, PET-CT, and repeat CA 19-9 level  -will re-stage after 6 months of neoadjuvant modified FOLFIRINOX with pancreatic protocol CT +contrast-enhanced CT scans of chest/pelvis, PET-CT, and repeat CA 19-9 level  -check CBC and CMP every couple of weeks  -check CA 19-9 level monthly to assess response  -MediPort placement is pending    Given his medical history, this is going to be a very difficult and challenging case with borderline resectable/unresectable pancreatic cancer, to be treated with neoadjuvant chemotherapy; we can anticipate considerable problems during the course of chemotherapy.    Discussion:  Preferred regimens for neoadjuvant chemotherapy:  -FOLFIRINOX or modified FOLFIRINOX +/- chemoradiation  (FOLFIRINOX or modified FOLFIRINOX she will be limited to those with ECOG 0-1)  -gemcitabine +albumin bound paclitaxel +/- subsequent chemoradiation  >>>  -we will treat with neoadjuvant FOLFIRINOX every 2 weeks for up to 6 months pre or perioperatively, +/-subsequent neoadjuvant chemoradiation therapy    Only if positive for BRCA1/2 or  PALB2 mutations:  -FOLFIRINOX or modified FOLFIRINOX +/- subsequent chemoradiation  -gemcitabine +cisplatin (=/> 2-6 cycles) +/- subsequent chemoradiation    Discussion: Monitoring with modified FOLFIRINOX:  -CBC with differential and platelet count prior to each treatment.  -Electrolytes (especially potassium and magnesium) and liver and renal function prior to each treatment.  -Irinotecan is associated with early and late diarrhea, both of which may be severe.  If patient develops abdominal cramping and/or diarrhea within 24 hours of receiving irinotecan, administer atropine (0.3 to 0.6 mg IV) and premedicate with atropine during later cycles. Patient must be instructed in the early use of loperamide for late diarrhea.  -Assess changes in neurologic function prior to each treatment.    Pre treatment considerations with modified FOLFIRINOX:  -Emesis risk: HIGH (greater than 90% frequency of emesis).  -Primary prophylaxis with G-CSF is not warranted in metastatic setting. However, there is risk of grade 3 or 4 neutropenia (46%)  -Do not recommend administration of FOLFIRINOX unless serum bilirubin is normal.  -Maneuvers to prevent neurotoxicity:  patient to avoid exposure to cold during and for approximately 48 hours after each infusion. Prolongation of the oxaliplatin infusion time from two to six hours may mitigate acute neurotoxicity.  -Cardiac issues: QT prolongation and ventricular arrhythmias have been reported after oxaliplatin. ECG monitoring is recommended if therapy is initiated in patients with heart failure, bradyarrhythmias, coadministration of drugs known to prolong the QT interval, and electrolyte abnormalities. Avoid oxaliplatin in patients with congenital long QT syndrome. Correct hypokalemia and hypomagnesemia prior to initiating oxaliplatin.  Cardiotoxicity observed with FU includes myocardial infarction/ischemia, angina, dysrhythmias, cardiac arrest, cardiac failure, sudden death,  electrocardiographic changes, and cardiomyopathy.    Suggested dose modifications for myelotoxicity (modified FOLFIRINOX):  # Do not retreat unless neutrophil count is >=1500/microL and platelets are >=75,000/microL.  # Neutropenia:  If day 1 treatment delayed for granulocytes is <1500/microL or febrile neutropenia or grade 4 neutropenia >7 days: Reduce irinotecan dose to 120 mg/m2.  For second occurrence: Reduce oxaliplatin dose to 60 mg/m2.  If nonrecovery after a two-week delay, or if there is a third occurrence of granulocytes <1500/microL on day 1, discontinue treatment.  For grade 4 neutropenia >7 days during treatment or febrile neutropenia, reduce oxaliplatin dose to 60 mg/m2 and the infusional FU dose to 75% of the original dose.  For the second occurrence, reduce dose of irinotecan to 120 mg/m2 and the dose of infusional FU an additional 25%. Discontinue treatment for third occurrence.  # Thrombocytopenia:  If day 1 treatment delayed for platelet count <75,000/microL, reduce oxaliplatin dose to 60 mg/m2 and reduce the continuous infusion FU to 75% of original doses.  For second occurrence, reduce irinotecan dose to 120 mg/m2.  If nonrecovery after a two-week delay, or if there is a third occurrence of platelets <75,000/microL, discontinue treatment.  # For grade 3 or 4 thrombocytopenia during treatment, reduce oxaliplatin dose to 60 mg/m2 and the infusional FU dose to 75% of the original dose.  For the second occurrence, reduce dose of irinotecan to 120 mg/m2 and the dose of infusional FU an additional 25%. Discontinue treatment for third occurrence.     # Biomarker testing:  -06/26/2025:  Tissue testing (pancreas head and neck biopsy:  [id number] testing:  KRAS G12D mutation positive; TMB 2.99 (low); MSI stable (MAYURI)    #A multitude of comorbid medical conditions:  anxiety, depression,   NIDDM (diagnosed 2016), uncontrolled, on insulin, complicated with painful peripheral diabetic neuropathy  Painful  peripheral diabetic neuropathy, mononeuritis multiplex (left upper extremity numbness/tingling; bilateral lower extremity numbness and tingling; NERVO spinal cord stimulator placed 07/21/2022)  Unable to have MRI cervical spine done because of the presence of spinal cord stimulator; worsening burning/tingling right lower extremity; worsening in hands)  Numbness and weakness in hands with no improvement with PT/OT  Chronic liver failure without hepatic coma, hepatic steatosis  Hypertension, CKD  CAD, history of NSTEMI (10/2024)  CICI, tobacco abuse  familial hypertriglyceridemia, chronic pancreatitis (diagnosed more than 10 years ago; placed on pancreatic enzyme replacement)  blurred vision right eye,  Impaired functional ability, balance, gait, and endurance  -chronic postprandial diarrhea, on Creon and cholestyramine  -chronic postprandial abdominal pain, mainly right-sided    # A multitude of surgeries/procedures:  coronary angiogram and LHC 04/10/2023;   coronary angiogram and LHC 10/10/2024;   CABG 12/05/2024 (CAD, S/P CABG x2 with the left atrial appendage ligation 12/2024);   EGD 11/20/2023;   excisional of arteriovenous fistula 06/01/2018; hernia repair  plasmapheresis 07/13/2018, 05/25/2018;   trial of spinal cord nerve stimulation 05/12/2022 (NERVO spinal cord stimulator placed 07/21/2022), etc.  -05/12/2022:  Fluoroscopically guided placement of 2 spinal cord stimulator leads under anesthesia for programming and trial (indication: Chronic pain syndrome; painful diabetic neuropathy)  -11/20/2023:  Gastric biopsy: Mild chronic gastritis with a lamina propria fibrosis; negative for H pylori; negative for dysplasia  Social history:       Follow-up:  No follow-ups on file.             [1]   Current Outpatient Medications on File Prior to Visit   Medication Sig Dispense Refill    amitriptyline (ELAVIL) 50 MG tablet TAKE ONE TABLET BY MOUTH IN THE EVENING 30 tablet 4    aspirin (ECOTRIN) 81 MG EC tablet Take 81 mg  by mouth once daily.      atorvastatin (LIPITOR) 80 MG tablet Take 1 tablet (80 mg total) by mouth once daily. 90 tablet 3    cholecalciferol, vitamin D3, 125 mcg (5,000 unit) capsule Take 1 capsule (5,000 Units total) by mouth once daily. 30 capsule 11    clonazePAM (KLONOPIN) 1 MG tablet TAKE ONE TABLET BY MOUTH TWICE DAILY 60 tablet 0    cloNIDine (CATAPRES) 0.3 MG tablet Take 1 tablet (0.3 mg total) by mouth 3 (three) times daily. 270 tablet 3    clopidogreL (PLAVIX) 75 mg tablet Take 4 tablets on day 1 and then one tablet daily. 90 tablet 3    CREON 36,000-114,000- 180,000 unit CpDR TAKE ONE CAPSULE THREE TIMES DAILY 270 capsule 1    dapagliflozin propanediol (FARXIGA) 5 mg Tab tablet Take 2 tablets (10 mg total) by mouth once daily. 180 tablet 1    EScitalopram oxalate (LEXAPRO) 20 MG tablet Take 20 mg by mouth once daily.      gabapentin (NEURONTIN) 800 MG tablet TAKE ONE TABLET BY MOUTH IN THE EVENING 90 tablet 3    HUMALOG U-100 INSULIN 100 unit/mL injection INJECT 25 UNITS SUBCUTANEOUSLY BEFORE MEALS THREE TIMES DAILY 10 mL 3    HYDROmorphone (DILAUDID) 8 MG tablet Take 1 tablet (8 mg total) by mouth every 8 (eight) hours as needed. 90 tablet 0    icosapent ethyL (VASCEPA) 1 gram Cap TAKE TWO CAPSULES BY MOUTH TWICE DAILY 60 capsule 3    insulin glargine U-100, Lantus, (LANTUS SOLOSTAR U-100 INSULIN) 100 unit/mL (3 mL) InPn pen Inject 40 Units into the skin 2 (two) times a day. 24 mL 11    isosorbide mononitrate (IMDUR) 120 MG 24 hr tablet Take 1 tablet (120 mg total) by mouth once daily. 90 tablet 3    LIDOcaine-prilocaine (EMLA) cream Apply topically as needed. To mediport 30-45 minutes prior to access 5 g 2    losartan (COZAAR) 100 MG tablet Take 1 tablet (100 mg total) by mouth once daily. 90 tablet 3    metoprolol succinate (TOPROL-XL) 25 MG 24 hr tablet Take 1 tablet (25 mg total) by mouth once daily. 90 tablet 3    morphine (MS CONTIN) 100 MG 12 hr tablet TAKE ONE TABLET BY MOUTH THREE TIMES DAILY  "90 tablet 0    nicotine (NICODERM CQ) 21 mg/24 hr Place 1 patch onto the skin once daily. 28 patch 0    NIFEdipine (PROCARDIA-XL) 90 MG (OSM) 24 hr tablet Take 1 tablet (90 mg total) by mouth once daily. 30 tablet 1    nut.tx.gluc intol,lf,soy-fiber (GLUCERNA 1.5 CEDRIC) 0.08-1.5 gram-kcal/mL Liqd Take 273 mLs by mouth 2 (two) times a day. 60 each 4    OXcarbazepine (TRILEPTAL) 600 MG Tab Take 1 tablet (600 mg total) by mouth 2 (two) times daily. 60 tablet 11    potassium chloride SA (K-DUR,KLOR-CON) 20 MEQ tablet Take 1 tablet (20 mEq total) by mouth 2 (two) times daily. 180 tablet 3    pregabalin (LYRICA) 150 MG capsule Take 1 capsule (150 mg total) by mouth 3 (three) times daily. 90 capsule 3    ranolazine (RANEXA) 500 MG Tb12 Take 1 tablet (500 mg total) by mouth 2 (two) times daily. 180 tablet 3    REPATHA SURECLICK 140 mg/mL PnIj INJECT 1ml INTRAMUSCULARLY EVERY 14 DAYS 2 mL 11    sucralfate (CARAFATE) 100 mg/mL suspension Take 10 mLs (1 g total) by mouth 4 (four) times daily before meals and nightly. 1200 mL 3    SURE COMFORT INSULIN SYRINGE 0.5 mL 31 gauge x 5/16" Syrg USE ONE SYRINGE THREE TIMES DAILY 100 each 3    tamsulosin (FLOMAX) 0.4 mg Cap TAKE ONE CAPSULE BY MOUTH EVERY DAY 90 capsule 3    torsemide (DEMADEX) 20 MG Tab Take 1 tablet (20 mg total) by mouth once daily. 90 tablet 3    TOUJEO SOLOSTAR U-300 INSULIN 300 unit/mL (1.5 mL) InPn pen INJECT 35 SUBCUTANEOUSLY TWICE DAILY 4.5 mL 3    cholestyramine (QUESTRAN) 4 gram packet Take 1 packet (4 g total) by mouth 3 (three) times daily with meals. (Patient not taking: Reported on 7/29/2025) 270 packet 3    esomeprazole (NEXIUM) 40 MG capsule Take 1 capsule (40 mg total) by mouth before breakfast. 30 capsule 11    linaGLIPtin (TRADJENTA) 5 mg Tab tablet Take 1 tablet (5 mg total) by mouth once daily. 90 tablet 3    nitroGLYCERIN (NITROSTAT) 0.3 MG SL tablet Place 1 tablet (0.3 mg total) under the tongue every 5 (five) minutes as needed for Chest pain (go " to er after 3 doses). 30 tablet 6     No current facility-administered medications on file prior to visit.

## 2025-07-28 ENCOUNTER — TELEPHONE (OUTPATIENT)
Dept: HEMATOLOGY/ONCOLOGY | Facility: CLINIC | Age: 45
End: 2025-07-28
Payer: MEDICARE

## 2025-07-28 PROBLEM — R59.0: Status: RESOLVED | Noted: 2025-07-06 | Resolved: 2025-07-28

## 2025-07-28 PROBLEM — E11.65 HYPERGLYCEMIA DUE TO DIABETES MELLITUS: Status: RESOLVED | Noted: 2025-07-18 | Resolved: 2025-07-28

## 2025-07-28 PROBLEM — G83.10 PARESIS OF SINGLE LOWER EXTREMITY: Status: RESOLVED | Noted: 2022-06-20 | Resolved: 2025-07-28

## 2025-07-28 PROBLEM — R60.0 BILATERAL EDEMA OF LOWER EXTREMITY: Status: RESOLVED | Noted: 2023-02-06 | Resolved: 2025-07-28

## 2025-07-28 PROBLEM — L97.421: Status: RESOLVED | Noted: 2023-01-24 | Resolved: 2025-07-28

## 2025-07-28 PROBLEM — Z79.4 TYPE 2 DIABETES MELLITUS WITH OTHER SKIN ULCER, WITH LONG-TERM CURRENT USE OF INSULIN: Status: RESOLVED | Noted: 2023-02-06 | Resolved: 2025-07-28

## 2025-07-28 PROBLEM — K72.10 CHRONIC LIVER FAILURE WITHOUT HEPATIC COMA: Status: RESOLVED | Noted: 2023-04-25 | Resolved: 2025-07-28

## 2025-07-28 PROBLEM — K86.89 PANCREATIC ATROPHY: Status: RESOLVED | Noted: 2025-07-06 | Resolved: 2025-07-28

## 2025-07-28 PROBLEM — R23.4 SCAB: Status: RESOLVED | Noted: 2024-06-07 | Resolved: 2025-07-28

## 2025-07-28 PROBLEM — K52.9 POSTPRANDIAL DIARRHEA: Status: RESOLVED | Noted: 2025-07-06 | Resolved: 2025-07-28

## 2025-07-28 PROBLEM — K86.89 PANCREATIC MASS: Status: RESOLVED | Noted: 2025-06-12 | Resolved: 2025-07-28

## 2025-07-28 PROBLEM — L98.8: Status: RESOLVED | Noted: 2023-08-30 | Resolved: 2025-07-28

## 2025-07-28 PROBLEM — R59.0 PERIPORTAL LYMPHADENOPATHY: Status: RESOLVED | Noted: 2025-07-06 | Resolved: 2025-07-28

## 2025-07-28 PROBLEM — E11.622 TYPE 2 DIABETES MELLITUS WITH OTHER SKIN ULCER, WITH LONG-TERM CURRENT USE OF INSULIN: Status: RESOLVED | Noted: 2023-02-06 | Resolved: 2025-07-28

## 2025-07-28 PROBLEM — G58.7 MONONEURITIS MULTIPLEX: Status: RESOLVED | Noted: 2022-06-20 | Resolved: 2025-07-28

## 2025-07-28 PROBLEM — K52.9 CHRONIC DIARRHEA OF UNKNOWN ORIGIN: Status: RESOLVED | Noted: 2025-03-18 | Resolved: 2025-07-28

## 2025-07-28 PROBLEM — S90.852A: Status: RESOLVED | Noted: 2025-06-11 | Resolved: 2025-07-28

## 2025-07-28 PROBLEM — R10.13 POSTPRANDIAL EPIGASTRIC PAIN: Status: RESOLVED | Noted: 2025-07-06 | Resolved: 2025-07-28

## 2025-07-28 PROBLEM — E11.65 UNCONTROLLED TYPE 2 DIABETES MELLITUS WITH HYPERGLYCEMIA: Status: RESOLVED | Noted: 2025-06-10 | Resolved: 2025-07-28

## 2025-07-29 ENCOUNTER — OFFICE VISIT (OUTPATIENT)
Dept: HEMATOLOGY/ONCOLOGY | Facility: CLINIC | Age: 45
End: 2025-07-29
Attending: INTERNAL MEDICINE
Payer: MEDICARE

## 2025-07-29 ENCOUNTER — OFFICE VISIT (OUTPATIENT)
Dept: INTERNAL MEDICINE | Facility: CLINIC | Age: 45
End: 2025-07-29
Payer: MEDICARE

## 2025-07-29 ENCOUNTER — PATIENT MESSAGE (OUTPATIENT)
Dept: SURGERY | Facility: HOSPITAL | Age: 45
End: 2025-07-29
Payer: MEDICARE

## 2025-07-29 ENCOUNTER — TELEPHONE (OUTPATIENT)
Dept: GASTROENTEROLOGY | Facility: CLINIC | Age: 45
End: 2025-07-29
Payer: MEDICARE

## 2025-07-29 VITALS
WEIGHT: 162 LBS | OXYGEN SATURATION: 98 % | RESPIRATION RATE: 20 BRPM | BODY MASS INDEX: 23.99 KG/M2 | DIASTOLIC BLOOD PRESSURE: 89 MMHG | TEMPERATURE: 98 F | HEART RATE: 79 BPM | SYSTOLIC BLOOD PRESSURE: 154 MMHG | HEIGHT: 69 IN

## 2025-07-29 VITALS
HEART RATE: 78 BPM | WEIGHT: 171 LBS | SYSTOLIC BLOOD PRESSURE: 192 MMHG | TEMPERATURE: 98 F | HEIGHT: 69 IN | RESPIRATION RATE: 18 BRPM | BODY MASS INDEX: 25.33 KG/M2 | OXYGEN SATURATION: 99 % | DIASTOLIC BLOOD PRESSURE: 90 MMHG

## 2025-07-29 DIAGNOSIS — I25.118 CORONARY ARTERY DISEASE OF NATIVE ARTERY OF NATIVE HEART WITH STABLE ANGINA PECTORIS: ICD-10-CM

## 2025-07-29 DIAGNOSIS — R35.1 NOCTURIA: ICD-10-CM

## 2025-07-29 DIAGNOSIS — E11.69 ONYCHOMYCOSIS OF MULTIPLE TOENAILS WITH TYPE 2 DIABETES MELLITUS: ICD-10-CM

## 2025-07-29 DIAGNOSIS — E08.42 DIABETIC POLYNEUROPATHY ASSOCIATED WITH DIABETES MELLITUS DUE TO UNDERLYING CONDITION: ICD-10-CM

## 2025-07-29 DIAGNOSIS — R97.0 HIGH CARCINOEMBRYONIC ANTIGEN (CEA): ICD-10-CM

## 2025-07-29 DIAGNOSIS — R23.4 SCAB: ICD-10-CM

## 2025-07-29 DIAGNOSIS — Z86.0100 HISTORY OF COLONIC POLYPS: ICD-10-CM

## 2025-07-29 DIAGNOSIS — L98.8 ACQUIRED PERFORATING DERMATOSIS: ICD-10-CM

## 2025-07-29 DIAGNOSIS — R45.89 ANXIETY ABOUT HEALTH: Primary | ICD-10-CM

## 2025-07-29 DIAGNOSIS — R79.89 HIGH SERUM CARBOHYDRATE ANTIGEN 19-9 (CA19-9): ICD-10-CM

## 2025-07-29 DIAGNOSIS — R59.0 PERIPORTAL LYMPHADENOPATHY: ICD-10-CM

## 2025-07-29 DIAGNOSIS — G89.4 CHRONIC PAIN SYNDROME: Chronic | ICD-10-CM

## 2025-07-29 DIAGNOSIS — B35.1 ONYCHOMYCOSIS OF MULTIPLE TOENAILS WITH TYPE 2 DIABETES MELLITUS: ICD-10-CM

## 2025-07-29 DIAGNOSIS — E78.1 FAMILIAL HYPERTRIGLYCERIDEMIA: ICD-10-CM

## 2025-07-29 DIAGNOSIS — I10 PRIMARY HYPERTENSION: ICD-10-CM

## 2025-07-29 DIAGNOSIS — K86.89 PANCREATIC MASS: ICD-10-CM

## 2025-07-29 DIAGNOSIS — C25.0 ADENOCARCINOMA OF HEAD OF PANCREAS: ICD-10-CM

## 2025-07-29 DIAGNOSIS — Z79.4 TYPE 2 DIABETES MELLITUS WITH OTHER SKIN ULCER, WITH LONG-TERM CURRENT USE OF INSULIN: ICD-10-CM

## 2025-07-29 DIAGNOSIS — N32.0 BLADDER OUTFLOW OBSTRUCTION: ICD-10-CM

## 2025-07-29 DIAGNOSIS — Z79.4 TYPE 2 DIABETES MELLITUS WITH HYPERGLYCEMIA, WITH LONG-TERM CURRENT USE OF INSULIN: ICD-10-CM

## 2025-07-29 DIAGNOSIS — Z87.19 HISTORY OF PANCREATITIS: ICD-10-CM

## 2025-07-29 DIAGNOSIS — K76.0 METABOLIC DYSFUNCTION-ASSOCIATED STEATOTIC LIVER DISEASE (MASLD): ICD-10-CM

## 2025-07-29 DIAGNOSIS — E11.65 TYPE 2 DIABETES MELLITUS WITH HYPERGLYCEMIA, WITH LONG-TERM CURRENT USE OF INSULIN: ICD-10-CM

## 2025-07-29 DIAGNOSIS — E11.42 DIABETIC POLYNEUROPATHY ASSOCIATED WITH TYPE 2 DIABETES MELLITUS: Primary | ICD-10-CM

## 2025-07-29 DIAGNOSIS — K52.9 POSTPRANDIAL DIARRHEA: ICD-10-CM

## 2025-07-29 DIAGNOSIS — Z74.09 IMPAIRED FUNCTIONAL MOBILITY, BALANCE, GAIT, AND ENDURANCE: Chronic | ICD-10-CM

## 2025-07-29 DIAGNOSIS — Z95.1 HX OF CABG: ICD-10-CM

## 2025-07-29 DIAGNOSIS — H91.90 HARD OF HEARING: ICD-10-CM

## 2025-07-29 DIAGNOSIS — I87.1 PORTAL VEIN STENOSIS: ICD-10-CM

## 2025-07-29 DIAGNOSIS — R10.13 POSTPRANDIAL EPIGASTRIC PAIN: ICD-10-CM

## 2025-07-29 DIAGNOSIS — H53.8 BLURRED VISION, RIGHT EYE: ICD-10-CM

## 2025-07-29 DIAGNOSIS — G58.7 MONONEURITIS MULTIPLEX: ICD-10-CM

## 2025-07-29 DIAGNOSIS — H83.3X3 NOISE-INDUCED HEARING LOSS OF BOTH EARS: ICD-10-CM

## 2025-07-29 DIAGNOSIS — E11.40 PAINFUL DIABETIC NEUROPATHY: Chronic | ICD-10-CM

## 2025-07-29 DIAGNOSIS — F33.2 MAJOR DEPRESSIVE DISORDER, RECURRENT SEVERE WITHOUT PSYCHOTIC FEATURES: ICD-10-CM

## 2025-07-29 DIAGNOSIS — R20.2 HAND PARESTHESIA: ICD-10-CM

## 2025-07-29 DIAGNOSIS — R10.9 LEFT FLANK PAIN: ICD-10-CM

## 2025-07-29 DIAGNOSIS — I70.0 AORTIC ATHEROSCLEROSIS: ICD-10-CM

## 2025-07-29 DIAGNOSIS — I21.4 NSTEMI (NON-ST ELEVATED MYOCARDIAL INFARCTION): ICD-10-CM

## 2025-07-29 DIAGNOSIS — E87.6 HYPOKALEMIA: ICD-10-CM

## 2025-07-29 DIAGNOSIS — E11.65 UNCONTROLLED TYPE 2 DIABETES MELLITUS WITH HYPERGLYCEMIA: ICD-10-CM

## 2025-07-29 DIAGNOSIS — R59.0 PANCREATICODUODENAL LYMPHADENOPATHY: ICD-10-CM

## 2025-07-29 DIAGNOSIS — R63.4 ABNORMAL WEIGHT LOSS: ICD-10-CM

## 2025-07-29 DIAGNOSIS — C25.9 MALIGNANT NEOPLASM OF PANCREAS METASTATIC TO INTRA-ABDOMINAL LYMPH NODE: ICD-10-CM

## 2025-07-29 DIAGNOSIS — Z72.0 TOBACCO USER: Chronic | ICD-10-CM

## 2025-07-29 DIAGNOSIS — E44.0 MODERATE MALNUTRITION: ICD-10-CM

## 2025-07-29 DIAGNOSIS — L60.2 OVERGROWN TOENAILS: ICD-10-CM

## 2025-07-29 DIAGNOSIS — K86.1 OTHER CHRONIC PANCREATITIS: ICD-10-CM

## 2025-07-29 DIAGNOSIS — E11.622 TYPE 2 DIABETES MELLITUS WITH OTHER SKIN ULCER, WITH LONG-TERM CURRENT USE OF INSULIN: ICD-10-CM

## 2025-07-29 DIAGNOSIS — K21.00 GASTROESOPHAGEAL REFLUX DISEASE WITH ESOPHAGITIS WITHOUT HEMORRHAGE: ICD-10-CM

## 2025-07-29 DIAGNOSIS — K86.89 PANCREATIC ATROPHY: ICD-10-CM

## 2025-07-29 DIAGNOSIS — R29.6 FALLS FREQUENTLY: ICD-10-CM

## 2025-07-29 DIAGNOSIS — T85.192S MALFUNCTION OF SPINAL CORD STIMULATOR, SEQUELA: ICD-10-CM

## 2025-07-29 DIAGNOSIS — C77.2 MALIGNANT NEOPLASM OF PANCREAS METASTATIC TO INTRA-ABDOMINAL LYMPH NODE: ICD-10-CM

## 2025-07-29 DIAGNOSIS — G83.10 PARESIS OF SINGLE LOWER EXTREMITY: ICD-10-CM

## 2025-07-29 DIAGNOSIS — K86.1 CHRONIC PANCREATITIS, UNSPECIFIED PANCREATITIS TYPE: ICD-10-CM

## 2025-07-29 DIAGNOSIS — K72.10 CHRONIC LIVER FAILURE WITHOUT HEPATIC COMA: ICD-10-CM

## 2025-07-29 DIAGNOSIS — G89.4 CHRONIC PAIN SYNDROME: Primary | Chronic | ICD-10-CM

## 2025-07-29 DIAGNOSIS — E78.2 MIXED HYPERLIPIDEMIA: ICD-10-CM

## 2025-07-29 PROCEDURE — 99215 OFFICE O/P EST HI 40 MIN: CPT | Mod: PBBFAC,27 | Performed by: INTERNAL MEDICINE

## 2025-07-29 PROCEDURE — 90791 PSYCH DIAGNOSTIC EVALUATION: CPT | Mod: ,,, | Performed by: SOCIAL WORKER

## 2025-07-29 PROCEDURE — 99211 OFF/OP EST MAY X REQ PHY/QHP: CPT | Mod: PBBFAC | Performed by: SOCIAL WORKER

## 2025-07-29 PROCEDURE — 99215 OFFICE O/P EST HI 40 MIN: CPT | Mod: PBBFAC,25,27 | Performed by: INTERNAL MEDICINE

## 2025-07-29 RX ORDER — ERGOCALCIFEROL 1.25 MG/1
50000 CAPSULE ORAL
COMMUNITY
Start: 2025-07-11

## 2025-07-29 NOTE — PROGRESS NOTES
Subjective     Patient ID: Bharath Caballero is a 45 y.o. male.    Chief Complaint: Follow-up    Follow-up      This patient comes to us today in follow up.  He had pancreatic adenocarcinoma diagnosed via EUS per Dr. Ba ramos within the last 2 months.  The plan is for neoadjuvant chemotherapy in anticipation in hopes of being able to do surgical resection.  Before proceeding he had to have the malfunctioning pain stimulator in his lumbar spine area removed which was accomplished 2 weeks ago.  Because of his psychosocial situation and diabetes he had to be admitted to observation over the weekend prior to getting this accomplished.  He then was to have a key chemotherapy MediPort placed this past Monday but they could not accomplish it because of scar tissue in his IJ.  A re-attempt at this is scheduled for tomorrow.  His blood pressure in clinic today is 220/100 he is complaining of abdominal pain and lives alone.  We will once again admit him to observation overnight in preparation for this procedure tomorrow.  I do think he needs to have a conversation with his family in that he needs to move in either with 1 of his relatives or make alternative living situation while he is undergoing chemotherapy etc. in the near future.  We will give an appointment to see us back in 2 weeks in the clinic he knows to call if any problems otherwise thank you  Review of Systems   All other systems reviewed and are negative.         Objective     Physical Exam  Constitutional:       Appearance: Normal appearance.   HENT:      Head: Normocephalic and atraumatic.      Right Ear: Tympanic membrane, ear canal and external ear normal.      Left Ear: Tympanic membrane, ear canal and external ear normal.      Nose: Nose normal.      Mouth/Throat:      Mouth: Mucous membranes are moist.      Pharynx: Oropharynx is clear.   Eyes:      Extraocular Movements: Extraocular movements intact.      Conjunctiva/sclera: Conjunctivae normal.       Pupils: Pupils are equal, round, and reactive to light.   Cardiovascular:      Rate and Rhythm: Normal rate.      Pulses: Normal pulses.      Heart sounds: Normal heart sounds.   Pulmonary:      Effort: Pulmonary effort is normal.      Breath sounds: Normal breath sounds.   Abdominal:      General: Bowel sounds are normal.      Palpations: Abdomen is soft.   Musculoskeletal:      Cervical back: Normal range of motion and neck supple.   Skin:     General: Skin is warm and dry.   Neurological:      General: No focal deficit present.      Mental Status: He is alert and oriented to person, place, and time. Mental status is at baseline.   Psychiatric:         Mood and Affect: Mood normal.         Behavior: Behavior normal.         Thought Content: Thought content normal.         Judgment: Judgment normal.            Assessment and Plan     1. Diabetic polyneuropathy associated with type 2 diabetes mellitus    2. Hand paresthesia    3. Mononeuritis multiplex    4. Malfunction of spinal cord stimulator, sequela    5. Chronic pain syndrome    6. Painful diabetic neuropathy    7. Paresis of single lower extremity    8. Major depressive disorder, recurrent severe without psychotic features    9. Blurred vision, right eye    10. Noise-induced hearing loss of both ears    11. Hard of hearing    12. Onychomycosis of multiple toenails with type 2 diabetes mellitus    13. Overgrown toenails    14. Acquired perforating dermatosis    15. Scab    16. Primary hypertension    17. Aortic atherosclerosis    18. Mixed hyperlipidemia    19. Familial hypertriglyceridemia    20. NSTEMI (non-ST elevated myocardial infarction)    21. Coronary artery disease of native artery of native heart with stable angina pectoris    22. Hx of CABG    23. Portal vein stenosis    24. Bladder outflow obstruction    25. Hypokalemia    26. Nocturia    27. Adenocarcinoma of head of pancreas    28. High serum carbohydrate antigen 19-9 ()    29. 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 High carcinoembryonic antigen (CEA)    31. Abnormal weight loss    32. Type 2 diabetes mellitus with other skin ulcer, with long-term current use of insulin    33. Type 2 diabetes mellitus with hyperglycemia, with long-term current use of insulin    34. Uncontrolled type 2 diabetes mellitus with hyperglycemia  -     Ambulatory referral/consult to Internal Medicine    35. Moderate malnutrition    36. History of pancreatitis    37. Other chronic pancreatitis    38. Left flank pain    39. Metabolic dysfunction-associated steatotic liver disease (MASLD)    40. Gastroesophageal reflux disease with esophagitis without hemorrhage    41. History of colonic polyps    42. Pancreatic mass    43. Falls frequently    44. Tobacco user    45. Impaired functional mobility, balance, gait, and endurance        As above time equaled 50 minutes         Follow up in about 2 weeks (around 8/12/2025).

## 2025-07-29 NOTE — TELEPHONE ENCOUNTER
----- Message from Effie sent at 7/29/2025 12:09 PM CDT -----  Pt request sooner appt. Pt states that he can't eat due to stomach hurting. Pt scheduled 10/28 and added to wait list.    Pt # [phone]

## 2025-07-29 NOTE — PROGRESS NOTES
Psychiatry Initial Visit (PhD/LCSW)  Diagnostic Interview - CPT 59928    Date: 7/29/2025    Site: [place]    Referral source: Dr. Clifford    Clinical status of patient: Outpatient    Bharath Caballero, a 45 y.o. male, for initial evaluation visit.  Met with patient.    Chief complaint/reason for encounter: Psychological Evaluation and treatment recommendations    History of present illness: Bharath is a 45 year old male patient of Dr. Clifford. He is followed at [facility] by the Cancer Center and the team. He was referred to me following an appointment where he displayed some signs of distress. I was asked to conduct an assessment and provide therapy if recommended. I spent approximately 45 minutes with Bharath. He is extremely hard of hearing. I could not conduct an assessment due to his inability to hear my questions. He did not understand the reason for the referral. He is not sad, depressed or suicidal. I concluded the appointment.     Pain: noncontributory    Symptoms:   Mood: weight loss  Anxiety: denied  Substance abuse: not assessed  Cognitive functioning: unable to assess  Health behaviors: unable to assess    Psychiatric history: unable to assess    Medical history: currently under the care of the cancer center    Family history of psychiatric illness: not known      Substance use:   Alcohol: unknown   Drugs: did not assess   Tobacco: did not assess   Caffeine: did not assess    Current medications and drug reactions (include OTC, herbal): see medication list     Strengths and liabilities: Liability: Patient lacks social skills., Liability: Patient has poor health., Liability: Patient has poor judgment, Liability: Patient has possible cognitive impairment., Liability: Patient lacks coping skills., extremely hard of hearing, difficult to conduct an assessment    Current Evaluation:     Mental Status Exam:  General Appearance:  older than stated age, bearded, disheveled   Speech: profane      Level of Cooperation:  resistant      Thought Processes: concrete, poverty of thought, illogical   Mood: steady      Thought Content: normal, no suicidality, no homicidality, delusions, or paranoia   Affect: guarded, inappropriate, irritable   Orientation:    Memory:    Attention Span & Concentration:    Fund of General Knowledge:    Abstract Reasoning:    Judgment & Insight: limited     Language       Diagnostic Impression - Plan:       ICD-10-CM ICD-9-CM   1. Anxiety about health  R45.89 799.29   2. Adenocarcinoma of head of pancreas  C25.0 157.0       Plan:Patient can reach out to me if he has emotional needs    Return to Clinic: as needed    Length of Service (minutes): 30

## 2025-07-29 NOTE — Clinical Note
Orders for 07/29/2025:  Genetic testing  MediPort placement  Start chemotherapy ASAP Three months after starting chemotherapy, re-stage with pancreatic protocol CT +contrast-enhanced CT scans of chest and pelvis as well as PET-CT  Check CBC and CMP every couple of weeks  Check CA 19-9 level every month Follow-up with NP in 2 weeks

## 2025-07-29 NOTE — TELEPHONE ENCOUNTER
----- Message from Effie sent at 7/29/2025 12:09 PM CDT -----  Pt request sooner appt. Pt states that he can't eat due to stomach hurting. Pt scheduled 10/28 and added to wait list.    Pt # [phone]

## 2025-07-30 PROBLEM — E43 SEVERE MALNUTRITION: Status: ACTIVE | Noted: 2025-07-30

## 2025-07-31 ENCOUNTER — TELEPHONE (OUTPATIENT)
Dept: GASTROENTEROLOGY | Facility: CLINIC | Age: 45
End: 2025-07-31
Payer: MEDICARE

## 2025-07-31 ENCOUNTER — TELEPHONE (OUTPATIENT)
Dept: HEMATOLOGY/ONCOLOGY | Facility: CLINIC | Age: 45
End: 2025-07-31
Payer: MEDICARE

## 2025-07-31 ENCOUNTER — ANESTHESIA EVENT (OUTPATIENT)
Dept: SURGERY | Facility: HOSPITAL | Age: 45
End: 2025-07-31
Payer: MEDICARE

## 2025-07-31 NOTE — TELEPHONE ENCOUNTER
----- Message from Effie sent at 7/29/2025 12:09 PM CDT -----  Pt request sooner appt. Pt states that he can't eat due to stomach hurting. Pt scheduled 10/28 and added to wait list.    Pt # [phone]

## 2025-07-31 NOTE — ANESTHESIA PREPROCEDURE EVALUATION
07/31/2025  Bharath Caballero is a 45 y.o., male for port a cath insertion  -- case cancelled x 2 (episode of emesis pre procedure & hypertensive emergency requiring admission)           Vitals:    07/31/25 2213 08/01/25 0013 08/01/25 0437 08/01/25 0812   BP:  (!) 94/55 114/74 (!) 147/100   BP Location:       Patient Position:       Pulse:  (!) 56 61 76   Resp: 18 20  20   Temp:    36.6 °C (97.8 °F)   TempSrc:    Oral   SpO2:  96% 96% 98%   Weight:       Height:                   -Adenocarcinoma of head of pancreas/High serum carbohydrate antigen 19-9   -T2DM ( 7/7/25) wwith panc insufficiency ; AM CBG 55 pre op DOS        -hospitalized w/ diarrhea & uncontrolled DM 6/2025 (A1c 12.9)  -CAD S/P CABG 12/5/24  -CKD 3  -chronic pancreatitis   -familial hypertriglyceridemia with routine plasmapheresis in the past (last approx 7 yr ago)  -hepatic steatosis  -HTN  -GERD  -SMOKER  -CICI     BETA-BLOCKER: METOPROLOL    Last dose:   0930 7.31.25  ASA LD (cont):  PLAVIX LD (hold x 5 days prior per sx):         Pre-op Assessment    I have reviewed the Patient Summary Reports.     I have reviewed the Nursing Notes. I have reviewed the NPO Status.   I have reviewed the Medications.     Review of Systems  Anesthesia Hx:  No problems with previous Anesthesia   History of prior surgery of interest to airway management or planning:          Denies Family Hx of Anesthesia complications.    Denies Personal Hx of Anesthesia complications.                    Hematology/Oncology:  Hematology Normal   Oncology Normal                                   EENT/Dental:  EENT/Dental Normal           Cardiovascular:  Cardiovascular Normal                                              Pulmonary:  Pulmonary Normal                       Renal/:  Renal/ Normal                 Hepatic/GI:  Hepatic/GI Normal                     Musculoskeletal:  Musculoskeletal Normal                Neurological:  Neurology Normal                                      Endocrine:  Endocrine Normal            Dermatological:  Skin Normal    Psych:  Psychiatric Normal                Physical Exam  General: Well nourished, Cooperative, Alert and Oriented    Airway:  Mallampati: I / I  Mouth Opening: Normal  TM Distance: Normal  Tongue: Normal  Neck ROM: Normal ROM    Dental:  Intact    Anesthesia Plan  Type of Anesthesia, risks & benefits discussed:    Anesthesia Type: MAC  Intra-op Monitoring Plan: Standard ASA Monitors  Post Op Pain Control Plan: IV/PO Opioids PRN  (medical reason for not using multimodal pain management)  Induction:  IV  Informed Consent: Informed consent signed with the Patient and all parties understand the risks and agree with anesthesia plan.  All questions answered. Patient consented to blood products? No  ASA Score: 4  Day of Surgery Review of History & Physical: H&P Update referred to the surgeon/provider.    Ready For Surgery From Anesthesia Perspective.     .

## 2025-08-01 ENCOUNTER — ANESTHESIA (OUTPATIENT)
Dept: SURGERY | Facility: HOSPITAL | Age: 45
End: 2025-08-01
Payer: MEDICARE

## 2025-08-01 PROBLEM — I1A.0 RESISTANT HYPERTENSION: Status: ACTIVE | Noted: 2025-08-01

## 2025-08-01 PROCEDURE — 63600175 PHARM REV CODE 636 W HCPCS

## 2025-08-01 PROCEDURE — 25000003 PHARM REV CODE 250

## 2025-08-01 RX ORDER — CEFAZOLIN 2 G/1
INJECTION, POWDER, FOR SOLUTION INTRAMUSCULAR; INTRAVENOUS
Status: DISCONTINUED | OUTPATIENT
Start: 2025-08-01 | End: 2025-08-01

## 2025-08-01 RX ORDER — DEXTROSE, SODIUM CHLORIDE, SODIUM LACTATE, POTASSIUM CHLORIDE, AND CALCIUM CHLORIDE 5; .6; .31; .03; .02 G/100ML; G/100ML; G/100ML; G/100ML; G/100ML
INJECTION, SOLUTION INTRAVENOUS CONTINUOUS PRN
Status: DISCONTINUED | OUTPATIENT
Start: 2025-08-01 | End: 2025-08-01

## 2025-08-01 RX ORDER — DEXTROSE MONOHYDRATE 100 MG/ML
INJECTION, SOLUTION INTRAVENOUS CONTINUOUS PRN
Status: DISCONTINUED | OUTPATIENT
Start: 2025-08-01 | End: 2025-08-01

## 2025-08-01 RX ORDER — SODIUM CHLORIDE, SODIUM LACTATE, POTASSIUM CHLORIDE, CALCIUM CHLORIDE 600; 310; 30; 20 MG/100ML; MG/100ML; MG/100ML; MG/100ML
INJECTION, SOLUTION INTRAVENOUS CONTINUOUS PRN
Status: DISCONTINUED | OUTPATIENT
Start: 2025-08-01 | End: 2025-08-01

## 2025-08-01 RX ORDER — ROCURONIUM BROMIDE 10 MG/ML
INJECTION, SOLUTION INTRAVENOUS
Status: DISCONTINUED | OUTPATIENT
Start: 2025-08-01 | End: 2025-08-01

## 2025-08-01 RX ORDER — DEXAMETHASONE SODIUM PHOSPHATE 4 MG/ML
INJECTION, SOLUTION INTRA-ARTICULAR; INTRALESIONAL; INTRAMUSCULAR; INTRAVENOUS; SOFT TISSUE
Status: DISCONTINUED | OUTPATIENT
Start: 2025-08-01 | End: 2025-08-01

## 2025-08-01 RX ORDER — FENTANYL CITRATE 50 UG/ML
INJECTION, SOLUTION INTRAMUSCULAR; INTRAVENOUS
Status: DISCONTINUED | OUTPATIENT
Start: 2025-08-01 | End: 2025-08-01

## 2025-08-01 RX ORDER — LIDOCAINE HYDROCHLORIDE 20 MG/ML
INJECTION INTRAVENOUS
Status: DISCONTINUED | OUTPATIENT
Start: 2025-08-01 | End: 2025-08-01

## 2025-08-01 RX ORDER — MIDAZOLAM HYDROCHLORIDE 1 MG/ML
INJECTION INTRAMUSCULAR; INTRAVENOUS
Status: DISCONTINUED | OUTPATIENT
Start: 2025-08-01 | End: 2025-08-01

## 2025-08-01 RX ORDER — EPHEDRINE SULFATE 50 MG/ML
INJECTION, SOLUTION INTRAVENOUS
Status: DISCONTINUED | OUTPATIENT
Start: 2025-08-01 | End: 2025-08-01

## 2025-08-01 RX ORDER — PROPOFOL 10 MG/ML
INJECTION, EMULSION INTRAVENOUS
Status: DISCONTINUED | OUTPATIENT
Start: 2025-08-01 | End: 2025-08-01

## 2025-08-01 RX ORDER — ONDANSETRON HYDROCHLORIDE 2 MG/ML
INJECTION, SOLUTION INTRAVENOUS
Status: DISCONTINUED | OUTPATIENT
Start: 2025-08-01 | End: 2025-08-01

## 2025-08-01 RX ADMIN — SUGAMMADEX 200 MG: 100 INJECTION, SOLUTION INTRAVENOUS at 09:08

## 2025-08-01 RX ADMIN — MIDAZOLAM 1 MG: 1 INJECTION INTRAMUSCULAR; INTRAVENOUS at 08:08

## 2025-08-01 RX ADMIN — LIDOCAINE HYDROCHLORIDE 80 MG: 20 INJECTION INTRAVENOUS at 08:08

## 2025-08-01 RX ADMIN — FENTANYL CITRATE 25 MCG: 50 INJECTION INTRAMUSCULAR; INTRAVENOUS at 09:08

## 2025-08-01 RX ADMIN — SODIUM CHLORIDE, SODIUM LACTATE, POTASSIUM CHLORIDE, CALCIUM CHLORIDE AND DEXTROSE MONOHYDRATE: 5; 600; 310; 30; 20 INJECTION, SOLUTION INTRAVENOUS at 08:08

## 2025-08-01 RX ADMIN — ONDANSETRON 4 MG: 2 INJECTION INTRAMUSCULAR; INTRAVENOUS at 09:08

## 2025-08-01 RX ADMIN — EPHEDRINE SULFATE 10 MG: 50 INJECTION INTRAVENOUS at 09:08

## 2025-08-01 RX ADMIN — FENTANYL CITRATE 25 MCG: 50 INJECTION INTRAMUSCULAR; INTRAVENOUS at 08:08

## 2025-08-01 RX ADMIN — SODIUM CHLORIDE, POTASSIUM CHLORIDE, SODIUM LACTATE AND CALCIUM CHLORIDE: 600; 310; 30; 20 INJECTION, SOLUTION INTRAVENOUS at 08:08

## 2025-08-01 RX ADMIN — DEXAMETHASONE SODIUM PHOSPHATE 4 MG: 4 INJECTION, SOLUTION INTRA-ARTICULAR; INTRALESIONAL; INTRAMUSCULAR; INTRAVENOUS; SOFT TISSUE at 09:08

## 2025-08-01 RX ADMIN — MIDAZOLAM 1 MG: 1 INJECTION INTRAMUSCULAR; INTRAVENOUS at 09:08

## 2025-08-01 RX ADMIN — CEFAZOLIN 2 G: 2 INJECTION, POWDER, FOR SOLUTION INTRAMUSCULAR; INTRAVENOUS at 08:08

## 2025-08-01 RX ADMIN — PROPOFOL 140 MG: 10 INJECTION, EMULSION INTRAVENOUS at 08:08

## 2025-08-01 RX ADMIN — ROCURONIUM BROMIDE 40 MG: 10 INJECTION INTRAVENOUS at 08:08

## 2025-08-01 RX ADMIN — EPHEDRINE SULFATE 5 MG: 50 INJECTION INTRAVENOUS at 09:08

## 2025-08-01 NOTE — ANESTHESIA PROCEDURE NOTES
Intubation    Date/Time: 8/1/2025 8:56 AM    Performed by: Jaci Martins CRNA  Authorized by: Melinda Orta MD    Intubation:     Induction:  Rapid sequence induction    Intubated:  Postinduction    Mask Ventilation:  Not attempted    Attempts:  1    Attempted By:  CRNA    Method of Intubation:  Direct    Blade:  Jean 2    Laryngeal View Grade: Grade I - full view of cords      Difficult Airway Encountered?: No      Complications:  None    Airway Device:  Oral endotracheal tube    Airway Device Size:  7.5    Style/Cuff Inflation:  Cuffed (inflated to minimal occlusive pressure)    Tube secured:  22    Secured at:  The lips    Placement Verified By:  Capnometry    Complicating Factors:  None    Findings Post-Intubation:  BS equal bilateral and atraumatic/condition of teeth unchanged

## 2025-08-01 NOTE — ANESTHESIA POSTPROCEDURE EVALUATION
Anesthesia Post Evaluation    Patient: Bharath Caballero    Procedure(s) Performed: Procedure(s) (LRB):  [id number] (Right)    Final Anesthesia Type: general      Patient location during evaluation: PACU  Patient participation: Yes- Able to Participate  Level of consciousness: awake and responds to stimulation  Post-procedure vital signs: reviewed and stable  Pain management: adequate  Airway patency: patent    PONV status at discharge: No PONV  Anesthetic complications: no      Cardiovascular status: blood pressure returned to baseline  Respiratory status: unassisted  Hydration status: euvolemic  Follow-up not needed.            Vitals Value Taken Time   /65 08/01/25 10:23   Temp 36.6 08/01/25 10:23   Pulse 61 08/01/25 10:23   Resp 19 08/01/25 10:23   SpO2 98 % 08/01/25 10:23         No case tracking events are documented in the log.      Pain/Merlyn Score: Pain Rating Prior to Med Admin: 9 (7/31/2025 10:13 PM)  Pain Rating Post Med Admin: 0 (7/31/2025 10:43 PM)  Merlyn Score: 8 (8/1/2025 10:10 AM)

## 2025-08-01 NOTE — TRANSFER OF CARE
"Anesthesia Transfer of Care Note    Patient: Bharath Caballero    Procedure(s) Performed: Procedure(s) (LRB):  [id number] (Right)    Patient location: PACU    Anesthesia Type: general    Transport from OR: Transported from OR on room air with adequate spontaneous ventilation    Post pain: adequate analgesia    Post assessment: no apparent anesthetic complications    Post vital signs: stable    Level of consciousness: awake    Nausea/Vomiting: no nausea/vomiting    Complications: none    Transfer of care protocol was followed      Last vitals: Visit Vitals  /65   Pulse 63   Temp 36.6 °C (97.8 °F) (Oral)   Resp (!) 8   Ht 5' 10.08" (1.78 m)   Wt 79 kg (174 lb 2.6 oz)   SpO2 96%   BMI 24.93 kg/m²     "

## 2025-08-05 ENCOUNTER — OFFICE VISIT (OUTPATIENT)
Dept: HEMATOLOGY/ONCOLOGY | Facility: CLINIC | Age: 45
End: 2025-08-05
Payer: MEDICARE

## 2025-08-05 VITALS
OXYGEN SATURATION: 99 % | HEIGHT: 70 IN | SYSTOLIC BLOOD PRESSURE: 202 MMHG | HEART RATE: 67 BPM | WEIGHT: 178 LBS | DIASTOLIC BLOOD PRESSURE: 94 MMHG | BODY MASS INDEX: 25.48 KG/M2

## 2025-08-05 DIAGNOSIS — C25.0 ADENOCARCINOMA OF HEAD OF PANCREAS: Primary | ICD-10-CM

## 2025-08-05 PROCEDURE — 99999 PR PBB SHADOW E&M-EST. PATIENT-LVL V: CPT | Mod: PBBFAC,,,

## 2025-08-05 PROCEDURE — 99215 OFFICE O/P EST HI 40 MIN: CPT | Mod: S$PBB,,,

## 2025-08-05 PROCEDURE — 99215 OFFICE O/P EST HI 40 MIN: CPT | Mod: PBBFAC

## 2025-08-05 RX ORDER — OLANZAPINE 5 MG/1
TABLET, FILM COATED ORAL
Qty: 3 TABLET | Refills: 11 | Status: SHIPPED | OUTPATIENT
Start: 2025-08-05

## 2025-08-05 RX ORDER — LOPERAMIDE HYDROCHLORIDE 2 MG/1
CAPSULE ORAL
Qty: 30 CAPSULE | Refills: 11 | Status: SHIPPED | OUTPATIENT
Start: 2025-08-05

## 2025-08-05 RX ORDER — PROCHLORPERAZINE MALEATE 10 MG
10 TABLET ORAL EVERY 6 HOURS PRN
Qty: 20 TABLET | Refills: 5 | Status: SHIPPED | OUTPATIENT
Start: 2025-08-05

## 2025-08-05 NOTE — PROGRESS NOTES
THERAPY EDUCATION: FOLFIRINOX     Chief Complaint: Therapy Education      Diagnosis: Adenocarcinoma of head of pancreas      Current Treatment: FOLFIRINOX Q2W to start on 8/6/25     Interval History      8/5/25: Mr. Caballero presents to the clinic accompanied by his sister for therapy education. Patient reports no major complaints at today's visit. Denies fever, chills, N/V/D, constipation, recent infection, chest pain or unexplained bleeding or bruising.          PLAN     -Mediport placement completed on 8/1/25.   -Therapy education completed on 8/5/25.  -Plans to start FOLFIRINOX tentatively on 8/6/25. Made patient aware that he will receive premedications given 30 minutes prior to each treatment to help prevent nausea. Patient understood that he will be going home with a 5FU pump for 2 days and will need to return to the clinic to have pump disconnected. Patient aware the importance of monitoring the pump 3 to 4 times a day to check for leaks or kinks. Patient also aware of the importance of avoiding cold drinks and cold food on the day of and 2 days after treatment with Oxaliplatin.     -Antiemetics regimen schedule made and given with calendar for guidance     Olanzapine- Take 1 tablet by mouth on Days 1-3 of each chemo cycle   -Compazine PRN prescribed for at home with instructions to be taken by mouth every 6 hours as needed for nausea.   -OTC Imodium AD prescribed for diarrhea (4-6 BMs a day). Take 2 tablets after the first loose bowel movement, and 1 tablet after each loose bowel movement after the first dose has been taken. No more than 4 tablets should be taken in any 24-hour period. If not working, Lomotil can be prescribed as 2nd choice.    -Mouth sore prevention with 1-quart warm water with 1 tsp of baking soda and salt and alcohol-free mouthwash.   -Emphasized adequate hand-hygiene and limited contact with people who are sick.   -Monitor and notify any bleeding in urine, stool, or sputum.  As well as  unusual bleeding or bruising and stomach pain.   - Emphasized hydration with 4 16 oz bottles of water a day.    -Importance of moisturizing with fragrance free lotion to prevent skin rash and/or hand-foot syndrome.  -Call clinic if fever >100.4, shakes or chills, shortness of breath, chest pain, uncontrolled vomiting or diarrhea, pain and tingling in the chest or arm, or just not feeling well.    -Plans to RTC on 8/13/25 for same day labs and toxicity check with MD.    DISCUSSION:    1.  A total of 60 minutes were spent in counseling today, in which 100% were face-to-face.  At today's therapy teaching session, we discussed the patient's cancer diagnosis as well as planned therapy regimen, protocol, side effects and toxicities.  A handout of each therapeutic agent in the regimen was provided and reviewed in detail.    2.  The following side effects were discussed but not limited to:    Fluorouracil Side Effects:  Allergic Reactions  Breathing Problems  Bruising  Chest Pain  Chills  Cough  Diarrhea  Fever  Hair Loss  Headache  Infection  Itching  Low Blood Counts  Mouth Sores  Nausea  Numbness  Pain  Rash  Stomach Upset  Swelling  Tingling  Vomiting     Irinotecan Side Effects:  Allergic Reactions  Bruising  Chest Pain  Chills  Constipation  Cough  Diarrhea  Fever  Flushing  Hair Loss  Headache  Infection  Itching  Low Blood Counts  Mouth Sores  Nausea  Pain  Rash  Runny Nose  Swelling  Vomiting     Leucovorin (Injection) Side Effects:  Allergic Reactions  Breathing Problems  Itching  Rash  Swelling     Oxaliplatin Side Effects:  Allergic Reactions  Anemia  Breathing Problems  Bruising  Chest Pain  Chills  Cough  Diarrhea  Dizziness  Feeling Faint  Fever  Gas  Hair Loss  Infection  Injury  Itching  Low Blood Counts  Mouth Sores  Muscle Pain  Nausea  Numbness  Pain  Rash  Swelling  Tingling  Vomiting  Cold Sensitivity                a.  Discussed the risk of infection while on therapy related to pancytopenia,  specifically a decrease in their white blood cell count.  Instructed to contact our office for temperature >100.4 F, chills, sudden onset cough or shortness of breath, symptoms of a urinary tract infection.                b.  Discussed the risk of anemia. Instructed to contact our office for dizziness, heart palpitations, or extreme or sudden changes in weakness.                c.  Discussed the risk of thrombocytopenia, which increases the risk of bruising or bleeding.  Instructed the patient to contact our office for spontaneous signs of bleeding, including nose bleeds, bleeding from the gums or mouth, blood in sputum, urine or stool and unusual or excessive bruising or rash.                d.  Discussed GI side effects including weight changes, changes in appetite, altered sense of taste, stomatitis, nausea, vomiting, diarrhea, constipation, and heartburn.                e.  Discussed  side effects including painful urination, changes in the amount of urination, possible urine color changes.  Discussed fertility issues and to prevent  pregnancy if of child bearing age.                f.  Discussed neurological side effects including the risk of peripheral neuropathy, either temporary or permanent.                g.  Discussed the potential for skin, hair, and nail changes.       3.  Instructed to contact our office for discussion of medication changes, the addition of vitamin and/or herbal supplementation as they may interact with some chemotherapy agents.    4.  Discussed dietary modifications and the need to maintain adequate caloric intake and proper oral hydration.  Recommended 64 ounces of fluid per day.    5.  Discussed anti-emetic protocol and bowel regimen protocol.    6.  Office contact information given including after hours number.  Discussed there is an oncologist on call 24/7, 365 days including weekends.  Provided primary nurse's information .    7.  In summary, the patient is in agreement with  the plan of care.  Questions appeared to be answered to their satisfaction. Consented the patient to the treatment plan and the patient was educated on the planned duration of the treatment and schedule of the treatment administration. Copy to be scanned into the chart.    All questions answered to the satisfaction of the patient and family.     Follow up appointments given to patient.         MUKUL PABON FNP-C  PATIENT EDUCATOR  [facility] [place] [facility]

## 2025-08-06 ENCOUNTER — LAB VISIT (OUTPATIENT)
Dept: HEMATOLOGY/ONCOLOGY | Facility: CLINIC | Age: 45
End: 2025-08-06
Payer: MEDICARE

## 2025-08-06 ENCOUNTER — INFUSION (OUTPATIENT)
Dept: INFUSION THERAPY | Facility: HOSPITAL | Age: 45
End: 2025-08-06
Attending: INTERNAL MEDICINE
Payer: MEDICARE

## 2025-08-06 ENCOUNTER — DOCUMENTATION ONLY (OUTPATIENT)
Dept: HEMATOLOGY/ONCOLOGY | Facility: CLINIC | Age: 45
End: 2025-08-06

## 2025-08-06 ENCOUNTER — CLINICAL SUPPORT (OUTPATIENT)
Dept: HEMATOLOGY/ONCOLOGY | Facility: CLINIC | Age: 45
End: 2025-08-06
Payer: MEDICARE

## 2025-08-06 VITALS
RESPIRATION RATE: 18 BRPM | HEART RATE: 76 BPM | WEIGHT: 176.81 LBS | BODY MASS INDEX: 26.8 KG/M2 | DIASTOLIC BLOOD PRESSURE: 88 MMHG | HEIGHT: 68 IN | OXYGEN SATURATION: 99 % | TEMPERATURE: 98 F | SYSTOLIC BLOOD PRESSURE: 154 MMHG

## 2025-08-06 DIAGNOSIS — C25.0 ADENOCARCINOMA OF HEAD OF PANCREAS: Primary | ICD-10-CM

## 2025-08-06 DIAGNOSIS — C25.0 ADENOCARCINOMA OF HEAD OF PANCREAS: ICD-10-CM

## 2025-08-06 LAB
ALBUMIN SERPL-MCNC: 2.9 G/DL (ref 3.5–5)
ALBUMIN/GLOB SERPL: 0.6 RATIO (ref 1.1–2)
ALP SERPL-CCNC: 511 UNIT/L (ref 40–150)
ALT SERPL-CCNC: 37 UNIT/L (ref 0–55)
ANION GAP SERPL CALC-SCNC: 7 MEQ/L
AST SERPL-CCNC: 35 UNIT/L (ref 11–45)
BASOPHILS # BLD AUTO: 0.04 X10(3)/MCL
BASOPHILS NFR BLD AUTO: 0.4 %
BILIRUB SERPL-MCNC: 0.4 MG/DL
BUN SERPL-MCNC: 12 MG/DL (ref 8.9–20.6)
CALCIUM SERPL-MCNC: 8.9 MG/DL (ref 8.4–10.2)
CHLORIDE SERPL-SCNC: 104 MMOL/L (ref 98–107)
CO2 SERPL-SCNC: 28 MMOL/L (ref 22–29)
CREAT SERPL-MCNC: 0.82 MG/DL (ref 0.72–1.25)
CREAT/UREA NIT SERPL: 15
EOSINOPHIL # BLD AUTO: 0.29 X10(3)/MCL (ref 0–0.9)
EOSINOPHIL NFR BLD AUTO: 2.7 %
ERYTHROCYTE [DISTWIDTH] IN BLOOD BY AUTOMATED COUNT: 12.6 % (ref 11.5–17)
GFR SERPLBLD CREATININE-BSD FMLA CKD-EPI: >60 ML/MIN/1.73/M2
GLOBULIN SER-MCNC: 5.2 GM/DL (ref 2.4–3.5)
GLUCOSE SERPL-MCNC: 228 MG/DL (ref 74–100)
HCT VFR BLD AUTO: 46.2 % (ref 42–52)
HGB BLD-MCNC: 15.4 G/DL (ref 14–18)
IMM GRANULOCYTES # BLD AUTO: 0.03 X10(3)/MCL (ref 0–0.04)
IMM GRANULOCYTES NFR BLD AUTO: 0.3 %
LYMPHOCYTES # BLD AUTO: 2.49 X10(3)/MCL (ref 0.6–4.6)
LYMPHOCYTES NFR BLD AUTO: 23.6 %
MAGNESIUM SERPL-MCNC: 2.2 MG/DL (ref 1.6–2.6)
MCH RBC QN AUTO: 29.1 PG (ref 27–31)
MCHC RBC AUTO-ENTMCNC: 33.3 G/DL (ref 33–36)
MCV RBC AUTO: 87.2 FL (ref 80–94)
MONOCYTES # BLD AUTO: 0.97 X10(3)/MCL (ref 0.1–1.3)
MONOCYTES NFR BLD AUTO: 9.2 %
NEUTROPHILS # BLD AUTO: 6.74 X10(3)/MCL (ref 2.1–9.2)
NEUTROPHILS NFR BLD AUTO: 63.8 %
NRBC BLD AUTO-RTO: 0 %
PLATELET # BLD AUTO: 225 X10(3)/MCL (ref 130–400)
PMV BLD AUTO: 11 FL (ref 7.4–10.4)
POTASSIUM SERPL-SCNC: 3.5 MMOL/L (ref 3.5–5.1)
PROT SERPL-MCNC: 8.1 GM/DL (ref 6.4–8.3)
RBC # BLD AUTO: 5.3 X10(6)/MCL (ref 4.7–6.1)
SODIUM SERPL-SCNC: 139 MMOL/L (ref 136–145)
WBC # BLD AUTO: 10.56 X10(3)/MCL (ref 4.5–11.5)

## 2025-08-06 PROCEDURE — 96375 TX/PRO/DX INJ NEW DRUG ADDON: CPT

## 2025-08-06 PROCEDURE — 96376 TX/PRO/DX INJ SAME DRUG ADON: CPT

## 2025-08-06 PROCEDURE — 96367 TX/PROPH/DG ADDL SEQ IV INF: CPT

## 2025-08-06 PROCEDURE — 96417 CHEMO IV INFUS EACH ADDL SEQ: CPT

## 2025-08-06 PROCEDURE — 83735 ASSAY OF MAGNESIUM: CPT

## 2025-08-06 PROCEDURE — 96413 CHEMO IV INFUSION 1 HR: CPT

## 2025-08-06 PROCEDURE — 85025 COMPLETE CBC W/AUTO DIFF WBC: CPT

## 2025-08-06 PROCEDURE — 96368 THER/DIAG CONCURRENT INF: CPT

## 2025-08-06 PROCEDURE — 80053 COMPREHEN METABOLIC PANEL: CPT

## 2025-08-06 PROCEDURE — 96415 CHEMO IV INFUSION ADDL HR: CPT

## 2025-08-06 PROCEDURE — 63600175 PHARM REV CODE 636 W HCPCS: Performed by: INTERNAL MEDICINE

## 2025-08-06 PROCEDURE — 25000003 PHARM REV CODE 250: Performed by: INTERNAL MEDICINE

## 2025-08-06 RX ORDER — HEPARIN 100 UNIT/ML
500 SYRINGE INTRAVENOUS
Status: DISCONTINUED | OUTPATIENT
Start: 2025-08-06 | End: 2025-08-06 | Stop reason: HOSPADM

## 2025-08-06 RX ORDER — DIPHENHYDRAMINE HYDROCHLORIDE 50 MG/ML
50 INJECTION, SOLUTION INTRAMUSCULAR; INTRAVENOUS ONCE AS NEEDED
Status: CANCELLED | OUTPATIENT
Start: 2025-08-06

## 2025-08-06 RX ORDER — SODIUM CHLORIDE 0.9 % (FLUSH) 0.9 %
10 SYRINGE (ML) INJECTION
Status: DISCONTINUED | OUTPATIENT
Start: 2025-08-06 | End: 2025-08-06 | Stop reason: HOSPADM

## 2025-08-06 RX ORDER — DIPHENHYDRAMINE HYDROCHLORIDE 50 MG/ML
50 INJECTION, SOLUTION INTRAMUSCULAR; INTRAVENOUS ONCE AS NEEDED
Status: DISCONTINUED | OUTPATIENT
Start: 2025-08-06 | End: 2025-08-06 | Stop reason: HOSPADM

## 2025-08-06 RX ORDER — PROCHLORPERAZINE EDISYLATE 5 MG/ML
10 INJECTION INTRAMUSCULAR; INTRAVENOUS ONCE AS NEEDED
Status: DISCONTINUED | OUTPATIENT
Start: 2025-08-06 | End: 2025-08-06 | Stop reason: HOSPADM

## 2025-08-06 RX ORDER — ATROPINE SULFATE 0.4 MG/ML
0.4 INJECTION, SOLUTION ENDOTRACHEAL; INTRAMEDULLARY; INTRAMUSCULAR; INTRAVENOUS; SUBCUTANEOUS ONCE AS NEEDED
Status: CANCELLED | OUTPATIENT
Start: 2025-08-06

## 2025-08-06 RX ORDER — ATROPINE SULFATE 0.4 MG/ML
0.4 INJECTION, SOLUTION ENDOTRACHEAL; INTRAMEDULLARY; INTRAMUSCULAR; INTRAVENOUS; SUBCUTANEOUS ONCE
Status: COMPLETED | OUTPATIENT
Start: 2025-08-06 | End: 2025-08-06

## 2025-08-06 RX ORDER — HEPARIN 100 UNIT/ML
500 SYRINGE INTRAVENOUS
Status: CANCELLED | OUTPATIENT
Start: 2025-08-07

## 2025-08-06 RX ORDER — ATROPINE SULFATE 0.4 MG/ML
0.4 INJECTION, SOLUTION ENDOTRACHEAL; INTRAMEDULLARY; INTRAMUSCULAR; INTRAVENOUS; SUBCUTANEOUS ONCE AS NEEDED
Status: COMPLETED | OUTPATIENT
Start: 2025-08-06 | End: 2025-08-06

## 2025-08-06 RX ORDER — EPINEPHRINE 1 MG/ML
0.3 INJECTION INTRAMUSCULAR; INTRAVENOUS; SUBCUTANEOUS ONCE AS NEEDED
Status: DISCONTINUED | OUTPATIENT
Start: 2025-08-06 | End: 2025-08-06 | Stop reason: HOSPADM

## 2025-08-06 RX ORDER — CLONIDINE HYDROCHLORIDE 0.1 MG/1
0.1 TABLET ORAL ONCE
Status: COMPLETED | OUTPATIENT
Start: 2025-08-06 | End: 2025-08-06

## 2025-08-06 RX ORDER — EPINEPHRINE 0.3 MG/.3ML
0.3 INJECTION SUBCUTANEOUS ONCE AS NEEDED
Status: CANCELLED | OUTPATIENT
Start: 2025-08-06

## 2025-08-06 RX ORDER — PROCHLORPERAZINE EDISYLATE 5 MG/ML
10 INJECTION INTRAMUSCULAR; INTRAVENOUS ONCE AS NEEDED
Status: CANCELLED | OUTPATIENT
Start: 2025-08-07

## 2025-08-06 RX ORDER — SODIUM CHLORIDE 0.9 % (FLUSH) 0.9 %
10 SYRINGE (ML) INJECTION
Status: CANCELLED | OUTPATIENT
Start: 2025-08-07

## 2025-08-06 RX ORDER — SODIUM CHLORIDE 0.9 % (FLUSH) 0.9 %
10 SYRINGE (ML) INJECTION
Status: CANCELLED | OUTPATIENT
Start: 2025-08-06

## 2025-08-06 RX ORDER — PROCHLORPERAZINE EDISYLATE 5 MG/ML
10 INJECTION INTRAMUSCULAR; INTRAVENOUS ONCE AS NEEDED
Status: CANCELLED | OUTPATIENT
Start: 2025-08-06

## 2025-08-06 RX ORDER — HEPARIN 100 UNIT/ML
500 SYRINGE INTRAVENOUS
Status: CANCELLED | OUTPATIENT
Start: 2025-08-06

## 2025-08-06 RX ADMIN — PALONOSETRON HYDROCHLORIDE 0.25 MG: 0.25 INJECTION, SOLUTION INTRAVENOUS at 10:08

## 2025-08-06 RX ADMIN — ATROPINE SULFATE 0.4 MG: 0.4 INJECTION, SOLUTION INTRAVENOUS at 01:08

## 2025-08-06 RX ADMIN — DEXTROSE MONOHYDRATE: 50 INJECTION, SOLUTION INTRAVENOUS at 11:08

## 2025-08-06 RX ADMIN — ATROPINE SULFATE 0.4 MG: 0.4 INJECTION, SOLUTION INTRAVENOUS at 10:08

## 2025-08-06 RX ADMIN — OXALIPLATIN 172 MG: 5 INJECTION, SOLUTION INTRAVENOUS at 11:08

## 2025-08-06 RX ADMIN — CLONIDINE HYDROCHLORIDE 0.1 MG: 0.1 TABLET ORAL at 08:08

## 2025-08-06 RX ADMIN — CLONIDINE HYDROCHLORIDE 0.1 MG: 0.1 TABLET ORAL at 10:08

## 2025-08-06 RX ADMIN — LEUCOVORIN CALCIUM 800 MG: 350 INJECTION, POWDER, LYOPHILIZED, FOR SOLUTION INTRAMUSCULAR; INTRAVENOUS at 01:08

## 2025-08-06 RX ADMIN — SODIUM CHLORIDE: 9 INJECTION, SOLUTION INTRAVENOUS at 09:08

## 2025-08-06 RX ADMIN — SODIUM CHLORIDE 300 MG: 9 INJECTION, SOLUTION INTRAVENOUS at 01:08

## 2025-08-06 RX ADMIN — FLUOROURACIL 4850 MG: 50 INJECTION, SOLUTION INTRAVENOUS at 03:08

## 2025-08-06 NOTE — NURSING
CARMITA Morejon met with patient in Infusion Clinic for Patient-centered Resource Education. Reviewed RN Jean-Pierre contact information and role in patient's care. Patient [place] and requires to talk loud. Patient reported his level of distress at 0. Social support reported as. Provided information on counseling services. Patient had appt with oncology social worker on 7/29/25. Resource folder with written information of community and cancer resources, disability benefits, and nutritional hints during cancer treatment given to patient. Spent additional time on signs of infection, infection prevention through good hand hygiene and wearing mask, adequate nutrition/hydration, skin care along with sunscreen, and oral care. Provided digital oral thermometer. Discussed communication process for some common scenarios in which patient should call provider for guidance vs. Urgent care or immediately report to the emergency room. Informed of [facility] and [employer] that he may utilize during the course of his treatment. Patient verbalized understanding and agreed to be referred. Patient agrees to contact CARMITA Morejon if any needs or concerns arise. [employer] gas card provided.   Oncology Navigation   Intake  Cancer Type: Pancreatic  Appointment Date: 07/07/25  Start of Treatment: 08/06/25     Treatment  Current Status: Active    Surgical Oncologist: Spencer Molina MD    Medical Oncologist: Brandon Clifford MD  Consult Date: 07/07/25  Chemotherapy: Initiated  Chemotherapy Regimen: FOLFIRINOX Q2W       Procedures: CT; PET scan; Port / PICC  CT Schedule Date: 07/14/25  PET Scan Schedule Date: 07/15/25    General Referrals: [facility]          Support Systems: Family members; Friends / neighbors  Barriers of Care: Comorbidities  Comorbidities: multiple comorbidities     Acuity  Stage: 2  Systemic Treatment - predicted or initiated: Chemotherapy Regimen with Multiple drugs (+1)  Treatment Tolerability: Has  not started treatment yet/treatment fully completed and side effects resolved  ECO  Comorbidities in Medical History: 2  Hospitalization Within the Past Month: 1   Needed: 0  Support: 0  Verbalizes Financial Concerns: 0  Transportation: 0  Mental Health: PHQ Score: 0  History of noncompliance/frequent no shows and cancellations: 0  Verbalizes the need for more education: 0  Navigation Acuity: 7     Follow Up  No follow-ups on file.

## 2025-08-06 NOTE — PROGRESS NOTES
Nutrition Assessment    Bharath Caballero is a 45 y.o. male.    DATE: 08/06/2025     Referral from: Oncology    Diagnosis: Adenocarcinoma of head of pancreas     PAST MEDICAL HISTORY  Past Medical History:   Diagnosis Date    Abdominal pain     Acquired perforating dermatosis 08/30/2023    Anxiety     Aortic atherosclerosis 01/24/2023    Appetite loss     Balance problem     Bilateral edema of lower extremity 02/06/2023    Bladder outflow obstruction 06/20/2022    Blurred vision, right eye 10/19/2022    BMI 34.0-34.9,adult 06/20/2022    BPH (benign prostatic hyperplasia)     Chest pain     Chronic liver failure without hepatic coma 04/25/2023    Chronic pain 10/25/2022    Chronic pain syndrome 05/12/2022    Chronic pancreatitis 10/28/2017    Coronary artery disease, unspecified vessel or lesion type, unspecified whether angina present, unspecified whether native or transplanted heart     Deafness 10/03/2023    Depression     Diabetes     Diabetic polyneuropathy 06/20/2022    Elevated LFTs 08/18/2022    Fatigue     GERD (gastroesophageal reflux disease)     Hand paresthesia 06/20/2022    Headache     Hepatic steatosis     History of continuous positive airway pressure (CPAP) therapy at home     History of pancreatitis 06/20/2022    History of recent fall     Hypertension     Hypertriglyceridemia     Insomnia     Kidney disease     Kidney disorder     Leg pain     Mixed hyperlipidemia 10/28/2017    Mononeuritis multiplex 06/20/2022    Morbid obesity     Nausea & vomiting     Neuropathy     Nocturia 06/20/2022    NSTEMI (non-ST elevated myocardial infarction) 10/2024    OA (osteoarthritis)     Obesity     Obstructive sleep apnea syndrome 06/20/2022    Onychomycosis of multiple toenails with type 2 diabetes mellitus 10/28/2017    Open wound of finger of right hand 10/20/2023    CICI (obstructive sleep apnea)      uses CPAP    Painful diabetic neuropathy 05/12/2022    Personal history of colonic polyps 08/18/2022    Polyneuropathy     Primary hypertension 10/28/2017    Recurrent pancreatitis     Renal insufficiency     Tobacco abuse     Tobacco user 06/20/2022    Type 2 diabetes mellitus with skin complication, with long-term current use of insulin 02/06/2023    Urinary frequency     Use of cane as ambulatory aid     Weight loss, unintentional      Past Surgical History:   Procedure Laterality Date    ANGIOGRAM, CORONARY, WITH LEFT HEART CATHETERIZATION N/A 04/10/2023    Procedure: Angiogram, Coronary, with Left Heart Cath;  Surgeon: Jaswant Pugh MD;  Location: [facility] CATH LAB;  Service: Cardiology;  Laterality: N/A;    ANGIOGRAM, CORONARY, WITH LEFT HEART CATHETERIZATION N/A 10/10/2024    Procedure: Angiogram, Coronary, with Left Heart Cath;  Surgeon: Jaswant Pugh MD;  Location: [facility] CATH LAB;  Service: Cardiology;  Laterality: N/A;    APPENDECTOMY      ARTERIOVENOUS ANASTOMOSIS, OPEN, UPPER ARM BASILLIC VEIN TRANSPOSITION N/A 05/07/2018    CORONARY ARTERY BYPASS GRAFT (CABG) N/A 12/05/2024    Procedure: CORONARY ARTERY BYPASS GRAFT (CABG);  Surgeon: Gillian Clayton MD;  Location: [facility] OR;  Service: Cardiothoracic;  Laterality: N/A;  ECHO NOTIFIED    EGD, WITH CLOSED BIOPSY N/A 11/20/2023    Procedure: EGD, WITH CLOSED BIOPSY;  Surgeon: Christina James MD;  Location: [facility] ENDOSCOPY;  Service: Gastroenterology;  Laterality: N/A;    ENDOSCOPIC HARVEST OF VEIN Left 12/05/2024    Procedure: HARVEST-VEIN-ENDOVASCULAR;  Surgeon: Gillian Clayton MD;  Location: [facility] OR;  Service: Cardiothoracic;  Laterality: Left;    ESOPHAGOGASTRODUODENOSCOPY N/A 06/07/2021    EXCISION OF ARTERIOVENOUS FISTULA N/A 06/01/2018    FRACTIONAL FLOW RESERVE (FFR), CORONARY  04/10/2023    Procedure: Fractional Flow [place] (FFR), Coronary;  Surgeon: Jaswant Pugh MD;  Location: [facility] CATH LAB;  Service: Cardiology;;     "HERNIA REPAIR      INSERTION OF TUNNELED CENTRAL VENOUS CATHETER (CVC) WITH SUBCUTANEOUS PORT Right 8/1/2025    Procedure: [id number];  Surgeon: Houston Hubbard Jr., MD;  Location: [facility] OR;  Service: General;  Laterality: Right;    INSPECTION OF UPPER INTESTINAL TRACT N/A 06/07/2021    OCCLUSION OF LEFT ATRIAL APPENDAGE Left 12/05/2024    Procedure: Left atrial appendage occlusion;  Surgeon: Gillian Clayton MD;  Location: [facility] OR;  Service: Cardiothoracic;  Laterality: Left;    PHERESIS OF PLASMA N/A 07/13/2018    PHERESIS OF PLASMA N/A 05/25/2018    TRIAL OF SPINAL CORD NERVE STIMULATOR N/A 05/12/2022    Procedure: TRIAL, NEUROSTIMULATOR, SPINAL CORD;  Surgeon: Jay Pozo MD;  Location: [facility] OR;  Service: Neurosurgery;  Laterality: N/A;    UPPER GI N/A 06/07/2021     Family History   Problem Relation Name Age of Onset    Obesity Father      Cancer Paternal Uncle       Social History     Tobacco Use    Smoking status: Every Day     Current packs/day: 0.50     Average packs/day: 2.6 packs/day for 33.1 years (84.4 ttl pk-yrs)     Types: Cigarettes     Start date: 1992     Last attempt to quit: 01/1999     Passive exposure: Current    Smokeless tobacco: Former     Types: Chew    Tobacco comments:     Pt is smoking 1/2 pack per day   Vaping Use    Vaping status: Never Used   Substance and Sexual Activity    Alcohol use: Not Currently    Drug use: Never    Sexual activity: Yes     Partners: Female       TREATMENT PLAN:  Chemo: [x] Yes, OP GI mFOLFIRINOX (oxaliplatin leucovorin irinotecan fluorouracil) Q2W       [] No  Radiation: [] Yes      [x] No    Review of patient's allergies indicates:  No Known Allergies    ANTHROPOMETRICS:  Height:  69"  Weight:   Wt Readings from Last 10 Encounters:   08/06/25 80.2 kg (176 lb 12.9 oz)   08/05/25 80.7 kg (178 lb)   07/30/25 79 kg (174 lb 2.6 oz)   07/29/25 77.6 kg (171 lb)   07/29/25 73.5 kg (162 lb)   07/23/25 85.5 kg (188 lb 9.6 oz)   07/18/25 89.3 " "kg (196 lb 13.9 oz)   07/15/25 85 kg (187 lb 6.4 oz)   07/10/25 84.4 kg (186 lb)   07/07/25 83.6 kg (184 lb 3.2 oz)     Weight Changes: has decreased 16 pounds over last 6 months, decreased 8 lb (5% over the last 1 month), > 20% wt loss over the last year  BMI: 26 (overweight)    INTAKE:  Current Diet: regular diet  Diet Texture: Regular  % PO consumed at meals: 26-50%  Dietary Patterns: Patient eats []0  [x]1  []2  []3  [] 4 meals/day  Skips []0  []1  [x]2  []3 meals  Drinks Mostly soda    Current ONS Intake: []  Yes   [x] No    Enteral Nutrition     Patient not receiving enteral nutrition at this time.    Food Security  Is patient able to sufficiently able to prepare meals at home? [x] Yes  [] No []N/A  If no, does patient have help cooking, preparing, and serving meals at home? [] Yes  [] No [x] N/A    SYMPTOMS/COMPLAINTS:  abdominal pain and diarrhea    Current Medications: Current Medications[1]       Current labs reviewed:  8/6/25 -- H/H 15/46, WBC 10, Na 141, K 3.4, Phos 3.8, Glu 52 L, BUN 10, Cr 0.8    RD Note:  8/6/25 -- Pt beginning chemo treatment this am for pancreatic cancer, alone during visit; Pt eating a [employer] breakfast sandwich at visit & drinking soda; reports poor po intake related to abdominal pain, reports only eating once weekly; denies n/v; Reports not drinking Ensure d/t cost, will refer to patient navigator for assistance with ONS from [facility]; Encouraged GI Soft/Low fat foods for best management of abdominal pain & diarrhea; Encourage water for hydration especially with diarrhea, pt verbalizes understanding reporting drinking water at times with "enhancer"; Continues on Creon per MD; Significant wt loss over the last year per EMR weight history, stabilizing over the last 6 months, Encourage use of Ensure Plus BID & 5-6 small snacks/meals daily to meet nutrition needs    Education Provided: Maximizing nutrition during cancer treatment, management of diarrhea, foods easy on the stomach, food " safety  Teaching Method: explanation and printed materials  Comprehension: verbalizes understanding and needs reinforcement  Barriers to Learning: difficulty concentrating and hearing deficit  Expected Compliance: fair  Comments: All questions were answered and dietitian's contact information was provided.     Estimated Nutrition Needs:based on 80 kg  Calories : 0627-1389 kcal (25 - 30 kcal/kg)  Protein : 80-96 gm (1 - 1.2 gm/kg)  Fluid: 1222-2455 ml (1ml/kcal)    Malnutrition in the context of chronic illness  Degree of Malnutrition:  Severe Malnutrition  Energy Intake:  </= 75% of estimated energy requirement for >/= 1 month  Interpretation of Weight Loss:  >20% in 1 year  Body Fat: mild depletion      Area of Body Fat Loss:  orbital region   Muscle Mass Loss:  mild depletion      Area of Muscle Mass Loss: temple region - temporalis muscle  Fluid Accumulation:  does not meet criteria      Edema: no edema present  Reduced  Strength:  unable to obtain  A minimum of two characteristics is recommended for diagnosis of either severe or non-severe malnutrition.        Nutrition Problem (PES):   PES: Malnutrition related to catabolic illness as evidenced by pancreatic cancer, < 75% oral intake > 1 month, > 20% wt  loss x 1 year, mild fat loss, mild muscle loss. (new)    RD Plan/Goals:   [x] Weight stable [] Weight gain  [] Weight Loss [x] Increase Kcal/protein [] Biochemical data improved  [] Adjust Tube-feeding Rx  [] Tolerate Tube Feedings   [] Increase tube feedings to goal   [x] Tolerate Supplements   [x] Symptoms Improved  [] Understand nutrition Education  [] offer supportive visits [] other, please specify        Interventions:  GI Soft diet (5-6 small meals/snacks)  Ensure Plus BID  Suggest daily MVI  Pancreatic Enzymes    Follow up:   [x]  Will continue to closely monitor throughout chemotherapy treatment regimen.  []  Will continue to monitor with MD follow up appointments or as needed per patient  []   Consult RD prn    Kenyatta Amador, RDN, LDN         [1]   Current Outpatient Medications:     amitriptyline (ELAVIL) 50 MG tablet, TAKE ONE TABLET BY MOUTH IN THE EVENING, Disp: 30 tablet, Rfl: 4    aspirin (ECOTRIN) 81 MG EC tablet, Take 81 mg by mouth once daily., Disp: , Rfl:     atorvastatin (LIPITOR) 80 MG tablet, Take 1 tablet (80 mg total) by mouth once daily., Disp: 90 tablet, Rfl: 3    clonazePAM (KLONOPIN) 1 MG tablet, TAKE ONE TABLET BY MOUTH TWICE DAILY, Disp: 60 tablet, Rfl: 0    cloNIDine (CATAPRES) 0.3 MG tablet, Take 1 tablet (0.3 mg total) by mouth 3 (three) times daily., Disp: 270 tablet, Rfl: 3    clopidogreL (PLAVIX) 75 mg tablet, Take 4 tablets on day 1 and then one tablet daily., Disp: 90 tablet, Rfl: 3    CREON 36,000-114,000- 180,000 unit CpDR, TAKE ONE CAPSULE THREE TIMES DAILY, Disp: 270 capsule, Rfl: 1    dapagliflozin propanediol (FARXIGA) 5 mg Tab tablet, Take 2 tablets (10 mg total) by mouth once daily., Disp: 180 tablet, Rfl: 1    ergocalciferol (ERGOCALCIFEROL) 50,000 unit Cap, Take 50,000 Units by mouth every 7 days., Disp: , Rfl:     EScitalopram oxalate (LEXAPRO) 20 MG tablet, Take 20 mg by mouth once daily., Disp: , Rfl:     esomeprazole (NEXIUM) 40 MG capsule, Take 1 capsule (40 mg total) by mouth before breakfast., Disp: 30 capsule, Rfl: 11    gabapentin (NEURONTIN) 800 MG tablet, TAKE ONE TABLET BY MOUTH IN THE EVENING, Disp: 90 tablet, Rfl: 3    HUMALOG U-100 INSULIN 100 unit/mL injection, INJECT 25 UNITS SUBCUTANEOUSLY BEFORE MEALS THREE TIMES DAILY, Disp: 10 mL, Rfl: 3    HYDROmorphone (DILAUDID) 8 MG tablet, Take 1 tablet (8 mg total) by mouth every 8 (eight) hours as needed., Disp: 90 tablet, Rfl: 0    icosapent ethyL (VASCEPA) 1 gram Cap, TAKE TWO CAPSULES BY MOUTH TWICE DAILY, Disp: 60 capsule, Rfl: 3    insulin glargine U-100, Lantus, (LANTUS SOLOSTAR U-100 INSULIN) 100 unit/mL (3 mL) InPn pen, Inject 40 Units into the skin 2 (two) times a day., Disp: 24 mL, Rfl: 11     isosorbide mononitrate (IMDUR) 120 MG 24 hr tablet, Take 1 tablet (120 mg total) by mouth once daily., Disp: 90 tablet, Rfl: 3    LIDOcaine-prilocaine (EMLA) cream, Apply topically as needed. To mediport 30-45 minutes prior to access, Disp: 5 g, Rfl: 2    linaGLIPtin (TRADJENTA) 5 mg Tab tablet, Take 1 tablet (5 mg total) by mouth once daily., Disp: 90 tablet, Rfl: 3    loperamide (IMODIUM) 2 mg capsule, Take 2 capsules (4mg) by mouth after first loose stool, 1 capsule every 2 hours until diarrhea free for 12 hours. May take 2 capsules (4mg) by mouth every 4 hours at night. May require more than the package labeling maximum dose of 16mg/day., Disp: 30 capsule, Rfl: 11    losartan (COZAAR) 100 MG tablet, Take 1 tablet (100 mg total) by mouth once daily., Disp: 90 tablet, Rfl: 3    metoprolol succinate (TOPROL-XL) 25 MG 24 hr tablet, Take 1 tablet (25 mg total) by mouth once daily., Disp: 90 tablet, Rfl: 3    morphine (MS CONTIN) 100 MG 12 hr tablet, TAKE ONE TABLET BY MOUTH THREE TIMES DAILY, Disp: 90 tablet, Rfl: 0    nicotine (NICODERM CQ) 21 mg/24 hr, Place 1 patch onto the skin once daily., Disp: 28 patch, Rfl: 0    NIFEdipine (PROCARDIA-XL) 90 MG (OSM) 24 hr tablet, Take 1 tablet (90 mg total) by mouth once daily., Disp: 30 tablet, Rfl: 1    nitroGLYCERIN (NITROSTAT) 0.3 MG SL tablet, Place 1 tablet (0.3 mg total) under the tongue every 5 (five) minutes as needed for Chest pain (go to er after 3 doses)., Disp: 30 tablet, Rfl: 6    nut.tx.gluc intol,lf,soy-fiber (GLUCERNA 1.5 CEDRIC) 0.08-1.5 gram-kcal/mL Liqd, Take 273 mLs by mouth 2 (two) times a day., Disp: 60 each, Rfl: 4    OLANZapine (ZYPREXA) 5 MG tablet, Take 1 tablet by mouth nightly on days 1-3 of each chemotherapy cycle., Disp: 3 tablet, Rfl: 11    OXcarbazepine (TRILEPTAL) 600 MG Tab, Take 1 tablet (600 mg total) by mouth 2 (two) times daily., Disp: 60 tablet, Rfl: 11    potassium chloride SA (K-DUR,KLOR-CON) 20 MEQ tablet, Take 1 tablet (20 mEq total)  "by mouth 2 (two) times daily., Disp: 180 tablet, Rfl: 3    pregabalin (LYRICA) 150 MG capsule, Take 1 capsule (150 mg total) by mouth 3 (three) times daily., Disp: 90 capsule, Rfl: 3    prochlorperazine (COMPAZINE) 10 MG tablet, Take 1 tablet (10 mg total) by mouth every 6 (six) hours as needed., Disp: 20 tablet, Rfl: 5    ranolazine (RANEXA) 500 MG Tb12, Take 1 tablet (500 mg total) by mouth 2 (two) times daily., Disp: 180 tablet, Rfl: 3    REPATHA SURECLICK 140 mg/mL PnIj, INJECT 1ml INTRAMUSCULARLY EVERY 14 DAYS, Disp: 2 mL, Rfl: 11    sucralfate (CARAFATE) 100 mg/mL suspension, Take 10 mLs (1 g total) by mouth 4 (four) times daily before meals and nightly., Disp: 1200 mL, Rfl: 3    SURE COMFORT INSULIN SYRINGE 0.5 mL 31 gauge x 5/16" Syrg, USE ONE SYRINGE THREE TIMES DAILY, Disp: 100 each, Rfl: 3    tamsulosin (FLOMAX) 0.4 mg Cap, TAKE ONE CAPSULE BY MOUTH EVERY DAY, Disp: 90 capsule, Rfl: 3    torsemide (DEMADEX) 20 MG Tab, Take 1 tablet (20 mg total) by mouth once daily., Disp: 90 tablet, Rfl: 3    TOUJEO SOLOSTAR U-300 INSULIN 300 unit/mL (1.5 mL) InPn pen, INJECT 35 SUBCUTANEOUSLY TWICE DAILY, Disp: 4.5 mL, Rfl: 3  No current facility-administered medications for this visit.    Facility-Administered Medications Ordered in Other Visits:     0.9% NaCl 250 mL flush bag, , Intravenous, 1 time in Clinic/HOD, Brandon Clifford MD    alteplase injection 2 mg, 2 mg, Intra-Catheter, PRN, Brandon Clifford MD    D5W 250 mL flush bag, , Intravenous, 1 time in Clinic/HOD, Brandon Clifford MD    diphenhydrAMINE injection 50 mg, 50 mg, Intravenous, Once PRN, Brandon Clifford MD    EPINEPHrine injection 0.3 mg, 0.3 mg, Intramuscular, Once PRN, Brandon Clifford MD    fluorouracil (Adrucil) 2,400 mg/m2 = 4,850 mg in 0.9% NaCl 100 mL chemo infusion, 2,400 mg/m2 (Treatment Plan Recorded), Intravenous, over 46 hr, Brandon Clifford MD    heparin, porcine (PF) 100 unit/mL injection flush 500 Units, 500 Units, Intravenous, " PRN, Brandon Clifford MD    hydrocortisone sodium succinate injection 100 mg, 100 mg, Intravenous, Once PRN, Brandon Clifford MD    irinotecan (CAMPTOSAR) 300 mg in 0.9% NaCl 580 mL chemo infusion, 300 mg, Intravenous, 1 time in Clinic/[facility], Brandon Clifford MD    leucovorin calcium 800 mg in D5W 285 mL infusion, 800 mg, Intravenous, 1 time in Clinic/[facility], Brandon Clifford MD    oxaliplatin (ELOXATIN) 85 mg/m2 = 172 mg in D5W 599.4 mL chemo infusion, 85 mg/m2 (Treatment Plan Recorded), Intravenous, 1 time in Clinic/[facility], Brandon Clifford MD    palonosetron 0.25mg/dexAMETHasone 12mg in NS IVPB 0.25 mg 50 mL, 0.25 mg, Intravenous, 1 time in Clinic/[facility], Brandon Clifford MD, Last Rate: 150 mL/hr at 08/06/25 1042, 0.25 mg at 08/06/25 1042    prochlorperazine injection Soln 10 mg, 10 mg, Intravenous, Once PRN, Brandon Clifford MD    sodium chloride 0.9% flush 10 mL, 10 mL, Intravenous, PRN, Brandon Clifford MD

## 2025-08-06 NOTE — PLAN OF CARE
C1 D1 Folfirinox given via MP; tolerated well  C/O pain to abdomen and BLE pain meds taken    BP was elevated clonidine 0.1mg x2 given

## 2025-08-08 ENCOUNTER — INFUSION (OUTPATIENT)
Dept: INFUSION THERAPY | Facility: HOSPITAL | Age: 45
End: 2025-08-08
Attending: INTERNAL MEDICINE
Payer: MEDICARE

## 2025-08-08 VITALS
TEMPERATURE: 98 F | HEART RATE: 54 BPM | OXYGEN SATURATION: 98 % | DIASTOLIC BLOOD PRESSURE: 74 MMHG | RESPIRATION RATE: 18 BRPM | SYSTOLIC BLOOD PRESSURE: 145 MMHG

## 2025-08-08 DIAGNOSIS — C25.0 ADENOCARCINOMA OF HEAD OF PANCREAS: Primary | ICD-10-CM

## 2025-08-08 PROCEDURE — 63600175 PHARM REV CODE 636 W HCPCS

## 2025-08-08 PROCEDURE — A4216 STERILE WATER/SALINE, 10 ML: HCPCS

## 2025-08-08 PROCEDURE — 96365 THER/PROPH/DIAG IV INF INIT: CPT

## 2025-08-08 PROCEDURE — 96361 HYDRATE IV INFUSION ADD-ON: CPT

## 2025-08-08 PROCEDURE — 25000003 PHARM REV CODE 250

## 2025-08-08 RX ORDER — CLONIDINE HYDROCHLORIDE 0.1 MG/1
0.2 TABLET ORAL ONCE
Status: COMPLETED | OUTPATIENT
Start: 2025-08-08 | End: 2025-08-08

## 2025-08-08 RX ORDER — PROCHLORPERAZINE EDISYLATE 5 MG/ML
10 INJECTION INTRAMUSCULAR; INTRAVENOUS ONCE AS NEEDED
Status: DISCONTINUED | OUTPATIENT
Start: 2025-08-08 | End: 2025-08-08 | Stop reason: HOSPADM

## 2025-08-08 RX ORDER — HEPARIN 100 UNIT/ML
500 SYRINGE INTRAVENOUS
Status: DISCONTINUED | OUTPATIENT
Start: 2025-08-08 | End: 2025-08-08 | Stop reason: HOSPADM

## 2025-08-08 RX ORDER — HEPARIN 100 UNIT/ML
500 SYRINGE INTRAVENOUS
Status: CANCELLED | OUTPATIENT
Start: 2025-08-08

## 2025-08-08 RX ORDER — SODIUM CHLORIDE 0.9 % (FLUSH) 0.9 %
10 SYRINGE (ML) INJECTION
Status: DISCONTINUED | OUTPATIENT
Start: 2025-08-08 | End: 2025-08-08 | Stop reason: HOSPADM

## 2025-08-08 RX ORDER — SODIUM CHLORIDE 0.9 % (FLUSH) 0.9 %
10 SYRINGE (ML) INJECTION
Status: CANCELLED | OUTPATIENT
Start: 2025-08-08

## 2025-08-08 RX ORDER — CLONIDINE HYDROCHLORIDE 0.1 MG/1
0.2 TABLET ORAL ONCE
Status: CANCELLED
Start: 2025-08-08 | End: 2025-08-08

## 2025-08-08 RX ADMIN — CLONIDINE HYDROCHLORIDE 0.2 MG: 0.1 TABLET ORAL at 12:08

## 2025-08-08 RX ADMIN — HEPARIN 500 UNITS: 100 SYRINGE at 01:08

## 2025-08-08 RX ADMIN — SODIUM CHLORIDE, PRESERVATIVE FREE 10 ML: 5 INJECTION INTRAVENOUS at 01:08

## 2025-08-08 RX ADMIN — PALONOSETRON HYDROCHLORIDE 0.25 MG: 0.25 INJECTION, SOLUTION INTRAVENOUS at 12:08

## 2025-08-08 RX ADMIN — SODIUM CHLORIDE 1000 ML: 9 INJECTION, SOLUTION INTRAVENOUS at 12:08

## 2025-08-08 NOTE — PLAN OF CARE
C1 D3 CADD Takedown done; c/o N/V since D1 and weakness 1L NS given, clonidine 0.2mg and Aloxi/Dex given IVPB  
Calm

## 2025-08-09 DIAGNOSIS — E08.42 DIABETIC POLYNEUROPATHY ASSOCIATED WITH DIABETES MELLITUS DUE TO UNDERLYING CONDITION: ICD-10-CM

## 2025-08-09 DIAGNOSIS — G89.4 CHRONIC PAIN SYNDROME: ICD-10-CM

## 2025-08-10 RX ORDER — ERGOCALCIFEROL 1.25 MG/1
50000 CAPSULE ORAL
Qty: 4 CAPSULE | Refills: 2 | Status: SHIPPED | OUTPATIENT
Start: 2025-08-10

## 2025-08-11 ENCOUNTER — OFFICE VISIT (OUTPATIENT)
Dept: INTERNAL MEDICINE | Facility: CLINIC | Age: 45
End: 2025-08-11
Payer: MEDICARE

## 2025-08-11 ENCOUNTER — INFUSION (OUTPATIENT)
Dept: INFUSION THERAPY | Facility: HOSPITAL | Age: 45
End: 2025-08-11
Attending: INTERNAL MEDICINE
Payer: MEDICARE

## 2025-08-11 VITALS
BODY MASS INDEX: 26.73 KG/M2 | RESPIRATION RATE: 19 BRPM | SYSTOLIC BLOOD PRESSURE: 189 MMHG | OXYGEN SATURATION: 96 % | WEIGHT: 176.38 LBS | HEIGHT: 68 IN | HEART RATE: 78 BPM | DIASTOLIC BLOOD PRESSURE: 87 MMHG | TEMPERATURE: 99 F

## 2025-08-11 VITALS
RESPIRATION RATE: 20 BRPM | WEIGHT: 177 LBS | OXYGEN SATURATION: 96 % | HEIGHT: 68 IN | TEMPERATURE: 99 F | DIASTOLIC BLOOD PRESSURE: 95 MMHG | HEART RATE: 76 BPM | BODY MASS INDEX: 26.83 KG/M2 | SYSTOLIC BLOOD PRESSURE: 178 MMHG

## 2025-08-11 DIAGNOSIS — C77.2 MALIGNANT NEOPLASM OF PANCREAS METASTATIC TO INTRA-ABDOMINAL LYMPH NODE: ICD-10-CM

## 2025-08-11 DIAGNOSIS — C25.0 ADENOCARCINOMA OF HEAD OF PANCREAS: Primary | ICD-10-CM

## 2025-08-11 DIAGNOSIS — R11.0 NAUSEA: Primary | ICD-10-CM

## 2025-08-11 DIAGNOSIS — C25.0 ADENOCARCINOMA OF HEAD OF PANCREAS: ICD-10-CM

## 2025-08-11 DIAGNOSIS — C25.9 MALIGNANT NEOPLASM OF PANCREAS METASTATIC TO INTRA-ABDOMINAL LYMPH NODE: ICD-10-CM

## 2025-08-11 DIAGNOSIS — I10 PRIMARY HYPERTENSION: Primary | ICD-10-CM

## 2025-08-11 PROCEDURE — A4216 STERILE WATER/SALINE, 10 ML: HCPCS

## 2025-08-11 PROCEDURE — 99215 OFFICE O/P EST HI 40 MIN: CPT | Mod: PBBFAC,25

## 2025-08-11 PROCEDURE — 96374 THER/PROPH/DIAG INJ IV PUSH: CPT

## 2025-08-11 PROCEDURE — 63600175 PHARM REV CODE 636 W HCPCS

## 2025-08-11 PROCEDURE — 25000003 PHARM REV CODE 250

## 2025-08-11 PROCEDURE — 96361 HYDRATE IV INFUSION ADD-ON: CPT

## 2025-08-11 RX ORDER — HEPARIN 100 UNIT/ML
500 SYRINGE INTRAVENOUS
Status: DISCONTINUED | OUTPATIENT
Start: 2025-08-11 | End: 2025-08-11 | Stop reason: HOSPADM

## 2025-08-11 RX ORDER — MORPHINE SULFATE 100 MG/1
100 TABLET, FILM COATED, EXTENDED RELEASE ORAL 3 TIMES DAILY
Qty: 90 TABLET | Refills: 0 | Status: SHIPPED | OUTPATIENT
Start: 2025-08-11

## 2025-08-11 RX ORDER — CLONAZEPAM 1 MG/1
1 TABLET ORAL 2 TIMES DAILY
Qty: 60 TABLET | Refills: 0 | Status: SHIPPED | OUTPATIENT
Start: 2025-08-11

## 2025-08-11 RX ORDER — ONDANSETRON HYDROCHLORIDE 2 MG/ML
8 INJECTION, SOLUTION INTRAVENOUS
Status: COMPLETED | OUTPATIENT
Start: 2025-08-11 | End: 2025-08-11

## 2025-08-11 RX ORDER — SODIUM CHLORIDE 0.9 % (FLUSH) 0.9 %
10 SYRINGE (ML) INJECTION
Status: DISCONTINUED | OUTPATIENT
Start: 2025-08-11 | End: 2025-08-11 | Stop reason: HOSPADM

## 2025-08-11 RX ORDER — HYDROMORPHONE HYDROCHLORIDE 8 MG/1
8 TABLET ORAL EVERY 8 HOURS PRN
Qty: 90 TABLET | Refills: 0 | Status: SHIPPED | OUTPATIENT
Start: 2025-08-11

## 2025-08-11 RX ORDER — ONDANSETRON 8 MG/1
8 TABLET, FILM COATED ORAL EVERY 8 HOURS PRN
Qty: 30 TABLET | Refills: 0 | Status: SHIPPED | OUTPATIENT
Start: 2025-08-11 | End: 2025-08-21

## 2025-08-11 RX ADMIN — Medication 10 ML: at 12:08

## 2025-08-11 RX ADMIN — ONDANSETRON 8 MG: 2 INJECTION INTRAMUSCULAR; INTRAVENOUS at 11:08

## 2025-08-11 RX ADMIN — HEPARIN 500 UNITS: 100 SYRINGE at 12:08

## 2025-08-11 RX ADMIN — SODIUM CHLORIDE 1000 ML: 9 INJECTION, SOLUTION INTRAVENOUS at 11:08

## 2025-08-12 ENCOUNTER — TELEPHONE (OUTPATIENT)
Dept: HEMATOLOGY/ONCOLOGY | Facility: CLINIC | Age: 45
End: 2025-08-12
Payer: MEDICARE

## 2025-08-12 DIAGNOSIS — C25.0 ADENOCARCINOMA OF HEAD OF PANCREAS: Primary | ICD-10-CM

## 2025-08-13 ENCOUNTER — TELEPHONE (OUTPATIENT)
Dept: HEMATOLOGY/ONCOLOGY | Facility: CLINIC | Age: 45
End: 2025-08-13
Payer: MEDICARE

## 2025-08-14 ENCOUNTER — TELEPHONE (OUTPATIENT)
Dept: HEMATOLOGY/ONCOLOGY | Facility: CLINIC | Age: 45
End: 2025-08-14
Payer: MEDICARE

## 2025-08-18 ENCOUNTER — TELEPHONE (OUTPATIENT)
Dept: HEMATOLOGY/ONCOLOGY | Facility: CLINIC | Age: 45
End: 2025-08-18
Payer: MEDICARE

## 2025-08-18 PROBLEM — R79.89 HIGH SERUM CARBOHYDRATE ANTIGEN 19-9 (CA19-9): Status: RESOLVED | Noted: 2025-07-06 | Resolved: 2025-08-18

## 2025-08-19 ENCOUNTER — TELEPHONE (OUTPATIENT)
Dept: HEMATOLOGY/ONCOLOGY | Facility: CLINIC | Age: 45
End: 2025-08-19
Payer: MEDICARE

## 2025-08-19 ENCOUNTER — OFFICE VISIT (OUTPATIENT)
Dept: SURGICAL ONCOLOGY | Facility: CLINIC | Age: 45
End: 2025-08-19
Payer: MEDICARE

## 2025-08-19 VITALS
RESPIRATION RATE: 18 BRPM | DIASTOLIC BLOOD PRESSURE: 69 MMHG | HEIGHT: 68 IN | WEIGHT: 177.81 LBS | TEMPERATURE: 98 F | OXYGEN SATURATION: 98 % | BODY MASS INDEX: 26.95 KG/M2 | SYSTOLIC BLOOD PRESSURE: 109 MMHG | HEART RATE: 81 BPM

## 2025-08-19 DIAGNOSIS — C25.0 MALIGNANT NEOPLASM OF HEAD OF PANCREAS: Primary | ICD-10-CM

## 2025-08-19 PROCEDURE — 99999 PR PBB SHADOW E&M-EST. PATIENT-LVL V: CPT | Mod: PBBFAC,,, | Performed by: SURGERY

## 2025-08-19 PROCEDURE — 99205 OFFICE O/P NEW HI 60 MIN: CPT | Mod: S$PBB,,, | Performed by: SURGERY

## 2025-08-19 PROCEDURE — 99215 OFFICE O/P EST HI 40 MIN: CPT | Mod: PBBFAC | Performed by: SURGERY

## 2025-08-19 PROCEDURE — G2211 COMPLEX E/M VISIT ADD ON: HCPCS | Mod: ,,, | Performed by: SURGERY

## 2025-08-20 ENCOUNTER — OFFICE VISIT (OUTPATIENT)
Dept: HEMATOLOGY/ONCOLOGY | Facility: CLINIC | Age: 45
End: 2025-08-20
Payer: MEDICARE

## 2025-08-20 ENCOUNTER — INFUSION (OUTPATIENT)
Dept: INFUSION THERAPY | Facility: HOSPITAL | Age: 45
End: 2025-08-20
Attending: INTERNAL MEDICINE
Payer: MEDICARE

## 2025-08-20 VITALS
WEIGHT: 170.19 LBS | OXYGEN SATURATION: 99 % | RESPIRATION RATE: 16 BRPM | HEART RATE: 73 BPM | SYSTOLIC BLOOD PRESSURE: 126 MMHG | HEIGHT: 68 IN | BODY MASS INDEX: 25.79 KG/M2 | DIASTOLIC BLOOD PRESSURE: 79 MMHG | TEMPERATURE: 99 F

## 2025-08-20 DIAGNOSIS — S91.302A OPEN WOUND OF FOOT, LEFT, INITIAL ENCOUNTER: ICD-10-CM

## 2025-08-20 DIAGNOSIS — C77.2 MALIGNANT NEOPLASM OF PANCREAS METASTATIC TO INTRA-ABDOMINAL LYMPH NODE: ICD-10-CM

## 2025-08-20 DIAGNOSIS — Z79.69 IMMUNODEFICIENCY DUE TO CHEMOTHERAPY: ICD-10-CM

## 2025-08-20 DIAGNOSIS — C25.0 ADENOCARCINOMA OF HEAD OF PANCREAS: Primary | ICD-10-CM

## 2025-08-20 DIAGNOSIS — G89.4 CHRONIC PAIN SYNDROME: ICD-10-CM

## 2025-08-20 DIAGNOSIS — T45.1X5A IMMUNODEFICIENCY DUE TO CHEMOTHERAPY: ICD-10-CM

## 2025-08-20 DIAGNOSIS — E87.6 HYPOKALEMIA: ICD-10-CM

## 2025-08-20 DIAGNOSIS — C25.9 MALIGNANT NEOPLASM OF PANCREAS METASTATIC TO INTRA-ABDOMINAL LYMPH NODE: ICD-10-CM

## 2025-08-20 DIAGNOSIS — S81.811A LACERATION OF RIGHT LOWER EXTREMITY, INITIAL ENCOUNTER: ICD-10-CM

## 2025-08-20 DIAGNOSIS — H91.90 HARD OF HEARING: ICD-10-CM

## 2025-08-20 DIAGNOSIS — R73.9 HYPERGLYCEMIA: ICD-10-CM

## 2025-08-20 DIAGNOSIS — R10.13 POSTPRANDIAL EPIGASTRIC PAIN: ICD-10-CM

## 2025-08-20 DIAGNOSIS — K52.9 POSTPRANDIAL DIARRHEA: ICD-10-CM

## 2025-08-20 DIAGNOSIS — D84.821 IMMUNODEFICIENCY DUE TO CHEMOTHERAPY: ICD-10-CM

## 2025-08-20 DIAGNOSIS — R11.0 NAUSEA: ICD-10-CM

## 2025-08-20 LAB
POCT GLUCOSE: 367 MG/DL (ref 70–110)
POCT GLUCOSE: 408 MG/DL (ref 70–110)
POCT GLUCOSE: 469 MG/DL (ref 70–110)
POCT GLUCOSE: 475 MG/DL (ref 70–110)
POCT GLUCOSE: >500 MG/DL (ref 70–110)

## 2025-08-20 PROCEDURE — 96368 THER/DIAG CONCURRENT INF: CPT

## 2025-08-20 PROCEDURE — 25000003 PHARM REV CODE 250

## 2025-08-20 PROCEDURE — 96417 CHEMO IV INFUS EACH ADDL SEQ: CPT

## 2025-08-20 PROCEDURE — 63600175 PHARM REV CODE 636 W HCPCS

## 2025-08-20 PROCEDURE — 96367 TX/PROPH/DG ADDL SEQ IV INF: CPT

## 2025-08-20 PROCEDURE — 96416 CHEMO PROLONG INFUSE W/PUMP: CPT

## 2025-08-20 PROCEDURE — 96372 THER/PROPH/DIAG INJ SC/IM: CPT | Mod: 59

## 2025-08-20 PROCEDURE — 96413 CHEMO IV INFUSION 1 HR: CPT

## 2025-08-20 PROCEDURE — 96375 TX/PRO/DX INJ NEW DRUG ADDON: CPT

## 2025-08-20 PROCEDURE — 96415 CHEMO IV INFUSION ADDL HR: CPT

## 2025-08-20 PROCEDURE — 96366 THER/PROPH/DIAG IV INF ADDON: CPT

## 2025-08-20 PROCEDURE — 99215 OFFICE O/P EST HI 40 MIN: CPT | Mod: PBBFAC,25

## 2025-08-20 RX ORDER — PROCHLORPERAZINE EDISYLATE 5 MG/ML
10 INJECTION INTRAMUSCULAR; INTRAVENOUS ONCE AS NEEDED
Status: CANCELLED | OUTPATIENT
Start: 2025-08-20

## 2025-08-20 RX ORDER — POTASSIUM CHLORIDE 14.9 MG/ML
20 INJECTION INTRAVENOUS
Status: COMPLETED | OUTPATIENT
Start: 2025-08-20 | End: 2025-08-20

## 2025-08-20 RX ORDER — DIPHENHYDRAMINE HYDROCHLORIDE 50 MG/ML
50 INJECTION, SOLUTION INTRAMUSCULAR; INTRAVENOUS ONCE AS NEEDED
Status: DISCONTINUED | OUTPATIENT
Start: 2025-08-20 | End: 2025-08-20 | Stop reason: HOSPADM

## 2025-08-20 RX ORDER — POTASSIUM CHLORIDE 14.9 MG/ML
20 INJECTION INTRAVENOUS
Status: CANCELLED | OUTPATIENT
Start: 2025-08-20 | End: 2025-08-20

## 2025-08-20 RX ORDER — ATROPINE SULFATE 0.4 MG/ML
0.4 INJECTION, SOLUTION ENDOTRACHEAL; INTRAMEDULLARY; INTRAMUSCULAR; INTRAVENOUS; SUBCUTANEOUS ONCE AS NEEDED
Status: CANCELLED | OUTPATIENT
Start: 2025-08-20 | End: 2037-01-16

## 2025-08-20 RX ORDER — HEPARIN 100 UNIT/ML
500 SYRINGE INTRAVENOUS
Status: DISCONTINUED | OUTPATIENT
Start: 2025-08-20 | End: 2025-08-20 | Stop reason: HOSPADM

## 2025-08-20 RX ORDER — EPINEPHRINE 1 MG/ML
0.3 INJECTION INTRAMUSCULAR; INTRAVENOUS; SUBCUTANEOUS ONCE AS NEEDED
Status: DISCONTINUED | OUTPATIENT
Start: 2025-08-20 | End: 2025-08-20 | Stop reason: HOSPADM

## 2025-08-20 RX ORDER — HEPARIN 100 UNIT/ML
500 SYRINGE INTRAVENOUS
Status: CANCELLED | OUTPATIENT
Start: 2025-08-20

## 2025-08-20 RX ORDER — DIPHENHYDRAMINE HYDROCHLORIDE 50 MG/ML
50 INJECTION, SOLUTION INTRAMUSCULAR; INTRAVENOUS ONCE AS NEEDED
Status: CANCELLED | OUTPATIENT
Start: 2025-08-20 | End: 2037-01-16

## 2025-08-20 RX ORDER — PROCHLORPERAZINE EDISYLATE 5 MG/ML
10 INJECTION INTRAMUSCULAR; INTRAVENOUS ONCE AS NEEDED
Status: DISCONTINUED | OUTPATIENT
Start: 2025-08-20 | End: 2025-08-20 | Stop reason: HOSPADM

## 2025-08-20 RX ORDER — EPINEPHRINE 0.3 MG/.3ML
0.3 INJECTION SUBCUTANEOUS ONCE AS NEEDED
Status: CANCELLED | OUTPATIENT
Start: 2025-08-20 | End: 2037-01-16

## 2025-08-20 RX ORDER — SODIUM CHLORIDE 0.9 % (FLUSH) 0.9 %
10 SYRINGE (ML) INJECTION
Status: CANCELLED | OUTPATIENT
Start: 2025-08-20

## 2025-08-20 RX ORDER — ATROPINE SULFATE 0.4 MG/ML
0.4 INJECTION, SOLUTION ENDOTRACHEAL; INTRAMEDULLARY; INTRAMUSCULAR; INTRAVENOUS; SUBCUTANEOUS ONCE AS NEEDED
Status: COMPLETED | OUTPATIENT
Start: 2025-08-20 | End: 2025-08-20

## 2025-08-20 RX ORDER — SODIUM CHLORIDE 0.9 % (FLUSH) 0.9 %
10 SYRINGE (ML) INJECTION
Status: DISCONTINUED | OUTPATIENT
Start: 2025-08-20 | End: 2025-08-20 | Stop reason: HOSPADM

## 2025-08-20 RX ADMIN — DEXAMETHASONE SODIUM PHOSPHATE 0.25 MG: 4 INJECTION, SOLUTION INTRA-ARTICULAR; INTRALESIONAL; INTRAMUSCULAR; INTRAVENOUS; SOFT TISSUE at 10:08

## 2025-08-20 RX ADMIN — DEXTROSE MONOHYDRATE: 50 INJECTION, SOLUTION INTRAVENOUS at 10:08

## 2025-08-20 RX ADMIN — OXALIPLATIN 172 MG: 5 INJECTION, SOLUTION INTRAVENOUS at 11:08

## 2025-08-20 RX ADMIN — IRINOTECAN HYDROCHLORIDE 300 MG: 20 INJECTION, SOLUTION INTRAVENOUS at 01:08

## 2025-08-20 RX ADMIN — SODIUM CHLORIDE: 9 INJECTION, SOLUTION INTRAVENOUS at 10:08

## 2025-08-20 RX ADMIN — ATROPINE SULFATE 0.4 MG: 0.4 INJECTION, SOLUTION INTRAVENOUS at 01:08

## 2025-08-20 RX ADMIN — POTASSIUM CHLORIDE 20 MEQ: 14.9 INJECTION, SOLUTION INTRAVENOUS at 10:08

## 2025-08-20 RX ADMIN — HUMAN INSULIN 10 UNITS: 100 INJECTION, SOLUTION SUBCUTANEOUS at 12:08

## 2025-08-20 RX ADMIN — LEUCOVORIN CALCIUM 800 MG: 350 INJECTION, POWDER, LYOPHILIZED, FOR SOLUTION INTRAMUSCULAR; INTRAVENOUS at 01:08

## 2025-08-20 RX ADMIN — POTASSIUM CHLORIDE 20 MEQ: 14.9 INJECTION, SOLUTION INTRAVENOUS at 01:08

## 2025-08-20 RX ADMIN — APREPITANT 130 MG: 130 INJECTION, EMULSION INTRAVENOUS at 10:08

## 2025-08-20 RX ADMIN — FLUOROURACIL 4850 MG: 50 INJECTION, SOLUTION INTRAVENOUS at 03:08

## 2025-08-22 ENCOUNTER — INFUSION (OUTPATIENT)
Dept: INFUSION THERAPY | Facility: HOSPITAL | Age: 45
End: 2025-08-22
Attending: INTERNAL MEDICINE
Payer: MEDICARE

## 2025-08-22 VITALS
DIASTOLIC BLOOD PRESSURE: 86 MMHG | SYSTOLIC BLOOD PRESSURE: 193 MMHG | TEMPERATURE: 98 F | HEART RATE: 55 BPM | WEIGHT: 158.06 LBS | BODY MASS INDEX: 23.95 KG/M2 | RESPIRATION RATE: 16 BRPM | OXYGEN SATURATION: 99 % | HEIGHT: 68 IN

## 2025-08-22 DIAGNOSIS — C25.0 ADENOCARCINOMA OF HEAD OF PANCREAS: Primary | ICD-10-CM

## 2025-08-22 PROCEDURE — 96374 THER/PROPH/DIAG INJ IV PUSH: CPT

## 2025-08-22 PROCEDURE — 63600175 PHARM REV CODE 636 W HCPCS

## 2025-08-22 PROCEDURE — 25000003 PHARM REV CODE 250

## 2025-08-22 RX ORDER — HEPARIN 100 UNIT/ML
500 SYRINGE INTRAVENOUS
Status: DISCONTINUED | OUTPATIENT
Start: 2025-08-22 | End: 2025-08-22 | Stop reason: HOSPADM

## 2025-08-22 RX ORDER — SODIUM CHLORIDE 0.9 % (FLUSH) 0.9 %
10 SYRINGE (ML) INJECTION
Status: CANCELLED | OUTPATIENT
Start: 2025-08-22

## 2025-08-22 RX ORDER — HEPARIN 100 UNIT/ML
500 SYRINGE INTRAVENOUS
Status: CANCELLED | OUTPATIENT
Start: 2025-08-22

## 2025-08-22 RX ORDER — SODIUM CHLORIDE 0.9 % (FLUSH) 0.9 %
10 SYRINGE (ML) INJECTION
Status: DISCONTINUED | OUTPATIENT
Start: 2025-08-22 | End: 2025-08-22 | Stop reason: HOSPADM

## 2025-08-22 RX ORDER — PROCHLORPERAZINE EDISYLATE 5 MG/ML
10 INJECTION INTRAMUSCULAR; INTRAVENOUS ONCE AS NEEDED
Status: DISCONTINUED | OUTPATIENT
Start: 2025-08-22 | End: 2025-08-22 | Stop reason: HOSPADM

## 2025-08-22 RX ADMIN — PROCHLORPERAZINE EDISYLATE 10 MG: 5 INJECTION, SOLUTION INTRAMUSCULAR; INTRAVENOUS at 01:08

## 2025-08-22 RX ADMIN — SODIUM CHLORIDE 1000 ML: 9 INJECTION, SOLUTION INTRAVENOUS at 01:08

## 2025-08-26 ENCOUNTER — TELEPHONE (OUTPATIENT)
Dept: HEMATOLOGY/ONCOLOGY | Facility: CLINIC | Age: 45
End: 2025-08-26
Payer: MEDICARE

## 2025-08-26 ENCOUNTER — DOCUMENTATION ONLY (OUTPATIENT)
Dept: HEMATOLOGY/ONCOLOGY | Facility: CLINIC | Age: 45
End: 2025-08-26
Payer: MEDICARE

## 2025-08-26 ENCOUNTER — OUTPATIENT CASE MANAGEMENT (OUTPATIENT)
Dept: ADMINISTRATIVE | Facility: OTHER | Age: 45
End: 2025-08-26
Payer: MEDICARE

## 2025-09-02 ENCOUNTER — OUTPATIENT CASE MANAGEMENT (OUTPATIENT)
Dept: ADMINISTRATIVE | Facility: OTHER | Age: 45
End: 2025-09-02
Payer: MEDICARE

## 2025-09-02 ENCOUNTER — TELEPHONE (OUTPATIENT)
Dept: ENDOSCOPY | Facility: HOSPITAL | Age: 45
End: 2025-09-02
Payer: MEDICARE

## 2025-09-02 DIAGNOSIS — Z86.0100 HISTORY OF COLONIC POLYPS: Primary | ICD-10-CM

## 2025-09-02 RX ORDER — SODIUM, POTASSIUM,MAG SULFATES 17.5-3.13G
1 SOLUTION, RECONSTITUTED, ORAL ORAL SEE ADMIN INSTRUCTIONS
Qty: 1 KIT | Refills: 0 | Status: SHIPPED | OUTPATIENT
Start: 2025-09-02 | End: 2025-09-04

## 2025-09-03 ENCOUNTER — OFFICE VISIT (OUTPATIENT)
Dept: HEMATOLOGY/ONCOLOGY | Facility: CLINIC | Age: 45
End: 2025-09-03
Payer: MEDICARE

## 2025-09-03 ENCOUNTER — INFUSION (OUTPATIENT)
Dept: INFUSION THERAPY | Facility: HOSPITAL | Age: 45
End: 2025-09-03
Attending: INTERNAL MEDICINE
Payer: MEDICARE

## 2025-09-03 VITALS
HEART RATE: 86 BPM | OXYGEN SATURATION: 99 % | DIASTOLIC BLOOD PRESSURE: 67 MMHG | WEIGHT: 168 LBS | SYSTOLIC BLOOD PRESSURE: 97 MMHG | HEIGHT: 68 IN | BODY MASS INDEX: 25.46 KG/M2 | TEMPERATURE: 98 F | RESPIRATION RATE: 18 BRPM

## 2025-09-03 DIAGNOSIS — G89.4 CHRONIC PAIN SYNDROME: ICD-10-CM

## 2025-09-03 DIAGNOSIS — C77.2 MALIGNANT NEOPLASM OF PANCREAS METASTATIC TO INTRA-ABDOMINAL LYMPH NODE: ICD-10-CM

## 2025-09-03 DIAGNOSIS — K52.9 POSTPRANDIAL DIARRHEA: ICD-10-CM

## 2025-09-03 DIAGNOSIS — C25.9 MALIGNANT NEOPLASM OF PANCREAS METASTATIC TO INTRA-ABDOMINAL LYMPH NODE: ICD-10-CM

## 2025-09-03 DIAGNOSIS — Z79.69 IMMUNODEFICIENCY DUE TO CHEMOTHERAPY: ICD-10-CM

## 2025-09-03 DIAGNOSIS — R11.0 NAUSEA: ICD-10-CM

## 2025-09-03 DIAGNOSIS — R10.13 POSTPRANDIAL EPIGASTRIC PAIN: ICD-10-CM

## 2025-09-03 DIAGNOSIS — E80.6 HYPERBILIRUBINEMIA: ICD-10-CM

## 2025-09-03 DIAGNOSIS — D84.821 IMMUNODEFICIENCY DUE TO CHEMOTHERAPY: ICD-10-CM

## 2025-09-03 DIAGNOSIS — R74.8 ELEVATED LIVER ENZYMES: Primary | ICD-10-CM

## 2025-09-03 DIAGNOSIS — T45.1X5A IMMUNODEFICIENCY DUE TO CHEMOTHERAPY: ICD-10-CM

## 2025-09-03 DIAGNOSIS — R73.9 HYPERGLYCEMIA: ICD-10-CM

## 2025-09-03 DIAGNOSIS — C25.0 ADENOCARCINOMA OF HEAD OF PANCREAS: ICD-10-CM

## 2025-09-03 DIAGNOSIS — H91.90 HARD OF HEARING: ICD-10-CM

## 2025-09-03 PROCEDURE — 99215 OFFICE O/P EST HI 40 MIN: CPT | Mod: PBBFAC

## 2025-09-04 ENCOUNTER — HOSPITAL ENCOUNTER (OUTPATIENT)
Dept: WOUND CARE | Facility: HOSPITAL | Age: 45
Discharge: HOME | End: 2025-09-04
Attending: NURSE PRACTITIONER
Payer: MEDICARE

## 2025-09-04 VITALS
OXYGEN SATURATION: 100 % | HEART RATE: 82 BPM | RESPIRATION RATE: 20 BRPM | DIASTOLIC BLOOD PRESSURE: 103 MMHG | TEMPERATURE: 98 F | SYSTOLIC BLOOD PRESSURE: 185 MMHG

## 2025-09-04 DIAGNOSIS — C25.9 MALIGNANT NEOPLASM OF PANCREAS METASTATIC TO INTRA-ABDOMINAL LYMPH NODE: ICD-10-CM

## 2025-09-04 DIAGNOSIS — Z72.0 TOBACCO USER: ICD-10-CM

## 2025-09-04 DIAGNOSIS — Z79.4 TYPE 2 DIABETES MELLITUS WITH HYPERGLYCEMIA, WITH LONG-TERM CURRENT USE OF INSULIN: ICD-10-CM

## 2025-09-04 DIAGNOSIS — E13.42 DIABETIC POLYNEUROPATHY ASSOCIATED WITH OTHER SPECIFIED DIABETES MELLITUS: ICD-10-CM

## 2025-09-04 DIAGNOSIS — S91.302A OPEN WOUND OF LEFT FOOT, INITIAL ENCOUNTER: Primary | ICD-10-CM

## 2025-09-04 DIAGNOSIS — C77.2 MALIGNANT NEOPLASM OF PANCREAS METASTATIC TO INTRA-ABDOMINAL LYMPH NODE: ICD-10-CM

## 2025-09-04 DIAGNOSIS — E11.65 TYPE 2 DIABETES MELLITUS WITH HYPERGLYCEMIA, WITH LONG-TERM CURRENT USE OF INSULIN: ICD-10-CM

## 2025-09-04 PROCEDURE — 99211 OFF/OP EST MAY X REQ PHY/QHP: CPT

## 2025-09-04 PROCEDURE — 27000999 HC MEDICAL RECORD PHOTO DOCUMENTATION

## 2025-09-04 PROCEDURE — 11042 DBRDMT SUBQ TIS 1ST 20SQCM/<: CPT

## (undated) DEVICE — Device

## (undated) DEVICE — GOWN ECLIPSE REINF LVL4 TWL XL

## (undated) DEVICE — SYR IRRIGATION BULB STER 60ML

## (undated) DEVICE — HOLDER STRIP-T SELF ADH 2X10IN

## (undated) DEVICE — SUPPORT ULNA NERVE PROTECTOR

## (undated) DEVICE — CARTRIDGE HEPARIN DOSE

## (undated) DEVICE — PAD DEFIB CADENCE ADULT R2

## (undated) DEVICE — DRAPE INCISE IOBAN 2 23X23IN

## (undated) DEVICE — SUT MONOCRYL PLUS UD 3-0 27

## (undated) DEVICE — CATH OPTITORQUE RADIAL 5FR

## (undated) DEVICE — DEVICE BASIXCOMPAK INFL 20ML

## (undated) DEVICE — GLOVE COTTON KNITTED CUFF

## (undated) DEVICE — TOWEL OR DISP STRL BLUE 4/PK

## (undated) DEVICE — SENZA CHARGER KIT

## (undated) DEVICE — CANNULA IMA 1MM

## (undated) DEVICE — TRAY CATH FOL SIL TEMP 10 16FR

## (undated) DEVICE — SYR 10CC LUER LOCK

## (undated) DEVICE — DRAPE C-ARM COVER EZ 36X28IN

## (undated) DEVICE — ELECTRODE PATIENT RETURN DISP

## (undated) DEVICE — SEE MEDLINE ITEM 157150

## (undated) DEVICE — CARTRIDGE HEPARIN 2 CHNNL ACT

## (undated) DEVICE — GLOVE BIOGEL 7.5

## (undated) DEVICE — INSERT INTRACK CLAMP ULT 66MM

## (undated) DEVICE — SUT ETHBND XTRA 1 OS-8 30IN

## (undated) DEVICE — DRAPE SLUSH WARMER WITH DISC

## (undated) DEVICE — BAG MEDI-PLAST DECANTER C-FLOW

## (undated) DEVICE — SOL LAC RINGERS 1000ML INJ

## (undated) DEVICE — CUSHION  WC FOAM 20X20X.75IN

## (undated) DEVICE — HEMOSTAT VASC BAND REG 24CM

## (undated) DEVICE — DRSNG POLYSKIN TRNSPAR 4X4.75

## (undated) DEVICE — COVER CIV-FLEX PROBE US 58IN

## (undated) DEVICE — DRAPE TOWEL TIBURON 19X30IN

## (undated) DEVICE — SOL PLASMALYTE PH 7.4 1000ML

## (undated) DEVICE — OMNIPAQUE 350MG 150ML VIAL

## (undated) DEVICE — CATH KARDIA PERI HI FLO 24FR

## (undated) DEVICE — SOL .9NACL PF 100 ML

## (undated) DEVICE — PILLOW HEAD REST

## (undated) DEVICE — GLOVE PROTEXIS NEOPRN SZ6.5

## (undated) DEVICE — INDEFLATOR ENCORE M001151062

## (undated) DEVICE — HEMOCONCENTRATOR PED .09M2

## (undated) DEVICE — SUT PROLENE 3-0 SH DA 36 BL

## (undated) DEVICE — DECANTER FLUID TRNSF WHITE 9IN

## (undated) DEVICE — TIP SUCTION YANKAUER

## (undated) DEVICE — APPLICATOR SURG 2 SPRY 1 PLUNG

## (undated) DEVICE — APPLICATOR CHLORAPREP ORN 26ML

## (undated) DEVICE — PACK SURG PERF CARDPULM BYPS

## (undated) DEVICE — CONTAINER SPECIMEN OR STER 4OZ

## (undated) DEVICE — INTRODUCER TRNSRADIAL 6F 10CM

## (undated) DEVICE — SHEET XLGE DRAPE

## (undated) DEVICE — PATCH SEALANT TACHOSIL 4.8X4.8

## (undated) DEVICE — SUT PROLENE 6-0 C-1 30IN BL

## (undated) DEVICE — SYR DISP LL 5CC

## (undated) DEVICE — GUIDEWIRE EMERALD 3MM 175X5CM

## (undated) DEVICE — KIT SURGICAL COLON .25 1.1OZ

## (undated) DEVICE — DRAPE RADIAL BRACHIAL 29X42IN

## (undated) DEVICE — CLIP HORIZON LIG TI MED-LG

## (undated) DEVICE — YANKAUER OPEN TIP W/O VENT

## (undated) DEVICE — BLANKET HYPER ADULT 24X60IN

## (undated) DEVICE — SOL ELECTROLYTE PH 7.4 500ML

## (undated) DEVICE — KIT SURGICAL TURNOVER

## (undated) DEVICE — DRESSING TELFA + BARR 4X6IN

## (undated) DEVICE — KIT TEMPLATE IPG

## (undated) DEVICE — GLOVE PROTEXIS PI SYN SURG 6.5

## (undated) DEVICE — CANNULA MC2 NVENT .5IN 28/36FR

## (undated) DEVICE — BLADE SURG STAINLESS STEEL #10

## (undated) DEVICE — TUBING INSUFFLATOR W/ROT CONCT

## (undated) DEVICE — SPONGE LAP STRL 18X18IN

## (undated) DEVICE — PORT POWER CLEAR VIEW: Type: IMPLANTABLE DEVICE | Status: NON-FUNCTIONAL

## (undated) DEVICE — PENCIL E-Z CLEAN ROCKER 15FT

## (undated) DEVICE — SYR SLIP TIP 10ML SHIELD

## (undated) DEVICE — TUBE SUCTION 6.5 TIP 6FR

## (undated) DEVICE — CONTAINER SPECIMEN SCREW 4OZ

## (undated) DEVICE — SUT SILK 0 BLK BR CT-1 30IN

## (undated) DEVICE — GAUZE SPONGE 4X4 12PLY

## (undated) DEVICE — CARTRIDGE SILV 4CHAN 2.0-3.5MG

## (undated) DEVICE — STOPCOCK 4-WAY

## (undated) DEVICE — BANDAGE ADHESIVE FABRIC 2X4

## (undated) DEVICE — SET EXT NAMIC ARTERIAL 12IN

## (undated) DEVICE — SENZA OMNIA PATIENT REMOTE KIT

## (undated) DEVICE — GUIDE LAUNCHER 6FR EBU 3.5

## (undated) DEVICE — CANNULA NON-VENT 24FR 14.5IN

## (undated) DEVICE — SENSOR LOW LEVEL OXYGEN

## (undated) DEVICE — SOL IRRI STRL WATER 1000ML

## (undated) DEVICE — CLIP JAW SPRING DBL SFT 6MM

## (undated) DEVICE — MOUTHPIECE ENDO 60FR

## (undated) DEVICE — SPONGE GAUZE 16PLY 4X4

## (undated) DEVICE — DRESSING TEGADERM CHG 3.5X4.5

## (undated) DEVICE — APPLICATOR ENDOSCP 32CM5MM2TIP

## (undated) DEVICE — CANNULA SOFT FLOW ANG 14IN 21F

## (undated) DEVICE — GLOVE PROTEXIS PI SYN SURG 7.5

## (undated) DEVICE — HEMOCONCENTRATOR W TBNG ADPT

## (undated) DEVICE — PENCIL ELECSURG ROCKER 15FT

## (undated) DEVICE — SYS VIRTUOSAPH PLUS EVM

## (undated) DEVICE — GOWN X-LG STERILE BACK

## (undated) DEVICE — CORD BIPOLAR 12 FOOT

## (undated) DEVICE — FORCEP ALLIGATOR 2.8MM W/NDL

## (undated) DEVICE — NDL BLUNT FILL 18G 1IN

## (undated) DEVICE — NDL FLTR 5MCRN BLNT TIP 18GX1

## (undated) DEVICE — SUT MCRYL PLUS 4-0 PS2 27IN

## (undated) DEVICE — KIT TUNNELING TOOL 35CM

## (undated) DEVICE — SEE MEDLINE ITEM 157125

## (undated) DEVICE — ADHESIVE DERMABOND ADVANCED

## (undated) DEVICE — SEE MEDLINE ITEM 157196

## (undated) DEVICE — DRAPE UTILITY W/ TAPE 20X30IN

## (undated) DEVICE — CATH THORACIC 28FR ST

## (undated) DEVICE — GLOVE PROTEXIS PI SYN SURG 7

## (undated) DEVICE — SEE MEDLINE ITEM 157166

## (undated) DEVICE — MANIFOLD 4 PORT

## (undated) DEVICE — SUT 2/0 36IN ETHIBOND EXCE

## (undated) DEVICE — KIT SURGIFLO HEMOSTATIC MATRIX

## (undated) DEVICE — BLADE SURG STAINLESS STEEL #11

## (undated) DEVICE — DRESSING TRANS 4X4 TEGADERM

## (undated) DEVICE — CANNULA AG CARDPLG 14G FLANGE

## (undated) DEVICE — GUIDEWIRE OMNI STR TIP 185CM

## (undated) DEVICE — GLOVE PROTEXIS BLUE LATEX 7

## (undated) DEVICE — CLAMP TOWEL EASY RELEASE TAB

## (undated) DEVICE — CATH TORCON BEACON NB 5F 100CM

## (undated) DEVICE — SET PERFUSION

## (undated) DEVICE — GUIDEWIRE VERRATA + STR 185CM

## (undated) DEVICE — SOL NACL IRR 3000ML

## (undated) DEVICE — DRESSING TELFA + RECT 6X10IN

## (undated) DEVICE — DRAIN CHEST DRY SUCTION

## (undated) DEVICE — SUT PROLENE 7-0 BV175-6

## (undated) DEVICE — SUT PROLENE 4-0 SH BLU 36IN

## (undated) DEVICE — DRAPE SURG W/TWL 17 5/8X23

## (undated) DEVICE — SOL NORMAL USPCA 0.9%

## (undated) DEVICE — SUT 2 30IN SILK BLK BRAIDE

## (undated) DEVICE — SUT PROLENE 4-0 RB-1 BL MO

## (undated) DEVICE — NDL HYPO REG 25G X 1 1/2

## (undated) DEVICE — ELECTRODE BLADE INSULATED 1 IN

## (undated) DEVICE — BLADE SCALP OPHTL BEVEL STR

## (undated) DEVICE — KIT C.A.T.S. FAST START

## (undated) DEVICE — GUIDEWIRE EMERALD .035IN 260CM

## (undated) DEVICE — BLADE SURGICAL SAFELOCK #10 ST

## (undated) DEVICE — PUNCH AORTIC ROT TIP 4.8MM

## (undated) DEVICE — CLOSURE SKIN STERI STRIP 1/2X4

## (undated) DEVICE — BOWL STERILE LG GRAD 32OZ

## (undated) DEVICE — GOWN POLY REINF BRTH SLV XL

## (undated) DEVICE — SUT STRATAFIX 3-0 30CM

## (undated) DEVICE — SUT VICRYL 3-0 27 SH

## (undated) DEVICE — PROTECTOR ONE-STEP ARM REG

## (undated) DEVICE — NDL SYR 10ML 18X1.5 LL BLUNT

## (undated) DEVICE — KIT VAVD

## (undated) DEVICE — SUT CTD VICRYL 0 UND BR

## (undated) DEVICE — INSERT INTRACK CLAMP ULTRA 88M